# Patient Record
Sex: MALE | Race: WHITE | NOT HISPANIC OR LATINO | Employment: OTHER | URBAN - METROPOLITAN AREA
[De-identification: names, ages, dates, MRNs, and addresses within clinical notes are randomized per-mention and may not be internally consistent; named-entity substitution may affect disease eponyms.]

---

## 2017-03-20 ENCOUNTER — TRANSCRIBE ORDERS (OUTPATIENT)
Dept: ADMINISTRATIVE | Facility: HOSPITAL | Age: 77
End: 2017-03-20

## 2017-03-20 ENCOUNTER — APPOINTMENT (OUTPATIENT)
Dept: LAB | Facility: HOSPITAL | Age: 77
End: 2017-03-20
Attending: INTERNAL MEDICINE
Payer: COMMERCIAL

## 2017-03-20 DIAGNOSIS — D64.9 ANEMIA, UNSPECIFIED: ICD-10-CM

## 2017-03-20 DIAGNOSIS — I10 ESSENTIAL HYPERTENSION, MALIGNANT: ICD-10-CM

## 2017-03-20 DIAGNOSIS — E78.2 MIXED HYPERLIPIDEMIA: ICD-10-CM

## 2017-03-20 DIAGNOSIS — E55.9 UNSPECIFIED VITAMIN D DEFICIENCY: ICD-10-CM

## 2017-03-20 DIAGNOSIS — I10 ESSENTIAL HYPERTENSION, MALIGNANT: Primary | ICD-10-CM

## 2017-03-20 LAB
25(OH)D3 SERPL-MCNC: 28.7 NG/ML (ref 30–100)
ALT SERPL W P-5'-P-CCNC: 22 U/L (ref 12–78)
ANION GAP SERPL CALCULATED.3IONS-SCNC: 5 MMOL/L (ref 4–13)
BUN SERPL-MCNC: 14 MG/DL (ref 5–25)
CALCIUM SERPL-MCNC: 8.9 MG/DL (ref 8.3–10.1)
CHLORIDE SERPL-SCNC: 104 MMOL/L (ref 100–108)
CHOLEST SERPL-MCNC: 121 MG/DL (ref 50–200)
CO2 SERPL-SCNC: 32 MMOL/L (ref 21–32)
CREAT SERPL-MCNC: 0.99 MG/DL (ref 0.6–1.3)
GFR SERPL CREATININE-BSD FRML MDRD: >60 ML/MIN/1.73SQ M
GLUCOSE P FAST SERPL-MCNC: 99 MG/DL (ref 65–99)
HCT VFR BLD AUTO: 43 % (ref 42–52)
HDLC SERPL-MCNC: 56 MG/DL (ref 40–60)
HGB BLD-MCNC: 14.2 G/DL (ref 14–18)
LDLC SERPL CALC-MCNC: 53 MG/DL (ref 0–100)
POTASSIUM SERPL-SCNC: 4.5 MMOL/L (ref 3.5–5.3)
SODIUM SERPL-SCNC: 141 MMOL/L (ref 136–145)
TRIGL SERPL-MCNC: 62 MG/DL

## 2017-03-20 PROCEDURE — 82306 VITAMIN D 25 HYDROXY: CPT

## 2017-03-20 PROCEDURE — 80061 LIPID PANEL: CPT | Performed by: INTERNAL MEDICINE

## 2017-03-20 PROCEDURE — 85014 HEMATOCRIT: CPT

## 2017-03-20 PROCEDURE — 85018 HEMOGLOBIN: CPT

## 2017-03-20 PROCEDURE — 36415 COLL VENOUS BLD VENIPUNCTURE: CPT

## 2017-03-20 PROCEDURE — 80048 BASIC METABOLIC PNL TOTAL CA: CPT

## 2017-03-20 PROCEDURE — 84460 ALANINE AMINO (ALT) (SGPT): CPT

## 2017-06-19 ENCOUNTER — APPOINTMENT (OUTPATIENT)
Dept: LAB | Facility: HOSPITAL | Age: 77
End: 2017-06-19
Attending: INTERNAL MEDICINE
Payer: COMMERCIAL

## 2017-06-19 ENCOUNTER — TRANSCRIBE ORDERS (OUTPATIENT)
Dept: ADMINISTRATIVE | Facility: HOSPITAL | Age: 77
End: 2017-06-19

## 2017-06-19 DIAGNOSIS — E78.00 PURE HYPERCHOLESTEROLEMIA: ICD-10-CM

## 2017-06-19 DIAGNOSIS — I10 ESSENTIAL HYPERTENSION, MALIGNANT: ICD-10-CM

## 2017-06-19 DIAGNOSIS — I10 ESSENTIAL HYPERTENSION, MALIGNANT: Primary | ICD-10-CM

## 2017-06-19 DIAGNOSIS — Z79.899 ENCOUNTER FOR LONG-TERM (CURRENT) USE OF OTHER MEDICATIONS: ICD-10-CM

## 2017-06-19 LAB
ALT SERPL W P-5'-P-CCNC: 22 U/L (ref 12–78)
ANION GAP SERPL CALCULATED.3IONS-SCNC: 6 MMOL/L (ref 4–13)
BUN SERPL-MCNC: 13 MG/DL (ref 5–25)
CALCIUM SERPL-MCNC: 8.9 MG/DL (ref 8.3–10.1)
CHLORIDE SERPL-SCNC: 104 MMOL/L (ref 100–108)
CHOLEST SERPL-MCNC: 114 MG/DL (ref 50–200)
CO2 SERPL-SCNC: 30 MMOL/L (ref 21–32)
CREAT SERPL-MCNC: 0.95 MG/DL (ref 0.6–1.3)
GFR SERPL CREATININE-BSD FRML MDRD: >60 ML/MIN/1.73SQ M
GLUCOSE P FAST SERPL-MCNC: 99 MG/DL (ref 65–99)
HDLC SERPL-MCNC: 51 MG/DL (ref 40–60)
LDLC SERPL CALC-MCNC: 48 MG/DL (ref 0–100)
POTASSIUM SERPL-SCNC: 4.3 MMOL/L (ref 3.5–5.3)
SODIUM SERPL-SCNC: 140 MMOL/L (ref 136–145)
TRIGL SERPL-MCNC: 77 MG/DL

## 2017-06-19 PROCEDURE — 36415 COLL VENOUS BLD VENIPUNCTURE: CPT

## 2017-06-19 PROCEDURE — 80048 BASIC METABOLIC PNL TOTAL CA: CPT

## 2017-06-19 PROCEDURE — 80061 LIPID PANEL: CPT | Performed by: INTERNAL MEDICINE

## 2017-06-19 PROCEDURE — 84460 ALANINE AMINO (ALT) (SGPT): CPT

## 2017-09-18 ENCOUNTER — APPOINTMENT (OUTPATIENT)
Dept: LAB | Facility: HOSPITAL | Age: 77
End: 2017-09-18
Attending: INTERNAL MEDICINE
Payer: COMMERCIAL

## 2017-09-18 ENCOUNTER — TRANSCRIBE ORDERS (OUTPATIENT)
Dept: ADMINISTRATIVE | Facility: HOSPITAL | Age: 77
End: 2017-09-18

## 2017-09-18 DIAGNOSIS — F31.9 BIPOLAR DISORDER, UNSPECIFIED (HCC): ICD-10-CM

## 2017-09-18 DIAGNOSIS — I10 ESSENTIAL HYPERTENSION, MALIGNANT: Primary | ICD-10-CM

## 2017-09-18 DIAGNOSIS — E78.00 PURE HYPERCHOLESTEROLEMIA: ICD-10-CM

## 2017-09-18 DIAGNOSIS — I10 ESSENTIAL HYPERTENSION, MALIGNANT: ICD-10-CM

## 2017-09-18 LAB
ALT SERPL W P-5'-P-CCNC: 19 U/L (ref 12–78)
ANION GAP SERPL CALCULATED.3IONS-SCNC: 5 MMOL/L (ref 4–13)
BUN SERPL-MCNC: 16 MG/DL (ref 5–25)
CALCIUM SERPL-MCNC: 8.9 MG/DL (ref 8.3–10.1)
CHLORIDE SERPL-SCNC: 106 MMOL/L (ref 100–108)
CHOLEST SERPL-MCNC: 126 MG/DL (ref 50–200)
CO2 SERPL-SCNC: 30 MMOL/L (ref 21–32)
CREAT SERPL-MCNC: 0.92 MG/DL (ref 0.6–1.3)
GFR SERPL CREATININE-BSD FRML MDRD: 80 ML/MIN/1.73SQ M
GLUCOSE P FAST SERPL-MCNC: 97 MG/DL (ref 65–99)
HDLC SERPL-MCNC: 50 MG/DL (ref 40–60)
LDLC SERPL CALC-MCNC: 63 MG/DL (ref 0–100)
POTASSIUM SERPL-SCNC: 4.1 MMOL/L (ref 3.5–5.3)
SODIUM SERPL-SCNC: 141 MMOL/L (ref 136–145)
TRIGL SERPL-MCNC: 66 MG/DL

## 2017-09-18 PROCEDURE — 36415 COLL VENOUS BLD VENIPUNCTURE: CPT

## 2017-09-18 PROCEDURE — 80061 LIPID PANEL: CPT

## 2017-09-18 PROCEDURE — 84460 ALANINE AMINO (ALT) (SGPT): CPT

## 2017-09-18 PROCEDURE — 80048 BASIC METABOLIC PNL TOTAL CA: CPT | Performed by: INTERNAL MEDICINE

## 2017-12-21 ENCOUNTER — APPOINTMENT (OUTPATIENT)
Dept: LAB | Facility: HOSPITAL | Age: 77
End: 2017-12-21
Attending: INTERNAL MEDICINE
Payer: COMMERCIAL

## 2017-12-21 ENCOUNTER — TRANSCRIBE ORDERS (OUTPATIENT)
Dept: ADMINISTRATIVE | Facility: HOSPITAL | Age: 77
End: 2017-12-21

## 2017-12-21 DIAGNOSIS — E78.00 PURE HYPERCHOLESTEROLEMIA: ICD-10-CM

## 2017-12-21 DIAGNOSIS — I10 ESSENTIAL HYPERTENSION, MALIGNANT: ICD-10-CM

## 2017-12-21 DIAGNOSIS — I10 ESSENTIAL HYPERTENSION, MALIGNANT: Primary | ICD-10-CM

## 2017-12-21 LAB
ALBUMIN SERPL BCP-MCNC: 3.7 G/DL (ref 3.5–5)
ALP SERPL-CCNC: 75 U/L (ref 46–116)
ALT SERPL W P-5'-P-CCNC: 23 U/L (ref 12–78)
ANION GAP SERPL CALCULATED.3IONS-SCNC: 8 MMOL/L (ref 4–13)
AST SERPL W P-5'-P-CCNC: 23 U/L (ref 5–45)
BILIRUB SERPL-MCNC: 0.8 MG/DL (ref 0.2–1)
BUN SERPL-MCNC: 12 MG/DL (ref 5–25)
CALCIUM SERPL-MCNC: 9.1 MG/DL (ref 8.3–10.1)
CHLORIDE SERPL-SCNC: 103 MMOL/L (ref 100–108)
CHOLEST SERPL-MCNC: 126 MG/DL (ref 50–200)
CO2 SERPL-SCNC: 30 MMOL/L (ref 21–32)
CREAT SERPL-MCNC: 1.05 MG/DL (ref 0.6–1.3)
GFR SERPL CREATININE-BSD FRML MDRD: 68 ML/MIN/1.73SQ M
GLUCOSE P FAST SERPL-MCNC: 101 MG/DL (ref 65–99)
HDLC SERPL-MCNC: 55 MG/DL (ref 40–60)
LDLC SERPL CALC-MCNC: 60 MG/DL (ref 0–100)
POTASSIUM SERPL-SCNC: 4.7 MMOL/L (ref 3.5–5.3)
PROT SERPL-MCNC: 7 G/DL (ref 6.4–8.2)
SODIUM SERPL-SCNC: 141 MMOL/L (ref 136–145)
TRIGL SERPL-MCNC: 54 MG/DL

## 2017-12-21 PROCEDURE — 80061 LIPID PANEL: CPT

## 2017-12-21 PROCEDURE — 80053 COMPREHEN METABOLIC PANEL: CPT | Performed by: INTERNAL MEDICINE

## 2017-12-21 PROCEDURE — 36415 COLL VENOUS BLD VENIPUNCTURE: CPT | Performed by: INTERNAL MEDICINE

## 2018-03-23 ENCOUNTER — APPOINTMENT (OUTPATIENT)
Dept: LAB | Facility: HOSPITAL | Age: 78
End: 2018-03-23
Attending: INTERNAL MEDICINE
Payer: COMMERCIAL

## 2018-03-23 ENCOUNTER — TRANSCRIBE ORDERS (OUTPATIENT)
Dept: ADMINISTRATIVE | Facility: HOSPITAL | Age: 78
End: 2018-03-23

## 2018-03-23 DIAGNOSIS — I10 ESSENTIAL HYPERTENSION, MALIGNANT: ICD-10-CM

## 2018-03-23 DIAGNOSIS — E78.00 PURE HYPERCHOLESTEROLEMIA: ICD-10-CM

## 2018-03-23 DIAGNOSIS — E78.00 PURE HYPERCHOLESTEROLEMIA: Primary | ICD-10-CM

## 2018-03-23 DIAGNOSIS — D64.9 ANEMIA, UNSPECIFIED TYPE: ICD-10-CM

## 2018-03-23 DIAGNOSIS — E55.9 VITAMIN D DEFICIENCY DISEASE: ICD-10-CM

## 2018-03-23 LAB
25(OH)D3 SERPL-MCNC: 26.1 NG/ML (ref 30–100)
ALBUMIN SERPL BCP-MCNC: 3.4 G/DL (ref 3.5–5)
ALP SERPL-CCNC: 82 U/L (ref 46–116)
ALT SERPL W P-5'-P-CCNC: 23 U/L (ref 12–78)
ANION GAP SERPL CALCULATED.3IONS-SCNC: 6 MMOL/L (ref 4–13)
AST SERPL W P-5'-P-CCNC: 20 U/L (ref 5–45)
BILIRUB SERPL-MCNC: 0.9 MG/DL (ref 0.2–1)
BUN SERPL-MCNC: 14 MG/DL (ref 5–25)
CALCIUM SERPL-MCNC: 8.8 MG/DL (ref 8.3–10.1)
CHLORIDE SERPL-SCNC: 103 MMOL/L (ref 100–108)
CHOLEST SERPL-MCNC: 133 MG/DL (ref 50–200)
CO2 SERPL-SCNC: 31 MMOL/L (ref 21–32)
CREAT SERPL-MCNC: 1.01 MG/DL (ref 0.6–1.3)
GFR SERPL CREATININE-BSD FRML MDRD: 71 ML/MIN/1.73SQ M
GLUCOSE P FAST SERPL-MCNC: 99 MG/DL (ref 65–99)
HCT VFR BLD AUTO: 43.1 % (ref 42–52)
HDLC SERPL-MCNC: 52 MG/DL (ref 40–60)
HGB BLD-MCNC: 14.2 G/DL (ref 14–18)
LDLC SERPL CALC-MCNC: 66 MG/DL (ref 0–100)
POTASSIUM SERPL-SCNC: 4.7 MMOL/L (ref 3.5–5.3)
PROT SERPL-MCNC: 6.9 G/DL (ref 6.4–8.2)
SODIUM SERPL-SCNC: 140 MMOL/L (ref 136–145)
TRIGL SERPL-MCNC: 75 MG/DL

## 2018-03-23 PROCEDURE — 80053 COMPREHEN METABOLIC PANEL: CPT | Performed by: INTERNAL MEDICINE

## 2018-03-23 PROCEDURE — 85018 HEMOGLOBIN: CPT

## 2018-03-23 PROCEDURE — 36415 COLL VENOUS BLD VENIPUNCTURE: CPT

## 2018-03-23 PROCEDURE — 80061 LIPID PANEL: CPT | Performed by: INTERNAL MEDICINE

## 2018-03-23 PROCEDURE — 85014 HEMATOCRIT: CPT

## 2018-03-23 PROCEDURE — 82306 VITAMIN D 25 HYDROXY: CPT

## 2018-06-19 ENCOUNTER — APPOINTMENT (OUTPATIENT)
Dept: LAB | Facility: HOSPITAL | Age: 78
End: 2018-06-19
Attending: INTERNAL MEDICINE
Payer: COMMERCIAL

## 2018-06-19 ENCOUNTER — TRANSCRIBE ORDERS (OUTPATIENT)
Dept: ADMINISTRATIVE | Facility: HOSPITAL | Age: 78
End: 2018-06-19

## 2018-06-19 DIAGNOSIS — I10 ESSENTIAL HYPERTENSION, MALIGNANT: ICD-10-CM

## 2018-06-19 DIAGNOSIS — I10 ESSENTIAL HYPERTENSION, MALIGNANT: Primary | ICD-10-CM

## 2018-06-19 DIAGNOSIS — E78.00 PURE HYPERCHOLESTEROLEMIA: ICD-10-CM

## 2018-06-19 LAB
ALT SERPL W P-5'-P-CCNC: 18 U/L (ref 12–78)
ANION GAP SERPL CALCULATED.3IONS-SCNC: 7 MMOL/L (ref 4–13)
BUN SERPL-MCNC: 11 MG/DL (ref 5–25)
CALCIUM SERPL-MCNC: 8.9 MG/DL (ref 8.3–10.1)
CHLORIDE SERPL-SCNC: 107 MMOL/L (ref 100–108)
CHOLEST SERPL-MCNC: 123 MG/DL (ref 50–200)
CO2 SERPL-SCNC: 30 MMOL/L (ref 21–32)
CREAT SERPL-MCNC: 1.04 MG/DL (ref 0.6–1.3)
GFR SERPL CREATININE-BSD FRML MDRD: 68 ML/MIN/1.73SQ M
GLUCOSE P FAST SERPL-MCNC: 100 MG/DL (ref 65–99)
HDLC SERPL-MCNC: 50 MG/DL (ref 40–60)
LDLC SERPL CALC-MCNC: 60 MG/DL (ref 0–100)
NONHDLC SERPL-MCNC: 73 MG/DL
POTASSIUM SERPL-SCNC: 4.6 MMOL/L (ref 3.5–5.3)
SODIUM SERPL-SCNC: 144 MMOL/L (ref 136–145)
TRIGL SERPL-MCNC: 67 MG/DL

## 2018-06-19 PROCEDURE — 36415 COLL VENOUS BLD VENIPUNCTURE: CPT

## 2018-06-19 PROCEDURE — 80048 BASIC METABOLIC PNL TOTAL CA: CPT | Performed by: INTERNAL MEDICINE

## 2018-06-19 PROCEDURE — 80061 LIPID PANEL: CPT

## 2018-06-19 PROCEDURE — 84460 ALANINE AMINO (ALT) (SGPT): CPT

## 2018-12-17 ENCOUNTER — TRANSCRIBE ORDERS (OUTPATIENT)
Dept: ADMINISTRATIVE | Facility: HOSPITAL | Age: 78
End: 2018-12-17

## 2018-12-17 ENCOUNTER — APPOINTMENT (OUTPATIENT)
Dept: LAB | Facility: HOSPITAL | Age: 78
End: 2018-12-17
Attending: INTERNAL MEDICINE
Payer: COMMERCIAL

## 2018-12-17 DIAGNOSIS — I10 ESSENTIAL HYPERTENSION, MALIGNANT: Primary | ICD-10-CM

## 2018-12-17 DIAGNOSIS — I10 ESSENTIAL HYPERTENSION, MALIGNANT: ICD-10-CM

## 2018-12-17 DIAGNOSIS — E78.00 PURE HYPERCHOLESTEROLEMIA: ICD-10-CM

## 2018-12-17 LAB
ALBUMIN SERPL BCP-MCNC: 3.4 G/DL (ref 3.5–5)
ALP SERPL-CCNC: 81 U/L (ref 46–116)
ALT SERPL W P-5'-P-CCNC: 28 U/L (ref 12–78)
ANION GAP SERPL CALCULATED.3IONS-SCNC: 5 MMOL/L (ref 4–13)
AST SERPL W P-5'-P-CCNC: 16 U/L (ref 5–45)
BILIRUB SERPL-MCNC: 0.6 MG/DL (ref 0.2–1)
BUN SERPL-MCNC: 14 MG/DL (ref 5–25)
CALCIUM SERPL-MCNC: 8.6 MG/DL (ref 8.3–10.1)
CHLORIDE SERPL-SCNC: 104 MMOL/L (ref 100–108)
CHOLEST SERPL-MCNC: 120 MG/DL (ref 50–200)
CO2 SERPL-SCNC: 31 MMOL/L (ref 21–32)
CREAT SERPL-MCNC: 1.09 MG/DL (ref 0.6–1.3)
GFR SERPL CREATININE-BSD FRML MDRD: 65 ML/MIN/1.73SQ M
GLUCOSE P FAST SERPL-MCNC: 97 MG/DL (ref 65–99)
HDLC SERPL-MCNC: 49 MG/DL (ref 40–60)
LDLC SERPL CALC-MCNC: 60 MG/DL (ref 0–100)
NONHDLC SERPL-MCNC: 71 MG/DL
POTASSIUM SERPL-SCNC: 4.3 MMOL/L (ref 3.5–5.3)
PROT SERPL-MCNC: 6.8 G/DL (ref 6.4–8.2)
SODIUM SERPL-SCNC: 140 MMOL/L (ref 136–145)
TRIGL SERPL-MCNC: 57 MG/DL

## 2018-12-17 PROCEDURE — 80061 LIPID PANEL: CPT

## 2018-12-17 PROCEDURE — 80053 COMPREHEN METABOLIC PANEL: CPT

## 2018-12-17 PROCEDURE — 36415 COLL VENOUS BLD VENIPUNCTURE: CPT

## 2019-06-24 ENCOUNTER — APPOINTMENT (OUTPATIENT)
Dept: LAB | Facility: HOSPITAL | Age: 79
End: 2019-06-24
Attending: INTERNAL MEDICINE
Payer: COMMERCIAL

## 2019-06-24 ENCOUNTER — TRANSCRIBE ORDERS (OUTPATIENT)
Dept: ADMINISTRATIVE | Facility: HOSPITAL | Age: 79
End: 2019-06-24

## 2019-06-24 DIAGNOSIS — D63.8 CHRONIC DISEASE ANEMIA: ICD-10-CM

## 2019-06-24 DIAGNOSIS — I10 ESSENTIAL HYPERTENSION, MALIGNANT: Primary | ICD-10-CM

## 2019-06-24 DIAGNOSIS — E78.00 PURE HYPERCHOLESTEROLEMIA: ICD-10-CM

## 2019-06-24 DIAGNOSIS — I10 ESSENTIAL HYPERTENSION, MALIGNANT: ICD-10-CM

## 2019-06-24 LAB
ALBUMIN SERPL BCP-MCNC: 3.5 G/DL (ref 3.5–5)
ALP SERPL-CCNC: 88 U/L (ref 46–116)
ALT SERPL W P-5'-P-CCNC: 20 U/L (ref 12–78)
ANION GAP SERPL CALCULATED.3IONS-SCNC: 4 MMOL/L (ref 4–13)
AST SERPL W P-5'-P-CCNC: 17 U/L (ref 5–45)
BILIRUB SERPL-MCNC: 0.8 MG/DL (ref 0.2–1)
BUN SERPL-MCNC: 11 MG/DL (ref 5–25)
CALCIUM SERPL-MCNC: 8.6 MG/DL (ref 8.3–10.1)
CHLORIDE SERPL-SCNC: 105 MMOL/L (ref 100–108)
CHOLEST SERPL-MCNC: 121 MG/DL (ref 50–200)
CO2 SERPL-SCNC: 30 MMOL/L (ref 21–32)
CREAT SERPL-MCNC: 1.07 MG/DL (ref 0.6–1.3)
GFR SERPL CREATININE-BSD FRML MDRD: 66 ML/MIN/1.73SQ M
GLUCOSE P FAST SERPL-MCNC: 94 MG/DL (ref 65–99)
HCT VFR BLD AUTO: 42 % (ref 36.5–49.3)
HDLC SERPL-MCNC: 50 MG/DL (ref 40–60)
HGB BLD-MCNC: 13.6 G/DL (ref 12–17)
LDLC SERPL CALC-MCNC: 59 MG/DL (ref 0–100)
NONHDLC SERPL-MCNC: 71 MG/DL
POTASSIUM SERPL-SCNC: 4.5 MMOL/L (ref 3.5–5.3)
PROT SERPL-MCNC: 6.8 G/DL (ref 6.4–8.2)
SODIUM SERPL-SCNC: 139 MMOL/L (ref 136–145)
TRIGL SERPL-MCNC: 58 MG/DL

## 2019-06-24 PROCEDURE — 80061 LIPID PANEL: CPT

## 2019-06-24 PROCEDURE — 36415 COLL VENOUS BLD VENIPUNCTURE: CPT | Performed by: INTERNAL MEDICINE

## 2019-06-24 PROCEDURE — 80053 COMPREHEN METABOLIC PANEL: CPT | Performed by: INTERNAL MEDICINE

## 2019-06-24 PROCEDURE — 85018 HEMOGLOBIN: CPT

## 2019-06-24 PROCEDURE — 85014 HEMATOCRIT: CPT

## 2019-10-14 PROBLEM — I35.1 NONRHEUMATIC AORTIC VALVE INSUFFICIENCY: Status: ACTIVE | Noted: 2019-10-14

## 2019-10-14 PROBLEM — I10 ESSENTIAL HYPERTENSION: Status: ACTIVE | Noted: 2019-10-14

## 2019-10-14 PROBLEM — J30.9 ALLERGIC RHINITIS: Status: ACTIVE | Noted: 2019-10-14

## 2019-10-14 PROBLEM — H90.5 SENSORINEURAL HEARING LOSS (SNHL): Status: ACTIVE | Noted: 2019-10-14

## 2019-10-14 PROBLEM — H91.90 HEARING DEFICIT: Status: ACTIVE | Noted: 2019-10-14

## 2019-10-14 PROBLEM — E78.00 HYPERCHOLESTEREMIA: Status: ACTIVE | Noted: 2019-10-14

## 2019-10-14 PROBLEM — F42.2 MIXED OBSESSIONAL THOUGHTS AND ACTS: Status: ACTIVE | Noted: 2019-10-14

## 2019-10-14 PROBLEM — I35.8 AORTIC VALVE SCLEROSIS: Status: ACTIVE | Noted: 2019-10-14

## 2019-10-14 PROBLEM — F33.42 RECURRENT MAJOR DEPRESSIVE DISORDER, IN FULL REMISSION (HCC): Status: ACTIVE | Noted: 2019-10-14

## 2019-10-14 PROBLEM — I34.0 MITRAL REGURGITATION: Status: ACTIVE | Noted: 2019-10-14

## 2019-12-03 ENCOUNTER — ANESTHESIA EVENT (INPATIENT)
Dept: PERIOP | Facility: HOSPITAL | Age: 79
DRG: 480 | End: 2019-12-03
Payer: COMMERCIAL

## 2019-12-03 ENCOUNTER — HOSPITAL ENCOUNTER (INPATIENT)
Facility: HOSPITAL | Age: 79
LOS: 10 days | DRG: 480 | End: 2019-12-13
Attending: EMERGENCY MEDICINE | Admitting: INTERNAL MEDICINE
Payer: COMMERCIAL

## 2019-12-03 ENCOUNTER — APPOINTMENT (EMERGENCY)
Dept: RADIOLOGY | Facility: HOSPITAL | Age: 79
DRG: 480 | End: 2019-12-03
Payer: COMMERCIAL

## 2019-12-03 DIAGNOSIS — S72.90XA: Primary | ICD-10-CM

## 2019-12-03 DIAGNOSIS — I10 ESSENTIAL HYPERTENSION: ICD-10-CM

## 2019-12-03 DIAGNOSIS — S72.91XA FEMUR FRACTURE, RIGHT (HCC): ICD-10-CM

## 2019-12-03 DIAGNOSIS — N50.89 SCROTAL SWELLING: ICD-10-CM

## 2019-12-03 DIAGNOSIS — I26.99 PULMONARY EMBOLI (HCC): ICD-10-CM

## 2019-12-03 DIAGNOSIS — N17.9 ACUTE RENAL FAILURE (ARF) (HCC): ICD-10-CM

## 2019-12-03 DIAGNOSIS — I48.92 ATRIAL FLUTTER (HCC): ICD-10-CM

## 2019-12-03 DIAGNOSIS — N50.82 SCROTAL PAIN: ICD-10-CM

## 2019-12-03 DIAGNOSIS — S72.90XA: ICD-10-CM

## 2019-12-03 PROBLEM — D72.829 LEUKOCYTOSIS: Status: ACTIVE | Noted: 2019-12-03
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
## 2019-12-03 PROBLEM — I99.9 VASCULAR DISEASE: Status: ACTIVE | Noted: 2019-12-03

## 2019-12-03 LAB
ABO GROUP BLD: NORMAL
ANION GAP SERPL CALCULATED.3IONS-SCNC: 6 MMOL/L (ref 4–13)
APTT PPP: 25 SECONDS (ref 23–37)
BASOPHILS # BLD MANUAL: 0 THOUSAND/UL (ref 0–0.1)
BASOPHILS NFR MAR MANUAL: 0 % (ref 0–1)
BLD GP AB SCN SERPL QL: NEGATIVE
BUN SERPL-MCNC: 12 MG/DL (ref 5–25)
CALCIUM SERPL-MCNC: 8.7 MG/DL (ref 8.3–10.1)
CHLORIDE SERPL-SCNC: 104 MMOL/L (ref 100–108)
CO2 SERPL-SCNC: 29 MMOL/L (ref 21–32)
CREAT SERPL-MCNC: 1.08 MG/DL (ref 0.6–1.3)
EOSINOPHIL # BLD MANUAL: 0.16 THOUSAND/UL (ref 0–0.4)
EOSINOPHIL NFR BLD MANUAL: 1 % (ref 0–6)
ERYTHROCYTE [DISTWIDTH] IN BLOOD BY AUTOMATED COUNT: 13.2 % (ref 11.6–15.1)
GFR SERPL CREATININE-BSD FRML MDRD: 65 ML/MIN/1.73SQ M
GLUCOSE SERPL-MCNC: 122 MG/DL (ref 65–140)
HCT VFR BLD AUTO: 40.4 % (ref 36.5–49.3)
HGB BLD-MCNC: 13.4 G/DL (ref 12–17)
INR PPP: 1.02 (ref 0.84–1.19)
LYMPHOCYTES # BLD AUTO: 1.44 THOUSAND/UL (ref 0.6–4.47)
LYMPHOCYTES # BLD AUTO: 9 % (ref 14–44)
MCH RBC QN AUTO: 32.4 PG (ref 26.8–34.3)
MCHC RBC AUTO-ENTMCNC: 33.2 G/DL (ref 31.4–37.4)
MCV RBC AUTO: 98 FL (ref 82–98)
MONOCYTES # BLD AUTO: 0.64 THOUSAND/UL (ref 0–1.22)
MONOCYTES NFR BLD: 4 % (ref 4–12)
NEUTROPHILS # BLD MANUAL: 13.79 THOUSAND/UL (ref 1.85–7.62)
NEUTS BAND NFR BLD MANUAL: 19 % (ref 0–8)
NEUTS SEG NFR BLD AUTO: 67 % (ref 43–75)
NRBC BLD AUTO-RTO: 0 /100 WBCS
PLATELET # BLD AUTO: 256 THOUSANDS/UL (ref 149–390)
PLATELET BLD QL SMEAR: ADEQUATE
PMV BLD AUTO: 9.3 FL (ref 8.9–12.7)
POTASSIUM SERPL-SCNC: 4.9 MMOL/L (ref 3.5–5.3)
PROTHROMBIN TIME: 13.5 SECONDS (ref 11.6–14.5)
RBC # BLD AUTO: 4.13 MILLION/UL (ref 3.88–5.62)
RBC MORPH BLD: NORMAL
RH BLD: POSITIVE
SODIUM SERPL-SCNC: 139 MMOL/L (ref 136–145)
SPECIMEN EXPIRATION DATE: NORMAL
TOTAL CELLS COUNTED SPEC: 100
WBC # BLD AUTO: 16.04 THOUSAND/UL (ref 4.31–10.16)

## 2019-12-03 PROCEDURE — 86850 RBC ANTIBODY SCREEN: CPT | Performed by: EMERGENCY MEDICINE

## 2019-12-03 PROCEDURE — 73502 X-RAY EXAM HIP UNI 2-3 VIEWS: CPT

## 2019-12-03 PROCEDURE — 71046 X-RAY EXAM CHEST 2 VIEWS: CPT

## 2019-12-03 PROCEDURE — 99223 1ST HOSP IP/OBS HIGH 75: CPT | Performed by: INTERNAL MEDICINE

## 2019-12-03 PROCEDURE — 86920 COMPATIBILITY TEST SPIN: CPT

## 2019-12-03 PROCEDURE — 80048 BASIC METABOLIC PNL TOTAL CA: CPT | Performed by: EMERGENCY MEDICINE

## 2019-12-03 PROCEDURE — 99285 EMERGENCY DEPT VISIT HI MDM: CPT

## 2019-12-03 PROCEDURE — 36415 COLL VENOUS BLD VENIPUNCTURE: CPT | Performed by: EMERGENCY MEDICINE

## 2019-12-03 PROCEDURE — 85007 BL SMEAR W/DIFF WBC COUNT: CPT | Performed by: EMERGENCY MEDICINE

## 2019-12-03 PROCEDURE — 85730 THROMBOPLASTIN TIME PARTIAL: CPT | Performed by: EMERGENCY MEDICINE

## 2019-12-03 PROCEDURE — 86901 BLOOD TYPING SEROLOGIC RH(D): CPT | Performed by: EMERGENCY MEDICINE

## 2019-12-03 PROCEDURE — 96374 THER/PROPH/DIAG INJ IV PUSH: CPT

## 2019-12-03 PROCEDURE — 93005 ELECTROCARDIOGRAM TRACING: CPT

## 2019-12-03 PROCEDURE — 85027 COMPLETE CBC AUTOMATED: CPT | Performed by: EMERGENCY MEDICINE

## 2019-12-03 PROCEDURE — 85610 PROTHROMBIN TIME: CPT | Performed by: EMERGENCY MEDICINE

## 2019-12-03 PROCEDURE — 73552 X-RAY EXAM OF FEMUR 2/>: CPT

## 2019-12-03 PROCEDURE — 86900 BLOOD TYPING SEROLOGIC ABO: CPT | Performed by: EMERGENCY MEDICINE

## 2019-12-03 PROCEDURE — 99285 EMERGENCY DEPT VISIT HI MDM: CPT | Performed by: EMERGENCY MEDICINE

## 2019-12-03 RX ORDER — ACETAMINOPHEN 325 MG/1
650 TABLET ORAL EVERY 6 HOURS PRN
Status: DISCONTINUED | OUTPATIENT
Start: 2019-12-03 | End: 2019-12-13 | Stop reason: HOSPADM

## 2019-12-03 RX ORDER — SODIUM CHLORIDE 9 MG/ML
50 INJECTION, SOLUTION INTRAVENOUS CONTINUOUS
Status: DISCONTINUED | OUTPATIENT
Start: 2019-12-03 | End: 2019-12-04

## 2019-12-03 RX ORDER — ONDANSETRON 2 MG/ML
4 INJECTION INTRAMUSCULAR; INTRAVENOUS EVERY 6 HOURS PRN
Status: DISCONTINUED | OUTPATIENT
Start: 2019-12-03 | End: 2019-12-04 | Stop reason: SDUPTHER

## 2019-12-03 RX ORDER — NIACIN 100 MG
500 TABLET ORAL
Status: DISCONTINUED | OUTPATIENT
Start: 2019-12-04 | End: 2019-12-13 | Stop reason: HOSPADM

## 2019-12-03 RX ORDER — LISINOPRIL 10 MG/1
10 TABLET ORAL DAILY
Status: DISCONTINUED | OUTPATIENT
Start: 2019-12-03 | End: 2019-12-05

## 2019-12-03 RX ORDER — HEPARIN SODIUM 5000 [USP'U]/ML
5000 INJECTION, SOLUTION INTRAVENOUS; SUBCUTANEOUS EVERY 8 HOURS SCHEDULED
Status: DISCONTINUED | OUTPATIENT
Start: 2019-12-03 | End: 2019-12-03

## 2019-12-03 RX ORDER — ASPIRIN 81 MG/1
81 TABLET ORAL DAILY
Status: DISCONTINUED | OUTPATIENT
Start: 2019-12-04 | End: 2019-12-07

## 2019-12-03 RX ORDER — FENTANYL CITRATE 50 UG/ML
1 INJECTION, SOLUTION INTRAMUSCULAR; INTRAVENOUS ONCE
Status: COMPLETED | OUTPATIENT
Start: 2019-12-03 | End: 2019-12-03

## 2019-12-03 RX ORDER — HYDRALAZINE HYDROCHLORIDE 20 MG/ML
5 INJECTION INTRAMUSCULAR; INTRAVENOUS EVERY 4 HOURS PRN
Status: DISCONTINUED | OUTPATIENT
Start: 2019-12-03 | End: 2019-12-13

## 2019-12-03 RX ORDER — ATORVASTATIN CALCIUM 10 MG/1
5 TABLET, FILM COATED ORAL DAILY
Status: DISCONTINUED | OUTPATIENT
Start: 2019-12-03 | End: 2019-12-13 | Stop reason: HOSPADM

## 2019-12-03 RX ORDER — CITALOPRAM 20 MG/1
40 TABLET ORAL DAILY
Status: DISCONTINUED | OUTPATIENT
Start: 2019-12-04 | End: 2019-12-13 | Stop reason: HOSPADM

## 2019-12-03 RX ORDER — FENTANYL CITRATE 50 UG/ML
50 INJECTION, SOLUTION INTRAMUSCULAR; INTRAVENOUS ONCE
Status: COMPLETED | OUTPATIENT
Start: 2019-12-03 | End: 2019-12-03

## 2019-12-03 RX ORDER — HEPARIN SODIUM 5000 [USP'U]/ML
5000 INJECTION, SOLUTION INTRAVENOUS; SUBCUTANEOUS EVERY 8 HOURS SCHEDULED
Status: DISCONTINUED | OUTPATIENT
Start: 2019-12-04 | End: 2019-12-04

## 2019-12-03 RX ORDER — OXYCODONE HYDROCHLORIDE AND ACETAMINOPHEN 5; 325 MG/1; MG/1
1 TABLET ORAL EVERY 4 HOURS PRN
Status: DISCONTINUED | OUTPATIENT
Start: 2019-12-03 | End: 2019-12-04

## 2019-12-03 RX ADMIN — SODIUM CHLORIDE 50 ML/HR: 0.9 INJECTION, SOLUTION INTRAVENOUS at 15:39

## 2019-12-03 RX ADMIN — FENTANYL CITRATE 50 MCG: 50 INJECTION, SOLUTION INTRAMUSCULAR; INTRAVENOUS at 12:15

## 2019-12-03 RX ADMIN — ATORVASTATIN CALCIUM 5 MG: 10 TABLET, FILM COATED ORAL at 15:38

## 2019-12-03 RX ADMIN — OXYCODONE HYDROCHLORIDE AND ACETAMINOPHEN 1 TABLET: 5; 325 TABLET ORAL at 15:39

## 2019-12-03 NOTE — ED PROVIDER NOTES
History  Chief Complaint   Patient presents with    Hip Injury     Brought in by EMS, fell getting out of car, external rotation and shortening RLE, no head strike, no LOC, no thinners, strong pulses  70-year-old male presenting for evaluation of right hip pain after a fall  Fall current just prior to arrival   Patient states he was getting out of his car when he lost his balance and fell landing on his right side, striking his right hip on the edge of the curb  Denies striking his head  No other areas of injury  Pain is localized to the right hip, does not radiate, worse with movement  Denies any numbness or weakness  Has not been able to bear weight since fall  No prior hip injuries/surgeries  Patient denies striking his head  On 81 mg aspirin, no other blood thinners  Received 50 mcg fentanyl EN route with EMS and has had significant improvement of pain  70-year-old male with right hip injury after a fall, will get x-ray to assess for osseous abnormality, preop lab/EKG/CXR          Prior to Admission Medications   Prescriptions Last Dose Informant Patient Reported? Taking? Multiple Vitamin (MULTIVITAMIN) tablet 12/3/2019 at Unknown time  Yes Yes   Sig: Take 1 tablet by mouth daily   aspirin (ECOTRIN LOW STRENGTH) 81 mg EC tablet 12/3/2019 at Unknown time  Yes Yes   Sig: Take 81 mg by mouth daily   atorvastatin (LIPITOR) 10 mg tablet 12/2/2019 at Unknown time  Yes Yes   Sig: Take 5 mg by mouth daily    citalopram (CeleXA) 40 mg tablet 12/2/2019 at Unknown time  Yes Yes   Sig: Take 40 mg by mouth daily   fosinopril (MONOPRIL) 10 mg tablet 12/3/2019 at Unknown time  No Yes   Sig: Take 1 tablet (10 mg total) by mouth daily   niacin 500 mg tablet 12/3/2019 at Unknown time  Yes Yes   Sig: Take 500 mg by mouth daily with breakfast      Facility-Administered Medications: None       Past Medical History:   Diagnosis Date    Hyperlipidemia        History reviewed   No pertinent surgical history  History reviewed  No pertinent family history  I have reviewed and agree with the history as documented  Social History     Tobacco Use    Smoking status: Never Smoker    Smokeless tobacco: Never Used   Substance Use Topics    Alcohol use: Never     Frequency: Never    Drug use: Never        Review of Systems   Constitutional: Negative for chills, fever and unexpected weight change  HENT: Negative for ear pain, rhinorrhea and sore throat  Eyes: Negative for pain and visual disturbance  Respiratory: Negative for cough and shortness of breath  Cardiovascular: Negative for chest pain and leg swelling  Gastrointestinal: Negative for abdominal pain, constipation, diarrhea, nausea and vomiting  Endocrine: Negative for polydipsia, polyphagia and polyuria  Genitourinary: Negative for dysuria, frequency, hematuria and urgency  Musculoskeletal: Negative for back pain, myalgias and neck pain         + right hip/thigh pain   Skin: Negative for color change and rash  Allergic/Immunologic: Negative for environmental allergies and immunocompromised state  Neurological: Negative for dizziness, weakness, light-headedness, numbness and headaches  Hematological: Negative for adenopathy  Does not bruise/bleed easily  Psychiatric/Behavioral: Negative for agitation and confusion  All other systems reviewed and are negative  Physical Exam  Physical Exam   Constitutional: He is oriented to person, place, and time  He appears well-developed and well-nourished  HENT:   Head: Normocephalic and atraumatic  Nose: Nose normal    Mouth/Throat: Oropharynx is clear and moist    Eyes: Conjunctivae and EOM are normal    Neck: Normal range of motion  Neck supple  Cardiovascular: Normal rate, regular rhythm, normal heart sounds and intact distal pulses  Pulmonary/Chest: Effort normal and breath sounds normal  No stridor  No respiratory distress  He has no wheezes  He has no rales   He exhibits no tenderness  Abdominal: Soft  He exhibits no distension  There is no tenderness  There is no rebound and no guarding  Musculoskeletal: He exhibits tenderness and deformity  He exhibits no edema  No tenderness to palpation over bilateral hips, significant tenderness to palpation over right proximal/mid thigh with deformity, distally neurovascularly intact with palpable DP/PT pulses, normal cap refill, sensation grossly intact   Neurological: He is alert and oriented to person, place, and time  He exhibits normal muscle tone  Coordination normal    Skin: Skin is warm and dry  No rash noted  Psychiatric: He has a normal mood and affect  Judgment and thought content normal    Nursing note and vitals reviewed        Vital Signs  ED Triage Vitals [12/03/19 1128]   Temperature Pulse Respirations Blood Pressure SpO2   97 9 °F (36 6 °C) 69 18 (!) 171/77 96 %      Temp Source Heart Rate Source Patient Position - Orthostatic VS BP Location FiO2 (%)   Oral Monitor Lying Right arm --      Pain Score       4           Vitals:    12/03/19 1128 12/03/19 1215 12/03/19 1337 12/03/19 1421   BP: (!) 171/77 166/74 149/66 126/76   Pulse: 69 61 87 72   Patient Position - Orthostatic VS: Lying Lying Lying Lying         Visual Acuity      ED Medications  Medications   aspirin (ECOTRIN LOW STRENGTH) EC tablet 81 mg (has no administration in time range)   atorvastatin (LIPITOR) tablet 5 mg (has no administration in time range)   citalopram (CeleXA) tablet 40 mg (has no administration in time range)   lisinopril (ZESTRIL) tablet 10 mg (10 mg Oral Not Given 12/3/19 1448)   niacin tablet 500 mg (has no administration in time range)   ondansetron (ZOFRAN) injection 4 mg (has no administration in time range)   sodium chloride 0 9 % infusion (has no administration in time range)   heparin (porcine) subcutaneous injection 5,000 Units (has no administration in time range)   acetaminophen (TYLENOL) tablet 650 mg (has no administration in time range)   oxyCODONE-acetaminophen (PERCOCET) 5-325 mg per tablet 1 tablet (has no administration in time range)   morphine injection 2 mg (has no administration in time range)   hydrALAZINE (APRESOLINE) injection 5 mg (has no administration in time range)   fentanyl citrate (PF) (FOR EMS ONLY) 100 mcg/2 mL injection 100 mcg (0 mcg Does not apply Given to EMS 12/3/19 1124)   fentanyl citrate (PF) 100 MCG/2ML 50 mcg (50 mcg Intravenous Given 12/3/19 1215)       Diagnostic Studies  Results Reviewed     Procedure Component Value Units Date/Time    CBC and differential [[id number]]  (Abnormal) Collected:  12/03/19 1214    Lab Status:  Final result Specimen:  Blood from Arm, Left Updated:  12/03/19 1252     WBC 16 04 Thousand/uL      RBC 4 13 Million/uL      Hemoglobin 13 4 g/dL      Hematocrit 40 4 %      MCV 98 fL      MCH 32 4 pg      MCHC 33 2 g/dL      RDW 13 2 %      MPV 9 3 fL      Platelets 355 Thousands/uL      nRBC 0 /100 WBCs     Narrative: This is an appended report  These results have been appended to a previously verified report      APTT [[id number]]  (Normal) Collected:  12/03/19 1214    Lab Status:  Final result Specimen:  Blood from Arm, Left Updated:  12/03/19 1244     PTT 25 seconds     Protime-INR [[id number]]  (Normal) Collected:  12/03/19 1214    Lab Status:  Final result Specimen:  Blood from Arm, Left Updated:  12/03/19 1244     Protime 13 5 seconds      INR 6 46    Basic metabolic panel [[id number]] Collected:  12/03/19 1214    Lab Status:  Final result Specimen:  Blood from Arm, Left Updated:  12/03/19 1235     Sodium 139 mmol/L      Potassium 4 9 mmol/L      Chloride 104 mmol/L      CO2 29 mmol/L      ANION GAP 6 mmol/L      BUN 12 mg/dL      Creatinine 1 08 mg/dL      Glucose 122 mg/dL      Calcium 8 7 mg/dL      eGFR 65 ml/min/1 73sq m     Narrative:       [facility] guidelines for Chronic Kidney Disease (CKD):     Stage 1 with normal or high GFR (GFR > 90 mL/min/1 73 square meters)    Stage 2 Mild CKD (GFR = 60-89 mL/min/1 73 square meters)    Stage 3A Moderate CKD (GFR = 45-59 mL/min/1 73 square meters)    Stage 3B Moderate CKD (GFR = 30-44 mL/min/1 73 square meters)    Stage 4 Severe CKD (GFR = 15-29 mL/min/1 73 square meters)    Stage 5 End Stage CKD (GFR <15 mL/min/1 73 square meters)  Note: GFR calculation is accurate only with a steady state creatinine                 XR hip/pelv 2-3 vws right   Final Result by Sol Gregory MD (12/03 1442)      Comminuted intertrochanteric femoral fracture extending into the subtrochanteric femur  Workstation performed: [id number]         XR femur 2 views RIGHT   Final Result by Leonie Perez MD (12/03 1440)      Comminuted intertrochanteric femoral fracture extending into the subtrochanteric femur  Workstation performed: [id number]         XR chest 2 views   Final Result by Leonie Perez MD (12/03 1441)      No acute cardiopulmonary disease  Multilevel age indeterminate compression deformities of multiple thoracic vertebral bodies          Workstation performed: [id number]                    Procedures  Procedures         ED Course  ED Course as of Dec 03 1506   Tue Dec 03, 2019   1217 EKG: sinus @ 56 bpm, normal axis, normal intervals, no ST/t wave changes      1253 Last PO 7:30 am      1259 Discussed with ortho- vinicio, recommends SLIM admit/clearance, possible OR today vs  tomorrow                                  MDM  Number of Diagnoses or Management Options  Closed femur fracture [facility]):   Diagnosis management comments: 63-year-old male status post mechanical fall with right femur fracture, case discussed with Orthopedic surgery, admitted to slim       Amount and/or Complexity of Data Reviewed  Clinical lab tests: ordered and reviewed  Tests in the radiology section of CPT®: ordered and reviewed  Tests in the medicine section of CPT®: ordered and reviewed  Review and summarize past medical records: yes  Discuss the patient with other providers: yes (Orthopedic surgery)  Independent visualization of images, tracings, or specimens: yes          Disposition  Final diagnoses:   Closed femur fracture ([address] )     Time reflects when diagnosis was documented in both MDM as applicable and the Disposition within this note     Time User Action Codes Description Comment    12/3/2019  1:20 PM Wild BLANC Add [S72 [id number]] Closed femur fracture [facility])       ED Disposition     ED Disposition Condition Date/Time Comment    Admit Stable Tue Dec 3, 2019  1:01 PM Case was discussed with MINERVA and the patient's admission status was agreed to be Admission Status: inpatient status to the service of Dr Josh Stroud    None         Current Discharge Medication List      CONTINUE these medications which have NOT CHANGED    Details   aspirin (ECOTRIN LOW STRENGTH) 81 mg EC tablet Take 81 mg by mouth daily      atorvastatin (LIPITOR) 10 mg tablet Take 5 mg by mouth daily       citalopram (CeleXA) 40 mg tablet Take 40 mg by mouth daily      fosinopril (MONOPRIL) 10 mg tablet Take 1 tablet (10 mg total) by mouth daily  Qty: 90 tablet, Refills: 1    Associated Diagnoses: Essential hypertension      Multiple Vitamin (MULTIVITAMIN) tablet Take 1 tablet by mouth daily      niacin 500 mg tablet Take 500 mg by mouth daily with breakfast           No discharge procedures on file      ED Provider  Electronically Signed by           Idania Johns DO  12/03/19 5662

## 2019-12-03 NOTE — ASSESSMENT & PLAN NOTE
Status post mechanical fall  Patient tripped over curb while getting out of car  X-ray imaging revealed a right-sided femur fracture-official read pending on admission  ED spoke with orthopedic team who plans to take patient to the OR today  Hemoglobin on admission noted to be 13 4  Pain control- Percocet p r n-moderate pain and morphine p r n-severe pain  Orthopedics plans to take patient to OR today 12/4/19  PT/OT after surgery    Surgical clearance-patient denies history of CVA/TIA, CAD, prior stent, or history of PE  Currently without any active chest pain, shortness of breath or palpitations  No preceding symptoms prior to the fall  EKG without acute findings  Official clearance from attending to follow

## 2019-12-03 NOTE — PLAN OF CARE
Problem: Prexisting or High Potential for Compromised Skin Integrity  Goal: Skin integrity is maintained or improved  Description  INTERVENTIONS:  - Identify patients at risk for skin breakdown  - Assess and monitor skin integrity  - Assess and monitor nutrition and hydration status  - Monitor labs   - Assess for incontinence   - Turn and reposition patient  - Assist with mobility/ambulation  - Relieve pressure over bony prominences  - Avoid friction and shearing  - Provide appropriate hygiene as needed including keeping skin clean and dry  - Evaluate need for skin moisturizer/barrier cream  - Collaborate with interdisciplinary team   - Patient/family teaching  - Consider wound care consult   Outcome: Progressing     Problem: Potential for Falls  Goal: Patient will remain free of falls  Description  INTERVENTIONS:  - Assess patient frequently for physical needs  -  Identify cognitive and physical deficits and behaviors that affect risk of falls    -  [place] fall precautions as indicated by assessment   - Educate patient/family on patient safety including physical limitations  - Instruct patient to call for assistance with activity based on assessment  - Modify environment to reduce risk of injury  - Consider OT/PT consult to assist with strengthening/mobility  Outcome: Progressing

## 2019-12-03 NOTE — ASSESSMENT & PLAN NOTE
Home regimen of Monopril 10 mg daily  Substitute for formulary lisinopril here  Has some elevated blood pressures upon arrival and 171/77, 166/74  Has trended down without intervention  Likely secondary to pain  Will continue to monitor    Add prn hydralazine for SBP>160

## 2019-12-03 NOTE — ASSESSMENT & PLAN NOTE
Status post mechanical fall while getting out of car  No preceding symptoms  Patient did not hit his head or lose consciousness  He landed on his right hip  Unable to ambulate    Plan for OR to fix right femur fracture  Fall precautions  PT/OT

## 2019-12-03 NOTE — H&P
Addendum:  Notified by Dr Rudolph Durant of Orthopedics  She plans to take the patient to the OR tomorrow 12/4/19 for fixation  Patient may eat now  NPO after midnight  Family updated at bedside  Continue pain control  H&P- Gin Guaman 1940, 78 y o  male MRN: [id number]    Unit/Bed#: E2 -01 Encounter: [id number]    Primary Care Provider: Wilson Vogt MD   Date and time admitted to hospital: 12/3/2019 11:20 AM      * Femur fracture, right [facility])  Assessment & Plan  Status post mechanical fall  Patient tripped over curb while getting out of car  X-ray imaging revealed a right-sided femur fracture-official read pending on admission  ED spoke with orthopedic team who plans to take patient to the OR today  Hemoglobin on admission noted to be 13 4  Has been NPO since 07/30 this morning  Continue NPO  Pain control- Percocet p r n-moderate pain and morphine p r n-severe pain  Orthopedics consulted  PT/OT after surgery    Surgical clearance-patient denies history of CVA/TIA, CAD, prior stent, or history of PE  Currently without any active chest pain, shortness of breath or palpitations  No preceding symptoms prior to the fall  EKG without acute findings  Official clearance from attending to follow  Leukocytosis  Assessment & Plan  WBC 16 04  No signs of acute infection  Continue to trend  Fall  Assessment & Plan  Status post mechanical fall while getting out of car  No preceding symptoms  Patient did not hit his head or lose consciousness  He landed on his right hip  Unable to ambulate  Plan for OR to fix right femur fracture  Fall precautions  PT/OT    Vascular disease  Assessment & Plan  According to most recent PCP note from 10/14/19 patient does have valvular disease  Noted to have mild-to-moderate aortic regurgitation, mild mitral regurgitation, and a normal ejection fraction on stress test from 3/19/18 and echocardiogram from 3/20/18      Hyperlipidemia  Assessment & Plan  On atorvastatin 5 mg daily    Recurrent major depressive disorder, in full remission ([facility] [place] [address] )  Assessment & Plan  Continue Celexa    Essential hypertension  Assessment & Plan  Home regimen of Monopril 10 mg daily  Substitute for formulary lisinopril here  Has some elevated blood pressures upon arrival and 171/77, 166/74  Has trended down without intervention and most recent is 149/66  Likely secondary to pain  Will continue to monitor  Sensorineural hearing loss (SNHL)  Assessment & Plan  Hard of hearing  Has hearing aids in place  Will give to wife to take home      VTE Prophylaxis: Enoxaparin (Lovenox)  / sequential compression device   Code Status:  Full code  Anticipated Length of Stay:  Patient will be admitted on an Inpatient basis with an anticipated length of stay of  greater than 2 midnights  Justification for Hospital Stay:  Femur fracture with need for further OR fixation    Chief Complaint:   Status post fall    History of Present Illness:    Maggi Segal is a 78 y o  male with past medical history of essential hypertension, anxiety/depression, hyperlipidemia, sensorineural hearing loss, valvular disease, chronic back pain  Patient presents status post fall  Him and his wife were going to visit their granddaughter  He was getting out of the passenger side car door when he tripped over the near by curb  Patient fell and landed on his right hip  He did not his head or lose consciousness  Denies any preceding symptoms such as shortness of breath, chest pain, lightheadedness, dizziness, headache  Wife was in the  seat when patient fell  She immediately got out to help and called EMS  At baseline patient does not use a cane or walker to ambulate  He is a fairly active 51-year-old  He does see a chiropractor once a week for chronic back problems but does not take any chronic pain medications  No history of smoking, drinking, or alcohol consumption    History was obtained with the help of wife and 2 daughters at bedside- updated at length  Review of Systems:    General:   No Fever or chills; + right leg pain with movement   EENT:   No ear pain, facial swelling; No sneezing, sore throat  Skin:   No rashes, color changes  Respiratory:     No shortness of breath, cough,    Cardiovascular:     No chest pain, palpitations  Gastrointestinal:    No nausea, vomiting, diarrhea; No abdominal pain  Musculoskeletal:     No arthralgias, myalgias, swelling  Neurologic:   No dizziness, numbness, weakness  No speech difficulties  Psych:   No agitation,     Otherwise, All other twelve-point review of systems normal      Past Medical and Surgical History:     Past Medical History:   Diagnosis Date    Hyperlipidemia        History reviewed  No pertinent surgical history      Meds/Allergies:    Current Facility-Administered Medications   Medication Dose Route Frequency Provider Last Rate Last Dose    acetaminophen (TYLENOL) tablet 650 mg  650 mg Oral Q6H PRN David Garcia PA-C        [START ON 12/4/2019] aspirin (ECOTRIN LOW STRENGTH) EC tablet 81 mg  81 mg Oral Daily Laila Bautista PA-C        atorvastatin (LIPITOR) tablet 5 mg  5 mg Oral Daily [facility] [place]        [START ON 12/4/2019] citalopram (CeleXA) tablet 40 mg  40 mg Oral Daily [facility] [place]        heparin (porcine) subcutaneous injection 5,000 Units  5,000 Units Subcutaneous Q8H [address] Laila Bautista PA-C        lisinopril (ZESTRIL) tablet 10 mg  10 mg Oral Daily [facility] [place]        [START ON 12/4/2019] niacin tablet 500 mg  500 mg Oral Daily With Breakfast Laila Bautista PA-C        ondansetron (ZOFRAN) injection 4 mg  4 mg Intravenous Q6H PRN [facility] [place]        sodium chloride 0 9 % infusion  50 mL/hr Intravenous Continuous [facility] [place]           No Known Allergies    Allergies: No Known Allergies    Social History:     Marital Status: /Civil Union     Substance Use History:   Social History Substance and Sexual Activity   Alcohol Use Never    Frequency: Never     Social History     Tobacco Use   Smoking Status Never Smoker   Smokeless Tobacco Never Used     Social History     Substance and Sexual Activity   Drug Use Never       Family History:    non-contributory    Physical Exam:     Vitals:   Blood Pressure: 126/76 (12/03/19 1421)  Pulse: 72 (12/03/19 1421)  Temperature: 98 8 °F (37 1 °C) (12/03/19 1421)  Temp Source: Tympanic (12/03/19 1421)  Respirations: 18 (12/03/19 1421)  Height: 5' 9" (175 3 cm) (12/03/19 1421)  Weight - Scale: 83 9 kg (185 lb) (12/03/19 1421)  SpO2: 97 % (12/03/19 1421)    Physical Exam   Constitutional: He is oriented to person, place, and time  He appears well-developed and well-nourished  HENT:   Head: Normocephalic and atraumatic  Cardiovascular: Normal rate and regular rhythm  Murmur heard  Pulmonary/Chest: Effort normal and breath sounds normal  No stridor  No respiratory distress  He has no wheezes  He has no rales  He exhibits no tenderness  Abdominal: Soft  Bowel sounds are normal  He exhibits no distension and no mass  There is no tenderness  There is no rebound and no guarding  No hernia  Musculoskeletal:   Right leg externally rotated  Sensation intact to lower extremities bilaterally  Neurological: He is alert and oriented to person, place, and time  Skin: Skin is warm and dry  Nursing note and vitals reviewed  Additional Data:     Lab Results: I have personally reviewed pertinent reports         Results from last 7 days   Lab Units 12/03/19  1214   WBC Thousand/uL 16 04*   HEMOGLOBIN g/dL 13 4   HEMATOCRIT % 40 4   PLATELETS Thousands/uL 256   LYMPHO PCT % 9*   MONO PCT % 4   EOS PCT % 1     Results from last 7 days   Lab Units 12/03/19  1214   POTASSIUM mmol/L 4 9   CHLORIDE mmol/L 104   CO2 mmol/L 29   BUN mg/dL 12   CREATININE mg/dL 1 08   CALCIUM mg/dL 8 7     Results from last 7 days   Lab Units 12/03/19  1214   INR  1 02 Imaging: I have personally reviewed pertinent films in PACS    No results found  EKG, Pathology, and Other Studies Reviewed on Admission:   · EK sinus    [employer] Records Reviewed: Yes     Total Time for Visit, including Counseling / Coordination of Care: 45 minutes  Greater than 50% of this total time spent on direct patient counseling and coordination of care  ** Please Note: This note has been constructed using a voice recognition system   **

## 2019-12-03 NOTE — ASSESSMENT & PLAN NOTE
Status post mechanical fall  Patient tripped over curb while getting out of car  X-ray imaging revealed a right-sided femur fracture-official read pending on admission  ED spoke with orthopedic team who plans to take patient to the OR today  Has been NPO since 07/30 this morning  Continue NPO  Pain control- Percocet p r n-moderate pain and morphine p r n-severe pain  Orthopedics consulted  PT/OT after surgery    Surgical clearance-patient denies history of CVA/TIA, CAD, prior stent, or history of PE  Currently without any active chest pain, shortness of breath or palpitations  No preceding symptoms prior to the fall  EKG without acute findings  Official clearance from attending to follow

## 2019-12-03 NOTE — ASSESSMENT & PLAN NOTE
According to most recent PCP note from 10/14/19 patient does have valvular disease  Noted to have mild-to-moderate aortic regurgitation, mild mitral regurgitation, and a normal ejection fraction on stress test from 3/19/18 and echocardiogram from 3/20/18

## 2019-12-03 NOTE — ANESTHESIA PREPROCEDURE EVALUATION
Review of Systems/Medical History  Patient summary reviewed  Chart reviewed  No history of anesthetic complications     Cardiovascular  Hyperlipidemia, Hypertension , Valvular heart disease (AR, MR) ,    Pulmonary  Negative pulmonary ROS        GI/Hepatic  Negative GI/hepatic ROS               Endo/Other     GYN       Hematology  Negative hematology ROS      Musculoskeletal  Back pain ,   Comment: Right femur fracture      Neurology  Negative neurology ROS      Psychology   Psychiatric history (OCD), Depression ,              Physical Exam    Airway      TM Distance: >3 FB  Neck ROM: full     Dental       Cardiovascular  Rhythm: regular, Rate: normal, Murmur, Cardiovascular exam normal    Pulmonary  Pulmonary exam normal     Other Findings        Anesthesia Plan  ASA Score- 2     Anesthesia Type- general with ASA Monitors  Additional Monitors:   Airway Plan:         Plan Factors-    Induction- intravenous  Postoperative Plan-     Informed Consent- Anesthetic plan and risks discussed with patient

## 2019-12-04 ENCOUNTER — APPOINTMENT (INPATIENT)
Dept: RADIOLOGY | Facility: HOSPITAL | Age: 79
DRG: 480 | End: 2019-12-04
Payer: COMMERCIAL

## 2019-12-04 ENCOUNTER — ANESTHESIA (INPATIENT)
Dept: PERIOP | Facility: HOSPITAL | Age: 79
DRG: 480 | End: 2019-12-04
Payer: COMMERCIAL

## 2019-12-04 LAB
ANION GAP SERPL CALCULATED.3IONS-SCNC: 10 MMOL/L (ref 4–13)
BUN SERPL-MCNC: 24 MG/DL (ref 5–25)
CALCIUM SERPL-MCNC: 7.9 MG/DL (ref 8.3–10.1)
CHLORIDE SERPL-SCNC: 104 MMOL/L (ref 100–108)
CO2 SERPL-SCNC: 25 MMOL/L (ref 21–32)
CREAT SERPL-MCNC: 1.81 MG/DL (ref 0.6–1.3)
ERYTHROCYTE [DISTWIDTH] IN BLOOD BY AUTOMATED COUNT: 13.4 % (ref 11.6–15.1)
ERYTHROCYTE [DISTWIDTH] IN BLOOD BY AUTOMATED COUNT: 13.9 % (ref 11.6–15.1)
GFR SERPL CREATININE-BSD FRML MDRD: 35 ML/MIN/1.73SQ M
GLUCOSE SERPL-MCNC: 165 MG/DL (ref 65–140)
HCT VFR BLD AUTO: 27.7 % (ref 36.5–49.3)
HCT VFR BLD AUTO: 35 % (ref 36.5–49.3)
HGB BLD-MCNC: 11.5 G/DL (ref 12–17)
HGB BLD-MCNC: 8.9 G/DL (ref 12–17)
MCH RBC QN AUTO: 32.2 PG (ref 26.8–34.3)
MCH RBC QN AUTO: 33.2 PG (ref 26.8–34.3)
MCHC RBC AUTO-ENTMCNC: 32.1 G/DL (ref 31.4–37.4)
MCHC RBC AUTO-ENTMCNC: 32.9 G/DL (ref 31.4–37.4)
MCV RBC AUTO: 103 FL (ref 82–98)
MCV RBC AUTO: 98 FL (ref 82–98)
PLATELET # BLD AUTO: 205 THOUSANDS/UL (ref 149–390)
PLATELET # BLD AUTO: 247 THOUSANDS/UL (ref 149–390)
PMV BLD AUTO: 9.6 FL (ref 8.9–12.7)
PMV BLD AUTO: 9.8 FL (ref 8.9–12.7)
POTASSIUM SERPL-SCNC: 4.8 MMOL/L (ref 3.5–5.3)
RBC # BLD AUTO: 2.68 MILLION/UL (ref 3.88–5.62)
RBC # BLD AUTO: 3.57 MILLION/UL (ref 3.88–5.62)
SODIUM SERPL-SCNC: 139 MMOL/L (ref 136–145)
WBC # BLD AUTO: 12.27 THOUSAND/UL (ref 4.31–10.16)
WBC # BLD AUTO: 15.36 THOUSAND/UL (ref 4.31–10.16)

## 2019-12-04 PROCEDURE — 30233N1 TRANSFUSION OF NONAUTOLOGOUS RED BLOOD CELLS INTO PERIPHERAL VEIN, PERCUTANEOUS APPROACH: ICD-10-PCS | Performed by: INTERNAL MEDICINE

## 2019-12-04 PROCEDURE — 73552 X-RAY EXAM OF FEMUR 2/>: CPT

## 2019-12-04 PROCEDURE — C1713 ANCHOR/SCREW BN/BN,TIS/BN: HCPCS | Performed by: ORTHOPAEDIC SURGERY

## 2019-12-04 PROCEDURE — 85027 COMPLETE CBC AUTOMATED: CPT | Performed by: PHYSICIAN ASSISTANT

## 2019-12-04 PROCEDURE — C1769 GUIDE WIRE: HCPCS | Performed by: ORTHOPAEDIC SURGERY

## 2019-12-04 PROCEDURE — 80048 BASIC METABOLIC PNL TOTAL CA: CPT | Performed by: PHYSICIAN ASSISTANT

## 2019-12-04 PROCEDURE — 99232 SBSQ HOSP IP/OBS MODERATE 35: CPT | Performed by: PHYSICIAN ASSISTANT

## 2019-12-04 PROCEDURE — 27245 TREAT THIGH FRACTURE: CPT | Performed by: ORTHOPAEDIC SURGERY

## 2019-12-04 PROCEDURE — 99024 POSTOP FOLLOW-UP VISIT: CPT | Performed by: ORTHOPAEDIC SURGERY

## 2019-12-04 PROCEDURE — 99223 1ST HOSP IP/OBS HIGH 75: CPT | Performed by: ORTHOPAEDIC SURGERY

## 2019-12-04 PROCEDURE — 0QS604Z REPOSITION RIGHT UPPER FEMUR WITH INTERNAL FIXATION DEVICE, OPEN APPROACH: ICD-10-PCS | Performed by: ORTHOPAEDIC SURGERY

## 2019-12-04 PROCEDURE — 27245 TREAT THIGH FRACTURE: CPT | Performed by: PHYSICIAN ASSISTANT

## 2019-12-04 DEVICE — 11MM/130 DEG TI CANN TROCH FIXATION NAIL 400MM/RIGHT-STER: Type: IMPLANTABLE DEVICE | Site: LEG | Status: FUNCTIONAL

## 2019-12-04 DEVICE — 11.0MM TI TROCH FIXATION NAIL SCREW/110MM - STERILE: Type: IMPLANTABLE DEVICE | Site: LEG | Status: FUNCTIONAL

## 2019-12-04 DEVICE — 5.0MM TI LOCKING SCREW 50MM- FOR IM NAILS-STERILE: Type: IMPLANTABLE DEVICE | Site: LEG | Status: FUNCTIONAL

## 2019-12-04 RX ORDER — ONDANSETRON 2 MG/ML
4 INJECTION INTRAMUSCULAR; INTRAVENOUS ONCE AS NEEDED
Status: DISCONTINUED | OUTPATIENT
Start: 2019-12-04 | End: 2019-12-04 | Stop reason: HOSPADM

## 2019-12-04 RX ORDER — ONDANSETRON 2 MG/ML
INJECTION INTRAMUSCULAR; INTRAVENOUS AS NEEDED
Status: DISCONTINUED | OUTPATIENT
Start: 2019-12-04 | End: 2019-12-04 | Stop reason: SURG

## 2019-12-04 RX ORDER — FENTANYL CITRATE 50 UG/ML
INJECTION, SOLUTION INTRAMUSCULAR; INTRAVENOUS AS NEEDED
Status: DISCONTINUED | OUTPATIENT
Start: 2019-12-04 | End: 2019-12-04 | Stop reason: SURG

## 2019-12-04 RX ORDER — HYDROMORPHONE HCL/PF 1 MG/ML
0.5 SYRINGE (ML) INJECTION
Status: DISCONTINUED | OUTPATIENT
Start: 2019-12-04 | End: 2019-12-04 | Stop reason: HOSPADM

## 2019-12-04 RX ORDER — NEOSTIGMINE METHYLSULFATE 1 MG/ML
INJECTION INTRAVENOUS AS NEEDED
Status: DISCONTINUED | OUTPATIENT
Start: 2019-12-04 | End: 2019-12-04 | Stop reason: SURG

## 2019-12-04 RX ORDER — PANTOPRAZOLE SODIUM 40 MG/1
40 TABLET, DELAYED RELEASE ORAL
Status: DISCONTINUED | OUTPATIENT
Start: 2019-12-05 | End: 2019-12-13 | Stop reason: HOSPADM

## 2019-12-04 RX ORDER — EPHEDRINE SULFATE 50 MG/ML
INJECTION INTRAVENOUS AS NEEDED
Status: DISCONTINUED | OUTPATIENT
Start: 2019-12-04 | End: 2019-12-04 | Stop reason: SURG

## 2019-12-04 RX ORDER — GLYCOPYRROLATE 0.2 MG/ML
INJECTION INTRAMUSCULAR; INTRAVENOUS AS NEEDED
Status: DISCONTINUED | OUTPATIENT
Start: 2019-12-04 | End: 2019-12-04 | Stop reason: SURG

## 2019-12-04 RX ORDER — CEFAZOLIN SODIUM 2 G/50ML
SOLUTION INTRAVENOUS AS NEEDED
Status: DISCONTINUED | OUTPATIENT
Start: 2019-12-04 | End: 2019-12-04 | Stop reason: SURG

## 2019-12-04 RX ORDER — ROCURONIUM BROMIDE 10 MG/ML
INJECTION, SOLUTION INTRAVENOUS AS NEEDED
Status: DISCONTINUED | OUTPATIENT
Start: 2019-12-04 | End: 2019-12-04 | Stop reason: SURG

## 2019-12-04 RX ORDER — DOCUSATE SODIUM 100 MG/1
100 CAPSULE, LIQUID FILLED ORAL 2 TIMES DAILY
Status: DISCONTINUED | OUTPATIENT
Start: 2019-12-04 | End: 2019-12-13

## 2019-12-04 RX ORDER — CEFAZOLIN SODIUM 1 G/50ML
1000 SOLUTION INTRAVENOUS EVERY 8 HOURS
Status: COMPLETED | OUTPATIENT
Start: 2019-12-04 | End: 2019-12-05

## 2019-12-04 RX ORDER — SENNOSIDES 8.6 MG
1 TABLET ORAL DAILY
Status: DISCONTINUED | OUTPATIENT
Start: 2019-12-05 | End: 2019-12-13

## 2019-12-04 RX ORDER — OXYCODONE HYDROCHLORIDE AND ACETAMINOPHEN 5; 325 MG/1; MG/1
2 TABLET ORAL EVERY 4 HOURS PRN
Status: DISCONTINUED | OUTPATIENT
Start: 2019-12-04 | End: 2019-12-13

## 2019-12-04 RX ORDER — FENTANYL CITRATE/PF 50 MCG/ML
50 SYRINGE (ML) INJECTION
Status: DISCONTINUED | OUTPATIENT
Start: 2019-12-04 | End: 2019-12-04 | Stop reason: HOSPADM

## 2019-12-04 RX ORDER — PROPOFOL 10 MG/ML
INJECTION, EMULSION INTRAVENOUS AS NEEDED
Status: DISCONTINUED | OUTPATIENT
Start: 2019-12-04 | End: 2019-12-04 | Stop reason: SURG

## 2019-12-04 RX ORDER — ONDANSETRON 2 MG/ML
4 INJECTION INTRAMUSCULAR; INTRAVENOUS EVERY 6 HOURS PRN
Status: DISCONTINUED | OUTPATIENT
Start: 2019-12-04 | End: 2019-12-13

## 2019-12-04 RX ORDER — CALCIUM CARBONATE 200(500)MG
1000 TABLET,CHEWABLE ORAL DAILY PRN
Status: DISCONTINUED | OUTPATIENT
Start: 2019-12-04 | End: 2019-12-13 | Stop reason: HOSPADM

## 2019-12-04 RX ORDER — OXYCODONE HYDROCHLORIDE 5 MG/1
2.5 TABLET ORAL EVERY 4 HOURS PRN
Status: DISCONTINUED | OUTPATIENT
Start: 2019-12-04 | End: 2019-12-13 | Stop reason: HOSPADM

## 2019-12-04 RX ORDER — SODIUM CHLORIDE, SODIUM LACTATE, POTASSIUM CHLORIDE, CALCIUM CHLORIDE 600; 310; 30; 20 MG/100ML; MG/100ML; MG/100ML; MG/100ML
75 INJECTION, SOLUTION INTRAVENOUS CONTINUOUS
Status: DISCONTINUED | OUTPATIENT
Start: 2019-12-04 | End: 2019-12-05

## 2019-12-04 RX ORDER — ACETAMINOPHEN 325 MG/1
650 TABLET ORAL EVERY 6 HOURS PRN
Status: DISCONTINUED | OUTPATIENT
Start: 2019-12-04 | End: 2019-12-04 | Stop reason: SDUPTHER

## 2019-12-04 RX ORDER — MEPERIDINE HYDROCHLORIDE 50 MG/ML
12.5 INJECTION INTRAMUSCULAR; INTRAVENOUS; SUBCUTANEOUS ONCE AS NEEDED
Status: DISCONTINUED | OUTPATIENT
Start: 2019-12-04 | End: 2019-12-04 | Stop reason: HOSPADM

## 2019-12-04 RX ADMIN — SODIUM CHLORIDE: 0.9 INJECTION, SOLUTION INTRAVENOUS at 17:19

## 2019-12-04 RX ADMIN — FENTANYL CITRATE 50 MCG: 50 INJECTION, SOLUTION INTRAMUSCULAR; INTRAVENOUS at 15:46

## 2019-12-04 RX ADMIN — ROCURONIUM BROMIDE 50 MG: 50 INJECTION, SOLUTION INTRAVENOUS at 15:04

## 2019-12-04 RX ADMIN — CEFAZOLIN SODIUM 1000 MG: 1 SOLUTION INTRAVENOUS at 23:00

## 2019-12-04 RX ADMIN — PHENYLEPHRINE HYDROCHLORIDE 100 MCG: 10 INJECTION INTRAVENOUS at 17:00

## 2019-12-04 RX ADMIN — PHENYLEPHRINE HYDROCHLORIDE 100 MCG: 10 INJECTION INTRAVENOUS at 15:21

## 2019-12-04 RX ADMIN — PROPOFOL 150 MG: 10 INJECTION, EMULSION INTRAVENOUS at 15:04

## 2019-12-04 RX ADMIN — SODIUM CHLORIDE, SODIUM LACTATE, POTASSIUM CHLORIDE, AND CALCIUM CHLORIDE 75 ML/HR: .6; .31; .03; .02 INJECTION, SOLUTION INTRAVENOUS at 18:58

## 2019-12-04 RX ADMIN — FENTANYL CITRATE 100 MCG: 50 INJECTION, SOLUTION INTRAMUSCULAR; INTRAVENOUS at 15:04

## 2019-12-04 RX ADMIN — FENTANYL CITRATE 50 MCG: 50 INJECTION, SOLUTION INTRAMUSCULAR; INTRAVENOUS at 16:40

## 2019-12-04 RX ADMIN — PHENYLEPHRINE HYDROCHLORIDE 200 MCG: 10 INJECTION INTRAVENOUS at 15:09

## 2019-12-04 RX ADMIN — ATORVASTATIN CALCIUM 5 MG: 10 TABLET, FILM COATED ORAL at 08:34

## 2019-12-04 RX ADMIN — PHENYLEPHRINE HYDROCHLORIDE 100 MCG: 10 INJECTION INTRAVENOUS at 16:55

## 2019-12-04 RX ADMIN — OXYCODONE HYDROCHLORIDE AND ACETAMINOPHEN 1 TABLET: 5; 325 TABLET ORAL at 03:24

## 2019-12-04 RX ADMIN — EPHEDRINE SULFATE 5 MG: 50 INJECTION, SOLUTION INTRAVENOUS at 16:06

## 2019-12-04 RX ADMIN — GLYCOPYRROLATE 0.4 MG: 0.2 INJECTION INTRAMUSCULAR; INTRAVENOUS at 18:11

## 2019-12-04 RX ADMIN — PHENYLEPHRINE HYDROCHLORIDE 40 MCG/MIN: 10 INJECTION INTRAVENOUS at 15:21

## 2019-12-04 RX ADMIN — LIDOCAINE HYDROCHLORIDE 80 MG: 20 INJECTION, SOLUTION INTRAVENOUS at 15:04

## 2019-12-04 RX ADMIN — LISINOPRIL 10 MG: 10 TABLET ORAL at 08:34

## 2019-12-04 RX ADMIN — CITALOPRAM HYDROBROMIDE 40 MG: 20 TABLET ORAL at 08:34

## 2019-12-04 RX ADMIN — NEOSTIGMINE METHYLSULFATE 3 MG: 1 INJECTION, SOLUTION INTRAVENOUS at 18:11

## 2019-12-04 RX ADMIN — ROCURONIUM BROMIDE 10 MG: 50 INJECTION, SOLUTION INTRAVENOUS at 16:55

## 2019-12-04 RX ADMIN — DOCUSATE SODIUM 100 MG: 100 CAPSULE, LIQUID FILLED ORAL at 20:59

## 2019-12-04 RX ADMIN — ROCURONIUM BROMIDE 20 MG: 50 INJECTION, SOLUTION INTRAVENOUS at 15:46

## 2019-12-04 RX ADMIN — ONDANSETRON 4 MG: 2 INJECTION INTRAMUSCULAR; INTRAVENOUS at 16:51

## 2019-12-04 RX ADMIN — PHENYLEPHRINE HYDROCHLORIDE 100 MCG: 10 INJECTION INTRAVENOUS at 18:07

## 2019-12-04 RX ADMIN — CEFAZOLIN SODIUM 2000 MG: 2 SOLUTION INTRAVENOUS at 15:02

## 2019-12-04 RX ADMIN — SODIUM CHLORIDE: 0.9 INJECTION, SOLUTION INTRAVENOUS at 15:44

## 2019-12-04 RX ADMIN — PHENYLEPHRINE HYDROCHLORIDE 200 MCG: 10 INJECTION INTRAVENOUS at 15:13

## 2019-12-04 RX ADMIN — OXYCODONE HYDROCHLORIDE AND ACETAMINOPHEN 1 TABLET: 5; 325 TABLET ORAL at 08:37

## 2019-12-04 NOTE — UTILIZATION REVIEW
Initial Clinical Review    Admission: Date/Time/Statement: Inpatient Admission Orders (From admission, onward)     Ordered        12/03/19 1329  Inpatient Admission  Once                   Orders Placed This Encounter   Procedures    Inpatient Admission     Standing Status:   Standing     Number of Occurrences:   1     Order Specific Question:   Admitting Physician     Answer:   Katty Barriga [[id number]]     Order Specific Question:   Level of Care     Answer:   Med Surg [[id number]]     Order Specific Question:   Estimated length of stay     Answer:   More than 2 Midnights     Order Specific Question:   Certification     Answer:   I certify that inpatient services are medically necessary for this patient for a duration of greater than two midnights  See H&P and MD Progress Notes for additional information about the patient's course of treatment  ED Arrival Information     Expected Arrival Acuity Means of Arrival Escorted By Service Admission Type    - 12/3/2019 11:20 Urgent Ambulance [facility] Urgent    Arrival Complaint    fall        Chief Complaint   Patient presents with    Hip Injury     Brought in by EMS, fell getting out of car, external rotation and shortening RLE, no head strike, no LOC, no thinners, strong pulses  Assessment/Plan:    78 Y O male presents to ED  From home  Via  EMS after  Tripping  While getting out of the car, and landed on  His  R side  No LOC  Denies  Any  Preceding symptoms  Patient  Normally  Ambulates  Independently  X rays  Revealed a right  Sided femur fracture  PMH  Is  Vascular disease,  Recurrent  Major depression, essential  Hypertension and  Sensorineural hearing loss  Admit  Ip with    Fracture  R femur and plan is   ortho  Consult, surgical  Intervention, pain control, PT/OT post op  And fall precautions  Per Ortho consult:     Pt is  Status  Post fall with   rightIntertrochanteric femur fracture extending into the subtrochanteric femur    Plan OR  12/4  ED Triage Vitals [12/03/19 1128]   Temperature Pulse Respirations Blood Pressure SpO2   97 9 °F (36 6 °C) 69 18 (!) 171/77 96 %      Temp Source Heart Rate Source Patient Position - Orthostatic VS BP Location FiO2 (%)   Oral Monitor Lying Right arm --      Pain Score       4        Wt Readings from Last 1 Encounters:   12/03/19 83 9 kg (185 lb)     Additional Vital Signs:   12/04/19 0700  98 9 °F (37 2 °C)  102  18  145/64  94 %  None (Room air)  Lying   12/03/19 2300  98 3 °F (36 8 °C)  98  18  119/58  95 %  None (Room air)  Lying   12/03/19 1421  98 8 °F (37 1 °C)  72  18  126/76  97 %  None (Room air)  Lying   12/03/19 1337    87  17  149/66  96 %  None (Room air)  Lying   12/03/19 1215    61  18  166/74  98 %  None (Room air)  Lying   12/03/19 1131            None (Room air)     12/03/19 1128  97 9 °F (36 6 °C)  69  18  171/77Abnormal   96 %  None (Room air)  Lying         Pertinent Labs/Diagnostic Test Results:   EKG  No acute  changes  X ray  R  Hip/pelvis  ( 12/3)     Comminuted intertrochanteric femoral fracture extending into the subtrochanteric femur  X ray  R  Femur   (  12/3)    Comminuted intertrochanteric femoral fracture extending into the subtrochanteric femur  CXR  ( 12/3)     No acute cardiopulmonary disease      Multilevel age indeterminate compression deformities of multiple thoracic vertebral bodies     Results from last 7 days   Lab Units 12/04/19  0424 12/03/19  1214   WBC Thousand/uL 12 27* 16 04*   HEMOGLOBIN g/dL 11 5* 13 4   HEMATOCRIT % 35 0* 40 4   PLATELETS Thousands/uL 247 256   BANDS PCT %  --  19*         Results from last 7 days   Lab Units 12/04/19  0423 12/03/19  1214   SODIUM mmol/L 139 139   POTASSIUM mmol/L 4 8 4 9   CHLORIDE mmol/L 104 104   CO2 mmol/L 25 29   ANION GAP mmol/L 10 6   BUN mg/dL 24 12   CREATININE mg/dL 1 81* 1 08   EGFR ml/min/1 73sq m 35 65   CALCIUM mg/dL 7 9* 8 7             Results from last 7 days   Lab Units 12/04/19  0427 12/03/19  1214   GLUCOSE RANDOM mg/dL 165* 122         Results from last 7 days   Lab Units 12/03/19  1214   PROTIME seconds 13 5   INR  1 02   PTT seconds 25           Results from last 7 days   Lab Units 12/03/19  1214   TOTAL COUNTED  100       ED Treatment:   Medication Administration from 12/03/2019 1120 to 12/03/2019 1407       Date/Time Order Dose Route Action Comments     12/03/2019 1124 fentanyl citrate (PF) (FOR EMS ONLY) 100 mcg/2 mL injection 100 mcg 0 mcg Does not apply Given to EMS      12/03/2019 1215 fentanyl citrate (PF) 100 MCG/2ML 50 mcg 50 mcg Intravenous Given         Present on Admission:   Essential hypertension   Hyperlipidemia   Recurrent major depressive disorder, in full remission ([facility] [address] )   Sensorineural hearing loss (SNHL)   Vascular disease   Femur fracture, right ([facility] [address] )   Fall   Leukocytosis      Admitting Diagnosis: Hip injury [S79 919A]  Closed femur fracture ([facility] [address] ) [S72 90XA]  Age/Sex: 78 y o  male  Admission Orders:  Scheduled Medications:    Medications:  aspirin 81 mg Oral Daily   atorvastatin 5 mg Oral Daily   citalopram 40 mg Oral Daily   heparin (porcine) 5,000 Units Subcutaneous Q8H [address]   lisinopril 10 mg Oral Daily   niacin 500 mg Oral Daily With Breakfast     Continuous IV Infusions:    sodium chloride 50 mL/hr Intravenous Continuous     PRN Meds:    acetaminophen 650 mg Oral Q6H PRN   hydrALAZINE 5 mg Intravenous Q4H PRN   morphine injection 2 mg Intravenous Q4H PRN   ondansetron 4 mg Intravenous Q6H PRN   oxyCODONE-acetaminophen 1 tablet Oral Q4H PRN       IP CONSULT TO ORTHOPEDIC SURGERY  IP CONSULT TO CASE MANAGEMENT    Network Utilization Review Department  [email or web address]  ATTENTION: Please call with any questions or concerns to [phone] and carefully listen to the prompts so that you are directed to the right person   All voicemails are confidential   Lois Baumann all requests for admission clinical reviews, approved or denied determinations and any other requests to dedicated fax number below belonging to the campus where the patient is receiving treatment   List of dedicated fax numbers for the Facilities:  [address] [place] [address] DENIALS (Administrative/Medical Necessity) [phone]   [address]  (Maternity/NICU/Pediatrics) [phone]   Rajwinder Prather [phone]   Abby Montaño [phone]   [address] [place] [address] [phone]   [address] [phone]   [facility] [phone]   Mylinda Reveal [address] [facility] [phone]

## 2019-12-04 NOTE — CONSULTS
Orthopedics   Tor Challenger 78 y o  male MRN: [id number]  Unit/Bed#: E2 -01      Chief Complaint:   right hip pain    HPI:   78 y o  male community ambulator status post Fall on the curb complaining of right hip and thigh pain and inability to bear weight  Pain is well localized to the hip and is made worse with motion or contact to the area  Denies numbness or tingling  Patient denies any history of fracture or previous injury to either leg at this time  He is denying any distal numbness or tingling at this time  Review Of Systems:   · Skin: Normal  · Neuro: See HPI  · Musculoskeletal: See HPI  · 14 point review of systems negative except as stated above     Past Medical History:   Past Medical History:   Diagnosis Date    Hyperlipidemia        Past Surgical History:   History reviewed  No pertinent surgical history  Family History:  Family history reviewed and non-contributory  History reviewed  No pertinent family history      Social History:  Social History     Socioeconomic History    Marital status: /Civil Union     Spouse name: None    Number of children: None    Years of education: None    Highest education level: None   Occupational History    None   Social Needs    Financial resource strain: None    Food insecurity:     Worry: None     Inability: None    Transportation needs:     Medical: None     Non-medical: None   Tobacco Use    Smoking status: Never Smoker    Smokeless tobacco: Never Used   Substance and Sexual Activity    Alcohol use: Never     Frequency: Never    Drug use: Never    Sexual activity: None   Lifestyle    Physical activity:     Days per week: None     Minutes per session: None    Stress: None   Relationships    Social connections:     Talks on phone: None     Gets together: None     Attends Anabaptist service: None     Active member of club or organization: None     Attends meetings of clubs or organizations: None     Relationship status: None    Intimate partner violence:     Fear of current or ex partner: None     Emotionally abused: None     Physically abused: None     Forced sexual activity: None   Other Topics Concern    None   Social History Narrative    None       Allergies:   No Known Allergies        Labs:  0   Lab Value Date/Time    HCT 35 0 (L) 12/04/2019 0424    HCT 40 4 12/03/2019 1214    HCT 42 0 06/24/2019 0707    HCT 43 3 12/21/2015 0720    HGB 11 5 (L) 12/04/2019 0424    HGB 13 4 12/03/2019 1214    HGB 13 6 06/24/2019 0707    HGB 14 1 12/21/2015 0720    INR 1 02 12/03/2019 1214    WBC 12 27 (H) 12/04/2019 0424    WBC 16 04 (H) 12/03/2019 1214    WBC 6 4 12/21/2015 0720       Meds:    Current Facility-Administered Medications:     acetaminophen (TYLENOL) tablet 650 mg, 650 mg, Oral, Q6H PRN, Tayo Jean PA-C    aspirin (ECOTRIN LOW STRENGTH) EC tablet 81 mg, 81 mg, Oral, Daily, Laila Bautista PA-C    atorvastatin (LIPITOR) tablet 5 mg, 5 mg, Oral, Daily, Laila Bautista PA-C, 5 mg at 12/03/19 1538    citalopram (CeleXA) tablet 40 mg, 40 mg, Oral, Daily, Laila Bautista PA-C    heparin (porcine) subcutaneous injection 5,000 Units, 5,000 Units, Subcutaneous, Q8H [address] **AND** Platelet count, , , Once, Tayo Jean PA-C    hydrALAZINE (APRESOLINE) injection 5 mg, 5 mg, Intravenous, Q4H PRN, Laila Bautista PA-C    lisinopril (ZESTRIL) tablet 10 mg, 10 mg, Oral, Daily, Laila Bautista PA-C    morphine injection 2 mg, 2 mg, Intravenous, Q4H PRN, Tayo Jean PA-C    niacin tablet 500 mg, 500 mg, Oral, Daily With Breakfast, Laila Bautista PA-C    ondansetron (ZOFRAN) injection 4 mg, 4 mg, Intravenous, Q6H PRN, Tayo Jean PA-C    oxyCODONE-acetaminophen (PERCOCET) 5-325 mg per tablet 1 tablet, 1 tablet, Oral, Q4H PRN, Tayo Jean PA-C, 1 tablet at 12/04/19 0324    sodium chloride 0 9 % infusion, 50 mL/hr, Intravenous, Continuous, Laila Bautista PA-C, Last Rate: 50 mL/hr at 12/03/19 1539, 50 mL/hr at 12/03/19 1539    Blood Culture:   No results found for: BLOODCX    Wound Culture:   No results found for: WOUNDCULT    Ins and Outs:  No intake/output data recorded  Physical Exam:   /58 (BP Location: Right arm)   Pulse 98   Temp 98 3 °F (36 8 °C) (Tympanic)   Resp 18   Ht 5' 9" (1 753 m)   Wt 83 9 kg (185 lb)   SpO2 95%   BMI 27 32 kg/m²   Gen: Alert and oriented to person, place, time  HEENT: EOMI, eyes clear, moist mucus membranes, hearing intact  Respiratory: Bilateral chest rise  No audible wheezing found  Cardiovascular: Regular Rate and Rhythm  Abdomen: soft nontender/nondistended  Musculoskeletal: right lower extremity  · Skin intact, limb shortened and externally rotated  · Tender to palpation over hip  · Pain with internal/external rotation of the hip  · Sensation intact L3-S1  · Intact ankle dorsi/plantar flexion, EHL/FHL  · 2+ DP pulse      Radiology:   I personally reviewed the films  X-rays right hip shows Intertrochanteric femur fracture extending into the subtrochanteric femur     _*_*_*_*_*_*_*_*_*_*_*_*_*_*_*_*_*_*_*_*_*_*_*_*_*_*_*_*_*_*_*_*_*_*_*_*_*_*_*_*_*    Assessment:  79 y o male status post fall with rightIntertrochanteric femur fracture extending into the subtrochanteric femur  Plan:   · Non weight bearing right lower extremity  · Analgesics for pain  · NPO  · Baez Catheter insertion  · Medicine for all medical management and preoperative risk stratification  · To OR for IM nail femur fracture of Intertrochanteric femur fracture extending into the subtrochanteric femur    · Dispo: Ortho will follow      Sara Shelley PA-C

## 2019-12-04 NOTE — PHYSICAL THERAPY NOTE
PT Cancellation Note    PT orders received  Chart reviewed; pt scheduled for OR today for IM nail femur fracture of Intertrochanteric femur fracture extending into the subtrochanteric femur  Will continue to follow post operatively as appropriate       Leila Morse, PT

## 2019-12-04 NOTE — PROGRESS NOTES
Progress Note - Orthopedics   Jonny Dinh 78 y o  male MRN: [id number]  Unit/Bed#: OR POOL Encounter: [id number]    Assessment:  78 y o  male s/p open reduction internal fixation for right hip fracture  POD 0    Plan:  Pain control prn  PT/OT-TDWB right LE   Lovenox/SCDs for DVT prophylaxis  Abx x 24h    Subjective: Pt S&E  Resting comfortably in PACU  Awake and alert  Vitals: Blood pressure (!) 169/115, pulse (!) 106, temperature 98 °F (36 7 °C), resp  rate 15, height 5' 9" (1 753 m), weight 83 9 kg (185 lb), SpO2 94 %  ,Body mass index is 27 32 kg/m²        Intake/Output Summary (Last 24 hours) at 12/4/2019 1835  Last data filed at 12/4/2019 1815  Gross per 24 hour   Intake 2700 ml   Output 600 ml   Net 2100 ml       Invasive Devices     Peripheral Intravenous Line            Peripheral IV 12/03/19 Left Antecubital 1 day          Drain            Urethral Catheter Non-latex 16 Fr  less than 1 day                Ortho Exam: rightLE:  Drsg:  C/D/I, sensation grossly intact L4, L5, S1, palpable pedal pulse, EHL/AT/GS intact    Lab, Imaging and other studies:   CBC:   Lab Results   Component Value Date    WBC 12 27 (H) 12/04/2019    HGB 11 5 (L) 12/04/2019    HCT 35 0 (L) 12/04/2019    MCV 98 12/04/2019     12/04/2019    MCH 32 2 12/04/2019    MCHC 32 9 12/04/2019    RDW 13 4 12/04/2019    MPV 9 6 12/04/2019     CMP:   Lab Results   Component Value Date    SODIUM 139 12/04/2019     12/04/2019    CO2 25 12/04/2019    BUN 24 12/04/2019    CREATININE 1 81 (H) 12/04/2019    CALCIUM 7 9 (L) 12/04/2019    EGFR 35 12/04/2019

## 2019-12-04 NOTE — PROGRESS NOTES
Progress Note - Tor Challenger 1940, 78 y o  male MRN: [id number]    Unit/Bed#: E2 -01 Encounter: [id number]    Primary Care Provider: Asim Betts MD   Date and time admitted to hospital: 12/3/2019 11:20 AM        * Femur fracture, right [facility])  Assessment & Plan  Status post mechanical fall  Patient tripped over curb while getting out of car  X-ray imaging revealed a right-sided femur fracture-official read pending on admission  ED spoke with orthopedic team who plans to take patient to the OR today  Hemoglobin on admission noted to be 13 4  Pain control- Percocet p r n-moderate pain and morphine p r n-severe pain  Orthopedics plans to take patient to OR today 12/4/19  PT/OT after surgery    Surgical clearance-patient denies history of CVA/TIA, CAD, prior stent, or history of PE  Currently without any active chest pain, shortness of breath or palpitations  No preceding symptoms prior to the fall  EKG without acute findings  Official clearance from attending to follow  Leukocytosis  Assessment & Plan  WBC 16 04 on admission  No signs of acute infection  Repeat 11 5 today    Fall  Assessment & Plan  Status post mechanical fall while getting out of car  No preceding symptoms  Patient did not hit his head or lose consciousness  He landed on his right hip  Unable to ambulate  Plan for OR to fix right femur fracture  Fall precautions  PT/OT    Vascular disease  Assessment & Plan  According to most recent PCP note from 10/14/19 patient does have valvular disease  Noted to have mild-to-moderate aortic regurgitation, mild mitral regurgitation, and a normal ejection fraction on stress test from 3/19/18 and echocardiogram from 3/20/18  Hyperlipidemia  Assessment & Plan  On atorvastatin 5 mg daily    Recurrent major depressive disorder, in full remission ([facility] [address] )  Assessment & Plan  Continue Celexa    Essential hypertension  Assessment & Plan  Home regimen of Monopril 10 mg daily    Substitute for formulary lisinopril here  Has some elevated blood pressures upon arrival and 171/77, 166/74  Has trended down without intervention  Likely secondary to pain  Will continue to monitor  Add prn hydralazine for SBP>160    Sensorineural hearing loss (SNHL)  Assessment & Plan  Hard of hearing  Has hearing aids in place  Will give to wife to take home      VTE Pharmacologic Prophylaxis:   Pharmacologic: Heparin  Mechanical VTE Prophylaxis in Place: Yes    Discharge Plan: With need for OR fixation of right femur fracture today 19  Then PT/OT eval for possible rehab    Discussions with Specialists or Other Care Team Provider: Dr Alfred Poole    Education and Discussions with Family / Patient: wife at bedside    Time Spent for Care: 15 minutes  More than 50% of total time spent on counseling and coordination of care as described above  Current Length of Stay: 1 day(s)  Current Patient Status: Inpatient   Code Status: Level 1 - Full Code    Subjective:   Comfortably in bed  Gets pain relief with oxycodone  He is scheduled for OR today  Currently NPO  Pain worsened with increased movement  Artem Stake for surgery  Wife to be called with updates  Objective:     Vitals:   Temp (24hrs), Av 7 °F (37 1 °C), Min:98 3 °F (36 8 °C), Max:98 9 °F (37 2 °C)    Temp:  [98 3 °F (36 8 °C)-98 9 °F (37 2 °C)] 98 9 °F (37 2 °C)  HR:  [] 102  Resp:  [17-18] 18  BP: (119-149)/(58-76) 145/64  SpO2:  [94 %-97 %] 94 %  Body mass index is 27 32 kg/m²  Input and Output Summary (last 24 hours):     No intake or output data in the 24 hours ending 19 1324    Physical Exam:     Physical Exam   Constitutional: He is oriented to person, place, and time  He appears well-developed and well-nourished  No distress  HENT:   Head: Normocephalic and atraumatic  Cardiovascular: Normal rate, regular rhythm and normal heart sounds  Pulmonary/Chest: Effort normal and breath sounds normal  No stridor  No respiratory distress  He has no wheezes  He has no rales  He exhibits no tenderness  Abdominal: Soft  Bowel sounds are normal  He exhibits no distension and no mass  There is no tenderness  There is no rebound and no guarding  No hernia  Musculoskeletal:   Right leg externally rotated  Right thigh edematous   Neurological: He is alert and oriented to person, place, and time  Skin: Skin is warm and dry  He is not diaphoretic  Psychiatric: He has a normal mood and affect  His behavior is normal  Judgment and thought content normal    Nursing note and vitals reviewed  Additional Data:     Labs:    Results from last 7 days   Lab Units 12/04/19  0424 12/03/19  1214   WBC Thousand/uL 12 27* 16 04*   HEMOGLOBIN g/dL 11 5* 13 4   HEMATOCRIT % 35 0* 40 4   PLATELETS Thousands/uL 247 256   LYMPHO PCT %  --  9*   MONO PCT %  --  4   EOS PCT %  --  1     Results from last 7 days   Lab Units 12/04/19  0423   POTASSIUM mmol/L 4 8   CHLORIDE mmol/L 104   CO2 mmol/L 25   BUN mg/dL 24   CREATININE mg/dL 1 81*   CALCIUM mg/dL 7 9*     Results from last 7 days   Lab Units 12/03/19  1214   INR  1 02       * I Have Reviewed All Lab Data Listed Above  * Additional Pertinent Lab Tests Reviewed:  Robbie [address] Admission Reviewed    Imaging:    Imaging Reports Reviewed Today Include:   Imaging Personally Reviewed by Myself Includes:      Recent Cultures (last 7 days):           Last 24 Hours Medication List:     Current Facility-Administered Medications:  acetaminophen 650 mg Oral Q6H PRN Alvy Mask, PA-C    aspirin 81 mg Oral Daily Laila Piger, PA-C    atorvastatin 5 mg Oral Daily Laila Piger, PA-C    citalopram 40 mg Oral Daily Laila Piger, PA-C    heparin (porcine) 5,000 Units Subcutaneous Q8H [facility] Laila Piger, PA-C    hydrALAZINE 5 mg Intravenous Q4H PRN Laila Piger, PA-C    lisinopril 10 mg Oral Daily Laila Piger, PA-C    morphine injection 2 mg Intravenous Q4H PRN Laila Piger, PA-C    niacin 500 mg Oral Daily With Breakfast Laila Bautista PA-C    ondansetron 4 mg Intravenous Q6H PRN Riaz George PA-C    oxyCODONE-acetaminophen 1 tablet Oral Q4H PRN Laila Bautista PA-C    sodium chloride 50 mL/hr Intravenous Continuous Laila Bautista PA-C Last Rate: 50 mL/hr (12/03/19 6642)        Today, Patient Was Seen By: Riaz George PA-C    ** Please Note: This note has been constructed using a voice recognition system   **

## 2019-12-04 NOTE — OCCUPATIONAL THERAPY NOTE
Occupational Therapy         Patient Name: Jonny Dinh  [id number] Date: 12/4/2019    MD's orders received  Pt is a hip fx with pending sx  Will continue when appropriate   Olinda Nevarez OT

## 2019-12-04 NOTE — OP NOTE
OPERATIVE REPORT  PATIENT NAME: Katya Negron    :  1940  MRN: [id number]  Pt Location: [facility] OR ROOM 03    SURGERY DATE: 2019    Surgeon(s) and Role:     * Percy Marlow, DO - Primary     * Greta Drake PA-C - Assisting    Preop Diagnosis:  Closed femur fracture ([address] ) [S72 90XA]    Post-Op Diagnosis Codes:     * Closed femur fracture ([address] ) [S72 90XA]    Procedure(s) (LRB):  INSERTION NAIL IM FEMUR ANTEGRADE (TROCHANTERIC) (Right)    Specimen(s):  * No specimens in log *    Estimated Blood Loss:   400 mL    Drains:  Urethral Catheter Non-latex 16 Fr  (Active)   Number of days: 0       Anesthesia Type:   GA    Operative Indications:  Closed femur fracture ([address] ) [S72 90XA]    Operative Findings:  See below    Complications:   None    Implants:  synthes TFN  400 x 11mm nail  110mm troch fix screw  50mm distal locking screw    Procedure and Technique:  INDICATIONS FOR PROCEDURE:  The patients is a 78 y o  male who presented to the ER after a fall c/o right  Hip and thigh pain  The patient and the XRs were evaluated  Given the nature of the fracture and the risks associated with nonoperative management, surgery was recommended  Extensive counseling in regards to the reasons for surgical intervention as well as the risks and benefits of surgery were reviewed  The risks include, but are not limited to infection, wound healing problems, blood loss, blood clots, malunion, nonunion, failure of hardware, persistent pain and stiffness, damage to blood vessels and nerves, need for additional surgery, heart attack, stroke, death  The patient understood and agreed to by oral and written consent  OPERATIVE PROCEDURE:  The patient was identified as Haydee Dopp  Bill Scale by his ID bracelet by the surgical staff in the preoperative area at [address]   The patient was wheeled back to the surgical room  Anesthesia was administered without complications  Preoperative antibiotics were given    The patient was placed on the operative table  The patient remained in the supine position and all bony prominences were carefully protected  A closed reduction was performed  This improved the alignment of the fracture fragments, but it was felt that further reduction via open reduction would be needed  The right leg was then prepped and draped in the usual sterile fashion  A timeout was performed where the patients name and surgical site were once again identified  Using XR the incisions were marked out  A skin incision was made over the lateral aspect of the femur  Dissection was made through subcutaneous tissue to the iliotibial band  The iliotibial band was incised and dissection was made down to bone  The fracture fragments were identified  A reduction forceps was placed which achieved better alignment of the fracture  X-rays confirmed this  A skin incision was made over the lateral aspect of the thigh just proximal to the greater trochanter  The tip of the greater trochanter was palpated and a guide wire was placed  A reamer was used to open the proximal femur  A ball-tipped guide wire was placed down the femur and the femur was measured  XR were taken  The femoral canal was reamed and prepared  A 400 x 11 mm nail was placed  The lateral incision was extended over the lateral aspect of the femur and a guide wire was placed into the femoral head  The wire was measured and the lateral cortex was opened  The femoral head was reamed  The femoral head screw was placed and locked into place  A distal femoral screw was then placed via the perfect Jackson technique  The reduction forceps was removed  Final XRs were taken  The incisions were copiously irrigated with saline solution  The subcutaneous tissues were closed with vicryl suture  The skin was closed with Vicryl suture and staples  Sterile dressings were placed    The patient was transferred to the hospital bed and then extubated  The patient was transferred to the recovery room in stable condition  I attest that I was present and performed this procedure  Precious Winn PA-C  was present for the entire procedure and provided essential assistance with limb position, patient prepping, and retraction    A qualified resident physician was not available    Patient Disposition:  extubated and stable    SIGNATURE: Charles Valdez DO  DATE: December 4, 2019  TIME: 6:17 PM

## 2019-12-05 PROBLEM — D64.9 ANEMIA: Status: ACTIVE | Noted: 2019-12-05

## 2019-12-05 PROBLEM — N17.9 ACUTE KIDNEY INJURY (HCC): Status: ACTIVE | Noted: 2019-12-05

## 2019-12-05 PROBLEM — H54.7 IMPAIRED VISION: Chronic | Status: ACTIVE | Noted: 2019-12-05

## 2019-12-05 PROBLEM — E87.5 HYPERKALEMIA: Status: ACTIVE | Noted: 2019-12-05

## 2019-12-05 PROBLEM — H90.0 CONDUCTIVE HEARING LOSS, BILATERAL: Chronic | Status: ACTIVE | Noted: 2019-12-05

## 2019-12-05 LAB
ANION GAP SERPL CALCULATED.3IONS-SCNC: 7 MMOL/L (ref 4–13)
ATRIAL RATE: 110 BPM
ATRIAL RATE: 96 BPM
BUN SERPL-MCNC: 38 MG/DL (ref 5–25)
CALCIUM SERPL-MCNC: 7.1 MG/DL (ref 8.3–10.1)
CHLORIDE SERPL-SCNC: 108 MMOL/L (ref 100–108)
CO2 SERPL-SCNC: 26 MMOL/L (ref 21–32)
CREAT SERPL-MCNC: 1.94 MG/DL (ref 0.6–1.3)
ERYTHROCYTE [DISTWIDTH] IN BLOOD BY AUTOMATED COUNT: 13.8 % (ref 11.6–15.1)
GFR SERPL CREATININE-BSD FRML MDRD: 32 ML/MIN/1.73SQ M
GLUCOSE SERPL-MCNC: 128 MG/DL (ref 65–140)
GLUCOSE SERPL-MCNC: 167 MG/DL (ref 65–140)
HCT VFR BLD AUTO: 25.8 % (ref 36.5–49.3)
HGB BLD-MCNC: 8.2 G/DL (ref 12–17)
MCH RBC QN AUTO: 32.4 PG (ref 26.8–34.3)
MCHC RBC AUTO-ENTMCNC: 31.8 G/DL (ref 31.4–37.4)
MCV RBC AUTO: 102 FL (ref 82–98)
P AXIS: 18 DEGREES
P AXIS: 27 DEGREES
PLATELET # BLD AUTO: 181 THOUSANDS/UL (ref 149–390)
PMV BLD AUTO: 10.3 FL (ref 8.9–12.7)
POTASSIUM SERPL-SCNC: 4.3 MMOL/L (ref 3.5–5.3)
POTASSIUM SERPL-SCNC: 5.6 MMOL/L (ref 3.5–5.3)
PR INTERVAL: 146 MS
QRS AXIS: -5 DEGREES
QRS AXIS: 55 DEGREES
QRSD INTERVAL: 86 MS
QRSD INTERVAL: 86 MS
QT INTERVAL: 334 MS
QT INTERVAL: 416 MS
QTC INTERVAL: 401 MS
QTC INTERVAL: 421 MS
RBC # BLD AUTO: 2.53 MILLION/UL (ref 3.88–5.62)
SODIUM SERPL-SCNC: 141 MMOL/L (ref 136–145)
T WAVE AXIS: 18 DEGREES
T WAVE AXIS: 70 DEGREES
VENTRICULAR RATE: 56 BPM
VENTRICULAR RATE: 96 BPM
WBC # BLD AUTO: 13.86 THOUSAND/UL (ref 4.31–10.16)

## 2019-12-05 PROCEDURE — 99232 SBSQ HOSP IP/OBS MODERATE 35: CPT | Performed by: INTERNAL MEDICINE

## 2019-12-05 PROCEDURE — 93010 ELECTROCARDIOGRAM REPORT: CPT

## 2019-12-05 PROCEDURE — 99024 POSTOP FOLLOW-UP VISIT: CPT | Performed by: ORTHOPAEDIC SURGERY

## 2019-12-05 PROCEDURE — G8987 SELF CARE CURRENT STATUS: HCPCS

## 2019-12-05 PROCEDURE — 85027 COMPLETE CBC AUTOMATED: CPT | Performed by: PHYSICIAN ASSISTANT

## 2019-12-05 PROCEDURE — 97167 OT EVAL HIGH COMPLEX 60 MIN: CPT

## 2019-12-05 PROCEDURE — 93005 ELECTROCARDIOGRAM TRACING: CPT

## 2019-12-05 PROCEDURE — G8978 MOBILITY CURRENT STATUS: HCPCS

## 2019-12-05 PROCEDURE — 80048 BASIC METABOLIC PNL TOTAL CA: CPT | Performed by: PHYSICIAN ASSISTANT

## 2019-12-05 PROCEDURE — 99223 1ST HOSP IP/OBS HIGH 75: CPT | Performed by: INTERNAL MEDICINE

## 2019-12-05 PROCEDURE — G8988 SELF CARE GOAL STATUS: HCPCS

## 2019-12-05 PROCEDURE — 97163 PT EVAL HIGH COMPLEX 45 MIN: CPT

## 2019-12-05 PROCEDURE — 84132 ASSAY OF SERUM POTASSIUM: CPT | Performed by: NURSE PRACTITIONER

## 2019-12-05 PROCEDURE — 82948 REAGENT STRIP/BLOOD GLUCOSE: CPT

## 2019-12-05 PROCEDURE — G8979 MOBILITY GOAL STATUS: HCPCS

## 2019-12-05 PROCEDURE — 99222 1ST HOSP IP/OBS MODERATE 55: CPT | Performed by: INTERNAL MEDICINE

## 2019-12-05 RX ORDER — ALBUTEROL SULFATE 2.5 MG/3ML
10 SOLUTION RESPIRATORY (INHALATION) ONCE
Status: DISCONTINUED | OUTPATIENT
Start: 2019-12-05 | End: 2019-12-05

## 2019-12-05 RX ORDER — SODIUM POLYSTYRENE SULFONATE 4.1 MEQ/G
15 POWDER, FOR SUSPENSION ORAL; RECTAL ONCE
Status: COMPLETED | OUTPATIENT
Start: 2019-12-05 | End: 2019-12-05

## 2019-12-05 RX ORDER — HEPARIN SODIUM 5000 [USP'U]/ML
5000 INJECTION, SOLUTION INTRAVENOUS; SUBCUTANEOUS EVERY 8 HOURS SCHEDULED
Status: DISCONTINUED | OUTPATIENT
Start: 2019-12-05 | End: 2019-12-07

## 2019-12-05 RX ORDER — ALBUTEROL SULFATE 2.5 MG/3ML
10 SOLUTION RESPIRATORY (INHALATION) ONCE
Status: COMPLETED | OUTPATIENT
Start: 2019-12-05 | End: 2019-12-05

## 2019-12-05 RX ORDER — DEXTROSE MONOHYDRATE 25 G/50ML
25 INJECTION, SOLUTION INTRAVENOUS ONCE
Status: COMPLETED | OUTPATIENT
Start: 2019-12-05 | End: 2019-12-05

## 2019-12-05 RX ORDER — SODIUM CHLORIDE 9 MG/ML
75 INJECTION, SOLUTION INTRAVENOUS CONTINUOUS
Status: DISCONTINUED | OUTPATIENT
Start: 2019-12-05 | End: 2019-12-06

## 2019-12-05 RX ADMIN — CEFAZOLIN SODIUM 1000 MG: 1 SOLUTION INTRAVENOUS at 06:02

## 2019-12-05 RX ADMIN — CEFAZOLIN SODIUM 1000 MG: 1 SOLUTION INTRAVENOUS at 14:55

## 2019-12-05 RX ADMIN — SODIUM CHLORIDE: 9 INJECTION, SOLUTION INTRAMUSCULAR; INTRAVENOUS; SUBCUTANEOUS at 10:06

## 2019-12-05 RX ADMIN — OXYCODONE HYDROCHLORIDE 2.5 MG: 5 TABLET ORAL at 12:04

## 2019-12-05 RX ADMIN — PANTOPRAZOLE SODIUM 40 MG: 40 TABLET, DELAYED RELEASE ORAL at 06:02

## 2019-12-05 RX ADMIN — SODIUM CHLORIDE 75 ML/HR: 0.9 INJECTION, SOLUTION INTRAVENOUS at 11:18

## 2019-12-05 RX ADMIN — OXYCODONE HYDROCHLORIDE 2.5 MG: 5 TABLET ORAL at 06:01

## 2019-12-05 RX ADMIN — Medication 500 MG: at 07:55

## 2019-12-05 RX ADMIN — DOCUSATE SODIUM 100 MG: 100 CAPSULE, LIQUID FILLED ORAL at 09:31

## 2019-12-05 RX ADMIN — CALCIUM GLUCONATE 1 G: 98 INJECTION, SOLUTION INTRAVENOUS at 10:13

## 2019-12-05 RX ADMIN — HEPARIN SODIUM 5000 UNITS: 5000 INJECTION INTRAVENOUS; SUBCUTANEOUS at 14:55

## 2019-12-05 RX ADMIN — ALBUTEROL SULFATE 10 MG: 2.5 SOLUTION RESPIRATORY (INHALATION) at 09:36

## 2019-12-05 RX ADMIN — ATORVASTATIN CALCIUM 5 MG: 10 TABLET, FILM COATED ORAL at 09:31

## 2019-12-05 RX ADMIN — HEPARIN SODIUM 5000 UNITS: 5000 INJECTION INTRAVENOUS; SUBCUTANEOUS at 22:37

## 2019-12-05 RX ADMIN — SODIUM POLYSTYRENE SULFONATE 15 G: 1 POWDER ORAL; RECTAL at 09:31

## 2019-12-05 RX ADMIN — ACETAMINOPHEN 650 MG: 325 TABLET ORAL at 07:55

## 2019-12-05 RX ADMIN — HEPARIN SODIUM 5000 UNITS: 5000 INJECTION INTRAVENOUS; SUBCUTANEOUS at 09:47

## 2019-12-05 RX ADMIN — ASPIRIN 81 MG: 81 TABLET, COATED ORAL at 09:31

## 2019-12-05 RX ADMIN — DEXTROSE MONOHYDRATE 25 ML: 500 INJECTION PARENTERAL at 09:31

## 2019-12-05 RX ADMIN — DOCUSATE SODIUM 100 MG: 100 CAPSULE, LIQUID FILLED ORAL at 17:12

## 2019-12-05 RX ADMIN — SENNOSIDES 8.6 MG: 8.6 TABLET, FILM COATED ORAL at 09:31

## 2019-12-05 RX ADMIN — CITALOPRAM HYDROBROMIDE 40 MG: 20 TABLET ORAL at 09:31

## 2019-12-05 NOTE — PLAN OF CARE
Problem: OCCUPATIONAL THERAPY ADULT  Goal: Performs self-care activities at highest level of function for planned discharge setting  See evaluation for individualized goals  Description  Treatment Interventions: ADL retraining, Functional transfer training, UE strengthening/ROM, Endurance training, Patient/family training, Equipment evaluation/education, Compensatory technique education, Energy conservation, Activityengagement          See flowsheet documentation for full assessment, interventions and recommendations  Note:   Limitation: Decreased ADL status, Decreased UE strength, Decreased endurance, Decreased self-care trans, Decreased high-level ADLs  Prognosis: Good  Assessment: Pt is a 78 y o  male seen for OT evaluation s/p adm to [facility] on 12/3/2019 s/p fall resulting in R intertrochanteric femur fracture  Pt now s/p ORIF for R femur fracture on 12/4/19  Pt w/ orders for TTWB R LE  Comorbidities affecting pts functional performance include a significant PMH of HLD  Pt with active OT orders and activity orders for Activity as tolerated  Pt lives with spouse in a multi-level house with 2 JASON and 1st floor setup  At baseline, pt was I w/ ADLs, IADLs, and functional mobility/transfers w/o use of AD, (+) , and reports 1 fall PTA  Upon evaluation, pt currently requires Supervision for UB ADLs, Max-Mod for LB ADLs, Mod A for toileting, Max-Mod A of 2 for bed mobility, and Mod A of 2 for functional mobility/transfers 2* the following deficits impacting occupational performance: weakness, decreased strength, decreased balance, decreased tolerance and increased pain  These impairments, as well at pts steps to enter environment, difficulty performing ADLS and difficulty performing IADLS  limit pts ability to safely engage in all baseline areas of occupation  Pt scored overall 50/100 on the Barthel Index   Based on the aforementioned OT evaluation, functional performance deficits, and assessments, pt has been identified as a High complexity evaluation  Pt to continue to benefit from continued acute OT services during hospital stay to address defined deficits and to maximize level of functional independence in the following Occupational Performance areas: grooming, bathing/shower, toilet hygiene, dressing, medication management, health maintenance, functional mobility, community mobility, clothing management, cleaning, meal prep and household maintenance  From OT standpoint, recommend STR upon D/C   OT will continue to follow pt 3-5x/wk to address the following goals to  w/in 10-14 days:     OT Discharge Recommendation: Short Term Rehab  OT - OK to Discharge: Yes(when medically cleared to rehab)

## 2019-12-05 NOTE — PHYSICAL THERAPY NOTE
PHYSICAL THERAPY EVALUATION          Patient Name: Carlos Matthews  XAXGR'L Date: 12/5/2019   78 y o   [id number]    Hip injury [S79 983H]  Closed femur fracture ([address] ) [place] D[place]    Past Medical History:   Diagnosis Date    Hyperlipidemia        Past Surgical History:   Procedure Laterality Date    RI OPEN RX FEMUR FX+INTRAMED BÁRBARA Right 12/4/2019    Procedure: INSERTION NAIL IM FEMUR ANTEGRADE (TROCHANTERIC); Surgeon: Raine Santos DO;  Location: [facility];  Service: Orthopedics        12/05/19 7192   Note Type   Note type Eval only   Pain Assessment   Pain Assessment No/denies pain   Pain Score No Pain   Home Living   Type of [address] to live on main level with bedroom/bathroom;Stairs to enter without rails  (2 JASON ? rails)   Bathroom Shower/Tub Tub/shower unit   Bathroom Toilet Standard   Bathroom Equipment Grab bars in shower   [address] Walker;Cane   Prior Function   Level of [place] Independent with ADLs and functional mobility   Lives With Spouse   Receives Help From Family   ADL Assistance Independent   IADLs Independent   Falls in the last 6 months 1 to 4  (+1  leading to admission)   Vocational Retired   Comments pt lives w wife  pta he was indep and amb w/o an AD  enjoys mowing the grass and visiting his grandchildren   Restrictions/Precautions   Weight Bearing Precautions Per Order Yes   RLE Weight Bearing Per Order TTWB   Other Precautions WBS; Multiple lines;O2;Fall Risk;Pain;Hard of hearing   General   Additional Pertinent History admitted 12/3/19 w R femur fx s/p fall; s/p insertion nail IM femur antegrade on R on 12/4/19   Family/Caregiver Present No   Cognition   Overall Cognitive Status WFL   Arousal/Participation Cooperative   Orientation Level Oriented X4   Memory Within functional limits   Following Commands Follows one step commands with increased time or repetition   RUE Assessment   RUE Assessment   (refer to OT)   LUE Assessment   LUE Assessment   (refer to OT)   RLE Assessment   RLE Assessment X   Strength RLE   RLE Overall Strength 3-/5   LLE Assessment   LLE Assessment X   Strength LLE   LLE Overall Strength 3+/5   Coordination   Movements are Fluid and Coordinated 0   Coordination and Movement Description guarded 2* to pain   Sensation WFL   Light Touch   RLE Light Touch Grossly intact   LLE Light Touch Grossly intact   Bed Mobility   Supine to Sit 3  Moderate assistance   Additional items Assist x 2;HOB elevated; Bedrails;Leg ;Verbal cues;LE management  (assist for trunk control)   Sit to Supine 2  Maximal assistance   Additional items Assist x 2;HOB elevated; Increased time required;Verbal cues;LE management  (assist for trunk control)   Transfers   Sit to Stand 3  Moderate assistance   Additional items Assist x 2; Increased time required;Verbal cues   Stand to Sit 3  Moderate assistance   Additional items Assist x 2; Increased time required;Verbal cues   Ambulation/Elevation   Gait pattern R Foot drag;R Knee Jacqui; Improper Weight shift; Antalgic; Forward Flexion;Decreased foot clearance;Decreased R stance; Short stride; Excessively slow   Gait Assistance 3  Moderate assist   Additional items Assist x 2;Verbal cues; Tactile cues   Assistive Device Rolling walker   Distance 1' forward and backward   Stair Management Assistance Not tested   Balance   Dynamic Sitting Fair +   Static Standing Poor +  (w RW)   Ambulatory Poor  (w RW)   Endurance Deficit   Endurance Deficit Yes   Endurance Deficit Description pt limited by pain, weakness   Activity Tolerance   Activity Tolerance Patient limited by fatigue   Medical Staff El Butts RN; cleared for PT   Assessment   Prognosis Good   Problem List Decreased strength;Decreased range of motion;Decreased endurance; Impaired balance;Decreased mobility; Decreased coordination; Impaired hearing;Decreased skin integrity;Orthopedic restrictions;Pain   Assessment Gabriel Gunter is a 78 y o  male admitted to [facility] on 12/3/2019 for Femur fracture, right ([address] ) following a fall, s/p R insertion nail IM femur antegrade on 12/4/19 Pt  has a past medical history of Hyperlipidemia    PT was consulted and pt was seen on 12/5/2019 for a high complexity PT EVALUATION  Pt presents with TTWB RLE WBS, fall risk precautions, NC, PIV, RLE incision, monitoring abn labs and vitals  Pt lives with his wife in a multi home with 2 JASON and first floor bed and bath  Prior to admission pt active, amb w/o an AD, and indep w ADLs and IADLs  Pt is currently functioning at a moderate assistance x2 level for bed mobility, moderate assistance x2 level for transfers, moderate assistance x2 level for ambulation with Rolling Walker  Pt demonstrated increased need for RLE management and trunk control during bed mobility and transfers, R knee buckling w decreased RLE clearance during ambulation however w maintenance of TTWB RLE status  Pt will benefit from continued skilled IP PT to address the above mentioned impairments  in order to maximize recovery and increase functional independence when completing mobility and ADLs  Currently PT recommendations for DME include RW (pt owns)  At this time PT recommendations for d/c are STR when medically cleared  Barriers to Discharge Inaccessible home environment   Barriers to Discharge Comments JASON   Goals   Patient Goals get better   STG Expiration Date 12/19/19   Short Term Goal #1 To be completed in 14 days: 1)  Pt will perform bed mobility with Cassandra demonstrating appropriate technique 100% of the time in order to improve function  2)  Perform all transfers with Cassandra demonstrating safe and appropriate technique 100% of the time in order to improve ability to negotiate safely in home environment  3) Amb with least restrictive AD >200'x1 with mod I in order to demonstrate ability to negotiate in home environment  4)  Improve overall strength and balance 1/2 grade in order to optimize ability to perform functional tasks and reduce fall risk  5) Increase activity tolerance to 45 minutes in order to improve endurance to functional tasks  6)  Negotiate stairs using most appropriate technique and S in order to be able to negotiate safely in home environment  7) PT for ongoing patient and family/caregiver education, DME needs and d/c planning in order to promote highest level of function in least restrictive environment  8) Pt will demonstrate TTWB RLE during functional tasks 3/3 times  PT Treatment Day 0   Plan   Treatment/Interventions Functional transfer training;ADL retraining;LE strengthening/ROM; Elevations; Therapeutic exercise; Endurance training;Patient/family training;Equipment eval/education; Bed mobility;Gait training; Compensatory technique education;Spoke to nursing;OT   PT Frequency Twice a day;7x/wk; Weekend   Recommendation   Recommendation Short-term skilled PT   Equipment Recommended Walker  (pt owns)   PT - OK to Discharge Yes   Additional Comments when medically cleared   Modified Gloria Scale   Modified Greenwood Scale 4   Barthel Index   Feeding 10   Bathing 0   Grooming Score 5   Dressing Score 5   Bladder Score 10   Bowels Score 10   Toilet Use Score 5   Transfers (Bed/Chair) Score 5   Mobility (Level Surface) Score 0   Stairs Score 0   Barthel Index Score 50   History: co - morbidities, age, social background,  fall risk, use of assistive device, multiple lines, WB status  Exam: impairments in systems including musculoskeletal (ROM, strength, posture), neuromuscular (balance,locomotion, gait, transfers, motor function and learning), joint integrity, integumentary (skin integrity, presence of scars or wounds), cardiopulmonary, cognition  Clinical: unstable/unpredictable  Complexity:high Martinez Wilson, PT

## 2019-12-05 NOTE — PROGRESS NOTES
Orthopaedic Surgery - Progress Note  Elicia Jane (1 y o  male)   : 1940   MRN: [id number]  Date: 2019   Encounter: [id number]   Unit/Bed#: E2 -01    Assessment / Plan  Postop day 1 status post ORIF right femur fracture    · Continue with PT/OT as ordered  Patient will be on toe-touch weight-bearing for at least the 1st 2 weeks postoperatively  · Continue with ice and analgesics as needed  · Patient's potassium elevated to 5 6 this morning, continue Medicine and Nephrology is recommendations for management  · Heparin 5000 units Q 8 hours for anticoagulant therapy at this time per Medicine  · Hemoglobin 8 2 this morning decreased secondary to acute blood loss anemia  Will continue to monitor at this time  · Maintain Mepilex dressings at this time  · Patient will most likely need transfer to rehab when cleared from a medical standpoint  Subjective  Postop day 1 status post ORIF right femur fracture  Patient is doing well at this time and his pain is well controlled at this time  Patient is denying any distal numbness or tingling  Vitals  Temp:  [97 7 °F (36 5 °C)-98 4 °F (36 9 °C)] 98 °F (36 7 °C)  HR:  [] 88  Resp:  [18-20] 18  BP: (120-169)/() 132/74  Body mass index is 27 38 kg/m²  I/O last 24 hours: In: 3000 [I V :3000]  Out: 600 [Urine:200; Blood:400]    Right Hip Exam  Alignment / Posture:  Normal resting hip posture  Normal knee alignment  Inspection:  Mild to moderate right thigh swelling  No erythema  No ecchymosis  Mepilex dressings are clean, dry and intact at this time  Palpation:  Mild tenderness at Tena-incisional areas right thigh     ROM:  Not tested  Strength:  5/5 AT, GSC, PT, and peroneals  Stability:  Not tested  Tests:  No pertinent positive or negative tests  Neurovascular:  Sensation intact in DP/SP/Geiger/Sa/T nerve distributions  Sensation intact in all digital nerve distributions  Toes warm and perfused  Gait:  Not tested      Lab Results (I have personally reviewed pertinent lab results )  Results from last 7 days   Lab Units 12/05/19  0442 12/04/19  1840 12/04/19  0424 12/03/19  1214   WBC Thousand/uL 13 86* 15 36* 12 27* 16 04*   HEMOGLOBIN g/dL 8 2* 8 9* 11 5* 13 4   HEMATOCRIT % 25 8* 27 7* 35 0* 40 4   PLATELETS Thousands/uL 181 205 247 256     Results from last 7 days   Lab Units 12/03/19  1214   PTT seconds 25   INR  1 02     Results from last 7 days   Lab Units 12/05/19  0442 12/04/19  0423 12/03/19  1214   POTASSIUM mmol/L 5 6* 4 8 4 9   CHLORIDE mmol/L 108 104 104   CO2 mmol/L 26 25 29   BUN mg/dL 38* 24 12   CREATININE mg/dL 1 94* 1 81* 1 08   EGFR ml/min/1 73sq m 32 35 65   CALCIUM mg/dL 7 1* 7 9* 8 7               Tre Ho PA-C

## 2019-12-05 NOTE — ASSESSMENT & PLAN NOTE
Status post ORIF right femur fracture postop day 1  -orthopedic follow-up appreciated  -continue with PT OT, pain control  -will likely need rehab

## 2019-12-05 NOTE — PROGRESS NOTES
Progress Note - Angie Dunlap 1940, 78 y o  male MRN: [id number]    Unit/Bed#: E2 -01 Encounter: [id number]    Primary Care Provider: Pa Denney MD   Date and time admitted to hospital: 12/3/2019 11:20 AM        * Femur fracture, right [facility])  Assessment & Plan  Status post ORIF right femur fracture postop day 1  -orthopedic follow-up appreciated  -continue with PT OT, pain control  -will likely need rehab    Acute kidney injury [facility])  Assessment & Plan  Patient's baseline creatinine close to 1, creatinine on 12/04/2019 was 1 8, creatinine today is 1 94  -possible etiologies include prerenal secondary to ACE-inhibitor, episodes of hypotension, anemia  -nephrology consultation appreciated, will hold lisinopril,  -patient started on normal saline  -will monitor renal function, avoid nephrotoxin    Hyperkalemia  Assessment & Plan  Potassium 5 6 this morning, likely secondary to ACE-inhibitor  -given cocktail, repeat potassium 4 3    Anemia  Assessment & Plan  Hemoglobin today 8 2 from 8 9  , will monitor H&H, transfuse if less than 7    Leukocytosis  Assessment & Plan  WBC 16 04 on admission  No signs of acute infection  White blood cell count 13 86 today, will monitor, defer antibiotics at this point  Vascular disease  Assessment & Plan  According to most recent PCP note from 10/14/19 patient does have valvular disease  Noted to have mild-to-moderate aortic regurgitation, mild mitral regurgitation, and a normal ejection fraction on stress test from 3/19/18 and echocardiogram from 3/20/18      Hyperlipidemia  Assessment & Plan  On atorvastatin 5 mg daily    Recurrent major depressive disorder, in full remission ([facility] [address] )  Assessment & Plan  Continue Celexa    Essential hypertension  Assessment & Plan  Was initially placed on lisinopril however given JIMY will hold  -hydralazine IV PRN as needed  -monitor BP    Sensorineural hearing loss (SNHL)  Assessment & Plan  Hard of hearing  Has hearing aids in place       VTE Pharmacologic Prophylaxis:   Pharmacologic: heparin    Patient Centered Rounds: I have performed bedside rounds with nursing staff today  Discussions with Specialists or Other Care Team Provider: neprhology    Education and Discussions with Family / Patient: wife, at bedside    Time Spent for Care: 20 minutes  More than 50% of total time spent on counseling and coordination of care as described above  Current Length of Stay: 2 day(s)    Current Patient Status: Inpatient   Certification Statement: The patient will continue to require additional inpatient hospital stay due to right femur fracture    Discharge Plan / Estimated Discharge Date: 2-3 days    Code Status: Level 1 - Full Code      Subjective:   Patient seen and examined at bedside, currently denies any complaints    Objective:     Vitals:   Temp (24hrs), Av 1 °F (36 7 °C), Min:97 7 °F (36 5 °C), Max:98 4 °F (36 9 °C)    Temp:  [97 7 °F (36 5 °C)-98 4 °F (36 9 °C)] 98 °F (36 7 °C)  HR:  [] 115  Resp:  [16-20] 18  BP: (120-169)/() 132/77  SpO2:  [92 %-100 %] 95 %  Body mass index is 27 38 kg/m²  Input and Output Summary (last 24 hours): Intake/Output Summary (Last 24 hours) at 2019 1518  Last data filed at 2019 1858  Gross per 24 hour   Intake 2800 ml   Output 600 ml   Net 2200 ml       Physical Exam:    Constitutional: Patient is oriented to person, place and time, no acute distress  HEENT:  Normocephalic, atraumatic, EOMI, PERRLA, no scleral icterus, no pallor, moist oral mucosa  Neck:  Supple, no masses, no thyromegaly, no bruits Normal range of motion  Lymph nodes:  No lymphadenopathy  Cardiovascular: Normal S1S2, RRR, No murmurs/rubs/gallops appreciated    Pulmonary:  Bilateral air entry, No rhonchi/rales/wheezing appreciated  Abdominal: Soft, Bowel sounds present, Non-tender, Non-distended, No rebound/guarding, no hepatomegaly   Musculoskeletal: No tenderness/abnormality   Extremities:  No cyanosis, clubbing or edema  Peripheral pulses palpable and equal bilaterally  Neurological: Cranial nerves II-XII grossly intact, sensation intact, otherwise no focal neurological symptoms  Skin: Skin is warm and dry, no rashes  Additional Data:     Labs:    Results from last 7 days   Lab Units 12/05/19  0442  12/03/19  1214   WBC Thousand/uL 13 86*   < > 16 04*   HEMOGLOBIN g/dL 8 2*   < > 13 4   HEMATOCRIT % 25 8*   < > 40 4   PLATELETS Thousands/uL 181   < > 256   LYMPHO PCT %  --   --  9*   MONO PCT %  --   --  4   EOS PCT %  --   --  1    < > = values in this interval not displayed  Results from last 7 days   Lab Units 12/05/19  1204 12/05/19  0442   POTASSIUM mmol/L 4 3 5 6*   CHLORIDE mmol/L  --  108   CO2 mmol/L  --  26   BUN mg/dL  --  38*   CREATININE mg/dL  --  1 94*   CALCIUM mg/dL  --  7 1*     Results from last 7 days   Lab Units 12/03/19  1214   INR  1 02        I Have Reviewed All Lab Data Listed Above          Recent Cultures (last 7 days):           Last 24 Hours Medication List:     Current Facility-Administered Medications:  acetaminophen 650 mg Oral Q6H PRN Anni Richardson PA-C    aspirin 81 mg Oral Daily MAIN [place]    atorvastatin 5 mg Oral Daily Anni Richardson, PA-TIKI    calcium carbonate 1,000 mg Oral Daily PRN MAIN [place]    cefazolin 1,000 mg Intravenous Q8H Anni Richardson PA-C Last Rate: 1,000 mg (12/05/19 1455)   citalopram 40 mg Oral Daily Anni Richardson PA-C    docusate sodium 100 mg Oral BID [place]IN [place]    heparin (porcine) 5,000 Units Subcutaneous Q8H [address] Tulio Hoff MD    hydrALAZINE 5 mg Intravenous Q4H PRN Anni Richardson PA-C    morphine injection 2 mg Intravenous Q4H PRN [place]IN [place]    niacin 500 mg Oral Daily With Breakfast Anni Richardson PA-C    ondansetron 4 mg Intravenous Q6H PRN MAIN [place]    oxyCODONE 2 5 mg Oral Q4H PRN Anni Richardson PA-C    oxyCODONE-acetaminophen 2 tablet Oral Q4H PRN Kianna Brunner SHIRA Hughes    pantoprazole 40 mg Oral Early Morning Natan Tejeda PA-C    senna 1 tablet Oral Daily Natan Tejeda PA-C    sodium chloride 75 mL/hr Intravenous Continuous Marcel Avila MD Last Rate: 75 mL/hr (12/05/19 1118)        Today, Patient Was Seen By: Marcel Avila MD

## 2019-12-05 NOTE — ASSESSMENT & PLAN NOTE
Patient's baseline creatinine close to 1, creatinine on 12/04/2019 was 1 8, creatinine today is 1 94  -possible etiologies include prerenal secondary to ACE-inhibitor, episodes of hypotension, anemia  -nephrology consultation appreciated, will hold lisinopril,  -patient started on normal saline  -will monitor renal function, avoid nephrotoxin

## 2019-12-05 NOTE — ASSESSMENT & PLAN NOTE
Potassium 5 6 this morning, likely secondary to ACE-inhibitor  -given cocktail, repeat potassium 4 3

## 2019-12-05 NOTE — ASSESSMENT & PLAN NOTE
Was initially placed on lisinopril however given JIMY will hold  -hydralazine IV PRN as needed  -monitor BP

## 2019-12-05 NOTE — CONSULTS
Consultation - Geriatric Medicine   Fritz Flores 78 y o  male MRN: [id number]  Unit/Bed#: E2 -01 Encounter: [id number]      Assessment/Plan     Comminuted intertrochanteric femur fracture  -confirmed on x-ray hip pelvis 12/03/2019  -s/p insertion IM nail with Dr Michelle Means 12/4/19  -continue acute pain control per geriatric pain protocol    Acute pain due to trauma  -recommend pain control per Geriatric pain protocol:   -recommend avoidance of combination pills such as percocet  -recommend DC percocet and initiating regimen below:  Tylenol 975mg Q8H scheduled  Roxicodone 2 5mg Q4H PRN moderate pain  Roxicodone 5mg Q4H PRN severe pain  Dilaudid 0 2mg Q4H PRN   -recommend lidocaine patch topically  -encourage addition of non-pharmacologic pain treatment including ice and frequent repositioning  -continue bowel regimen including senna to prevent and treat constipation due to increased risk with acute pain and opiate pain medications    Acute blood loss anemia  -evidenced by drop in hemoglobin from 13 4 preop 8 2 today  -no evidence of bleeding through surgical bandages  -medicine team managing     JIMY   -multifactorial including borderline low BP, Lisinopril, and acute blood loss anemia  -consider initiation of JIMY pathway to ensure follow-up after d/c  -Nephrology on board, currently on NSS 75cc/hr, ACE on hold     Osteoporosis  -as evidenced by fragility fracture, femur fracture sustained in fall from standing height  -recommend checking vitamin-D as last level was 26 in March of 2018, if levels remain that low recommend supplementation with 36795 units once weekly for 12 weeks followed by 1000 International Units daily and at least 1200mg calcium daily  -consider secondary workup for evaluation including TSH, PTH, SPEP, UPEP and DEXA as o/p with PCP follow-up to discuss tx options including antiresorptive therapy if indicated    Impaired vision  -patient requires use of glasses, encourage use of appropriate times  -encourage personal items to be kept close to avoid reaching  -encourage appropriate footwear and adequate lighting when out of bed to reduce risk of contributing to fall risk    Impaired hearing  -patient requires use of hearing aids which he does not have with him and are home, recommend use of hearing amplifier to ensure patient can hear providers  -encourage use of teach back method to ensure that patient is hearing directions/instructions clearly     Delirium precautions  -Patient is high risk of delirium due to age, traumatic injury, acute pain due to trauma, and hospitalization  -Initiate delirium precautions  -maintain normal sleep/wake cycle  -minimize overnight interruptions, group overnight vitals/labs/nursing checks as possible  -dim lights, close blinds and turn off tv to minimize stimulation and encourage sleep environment in evenings  -ensure that pain is well controlled, recommend starting Tylenol 975mg Q8H scheduled   -monitor for fecal and urinary retention which may precipitate delirium  -encourage early mobilization and ambulation with assistance  -avoid medications which may precipitate or worsen delirium such as tramadol, benzodiazepine, anticholinergics, and benadryl  -encourage hydration and nutrition     Cognitive screening  -patient is A/Ox4 and answers screening questions with ease  -continue to encourage good control of chronic conditions  -continue to remain active physically, mentally, and socially  -continue treatment of sensory impairments which can lead to social isolation and increased risk of cognitive impairment would not treated including impaired hearing impaired vision  -continue routine regularly scheduled medical treatments and preventative screenings with PCP    History of Present Illness   Physician Requesting Consult: Janet Ignacio MD  Reason for Consult / Principal Problem:  Fall  Hx and PE limited by: N/A  Additional history obtained from:  Patient interview/exam and extensive chart review    HPI: Nay Marie is a 78y o  year old male with peripheral vascular disease, hypertension, hyperlipidemia, OCD, impaired vision, and impaired hearing, he is admitted to medical service following intertrochanteric femur fracture sustained in a fall from standing height which is thought to be mechanical in nature when he accidentally turned the wrong way getting out of the car and landed on his hip on the curb  He underwent surgical correction with IM nail insertion on 12/04/2019 with Dr Roxana Ramachandran  He is being seen in consultation by Geriatrics for ambulatory dysfunction with fall and delirium prevention  He reports that his pain is moderately well controlled, he states that he has been sleeping well, and that appetite is poor due to pain  Reports that this is his 1st surgery as well as 1st fracture and he considers himself mostly healthy at baseline  He resides with his wife in [place] and remains very active  He denies any memory concerns, he is able to manage his own finances, medications, ADLs, and IADLs  He does require the use of glasses and hearing aids but does not use dentures or any assistive device for ambulation  He denies any acute concerns  Inpatient consult to Gerontology  Consult performed by: Dorie Guzman DO  Consult ordered by: Digna Steinberg PA-C        Review of Systems   Constitutional: Positive for appetite change (poor appetite)  Negative for chills and fever  HENT: Positive for hearing loss  Negative for dental problem and trouble swallowing  Ear pain: Requires hearing aids which he does not have with him this admission  Eyes: Positive for visual disturbance ( wears glasses)  Respiratory: Negative for shortness of breath and wheezing  Cardiovascular: Negative for chest pain and palpitations  Gastrointestinal: Negative for diarrhea, nausea and vomiting  Genitourinary: Negative for difficulty urinating     Musculoskeletal: Positive for gait problem ( due to recent femur fracture)  Skin: Positive for color change  Neurological: Negative for dizziness, weakness, light-headedness and headaches  Hematological: Negative for adenopathy  Psychiatric/Behavioral: Negative for confusion, decreased concentration and sleep disturbance  All other systems reviewed and are negative  Historical Information   Past Medical History:   Diagnosis Date    Hyperlipidemia      Past Surgical History:   Procedure Laterality Date    LA OPEN RX FEMUR FX+INTRAMED BÁRBARA Right 12/4/2019    Procedure: INSERTION NAIL IM FEMUR ANTEGRADE (TROCHANTERIC); Surgeon: Yony Montalvo DO;  Location: [facility];  Service: Orthopedics     Social History   Social History     Substance and Sexual Activity   Alcohol Use Never    Frequency: Never     Social History     Substance and Sexual Activity   Drug Use Never     Social History     Tobacco Use   Smoking Status Never Smoker   Smokeless Tobacco Never Used     Family History:   Family History   Problem Relation Age of Onset    Cancer Mother      Meds/Allergies   all current active meds have been reviewed    No Known Allergies    Objective     Intake/Output Summary (Last 24 hours) at 12/5/2019 1848  Last data filed at 12/5/2019 0602  Gross per 24 hour   Intake 300 ml   Output 500 ml   Net -200 ml     Invasive Devices     Peripheral Intravenous Line            Peripheral IV 12/03/19 Left Antecubital 2 days              Physical Exam   Constitutional: He is oriented to person, place, and time  He appears well-developed and well-nourished  Appears stated age, no acute distress, lying in bed resting comfortably   HENT:   Head: Normocephalic and atraumatic  Mouth/Throat: Oropharynx is clear and moist    Currently wearing glasses  O2 via NC  Mucous membranes moist   Eyes: Pupils are equal, round, and reactive to light  Conjunctivae and EOM are normal  No scleral icterus  Neck: Normal range of motion  Neck supple   No thyromegaly present  Cardiovascular: Normal heart sounds and intact distal pulses  No murmur heard  Pulmonary/Chest: Effort normal and breath sounds normal  No respiratory distress  He has no wheezes  Abdominal: Soft  Bowel sounds are normal  He exhibits no distension  There is no tenderness  Musculoskeletal: He exhibits tenderness (right thigh)  He exhibits no edema  Neurological: He is alert and oriented to person, place, and time  He exhibits normal muscle tone  Skin: Skin is warm and dry  There is pallor  Psychiatric: He has a normal mood and affect  Nursing note and vitals reviewed      Lab Results:   I have personally reviewed pertinent lab results including the following:  -sodium 141, potassium 5 6, chloride 108, CO2 26, BUN 30, creatinine 1 4, glucose 128, calcium 7 1, GFR 32  -hemoglobin 8 2, hematocrit 25 8, WBC 13 6, platelets 446    I have personally reviewed the following imaging study reports in PACS:  -X-ray right hip 12/03/2019 revealed comminuted intertrochanteric femur fracture extending into subtrochanteric femur  -x-ray chest 12/03/2019 revealed no acute cardiopulmonary disease, multilevel age indeterminate compression deformities multiple both thoracic vertebral bodies    Therapies:   PT:  Recommend short-term rehab  OT:  Recommending short-term rehab    VTE Prophylaxis: Heparin    Code Status: Level 1 - Full Code  Advance Directive and Living Will:      Power of :    POLST:      Family and Social Support:  Spouse he resides as well as 2 daughters who resides locally    Goals of Care:  Get back to normal activities

## 2019-12-05 NOTE — ANESTHESIA POSTPROCEDURE EVALUATION
Post-Op Assessment Note    CV Status:  Stable    Pain management: adequate     Mental Status:  Alert and awake   Hydration Status:  Euvolemic   PONV Controlled:  Controlled   Airway Patency:  Patent   Post Op Vitals Reviewed: Yes      Staff: Anesthesiologist           /58 (12/04/19 2033)    Temp 98 1 °F (36 7 °C) (12/04/19 2033)    Pulse 100 (12/04/19 2033)   Resp 18 (12/04/19 2033)    SpO2 97 % (12/04/19 2033)

## 2019-12-05 NOTE — CONSULTS
Consultation - Nephrology   Hanna Katz 78 y o  male MRN: [id number]  Unit/Bed#: E2 -01 Encounter: [id number]    ASSESSMENT and PLAN:  Acute kidney injury: likely prerenal in the setting of ACE + fluctuations of blood pressure below baseline     - Lisinopril on hold  Last dose yesterday in am    - Admission creatinine 1 08  Creatinine continues to trend up today to 1 94 from 1 81 yesterday  - Upon review of medical records, baseline creatinine approximately 0 9- 1 1    - Avoid hypotension/fluctuations in blood pressure to prevent decreased renal perfusion   - Avoid IV contrast, NSAIDS  - UA ordered  - PVR ordered  - Monitor volume status closely, intake/output  Daily weight    - Repeat BMP in a m  Hyperkalemia:  Potassium today 5 6   - Calcium gluconate, D50, and insulin ordered per primary team   - Albuterol ordered  - Repeat potassium level today  - Trend and monitor with daily BMP  Right femur fracture:  Status post fall    - POD 1 for open reduction internal fixation for right hip fracture  - Plan per Orthopedics  Acute Anemia:  Hemoglobin 8 2   - Continue to trend and monitor closely  HISTORY OF PRESENT ILLNESS:  Requesting Physician: Emma Vega MD  Reason for Consult:  Acute kidney injury    Hanna Katz is a 78 y o  male with past medical history of essential hypertension, anxiety/depression, hyperlipidemia, hearing loss, valvular disease, and chronic back pain who was admitted to [facility] after presenting post fall on 12/03/2019  Patient and wife were going to visit granddaughter when patient tripped over the near curb exiting the car  He fell and landed on his right hip  He did not hit his head or lose consciousness  At that time, patient did not have any shortness of breath, chest pain, dizziness, headache  Wife immediately called EMS  Patient is a fairly active and does not use assistive devices to ambulate    Upon assessment and evaluation, patient denies hip pain, shortness of breath, chest pain, nausea, vomiting, or diarrhea  Patient denies NSAID use at home  A renal consultation is requested today for assistance in the management of acute kidney injury  PAST MEDICAL HISTORY:  Past Medical History:   Diagnosis Date    Hyperlipidemia        PAST SURGICAL HISTORY:  History reviewed  No pertinent surgical history  ALLERGIES:  No Known Allergies    SOCIAL HISTORY:  Social History     Substance and Sexual Activity   Alcohol Use Never    Frequency: Never     Social History     Substance and Sexual Activity   Drug Use Never     Social History     Tobacco Use   Smoking Status Never Smoker   Smokeless Tobacco Never Used       FAMILY HISTORY:  History reviewed  No pertinent family history      MEDICATIONS:    Current Facility-Administered Medications:     acetaminophen (TYLENOL) tablet 650 mg, 650 mg, Oral, Q6H PRN, Shan Stoner PA-C, 650 mg at 12/05/19 0755    albuterol inhalation solution 10 mg, 10 mg, Nebulization, Once, Lulú Dyer MD    aspirin (ECOTRIN LOW STRENGTH) EC tablet 81 mg, 81 mg, Oral, Daily, Shan Stoner PA-C, Stopped at 12/04/19 5649    atorvastatin (LIPITOR) tablet 5 mg, 5 mg, Oral, Daily, Shan Stoner PA-C, 5 mg at 12/04/19 1085    calcium carbonate (TUMS) chewable tablet 1,000 mg, 1,000 mg, Oral, Daily PRN, Shan Stoner PA-C    calcium gluconate 1 g in sodium chloride 0 9 % 100 mL IVPB, 1 g, Intravenous, Once, Lulú Dyer MD    ceFAZolin (ANCEF) IVPB (premix) 1,000 mg, 1,000 mg, Intravenous, Q8H, Shan Stoner PA-C, Last Rate: 100 mL/hr at 12/05/19 0602, 1,000 mg at 12/05/19 0602    citalopram (CeleXA) tablet 40 mg, 40 mg, Oral, Daily, MAIN Munroe-TIKI, 40 mg at 12/04/19 0834    dextrose 50 % IV solution 25 mL, 25 mL, Intravenous, Once, Lulú Dyer MD    docusate sodium (COLACE) capsule 100 mg, 100 mg, Oral, BID, Shna Stoner PA-C, 100 mg at 12/04/19 6905    heparin (porcine) subcutaneous injection 5,000 Units, 5,000 Units, Subcutaneous, Q8H [address], Mariposa Roman MD    hydrALAZINE (APRESOLINE) injection 5 mg, 5 mg, Intravenous, Q4H PRN, Juan Gtz PA-C    insulin regular (HumuLIN R,NovoLIN R) 10 Units in sodium chloride 0 9 % 2 9 mL IV syringe, , Intravenous, Once, Mariposa Roman MD    lactated ringers infusion, 75 mL/hr, Intravenous, Continuous, Juan Gtz PA-C, Last Rate: 75 mL/hr at 12/04/19 1858, 75 mL/hr at 12/04/19 1858    morphine injection 2 mg, 2 mg, Intravenous, Q4H PRN, Juan Gtz PA-C    niacin tablet 500 mg, 500 mg, Oral, Daily With Breakfast, Juan Gtz PA-C, 500 mg at 12/05/19 0755    ondansetron (ZOFRAN) injection 4 mg, 4 mg, Intravenous, Q6H PRN, Juan Gtz PA-C    oxyCODONE (ROXICODONE) IR tablet 2 5 mg, 2 5 mg, Oral, Q4H PRN, Juan Gtz PA-C, 2 5 mg at 12/05/19 0601    oxyCODONE-acetaminophen (PERCOCET) 5-325 mg per tablet 2 tablet, 2 tablet, Oral, Q4H PRN, Juan Gtz PA-C    pantoprazole (PROTONIX) EC tablet 40 mg, 40 mg, Oral, Early Morning, Juan Gtz PA-C, 40 mg at 12/05/19 0602    senna (SENOKOT) tablet 8 6 mg, 1 tablet, Oral, Daily, Juan Gtz PA-C    sodium polystyrene (KAYEXALATE) powder 15 g, 15 g, Oral, Once, Mariposa Roman MD    REVIEW OF SYSTEMS:  All the systems were reviewed and were negative except as documented on the HPI      PHYSICAL EXAM:  Current Weight: Weight - Scale: 84 1 kg (185 lb 6 5 oz)  First Weight: Weight - Scale: 88 8 kg (195 lb 11 2 oz)  Vitals:    12/04/19 2300 12/05/19 0300 12/05/19 0531 12/05/19 0748   BP: 131/65 128/72  132/74   BP Location:       Pulse: 98 89  88   Resp: 20 20  18   Temp: 97 7 °F (36 5 °C) 98 1 °F (36 7 °C)  98 °F (36 7 °C)   TempSrc: Tympanic Tympanic     SpO2: 97% 97%  97%   Weight:   84 1 kg (185 lb 6 5 oz)    Height:           Intake/Output Summary (Last 24 hours) at 12/5/2019 0856  Last data filed at 12/4/2019 1858  Gross per 24 hour   Intake 3000 ml   Output 600 ml Net 2400 ml     General: conscious, cooperative, not in acute distress  Skin: no rash, warm  Eyes: pale conjunctivae, anicteric sclerae  ENT: moist lips and mucous membranes  Neck: supple, no JVD, trachea midline  Chest: clear breath sounds bilaterally  CVS: S1 & S2, normal rate, regular rhythm  Abdomen: soft, non-tender, non-distended  Extremities: no edema bilaterally   Neuro: awake, alert  Psych: appropriate affect       Invasive Devices:        Lab Results:   Results from last 7 days   Lab Units 12/05/19  0442 12/04/19  1840 12/04/19  0424 12/04/19  0423 12/03/19  1214   WBC Thousand/uL 13 86* 15 36* 12 27*  --  16 04*   HEMOGLOBIN g/dL 8 2* 8 9* 11 5*  --  13 4   HEMATOCRIT % 25 8* 27 7* 35 0*  --  40 4   PLATELETS Thousands/uL 181 205 247  --  256   POTASSIUM mmol/L 5 6*  --   --  4 8 4 9   CHLORIDE mmol/L 108  --   --  104 104   CO2 mmol/L 26  --   --  25 29   BUN mg/dL 38*  --   --  24 12   CREATININE mg/dL 1 94*  --   --  1 81* 1 08   CALCIUM mg/dL 7 1*  --   --  7 9* 8 7

## 2019-12-05 NOTE — PLAN OF CARE
Problem: PHYSICAL THERAPY ADULT  Goal: Performs mobility at highest level of function for planned discharge setting  See evaluation for individualized goals  Description  Treatment/Interventions: Functional transfer training, ADL retraining, LE strengthening/ROM, Elevations, Therapeutic exercise, Endurance training, Patient/family training, Equipment eval/education, Bed mobility, Gait training, Compensatory technique education, Spoke to nursing, OT  Equipment Recommended: Walker(pt owns)       See flowsheet documentation for full assessment, interventions and recommendations  Note:   Prognosis: Good  Problem List: Decreased strength, Decreased range of motion, Decreased endurance, Impaired balance, Decreased mobility, Decreased coordination, Impaired hearing, Decreased skin integrity, Orthopedic restrictions, Pain  Assessment: Nay Marie is a 78 y o  male admitted to [facility] on 12/3/2019 for Femur fracture, right ([address] ) following a fall, s/p R insertion nail IM femur antegrade on 12/4/19 Pt  has a past medical history of Hyperlipidemia    PT was consulted and pt was seen on 12/5/2019 for a high complexity PT EVALUATION  Pt presents with TTWB RLE WBS, fall risk precautions, NC, PIV, RLE incision, monitoring abn labs and vitals  Pt lives with his wife in a multi home with 2 JASON and first floor bed and bath  Prior to admission pt active, amb w/o an AD, and indep w ADLs and IADLs  Pt is currently functioning at a moderate assistance x2 level for bed mobility, moderate assistance x2 level for transfers, moderate assistance x2 level for ambulation with Rolling Walker  Pt demonstrated increased need for RLE management and trunk control during bed mobility and transfers, R knee buckling w decreased RLE clearance during ambulation however w maintenance of TTWB RLE status   Pt will benefit from continued skilled IP PT to address the above mentioned impairments  in order to maximize recovery and increase functional independence when completing mobility and ADLs  Currently PT recommendations for DME include RW (pt owns)  At this time PT recommendations for d/c are STR when medically cleared  Barriers to Discharge: Inaccessible home environment  Barriers to Discharge Comments: JASON  Recommendation: Short-term skilled PT     PT - OK to Discharge: Yes    See flowsheet documentation for full assessment

## 2019-12-05 NOTE — OCCUPATIONAL THERAPY NOTE
[address]  Evaluation     Patient Name: Kaity DE LEÓN Date: 12/5/2019  Problem List  Principal Problem:    Femur fracture, right ([facility] )  Active Problems:    Sensorineural hearing loss (SNHL)    Essential hypertension    Recurrent major depressive disorder, in full remission ([facility] )    Hyperlipidemia    Vascular disease    Leukocytosis    Closed femur fracture ([facility] )    Acute [facility] ([facility] )    Hyperkalemia    Anemia    Past Medical History  Past Medical History:   Diagnosis Date    Hyperlipidemia      Past Surgical History  Past Surgical History:   Procedure Laterality Date    TN OPEN RX FEMUR FX+INTRAMED BÁRBARA Right 12/4/2019    Procedure: INSERTION NAIL IM FEMUR ANTEGRADE (TROCHANTERIC); Surgeon: Alma Fishman DO;  Location: [facility];  Service: Orthopedics           12/05/19 1521   Note Type   Note type Eval only   Restrictions/Precautions   Weight Bearing Precautions Per Order Yes   RLE Weight Bearing Per Order TTWB   Other Precautions Fall Risk;Pain;WBS;Multiple lines;O2;Hard of hearing   Pain Assessment   Pain Assessment No/denies pain   Pain Score No Pain   Home Living   Type of Home House   Home Layout Multi-level;Performs ADLs on one level; Able to live on main level with bedroom/bathroom;Stairs to enter with rails  (2 JASON, stays on 1st floor)   Bathroom Shower/Tub Tub/shower unit   Bathroom Toilet Standard   Bathroom Equipment Grab bars in shower   Bathroom Accessibility Accessible   Home Equipment Walker;Cane   Additional Comments Pt lives with spouse in a multi-level house with 2 JASON and 1st floor setup   Prior Function   Level of [place] Independent with ADLs and functional mobility   Lives With Nicholas Nelson in the last 6 months 1 to 4  (1 leading to admission)   Vocational Retired   Comments At baseline, pt was I w/ ADLs, IADLs, and functional mobility/transfers w/o use of AD, (+) , and reports 1 fall PTA  Lifestyle   Autonomy At baseline, pt was I w/ ADLs, IADLs, and functional mobility/transfers w/o use of AD, (+) , and reports 1 fall PTA  Reciprocal Relationships Spouse   Service to Others Retired   Intrinsic Gratification Mowing the grass   Psychosocial   Psychosocial (WDL) WDL   ADL   Where Assessed Edge of bed   Eating Assistance 5  Supervision/Setup   Grooming Assistance 5  Supervision/Setup   UB Bathing Assistance 5  Supervision/Setup   LB Bathing Assistance 3  Moderate Assistance   UB Dressing Assistance 5  Supervision/Setup   LB Dressing Assistance 2  Maximal [address]  3  Moderate Assistance   Functional Assistance 3  Moderate Assistance   Bed Mobility   Supine to Sit 3  Moderate assistance   Additional items Assist x 2;HOB elevated; Bedrails; Increased time required;Verbal cues;LE management   Sit to Supine 2  Maximal assistance   Additional items Assist x 2; Increased time required;Verbal cues;LE management   Additional Comments Pt lying supine at end of session with call bell and phone within reach  All needs met and pt reports no further questions for OT at this time   Transfers   Sit to Stand 3  Moderate assistance   Additional items Assist x 2; Increased time required;Verbal cues   Stand to Sit 3  Moderate assistance   Additional items Assist x 2; Increased time required;Verbal cues   Additional Comments Cues for safe technique and hand placement   Functional Mobility   Functional Mobility 3  Moderate assistance   Additional Comments Assist x2; Cues for TTWB R LE   Additional items Rolling walker   Balance   Static Sitting Fair +   Dynamic Sitting Fair +   Static Standing Poor +   Dynamic Standing Poor   Ambulatory Poor   Activity Tolerance   Activity Tolerance Patient limited by fatigue;Patient limited by pain   Medical Staff [address], PT   Nurse Made Aware yes;  Guerrero Gilman RN   RUE Assessment   RUE Assessment [facility]   RUE Strength   RUE Overall Strength Within Functional Limits - able to perform ADL tasks with strength  (4/5 throughout)   LUE Assessment   LUE Assessment WFL   LUE Strength   LUE Overall Strength Within Functional Limits - able to perform ADL tasks with strength   Hand Function   Gross Motor Coordination Functional   Fine Motor Coordination Functional   Sensation   Light Touch No apparent deficits   Proprioception   Proprioception No apparent deficits   Vision-Basic Assessment   Current Vision Wears glasses all the time   Vision - Complex Assessment   Ocular Range of Motion [facility]   Acuity Able to read clock/calendar on wall without difficulty   Perception   Inattention/Neglect Appears intact   Cognition   Overall Cognitive Status [facility]   Arousal/Participation Alert; Cooperative   Attention Within functional limits   Orientation Level Oriented X4   Memory Within functional limits   Following Commands Follows one step commands with increased time or repetition   Comments Pt pleasant and cooperative   Assessment   Limitation Decreased ADL status; Decreased UE strength;Decreased endurance;Decreased self-care trans;Decreased high-level ADLs   Prognosis Good   Assessment Pt is a 78 y o  male seen for OT evaluation [place] [facility] [place] s/p fall resulting in R intertrochanteric femur fracture  Pt now s/p ORIF for R femur fracture on 12/4/19  Pt w/ orders for TTWB R LE  Comorbidities affecting pts functional performance include a significant PMH of HLD  Pt with active OT orders and activity orders for Activity as tolerated  Pt lives with spouse in a multi-level house with 2 JASON and 1st floor setup  At baseline, pt was I w/ ADLs, IADLs, and functional mobility/transfers w/o use of AD, (+) , and reports 1 fall PTA   Upon evaluation, pt currently requires Supervision for UB ADLs, Max-Mod for LB ADLs, Mod A for toileting, Max-Mod A of 2 for bed mobility, and Mod A of 2 for functional mobility/transfers 2* the following deficits impacting occupational performance: weakness, decreased strength, decreased balance, decreased tolerance and increased pain  These impairments, as well at pts steps to enter environment, difficulty performing ADLS and difficulty performing IADLS  limit pts ability to safely engage in all baseline areas of occupation  Pt scored overall 50/100 on the Barthel Index  Based on the aforementioned OT evaluation, functional performance deficits, and assessments, pt has been identified as a High complexity evaluation  Pt to continue to benefit from continued acute OT services during hospital stay to address defined deficits and to maximize level of functional independence in the following Occupational Performance areas: grooming, bathing/shower, toilet hygiene, dressing, medication management, health maintenance, functional mobility, community mobility, clothing management, cleaning, meal prep and household maintenance  From OT standpoint, recommend STR upon D/C  OT will continue to follow pt 3-5x/wk to address the following goals to  w/in 10-14 days:   Goals   Patient Goals To get better   LTG Time Frame 10-   Long Term Goal Please refer to LTGs listed below   Plan   Treatment Interventions ADL retraining;Functional transfer training;UE strengthening/ROM; Endurance training;Patient/family training;Equipment evaluation/education; Compensatory technique education; Energy conservation; Activityengagement   Goal Expiration Date 19   OT Treatment Day 0   OT Frequency 3-5x/wk   Recommendation   OT Discharge Recommendation Short Term Rehab   OT - OK to Discharge Yes  (when medically cleared to rehab)   Barthel Index   Feeding 10   Bathing 0   Grooming Score 5   Dressing Score 5   Bladder Score 10   Bowels Score 10   Toilet Use Score 5   Transfers (Bed/Chair) Score 5   Mobility (Level Surface) Score 0   Stairs Score 0   Barthel Index Score 50   Modified Gloria Scale   Modified Gloria Scale 4          GOALS    1) Pt will improve activity tolerance to G for min 30 min txment sessions for increase engagement in functional tasks    2) Pt will complete bed mobility at a Supervision level w/ G balance/safety demonstrated to decrease caregiver assistance required     3) Pt will demonstrate 100% adherence to WBS (TTWB R LE) during all functional activities w/o cues from therapist     4) Pt will complete UB dressing/self care w/ mod I using adaptive device and DME as needed    5) Pt will complete LB dressing/self care w/ Supervision using adaptive device and DME as needed    6) Pt will complete toileting w/ Supervision w/ G hygiene/thoroughness using DME as needed    7) Pt will improve functional transfers to Supervision on/off all surfaces using DME as needed w/ G balance/safety     8) Pt will improve functional mobility during ADL/IADL/leisure tasks to Supervision using DME as needed w/ G balance/safety     9) Pt will be attentive 100% of the time during ongoing cognitive assessment w/ G participation to assist w/ safe d/c planning/recommendations    10) Pt will demonstrate G carryover of pt/caregiver education and training as appropriate w/o cues w/ good tolerance to increase safety during functional tasks    11) Pt will increase BUE strength by 1MM grade via AROM/AAROM/PROM exercises to increase independence in ADLs and transfers    12) Pt will verbalize 3 potential fall hazards and identify appropriate compensatory techniques to decrease fall risk in home environment         NADIYA Yepez/L

## 2019-12-06 ENCOUNTER — APPOINTMENT (INPATIENT)
Dept: RADIOLOGY | Facility: HOSPITAL | Age: 79
DRG: 480 | End: 2019-12-06
Payer: COMMERCIAL

## 2019-12-06 PROBLEM — D62 ACUTE BLOOD LOSS ANEMIA: Status: ACTIVE | Noted: 2019-12-05

## 2019-12-06 LAB
ANION GAP SERPL CALCULATED.3IONS-SCNC: 5 MMOL/L (ref 4–13)
ATRIAL RATE: 114 BPM
ATRIAL RATE: 133 BPM
ATRIAL RATE: 142 BPM
ATRIAL RATE: 72 BPM
ATRIAL RATE: 99 BPM
BACTERIA UR QL AUTO: ABNORMAL /HPF
BASOPHILS # BLD AUTO: 0.01 THOUSANDS/ΜL (ref 0–0.1)
BASOPHILS NFR BLD AUTO: 0 % (ref 0–1)
BILIRUB UR QL STRIP: NEGATIVE
BUN SERPL-MCNC: 38 MG/DL (ref 5–25)
CALCIUM SERPL-MCNC: 7.4 MG/DL (ref 8.3–10.1)
CHLORIDE SERPL-SCNC: 107 MMOL/L (ref 100–108)
CLARITY UR: CLEAR
CO2 SERPL-SCNC: 27 MMOL/L (ref 21–32)
COLOR UR: YELLOW
CREAT SERPL-MCNC: 1.24 MG/DL (ref 0.6–1.3)
EOSINOPHIL # BLD AUTO: 0.04 THOUSAND/ΜL (ref 0–0.61)
EOSINOPHIL NFR BLD AUTO: 0 % (ref 0–6)
ERYTHROCYTE [DISTWIDTH] IN BLOOD BY AUTOMATED COUNT: 13.9 % (ref 11.6–15.1)
GFR SERPL CREATININE-BSD FRML MDRD: 55 ML/MIN/1.73SQ M
GLUCOSE SERPL-MCNC: 123 MG/DL (ref 65–140)
GLUCOSE UR STRIP-MCNC: NEGATIVE MG/DL
HCT VFR BLD AUTO: 19.1 % (ref 36.5–49.3)
HCT VFR BLD AUTO: 19.7 % (ref 36.5–49.3)
HCT VFR BLD AUTO: 23.2 % (ref 36.5–49.3)
HGB BLD-MCNC: 6.3 G/DL (ref 12–17)
HGB BLD-MCNC: 6.3 G/DL (ref 12–17)
HGB BLD-MCNC: 7.5 G/DL (ref 12–17)
HGB UR QL STRIP.AUTO: ABNORMAL
IMM GRANULOCYTES # BLD AUTO: 0.09 THOUSAND/UL (ref 0–0.2)
IMM GRANULOCYTES NFR BLD AUTO: 1 % (ref 0–2)
KETONES UR STRIP-MCNC: NEGATIVE MG/DL
LEUKOCYTE ESTERASE UR QL STRIP: NEGATIVE
LYMPHOCYTES # BLD AUTO: 1.28 THOUSANDS/ΜL (ref 0.6–4.47)
LYMPHOCYTES NFR BLD AUTO: 11 % (ref 14–44)
MCH RBC QN AUTO: 33.2 PG (ref 26.8–34.3)
MCHC RBC AUTO-ENTMCNC: 33 G/DL (ref 31.4–37.4)
MCV RBC AUTO: 101 FL (ref 82–98)
MONOCYTES # BLD AUTO: 1.83 THOUSAND/ΜL (ref 0.17–1.22)
MONOCYTES NFR BLD AUTO: 16 % (ref 4–12)
NEUTROPHILS # BLD AUTO: 7.98 THOUSANDS/ΜL (ref 1.85–7.62)
NEUTS SEG NFR BLD AUTO: 72 % (ref 43–75)
NITRITE UR QL STRIP: NEGATIVE
NON-SQ EPI CELLS URNS QL MICRO: ABNORMAL /HPF
NRBC BLD AUTO-RTO: 0 /100 WBCS
P AXIS: 22 DEGREES
P AXIS: 24 DEGREES
PH UR STRIP.AUTO: 5 [PH]
PLATELET # BLD AUTO: 169 THOUSANDS/UL (ref 149–390)
PMV BLD AUTO: 10 FL (ref 8.9–12.7)
POTASSIUM SERPL-SCNC: 4.6 MMOL/L (ref 3.5–5.3)
PR INTERVAL: 142 MS
PR INTERVAL: 142 MS
PR INTERVAL: 176 MS
PROT UR STRIP-MCNC: NEGATIVE MG/DL
QRS AXIS: -6 DEGREES
QRS AXIS: 1 DEGREES
QRS AXIS: 2 DEGREES
QRS AXIS: 3 DEGREES
QRS AXIS: 5 DEGREES
QRSD INTERVAL: 84 MS
QRSD INTERVAL: 86 MS
QRSD INTERVAL: 86 MS
QRSD INTERVAL: 88 MS
QRSD INTERVAL: 96 MS
QT INTERVAL: 304 MS
QT INTERVAL: 338 MS
QT INTERVAL: 340 MS
QT INTERVAL: 340 MS
QT INTERVAL: 380 MS
QTC INTERVAL: 419 MS
QTC INTERVAL: 436 MS
QTC INTERVAL: 523 MS
QTC INTERVAL: 528 MS
QTC INTERVAL: 565 MS
RBC # BLD AUTO: 1.9 MILLION/UL (ref 3.88–5.62)
RBC #/AREA URNS AUTO: ABNORMAL /HPF
SODIUM SERPL-SCNC: 139 MMOL/L (ref 136–145)
SP GR UR STRIP.AUTO: 1.02 (ref 1–1.03)
T WAVE AXIS: 11 DEGREES
T WAVE AXIS: 12 DEGREES
T WAVE AXIS: 15 DEGREES
T WAVE AXIS: 16 DEGREES
T WAVE AXIS: 8 DEGREES
UROBILINOGEN UR QL STRIP.AUTO: 0.2 E.U./DL
VENTRICULAR RATE: 114 BPM
VENTRICULAR RATE: 133 BPM
VENTRICULAR RATE: 142 BPM
VENTRICULAR RATE: 147 BPM
VENTRICULAR RATE: 99 BPM
WBC # BLD AUTO: 11.23 THOUSAND/UL (ref 4.31–10.16)
WBC #/AREA URNS AUTO: ABNORMAL /HPF

## 2019-12-06 PROCEDURE — 97535 SELF CARE MNGMENT TRAINING: CPT

## 2019-12-06 PROCEDURE — 71045 X-RAY EXAM CHEST 1 VIEW: CPT

## 2019-12-06 PROCEDURE — 93010 ELECTROCARDIOGRAM REPORT: CPT | Performed by: INTERNAL MEDICINE

## 2019-12-06 PROCEDURE — 97110 THERAPEUTIC EXERCISES: CPT

## 2019-12-06 PROCEDURE — 85014 HEMATOCRIT: CPT | Performed by: INTERNAL MEDICINE

## 2019-12-06 PROCEDURE — 80048 BASIC METABOLIC PNL TOTAL CA: CPT | Performed by: PHYSICIAN ASSISTANT

## 2019-12-06 PROCEDURE — 85018 HEMOGLOBIN: CPT | Performed by: INTERNAL MEDICINE

## 2019-12-06 PROCEDURE — 99024 POSTOP FOLLOW-UP VISIT: CPT | Performed by: ORTHOPAEDIC SURGERY

## 2019-12-06 PROCEDURE — 97530 THERAPEUTIC ACTIVITIES: CPT

## 2019-12-06 PROCEDURE — 93005 ELECTROCARDIOGRAM TRACING: CPT

## 2019-12-06 PROCEDURE — 99232 SBSQ HOSP IP/OBS MODERATE 35: CPT | Performed by: INTERNAL MEDICINE

## 2019-12-06 PROCEDURE — 85018 HEMOGLOBIN: CPT | Performed by: PHYSICIAN ASSISTANT

## 2019-12-06 PROCEDURE — 85025 COMPLETE CBC W/AUTO DIFF WBC: CPT | Performed by: INTERNAL MEDICINE

## 2019-12-06 PROCEDURE — 81001 URINALYSIS AUTO W/SCOPE: CPT | Performed by: INTERNAL MEDICINE

## 2019-12-06 PROCEDURE — 85014 HEMATOCRIT: CPT | Performed by: PHYSICIAN ASSISTANT

## 2019-12-06 PROCEDURE — P9016 RBC LEUKOCYTES REDUCED: HCPCS

## 2019-12-06 RX ADMIN — SODIUM CHLORIDE 75 ML/HR: 0.9 INJECTION, SOLUTION INTRAVENOUS at 01:29

## 2019-12-06 RX ADMIN — ASPIRIN 81 MG: 81 TABLET, COATED ORAL at 08:42

## 2019-12-06 RX ADMIN — HEPARIN SODIUM 5000 UNITS: 5000 INJECTION INTRAVENOUS; SUBCUTANEOUS at 23:13

## 2019-12-06 RX ADMIN — DOCUSATE SODIUM 100 MG: 100 CAPSULE, LIQUID FILLED ORAL at 08:43

## 2019-12-06 RX ADMIN — DOCUSATE SODIUM 100 MG: 100 CAPSULE, LIQUID FILLED ORAL at 17:45

## 2019-12-06 RX ADMIN — OXYCODONE HYDROCHLORIDE 2.5 MG: 5 TABLET ORAL at 06:42

## 2019-12-06 RX ADMIN — HEPARIN SODIUM 5000 UNITS: 5000 INJECTION INTRAVENOUS; SUBCUTANEOUS at 14:36

## 2019-12-06 RX ADMIN — CITALOPRAM HYDROBROMIDE 40 MG: 20 TABLET ORAL at 08:42

## 2019-12-06 RX ADMIN — SENNOSIDES 8.6 MG: 8.6 TABLET, FILM COATED ORAL at 08:42

## 2019-12-06 RX ADMIN — ATORVASTATIN CALCIUM 5 MG: 10 TABLET, FILM COATED ORAL at 08:43

## 2019-12-06 RX ADMIN — Medication 500 MG: at 08:41

## 2019-12-06 RX ADMIN — OXYCODONE HYDROCHLORIDE AND ACETAMINOPHEN 2 TABLET: 5; 325 TABLET ORAL at 13:48

## 2019-12-06 RX ADMIN — PANTOPRAZOLE SODIUM 40 MG: 40 TABLET, DELAYED RELEASE ORAL at 06:42

## 2019-12-06 RX ADMIN — HEPARIN SODIUM 5000 UNITS: 5000 INJECTION INTRAVENOUS; SUBCUTANEOUS at 06:42

## 2019-12-06 NOTE — TREATMENT PLAN
Patient noted to be significantly tachycardic, heart rate fluctuating between 90 to 140s in 150s, blood pressure stable  Patient also requiring oxygen 2 L via nasal cannula  Chest x-ray ordered which shows no acute cardiopulmonary disease  Patient currently without any complaints, no chest pain, no pain at surgical site, denies any palpitations that he feels, denies any dyspnea  EKG reveals sinus tachycardia    Sinus tachycardia  -possible differentials at this point include dehydration versus anemia versus pain although not reporting versus PE  -patient baseline hemoglobin is close to 12, hemoglobin today 6 3 patient transfuse 1 unit PRBC and repeat hemoglobin is 7 5  -discussed in depth with Nephrology, Dr Orville Morgan, at this point we have agreed that we will transfuse 1 more unit PRBC and monitor patient's vitals,  -if patient is still tachycardic despite receiving his 2nd PRBC will pursue a CT PE protocol  -discussed with Nephrology and will give 250 cc bolus prior to CT and after will maintained on 75 cc an hour of normal saline for kidney protection  -I also discussed with orthopedic surgery team, Dr Stacy Olson regarding need to anticoagulate if patient does have a PE, she is okay with treating if needed  -I have discussed with patient and patient's wife, Shaneka Postal via telephone, regarding plan of care  Both patient and wife understand plan of care including repeat blood transfusion for tachycardia and need for possible his CT scan if no improvement    Wife and patient do understand that CT PE protocol with contrast may cause worsening kidney function, patient and wife are okay with proceeding with CT scan if needed  -will continue to closely monitor patient on telemetry  -also updated plan of care to nurse, Fidencio Agarwal

## 2019-12-06 NOTE — PHYSICAL THERAPY NOTE
Physical Therapy Treatment Note     12/06/19 [facility]   Pain Assessment   Pain Assessment 0-10   Pain Score 7   Pain Type Acute pain   Pain Location Leg   Pain Orientation Right   Pain Descriptors Discomfort   Clinical Progression Gradually improving   Hospital Pain Intervention(s) Repositioned;Cold applied; Ambulation/increased activity; Emotional support   Restrictions/Precautions   RLE Weight Bearing Per Order TTWB   Other Precautions WBS; Fall Risk;Pain;O2;Hard of hearing   General   Chart Reviewed Yes   Family/Caregiver Present No   Cognition   Overall Cognitive Status WFL   Arousal/Participation Alert; Cooperative   Attention Within functional limits   Orientation Level Oriented X4   Memory Within functional limits   Following Commands Follows one step commands with increased time or repetition   Subjective   Subjective " they gave me somehting for the pain "     Bed Mobility   Rolling R 2  Maximal assistance   Additional items Assist x 2; Increased time required; Bedrails;Verbal cues;LE management   Rolling L 2  Maximal assistance   Additional items Assist x 1;Bedrails; Increased time required;Verbal cues;LE management   Supine to Sit 3  Moderate assistance   Additional items Assist x 1;HOB elevated; Bedrails; Increased time required;Verbal cues;LE management   Sit to Supine 2  Maximal assistance   Additional items Assist x 2; Increased time required;Verbal cues;LE management   Additional Comments Pt performed sitting activites at edge of bed working on sitting toleance, balance and posture x 15 reps  Transfers   Sit to Stand 3  Moderate assistance   Additional items Assist x 2; Increased time required;Verbal cues   Stand to Sit 3  Moderate assistance   Additional items Assist x 2; Increased time required;Verbal cues   Additional Comments pt performed static standing, dynmaic standing x 7 minutes wtih mod assist x1 working on static standing balanec, upright posture, weightbearing compliance, and balance, weightshfting, Ambulation/Elevation   Gait pattern Forward Flexion;Decreased foot clearance; Improper Weight shift; Excessively slow; Wide ALEX; Short stride;Decreased R stance   Gait Assistance 3  Moderate assist   Additional items Assist x 2;Verbal cues   Assistive Device Rolling walker   Distance 3' x1 side stepping toward head of bed  Balance   Static Sitting Fair   Dynamic Sitting Fair -  (retropulsion noted while performing seatd b/l le arom ex  )   Static Standing Poor  (with Rw)   Dynamic Standing   (zero, assist x2)   Ambulatory Zero  (assist x2)   Endurance Deficit   Endurance Deficit Yes   Endurance Deficit Description Pt  limited by pain  Activity Tolerance   Activity Tolerance Patient limited by pain; Patient limited by fatigue   Medical Staff Made Aware Sergo nuñez, RN, rickey   Nurse Made Aware yes   Exercises   Hip Flexion Sitting;10 reps;AAROM; Right   Hip Adduction Sitting;10 reps;AROM; Bilateral  (isometric hip add x 10 reps  )   Knee AROM Long Arc Quad Sitting;10 reps;AROM; Bilateral   Ankle Pumps Sitting;10 reps;AROM; Bilateral   Assessment   Prognosis Good   Problem List Decreased strength;Decreased range of motion;Decreased endurance; Impaired balance;Decreased mobility; Impaired hearing;Decreased skin integrity;Orthopedic restrictions;Pain   Assessment Pt continues to require increased assistance for all aspects of mobility, requiring mod assist x1 for supine to sit and max assist x2 for sit to supine  Pt is requiring max assist x2 for rolling verbal cues for rolling techniques and handplacement for use of bedrails  PT performed sitting at edge of bed x 15 minutes with close supervision- min assist and verbal cues for improved sitting posture and balance  Pt ablet o self correct at times and other times requires min assist x1  Pt perform sit to stand with mod assist x2 with cues for handplacement, positioning of R le and cues for compliance with weightbearing status    Pt  tolerated 7 minutes of standing activities with mod assist x2 and performed side stepping 3' x1 with mod assist with verbal cues for le sequencing, improved posture, weightshifting and weightbearing compliance  Pt performed seated b/l le a-aarom exercises x 10 reps  Pt's reported lightheadedness upon sit at edge of bed  Pt 's vitals as follows:  126/60, , sit edge of bed-137/63 , and post standing and side stepping 123/58   Pt returned to supine , SCD's to b/l le's and ice to R lateral hip  Recommend  D/c to STR when medically cleared  Goals   Patient Goals " to get better "     STG Expiration Date 12/19/19   PT Treatment Day 1   Plan   Treatment/Interventions Functional transfer training;LE strengthening/ROM; Therapeutic exercise; Endurance training;Patient/family training;Equipment eval/education; Bed mobility;Gait training;Spoke to nursing;OT   Progress Slow progress, decreased activity tolerance   PT Frequency Twice a day;7x/wk; Weekend   Recommendation   Recommendation Short-term skilled PT   PT - OK to Discharge Yes      12[place]06/19 [facility]   Pain Assessment   Pain Assessment 0-10   Pain Score 7   Pain Type Acute pain   Pain Location Leg   Pain Orientation Right   Pain Descriptors Discomfort   Clinical Progression Gradually improving   Hospital Pain Intervention(s) Repositioned;Cold applied; Ambulation/increased activity; Emotional support   Restrictions/Precautions   RLE Weight Bearing Per Order TTWB   Other Precautions WBS; Fall Risk;Pain;O2;Hard of hearing   General   Chart Reviewed Yes   Family/Caregiver Present No   Cognition   Overall Cognitive Status WFL   Arousal/Participation Alert; Cooperative   Attention Within functional limits   Orientation Level Oriented X4   Memory Within functional limits   Following Commands Follows one step commands with increased time or repetition   Subjective   Subjective " they gave me somehting for the pain "     Bed Mobility   Rolling R 2  Maximal assistance   Additional items Assist x 2;Increased time required; Bedrails;Verbal cues;LE management   Rolling L 2  Maximal assistance   Additional items Assist x 1;Bedrails; Increased time required;Verbal cues;LE management   Supine to Sit 3  Moderate assistance   Additional items Assist x 1;HOB elevated; Bedrails; Increased time required;Verbal cues;LE management   Sit to Supine 2  Maximal assistance   Additional items Assist x 2; Increased time required;Verbal cues;LE management   Additional Comments Pt performed sitting activites at edge of bed working on sitting toleance, balance and posture x 15 reps  Transfers   Sit to Stand 3  Moderate assistance   Additional items Assist x 2; Increased time required;Verbal cues   Stand to Sit 3  Moderate assistance   Additional items Assist x 2; Increased time required;Verbal cues   Additional Comments pt performed static standing, dynmaic standing x 7 minutes wtih mod assist x1 working on static standing balanec, upright posture, weightbearing compliance, and balance, weightshfting,    Ambulation/Elevation   Gait pattern Forward Flexion;Decreased foot clearance; Improper Weight shift; Excessively slow; Wide ALEX; Short stride;Decreased R stance   Gait Assistance 3  Moderate assist   Additional items Assist x 2;Verbal cues   Assistive Device Rolling walker   Distance 3' x1 side stepping toward head of bed  Balance   Static Sitting Fair   Dynamic Sitting Fair -  (retropulsion noted while performing seatd b/l le arom ex  )   Static Standing Poor  (with Rw)   Dynamic Standing   (zero, assist x2)   Ambulatory Zero  (assist x2)   Endurance Deficit   Endurance Deficit Yes   Endurance Deficit Description Pt  limited by pain  Activity Tolerance   Activity Tolerance Patient limited by pain; Patient limited by fatigue   Medical Staff Made Aware Sergo nuñez, RN, rickey   Nurse Made Aware yes   Exercises   Hip Flexion Sitting;10 reps;AAROM; Right   Hip Adduction Sitting;10 reps;AROM; Bilateral  (isometric hip add x 10 reps  )   Knee AROM Long Arc Quad Sitting;10 reps;AROM; Bilateral   Ankle Pumps Sitting;10 reps;AROM; Bilateral   Assessment   Prognosis Good   Problem List Decreased strength;Decreased range of motion;Decreased endurance; Impaired balance;Decreased mobility; Impaired hearing;Decreased skin integrity;Orthopedic restrictions;Pain   Assessment Pt continues to require increased assistance for all aspects of mobility, requiring mod assist x1 for supine to sit and max assist x2 for sit to supine  Pt is requiring max assist x2 for rolling verbal cues for rolling techniques and handplacement for use of bedrails  PT performed sitting at edge of bed x 15 minutes with close supervision- min assist and verbal cues for improved sitting posture and balance  Pt ablet o self correct at times and other times requires min assist x1  Pt perform sit to stand with mod assist x2 with cues for handplacement, positioning of R le and cues for compliance with weightbearing status  Pt  tolerated 7 minutes of standing activities with mod assist x2 and performed side stepping 3' x1 with mod assist with verbal cues for le sequencing, improved posture, weightshifting and weightbearing compliance  Pt performed seated b/l le a-aarom exercises x 10 reps  Pt's reported lightheadedness upon sit at edge of bed  Pt 's vitals as follows:  126/60, , sit edge of bed-137/63 , and post standing and side stepping 123/58   Pt returned to supine , SCD's to b/l le's and ice to R lateral hip  Recommend  D/c to STR when medically cleared  Goals   Patient Goals " to get better "     STG Expiration Date 12/19/19   PT Treatment Day 1   Plan   Treatment/Interventions Functional transfer training;LE strengthening/ROM; Therapeutic exercise; Endurance training;Patient/family training;Equipment eval/education; Bed mobility;Gait training;Spoke to nursing;OT   Progress Slow progress, decreased activity tolerance   PT Frequency Twice a day;7x/wk; Weekend Recommendation   Recommendation Short-term skilled PT   PT - OK to Discharge Yes         Savita Bright, HOLLY

## 2019-12-06 NOTE — PROGRESS NOTES
Progress Note - Phi Vásquez 1940, 78 y o  male MRN: [id number]    Unit/Bed#: E2 -01 Encounter: [id number]    Primary Care Provider: Keegan Ruiz MD   Date and time admitted to hospital: 12/3/2019 11:20 AM        Acute blood loss anemia  Assessment & Plan  Admitted with hemoglobin of 11 5, hemoglobin today is 6 3  -repeat hemoglobin is still 6 3, will transfuse 1 unit PRBC  -will check stool occult  -monitor H&H and transfuse further as needed  -thoroughly examined patient, no abdominal pain, no back pain, no areas of ecchymosis or hematoma visualized    * Femur fracture, right [facility])  Assessment & Plan  Status post ORIF right femur fracture postop day 2  -patient with significant postoperative anemia requiring transfusion today  -orthopedic follow-up appreciated  -continue with PT OT, pain control  -will likely need rehab    Hyperkalemia  Assessment & Plan  Likely secondary to ACE-inhibitor, improved    Acute kidney injury [facility])  Assessment & Plan  Patient's baseline creatinine close to 1, creatinine on 12/04/2019 was 1 8, -creatinine today improved to 1 24  -possible etiologies include prerenal secondary to ACE-inhibitor, episodes of hypotension, anemia  -nephrology follow-up appreciated, continue to avoid Ace inhibitors at this point,   -will monitor renal function, avoid nephrotoxin    Leukocytosis  Assessment & Plan  WBC 16 04 on admission, 13 86 today, patient afebrile  No signs of acute infection  Vascular disease  Assessment & Plan  According to most recent PCP note from 10/14/19 patient does have valvular disease  Noted to have mild-to-moderate aortic regurgitation, mild mitral regurgitation, and a normal ejection fraction on stress test from 3/19/18 and echocardiogram from 3/20/18      Hyperlipidemia  Assessment & Plan  On atorvastatin 5 mg daily    Recurrent major depressive disorder, in full remission [facility])  Assessment & Plan  Continue Celexa    Essential hypertension  Assessment & Plan  Was initially placed on lisinopril however given JIMY will hold  -hydralazine IV PRN as needed  -monitor BP    Sensorineural hearing loss (SNHL)  Assessment & Plan  Hard of hearing  Has hearing aids in place  VTE Pharmacologic Prophylaxis:   Pharmacologic: heparin    Patient Centered Rounds: I have performed bedside rounds with nursing staff today  Discussions with Specialists or Other Care Team Provider:  Orthopedic surgery    Education and Discussions with Family / Patient:  Wife, at bedside    Time Spent for Care: 20 minutes  More than 50% of total time spent on counseling and coordination of care as described above  Current Length of Stay: 3 day(s)    Current Patient Status: Inpatient   Certification Statement: The patient will continue to require additional inpatient hospital stay due to Right femur fracture, acute kidney injury, anemia    Discharge Plan / Estimated Discharge Date: 2-3 days    Code Status: Level 1 - Full Code      Subjective:   Patient seen and examined at bedside, denies any chest pain, palpitations, dyspnea, nausea, vomiting  Denies any hematemesis, hematochezia, melena    Objective:     Vitals:   Temp (24hrs), Av 3 °F (36 8 °C), Min:97 °F (36 1 °C), Max:99 °F (37 2 °C)    Temp:  [97 °F (36 1 °C)-99 °F (37 2 °C)] 98 8 °F (37 1 °C)  HR:  [100-140] 106  Resp:  [16-19] 18  BP: (124-142)/(46-91) 130/61  SpO2:  [95 %-98 %] 96 %  Body mass index is 27 67 kg/m²  Input and Output Summary (last 24 hours): Intake/Output Summary (Last 24 hours) at 2019 1253  Last data filed at 2019 1233  Gross per 24 hour   Intake 350 ml   Output 1050 ml   Net -700 ml       Physical Exam:    Constitutional: Patient is oriented to person, place and time, no acute distress  HEENT:  Normocephalic, atraumatic, EOMI, PERRLA, no scleral icterus, no pallor, moist oral mucosa  Neck:  Supple, no masses, no thyromegaly, no bruits Normal range of motion     Lymph nodes:  No lymphadenopathy  Cardiovascular: Normal S1S2, RRR, No murmurs/rubs/gallops appreciated  Pulmonary:  Bilateral air entry, No rhonchi/rales/wheezing appreciated  Abdominal: Soft, Bowel sounds present, Non-tender, Non-distended, No rebound/guarding, no hepatomegaly   Musculoskeletal: No tenderness/abnormality   Extremities:  No cyanosis, clubbing or edema  Peripheral pulses palpable and equal bilaterally  Neurological: Cranial nerves II-XII grossly intact, sensation intact, otherwise no focal neurological symptoms  Skin: surgical site on right hip with dressing inplace    Additional Data:     Labs:    Results from last 7 days   Lab Units 12/06/19  0754 12/06/19  0451   WBC Thousand/uL  --  11 23*   HEMOGLOBIN g/dL 6 3* 6 3*   HEMATOCRIT % 19 7* 19 1*   PLATELETS Thousands/uL  --  169   NEUTROS PCT %  --  72   LYMPHS PCT %  --  11*   MONOS PCT %  --  16*   EOS PCT %  --  0     Results from last 7 days   Lab Units 12/06/19  0451   POTASSIUM mmol/L 4 6   CHLORIDE mmol/L 107   CO2 mmol/L 27   BUN mg/dL 38*   CREATININE mg/dL 1 24   CALCIUM mg/dL 7 4*     Results from last 7 days   Lab Units 12/03/19  1214   INR  1 02        I Have Reviewed All Lab Data Listed Above          Recent Cultures (last 7 days):           Last 24 Hours Medication List:     Current Facility-Administered Medications:  acetaminophen 650 mg Oral Q6H PRN [facility], PA-C   aspirin 81 mg Oral Daily [facility], PA-C   atorvastatin 5 mg Oral Daily [facility], PA-C   calcium carbonate 1,000 mg Oral Daily PRN [facility], PA-C   citalopram 40 mg Oral Daily [facility], PA-C   docusate sodium 100 mg Oral BID [facility], PA-C   heparin (porcine) 5,000 Units Subcutaneous Q8H [address] Janet Ignacio MD   hydrALAZINE 5 mg Intravenous Q4H PRN [facility], PA-C   morphine injection 2 mg Intravenous Q4H PRN Roselia Montana, PA-C   niacin 500 mg Oral Daily With Breakfast Raul Hughes, PA-C   ondansetron 4 mg Intravenous Q6H PRN Keturah Trujillo SHIRA Hughes   oxyCODONE 2 5 mg Oral Q4H PRN Digna ErikssSHIRA lopez   oxyCODONE-acetaminophen 2 tablet Oral Q4H PRN Digna ErikSHIRA hanna   pantoprazole 40 mg Oral Early Morning Digna ErikSHIRA hanna   senna 1 tablet Oral Daily Dignaselvin Steinberg PA-C        Today, Patient Was Seen By: Hayden Bradley MD

## 2019-12-06 NOTE — PLAN OF CARE
Problem: OCCUPATIONAL THERAPY ADULT  Goal: Performs self-care activities at highest level of function for planned discharge setting  See evaluation for individualized goals  Description  Treatment Interventions: ADL retraining, Functional transfer training, UE strengthening/ROM, Endurance training, Patient/family training, Equipment evaluation/education, Compensatory technique education, Energy conservation, Activityengagement          See flowsheet documentation for full assessment, interventions and recommendations  Outcome: Progressing  Note:   Limitation: Decreased ADL status, Decreased UE strength, Decreased endurance, Decreased self-care trans, Decreased high-level ADLs  Prognosis: Good  Assessment: Pt was seen for skilled OT with focus on completion of self care tasks, bed mobility, functional mobility, review of fall prevention and review of current plan of care  Pt with B/P at 126/60 with supine positioning  Max A X2 to achieve EOB positioning  Pt with initial c/o of dizziness at EOB  B/P at /63  Pt able to tolerate completion of UE self care while seated EOB with Min A required overall  Pt dependent for LE self care  Max A of 2 required to stand at [facility]  Pt's B/P dipped to 125/58 with activity  No further c/o of pain or dizziness noted  Pt's pain levels with movement 7/10  Reported all findings to nursing staff  See above levels of A required for all functional tasks  Pt may benefit from further rehab with focus on achieving optimal performance levels with all functional tasks   Continue to recommend Inpatient rehab     OT Discharge Recommendation: Short Term Rehab  OT - OK to Discharge: Yes(when medically cleared  )

## 2019-12-06 NOTE — SOCIAL WORK
CM met with pt at bedside to discuss discharge needs  Pt lives in a house with his wife  ADL's are completed independently with no DME use  Pt does own a cane and a RW  PCP identified as Dr Chetan Helton  Pharmacy identified as [facility]  Pt does drive; spouse also able to drive  POA identified as his spouse and then his two dtrs  Pt denies VNA/STR history  A post acute care recommendation was made by your care team for Acute Rehab  Discussed [place] of Choice with patient  List of facilities given to patient via in person  patient aware the list is custom filtered for them by insurance and that [facility] post acute providers are designated  Pt reports living in the [place], [place] area and wishes to go to a rehab near there  Pt's wife mentioned [facility] is close to their home  CM has submitted a referral to that facility  CM will also go over other options with pt and spouse  CM will advise

## 2019-12-06 NOTE — PLAN OF CARE
Problem: PHYSICAL THERAPY ADULT  Goal: Performs mobility at highest level of function for planned discharge setting  See evaluation for individualized goals  Description  Treatment/Interventions: Functional transfer training, ADL retraining, LE strengthening/ROM, Elevations, Therapeutic exercise, Endurance training, Patient/family training, Equipment eval/education, Bed mobility, Gait training, Compensatory technique education, Spoke to nursing, OT  Equipment Recommended: Walker(pt owns)       See flowsheet documentation for full assessment, interventions and recommendations  12/6/2019 1643 by Alicia Marsh PTA  Outcome: Progressing  Note:   Prognosis: Good  Problem List: Decreased strength, Decreased range of motion, Decreased endurance, Impaired balance, Decreased mobility, Decreased skin integrity, Orthopedic restrictions, Pain  Assessment: Pt sleeping upon arrival   Pt arouseable  Pt reporting no pain at rest, increased pain with activity/ mobility  5/10 reported  Pt performed supine b/l le a- aarom to R le and arom to l le  X 10 reps with verbal and tactile cues for proper exercise techniques and performance  Pt  Issued written supine b/lle HEP reviewed with pt  Increased time required for all exercises to R le due to increased pain or discomfort, assistance required for heel slides, saq, hip abd /add exercises to R le  Ice applied to R lateral thigh/ hip post PT session and scd's to b/l le's  Pt  will benefit from continued inpt PT and rehab at d/c to maximize functional outcomes, mobility and independence  Barriers to Discharge: Inaccessible home environment  Barriers to Discharge Comments: JASON  Recommendation: Short-term skilled PT     PT - OK to Discharge: Yes    See flowsheet documentation for full assessment

## 2019-12-06 NOTE — PROGRESS NOTES
Orthopaedic Surgery - Progress Note  Dex Krishnamurthy (89 y o  male)   : 1940   MRN: [id number]  Date: 2019   Encounter: [id number]   Unit/Bed#: E2 -01    Assessment / Plan  Postop day 2 status post ORIF right femur fracture    · Continue with PT/OT as ordered  Patient will be on toe-touch weight-bearing for at least the 1st 2 weeks postoperatively  · Continue with ice and analgesics as needed  · Patient's potassium improved 2 with treatment yesterday, 4 6 this am   · Continue with Heparin 5000 units Q 8 hours for anticoagulant therapy at this time per Medicine  · Hemoglobin 6 3 this morning decreased secondary to acute blood loss anemia  There is no acute change with swelling or edema since yesterday in the thigh and dressings remain clean and dry  Due to no sign of obvious bleeding, will repeat hemoglobin this a m  and if remains low patient will most likely need transfusion due to some tachycardia this a m  · Maintain Mepilex dressings at this time  · Patient will most likely need transfer to rehab when cleared from a medical standpoint  Subjective  Postop day 2 status post ORIF right femur fracture  Patient is doing well at this time and his pain is well controlled at this time  Patient does feel he has more thigh pain than he did yesterday, the pain is localized to his the at this time  Patient is denying any distal numbness or tingling  Vitals  Temp:  [97 °F (36 1 °C)-99 °F (37 2 °C)] 99 °F (37 2 °C)  HR:  [100-117] 117  Resp:  [16-19] 18  BP: (124-134)/(46-77) 134/60  Body mass index is 27 67 kg/m²  I/O last 24 hours: In: -   Out: 850 [Urine:850]    Right Hip Exam  Alignment / Posture:  Normal resting hip posture  Normal knee alignment  Inspection:  Mild to moderate right thigh swelling  No erythema  No ecchymosis  Mepilex dressings are clean, dry and intact at this time  no obvious signs of bleeding at this time    Palpation:  Mild tenderness at Tena-incisional areas right thigh     ROM:  Not tested  Strength:  5/5 AT, GSC, PT, and peroneals  Stability:  Not tested  Tests:  No pertinent positive or negative tests  Neurovascular:  Sensation intact in DP/SP/Geiger/Sa/T nerve distributions  Sensation intact in all digital nerve distributions  Toes warm and perfused  Gait:  Not tested      Lab Results  (I have personally reviewed pertinent lab results )  Results from last 7 days   Lab Units 12/06/19  0451 12/05/19  0442 12/04/19  1840 12/04/19  0424 12/03/19  1214   WBC Thousand/uL 11 23* 13 86* 15 36* 12 27* 16 04*   HEMOGLOBIN g/dL 6 3* 8 2* 8 9* 11 5* 13 4   HEMATOCRIT % 19 1* 25 8* 27 7* 35 0* 40 4   PLATELETS Thousands/uL 169 181 205 247 256     Results from last 7 days   Lab Units 12/03/19  1214   PTT seconds 25   INR  1 02     Results from last 7 days   Lab Units 12/06/19  0451 12/05/19  1204 12/05/19  0442 12/04/19  0423 12/03/19  1214   POTASSIUM mmol/L 4 6 4 3 5 6* 4 8 4 9   CHLORIDE mmol/L 107  --  108 104 104   CO2 mmol/L 27  --  26 25 29   BUN mg/dL 38*  --  38* 24 12   CREATININE mg/dL 1 24  --  1 94* 1 81* 1 08   EGFR ml/min/1 73sq m 55  --  32 35 65   CALCIUM mg/dL 7 4*  --  7 1* 7 9* 8 7               Shan Stoner PA-C

## 2019-12-06 NOTE — OCCUPATIONAL THERAPY NOTE
Occupational Therapy Treatment Note:         12/06/19 1000   Restrictions/Precautions   Weight Bearing Precautions Per Order Yes   RLE Weight Bearing Per Order TTWB   Pain Assessment   Pain Assessment 0-10   Pain Score 7   Pain Type Acute pain;Surgical pain   Pain Location Hip   Pain Orientation Right   ADL   Where Assessed Edge of bed   Grooming Assistance 4  Minimal Assistance   Grooming Deficit Setup   UB Bathing Assistance 4  Minimal Assistance   UB Bathing Deficit Setup   LB Bathing Assistance 2  Maximal Assistance   LB Bathing Deficit Steadying;Setup;Verbal cueing; Increased time to complete;Supervision/safety; Buttocks; Perineal area;Right lower leg including foot; Left lower leg including foot   UB Dressing Assistance 4  Minimal Assistance   UB Dressing Deficit Setup   LB Dressing Assistance 2  Maximal Assistance   LB Dressing Deficit Requires assistive device for steadying;Steadying;Setup;Verbal cueing;Supervision/safety; Increased time to complete; Don/doff R sock; Don/doff L sock   LB Dressing Comments Pt with limited functional reach  Toileting Assistance  4  Minimal Assistance   Toileting Deficit Increased time to complete;Use of bedpan/urinal setup;Verbal cueing   Toileting Comments Pt with noted spillage from use of hand held urinal     Functional Standing Tolerance   Time 7 mins   Activity static stand balance activity  Comments Pt with need for step by step verbal cues with activities  Bed Mobility   Rolling R 2  Maximal assistance   Additional items Assist x 2;Bedrails; Increased time required;Verbal cues;LE management   Rolling L 2  Maximal assistance   Additional items Assist x 2;Bedrails; Increased time required;Verbal cues;LE management   Supine to Sit 2  Maximal assistance   Additional items Assist x 2; Increased time required;Verbal cues;LE management; Bedrails   Sit to Supine 3  Moderate assistance   Additional items Bedrails; Increased time required;Verbal cues;LE management;Assist x 1 Additional Comments cues for positioning self prior to movement   Transfers   Sit to Stand 3  Moderate assistance   Additional items Assist x 2   Stand to Sit 3  Moderate assistance   Additional items Assist x 2;Armrests; Bedrails; Increased time required;Verbal cues   Stand pivot Unable to assess   Additional Comments Pt with increased weakness with activities  Functional Mobility   Functional Mobility 3  Moderate assistance   Additional Comments x2   Additional items Rolling walker   Cognition   Overall Cognitive Status WFL   Arousal/Participation Responsive; Alert; Cooperative   Attention Within functional limits   Orientation Level Oriented X4   Memory Within functional limits   Following Commands Follows one step commands without difficulty   Comments Pt [place] warranting slow, loud communication  Additional Activities   Additional Activities Other (Comment)  (reviewed current plan of care  )   Additional Activities Comments Pt reports haivng good understanding  Activity Tolerance   Activity Tolerance Patient limited by fatigue;Patient limited by pain   Medical Staff Made Aware Reported all findings to nursing staff  MAYLIN Pardo regarding Pt's participation levels  Bed alarm activated upon termination of tx session  Assessment   Assessment Pt was seen for skilled OT with focus on completion of self care tasks, bed mobility, functional mobility, review of fall prevention and review of current plan of care  Pt with B/P at 126/60 with supine positioning  Max A X2 to achieve EOB positioning  Pt with initial c/o of dizziness at EOB  B/P at /63  Pt able to tolerate completion of UE self care while seated EOB with Min A required overall  Pt dependent for LE self care  Max A of 2 required to stand at [address] [place] [address]  Pt's B/P dipped to 125/58 with activity  No further c/o of pain or dizziness noted  Pt's pain levels with movement 7/10  Reported all findings to nursing staff   See above levels of A required for all functional tasks  Pt may benefit from further rehab with focus on achieving optimal performance levels with all functional tasks  Continue to recommend Inpatient rehab   Plan   Treatment Interventions ADL retraining;Functional transfer training; Endurance training   Goal Expiration Date 12/19/19   OT Treatment Day 1   OT Frequency 3-5x/wk   Recommendation   OT Discharge Recommendation Short Term Rehab   OT - OK to Discharge Yes  (when medically cleared  )   Barthel Index   Feeding 10   Bathing 0   Grooming Score 5   Dressing Score 5   Bladder Score 10   Bowels Score 10   Toilet Use Score 5   Transfers (Bed/Chair) Score 5   Mobility (Level Surface) Score 0   Stairs Score 0   Barthel Index Score 50   Modified Gloria Scale   Modified Gloria Scale 4   Danni Ortiz, [address]

## 2019-12-06 NOTE — PHYSICAL THERAPY NOTE
Physical Therapy Treatment Note       12/06/19 7377   Pain Assessment   Pain Assessment 0-10   Pain Score 5   Pain Type Acute pain;Surgical pain   Pain Location Hip   Pain Orientation Right   Pain Descriptors Discomfort   Pain Frequency Constant/continuous   Clinical Progression Gradually improving   Hospital Pain Intervention(s) Cold applied; Ambulation/increased activity; Emotional support; Rest   Response to Interventions tolerated  Restrictions/Precautions   RLE Weight Bearing Per Order TTWB   Other Precautions Bed Alarm;WBS;Multiple lines; Fall Risk;Pain;Hard of hearing   General   Chart Reviewed Yes   Family/Caregiver Present No   Cognition   Overall Cognitive Status WFL   Subjective   Subjective " It's gettng better I have not pain just laying here "     Exercises   Quad Sets Supine;10 reps;AROM; Bilateral   Heelslides Supine;10 reps;AAROM; Right  (araom L le )   Glute Sets Supine;10 reps;AROM; Bilateral   Hip Abduction Supine;10 reps;AROM;AAROM; Right;Left  (aarom r le , arom l le)   Hip Adduction Supine;10 reps;AAROM;AROM; Right;Left  (aarom r le and arom L le)   Knee AROM Short Arc Quad Supine;10 reps;AAROM;AROM; Right;Left  (aarom r le and arom l le )   Ankle Pumps Supine;10 reps;AROM; Bilateral   Equipment Use   Comments pt issued written HEP for b/l le exercises  reviewed and performed with pt  Assessment   Prognosis Good   Problem List Decreased strength;Decreased range of motion;Decreased endurance; Impaired balance;Decreased mobility; Decreased skin integrity;Orthopedic restrictions;Pain   Assessment Pt sleeping upon arrival   Pt arouseable  Pt reporting no pain at rest, increased pain with activity/ mobility  5/10 reported  Pt performed supine b/l le a- aarom to R le and arom to l le  X 10 reps with verbal and tactile cues for proper exercise techniques and performance  Pt  Issued written supine b/lle HEP reviewed with pt    Increased time required for all exercises to R le due to increased pain or discomfort, assistance required for heel slides, saq, hip abd /add exercises to R le  Ice applied to R lateral thigh/ hip post PT session and scd's to b/l le's  Pt  will benefit from continued inpt PT and rehab at d/c to maximize functional outcomes, mobility and independence  Goals   Patient Goals " To get better  "     STG Expiration Date 12/19/19   PT Treatment Day 2   Plan   Treatment/Interventions Functional transfer training;LE strengthening/ROM; Therapeutic exercise; Endurance training;Patient/family training;Equipment eval/education; Bed mobility;Gait training;Spoke to nursing   Progress Slow progress, decreased activity tolerance   PT Frequency Twice a day;7x/wk; Weekend   Recommendation   Recommendation Short-term skilled PT   PT - OK to Discharge Yes        12/06/19 1645   Pain Assessment   Pain Assessment 0-10   Pain Score 5   Pain Type Acute pain;Surgical pain   Pain Location Hip   Pain Orientation Right   Pain Descriptors Discomfort   Pain Frequency Constant/continuous   Clinical Progression Gradually improving   Hospital Pain Intervention(s) Cold applied; Ambulation/increased activity; Emotional support; Rest   Response to Interventions tolerated  Restrictions/Precautions   RLE Weight Bearing Per Order TTWB   Other Precautions Bed Alarm;WBS;Multiple lines; Fall Risk;Pain;Hard of hearing   General   Chart Reviewed Yes   Family/Caregiver Present No   Cognition   Overall Cognitive Status WFL   Subjective   Subjective " It's gettng better I have not pain just laying here "     Exercises   Quad Sets Supine;10 reps;AROM; Bilateral   Heelslides Supine;10 reps;AAROM; Right  (araom L le )   Glute Sets Supine;10 reps;AROM; Bilateral   Hip Abduction Supine;10 reps;AROM;AAROM; Right;Left  (aarom r le , arom l le)   Hip Adduction Supine;10 reps;AAROM;AROM; Right;Left  (aarom r le and arom L le)   Knee AROM Short Arc Quad Supine;10 reps;AAROM;AROM; Right;Left  (aarom r le and arom l le )   Ankle Pumps Supine;10 reps;AROM; Bilateral   Equipment Use   Comments pt issued written HEP for b/l le exercises  reviewed and performed with pt  Assessment   Prognosis Good   Problem List Decreased strength;Decreased range of motion;Decreased endurance; Impaired balance;Decreased mobility; Decreased skin integrity;Orthopedic restrictions;Pain   Assessment Pt sleeping upon arrival   Pt arouseable  Pt reporting no pain at rest, increased pain with activity/ mobility  5/10 reported  Pt performed supine b/l le a- aarom to R le and arom to l le  X 10 reps with verbal and tactile cues for proper exercise techniques and performance  Pt  Issued written supine b/lle HEP reviewed with pt  Increased time required for all exercises to R le due to increased pain or discomfort, assistance required for heel slides, saq, hip abd /add exercises to R le  Ice applied to R lateral thigh/ hip post PT session and scd's to b/l le's  Pt  will benefit from continued inpt PT and rehab at d/c to maximize functional outcomes, mobility and independence  Goals   Patient Goals " To get better  "     [facility] Expiration Date 12/19/19   PT Treatment Day 2   Plan   Treatment/Interventions Functional transfer training;LE strengthening/ROM; Therapeutic exercise; Endurance training;Patient/family training;Equipment eval/education; Bed mobility;Gait training;Spoke to nursing   Progress Slow progress, decreased activity tolerance   PT Frequency Twice a day;7x/wk; Weekend   Recommendation   Recommendation Short-term skilled PT   PT - OK to Discharge Yes       Savita Bright PTA

## 2019-12-06 NOTE — PROGRESS NOTES
NEPHROLOGY PROGRESS NOTE   Chip Estevez 78 y o  male MRN: [id number]  Unit/Bed#: E2 -01 Encounter: [id number]  Reason for Consult: JIMY    ASSESSMENT AND PLAN:  Patient is 49-year-old male with hypertension for many years, no obvious history of diabetes,, hyperlipidemia, anxiety, presented after episode of fall and found to have femoral fracture  We are consulted for JIMY management      JIMY POA, baseline serum creatinine 0 9 to 1 0  -creatinine peak level 1 9 now significantly improved to 1 2 today  -JIMY suspected to be prerenal, use of ACE-inhibitor + occasional lower BP readings contributing to autoregulatory failure + severe anemia  -status post lisinopril on 12/4/19  continue to avoid Ace inhibitor for now   -currently on IV fluid and discontinue after current bag is over   -bladder scan nonsignificant  -urinalysis this admission shows 4 to 10 RBCs, no proteinuria  This was in the setting of recent Baez catheter insertion  Will eventually need repeat urinalysis  -avoid nephrotoxins or NSAIDs     Hyperkalemia, now improved with medical management and improving renal function    -continue to avoid Ace inhibitor  -strict low-potassium diet     Anemia, currently postoperative, transfuse p r n  For hemoglobin less than seven  Hemoglobin has dropped to 6 3 today  Recent blood transfusion today  Management as per primary team      Hypertension  -blood pressure acceptable, goal SBP 140s for time being  Avoid Ace inhibitor for now      Femur fracture, status post ORIF     Discussed above plan with primary team    SUBJECTIVE:  Patient seen and examined at bedside  No chest pain, shortness of breath, nausea, vomiting, abdominal pain or diarrhea  No urinary complaints       OBJECTIVE:  Current Weight: Weight - Scale: 85 kg (187 lb 6 3 oz)  Vitals:    12/06/19 1106   BP: 142/64   Pulse: (!) 114   Resp: 18   Temp: 98 3 °F (36 8 °C)   SpO2:        Intake/Output Summary (Last 24 hours) at 12/6/2019 1111  Last data filed at 12/6/2019 0620  Gross per 24 hour   Intake    Output 850 ml   Net -850 ml     Wt Readings from Last 3 Encounters:   12/06/19 85 kg (187 lb 6 3 oz)   10/14/19 86 2 kg (190 lb)     Temp Readings from Last 3 Encounters:   12/06/19 98 3 °F (36 8 °C) (Tympanic)     BP Readings from Last 3 Encounters:   12/06/19 142/64   10/14/19 131/82     Pulse Readings from Last 3 Encounters:   12/06/19 (!) 114   10/14/19 84        Physical Examination:  General:  Lying in bed, no acute distress   Eyes:  Mild conjunctival pallor present, sclerae anicteric  ENT:  External examination of ears and nose unremarkable  Neck:  No obvious lymphadenopathy appreciated  Respiratory:  Bilateral air entry present, no crackles appreciated  CVS:  S1, S2 present  GI:  Soft, nontender, nondistended  CNS:  Active alert oriented x3  Extremities:  No significant edema in legs  Skin:  No new rash in legs    Medications:    Current Facility-Administered Medications:     acetaminophen (TYLENOL) tablet 650 mg, 650 mg, Oral, Q6H PRN, Ray Blinks, PA-C, 650 mg at 12/05/19 0755    aspirin (ECOTRIN LOW STRENGTH) EC tablet 81 mg, 81 mg, Oral, Daily, Rayfield Blinks, PA-C, 81 mg at 12/06/19 3039    atorvastatin (LIPITOR) tablet 5 mg, 5 mg, Oral, Daily, Rayfield Blinks, PA-C, 5 mg at 12/06/19 3539    calcium carbonate (TUMS) chewable tablet 1,000 mg, 1,000 mg, Oral, Daily PRN, Ray Blinks, PA-C    citalopram (CeleXA) tablet 40 mg, 40 mg, Oral, Daily, Rayfield Blinks, PA-C, 40 mg at 12/06/19 1917    docusate sodium (COLACE) capsule 100 mg, 100 mg, Oral, BID, Rayfield Blinks, PA-C, 100 mg at 12/06/19 0843    heparin (porcine) subcutaneous injection 5,000 Units, 5,000 Units, Subcutaneous, Q8H [address], Cassandra Sargent MD, 5,000 Units at 12/06/19 0208    hydrALAZINE (APRESOLINE) injection 5 mg, 5 mg, Intravenous, Q4H PRN, Tre Ho PA-C    morphine injection 2 mg, 2 mg, Intravenous, Q4H PRN, Tre Ho PA-C    niacin tablet 500 mg, 500 mg, Oral, Daily With Breakfast, Zorita Pilling, PA-C, 500 mg at 12/06/19 0841    ondansetron [facility]) injection 4 mg, 4 mg, Intravenous, Q6H PRN, Zorita Pilling, PA-C    oxyCODONE (ROXICODONE) IR tablet 2 5 mg, 2 5 mg, Oral, Q4H PRN, Zorita Pilling, PA-C, 2 5 mg at 12/06/19 1639    oxyCODONE-acetaminophen (PERCOCET) 5-325 mg per tablet 2 tablet, 2 tablet, Oral, Q4H PRN, Zorita Pilling, PA-C    pantoprazole (PROTONIX) EC tablet 40 mg, 40 mg, Oral, Early Morning, Zorita Pilling, PA-C, 40 mg at 12/06/19 4484    senna (SENOKOT) tablet 8 6 mg, 1 tablet, Oral, Daily, Zorita Pilling, PA-C, 8 6 mg at 12/06/19 0207    sodium chloride 0 9 % infusion, 75 mL/hr, Intravenous, Continuous, Jetta Cogan, MD, Last Rate: 75 mL/hr at 12/06/19 0129, 75 mL/hr at 12/06/19 0129    Laboratory Results:  Results from last 7 days   Lab Units 12/06/19  0754 12/06/19  0451 12/05/19  1204 12/05/19  0442 12/04/19  1840 12/04/19  0424 12/04/19  0423 12/03/19  1214   WBC Thousand/uL  --  11 23*  --  13 86* 15 36* 12 27*  --  16 04*   HEMOGLOBIN g/dL 6 3* 6 3*  --  8 2* 8 9* 11 5*  --  13 4   HEMATOCRIT % 19 7* 19 1*  --  25 8* 27 7* 35 0*  --  40 4   PLATELETS Thousands/uL  --  169  --  181 205 247  --  256   SODIUM mmol/L  --  139  --  141  --   --  139 139   POTASSIUM mmol/L  --  4 6 4 3 5 6*  --   --  4 8 4 9   CHLORIDE mmol/L  --  107  --  108  --   --  104 104   CO2 mmol/L  --  27  --  26  --   --  25 29   BUN mg/dL  --  38*  --  38*  --   --  24 12   CREATININE mg/dL  --  1 24  --  1 94*  --   --  1 81* 1 08   CALCIUM mg/dL  --  7 4*  --  7 1*  --   --  7 9* 8 7       XR femur 2 vw right   Final Result by Darian Molina MD (12/04 2041)      Fluoroscopic guidance provided for surgical procedure  Please refer to the separate procedure notes for additional details            Workstation performed: [id number]         XR hip/pelv 2-3 vws right   Final Result by Yimi Leyva MD (12/03 3632)      Comminuted intertrochanteric femoral fracture extending into the subtrochanteric femur  Workstation performed: [id number]         XR femur 2 views RIGHT   Final Result by Leonie Perez MD (12/03 1440)      Comminuted intertrochanteric femoral fracture extending into the subtrochanteric femur  Workstation performed: [id number]         XR chest 2 views   Final Result by Leonie Perez MD (12/03 1441)      No acute cardiopulmonary disease  Multilevel age indeterminate compression deformities of multiple thoracic vertebral bodies  Workstation performed: [id number]             Portions of the record may have been created with voice recognition software  Occasional wrong word or "sound a like" substitutions may have occurred due to the inherent limitations of voice recognition software  Read the chart carefully and recognize, using context, where substitutions have occurred

## 2019-12-06 NOTE — PLAN OF CARE
Problem: PHYSICAL THERAPY ADULT  Goal: Performs mobility at highest level of function for planned discharge setting  See evaluation for individualized goals  Description  Treatment/Interventions: Functional transfer training, ADL retraining, LE strengthening/ROM, Elevations, Therapeutic exercise, Endurance training, Patient/family training, Equipment eval/education, Bed mobility, Gait training, Compensatory technique education, Spoke to nursing, OT  Equipment Recommended: Walker(pt owns)       See flowsheet documentation for full assessment, interventions and recommendations  Outcome: Progressing  Note:   Prognosis: Good  Problem List: Decreased strength, Decreased range of motion, Decreased endurance, Impaired balance, Decreased mobility, Impaired hearing, Decreased skin integrity, Orthopedic restrictions, Pain  Assessment: Pt continues to require increased assistance for all aspects of mobility, requiring mod assist x1 for supine to sit and max assist x2 for sit to supine  Pt is requiring max assist x2 for rolling verbal cues for rolling techniques and handplacement for use of bedrails  PT performed sitting at edge of bed x 15 minutes with close supervision- min assist and verbal cues for improved sitting posture and balance  Pt ablet o self correct at times and other times requires min assist x1  Pt perform sit to stand with mod assist x2 with cues for handplacement, positioning of R le and cues for compliance with weightbearing status  Pt  tolerated 7 minutes of standing activities with mod assist x2 and performed side stepping 3' x1 with mod assist with verbal cues for le sequencing, improved posture, weightshifting and weightbearing compliance  Pt performed seated b/l le a-aarom exercises x 10 reps  Pt's reported lightheadedness upon sit at edge of bed  Pt 's vitals as follows:  126/60, , sit edge of bed-137/63 , and post standing and side stepping 123/58     Pt returned to supine , SCD's to b/l le's and ice to R lateral hip  Recommend  D/c to STR when medically cleared  Barriers to Discharge: Inaccessible home environment  Barriers to Discharge Comments: JASON  Recommendation: Short-term skilled PT     PT - OK to Discharge: Yes    See flowsheet documentation for full assessment

## 2019-12-06 NOTE — PROGRESS NOTES
Pt hemoglobin resulted at 6 3 this AM  Called [facility] and notified Fe Valle   She is leaving a note for Dr Luciana Barber this AM

## 2019-12-06 NOTE — ASSESSMENT & PLAN NOTE
Status post ORIF right femur fracture postop day 2  -patient with significant postoperative anemia requiring transfusion today  -orthopedic follow-up appreciated  -continue with PT OT, pain control  -will likely need rehab

## 2019-12-07 ENCOUNTER — APPOINTMENT (INPATIENT)
Dept: RADIOLOGY | Facility: HOSPITAL | Age: 79
DRG: 480 | End: 2019-12-07
Attending: INTERNAL MEDICINE
Payer: COMMERCIAL

## 2019-12-07 ENCOUNTER — APPOINTMENT (INPATIENT)
Dept: CT IMAGING | Facility: HOSPITAL | Age: 79
DRG: 480 | End: 2019-12-07
Payer: COMMERCIAL

## 2019-12-07 PROBLEM — Z95.828 S/P IVC FILTER: Status: ACTIVE | Noted: 2019-12-07

## 2019-12-07 PROBLEM — I30.9 ACUTE PERICARDITIS: Status: ACTIVE | Noted: 2019-12-07

## 2019-12-07 PROBLEM — I26.99 ACUTE PULMONARY EMBOLISM (HCC): Status: ACTIVE | Noted: 2019-12-07

## 2019-12-07 PROBLEM — N50.89 SCROTAL SWELLING: Status: ACTIVE | Noted: 2019-12-07

## 2019-12-07 PROBLEM — I30.9 ACUTE PERICARDITIS: Status: RESOLVED | Noted: 2019-12-07 | Resolved: 2019-12-07

## 2019-12-07 PROBLEM — R00.0 SINUS TACHYCARDIA: Status: ACTIVE | Noted: 2019-12-07

## 2019-12-07 LAB
ABO GROUP BLD BPU: NORMAL
ABO GROUP BLD BPU: NORMAL
ANION GAP SERPL CALCULATED.3IONS-SCNC: 6 MMOL/L (ref 4–13)
APTT PPP: 143 SECONDS (ref 23–37)
APTT PPP: 39 SECONDS (ref 23–37)
BASOPHILS # BLD AUTO: 0.03 THOUSANDS/ΜL (ref 0–0.1)
BASOPHILS NFR BLD AUTO: 0 % (ref 0–1)
BPU ID: NORMAL
BPU ID: NORMAL
BUN SERPL-MCNC: 32 MG/DL (ref 5–25)
CALCIUM SERPL-MCNC: 7.7 MG/DL (ref 8.3–10.1)
CHLORIDE SERPL-SCNC: 106 MMOL/L (ref 100–108)
CO2 SERPL-SCNC: 26 MMOL/L (ref 21–32)
CREAT SERPL-MCNC: 0.96 MG/DL (ref 0.6–1.3)
CROSSMATCH: NORMAL
CROSSMATCH: NORMAL
EOSINOPHIL # BLD AUTO: 0.16 THOUSAND/ΜL (ref 0–0.61)
EOSINOPHIL NFR BLD AUTO: 2 % (ref 0–6)
ERYTHROCYTE [DISTWIDTH] IN BLOOD BY AUTOMATED COUNT: 15.2 % (ref 11.6–15.1)
ERYTHROCYTE [DISTWIDTH] IN BLOOD BY AUTOMATED COUNT: 15.4 % (ref 11.6–15.1)
GFR SERPL CREATININE-BSD FRML MDRD: 75 ML/MIN/1.73SQ M
GLUCOSE SERPL-MCNC: 108 MG/DL (ref 65–140)
HCT VFR BLD AUTO: 24 % (ref 36.5–49.3)
HCT VFR BLD AUTO: 24.2 % (ref 36.5–49.3)
HCT VFR BLD AUTO: 26.1 % (ref 36.5–49.3)
HGB BLD-MCNC: 7.8 G/DL (ref 12–17)
HGB BLD-MCNC: 7.8 G/DL (ref 12–17)
HGB BLD-MCNC: 8.3 G/DL (ref 12–17)
IMM GRANULOCYTES # BLD AUTO: 0.12 THOUSAND/UL (ref 0–0.2)
IMM GRANULOCYTES NFR BLD AUTO: 1 % (ref 0–2)
INR PPP: 1.13 (ref 0.84–1.19)
LYMPHOCYTES # BLD AUTO: 1.41 THOUSANDS/ΜL (ref 0.6–4.47)
LYMPHOCYTES NFR BLD AUTO: 13 % (ref 14–44)
MCH RBC QN AUTO: 31.8 PG (ref 26.8–34.3)
MCH RBC QN AUTO: 32 PG (ref 26.8–34.3)
MCHC RBC AUTO-ENTMCNC: 31.8 G/DL (ref 31.4–37.4)
MCHC RBC AUTO-ENTMCNC: 32.5 G/DL (ref 31.4–37.4)
MCV RBC AUTO: 100 FL (ref 82–98)
MCV RBC AUTO: 98 FL (ref 82–98)
MONOCYTES # BLD AUTO: 1.71 THOUSAND/ΜL (ref 0.17–1.22)
MONOCYTES NFR BLD AUTO: 16 % (ref 4–12)
NEUTROPHILS # BLD AUTO: 7.33 THOUSANDS/ΜL (ref 1.85–7.62)
NEUTS SEG NFR BLD AUTO: 68 % (ref 43–75)
NRBC BLD AUTO-RTO: 0 /100 WBCS
PLATELET # BLD AUTO: 174 THOUSANDS/UL (ref 149–390)
PLATELET # BLD AUTO: 174 THOUSANDS/UL (ref 149–390)
PMV BLD AUTO: 10 FL (ref 8.9–12.7)
PMV BLD AUTO: 10.3 FL (ref 8.9–12.7)
POTASSIUM SERPL-SCNC: 4.5 MMOL/L (ref 3.5–5.3)
PROTHROMBIN TIME: 14.7 SECONDS (ref 11.6–14.5)
RBC # BLD AUTO: 2.44 MILLION/UL (ref 3.88–5.62)
RBC # BLD AUTO: 2.61 MILLION/UL (ref 3.88–5.62)
SODIUM SERPL-SCNC: 138 MMOL/L (ref 136–145)
UNIT DISPENSE STATUS: NORMAL
UNIT DISPENSE STATUS: NORMAL
UNIT PRODUCT CODE: NORMAL
UNIT PRODUCT CODE: NORMAL
UNIT RH: NORMAL
UNIT RH: NORMAL
WBC # BLD AUTO: 10.73 THOUSAND/UL (ref 4.31–10.16)
WBC # BLD AUTO: 10.76 THOUSAND/UL (ref 4.31–10.16)

## 2019-12-07 PROCEDURE — 76937 US GUIDE VASCULAR ACCESS: CPT

## 2019-12-07 PROCEDURE — 93005 ELECTROCARDIOGRAM TRACING: CPT

## 2019-12-07 PROCEDURE — 85027 COMPLETE CBC AUTOMATED: CPT | Performed by: PHYSICIAN ASSISTANT

## 2019-12-07 PROCEDURE — 80048 BASIC METABOLIC PNL TOTAL CA: CPT | Performed by: PHYSICIAN ASSISTANT

## 2019-12-07 PROCEDURE — C1894 INTRO/SHEATH, NON-LASER: HCPCS

## 2019-12-07 PROCEDURE — 85730 THROMBOPLASTIN TIME PARTIAL: CPT | Performed by: INTERNAL MEDICINE

## 2019-12-07 PROCEDURE — 99233 SBSQ HOSP IP/OBS HIGH 50: CPT | Performed by: INTERNAL MEDICINE

## 2019-12-07 PROCEDURE — C1769 GUIDE WIRE: HCPCS

## 2019-12-07 PROCEDURE — 85014 HEMATOCRIT: CPT | Performed by: INTERNAL MEDICINE

## 2019-12-07 PROCEDURE — C1880 VENA CAVA FILTER: HCPCS

## 2019-12-07 PROCEDURE — 99222 1ST HOSP IP/OBS MODERATE 55: CPT | Performed by: NURSE PRACTITIONER

## 2019-12-07 PROCEDURE — 85730 THROMBOPLASTIN TIME PARTIAL: CPT | Performed by: PHYSICIAN ASSISTANT

## 2019-12-07 PROCEDURE — 71275 CT ANGIOGRAPHY CHEST: CPT

## 2019-12-07 PROCEDURE — 37191 INS ENDOVAS VENA CAVA FILTR: CPT | Performed by: RADIOLOGY

## 2019-12-07 PROCEDURE — 99222 1ST HOSP IP/OBS MODERATE 55: CPT | Performed by: UROLOGY

## 2019-12-07 PROCEDURE — 37191 INS ENDOVAS VENA CAVA FILTR: CPT

## 2019-12-07 PROCEDURE — 85018 HEMOGLOBIN: CPT | Performed by: INTERNAL MEDICINE

## 2019-12-07 PROCEDURE — 06H03DZ INSERTION OF INTRALUMINAL DEVICE INTO INFERIOR VENA CAVA, PERCUTANEOUS APPROACH: ICD-10-PCS | Performed by: RADIOLOGY

## 2019-12-07 PROCEDURE — 99232 SBSQ HOSP IP/OBS MODERATE 35: CPT | Performed by: INTERNAL MEDICINE

## 2019-12-07 PROCEDURE — 85025 COMPLETE CBC W/AUTO DIFF WBC: CPT | Performed by: INTERNAL MEDICINE

## 2019-12-07 PROCEDURE — 99024 POSTOP FOLLOW-UP VISIT: CPT | Performed by: PHYSICIAN ASSISTANT

## 2019-12-07 PROCEDURE — 85610 PROTHROMBIN TIME: CPT | Performed by: PHYSICIAN ASSISTANT

## 2019-12-07 RX ORDER — SODIUM CHLORIDE 9 MG/ML
40 INJECTION, SOLUTION INTRAVENOUS CONTINUOUS
Status: DISCONTINUED | OUTPATIENT
Start: 2019-12-07 | End: 2019-12-08

## 2019-12-07 RX ORDER — FENTANYL CITRATE 50 UG/ML
INJECTION, SOLUTION INTRAMUSCULAR; INTRAVENOUS CODE/TRAUMA/SEDATION MEDICATION
Status: COMPLETED | OUTPATIENT
Start: 2019-12-07 | End: 2019-12-07

## 2019-12-07 RX ORDER — HEPARIN SODIUM 10000 [USP'U]/100ML
3-30 INJECTION, SOLUTION INTRAVENOUS
Status: DISCONTINUED | OUTPATIENT
Start: 2019-12-07 | End: 2019-12-07

## 2019-12-07 RX ORDER — HEPARIN SODIUM 1000 [USP'U]/ML
3400 INJECTION, SOLUTION INTRAVENOUS; SUBCUTANEOUS AS NEEDED
Status: DISCONTINUED | OUTPATIENT
Start: 2019-12-07 | End: 2019-12-07

## 2019-12-07 RX ORDER — METOPROLOL TARTRATE 5 MG/5ML
2.5 INJECTION INTRAVENOUS EVERY 6 HOURS PRN
Status: DISCONTINUED | OUTPATIENT
Start: 2019-12-07 | End: 2019-12-13

## 2019-12-07 RX ORDER — HEPARIN SODIUM 1000 [USP'U]/ML
6800 INJECTION, SOLUTION INTRAVENOUS; SUBCUTANEOUS AS NEEDED
Status: DISCONTINUED | OUTPATIENT
Start: 2019-12-07 | End: 2019-12-07

## 2019-12-07 RX ADMIN — SODIUM CHLORIDE 75 ML/HR: 0.9 INJECTION, SOLUTION INTRAVENOUS at 03:33

## 2019-12-07 RX ADMIN — FENTANYL CITRATE 50 MCG: 50 INJECTION, SOLUTION INTRAMUSCULAR; INTRAVENOUS at 11:26

## 2019-12-07 RX ADMIN — MORPHINE SULFATE 2 MG: 2 INJECTION, SOLUTION INTRAMUSCULAR; INTRAVENOUS at 16:52

## 2019-12-07 RX ADMIN — OXYCODONE HYDROCHLORIDE AND ACETAMINOPHEN 2 TABLET: 5; 325 TABLET ORAL at 22:16

## 2019-12-07 RX ADMIN — METOPROLOL TARTRATE 2.5 MG: 1 INJECTION, SOLUTION INTRAVENOUS at 17:44

## 2019-12-07 RX ADMIN — HEPARIN SODIUM AND DEXTROSE 18 UNITS/KG/HR: 10000; 5 INJECTION INTRAVENOUS at 03:37

## 2019-12-07 RX ADMIN — IODIXANOL 85 ML: 320 INJECTION, SOLUTION INTRAVASCULAR at 02:39

## 2019-12-07 RX ADMIN — PANTOPRAZOLE SODIUM 40 MG: 40 TABLET, DELAYED RELEASE ORAL at 06:28

## 2019-12-07 RX ADMIN — DOCUSATE SODIUM 100 MG: 100 CAPSULE, LIQUID FILLED ORAL at 17:44

## 2019-12-07 RX ADMIN — IOHEXOL 5 ML: 350 INJECTION, SOLUTION INTRAVENOUS at 11:45

## 2019-12-07 RX ADMIN — OXYCODONE HYDROCHLORIDE AND ACETAMINOPHEN 2 TABLET: 5; 325 TABLET ORAL at 06:28

## 2019-12-07 RX ADMIN — SODIUM CHLORIDE 250 ML: 0.9 INJECTION, SOLUTION INTRAVENOUS at 01:11

## 2019-12-07 NOTE — CONSULTS
ConsultNote - Titus Spears 1940, 78 y o  male MRN: [id number]    Unit/Bed#: E2 -01 Encounter: [id number]    Primary Care Provider: Reynaldo Major MD   Date and time admitted to hospital: 12/3/2019 11:20 AM        S/P IVC filter  Assessment & Plan  77 y/o male with HTN, HLD, AS, s/p fall with right femur fracture on 12/3, s/p ORIF 12/4, noted to be tachycardic with decrease pulse ox, CTA PE study on 12/7 notable for right sided PE, initiated on Heparin gtt  After initiation of Heparin gtt developed significant bleeding from surgical site  Vascular was consulted for IVC filter placement  Upon seeing patient and chart review, patient had IVC filter placed by IR at 11:44am   Patient denies chest pain/SOB  No prior hx of DVT/PE  Recommendations:  -Due to presence of IVC filter, PE and non-ambulatory status s/p ORIF would recommend therapeutic anticoagulation when cleared by ortho/SLIM  -Thank you for allowing us to participate in the care of this patient  If we can be of further assistance during this hospitalization please let us know  ______________________________________________________________________    Consulting Service: [facility]    Chief Complaint: IVC filter placement    HPI: Titus Spears is a 78 y o  male with HTN, HLD, AS, s/p fall with right femur fracture on 12/3, s/p ORIF 12/4, noted to be tachycardic with decrease pulse ox, so had CTA PE study on 12/7 that was notable for right sided PE, initiated on Heparin gtt  After initiation of Heparin gtt developed significant bleeding from surgical site  Vascular was consulted for IVC filter placement  Upon seeing patient and chart review, patient had IVC filter placed by IR at 11:44am   Patient denies chest pain/SOB  No prior hx of DVT/PE  No family history DVT/PE/hypercoagulable state  No lower extremity edema  Reports pain to the RLE with movement/repositioning, otherwise offers no complaints      Review of Systems:  General: negative  Cardiovascular: no chest pain or dyspnea on exertion  Respiratory: no cough, shortness of breath, or wheezing  Gastrointestinal: negative  Genitourinary ROS: negative  Musculoskeletal ROS: Right leg pain with movement  Neurological ROS: negative  Hematological and Lymphatic ROS: negative  Dermatological ROS: negative  Psychological ROS: negative  Ophthalmic ROS: negative  ENT ROS: negative    Past Medical History:  Past Medical History:   Diagnosis Date    Hyperlipidemia        Past Surgical History:  Past Surgical History:   Procedure Laterality Date    IR IVC OPTIONAL FILTER PLACEMENT  12/7/2019    [place] OPEN RX FEMUR FX+INTRAMED BÁRBARA Right 12/4/2019    Procedure: INSERTION NAIL IM FEMUR ANTEGRADE (TROCHANTERIC);   Surgeon: Alma Fishman DO;  Location: [facility];  Service: Orthopedics       Social History:  Social History     Substance and Sexual Activity   Alcohol Use Never    Frequency: Never     Social History     Substance and Sexual Activity   Drug Use Never     Social History     Tobacco Use   Smoking Status Never Smoker   Smokeless Tobacco Never Used       Family History:  Family History   Problem Relation Age of Onset    Cancer Mother        Allergies:  No Known Allergies    Medications:  Current Facility-Administered Medications   Medication Dose Route Frequency    acetaminophen (TYLENOL) tablet 650 mg  650 mg Oral Q6H PRN    atorvastatin (LIPITOR) tablet 5 mg  5 mg Oral Daily    calcium carbonate (TUMS) chewable tablet 1,000 mg  1,000 mg Oral Daily PRN    citalopram (CeleXA) tablet 40 mg  40 mg Oral Daily    docusate sodium (COLACE) capsule 100 mg  100 mg Oral BID    heparin (porcine) 25,000 units in 250 mL infusion (premix)  3-30 Units/kg/hr (Order-Specific) Intravenous Titrated    heparin (porcine) injection 3,400 Units  3,400 Units Intravenous PRN    heparin (porcine) injection 6,800 Units  6,800 Units Intravenous PRN    hydrALAZINE (APRESOLINE) injection 5 mg  5 mg Intravenous Q4H PRN    morphine injection 2 mg  2 mg Intravenous Q4H PRN    niacin tablet 500 mg  500 mg Oral Daily With Breakfast    ondansetron (ZOFRAN) injection 4 mg  4 mg Intravenous Q6H PRN    oxyCODONE (ROXICODONE) IR tablet 2 5 mg  2 5 mg Oral Q4H PRN    oxyCODONE-acetaminophen (PERCOCET) 5-325 mg per tablet 2 tablet  2 tablet Oral Q4H PRN    pantoprazole (PROTONIX) EC tablet 40 mg  40 mg Oral Early Morning    senna (SENOKOT) tablet 8 6 mg  1 tablet Oral Daily    sodium chloride 0 9 % infusion  75 mL/hr Intravenous Continuous       Vitals:  BP      Temp      Pulse     Resp      SpO2        I/Os:  I/O last 3 completed shifts:   In: 691 7 [Blood:691 7]  Out: 2050 [Urine:2050]  I/O this shift:  In: -   Out: 400 [Urine:400]    Lab Results and Cultures:   CBC with diff:   Lab Results   Component Value Date    WBC 10 76 (H) 12/07/2019    HGB 7 8 (L) 12/07/2019    HCT 24 2 (L) 12/07/2019     (H) 12/07/2019     12/07/2019    MCH 31 8 12/07/2019    MCHC 31 8 12/07/2019    RDW 15 4 (H) 12/07/2019    MPV 10 3 12/07/2019    NRBC 0 12/07/2019   ,   BMP/CMP:  Lab Results   Component Value Date     12/21/2015    K 4 5 12/07/2019    K 4 6 12/21/2015     12/07/2019     12/21/2015    CO2 26 12/07/2019    CO2 30 (H) 12/21/2015    ANIONGAP 11 9 12/21/2015    BUN 32 (H) 12/07/2019    BUN 13 12/21/2015    CREATININE 0 96 12/07/2019    CREATININE 0 9 12/21/2015    GLUCOSE 93 12/21/2015    CALCIUM 7 7 (L) 12/07/2019    CALCIUM 8 7 12/21/2015    AST 17 06/24/2019    AST 20 12/21/2015    ALT 20 06/24/2019    ALT 22 12/21/2015    ALKPHOS 88 06/24/2019    ALKPHOS 82 12/21/2015    PROT 6 9 12/21/2015    BILITOT 0 6 12/21/2015    EGFR 75 12/07/2019   ,   Lipid Panel:   Lab Results   Component Value Date    CHOL 113 (L) 12/21/2015   ,   Coags:   Lab Results   Component Value Date     (HH) 12/07/2019    INR 1 13 12/07/2019   ,     Blood Culture: No results found for: BLOODCX,   Urinalysis:   Lab Results Component Value Date    COLORU Yellow 2019    CLARITYU Clear 2019    SPECGRAV 1 025 2019    PHUR 5 0 2019    LEUKOCYTESUR Negative 2019    NITRITE Negative 2019    GLUCOSEU Negative 2019    KETONESU Negative 2019    BILIRUBINUR Negative 2019    BLOODU Moderate (A) 2019   ,   Urine Culture: No results found for: URINECX,   Wound Culure: No results found for: WOUNDCULT    Imagin19 CTA PE study  FINDINGS:     PULMONARY ARTERIAL TREE:  There is a segmental branch right upper lobe pulmonary artery embolus (series 2 image 75)     LUNGS:  The trachea and central bronchial tree are patent  Atelectasis seen within the lung bases      PLEURA:  Small bilateral pleural effusions are visualized      HEART/GREAT VESSELS:  Unremarkable for patient's age      MEDIASTINUM AND JACQUIE:  Unremarkable      CHEST WALL AND LOWER NECK:   Unremarkable      VISUALIZED STRUCTURES IN THE UPPER ABDOMEN:  Large hiatal hernia is seen      OSSEOUS STRUCTURES:  Multiple compression deformities of the thoracic and lumbar spine are seen  Old right-sided rib fractures are seen      IMPRESSION:     Segmental branch right upper lobe pulmonary artery embolus     Measured RV/LV ratio is within normal limits at less than 0 9  Physical Exam:    General appearance: alert and oriented, in no acute distress  Head: Normocephalic, without obvious abnormality, atraumatic  Eyes: EOMI  Neck: no JVD and supple, symmetrical, trachea midline  Back: symmetric, no curvature  ROM normal  No CVA tenderness    Lungs: clear to auscultation bilaterally  Chest wall: no tenderness  Heart: regular rate and rhythm, S1, S2 normal, no murmur, click, rub or gallop  Abdomen: soft, non-tender; bowel sounds normal; no masses,  no organomegaly  Genitalia: deferred  Rectal: deferred  Extremities: extremities normal, warm and well-perfused; no cyanosis, clubbing, or edema  Skin: Skin color, texture, turgor normal  No rashes or lesions  Neurologic: Grossly normal      BERNADETTE Huang  12/7/2019

## 2019-12-07 NOTE — PHYSICAL THERAPY NOTE
Physical Therapy Cancel Note  Orders received, chart reviewed  Spoke with RN pt  Pending IVC filter in IR due to PE, has had heparin  Pt  Not appropriate for PT at this time  Cancel PT today, continue to follow       Ruth Manzo PT

## 2019-12-07 NOTE — ASSESSMENT & PLAN NOTE
Hard of hearing  Hearing aids at home, patient afraid to lose them and did not let wife bring them in

## 2019-12-07 NOTE — ASSESSMENT & PLAN NOTE
79 y/o male with HTN, HLD, AS, s/p fall with right femur fracture on 12/3, s/p ORIF 12/4, noted to be tachycardic with decrease pulse ox, CTA PE study on 12/7 notable for right sided PE, initiated on Heparin gtt  After initiation of Heparin gtt developed significant bleeding from surgical site  Vascular was consulted for IVC filter placement  Upon seeing patient and chart review, patient had IVC filter placed by IR at 11:44am   Patient denies chest pain/SOB  No prior hx of DVT/PE  Recommendations:  -Due to presence of IVC filter, PE and non-ambulatory status s/p ORIF would recommend therapeutic anticoagulation when cleared by ortho/[facility]  -Thank you for allowing us to participate in the care of this patient    If we can be of further assistance during this hospitalization please let us know

## 2019-12-07 NOTE — ASSESSMENT & PLAN NOTE
Patient with scrotal swelling and ecchymosis  Urology consulted, did not believe patient has an expanding hematoma at this point  Will continue to monitor

## 2019-12-07 NOTE — QUICK NOTE
Re-evaluated patient  Heart rate in improved after blood transfusion, currently in low 90's  I did not see any spikes higher than that while I was evaluating patient  Nurse states that he was having spikes into the 120's but that may have been while the blood was still running  Pulse ox 96% on 2L  Patient denies chest pain or shortness of breath  Will continue to monitor closely  Spoke with nurse  If heart rate starts to go back up will order CT PE study

## 2019-12-07 NOTE — ASSESSMENT & PLAN NOTE
Admitted with hemoglobin of 11 5,     -anemia secondary to postoperative blood loss, also expressed to have hematoma prior to surgery,  -hemoglobin today 8 3 status post 2 units PRBCs transfused on 12/06/2019  -monitor H&H and transfuse further as needed  -stool occult pending

## 2019-12-07 NOTE — OCCUPATIONAL THERAPY NOTE
Occupational Therapy Note:    Patient Name: Anival Cardoso  BCQGQ'R Date: 12/7/2019     Attempted to see pt for OT treatment session this AM, however nursing requested to defer at this time as pt pending IVC filter at IR 2* PE  Will attempt at later time as medically appropriate       Jordan Guthrie, OTR/L

## 2019-12-07 NOTE — PROGRESS NOTES
Progress Note - Orthopedics   Molly Hadley 78 y o  male MRN: [id number]  Unit/Bed#: E2 -01      Subjective:    78 y o male postop day 3 status post ORIF right femur fracture  Overnight patient had several episodes of sinus tachycardia that persisted even after blood transfusion  CTA was ordered and showed a PE in a segmental branch of the right upper lobe pulmonary artery  Patient started on heparin drip and then noted to have excessive serosanguineous drainage his surgical wounds  Compressive dressings have been applied and heparin drip has since been held  Currently patient notes some soreness in the right leg but overall his pain is controlled  He currently denies any numbness or tingling  Denies fevers chills, CP, or SOB  He is going for an IVC filter later this morning      Labs:  0   Lab Value Date/Time    HCT 24 2 (L) 12/07/2019 0920    HCT 26 1 (L) 12/07/2019 0425    HCT 24 0 (L) 12/07/2019 0326    HCT 43 3 12/21/2015 0720    HGB 7 8 (L) 12/07/2019 0920    HGB 8 3 (L) 12/07/2019 0425    HGB 7 8 (L) 12/07/2019 0326    HGB 14 1 12/21/2015 0720    INR 1 13 12/07/2019 0326    WBC 10 76 (H) 12/07/2019 0425    WBC 10 73 (H) 12/07/2019 0326    WBC 11 23 (H) 12/06/2019 0451    WBC 6 4 12/21/2015 0720       Meds:    Current Facility-Administered Medications:     acetaminophen (TYLENOL) tablet 650 mg, 650 mg, Oral, Q6H PRN, Padmini Keene, PA-C, 650 mg at 12/05/19 0755    atorvastatin (LIPITOR) tablet 5 mg, 5 mg, Oral, Daily, Padmini Keene, PA-C, 5 mg at 12/06/19 7750    calcium carbonate (TUMS) chewable tablet 1,000 mg, 1,000 mg, Oral, Daily PRN, Padmini Keene, PA-C    citalopram (CeleXA) tablet 40 mg, 40 mg, Oral, Daily, Padmini Keene, PA-C, 40 mg at 12/06/19 1978    docusate sodium (COLACE) capsule 100 mg, 100 mg, Oral, BID, Padmini Keene PA-C, 100 mg at 12/06/19 1745    heparin (porcine) 25,000 units in 250 mL infusion (premix), 3-30 Units/kg/hr (Order-Specific), Intravenous, Titrated, Jennifer Natalya, PA-C, Last Rate: 15 3 mL/hr at 12/07/19 0337, 18 Units/kg/hr at 12/07/19 0337    heparin (porcine) injection 3,400 Units, 3,400 Units, Intravenous, PRN, Jennifer Natalya, PA-C    heparin (porcine) injection 6,800 Units, 6,800 Units, Intravenous, PRN, Jennifer Natalya, PA-C    hydrALAZINE (APRESOLINE) injection 5 mg, 5 mg, Intravenous, Q4H PRN, Alexei Brew, PA-C    morphine injection 2 mg, 2 mg, Intravenous, Q4H PRN, Alexei Brew, PA-C    niacin tablet 500 mg, 500 mg, Oral, Daily With Breakfast, Alexei Lizethw, PA-C, 500 mg at 12/06/19 0841    ondansetron (ZOFRAN) injection 4 mg, 4 mg, Intravenous, Q6H PRN, Alexei Brew, PA-C    oxyCODONE (ROXICODONE) IR tablet 2 5 mg, 2 5 mg, Oral, Q4H PRN, Alexei Brew, PA-C, 2 5 mg at 12/06/19 5171    oxyCODONE-acetaminophen (PERCOCET) 5-325 mg per tablet 2 tablet, 2 tablet, Oral, Q4H PRN, Alexei Brew, PA-C, 2 tablet at 12/07/19 0628    pantoprazole (PROTONIX) EC tablet 40 mg, 40 mg, Oral, Early Morning, Alexei Maykel, PA-C, 40 mg at 12/07/19 1071    senna (SENOKOT) tablet 8 6 mg, 1 tablet, Oral, Daily, Alexeinara Brar, PA-C, 8 6 mg at 12/06/19 6168    sodium chloride 0 9 % infusion, 75 mL/hr, Intravenous, Continuous, Jennifer Natalya, PA-C, Last Rate: 75 mL/hr at 12/07/19 0333, 75 mL/hr at 12/07/19 0333    Blood Culture:   No results found for: BLOODCX    Wound Culture:   No results found for: WOUNDCULT    Ins and Outs:  I/O last 24 hours: In: 691 7 [Blood:691 7]  Out: 1375 [Urine:1375]          Physical:  Vitals:    12/07/19 0732   BP: 153/67   Pulse: 97   Resp: 18   Temp: 99 4 °F (37 4 °C)   SpO2: 95%     Musculoskeletal:  Right Lower Extremity  · Skin warm all perfused, no excessive ecchymosis or erythema    Notable swelling over entire right lower extremity which is not surprising given the amount of bleeding he has had since heparin drip was started  · No anterior thigh pain, no calf pain, negative Homans  · Compressive dressings clean dry and intact currently  · TTP over incision sites  · SILT s/s/sp/dp/t  +fhl/ehl, +ankle dorsi/plantar flexion  2+ DP pulse    Assessment:    79 y o male postop day 3 status post ORIF a right femur fracture, PE in the right upper lobe     Plan:  · Toe touch weight-bearing x2 weeks postoperatively  · Hemoglobin 7 8 as of last reading today    Patient qualifies for diagnosis of acute blood loss anemia, will monitor and administer IVF/prbc as indicated   · PT/OT  · Pain control - continue ice and analgesia as needed per primary team  · DVT ppx - per primary team  · Patient is going for IVC filter today  · Continue compressive dressings as needed per nursing  · Urology on board due to focal swelling  · Dispo: Ortho will follow while patient is here, may begin d/c planning once medically stable     Yaniv Michele PA-C

## 2019-12-07 NOTE — CONSULTS
UROLOGY CONSULTATION NOTE     Patient Identifiers: Germaine Kelly (MRN [id number])  Service Requesting Consultation:  Internal Medicine  Service Providing Consultation:  Urology, Debbi Gutierrez MD    Date of Service: 12/7/2019    Reason for Consultation:  Scrotal ecchymosis      ASSESSMENT:     Patient has some old ecchymosis of the scrotum without tense intrascrotal hematoma    PLAN:     At this point time, I do not think the patient has a tense expanding scrotal hematoma  Would defer management of his pulmonary embolism with either anticoagulation or IVC filter to the medical service  Would be happy to reexamine the patient serially to ensure that this is not a changing exam at this time  Thank you for allowing me to participate in this patients care  Please do not hesitate to call with any additional questions  Debbi Gutierrez MD    History of Present Illness:     Germaine Kelly is a 78 y o  old with a history of fall and fracture of the right femur  Patient underwent surgical fixation  Following the procedure, the patient was diagnosed with pulmonary embolism and started on short course of intravenous heparin drip  He began to have bleeding from his surgical incision  Nursing staff noticed some ecchymosis of the patient's scrotum without tense expansion or pain  Urology was consulted  Heparin drip has been stopped  Past Medical, Past Surgical History:     Past Medical History:   Diagnosis Date    Hyperlipidemia    :    Past Surgical History:   Procedure Laterality Date    KS OPEN RX FEMUR FX+INTRAMED BÁRBARA Right 12/4/2019    Procedure: INSERTION NAIL IM FEMUR ANTEGRADE (TROCHANTERIC);   Surgeon: Clayton Michelle DO;  Location: [facility] OR;  Service: Orthopedics   :    Medications, Allergies:     Current Facility-Administered Medications:     acetaminophen (TYLENOL) tablet 650 mg, 650 mg, Oral, Q6H PRN, Kiel Marsh PA-C, 650 mg at 12/05/19 0755    atorvastatin (LIPITOR) tablet 5 mg, 5 mg, Oral, Daily, MAIN Phelan-C, 5 mg at 12/06/19 0632    calcium carbonate (TUMS) chewable tablet 1,000 mg, 1,000 mg, Oral, Daily PRN, Roderick Guerin PA-C    citalopram (CeleXA) tablet 40 mg, 40 mg, Oral, Daily, MAIN Phelan-C, 40 mg at 12/06/19 0918    docusate sodium (COLACE) capsule 100 mg, 100 mg, Oral, BID, MAIN Phelan-C, 100 mg at 12/06/19 1745    heparin (porcine) 25,000 units in 250 mL infusion (premix), 3-30 Units/kg/hr (Order-Specific), Intravenous, Titrated, Collin Avila PA-C, Last Rate: 15 3 mL/hr at 12/07/19 0337, 18 Units/kg/hr at 12/07/19 0337    heparin (porcine) injection 3,400 Units, 3,400 Units, Intravenous, PRN, Collin Avila PA-C    heparin (porcine) injection 6,800 Units, 6,800 Units, Intravenous, PRN, Collin Avila PA-C    hydrALAZINE (APRESOLINE) injection 5 mg, 5 mg, Intravenous, Q4H PRN, Roderick Guerin PA-C    morphine injection 2 mg, 2 mg, Intravenous, Q4H PRN, MAIN Phelan-C    niacin tablet 500 mg, 500 mg, Oral, Daily With Breakfast, MAIN Phelan-C, 500 mg at 12/06/19 0841    ondansetron (ZOFRAN) injection 4 mg, 4 mg, Intravenous, Q6H PRN, MAIN Phelan-TIKI    oxyCODONE (ROXICODONE) IR tablet 2 5 mg, 2 5 mg, Oral, Q4H PRN, MAIN Phelan-C, 2 5 mg at 12/06/19 1138    oxyCODONE-acetaminophen (PERCOCET) 5-325 mg per tablet 2 tablet, 2 tablet, Oral, Q4H PRN, MAIN Phelan-C, 2 tablet at 12/07/19 7092    pantoprazole (PROTONIX) EC tablet 40 mg, 40 mg, Oral, Early Morning, MAIN Phelan-C, 40 mg at 12/07/19 9500    senna (SENOKOT) tablet 8 6 mg, 1 tablet, Oral, Daily, Roderick Guerin PA-C, 8 6 mg at 12/06/19 7564    sodium chloride 0 9 % infusion, 75 mL/hr, Intravenous, Continuous, Collin Avila PA-C, Last Rate: 75 mL/hr at 12/07/19 0333, 75 mL/hr at 12/07/19 6108    Allergies:  No Known Allergies:    Social and Family History:   Social History:   Social History     Tobacco Use    Smoking status: Never Smoker    Smokeless tobacco: Never Used   Substance Use Topics    Alcohol use: Never     Frequency: Never    Drug use: Never     Social History     Tobacco Use   Smoking Status Never Smoker   Smokeless Tobacco Never Used       Family History:  Family History   Problem Relation Age of Onset   Duran Sutherland Cancer Mother    :     Review of Systems:     General: Fever, chills, or night sweats: negative patient is hard of hearing  Cardiac: Negative for chest pain  Pulmonary: Negative for shortness of breath  Gastrointestinal: Abdominal pain negative  Nausea, vomiting, or diarrhea negative,  Genitourinary: See HPI above  Patient does not have hematuria  All other systems queried were negative  Physical Exam:   General: Patient is pleasant and in NAD  Awake and alert  Hard of hearing, wife at bedside  /67 (BP Location: Left arm)   Pulse 97   Temp 99 4 °F (37 4 °C) (Tympanic)   Resp 18   Ht 5' 9" (1 753 m)   Wt 84 7 kg (186 lb 11 7 oz)   SpO2 95% Comment: 2liter  BMI 27 58 kg/m²   Cardiac: Peripheral edema: positive  Pulmonary: Non-labored breathing  Abdomen: Soft, non-tender, non-distended  No surgical scars  No masses, tenderness, hernias noted  Genitourinary: Negative CVA tenderness, negative suprapubic tenderness  (Male): Penis pseudo buried given some mild scrotal edema,  meatus patent  Testicles descended into scrotum bilaterally without masses nor tenderness  there is some dependent edema of the scrotum which is soft and flexible without tense expansion  There is no tenderness on examination  This is not consistent with an expanding intrascrotal hematoma  No inguinal hernias bilaterally          Labs:     Lab Results   Component Value Date    HGB 7 8 (L) 12/07/2019    HCT 24 2 (L) 12/07/2019    WBC 10 76 (H) 12/07/2019     12/07/2019   ]    Lab Results   Component Value Date     12/21/2015    K 4 5 12/07/2019     12/07/2019    CO2 26 12/07/2019    BUN 32 (H) 12/07/2019    CREATININE 0 96 12/07/2019    CALCIUM 7 7 (L) 12/07/2019    GLUCOSE 93 12/21/2015   ]    Imaging:   I personally reviewed the images and report of the following studies, and reviewed them with the patient:    No recent  imaging

## 2019-12-07 NOTE — ASSESSMENT & PLAN NOTE
Was initially placed on lisinopril however given JIMY will hold  -hydralazine IV PRN as needed  -monitor BP No risk alerts present

## 2019-12-07 NOTE — QUICK NOTE
Recheck on patient  HR consistently back up into 100's  Will order CT PE study  IV fluids ordered pre and post CT per Dr Lorie Stacy recommendations   Will continue to monitor closely

## 2019-12-07 NOTE — ASSESSMENT & PLAN NOTE
Patient with persistent sinus tachycardia likely secondary to significant blood loss anemia/dehydration/pain/PE  -will continue with telemetry monitoring  -2D echo for further evaluation

## 2019-12-07 NOTE — ASSESSMENT & PLAN NOTE
Status post ORIF right femur fracture postop day 3  -patient with significant postoperative anemia requiring transfusion   -patient started on heparin drip early this morning at 3:00 a m   After which noted to have bleeding at surgical sites, discussed with orthopedic surgery team  -recommend to continue with pressure dressing, monitor H&H and transfuse as needed  -continue with PT OT, pain control  -will likely need rehab

## 2019-12-07 NOTE — PROGRESS NOTES
RN received call from charge nurse to notify her that pt tele was alarming tachy in the 150's consistently  RN obtained vitals and all normal except   RN called Dr Carla Diamond and spoke with her and obtained order for EKG  Pt has no complaints at this time  States that his hip only hurts when he moves, so administered PRN dose of pain medication  Had no complaints of SOB or chest pain  Will continue to monitor

## 2019-12-07 NOTE — PROGRESS NOTES
Progress Note - Katya Negron 1940, 78 y o  male MRN: [id number]    Unit/Bed#: E2 -01 Encounter: [id number]    Primary Care Provider: Abe Alston MD   Date and time admitted to hospital: 12/3/2019 11:20 AM        Acute pulmonary embolism [facility])  Assessment & Plan  CT PE protocol positive for segmental branch right upper lobe pulmonary artery embolus, RV/LV ratio normal  -patient started on heparin drip at about 3:00 a m  After which early this morning patient noted to have will oozing of serosanguineous bloody discharge from his surgical sites also noted to have swelling of his scrotum concerning for hematoma  -at this point decision made to stop further heparin drip, vascular surgery consulted, intervention Radiology consulted  -patient underwent IVC filter  -vascular surgery recommending therapeutic anticoagulation when stable    Acute blood loss anemia  Assessment & Plan  Admitted with hemoglobin of 11 5,     -anemia secondary to postoperative blood loss, also expressed to have hematoma prior to surgery,  -hemoglobin today 8 3 status post 2 units PRBCs transfused on 12/06/2019  -monitor H&H and transfuse further as needed  -stool occult pending    Scrotal swelling  Assessment & Plan  Patient with scrotal swelling and ecchymosis  Urology consulted, did not believe patient has an expanding hematoma at this point  Will continue to monitor      * Femur fracture, right ([address] )  Assessment & Plan  Status post ORIF right femur fracture postop day 3  -patient with significant postoperative anemia requiring transfusion   -patient started on heparin drip early this morning at 3:00 a m   After which noted to have bleeding at surgical sites, discussed with orthopedic surgery team  -recommend to continue with pressure dressing, monitor H&H and transfuse as needed  -continue with PT OT, pain control  -will likely need rehab    Sinus tachycardia  Assessment & Plan  Patient with persistent sinus tachycardia likely secondary to significant blood loss anemia/dehydration/pain/PE  -will continue with telemetry monitoring  -2D echo for further evaluation    Acute kidney injury [facility])  Assessment & Plan  Patient's baseline creatinine close to 1, creatinine on 12/04/2019 was 1 8, -creatinine today improved to 0 96  -possible etiologies include prerenal secondary to ACE-inhibitor, episodes of hypotension, anemia  -nephrology follow-up appreciated, continue to avoid Ace inhibitors at this point,   -will monitor renal function, avoid nephrotoxin    Hyperlipidemia  Assessment & Plan  On atorvastatin 5 mg daily    Recurrent major depressive disorder, in full remission ([facility] [address] )  Assessment & Plan  Continue Celexa    Essential hypertension  Assessment & Plan  Was initially placed on lisinopril however given JIMY will hold  -hydralazine IV PRN as needed  -monitor BP    Sensorineural hearing loss (SNHL)  Assessment & Plan  Hard of hearing  Hearing aids at home, patient afraid to lose them and did not let wife bring them in  Extensively updated patient, patient's wife Konstantin Rivas, patient's daughter Arslan Barros at bedside also updated patient's daughter Melissa Garcias of via telephone  VTE Pharmacologic Prophylaxis:   Pharmacologic:  Currently on hold due to anemia and bleeding from surgical site    Patient Centered Rounds: I have performed bedside rounds with nursing staff today  Discussions with Specialists or Other Care Team Provider:  Orthopedic surgery, vascular surgery, intervention Radiology, Urology, Nephrology    Education and Discussions with Family / Patient:  Wife, at bedside, daughter Melissa Garcias a via telephone  Saadia Barros at bedside    Time Spent for Care: 50 minutes  More than 50% of total time spent on counseling and coordination of care as described above      Current Length of Stay: 4 day(s)    Current Patient Status: Inpatient   Certification Statement: The patient will continue to require additional inpatient hospital stay due to Right femur fracture, anemia, PE    Discharge Plan / Estimated Discharge Date:  2-3 days    Code Status: Level 1 - Full Code      Subjective:   Patient seen and examined at bedside, denies any chest pain, palpitations, dyspnea    Objective:     Vitals:   Temp (24hrs), Av 4 °F (36 9 °C), Min:97 3 °F (36 3 °C), Max:99 4 °F (37 4 °C)    Temp:  [97 3 °F (36 3 °C)-99 4 °F (37 4 °C)] 99 1 °F (37 3 °C)  HR:  [] 152  Resp:  [16-20] 18  BP: (120-157)/(58-75) 147/71  SpO2:  [93 %-98 %] 95 %  Body mass index is 27 58 kg/m²  Input and Output Summary (last 24 hours): Intake/Output Summary (Last 24 hours) at 2019 1715  Last data filed at 2019 1500  Gross per 24 hour   Intake 341 67 ml   Output 1600 ml   Net -1258 33 ml       Physical Exam:    Constitutional: Patient is oriented to person, place and time, no acute distress  HEENT:  Normocephalic, atraumatic, EOMI, PERRLA, no scleral icterus, no pallor, moist oral mucosa  Neck:  Supple, no masses, no thyromegaly, no bruits Normal range of motion  Lymph nodes:  No lymphadenopathy  Cardiovascular: Normal S1S2, RRR, No murmurs/rubs/gallops appreciated  Pulmonary:  Bilateral air entry, No rhonchi/rales/wheezing appreciated  Abdominal: Soft, Bowel sounds present, Non-tender, Non-distended, No rebound/guarding, no hepatomegaly   Musculoskeletal: No tenderness/abnormality   Extremities:  No cyanosis, clubbing or edema  Peripheral pulses palpable and equal bilaterally  Neurological: Cranial nerves II-XII grossly intact, sensation intact, otherwise no focal neurological symptoms     Skin:  Right femur surgical site with pressure dressing in place    Additional Data:     Labs:    Results from last 7 days   Lab Units 19  0920 19  0425   WBC Thousand/uL  --  10 76*   HEMOGLOBIN g/dL 7 8* 8 3*   HEMATOCRIT % 24 2* 26 1*   PLATELETS Thousands/uL  --  174   NEUTROS PCT %  --  68   LYMPHS PCT %  --  13*   MONOS PCT %  --  16*   EOS PCT %  --  2     Results from last 7 days   Lab Units 12/07/19  0425   POTASSIUM mmol/L 4 5   CHLORIDE mmol/L 106   CO2 mmol/L 26   BUN mg/dL 32*   CREATININE mg/dL 0 96   CALCIUM mg/dL 7 7*     Results from last 7 days   Lab Units 12/07/19  0326   INR  1 13        I Have Reviewed All Lab Data Listed Above          Recent Cultures (last 7 days):           Last 24 Hours Medication List:     Current Facility-Administered Medications:  acetaminophen 650 mg Oral Q6H PRN Al Estimable, PA-C    atorvastatin 5 mg Oral Daily Al Estimable, PA-C    calcium carbonate 1,000 mg Oral Daily PRN Al Estimable, PA-C    citalopram 40 mg Oral Daily Al Estimable, PA-C    docusate sodium 100 mg Oral BID Al Estimable, PA-C    hydrALAZINE 5 mg Intravenous Q4H PRN Al Estimable, PA-C    morphine injection 2 mg Intravenous Q4H PRN Al Estimable, PA-C    niacin 500 mg Oral Daily With Breakfast Al Estimable, PA-C    ondansetron 4 mg Intravenous Q6H PRN Al Estimable, PA-C    oxyCODONE 2 5 mg Oral Q4H PRN Al Estimable, PA-C    oxyCODONE-acetaminophen 2 tablet Oral Q4H PRN Al Estimable, PA-C    pantoprazole 40 mg Oral Early Morning Al Estimable, PA-C    senna 1 tablet Oral Daily Al Estimable, PA-C    sodium chloride 40 mL/hr Intravenous Continuous Jayson Carbajal DO Last Rate: 75 mL/hr (12/07/19 0832)        Today, Patient Was Seen By: Halie Lyons MD

## 2019-12-07 NOTE — ASSESSMENT & PLAN NOTE
Patient's baseline creatinine close to 1, creatinine on 12/04/2019 was 1 8, -creatinine today improved to 0 96  -possible etiologies include prerenal secondary to ACE-inhibitor, episodes of hypotension, anemia  -nephrology follow-up appreciated, continue to avoid Ace inhibitors at this point,   -will monitor renal function, avoid nephrotoxin

## 2019-12-07 NOTE — BRIEF OP NOTE (RAD/CATH)
IR IVC OPTIONAL FILTER PLACEMENT Procedure Note    PATIENT NAME: Kamala Edwards  : 1940  MRN: [id number]    Pre-op Diagnosis:   1  Closed femur fracture ([facility] [address] )    2  Closed fracture of femur, initial encounter ([facility] )    3  Acute renal failure (ARF) (HCC)    4  Pulmonary emboli (HCC)    5  Scrotal pain    6  Scrotal swelling    7  Femur fracture, right ([facility])      Post-op Diagnosis:   1  Closed femur fracture ([facility] )    2  Closed fracture of femur, initial encounter ([facility] )    3  Acute renal failure (ARF) (HCC)    4  Pulmonary emboli (HCC)    5  Scrotal pain    6  Scrotal swelling    7   Femur fracture, right [facility])        Surgeon:   Yesica Mujica MD  Assistants:     No qualified resident was available, Resident is only observing    Estimated Blood Loss: minimal  Findings: Infrarenal Convertible filter placed    Specimens: none    Complications:  none    Anesthesia: Local    Yesica Mujica MD     Date: 2019  Time: 11:44 AM

## 2019-12-07 NOTE — QUICK NOTE
Informed by nurse, Blayne Kwong that patient now having losing for his surgical dressing sites  Also patient has scrotal swelling and red purple discoloration  At this point heparin drip has been placed on hold  Patient denies any chest pain, palpitations, dyspnea  Denies any pain in his scrotum  Will get stat H and H, orthopedic surgery paged by RN  I have placed a consult for Urology for patient's scrotal swelling, have also place consult for vascular surgery for need to consider IVC filter given bleeding  Call placed to patient's wife, message left on the answering service      Will continue with telemetry monitoring

## 2019-12-07 NOTE — PROGRESS NOTES
RN entered room to help assist PCA reposition pt and complete assessment  RN found mepilex dressings completely saturated x3 and saturated pad under pt and additionally noted that pt scrotum were swollen and purple  RN placed Heparin gtt on hold and paged Dr Alfred Poole and Bharath Speaker the [place] from [facility]  Will continue to monitor

## 2019-12-07 NOTE — ASSESSMENT & PLAN NOTE
CT PE protocol positive for segmental branch right upper lobe pulmonary artery embolus, RV/LV ratio normal  -patient started on heparin drip at about 3:00 a m   After which early this morning patient noted to have will oozing of serosanguineous bloody discharge from his surgical sites also noted to have swelling of his scrotum concerning for hematoma  -at this point decision made to stop further heparin drip, vascular surgery consulted, intervention Radiology consulted  -patient underwent IVC filter  -vascular surgery recommending therapeutic anticoagulation when stable

## 2019-12-08 PROBLEM — I47.1 SVT (SUPRAVENTRICULAR TACHYCARDIA) (HCC): Status: ACTIVE | Noted: 2019-12-07

## 2019-12-08 PROBLEM — I47.10 SVT (SUPRAVENTRICULAR TACHYCARDIA): Status: ACTIVE | Noted: 2019-12-07

## 2019-12-08 LAB
ANION GAP SERPL CALCULATED.3IONS-SCNC: 1 MMOL/L (ref 4–13)
BASOPHILS # BLD AUTO: 0.03 THOUSANDS/ΜL (ref 0–0.1)
BASOPHILS NFR BLD AUTO: 0 % (ref 0–1)
BUN SERPL-MCNC: 23 MG/DL (ref 5–25)
CALCIUM SERPL-MCNC: 7.9 MG/DL (ref 8.3–10.1)
CHLORIDE SERPL-SCNC: 107 MMOL/L (ref 100–108)
CO2 SERPL-SCNC: 31 MMOL/L (ref 21–32)
CREAT SERPL-MCNC: 0.87 MG/DL (ref 0.6–1.3)
EOSINOPHIL # BLD AUTO: 0.36 THOUSAND/ΜL (ref 0–0.61)
EOSINOPHIL NFR BLD AUTO: 4 % (ref 0–6)
ERYTHROCYTE [DISTWIDTH] IN BLOOD BY AUTOMATED COUNT: 15.2 % (ref 11.6–15.1)
GFR SERPL CREATININE-BSD FRML MDRD: 82 ML/MIN/1.73SQ M
GLUCOSE SERPL-MCNC: 104 MG/DL (ref 65–140)
HCT VFR BLD AUTO: 25.3 % (ref 36.5–49.3)
HGB BLD-MCNC: 8.1 G/DL (ref 12–17)
IMM GRANULOCYTES # BLD AUTO: 0.11 THOUSAND/UL (ref 0–0.2)
IMM GRANULOCYTES NFR BLD AUTO: 1 % (ref 0–2)
LYMPHOCYTES # BLD AUTO: 1.63 THOUSANDS/ΜL (ref 0.6–4.47)
LYMPHOCYTES NFR BLD AUTO: 17 % (ref 14–44)
MCH RBC QN AUTO: 32.1 PG (ref 26.8–34.3)
MCHC RBC AUTO-ENTMCNC: 32 G/DL (ref 31.4–37.4)
MCV RBC AUTO: 100 FL (ref 82–98)
MONOCYTES # BLD AUTO: 1.5 THOUSAND/ΜL (ref 0.17–1.22)
MONOCYTES NFR BLD AUTO: 16 % (ref 4–12)
NEUTROPHILS # BLD AUTO: 5.76 THOUSANDS/ΜL (ref 1.85–7.62)
NEUTS SEG NFR BLD AUTO: 62 % (ref 43–75)
NRBC BLD AUTO-RTO: 0 /100 WBCS
PLATELET # BLD AUTO: 210 THOUSANDS/UL (ref 149–390)
PMV BLD AUTO: 9.3 FL (ref 8.9–12.7)
POTASSIUM SERPL-SCNC: 4.5 MMOL/L (ref 3.5–5.3)
RBC # BLD AUTO: 2.52 MILLION/UL (ref 3.88–5.62)
SODIUM SERPL-SCNC: 139 MMOL/L (ref 136–145)
WBC # BLD AUTO: 9.39 THOUSAND/UL (ref 4.31–10.16)

## 2019-12-08 PROCEDURE — 99232 SBSQ HOSP IP/OBS MODERATE 35: CPT | Performed by: UROLOGY

## 2019-12-08 PROCEDURE — 99024 POSTOP FOLLOW-UP VISIT: CPT | Performed by: PHYSICIAN ASSISTANT

## 2019-12-08 PROCEDURE — 97535 SELF CARE MNGMENT TRAINING: CPT

## 2019-12-08 PROCEDURE — 99233 SBSQ HOSP IP/OBS HIGH 50: CPT | Performed by: INTERNAL MEDICINE

## 2019-12-08 PROCEDURE — 99222 1ST HOSP IP/OBS MODERATE 55: CPT | Performed by: INTERNAL MEDICINE

## 2019-12-08 PROCEDURE — 85025 COMPLETE CBC W/AUTO DIFF WBC: CPT | Performed by: INTERNAL MEDICINE

## 2019-12-08 PROCEDURE — 30233N1 TRANSFUSION OF NONAUTOLOGOUS RED BLOOD CELLS INTO PERIPHERAL VEIN, PERCUTANEOUS APPROACH: ICD-10-PCS | Performed by: INTERNAL MEDICINE

## 2019-12-08 PROCEDURE — 97530 THERAPEUTIC ACTIVITIES: CPT

## 2019-12-08 PROCEDURE — 80048 BASIC METABOLIC PNL TOTAL CA: CPT | Performed by: NURSE PRACTITIONER

## 2019-12-08 RX ORDER — HEPARIN SODIUM 10000 [USP'U]/100ML
3-30 INJECTION, SOLUTION INTRAVENOUS
Status: DISCONTINUED | OUTPATIENT
Start: 2019-12-08 | End: 2019-12-08

## 2019-12-08 RX ORDER — HEPARIN SODIUM 5000 [USP'U]/ML
5000 INJECTION, SOLUTION INTRAVENOUS; SUBCUTANEOUS EVERY 8 HOURS SCHEDULED
Status: DISCONTINUED | OUTPATIENT
Start: 2019-12-08 | End: 2019-12-10

## 2019-12-08 RX ADMIN — CITALOPRAM HYDROBROMIDE 40 MG: 20 TABLET ORAL at 08:09

## 2019-12-08 RX ADMIN — METOPROLOL TARTRATE 25 MG: 25 TABLET, FILM COATED ORAL at 20:45

## 2019-12-08 RX ADMIN — DOCUSATE SODIUM 100 MG: 100 CAPSULE, LIQUID FILLED ORAL at 08:09

## 2019-12-08 RX ADMIN — HEPARIN SODIUM 5000 UNITS: 5000 INJECTION INTRAVENOUS; SUBCUTANEOUS at 22:27

## 2019-12-08 RX ADMIN — OXYCODONE HYDROCHLORIDE 2.5 MG: 5 TABLET ORAL at 10:47

## 2019-12-08 RX ADMIN — SENNOSIDES 8.6 MG: 8.6 TABLET, FILM COATED ORAL at 08:09

## 2019-12-08 RX ADMIN — Medication 500 MG: at 08:08

## 2019-12-08 RX ADMIN — OXYCODONE HYDROCHLORIDE AND ACETAMINOPHEN 2 TABLET: 5; 325 TABLET ORAL at 19:18

## 2019-12-08 RX ADMIN — HEPARIN SODIUM 5000 UNITS: 5000 INJECTION INTRAVENOUS; SUBCUTANEOUS at 15:19

## 2019-12-08 RX ADMIN — DOCUSATE SODIUM 100 MG: 100 CAPSULE, LIQUID FILLED ORAL at 17:37

## 2019-12-08 RX ADMIN — ATORVASTATIN CALCIUM 5 MG: 10 TABLET, FILM COATED ORAL at 08:09

## 2019-12-08 RX ADMIN — METOPROLOL TARTRATE 25 MG: 25 TABLET, FILM COATED ORAL at 12:42

## 2019-12-08 RX ADMIN — PANTOPRAZOLE SODIUM 40 MG: 40 TABLET, DELAYED RELEASE ORAL at 05:03

## 2019-12-08 RX ADMIN — OXYCODONE HYDROCHLORIDE AND ACETAMINOPHEN 2 TABLET: 5; 325 TABLET ORAL at 05:03

## 2019-12-08 NOTE — CONSULTS
Consult - Cardiology   Nay Blum 78 y o  male MRN: [id number]  Unit/Bed#: E2 -01 Encounter: [id number]        Reason For Consult:  Tachycardia               ASSESSMENT:  1  Tachycardia- probable SVT up to 150-180          2  Acute blood-loss anemia s/p 2 units PRBC:   -hemoglobin currently trending around 8    3  Acute pulmonary embolism    4  Right femur fracture s/p ORIF 12/04/2019    5  Hematoma at site of right femur surgery    PLAN/ DISCUSSION:     1  As I am sitting with the patient his heart rate goes up to 150 (see above strips) which looks like SVT  Will start Lopressor 25 b i d  1st dose now  2  P r n  IV Lopressor for sustained tachycardia    3  Suspect SVT is driven by several factors- acute anemia, pain from recent surgery, acute pulmonary embolism  Hopefully with treatment of all of these his paroxysms of tachycardia will resolve    4  He is s/p IVC filter on 12/7/19 & will soon be restarted on [facility] [place] by [facility] once OK with ortho    5  Will discuss with EP for potential SVT ablation (IP vs OP)    6  Please keep on telemetry while hospitalized     7  Echocardiogram ordered, will review tomorrow    Discussed with patient and family at bedside    History Of Present Illness:  Is a very kind 68-year-old gentleman without much of a past medical history  He tells me he does see a cardiologist for a Carlos Conrad heart valve"but denies any other cardiac history  His cardiologist does not seem to be affiliated with any of the local hospitals  He is normally an active gentleman for his age does not have much of a noncardiac past medical history  This is in fact the first time he has undergone any surgery in his life in the 1st time he has been hospitalized for quite sometime  This gentleman presented to the hospital last week (Tuesday) after sustaining a mechanical fall onto his right hip  He was found to have a complicated femur fracture and underwent an open reduction internal fixation on December 4, 2019   His postoperative course was complicated by several factors  He had acute blood-loss anemia with hemoglobin dropping from 11 5 down to 6 3  He has required 2 units of packed red blood cells and his hemoglobin is now 8 1  He was found to be tachycardic and hypoxic and underwent a CTA of his chest and was found to have an acute pulmonary embolism  He was started on IV heparin however soon developed a hematoma on his right hip and so anticoagulation was stopped  He had an IVC filter placed on December 7, 2019 by Interventional Radiology  We are awaiting clearance by Orthopedics to start anticoagulation  In the midst of all this he has been having paroxysms of tachycardia with heart rate up to 150-180  We are asked to see him in consultation for this  He is completely asymptomatic from his tachycardia  The time my consultation his heart rate was 150 any felt perfectly fine  Past Medical History:        Past Medical History:   Diagnosis Date    Hyperlipidemia       Past Surgical History:   Procedure Laterality Date    IR IVC OPTIONAL FILTER PLACEMENT  12/7/2019    CO OPEN RX FEMUR FX+INTRAMED BÁRBARA Right 12/4/2019    Procedure: INSERTION NAIL IM FEMUR ANTEGRADE (TROCHANTERIC); Surgeon: Jamaal Byrne DO;  Location: [facility];  Service: Orthopedics        Allergy:        No Known Allergies    Medications:       Prior to Admission medications    Medication Sig Start Date End Date Taking?  Authorizing Provider   aspirin (ECOTRIN LOW STRENGTH) 81 mg EC tablet Take 81 mg by mouth daily   Yes Historical Provider, MD   atorvastatin (LIPITOR) 10 mg tablet Take 5 mg by mouth daily    Yes Historical Provider, MD   citalopram (CeleXA) 40 mg tablet Take 40 mg by mouth daily   Yes Historical Provider, MD   fosinopril (MONOPRIL) 10 mg tablet Take 1 tablet (10 mg total) by mouth daily 10/14/19  Yes Janet Frias MD   Multiple Vitamin (MULTIVITAMIN) tablet Take 1 tablet by mouth daily   Yes Historical Provider, MD niacin 500 mg tablet Take 500 mg by mouth daily with breakfast   Yes Historical Provider, MD       Family History:     Family History   Problem Relation Age of Onset    Cancer Mother         Social History:       Social History     Socioeconomic History    Marital status: /Civil Union     Spouse name: None    Number of children: None    Years of education: None    Highest education level: None   Occupational History    None   Social Needs    Financial resource strain: None    Food insecurity:     Worry: None     Inability: None    Transportation needs:     Medical: None     Non-medical: None   Tobacco Use    Smoking status: Never Smoker    Smokeless tobacco: Never Used   Substance and Sexual Activity    Alcohol use: Never     Frequency: Never    Drug use: Never    Sexual activity: None   Lifestyle    Physical activity:     Days per week: None     Minutes per session: None    Stress: None   Relationships    Social connections:     Talks on phone: None     Gets together: None     Attends [employer] service: None     Active member of club or organization: None     Attends meetings of clubs or organizations: None     Relationship status: None    Intimate partner violence:     Fear of current or ex partner: None     Emotionally abused: None     Physically abused: None     Forced sexual activity: None   Other Topics Concern    None   Social History Narrative    None       ROS:  Symptoms per HPI  Remainder review of systems is negative    Exam:  General:  alert, oriented and in no distress, cooperative  Head: Normocephalic, atraumatic  Eyes:  EOMI  Pupils - equal, round, reactive to accomodation  No icterus  Normal Conjunctiva     Oropharynx: moist and normal-appearing mucosa  Neck: supple, symmetrical, trachea midline and no JVD  Heart:  Tachycardic, regular, no murmurs  Respiratory effort / Chest Inspection: unlabored  Lungs:  normal air entry, lungs clear to auscultation and no rales, rhonchi or wheezing   Abdomen: flat, normal findings: bowel sounds normal and soft, non-tender  Lower Limbs:  no pitting edema  Pulses[de-identified]  RLE - DP: present 2+                 LLE - DP: present 2+  Musculoskeletal: ROM grossly normal        DATA:      ECG:   Available ECGs appear to be sinus tachycardia                    Telemetry: tachycardic  -115  SVT as noted above          Echocardiogram:   Pending               Weights: Wt Readings from Last 3 Encounters:   12/08/19 85 1 kg (187 lb 9 8 oz)   10/14/19 86 2 kg (190 lb)   , Body mass index is 27 71 kg/m²           Lab Studies:             Results from last 7 days   Lab Units 12/08/19  0457 12/07/19  0920 12/07/19  0425 12/07/19  0326   WBC Thousand/uL 9 39  --  10 76* 10 73*   HEMOGLOBIN g/dL 8 1* 7 8* 8 3* 7 8*   HEMATOCRIT % 25 3* 24 2* 26 1* 24 0*   PLATELETS Thousands/uL 210  --  174 174   ,   Results from last 7 days   Lab Units 12/08/19  0457 12/07/19  0425 12/06/19  0451   POTASSIUM mmol/L 4 5 4 5 4 6   CHLORIDE mmol/L 107 106 107   CO2 mmol/L 31 26 27   BUN mg/dL 23 32* 38*   CREATININE mg/dL 0 87 0 96 1 24   CALCIUM mg/dL 7 9* 7 7* 7 4*

## 2019-12-08 NOTE — PROGRESS NOTES
NEPHROLOGY PROGRESS NOTE   Doris Muniz 78 y o  male MRN: [id number]  Unit/Bed#: E2 -01 Encounter: [id number]  Reason for Consult: JIMY    ASSESSMENT/PLAN:  1  Acute kidney injury (POA):   resolved and creatinine stable at 0 87  -Suspect secondary to prerenal, relative hypotension periodically, ACE-inhibitor, and severe anemia requiring transfusion  -baseline creatinine 0 9-1 0  -peak creatinine 1 9, current creatinine 0 87  -status post IV fluids yesterday secondary to IV contrast for PE study-will discontinue today  -I/O-good urinary output negative one 0 5 L in 24 hours  -avoid nephrotoxins  -avoid hypotension  -continue to trend I/O, lab values volume status  -will check BMP in a m  to ensure stability renal function off IV fluids     2  Hyperkalemia:   resolved and stable at 4 5  -Suspect secondary to AK I with previous ACE-inhibitor use  - current potassium 4 5  -avoid Ace  -will continue trend     3  Anemia:  Suspect postop  -status post transfusion x2 Friday  -hemoglobin stable at 8 1  -per primary team     4  Hypertension:  BP acceptable given age  -continue to trend at this time  -avoid ACE-inhibitor  -echocardiogram pending     5  Femur fracture:  Status post ORIF  -orthopedics following  -had bleeding at surgical site yesterday after initiation of heparin drip  -once heparin drip off no further issues  -continues with lower extremity swelling     6  Positive for PE:  Heparin drip initially started however was discontinued secondary to oozing at surgical site and scrotal edema with change in color to purple-Urology now following  -status post IR for IVC filter placement yesterday  -per primary team           SUBJECTIVE:  Patient seen and examined  Denies chest pain shortness of breath       OBJECTIVE:  Current Weight: Weight - Scale: 85 1 kg (187 lb 9 8 oz)  Vitals:    12/07/19 2300 12/07/19 2313 12/08/19 0531 12/08/19 0700   BP: 139/78   141/72   BP Location:    Left arm   Pulse: (!) 115 94  85 Resp: 20   18   Temp: 99 4 °F (37 4 °C)   (!) 97 4 °F (36 3 °C)   TempSrc: Tympanic   Tympanic   SpO2: 97%   95%   Weight:   85 1 kg (187 lb 9 8 oz)    Height:           Intake/Output Summary (Last 24 hours) at 12/8/2019 1132  Last data filed at 12/8/2019 0700  Gross per 24 hour   Intake    Output 1350 ml   Net -1350 ml     General: no acute distress, out of bed, [place]  Skin:  Warm and dry, pale, no rash, scrotum purple in color, and small  Eyes:  Sclera anicteric  ENMT:  Mucous membranes moist  Neck:  Supple without JVD  Respiratory:  Essentially clear auscultation slightly decreased bases without crackles rhonchi or wheezes  Cardiac:  Regular rate and rhythm  Extremities:  Edema noted bilaterally right greater than left  GI:  Soft, nontender, no distention, active bowel sounds  Neuro:  Alert oriented awake  Psych:  Appropriate affect    Medications:    Current Facility-Administered Medications:     acetaminophen (TYLENOL) tablet 650 mg, 650 mg, Oral, Q6H PRN, Alexei Brar PA-C, 650 mg at 12/05/19 0755    atorvastatin (LIPITOR) tablet 5 mg, 5 mg, Oral, Daily, Alexei Lizethw, PA-C, 5 mg at 12/08/19 0809    calcium carbonate (TUMS) chewable tablet 1,000 mg, 1,000 mg, Oral, Daily PRN, Alexei Brar PA-C    citalopram (CeleXA) tablet 40 mg, 40 mg, Oral, Daily, Alexei Brar, PA-C, 40 mg at 12/08/19 0809    docusate sodium (COLACE) capsule 100 mg, 100 mg, Oral, BID, Alexei Maykel, PA-C, 100 mg at 12/08/19 0809    hydrALAZINE (APRESOLINE) injection 5 mg, 5 mg, Intravenous, Q4H PRN, Alexei Brar PA-C    metoprolol (LOPRESSOR) injection 2 5 mg, 2 5 mg, Intravenous, Q6H PRN, Evangelina Lopes MD, 2 5 mg at 12/07/19 1744    morphine injection 2 mg, 2 mg, Intravenous, Q4H PRN, Alexei Brar PA-C, 2 mg at 12/07/19 1652    niacin tablet 500 mg, 500 mg, Oral, Daily With Breakfast, Philly Hughes PA-C, 500 mg at 12/08/19 0808    ondansetron (ZOFRAN) injection 4 mg, 4 mg, Intravenous, Q6H PRN, Philly Barnard SHIRA Hughes    oxyCODONE (ROXICODONE) IR tablet 2 5 mg, 2 5 mg, Oral, Q4H PRN, Natan Tejeda PA-C, 2 5 mg at 12/08/19 1047    oxyCODONE-acetaminophen (PERCOCET) 5-325 mg per tablet 2 tablet, 2 tablet, Oral, Q4H PRN, Natan Tejeda PA-C, 2 tablet at 12/08/19 0503    pantoprazole (PROTONIX) EC tablet 40 mg, 40 mg, Oral, Early Morning, Natan Tejeda PA-C, 40 mg at 12/08/19 0503    senna (SENOKOT) tablet 8 6 mg, 1 tablet, Oral, Daily, Natan Tejeda PA-C, 8 6 mg at 12/08/19 0809    Laboratory Results:  Results from last 7 days   Lab Units 12/08/19  0457 12/07/19  0920 12/07/19  0425 12/07/19  0326 12/06/19  1508 12/06/19  0754 12/06/19  0451 12/05/19  1204 12/05/19  0442 12/04/19  1840 12/04/19  0424 12/04/19  0423 12/03/19  1214   WBC Thousand/uL 9 39  --  10 76* 10 73*  --   --  11 23*  --  13 86* 15 36* 12 27*  --  16 04*   HEMOGLOBIN g/dL 8 1* 7 8* 8 3* 7 8* 7 5* 6 3* 6 3*  --  8 2* 8 9* 11 5*  --  13 4   HEMATOCRIT % 25 3* 24 2* 26 1* 24 0* 23 2* 19 7* 19 1*  --  25 8* 27 7* 35 0*  --  40 4   PLATELETS Thousands/uL 210  --  174 174  --   --  169  --  181 205 247  --  256   POTASSIUM mmol/L 4 5  --  4 5  --   --   --  4 6 4 3 5 6*  --   --  4 8 4 9   CHLORIDE mmol/L 107  --  106  --   --   --  107  --  108  --   --  104 104   CO2 mmol/L 31  --  26  --   --   --  27  --  26  --   --  25 29   BUN mg/dL 23  --  32*  --   --   --  38*  --  38*  --   --  24 12   CREATININE mg/dL 0 87  --  0 96  --   --   --  1 24  --  1 94*  --   --  1 81* 1 08   CALCIUM mg/dL 7 9*  --  7 7*  --   --   --  7 4*  --  7 1*  --   --  7 9* 8 7

## 2019-12-08 NOTE — PROGRESS NOTES
Progress Note - Orthopedics   Titus Spears 78 y o  male MRN: [id number]  Unit/Bed#: E2 -01      Subjective:    78 y o male PO day 4 status post ORIF right femur fracture  Patient got IVC filter yesterday and is doing well overall  He notes soreness in the operative leg with movement but is comfortable lying down  No numbness or tingling  No fevers chills chest pain or shortness of breath      Labs:  0   Lab Value Date/Time    HCT 25 3 (L) 12/08/2019 0457    HCT 24 2 (L) 12/07/2019 0920    HCT 26 1 (L) 12/07/2019 0425    HCT 43 3 12/21/2015 0720    HGB 8 1 (L) 12/08/2019 0457    HGB 7 8 (L) 12/07/2019 0920    HGB 8 3 (L) 12/07/2019 0425    HGB 14 1 12/21/2015 0720    INR 1 13 12/07/2019 0326    WBC 9 39 12/08/2019 0457    WBC 10 76 (H) 12/07/2019 0425    WBC 10 73 (H) 12/07/2019 0326    WBC 6 4 12/21/2015 0720       Meds:    Current Facility-Administered Medications:     acetaminophen (TYLENOL) tablet 650 mg, 650 mg, Oral, Q6H PRN, Alexei Brar PA-C, 650 mg at 12/05/19 0755    atorvastatin (LIPITOR) tablet 5 mg, 5 mg, Oral, Daily, Alexei Brar, PA-C, 5 mg at 12/08/19 0809    calcium carbonate (TUMS) chewable tablet 1,000 mg, 1,000 mg, Oral, Daily PRN, Alexei Brar PA-C    citalopram (CeleXA) tablet 40 mg, 40 mg, Oral, Daily, Alexei Brar, PA-C, 40 mg at 12/08/19 0809    docusate sodium (COLACE) capsule 100 mg, 100 mg, Oral, BID, Alexei Brar, PA-C, 100 mg at 12/08/19 0809    hydrALAZINE (APRESOLINE) injection 5 mg, 5 mg, Intravenous, Q4H PRN, Alexei Brar PA-C    metoprolol (LOPRESSOR) injection 2 5 mg, 2 5 mg, Intravenous, Q6H PRN, Evangelina Lopes MD, 2 5 mg at 12/07/19 1744    morphine injection 2 mg, 2 mg, Intravenous, Q4H PRN, Alexei Brar PA-C, 2 mg at 12/07/19 1652    niacin tablet 500 mg, 500 mg, Oral, Daily With Breakfast, Alexei Brar PA-C, 500 mg at 12/08/19 0808    ondansetron (ZOFRAN) injection 4 mg, 4 mg, Intravenous, Q6H PRN, Alexei Brar PA-C   oxyCODONE (ROXICODONE) IR tablet 2 5 mg, 2 5 mg, Oral, Q4H PRN, Aleatha Sabal, PA-C, 2 5 mg at 12/06/19 8052    oxyCODONE-acetaminophen (PERCOCET) 5-325 mg per tablet 2 tablet, 2 tablet, Oral, Q4H PRN, Aleatha Sabal, PA-C, 2 tablet at 12/08/19 0503    pantoprazole (PROTONIX) EC tablet 40 mg, 40 mg, Oral, Early Morning, Aleatha Sabal, PA-C, 40 mg at 12/08/19 0503    senna (SENOKOT) tablet 8 6 mg, 1 tablet, Oral, Daily, Aleatha Sabal, PA-C, 8 6 mg at 12/08/19 0809    sodium chloride 0 9 % infusion, 40 mL/hr, Intravenous, Continuous, Aba Holding, DO, Last Rate: 40 mL/hr at 12/07/19 1731, 40 mL/hr at 12/07/19 1731    Blood Culture:   No results found for: BLOODCX    Wound Culture:   No results found for: WOUNDCULT    Ins and Outs:  I/O last 24 hours: In: -   Out: 0703 [Urine:1525]          Physical:  Vitals:    12/08/19 0700   BP: 141/72   Pulse: 85   Resp: 18   Temp: (!) 97 4 °F (36 3 °C)   SpO2: 95%     Musculoskeletal: right Lower Extremity  · Skin warm and well perfused, mild edema over the operative extremity, compartments soft and compressible, mild ecchymosis, no erythema  · Compressive dressings nearly saturated with serosanguineous drainage, most notable at the long middle incision site  · Incision sites appear well intact  · TTP over incision sites  · SILT s/s/sp/dp/t  +fhl/ehl, +ankle dorsi/plantar flexion  2+ DP pulse    Assessment:    79 y o male postop day 4 from a right femur ORIF    Plan:  · TTWB right lower extremity x2 weeks postop  · Patient received IVC filter yesterday and is doing well  · Dressings were changed today, continue with compressive dressings as needed  Consider changing to Mepilex once drainage slows  · Hemoglobin 8 1 today  Continue to monitor and administer IVF/prbc as indicated   · PT/OT  · Pain control per primary team  · DVT ppx per primary team  · Dispo:  Ortho will continue to follow while here, begin discharge planning when medically stable    Follow up with Dr Moises Marina 2 weeks postoperatively    Yony Spears PA-C

## 2019-12-08 NOTE — PLAN OF CARE
Problem: OCCUPATIONAL THERAPY ADULT  Goal: Performs self-care activities at highest level of function for planned discharge setting  See evaluation for individualized goals  Description  Treatment Interventions: ADL retraining, Functional transfer training, UE strengthening/ROM, Endurance training, Patient/family training, Equipment evaluation/education, Compensatory technique education, Energy conservation, Activityengagement          See flowsheet documentation for full assessment, interventions and recommendations  Outcome: Progressing  Note:   Limitation: Decreased ADL status, Decreased UE strength, Decreased endurance, Decreased self-care trans, Decreased high-level ADLs  Prognosis: Good  Assessment: Pt seen for OT treatment session this AM focusing on functional activity tolerance, bed mobility, ADLs, and functional mobility/transfers  Pt alert and cooperative throughout session  Pt lying supine at start of session, requiring Mod A of 2 for supine>sit with assist for LE management and trunk control  BP seated EOB: 166/98, HR: 133 bpm  Transfers completed with Max A of 2 with cues for safe technique and hand placement  Increased assist required to initiate forward weight shift from surface for sit>stand and assist for controlled descent to surface for stand>sit  Pt unable to take steps at this time while adhering to WBS, therefore trial with Quick Move performed  Pt able to stand with Mod A of 2 with use of Quick Move and transferred to bedside chair with 2 staff members present for optimal pt safety  BP in chair: 146/87, HR: 137 bpm   ADL routine completed while seated in chair  UB ADLs completed with Min A with assist for thoroughness for UB bathing and assist to bring gown around/down in back for UB dressing  LB ADLs completed with Max A with impaired functional reach and decreased dynamic standing balance, with pt requiring BUE support on RW when standing for bathing tasks   Pt seated OOB in chair at end of session with call bell and phone within reach  All needs met and pt reports no further questions for OT at this time  Continue to recommend STR when medically cleared  OT to follow pt on caseload        OT Discharge Recommendation: Short Term Rehab  OT - OK to Discharge: Yes(when medically cleared to rehab)

## 2019-12-08 NOTE — PROGRESS NOTES
Progress Note - Tor Challenger 1940, 78 y o  male MRN: [id number]    Unit/Bed#: E2 -01 Encounter: [id number]    Primary Care Provider: Asim Betts MD   Date and time admitted to hospital: 12/3/2019 11:20 AM        Acute pulmonary embolism [facility])  Assessment & Plan  CT PE protocol positive for segmental branch right upper lobe pulmonary artery embolus, RV/LV ratio normal  -patient started on heparin drip at about 3:00 a m   After which early this morning patient noted to have will oozing of serosanguineous bloody discharge from his surgical sites also noted to have swelling of his scrotum concerning for hematoma  -at this point decision made to stop further heparin drip, vascular surgery consulted, intervention Radiology consulted  -patient underwent IVC filter  -vascular surgery recommending therapeutic anticoagulation when stable  -discussed with vascular surgery team, at this point will start patient on DVT prophylaxis with heparin and closely monitor  -will defer therapeutic anticoagulation given risk of bleeding at this point will continue prophylactic as per Ortho recommendations, patient may need to be started on a therapeutic anticoagulation once stable  -will also need to be following with vascular surgery to remove IVC filter    Acute blood loss anemia  Assessment & Plan  Admitted with hemoglobin of 11 5,     -anemia secondary to postoperative blood loss, also expressed to have hematoma prior to surgery,  -hemoglobin today 8 3 status post 2 units PRBCs transfused on 12/06/2019  -monitor H&H and transfuse further as needed  -stool occult pending    Scrotal swelling  Assessment & Plan  Patient with scrotal swelling and ecchymosis  Urology consulted, did not believe patient has an expanding hematoma at this point  Will continue to monitor      * Femur fracture, right ([address] )  Assessment & Plan  Status post ORIF right femur fracture postop day 3  -patient with significant postoperative anemia requiring transfusion   -patient started on heparin drip early this morning at 3:00 a m  After which noted to have bleeding at surgical sites, discussed with orthopedic surgery team  -recommend to continue with pressure dressing, monitor H&H and transfuse as needed  -continue with PT OT, pain control  -will likely need rehab    SVT (supraventricular tachycardia) ([facility] [address] )  Assessment & Plan  Patient with persistent sinus tachycardia likely secondary to significant blood loss anemia/dehydration/pain/PE  -also noted to have episodes of SVT, cardiology consultation appreciated  -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, recommend initiating metoprolol 25 mg b i d   -2D echo  -will continue with telemetry monitoring    Acute kidney injury [facility])  Assessment & Plan  Patient's baseline creatinine close to 1, creatinine on 12/04/2019 was 1 8, -creatinine today improved to 0 96  -possible etiologies include prerenal secondary to ACE-inhibitor, episodes of hypotension, anemia  -nephrology follow-up appreciated, continue to avoid Ace inhibitors at this point,   -will monitor renal function, avoid nephrotoxin    Hyperlipidemia  Assessment & Plan  On atorvastatin 5 mg daily    Recurrent major depressive disorder, [facility] ([facility] )  Assessment & Plan  Continue Celexa    Essential hypertension  Assessment & Plan  Was initially placed on lisinopril however given JIMY will hold  -hydralazine IV PRN as needed  -monitor BP    Sensorineural hearing loss (SNHL)  Assessment & Plan  Hard of hearing  Hearing aids at home, patient afraid to lose them and did not let wife bring them in        VTE Pharmacologic Prophylaxis:   Pharmacologic:  Heparin    Patient Centered Rounds: I have performed bedside rounds with nursing staff today      Discussions with Specialists or Other Care Team Provider:  Orthopedic surgery, urology    Education and Discussions with Family / Patient:  Updated daughterLexie via telephone    Time Spent for Care: 36 minutes  More than 50% of total time spent on counseling and coordination of care as described above  Current Length of Stay: 5 day(s)    Current Patient Status: Inpatient   Certification Statement: The patient will continue to require additional inpatient hospital stay due to Femur fracture, PE, anemia    Discharge Plan / Estimated Discharge Date: TBD    Code Status: Level 1 - Full Code      Subjective:   Patient seen and examined at bedside, denies any chest pain, palpitations, dyspnea    Objective:     Vitals:   Temp (24hrs), Av 8 °F (37 1 °C), Min:97 4 °F (36 3 °C), Max:99 4 °F (37 4 °C)    Temp:  [97 4 °F (36 3 °C)-99 4 °F (37 4 °C)] 97 4 °F (36 3 °C)  HR:  [] 85  Resp:  [18-20] 18  BP: (139-156)/(71-78) 141/72  SpO2:  [95 %-97 %] 95 %  Body mass index is 27 71 kg/m²  Input and Output Summary (last 24 hours): Intake/Output Summary (Last 24 hours) at 2019 1452  Last data filed at 2019 1243  Gross per 24 hour   Intake    Output 1375 ml   Net -1375 ml       Physical Exam:    Constitutional: Patient is oriented to person, place and time, no acute distress  HEENT:  Normocephalic, atraumatic, EOMI, PERRLA, no scleral icterus, no pallor, moist oral mucosa  Neck:  Supple, no masses, no thyromegaly, no bruits Normal range of motion  Lymph nodes:  No lymphadenopathy  Cardiovascular: Normal S1S2, RRR, No murmurs/rubs/gallops appreciated  Pulmonary:  Bilateral air entry, No rhonchi/rales/wheezing appreciated  Abdominal: Soft, Bowel sounds present, Non-tender, Non-distended, No rebound/guarding, no hepatomegaly   Musculoskeletal:  Right femur dressing in place without any drainage  Extremities:  No cyanosis, clubbing or edema  Peripheral pulses palpable and equal bilaterally  Neurological: Cranial nerves II-XII grossly intact, sensation intact, otherwise no focal neurological symptoms  Skin: Skin is warm and dry, no rashes    Scrotal swelling and ecchymosis    Additional Data: Labs:    Results from last 7 days   Lab Units 12/08/19  0457   WBC Thousand/uL 9 39   HEMOGLOBIN g/dL 8 1*   HEMATOCRIT % 25 3*   PLATELETS Thousands/uL 210   NEUTROS PCT % 62   LYMPHS PCT % 17   MONOS PCT % 16*   EOS PCT % 4     Results from last 7 days   Lab Units 12/08/19  0457   POTASSIUM mmol/L 4 5   CHLORIDE mmol/L 107   CO2 mmol/L 31   BUN mg/dL 23   CREATININE mg/dL 0 87   CALCIUM mg/dL 7 9*     Results from last 7 days   Lab Units 12/07/19  0326   INR  1 13        I Have Reviewed All Lab Data Listed Above          Recent Cultures (last 7 days):           Last 24 Hours Medication List:     Current Facility-Administered Medications:  acetaminophen 650 mg Oral Q6H PRN Lorayne Sovereign, PA-C   atorvastatin 5 mg Oral Daily Lorayne Sovereign, PA-C   calcium carbonate 1,000 mg Oral Daily PRN Lorayne Sovereign, PA-C   citalopram 40 mg Oral Daily Lorayne Sovereign, PA-C   docusate sodium 100 mg Oral BID Lorayne Sovereign, PA-C   hydrALAZINE 5 mg Intravenous Q4H PRN Lorayne Sovereign, PA-C   metoprolol 2 5 mg Intravenous Q6H PRN Lanre Booth MD   metoprolol tartrate 25 mg Oral Q12H [address] Myron Sloan, PA-C   morphine injection 2 mg Intravenous Q4H PRN Lorayne Sovereign, PA-C   niacin 500 mg Oral Daily With Breakfast Lorayne Sovereign, PA-C   ondansetron 4 mg Intravenous Q6H PRN Lorayne Sovereign, PA-C   oxyCODONE 2 5 mg Oral Q4H PRN Lorayne Sovereign, PA-C   oxyCODONE-acetaminophen 2 tablet Oral Q4H PRN Lorayne Sovereign, PA-C   pantoprazole 40 mg Oral Early Morning Lorayne Sovereign, PA-C   senna 1 tablet Oral Daily Lorayne Soverejocelyn, PA-C        Today, Patient Was Seen By: Lanre Booth MD

## 2019-12-08 NOTE — ASSESSMENT & PLAN NOTE
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

## 2019-12-08 NOTE — ASSESSMENT & PLAN NOTE
CT PE protocol positive for segmental branch right upper lobe pulmonary artery embolus, RV/LV ratio normal  -patient started on heparin drip at about 3:00 a m   After which early this morning patient noted to have will oozing of serosanguineous bloody discharge from his surgical sites also noted to have swelling of his scrotum concerning for hematoma  -at this point decision made to stop further heparin drip, vascular surgery consulted, intervention Radiology consulted  -patient underwent IVC filter  -vascular surgery recommending therapeutic anticoagulation when stable  -discussed with vascular surgery team, at this point will start patient on DVT prophylaxis with heparin and closely monitor  -will defer therapeutic anticoagulation given risk of bleeding at this point will continue prophylactic as per Ortho recommendations, patient may need to be started on a therapeutic anticoagulation once stable  -will also need to be following with vascular surgery to remove IVC filter

## 2019-12-08 NOTE — PROGRESS NOTES
UROLOGY PROGRESS NOTE   Patient Identifiers: Katie Giron (MRN [id number])  Date of Service: 12/8/2019        Assessment:   80-year-old male status post fixation of femur fracture with scrotal ecchymosis after hyper drip started for pulmonary embolism    Plan: This appears to be superficial issue with ecchymosis and no progressive swelling hematoma or about a c/o  No additional imaging or urologic intervention needed  If there appears to be a change in the clinical status, please contact us  No specific follow-up is warranted      Subjective:     24 HR EVENTS:   Patient underwent IR IVC filter      Patient has complaints of right leg patient      Objective:     VITALS:    Vitals:    12/08/19 0700   BP: 141/72   Pulse: 85   Resp: 18   Temp: (!) 97 4 °F (36 3 °C)   SpO2: 95%       INS & OUTS:  1525cc    LABS:  Lab Results   Component Value Date    HGB 8 1 (L) 12/08/2019    HCT 25 3 (L) 12/08/2019    WBC 9 39 12/08/2019     12/08/2019   ]    Lab Results   Component Value Date     12/21/2015    K 4 5 12/08/2019     12/08/2019    CO2 31 12/08/2019    BUN 23 12/08/2019    CREATININE 0 87 12/08/2019    CALCIUM 7 9 (L) 12/08/2019    GLUCOSE 93 12/21/2015   ]    INPATIENT MEDS:    Current Facility-Administered Medications:     acetaminophen (TYLENOL) tablet 650 mg, 650 mg, Oral, Q6H PRN, Gill Salen, PA-C, 650 mg at 12/05/19 0755    atorvastatin (LIPITOR) tablet 5 mg, 5 mg, Oral, Daily, Gill Salen, PA-C, 5 mg at 12/08/19 0809    calcium carbonate (TUMS) chewable tablet 1,000 mg, 1,000 mg, Oral, Daily PRN, Gill Salen, PA-C    citalopram (CeleXA) tablet 40 mg, 40 mg, Oral, Daily, Gill Salen, PA-C, 40 mg at 12/08/19 0809    docusate sodium (COLACE) capsule 100 mg, 100 mg, Oral, BID, Gill Thaddeusn, PA-C, 100 mg at 12/08/19 0809    hydrALAZINE (APRESOLINE) injection 5 mg, 5 mg, Intravenous, Q4H PRN, Jairo Yeung PA-C    metoprolol (LOPRESSOR) injection 2 5 mg, 2 5 mg, Intravenous, Q6H PRN, Fabian Delatorre MD, 2 5 mg at 12/07/19 1744    morphine injection 2 mg, 2 mg, Intravenous, Q4H PRN, Evan Gomez PA-C, 2 mg at 12/07/19 1652    niacin tablet 500 mg, 500 mg, Oral, Daily With Breakfast, Evan Gomez PA-C, 500 mg at 12/08/19 0808    ondansetron (ZOFRAN) injection 4 mg, 4 mg, Intravenous, Q6H PRN, Evan Gomez PA-C    oxyCODONE (ROXICODONE) IR tablet 2 5 mg, 2 5 mg, Oral, Q4H PRN, Evan Gomez PA-C, 2 5 mg at 12/06/19 6777    oxyCODONE-acetaminophen (PERCOCET) 5-325 mg per tablet 2 tablet, 2 tablet, Oral, Q4H PRN, Evan Gomez PA-C, 2 tablet at 12/08/19 0503    pantoprazole (PROTONIX) EC tablet 40 mg, 40 mg, Oral, Early Morning, Evan Gomez PA-C, 40 mg at 12/08/19 0503    senna (SENOKOT) tablet 8 6 mg, 1 tablet, Oral, Daily, Evan Gomez PA-C, 8 6 mg at 12/08/19 0809      Physical Exam:   /72 (BP Location: Left arm)   Pulse 85   Temp (!) 97 4 °F (36 3 °C) (Tympanic)   Resp 18   Ht 5' 9" (1 753 m)   Wt 85 1 kg (187 lb 9 8 oz)   SpO2 95% Comment: 1liter  BMI 27 71 kg/m²   GEN: No acute distress  RESP: breathing comfortably with no accessory muscle use  CV: no significant peripheral edema  ABD: soft, non-tender, non-distended  Scrotum with ecchymosis and minimal edema, no tense hematoma her medicine noted

## 2019-12-09 ENCOUNTER — APPOINTMENT (INPATIENT)
Dept: NON INVASIVE DIAGNOSTICS | Facility: HOSPITAL | Age: 79
DRG: 480 | End: 2019-12-09
Payer: COMMERCIAL

## 2019-12-09 PROBLEM — I38 VALVULAR HEART DISEASE: Status: ACTIVE | Noted: 2019-12-09

## 2019-12-09 PROBLEM — N17.9 ACUTE KIDNEY INJURY (HCC): Status: RESOLVED | Noted: 2019-12-05 | Resolved: 2019-12-09

## 2019-12-09 LAB
ABO GROUP BLD: NORMAL
ANION GAP SERPL CALCULATED.3IONS-SCNC: 3 MMOL/L (ref 4–13)
ATRIAL RATE: 296 BPM
BASOPHILS # BLD AUTO: 0.02 THOUSANDS/ΜL (ref 0–0.1)
BASOPHILS NFR BLD AUTO: 0 % (ref 0–1)
BLD GP AB SCN SERPL QL: NEGATIVE
BUN SERPL-MCNC: 22 MG/DL (ref 5–25)
CALCIUM SERPL-MCNC: 8 MG/DL (ref 8.3–10.1)
CHLORIDE SERPL-SCNC: 105 MMOL/L (ref 100–108)
CO2 SERPL-SCNC: 30 MMOL/L (ref 21–32)
CREAT SERPL-MCNC: 0.85 MG/DL (ref 0.6–1.3)
EOSINOPHIL # BLD AUTO: 0.39 THOUSAND/ΜL (ref 0–0.61)
EOSINOPHIL NFR BLD AUTO: 4 % (ref 0–6)
ERYTHROCYTE [DISTWIDTH] IN BLOOD BY AUTOMATED COUNT: 14.9 % (ref 11.6–15.1)
GFR SERPL CREATININE-BSD FRML MDRD: 83 ML/MIN/1.73SQ M
GLUCOSE SERPL-MCNC: 105 MG/DL (ref 65–140)
HCT VFR BLD AUTO: 22.7 % (ref 36.5–49.3)
HGB BLD-MCNC: 7.4 G/DL (ref 12–17)
IMM GRANULOCYTES # BLD AUTO: 0.16 THOUSAND/UL (ref 0–0.2)
IMM GRANULOCYTES NFR BLD AUTO: 2 % (ref 0–2)
LYMPHOCYTES # BLD AUTO: 1.12 THOUSANDS/ΜL (ref 0.6–4.47)
LYMPHOCYTES NFR BLD AUTO: 12 % (ref 14–44)
MCH RBC QN AUTO: 32.7 PG (ref 26.8–34.3)
MCHC RBC AUTO-ENTMCNC: 32.6 G/DL (ref 31.4–37.4)
MCV RBC AUTO: 100 FL (ref 82–98)
MONOCYTES # BLD AUTO: 1.41 THOUSAND/ΜL (ref 0.17–1.22)
MONOCYTES NFR BLD AUTO: 15 % (ref 4–12)
NEUTROPHILS # BLD AUTO: 6.08 THOUSANDS/ΜL (ref 1.85–7.62)
NEUTS SEG NFR BLD AUTO: 67 % (ref 43–75)
NRBC BLD AUTO-RTO: 0 /100 WBCS
P AXIS: 263 DEGREES
PLATELET # BLD AUTO: 223 THOUSANDS/UL (ref 149–390)
PMV BLD AUTO: 9.3 FL (ref 8.9–12.7)
POTASSIUM SERPL-SCNC: 4 MMOL/L (ref 3.5–5.3)
QRS AXIS: -14 DEGREES
QRSD INTERVAL: 146 MS
QT INTERVAL: 286 MS
QTC INTERVAL: 449 MS
RBC # BLD AUTO: 2.26 MILLION/UL (ref 3.88–5.62)
RH BLD: POSITIVE
SODIUM SERPL-SCNC: 138 MMOL/L (ref 136–145)
SPECIMEN EXPIRATION DATE: NORMAL
T WAVE AXIS: -38 DEGREES
VENTRICULAR RATE: 148 BPM
WBC # BLD AUTO: 9.18 THOUSAND/UL (ref 4.31–10.16)

## 2019-12-09 PROCEDURE — 99233 SBSQ HOSP IP/OBS HIGH 50: CPT | Performed by: INTERNAL MEDICINE

## 2019-12-09 PROCEDURE — 97110 THERAPEUTIC EXERCISES: CPT

## 2019-12-09 PROCEDURE — 86923 COMPATIBILITY TEST ELECTRIC: CPT

## 2019-12-09 PROCEDURE — 86900 BLOOD TYPING SEROLOGIC ABO: CPT | Performed by: INTERNAL MEDICINE

## 2019-12-09 PROCEDURE — 97535 SELF CARE MNGMENT TRAINING: CPT

## 2019-12-09 PROCEDURE — 80048 BASIC METABOLIC PNL TOTAL CA: CPT | Performed by: NURSE PRACTITIONER

## 2019-12-09 PROCEDURE — 86850 RBC ANTIBODY SCREEN: CPT | Performed by: INTERNAL MEDICINE

## 2019-12-09 PROCEDURE — 99232 SBSQ HOSP IP/OBS MODERATE 35: CPT | Performed by: INTERNAL MEDICINE

## 2019-12-09 PROCEDURE — 99024 POSTOP FOLLOW-UP VISIT: CPT | Performed by: PHYSICIAN ASSISTANT

## 2019-12-09 PROCEDURE — 93306 TTE W/DOPPLER COMPLETE: CPT

## 2019-12-09 PROCEDURE — 97530 THERAPEUTIC ACTIVITIES: CPT

## 2019-12-09 PROCEDURE — 85025 COMPLETE CBC W/AUTO DIFF WBC: CPT | Performed by: INTERNAL MEDICINE

## 2019-12-09 PROCEDURE — 93010 ELECTROCARDIOGRAM REPORT: CPT

## 2019-12-09 PROCEDURE — 93306 TTE W/DOPPLER COMPLETE: CPT | Performed by: INTERNAL MEDICINE

## 2019-12-09 PROCEDURE — P9016 RBC LEUKOCYTES REDUCED: HCPCS

## 2019-12-09 PROCEDURE — 86901 BLOOD TYPING SEROLOGIC RH(D): CPT | Performed by: INTERNAL MEDICINE

## 2019-12-09 RX ORDER — POLYETHYLENE GLYCOL 3350 17 G/17G
17 POWDER, FOR SOLUTION ORAL DAILY
Status: DISCONTINUED | OUTPATIENT
Start: 2019-12-09 | End: 2019-12-13 | Stop reason: HOSPADM

## 2019-12-09 RX ORDER — LISINOPRIL 10 MG/1
10 TABLET ORAL DAILY
Status: DISCONTINUED | OUTPATIENT
Start: 2019-12-09 | End: 2019-12-13 | Stop reason: HOSPADM

## 2019-12-09 RX ADMIN — OXYCODONE HYDROCHLORIDE AND ACETAMINOPHEN 2 TABLET: 5; 325 TABLET ORAL at 05:02

## 2019-12-09 RX ADMIN — Medication 500 MG: at 08:33

## 2019-12-09 RX ADMIN — METOPROLOL TARTRATE 25 MG: 25 TABLET, FILM COATED ORAL at 08:31

## 2019-12-09 RX ADMIN — HEPARIN SODIUM 5000 UNITS: 5000 INJECTION INTRAVENOUS; SUBCUTANEOUS at 05:00

## 2019-12-09 RX ADMIN — OXYCODONE HYDROCHLORIDE 2.5 MG: 5 TABLET ORAL at 11:37

## 2019-12-09 RX ADMIN — OXYCODONE HYDROCHLORIDE 2.5 MG: 5 TABLET ORAL at 17:44

## 2019-12-09 RX ADMIN — ATORVASTATIN CALCIUM 5 MG: 10 TABLET, FILM COATED ORAL at 08:31

## 2019-12-09 RX ADMIN — DOCUSATE SODIUM 100 MG: 100 CAPSULE, LIQUID FILLED ORAL at 17:44

## 2019-12-09 RX ADMIN — POLYETHYLENE GLYCOL 3350 17 G: 17 POWDER, FOR SOLUTION ORAL at 11:37

## 2019-12-09 RX ADMIN — METOPROLOL TARTRATE 25 MG: 25 TABLET, FILM COATED ORAL at 22:13

## 2019-12-09 RX ADMIN — CITALOPRAM HYDROBROMIDE 40 MG: 20 TABLET ORAL at 08:32

## 2019-12-09 RX ADMIN — PANTOPRAZOLE SODIUM 40 MG: 40 TABLET, DELAYED RELEASE ORAL at 05:00

## 2019-12-09 RX ADMIN — SENNOSIDES 8.6 MG: 8.6 TABLET, FILM COATED ORAL at 08:31

## 2019-12-09 RX ADMIN — DOCUSATE SODIUM 100 MG: 100 CAPSULE, LIQUID FILLED ORAL at 08:31

## 2019-12-09 RX ADMIN — LISINOPRIL 10 MG: 10 TABLET ORAL at 11:37

## 2019-12-09 NOTE — PHYSICAL THERAPY NOTE
Pt treatment time 4111-3298   Physical Therapy Treatment Note       12/09/19 6128   Pain Assessment   Pain Assessment 0-10   Pain Score 7   Pain Type Surgical pain   Pain Location Hip   Pain Orientation Right   Pain Descriptors Aching;Discomfort   Hospital Pain Intervention(s) Repositioned;Cold applied; Ambulation/increased activity; Emotional support   Response to Interventions tolerated   Restrictions/Precautions   Weight Bearing Precautions Per Order Yes   RLE Weight Bearing Per Order TTWB   Other Precautions Pain; Fall Risk;Hard of hearing;WBS  (tttwb R le  )   General   Family/Caregiver Present No   Subjective   Subjective pt  out of bed in chair upon arrival   Pt reports fatigue, pain 4/10 at rest and asking to get back to bed  Bed Mobility   Rolling L 3  Moderate assistance   Additional items Assist x 1;Bedrails; Increased time required;Verbal cues;LE management   Sit to Supine 2  Maximal assistance   Additional items Assist x 2; Increased time required;Verbal cues;LE management   Transfers   Sit to Stand 3  Moderate assistance   Additional items Assist x 2; Increased time required;Verbal cues  (with use of quick move   )   Stand to Sit 3  Moderate assistance   Additional items Assist x 2; Increased time required;Verbal cues   Other 1  Dependent   Additional items Assist x 2  (with use of quick move   )   Additional Comments PT performs static standing with use of quick move and mod assist x2  x2 mintues x2  Balance   Static Sitting Fair +   Static Standing Zero  (assist x2 with quick move   )   Endurance Deficit   Endurance Deficit Yes  (pain, fatigue)   Activity Tolerance   Activity Tolerance Patient limited by pain; Patient limited by fatigue   Medical Staff Made Aware Booker Pérez   Nurse Made Aware yes   Exercises   THR Supine;15 reps;AAROM;AROM; Right;Left  (a/a hip abd /add , a/a hs, )   Assessment   Prognosis Fair   Problem List Decreased strength;Decreased range of motion;Decreased endurance; Impaired balance;Decreased mobility; Impaired hearing;Obesity; Decreased skin integrity;Orthopedic restrictions;Pain   Assessment Pt performs and completes sit to stand transfers from bedside chair with mod assist x2 with use of quickmove cues for handplacement and reminders and compliance with weightbearing status  Pt  Performs static standing with use of quickmove x 2 minutes with mod assist x2 and verbal cues for reminders and compliance with weightbearing  Pt performs b/l le a-aarom to R le x 15 reps  Pt requires assistance for hip abd /add and heelslides  Pt continues to have increased swelling in R le and copious amounts of drainage  PT's RNPebbles aware  Pt performs rolling to L with mod assist x1 with use of bedrail  Ice applied to R lateral thigh scd's to b/l le's  Recommend continued inpt PT and inpt rehab at d/c to maximize functional outcomes, mobility and independence  Goals   Patient Goals "To get better "     STG Expiration Date 12/19/19   PT Treatment Day 4   Plan   Treatment/Interventions Functional transfer training;LE strengthening/ROM; Therapeutic exercise; Endurance training;Patient/family training;Equipment eval/education; Bed mobility;Gait training;Spoke to nursing   Progress Slow progress, decreased activity tolerance   PT Frequency 7x/wk; Twice a day;Weekend   Recommendation   Recommendation Short-term skilled PT   PT - OK to Discharge Yes  (STR)        12/09/19 1508   Pain Assessment   Pain Assessment 0-10   Pain Score 7   Pain Type Surgical pain   Pain Location Hip   Pain Orientation Right   Pain Descriptors Aching;Discomfort   Hospital Pain Intervention(s) Repositioned;Cold applied; Ambulation/increased activity; Emotional support   Response to Interventions tolerated   Restrictions/Precautions   Weight Bearing Precautions Per Order Yes   RLE Weight Bearing Per Order TTWB   Other Precautions Pain; Fall Risk;Hard of hearing;WBS  (tttwb R le  )   General   Family/Caregiver Present No   Subjective Subjective pt  out of bed in chair upon arrival   Pt reports fatigue, pain 4/10 at rest and asking to get back to bed  Bed Mobility   Rolling L 3  Moderate assistance   Additional items Assist x 1;Bedrails; Increased time required;Verbal cues;LE management   Sit to Supine 2  Maximal assistance   Additional items Assist x 2; Increased time required;Verbal cues;LE management   Transfers   Sit to Stand 3  Moderate assistance   Additional items Assist x 2; Increased time required;Verbal cues  (with use of quick move   )   Stand to Sit 3  Moderate assistance   Additional items Assist x 2; Increased time required;Verbal cues   Other 1  Dependent   Additional items Assist x 2  (with use of quick move   )   Additional Comments PT performs static standing with use of quick move and mod assist x2  x2 mintues x2  Balance   Static Sitting Fair +   Static Standing Zero  (assist x2 with quick move   )   Endurance Deficit   Endurance Deficit Yes  (pain, fatigue)   Activity Tolerance   Activity Tolerance Patient limited by pain; Patient limited by fatigue   Medical Staff Made Aware Camron Galarza   Nurse Made Aware yes   Exercises   THR Supine;15 reps;AAROM;AROM; Right;Left  (a/a hip abd /add , a/a hs, )   Assessment   Prognosis Fair   Problem List Decreased strength;Decreased range of motion;Decreased endurance; Impaired balance;Decreased mobility; Impaired hearing;Obesity; Decreased skin integrity;Orthopedic restrictions;Pain   Assessment Pt performs and completes sit to stand transfers from bedside chair with mod assist x2 with use of quickmove cues for handplacement and reminders and compliance with weightbearing status  Pt  Performs static standing with use of quickmove x 2 minutes with mod assist x2 and verbal cues for reminders and compliance with weightbearing  Pt performs b/l le a-aarom to R le x 15 reps  Pt requires assistance for hip abd /add and heelslides    Pt continues to have increased swelling in R le and copious amounts of drainage  PT's RNRoxi Sees aware  Pt performs rolling to L with mod assist x1 with use of bedrail  Ice applied to R lateral thigh scd's to b/l le's  Recommend continued inpt PT and inpt rehab at d/c to maximize functional outcomes, mobility and independence  Goals   Patient Goals "To get better "     [facility] Expiration Date 12/19/19   PT Treatment Day 4   Plan   Treatment/Interventions Functional transfer training;LE strengthening/ROM; Therapeutic exercise; Endurance training;Patient/family training;Equipment eval/education; Bed mobility;Gait training;Spoke to nursing   Progress Slow progress, decreased activity tolerance   PT Frequency 7x/wk; Twice a day;Weekend   Recommendation   Recommendation Short-term skilled PT   PT - OK to Discharge Yes  (STR)       Furman Gosselin, HOLLY

## 2019-12-09 NOTE — PROGRESS NOTES
Orthopaedic Surgery - Progress Note  Doris Muniz (89 y o  male)   : 1940   MRN: [id number]  Date: 2019   Encounter: [id number]   Unit/Bed#: E2 -01    Assessment / Plan  Postop day 5 status post ORIF right femur fracture    · Continue with PT/OT as ordered  Patient will be on toe-touch weight-bearing for at least the 1st 2 weeks postoperatively  · Continue with ice and analgesics as needed  · Now status post IVC filter placement due to PT, continue anticoagulation per Medicine  · Hemoglobin 7 4 this morning decreased secondary to acute blood loss anemia  Will continue to monitor at this time  Continue with dressing changes as needed for continued serous sanguinous drainage  Incisions are healing well at this time without erythema  Incisions are well approximated  · Patient will most likely need transfer to rehab when cleared from a medical standpoint  Subjective  Postop day 5 status post ORIF right femur fracture  Patient is doing well at this time and his pain is well controlled at this time  Patient is denying any distal numbness or tingling  Vitals  Temp:  [97 8 °F (36 6 °C)-98 °F (36 7 °C)] 97 9 °F (36 6 °C)  HR:  [87-94] 89  Resp:  [18-20] 18  BP: (123-162)/(58-72) 162/72  Body mass index is 27 87 kg/m²  I/O last 24 hours: In: -   Out: 980 [Urine:980]    Right Hip Exam  Alignment / Posture:  Normal resting hip posture  Normal knee alignment  Inspection:  Moderate right thigh and leg swelling  No erythema  Mild to moderate right leg ecchymosis  Incisions are healing well, mild serous drainage noted from all incisions at this time  Palpation:  Mild tenderness at Tena-incisional areas right thigh     ROM:  Not tested  Strength:  5/5 AT, GSC, PT, and peroneals  Stability:  Not tested  Tests:  No pertinent positive or negative tests  Neurovascular:  Sensation intact in DP/SP/Geiger/Sa/T nerve distributions  Sensation intact in all digital nerve distributions   Toes warm and perfused  Gait:  Not tested      Lab Results  (I have personally reviewed pertinent lab results )  Results from last 7 days   Lab Units 12/09/19  0456 12/08/19  0457 12/07/19  0920 12/07/19  0425 12/07/19  0326 12/06/19  1508 12/06/19  0754 12/06/19  0451 12/05/19  0442 12/04/19  1840 12/04/19  0424 12/03/19  1214   WBC Thousand/uL 9 18 9 39  --  10 76* 10 73*  --   --  11 23* 13 86* 15 36* 12 27* 16 04*   HEMOGLOBIN g/dL 7 4* 8 1* 7 8* 8 3* 7 8* 7 5* 6 3* 6 3* 8 2* 8 9* 11 5* 13 4   HEMATOCRIT % 22 7* 25 3* 24 2* 26 1* 24 0* 23 2* 19 7* 19 1* 25 8* 27 7* 35 0* 40 4   PLATELETS Thousands/uL 223 210  --  174 174  --   --  169 181 205 247 256     Results from last 7 days   Lab Units 12/07/19  0920 12/07/19  0326 12/03/19  1214   PTT seconds 143* 39* 25   INR   --  1 13 1 02     Results from last 7 days   Lab Units 12/09/19  0456 12/08/19  0457 12/07/19  0425 12/06/19  0451 12/05/19  1204 12/05/19  0442 12/04/19  0423 12/03/19  1214   POTASSIUM mmol/L 4 0 4 5 4 5 4 6 4 3 5 6* 4 8 4 9   CHLORIDE mmol/L 105 107 106 107  --  108 104 104   CO2 mmol/L 30 31 26 27  --  26 25 29   BUN mg/dL 22 23 32* 38*  --  38* 24 12   CREATININE mg/dL 0 85 0 87 0 96 1 24  --  1 94* 1 81* 1 08   EGFR ml/min/1 73sq m 83 82 75 55  --  32 35 65   CALCIUM mg/dL 8 0* 7 9* 7 7* 7 4*  --  7 1* 7 9* 8 7               Chance De Anda PA-C

## 2019-12-09 NOTE — PROGRESS NOTES
Progress Note - Cardiology   Nay Marie 78 y o  male MRN: [id number]  Unit/Bed#: E2 -01 Encounter: [id number]        Problem List:  Principal Problem:    Femur fracture, right ([facility] [address] )  Active Problems:    Sensorineural hearing loss (SNHL)    Essential hypertension    Recurrent major depressive disorder, in full remission ([facility] [address] )    Hyperlipidemia    Closed femur fracture ([facility] [address] )    Acute kidney injury ([facility] [address] )    Acute blood loss anemia    Impaired vision    Conductive hearing loss, bilateral    S/P IVC filter    Scrotal swelling    Acute pulmonary embolism (HCC)    SVT (supraventricular tachycardia) (HCC)      Asessment:  1  Tachycardia, 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  2  Acute blood-loss anemia s/p 2 units PRBC:              -hemoglobin currently trending around 8  3  Acute pulmonary embolism  4  Right femur fracture s/p ORIF 12/04/2019  5  Hematoma at site of right femur surgery    Plan/ Discussion:  1  Heart rate looks much better on telemetry and no further episodes of AVNRT since yesterday- he is responding to and tolerating beta blocker well     2  Continue Lopressor 25 b i d     3  Will arrange outpatient follow-up with EP for 2 week event monitor as outlined in note yesterday from Dr Rick Hanna    4  Ongoing workup for PE, femur fracture, and anemia    Will see patient PRN  Please call if needed    Subjective:  Feels good today    Doing physical therapy at the time my progress note    Vitals:  Vitals:    12/08/19 0531 12/09/19 0535   Weight: 85 1 kg (187 lb 9 8 oz) 85 6 kg (188 lb 11 4 oz)   ,  Vitals:    12/08/19 2041 12/08/19 2300 12/09/19 0535 12/09/19 0700   BP: 123/58 128/72  162/72   BP Location: Left arm   Left arm   Pulse: 94 89  89   Resp:  20  18   Temp:  97 8 °F (36 6 °C)  97 9 °F (36 6 °C)   TempSrc:  Tympanic  Tympanic   SpO2: 93% 97%  96%   Weight:   85 6 kg (188 lb 11 4 oz)    Height:           Exam:  General: Alert awake and oriented, no acute distress    Heart:  Regular rate and rhythm, no murmurs Respiratory effort:  Breathing comfortably on room air   Lungs:  Clear bilaterally without wheezing, rales, crackles   Lower Limbs:  No edema           Telemetry:       tachycardic, Heart Rate   Sinus tachycardia    Medications:    Current Facility-Administered Medications:     acetaminophen (TYLENOL) tablet 650 mg, 650 mg, Oral, Q6H PRN, Shar Marti PA-C, 650 mg at 12/05/19 0755    atorvastatin (LIPITOR) tablet 5 mg, 5 mg, Oral, Daily, Shar Marti PA-C, 5 mg at 12/09/19 0831    calcium carbonate (TUMS) chewable tablet 1,000 mg, 1,000 mg, Oral, Daily PRN, Shar Matri PA-C    citalopram (CeleXA) tablet 40 mg, 40 mg, Oral, Daily, Shar Marti PA-C, 40 mg at 12/09/19 3888    docusate sodium (COLACE) capsule 100 mg, 100 mg, Oral, BID, Shar Marti PA-C, 100 mg at 12/09/19 0831    heparin (porcine) subcutaneous injection 5,000 Units, 5,000 Units, Subcutaneous, Q8H [facility], Tiffany Rojas MD, 5,000 Units at 12/09/19 0500    hydrALAZINE (APRESOLINE) injection 5 mg, 5 mg, Intravenous, Q4H PRN, Shar Marti PA-C    metoprolol (LOPRESSOR) injection 2 5 mg, 2 5 mg, Intravenous, Q6H PRN, Tiffany Rojas MD, 2 5 mg at 12/07/19 1744    metoprolol tartrate (LOPRESSOR) tablet 25 mg, 25 mg, Oral, Q12H [facility], Myron Solan PA-C, 25 mg at 12/09/19 0831    morphine injection 2 mg, 2 mg, Intravenous, Q4H PRN, Shar Marti PA-C, 2 mg at 12/07/19 1652    niacin tablet 500 mg, 500 mg, Oral, Daily With Breakfast, Shar Marti PA-C, 500 mg at 12/09/19 0833    ondansetron (ZOFRAN) injection 4 mg, 4 mg, Intravenous, Q6H PRN, Shar Marti PA-C    oxyCODONE (ROXICODONE) IR tablet 2 5 mg, 2 5 mg, Oral, Q4H PRN, Shar Marti PA-C, 2 5 mg at 12/08/19 1047    oxyCODONE-acetaminophen (PERCOCET) 5-325 mg per tablet 2 tablet, 2 tablet, Oral, Q4H PRN, Shar Marti PA-C, 2 tablet at 12/09/19 0502    pantoprazole (PROTONIX) EC tablet 40 mg, 40 mg, Oral, Early Morning, Shar Marti PA-C, 40 mg at 12/09/19 0500    senna (SENOKOT) tablet 8 6 mg, 1 tablet, Oral, Daily, Charla Nye PA-C, 8 6 mg at 12/09/19 0831      Labs/Data:        Results from last 7 days   Lab Units 12/09/19  0456 12/08/19  0457 12/07/19  0920 12/07/19  0425   WBC Thousand/uL 9 18 9 39  --  10 76*   HEMOGLOBIN g/dL 7 4* 8 1* 7 8* 8 3*   HEMATOCRIT % 22 7* 25 3* 24 2* 26 1*   PLATELETS Thousands/uL 223 210  --  174     Results from last 7 days   Lab Units 12/09/19  0456 12/08/19  0457 12/07/19  0425   POTASSIUM mmol/L 4 0 4 5 4 5   CHLORIDE mmol/L 105 107 106   CO2 mmol/L 30 31 26   BUN mg/dL 22 23 32*

## 2019-12-09 NOTE — PROGRESS NOTES
NEPHROLOGY PROGRESS NOTE   Jonny Dinh 78 y o  male MRN: [id number]  Unit/Bed#: E2 -01 Encounter: [id number]      ASSESSMENT/PLAN:  Acute kidney injury (POA):  Likely prerenal with episodes of relative hypotension, ACE- i, and severe anemia  Resolved  Creatinine stable at 0 85   - Upon review of medical records, baseline creatinine 0 9-1 0    - Peak creatinine on this admission 1 9   - Renal status stable post receiving IV contrast on 12/7/2019  Gentle IVF discontinued yesterday  - Monitor volume status with intake/output  - Avoid hypotension/fluctuations in blood pressure, nephrotoxins, NSAIDs   - Continue to hold ACE  Hyperkalemia:  Resolved  Potassium stable at 4 0 today  - Continue to avoid ACE  - Trend and monitor with labs  Hypertension:  Blood pressure overall stable with one episode of hypertension this am    - Continue to trend as ACE is on hold  - Echo completed, awaiting interpretation    - Currently on: Metoprolol Tartrate 25 mg BID  Anemia:  Suspect secondary to procedure  - Hemoglobin 7 4 today  - Patient received 2 units PRBC Friday  - Trend H&H  Transfuse for hemoglobin < 7    - Plan per primary team      Femur fracture: post ORIF 5 days    - Management per orthopedics   - Patient with minimal bleeding at surgical site today per RN  Patient with bleeding at surgical site over the weekend after initiation of heparin drip  Positive for PE:  Heparin drip discontinued due to surgical site and scrotal ecchymosis  - Urology following for scrotal ecchymosis  - IVC filter placed in IR on 12/07/2019   - Management per primary team        SUBJECTIVE:  Patient pleasantly resting in bed with RN at bedside  Patient denies chest pain, shortness of breath, nausea, vomiting, diarrhea       OBJECTIVE:  Current Weight: Weight - Scale: 85 6 kg (188 lb 11 4 oz)  Vitals:    12/09/19 0700   BP: 162/72   Pulse: 89   Resp: 18   Temp: 97 9 °F (36 6 °C)   SpO2: 96%       Intake/Output Summary (Last 24 hours) at 12/9/2019 0905  Last data filed at 12/9/2019 0401  Gross per 24 hour   Intake    Output 680 ml   Net -680 ml       General:  No acute distress  Skin:  Warm, no rash, ecchymosis on scrotum  Eyes:  Sclerae are anicteric  ENT:  Moist lips and mucous membranes  Neck:  Supple, no JVD, trachea midline  Chest:  Clear breath sounds bilaterally   CVS:  Normal rate rhythm  Abdomen:  Soft, nontender, nondistended  Extremities:  No bilateral lower extremity edema   Neuro:  Alert and oriented  Psych:  Appropriate affect      Medications:  Scheduled Meds:  Current Facility-Administered Medications:  acetaminophen 650 mg Oral Q6H PRN Chyrl Magyar, PA-C   atorvastatin 5 mg Oral Daily Chyrl Magyar, PA-C   calcium carbonate 1,000 mg Oral Daily PRN Chyrl Magyar, PA-C   citalopram 40 mg Oral Daily Chyrl Magyar, PA-C   docusate sodium 100 mg Oral BID Chyrl Magyar, PA-C   heparin (porcine) 5,000 Units Subcutaneous Q8H [address] Kitty Frey MD   hydrALAZINE 5 mg Intravenous Q4H PRN Chyrl Magyar, PA-TIKI   metoprolol 2 5 mg Intravenous Q6H PRN Kitty Frey MD   metoprolol tartrate 25 mg Oral Q12H [address] Myron Sloan PA-C   morphine injection 2 mg Intravenous Q4H PRN Chyrl Magyar, PA-C   niacin 500 mg Oral Daily With Breakfast Raul Hughes PA-C   ondansetron 4 mg Intravenous Q6H PRN Chyrl Magyar, PA-TIKI   oxyCODONE 2 5 mg Oral Q4H PRN Chyrl Magyar, PA-TIKI   oxyCODONE-acetaminophen 2 tablet Oral Q4H PRN Chyrl Magyar, PA-C   pantoprazole 40 mg Oral Early Morning Chyrl Magyar, PA-C   senna 1 tablet Oral Daily Chyrl Magyar, PA-C       PRN Meds:   acetaminophen    calcium carbonate    hydrALAZINE    metoprolol    morphine injection    ondansetron    oxyCODONE    oxyCODONE-acetaminophen    Continuous Infusions:     Laboratory Results:  Results from last 7 days   Lab Units 12/09/19  0456 12/08/19  0457 12/07/19  0920 12/07/19  0425 12/07/19  0326 12/06/19  1508 12/06/19  2606 12/06/19  0451 12/05/19  1204 12/05/19  0442 12/04/19  1840  12/04/19  0423 12/03/19  1214   WBC Thousand/uL 9 18 9 39  --  10 76* 10 73*  --   --  11 23*  --  13 86* 15 36*   < >  --  16 04*   HEMOGLOBIN g/dL 7 4* 8 1* 7 8* 8 3* 7 8* 7 5* 6 3* 6 3*  --  8 2* 8 9*   < >  --  13 4   HEMATOCRIT % 22 7* 25 3* 24 2* 26 1* 24 0* 23 2* 19 7* 19 1*  --  25 8* 27 7*   < >  --  40 4   PLATELETS Thousands/uL 223 210  --  174 174  --   --  169  --  181 205   < >  --  256   SODIUM mmol/L 138 139  --  138  --   --   --  139  --  141  --   --  139 139   POTASSIUM mmol/L 4 0 4 5  --  4 5  --   --   --  4 6 4 3 5 6*  --   --  4 8 4 9   CHLORIDE mmol/L 105 107  --  106  --   --   --  107  --  108  --   --  104 104   CO2 mmol/L 30 31  --  26  --   --   --  27  --  26  --   --  25 29   BUN mg/dL 22 23  --  32*  --   --   --  38*  --  38*  --   --  24 12   CREATININE mg/dL 0 85 0 87  --  0 96  --   --   --  1 24  --  1 94*  --   --  1 81* 1 08   CALCIUM mg/dL 8 0* 7 9*  --  7 7*  --   --   --  7 4*  --  7 1*  --   --  7 9* 8 7    < > = values in this interval not displayed

## 2019-12-09 NOTE — OCCUPATIONAL THERAPY NOTE
Occupational Therapy Treatment Note:         12/09/19 1055   Restrictions/Precautions   Weight Bearing Precautions Per Order Yes   RLE Weight Bearing Per Order TTWB   Other Precautions Pain; Fall Risk;O2;Telemetry;Cognitive;Hard of hearing;WBS   Pain Assessment   Pain Assessment 0-10   Pain Score Worst Possible Pain   Pain Type Acute pain;Surgical pain   Pain Location Leg   Pain Orientation Right   Pain Descriptors Aching; Sore   ADL   Where Assessed Chair   Grooming Assistance 5  Supervision/Setup   Grooming Deficit Setup   UB Bathing Assistance 4  Minimal Assistance   UB Bathing Deficit Setup;Supervision/safety;Verbal cueing   LB Bathing Assistance 2  Maximal Assistance   LB Bathing Deficit Steadying;Setup;Verbal cueing;Supervision/safety; Increased time to complete;Perineal area; Buttocks   UB Dressing Assistance 4  Minimal Assistance   UB Dressing Deficit Setup   LB Dressing Assistance 2  Maximal Assistance   LB Dressing Deficit Setup;Steadying; Requires assistive device for steadying;Verbal cueing;Supervision/safety; Increased time to complete; Don/doff R sock; Don/doff L sock   Functional Standing Tolerance   Time 2 mins   Activity dynamic stand balance activity  Comments cues for safety required with activity    Bed Mobility   Supine to Sit 3  Moderate assistance   Additional items Assist x 2;HOB elevated; Increased time required;Verbal cues;LE management   Additional Comments cues for safety required with activities  Transfers   Sit to Stand 2  Maximal assistance   Additional items Assist x 2;Armrests; Increased time required;Verbal cues; Bedrails   Stand to Sit 3  Moderate assistance   Additional items Assist x 2;Armrests; Bedrails; Increased time required;Verbal cues   Stand pivot 3  Moderate assistance   Additional items Assist x 2;Armrests; Bedrails; Increased time required;Verbal cues   Additional Comments with use of quick move device      Functional Mobility   Functional Mobility 3  Moderate assistance Additional Comments x2   Additional items Rolling walker   Cognition   Overall Cognitive Status Impaired   Arousal/Participation Alert; Cooperative   Attention Within functional limits   Orientation Level Oriented to person;Disoriented to place; Disoriented to time;Disoriented to situation   Memory Decreased short term memory;Decreased recall of recent events;Decreased recall of precautions   Following Commands Follows one step commands without difficulty   Comments step by step cues required with loud simple commands to complete functional tasks  Additional Activities   Additional Activities Other (Comment)  (reviewed current plan of care  )   Additional Activities Comments Pt and spouse report having good understanding  Activity Tolerance   Activity Tolerance Patient limited by fatigue;Patient limited by pain   Medical Staff Made Aware reported all findings to nursing staff  Pt reports having 5/10 pain overall during tx session able to tolerate OOB positioning with BLE elevated on foot stool and chair alarm activated upon termination of tx session  All items with in reach  Assessment   Assessment Pt was seen for skilled OT with focus on completion of self care tasks, bed mobility, functional transfers, review of LE dressing activity and review of current plan of care  Pt with noted increased drainage from affected RLE  Pt with [place] and poor carry over of reviewed techs  Inconsistent focus to tasks noted  Min A overall for UE bathing following EOB positioning  Improved sitting balance noted while EOB for further completion of self care routine  Mod A overall for LE bathing with limited stand tolerance/functional reach for channing care  See above levels of A required for all functional tasks  Pt able to tolerate use of quick move device with Mod A x2 and step by step verbal cues to shift weight into LLE with activity   Pt may benefit from further rehab with focus on achieving optimal performance levels with all functional tasks  Continue to recommend Inpatient rehab   Plan   Treatment Interventions ADL retraining;UE strengthening/ROM; Functional transfer training   Goal Expiration Date 12/19/19   OT Treatment Day 3   OT Frequency 3-5x/wk   Recommendation   OT Discharge Recommendation Short Term Rehab   OT - OK to Discharge Yes  (when medically cleared  )   Barthel Index   Feeding 10   Bathing 0   Grooming Score 0   Dressing Score 5   Bladder Score 5   Bowels Score 10   Toilet Use Score 5   Transfers (Bed/Chair) Score 5   Mobility (Level Surface) Score 0   Stairs Score 0   Barthel Index Score 40   Modified Gloria Scale   Modified Gloria Scale 4   Jane Schultz, [address]

## 2019-12-09 NOTE — PLAN OF CARE
Problem: PHYSICAL THERAPY ADULT  Goal: Performs mobility at highest level of function for planned discharge setting  See evaluation for individualized goals  Description  Treatment/Interventions: Functional transfer training, ADL retraining, LE strengthening/ROM, Elevations, Therapeutic exercise, Endurance training, Patient/family training, Equipment eval/education, Bed mobility, Gait training, Compensatory technique education, Spoke to nursing, OT  Equipment Recommended: Walker(pt owns)       See flowsheet documentation for full assessment, interventions and recommendations  12/9/2019 1633 by Tere Virk PTA  Outcome: Progressing  Note:   Prognosis: Fair  Problem List: Decreased strength, Decreased range of motion, Decreased endurance, Impaired balance, Decreased mobility, Impaired hearing, Obesity, Decreased skin integrity, Orthopedic restrictions, Pain  Assessment: Pt performs and completes sit to stand transfers from bedside chair with mod assist x2 with use of quickmove cues for handplacement and reminders and compliance with weightbearing status  Pt  Performs static standing with use of quickmove x 2 minutes with mod assist x2 and verbal cues for reminders and compliance with weightbearing  Pt performs b/l le a-aarom to R le x 15 reps  Pt requires assistance for hip abd /add and heelslides  Pt continues to have increased swelling in R le and copious amounts of drainage  PT's RNJennifer  Pt performs rolling to L with mod assist x1 with use of bedrail  Ice applied to R lateral thigh scd's to b/l le's  Recommend continued inpt PT and inpt rehab at d/c to maximize functional outcomes, mobility and independence  Barriers to Discharge: Inaccessible home environment  Barriers to Discharge Comments: JASON  Recommendation: Short-term skilled PT     PT - OK to Discharge: Yes(STR)    See flowsheet documentation for full assessment

## 2019-12-09 NOTE — PHYSICAL THERAPY NOTE
PT treatment time [id number]   Physical Therapy Treatment Note     12/09/19 0943   Pain Assessment   Pain Assessment 0-10   Pain Score Worst Possible Pain   Pain Type Acute pain;Surgical pain   Pain Location Leg   Pain Orientation Right   Pain Descriptors Aching; Sore   Pain Onset Ongoing   Hospital Pain Intervention(s) Repositioned; Ambulation/increased activity; Emotional support;Elevated; Rest   Response to Interventions tolerated  Restrictions/Precautions   RLE Weight Bearing Per Order TTWB   Other Precautions Cognitive; Fall Risk;Pain;Telemetry;O2;Hard of hearing;WBS   General   Family/Caregiver Present Yes  (wife)   Cognition   Overall Cognitive Status Impaired   Arousal/Participation Alert; Cooperative   Attention Within functional limits   Orientation Level Oriented to person  (general to place did not recall name)   Memory Decreased short term memory   Following Commands Follows one step commands with increased time or repetition   Subjective   Subjective " No pain at rest, jsut when I move "     Bed Mobility   Supine to Sit 3  Moderate assistance   Additional items Assist x 2;HOB elevated; Increased time required; Bedrails;Verbal cues;LE management   Additional Comments pt seated at edge of bed x 5 minutes with supervision without support prior to standing  Transfers   Sit to Stand 2  Maximal assistance   Additional items Assist x 2; Increased time required;Verbal cues  (from bed level, mod with quick move)   Stand to Sit 3  Moderate assistance   Additional items Assist x 2; Increased time required;Verbal cues   Other 1  Dependent   Additional items Assist x 2; Increased time required  (with quickmove for transfers from bed-->chair)   Additional Comments pt performed static standing with use of Rw with max to mod assist x2 working on standing tolenace, balance, proper weightbearing to R le, ue weightbearing techniques, weightshifting x 5 minutes  Ambulation/Elevation   Gait pattern Forward Flexion; Improper Weight shift; Poor UE support;L Knee Jacqui;Decreased foot clearance;Decreased R stance; Antalgic; Short stride; Step to;Excessively slow   Gait Assistance 2  Maximal assist   Additional items Assist x 2;Verbal cues   Assistive Device Rolling walker   Distance 1 step forward and 3 stepss backward  Balance   Static Sitting Fair +   Dynamic Sitting Fair   Static Standing Zero  (assist x2 with Rw, retropulsion upon standing)   Dynamic Standing Poor -  (zero assist x2 with Rw)   Ambulatory Zero  (assist x2 with Rw   )   Endurance Deficit   Endurance Deficit Yes   Endurance Deficit Description pain, fatigue, weakness   Activity Tolerance   Activity Tolerance Patient limited by pain; Patient limited by fatigue  (vital /78 , P stand/ amb 159/88 )   Medical Staff Made Aware Scooby Castillo  RN, Petty Scott   Nurse Made Aware yes   Exercises   THR Supine;15 reps;AAROM;AROM; Right;Left  (a/a hs, a/a hip abd /add  )   Assessment   Prognosis Fair   Problem List Decreased strength;Decreased range of motion;Decreased endurance; Impaired balance;Decreased mobility; Impaired hearing;Decreased skin integrity;Obesity;Orthopedic restrictions;Pain   Assessment requiring mod assist A x2 for bed mobility, max A x2 for sit to stand from bed level and mod assist x2 with use of quick move  Pt is unable to maintain TTWB to R Le and requires frequent verbal cues for reminders and compliance, cues required for ue weightbearing  and weightshfting techniques and cues for improved posture  PT  Able to take one step forward with use of rw and 3 step backward with mod to max assist x2  Noted (+) buckling of L knee  PT demetra to tolerate static standing x5 minutes, noted retropulsion requiring verbal cues and assistance to correct  Pt requires use of quickmove for transfers out of bed to chair to to fatigue, increased R le pain and generalized weakness and for safety and compliance with weightbearing during transfers    Pt perform supine a-aarom to R le x 15 reps  Pt remained seated out of bed in chair with Rle elevated and scd's to b/l 'es  PT's Bp remained stable t/o PT session 154/78 seated edge of bed, 159/88 post standing, amb  HR tachy 104 edge of bed and 125 post mobility training  Increased serous and serous sanguinous drainage noted from incisions on R le, pt's RN, Edgardo Li aware  Recommend continued inpt PT and inpt rehab at d/c to maximize functional outcomes, mobility and independence  Goals   Patient Goals " to get better and go home "   [facility] Expiration Date 12/19/19   PT Treatment Day 3   Plan   Treatment/Interventions Functional transfer training;LE strengthening/ROM; Elevations; Therapeutic exercise; Endurance training;Patient/family training;Equipment eval/education; Bed mobility;Gait training;Spoke to nursing;OT;Family   Progress Slow progress, decreased activity tolerance   PT Frequency 7x/wk; Twice a day;Weekend   Recommendation   Recommendation Short-term skilled PT   PT - OK to Discharge Yes  (inpt rehab)         Carmelo Rodrigues, PTA

## 2019-12-09 NOTE — PLAN OF CARE
Problem: PHYSICAL THERAPY ADULT  Goal: Performs mobility at highest level of function for planned discharge setting  See evaluation for individualized goals  Description  Treatment/Interventions: Functional transfer training, ADL retraining, LE strengthening/ROM, Elevations, Therapeutic exercise, Endurance training, Patient/family training, Equipment eval/education, Bed mobility, Gait training, Compensatory technique education, Spoke to nursing, OT  Equipment Recommended: Walker(pt owns)       See flowsheet documentation for full assessment, interventions and recommendations  Outcome: Progressing  Note:   Prognosis: Fair  Problem List: Decreased strength, Decreased range of motion, Decreased endurance, Impaired balance, Decreased mobility, Impaired hearing, Decreased skin integrity, Obesity, Orthopedic restrictions, Pain  Assessment: requiring mod assist A x2 for bed mobility, max A x2 for sit to stand from bed level and mod assist x2 with use of quick move  Pt is unable to maintain TTWB to R Le and requires frequent verbal cues for reminders and compliance, cues required for ue weightbearing  and weightshfting techniques and cues for improved posture  PT  Able to take one step forward with use of rw and 3 step backward with mod to max assist x2  Noted (+) buckling of L knee  PT demetra to tolerate static standing x5 minutes, noted retropulsion requiring verbal cues and assistance to correct  Pt requires use of quickmove for transfers out of bed to chair to to fatigue, increased R le pain and generalized weakness and for safety and compliance with weightbearing during transfers  Pt perform supine a-aarom to R le x 15 reps  Pt remained seated out of bed in chair with Rle elevated and scd's to b/l 'es  PT's Bp remained stable t/o PT session 154/78 seated edge of bed, 159/88 post standing, amb  HR tachy 104 edge of bed and 125 post mobility training   Increased serous and serous sanguinous drainage noted from incisions on R le, pt's RN, Mary Manuel aware  Recommend continued inpt PT and inpt rehab at d/c to maximize functional outcomes, mobility and independence  Barriers to Discharge: Inaccessible home environment  Barriers to Discharge Comments: JASON  Recommendation: Short-term skilled PT     PT - OK to Discharge: Yes(inpt rehab)    See flowsheet documentation for full assessment

## 2019-12-09 NOTE — SOCIAL WORK
YELITZA spoke with liaison from [address] [place] [address]  They need an updated medical note to send to [facility] for [facility]  CM faxed medical note from yesterday    Awaiting notification of authorization from [address] [place] [address]

## 2019-12-09 NOTE — PLAN OF CARE
Problem: OCCUPATIONAL THERAPY ADULT  Goal: Performs self-care activities at highest level of function for planned discharge setting  See evaluation for individualized goals  Description  Treatment Interventions: ADL retraining, Functional transfer training, UE strengthening/ROM, Endurance training, Patient/family training, Equipment evaluation/education, Compensatory technique education, Energy conservation, Activityengagement          See flowsheet documentation for full assessment, interventions and recommendations  Outcome: Progressing  Note:   Limitation: Decreased ADL status, Decreased UE strength, Decreased endurance, Decreased self-care trans, Decreased high-level ADLs  Prognosis: Good  Assessment: Pt was seen for skilled OT with focus on completion of self care tasks, bed mobility, functional transfers, review of LE dressing activity and review of current plan of care  Pt with noted increased drainage from affected RLE  Pt with [place] and poor carry over of reviewed techs  Inconsistent focus to tasks noted  Min A overall for UE bathing following EOB positioning  Improved sitting balance noted while EOB for further completion of self care routine  Mod A overall for LE bathing with limited stand tolerance/functional reach for channing care  See above levels of A required for all functional tasks  Pt able to tolerate use of quick move device with Mod A x2 and step by step verbal cues to shift weight into LLE with activity  Pt may benefit from further rehab with focus on achieving optimal performance levels with all functional tasks   Continue to recommend Inpatient rehab     OT Discharge Recommendation: Short Term Rehab  OT - OK to Discharge: Yes(when medically cleared  )

## 2019-12-09 NOTE — PROGRESS NOTES
Progress Note - Evelia Lloyd 78 y o  male MRN: [id number]    Unit/Bed#: E2 -01 Encounter: [id number]      Assessment/Plan:  1-acute pulmonary embolism:  Patient had a CT scan that was positive for a right upper lobe segmental branch pulmonary embolism  Patient was started on a heparin infusion, however this was discontinued after he had serosanguineous oozing from his surgical sites   -patient was evaluated by vascular surgery who recommended IVC filter placement, performed by Interventional Radiology:  Vascular surgery recommended continuing therapeutic anticoagulation once stable    -patient was started on subcu heparin the DVT prophylaxis dose, however he has significant bleeding from his surgical incision, so this was held today  Once bleeding from the surgical site stabilizes, he will be tried on therapeutic anticoagulation again    2-acute blood loss anemia:  Patient was noted to have postoperative blood loss secondary to acute blood loss anemia due to expected procedural blood loss, and acute blood loss from his femur fracture    -patient's hemoglobin decreased from 11 5 down to 6 3 postoperatively:  He was transfused 2 units of packed red blood cells  -patient was diagnosed with a pulmonary embolism was started on heparin infusion, however was noted to have serosanguineous drainage at his surgical site  Patient is suspected to have acute blood loss anemia secondary to this bleeding as well   -patient's hemoglobin has decreased to 7 4, and he has continued oozing from the right thigh surgical site: Will transfuse 1 additional unit packed red blood cells at this time, especially in light of his recent acute kidney injury, hypotension, hypovolemia    3-scrotal swelling:  Patient was noted to have scrotal swelling and ecchymoses  There were initial concerns for possible hematoma  Patient was followed by the Urology team who noted his changes appear to be superficial, with ecchymoses    There is no evidence of progressive swelling, or hematoma  They did not recommend any additional imaging or urologic intervention  4-status post right femur fracture:  Patient had right femur fracture and underwent ORIF of the right femur    -continue physical therapy/occupational therapy   -continue pain management   -patient will need short-term rehab   -per ortho:  Patient will be on toe-touch weight-bearing for at at least the 2 weeks postoperatively    5-arrhythmia:  Patient was noted to have PSVT, and sinus tachycardia:  Felt to be multifactorial secondary to acute blood loss anemia, dehydration, pain, as well as pulmonary embolism    -patient was evaluated by the cardiology team who noted 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:  He was initiated on a beta-blocker   -continue metoprolol 25 mg b i d    -2D echocardiogram:  With aortic regurgitation and tricuspid regurgitation  No other significant structural abnormalities    6-status post acute kidney injury:  Patient does not have any previous history of chronic kidney disease  Previous creatinine appeared to range 0 9-1 0      -Patient had acute kidney injury with a creatinine that increased to 1 8  He was evaluated by the nephrology team    -this is most likely multifactorial secondary to hypovolemia, acute blood loss anemia, hypotension, and ACE-inhibitor use    -patient was transfused, ACE-inhibitor was discontinued, and his creatinine improved   -creatinine currently within normal limits, however monitor closely as his lisinopril has been restarted by Cardiology  7-hyperlipidemia:  Continue statin    8-recurrent major depressive disorder: In full remission  Continue Celexa    9-essential hypertension:  Patient's lisinopril was held due to acute kidney injury  -currently on metoprolol 25 mg b i d    -central 10 mg daily restarted by Cardiology:    10-sensorineural hearing loss:  Patient has hearing aids at home    Does not wish family to bring the med in the hospital    11-status post hyperkalemia:  Most likely secondary to acute kidney injury and previous ACE-inhibitor use  Normalized    12-valvular heart disease:  Patient's 2D echocardiogram revealed mild-moderate aortic regurgitation, and moderate tricuspid regurgitation  He will continue outpatient follow-up with his primary cardiologist    13-family:  Called pt's wife Emily Comes and LM to call my cell number for update    Discussed with patient's nurse and       VTE Pharmacologic Prophylaxis: Heparin- dose held today due to bloody oozing at R leg incision site and ABLA  VTE Mechanical Prophylaxis: sequential compression device    Certification Statement: The patient will continue to require additional inpatient hospital stay due to need for further acute intervention for ABLA    Status: inpatient     Total time spent today including review of chart, radiology reports, lab work, consultant's notes, as well as patient's interview and exam:  46 minutes    ===================================================================    Subjective:  Patient notes he has pain and his right leg incision site  He notes is worse with weight-bearing and with exercising  It improves when his leg is still  He denies any pain anywhere else  He denies any headache, chest pain, abdominal or back pain  He denies any shortness of breath or cough  He denies any dizziness, lightheadedness, numbness, or weakness  Patient was having bloody oozing from his right lateral thigh incision site  He denies any bleeding anywhere else  He denies any hematuria  Patient denies any nausea or vomiting  He denies any diarrhea  Notes he has not had a bowel movement since admission  Received laxatives today    Patient denies any other complaints  He denies any scrotal tenderness    He notes he is not certain if the swelling has improved as it is difficult for him to see that area    Physical Exam: Temp:  [97 8 °F (36 6 °C)-99 1 °F (37 3 °C)] 99 1 °F (37 3 °C)  HR:  [89-94] 92  Resp:  [18-20] 18  BP: (122-162)/(58-72) 122/58    Gen:  Pleasant, non-tachypnic, non-dyspnic  Conversant  Sitting up in the bedside  Smiling and interactive  Heart: regular rate and rhythm, S1S2 present, no murmur, rub or gallop  Lungs: clear to ausculatation bilaterally  No wheezing, crackles, or rhonchi  No accessory muscle use or respiratory distress  Abd: soft, non-tender, non-distended  NABS, no guarding, rebound or peritoneal signs  Extremities: no clubbing, cyanosis or edema  2+pedal pulses bilaterally  :  Scrotal edema and ecchymoses noted  Neuro: awake, alert  Fluent speech  Interactive  Skin: warm and dry: no petechiae, purpura and rash  LABS:   Results from last 7 days   Lab Units 12/09/19 0456 12/08/19 0457 12/07/19  0920 12/07/19  0425   WBC Thousand/uL 9 18 9 39  --  10 76*   HEMOGLOBIN g/dL 7 4* 8 1* 7 8* 8 3*   HEMATOCRIT % 22 7* 25 3* 24 2* 26 1*   PLATELETS Thousands/uL 223 210  --  174     Results from last 7 days   Lab Units 12/09/19  0456 12/08/19  0457 12/07/19  0425   POTASSIUM mmol/L 4 0 4 5 4 5   CHLORIDE mmol/L 105 107 106   CO2 mmol/L 30 31 26   BUN mg/dL 22 23 32*   CREATININE mg/dL 0 85 0 87 0 96   CALCIUM mg/dL 8 0* 7 9* 7 [address] [place] [address] Data:  12/9:  Echocardiogram:  LEFT VENTRICLE:  Systolic function was vigorous  Ejection fraction was estimated to be 68 %  There were no regional wall motion abnormalities  Wall thickness was mildly increased  Mild-moderate aortic regurgitation  Moderate tricuspid regurgitation    12/7:  CTA chest PE study:  Segmental branch right upper lobe pulmonary artery embolus  Measured RV/LV ratio is within normal limits at less than 0 9       12/6:  Chest x-ray  No acute cardiopulmonary disease  12/4 x-ray right femur:  Fluoroscopic guidance provided for surgical procedure  Please refer to the separate procedure notes for additional details      12/3: Chest x-ray  No acute cardiopulmonary disease  Multilevel age indeterminate compression deformities of multiple thoracic vertebral bodies    12/3 right femur  Comminuted intertrochanteric femoral fracture extending into the subtrochanteric femur    12/3:  X-ray right hip/pelvis  Comminuted intertrochanteric femoral fracture extending into the subtrochanteric femur    Procedure  12/7:  IVC filter placement  12/4:  Insertion nail IM femur antegrade, right      ---------------------------------------------------------------------------------------------------------------  This note has been constructed using a voice recognition system

## 2019-12-10 LAB
ABO GROUP BLD BPU: NORMAL
ANION GAP SERPL CALCULATED.3IONS-SCNC: 6 MMOL/L (ref 4–13)
BPU ID: NORMAL
BUN SERPL-MCNC: 20 MG/DL (ref 5–25)
CALCIUM SERPL-MCNC: 8.2 MG/DL (ref 8.3–10.1)
CHLORIDE SERPL-SCNC: 103 MMOL/L (ref 100–108)
CO2 SERPL-SCNC: 28 MMOL/L (ref 21–32)
CREAT SERPL-MCNC: 0.91 MG/DL (ref 0.6–1.3)
CROSSMATCH: NORMAL
ERYTHROCYTE [DISTWIDTH] IN BLOOD BY AUTOMATED COUNT: 15.5 % (ref 11.6–15.1)
GFR SERPL CREATININE-BSD FRML MDRD: 80 ML/MIN/1.73SQ M
GLUCOSE SERPL-MCNC: 110 MG/DL (ref 65–140)
HCT VFR BLD AUTO: 27.5 % (ref 36.5–49.3)
HGB BLD-MCNC: 8.8 G/DL (ref 12–17)
MCH RBC QN AUTO: 31.2 PG (ref 26.8–34.3)
MCHC RBC AUTO-ENTMCNC: 32 G/DL (ref 31.4–37.4)
MCV RBC AUTO: 98 FL (ref 82–98)
PLATELET # BLD AUTO: 265 THOUSANDS/UL (ref 149–390)
PMV BLD AUTO: 9.3 FL (ref 8.9–12.7)
POTASSIUM SERPL-SCNC: 4 MMOL/L (ref 3.5–5.3)
RBC # BLD AUTO: 2.82 MILLION/UL (ref 3.88–5.62)
SODIUM SERPL-SCNC: 137 MMOL/L (ref 136–145)
UNIT DISPENSE STATUS: NORMAL
UNIT PRODUCT CODE: NORMAL
UNIT RH: NORMAL
WBC # BLD AUTO: 9.46 THOUSAND/UL (ref 4.31–10.16)

## 2019-12-10 PROCEDURE — 97116 GAIT TRAINING THERAPY: CPT

## 2019-12-10 PROCEDURE — 99232 SBSQ HOSP IP/OBS MODERATE 35: CPT | Performed by: INTERNAL MEDICINE

## 2019-12-10 PROCEDURE — 85027 COMPLETE CBC AUTOMATED: CPT | Performed by: INTERNAL MEDICINE

## 2019-12-10 PROCEDURE — 99024 POSTOP FOLLOW-UP VISIT: CPT | Performed by: PHYSICIAN ASSISTANT

## 2019-12-10 PROCEDURE — 97535 SELF CARE MNGMENT TRAINING: CPT

## 2019-12-10 PROCEDURE — 97110 THERAPEUTIC EXERCISES: CPT

## 2019-12-10 PROCEDURE — 80048 BASIC METABOLIC PNL TOTAL CA: CPT | Performed by: INTERNAL MEDICINE

## 2019-12-10 PROCEDURE — 97530 THERAPEUTIC ACTIVITIES: CPT

## 2019-12-10 RX ORDER — BISACODYL 10 MG
10 SUPPOSITORY, RECTAL RECTAL DAILY PRN
Status: DISCONTINUED | OUTPATIENT
Start: 2019-12-10 | End: 2019-12-13

## 2019-12-10 RX ADMIN — SENNOSIDES 8.6 MG: 8.6 TABLET, FILM COATED ORAL at 08:14

## 2019-12-10 RX ADMIN — ATORVASTATIN CALCIUM 5 MG: 10 TABLET, FILM COATED ORAL at 08:13

## 2019-12-10 RX ADMIN — CITALOPRAM HYDROBROMIDE 40 MG: 20 TABLET ORAL at 08:13

## 2019-12-10 RX ADMIN — Medication 500 MG: at 08:12

## 2019-12-10 RX ADMIN — METOPROLOL TARTRATE 25 MG: 25 TABLET, FILM COATED ORAL at 08:13

## 2019-12-10 RX ADMIN — OXYCODONE HYDROCHLORIDE 2.5 MG: 5 TABLET ORAL at 10:58

## 2019-12-10 RX ADMIN — LISINOPRIL 10 MG: 10 TABLET ORAL at 08:16

## 2019-12-10 RX ADMIN — DOCUSATE SODIUM 100 MG: 100 CAPSULE, LIQUID FILLED ORAL at 08:13

## 2019-12-10 RX ADMIN — DOCUSATE SODIUM 100 MG: 100 CAPSULE, LIQUID FILLED ORAL at 17:06

## 2019-12-10 RX ADMIN — PANTOPRAZOLE SODIUM 40 MG: 40 TABLET, DELAYED RELEASE ORAL at 06:34

## 2019-12-10 RX ADMIN — BISACODYL 10 MG: 10 SUPPOSITORY RECTAL at 15:30

## 2019-12-10 RX ADMIN — METOPROLOL TARTRATE 25 MG: 25 TABLET, FILM COATED ORAL at 22:03

## 2019-12-10 RX ADMIN — POLYETHYLENE GLYCOL 3350 17 G: 17 POWDER, FOR SOLUTION ORAL at 08:14

## 2019-12-10 NOTE — PHYSICAL THERAPY NOTE
Physical Therapy Treatment Note       12/10/19 1518   Pain Assessment   Pain Assessment 0-10   Pain Score 9   Pain Type Surgical pain   Pain Location Leg;Hip   Pain Orientation Right   Pain Descriptors Aching;Discomfort   Effect of Pain on Daily Activities increased pain with weightbearing activity  Hospital Pain Intervention(s) Cold applied;Repositioned; Ambulation/increased activity; Emotional support; Rest   Response to Interventions tolerated   Restrictions/Precautions   RLE Weight Bearing Per Order TTWB   Other Precautions Cognitive;WBS;Fall Risk;Pain;Hard of hearing  (ttwb R le  )   General   Family/Caregiver Present No   Subjective   Subjective " I would like to get back to bed "     Bed Mobility   Sit to Supine 3  Moderate assistance   Additional items Assist x 2; Increased time required;Verbal cues   Transfers   Sit to Stand 3  Moderate assistance   Additional items Assist x 2; Increased time required;Verbal cues  (with quickmove)   Stand to Sit 3  Moderate assistance   Additional items Assist x 2; Increased time required;Verbal cues  (with quickmove)   Other 1  Dependent   Additional items Assist x 2  (with use of quick move for transfers from chair to bed)   Additional Comments PT performed standing tolerance working on standing posture, weightbearing compliance to R le  standing exercises of hip flexion  x2 mintues x1 and 1 minutes x1 with mod assist x2 for safety  Ambulation/Elevation   Gait pattern Not appropriate  (unable to due to increased fatigue, pain and weakness)   Balance   Static Sitting Fair  (briefly at edge of bed )   Static Standing Zero  (assist x2 with quickmove)   Dynamic Standing Poor -  (assist x2 with quickmove)   Endurance Deficit   Endurance Deficit Yes   Endurance Deficit Description pain, fatigue, weakness,   Activity Tolerance   Activity Tolerance Patient limited by pain; Patient limited by fatigue   Medical Staff Made Aware Rn, [id number] Gabrielle   Nurse Made Aware yes   Exercises THR Supine;15 reps;AAROM;AROM; Right;Bilateral  (a/a hs, a/a hip abd /add , arom saq)   Assessment   Prognosis Fair   Problem List Decreased strength;Decreased range of motion;Decreased endurance; Impaired balance;Decreased mobility; Decreased cognition;Decreased safety awareness; Impaired hearing;Obesity; Decreased skin integrity;Orthopedic restrictions;Pain   Assessment Pt  Required use of quickmove for transfers from bedside chair back to bed due to increased fatigue, generalized weakness, and increased pain of r le  PT continues to requires mod assist x2 for transfers, static standing activities and sit to supine transfers  With max cues for handplacement, R le foot placement and compliance with weightbearing to R le  Pt was able to perform static standing with use of quickmove and mod assist x2 x 2 minutes x1 and 1 minute x1 with verbal cues for improved posture, balance, and compliance with weightbearing To r le  Pt demonstrates retropulsion during static standing activities requiring verbal cues and assistance to correct  Pt performs supine aa-arom to R le x 15 reps  and continues to require assistance for hip abd /add  and heel slides to r le  Pt reamins at increaed risk for falls due to impairments in balance, strength, activity tolerance, functional endurance Mobility, safety, R le pain, and weightbearing limitations  Recommend STR at d/c  Goals   Patient Goals " to be able to walk "     STG Expiration Date 12/19/19   PT Treatment Day 6   Plan   Treatment/Interventions Functional transfer training;LE strengthening/ROM; Therapeutic exercise; Endurance training;Patient/family training;Equipment eval/education; Bed mobility;Spoke to nursing   Progress Slow progress, decreased activity tolerance   PT Frequency 7x/wk; Twice a day;Weekend   Recommendation   Recommendation Short-term skilled PT   PT - OK to Discharge Yes        12/10/19 1266   Pain Assessment   Pain Assessment 0-10   Pain Score 9   Pain Type Surgical pain   Pain Location Leg;Hip   Pain Orientation Right   Pain Descriptors Aching;Discomfort   Effect of Pain on Daily Activities increased pain with weightbearing activity  Hospital Pain Intervention(s) Cold applied;Repositioned; Ambulation/increased activity; Emotional support; Rest   Response to Interventions tolerated   Restrictions/Precautions   RLE Weight Bearing Per Order TTWB   Other Precautions Cognitive;WBS;Fall Risk;Pain;Hard of hearing  (ttwb R le  )   General   Family/Caregiver Present No   Subjective   Subjective " I would like to get back to bed "     Bed Mobility   Sit to Supine 3  Moderate assistance   Additional items Assist x 2; Increased time required;Verbal cues   Transfers   Sit to Stand 3  Moderate assistance   Additional items Assist x 2; Increased time required;Verbal cues  (with quickmove)   Stand to Sit 3  Moderate assistance   Additional items Assist x 2; Increased time required;Verbal cues  (with quickmove)   Other 1  Dependent   Additional items Assist x 2  (with use of quick move for transfers from chair to bed)   Additional Comments PT performed standing tolerance working on standing posture, weightbearing compliance to R le  standing exercises of hip flexion  x2 mintues x1 and 1 minutes x1 with mod assist x2 for safety  Ambulation/Elevation   Gait pattern Not appropriate  (unable to due to increased fatigue, pain and weakness)   Balance   Static Sitting Fair  (briefly at edge of bed )   Static Standing Zero  (assist x2 with quickmove)   Dynamic Standing Poor -  (assist x2 with quickmove)   Endurance Deficit   Endurance Deficit Yes   Endurance Deficit Description pain, fatigue, weakness,   Activity Tolerance   Activity Tolerance Patient limited by pain; Patient limited by fatigue   Medical Staff Made Aware rickey Ulloa   Nurse Made Aware yes   Exercises   THR Supine;15 reps;AAROM;AROM; Right;Bilateral  (a/a hs, a/a hip abd /add , arom saq)   Assessment   Prognosis Fair Problem List Decreased strength;Decreased range of motion;Decreased endurance; Impaired balance;Decreased mobility; Decreased cognition;Decreased safety awareness; Impaired hearing;Obesity; Decreased skin integrity;Orthopedic restrictions;Pain   Assessment Pt  Required use of quickmove for transfers from bedside chair back to bed due to increased fatigue, generalized weakness, and increased pain of r le  PT continues to requires mod assist x2 for transfers, static standing activities and sit to supine transfers  With max cues for handplacement, R le foot placement and compliance with weightbearing to R le  Pt was able to perform static standing with use of quickmove and mod assist x2 x 2 minutes x1 and 1 minute x1 with verbal cues for improved posture, balance, and compliance with weightbearing To r le  Pt demonstrates retropulsion during static standing activities requiring verbal cues and assistance to correct  Pt performs supine aa-arom to R le x 15 reps  and continues to require assistance for hip abd /add  and heel slides to r le  Pt reamins at increaed risk for falls due to impairments in balance, strength, activity tolerance, functional endurance Mobility, safety, R le pain, and weightbearing limitations  Recommend STR at d/c  Goals   Patient Goals " to be able to walk "     [facility] Expiration Date 12/19/19   PT Treatment Day 6   Plan   Treatment/Interventions Functional transfer training;LE strengthening/ROM; Therapeutic exercise; Endurance training;Patient/family training;Equipment eval/education; Bed mobility;Spoke to nursing   Progress Slow progress, decreased activity tolerance   PT Frequency 7x/wk; Twice a day;Weekend   Recommendation   Recommendation Short-term skilled PT   PT - OK to Discharge Yes       Willian Pizarro PTA

## 2019-12-10 NOTE — PROGRESS NOTES
Progress Note - Maggi Segal 78 y o  male MRN: [id number]    Unit/Bed#: E2 -01 Encounter: [id number]      Assessment/Plan:  1-acute pulmonary embolism:  Patient had a CT scan that was positive for a right upper lobe segmental branch pulmonary embolism  Patient was started on a heparin infusion, however this was discontinued after he had serosanguineous oozing from his surgical sites              -patient was evaluated by vascular surgery who recommended IVC filter placement, performed by Interventional Radiology:  Vascular surgery recommended continuing therapeutic anticoagulation once stable  He will follow up with vascular surgery as an outpatient for IVC filter removal              -patient was started on subcu heparin the DVT prophylaxis dose, however he had significant bleeding from his surgical incision, so this was held yesterday afternoon and again today  Bleeding from the incision site has resolved  -continue to monitor incision site:  Anticipate restarting DVT prophylaxis dose anticoagulation in 2 days if hemoglobin remains stable      2-acute blood loss anemia:  Patient was noted to have acute blood loss anemia secondary to expected procedural blood loss, and acute blood loss from his femur fracture               -patient's hemoglobin decreased from 11 5 down to 6 3 postoperatively:  He was transfused 2 units of packed red blood cells  -patient was diagnosed with a pulmonary embolism was started on heparin infusion, however was noted to have serosanguineous drainage at his surgical site    Patient was suspected to have acute blood loss anemia secondary to this additional bleeding as well              -on 12/9, patient's hemoglobin had decreased to 7 4, and he had continued oozing from the right thigh surgical site: He was transfused 1 additional unit packed red blood cells at 12/9, especially in light of his recent acute kidney injury, hypotension, hypovolemia   -follow up Hb improved to 8 8   -due to pt's ABLA on anticoagulation requiring repeat blood transfusion, and significant sanguinous oozing from incision site on dvt prophylaxis dose heparin, anticoagulation will be temporarily held at this time     3-scrotal swelling:  Patient was noted to have scrotal swelling and ecchymoses  There were initial concerns for possible hematoma  Patient was followed by the Urology team who noted his changes appear to be superficial, with ecchymoses  There is no evidence of progressive swelling, or hematoma  They did not recommend any additional imaging or urologic intervention      4-status post right femur fracture:  Patient had right femur fracture and underwent ORIF of the right femur               -continue physical therapy/occupational therapy              -continue pain management              -patient will need short-term rehab              -per ortho:  Patient will be on toe-touch weight-bearing for at at least the 2 weeks postoperatively     5-arrhythmia:  Patient was noted to have PSVT, and sinus tachycardia:  Felt to be multifactorial secondary to acute blood loss anemia, dehydration, pain, as well as pulmonary embolism               -patient was evaluated by the cardiology team who noted 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:  He was initiated on a beta-blocker              -continue metoprolol 25 mg b i d               -2D echocardiogram:  With aortic regurgitation and tricuspid regurgitation  No other significant structural abnormalities   -for cardiology:  Patient's arrhythmias were most likely secondary to the pulmonary embolism, and not likely to recur as he recovers  They recommend outpatient follow-up with his primary cardiologist      6-status post acute kidney injury:  Patient does not have any previous history of chronic kidney disease    Previous creatinine appeared to range 0 9-1 0                 -Patient had acute kidney injury with a creatinine that increased to 1 8  He was evaluated by the nephrology team               -this is most likely multifactorial secondary to hypovolemia, acute blood loss anemia, hypotension, and ACE-inhibitor use               -patient was transfused, ACE-inhibitor was discontinued, and his creatinine improved              -creatinine currently within normal limits, however monitor closely as his lisinopril has been restarted by Cardiology    -creatinine 0 9 today   7-hyperlipidemia:  Continue statin     8-recurrent major depressive disorder: In full remission  Continue Celexa     9-essential hypertension:  Patient's lisinopril was held due to acute kidney injury  -currently on metoprolol 25 mg b i d               -lisinopril 10 mg daily restarted by Cardiology:   -blood pressure 163/71 today  Continue to monitor     10-sensorineural hearing loss:  Patient has hearing aids at home  Does not wish family to bring the med in the hospital     11-status post hyperkalemia:  Most likely secondary to acute kidney injury and previous ACE-inhibitor use  Normalized     12-valvular heart disease:  Patient's 2D echocardiogram revealed mild-moderate aortic regurgitation, and moderate tricuspid regurgitation    He will continue outpatient follow-up with his primary cardiologist     13-family:  Called pt's wife Graham Gordillo and gave update  Discussed with patient's nurse and         VTE Pharmacologic Prophylaxis: Heparin- dose held today due to bloody oozing at R leg incision site and ABLA  VTE Mechanical Prophylaxis: sequential compression device     Certification Statement: The patient will continue to require additional inpatient hospital stay due to need for further acute intervention for ABLA    Status:  Inpatient    Addendum:  Called [facility] for a peer to peer review regarding the denial for his acute rehab stay    ===================================================================    Subjective:  Patient denies any current pain anywhere  He notes he has pain in his right hip with weight-bearing and ambulating with physical therapy  This improved at rest     He denies any pain anywhere else  He denies any headache, chest pain, back pain, abdominal pain  He denies any shortness of breath or cough  He denies any nausea, vomiting, diarrhea  He notes he has not had a bowel movement in 1 week  He is agreeable to a suppository  He denies any abdominal pain, cramping, or discomfort  He denies any chest congestion, cough, shortness of breath, or dyspnea on exertion  He denies any dizziness or lightheadedness  He denies any bleeding today  Physical Exam:   Temp:  [98 6 °F (37 °C)-100 1 °F (37 8 °C)] 98 6 °F (37 °C)  HR:  [] 93  Resp:  [16-18] 18  BP: (122-163)/(58-71) 163/71    Gen:  Pleasant, non-tachypnic, non-dyspnic  Conversant  Sitting at chair at bedside  Smiling and joking  Interactive  Heart: regular rate and rhythm, S1S2 present, no murmur, rub or gallop  Lungs: clear to ausculatation bilaterally  No wheezing, crackles, or rhonchi  No accessory muscle use or respiratory distress  Abd: soft, non-tender, non-distended  NABS, no guarding, rebound or peritoneal signs  Extremities: no clubbing, cyanosis  2+ right lower extremity, 1+ left lower extremity edema  1+ bilateral pedal edema     2+pedal pulses bilaterally  Neuro: awake, alert and oriented  Fluent speech  Interactive  Follows commands  Skin: warm and dry: no petechiae, purpura and rash  + feeding ecchymoses noted on right lower extremity, proximal and distal to the knee      LABS:   Results from last 7 days   Lab Units 12/10/19  0542 12/09/19  0456 12/08/19  0457   WBC Thousand/uL 9 46 9 18 9 39   HEMOGLOBIN g/dL 8 8* 7 4* 8 1*   HEMATOCRIT % 27 5* 22 7* 25 3*   PLATELETS Thousands/uL 265 223 210     Results from last 7 days Lab Units 12/10/19  0542 12/09/19  0456 12/08/19  0457   POTASSIUM mmol/L 4 0 4 0 4 5   CHLORIDE mmol/L 103 105 107   CO2 mmol/L 28 30 31   BUN mg/dL 20 22 23   CREATININE mg/dL 0 91 0 85 0 87   CALCIUM mg/dL 8 2* 8 0* 7 [address] [place] [address] Data:    12/9:  Echocardiogram:  LEFT VENTRICLE:  Systolic function was vigorous  Ejection fraction was estimated to be 68 %  There were no regional wall motion abnormalities  Wall thickness was mildly increased  Mild-moderate aortic regurgitation  Moderate tricuspid regurgitation     12/7:  CTA chest PE study:  Segmental branch right upper lobe pulmonary artery embolus  Measured RV/LV ratio is within normal limits at less than 0 9        12/6:  Chest x-ray  No acute cardiopulmonary disease      12/4 x-ray right femur:  Fluoroscopic guidance provided for surgical procedure  Please refer to the separate procedure notes for additional details      12/3:  Chest x-ray  No acute cardiopulmonary disease  Multilevel age indeterminate compression deformities of multiple thoracic vertebral bodies     12/3 right femur  Comminuted intertrochanteric femoral fracture extending into the subtrochanteric femur     12/3:  X-ray right hip/pelvis  Comminuted intertrochanteric femoral fracture extending into the subtrochanteric femur     Procedure  12/7:  IVC filter placement  12/4:  Insertion nail IM femur antegrade, right        ---------------------------------------------------------------------------------------------------------------  This note has been constructed using a voice recognition system

## 2019-12-10 NOTE — OCCUPATIONAL THERAPY NOTE
Occupational Therapy Treatment Note:         12/10/19 1201   ADL   Where Assessed Edge of bed   Grooming Assistance 5  Supervision/Setup   Grooming Deficit Setup   UB Bathing Assistance 4  Minimal Assistance   UB Bathing Deficit Setup   LB Bathing Assistance 3  Moderate Assistance   LB Bathing Deficit Setup;Verbal cueing;Supervision/safety; Increased time to complete;Steadying   UB Dressing Assistance 5  Supervision/Setup   UB Dressing Deficit Setup   LB Dressing Assistance 2  Maximal Assistance   LB Dressing Deficit Steadying;Setup; Requires assistive device for steadying;Verbal cueing;Supervision/safety; Increased time to complete; Don/doff L sock; Don/doff R sock   Functional Standing Tolerance   Time 2 mins   Activity dynamic stand balance activities  Comments cues for safe hand placement required with activities Poor carry over noted  Bed Mobility   Rolling L 4  Minimal assistance   Additional items Assist x 1;Bedrails; Increased time required;Verbal cues;LE management   Supine to Sit 3  Moderate assistance   Additional items Assist x 1;HOB elevated; Increased time required;Verbal cues;LE management   Transfers   Sit to Stand 3  Moderate assistance   Additional items Assist x 2;Bedrails;Armrests; Increased time required   Stand to Sit 3  Moderate assistance   Additional items Assist x 2;Armrests; Increased time required;Verbal cues   Stand pivot 3  Moderate assistance   Additional items Assist x 2   Additional Comments Pt performed static standing with support of Rw with max to mod assist x2 x 2 minutes working on anterior weight shifts posture, TTWB compliance, ue weightbearing techniques,    Functional Mobility   Functional Mobility 3  Moderate assistance   Additional Comments x2   Additional items Rolling walker   Cognition   Overall Cognitive Status Impaired   Arousal/Participation Alert; Cooperative   Attention Attends with cues to redirect   Orientation Level Oriented to person;Oriented to place;Oriented to time;Oriented to situation   Memory Decreased recall of precautions;Decreased recall of recent events;Decreased short term memory   Following Commands Follows one step commands without difficulty   Comments Pt with inconsistent focus to tasks noted  Cognition Assessment Tools MOCA  (MOCA BASIC)   Score 18   Additional Activities   Additional Activities Other (Comment)  (reviewed current plan of care  )   Additional Activities Comments Pt will beneffit from further review  Activity Tolerance   Activity Tolerance Patient limited by fatigue;Patient limited by pain   Medical Staff Made Aware reported all findings to nursing staff  Assessment   Assessment Pt was seen for skilled OT with focus on completion of bed mobility, light grooming, functional mobility, review of fall prevention and review of current plan of care  Pt's wife was present during tx session  See above levels of A required for all functional tasks  Pt with need for initial A from nursing staff for wound dressing change due to noted weeping and drainage  Mod A overall to achieve EOB positioning due to noted weakness in BUE and affected RLE  Pt able to use electric razor appropriately for light grooming and UE self care  Max A for LE self care due to noted edema in scrotum as well as affected RLE  Pt was encouraged to complete the [facility] Cognitive Assessment MoCA BASIC due to noted delays with processing commands and limited motor planning  Pt with deficits noted in delayed recall scoring 2/5 overall  Further cognitive screening may be required due to noted deficits  Pt with improvement noted with functional mobility following extensive step by step repetitive cues required to carry over techs  Pt will require repetitive training with further rehab  Pt may benefit from further rehab with focus on achieving optimal performance levels with all functional tasks   Continue to recommend Inpatient rehab   Plan   Treatment Interventions ADL retraining;Functional transfer training; Endurance training;Cognitive reorientation;UE strengthening/ROM   Goal Expiration Date 12/19/19   OT Treatment Day 4   OT Frequency 3-5x/wk   Recommendation   OT Discharge Recommendation Short Term Rehab   OT - OK to Discharge Yes  (when medically cleared  )   Barthel Index   Feeding 10   Bathing 0   Grooming Score 0   Dressing Score 5   Bladder Score 5   Bowels Score 10   Toilet Use Score 5   Transfers (Bed/Chair) Score 5   Mobility (Level Surface) Score 0   Stairs Score 0   Barthel Index Score 40   Modified Gloria Scale   Modified Gloria Scale 4   Radha Hedrick, [address]

## 2019-12-10 NOTE — PLAN OF CARE
Problem: OCCUPATIONAL THERAPY ADULT  Goal: Performs self-care activities at highest level of function for planned discharge setting  See evaluation for individualized goals  Description  Treatment Interventions: ADL retraining, Functional transfer training, UE strengthening/ROM, Endurance training, Patient/family training, Equipment evaluation/education, Compensatory technique education, Energy conservation, Activityengagement          See flowsheet documentation for full assessment, interventions and recommendations  Outcome: Progressing  Note:   Limitation: Decreased ADL status, Decreased UE strength, Decreased endurance, Decreased self-care trans, Decreased high-level ADLs  Prognosis: Good  Assessment: Pt was seen for skilled OT with focus on completion of bed mobility, light grooming, functional mobility, review of fall prevention and review of current plan of care  Pt's wife was present during tx session  See above levels of A required for all functional tasks  Pt with need for initial A from nursing staff for wound dressing change due to noted weeping and drainage  Mod A overall to achieve EOB positioning due to noted weakness in BUE and affected RLE  Pt able to use electric razor appropriately for light grooming and UE self care  Max A for LE self care due to noted edema in scrotum as well as affected RLE  Pt was encouraged to complete the [facility] Cognitive Assessment MoCA BASIC due to noted delays with processing commands and limited motor planning  Pt with deficits noted in delayed recall scoring 2/5 overall  Further cognitive screening may be required due to noted deficits  Pt with improvement noted with functional mobility following extensive step by step repetitive cues required to carry over techs  Pt will require repetitive training with further rehab  Pt may benefit from further rehab with focus on achieving optimal performance levels with all functional tasks   Continue to recommend Inpatient rehab     OT Discharge Recommendation: Short Term Rehab  OT - OK to Discharge: Yes(when medically cleared  )

## 2019-12-10 NOTE — PROGRESS NOTES
Orthopaedic Surgery - Progress Note  Rm Carter (33 y o  male)   : 1940   MRN: [id number]  Date: 12/10/2019   Encounter: [id number]   Unit/Bed#: E2 -01    Assessment / Plan  Postop day 6 status post ORIF right femur fracture    · Continue with PT/OT as ordered  Patient will be on toe-touch weight-bearing for at least the 1st 2 weeks postoperatively  · Continue with ice and analgesics as needed  · Now status post IVC filter placement due to PT, continue anticoagulation per Medicine  · Hemoglobin 8 8 following transfusion last night  Will continue to monitor at this time as drainage seems to have decreased significantly since yesterday though still mild to moderate output  Overall leg edema seems to have improved slightly since yesterday  Continue with dressing changes as needed for continued serous sanguinous drainage  · Patient will most likely need transfer to rehab when cleared from a medical standpoint  Subjective  Postop day 6 status post ORIF right femur fracture  Patient continues to do well at this time and his pain is well controlled at this time  Pain is localized to right thigh expected  Patient is denying any distal numbness or tingling  Vitals  Temp:  [99 1 °F (37 3 °C)-99 8 °F (37 7 °C)] 99 5 °F (37 5 °C)  HR:  [] 84  Resp:  [16-18] 16  BP: (122-150)/(58-66) 142/65  Body mass index is 30 44 kg/m²  I/O last 24 hours: In: 368 8 [Blood:368 8]  Out: 850 [Urine:850]    Right Hip Exam  Alignment / Posture:  Normal resting hip posture  Normal knee alignment  Inspection:  Slightly improved from yesterday on exam, Moderate right thigh and leg swelling  No erythema  Mild to moderate right leg ecchymosis  Incisions are healing well, mild serous drainage noted from all incisions at this time  Palpation:  Mild tenderness at Tena-incisional areas right thigh     ROM:  Not tested  Strength:  5/5 AT, GSC, PT, and peroneals  Stability:  Not tested    Tests:  No pertinent positive or negative tests  Neurovascular:  Sensation intact in DP/SP/Geiger/Sa/T nerve distributions  Sensation intact in all digital nerve distributions  Toes warm and perfused  Gait:  Not tested      Lab Results  (I have personally reviewed pertinent lab results )  Results from last 7 days   Lab Units 12/10/19  0542 12/09/19  0456 12/08/19  0457 12/07/19  0920 12/07/19  0425 12/07/19  0326 12/06/19  1508 12/06/19  0754 12/06/19  0451 12/05/19  0442 12/04/19  1840 12/04/19  0424 12/03/19  1214   WBC Thousand/uL 9 46 9 18 9 39  --  10 76* 10 73*  --   --  11 23* 13 86* 15 36* 12 27* 16 04*   HEMOGLOBIN g/dL 8 8* 7 4* 8 1* 7 8* 8 3* 7 8* 7 5* 6 3* 6 3* 8 2* 8 9* 11 5* 13 4   HEMATOCRIT % 27 5* 22 7* 25 3* 24 2* 26 1* 24 0* 23 2* 19 7* 19 1* 25 8* 27 7* 35 0* 40 4   PLATELETS Thousands/uL 265 223 210  --  174 174  --   --  169 181 205 247 256     Results from last 7 days   Lab Units 12/07/19  0920 12/07/19  0326 12/03/19  1214   PTT seconds 143* 39* 25   INR   --  1 13 1 02     Results from last 7 days   Lab Units 12/10/19  0542 12/09/19  0456 12/08/19  0457 12/07/19  0425 12/06/19  0451 12/05/19  1204 12/05/19  0442 12/04/19  0423 12/03/19  1214   POTASSIUM mmol/L 4 0 4 0 4 5 4 5 4 6 4 3 5 6* 4 8 4 9   CHLORIDE mmol/L 103 105 107 106 107  --  108 104 104   CO2 mmol/L 28 30 31 26 27  --  26 25 29   BUN mg/dL 20 22 23 32* 38*  --  38* 24 12   CREATININE mg/dL 0 91 0 85 0 87 0 96 1 24  --  1 94* 1 81* 1 08   EGFR ml/min/1 73sq m 80 83 82 75 55  --  32 35 65   CALCIUM mg/dL 8 2* 8 0* 7 9* 7 7* 7 4*  --  7 1* 7 9* 8 7               Alicia Mix PA-C

## 2019-12-10 NOTE — PHYSICAL THERAPY NOTE
Physical Therapy Treatment Note       12/10/19 1200   Pain Assessment   Pain Assessment 0-10   Pain Score 7   Pain Type Surgical pain   Pain Location Hip   Pain Orientation Right   Pain Descriptors Aching;Discomfort   Patient's Stated Pain Goal No pain   Hospital Pain Intervention(s) Cold applied;Repositioned; Ambulation/increased activity; Elevated; Rest   Response to Interventions tolerated   Restrictions/Precautions   RLE Weight Bearing Per Order TTWB   Other Precautions WBS;Pain; Fall Risk;Hard of hearing   General   Chart Reviewed Yes   Family/Caregiver Present   (wife)   Cognition   Overall Cognitive Status Impaired   Arousal/Participation Alert; Cooperative   Attention Attends with cues to redirect   Orientation Level Oriented to person;Oriented to place;Oriented to time;Oriented to situation  (not to exact date)   Memory Decreased short term memory;Decreased recall of recent events;Decreased recall of precautions   Following Commands Follows one step commands with increased time or repetition   Subjective   Subjective " Still quite sore when I get up and move "     Bed Mobility   Rolling L 4  Minimal assistance   Additional items Assist x 1   Supine to Sit 3  Moderate assistance   Additional items Assist x 1; Increased time required;Verbal cues;LE management; Bedrails;HOB elevated   Transfers   Sit to Stand 3  Moderate assistance   Additional items Assist x 2  (max cues )   Stand to Sit 3  Moderate assistance  (max cues  )   Additional items Assist x 2   Stand pivot 3  Moderate assistance   Additional items Increased time required;Verbal cues; Assist x 2   Additional Comments Pt performed static standing with support of Rw with max to mod assist x2 x 2 minutes working on anterior weight shifts posture, TTWB compliance, ue weightbearing techniques,    Ambulation/Elevation   Gait pattern Forward Flexion; Retropulsion; Antalgic;Poor UE support;Decreased R stance; Excessively slow; Step to   Gait Assistance 3  Moderate assist  (with max cues for gait and safe and proper techniques,WB'ing)   Additional items Assist x 2;Verbal cues   Assistive Device Rolling walker   Distance 6'x1, 2' x1   Balance   Static Sitting Fair -  (retropulsion)   Dynamic Sitting Poor  (retropulsion)   Static Standing Zero  (assist x2 with Rw)   Dynamic Standing   (zero assist x2 with support of rw)   Ambulatory Zero  (assist x2 with support of Rw   )   Endurance Deficit   Endurance Deficit Yes   Endurance Deficit Description pain fatigue   Activity Tolerance   Activity Tolerance Patient limited by pain; Patient limited by fatigue   Medical Staff Made Aware RnSamir   Nurse Made Aware yes   Exercises   Hip Flexion Sitting;10 reps;AAROM; Right   Hip Adduction Sitting;AROM; Bilateral  (x 12 reps  isometric hip add   )   Knee AROM Long Arc Quad Sitting;AROM;10 reps;Right   Ankle Pumps Sitting;15 reps;AROM; Bilateral   Equipment Use   Comments Pt performed sitting at edge of bed x 15 minutes with close supervision to min assist x1 due to retropulsion with dynamic sitting balance  PT requiring b/l ue support and max verbal cues for correction and or min assist to correct  Assessment   Prognosis Fair   Problem List Decreased strength;Decreased range of motion;Decreased endurance; Impaired balance;Decreased mobility; Decreased cognition;Decreased safety awareness; Obesity; Decreased skin integrity;Orthopedic restrictions;Pain   Assessment Pt seen for PT for bed mobility, transfers, sitting and standing balance/ tolerance activities, le strengthening rom exercises, and gait training  Pt is requiring mod assist x1 for supine to sit at edge of bed with use of bed rails and max cues for bed mobility techniques  Pt  remained seated at edge of bed x 15 minutes with close supervision to min assist for sitting balance correction and safety due to retropulsion with static and dynamic sitting activities    Pt requiring b/l ue support for sitting activities and max verbal cues for improved sitting balance  Pt requires max cues for improved balance, weightbearing compliance, posture, proper handplacement during transfer training, weightshifting techniques with use of Rw  Pt progressed with ambulation to short distances of 6' with mod assist x2 with max verbal cues as previously stated  Pt continues to display retropulsion with standing and ambulation  Pt performed seated r le arom exercises for laq and self assisted hip flexion, and ap's to b/l le's  X 10 - 15 reps to tolerance  PT remained seated out of bed in chair post Pt session with r le elevated on step stool with one pillow and ice to R thigh  Recommend STR at d/c  Goals   Patient Goals " To be able to walk "    [facility] Expiration Date 12/19/19   PT Treatment Day 5   Plan   Treatment/Interventions Functional transfer training;LE strengthening/ROM; Therapeutic exercise; Endurance training;Patient/family training;Equipment eval/education; Bed mobility;Gait training;Spoke to nursing;OT;Family   Progress Slow progress, decreased activity tolerance   PT Frequency 7x/wk; Twice a day;Weekend   Recommendation   Recommendation Short-term skilled PT   PT - OK to Discharge Yes  (when medically cleared)       Princess Garcia PTA

## 2019-12-10 NOTE — PLAN OF CARE
Problem: PHYSICAL THERAPY ADULT  Goal: Performs mobility at highest level of function for planned discharge setting  See evaluation for individualized goals  Description  Treatment/Interventions: Functional transfer training, ADL retraining, LE strengthening/ROM, Elevations, Therapeutic exercise, Endurance training, Patient/family training, Equipment eval/education, Bed mobility, Gait training, Compensatory technique education, Spoke to nursing, OT  Equipment Recommended: Walker(pt owns)       See flowsheet documentation for full assessment, interventions and recommendations  Outcome: Progressing  Note:   Prognosis: Fair  Problem List: Decreased strength, Decreased range of motion, Decreased endurance, Impaired balance, Decreased mobility, Decreased cognition, Decreased safety awareness, Impaired hearing, Obesity, Decreased skin integrity, Orthopedic restrictions, Pain  Assessment: Pt  Required use of quickmove for transfers from bedside chair back to bed due to increased fatigue, generalized weakness, and increased pain of r le  PT continues to requires mod assist x2 for transfers, static standing activities and sit to supine transfers  With max cues for handplacement, R le foot placement and compliance with weightbearing to R le  Pt was able to perform static standing with use of quickmove and mod assist x2 x 2 minutes x1 and 1 minute x1 with verbal cues for improved posture, balance, and compliance with weightbearing To r le  Pt demonstrates retropulsion during static standing activities requiring verbal cues and assistance to correct  Pt performs supine aa-arom to R le x 15 reps  and continues to require assistance for hip abd /add  and heel slides to r le  Pt reamins at increaed risk for falls due to impairments in balance, strength, activity tolerance, functional endurance Mobility, safety, R le pain, and weightbearing limitations    Recommend STR at d/c    Barriers to Discharge: [address] home environment  Barriers to Discharge Comments: JASON  Recommendation: Short-term skilled PT     PT - OK to Discharge: Yes    See flowsheet documentation for full assessment

## 2019-12-11 LAB
HCT VFR BLD AUTO: 28.6 % (ref 36.5–49.3)
HGB BLD-MCNC: 9.3 G/DL (ref 12–17)

## 2019-12-11 PROCEDURE — 99232 SBSQ HOSP IP/OBS MODERATE 35: CPT | Performed by: INTERNAL MEDICINE

## 2019-12-11 PROCEDURE — 97116 GAIT TRAINING THERAPY: CPT

## 2019-12-11 PROCEDURE — 97535 SELF CARE MNGMENT TRAINING: CPT

## 2019-12-11 PROCEDURE — 97110 THERAPEUTIC EXERCISES: CPT

## 2019-12-11 PROCEDURE — 99024 POSTOP FOLLOW-UP VISIT: CPT | Performed by: ORTHOPAEDIC SURGERY

## 2019-12-11 PROCEDURE — 97530 THERAPEUTIC ACTIVITIES: CPT

## 2019-12-11 PROCEDURE — 85014 HEMATOCRIT: CPT | Performed by: INTERNAL MEDICINE

## 2019-12-11 PROCEDURE — 85018 HEMOGLOBIN: CPT | Performed by: INTERNAL MEDICINE

## 2019-12-11 RX ORDER — SODIUM PHOSPHATE, DIBASIC AND SODIUM PHOSPHATE, MONOBASIC 7; 19 G/133ML; G/133ML
1 ENEMA RECTAL DAILY PRN
Status: DISCONTINUED | OUTPATIENT
Start: 2019-12-11 | End: 2019-12-13

## 2019-12-11 RX ADMIN — Medication 500 MG: at 07:59

## 2019-12-11 RX ADMIN — CITALOPRAM HYDROBROMIDE 40 MG: 20 TABLET ORAL at 08:12

## 2019-12-11 RX ADMIN — DOCUSATE SODIUM 100 MG: 100 CAPSULE, LIQUID FILLED ORAL at 08:12

## 2019-12-11 RX ADMIN — SENNOSIDES 8.6 MG: 8.6 TABLET, FILM COATED ORAL at 08:12

## 2019-12-11 RX ADMIN — BISACODYL 10 MG: 10 SUPPOSITORY RECTAL at 13:14

## 2019-12-11 RX ADMIN — OXYCODONE HYDROCHLORIDE 2.5 MG: 5 TABLET ORAL at 10:26

## 2019-12-11 RX ADMIN — LISINOPRIL 10 MG: 10 TABLET ORAL at 08:12

## 2019-12-11 RX ADMIN — OXYCODONE HYDROCHLORIDE 2.5 MG: 5 TABLET ORAL at 06:37

## 2019-12-11 RX ADMIN — PANTOPRAZOLE SODIUM 40 MG: 40 TABLET, DELAYED RELEASE ORAL at 06:33

## 2019-12-11 RX ADMIN — DOCUSATE SODIUM 100 MG: 100 CAPSULE, LIQUID FILLED ORAL at 17:35

## 2019-12-11 RX ADMIN — POLYETHYLENE GLYCOL 3350 17 G: 17 POWDER, FOR SOLUTION ORAL at 08:11

## 2019-12-11 RX ADMIN — METOPROLOL TARTRATE 25 MG: 25 TABLET, FILM COATED ORAL at 08:12

## 2019-12-11 RX ADMIN — ATORVASTATIN CALCIUM 5 MG: 10 TABLET, FILM COATED ORAL at 08:12

## 2019-12-11 RX ADMIN — METOPROLOL TARTRATE 25 MG: 25 TABLET, FILM COATED ORAL at 21:05

## 2019-12-11 NOTE — OCCUPATIONAL THERAPY NOTE
Occupational Therapy Treatment Note:    Treatment time 2743 - 4662 PM     12/11/19 1552   Restrictions/Precautions   Weight Bearing Precautions Per Order Yes   RLE Weight Bearing Per Order TTWB   Other Precautions Fall Risk;Pain;Cognitive; Chair Alarm; Bed Alarm;Hard of hearing   Pain Assessment   Pain Assessment 0-10   Pain Score 5   Pain Type Surgical pain   Pain Location Hip   Pain Orientation Right   ADL   Where Assessed Edge of bed   Grooming Assistance 5  Supervision/Setup   Grooming Deficit Setup   UB Bathing Assistance 4  Minimal Assistance   UB Bathing Deficit Setup   LB Bathing Assistance 3  Moderate Assistance   LB Bathing Deficit Setup   LB Dressing Assistance 2  Maximal Assistance   LB Dressing Deficit Setup   Functional Standing Tolerance   Time 4 mins   Activity dynamic stand balance activity  Comments cues for safety required  Bed Mobility   Sit to Supine 3  Moderate assistance   Additional items Assist x 2;Bedrails; Increased time required;Verbal cues;LE management   Additional Comments cues for safety reqiured with activity  Transfers   Sit to Stand 3  Moderate assistance   Additional items Assist x 2   Stand to Sit 3  Moderate assistance   Additional items Assist x 2   Stand pivot 3  Moderate assistance   Additional items Assist x 2   Cognition   Overall Cognitive Status Impaired   Arousal/Participation Responsive; Cooperative   Attention Difficulty attending to directions   Orientation Level Oriented X4   Memory Decreased short term memory;Decreased recall of recent events;Decreased recall of precautions   Following Commands Follows one step commands without difficulty   Comments Pt [place]   Additional Activities   Additional Activities Other (Comment)  (reviewed current plan of care  )   Additional Activities Comments Pt with improved focus to tasks noted this tx session      Activity Tolerance   Activity Tolerance Patient limited by fatigue;Patient limited by pain   Medical Staff Made Aware reported all findings to nursing staff  Pt back to bed with bed alarm in place this tx session  Assessment   Assessment Pt was seen for skilled OT with focus on completion of self care tasks, functional transfers and review of current plan of care  Pt with improved functional mobility noted this tx session  Pt was able to weight shift with both tactile and verbal cues provided  See above levels of A required for all functional tasks  Pt may benefit from further rehab with focus on achieving optimal performance levels with all functional tasks  Continue to recommend Inpatient rehab   Plan   Treatment Interventions ADL retraining;Functional transfer training; Endurance training;Cognitive reorientation   Goal Expiration Date 12/19/19   OT Treatment Day 5   OT Frequency 3-5x/wk   Recommendation   OT Discharge Recommendation Short Term Rehab   OT - OK to Discharge Yes  (when medically cleared  )   Barthel Index   Feeding 10   Bathing 0   Grooming Score 0   Dressing Score 5   Bladder Score 5   Bowels Score 10   Toilet Use Score 5   Transfers (Bed/Chair) Score 5   Mobility (Level Surface) Score 0   Stairs Score 0   Barthel Index Score 40   Modified Gloria Scale   Modified Gloria Scale 4   Ally Mcgrath, [address]

## 2019-12-11 NOTE — PHYSICAL THERAPY NOTE
Physical Therapy Treatment Note     12/11/19 1610   Pain Assessment   Pain Assessment 0-10   Pain Score 5   Pain Type Surgical pain   Pain Location Hip   Pain Orientation Right   Pain Descriptors Aching; Sore   Clinical Progression Gradually improving   Hospital Pain Intervention(s) Ambulation/increased activity; Emotional support   Restrictions/Precautions   RLE Weight Bearing Per Order TTWB   Other Precautions Fall Risk;Pain;WBS   General   Chart Reviewed Yes   Subjective   Subjective " I'm ok "     Bed Mobility   Rolling L 3  Moderate assistance   Additional items Assist x 1;Bedrails; Increased time required;Verbal cues;LE management   Additional Comments pt performs supine to longsitting with use of b/l rails x2 with min assist x1 and moderate verbal cues  Exercises   Quad Sets Supine;15 reps;AROM; Bilateral   Heelslides Supine;15 reps;AAROM; Right   Hip Abduction Supine;15 reps;AAROM; Right   Hip Adduction Supine;15 reps;AAROM; Right   Knee AROM Short Arc Quad Supine;15 reps;AAROM;AROM; Right   Ankle Pumps Supine;15 reps;AROM; Bilateral   Equipment Use   Comments pt rolls to the left with use of bedrail and mod assist x1 to place and remove bedpan under pt   total assist for channing anal care  Assessment   Prognosis Fair   Problem List Decreased strength;Decreased range of motion;Decreased endurance;Decreased mobility; Impaired balance; Impaired hearing;Decreased skin integrity;Orthopedic restrictions;Pain   Assessment Pt seen for bed level exercises to R le a-aarom performed x 15 reps  A for hs, hip abd /add , and assist to initial SAQ then pt is able to perform actively  Pt requires mod assist to roll toward the l with use of bedrail  Pt performs supine to longsitting x2 with min assist x1 with use of b/l bedrails  Recommend STR at d/c inorder to maximize safe functional mobility, outcomes and independence       Goals   Patient Goals " to be able to walk "     [facility] Expiration Date 12/19/19   PT Treatment Day 8 Plan   Treatment/Interventions LE strengthening/ROM; Therapeutic exercise;Patient/family training;Bed mobility;Spoke to nursing   PT Frequency 7x/wk; Twice a day;Weekend   Recommendation   Recommendation Short-term skilled PT   PT - OK to Discharge Yes         Saintclair Crea, PTA

## 2019-12-11 NOTE — PROGRESS NOTES
Orthopaedic Surgery - Progress Note  Katya Negron (64 y o  male)   : 1940   MRN: [id number]  Date: 2019   Encounter: [id number]   Unit/Bed#: E2 -01    Assessment / Plan  Postop day 7 status post ORIF right femur fracture    · Continue with PT/OT as ordered  Continue with toe-touch weight-bearing at this time with plan for at least the 1st 2 weeks postoperatively  · Continue with ice and analgesics as needed  · Now status post IVC filter placement due to PT, continue anticoagulation per Medicine  · Hemoglobin 8 8 yesterday  Will continue to monitor at this time as drainage and edema continues to decrease  Continue with dressing changes as needed for continued serous sanguinous drainage  · Plan for transfer to rehab when cleared from a medical standpoint  Will need follow up with Dr Aleksandr Gill 2 weeks postoperatively for staple removal and xrays  Subjective  Postop day 7 status post ORIF right femur fracture  Patient continues to improve overall and his pain is well controlled at this time  He has been tolerating what they are doing with him in PT  Pain is localized to right thigh expected  Patient is denying any distal numbness or tingling  Vitals  Temp:  [97 8 °F (36 6 °C)-98 8 °F (37 1 °C)] 97 8 °F (36 6 °C)  HR:  [70-86] 70  Resp:  [18] 18  BP: (148-156)/(64-70) 148/70  Body mass index is 30 28 kg/m²  I/O last 24 hours: In: -   Out: 1320 [Urine:1320]    Right Hip Exam  Alignment / Posture:  Normal resting hip posture  Normal knee alignment  Inspection:  moderate R thigh swelling  No erythema  Mild to moderate right leg ecchymosis  Incisions are healing well, continued mild serous drainage noted from all incisions at this time  Overall swelling and drainage continue to improve daily  Palpation:  Mild tenderness at Tena-incisional areas right thigh     ROM:  Not tested  Strength:  5/5 AT, GSC, PT, and peroneals  Stability:  Not tested    Tests:  No pertinent positive or negative tests  Neurovascular:  Sensation intact in DP/SP/Geiger/Sa/T nerve distributions  Sensation intact in all digital nerve distributions  Toes warm and perfused  Gait:  Not tested      Lab Results  (I have personally reviewed pertinent lab results )  Results from last 7 days   Lab Units 12/10/19  0542 12/09/19  0456 12/08/19  0457 12/07/19  0920 12/07/19  0425 12/07/19  0326 12/06/19  1508 12/06/19  0754 12/06/19  0451 12/05/19  0442 12/04/19  1840   WBC Thousand/uL 9 46 9 18 9 39  --  10 76* 10 73*  --   --  11 23* 13 86* 15 36*   HEMOGLOBIN g/dL 8 8* 7 4* 8 1* 7 8* 8 3* 7 8* 7 5* 6 3* 6 3* 8 2* 8 9*   HEMATOCRIT % 27 5* 22 7* 25 3* 24 2* 26 1* 24 0* 23 2* 19 7* 19 1* 25 8* 27 7*   PLATELETS Thousands/uL 265 223 210  --  174 174  --   --  169 181 205     Results from last 7 days   Lab Units 12/07/19  0920 12/07/19  0326   PTT seconds 143* 39*   INR   --  1 13     Results from last 7 days   Lab Units 12/10/19  0542 12/09/19  0456 12/08/19  0457 12/07/19  0425 12/06/19  0451 12/05/19  1204 12/05/19  0442   POTASSIUM mmol/L 4 0 4 0 4 5 4 5 4 6 4 3 5 6*   CHLORIDE mmol/L 103 105 107 106 107  --  108   CO2 mmol/L 28 30 31 26 27  --  26   BUN mg/dL 20 22 23 32* 38*  --  38*   CREATININE mg/dL 0 91 0 85 0 87 0 96 1 24  --  1 94*   EGFR ml/min/1 73sq m 80 83 82 75 55  --  32   CALCIUM mg/dL 8 2* 8 0* 7 9* 7 7* 7 4*  --  7 1*               Royer Ambriz PA-C

## 2019-12-11 NOTE — PHYSICAL THERAPY NOTE
Physical Therapy Treatment Note       12/11/19 4253   Pain Assessment   Pain Assessment 0-10   Pain Score 5   Pain Type Surgical pain   Pain Location Hip   Pain Orientation Right   Pain Descriptors Aching; [facility] [place]   Hospital Pain Intervention(s) Repositioned;Cold applied; Ambulation/increased activity; Emotional support; Rest   Response to Interventions tolerated   Restrictions/Precautions   RLE Weight Bearing Per Order TTWB   Other Precautions Cognitive;WBS;Fall Risk;Pain;Hard of hearing;Bed Alarm; Chair Alarm   General   Chart Reviewed Yes   Family/Caregiver Present Yes  (wife)   Cognition   Overall Cognitive Status Impaired   Arousal/Participation Responsive; Cooperative   Attention Difficulty attending to directions   Orientation Level Oriented X4   Memory Decreased recall of precautions;Decreased short term memory   Following Commands Follows one step commands without difficulty   Subjective   Subjective "I't's a little better "     Bed Mobility   Sit to Supine 3  Moderate assistance   Additional items Assist x 2; Increased time required;Verbal cues;LE management; Bedrails   Transfers   Sit to Stand 3  Moderate assistance   Additional items Assist x 2;Armrests; Increased time required;Verbal cues  (use of sling to lift pt from chair   )   Stand to Sit 3  Moderate assistance   Additional items Assist x 2; Increased time required;Verbal cues   Ambulation/Elevation   Gait pattern Antalgic;Retropulsion;Poor UE support;Decreased foot clearance;Decreased R stance; Improper Weight shift; Step to;Excessively slow   Gait Assistance 3  Moderate assist   Additional items Assist x 2;Verbal cues   Assistive Device Rolling walker   Distance 12' x1   Balance   Static Sitting Fair +   Static Standing Poor -  (assist x2 with rw)   Dynamic Standing Poor -   Ambulatory Zero  (assist x1 with rw  )   Endurance Deficit   Endurance Deficit Yes   Endurance Deficit Description fatigue, ue fatigue, weakness, pain,     Activity Tolerance   Activity Tolerance Patient limited by pain; Patient limited by fatigue   Medical Staff Made Aware RNberto Janise Clause, [address]   Nurse Made Aware yes   Exercises   Heelslides Sitting;15 reps;AAROM;AROM; Right   Glute Sets Sitting;15 reps;AROM; Bilateral   Hip Flexion Sitting;15 reps;AAROM; Right   Hip Adduction Sitting;15 reps;AROM; Bilateral  (isometric hip add)   Knee AROM Long Arc Quad Sitting;15 reps;AROM; Right   Ankle Pumps Sitting;15 reps;AROM; Bilateral   Assessment   Prognosis Fair   Problem List Decreased strength;Decreased endurance;Decreased range of motion; Impaired balance;Decreased mobility;Obesity; Decreased skin integrity; Decreased cognition   Assessment Pt seen for Pt interventions focusing on functional mobility training, le there x, rom  Pt performs seated r le a-aarom x 15 reps with brief est breaks between exercises due to muscle fatigue, muscle weakness and increased pain to R le  Pt performs sit to stand transfers from bedside chair with mod assist x2 with use of sling to assist with transfer verbal cues required for handplacement and cues to maintain TTWB to R le  Pt continues to demonstrate retropulsion upon standing requiring verbal cues and min- mod assist x2 to correct  L sneaker donned with total assist prior to gait training, pt ambulates with use of rw with mod assist x2 and max cues for le sequencing, step lengths, posture, ue weightbearing techniques and cues for reminder to maintain TTWb to R le  However feel sneaker to L le helps with improved compliance with WBS to R le  Pt progressed with ambulation distances to 12' x1  Increased fatigued noted post ambulation and BOWIE  Continue to recommend STR at d/c   Goals   Patient Goals " to be able to walk "     [facility] Expiration Date 12/19/19   PT Treatment Day 9   Plan   Treatment/Interventions Functional transfer training;LE strengthening/ROM; Therapeutic exercise; Endurance training;Patient/family training;Equipment eval/education; Bed mobility;Gait training;Spoke to nursing;OT;Family   Progress Slow progress, decreased activity tolerance   PT Frequency 7x/wk; Twice a day;Weekend   Recommendation   Recommendation Short-term skilled PT   PT - OK to Discharge Yes        12/11/19 1308   Pain Assessment   Pain Assessment 0-10   Pain Score 5   Pain Type Surgical pain   Pain Location Hip   Pain Orientation Right   Pain Descriptors Aching; [facility]   [facility] Pain Intervention(s) Repositioned;Cold applied; Ambulation/increased activity; Emotional support; Rest   Response to Interventions tolerated   Restrictions/Precautions   RLE Weight Bearing Per Order TTWB   Other Precautions Cognitive;WBS;Fall Risk;Pain;Hard of hearing;Bed Alarm; Chair Alarm   General   Chart Reviewed Yes   Family/Caregiver Present Yes  (wife)   Cognition   Overall Cognitive Status Impaired   Arousal/Participation Responsive; Cooperative   Attention Difficulty attending to directions   Orientation Level Oriented X4   Memory Decreased recall of precautions;Decreased short term memory   Following Commands Follows one step commands without difficulty   Subjective   Subjective "I't's a little better "     Bed Mobility   Sit to Supine 3  Moderate assistance   Additional items Assist x 2; Increased time required;Verbal cues;LE management; Bedrails   Transfers   Sit to Stand 3  Moderate assistance   Additional items Assist x 2;Armrests; Increased time required;Verbal cues  (use of sling to lift pt from chair   )   Stand to Sit 3  Moderate assistance   Additional items Assist x 2; Increased time required;Verbal cues   Ambulation/Elevation   Gait pattern Antalgic;Retropulsion;Poor UE support;Decreased foot clearance;Decreased R stance; Improper Weight shift; Step to;Excessively slow   Gait Assistance 3  Moderate assist   Additional items Assist x 2;Verbal cues   Assistive Device Rolling walker   Distance 12' x1   Balance   Static Sitting Fair +   Static Standing Poor -  (assist x2 with rw)   Dynamic Standing Poor - Ambulatory Zero  (assist x1 with rw  )   Endurance Deficit   Endurance Deficit Yes   Endurance Deficit Description fatigue, ue fatigue, weakness, pain,     Activity Tolerance   Activity Tolerance Patient limited by pain; Patient limited by fatigue   Medical Staff Made Aware berto CARLISLE Etheleen Sly, [address]   Nurse Made Aware yes   Exercises   Heelslides Sitting;15 reps;AAROM;AROM; Right   Glute Sets Sitting;15 reps;AROM; Bilateral   Hip Flexion Sitting;15 reps;AAROM; Right   Hip Adduction Sitting;15 reps;AROM; Bilateral  (isometric hip add)   Knee AROM Long Arc Quad Sitting;15 reps;AROM; Right   Ankle Pumps Sitting;15 reps;AROM; Bilateral   Assessment   Prognosis Fair   Problem List Decreased strength;Decreased endurance;Decreased range of motion; Impaired balance;Decreased mobility;Obesity; Decreased skin integrity; Decreased cognition   Assessment Pt seen for Pt interventions focusing on functional mobility training, le there x, rom  Pt performs seated r le a-aarom x 15 reps with brief est breaks between exercises due to muscle fatigue, muscle weakness and increased pain to R le  Pt performs sit to stand transfers from bedside chair with mod assist x2 with use of sling to assist with transfer verbal cues required for handplacement and cues to maintain TTWB to R le  Pt continues to demonstrate retropulsion upon standing requiring verbal cues and min- mod assist x2 to correct  L sneaker donned with total assist prior to gait training, pt ambulates with use of rw with mod assist x2 and max cues for le sequencing, step lengths, posture, ue weightbearing techniques and cues for reminder to maintain TTWb to R le  However feel sneaker to L le helps with improved compliance with WBS to R le  Pt progressed with ambulation distances to 12' x1  Increased fatigued noted post ambulation and BOWIE      Continue to recommend STR at d/c   Goals   Patient Goals " to be able to walk "     [facility] Expiration Date 12/19/19   PT Treatment Day 9 Plan   Treatment/Interventions Functional transfer training;LE strengthening/ROM; Therapeutic exercise; Endurance training;Patient/family training;Equipment eval/education; Bed mobility;Gait training;Spoke to nursing;OT;Family   Progress Slow progress, decreased activity tolerance   PT Frequency 7x/wk; Twice a day;Weekend   Recommendation   Recommendation Short-term skilled PT   PT - OK to Discharge Yes       Willian Pizarro, PTA

## 2019-12-11 NOTE — PLAN OF CARE
Problem: PHYSICAL THERAPY ADULT  Goal: Performs mobility at highest level of function for planned discharge setting  See evaluation for individualized goals  Description  Treatment/Interventions: Functional transfer training, ADL retraining, LE strengthening/ROM, Elevations, Therapeutic exercise, Endurance training, Patient/family training, Equipment eval/education, Bed mobility, Gait training, Compensatory technique education, Spoke to nursing, OT  Equipment Recommended: Walker(pt owns)       See flowsheet documentation for full assessment, interventions and recommendations  Outcome: Progressing  Note:   Prognosis: Fair  Problem List: Decreased strength, Decreased range of motion, Decreased endurance, Decreased mobility, Impaired balance, Impaired hearing, Decreased skin integrity, Orthopedic restrictions, Pain  Assessment: Pt seen for bed level exercises to R le a-aarom performed x 15 reps  A for hs, hip abd /add , and assist to initial SAQ then pt is able to perform actively  Pt requires mod assist to roll toward the l with use of bedrail  Pt performs supine to longsitting x2 with min assist x1 with use of b/l bedrails  Recommend STR at d/c inorder to maximize safe functional mobility, outcomes and independence  Barriers to Discharge: Inaccessible home environment  Barriers to Discharge Comments: JASON  Recommendation: Short-term skilled PT     PT - OK to Discharge: Yes    See flowsheet documentation for full assessment

## 2019-12-11 NOTE — PLAN OF CARE
Problem: PHYSICAL THERAPY ADULT  Goal: Performs mobility at highest level of function for planned discharge setting  See evaluation for individualized goals  Description  Treatment/Interventions: Functional transfer training, ADL retraining, LE strengthening/ROM, Elevations, Therapeutic exercise, Endurance training, Patient/family training, Equipment eval/education, Bed mobility, Gait training, Compensatory technique education, Spoke to nursing, OT  Equipment Recommended: Walker(pt owns)       See flowsheet documentation for full assessment, interventions and recommendations  12/11/2019 1715 by Nayla Porter PTA  Note:   Prognosis: Fair  Problem List: Decreased strength, Decreased range of motion, Decreased endurance, Decreased mobility, Impaired balance, Impaired hearing, Decreased skin integrity, Orthopedic restrictions, Pain  Assessment: Pt seen for bed level exercises to R le a-aarom performed x 15 reps  A for hs, hip abd /add , and assist to initial SAQ then pt is able to perform actively  Pt requires mod assist to roll toward the l with use of bedrail  Pt performs supine to longsitting x2 with min assist x1 with use of b/l bedrails  Recommend STR at d/c inorder to maximize safe functional mobility, outcomes and independence  Barriers to Discharge: Inaccessible home environment  Barriers to Discharge Comments: JASON  Recommendation: Short-term skilled PT     PT - OK to Discharge: Yes    See flowsheet documentation for full assessment

## 2019-12-11 NOTE — PLAN OF CARE
Problem: OCCUPATIONAL THERAPY ADULT  Goal: Performs self-care activities at highest level of function for planned discharge setting  See evaluation for individualized goals  Description  Treatment Interventions: ADL retraining, Functional transfer training, UE strengthening/ROM, Endurance training, Patient/family training, Equipment evaluation/education, Compensatory technique education, Energy conservation, Activityengagement          See flowsheet documentation for full assessment, interventions and recommendations  Outcome: Progressing  Note:   Limitation: Decreased ADL status, Decreased UE strength, Decreased endurance, Decreased self-care trans, Decreased high-level ADLs  Prognosis: Good  Assessment: Pt was seen for skilled OT with focus on completion of self care tasks, functional transfers and review of current plan of care  Pt with improved functional mobility noted this tx session  Pt was able to weight shift with both tactile and verbal cues provided  See above levels of A required for all functional tasks  Pt may benefit from further rehab with focus on achieving optimal performance levels with all functional tasks  Continue to recommend Inpatient rehab     OT Discharge Recommendation: Short Term Rehab  OT - OK to Discharge: Yes(when medically cleared  )       Problem: OCCUPATIONAL THERAPY ADULT  Goal: Performs self-care activities at highest level of function for planned discharge setting  See evaluation for individualized goals  Description  Treatment Interventions: ADL retraining, Functional transfer training, UE strengthening/ROM, Endurance training, Patient/family training, Equipment evaluation/education, Compensatory technique education, Energy conservation, Activityengagement          See flowsheet documentation for full assessment, interventions and recommendations      Outcome: Progressing  Note:   Limitation: Decreased ADL status, Decreased UE strength, Decreased endurance, Decreased self-care trans, Decreased high-level ADLs  Prognosis: Good  Assessment: Pt was seen for skilled OT with focus on completion of self care tasks, functional transfers and review of current plan of care  Pt with improved functional mobility noted this tx session  Pt was able to weight shift with both tactile and verbal cues provided  See above levels of A required for all functional tasks  Pt may benefit from further rehab with focus on achieving optimal performance levels with all functional tasks   Continue to recommend Inpatient rehab     OT Discharge Recommendation: Short Term Rehab  OT - OK to Discharge: Yes(when medically cleared  )

## 2019-12-11 NOTE — SOCIAL WORK
CM met with pt and spouse at bedside to discuss denial  If denial does not get overturned, pt would like either [facility] or to go home with home therapy  CM will submit a referral to [facility] to see if they would be able to accept  We are hoping for the denial to be overturned  We should have an answer by later today  Will advise

## 2019-12-11 NOTE — PROGRESS NOTES
Progress Note - Gabriel Gunter 78 y o  male MRN: [id number]    Unit/Bed#: E2 -01 Encounter: [id number]      Assessment/Plan:  1-acute pulmonary embolism:  Patient had a CT scan that was positive for a right upper lobe segmental branch pulmonary embolism   Patient was started on a heparin infusion, however this was discontinued after he had serosanguineous oozing from his right hip surgical site              -patient was evaluated by vascular surgery who recommended IVC filter placement, performed by Interventional Radiology:  Vascular surgery recommended continuing therapeutic anticoagulation once stable  He will follow up with vascular surgery as an outpatient for IVC filter removal              -patient was started on subcu heparin, DVT prophylaxis dose, however he had significant bleeding from his surgical incision, so this was held since 12/09 am                 -patient continues to have sanguinous oozing from his surgical site: At this time will not institute any anticoagulation at prophylactic are therapeutic doses, as he had significant acute blood loss anemia requiring transfusions  -appreciate Orthopedic surgery evaluation:  Continue to monitor closely, and monitor his hemoglobin     2-acute blood loss anemia:  Patient was noted to have acute blood loss anemia secondary to expected blood loss from his femur fracture, and expected procedural blood loss from his hip surgery              -patient's hemoglobin decreased from 11 5 down to 6 3 postoperatively:  He was transfused 2 units of packed red blood cells                -ICCOPEU was diagnosed with a pulmonary embolism was started on heparin infusion, however was noted to have serosanguineous drainage at his surgical site   Patient was suspected to have acute blood loss anemia secondary to sanguinous oozing from his surgical incision site due to anticoagulation with treatment dose heparin infusion               -on 12/9, patient's hemoglobin had decreased to 7 4, and he had continued oozing from the right hip surgical site: He was transfused 1 additional unit packed red blood cells on 12/9, especially in light of his recent acute kidney injury, hypotension, hypovolemia              -follow up Hb improved to 8 8 and is 9 3 today              -due to pt's ABLA on anticoagulation requiring repeat blood transfusion, and significant sanguinous oozing from incision site on prophylactic dose heparin, anticoagulation will be temporarily held at this time     3-scrotal swelling:  Patient was noted to have scrotal swelling and ecchymoses   There were initial concerns for possible hematoma   Patient was followed by the Urology team who noted his changes appear to be superficial, with ecchymoses  Arabella Gamma is no evidence of progressive swelling, or hematoma   They did not recommend any additional imaging or urologic intervention      4-status post right femur fracture:  Patient had right femur fracture after he tripped and suffered a mechanical fall    He underwent ORIF of the right femur               -continue physical therapy/occupational therapy              -continue pain management              -patient will need short-term rehab              -per ortho:  Patient will be on toe-touch weight-bearing for at at least the 2 weeks postoperatively   -continue to monitor sanguinous oozing from surgical incision site     5-arrhythmia:  Patient was noted to have PSVT, and sinus tachycardia:  Felt to be multifactorial secondary to acute blood loss anemia, dehydration, pain, as well as pulmonary embolism               -patient was evaluated by the cardiology team who noted 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:  He was initiated on a beta-blocker              -continue metoprolol 25 mg b i d               -7B echocardiogram:  With aortic regurgitation and tricuspid regurgitation   No other significant structural abnormalities              -per cardiology: Patient's arrhythmias were most likely secondary to the pulmonary embolism, and not likely to recur as he recovers  They recommend outpatient follow-up with his primary cardiologist    -patient currently normal sinus rhythm     6-status post acute kidney injury:  Patient does not have any previous history of chronic kidney disease   Previous creatinine appeared to range 0 9-1 0                 -SHAR had acute kidney injury with a creatinine that increased to 1 8   He was evaluated by the nephrology team               -this is most likely multifactorial secondary to hypovolemia, acute blood loss anemia, hypotension, and ACE-inhibitor use               -patient was transfused, ACE-inhibitor was discontinued, and his creatinine improved              -creatinine currently within normal limits, however monitor closely as his lisinopril has been restarted by Cardiology               -creatinine 0 9     7-hyperlipidemia:  Continue statin     8-recurrent major depressive disorder:  In full remission   Continue Celexa     9-essential hypertension:  Patient's lisinopril was held due to acute kidney injury              -currently on metoprolol 25 mg b i d               -lisinopril 10 mg daily restarted by Cardiology:              -systolic blood pressure currently ranging 140-150s  Continue medication and monitor    Titrate as needed     10-sensorineural hearing loss:  Patient has hearing aids at home  Willammarjorie BassBeverly not wish family to bring the med in the hospital     11-status post hyperkalemia:  Patient had hyperkalemia, most likely secondary to acute kidney injury and previous ACE-inhibitor use   Normalized     12-valvular heart disease:  Patient's 2D echocardiogram revealed mild-moderate aortic regurgitation, and moderate tricuspid regurgitation   He will continue outpatient follow-up with his primary cardiologist     13-family:  Updated pt's wife Eloisa Dolan via phone    Discussed with patient's nurse and case manager    Called for peer to peer review with insurance company: Dave So [phone]  Left second message for her to call me  Addendum:  Received call from rehab center that insurance had tried to call me unsuccessfully:  Called charisse at 1:47 and left another message to call me  Addendum:  Spoke with Dave So at 1:51pm- she notes that medical director Dr Skip Keene will call me before 2:49pm     ---------------------------------------------------------   VTE Pharmacologic Prophylaxis: Heparin- dose held today due to bloody oozing at R leg incision site and ABLA  VTE Mechanical Prophylaxis: sequential compression device     Certification Statement: The patient will continue to require additional inpatient hospital stay due to need for short-term rehab placement      Status: inpatient     ===================================================================    Subjective:  Patient relates he feels better  He denies any current complaints  He denies any pain in his hip  He notes he has pain mainly with weight-bearing, and ambulating  He notes his pain is controlled  He denies pain anywhere else besides his hip  He denies any other leg pain  He denies any headache, chest pain, back pain, abdominal pain  He denies any shortness of breath or cough  He denies any chest congestion or phlegm  He denies any nausea, vomiting, diarrhea    He has not yet moved his bowels  He denies any abdominal pain or discomfort  He is tolerating p o  He denies any dizziness or lightheadedness  Per his nurse, he continues to have sanguinous drainage, soaking through the gauze on his right hip dressing      Physical Exam:   Temp:  [97 8 °F (36 6 °C)-98 8 °F (37 1 °C)] 97 9 °F (36 6 °C)  HR:  [70-86] 74  Resp:  [18] 18  BP: (148-157)/(64-74) 157/74    Gen:  Pleasant, non-tachypnic, non-dyspnic  Conversant  Heart: regular rate and rhythm, S1S2 present, no murmur, rub or gallop  Lungs: clear to ausculatation bilaterally  No wheezing, crackles, or rhonchi  No accessory muscle use or respiratory distress  Abd: soft, non-tender, non-distended  NABS, no guarding, rebound or peritoneal signs  Extremities: no clubbing, cyanosis  2+ right lower extremity edema  1+ bilateral pedal edema     2+pedal pulses bilaterally  Neuro: awake, alert  Fluent speech  Interactive  Cooperates with exam   Versa Angry and joking  Skin: warm and dry:  Fading ecchymoses right lower extremity    LABS:   Results from last 7 days   Lab Units 12/11/19  1016 12/10/19  0542 12/09/19  0456 12/08/19 0457   WBC Thousand/uL  --  9 46 9 18 9 39   HEMOGLOBIN g/dL 9 3* 8 8* 7 4* 8 1*   HEMATOCRIT % 28 6* 27 5* 22 7* 25 3*   PLATELETS Thousands/uL  --  265 223 210     Results from last 7 days   Lab Units 12/10/19  0542 12/09/19  0456 12/08/19 0457   POTASSIUM mmol/L 4 0 4 0 4 5   CHLORIDE mmol/L 103 105 107   CO2 mmol/L 28 30 31   BUN mg/dL 20 22 23   CREATININE mg/dL 0 91 0 85 0 87   CALCIUM mg/dL 8 2* 8 0* 7 [address] [place] [address] Data:    12/9:  Echocardiogram:  LEFT VENTRICLE:  Systolic function was vigorous  Ejection fraction was estimated to be 68 %  There were no regional wall motion abnormalities  Wall thickness was mildly increased  Mild-moderate aortic regurgitation  Moderate tricuspid regurgitation     12/7:  CTA chest PE study:  Segmental branch right upper lobe pulmonary artery embolus  Measured RV/LV ratio is within normal limits at less than 0 9        12/6:  Chest x-ray  No acute cardiopulmonary disease      12/4 x-ray right femur:  Fluoroscopic guidance provided for surgical procedure  Please refer to the separate procedure notes for additional details      12/3:  Chest x-ray  No acute cardiopulmonary disease    Multilevel age indeterminate compression deformities of multiple thoracic vertebral bodies     12/3 right femur  Comminuted intertrochanteric femoral fracture extending into the subtrochanteric femur     12/3:  X-ray right hip/pelvis  Comminuted intertrochanteric femoral fracture extending into the subtrochanteric femur     Procedure  12/7: [facility] filter placement  12/4:  Insertion nail IM femur antegrade, right        ---------------------------------------------------------------------------------------------------------------  This note has been constructed using a voice recognition system

## 2019-12-11 NOTE — DISCHARGE INSTRUCTIONS
HIP FRACTURE DISCHARGE INSTRUCTIONS    Surgical Dressing:  Change your dressing daily  Cecilia Morley will be removed in the office 2 weeks postoperatively  Do not put any lotions or creams on your incision  If there are any signs of infection such as drainage persisting beyond a few days, unusual looking drainage (yellow, green), increased redness around the incision, or fever/chills let your doctor know  Medications:  Narcotic pain relievers cannot be refilled over the phone  Please be mindful of the number of pills you have left so you can  a prescription for a refill if needed during office hours  Walking: You will be toe-touch weight-bearing at this time  Use two crutches or a walker for EVERY step  Gradually increase your walking daily  Bathing:  No tub baths  Do not submerge your incision  You may shower and let water run over your incisions 1 week after surgery  You may sit on a shower chair or stand and shower briefly  Use your crutches/walker to get in and out of the shower  Sexual Relations:  Resume according to your comfort  Swimming:  No swimming or hot tubs until approved by your physician  Swimming will be allowed once your incision is well healed  Driving: You may ride as a passenger now  No driving until your follow-up appointment  To get into the car use the front passenger seat with the seat pushed back as far as possible  Scoot yourself back in the seat  Use your hands to assist your legs into the car  Physical therapy:  When you have finished with home visiting PT, you may begin outpatient PT  Call your surgeons office if you have not already received a prescription  A prescription can be faxed to the outpatient center of your choice  Special considerations: To minimize swelling, stiffness, and decrease pain use cold as needed, but not heat  Ice 20 minutes at a time with a cloth between your skin and the ice    Ice when you have pain or after you have completed your exercises  Limit your sitting to 60 minutes at one time  Follow up:  Call [phone] to make an appointment to see your surgeon within 2 weeks of your surgery if you havent done so already  If you have any questions during business hours please call the direct office phone number [phone]  Otherwise please call [phone] for any concerns  Inferior Vena Cava Filter Placement     WHAT YOU NEED TO KNOW:   Inferior vena cava filter placement is surgery to place a filter into your inferior vena cava (IVC)  The IVC is a large blood vessel that brings blood from your lower body back to your heart  The filter is a small mesh strainer made of thin wires  It is placed in the center of the IVC to trap blood clots going to your heart or lungs  Filter types: Permanent Filters are used for patients who can't have anticoagulation medications  The filters are left in place permanently  Optional filters: Are filters that can be removed when a patient's risk for clotting is decreased  DISCHARGE INSTRUCTIONS:     Wound care: Keep your wound clean and dry  Band aid may come off in 24 hours  Self-care:   · Limit activity: Do not lift, pull or push heavy objects for 24 hours  Slowly start to do more each day  Return to your daily activities as directed  · Resume your normal diet  Small sips of flat soda will help with nausea  Contact Interventional Radiology at [phone] [place] PATIENTS: Contact Interventional Radiology at [phone]) Dara Saldaña PATIENTS: Contact Interventional Radiology at [phone]) if:  · You have a fever  · You have chills, a cough, or feel weak and achy  · Persistent nausea or vomiting  · Your wound is red, swollen, or draining pus  · You have questions or concerns about your condition  · Optional filters can and should  be removed when you no longer need it    · Please call Interventional Radiology to make your removal appointment        Start heparin subcu 5000 units every 8 hours in 3 to 4 days if hip postop wound remained stable with no hemorrhage/draining    In follow-up with vascular surgery in 1 week to evaluate for ongoing therapeutic anticoagulation

## 2019-12-12 LAB
APTT PPP: 29 SECONDS (ref 23–37)
ERYTHROCYTE [DISTWIDTH] IN BLOOD BY AUTOMATED COUNT: 15.5 % (ref 11.6–15.1)
HCT VFR BLD AUTO: 29.6 % (ref 36.5–49.3)
HGB BLD-MCNC: 9.4 G/DL (ref 12–17)
INR PPP: 1.1 (ref 0.84–1.19)
MCH RBC QN AUTO: 31.3 PG (ref 26.8–34.3)
MCHC RBC AUTO-ENTMCNC: 31.8 G/DL (ref 31.4–37.4)
MCV RBC AUTO: 99 FL (ref 82–98)
PLATELET # BLD AUTO: 335 THOUSANDS/UL (ref 149–390)
PMV BLD AUTO: 9 FL (ref 8.9–12.7)
PROTHROMBIN TIME: 14.3 SECONDS (ref 11.6–14.5)
RBC # BLD AUTO: 3 MILLION/UL (ref 3.88–5.62)
WBC # BLD AUTO: 9.65 THOUSAND/UL (ref 4.31–10.16)

## 2019-12-12 PROCEDURE — 99024 POSTOP FOLLOW-UP VISIT: CPT | Performed by: PHYSICIAN ASSISTANT

## 2019-12-12 PROCEDURE — 85730 THROMBOPLASTIN TIME PARTIAL: CPT | Performed by: INTERNAL MEDICINE

## 2019-12-12 PROCEDURE — 97110 THERAPEUTIC EXERCISES: CPT

## 2019-12-12 PROCEDURE — 97535 SELF CARE MNGMENT TRAINING: CPT

## 2019-12-12 PROCEDURE — 97530 THERAPEUTIC ACTIVITIES: CPT

## 2019-12-12 PROCEDURE — 99232 SBSQ HOSP IP/OBS MODERATE 35: CPT | Performed by: INTERNAL MEDICINE

## 2019-12-12 PROCEDURE — 85027 COMPLETE CBC AUTOMATED: CPT | Performed by: INTERNAL MEDICINE

## 2019-12-12 PROCEDURE — 97116 GAIT TRAINING THERAPY: CPT

## 2019-12-12 PROCEDURE — 85610 PROTHROMBIN TIME: CPT | Performed by: INTERNAL MEDICINE

## 2019-12-12 RX ADMIN — LISINOPRIL 10 MG: 10 TABLET ORAL at 10:00

## 2019-12-12 RX ADMIN — ACETAMINOPHEN 650 MG: 325 TABLET ORAL at 16:27

## 2019-12-12 RX ADMIN — SENNOSIDES 8.6 MG: 8.6 TABLET, FILM COATED ORAL at 08:01

## 2019-12-12 RX ADMIN — METOPROLOL TARTRATE 25 MG: 25 TABLET, FILM COATED ORAL at 20:00

## 2019-12-12 RX ADMIN — DOCUSATE SODIUM 100 MG: 100 CAPSULE, LIQUID FILLED ORAL at 17:05

## 2019-12-12 RX ADMIN — METOPROLOL TARTRATE 25 MG: 25 TABLET, FILM COATED ORAL at 10:00

## 2019-12-12 RX ADMIN — Medication 500 MG: at 08:01

## 2019-12-12 RX ADMIN — CITALOPRAM HYDROBROMIDE 40 MG: 20 TABLET ORAL at 09:59

## 2019-12-12 RX ADMIN — POLYETHYLENE GLYCOL 3350 17 G: 17 POWDER, FOR SOLUTION ORAL at 09:55

## 2019-12-12 RX ADMIN — ATORVASTATIN CALCIUM 5 MG: 10 TABLET, FILM COATED ORAL at 10:00

## 2019-12-12 RX ADMIN — PANTOPRAZOLE SODIUM 40 MG: 40 TABLET, DELAYED RELEASE ORAL at 05:10

## 2019-12-12 RX ADMIN — OXYCODONE HYDROCHLORIDE 2.5 MG: 5 TABLET ORAL at 10:03

## 2019-12-12 RX ADMIN — DOCUSATE SODIUM 100 MG: 100 CAPSULE, LIQUID FILLED ORAL at 09:59

## 2019-12-12 NOTE — PLAN OF CARE
Problem: OCCUPATIONAL THERAPY ADULT  Goal: Performs self-care activities at highest level of function for planned discharge setting  See evaluation for individualized goals  Description  Treatment Interventions: ADL retraining, Functional transfer training, UE strengthening/ROM, Endurance training, Patient/family training, Equipment evaluation/education, Compensatory technique education, Energy conservation, Activityengagement          See flowsheet documentation for full assessment, interventions and recommendations  Outcome: Progressing  Note:   Limitation: Decreased ADL status, Decreased UE strength, Decreased endurance, Decreased self-care trans, Decreased high-level ADLs  Prognosis: Good  Assessment: Pt was seen for skilled OT with focus on completion of self care routine, review of LE dressing with use of LHAE, review of RW safety and review of current plan of care  Pt with need for Min A x2 to achieve EOB positioning  No LOB noted while seated EOB  Pt able to complete UE bathing/dressing with Min A overall  Pt able to complete front channing care while seated EOB Extended rest break provided prior to review of LE dressing  Min A overall required with returned demonstration of use of dressing stick and sock aid while seated EOB  Pt pleasantly surprised he was able to don socks and L shoe without bending  See above levels of A required for functional transfers  Pt with improvement noted with functional transfers with use of RW  Min A x2 overall with step by step verbal cues provided to advance footing and RW  Improved carry over noted of reviewed techs for fall prevention  Pt may benefit from further rehab with focus on achieving optimal performance levels with all functional tasks   Continue to recommend Inpatient rehab     OT Discharge Recommendation: Short Term Rehab  OT - OK to Discharge: Yes(when medically cleared  )

## 2019-12-12 NOTE — SOCIAL WORK
CM notified by MD that they have overturned the denial for acute rehab  Pt to go to [address] tomorrow at [facility] arranged with [facility]  CM called pt's wife and dtr to inform them of discharge plan

## 2019-12-12 NOTE — PROGRESS NOTES
Progress Note - Jensen Cordova 78 y o  male MRN: [id number]    Unit/Bed#: E2 -01 Encounter: [id number]      Assessment/Plan:  1-acute pulmonary embolism:  Patient had a CT scan that was positive for a right upper lobe segmental branch pulmonary embolism   Patient was started on a heparin infusion, however this was discontinued after he had serosanguineous oozing from his right hip surgical site              -patient was evaluated by vascular surgery who recommended IVC filter placement, performed by Interventional Radiology:  Vascular surgery recommended continuing therapeutic anticoagulation once stable   He will follow up with vascular surgery as an outpatient for IVC filter removal              -patient was started on subcu heparin, DVT prophylaxis dose, however he had significant bleeding from his surgical incision, so this was held since 12/09 am                 -[id number] continues to have sanguinous oozing from his surgical site: At this time will not institute any anticoagulation at prophylactic are therapeutic doses, as he had significant acute blood loss anemia requiring transfusions  -appreciate Orthopedic surgery evaluation:  Continue to monitor closely, and monitor his hemoglobin   -patient currently does not have any respiratory compromise, and has adequate oxygenation on room air    -it is anticipated that once patient's healing at his right hip incision site has improved, he will be started on DVT prophylactic dose anticoagulation, and monitor for any bleeding  If he successfully tolerates this, it is recommended he be started on the full dose anticoagulation for treatment of a PE for the 3-6 months duration    2-acute blood loss anemia:  Patient was noted to have acute blood loss anemia secondary to expected blood loss from his femur fracture, and expected procedural blood loss from his hip surgery                -patient's hemoglobin decreased from 11 5 down to 6 3 postoperatively:  He was transfused 2 units of packed red blood cells              -ELMER was diagnosed with a pulmonary embolism was started on heparin infusion, however was noted to have serosanguineous drainage at his surgical site   Patient was suspected to have acute blood loss anemia secondary to sanguinous oozing from his surgical incision site due to anticoagulation with treatment dose heparin infusion               -on 12/9, patient's hemoglobin had decreased to 7 4, and he had continued oozing from the right hip surgical site: He was transfused 1 additional unit packed red blood cells on 12/9, especially in light of his recent acute kidney injury, hypotension, hypovolemia              -follow up Hb improved to 8 8 and is 9 4 today              -due to pt's ABLA on anticoagulation requiring repeat blood transfusion, and significant sanguinous oozing from incision site on prophylactic dose heparin, anticoagulation will be temporarily held at this time   -is anticipated that has the sanguinous oozing from patient's right hip incision resolves, his anticoagulation will be restarted at the DVT prophylaxis dose, in the next 2-3 days  He will need close monitoring of his hemoglobin at that time      3-scrotal swelling:  Patient was noted to have scrotal swelling and ecchymoses   There were initial concerns for possible hematoma   Patient was followed by the Urology team who noted his changes appear to be superficial, with ecchymoses  Cheng Martin is no evidence of progressive swelling, or hematoma   They did not recommend any additional imaging or urologic intervention      4-status post right femur fracture:  Patient had right femur fracture after he tripped and suffered a mechanical fall    He underwent ORIF of the right femur               -continue physical therapy/occupational therapy              -continue pain management              -patient will need short-term rehab              -per ortho:  Patient will be on toe-touch weight-bearing for at at least the 2 weeks postoperatively              -continue to monitor sanguinous oozing from surgical incision site     5-arrhythmia:  Patient was noted to have PSVT, and sinus tachycardia:  Felt to be multifactorial secondary to acute blood loss anemia, dehydration, pain, as well as pulmonary embolism               -patient was evaluated by the cardiology team who noted 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:  He was initiated on a beta-blocker              -continue metoprolol 25 mg b i d               -2D echocardiogram:  With aortic regurgitation and tricuspid regurgitation   No other significant structural abnormalities              -per cardiology:  Patient's arrhythmias were most likely secondary to the pulmonary embolism, and not likely to recur as he recovers  Thom Curiel recommend outpatient follow-up with his primary cardiologist               -patient currently normal sinus rhythm     6-status post acute kidney injury:  Patient does not have any previous history of chronic kidney disease   Previous creatinine appeared to range 0 9-1 0                 -Patient had acute kidney injury with a creatinine that increased to 1 8   He was evaluated by the nephrology team               -this is most likely multifactorial secondary to hypovolemia, acute blood loss anemia, hypotension, and ACE-inhibitor use               -patient was transfused, ACE-inhibitor was discontinued, and his creatinine improved              -creatinine currently within normal limits, however monitor closely as his lisinopril has been restarted by Cardiology              -creatinine 0 9 most recently     7-hyperlipidemia:  Continue statin     8-recurrent major depressive disorder:  In full remission   Continue Celexa     9-essential hypertension:  Patient's lisinopril was held due to acute kidney injury                -currently on metoprolol 25 mg b i d               -lisinopril 10 mg daily restarted by Cardiology:  Mani Paige blood pressure currently ranging 140-160  Continue medication and monitor  Titrate as needed     10-sensorineural hearing loss:  Patient has hearing aids at home  Marie Valero not wish family to bring the med in the hospital     11-status post hyperkalemia:  Patient had hyperkalemia, most likely secondary to acute kidney injury and previous ACE-inhibitor use   Normalized     12-valvular heart disease:  Patient's 2D echocardiogram revealed mild-moderate aortic regurgitation, and moderate tricuspid regurgitation   He will continue outpatient follow-up with his primary cardiologist    13-family:  Called pt's wife via phone  Had updated her yesterday    D/w pt's nurse and      VTE Pharmacologic Prophylaxis: RX contraindicated due to: ABLA and bleeding from incision  VTE Mechanical Prophylaxis: sequential compression device    Certification Statement: The patient will continue to require additional inpatient hospital stay due to need for further acute intervention for STR arrangements dc planning    Status: inpatient     ===================================================================    Subjective:  Patient relates that he has pain in his right hip only with standing and weight-bearing, and walking  He denies any pain when he is sitting, at rest     He denies any pain anywhere else  He denies any headache, chest pain, back pain, abdominal pain  He denies any chest congestion, chest tightness or cough  He denies any abdominal pain, nausea, vomiting, diarrhea or constipation  Patient notes that he had a suppository yesterday, and had a subsequent large bowel movement  He notes he is tolerating p o  He denies any shortness of breath, chest congestion, cough or phlegm  He denies any dizziness or lightheadedness  He denies any complaints at all  He denies any bleeding        Physical Exam:   Temp:  [98 °F (36 7 °C)-99 4 °F (37 4 °C)] 98 °F (36 7 °C)  HR: [73-84] 84  Resp:  [18] 18  BP: (147-161)/(58-78) 161/70    Gen:  Pleasant, non-tachypnic, non-dyspnic  Conversant  Sitting in a chair at the bedside  Heart: regular rate and rhythm, S1S2 present, no murmur, rub or gallop  Lungs: clear to ausculatation bilaterally  No wheezing, crackles, or rhonchi  No accessory muscle use or respiratory distress  Good air movement  Abd: soft, non-tender, non-distended  NABS, no guarding, rebound or peritoneal signs  Extremities: no clubbing, cyanosis  + 1+ edema right lower extremity  1+ bilateral pedal edema     2+pedal pulses bilaterally  Neuro: awake, alert  Fluent speech  Skin: warm and dry: no petechiae, purpura and rash + fading ecchymoses right leg    LABS:   Results from last 7 days   Lab Units 12/12/19  0504 12/11/19  1016 12/10/19  0542 12/09/19  0456   WBC Thousand/uL 9 65  --  9 46 9 18   HEMOGLOBIN g/dL 9 4* 9 3* 8 8* 7 4*   HEMATOCRIT % 29 6* 28 6* 27 5* 22 7*   PLATELETS Thousands/uL 335  --  265 223     Results from last 7 days   Lab Units 12/10/19  0542 12/09/19  0456 12/08/19  0457   POTASSIUM mmol/L 4 0 4 0 4 5   CHLORIDE mmol/L 103 105 107   CO2 mmol/L 28 30 31   BUN mg/dL 20 22 23   CREATININE mg/dL 0 91 0 85 0 87   CALCIUM mg/dL 8 2* 8 0* 7 [address] [place] [address] Data:    12/9:  Echocardiogram:  LEFT VENTRICLE:  Systolic function was vigorous  Ejection fraction was estimated to be 68 %  There were no regional wall motion abnormalities  Wall thickness was mildly increased  Mild-moderate aortic regurgitation  Moderate tricuspid regurgitation     12/7:  CTA chest PE study:  Segmental branch right upper lobe pulmonary artery embolus  Measured RV/LV ratio is within normal limits at less than 0 9        12/6:  Chest x-ray  No acute cardiopulmonary disease      12/4 x-ray right femur:  Fluoroscopic guidance provided for surgical procedure   Please refer to the separate procedure notes for additional details      12/3:  Chest x-ray  No acute cardiopulmonary disease  Multilevel age indeterminate compression deformities of multiple thoracic vertebral bodies     12/3 right femur  Comminuted intertrochanteric femoral fracture extending into the subtrochanteric femur     12/3:  X-ray right hip/pelvis  Comminuted intertrochanteric femoral fracture extending into the subtrochanteric femur     Procedure  12/7: [facility] filter placement  12/4:  Insertion nail IM femur antegrade, right        ---------------------------------------------------------------------------------------------------------------  This note has been constructed using a voice recognition system

## 2019-12-12 NOTE — PLAN OF CARE
Problem: Prexisting or High Potential for Compromised Skin Integrity  Goal: Skin integrity is maintained or improved  Description  INTERVENTIONS:  - Identify patients at risk for skin breakdown  - Assess and monitor skin integrity  - Assess and monitor nutrition and hydration status  - Monitor labs   - Assess for incontinence   - Turn and reposition patient  - Assist with mobility/ambulation  - Relieve pressure over bony prominences  - Avoid friction and shearing  - Provide appropriate hygiene as needed including keeping skin clean and dry  - Evaluate need for skin moisturizer/barrier cream  - Collaborate with interdisciplinary team   - Patient/family teaching  - Consider wound care consult   Outcome: Progressing     Problem: Potential for Falls  Goal: Patient will remain free of falls  Description  INTERVENTIONS:  - Assess patient frequently for physical needs  -  Identify cognitive and physical deficits and behaviors that affect risk of falls    -  [place] fall precautions as indicated by assessment   - Educate patient/family on patient safety including physical limitations  - Instruct patient to call for assistance with activity based on assessment  - Modify environment to reduce risk of injury  - Consider OT/PT consult to assist with strengthening/mobility  Outcome: Progressing

## 2019-12-12 NOTE — OCCUPATIONAL THERAPY NOTE
Occupational Therapy Treatment Note:         12/12/19 0920   Restrictions/Precautions   Weight Bearing Precautions Per Order Yes   RLE Weight Bearing Per Order TTWB   Other Precautions Fall Risk;Pain;WBS   Pain Assessment   Pain Assessment 0-10   Pain Score 5   Pain Type Surgical pain   Pain Location Hip   Pain Orientation Right   ADL   Where Assessed Edge of bed   Grooming Assistance 5  Supervision/Setup   Grooming Deficit Setup   UB Bathing Assistance 4  Minimal Assistance   UB Bathing Deficit Setup   LB Bathing Assistance 3  Moderate Assistance   LB Bathing Deficit Setup;Steadying;Verbal cueing;Supervision/safety; Increased time to complete;Perineal area; Buttocks;Right lower leg including foot; Left lower leg including foot   LB Bathing Comments Pt able to complete front channing care while seated  UB Dressing Assistance 5  Supervision/Setup   UB Dressing Deficit Setup   LB Dressing Assistance 4  Minimal Assistance   LB Dressing Deficit Steadying;Setup; Requires assistive device for steadying;Supervision/safety;Verbal cueing; Increased time to complete; Don/doff R sock; Don/doff L sock   LB Dressing Comments with use of LHAE  Toileting Assistance  4  Minimal Assistance   Toileting Deficit Setup;Steadying;Verbal cueing;Supervison/safety; Increased time to complete; Bedside commode;Perineal hygiene   Toileting Comments further review of clothing management instance will be required  Functional Standing Tolerance   Time 5 mins   Activity functional mobility  Comments Improved tolerance noted without further signs of increased pain  Bed Mobility   Supine to Sit 4  Minimal assistance   Additional items Assist x 2;Bedrails; Increased time required;Verbal cues;LE management   Additional Comments increased A to advance affected RLE      Transfers   Sit to Stand 4  Minimal assistance   Additional items Assist x 2   Stand to Sit 4  Minimal assistance   Additional items Assist x 2   Stand pivot 4  Minimal assistance Additional items Assist x 2   Toilet transfer 4  Minimal assistance   Additional items Assist x 2   Additional Comments improvement noted with appropriate techs  Functional Mobility   Functional Mobility 4  Minimal assistance   Additional Comments x2   Additional items Rolling walker   Cognition   Overall Cognitive Status Impaired   Arousal/Participation Responsive; Alert; Cooperative   Attention Attends with cues to redirect   Orientation Level Oriented X4   Memory Decreased short term memory;Decreased recall of precautions   Following Commands Follows multistep commands with increased time or repetition   Comments Pt [place] Improved carry over noted this tx session of newly learned techs  Additional Activities   Additional Activities Other (Comment)  (reviewed fall prevention/RW safety  )   Additional Activities Comments Pt reports having F understanding  Activity Tolerance   Activity Tolerance Patient limited by pain   Medical Staff Made Aware reviewed current plan care of care  Assessment   Assessment Pt was seen for skilled OT with focus on completion of self care routine, review of LE dressing with use of LHAE, review of RW safety and review of current plan of care  Pt with need for Min A x2 to achieve EOB positioning  No LOB noted while seated EOB  Pt able to complete UE bathing/dressing with Min A overall  Pt able to complete front channing care while seated EOB Extended rest break provided prior to review of LE dressing  Min A overall required with returned demonstration of use of dressing stick and sock aid while seated EOB  Pt pleasantly surprised he was able to don socks and L shoe without bending  See above levels of A required for functional transfers  Pt with improvement noted with functional transfers with use of RW  Min A x2 overall with step by step verbal cues provided to advance footing and RW  Improved carry over noted of reviewed techs for fall prevention   Pt may benefit from further rehab with focus on achieving optimal performance levels with all functional tasks  Continue to recommend Inpatient rehab   Plan   Treatment Interventions ADL retraining;Functional transfer training; Endurance training;Cognitive reorientation;Patient/family training   Goal Expiration Date 12/19/19   OT Treatment Day 6   OT Frequency 3-5x/wk   Recommendation   OT Discharge Recommendation Short Term Rehab   OT - OK to Discharge Yes  (when medically cleared  )   Barthel Index   Feeding 10   Bathing 0   Grooming Score 0   Dressing Score 5   Bladder Score 5   Bowels Score 10   Toilet Use Score 5   Transfers (Bed/Chair) Score 5   Mobility (Level Surface) Score 0   Stairs Score 0   Barthel Index Score 40   Modified Gloria Scale   Modified [place] Scale 4   Lord Cisneros, [address]

## 2019-12-12 NOTE — PLAN OF CARE
Problem: PHYSICAL THERAPY ADULT  Goal: Performs mobility at highest level of function for planned discharge setting  See evaluation for individualized goals  Description  Treatment/Interventions: Functional transfer training, ADL retraining, LE strengthening/ROM, Elevations, Therapeutic exercise, Endurance training, Patient/family training, Equipment eval/education, Bed mobility, Gait training, Compensatory technique education, Spoke to nursing, OT  Equipment Recommended: Walker(pt owns)       See flowsheet documentation for full assessment, interventions and recommendations  Outcome: Progressing  Note:   Prognosis: Fair  Problem List: Decreased strength, Decreased range of motion, Decreased endurance, Impaired balance, Decreased mobility, Obesity, Decreased skin integrity, Orthopedic restrictions, Pain, Impaired hearing  Assessment: Pt  rec'd supine in bed  Pt  Continues to perform supine R le a-aarom exercises x 15 reps  And seated active laq and aa hip flexion to R le x 5 reps  Pt demonstrating improved ability to perform bed mobility,  Transfers and ambulation requiring less assistance for all aspects of mobility  Pt  Progressed with ambulation distances to 25' x1 with use of rw  PT maintnains ttwb to R le 100% of the time during gait training with shoe on L le  NOted Improved carry over with transfer and gait techniques, le sequencing, step lengths and compliance with weightbearing status therefore requiring less verbal cueing  Less assistance for static standing , pt requiring min assist x1 with support fo rw x 3 minutes  Pt  remained seated out of bed in chair post PT session  Pt 's / 63 upon sitting at edge of bed o2 sat 935 AND hr 100  Continue to recommend d/c to rehab inorder to maximize safe functional mobility and independence      Barriers to Discharge: Inaccessible home environment  Barriers to Discharge Comments: JASON  Recommendation: Long-term skilled PT     PT - OK to Discharge: Yes    See flowsheet documentation for full assessment

## 2019-12-12 NOTE — PHYSICAL THERAPY NOTE
Physical Therapy Treatment Note     12/12/19 0925   Pain Assessment   Pain Assessment 0-10   Pain Score 5   Pain Type Surgical pain   Pain Location Hip   Pain Orientation Right   Pain Descriptors Sore   Clinical Progression Gradually improving   Hospital Pain Intervention(s) Repositioned;Cold applied; Ambulation/increased activity; Emotional support   Response to Interventions tolerated   Restrictions/Precautions   RLE Weight Bearing Per Order TTWB   Other Precautions Pain; Fall Risk;THR;WBS   General   Chart Reviewed Yes   Family/Caregiver Present No   Cognition   Overall Cognitive Status Impaired   Arousal/Participation Responsive; Cooperative   Attention Attends with cues to redirect   Orientation Level Oriented X4   Memory Decreased recall of precautions;Decreased short term memory   Following Commands Follows multistep commands with increased time or repetition   Subjective   Subjective " I'm still having pain when I move it "     Bed Mobility   Supine to Sit 4  Minimal assistance   Additional items Assist x 2;HOB elevated; Bedrails; Increased time required;Verbal cues;LE management   Additional Comments assist to manuver L le to edge of bed, pt seasted at edg eof bed x 10 minutes performed le exercises and sitting balance/ tolerance activities while performing ADL's  Transfers   Sit to Stand 4  Minimal assistance   Additional items Assist x 2; Increased time required;Verbal cues   Stand to Sit 4  Minimal assistance   Additional items Assist x 2;Armrests; Increased time required;Verbal cues   Stand pivot 4  Minimal assistance   Additional items Assist x 2; Increased time required;Verbal cues   Ambulation/Elevation   Gait pattern Antalgic; Step to;Excessively slow; Inconsistent jennifer; Foward flexed   Gait Assistance 4  Minimal assist   Additional items Verbal cues; Assist x 2   Assistive Device Rolling walker   Distance 18' x1, 3' x1   Balance   Static Sitting Good   Dynamic Sitting Fair +   Static Standing Fair -  (with rw)   Dynamic Standing Poor   Ambulatory Poor -  (with Rw)   Endurance Deficit   Endurance Deficit Yes   Endurance Deficit Description fatigue, pain  weakness   Activity Tolerance   Activity Tolerance Patient limited by pain; Patient limited by fatigue   Medical Staff Made Aware savannah COFFEY Rn renelda   Exercises   Hip Flexion Sitting;5 reps;AAROM; Right   Knee AROM Long Arc Quad Sitting;5 reps;AROM; Right   THR Supine;15 reps;AAROM;AROM; Right;Bilateral   Assessment   Prognosis Fair   Problem List Decreased strength;Decreased range of motion;Decreased endurance; Impaired balance;Decreased mobility;Obesity; Decreased skin integrity;Orthopedic restrictions;Pain; Impaired hearing   Assessment Pt  rec'd supine in bed  Pt  Continues to perform supine R le a-aarom exercises x 15 reps  And seated active laq and aa hip flexion to R le x 5 reps  Pt demonstrating improved ability to perform bed mobility,  Transfers and ambulation requiring less assistance for all aspects of mobility  Pt  Progressed with ambulation distances to 25' x1 with use of rw  PT maintnains ttwb to R le 100% of the time during gait training with shoe on L le  NOted Improved carry over with transfer and gait techniques, le sequencing, step lengths and compliance with weightbearing status therefore requiring less verbal cueing  Less assistance for static standing , pt requiring min assist x1 with support fo rw x 3 minutes  Pt  remained seated out of bed in chair post PT session  Pt 's / 63 upon sitting at edge of bed o2 sat 935 AND hr 100  Continue to recommend d/c to rehab inorder to maximize safe functional mobility and independence  Goals   Patient Goals " To be able to walk "     STG Expiration Date 12/19/19   PT Treatment Day 9   Plan   Treatment/Interventions Functional transfer training;LE strengthening/ROM; Therapeutic exercise; Endurance training;Patient/family training;Equipment eval/education; Bed mobility;Gait training;Spoke to nursing;OT   Progress Slow progress, decreased activity tolerance   PT Frequency 7x/wk; Twice a day;Weekend   Recommendation   Recommendation Long-term skilled PT   PT - OK to Discharge Yes        12/12/19 0940   Pain Assessment   Pain Assessment 0-10   Pain Score 5   Pain Type Surgical pain   Pain Location Hip   Pain Orientation Right   Pain Descriptors Sore   Clinical Progression Gradually improving   Hospital Pain Intervention(s) Repositioned;Cold applied; Ambulation/increased activity; Emotional support   Response to Interventions tolerated   Restrictions/Precautions   RLE Weight Bearing Per Order TTWB   Other Precautions Pain; Fall Risk;THR;WBS   General   Chart Reviewed Yes   Family/Caregiver Present No   Cognition   Overall Cognitive Status Impaired   Arousal/Participation Responsive; Cooperative   Attention Attends with cues to redirect   Orientation Level Oriented X4   Memory Decreased recall of precautions;Decreased short term memory   Following Commands Follows multistep commands with increased time or repetition   Subjective   Subjective " I'm still having pain when I move it "     Bed Mobility   Supine to Sit 4  Minimal assistance   Additional items Assist x 2;HOB elevated; Bedrails; Increased time required;Verbal cues;LE management   Additional Comments assist to manuver L le to edge of bed, pt seasted at edg eof bed x 10 minutes performed le exercises and sitting balance/ tolerance activities while performing ADL's  Transfers   Sit to Stand 4  Minimal assistance   Additional items Assist x 2; Increased time required;Verbal cues   Stand to Sit 4  Minimal assistance   Additional items Assist x 2;Armrests; Increased time required;Verbal cues   Stand pivot 4  Minimal assistance   Additional items Assist x 2; Increased time required;Verbal cues   Ambulation/Elevation   Gait pattern Antalgic; Step to;Excessively slow; Inconsistent jennifer; Foward flexed   Gait Assistance 4  Minimal assist Additional items Verbal cues; Assist x 2   Assistive Device Rolling walker   Distance 18' x1, 3' x1   Balance   Static Sitting Good   Dynamic Sitting Fair +   Static Standing Fair -  (with rw)   Dynamic Standing Poor   Ambulatory Poor -  (with Rw)   Endurance Deficit   Endurance Deficit Yes   Endurance Deficit Description fatigue, pain  weakness   Activity Tolerance   Activity Tolerance Patient limited by pain; Patient limited by fatigue   Medical Staff Made Aware savannah COFFEY, Rn renelda   Exercises   Hip Flexion Sitting;5 reps;AAROM; Right   Knee AROM Long Arc Quad Sitting;5 reps;AROM; Right   THR Supine;15 reps;AAROM;AROM; Right;Bilateral   Assessment   Prognosis Fair   Problem List Decreased strength;Decreased range of motion;Decreased endurance; Impaired balance;Decreased mobility;Obesity; Decreased skin integrity;Orthopedic restrictions;Pain; Impaired hearing   Assessment Pt  rec'd supine in bed  Pt  Continues to perform supine R le a-aarom exercises x 15 reps  And seated active laq and aa hip flexion to R le x 5 reps  Pt demonstrating improved ability to perform bed mobility,  Transfers and ambulation requiring less assistance for all aspects of mobility  Pt  Progressed with ambulation distances to 25' x1 with use of rw  PT maintnains ttwb to R le 100% of the time during gait training with shoe on L le  NOted Improved carry over with transfer and gait techniques, le sequencing, step lengths and compliance with weightbearing status therefore requiring less verbal cueing  Less assistance for static standing , pt requiring min assist x1 with support fo rw x 3 minutes  Pt  remained seated out of bed in chair post PT session  Pt 's / 63 upon sitting at edge of bed o2 sat 935 AND hr 100  Continue to recommend d/c to rehab inorder to maximize safe functional mobility and independence       Goals   Patient Goals " To be able to walk "     STG Expiration Date 12/19/19   PT Treatment Day 9   Plan Treatment/Interventions Functional transfer training;LE strengthening/ROM; Therapeutic exercise; Endurance training;Patient/family training;Equipment eval/education; Bed mobility;Gait training;Spoke to nursing;OT   Progress Slow progress, decreased activity tolerance   PT Frequency 7x/wk; Twice a day;Weekend   Recommendation   Recommendation Long-term skilled PT   PT - OK to Discharge Yes         Omar Disla, PTA

## 2019-12-12 NOTE — PROGRESS NOTES
Orthopaedic Surgery - Progress Note  Tara Ricks (5 y o  male)   : 1940   MRN: [id number]  Date: 2019   Encounter: [id number]   Unit/Bed#: E2 -01    Assessment / Plan  Postop day 8 status post ORIF right femur fracture    · Continue with PT/OT as ordered  Continue with toe-touch weight-bearing at this time with plan for at least the 1st 2 weeks postoperatively  · Continue with ice and analgesics as needed  · Now status post IVC filter placement due to PT, continue anticoagulation per Medicine  · Hemoglobin stable 9 4 today  Will continue to monitor at this time as drainage and edema continues to decrease  Continue with dressing changes regularly when soiled for all incisions  Use minimal tape at this time to avoid blistering  · Plan for transfer to rehab when cleared from a medical standpoint  Will need follow up with Dr Garland Collins 2 weeks postoperatively for staple removal and xrays  Subjective  Postop day 8 status post ORIF right femur fracture  Patient feels he has continue to improve in regards to pain and swelling  Pain is localized to right thigh expected  Patient is denying any distal numbness or tingling  Vitals  Temp:  [98 8 °F (37 1 °C)-99 4 °F (37 4 °C)] 98 8 °F (37 1 °C)  HR:  [73-84] 80  Resp:  [18] 18  BP: (144-158)/(58-78) 158/74  Body mass index is 30 28 kg/m²  I/O last 24 hours: In: -   Out: 2450 [Urine:2450]    Right Hip Exam  Alignment / Posture:  Normal resting hip posture  Normal knee alignment  Inspection:  Mild to moderate right thigh swelling  No erythema  Mild to moderate right leg ecchymosis  Incisions continue to heal well with mild serous drainage noted from all incisions at this time  Overall swelling and drainage continue to improve daily  Palpation:  Mild tenderness at Tena-incisional areas right thigh     ROM:  Not tested  Strength:  5/5 AT, GSC, PT, and peroneals  Stability:  Not tested    Tests:  No pertinent positive or negative tests   Neurovascular:  Sensation intact in DP/SP/Geiger/Sa/T nerve distributions  Sensation intact in all digital nerve distributions  Toes warm and perfused  Gait:  Not tested      Lab Results  (I have personally reviewed pertinent lab results )  Results from last 7 days   Lab Units 12/12/19  0504 12/11/19  1016 12/10/19  0542 12/09/19  0456 12/08/19  0457 12/07/19  0920 12/07/19  0425 12/07/19  0326 12/06/19  1508 12/06/19  0754 12/06/19  0451   WBC Thousand/uL 9 65  --  9 46 9 18 9 39  --  10 76* 10 73*  --   --  11 23*   HEMOGLOBIN g/dL 9 4* 9 3* 8 8* 7 4* 8 1* 7 8* 8 3* 7 8* 7 5* 6 3* 6 3*   HEMATOCRIT % 29 6* 28 6* 27 5* 22 7* 25 3* 24 2* 26 1* 24 0* 23 2* 19 7* 19 1*   PLATELETS Thousands/uL 335  --  265 223 210  --  174 174  --   --  169     Results from last 7 days   Lab Units 12/12/19  0504 12/07/19  0920 12/07/19  0326   PTT seconds 29 143* 39*   INR  1 10  --  1 13     Results from last 7 days   Lab Units 12/10/19  0542 12/09/19  0456 12/08/19  0457 12/07/19  0425 12/06/19  0451 12/05/19  1204   POTASSIUM mmol/L 4 0 4 0 4 5 4 5 4 6 4 3   CHLORIDE mmol/L 103 105 107 106 107  --    CO2 mmol/L 28 30 31 26 27  --    BUN mg/dL 20 22 23 32* 38*  --    CREATININE mg/dL 0 91 0 85 0 87 0 96 1 24  --    EGFR ml/min/1 73sq m 80 83 82 75 55  --    CALCIUM mg/dL 8 2* 8 0* 7 9* 7 7* 7 4*  --                Shar Marti PA-C

## 2019-12-13 VITALS
DIASTOLIC BLOOD PRESSURE: 67 MMHG | OXYGEN SATURATION: 97 % | TEMPERATURE: 97.9 F | HEART RATE: 88 BPM | SYSTOLIC BLOOD PRESSURE: 145 MMHG | HEIGHT: 69 IN | BODY MASS INDEX: 30.27 KG/M2 | WEIGHT: 204.37 LBS | RESPIRATION RATE: 18 BRPM

## 2019-12-13 PROCEDURE — 99239 HOSP IP/OBS DSCHRG MGMT >30: CPT | Performed by: INTERNAL MEDICINE

## 2019-12-13 PROCEDURE — 99024 POSTOP FOLLOW-UP VISIT: CPT | Performed by: PHYSICIAN ASSISTANT

## 2019-12-13 RX ORDER — ACETAMINOPHEN 325 MG/1
650 TABLET ORAL EVERY 6 HOURS PRN
Qty: 30 TABLET | Refills: 0 | Status: SHIPPED | OUTPATIENT
Start: 2019-12-13 | End: 2020-01-03

## 2019-12-13 RX ORDER — AMOXICILLIN 250 MG
1 CAPSULE ORAL 2 TIMES DAILY
Refills: 0
Start: 2019-12-13 | End: 2020-03-10

## 2019-12-13 RX ORDER — AMOXICILLIN 250 MG
1 CAPSULE ORAL 2 TIMES DAILY
Status: DISCONTINUED | OUTPATIENT
Start: 2019-12-13 | End: 2019-12-13 | Stop reason: HOSPADM

## 2019-12-13 RX ORDER — LISINOPRIL 10 MG/1
10 TABLET ORAL DAILY
Refills: 0
Start: 2019-12-14 | End: 2020-01-03

## 2019-12-13 RX ORDER — POLYETHYLENE GLYCOL 3350 17 G/17G
17 POWDER, FOR SOLUTION ORAL DAILY
Qty: 14 EACH | Refills: 0 | Status: SHIPPED | OUTPATIENT
Start: 2019-12-14 | End: 2020-01-03

## 2019-12-13 RX ORDER — PANTOPRAZOLE SODIUM 40 MG/1
40 TABLET, DELAYED RELEASE ORAL
Refills: 0
Start: 2019-12-14 | End: 2020-01-10 | Stop reason: ALTCHOICE

## 2019-12-13 RX ORDER — OXYCODONE HYDROCHLORIDE 5 MG/1
2.5 TABLET ORAL EVERY 4 HOURS PRN
Qty: 30 TABLET | Refills: 0
Start: 2019-12-13 | End: 2019-12-23

## 2019-12-13 RX ORDER — CALCIUM CARBONATE 200(500)MG
1000 TABLET,CHEWABLE ORAL DAILY PRN
Refills: 0
Start: 2019-12-13 | End: 2020-01-03

## 2019-12-13 RX ADMIN — CITALOPRAM HYDROBROMIDE 40 MG: 20 TABLET ORAL at 08:01

## 2019-12-13 RX ADMIN — LISINOPRIL 10 MG: 10 TABLET ORAL at 08:04

## 2019-12-13 RX ADMIN — PANTOPRAZOLE SODIUM 40 MG: 40 TABLET, DELAYED RELEASE ORAL at 05:21

## 2019-12-13 RX ADMIN — DOCUSATE SODIUM 100 MG: 100 CAPSULE, LIQUID FILLED ORAL at 08:01

## 2019-12-13 RX ADMIN — ATORVASTATIN CALCIUM 5 MG: 10 TABLET, FILM COATED ORAL at 08:00

## 2019-12-13 RX ADMIN — SENNOSIDES AND DOCUSATE SODIUM 1 TABLET: 8.6; 5 TABLET ORAL at 09:15

## 2019-12-13 RX ADMIN — SENNOSIDES 8.6 MG: 8.6 TABLET, FILM COATED ORAL at 08:01

## 2019-12-13 RX ADMIN — METOPROLOL TARTRATE 25 MG: 25 TABLET, FILM COATED ORAL at 08:04

## 2019-12-13 RX ADMIN — ACETAMINOPHEN 650 MG: 325 TABLET ORAL at 05:23

## 2019-12-13 RX ADMIN — OXYCODONE HYDROCHLORIDE 2.5 MG: 5 TABLET ORAL at 09:35

## 2019-12-13 RX ADMIN — Medication 500 MG: at 07:59

## 2019-12-13 RX ADMIN — POLYETHYLENE GLYCOL 3350 17 G: 17 POWDER, FOR SOLUTION ORAL at 08:01

## 2019-12-13 NOTE — PROGRESS NOTES
Progress Note - Jonny Dinh 78 y o  male MRN: [id number]    Unit/Bed#: E2 -01 Encounter: [id number]    Assessment/Plan:    Acute PE   patient is status post IVC filter, will follow up with vascular surgery 1 stable for removal of IVC filter and initiation of therapeutic anticoagulation    Acute blood loss anemia hemoglobin stable 9 4    Right femoral fracture  status post ORIF right femur continue physical therapy and orthopedic follow-up    A KI    Postop, now resolved creatinine 0 91    Dyslipidemia   continue statin for LDL control    Subjective:   Feels good, denies chest pain shortness of breath nausea vomiting diarrhea no fevers chills minimal hip pain appetite good    Objective:     Vitals: Blood pressure 145/67, pulse 88, temperature 97 9 °F (36 6 °C), temperature source Tympanic, resp  rate 18, height 5' 9" (1 753 m), weight 92 7 kg (204 lb 5 9 oz), SpO2 97 %  ,Body mass index is 30 18 kg/m²        Results from last 7 days   Lab Units 12/12/19  0504   WBC Thousand/uL 9 65   HEMOGLOBIN g/dL 9 4*   HEMATOCRIT % 29 6*   PLATELETS Thousands/uL 335   INR  1 10     Results from last 7 days   Lab Units 12/10/19  0542   POTASSIUM mmol/L 4 0   CHLORIDE mmol/L 103   CO2 mmol/L 28   BUN mg/dL 20   CREATININE mg/dL 0 91   CALCIUM mg/dL 8 2*       Scheduled Meds:    Current Facility-Administered Medications:  acetaminophen 650 mg Oral Q6H PRN Sara Shelley PA-C   atorvastatin 5 mg Oral Daily Sara Shelley PA-C   calcium carbonate 1,000 mg Oral Daily PRN Sara Shelley PA-C   citalopram 40 mg Oral Daily Sara Shelley PA-C   lisinopril 10 mg Oral Daily Veronica Johnston MD   metoprolol tartrate 25 mg Oral Q12H [facility] Myron Sloan PA-C   niacin 500 mg Oral Daily With Breakfast Sara Shelley PA-C   oxyCODONE 2 5 mg Oral Q4H PRN Sara Shelley PA-C   pantoprazole 40 mg Oral Early Morning Sara Shelley PA-C   polyethylene glycol 17 g Oral Daily Emiliano Jordan MD   senna-docusate sodium 1 tablet Oral BID Velvet Chars, DO       Continuous Infusions:     Physical exam:  General appearance:  Alert elderly no distress interaction appropriate   Head/Eyes:  Nonicteric pale PERRL EOMI  Neck:  Supple  Lungs:  Decreased BS bilateral no wheezing rhonchi or rales  Heart: normal S1 S2 regular  Abdomen: Soft nontender with bowel sounds  Extremities: no edema  Skin: no rash    Invasive Devices     Peripheral Intravenous Line            Peripheral IV 12/08/19 Left;Ventral (anterior) Forearm 4 days                Counseling / Coordination of Care  Total floor / unit time spent today 30  minutes  Greater than 50% of total time was spent with the patient and / or family counseling and / or coordination of care    A description of the counseling / coordination of care:

## 2019-12-13 NOTE — PROGRESS NOTES
Orthopaedic Surgery - Progress Note  Fritz Flores (70 y o  male)   : 1940   MRN: [id number]  Date: 2019   Encounter: [id number]   Unit/Bed#: E2 -01    Assessment / Plan  Postop day 9 status post ORIF right femur fracture    · Continue with PT/OT as ordered  Continue with toe-touch weight-bearing at this time with plan for at least the 1st 2 weeks postoperatively  · Continue with ice and analgesics as needed  · Now status post IVC filter placement due to PT, continue anticoagulation per Medicine  · Hemoglobin has been stable for last few days, 9 4 yesterday  Will continue to monitor at this time as drainage and edema continues to decrease  Continue with dressing changes regularly when soiled for all incisions  Use minimal tape at this time to avoid blistering  · Plan for transfer to rehab when cleared from a medical standpoint  Will most likely be today  Will need follow up with Dr Michelle Means 1 week on  or 12-10 for staple removal and xrays  Subjective  Postop day 9 status post ORIF right femur fracture  Patient continues to improve in regards to pain and swelling  Pain is localized to right thigh expected  Patient is denying any distal numbness or tingling  Vitals  Temp:  [97 8 °F (36 6 °C)-98 °F (36 7 °C)] 97 9 °F (36 6 °C)  HR:  [74-88] 88  Resp:  [18-20] 18  BP: (121-161)/(61-70) 145/67  Body mass index is 30 18 kg/m²  I/O last 24 hours: In: -   Out: 1130 [Urine:1130]    Right Hip Exam  Alignment / Posture:  Normal resting hip posture  Normal knee alignment  Inspection:  Mild to moderate right thigh swelling  Patient has slight mild erythema around staples  Mild to moderate right leg ecchymosis  Incisions continue to heal well with mild serous drainage noted from all incisions at this time  Overall swelling and drainage continue to improve daily  Palpation:  Mild tenderness at Tena-incisional areas right thigh     ROM:  Not tested    Strength:  5/5 AT, GSC, PT, and peroneals  Stability:  Not tested  Tests:  No pertinent positive or negative tests  Neurovascular:  Sensation intact in DP/SP/Geiger/Sa/T nerve distributions  Sensation intact in all digital nerve distributions  Toes warm and perfused  Gait:  Not tested      Lab Results  (I have personally reviewed pertinent lab results )  Results from last 7 days   Lab Units 12/12/19  0504 12/11/19  1016 12/10/19  0542 12/09/19  0456 12/08/19  0457 12/07/19  0920 12/07/19  0425 12/07/19  0326 12/06/19  1508   WBC Thousand/uL 9 65  --  9 46 9 18 9 39  --  10 76* 10 73*  --    HEMOGLOBIN g/dL 9 4* 9 3* 8 8* 7 4* 8 1* 7 8* 8 3* 7 8* 7 5*   HEMATOCRIT % 29 6* 28 6* 27 5* 22 7* 25 3* 24 2* 26 1* 24 0* 23 2*   PLATELETS Thousands/uL 335  --  265 223 210  --  174 174  --      Results from last 7 days   Lab Units 12/12/19  0504 12/07/19  0920 12/07/19  0326   PTT seconds 29 143* 39*   INR  1 10  --  1 13     Results from last 7 days   Lab Units 12/10/19  0542 12/09/19  0456 12/08/19  0457 12/07/19  0425   POTASSIUM mmol/L 4 0 4 0 4 5 4 5   CHLORIDE mmol/L 103 105 107 106   CO2 mmol/L 28 30 31 26   BUN mg/dL 20 22 23 32*   CREATININE mg/dL 0 91 0 85 0 87 0 96   EGFR ml/min/1 73sq m 80 83 82 75   CALCIUM mg/dL 8 2* 8 0* 7 9* 7 7*               Solo Oneil PA-C

## 2019-12-13 NOTE — DISCHARGE SUMMARY
Discharge Summary - Medical Fritz Flores 78 y o  male MRN: [id number]    [address] [address] MED SURG Room / Bed: [address] [place] [address] /E2 -* Encounter: [id number]    BRIEF OVERVIEW    Admitting Provider: Adrian Infante MD  Discharge Provider: Vicki Zuñiga DO  Admission Date: 12/3/2019     Discharge Date: No discharge date for patient encounter  Primary Care Physician at Discharge:  Ameya Park [phone]    Primary Discharge Diagnosis  Acute pulmonary emboli  Acute blood loss anemia  Right femur fracture    Other Problems Addressed  Patient Active Problem List    Diagnosis Date Noted    Valvular heart disease 12/09/2019    S/P IVC filter 12/07/2019    Scrotal swelling 12/07/2019    Acute pulmonary embolism ([address] ) 12/07/2019    SVT (supraventricular tachycardia) (HCC) 12/07/2019    Hyperkalemia 12/05/2019    Acute blood loss anemia 12/05/2019    Impaired vision 12/05/2019    Conductive hearing loss, bilateral 12/05/2019    Vascular disease 12/03/2019    Femur fracture, right ([address] ) 12/03/2019    Fall 12/03/2019    Leukocytosis 12/03/2019    Closed femur fracture ([address] ) 12/03/2019    Hyperlipidemia     Mixed obsessional thoughts and acts 10/14/2019    Allergic rhinitis 10/14/2019    Hypercholesteremia 10/14/2019    Sensorineural hearing loss (SNHL) 10/14/2019    Mitral regurgitation 10/14/2019    Aortic valve sclerosis 10/14/2019    Nonrheumatic aortic valve insufficiency 10/14/2019    Essential hypertension 10/14/2019    Recurrent major depressive disorder, in full remission ([address] ) 10/14/2019       Consulting Providers   Vascular surgery  Orthopedic surgery  Urology  Geriatric Medicine  Nephrology  Therapeutic Operative Procedures Performed  IVC filter placement  ORIF right femur    Discharge Disposition:  Rehab  Facility:  [facility]    Allergies  No Known Allergies  Diet restrictions:  Low-salt diet  Activity restrictions:  Per rehab  Discharge Condition:  [address] Dr Tami Filbert in 1 week  Follow up with consulting providers  Orthopedic surgery in 1 week Dr Annie Oliva  Vascular surgery in 1 week Dr Janell Munoz    Presenting Problem/History of Present Illness  Femur fracture, right [facility])  Hospital Course  80-year-old male presents after tripping and landing on his right hip  Right hip fracture  patient was admitted and provided IV narcotic pain control, seen in consultation with Orthopedics and ORIF was completed  Patient continued with physical therapy and postop care by Orthopedic surgery  Discharged for continue rehab at [address] [place] [address] pulmonary emboli CT scan was positive for right upper lobe segment pole branch pulmonary emboli  Initially started on IV heparin but had serosanguineous oozing from his right surgical hip site  Seen in consultation with vascular surgery who recommended IVC filter which was placed by Interventional Radiology  Once wound stabilizes vascular surgery recommends full anticoagulation  Patient will be followed up in 1 week with vascular surgery to possibly remove IVC filter and initiate therapeutic anticoagulation  Acute blood loss anemia  associated with postop and perioperative blood loss  He was transfused 1 unit, hemoglobin stabilized, 9 4 on discharge  A KI    postop associated with hypovolemia acute blood loss hypotension and ACE-inhibitor usage  He was seen in consultation with Nephrology the creatinine peaked at 1 8 and was at baseline 0 9 at discharge  Hypertension   blood pressure stable on discharge with metoprolol and lisinopril    Transferred to acute rehab at [facility]    Discharge  Statement   I spent 35 minutes discharging the patient  This time was spent on the day of discharge  I had direct contact with the patient on the day of discharge  Additional documentation is required if more than 30 minutes were spent on discharge  Discussed discharge follow-up and rehab    Discharge instructions/Information to patient and family:   See after visit summary for information provided to patient and family

## 2019-12-13 NOTE — PLAN OF CARE
Problem: Prexisting or High Potential for Compromised Skin Integrity  Goal: Skin integrity is maintained or improved  Description  INTERVENTIONS:  - Identify patients at risk for skin breakdown  - Assess and monitor skin integrity  - Assess and monitor nutrition and hydration status  - Monitor labs   - Assess for incontinence   - Turn and reposition patient  - Assist with mobility/ambulation  - Relieve pressure over bony prominences  - Avoid friction and shearing  - Provide appropriate hygiene as needed including keeping skin clean and dry  - Evaluate need for skin moisturizer/barrier cream  - Collaborate with interdisciplinary team   - Patient/family teaching  - Consider wound care consult   Outcome: Progressing     Problem: Potential for Falls  Goal: Patient will remain free of falls  Description  INTERVENTIONS:  - Assess patient frequently for physical needs  -  Identify cognitive and physical deficits and behaviors that affect risk of falls    -  Ariel fall precautions as indicated by assessment   - Educate patient/family on patient safety including physical limitations  - Instruct patient to call for assistance with activity based on assessment  - Modify environment to reduce risk of injury  - Consider OT/PT consult to assist with strengthening/mobility  Outcome: Progressing     Problem: PAIN - ADULT  Goal: Verbalizes/displays adequate comfort level or baseline comfort level  Description  Interventions:  - Encourage patient to monitor pain and request assistance  - Assess pain using appropriate pain scale  - Administer analgesics based on type and severity of pain and evaluate response  - Implement non-pharmacological measures as appropriate and evaluate response  - Consider cultural and social influences on pain and pain management  - Notify physician/advanced practitioner if interventions unsuccessful or patient reports new pain  Outcome: Progressing     Problem: INFECTION - ADULT  Goal: Absence or prevention of progression during hospitalization  Description  INTERVENTIONS:  - Assess and monitor for signs and symptoms of infection  - Monitor lab/diagnostic results  - Monitor all insertion sites, i e  indwelling lines, tubes, and drains  - Monitor endotracheal if appropriate and nasal secretions for changes in amount and color  - Rum[place] appropriate cooling/warming therapies per order  - Administer medications as ordered  - Instruct and encourage patient and family to use good hand hygiene technique  - Identify and instruct in appropriate isolation precautions for identified infection/condition  Outcome: Progressing  Goal: Absence of fever/infection during neutropenic period  Description  INTERVENTIONS:  - Monitor WBC    Outcome: Progressing     Problem: SAFETY ADULT  Goal: Maintain or return to baseline ADL function  Description  INTERVENTIONS:  -  Assess patient's ability to carry out ADLs; assess patient's baseline for ADL function and identify physical deficits which impact ability to perform ADLs (bathing, care of mouth/teeth, toileting, grooming, dressing, etc )  - Assess/evaluate cause of self-care deficits   - Assess range of motion  - Assess patient's mobility; develop plan if impaired  - Assess patient's need for assistive devices and provide as appropriate  - Encourage maximum independence but intervene and supervise when necessary  - Involve family in performance of ADLs  - Assess for home care needs following discharge   - Consider OT consult to assist with ADL evaluation and planning for discharge  - Provide patient education as appropriate  Outcome: Progressing  Goal: Maintain or return mobility status to optimal level  Description  INTERVENTIONS:  - Assess patient's baseline mobility status (ambulation, transfers, stairs, etc )    - Identify cognitive and physical deficits and behaviors that affect mobility  - Identify mobility aids required to assist with transfers and/or ambulation (gait belt, sit-to-stand, lift, walker, cane, etc )  - [place] fall precautions as indicated by assessment  - Record patient progress and toleration of activity level on Mobility SBAR; progress patient to next Phase/Stage  - Instruct patient to call for assistance with activity based on assessment  - Consider rehabilitation consult to assist with strengthening/weightbearing, etc   Outcome: Progressing     Problem: DISCHARGE PLANNING  Goal: Discharge to home or other facility with appropriate resources  Description  INTERVENTIONS:  - Identify barriers to discharge w/patient and caregiver  - Arrange for needed discharge resources and transportation as appropriate  - Identify discharge learning needs (meds, wound care, etc )  - Arrange for interpretive services to assist at discharge as needed  - Refer to Case Management Department for coordinating discharge planning if the patient needs post-hospital services based on physician/advanced practitioner order or complex needs related to functional status, cognitive ability, or social support system  Outcome: Progressing     Problem: Knowledge Deficit  Goal: Patient/family/caregiver demonstrates understanding of disease process, treatment plan, medications, and discharge instructions  Description  Complete learning assessment and assess knowledge base    Interventions:  - Provide teaching at level of understanding  - Provide teaching via preferred learning methods  Outcome: Progressing

## 2019-12-16 ENCOUNTER — TELEPHONE (OUTPATIENT)
Dept: OBGYN CLINIC | Facility: MEDICAL CENTER | Age: 79
End: 2019-12-16

## 2019-12-16 NOTE — TELEPHONE ENCOUNTER
Bess Yeung,     I have to see this patient before any staples are removed  I want to watch him closely because he had a lot of drainage after surgery due to high dosage of heparin that was given  Please tell this rehab they will have to transport him to be seen later this week  I would like to see him Wednesday or Thursday  Thank you

## 2019-12-16 NOTE — TELEPHONE ENCOUNTER
I spoke to Nicolasa Richardson at [address] and advised we should bring him in for PO appt this week  Nicolasa Richardson said they will not transport while in rehab as they have Drs that are evaluating patient's there  She would like to know if it is okay that they remove staples due to they think patient is sensitive to the metal in the staples  Wound does not look infection  They do not have tiger text to send a picture  Please advise

## 2019-12-16 NOTE — TELEPHONE ENCOUNTER
Dr Veronica   #: [phone]           Chana Cardenas from [facility] acute re[facility] called in requesting a call back   Patient had sx 12/4 R femur  Chana Cardenas stated patient's incision is a little red and would like to know if they can remove the staples?  Please advise, thank you

## 2019-12-17 NOTE — TELEPHONE ENCOUNTER
Spoke with Burke Chauhan at [address], she states that she has a meeting today and they will discuss at that time to the feasibility of transporting the patient for a post op appointment this week  She states she will call back later this afternoon  I did give her my direct number to call

## 2019-12-19 ENCOUNTER — TELEPHONE (OUTPATIENT)
Dept: OBGYN CLINIC | Facility: HOSPITAL | Age: 79
End: 2019-12-19

## 2019-12-19 ENCOUNTER — OFFICE VISIT (OUTPATIENT)
Dept: OBGYN CLINIC | Facility: MEDICAL CENTER | Age: 79
End: 2019-12-19

## 2019-12-19 ENCOUNTER — APPOINTMENT (OUTPATIENT)
Dept: RADIOLOGY | Facility: MEDICAL CENTER | Age: 79
End: 2019-12-19
Payer: COMMERCIAL

## 2019-12-19 VITALS
DIASTOLIC BLOOD PRESSURE: 75 MMHG | HEIGHT: 69 IN | WEIGHT: 192 LBS | BODY MASS INDEX: 28.44 KG/M2 | SYSTOLIC BLOOD PRESSURE: 137 MMHG

## 2019-12-19 DIAGNOSIS — S72.91XD CLOSED FRACTURE OF RIGHT FEMUR WITH ROUTINE HEALING, UNSPECIFIED FRACTURE MORPHOLOGY, UNSPECIFIED PORTION OF FEMUR, SUBSEQUENT ENCOUNTER: Primary | ICD-10-CM

## 2019-12-19 DIAGNOSIS — S72.91XD CLOSED FRACTURE OF RIGHT FEMUR WITH ROUTINE HEALING, UNSPECIFIED FRACTURE MORPHOLOGY, UNSPECIFIED PORTION OF FEMUR, SUBSEQUENT ENCOUNTER: ICD-10-CM

## 2019-12-19 DIAGNOSIS — Z98.890 S/P ORIF (OPEN REDUCTION INTERNAL FIXATION) FRACTURE: ICD-10-CM

## 2019-12-19 DIAGNOSIS — Z87.81 S/P ORIF (OPEN REDUCTION INTERNAL FIXATION) FRACTURE: ICD-10-CM

## 2019-12-19 DIAGNOSIS — T81.89XA DRAINING POSTOPERATIVE WOUND, INITIAL ENCOUNTER: ICD-10-CM

## 2019-12-19 PROCEDURE — 99024 POSTOP FOLLOW-UP VISIT: CPT | Performed by: ORTHOPAEDIC SURGERY

## 2019-12-19 PROCEDURE — 73552 X-RAY EXAM OF FEMUR 2/>: CPT

## 2019-12-19 RX ORDER — CEPHALEXIN 500 MG/1
500 CAPSULE ORAL EVERY 8 HOURS
Qty: 30 CAPSULE | Refills: 0 | Status: SHIPPED | OUTPATIENT
Start: 2019-12-19 | End: 2019-12-29

## 2019-12-19 NOTE — PROGRESS NOTES
Assessment/Plan:  1  Closed fracture of right femur with routine healing, unspecified fracture morphology, unspecified portion of femur, subsequent encounter    2  Draining postoperative wound, initial encounter    3  S/P ORIF (open reduction internal fixation) fracture      Orders Placed This Encounter   Procedures    XR femur 2 vw right       2 weeks s/p Right Antegrade IM nail insertion  · Continue physical therapy  · Continue toe touch weight bearing   · Tylenol  for pain as needed  · Wean from narcotics  · IVC filter in place  · Monitor incision daily  · Tuck dressing into skin fold over most proximal incision  · Change daily  · Start Keflex 500mg TID 10 days    · Follow up in one week for wound check    Return for next friday  I answered all of the patient's questions during the visit and provided education of the patient's condition during the visit  The patient verbalized understanding of the information given and agrees with the plan  This note was dictated using [employer] software  It may contain errors including improperly dictated words  Please contact physician directly for any questions  Subjective   Chief Complaint:   Chief Complaint   Patient presents with    Right Hip - Fracture, Post-op       Zechariah Lloyd is a 78 y o  male who presents 2 weeks s/p Right Antegrade IM nail insertion, 124/19  He is doing well  Today he complains of right lateral hip pain  He rates his pain at 5/10  He did have a PE following procedure and now has a IVC filter  He does inpatient physical therapy  He has been toe touch weight bearing  He and his family are uncertain if he uses oxycodone  He does use tyelnol as well  He denies fever or chills  Review of Systems  ROS:    See HPI for musculoskeletal review     All other systems reviewed are negative     History:  Past Medical History:   Diagnosis Date    Hyperlipidemia      Past Surgical History:   Procedure Laterality Date    IR IVC OPTIONAL FILTER PLACEMENT  12/7/2019    AK OPEN RX FEMUR FX+INTRAMED BÁRBARA Right 12/4/2019    Procedure: INSERTION NAIL IM FEMUR ANTEGRADE (TROCHANTERIC);   Surgeon: Cyndi Vogt DO;  Location: [facility];  Service: Orthopedics     Social History   Social History     Substance and Sexual Activity   Alcohol Use Never    Frequency: Never     Social History     Substance and Sexual Activity   Drug Use Never     Social History     Tobacco Use   Smoking Status Never Smoker   Smokeless Tobacco Never Used     Family History:   Family History   Problem Relation Age of Onset    Cancer Mother        Meds/Allergies     (Not in a hospital admission)  No Known Allergies       Objective     /75   Ht 5' 9" (1 753 m)   Wt 87 1 kg (192 lb)   BMI 28 35 kg/m²      PE:  AAOx 3  WDWN  Hearing intact, no drainage from eyes  no audible wheezing  no abdominal distension  LE compartments soft, AT/GS intact    Ortho Exam:  right hip:   INC: healing, moderate swelling  Reaction to staples noted      Scribe Attestation    I,:   Chevy Walls am acting as a scribe while in the presence of the attending physician :        I,:   Cyndi Vogt DO personally performed the services described in this documentation    as scribed in my presence :

## 2019-12-19 NOTE — TELEPHONE ENCOUNTER
Darrion Clemente, Matty Parada, contacted to discuss patients appt for tomorrow, 12/19  Matty Parada reports pt and family was educated on wheelchair van option vs personal vehicle transportation  Pt and family selected that patient would be coming to surgeons appt in personal vehicle and the [facility] rehab staff would assist with getting patient into the car for appt  Per [facility] staff patient to be at appt tomorrow morning

## 2019-12-20 ENCOUNTER — TELEPHONE (OUTPATIENT)
Dept: VASCULAR SURGERY | Facility: CLINIC | Age: 79
End: 2019-12-20

## 2019-12-20 NOTE — TELEPHONE ENCOUNTER
Incoming Vascular Consult Telephone Note    Jeaneth Thomas from [facility] called with consult    Return Phone Number: [phone]     [id number]          Provider Requesting:Sue     Diagnosis for Consult: question need for  anticoagulation with IVC filter   Gave to resident Denzel and DR Bonner

## 2019-12-26 ENCOUNTER — TELEPHONE (OUTPATIENT)
Dept: OBGYN CLINIC | Facility: MEDICAL CENTER | Age: 79
End: 2019-12-26

## 2019-12-26 NOTE — TELEPHONE ENCOUNTER
I spoke with the hospitalist taking care of Mr Sim Milan at [facility]  She is calling to inquire regarding DVT prophylaxis  It was recommended by another physician for him to be on DVT prophylaxis at this point  He had a PE after surgery and now has an IVC filter  I am comfortable with him leaving on Lovenox at this stage of his wound healing  He will just be on a prophylactic dosage

## 2019-12-27 ENCOUNTER — APPOINTMENT (OUTPATIENT)
Dept: RADIOLOGY | Facility: MEDICAL CENTER | Age: 79
End: 2019-12-27
Payer: COMMERCIAL

## 2019-12-27 ENCOUNTER — OFFICE VISIT (OUTPATIENT)
Dept: OBGYN CLINIC | Facility: MEDICAL CENTER | Age: 79
End: 2019-12-27

## 2019-12-27 VITALS
WEIGHT: 192 LBS | SYSTOLIC BLOOD PRESSURE: 147 MMHG | HEIGHT: 69 IN | HEART RATE: 72 BPM | BODY MASS INDEX: 28.44 KG/M2 | DIASTOLIC BLOOD PRESSURE: 70 MMHG

## 2019-12-27 DIAGNOSIS — S72.91XD CLOSED FRACTURE OF RIGHT FEMUR WITH ROUTINE HEALING, UNSPECIFIED FRACTURE MORPHOLOGY, UNSPECIFIED PORTION OF FEMUR, SUBSEQUENT ENCOUNTER: ICD-10-CM

## 2019-12-27 DIAGNOSIS — S72.91XD CLOSED FRACTURE OF RIGHT FEMUR WITH ROUTINE HEALING, UNSPECIFIED FRACTURE MORPHOLOGY, UNSPECIFIED PORTION OF FEMUR, SUBSEQUENT ENCOUNTER: Primary | ICD-10-CM

## 2019-12-27 PROCEDURE — 99024 POSTOP FOLLOW-UP VISIT: CPT | Performed by: ORTHOPAEDIC SURGERY

## 2019-12-27 PROCEDURE — 73552 X-RAY EXAM OF FEMUR 2/>: CPT

## 2019-12-27 NOTE — PROGRESS NOTES
Assessment/Plan:  1  Closed fracture of right femur with routine healing, unspecified fracture morphology, unspecified portion of femur, subsequent encounter      Orders Placed This Encounter   Procedures    XR femur 2 vw right     Monitor incision  Monitor skin posterior thigh  Last Steri-Strips of follow up on their own  If they are there in 1 week please remove  Continue home PT- PWB RLE  Pain control prn- tylenol prn pain  Continue with Lovenox for DVT prophylaxis- discussed with PCP if Lovenox needs to be continued beyond 1 more week  May shower and let water run over incision, do not submerge incision    Return in about 2 weeks (around 1/10/2020)  I answered all of the patient's questions during the visit and provided education of the patient's condition during the visit  The patient verbalized understanding of the information given and agrees with the plan  This note was dictated using [employer] software  It may contain errors including improperly dictated words  Please contact physician directly for any questions  Subjective   Chief Complaint:   Chief Complaint   Patient presents with    Right Leg - Post-op, Follow-up       Elicia Jane is a 78 y o  male who presents for 3 week follow up s/p right hip ORIF  Overall he is doing well  He is trying to be TDWB with walker, but admits that he is likely PWB RLE with the walker  Admits some mild pain  He was discharged from rehab yesterday  They started him on prophylactic Lovenox daily  He had a PE after surgery and had an IVC filter placed  He was on antibiotics while in the facility but the family does not think he was sent home on any antibiotics  Review of Systems  ROS:    See HPI for musculoskeletal review     All other systems reviewed are negative     History:  Past Medical History:   Diagnosis Date    Hyperlipidemia      Past Surgical History:   Procedure Laterality Date    IR IVC OPTIONAL FILTER PLACEMENT  12/7/2019    MS OPEN RX FEMUR FX+INTRAMED BÁRBARA Right 12/4/2019    Procedure: INSERTION NAIL IM FEMUR ANTEGRADE (TROCHANTERIC);   Surgeon: Callie Parker DO;  Location: [facility];  Service: Orthopedics     Social History   Social History     Substance and Sexual Activity   Alcohol Use Never    Frequency: Never     Social History     Substance and Sexual Activity   Drug Use Never     Social History     Tobacco Use   Smoking Status Never Smoker   Smokeless Tobacco Never Used     Family History:   Family History   Problem Relation Age of Onset    Cancer Mother        Current Outpatient Medications on File Prior to Visit   Medication Sig Dispense Refill    acetaminophen (TYLENOL) 325 mg tablet Take 2 tablets (650 mg total) by mouth every 6 (six) hours as needed for mild pain, headaches or fever 30 tablet 0    atorvastatin (LIPITOR) 10 mg tablet Take 5 mg by mouth daily       calcium carbonate (TUMS) 500 mg chewable tablet Chew 2 tablets (1,000 mg total) daily as needed for indigestion or heartburn  0    cephalexin (KEFLEX) 500 mg capsule Take 1 capsule (500 mg total) by mouth every 8 (eight) hours for 10 days 30 capsule 0    citalopram (CeleXA) 40 mg tablet Take 40 mg by mouth daily      fosinopril (MONOPRIL) 10 mg tablet Take 1 tablet (10 mg total) by mouth daily 90 tablet 1    lisinopril (ZESTRIL) 10 mg tablet Take 1 tablet (10 mg total) by mouth daily  0    metoprolol tartrate (LOPRESSOR) 25 mg tablet Take 1 tablet (25 mg total) by mouth every 12 (twelve) hours  0    Multiple Vitamin (MULTIVITAMIN) tablet Take 1 tablet by mouth daily      niacin 500 mg tablet Take 500 mg by mouth daily with breakfast      pantoprazole (PROTONIX) 40 mg tablet Take 1 tablet (40 mg total) by mouth daily in the early morning  0    polyethylene glycol (MIRALAX) 17 g packet Take 17 g by mouth daily 14 each 0    senna-docusate sodium (SENOKOT S) 8 6-50 mg per tablet Take 1 tablet by mouth 2 (two) times a day  0     No current facility-administered medications on file prior to visit        No Known Allergies     Objective     /70   Pulse 72   Ht 5' 9" (1 753 m)   Wt 87 1 kg (192 lb)   BMI 28 35 kg/m²      PE:  AAOx 3  WDWN  Hearing intact, no drainage from eyes  no audible wheezing  no abdominal distension  LE compartments soft, AT/GS intact    Ortho Exam:  right hip:   INCs: C/D/I, No erythema, mild swelling  mild pain with ROM  Posterior R thigh:  Mild abrasion with some discoloration, no erythema, no drainage, healing    Imaging Studies: I have personally reviewed pertinent films in PACS  XR right hip:  S/p ORIF hip, hardware intact, fracture alignment maintained, small amount of callus noted

## 2020-01-02 ENCOUNTER — TELEPHONE (OUTPATIENT)
Dept: OBGYN CLINIC | Facility: HOSPITAL | Age: 80
End: 2020-01-02

## 2020-01-02 NOTE — TELEPHONE ENCOUNTER
Caller: Payam Brock- OT Physical Therapist  Call Back Number: [phone]  Provider: Dr Philomena Guadalupe has called to inform Dr Shahram Ortiz that he did go see patient for OT Therapy  Payam Brock stated he will be requesting to see patient for 5 more OT sessions  Payam Brock stated he will be sending paperwork over as well

## 2020-01-10 ENCOUNTER — OFFICE VISIT (OUTPATIENT)
Dept: OBGYN CLINIC | Facility: MEDICAL CENTER | Age: 80
End: 2020-01-10

## 2020-01-10 ENCOUNTER — APPOINTMENT (OUTPATIENT)
Dept: RADIOLOGY | Facility: MEDICAL CENTER | Age: 80
End: 2020-01-10
Payer: COMMERCIAL

## 2020-01-10 VITALS
BODY MASS INDEX: 28.58 KG/M2 | DIASTOLIC BLOOD PRESSURE: 70 MMHG | HEART RATE: 66 BPM | HEIGHT: 69 IN | SYSTOLIC BLOOD PRESSURE: 133 MMHG | WEIGHT: 193 LBS

## 2020-01-10 DIAGNOSIS — S72.91XD CLOSED FRACTURE OF RIGHT FEMUR WITH ROUTINE HEALING, UNSPECIFIED FRACTURE MORPHOLOGY, UNSPECIFIED PORTION OF FEMUR, SUBSEQUENT ENCOUNTER: Primary | ICD-10-CM

## 2020-01-10 DIAGNOSIS — Z87.81 S/P ORIF (OPEN REDUCTION INTERNAL FIXATION) FRACTURE: ICD-10-CM

## 2020-01-10 DIAGNOSIS — S72.91XD CLOSED FRACTURE OF RIGHT FEMUR WITH ROUTINE HEALING, UNSPECIFIED FRACTURE MORPHOLOGY, UNSPECIFIED PORTION OF FEMUR, SUBSEQUENT ENCOUNTER: ICD-10-CM

## 2020-01-10 DIAGNOSIS — Z98.890 S/P ORIF (OPEN REDUCTION INTERNAL FIXATION) FRACTURE: ICD-10-CM

## 2020-01-10 PROCEDURE — 99024 POSTOP FOLLOW-UP VISIT: CPT | Performed by: ORTHOPAEDIC SURGERY

## 2020-01-10 PROCEDURE — 73552 X-RAY EXAM OF FEMUR 2/>: CPT

## 2020-01-10 NOTE — PROGRESS NOTES
Assessment/Plan:  1  Closed fracture of right femur with routine healing, unspecified fracture morphology, unspecified portion of femur, subsequent encounter    2  S/P ORIF (open reduction internal fixation) fracture      Orders Placed This Encounter   Procedures    XR femur 2 vw right     Advance to WBAT RLE  Continue home PT  Pain control prn- tylenol prn pain  Continue lovenox x 2 weeks then per PCP, given hx of PE, IVC filter was placed    Return in about 4 weeks (around 2/7/2020)  I answered all of the patient's questions during the visit and provided education of the patient's condition during the visit  The patient verbalized understanding of the information given and agrees with the plan  This note was dictated using [employer] software  It may contain errors including improperly dictated words  Please contact physician directly for any questions  Subjective   Chief Complaint:   Chief Complaint   Patient presents with    Right Hip - Post-op, Follow-up       Nicolas Stroud is a 78 y o  male who presents for 5 week follow up s/p right   Antegrade IM nail insertion, 12/4/19  patient is present today with his daughter and wife  Patient is present in a wheelchair  Patient is walking with a walker at home  Patient states he is doing well  He has been toe-touch weight-bearing right lower extremity  He is currently doing home physical therapy and tolerating the sessions well  Patient notes some soreness over the right lateral hip  Patient is currently on Lovenox for DVT prophylaxis  Patient is taking Tylenol p r n  For pain  Review of Systems  ROS:    See HPI for musculoskeletal review     All other systems reviewed are negative     History:  Past Medical History:   Diagnosis Date    Hyperlipidemia      Past Surgical History:   Procedure Laterality Date    IR IVC OPTIONAL FILTER PLACEMENT  12/7/2019    MI OPEN RX FEMUR FX+INTRAMED BÁRBARA Right 12/4/2019    Procedure: INSERTION NAIL IM FEMUR ANTEGRADE (TROCHANTERIC); Surgeon: Edwin Post DO;  Location: [facility];  Service: Orthopedics     Social History   Social History     Substance and Sexual Activity   Alcohol Use Never    Frequency: Never     Social History     Substance and Sexual Activity   Drug Use Never     Social History     Tobacco Use   Smoking Status Never Smoker   Smokeless Tobacco Never Used     Family History:   Family History   Problem Relation Age of Onset    Cancer Mother        Current Outpatient Medications on File Prior to Visit   Medication Sig Dispense Refill    atorvastatin (LIPITOR) 10 mg tablet Take 0 5 tablets (5 mg total) by mouth daily 90 tablet 1    citalopram (CeleXA) 40 mg tablet Take 1 tablet (40 mg total) by mouth daily 90 tablet 1    enoxaparin (LOVENOX) 40 mg/0 4 mL Inject 0 4 mL (40 mg total) under the skin daily 21 Syringe 1    fosinopril (MONOPRIL) 10 mg tablet Take 1 tablet (10 mg total) by mouth daily 90 tablet 1    metoprolol tartrate (LOPRESSOR) 25 mg tablet Take 1 tablet (25 mg total) by mouth every 12 (twelve) hours 180 tablet 1    Multiple Vitamin (MULTIVITAMIN) tablet Take 1 tablet by mouth daily      niacin (NIASPAN) 500 mg CR tablet Take 1 tablet (500 mg total) by mouth daily at bedtime 90 tablet 1    niacin 500 mg tablet Take 500 mg by mouth daily with breakfast      senna-docusate sodium (SENOKOT S) 8 6-50 mg per tablet Take 1 tablet by mouth 2 (two) times a day  0    [DISCONTINUED] pantoprazole (PROTONIX) 40 mg tablet Take 1 tablet (40 mg total) by mouth daily in the early morning (Patient not taking: Reported on 1/10/2020)  0     No current facility-administered medications on file prior to visit        No Known Allergies     Objective     /70   Pulse 66   Ht 5' 9" (1 753 m)   Wt 87 5 kg (193 lb)   BMI 28 50 kg/m²      PE:  AAOx 3  WDWN  Hearing intact, no drainage from eyes  no audible wheezing  no abdominal distension  LE compartments soft, AT/GS intact    Ortho Exam:  right hip:   INCs: healed, No erythema, mild swelling  No pain with ROM    I personally reviewed the x-rays  X-ray right femur:  Status post open reduction internal fixation, hardware intact, fracture alignment maintained, healing evident      Scribe Attestation    I,:   Gisela Miller am acting as a scribe while in the presence of the attending physician :        I,:   Ernesto Malcolm,  personally performed the services described in this documentation    as scribed in my presence :

## 2020-01-17 ENCOUNTER — APPOINTMENT (OUTPATIENT)
Dept: LAB | Facility: HOSPITAL | Age: 80
End: 2020-01-17
Attending: INTERNAL MEDICINE
Payer: COMMERCIAL

## 2020-01-17 DIAGNOSIS — I26.99 ACUTE PULMONARY EMBOLISM, UNSPECIFIED PULMONARY EMBOLISM TYPE, UNSPECIFIED WHETHER ACUTE COR PULMONALE PRESENT (HCC): ICD-10-CM

## 2020-01-17 DIAGNOSIS — E78.00 HYPERCHOLESTEREMIA: ICD-10-CM

## 2020-01-17 DIAGNOSIS — D62 ACUTE BLOOD LOSS ANEMIA: ICD-10-CM

## 2020-01-17 DIAGNOSIS — S72.91XD CLOSED FRACTURE OF RIGHT FEMUR WITH ROUTINE HEALING, UNSPECIFIED FRACTURE MORPHOLOGY, UNSPECIFIED PORTION OF FEMUR, SUBSEQUENT ENCOUNTER: ICD-10-CM

## 2020-01-17 DIAGNOSIS — I10 ESSENTIAL HYPERTENSION: ICD-10-CM

## 2020-01-17 LAB
ALBUMIN SERPL BCP-MCNC: 3.2 G/DL (ref 3.5–5)
ALP SERPL-CCNC: 181 U/L (ref 46–116)
ALT SERPL W P-5'-P-CCNC: 16 U/L (ref 12–78)
ANION GAP SERPL CALCULATED.3IONS-SCNC: 5 MMOL/L (ref 4–13)
AST SERPL W P-5'-P-CCNC: 14 U/L (ref 5–45)
BILIRUB SERPL-MCNC: 0.4 MG/DL (ref 0.2–1)
BUN SERPL-MCNC: 12 MG/DL (ref 5–25)
CALCIUM SERPL-MCNC: 9.1 MG/DL (ref 8.3–10.1)
CHLORIDE SERPL-SCNC: 101 MMOL/L (ref 100–108)
CHOLEST SERPL-MCNC: 124 MG/DL (ref 50–200)
CO2 SERPL-SCNC: 30 MMOL/L (ref 21–32)
CREAT SERPL-MCNC: 1.07 MG/DL (ref 0.6–1.3)
ERYTHROCYTE [DISTWIDTH] IN BLOOD BY AUTOMATED COUNT: 13.6 % (ref 11.6–15.1)
GFR SERPL CREATININE-BSD FRML MDRD: 66 ML/MIN/1.73SQ M
GLUCOSE P FAST SERPL-MCNC: 93 MG/DL (ref 65–99)
HCT VFR BLD AUTO: 39.7 % (ref 36.5–49.3)
HDLC SERPL-MCNC: 46 MG/DL
HGB BLD-MCNC: 12.6 G/DL (ref 12–17)
LDLC SERPL CALC-MCNC: 60 MG/DL (ref 0–100)
MCH RBC QN AUTO: 31.3 PG (ref 26.8–34.3)
MCHC RBC AUTO-ENTMCNC: 31.7 G/DL (ref 31.4–37.4)
MCV RBC AUTO: 99 FL (ref 82–98)
NONHDLC SERPL-MCNC: 78 MG/DL
PLATELET # BLD AUTO: 426 THOUSANDS/UL (ref 149–390)
PMV BLD AUTO: 9 FL (ref 8.9–12.7)
POTASSIUM SERPL-SCNC: 4.1 MMOL/L (ref 3.5–5.3)
PROT SERPL-MCNC: 7.1 G/DL (ref 6.4–8.2)
RBC # BLD AUTO: 4.03 MILLION/UL (ref 3.88–5.62)
SODIUM SERPL-SCNC: 136 MMOL/L (ref 136–145)
TRIGL SERPL-MCNC: 89 MG/DL
WBC # BLD AUTO: 7.25 THOUSAND/UL (ref 4.31–10.16)

## 2020-01-17 PROCEDURE — 80061 LIPID PANEL: CPT

## 2020-01-17 PROCEDURE — 36415 COLL VENOUS BLD VENIPUNCTURE: CPT

## 2020-01-17 PROCEDURE — 80053 COMPREHEN METABOLIC PANEL: CPT

## 2020-01-17 PROCEDURE — 85027 COMPLETE CBC AUTOMATED: CPT

## 2020-02-06 ENCOUNTER — APPOINTMENT (OUTPATIENT)
Dept: RADIOLOGY | Facility: MEDICAL CENTER | Age: 80
End: 2020-02-06
Payer: COMMERCIAL

## 2020-02-06 ENCOUNTER — OFFICE VISIT (OUTPATIENT)
Dept: OBGYN CLINIC | Facility: MEDICAL CENTER | Age: 80
End: 2020-02-06

## 2020-02-06 VITALS
DIASTOLIC BLOOD PRESSURE: 69 MMHG | SYSTOLIC BLOOD PRESSURE: 155 MMHG | WEIGHT: 176.8 LBS | HEART RATE: 64 BPM | BODY MASS INDEX: 26.19 KG/M2 | HEIGHT: 69 IN

## 2020-02-06 DIAGNOSIS — S72.91XD CLOSED FRACTURE OF RIGHT FEMUR WITH ROUTINE HEALING, UNSPECIFIED FRACTURE MORPHOLOGY, UNSPECIFIED PORTION OF FEMUR, SUBSEQUENT ENCOUNTER: Primary | ICD-10-CM

## 2020-02-06 DIAGNOSIS — S72.91XD CLOSED FRACTURE OF RIGHT FEMUR WITH ROUTINE HEALING, UNSPECIFIED FRACTURE MORPHOLOGY, UNSPECIFIED PORTION OF FEMUR, SUBSEQUENT ENCOUNTER: ICD-10-CM

## 2020-02-06 PROCEDURE — 73552 X-RAY EXAM OF FEMUR 2/>: CPT

## 2020-02-06 PROCEDURE — 1160F RVW MEDS BY RX/DR IN RCRD: CPT | Performed by: ORTHOPAEDIC SURGERY

## 2020-02-06 PROCEDURE — 99024 POSTOP FOLLOW-UP VISIT: CPT | Performed by: ORTHOPAEDIC SURGERY

## 2020-02-06 PROCEDURE — 3077F SYST BP >= 140 MM HG: CPT | Performed by: ORTHOPAEDIC SURGERY

## 2020-02-06 PROCEDURE — 1111F DSCHRG MED/CURRENT MED MERGE: CPT | Performed by: ORTHOPAEDIC SURGERY

## 2020-02-06 PROCEDURE — 3078F DIAST BP <80 MM HG: CPT | Performed by: ORTHOPAEDIC SURGERY

## 2020-02-06 PROCEDURE — 3008F BODY MASS INDEX DOCD: CPT | Performed by: ORTHOPAEDIC SURGERY

## 2020-02-06 NOTE — PROGRESS NOTES
Assessment/Plan:  1  Closed fracture of right femur with routine healing, unspecified fracture morphology, unspecified portion of femur, subsequent encounter      Orders Placed This Encounter   Procedures    XR femur 2 vw right    Ambulatory referral to Physical Therapy       · Reviewed x-rays with patient showing fracture healing  · Continue PT  Script provided for outpatient PT  Use walker as needed  Patient will consider  He is aware of the importance of home exercises as well  · Pain control prn- tylenol  · Completed lovenox DVT prophylaxis and has been transitioned to Eliquis by PCP  Return if symptoms worsen or fail to improve  I answered all of the patient's questions during the visit and provided education of the patient's condition during the visit  The patient verbalized understanding of the information given and agrees with the plan  This note was dictated using [employer] software  It may contain errors including improperly dictated words  Please contact physician directly for any questions  Subjective   Chief Complaint:   Chief Complaint   Patient presents with    Right Hip - Post-op, Follow-up       Nay Marie is a 78 y o  male who presents with his wife and daughter for 9 week follow up s/p right antegrade IM nail insertion on 12/4/19  Pt and family states he is doing well  Pt reports occasional pain on the lateral aspect of the right thigh  Denies taking anything for pain  He completed home PT 2 weeks ago  He ambulates with a walker but feels that he gets around well  He completed lovenox DVT prophylaxis and was transitioned to Eliquis by his PCP  Denies numbness or tingling  Review of Systems  ROS:    See HPI for musculoskeletal review     All other systems reviewed are negative     History:  Past Medical History:   Diagnosis Date    Hyperlipidemia      Past Surgical History:   Procedure Laterality Date    IR IVC OPTIONAL FILTER PLACEMENT  12/7/2019    [place] OPEN RX FEMUR FX+INTRAMED BÁRBARA Right 12/4/2019    Procedure: INSERTION NAIL IM FEMUR ANTEGRADE (TROCHANTERIC); Surgeon: Rhett Peres DO;  Location: [facility];  Service: Orthopedics     Social History   Social History     Substance and Sexual Activity   Alcohol Use Never    Frequency: Never     Social History     Substance and Sexual Activity   Drug Use Never     Social History     Tobacco Use   Smoking Status Never Smoker   Smokeless Tobacco Never Used     Family History:   Family History   Problem Relation Age of Onset    Cancer Mother        Current Outpatient Medications on File Prior to Visit   Medication Sig Dispense Refill    apixaban (ELIQUIS) 5 mg Take 1 tablet (5 mg total) by mouth 2 (two) times a day 60 tablet 1    atorvastatin (LIPITOR) 10 mg tablet Take 0 5 tablets (5 mg total) by mouth daily 90 tablet 1    citalopram (CeleXA) 40 mg tablet Take 1 tablet (40 mg total) by mouth daily 90 tablet 1    fosinopril (MONOPRIL) 10 mg tablet Take 1 tablet (10 mg total) by mouth daily 90 tablet 1    metoprolol tartrate (LOPRESSOR) 25 mg tablet Take 1 tablet (25 mg total) by mouth every 12 (twelve) hours 180 tablet 1    Multiple Vitamin (MULTIVITAMIN) tablet Take 1 tablet by mouth daily      niacin (NIASPAN) 500 mg CR tablet Take 1 tablet (500 mg total) by mouth daily at bedtime 90 tablet 1    niacin 500 mg tablet Take 500 mg by mouth daily with breakfast      senna-docusate sodium (SENOKOT S) 8 6-50 mg per tablet Take 1 tablet by mouth 2 (two) times a day (Patient taking differently: Take 1 tablet by mouth daily )  0     No current facility-administered medications on file prior to visit        No Known Allergies     Objective     /69   Pulse 64   Ht 5' 9" (1 753 m)   Wt 80 2 kg (176 lb 12 8 oz)   BMI 26 11 kg/m²      PE:  AAOx 3  WDWN  Hearing intact, no drainage from eyes  no audible wheezing  no abdominal distension  LE compartments soft, AT/GS intact    Ortho Exam:  right hip:   INCs: C/D/I, No erythema, no swelling  No pain with ROM    Imaging Studies: I have personally reviewed pertinent films in PACS  XR right hip:  S/p ORIF hip, hardware intact, fracture alignment maintained, healing evident

## 2020-03-02 ENCOUNTER — HOSPITAL ENCOUNTER (OUTPATIENT)
Dept: RADIOLOGY | Facility: HOSPITAL | Age: 80
Discharge: HOME | End: 2020-03-02
Attending: INTERNAL MEDICINE
Payer: COMMERCIAL

## 2020-03-02 ENCOUNTER — APPOINTMENT (OUTPATIENT)
Dept: LAB | Facility: CLINIC | Age: 80
End: 2020-03-02
Payer: COMMERCIAL

## 2020-03-02 DIAGNOSIS — D62 ACUTE BLOOD LOSS ANEMIA: ICD-10-CM

## 2020-03-02 DIAGNOSIS — R33.9 URINARY RETENTION: ICD-10-CM

## 2020-03-02 DIAGNOSIS — R60.0 EDEMA OF BOTH LEGS: ICD-10-CM

## 2020-03-02 DIAGNOSIS — R35.0 URINARY FREQUENCY: ICD-10-CM

## 2020-03-02 DIAGNOSIS — I26.99 ACUTE PULMONARY EMBOLISM, UNSPECIFIED PULMONARY EMBOLISM TYPE, UNSPECIFIED WHETHER ACUTE COR PULMONALE PRESENT (HCC): ICD-10-CM

## 2020-03-02 LAB
ALBUMIN SERPL BCP-MCNC: 3.5 G/DL (ref 3.5–5)
ALP SERPL-CCNC: 183 U/L (ref 46–116)
ALT SERPL W P-5'-P-CCNC: 15 U/L (ref 12–78)
ANION GAP SERPL CALCULATED.3IONS-SCNC: 2 MMOL/L (ref 4–13)
AST SERPL W P-5'-P-CCNC: 12 U/L (ref 5–45)
BILIRUB SERPL-MCNC: 0.36 MG/DL (ref 0.2–1)
BUN SERPL-MCNC: 13 MG/DL (ref 5–25)
CALCIUM SERPL-MCNC: 8.9 MG/DL (ref 8.3–10.1)
CHLORIDE SERPL-SCNC: 104 MMOL/L (ref 100–108)
CO2 SERPL-SCNC: 30 MMOL/L (ref 21–32)
CREAT SERPL-MCNC: 0.97 MG/DL (ref 0.6–1.3)
ERYTHROCYTE [DISTWIDTH] IN BLOOD BY AUTOMATED COUNT: 14.1 % (ref 11.6–15.1)
GFR SERPL CREATININE-BSD FRML MDRD: 74 ML/MIN/1.73SQ M
GLUCOSE SERPL-MCNC: 97 MG/DL (ref 65–140)
HCT VFR BLD AUTO: 40.4 % (ref 36.5–49.3)
HGB BLD-MCNC: 12.9 G/DL (ref 12–17)
MCH RBC QN AUTO: 30.7 PG (ref 26.8–34.3)
MCHC RBC AUTO-ENTMCNC: 31.9 G/DL (ref 31.4–37.4)
MCV RBC AUTO: 96 FL (ref 82–98)
PLATELET # BLD AUTO: 306 THOUSANDS/UL (ref 149–390)
PMV BLD AUTO: 9.9 FL (ref 8.9–12.7)
POTASSIUM SERPL-SCNC: 4.2 MMOL/L (ref 3.5–5.3)
PROT SERPL-MCNC: 7 G/DL (ref 6.4–8.2)
RBC # BLD AUTO: 4.2 MILLION/UL (ref 3.88–5.62)
SODIUM SERPL-SCNC: 136 MMOL/L (ref 136–145)
WBC # BLD AUTO: 6.56 THOUSAND/UL (ref 4.31–10.16)

## 2020-03-02 PROCEDURE — 93970 EXTREMITY STUDY: CPT

## 2020-03-02 PROCEDURE — 80053 COMPREHEN METABOLIC PANEL: CPT

## 2020-03-02 PROCEDURE — 36415 COLL VENOUS BLD VENIPUNCTURE: CPT

## 2020-03-02 PROCEDURE — 85027 COMPLETE CBC AUTOMATED: CPT

## 2020-03-03 PROCEDURE — 93970 EXTREMITY STUDY: CPT | Performed by: SURGERY

## 2020-03-09 ENCOUNTER — HOSPITAL ENCOUNTER (OUTPATIENT)
Dept: RADIOLOGY | Facility: HOSPITAL | Age: 80
Discharge: HOME | End: 2020-03-09
Attending: INTERNAL MEDICINE
Payer: COMMERCIAL

## 2020-03-09 DIAGNOSIS — R35.0 URINARY FREQUENCY: ICD-10-CM

## 2020-03-09 DIAGNOSIS — R33.9 URINARY RETENTION: ICD-10-CM

## 2020-03-09 PROCEDURE — 76770 US EXAM ABDO BACK WALL COMP: CPT

## 2020-03-10 PROBLEM — N30.00 ACUTE CYSTITIS: Status: ACTIVE | Noted: 2020-03-10

## 2020-05-05 PROBLEM — E87.5 HYPERKALEMIA: Status: RESOLVED | Noted: 2019-12-05 | Resolved: 2020-05-05

## 2020-05-05 PROBLEM — D72.829 LEUKOCYTOSIS: Status: RESOLVED | Noted: 2019-12-03 | Resolved: 2020-05-05

## 2020-05-05 PROBLEM — N30.00 ACUTE CYSTITIS: Status: RESOLVED | Noted: 2020-03-10 | Resolved: 2020-05-05

## 2020-06-16 PROBLEM — R60.0 EDEMA OF LEFT LOWER LEG: Status: ACTIVE | Noted: 2020-06-16

## 2020-06-17 ENCOUNTER — TRANSCRIBE ORDERS (OUTPATIENT)
Dept: ADMINISTRATIVE | Facility: HOSPITAL | Age: 80
End: 2020-06-17

## 2020-06-17 ENCOUNTER — HOSPITAL ENCOUNTER (OUTPATIENT)
Dept: RADIOLOGY | Facility: HOSPITAL | Age: 80
Discharge: HOME | End: 2020-06-17
Attending: INTERNAL MEDICINE
Payer: COMMERCIAL

## 2020-06-17 ENCOUNTER — APPOINTMENT (OUTPATIENT)
Dept: LAB | Facility: HOSPITAL | Age: 80
End: 2020-06-17
Attending: INTERNAL MEDICINE
Payer: COMMERCIAL

## 2020-06-17 DIAGNOSIS — I10 ESSENTIAL HYPERTENSION: ICD-10-CM

## 2020-06-17 DIAGNOSIS — R60.0 EDEMA OF BOTH LEGS: ICD-10-CM

## 2020-06-17 DIAGNOSIS — E78.00 HYPERCHOLESTEREMIA: ICD-10-CM

## 2020-06-17 DIAGNOSIS — I26.99 ACUTE PULMONARY EMBOLISM, UNSPECIFIED PULMONARY EMBOLISM TYPE, UNSPECIFIED WHETHER ACUTE COR PULMONALE PRESENT (HCC): ICD-10-CM

## 2020-06-17 DIAGNOSIS — S72.91XD CLOSED FRACTURE OF RIGHT FEMUR WITH ROUTINE HEALING, UNSPECIFIED FRACTURE MORPHOLOGY, UNSPECIFIED PORTION OF FEMUR, SUBSEQUENT ENCOUNTER: ICD-10-CM

## 2020-06-17 DIAGNOSIS — M32.9 LUPUS (HCC): ICD-10-CM

## 2020-06-17 LAB
ALBUMIN SERPL BCP-MCNC: 3.4 G/DL (ref 3.5–5)
ALP SERPL-CCNC: 129 U/L (ref 46–116)
ALT SERPL W P-5'-P-CCNC: 12 U/L (ref 12–78)
ANION GAP SERPL CALCULATED.3IONS-SCNC: 3 MMOL/L (ref 4–13)
AST SERPL W P-5'-P-CCNC: 15 U/L (ref 5–45)
BILIRUB SERPL-MCNC: 0.6 MG/DL (ref 0.2–1)
BUN SERPL-MCNC: 13 MG/DL (ref 5–25)
CALCIUM SERPL-MCNC: 8.8 MG/DL (ref 8.3–10.1)
CHLORIDE SERPL-SCNC: 105 MMOL/L (ref 100–108)
CHOLEST SERPL-MCNC: 122 MG/DL (ref 50–200)
CO2 SERPL-SCNC: 31 MMOL/L (ref 21–32)
CREAT SERPL-MCNC: 0.94 MG/DL (ref 0.6–1.3)
GFR SERPL CREATININE-BSD FRML MDRD: 76 ML/MIN/1.73SQ M
GLUCOSE P FAST SERPL-MCNC: 96 MG/DL (ref 65–99)
HDLC SERPL-MCNC: 51 MG/DL
LDLC SERPL CALC-MCNC: 58 MG/DL (ref 0–100)
NONHDLC SERPL-MCNC: 71 MG/DL
POTASSIUM SERPL-SCNC: 4.5 MMOL/L (ref 3.5–5.3)
PROT SERPL-MCNC: 6.7 G/DL (ref 6.4–8.2)
SODIUM SERPL-SCNC: 139 MMOL/L (ref 136–145)
TRIGL SERPL-MCNC: 65 MG/DL

## 2020-06-17 PROCEDURE — 86038 ANTINUCLEAR ANTIBODIES: CPT

## 2020-06-17 PROCEDURE — 93970 EXTREMITY STUDY: CPT | Performed by: SURGERY

## 2020-06-17 PROCEDURE — 80061 LIPID PANEL: CPT

## 2020-06-17 PROCEDURE — 80053 COMPREHEN METABOLIC PANEL: CPT

## 2020-06-17 PROCEDURE — 36415 COLL VENOUS BLD VENIPUNCTURE: CPT

## 2020-06-17 PROCEDURE — 93970 EXTREMITY STUDY: CPT

## 2020-06-19 LAB — RYE IGE QN: NEGATIVE

## 2020-06-23 LAB — F5 GENE MUT ANL BLD/T: NORMAL

## 2020-06-29 PROBLEM — I48.92 PAROXYSMAL ATRIAL FLUTTER (HCC): Status: ACTIVE | Noted: 2020-06-29
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
## 2020-06-29 PROBLEM — N50.89 SCROTAL SWELLING: Status: RESOLVED | Noted: 2019-12-07 | Resolved: 2020-06-29

## 2020-08-04 DIAGNOSIS — Z95.828 S/P IVC FILTER: Primary | ICD-10-CM

## 2020-08-13 ENCOUNTER — TELEPHONE (OUTPATIENT)
Dept: RADIOLOGY | Facility: HOSPITAL | Age: 80
End: 2020-08-13

## 2020-08-13 NOTE — TELEPHONE ENCOUNTER
Spoke to patient and patient's wife regarding IVC filter conversion on Friday, 8/21  Patient instructed to arrive to hospital at 7:30am, NPO after midnight, and to have a   Patient also instructed he is to get tested for Covid-19 prior to the procedure per [place] regulation  Both patient and spouse state understanding

## 2020-08-15 DIAGNOSIS — U07.1 COVID-19: ICD-10-CM

## 2020-08-15 PROCEDURE — U0003 INFECTIOUS AGENT DETECTION BY NUCLEIC ACID (DNA OR RNA); SEVERE ACUTE RESPIRATORY SYNDROME CORONAVIRUS 2 (SARS-COV-2) (CORONAVIRUS DISEASE [COVID-19]), AMPLIFIED PROBE TECHNIQUE, MAKING USE OF HIGH THROUGHPUT TECHNOLOGIES AS DESCRIBED BY CMS-2020-01-R: HCPCS | Performed by: INTERNAL MEDICINE

## 2020-08-17 LAB — SARS-COV-2 RNA SPEC QL NAA+PROBE: NOT DETECTED

## 2020-08-20 ENCOUNTER — TELEPHONE (OUTPATIENT)
Dept: SURGERY | Facility: HOSPITAL | Age: 80
End: 2020-08-20

## 2020-08-20 RX ORDER — SODIUM CHLORIDE 9 MG/ML
50 INJECTION, SOLUTION INTRAVENOUS CONTINUOUS
Status: CANCELLED | OUTPATIENT
Start: 2020-08-20

## 2020-08-21 ENCOUNTER — HOSPITAL ENCOUNTER (OUTPATIENT)
Dept: NON INVASIVE DIAGNOSTICS | Facility: HOSPITAL | Age: 80
Discharge: HOME | End: 2020-08-21
Admitting: RADIOLOGY
Payer: COMMERCIAL

## 2020-08-21 VITALS
DIASTOLIC BLOOD PRESSURE: 69 MMHG | RESPIRATION RATE: 18 BRPM | TEMPERATURE: 97.5 F | SYSTOLIC BLOOD PRESSURE: 124 MMHG | WEIGHT: 179.5 LBS | OXYGEN SATURATION: 94 % | HEART RATE: 54 BPM | BODY MASS INDEX: 26.51 KG/M2

## 2020-08-21 DIAGNOSIS — Z95.828 S/P IVC FILTER: ICD-10-CM

## 2020-08-21 PROCEDURE — C1894 INTRO/SHEATH, NON-LASER: HCPCS

## 2020-08-21 PROCEDURE — C1769 GUIDE WIRE: HCPCS

## 2020-08-21 PROCEDURE — 37799 UNLISTED PX VASCULAR SURGERY: CPT | Performed by: RADIOLOGY

## 2020-08-21 PROCEDURE — 99152 MOD SED SAME PHYS/QHP 5/>YRS: CPT | Performed by: RADIOLOGY

## 2020-08-21 PROCEDURE — 99152 MOD SED SAME PHYS/QHP 5/>YRS: CPT

## 2020-08-21 PROCEDURE — 37197 REMOVE INTRVAS FOREIGN BODY: CPT

## 2020-08-21 PROCEDURE — 99153 MOD SED SAME PHYS/QHP EA: CPT

## 2020-08-21 PROCEDURE — C1773 RET DEV, INSERTABLE: HCPCS

## 2020-08-21 RX ORDER — FENTANYL CITRATE 50 UG/ML
INJECTION, SOLUTION INTRAMUSCULAR; INTRAVENOUS CODE/TRAUMA/SEDATION MEDICATION
Status: COMPLETED | OUTPATIENT
Start: 2020-08-21 | End: 2020-08-21

## 2020-08-21 RX ORDER — ACETAMINOPHEN 325 MG/1
650 TABLET ORAL EVERY 4 HOURS PRN
Status: DISCONTINUED | OUTPATIENT
Start: 2020-08-21 | End: 2020-08-22 | Stop reason: HOSPADM

## 2020-08-21 RX ORDER — MIDAZOLAM HYDROCHLORIDE 2 MG/2ML
INJECTION, SOLUTION INTRAMUSCULAR; INTRAVENOUS CODE/TRAUMA/SEDATION MEDICATION
Status: COMPLETED | OUTPATIENT
Start: 2020-08-21 | End: 2020-08-21

## 2020-08-21 RX ORDER — LIDOCAINE WITH 8.4% SOD BICARB 0.9%(10ML)
SYRINGE (ML) INJECTION CODE/TRAUMA/SEDATION MEDICATION
Status: COMPLETED | OUTPATIENT
Start: 2020-08-21 | End: 2020-08-21

## 2020-08-21 RX ORDER — OXYCODONE HYDROCHLORIDE 5 MG/1
5 TABLET ORAL EVERY 4 HOURS PRN
Status: DISCONTINUED | OUTPATIENT
Start: 2020-08-21 | End: 2020-08-22 | Stop reason: HOSPADM

## 2020-08-21 RX ORDER — SODIUM CHLORIDE 9 MG/ML
50 INJECTION, SOLUTION INTRAVENOUS CONTINUOUS
Status: DISCONTINUED | OUTPATIENT
Start: 2020-08-21 | End: 2020-08-22 | Stop reason: HOSPADM

## 2020-08-21 RX ADMIN — SODIUM CHLORIDE 50 ML/HR: 0.9 INJECTION, SOLUTION INTRAVENOUS at 07:40

## 2020-08-21 RX ADMIN — FENTANYL CITRATE 50 MCG: 50 INJECTION, SOLUTION INTRAMUSCULAR; INTRAVENOUS at 08:48

## 2020-08-21 RX ADMIN — MIDAZOLAM HYDROCHLORIDE 1 MG: 1 INJECTION, SOLUTION INTRAMUSCULAR; INTRAVENOUS at 08:48

## 2020-08-21 RX ADMIN — IODIXANOL 30 ML: 320 INJECTION, SOLUTION INTRAVASCULAR at 09:45

## 2020-08-21 RX ADMIN — Medication 5 ML: at 08:49

## 2020-08-21 NOTE — BRIEF OP NOTE (RAD/CATH)
IR IVC FILTER REMOVAL Procedure Note    PATIENT NAME: Sadia Madsen  : 1940  MRN: [id number]    Pre-op Diagnosis:   1  S/P IVC filter      Post-op Diagnosis:   1   S/P IVC filter        Surgeon:   Teresita Malloy MD  Assistants:     No qualified resident was available, Resident is only observing    Estimated Blood Loss: minimal     Findings:     IVC filter conversion with removal of cap and venogram with no thrombus within the cava, cava intact on completion venogram      Specimens: none    Complications:  none    Anesthesia: Conscious sedation and Destiny Gonzalez MD     Date: 2020  Time: 9:39 AM

## 2020-08-21 NOTE — SEDATION DOCUMENTATION
Procedure ended  IVC filter converted without incident  Manual pressure held to right neck site  Steri-strips telfa, tegaderm to site  Dressing is clean, dry, and intact   Patient tolerated well and transferred to [facility] in stable condition

## 2020-08-21 NOTE — DISCHARGE INSTRUCTIONS
Inferior Vena Cava Filter Removal     WHAT YOU NEED TO KNOW:   Inferior vena cava (IVC) filter removal is a procedure to remove your IVC filter  DISCHARGE INSTRUCTIONS:     Wound care: Keep your wound clean and dry  Bandaide may come off in 24 hours  Self-care:   · Limit activity for 24 hours  Slowly start to do more each day  Return to your daily activities as directed  · Resume your normal diet  Small sips of flat soda will help with nausea  Contact Interventional Radiology at [phone] [facility] PATIENTS: Contact Interventional Radiology at [phone]) [facility] [place] PATIENTS: Contact Interventional Radiology at [phone]) if:  · You have a fever  · Persistent nausea or vomiting  · You have chills, a cough, or feel weak and achy  · Your wound is red, swollen, or draining pus  · You have questions or concerns about your condition  Procedural Sedation   WHAT YOU NEED TO KNOW:   Procedural sedation is medicine used during procedures to help you feel relaxed and calm  You will remember little to none of the procedure  After sedation you may feel tired, weak, or unsteady on your feet  You may also have trouble concentrating or short-term memory loss  These symptoms should go away in 24 hours or less  DISCHARGE INSTRUCTIONS:     Call 911 or have someone else call for any of the following:   · You have sudden trouble breathing      · You cannot be woken        Contact Interventional Radiology at [phone]   [facility] PATIENTS: Contact Interventional Radiology at [phone]) [facility] [place] PATIENTS: Contact Interventional Radiology at [phone]) if any of the following occur:     · You have a severe headache or dizziness      · Your heart is beating faster than usual     · You have a fever or chills      · Your skin is itchy, swollen, or you have a rash      · You have nausea or are vomiting for more than 8 hours after the procedure       · You have questions or concerns about your condition or care   Self-care:   · Have someone stay with you for 24 hours  This person can drive you to errands and help you do things around the house  This person can also watch for problems       · Rest and do quiet activities for 24 hours  Do not exercise, ride a bike, or play sports  Stand up slowly to prevent dizziness and falls  Take short walks around the house with another person  Slowly return to your usual activities the next day       · Do not drive or use dangerous machines or tools for 24 hours  You may injure yourself or others  Examples include a lawnmower, saw, or drill  Do not return to work for 24 hours if you use dangerous machines or tools for work       · Do not make important decisions for 24 hours  For example, do not sign important papers or invest money       · Drink liquids as directed  Liquids help flush the sedation medicine out of your body  Ask how much liquid to drink each day and which liquids are best for you       · Eat small, frequent meals to prevent nausea and vomiting  Start with clear liquids such as juice or broth  If you do not vomit after clear liquids, you can eat your usual foods       · Do not drink alcohol or take medicines that make you drowsy  This includes medicines that help you sleep and anxiety medicines  Ask your healthcare provider if it is safe for you to take pain medicine  Follow up with your healthcare provider as directed: Write down your questions so you remember to ask them during your visits

## 2020-10-26 ENCOUNTER — LAB (OUTPATIENT)
Dept: LAB | Facility: CLINIC | Age: 80
End: 2020-10-26
Payer: COMMERCIAL

## 2020-10-26 DIAGNOSIS — I10 ESSENTIAL HYPERTENSION: ICD-10-CM

## 2020-10-26 DIAGNOSIS — E78.2 MIXED HYPERLIPIDEMIA: ICD-10-CM

## 2020-10-26 LAB
ALBUMIN SERPL BCP-MCNC: 3.8 G/DL (ref 3.5–5)
ALP SERPL-CCNC: 109 U/L (ref 46–116)
ALT SERPL W P-5'-P-CCNC: 15 U/L (ref 12–78)
ANION GAP SERPL CALCULATED.3IONS-SCNC: -1 MMOL/L (ref 4–13)
AST SERPL W P-5'-P-CCNC: 14 U/L (ref 5–45)
BILIRUB SERPL-MCNC: 0.79 MG/DL (ref 0.2–1)
BUN SERPL-MCNC: 13 MG/DL (ref 5–25)
CALCIUM SERPL-MCNC: 9.2 MG/DL (ref 8.3–10.1)
CHLORIDE SERPL-SCNC: 106 MMOL/L (ref 100–108)
CHOLEST SERPL-MCNC: 130 MG/DL (ref 50–200)
CO2 SERPL-SCNC: 33 MMOL/L (ref 21–32)
CREAT SERPL-MCNC: 0.96 MG/DL (ref 0.6–1.3)
GFR SERPL CREATININE-BSD FRML MDRD: 74 ML/MIN/1.73SQ M
GLUCOSE P FAST SERPL-MCNC: 94 MG/DL (ref 65–99)
HDLC SERPL-MCNC: 65 MG/DL
LDLC SERPL CALC-MCNC: 51 MG/DL (ref 0–100)
NONHDLC SERPL-MCNC: 65 MG/DL
POTASSIUM SERPL-SCNC: 4.3 MMOL/L (ref 3.5–5.3)
PROT SERPL-MCNC: 7.2 G/DL (ref 6.4–8.2)
SODIUM SERPL-SCNC: 138 MMOL/L (ref 136–145)
TRIGL SERPL-MCNC: 71 MG/DL

## 2020-10-26 PROCEDURE — 80061 LIPID PANEL: CPT

## 2020-10-26 PROCEDURE — 36415 COLL VENOUS BLD VENIPUNCTURE: CPT

## 2020-10-26 PROCEDURE — 80053 COMPREHEN METABOLIC PANEL: CPT

## 2020-11-03 PROBLEM — S72.91XA FEMUR FRACTURE, RIGHT (HCC): Status: RESOLVED | Noted: 2019-12-03 | Resolved: 2020-11-03

## 2020-11-03 PROBLEM — R33.9 URINARY RETENTION: Status: RESOLVED | Noted: 2020-03-02 | Resolved: 2020-11-03

## 2020-11-03 PROBLEM — S72.90XA CLOSED FEMUR FRACTURE (HCC): Status: RESOLVED | Noted: 2019-12-03 | Resolved: 2020-11-03

## 2020-11-03 PROBLEM — D62 ACUTE BLOOD LOSS ANEMIA: Status: RESOLVED | Noted: 2019-12-05 | Resolved: 2020-11-03

## 2020-11-05 PROBLEM — Z00.00 MEDICARE ANNUAL WELLNESS VISIT, SUBSEQUENT: Status: ACTIVE | Noted: 2020-11-05

## 2021-02-02 ENCOUNTER — TELEPHONE (OUTPATIENT)
Dept: ADMINISTRATIVE | Facility: OTHER | Age: 81
End: 2021-02-02

## 2021-02-02 ENCOUNTER — TELEMEDICINE (OUTPATIENT)
Dept: INTERNAL MEDICINE CLINIC | Facility: CLINIC | Age: 81
End: 2021-02-02
Payer: COMMERCIAL

## 2021-02-02 VITALS
BODY MASS INDEX: 27.11 KG/M2 | HEIGHT: 69 IN | WEIGHT: 183 LBS | DIASTOLIC BLOOD PRESSURE: 63 MMHG | HEART RATE: 65 BPM | SYSTOLIC BLOOD PRESSURE: 133 MMHG

## 2021-02-02 DIAGNOSIS — I10 ESSENTIAL HYPERTENSION: Primary | ICD-10-CM

## 2021-02-02 DIAGNOSIS — I35.1 NONRHEUMATIC AORTIC VALVE INSUFFICIENCY: ICD-10-CM

## 2021-02-02 DIAGNOSIS — H90.5 SENSORINEURAL HEARING LOSS (SNHL), UNSPECIFIED LATERALITY: ICD-10-CM

## 2021-02-02 DIAGNOSIS — I34.0 NONRHEUMATIC MITRAL VALVE REGURGITATION: ICD-10-CM

## 2021-02-02 DIAGNOSIS — F42.2 MIXED OBSESSIONAL THOUGHTS AND ACTS: ICD-10-CM

## 2021-02-02 DIAGNOSIS — F33.42 RECURRENT MAJOR DEPRESSIVE DISORDER, IN FULL REMISSION (HCC): ICD-10-CM

## 2021-02-02 DIAGNOSIS — E78.2 MIXED HYPERLIPIDEMIA: ICD-10-CM

## 2021-02-02 DIAGNOSIS — E55.9 VITAMIN D DEFICIENCY: ICD-10-CM

## 2021-02-02 DIAGNOSIS — E78.00 HYPERCHOLESTEREMIA: ICD-10-CM

## 2021-02-02 PROCEDURE — 99442 PR PHYS/QHP TELEPHONE EVALUATION 11-20 MIN: CPT | Performed by: INTERNAL MEDICINE

## 2021-02-02 RX ORDER — ATORVASTATIN CALCIUM 10 MG/1
5 TABLET, FILM COATED ORAL DAILY
Qty: 45 TABLET | Refills: 1 | Status: SHIPPED | OUTPATIENT
Start: 2021-02-02 | End: 2021-06-25

## 2021-02-02 RX ORDER — FOSINOPRIL SODIUM 10 MG/1
TABLET ORAL
Qty: 45 TABLET | Refills: 1 | Status: SHIPPED | OUTPATIENT
Start: 2021-02-02 | End: 2021-06-25

## 2021-02-02 NOTE — PATIENT INSTRUCTIONS
Follow with Consultants as per their and our suggestion    Follow up in 12 week(s) or as needed earlier    Follow all instructions as advised and discussed  Take your medications as prescribed  Call the office immediately if you experience any side effects  Ask questions if you do not understand  Keep your scheduled appointment as advised or come sooner if necessary or in doubt  Best time to call for non-urgent matter or questions on weekdays is between 9am and 12 noon  See physician for any new symptoms or worsening of current symptoms  Urgent or emergent situations call 911 and report to nearest emergency room      I spent  25 minutes taking care of this patient including clinical care, conseling, collaboration, chart, lab and consultaion review as appropriate    Patient is to get labs 1 week(s) prior to next visit if advised

## 2021-02-02 NOTE — TELEPHONE ENCOUNTER
----- Message from Filomena Minaya MD sent at 2/2/2021 10:40 AM EST -----  Regarding: colonoscopy  02/02/21 10:40 AM    Hello, our patient Zechariah Lloyd has had CRC: Colonoscopy completed/performed  Please assist in updating the patient chart by making an External outreach to [facility] facility located in [place]  The date of service is 2016 Dr Cassie Aj      Thank you,  Filomena Minaya MD  [facility] INTERNAL MED

## 2021-02-02 NOTE — TELEPHONE ENCOUNTER
Upon review of the In Basket request and the patient's chart, initial outreach has been made via fax, please see Contacts section for details       Thank you  Nasir Orozco

## 2021-02-02 NOTE — ASSESSMENT & PLAN NOTE
Hypertension( High Blood Pressure ):    Continue Home BP check daily and bring log, if you are not checking consider checking daily    Take your Blood Pressure medicine as advised    Do not take your Blood pressure medicine if systolic Blood Pressure (top number) is less than 100 or heart rate less than 60  Notify you physician

## 2021-02-02 NOTE — LETTER
Procedure Request Form: Colonoscopy      Date Requested: 21  Patient: Gin Rochelle  Patient : 1940   Referring Provider: Wilson Verasis, MD        Date of Procedure ______________________________       The above patient has informed us that they have completed their   most recent Colonoscopy at your facility  Please complete   this form and attach all corresponding procedure reports/results  Comments __________________________________________________________  ____________________________________________________________________  ____________________________________________________________________  ____________________________________________________________________    Facility Completing Procedure _________________________________________    Form Completed By (print name) _______________________________________      Signature __________________________________________________________      These reports are needed for  compliance    Please fax this completed form and a copy of the procedure report to our office located at [place] [address] as soon as possible to [phone] attention Sridhar Hickman: Phone [phone]    We thank you for your assistance in treating our mutual patient

## 2021-02-02 NOTE — ASSESSMENT & PLAN NOTE
Pt's depression is well controlled    Pt is tolerating current psych medicine regimen and regimen is effective  Pt does not have any side effects of medicine  Refer to Med List for Psych Medicine  Will continue same regimen

## 2021-02-02 NOTE — PROGRESS NOTES
Virtual Brief Visit    Assessment/Plan:    Problem List Items Addressed This Visit        Cardiovascular and Mediastinum    Essential hypertension - Primary     Hypertension( High Blood Pressure ):    Continue Home BP check daily and bring log, if you are not checking consider checking daily    Take your Blood Pressure medicine as advised    Do not take your Blood pressure medicine if systolic Blood Pressure (top number) is less than 100 or heart rate less than 60  Notify you physician  Other    Mixed obsessional thoughts and acts     Fair at this time         Recurrent major depressive disorder, in full remission ([facility] [address] )     Pt's depression is well controlled    Pt is tolerating current psych medicine regimen and regimen is effective  Pt does not have any side effects of medicine  Refer to Med List for Psych Medicine  Will continue same regimen  Hyperlipidemia     Cholesterol    Eat low cholesterol diet    Continue taking your cholesterol medicine as advised    Call if any side effects    Lipid Profile and LFT prior to next visit or as advised  ( You should Get periodically to monitor liver side effects)    KNOW YOUR DIABETIC GOAL( HBA1C AND SUGAR), BLOOD PRESSURE TARGET NUMBER AND CHOLESTEROL( LDL, HDL AND TRIGLYCERIDE)   Reason for visit is   Chief Complaint   Patient presents with    Virtual Brief Visit        Encounter provider Owen James MD    Provider located at [address] [address]    Recent Visits  No visits were found meeting these conditions     Showing recent visits within past 7 days and meeting all other requirements     Today's Visits  Date Type Provider Dept   02/02/21 Telemedicine Owen James MD [facility] Internal [facility]   Showing today's visits and meeting all other requirements     Future Appointments  No visits were found meeting these conditions  Showing future appointments within next 150 days and meeting all other requirements        After connecting through telephone, the patient was identified by name and date of birth  Zechariah Lloyd was informed that this is a telemedicine visit and that the visit is being conducted through telephone  My office door was closed  No one else was in the room  He acknowledged consent and understanding of privacy and security of the platform  The patient has agreed to participate and understands he can discontinue the visit at any time  Patient is aware this is a billable service  Subjective    Zechariah Lloyd is a [de-identified] y o  male chronic disae management  Mr Renetta Hernandez was seen today for management of his chronic medical issues  Patient states that he has been well, and reports no acute issues  He indicates compliance with medication regime  HTN: Patient reports no symptoms  Intermittent ambulatory BP measurements but states within appropriate ranges  No side-effects reported on ACEi and beta-blocker  Hx of PE: Patient reports that IV filter has been removed  Currently not on blood thinner, sx free    Edema of legs better    Allergic rhinitis:  Symptom-free  Hyperlipidemia remains on atorvastatin 10 mg half tablet daily d  Tolerating medication without side effect  Blood sugar borderline will monitor  Chronic depression and obsessive-compulsive are fairly same a tolerating antidepressant without side effect    PHQ-9 Follow-up    Little interest or pleasure in doing things: 0 - not at all  Feeling down, depressed, or hopeless: 0 - not at all  Trouble falling or staying asleep, or sleeping too much: 0 - not at all  Feeling tired or having little energy: 0 - not at all  Poor appetite or overeatin - not at all  Feeling bad about yourself - or that you are a failure or have let   yourself or your family down: 0 - not at all  Trouble concentrating on things, such as reading the newspaper or watching   television: 0 - not at all  Moving or speaking so slowly that other people could have noticed  Or the   opposite - being so fidgety or restless that you have been moving around a   lot more than usual: 0 - not at all  Thoughts that you would be better off dead, or of hurting yourself in some   way: 0 - not at all  PHQ-2 Score: 0  PHQ-9 Score: 0     MAGALY-7 Flowsheet Screening      Most Recent Value   Over the last two weeks, how often have you been bothered by the following   problems? Feeling nervous, anxious, or on edge  1   Not being able to stop or control worrying  1   Worrying too much about different things  1   Trouble relaxing   0   Being so restless that it's hard to sit still  0   Becoming easily annoyed or irritable   0   Feeling afraid as if something awful might happen  0   How difficult have these problems made it for you to do your work, take   care of things at home, or get along with other people? Not difficult at   all   MAGALY Score   3          Mild-to-moderate aortic regurgitation, mild mitral regurgitation and normal ejection fraction with normal nuclear stress test on 03/19/2018 an echocardiogram on 03/20/2018 fair  In the no cancer screen per his choice  Medications reviewed        Colonoscopy done on 12/26/2016 last time  A  Fib: pulse regular      No visits with results within 2 Week(s) from this visit    Latest known visit with results is:  Lab on 10/26/2020  Cholesterol               Value: 130(mg/dL)         Dt: 10/26/2020  Triglycerides             Value: 71(mg/dL)          Dt: 10/26/2020  HDL, Direct               Value: 65(mg/dL)          Dt: 10/26/2020  LDL Calculated            Value: 51(mg/dL)          Dt: 10/26/2020  Non-HDL-Chol (CHOL-HDL)   Value: 65(mg/dl)          Dt: 10/26/2020  Sodium                    Value: 138(mmol/L)        Dt: 10/26/2020  Potassium                 Value: 4 3(mmol/L)        Dt: 10/26/2020  Chloride                  Value: 106(mmol/L)        Dt: 10/26/2020  CO2                       Value: 33(mmol/L)*        Dt: 10/26/2020  ANION GAP                 Value: -1(mmol/L)*        Dt: 10/26/2020  BUN                       Value: 13(mg/dL)          Dt: 10/26/2020  Creatinine                Value: 0 96(mg/dL)        Dt: 10/26/2020  Glucose, Fasting          Value: 94(mg/dL)          Dt: 10/26/2020  Calcium                   Value: 9 2(mg/dL)         Dt: 10/26/2020  AST                       Value: 14(U/L)            Dt: 10/26/2020  ALT                       Value: 15(U/L)            Dt: 10/26/2020  Alkaline Phosphatase      Value: 109(U/L)           Dt: 10/26/2020  Total Protein             Value: 7 2(g/dL)          Dt: 10/26/2020  Albumin                   Value: 3 8(g/dL)          Dt: 10/26/2020  Total Bilirubin           Value: 0 79(mg/dL)        Dt: 10/26/2020  eGFR                      Value: 74(ml/min/1 73sq m) Dt: 10/26/2020  ------------ - 2 weeks    Cholesterol               Value: 130(mg/dL)         Dt: 10/26/2020  Triglycerides             Value: 71(mg/dL)          Dt: 10/26/2020  HDL, Direct               Value: 65(mg/dL)          Dt: 10/26/2020  LDL Calculated            Value: 51(mg/dL)          Dt: 10/26/2020  Non-HDL-Chol (CHOL-HDL)   Value: 65(mg/dl)          Dt: 10/26/2020  Sodium                    Value: 138(mmol/L)        Dt: 10/26/2020  Potassium                 Value: 4 3(mmol/L)        Dt: 10/26/2020  Chloride                  Value: 106(mmol/L)        Dt: 10/26/2020  CO2                       Value: 33(mmol/L)*        Dt: 10/26/2020  ANION GAP                 Value: -1(mmol/L)*        Dt: 10/26/2020  BUN                       Value: 13(mg/dL)          Dt: 10/26/2020  Creatinine                Value: 0 96(mg/dL)        Dt: 10/26/2020  Glucose, Fasting          Value: 94(mg/dL)          Dt: 10/26/2020  Calcium                   Value: 9 2(mg/dL)         Dt: 10/26/2020  AST                       Value: 14(U/L) Dt: 10/26/2020  ALT                       Value: 15(U/L)            Dt: 10/26/2020  Alkaline Phosphatase      Value: 109(U/L)           Dt: 10/26/2020  Total Protein             Value: 7 2(g/dL)          Dt: 10/26/2020  Albumin                   Value: 3 8(g/dL)          Dt: 10/26/2020  Total Bilirubin           Value: 0 79(mg/dL)        Dt: 10/26/2020  eGFR                      Value: 74(ml/min/1 73sq m) Dt: 10/26/2020      Hypertension  This is a chronic problem  The problem is controlled  Pertinent negatives include no chest pain, headaches, palpitations or shortness of breath  There are no associated agents to hypertension  Risk factors for coronary artery disease include dyslipidemia  The current treatment provides significant improvement  There is no history of angina, CVA, heart failure or PVD  Hyperlipidemia  Pertinent negatives include no chest pain, myalgias or shortness of breath  Past Medical History:   Diagnosis Date    Hyperlipidemia        Past Surgical History:   Procedure Laterality Date    IR IVC FILTER PLACEMENT OPTIONAL/TEMPORARY  12/7/2019    IR IVC FILTER REMOVAL  8/21/2020    [place] OPEN RX FEMUR FX+INTRAMED BÁRBARA Right 12/4/2019    Procedure: INSERTION NAIL IM FEMUR ANTEGRADE (TROCHANTERIC);   Surgeon: Giovanna Kaufman DO;  Location: [facility];  Service: Orthopedics       Current Outpatient Medications   Medication Sig Dispense Refill    atorvastatin (LIPITOR) 10 mg tablet TAKE 1/2 TABLET BY MOUTH DAILY 90 tablet 1    citalopram (CeleXA) 40 mg tablet Take 1 tablet (40 mg total) by mouth daily 90 tablet 1    fosinopril (MONOPRIL) 10 mg tablet TAKE ONE-HALF TABLET DAILY 45 tablet 1    metoprolol tartrate (LOPRESSOR) 25 mg tablet Take 1 tablet (25 mg total) by mouth every 12 (twelve) hours 180 tablet 1    Multiple Vitamin (MULTIVITAMIN) tablet Take 1 tablet by mouth daily      niacin (NIASPAN) 500 mg CR tablet TAKE ONE TABLET BY MOUTH EVERY DAY AT BEDTIME 90 tablet 1     No current facility-administered medications for this visit  No Known Allergies    Review of Systems   Constitutional: Negative for chills, fatigue, fever and unexpected weight change  HENT: Negative for ear pain, postnasal drip, sinus pain and sore throat  Eyes: Negative for pain and redness  Respiratory: Negative for cough and shortness of breath  Cardiovascular: Negative for chest pain and palpitations  Gastrointestinal: Negative for abdominal pain, diarrhea and nausea  Endocrine: Negative for cold intolerance, polydipsia and polyuria  Genitourinary: Negative for dysuria, frequency and urgency  Musculoskeletal: Positive for gait problem  Negative for arthralgias and myalgias  Skin: Negative for rash  Allergic/Immunologic: Negative  Neurological: Negative for dizziness, tremors, weakness and headaches  Hematological: Negative for adenopathy  Psychiatric/Behavioral: Negative for behavioral problems, confusion, decreased concentration, dysphoric mood and self-injury  The patient is nervous/anxious  The patient is not hyperactive  There were no vitals filed for this visit  I spent 15 minutes directly with the patient during this visit    VIRTUAL VISIT [address] acknowledges that he has consented to an online visit or consultation  He understands that the online visit is based solely on information provided by him, and that, in the absence of a face-to-face physical evaluation by the physician, the diagnosis he receives is both limited and provisional in terms of accuracy and completeness  This is not intended to replace a full medical face-to-face evaluation by the physician  Genny Proctor understands and accepts these terms

## 2021-02-02 NOTE — LETTER
[facility] Medical Records Request for Care Gap Closure    Medical Records Fax: [phone]    Date Requested: 21  Patient: Demetrio Bamberger  Patient : 1940   Requesting on behalf of Provider: MD Soraya Mai MD has requested the retrieval and updating of CRC: Colonoscopy  The office staff has provided us with the following information listed below  Prior to sending this request I have reviewed Epic Chart Review, completed an Epic Chart Search, and reviewed Care Everywhere documents  Estimated Date of Service:   Facility: [facility]  Specialty: Paulo Zamudio  Performing Provider: Dr Allie Morse  Additional Comments: [facility] stated not in there old computer system  Your assistance in completing this request is greatly appreciated  Please send document to [phone] rafal Garcia: Phone [phone]       Sincerely,      Christi Hawkins

## 2021-02-04 NOTE — TELEPHONE ENCOUNTER
Upon review of the In Basket request we have found as a result of outreach that patient did not have the requested item(s) completed  As per Medical Records, there was no Colonoscopy done  Any additional questions or concerns should be emailed to the Practice Liaisons via [email or web address]  [email or web address] email, please do not reply via In Basket      Thank you  Sohan Finney

## 2021-02-04 NOTE — TELEPHONE ENCOUNTER
Upon review of the In Basket request and the patient's chart, initial outreach has been made via fax, please see Contacts section for details  As per [facility], patient is not in there old computer system   I am checking with [address] [place] [address]     Thank you  Toni Abad

## 2021-03-03 ENCOUNTER — TELEPHONE (OUTPATIENT)
Dept: INTERNAL MEDICINE CLINIC | Facility: CLINIC | Age: 81
End: 2021-03-03

## 2021-03-03 NOTE — TELEPHONE ENCOUNTER
Spoke with pharmacist today about cancelling renewals for Tamsulosin which pt is no longer on  Pharmacist agreed

## 2021-04-26 ENCOUNTER — APPOINTMENT (OUTPATIENT)
Dept: LAB | Facility: CLINIC | Age: 81
End: 2021-04-26
Payer: COMMERCIAL

## 2021-04-26 DIAGNOSIS — E78.2 MIXED HYPERLIPIDEMIA: ICD-10-CM

## 2021-04-26 DIAGNOSIS — I10 ESSENTIAL HYPERTENSION: ICD-10-CM

## 2021-04-26 DIAGNOSIS — F42.2 MIXED OBSESSIONAL THOUGHTS AND ACTS: ICD-10-CM

## 2021-04-26 DIAGNOSIS — F33.42 RECURRENT MAJOR DEPRESSIVE DISORDER, IN FULL REMISSION (HCC): ICD-10-CM

## 2021-04-26 DIAGNOSIS — E55.9 VITAMIN D DEFICIENCY: ICD-10-CM

## 2021-04-26 LAB
25(OH)D3 SERPL-MCNC: 26.6 NG/ML (ref 30–100)
ALBUMIN SERPL BCP-MCNC: 3.5 G/DL (ref 3.5–5)
ALP SERPL-CCNC: 89 U/L (ref 46–116)
ALT SERPL W P-5'-P-CCNC: 18 U/L (ref 12–78)
ANION GAP SERPL CALCULATED.3IONS-SCNC: 4 MMOL/L (ref 4–13)
AST SERPL W P-5'-P-CCNC: 14 U/L (ref 5–45)
BASOPHILS # BLD AUTO: 0.04 THOUSANDS/ΜL (ref 0–0.1)
BASOPHILS NFR BLD AUTO: 1 % (ref 0–1)
BILIRUB SERPL-MCNC: 0.81 MG/DL (ref 0.2–1)
BUN SERPL-MCNC: 14 MG/DL (ref 5–25)
CALCIUM SERPL-MCNC: 9.1 MG/DL (ref 8.3–10.1)
CHLORIDE SERPL-SCNC: 107 MMOL/L (ref 100–108)
CHOLEST SERPL-MCNC: 127 MG/DL (ref 50–200)
CO2 SERPL-SCNC: 29 MMOL/L (ref 21–32)
CREAT SERPL-MCNC: 1.02 MG/DL (ref 0.6–1.3)
EOSINOPHIL # BLD AUTO: 0.3 THOUSAND/ΜL (ref 0–0.61)
EOSINOPHIL NFR BLD AUTO: 5 % (ref 0–6)
ERYTHROCYTE [DISTWIDTH] IN BLOOD BY AUTOMATED COUNT: 13.3 % (ref 11.6–15.1)
GFR SERPL CREATININE-BSD FRML MDRD: 69 ML/MIN/1.73SQ M
GLUCOSE P FAST SERPL-MCNC: 98 MG/DL (ref 65–99)
HCT VFR BLD AUTO: 42.1 % (ref 36.5–49.3)
HDLC SERPL-MCNC: 55 MG/DL
HGB BLD-MCNC: 13.8 G/DL (ref 12–17)
IMM GRANULOCYTES # BLD AUTO: 0.01 THOUSAND/UL (ref 0–0.2)
IMM GRANULOCYTES NFR BLD AUTO: 0 % (ref 0–2)
LDLC SERPL CALC-MCNC: 58 MG/DL (ref 0–100)
LYMPHOCYTES # BLD AUTO: 1.52 THOUSANDS/ΜL (ref 0.6–4.47)
LYMPHOCYTES NFR BLD AUTO: 26 % (ref 14–44)
MCH RBC QN AUTO: 31.9 PG (ref 26.8–34.3)
MCHC RBC AUTO-ENTMCNC: 32.8 G/DL (ref 31.4–37.4)
MCV RBC AUTO: 98 FL (ref 82–98)
MONOCYTES # BLD AUTO: 0.63 THOUSAND/ΜL (ref 0.17–1.22)
MONOCYTES NFR BLD AUTO: 11 % (ref 4–12)
NEUTROPHILS # BLD AUTO: 3.33 THOUSANDS/ΜL (ref 1.85–7.62)
NEUTS SEG NFR BLD AUTO: 57 % (ref 43–75)
NONHDLC SERPL-MCNC: 72 MG/DL
NRBC BLD AUTO-RTO: 0 /100 WBCS
PLATELET # BLD AUTO: 246 THOUSANDS/UL (ref 149–390)
PMV BLD AUTO: 9.2 FL (ref 8.9–12.7)
POTASSIUM SERPL-SCNC: 4.7 MMOL/L (ref 3.5–5.3)
PROT SERPL-MCNC: 6.9 G/DL (ref 6.4–8.2)
RBC # BLD AUTO: 4.32 MILLION/UL (ref 3.88–5.62)
SODIUM SERPL-SCNC: 140 MMOL/L (ref 136–145)
TRIGL SERPL-MCNC: 72 MG/DL
WBC # BLD AUTO: 5.83 THOUSAND/UL (ref 4.31–10.16)

## 2021-04-26 PROCEDURE — 36415 COLL VENOUS BLD VENIPUNCTURE: CPT

## 2021-04-26 PROCEDURE — 85025 COMPLETE CBC W/AUTO DIFF WBC: CPT

## 2021-04-26 PROCEDURE — 80061 LIPID PANEL: CPT

## 2021-04-26 PROCEDURE — 80053 COMPREHEN METABOLIC PANEL: CPT

## 2021-04-26 PROCEDURE — 82306 VITAMIN D 25 HYDROXY: CPT

## 2021-05-04 ENCOUNTER — OFFICE VISIT (OUTPATIENT)
Dept: INTERNAL MEDICINE CLINIC | Facility: CLINIC | Age: 81
End: 2021-05-04
Payer: COMMERCIAL

## 2021-05-04 VITALS
WEIGHT: 185 LBS | TEMPERATURE: 96.6 F | SYSTOLIC BLOOD PRESSURE: 138 MMHG | OXYGEN SATURATION: 95 % | HEART RATE: 56 BPM | DIASTOLIC BLOOD PRESSURE: 88 MMHG | HEIGHT: 69 IN | BODY MASS INDEX: 27.4 KG/M2

## 2021-05-04 DIAGNOSIS — F33.42 RECURRENT MAJOR DEPRESSIVE DISORDER, IN FULL REMISSION (HCC): ICD-10-CM

## 2021-05-04 DIAGNOSIS — I34.0 NONRHEUMATIC MITRAL VALVE REGURGITATION: ICD-10-CM

## 2021-05-04 DIAGNOSIS — J30.9 ALLERGIC RHINITIS, UNSPECIFIED SEASONALITY, UNSPECIFIED TRIGGER: ICD-10-CM

## 2021-05-04 DIAGNOSIS — I48.92 PAROXYSMAL ATRIAL FLUTTER (HCC): ICD-10-CM

## 2021-05-04 DIAGNOSIS — H90.5 SENSORINEURAL HEARING LOSS (SNHL), UNSPECIFIED LATERALITY: ICD-10-CM

## 2021-05-04 DIAGNOSIS — F42.2 MIXED OBSESSIONAL THOUGHTS AND ACTS: ICD-10-CM

## 2021-05-04 DIAGNOSIS — I10 ESSENTIAL HYPERTENSION: Primary | ICD-10-CM

## 2021-05-04 DIAGNOSIS — I35.8 AORTIC VALVE SCLEROSIS: ICD-10-CM

## 2021-05-04 DIAGNOSIS — E78.2 MIXED HYPERLIPIDEMIA: ICD-10-CM

## 2021-05-04 DIAGNOSIS — R60.0 EDEMA OF LEFT LOWER LEG: ICD-10-CM

## 2021-05-04 DIAGNOSIS — I99.9 VASCULAR DISEASE: ICD-10-CM

## 2021-05-04 PROCEDURE — 3075F SYST BP GE 130 - 139MM HG: CPT | Performed by: INTERNAL MEDICINE

## 2021-05-04 PROCEDURE — 1160F RVW MEDS BY RX/DR IN RCRD: CPT | Performed by: INTERNAL MEDICINE

## 2021-05-04 PROCEDURE — 3725F SCREEN DEPRESSION PERFORMED: CPT | Performed by: INTERNAL MEDICINE

## 2021-05-04 PROCEDURE — 3079F DIAST BP 80-89 MM HG: CPT | Performed by: INTERNAL MEDICINE

## 2021-05-04 PROCEDURE — 99214 OFFICE O/P EST MOD 30 MIN: CPT | Performed by: INTERNAL MEDICINE

## 2021-05-04 PROCEDURE — 1036F TOBACCO NON-USER: CPT | Performed by: INTERNAL MEDICINE

## 2021-05-04 RX ORDER — CITALOPRAM 40 MG/1
40 TABLET ORAL DAILY
Qty: 90 TABLET | Refills: 1 | Status: SHIPPED | OUTPATIENT
Start: 2021-05-04 | End: 2021-10-25

## 2021-05-04 NOTE — PROGRESS NOTES
[facility] Office Visit Note  21     Alba Holiday 80 y o  male MRN: [id number]  : 1940    Assessment:     Allergic rhinitis  Allergy is well controlled at this time recommend saline nasal spray or Claritin as needed    Vascular disease  Seen by cardiologist   Had EKG done through them  Patient is compensated at this time    Paroxysmal atrial flutter (HCC)  Pulse is regular  Paroxysmal atrial flutter was during hospitalization  Continue metoprolol    Nonrheumatic mitral valve regurgitation  Seen by cardiologist   Had EKG done through them  Patient is compensated at this time    Essential hypertension  Hypertension( High Blood Pressure ):    Continue Home BP check daily and bring log, if you are not checking consider checking daily    Take your Blood Pressure medicine as advised    Do not take your Blood pressure medicine if systolic Blood Pressure (top number) is less than 100 or heart rate less than 60  Notify you physician  It at home continue metoprolol tartrate 25 mg and fosinopril 10 mg daily    Sensorineural hearing loss (SNHL)  Unchanged    Hyperlipidemia  Excellent cholesterol and triglyceride control ALT normal continue atorvastatin 10 mg daily and Niaspan 500 mg daily tolerating without side effect    Edema of left lower leg  Mild baseline post phlebitis syndrome recommend stockings for the legs       Diagnoses and all orders for this visit:    Essential hypertension  -     citalopram (CeleXA) 40 mg tablet; Take 1 tablet (40 mg total) by mouth daily  -     metoprolol tartrate (LOPRESSOR) 25 mg tablet; Take 1 tablet (25 mg total) by mouth every 12 (twelve) hours    Recurrent major depressive disorder, in full remission (HCC)  -     citalopram (CeleXA) 40 mg tablet;  Take 1 tablet (40 mg total) by mouth daily    Aortic valve sclerosis    Allergic rhinitis, unspecified seasonality, unspecified trigger    Vascular disease    Paroxysmal atrial flutter ([address] )    Nonrheumatic mitral valve regurgitation    Sensorineural hearing loss (SNHL), unspecified laterality    Mixed hyperlipidemia    Edema of left lower leg    Mixed obsessional thoughts and acts          Discussion Summary and Plan: Today's care plan and medications were reviewed with patient in detail and all their questions answered to their satisfaction  In summary Nishant Méndez is doing fairly well  Hypertension fair control  Hyperlipidemia excellent control  Of no further urinary problem  Mild edema acceptable  Walks with a cane  No further problem with the pulmonary embolism  No major depression  All CAD fair  Tolerating current medication without side effect  Status post IVC filter which was removed  History of pulmonary embolism stable  Hearing deficit unchanged  Generally seems to be doing fairly well  Will follow back in 3 months with no blood test discussed with wife    Chief Complaint   Patient presents with    Follow-up     no complaints    Hypertension    Hyperlipidemia    Abnormal Lab    Allergic Rhinitis    Anxiety      Subjective:  Mr  Deb Wilde was seen today for management of his chronic medical issues  Patient states that he has been well, and reports no acute issues  He indicates compliance with medication regime  HTN: Patient reports no symptoms    No side-effects reported on ACEi and beta-blocker  Hx of PE: Patient reports that IV filter has been removed  Currently not on blood thinner, sx free    Edema of legs better    Allergic rhinitis:  Symptom-free  Hyperlipidemia remains on atorvastatin 10 mg half tablet daily d  Tolerating medication without side effect  Blood sugar fair    Chronic depression and obsessive-compulsive are fairly same a tolerating antidepressant without side effect  MAGALY-7 Flowsheet Screening      Most Recent Value   Over the last two weeks, how often have you been bothered by the following   problems?     Feeling nervous, anxious, or on edge  0   Not being able to stop or control worrying  0   Worrying too much about different things  2   Trouble relaxing   0   Being so restless that it's hard to sit still  0   Becoming easily annoyed or irritable   0   Feeling afraid as if something awful might happen  0   How difficult have these problems made it for you to do your work, take   care of things at home, or get along with other people? Not difficult at   all   MAGALY Score   2      PHQ-9 Follow-up    Little interest or pleasure in doing things: 0 - not at all  Feeling down, depressed, or hopeless: 0 - not at all  Trouble falling or staying asleep, or sleeping too much: 0 - not at all  Feeling tired or having little energy: 0 - not at all  Poor appetite or overeatin - not at all  Feeling bad about yourself - or that you are a failure or have let   yourself or your family down: 0 - not at all  Trouble concentrating on things, such as reading the newspaper or watching   television: 0 - not at all  Moving or speaking so slowly that other people could have noticed  Or the   opposite - being so fidgety or restless that you have been moving around a   lot more than usual: 0 - not at all  Thoughts that you would be better off dead, or of hurting yourself in some   way: 0 - not at all  PHQ-2 Score: 0  PHQ-9 Score: 0     His OCD is fair  Does was sent set time  Tolerating citalopram without side effect  No psychosis  Mild-to-moderate aortic regurgitation, mild mitral regurgitation and normal ejection fraction with normal nuclear stress test on 2018 an echocardiogram on 2018 fair  In the no cancer screen per his choice  Medications reviewed    Walks with cane  Colonoscopy done on 2016 last time  Hip Fracture: Pt ambulates without cane  Denies edema, ecchymosis or other symptoms  Pt not doing any Physical Therapy at this time       A  Fib: pulse regular    Patient was seen by cardiologist     Appointment on 2021  Sodium                    Value: 140(mmol/L)        Dt: 04/26/2021  Potassium                 Value: 4 7(mmol/L)        Dt: 04/26/2021  Chloride                  Value: 107(mmol/L)        Dt: 04/26/2021  CO2                       Value: 29(mmol/L)         Dt: 04/26/2021  ANION GAP                 Value:  4(mmol/L)          Dt: 04/26/2021  BUN                       Value: 14(mg/dL)          Dt: 04/26/2021  Creatinine                Value: 1 02(mg/dL)        Dt: 04/26/2021  Glucose, Fasting          Value: 98(mg/dL)          Dt: 04/26/2021  Calcium                   Value: 9 1(mg/dL)         Dt: 04/26/2021  AST                       Value: 14(U/L)            Dt: 04/26/2021  ALT                       Value: 18(U/L)            Dt: 04/26/2021  Alkaline Phosphatase      Value: 89(U/L)            Dt: 04/26/2021  Total Protein             Value: 6 9(g/dL)          Dt: 04/26/2021  Albumin                   Value: 3 5(g/dL)          Dt: 04/26/2021  Total Bilirubin           Value: 0 81(mg/dL)        Dt: 04/26/2021  eGFR                      Value: 69(ml/min/1 73sq m) Dt: 04/26/2021  WBC                       Value: 5 83(Thousand/uL)  Dt: 04/26/2021  RBC                       Value: 4 32(Million/uL)   Dt: 04/26/2021  Hemoglobin                Value: 13 8(g/dL)         Dt: 04/26/2021  Hematocrit                Value: 42 1(%)            Dt: 04/26/2021  MCV                       Value: 98(fL)             Dt: 04/26/2021  MCH                       Value: 31 9(pg)           Dt: 04/26/2021  MCHC                      Value: 32 8(g/dL)         Dt: 04/26/2021  RDW                       Value: 13 3(%)            Dt: 04/26/2021  MPV                       Value: 9 2(fL)            Dt: 04/26/2021  Platelets                 Value: 246(Thousands/uL)  Dt: 04/26/2021  nRBC                      Value: 0(/100 WBCs)       Dt: 04/26/2021  Neutrophils Relative      Value: 57(%)              Dt: 04/26/2021  Immat GRANS %             Value: 0(%)               Dt: 04/26/2021  Lymphocytes Relative      Value: 26(%)              Dt: 04/26/2021  Monocytes Relative        Value: 11(%)              Dt: 04/26/2021  Eosinophils Relative      Value: 5(%)               Dt: 04/26/2021  Basophils Relative        Value: 1(%)               Dt: 04/26/2021  Neutrophils Absolute      Value: 3 33(Thousands/µL) Dt: 04/26/2021  Immature Grans Absolute   Value: 0 01(Thousand/uL)  Dt: 04/26/2021  Lymphocytes Absolute      Value: 1 52(Thousands/µL) Dt: 04/26/2021  Monocytes Absolute        Value: 0 63(Thousand/µL)  Dt: 04/26/2021  Eosinophils Absolute      Value: 0 30(Thousand/µL)  Dt: 04/26/2021  Basophils Absolute        Value: 0 04(Thousands/µL) Dt: 04/26/2021  Cholesterol               Value: 127(mg/dL)         Dt: 04/26/2021  Triglycerides             Value: 72(mg/dL)          Dt: 04/26/2021  HDL, Direct               Value: 55(mg/dL)          Dt: 04/26/2021  LDL Calculated            Value: 58(mg/dL)          Dt: 04/26/2021  Non-HDL-Chol (CHOL-HDL)   Value: 72(mg/dl)          Dt: 04/26/2021  Vit D, 25-Hydroxy         Value: 26  6(ng/mL)*       Dt: 04/26/2021  ------------ - 2 weeks      Hypertension  The problem is uncontrolled  Pertinent negatives include no chest pain, headaches, palpitations or shortness of breath  The current treatment provides significant improvement  Hyperlipidemia  Pertinent negatives include no chest pain, myalgias or shortness of breath  The following portions of the patient's history were reviewed and updated as appropriate: allergies, current medications, past family history, past medical history, past social history, past surgical history and problem list     Review of Systems   Respiratory: Negative for shortness of breath  Cardiovascular: Negative for chest pain and palpitations  Musculoskeletal: Negative for myalgias  Neurological: Negative for headaches  All other systems reviewed and are negative          Historical Information   Patient Active Problem List   Diagnosis    Mixed obsessional thoughts and acts    Allergic rhinitis    Hypercholesteremia    Sensorineural hearing loss (SNHL)    Nonrheumatic mitral valve regurgitation    Aortic valve sclerosis    Nonrheumatic aortic valve insufficiency    Essential hypertension    Recurrent major depressive disorder, in full remission ([facility] )    Hyperlipidemia    Vascular disease    Impaired vision    Conductive hearing loss, bilateral    S/P IVC filter    Acute pulmonary embolism (HCC)    SV[facility]ra[facility] tachycardia) [facility]    Valvular heart disease    Urinary frequency    Edema of both legs    Edema of left lower leg    Paroxysmal atrial flutter ([facility] )    Medicare annual wellness visit, subsequent     Past Medical History:   Diagnosis Date    Hyperlipidemia      Past Surgical History:   Procedure Laterality Date    IR IVC FILTER PLACEMENT OPTIONAL/TEMPORARY  12/7/2019    IR IVC FILTER REMOVAL  8/21/2020    MN OPEN RX FEMUR FX+INTRAMED BÁRBARA Right 12/4/2019    Procedure: INSERTION NAIL IM FEMUR ANTEGRADE (TROCHANTERIC);   Surgeon: Ara Clemente DO;  Location: [facility];  Service: Orthopedics     Social History     Substance and Sexual Activity   Alcohol Use Never    Frequency: Never     Social History     Substance and Sexual Activity   Drug Use Never     Social History     Tobacco Use   Smoking Status Never Smoker   Smokeless Tobacco Never Used     Family History   Problem Relation Age of Onset    Cancer Mother      Health Maintenance Due   Topic    COVID-19 Vaccine (1)    Falls: Plan of Care     Pneumococcal Vaccine: 65+ Years (1 of 1 - PPSV23)      Meds/Allergies       Current Outpatient Medications:     atorvastatin (LIPITOR) 10 mg tablet, Take 0 5 tablets (5 mg total) by mouth daily, Disp: 45 tablet, Rfl: 1    citalopram (CeleXA) 40 mg tablet, Take 1 tablet (40 mg total) by mouth daily, Disp: 90 tablet, Rfl: 1    fosinopril (MONOPRIL) 10 mg tablet, TAKE ONE-HALF TABLET DAILY, Disp: 45 tablet, Rfl: 1    metoprolol tartrate (LOPRESSOR) 25 mg tablet, Take 1 tablet (25 mg total) by mouth every 12 (twelve) hours, Disp: 180 tablet, Rfl: 1    Multiple Vitamin (MULTIVITAMIN) tablet, Take 1 tablet by mouth daily, Disp: , Rfl:     niacin (NIASPAN) 500 mg CR tablet, TAKE ONE TABLET BY MOUTH EVERY DAY AT BEDTIME, Disp: 90 tablet, Rfl: 1      Objective:    Vitals:   /88   Pulse 56   Temp (!) 96 6 °F (35 9 °C)   Ht 5' 9" (1 753 m)   Wt 83 9 kg (185 lb)   SpO2 95%   BMI 27 32 kg/m²   Body mass index is 27 32 kg/m²  Vitals:    05/04/21 1004   Weight: 83 9 kg (185 lb)       Physical Exam  Constitutional:       Appearance: Normal appearance  He is well-developed  HENT:      Head: Normocephalic and atraumatic  Eyes:      Conjunctiva/sclera: Conjunctivae normal    Neck:      Thyroid: No thyromegaly  Vascular: No JVD  Cardiovascular:      Rate and Rhythm: Regular rhythm  Heart sounds: Normal heart sounds  Pulmonary:      Effort: No respiratory distress  Breath sounds: Normal breath sounds  No wheezing or rales  Abdominal:      General: Bowel sounds are normal  There is no distension  Palpations: There is no mass  Tenderness: There is no rebound  Musculoskeletal:      Right lower leg: Edema present  Left lower leg: Edema present  Comments: mild   Lymphadenopathy:      Cervical: No cervical adenopathy  Skin:     General: Skin is warm  Findings: No rash  Neurological:      Coordination: Coordination normal       Gait: Gait abnormal    Psychiatric:         Attention and Perception: Attention and perception normal  He is attentive  He does not perceive auditory or visual hallucinations  Mood and Affect: Mood normal  Mood is not anxious  Speech: Speech normal          Behavior: Behavior normal  Behavior is not agitated, aggressive, withdrawn, hyperactive or combative  Thought Content:  Thought content normal  Thought content is not paranoid or delusional  Thought content does not include homicidal or suicidal ideation  Thought content does not include suicidal plan  Cognition and Memory: Cognition is not impaired  Memory is not impaired  He exhibits impaired recent memory  Judgment: Judgment normal  Judgment is not impulsive or inappropriate  Lab Review   Appointment on 04/26/2021   Component Date Value Ref Range Status    Sodium 04/26/2021 140  136 - 145 mmol/L Final    Potassium 04/26/2021 4 7  3 5 - 5 3 mmol/L Final    Chloride 04/26/2021 107  100 - 108 mmol/L Final    CO2 04/26/2021 29  21 - 32 mmol/L Final    ANION GAP 04/26/2021 4  4 - 13 mmol/L Final    BUN 04/26/2021 14  5 - 25 mg/dL Final    Creatinine 04/26/2021 1 02  0 60 - 1 30 mg/dL Final    Standardized to IDMS reference method    Glucose, Fasting 04/26/2021 98  65 - 99 mg/dL Final    Specimen collection should occur prior to Sulfasalazine administration due to the potential for falsely depressed results  Specimen collection should occur prior to Sulfapyridine administration due to the potential for falsely elevated results   Calcium 04/26/2021 9 1  8 3 - 10 1 mg/dL Final    AST 04/26/2021 14  5 - 45 U/L Final    Specimen collection should occur prior to Sulfasalazine administration due to the potential for falsely depressed results   ALT 04/26/2021 18  12 - 78 U/L Final    Specimen collection should occur prior to Sulfasalazine and/or Sulfapyridine administration due to the potential for falsely depressed results   Alkaline Phosphatase 04/26/2021 89  46 - 116 U/L Final    Total Protein 04/26/2021 6 9  6 4 - 8 2 g/dL Final    Albumin 04/26/2021 3 5  3 5 - 5 0 g/dL Final    Total Bilirubin 04/26/2021 0 81  0 20 - 1 00 mg/dL Final    Use of this assay is not recommended for patients undergoing treatment with eltrombopag due to the potential for falsely elevated results      eGFR 04/26/2021 69 ml/min/1 73sq m Final    WBC 04/26/2021 5 83  4 31 - 10 16 Thousand/uL Final    RBC 04/26/2021 4 32  3 88 - 5 62 Million/uL Final    Hemoglobin 04/26/2021 13 8  12 0 - 17 0 g/dL Final    Hematocrit 04/26/2021 42 1  36 5 - 49 3 % Final    MCV 04/26/2021 98  82 - 98 fL Final    MCH 04/26/2021 31 9  26 8 - 34 3 pg Final    MCHC 04/26/2021 32 8  31 4 - 37 4 g/dL Final    RDW 04/26/2021 13 3  11 6 - 15 1 % Final    MPV 04/26/2021 9 2  8 9 - 12 7 fL Final    Platelets 66/58/1134 246  149 - 390 Thousands/uL Final    nRBC 04/26/2021 0  /100 WBCs Final    Neutrophils Relative 04/26/2021 57  43 - 75 % Final    Immat GRANS % 04/26/2021 0  0 - 2 % Final    Lymphocytes Relative 04/26/2021 26  14 - 44 % Final    Monocytes Relative 04/26/2021 11  4 - 12 % Final    Eosinophils Relative 04/26/2021 5  0 - 6 % Final    Basophils Relative 04/26/2021 1  0 - 1 % Final    Neutrophils Absolute 04/26/2021 3 33  1 85 - 7 62 Thousands/µL Final    Immature Grans Absolute 04/26/2021 0 01  0 00 - 0 20 Thousand/uL Final    Lymphocytes Absolute 04/26/2021 1 52  0 60 - 4 47 Thousands/µL Final    Monocytes Absolute 04/26/2021 0 63  0 17 - 1 22 Thousand/µL Final    Eosinophils Absolute 04/26/2021 0 30  0 00 - 0 61 Thousand/µL Final    Basophils Absolute 04/26/2021 0 04  0 00 - 0 10 Thousands/µL Final    Cholesterol 04/26/2021 127  50 - 200 mg/dL Final    Cholesterol:       Desirable         <200 mg/dl       Borderline         200-239 mg/dl       High              >239           Triglycerides 04/26/2021 72  <=150 mg/dL Final    Triglyceride:     Normal          <150 mg/dl     Borderline High 150-199 mg/dl     High            200-499 mg/dl        Very High       >499 mg/dl    Specimen collection should occur prior to N-Acetylcysteine or Metamizole administration due to the potential for falsely depressed results      HDL, Direct 04/26/2021 55  >=40 mg/dL Final    HDL Cholesterol:       Low     <41 mg/dL  Specimen collection should occur prior to Metamizole administration due to the potential for falsley depressed results   LDL Calculated 04/26/2021 58  0 - 100 mg/dL Final    LDL Cholesterol:     Optimal           <100 mg/dl     Near Optimal      100-129 mg/dl     Above Optimal       Borderline High 130-159 mg/dl       High            160-189 mg/dl       Very High       >189 mg/dl         This screening LDL is a calculated result  It does not have the accuracy of the Direct Measured LDL in the monitoring of patients with hyperlipidemia and/or statin therapy  Direct Measure LDL (ATM077) must be ordered separately in these patients   Non-HDL-Chol (CHOL-HDL) 04/26/2021 72  mg/dl Final    Vit D, 25-Hydroxy 04/26/2021 26 6* 30 0 - 100 0 ng/mL Final         Patient Instructions   Follow with Consultants as per their and our suggestion    Follow up in 12 week(s) or as needed earlier    Follow all instructions as advised and discussed  Take your medications as prescribed  Call the office immediately if you experience any side effects  Ask questions if you do not understand  Keep your scheduled appointment as advised or come sooner if necessary or in doubt  Best time to call for non-urgent matter or questions on weekdays is between 9am and 12 noon  See physician for any new symptoms or worsening of current symptoms  Urgent or emergent situations call 911 and report to nearest emergency room  I spent  30 -40 minutes taking care of this patient including clinical care, conseling, collaboration, chart, lab and consultaion review as appropriate    Patient is to get labs 1 week(s) prior to next visit if advised               Dr Candace Thrasher MD  [facility]       "This note has been constructed using a voice recognition system  Therefore there may be syntax, spelling, and/or grammatical errors   Please call if you have any questions  "

## 2021-05-04 NOTE — ASSESSMENT & PLAN NOTE
Hypertension( High Blood Pressure ):    Continue Home BP check daily and bring log, if you are not checking consider checking daily    Take your Blood Pressure medicine as advised    Do not take your Blood pressure medicine if systolic Blood Pressure (top number) is less than 100 or heart rate less than 60  Notify you physician        It at home continue metoprolol tartrate 25 mg and fosinopril 10 mg daily

## 2021-05-04 NOTE — ASSESSMENT & PLAN NOTE
Excellent cholesterol and triglyceride control ALT normal continue atorvastatin 10 mg daily and Niaspan 500 mg daily tolerating without side effect

## 2021-08-03 ENCOUNTER — OFFICE VISIT (OUTPATIENT)
Dept: INTERNAL MEDICINE CLINIC | Facility: CLINIC | Age: 81
End: 2021-08-03
Payer: COMMERCIAL

## 2021-08-03 VITALS
BODY MASS INDEX: 27.99 KG/M2 | OXYGEN SATURATION: 96 % | SYSTOLIC BLOOD PRESSURE: 120 MMHG | HEART RATE: 56 BPM | DIASTOLIC BLOOD PRESSURE: 70 MMHG | HEIGHT: 69 IN | WEIGHT: 189 LBS

## 2021-08-03 DIAGNOSIS — I35.8 AORTIC VALVE SCLEROSIS: ICD-10-CM

## 2021-08-03 DIAGNOSIS — I10 ESSENTIAL HYPERTENSION: ICD-10-CM

## 2021-08-03 DIAGNOSIS — I48.92 PAROXYSMAL ATRIAL FLUTTER (HCC): Primary | ICD-10-CM

## 2021-08-03 DIAGNOSIS — I34.0 NONRHEUMATIC MITRAL VALVE REGURGITATION: ICD-10-CM

## 2021-08-03 DIAGNOSIS — I35.1 NONRHEUMATIC AORTIC VALVE INSUFFICIENCY: ICD-10-CM

## 2021-08-03 DIAGNOSIS — R60.0 EDEMA OF LEFT LOWER LEG: ICD-10-CM

## 2021-08-03 DIAGNOSIS — E78.00 HYPERCHOLESTEREMIA: ICD-10-CM

## 2021-08-03 DIAGNOSIS — F33.42 RECURRENT MAJOR DEPRESSIVE DISORDER, IN FULL REMISSION (HCC): ICD-10-CM

## 2021-08-03 DIAGNOSIS — E78.2 MIXED HYPERLIPIDEMIA: ICD-10-CM

## 2021-08-03 DIAGNOSIS — J30.9 ALLERGIC RHINITIS, UNSPECIFIED SEASONALITY, UNSPECIFIED TRIGGER: ICD-10-CM

## 2021-08-03 DIAGNOSIS — Z95.828 S/P IVC FILTER: ICD-10-CM

## 2021-08-03 PROBLEM — I26.99 ACUTE PULMONARY EMBOLISM (HCC): Status: RESOLVED | Noted: 2019-12-07 | Resolved: 2021-08-03

## 2021-08-03 PROCEDURE — 3078F DIAST BP <80 MM HG: CPT | Performed by: INTERNAL MEDICINE

## 2021-08-03 PROCEDURE — 99214 OFFICE O/P EST MOD 30 MIN: CPT | Performed by: INTERNAL MEDICINE

## 2021-08-03 PROCEDURE — 1101F PT FALLS ASSESS-DOCD LE1/YR: CPT | Performed by: INTERNAL MEDICINE

## 2021-08-03 PROCEDURE — 3725F SCREEN DEPRESSION PERFORMED: CPT | Performed by: INTERNAL MEDICINE

## 2021-08-03 PROCEDURE — 3288F FALL RISK ASSESSMENT DOCD: CPT | Performed by: INTERNAL MEDICINE

## 2021-08-03 PROCEDURE — 1036F TOBACCO NON-USER: CPT | Performed by: INTERNAL MEDICINE

## 2021-08-03 PROCEDURE — 1160F RVW MEDS BY RX/DR IN RCRD: CPT | Performed by: INTERNAL MEDICINE

## 2021-08-03 PROCEDURE — 3074F SYST BP LT 130 MM HG: CPT | Performed by: INTERNAL MEDICINE

## 2021-08-03 NOTE — PATIENT INSTRUCTIONS
Follow with Consultants as per their and our suggestion    Follow up in 12 week(s) or as needed earlier    Follow all instructions as advised and discussed  Take your medications as prescribed  Call the office immediately if you experience any side effects  Ask questions if you do not understand  Keep your scheduled appointment as advised or come sooner if necessary or in doubt  Best time to call for non-urgent matter or questions on weekdays is between 9am and 12 noon  See physician for any new symptoms or worsening of current symptoms  Urgent or emergent situations call 911 and report to nearest emergency room      I spent  30 -40 minutes taking care of this patient including clinical care, conseling, collaboration, chart, lab and consultaion review as appropriate    Patient is to get labs 1 week(s) prior to next visit if advised    Continue using cane, do some strengthening exercise, fall precautions reviewed

## 2021-08-03 NOTE — ASSESSMENT & PLAN NOTE
Was CT and depressions are well controlled  Will continue citalopram    Pt's depression is well controlled    Pt is tolerating current psych medicine regimen and regimen is effective  Pt does not have any side effects of medicine  Refer to Med List for Psych Medicine  Will continue same regimen  mg daily  yvmdd

## 2021-08-03 NOTE — ASSESSMENT & PLAN NOTE
12/09/2019:  Echocardiogram:  SUMMARY     LEFT VENTRICLE:  Systolic function was vigorous  Ejection fraction was estimated to be 68 %  There were no regional wall motion abnormalities  Wall thickness was mildly increased      LEFT ATRIUM:  The atrium was mildly dilated      MITRAL VALVE:  There was mild annular calcification  There was mild regurgitation      AORTIC VALVE:  There was mild left ventricular outflow obstruction at rest with peak velocity in LVOT of 1 5 M/S  There was mild to moderate regurgitation      TRICUSPID VALVE:  There was mild to moderate regurgitation      AORTA:  The root exhibited mild dilatation  Clinically compensated

## 2021-08-03 NOTE — ASSESSMENT & PLAN NOTE
Continue atorvastatin 10 mg daily    Due for lipid profile and CMP prior to next visit tolerating without side effect

## 2021-08-03 NOTE — PROGRESS NOTES
[facility]  [facility]     Amina Garcia 80 y o  male MRN: [id number]  : 1940    Assessment:     Nonrheumatic mitral valve regurgitation  2019:  Echocardiogram:  SUMMARY     LEFT VENTRICLE:  Systolic function was vigorous  Ejection fraction was estimated to be 68 %  There were no regional wall motion abnormalities  Wall thickness was mildly increased      LEFT ATRIUM:  The atrium was mildly dilated      MITRAL VALVE:  There was mild annular calcification  There was mild regurgitation      AORTIC VALVE:  There was mild left ventricular outflow obstruction at rest with peak velocity in LVOT of 1 5 M/S  There was mild to moderate regurgitation      TRICUSPID VALVE:  There was mild to moderate regurgitation      AORTA:  The root exhibited mild dilatation  Clinically compensated  Aortic valve sclerosis  2019:  Echocardiogram:  SUMMARY     LEFT VENTRICLE:  Systolic function was vigorous  Ejection fraction was estimated to be 68 %  There were no regional wall motion abnormalities  Wall thickness was mildly increased      LEFT ATRIUM:  The atrium was mildly dilated      MITRAL VALVE:  There was mild annular calcification  There was mild regurgitation      AORTIC VALVE:  There was mild left ventricular outflow obstruction at rest with peak velocity in LVOT of 1 5 M/S  There was mild to moderate regurgitation      TRICUSPID VALVE:  There was mild to moderate regurgitation      AORTA:  The root exhibited mild dilatation  Clinically compensated  Nonrheumatic aortic valve insufficiency  2019:  Echocardiogram:  SUMMARY     LEFT VENTRICLE:  Systolic function was vigorous  Ejection fraction was estimated to be 68 %  There were no regional wall motion abnormalities  Wall thickness was mildly increased      LEFT ATRIUM:  The atrium was mildly dilated      MITRAL VALVE:  There was mild annular calcification    There was mild regurgitation      AORTIC VALVE:  There was mild left ventricular outflow obstruction at rest with peak velocity in LVOT of 1 5 M/S  There was mild to moderate regurgitation      TRICUSPID VALVE:  There was mild to moderate regurgitation      AORTA:  The root exhibited mild dilatation  Clinically compensated  Allergic rhinitis  Allergy symptoms    Paroxysmal atrial flutter (HCC)  Pulses regular  Denies any palpitation dizziness  Remains on metoprolol tartrate 25 mg twice a day  Hypercholesteremia  Continue atorvastatin 10 mg daily  Due for lipid profile and CMP prior to next visit tolerating without side effect    Recurrent major depressive disorder, in full remission ([facility] )  Was CT and depressions are well controlled  Will continue citalopram    Pt's depression is well controlled    Pt is tolerating current psych medicine regimen and regimen is effective  Pt does not have any side effects of medicine  Refer to Med List for Psych Medicine  Will continue same regimen  mg daily  yvmdd    Hyperlipidemia  Continue atorvastatin 10 mg daily  Due for lipid profile and CMP prior to the next visit  Diagnoses and all orders for this visit:    [facility]ox[facility]    Essential hypertension  -     Lipid panel; Future  -     Comprehensive metabolic panel; Future    Nonrheumatic mitral valve regurgitation    Nonrheumatic aortic valve insufficiency    Recurrent major depressive disorder, in full remission ([facility] )    Mixed hyperlipidemia    Edema of left lower leg    S/P IVC filter    Hypercholesteremia  -     Lipid panel; Future  -     Comprehensive metabolic panel; Future    Aortic valve sclerosis    Allergic rhinitis, unspecified seasonality, unspecified trigger          Discussion Summary and Plan: Today's care plan and medications were reviewed with patient in detail and all their questions answered to their satisfaction  Generally seems to be doing fairly well  Hypertension well controlled    Hyperlipidemia continue statin due for CMP lipid profile prior to next visit  Depression and OCD fair  The allergic rhinitis fair  Gait reasonable walks with a cane  No further colonoscopy per his choice  Tolerating current medications without side effect  Mild mitral regurgitation tricuspid regurgitations aortic sclerosis all appears fair  A home compensated  Continue current regimen  History of pulmonary embolism stable status post filter  Discussed with wife  Chief Complaint   Patient presents with    Hypertension    Hyperlipidemia    Follow-up     Valvular heart disease, edema of legs, OCD,      Subjective:  Mr Stacy Mcnair was seen today for management of his chronic medical issues  Patient states that he has been well, and reports no acute issues  He indicates compliance with medication regime  HTN: Patient reports no symptoms    No side-effects reported on ACEi and beta-blocker  Hx of PE: Patient reports that IV filter has been removed  Currently not on blood thinner, sx free    Edema of legs better    Allergic rhinitis:  Symptom-free  Not requiring much medications    Hyperlipidemia remains on atorvastatin 10 mg half tablet daily d  Tolerating medication without side effect  Due for blood test next time    Blood sugar fair    Chronic depression and obsessive-compulsive are fairly same a tolerating antidepressant without side effect  His OCD is fair  Does was sent set time  Tolerating citalopram without side effect  No psychosis    PHQ-9 Follow-up    Little interest or pleasure in doing things: 0 - not at all  Feeling down, depressed, or hopeless: 0 - not at all  Trouble falling or staying asleep, or sleeping too much: 0 - not at all  Feeling tired or having little energy: 0 - not at all  Poor appetite or overeatin - not at all  Feeling bad about yourself - or that you are a failure or have let   yourself or your family down: 0 - not at all  Trouble concentrating on things, such as reading the newspaper or watching television: 0 - not at all  Moving or speaking so slowly that other people could have noticed  Or the   opposite - being so fidgety or restless that you have been moving around a   lot more than usual: 0 - not at all  Thoughts that you would be better off dead, or of hurting yourself in some   way: 0 - not at all  PHQ-2 Score: 0  PHQ-9 Score: 0     generalized anxiety disorder        Mild-to-moderate aortic regurgitation, mild mitral regurgitation and normal ejection fraction with normal nuclear stress test on 03/19/2018 an echocardiogram on 03/20/2018 fair  In the no cancer screen per his choice  Medications reviewed    Walks with cane  Colonoscopy done on 12/26/2016 last time  Hip Fracture: Pt ambulates without cane  Denies edema, ecchymosis or other symptoms  Pt not doing any Physical Therapy at this time  A  Fib: pulse regular    Patient was seen by cardiologist     04/26/2021:  CMP normal except creatinine 1 02, GFR 69, CBC normal, vitamin-D 26 6, LDL 58, HDL 55, triglycerides 72 total cholesterol 127          The following portions of the patient's history were reviewed and updated as appropriate: allergies, current medications, past family history, past medical history, past social history, past surgical history and problem list     Review of Systems   Constitutional: Negative for chills, fatigue, fever and unexpected weight change  HENT: Negative for ear pain, postnasal drip, sinus pain and sore throat  Eyes: Negative for pain and redness  Respiratory: Negative for cough and shortness of breath  Cardiovascular: Negative for chest pain and palpitations  Gastrointestinal: Negative for abdominal pain, diarrhea and nausea  Endocrine: Negative for cold intolerance, polydipsia and polyuria  Genitourinary: Negative for dysuria, frequency and urgency  Musculoskeletal: Positive for gait problem  Negative for arthralgias and myalgias  Skin: Negative for rash  Allergic/Immunologic: Negative  Neurological: Negative for dizziness, tremors, seizures, syncope, speech difficulty, weakness, numbness and headaches  Hematological: Negative for adenopathy  Psychiatric/Behavioral: Negative for behavioral problems, confusion, decreased concentration, dysphoric mood, hallucinations, self-injury and sleep disturbance  The patient is not nervous/anxious and is not hyperactive  Historical Information   Patient Active Problem List   Diagnosis    Mixed obsessional thoughts and acts    Allergic rhinitis    Hypercholesteremia    Sensorineural hearing loss (SNHL)    Nonrheumatic mitral valve regurgitation    Aortic valve sclerosis    Nonrheumatic aortic valve insufficiency    Essential hypertension    Recurrent major depressive disorder, in full remission ([facility] )    Hyperlipidemia    Impaired vision    Conductive hearing loss, bilateral    S/P IVC filter    [facility]    Valvular heart disease    Urinary frequency    Edema of left lower leg    [facility]    Medicare annual wellness visit, subsequent     Past Medical History:   Diagnosis Date    Hyperlipidemia      Past Surgical History:   Procedure Laterality Date    IR IVC FILTER PLACEMENT OPTIONAL/TEMPORARY  12/7/2019    IR IVC FILTER REMOVAL  8/21/2020    SD OPEN RX FEMUR FX+INTRAMED BÁRBARA Right 12/4/2019    Procedure: INSERTION NAIL IM FEMUR ANTEGRADE (TROCHANTERIC);   Surgeon: Jayden Lane DO;  Location: [facility];  Service: Orthopedics     Social History     Substance and Sexual Activity   Alcohol Use Never     Social History     Substance and Sexual Activity   Drug Use Never     Social History     Tobacco Use   Smoking Status Never Smoker   Smokeless Tobacco Never Used     Family History   Problem Relation Age of Onset    Cancer Mother      Health Maintenance Due   Topic    COVID-19 Vaccine (1)    Pneumococcal Vaccine: 65+ Years (1 of 1 - is warm  Coloration: Skin is not jaundiced or pale  Findings: No rash  Neurological:      General: No focal deficit present  Mental Status: He is oriented to person, place, and time  Coordination: Coordination normal       Gait: Gait abnormal    Psychiatric:         Attention and Perception: Attention and perception normal  He is attentive  He does not perceive auditory or visual hallucinations  Mood and Affect: Mood normal  Mood is not anxious  Speech: Speech normal          Behavior: Behavior normal  Behavior is not agitated, aggressive, withdrawn, hyperactive or combative  Thought Content: Thought content normal  Thought content is not paranoid or delusional  Thought content does not include homicidal or suicidal ideation  Thought content does not include suicidal plan  Cognition and Memory: Cognition is not impaired  Memory is not impaired  He exhibits impaired recent memory  Judgment: Judgment normal  Judgment is not impulsive or inappropriate  Falls Plan of Care: Balance, strength, and gait training instructions were provided  Lab Review   No visits with results within 2 Month(s) from this visit  Latest known visit with results is:   Appointment on 04/26/2021   Component Date Value Ref Range Status    Sodium 04/26/2021 140  136 - 145 mmol/L Final    Potassium 04/26/2021 4 7  3 5 - 5 3 mmol/L Final    Chloride 04/26/2021 107  100 - 108 mmol/L Final    CO2 04/26/2021 29  21 - 32 mmol/L Final    ANION GAP 04/26/2021 4  4 - 13 mmol/L Final    BUN 04/26/2021 14  5 - 25 mg/dL Final    Creatinine 04/26/2021 1 02  0 60 - 1 30 mg/dL Final    Standardized to IDMS reference method    Glucose, Fasting 04/26/2021 98  65 - 99 mg/dL Final    Specimen collection should occur prior to Sulfasalazine administration due to the potential for falsely depressed results   Specimen collection should occur prior to Sulfapyridine administration due to the potential for falsely elevated results   Calcium 04/26/2021 9 1  8 3 - 10 1 mg/dL Final    AST 04/26/2021 14  5 - 45 U/L Final    Specimen collection should occur prior to Sulfasalazine administration due to the potential for falsely depressed results   ALT 04/26/2021 18  12 - 78 U/L Final    Specimen collection should occur prior to Sulfasalazine and/or Sulfapyridine administration due to the potential for falsely depressed results   Alkaline Phosphatase 04/26/2021 89  46 - 116 U/L Final    Total Protein 04/26/2021 6 9  6 4 - 8 2 g/dL Final    Albumin 04/26/2021 3 5  3 5 - 5 0 g/dL Final    Total Bilirubin 04/26/2021 0 81  0 20 - 1 00 mg/dL Final    Use of this assay is not recommended for patients undergoing treatment with eltrombopag due to the potential for falsely elevated results      eGFR 04/26/2021 69  ml/min/1 73sq m Final    WBC 04/26/2021 5 83  4 31 - 10 16 Thousand/uL Final    RBC 04/26/2021 4 32  3 88 - 5 62 Million/uL Final    Hemoglobin 04/26/2021 13 8  12 0 - 17 0 g/dL Final    Hematocrit 04/26/2021 42 1  36 5 - 49 3 % Final    MCV 04/26/2021 98  82 - 98 fL Final    MCH 04/26/2021 31 9  26 8 - 34 3 pg Final    MCHC 04/26/2021 32 8  31 4 - 37 4 g/dL Final    RDW 04/26/2021 13 3  11 6 - 15 1 % Final    MPV 04/26/2021 9 2  8 9 - 12 7 fL Final    Platelets 91/57/4555 246  149 - 390 Thousands/uL Final    nRBC 04/26/2021 0  /100 WBCs Final    Neutrophils Relative 04/26/2021 57  43 - 75 % Final    Immat GRANS % 04/26/2021 0  0 - 2 % Final    Lymphocytes Relative 04/26/2021 26  14 - 44 % Final    Monocytes Relative 04/26/2021 11  4 - 12 % Final    Eosinophils Relative 04/26/2021 5  0 - 6 % Final    Basophils Relative 04/26/2021 1  0 - 1 % Final    Neutrophils Absolute 04/26/2021 3 33  1 85 - 7 62 Thousands/µL Final    Immature Grans Absolute 04/26/2021 0 01  0 00 - 0 20 Thousand/uL Final    Lymphocytes Absolute 04/26/2021 1 52  0 60 - 4 47 Thousands/µL Final    Monocytes Absolute 04/26/2021 0 63  0 17 - 1 22 Thousand/µL Final    Eosinophils Absolute 04/26/2021 0 30  0 00 - 0 61 Thousand/µL Final    Basophils Absolute 04/26/2021 0 04  0 00 - 0 10 Thousands/µL Final    Cholesterol 04/26/2021 127  50 - 200 mg/dL Final    Cholesterol:       Desirable         <200 mg/dl       Borderline         200-239 mg/dl       High              >239           Triglycerides 04/26/2021 72  <=150 mg/dL Final    Triglyceride:     Normal          <150 mg/dl     Borderline High 150-199 mg/dl     High            200-499 mg/dl        Very High       >499 mg/dl    Specimen collection should occur prior to N-Acetylcysteine or Metamizole administration due to the potential for falsely depressed results   HDL, Direct 04/26/2021 55  >=40 mg/dL Final    HDL Cholesterol:       Low     <41 mg/dL  Specimen collection should occur prior to Metamizole administration due to the potential for falsley depressed results   LDL Calculated 04/26/2021 58  0 - 100 mg/dL Final    LDL Cholesterol:     Optimal           <100 mg/dl     Near Optimal      100-129 mg/dl     Above Optimal       Borderline High 130-159 mg/dl       High            160-189 mg/dl       Very High       >189 mg/dl         This screening LDL is a calculated result  It does not have the accuracy of the Direct Measured LDL in the monitoring of patients with hyperlipidemia and/or statin therapy  Direct Measure LDL (HSG457) must be ordered separately in these patients   Non-HDL-Chol (CHOL-HDL) 04/26/2021 72  mg/dl Final    Vit D, 25-Hydroxy 04/26/2021 26 6* 30 0 - 100 0 ng/mL Final         Patient Instructions   Follow with Consultants as per their and our suggestion    Follow up in 12 week(s) or as needed earlier    Follow all instructions as advised and discussed  Take your medications as prescribed  Call the office immediately if you experience any side effects  Ask questions if you do not understand      Keep your scheduled appointment as advised or come sooner if necessary or in doubt  Best time to call for non-urgent matter or questions on weekdays is between 9am and 12 noon  See physician for any new symptoms or worsening of current symptoms  Urgent or emergent situations call 911 and report to nearest emergency room  I spent  30 -40 minutes taking care of this patient including clinical care, conseling, collaboration, chart, lab and consultaion review as appropriate    Patient is to get labs 1 week(s) prior to next visit if advised    Continue using cane, do some strengthening exercise, fall precautions reviewed           Dr Adam Lindo MD  [facility]       "This note has been constructed using a voice recognition system  Therefore there may be syntax, spelling, and/or grammatical errors   Please call if you have any questions  "

## 2021-09-23 DIAGNOSIS — I10 ESSENTIAL HYPERTENSION: ICD-10-CM

## 2021-09-23 RX ORDER — FOSINOPRIL SODIUM 10 MG/1
TABLET ORAL
Qty: 45 TABLET | Refills: 1 | Status: SHIPPED | OUTPATIENT
Start: 2021-09-23 | End: 2021-12-22

## 2021-10-12 ENCOUNTER — CLINICAL SUPPORT (OUTPATIENT)
Dept: INTERNAL MEDICINE CLINIC | Facility: CLINIC | Age: 81
End: 2021-10-12
Payer: COMMERCIAL

## 2021-10-12 DIAGNOSIS — Z23 ENCOUNTER FOR IMMUNIZATION: Primary | ICD-10-CM

## 2021-10-12 PROCEDURE — 90662 IIV NO PRSV INCREASED AG IM: CPT | Performed by: INTERNAL MEDICINE

## 2021-10-12 PROCEDURE — G0008 ADMIN INFLUENZA VIRUS VAC: HCPCS | Performed by: INTERNAL MEDICINE

## 2021-10-23 DIAGNOSIS — F33.42 RECURRENT MAJOR DEPRESSIVE DISORDER, IN FULL REMISSION (HCC): ICD-10-CM

## 2021-10-23 DIAGNOSIS — I10 ESSENTIAL HYPERTENSION: ICD-10-CM

## 2021-10-25 RX ORDER — CITALOPRAM 40 MG/1
TABLET ORAL
Qty: 90 TABLET | Refills: 1 | Status: SHIPPED | OUTPATIENT
Start: 2021-10-25 | End: 2021-11-24 | Stop reason: SDUPTHER

## 2021-11-15 ENCOUNTER — APPOINTMENT (OUTPATIENT)
Dept: LAB | Facility: CLINIC | Age: 81
End: 2021-11-15
Payer: COMMERCIAL

## 2021-11-15 DIAGNOSIS — I10 ESSENTIAL HYPERTENSION: ICD-10-CM

## 2021-11-15 DIAGNOSIS — E78.00 HYPERCHOLESTEREMIA: ICD-10-CM

## 2021-11-15 LAB
ALBUMIN SERPL BCP-MCNC: 3.3 G/DL (ref 3.5–5)
ALP SERPL-CCNC: 94 U/L (ref 46–116)
ALT SERPL W P-5'-P-CCNC: 17 U/L (ref 12–78)
ANION GAP SERPL CALCULATED.3IONS-SCNC: 2 MMOL/L (ref 4–13)
AST SERPL W P-5'-P-CCNC: 14 U/L (ref 5–45)
BILIRUB SERPL-MCNC: 0.63 MG/DL (ref 0.2–1)
BUN SERPL-MCNC: 17 MG/DL (ref 5–25)
CALCIUM ALBUM COR SERPL-MCNC: 9.9 MG/DL (ref 8.3–10.1)
CALCIUM SERPL-MCNC: 9.3 MG/DL (ref 8.3–10.1)
CHLORIDE SERPL-SCNC: 108 MMOL/L (ref 100–108)
CHOLEST SERPL-MCNC: 137 MG/DL (ref 50–200)
CO2 SERPL-SCNC: 29 MMOL/L (ref 21–32)
CREAT SERPL-MCNC: 1.04 MG/DL (ref 0.6–1.3)
GFR SERPL CREATININE-BSD FRML MDRD: 67 ML/MIN/1.73SQ M
GLUCOSE P FAST SERPL-MCNC: 98 MG/DL (ref 65–99)
HDLC SERPL-MCNC: 52 MG/DL
LDLC SERPL CALC-MCNC: 72 MG/DL (ref 0–100)
NONHDLC SERPL-MCNC: 85 MG/DL
POTASSIUM SERPL-SCNC: 4.7 MMOL/L (ref 3.5–5.3)
PROT SERPL-MCNC: 6.8 G/DL (ref 6.4–8.2)
SODIUM SERPL-SCNC: 139 MMOL/L (ref 136–145)
TRIGL SERPL-MCNC: 67 MG/DL

## 2021-11-15 PROCEDURE — 80061 LIPID PANEL: CPT

## 2021-11-15 PROCEDURE — 36415 COLL VENOUS BLD VENIPUNCTURE: CPT

## 2021-11-15 PROCEDURE — 80053 COMPREHEN METABOLIC PANEL: CPT

## 2021-11-22 DIAGNOSIS — E78.5 HYPERLIPIDEMIA, UNSPECIFIED HYPERLIPIDEMIA TYPE: ICD-10-CM

## 2021-11-22 RX ORDER — NIACIN 500 MG/1
TABLET, EXTENDED RELEASE ORAL
Qty: 90 TABLET | Refills: 1 | Status: SHIPPED | OUTPATIENT
Start: 2021-11-22 | End: 2021-11-24 | Stop reason: SDUPTHER

## 2021-11-24 ENCOUNTER — OFFICE VISIT (OUTPATIENT)
Dept: INTERNAL MEDICINE CLINIC | Facility: CLINIC | Age: 81
End: 2021-11-24
Payer: COMMERCIAL

## 2021-11-24 VITALS
HEART RATE: 64 BPM | OXYGEN SATURATION: 94 % | DIASTOLIC BLOOD PRESSURE: 80 MMHG | WEIGHT: 189 LBS | BODY MASS INDEX: 27.99 KG/M2 | HEIGHT: 69 IN | SYSTOLIC BLOOD PRESSURE: 128 MMHG

## 2021-11-24 DIAGNOSIS — F42.2 MIXED OBSESSIONAL THOUGHTS AND ACTS: ICD-10-CM

## 2021-11-24 DIAGNOSIS — E78.00 HYPERCHOLESTEREMIA: ICD-10-CM

## 2021-11-24 DIAGNOSIS — I48.92 PAROXYSMAL ATRIAL FLUTTER (HCC): Primary | ICD-10-CM

## 2021-11-24 DIAGNOSIS — R60.0 EDEMA OF LEFT LOWER LEG: ICD-10-CM

## 2021-11-24 DIAGNOSIS — H90.5 SENSORINEURAL HEARING LOSS (SNHL), UNSPECIFIED LATERALITY: ICD-10-CM

## 2021-11-24 DIAGNOSIS — F33.42 RECURRENT MAJOR DEPRESSIVE DISORDER, IN FULL REMISSION (HCC): ICD-10-CM

## 2021-11-24 DIAGNOSIS — J30.9 ALLERGIC RHINITIS, UNSPECIFIED SEASONALITY, UNSPECIFIED TRIGGER: ICD-10-CM

## 2021-11-24 DIAGNOSIS — I10 ESSENTIAL HYPERTENSION: ICD-10-CM

## 2021-11-24 DIAGNOSIS — Z00.00 MEDICARE ANNUAL WELLNESS VISIT, SUBSEQUENT: ICD-10-CM

## 2021-11-24 DIAGNOSIS — I34.0 NONRHEUMATIC MITRAL VALVE REGURGITATION: ICD-10-CM

## 2021-11-24 DIAGNOSIS — E78.5 HYPERLIPIDEMIA, UNSPECIFIED HYPERLIPIDEMIA TYPE: ICD-10-CM

## 2021-11-24 PROCEDURE — 1036F TOBACCO NON-USER: CPT | Performed by: INTERNAL MEDICINE

## 2021-11-24 PROCEDURE — 3079F DIAST BP 80-89 MM HG: CPT | Performed by: INTERNAL MEDICINE

## 2021-11-24 PROCEDURE — 1170F FXNL STATUS ASSESSED: CPT | Performed by: INTERNAL MEDICINE

## 2021-11-24 PROCEDURE — 1125F AMNT PAIN NOTED PAIN PRSNT: CPT | Performed by: INTERNAL MEDICINE

## 2021-11-24 PROCEDURE — 99214 OFFICE O/P EST MOD 30 MIN: CPT | Performed by: INTERNAL MEDICINE

## 2021-11-24 PROCEDURE — G0439 PPPS, SUBSEQ VISIT: HCPCS | Performed by: INTERNAL MEDICINE

## 2021-11-24 PROCEDURE — 3074F SYST BP LT 130 MM HG: CPT | Performed by: INTERNAL MEDICINE

## 2021-11-24 PROCEDURE — 3725F SCREEN DEPRESSION PERFORMED: CPT | Performed by: INTERNAL MEDICINE

## 2021-11-24 PROCEDURE — 3288F FALL RISK ASSESSMENT DOCD: CPT | Performed by: INTERNAL MEDICINE

## 2021-11-24 PROCEDURE — 1160F RVW MEDS BY RX/DR IN RCRD: CPT | Performed by: INTERNAL MEDICINE

## 2021-11-24 RX ORDER — ATORVASTATIN CALCIUM 10 MG/1
TABLET, FILM COATED ORAL
Qty: 45 TABLET | Refills: 1 | Status: SHIPPED | OUTPATIENT
Start: 2021-11-24 | End: 2022-05-27 | Stop reason: SDUPTHER

## 2021-11-24 RX ORDER — CITALOPRAM 40 MG/1
40 TABLET ORAL DAILY
Qty: 90 TABLET | Refills: 1 | Status: SHIPPED | OUTPATIENT
Start: 2021-11-24 | End: 2022-04-21

## 2021-11-24 RX ORDER — NIACIN 500 MG/1
500 TABLET, EXTENDED RELEASE ORAL
Qty: 90 TABLET | Refills: 1 | Status: ON HOLD | OUTPATIENT
Start: 2021-11-24 | End: 2022-06-20

## 2021-12-01 ENCOUNTER — APPOINTMENT (EMERGENCY)
Dept: RADIOLOGY | Facility: HOSPITAL | Age: 81
End: 2021-12-01
Payer: COMMERCIAL

## 2021-12-01 ENCOUNTER — HOSPITAL ENCOUNTER (EMERGENCY)
Facility: HOSPITAL | Age: 81
Discharge: HOME | End: 2021-12-01
Attending: EMERGENCY MEDICINE
Payer: COMMERCIAL

## 2021-12-01 VITALS
RESPIRATION RATE: 20 BRPM | OXYGEN SATURATION: 98 % | BODY MASS INDEX: 27.17 KG/M2 | DIASTOLIC BLOOD PRESSURE: 85 MMHG | SYSTOLIC BLOOD PRESSURE: 214 MMHG | HEART RATE: 78 BPM | TEMPERATURE: 98.2 F | WEIGHT: 184 LBS

## 2021-12-01 DIAGNOSIS — S42.009A CLAVICLE FRACTURE: ICD-10-CM
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
## 2021-12-01 PROCEDURE — 99284 EMERGENCY DEPT VISIT MOD MDM: CPT | Performed by: EMERGENCY MEDICINE

## 2021-12-01 PROCEDURE — 73030 X-RAY EXAM OF SHOULDER: CPT

## 2021-12-01 PROCEDURE — 99284 EMERGENCY DEPT VISIT MOD MDM: CPT

## 2021-12-01 PROCEDURE — 70450 CT HEAD/BRAIN W/O DYE: CPT

## 2021-12-01 PROCEDURE — 72125 CT NECK SPINE W/O DYE: CPT

## 2021-12-01 RX ORDER — ACETAMINOPHEN 325 MG/1
650 TABLET ORAL ONCE
Status: COMPLETED | OUTPATIENT
Start: 2021-12-01 | End: 2021-12-01

## 2021-12-01 RX ADMIN — ACETAMINOPHEN 650 MG: 325 TABLET, FILM COATED ORAL at 10:45

## 2021-12-03 ENCOUNTER — APPOINTMENT (OUTPATIENT)
Dept: RADIOLOGY | Facility: CLINIC | Age: 81
End: 2021-12-03
Payer: COMMERCIAL

## 2021-12-03 ENCOUNTER — OFFICE VISIT (OUTPATIENT)
Dept: OBGYN CLINIC | Facility: CLINIC | Age: 81
End: 2021-12-03
Payer: COMMERCIAL

## 2021-12-03 VITALS — TEMPERATURE: 98.4 F | SYSTOLIC BLOOD PRESSURE: 202 MMHG | HEART RATE: 59 BPM | DIASTOLIC BLOOD PRESSURE: 73 MMHG

## 2021-12-03 DIAGNOSIS — S42.018A CLOSED NONDISPLACED FRACTURE OF STERNAL END OF LEFT CLAVICLE, INITIAL ENCOUNTER: Primary | ICD-10-CM

## 2021-12-03 DIAGNOSIS — M54.2 NECK PAIN ON LEFT SIDE: ICD-10-CM

## 2021-12-03 PROCEDURE — 3077F SYST BP >= 140 MM HG: CPT | Performed by: PHYSICIAN ASSISTANT

## 2021-12-03 PROCEDURE — 73000 X-RAY EXAM OF COLLAR BONE: CPT

## 2021-12-03 PROCEDURE — 1160F RVW MEDS BY RX/DR IN RCRD: CPT | Performed by: PHYSICIAN ASSISTANT

## 2021-12-03 PROCEDURE — 99214 OFFICE O/P EST MOD 30 MIN: CPT | Performed by: PHYSICIAN ASSISTANT

## 2021-12-03 PROCEDURE — 3078F DIAST BP <80 MM HG: CPT | Performed by: PHYSICIAN ASSISTANT

## 2021-12-03 PROCEDURE — 1036F TOBACCO NON-USER: CPT | Performed by: PHYSICIAN ASSISTANT

## 2021-12-22 DIAGNOSIS — I10 ESSENTIAL HYPERTENSION: ICD-10-CM

## 2021-12-22 RX ORDER — FOSINOPRIL SODIUM 10 MG/1
TABLET ORAL
Qty: 45 TABLET | Refills: 1 | Status: SHIPPED | OUTPATIENT
Start: 2021-12-22 | End: 2022-03-22

## 2022-01-05 ENCOUNTER — APPOINTMENT (OUTPATIENT)
Dept: RADIOLOGY | Facility: CLINIC | Age: 82
End: 2022-01-05
Payer: COMMERCIAL

## 2022-01-05 ENCOUNTER — OFFICE VISIT (OUTPATIENT)
Dept: OBGYN CLINIC | Facility: CLINIC | Age: 82
End: 2022-01-05
Payer: COMMERCIAL

## 2022-01-05 VITALS
BODY MASS INDEX: 27.25 KG/M2 | HEART RATE: 59 BPM | WEIGHT: 184 LBS | DIASTOLIC BLOOD PRESSURE: 75 MMHG | HEIGHT: 69 IN | SYSTOLIC BLOOD PRESSURE: 174 MMHG

## 2022-01-05 DIAGNOSIS — S42.018A CLOSED NONDISPLACED FRACTURE OF STERNAL END OF LEFT CLAVICLE, INITIAL ENCOUNTER: Primary | ICD-10-CM

## 2022-01-05 DIAGNOSIS — S42.018A CLOSED NONDISPLACED FRACTURE OF STERNAL END OF LEFT CLAVICLE, INITIAL ENCOUNTER: ICD-10-CM

## 2022-01-05 PROCEDURE — 3078F DIAST BP <80 MM HG: CPT | Performed by: ORTHOPAEDIC SURGERY

## 2022-01-05 PROCEDURE — 1160F RVW MEDS BY RX/DR IN RCRD: CPT | Performed by: ORTHOPAEDIC SURGERY

## 2022-01-05 PROCEDURE — 99213 OFFICE O/P EST LOW 20 MIN: CPT | Performed by: ORTHOPAEDIC SURGERY

## 2022-01-05 PROCEDURE — 1036F TOBACCO NON-USER: CPT | Performed by: ORTHOPAEDIC SURGERY

## 2022-01-05 PROCEDURE — 3077F SYST BP >= 140 MM HG: CPT | Performed by: ORTHOPAEDIC SURGERY

## 2022-01-05 PROCEDURE — 73000 X-RAY EXAM OF COLLAR BONE: CPT

## 2022-01-05 NOTE — PROGRESS NOTES
Assessment/Plan:  1  Closed nondisplaced fracture of sternal end of left clavicle, initial encounter  XR clavicle left       Scribe Attestation    I,:  Sandrajose martin [place] Call am acting as a scribe while in the presence of the attending physician :       I,:  Rodo Sim MD personally performed the services described in this documentation    as scribed in my presence :             Hyacinth Sharma is doing very well  His x-rays demonstrate an acceptably aligned and healing comminuted fracture at the medial clavicle  I am pleased with Taj's active range of motion today  He can now discontinue the use of the sling  He should still avoid lifting anything of significant weight with the left extremity for an additional 4 weeks  I asked that he not sleep on the left side for an additional 4 weeks as well  He can now follow up with me as needed for this  Neither he nor his wife had any questions  Subjective:   Alba Lopez is a 80 y o  male who presents to the office today for follow-up evaluation for the left clavicle fracture suffered 4 weeks ago  Hyacinth Sharma is very happy to report that he is experiencing little to no pain whatsoever  He denies radiating pain or distal paresthesias  He is eager to be able to remove his sling  He is here today with his wife  Review of Systems   Constitutional: Negative for chills, fever and unexpected weight change  HENT: Negative for hearing loss, nosebleeds and sore throat  Eyes: Negative for pain, redness and visual disturbance  Respiratory: Negative for cough, shortness of breath and wheezing  Cardiovascular: Negative for chest pain, palpitations and leg swelling  Gastrointestinal: Negative for abdominal pain, nausea and vomiting  Endocrine: Negative for polyphagia and polyuria  Genitourinary: Negative for dysuria and hematuria  Musculoskeletal:        See HPI   Skin: Negative for rash and wound  Neurological: Negative for dizziness, numbness and headaches  Psychiatric/Behavioral: Negative for decreased concentration and suicidal ideas  The patient is not nervous/anxious  Past Medical History:   Diagnosis Date    Hyperlipidemia        Past Surgical History:   Procedure Laterality Date    IR IVC FILTER PLACEMENT OPTIONAL/TEMPORARY  12/7/2019    IR IVC FILTER REMOVAL  8/21/2020    NM OPEN RX FEMUR FX+INTRAMED BÁRBARA Right 12/4/2019    Procedure: INSERTION NAIL IM FEMUR ANTEGRADE (TROCHANTERIC); Surgeon: Ari Forrest DO;  Location: [facility];  Service: Orthopedics       Family History   Problem Relation Age of Onset    Cancer Mother        Social History     Occupational History    Not on file   Tobacco Use    Smoking status: Never Smoker    Smokeless tobacco: Never Used   Substance and Sexual Activity    Alcohol use: Never    Drug use: Never    Sexual activity: Not on file         Current Outpatient Medications:     atorvastatin (LIPITOR) 10 mg tablet, Half tab daily, Disp: 45 tablet, Rfl: 1    citalopram (CeleXA) 40 mg tablet, Take 1 tablet (40 mg total) by mouth daily, Disp: 90 tablet, Rfl: 1    fosinopril (MONOPRIL) 10 mg tablet, TAKE ONE-HALF TABLET BY MOUTH DAILY, Disp: 45 tablet, Rfl: 1    metoprolol tartrate (LOPRESSOR) 25 mg tablet, TAKE 1 TABLET BY MOUTH EVERY 12 HOURS, Disp: 180 tablet, Rfl: 1    Multiple Vitamin (MULTIVITAMIN) tablet, Take 1 tablet by mouth daily, Disp: , Rfl:     niacin (NIASPAN) 500 mg CR tablet, Take 1 tablet (500 mg total) by mouth daily at bedtime, Disp: 90 tablet, Rfl: 1    No Known Allergies    Objective:  Vitals:    01/05/22 0949   BP: (!) 174/75   Pulse: 59       Left Shoulder Exam     Tenderness   The patient is experiencing no tenderness       Range of Motion   Active abduction: 130   External rotation: 60   Forward flexion: 140   Internal rotation 0 degrees: Lumbar     Other   Erythema: absent  Sensation: normal  Pulse: present (2+ radial)     Comments:  Swelling over the medial clavicle and sternoclavicular jt remains  The patient is able to actively swallow without difficulty  Normal motor function in the distal extremity  Normal sensation to light touch  Physical Exam  Vitals reviewed  Constitutional:       Appearance: He is well-developed  HENT:      Head: Normocephalic and atraumatic  Eyes:      General:         Right eye: No discharge  Left eye: No discharge  Conjunctiva/sclera: Conjunctivae normal    Cardiovascular:      Rate and Rhythm: Regular rhythm  Pulmonary:      Effort: Pulmonary effort is normal  No respiratory distress  Musculoskeletal:      Cervical back: Normal range of motion and neck supple  Skin:     General: Skin is warm and dry  Neurological:      Mental Status: He is alert and oriented to person, place, and time  Psychiatric:         Behavior: Behavior normal          I have personally reviewed pertinent films in PACS and my interpretation is as follows:   X-rays of the left clavicle taken today demonstrate an acceptably aligned comminuted fracture of the medial clavicle

## 2022-02-25 ENCOUNTER — OFFICE VISIT (OUTPATIENT)
Dept: INTERNAL MEDICINE CLINIC | Facility: CLINIC | Age: 82
End: 2022-02-25
Payer: COMMERCIAL

## 2022-02-25 VITALS
HEIGHT: 69 IN | DIASTOLIC BLOOD PRESSURE: 80 MMHG | SYSTOLIC BLOOD PRESSURE: 120 MMHG | OXYGEN SATURATION: 94 % | WEIGHT: 188 LBS | HEART RATE: 71 BPM | BODY MASS INDEX: 27.85 KG/M2

## 2022-02-25 DIAGNOSIS — R60.0 EDEMA OF LEFT LOWER LEG: ICD-10-CM

## 2022-02-25 DIAGNOSIS — I47.1 SVT (SUPRAVENTRICULAR TACHYCARDIA) (HCC): ICD-10-CM

## 2022-02-25 DIAGNOSIS — F33.42 RECURRENT MAJOR DEPRESSIVE DISORDER, IN FULL REMISSION (HCC): ICD-10-CM

## 2022-02-25 DIAGNOSIS — E55.9 VITAMIN D DEFICIENCY: ICD-10-CM

## 2022-02-25 DIAGNOSIS — I48.92 PAROXYSMAL ATRIAL FLUTTER (HCC): Primary | ICD-10-CM

## 2022-02-25 DIAGNOSIS — I10 ESSENTIAL HYPERTENSION: ICD-10-CM

## 2022-02-25 DIAGNOSIS — J30.9 ALLERGIC RHINITIS, UNSPECIFIED SEASONALITY, UNSPECIFIED TRIGGER: ICD-10-CM

## 2022-02-25 DIAGNOSIS — R35.0 URINARY FREQUENCY: ICD-10-CM

## 2022-02-25 DIAGNOSIS — I34.0 NONRHEUMATIC MITRAL VALVE REGURGITATION: ICD-10-CM

## 2022-02-25 DIAGNOSIS — E78.5 HYPERLIPIDEMIA, UNSPECIFIED HYPERLIPIDEMIA TYPE: ICD-10-CM

## 2022-02-25 DIAGNOSIS — I35.1 NONRHEUMATIC AORTIC VALVE INSUFFICIENCY: ICD-10-CM

## 2022-02-25 DIAGNOSIS — H90.0 CONDUCTIVE HEARING LOSS, BILATERAL: Chronic | ICD-10-CM

## 2022-02-25 PROCEDURE — 3074F SYST BP LT 130 MM HG: CPT | Performed by: INTERNAL MEDICINE

## 2022-02-25 PROCEDURE — 3079F DIAST BP 80-89 MM HG: CPT | Performed by: INTERNAL MEDICINE

## 2022-02-25 PROCEDURE — 3725F SCREEN DEPRESSION PERFORMED: CPT | Performed by: INTERNAL MEDICINE

## 2022-02-25 PROCEDURE — 1036F TOBACCO NON-USER: CPT | Performed by: INTERNAL MEDICINE

## 2022-02-25 PROCEDURE — 99214 OFFICE O/P EST MOD 30 MIN: CPT | Performed by: INTERNAL MEDICINE

## 2022-02-25 PROCEDURE — 1160F RVW MEDS BY RX/DR IN RCRD: CPT | Performed by: INTERNAL MEDICINE

## 2022-02-25 NOTE — PROGRESS NOTES
[facility] Office Visit Note  22     Madison Jha 80 y o  male MRN: [id number]  : 1940    Assessment:     1  Paroxysmal atrial flutter (HCC)  Assessment & Plan:  Pulses regular  Will continue metoprolol tartrate 25 mg 1 tablet every 12 hour  As well as continue to control blood pressure    Orders:  -     Comprehensive metabolic panel; Future; Expected date: 2022  -     CBC and differential; Future    2  Essential hypertension  Assessment & Plan:  Hypertension well controlled  Continue fosinopril 10 mg daily as well as metoprolol tartrate 25 mg every 12 hour    Orders:  -     metoprolol tartrate (LOPRESSOR) 25 mg tablet; Take 1 tablet (25 mg total) by mouth every 12 (twelve) hours  -     Comprehensive metabolic panel; Future; Expected date: 2022  -     CBC and differential; Future  -     Lipid panel; Future    3  Recurrent major depressive disorder, in full remission [facility])  Assessment & Plan:  Well controlled  Pt's depression is well controlled    Pt is tolerating current psych medicine regimen and regimen is effective  Pt does not have any side effects of medicine  Refer to Med List for Psych Medicine  Will continue same regimen  Continue citalopram 40 mg daily  OCD is well controlled      4  Allergic rhinitis, unspecified seasonality, unspecified trigger  Assessment & Plan:  No major allergy symptoms at this time      5  SVT (supraventricular tachycardia) (HCC)  Assessment & Plan:  Pulses regular  Continue beta-blocker as well as Monopril  Also being followed by cardiologist   Seema Daniel  Orders:  -     Comprehensive metabolic panel; Future; Expected date: 2022    6  Conductive hearing loss, bilateral  Assessment & Plan:  Unchanged  7  Urinary frequency  Assessment & Plan:  No major frequency issue      8  Edema of left lower leg  Assessment & Plan:  Mild swelling but no LV failure  Tolerating well    More left Than the right    Orders:  - Comprehensive metabolic panel; Future; Expected date: 05/25/2022  -     CBC and differential; Future    9  Nonrheumatic aortic valve insufficiency  Assessment & Plan:  Compensated symptom-free also gets echocardiogram periodically with his own cardiologist periodically      10  Nonrheumatic mitral valve regurgitation  Assessment & Plan:  Mild-to-moderate MR  Clinically compensated  No symptoms LV failure  Last echocardiogram 2019      11  Vitamin D deficiency  -     Vitamin D 25 hydroxy; Future    12  Hyperlipidemia, unspecified hyperlipidemia type  Assessment & Plan:  Lab Results   Component Value Date    CHOLESTEROL 137 11/15/2021    CHOLESTEROL 127 04/26/2021    CHOLESTEROL 130 10/26/2020     Lab Results   Component Value Date    HDL 52 11/15/2021    HDL 55 04/26/2021    HDL 65 10/26/2020     Lab Results   Component Value Date    TRIG 67 11/15/2021    TRIG 72 04/26/2021    TRIG 71 10/26/2020     Lab Results   Component Value Date    [place] 85 11/15/2021    NONHDLC 72 04/26/2021    [place] 65 10/26/2020   Continue low-cholesterol diet as well as atorvastatin 10 mg daily due for lipid profile next visit      Orders:  -     Comprehensive metabolic panel; Future; Expected date: 05/25/2022  -     CBC and differential; Future  -     Lipid panel; Future        Discussion Summary and Plan: Today's care plan and medications were reviewed with patient in detail and all their questions answered to their satisfaction  Chief Complaint   Patient presents with    Hypertension     Valvular heart disease    Hyperlipidemia    Depression     OCD    Follow-up     Gait dysfunction, history of paroxysmal atrial flutter, urinary frequency, history of edema legs,    Arthritis      Subjective:  Patient is here for chronic disease management  Offers no specific complaint  HTN: Patient reports no symptoms    No side-effects reported on ACEi and beta-blocker    Patient is on fosinopril 10 mg daily as well as metoprolol tartrate 25 mg twice a day    Hx of PE: Patient reports that IV filter has been removed  Currently not on blood thinner, sx free, filter was already removed 2020    Edema of legs better somewhat on the left    Allergic rhinitis:  Symptom-free  Not requiring much medications    Hyperlipidemia remains on atorvastatin 10 mg half tablet daily d  Tolerating medication without side effect  Due for lipid profile next visit    Blood sugar fair monitor blood sugar next visit    Chronic depression and obsessive-compulsive are fairly same a tolerating antidepressant without side effect  His OCD is fair  Does was sent set time  Tolerating citalopram without side effect  No psychosis  PHQ-9 0 MAGALY-7 0  generalized anxiety disorder  OCD thoughts are well controlled no side effect of citalopram 40 mg daily      Mild-to-moderate aortic regurgitation, mild mitral regurgitation and normal ejection fraction with normal nuclear stress test on 03/19/2018 an echocardiogram on 03/20/2018 fair  Sees cardiologist periodically  Paroxysmal atrial flutter pulse regular  The no palpitation dizziness  In the no cancer screen per his choice  Medications reviewed    Walks with cane  Colonoscopy done on 12/26/2016 last time  Hip Fracture: Pt ambulates without cane  Denies edema, ecchymosis or other symptoms  Pt not doing any Physical Therapy at this time       A  Fib: pulse regular    Patient was seen by cardiologist     04/26/2021:  CMP normal except creatinine 1 02, GFR 69, CBC normal, vitamin-D 26 6, LDL 58, HDL 55, triglycerides 72 total cholesterol 127  11/15/2021 blood sugar 98 LDL cholesterol 72 HDL 52 GFR 67 albumin 3 3 rest of the CMP normal            The following portions of the patient's history were reviewed and updated as appropriate: allergies, current medications, past family history, past medical history, past social history, past surgical history and problem list     Review of Systems   All other systems reviewed and are negative  Historical Information   Patient Active Problem List   Diagnosis    Mixed obsessional thoughts and acts    Allergic rhinitis    Hypercholesteremia    Sensorineural hearing loss (SNHL)    Nonrheumatic mitral valve regurgitation    Aortic valve sclerosis    Nonrheumatic aortic valve insufficiency    Essential hypertension    Recurrent major depressive disorder, in full remission ([facility] [address] )    Hyperlipidemia    Impaired vision    Conductive hearing loss, bilateral    S/P IVC filter    [facility]    Valvular heart disease    Urinary frequency    Edema of left lower leg    Paroxysmal atrial flutter ([facility] [address] )    Medicare annual wellness visit, subsequent     Past Medical History:   Diagnosis Date    Hyperlipidemia      Past Surgical History:   Procedure Laterality Date    IR IVC FILTER PLACEMENT OPTIONAL/TEMPORARY  12/7/2019    IR IVC FILTER REMOVAL  8/21/2020    UT OPEN RX FEMUR FX+INTRAMED BÁRBARA Right 12/4/2019    Procedure: INSERTION NAIL IM FEMUR ANTEGRADE (TROCHANTERIC);   Surgeon: Nicho Sofia DO;  Location: [facility];  Service: Orthopedics     Social History     Substance and Sexual Activity   Alcohol Use Never     Social History     Substance and Sexual Activity   Drug Use Never     Social History     Tobacco Use   Smoking Status Never Smoker   Smokeless Tobacco Never Used     Family History   Problem Relation Age of Onset    Cancer Mother      Health Maintenance Due   Topic    COVID-19 Vaccine (1)    Pneumococcal Vaccine: 65+ Years (1 of 1 - PPSV23)      Meds/Allergies       Current Outpatient Medications:     atorvastatin (LIPITOR) 10 mg tablet, Half tab daily, Disp: 45 tablet, Rfl: 1    citalopram (CeleXA) 40 mg tablet, Take 1 tablet (40 mg total) by mouth daily, Disp: 90 tablet, Rfl: 1    fosinopril (MONOPRIL) 10 mg tablet, TAKE ONE-HALF TABLET BY MOUTH DAILY, Disp: 45 tablet, Rfl: 1    metoprolol tartrate (LOPRESSOR) 25 mg tablet, Take 1 tablet (25 mg total) by mouth every 12 (twelve) hours, Disp: 180 tablet, Rfl: 1    Multiple Vitamin (MULTIVITAMIN) tablet, Take 1 tablet by mouth daily, Disp: , Rfl:     niacin (NIASPAN) 500 mg CR tablet, Take 1 tablet (500 mg total) by mouth daily at bedtime, Disp: 90 tablet, Rfl: 1      Objective:    Vitals:   /80   Pulse 71   Ht 5' 9" (1 753 m)   Wt 85 3 kg (188 lb)   SpO2 94%   BMI 27 76 kg/m²   Body mass index is 27 76 kg/m²  Vitals:    02/25/22 1036   Weight: 85 3 kg (188 lb)       Physical Exam  Constitutional:       General: He is not in acute distress  Appearance: Normal appearance  He is well-developed  He is not ill-appearing, toxic-appearing or diaphoretic  HENT:      Head: Normocephalic and atraumatic  Right Ear: External ear normal       Left Ear: External ear normal    Eyes:      General:         Right eye: No discharge  Left eye: No discharge  Conjunctiva/sclera: Conjunctivae normal    Neck:      Thyroid: No thyromegaly  Vascular: No carotid bruit or JVD  Cardiovascular:      Rate and Rhythm: Regular rhythm  Heart sounds: Murmur heard  No friction rub  No gallop  Pulmonary:      Effort: No respiratory distress  Breath sounds: Normal breath sounds  No wheezing or rales  Abdominal:      General: Bowel sounds are normal  There is no distension  Palpations: There is no mass  Tenderness: There is no rebound  Musculoskeletal:      Cervical back: No rigidity or tenderness  Right lower leg: No edema  Left lower leg: No edema  Comments: mild   Lymphadenopathy:      Cervical: No cervical adenopathy  Skin:     General: Skin is warm  Coloration: Skin is not jaundiced or pale  Findings: No rash  Neurological:      General: No focal deficit present  Mental Status: He is oriented to person, place, and time        Coordination: Coordination normal       Gait: Gait abnormal  Psychiatric:         Attention and Perception: Attention and perception normal  He is attentive  He does not perceive auditory or visual hallucinations  Mood and Affect: Mood normal  Mood is not anxious  Speech: Speech normal          Behavior: Behavior normal  Behavior is not agitated, aggressive, withdrawn, hyperactive or combative  Thought Content: Thought content normal  Thought content is not paranoid or delusional  Thought content does not include homicidal or suicidal ideation  Thought content does not include suicidal plan  Cognition and Memory: Cognition is not impaired  Memory is not impaired  He exhibits impaired recent memory  Judgment: Judgment normal  Judgment is not impulsive or inappropriate  Lab Review   No visits with results within 2 Month(s) from this visit  Latest known visit with results is:   Appointment on 11/15/2021   Component Date Value Ref Range Status    Cholesterol 11/15/2021 137  50 - 200 mg/dL Final    Cholesterol:       Desirable         <200 mg/dl       Borderline         200-239 mg/dl       High              >239 mg/dl           Triglycerides 11/15/2021 67  <=150 mg/dL Final    Triglyceride:     Normal          <150 mg/dl     Borderline High 150-199 mg/dl     High            200-499 mg/dl        Very High       >499 mg/dl    Specimen collection should occur prior to N-Acetylcysteine or Metamizole administration due to the potential for falsely depressed results   HDL, Direct 11/15/2021 52  >=40 mg/dL Final    Specimen collection should occur prior to Metamizole administration due to the potential for falsley depressed results      LDL Calculated 11/15/2021 72  0 - 100 mg/dL Final    LDL Cholesterol:     Optimal           <100 mg/dl     Near Optimal      100-129 mg/dl     Above Optimal       Borderline High 130-159 mg/dl       High            160-189 mg/dl       Very High       >189 mg/dl         This screening LDL is a calculated result  It does not have the accuracy of the Direct Measured LDL in the monitoring of patients with hyperlipidemia and/or statin therapy  Direct Measure LDL (UWM089) must be ordered separately in these patients   Non-HDL-Chol (CHOL-HDL) 11/15/2021 85  mg/dl Final    Sodium 11/15/2021 139  136 - 145 mmol/L Final    Potassium 11/15/2021 4 7  3 5 - 5 3 mmol/L Final    Chloride 11/15/2021 108  100 - 108 mmol/L Final    CO2 11/15/2021 29  21 - 32 mmol/L Final    ANION GAP 11/15/2021 2* 4 - 13 mmol/L Final    BUN 11/15/2021 17  5 - 25 mg/dL Final    Creatinine 11/15/2021 1 04  0 60 - 1 30 mg/dL Final    Standardized to IDMS reference method    Glucose, Fasting 11/15/2021 98  65 - 99 mg/dL Final    Specimen collection should occur prior to Sulfasalazine administration due to the potential for falsely depressed results  Specimen collection should occur prior to Sulfapyridine administration due to the potential for falsely elevated results   Calcium 11/15/2021 9 3  8 3 - 10 1 mg/dL Final    Corrected Calcium 11/15/2021 9 9  8 3 - 10 1 mg/dL Final    AST 11/15/2021 14  5 - 45 U/L Final    Specimen collection should occur prior to Sulfasalazine administration due to the potential for falsely depressed results   ALT 11/15/2021 17  12 - 78 U/L Final    Specimen collection should occur prior to Sulfasalazine and/or Sulfapyridine administration due to the potential for falsely depressed results   Alkaline Phosphatase 11/15/2021 94  46 - 116 U/L Final    Total Protein 11/15/2021 6 8  6 4 - 8 2 g/dL Final    Albumin 11/15/2021 3 3* 3 5 - 5 0 g/dL Final    Total Bilirubin 11/15/2021 0 63  0 20 - 1 00 mg/dL Final    Use of this assay is not recommended for patients undergoing treatment with eltrombopag due to the potential for falsely elevated results      eGFR 11/15/2021 67  ml/min/1 73sq m Final         Patient Instructions   Follow with Consultants as per their and our suggestion    Follow up in 12 week(s) or as needed earlier    Follow all instructions as advised and discussed  Take your medications as prescribed  Call the office immediately if you experience any side effects  Ask questions if you do not understand  Keep your scheduled appointment as advised or come sooner if necessary or in doubt  Best time to call for non-urgent matter or questions on weekdays is between 9am and 12 noon  See physician for any new symptoms or worsening of current symptoms  Urgent or emergent situations call 911 and report to nearest emergency room  I spent  30 -40 minutes taking care of this patient including clinical care, conseling, collaboration, chart, lab and consultaion review as appropriate    Patient is to get labs 1 week(s) prior to next visit if advised               Dr Sejal Pantoja MD  [facility]       "This note has been constructed using a voice recognition system  Therefore there may be syntax, spelling, and/or grammatical errors   Please call if you have any questions  "

## 2022-02-25 NOTE — ASSESSMENT & PLAN NOTE
Lab Results   Component Value Date    CHOLESTEROL 137 11/15/2021    CHOLESTEROL 127 04/26/2021    CHOLESTEROL 130 10/26/2020     Lab Results   Component Value Date    HDL 52 11/15/2021    HDL 55 04/26/2021    HDL 65 10/26/2020     Lab Results   Component Value Date    TRIG 67 11/15/2021    TRIG 72 04/26/2021    TRIG 71 10/26/2020     Lab Results   Component Value Date    NONHDLC 85 11/15/2021    NONHDLC 72 04/26/2021    [place] 65 10/26/2020   Continue low-cholesterol diet as well as atorvastatin 10 mg daily due for lipid profile next visit

## 2022-02-25 NOTE — ASSESSMENT & PLAN NOTE
Compensated symptom-free also gets echocardiogram periodically with his own cardiologist periodically

## 2022-02-25 NOTE — ASSESSMENT & PLAN NOTE
Well controlled  Pt's depression is well controlled    Pt is tolerating current psych medicine regimen and regimen is effective  Pt does not have any side effects of medicine  Refer to Med List for Psych Medicine  Will continue same regimen  Continue citalopram 40 mg daily    OCD is well controlled

## 2022-02-25 NOTE — ASSESSMENT & PLAN NOTE
Hypertension well controlled    Continue fosinopril 10 mg daily as well as metoprolol tartrate 25 mg every 12 hour

## 2022-02-25 NOTE — ASSESSMENT & PLAN NOTE
Pulses regular  Will continue metoprolol tartrate 25 mg 1 tablet every 12 hour    As well as continue to control blood pressure

## 2022-02-25 NOTE — ASSESSMENT & PLAN NOTE
Pulses regular  Continue beta-blocker as well as Monopril  Also being followed by cardiologist   Lefty Ying

## 2022-03-22 DIAGNOSIS — I10 ESSENTIAL HYPERTENSION: ICD-10-CM

## 2022-03-22 RX ORDER — FOSINOPRIL SODIUM 10 MG/1
TABLET ORAL
Qty: 45 TABLET | Refills: 1 | Status: ON HOLD | OUTPATIENT
Start: 2022-03-22 | End: 2022-06-20

## 2022-04-21 DIAGNOSIS — I10 ESSENTIAL HYPERTENSION: ICD-10-CM

## 2022-04-21 DIAGNOSIS — F33.42 RECURRENT MAJOR DEPRESSIVE DISORDER, IN FULL REMISSION (HCC): ICD-10-CM

## 2022-04-21 RX ORDER — CITALOPRAM 40 MG/1
TABLET ORAL
Qty: 90 TABLET | Refills: 1 | Status: SHIPPED | OUTPATIENT
Start: 2022-04-21 | End: 2022-05-27 | Stop reason: SDUPTHER

## 2022-05-20 ENCOUNTER — LAB (OUTPATIENT)
Dept: LAB | Facility: CLINIC | Age: 82
End: 2022-05-20
Payer: COMMERCIAL

## 2022-05-20 DIAGNOSIS — E78.5 HYPERLIPIDEMIA, UNSPECIFIED HYPERLIPIDEMIA TYPE: ICD-10-CM

## 2022-05-20 DIAGNOSIS — R60.0 EDEMA OF LEFT LOWER LEG: ICD-10-CM

## 2022-05-20 DIAGNOSIS — I47.1 SVT (SUPRAVENTRICULAR TACHYCARDIA) (HCC): ICD-10-CM

## 2022-05-20 DIAGNOSIS — E55.9 VITAMIN D DEFICIENCY: ICD-10-CM

## 2022-05-20 DIAGNOSIS — I10 ESSENTIAL HYPERTENSION: ICD-10-CM

## 2022-05-20 DIAGNOSIS — I48.92 PAROXYSMAL ATRIAL FLUTTER (HCC): ICD-10-CM

## 2022-05-20 LAB
25(OH)D3 SERPL-MCNC: 37.1 NG/ML (ref 30–100)
ALBUMIN SERPL BCP-MCNC: 3.5 G/DL (ref 3.5–5)
ALP SERPL-CCNC: 81 U/L (ref 46–116)
ALT SERPL W P-5'-P-CCNC: 17 U/L (ref 12–78)
ANION GAP SERPL CALCULATED.3IONS-SCNC: 2 MMOL/L (ref 4–13)
AST SERPL W P-5'-P-CCNC: 17 U/L (ref 5–45)
BASOPHILS # BLD AUTO: 0.05 THOUSANDS/ΜL (ref 0–0.1)
BASOPHILS NFR BLD AUTO: 1 % (ref 0–1)
BILIRUB SERPL-MCNC: 0.97 MG/DL (ref 0.2–1)
BUN SERPL-MCNC: 16 MG/DL (ref 5–25)
CALCIUM SERPL-MCNC: 8.9 MG/DL (ref 8.3–10.1)
CHLORIDE SERPL-SCNC: 105 MMOL/L (ref 100–108)
CHOLEST SERPL-MCNC: 120 MG/DL
CO2 SERPL-SCNC: 30 MMOL/L (ref 21–32)
CREAT SERPL-MCNC: 1.05 MG/DL (ref 0.6–1.3)
EOSINOPHIL # BLD AUTO: 0.25 THOUSAND/ΜL (ref 0–0.61)
EOSINOPHIL NFR BLD AUTO: 4 % (ref 0–6)
ERYTHROCYTE [DISTWIDTH] IN BLOOD BY AUTOMATED COUNT: 13.8 % (ref 11.6–15.1)
GFR SERPL CREATININE-BSD FRML MDRD: 65 ML/MIN/1.73SQ M
GLUCOSE P FAST SERPL-MCNC: 102 MG/DL (ref 65–99)
HCT VFR BLD AUTO: 39.1 % (ref 36.5–49.3)
HDLC SERPL-MCNC: 49 MG/DL
HGB BLD-MCNC: 13.1 G/DL (ref 12–17)
IMM GRANULOCYTES # BLD AUTO: 0.02 THOUSAND/UL (ref 0–0.2)
IMM GRANULOCYTES NFR BLD AUTO: 0 % (ref 0–2)
LDLC SERPL CALC-MCNC: 54 MG/DL (ref 0–100)
LYMPHOCYTES # BLD AUTO: 1.59 THOUSANDS/ΜL (ref 0.6–4.47)
LYMPHOCYTES NFR BLD AUTO: 26 % (ref 14–44)
MCH RBC QN AUTO: 31.8 PG (ref 26.8–34.3)
MCHC RBC AUTO-ENTMCNC: 33.5 G/DL (ref 31.4–37.4)
MCV RBC AUTO: 95 FL (ref 82–98)
MONOCYTES # BLD AUTO: 0.66 THOUSAND/ΜL (ref 0.17–1.22)
MONOCYTES NFR BLD AUTO: 11 % (ref 4–12)
NEUTROPHILS # BLD AUTO: 3.55 THOUSANDS/ΜL (ref 1.85–7.62)
NEUTS SEG NFR BLD AUTO: 58 % (ref 43–75)
NONHDLC SERPL-MCNC: 71 MG/DL
NRBC BLD AUTO-RTO: 0 /100 WBCS
PLATELET # BLD AUTO: 225 THOUSANDS/UL (ref 149–390)
PMV BLD AUTO: 9.5 FL (ref 8.9–12.7)
POTASSIUM SERPL-SCNC: 4.8 MMOL/L (ref 3.5–5.3)
PROT SERPL-MCNC: 6.7 G/DL (ref 6.4–8.2)
RBC # BLD AUTO: 4.12 MILLION/UL (ref 3.88–5.62)
SODIUM SERPL-SCNC: 137 MMOL/L (ref 136–145)
TRIGL SERPL-MCNC: 87 MG/DL
WBC # BLD AUTO: 6.12 THOUSAND/UL (ref 4.31–10.16)

## 2022-05-20 PROCEDURE — 80061 LIPID PANEL: CPT

## 2022-05-20 PROCEDURE — 36415 COLL VENOUS BLD VENIPUNCTURE: CPT

## 2022-05-20 PROCEDURE — 80053 COMPREHEN METABOLIC PANEL: CPT

## 2022-05-20 PROCEDURE — 82306 VITAMIN D 25 HYDROXY: CPT

## 2022-05-20 PROCEDURE — 85025 COMPLETE CBC W/AUTO DIFF WBC: CPT

## 2022-05-27 ENCOUNTER — OFFICE VISIT (OUTPATIENT)
Dept: INTERNAL MEDICINE CLINIC | Facility: CLINIC | Age: 82
End: 2022-05-27
Payer: COMMERCIAL

## 2022-05-27 VITALS
SYSTOLIC BLOOD PRESSURE: 122 MMHG | HEART RATE: 56 BPM | DIASTOLIC BLOOD PRESSURE: 79 MMHG | HEIGHT: 69 IN | BODY MASS INDEX: 28.14 KG/M2 | WEIGHT: 190 LBS | OXYGEN SATURATION: 96 %

## 2022-05-27 DIAGNOSIS — I10 ESSENTIAL HYPERTENSION: Primary | ICD-10-CM

## 2022-05-27 DIAGNOSIS — I35.1 NONRHEUMATIC AORTIC VALVE INSUFFICIENCY: ICD-10-CM

## 2022-05-27 DIAGNOSIS — F33.42 RECURRENT MAJOR DEPRESSIVE DISORDER, IN FULL REMISSION (HCC): ICD-10-CM

## 2022-05-27 DIAGNOSIS — R60.0 EDEMA OF LEFT LOWER LEG: ICD-10-CM

## 2022-05-27 DIAGNOSIS — F42.2 MIXED OBSESSIONAL THOUGHTS AND ACTS: ICD-10-CM

## 2022-05-27 DIAGNOSIS — I34.0 NONRHEUMATIC MITRAL VALVE REGURGITATION: ICD-10-CM

## 2022-05-27 DIAGNOSIS — E78.00 HYPERCHOLESTEREMIA: ICD-10-CM

## 2022-05-27 DIAGNOSIS — I48.92 PAROXYSMAL ATRIAL FLUTTER (HCC): ICD-10-CM

## 2022-05-27 PROCEDURE — 99214 OFFICE O/P EST MOD 30 MIN: CPT | Performed by: INTERNAL MEDICINE

## 2022-05-27 PROCEDURE — 1160F RVW MEDS BY RX/DR IN RCRD: CPT | Performed by: INTERNAL MEDICINE

## 2022-05-27 PROCEDURE — 3078F DIAST BP <80 MM HG: CPT | Performed by: INTERNAL MEDICINE

## 2022-05-27 PROCEDURE — 3074F SYST BP LT 130 MM HG: CPT | Performed by: INTERNAL MEDICINE

## 2022-05-27 PROCEDURE — 1036F TOBACCO NON-USER: CPT | Performed by: INTERNAL MEDICINE

## 2022-05-27 RX ORDER — CITALOPRAM 40 MG/1
40 TABLET ORAL DAILY
Qty: 90 TABLET | Refills: 1 | Status: SHIPPED | OUTPATIENT
Start: 2022-05-27

## 2022-05-27 RX ORDER — ATORVASTATIN CALCIUM 10 MG/1
TABLET, FILM COATED ORAL
Qty: 45 TABLET | Refills: 1 | Status: SHIPPED | OUTPATIENT
Start: 2022-05-27

## 2022-05-27 NOTE — PROGRESS NOTES
[facility] Office Visit Note  22     Evelia Lloyd 80 y o  male MRN: [id number]  : 1940    Assessment:     1  Essential hypertension  -     citalopram (CeleXA) 40 mg tablet; Take 1 tablet (40 mg total) by mouth daily    2  Recurrent major depressive disorder, in full remission ([facility] [address] )  -     citalopram (CeleXA) 40 mg tablet; Take 1 tablet (40 mg total) by mouth daily    3  Hypercholesteremia  -     atorvastatin (LIPITOR) 10 mg tablet; Half tab daily    4  Nonrheumatic mitral valve regurgitation    5  Nonrheumatic aortic valve insufficiency    6  Paroxysmal atrial flutter (HCC)    7  Edema of left lower leg    8  Mixed obsessional thoughts and acts          Discussion Summary and Plan: Today's care plan and medications were reviewed with patient in detail and all their questions answered to their satisfaction  Chief Complaint   Patient presents with    Hypertension    Hyperlipidemia    Depression     OCD    Arthritis     Gait dysfunction walks with a cane    Follow-up    Abnormal Lab      Subjective:  Patient is here for chronic disease management  Patient's wife is present  Generally patient seems to be doing fairly well  Hypertension patient remains on fosinopril 10 mg orally daily as well as metoprolol tartrate 25 mg twice a day  Denies any chest pain palpitation PND orthopnea  History of SVT patient denies any palpitation  History of valvular heart disease patient denies any symptoms of CHF patient is being followed by cardiologist     History of pulmonary embolism status post IVC filter which was removed  Of edema is mild on the right but chronic unremarkable  Allergic rhinitis fair denies any stuffy nose runny nose  Gait dysfunction walks with a cane  No recent fall  Chronic depression and obsessive-compulsive symptom-free    At this time the how from the depression standpoint some OCD wise he likes to wash his hands or check lock very frequently or check few things but it is a benign behavior  Of patient remains on citalopram 40 mg daily symptoms are fair control  No side effect of medications  Mild-to-moderate aortic regurgitation, mild mitral regurgitation and normal ejection fraction with normal nuclear stress test on 03/19/2018 an echocardiogram on 03/20/2018 fair  Sees cardiologist periodically  Paroxysmal atrial flutter pulse regular  The no palpitation dizziness  In the no cancer screen per his choice  Medications reviewed    Walks with cane  Colonoscopy done on 12/26/2016 last time  A  Fib: pulse regular    Patient was seen by cardiologist     04/26/2021:  CMP normal except creatinine 1 02, GFR 69, CBC normal, vitamin-D 26 6, LDL 58, HDL 55, triglycerides 72 total cholesterol 127  11/15/2021 blood sugar 98 LDL cholesterol 72 HDL 52 GFR 67 albumin 3 3 rest of the CMP normal  Lab on 05/20/2022  Sodium                    Value: 137(mmol/L)        Dt: 05/20/2022  Potassium                 Value: 4 8(mmol/L)        Dt: 05/20/2022  Chloride                  Value: 105(mmol/L)        Dt: 05/20/2022  CO2                       Value: 30(mmol/L)         Dt: 05/20/2022  ANION GAP                 Value:  2(mmol/L) (A)      Dt: 05/20/2022  BUN                       Value: 16(mg/dL)          Dt: 05/20/2022  Creatinine                Value: 1 05(mg/dL)        Dt: 05/20/2022  Glucose, Fasting          Value: 102(mg/dL) (A)     Dt: 05/20/2022  Calcium                   Value: 8 9(mg/dL)         Dt: 05/20/2022  AST                       Value: 17(U/L)            Dt: 05/20/2022  ALT                       Value: 17(U/L)            Dt: 05/20/2022  Alkaline Phosphatase      Value: 81(U/L)            Dt: 05/20/2022  Total Protein             Value: 6 7(g/dL)          Dt: 05/20/2022  Albumin                   Value: 3 5(g/dL)          Dt: 05/20/2022  Total Bilirubin           Value: 0 97(mg/dL)        Dt: 05/20/2022  eGFR                      Value: 65(ml/min/1 73sq m) Dt: 05/20/2022  WBC                       Value: 6 12(Thousand/uL)  Dt: 05/20/2022  RBC                       Value: 4 12(Million/uL)   Dt: 05/20/2022  Hemoglobin                Value: 13 1(g/dL)         Dt: 05/20/2022  Hematocrit                Value: 39 1(%)            Dt: 05/20/2022  MCV                       Value: 95(fL)             Dt: 05/20/2022  MCH                       Value: 31 8(pg)           Dt: 05/20/2022  MCHC                      Value: 33 5(g/dL)         Dt: 05/20/2022  RDW                       Value: 13 8(%)            Dt: 05/20/2022  MPV                       Value: 9 5(fL)            Dt: 05/20/2022  Platelets                 Value: 225(Thousands/uL)  Dt: 05/20/2022  nRBC                      Value: 0(/100 WBCs)       Dt: 05/20/2022  Neutrophils Relative      Value: 58(%)              Dt: 05/20/2022  Immat GRANS %             Value: 0(%)               Dt: 05/20/2022  Lymphocytes Relative      Value: 26(%)              Dt: 05/20/2022  Monocytes Relative        Value: 11(%)              Dt: 05/20/2022  Eosinophils Relative      Value: 4(%)               Dt: 05/20/2022  Basophils Relative        Value: 1(%)               Dt: 05/20/2022  Neutrophils Absolute      Value: 3 55(Thousands/µL) Dt: 05/20/2022  Immature Grans Absolute   Value: 0 02(Thousand/uL)  Dt: 05/20/2022  Lymphocytes Absolute      Value: 1 59(Thousands/µL) Dt: 05/20/2022  Monocytes Absolute        Value: 0 66(Thousand/µL)  Dt: 05/20/2022  Eosinophils Absolute      Value: 0 25(Thousand/µL)  Dt: 05/20/2022  Basophils Absolute        Value: 0 05(Thousands/µL) Dt: 05/20/2022  Cholesterol               Value: 120(mg/dL)         Dt: 05/20/2022  Triglycerides             Value: 87(mg/dL)          Dt: 05/20/2022  HDL, Direct               Value: 49(mg/dL)          Dt: 05/20/2022  LDL Calculated            Value: 54(mg/dL)          Dt: 05/20/2022  Non-HDL-Chol (CHOL-HDL)   Value: 71(mg/dl)          Dt: 05/20/2022  Vit D, 25-Hydroxy         Value: 37  1(ng/mL)        Dt: 05/20/2022  ------------ - 2 weeks          The following portions of the patient's history were reviewed and updated as appropriate: allergies, current medications, past family history, past medical history, past social history, past surgical history and problem list     Review of Systems   All other systems reviewed and are negative  Historical Information   Patient Active Problem List   Diagnosis    Mixed obsessional thoughts and acts    Allergic rhinitis    Hypercholesteremia    Sensorineural hearing loss (SNHL)    Nonrheumatic mitral valve regurgitation    Aortic valve sclerosis    Nonrheumatic aortic valve insufficiency    Essential hypertension    Recurrent major depressive disorder, in full remission ([facility] [address] )    Hyperlipidemia    Impaired vision    Conductive hearing loss, bilateral    S/P IVC filter    SVT [facility]supraventricular tachycardia) ([facility])    Valvular heart disease    Urinary frequency    Edema of left lower leg    [facility]    Medicare annual wellness visit, subsequent     Past Medical History:   Diagnosis Date    Hyperlipidemia      Past Surgical History:   Procedure Laterality Date    IR IVC FILTER PLACEMENT OPTIONAL/TEMPORARY  12/7/2019    IR IVC FILTER REMOVAL  8/21/2020    WA OPEN RX FEMUR FX+INTRAMED BÁRBARA Right 12/4/2019    Procedure: INSERTION NAIL IM FEMUR ANTEGRADE (TROCHANTERIC);   Surgeon: Jamaal Byrne DO;  Location: [facility];  Service: Orthopedics     Social History     Substance and Sexual Activity   Alcohol Use Never     Social History     Substance and Sexual Activity   Drug Use Never     Social History     Tobacco Use   Smoking Status Never Smoker   Smokeless Tobacco Never Used     Family History   Problem Relation Age of Onset    Cancer Mother      Health Maintenance Due   Topic    COVID-19 Vaccine (1)    Pneumococcal Vaccine: 65+ Years (1 - PCV)      Meds/Allergies       Current Outpatient Medications:     atorvastatin (LIPITOR) 10 mg tablet, Half tab daily, Disp: 45 tablet, Rfl: 1    citalopram (CeleXA) 40 mg tablet, Take 1 tablet (40 mg total) by mouth daily, Disp: 90 tablet, Rfl: 1    fosinopril (MONOPRIL) 10 mg tablet, TAKE ONE-HALF TABLET BY MOUTH DAILY, Disp: 45 tablet, Rfl: 1    metoprolol tartrate (LOPRESSOR) 25 mg tablet, Take 1 tablet (25 mg total) by mouth every 12 (twelve) hours, Disp: 180 tablet, Rfl: 1    Multiple Vitamin (MULTIVITAMIN) tablet, Take 1 tablet by mouth daily, Disp: , Rfl:     niacin (NIASPAN) 500 mg CR tablet, Take 1 tablet (500 mg total) by mouth daily at bedtime, Disp: 90 tablet, Rfl: 1      Objective:    Vitals:   /79   Pulse 56   Ht 5' 9" (1 753 m)   Wt 86 2 kg (190 lb)   SpO2 96%   BMI 28 06 kg/m²   Body mass index is 28 06 kg/m²  Vitals:    05/27/22 1000   Weight: 86 2 kg (190 lb)       Physical Exam  Constitutional:       General: He is not in acute distress  Appearance: Normal appearance  He is well-developed  He is not ill-appearing, toxic-appearing or diaphoretic  HENT:      Head: Normocephalic and atraumatic  Right Ear: External ear normal       Left Ear: External ear normal    Eyes:      General:         Right eye: No discharge  Left eye: No discharge  Conjunctiva/sclera: Conjunctivae normal    Neck:      Thyroid: No thyromegaly  Vascular: No carotid bruit or JVD  Cardiovascular:      Rate and Rhythm: Regular rhythm  Heart sounds: Murmur heard  No friction rub  No gallop  Pulmonary:      Effort: No respiratory distress  Breath sounds: Normal breath sounds  No wheezing or rales  Abdominal:      General: Bowel sounds are normal  There is no distension  Palpations: There is no mass  Tenderness: There is no rebound  Musculoskeletal:      Cervical back: No rigidity or tenderness  Right lower leg: No edema  Left lower leg: No edema        Comments: mild Lymphadenopathy:      Cervical: No cervical adenopathy  Skin:     General: Skin is warm  Coloration: Skin is not jaundiced or pale  Findings: No rash  Neurological:      General: No focal deficit present  Mental Status: He is oriented to person, place, and time  Coordination: Coordination normal       Gait: Gait abnormal    Psychiatric:         Attention and Perception: Attention and perception normal  He is attentive  He does not perceive auditory or visual hallucinations  Mood and Affect: Mood normal  Mood is not anxious or depressed  Speech: Speech normal          Behavior: Behavior normal          Thought Content: Thought content normal          Cognition and Memory: Cognition is not impaired  Memory is not impaired  He exhibits impaired recent memory  Judgment: Judgment normal  Judgment is not impulsive or inappropriate  Lab Review   Lab on 05/20/2022   Component Date Value Ref Range Status    Sodium 05/20/2022 137  136 - 145 mmol/L Final    Potassium 05/20/2022 4 8  3 5 - 5 3 mmol/L Final    Chloride 05/20/2022 105  100 - 108 mmol/L Final    CO2 05/20/2022 30  21 - 32 mmol/L Final    ANION GAP 05/20/2022 2 (A) 4 - 13 mmol/L Final    BUN 05/20/2022 16  5 - 25 mg/dL Final    Creatinine 05/20/2022 1 05  0 60 - 1 30 mg/dL Final    Standardized to IDMS reference method    Glucose, Fasting 05/20/2022 102 (A) 65 - 99 mg/dL Final    Specimen collection should occur prior to Sulfasalazine administration due to the potential for falsely depressed results  Specimen collection should occur prior to Sulfapyridine administration due to the potential for falsely elevated results   Calcium 05/20/2022 8 9  8 3 - 10 1 mg/dL Final    AST 05/20/2022 17  5 - 45 U/L Final    Specimen collection should occur prior to Sulfasalazine administration due to the potential for falsely depressed results       ALT 05/20/2022 17  12 - 78 U/L Final    Specimen collection should occur prior to Sulfasalazine and/or Sulfapyridine administration due to the potential for falsely depressed results   Alkaline Phosphatase 05/20/2022 81  46 - 116 U/L Final    Total Protein 05/20/2022 6 7  6 4 - 8 2 g/dL Final    Albumin 05/20/2022 3 5  3 5 - 5 0 g/dL Final    Total Bilirubin 05/20/2022 0 97  0 20 - 1 00 mg/dL Final    Use of this assay is not recommended for patients undergoing treatment with eltrombopag due to the potential for falsely elevated results      eGFR 05/20/2022 65  ml/min/1 73sq m Final    WBC 05/20/2022 6 12  4 31 - 10 16 Thousand/uL Final    RBC 05/20/2022 4 12  3 88 - 5 62 Million/uL Final    Hemoglobin 05/20/2022 13 1  12 0 - 17 0 g/dL Final    Hematocrit 05/20/2022 39 1  36 5 - 49 3 % Final    MCV 05/20/2022 95  82 - 98 fL Final    MCH 05/20/2022 31 8  26 8 - 34 3 pg Final    MCHC 05/20/2022 33 5  31 4 - 37 4 g/dL Final    RDW 05/20/2022 13 8  11 6 - 15 1 % Final    MPV 05/20/2022 9 5  8 9 - 12 7 fL Final    Platelets 42/01/9141 225  149 - 390 Thousands/uL Final    nRBC 05/20/2022 0  /100 WBCs Final    Neutrophils Relative 05/20/2022 58  43 - 75 % Final    Immat GRANS % 05/20/2022 0  0 - 2 % Final    Lymphocytes Relative 05/20/2022 26  14 - 44 % Final    Monocytes Relative 05/20/2022 11  4 - 12 % Final    Eosinophils Relative 05/20/2022 4  0 - 6 % Final    Basophils Relative 05/20/2022 1  0 - 1 % Final    Neutrophils Absolute 05/20/2022 3 55  1 85 - 7 62 Thousands/µL Final    Immature Grans Absolute 05/20/2022 0 02  0 00 - 0 20 Thousand/uL Final    Lymphocytes Absolute 05/20/2022 1 59  0 60 - 4 47 Thousands/µL Final    Monocytes Absolute 05/20/2022 0 66  0 17 - 1 22 Thousand/µL Final    Eosinophils Absolute 05/20/2022 0 25  0 00 - 0 61 Thousand/µL Final    Basophils Absolute 05/20/2022 0 05  0 00 - 0 10 Thousands/µL Final    Cholesterol 05/20/2022 120  See Comment mg/dL Final    Cholesterol:         Pediatric <18 Years        Desirable <170 mg/dL      Borderline High    170-199 mg/dL      High               >=200 mg/dL        Adult >=18 Years            Desirable         <200 mg/dL      Borderline High   200-239 mg/dL      High              >239 mg/dL      Triglycerides 05/20/2022 87  See Comment mg/dL Final    Triglyceride:     0-9Y            <75mg/dL     10Y-17Y         <90 mg/dL       >=18Y     Normal          <150 mg/dL     Borderline High 150-199 mg/dL     High            200-499 mg/dL        Very High       >499 mg/dL    Specimen collection should occur prior to N-Acetylcysteine or Metamizole administration due to the potential for falsely depressed results   HDL, Direct 05/20/2022 49  >=40 mg/dL Final    Specimen collection should occur prior to Metamizole administration due to the potential for falsley depressed results   LDL Calculated 05/20/2022 54  0 - 100 mg/dL Final    LDL Cholesterol:     Optimal           <100 mg/dl     Near Optimal      100-129 mg/dl     Above Optimal       Borderline High 130-159 mg/dl       High            160-189 mg/dl       Very High       >189 mg/dl         This screening LDL is a calculated result  It does not have the accuracy of the Direct Measured LDL in the monitoring of patients with hyperlipidemia and/or statin therapy  Direct Measure LDL (FHA722) must be ordered separately in these patients   Non-HDL-Chol (CHOL-HDL) 05/20/2022 71  mg/dl Final    Vit D, 25-Hydroxy 05/20/2022 37 1  30 0 - 100 0 ng/mL Final         Patient Instructions   Follow with Consultants as per their and our suggestion    Follow up in 12 week(s) or as needed earlier    Follow all instructions as advised and discussed  Take your medications as prescribed  Call the office immediately if you experience any side effects  Ask questions if you do not understand  Keep your scheduled appointment as advised or come sooner if necessary or in doubt      Best time to call for non-urgent matter or questions on weekdays is between 9am and 12 noon  See physician for any new symptoms or worsening of current symptoms  Urgent or emergent situations call 911 and report to nearest emergency room  I spent  30 -40 minutes taking care of this patient including clinical care, conseling, collaboration, chart, lab and consultaion review as appropriate                 Dr Rodolfo Henderson MD  [facility]       "This note has been constructed using a voice recognition system  Therefore there may be syntax, spelling, and/or grammatical errors   Please call if you have any questions  "

## 2022-05-27 NOTE — PROGRESS NOTES
BMI Counseling: Body mass index is 28 06 kg/m²  The BMI is above normal  Nutrition recommendations include decreasing portion sizes, encouraging healthy choices of fruits and vegetables, decreasing fast food intake, consuming healthier snacks, limiting drinks that contain sugar, moderation in carbohydrate intake, increasing intake of lean protein, reducing intake of saturated and trans fat and reducing intake of cholesterol  Exercise recommendations include exercising 3-5 times per week and strength training exercises  No pharmacotherapy was ordered  Rationale for BMI follow-up plan is due to patient being overweight or obese

## 2022-05-27 NOTE — PATIENT INSTRUCTIONS
Follow with Consultants as per their and our suggestion    Follow up in 12 week(s) or as needed earlier    Follow all instructions as advised and discussed  Take your medications as prescribed  Call the office immediately if you experience any side effects  Ask questions if you do not understand  Keep your scheduled appointment as advised or come sooner if necessary or in doubt  Best time to call for non-urgent matter or questions on weekdays is between 9am and 12 noon  See physician for any new symptoms or worsening of current symptoms  Urgent or emergent situations call 911 and report to nearest emergency room      I spent  30 -40 minutes taking care of this patient including clinical care, conseling, collaboration, chart, lab and consultaion review as appropriate

## 2022-06-16 ENCOUNTER — ANESTHESIA EVENT (INPATIENT)
Dept: PERIOP | Facility: HOSPITAL | Age: 82
DRG: 853 | End: 2022-06-16
Payer: COMMERCIAL

## 2022-06-16 ENCOUNTER — APPOINTMENT (EMERGENCY)
Dept: RADIOLOGY | Facility: HOSPITAL | Age: 82
DRG: 853 | End: 2022-06-16
Payer: COMMERCIAL

## 2022-06-16 ENCOUNTER — HOSPITAL ENCOUNTER (INPATIENT)
Facility: HOSPITAL | Age: 82
LOS: 4 days | Discharge: NURSING, REHAB OR LONG-TERM CARE | DRG: 853 | End: 2022-06-20
Attending: EMERGENCY MEDICINE | Admitting: INTERNAL MEDICINE
Payer: COMMERCIAL

## 2022-06-16 DIAGNOSIS — S72.011K: ICD-10-CM

## 2022-06-16 DIAGNOSIS — I47.1 SVT (SUPRAVENTRICULAR TACHYCARDIA) (HCC): ICD-10-CM

## 2022-06-16 DIAGNOSIS — J18.9 PNEUMONIA OF RIGHT UPPER LOBE DUE TO INFECTIOUS ORGANISM: ICD-10-CM

## 2022-06-16 DIAGNOSIS — S72.142A CLOSED DISPLACED INTERTROCHANTERIC FRACTURE OF LEFT FEMUR, INITIAL ENCOUNTER (HCC): ICD-10-CM

## 2022-06-16 DIAGNOSIS — E83.39 HYPOPHOSPHATEMIA: ICD-10-CM

## 2022-06-16 DIAGNOSIS — Z95.828 S/P IVC FILTER: ICD-10-CM

## 2022-06-16 DIAGNOSIS — R50.9 FEVER: ICD-10-CM

## 2022-06-16 DIAGNOSIS — I48.92 PAROXYSMAL ATRIAL FLUTTER (HCC): ICD-10-CM

## 2022-06-16 DIAGNOSIS — S72.302A CLOSED FRACTURE OF SHAFT OF LEFT FEMUR, UNSPECIFIED FRACTURE MORPHOLOGY, INITIAL ENCOUNTER (HCC): ICD-10-CM

## 2022-06-16 DIAGNOSIS — I10 ESSENTIAL HYPERTENSION: ICD-10-CM

## 2022-06-16 DIAGNOSIS — S72.012S: ICD-10-CM

## 2022-06-16 DIAGNOSIS — Z86.711 HISTORY OF PULMONARY EMBOLISM: ICD-10-CM
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
## 2022-06-16 DIAGNOSIS — J18.9 PNEUMONIA OF RIGHT LUNG DUE TO INFECTIOUS ORGANISM, UNSPECIFIED PART OF LUNG: ICD-10-CM

## 2022-06-16 DIAGNOSIS — N17.9 AKI (ACUTE KIDNEY INJURY) (HCC): ICD-10-CM

## 2022-06-16 DIAGNOSIS — Z01.810 PREOP CARDIOVASCULAR EXAM: ICD-10-CM

## 2022-06-16 DIAGNOSIS — I38 VALVULAR HEART DISEASE: ICD-10-CM

## 2022-06-16 DIAGNOSIS — D72.829 LEUKOCYTOSIS: ICD-10-CM

## 2022-06-16 PROBLEM — A41.9 SEPSIS (HCC): Status: ACTIVE | Noted: 2022-06-16

## 2022-06-16 PROBLEM — M25.559 HIP PAIN: Status: ACTIVE | Noted: 2022-06-16

## 2022-06-16 PROBLEM — E87.1 HYPONATREMIA: Status: ACTIVE | Noted: 2022-06-16

## 2022-06-16 LAB
ALBUMIN SERPL BCP-MCNC: 2.5 G/DL (ref 3.5–5)
ALP SERPL-CCNC: 86 U/L (ref 46–116)
ALT SERPL W P-5'-P-CCNC: 24 U/L (ref 12–78)
ANION GAP SERPL CALCULATED.3IONS-SCNC: 9 MMOL/L (ref 4–13)
APTT PPP: 33 SECONDS (ref 23–37)
AST SERPL W P-5'-P-CCNC: 43 U/L (ref 5–45)
BACTERIA UR QL AUTO: ABNORMAL /HPF
BASOPHILS # BLD MANUAL: 0 THOUSAND/UL (ref 0–0.1)
BASOPHILS NFR MAR MANUAL: 0 % (ref 0–1)
BILIRUB SERPL-MCNC: 0.6 MG/DL (ref 0.2–1)
BILIRUB UR QL STRIP: NEGATIVE
BUN SERPL-MCNC: 28 MG/DL (ref 5–25)
CALCIUM ALBUM COR SERPL-MCNC: 9.8 MG/DL (ref 8.3–10.1)
CALCIUM SERPL-MCNC: 8.6 MG/DL (ref 8.3–10.1)
CHLORIDE SERPL-SCNC: 99 MMOL/L (ref 100–108)
CLARITY UR: ABNORMAL
CO2 SERPL-SCNC: 26 MMOL/L (ref 21–32)
COLOR UR: YELLOW
CREAT SERPL-MCNC: 1.48 MG/DL (ref 0.6–1.3)
EOSINOPHIL # BLD MANUAL: 0.19 THOUSAND/UL (ref 0–0.4)
EOSINOPHIL NFR BLD MANUAL: 1 % (ref 0–6)
ERYTHROCYTE [DISTWIDTH] IN BLOOD BY AUTOMATED COUNT: 13.7 % (ref 11.6–15.1)
FLUAV RNA RESP QL NAA+PROBE: NEGATIVE
FLUBV RNA RESP QL NAA+PROBE: NEGATIVE
GFR SERPL CREATININE-BSD FRML MDRD: 43 ML/MIN/1.73SQ M
GLUCOSE SERPL-MCNC: 134 MG/DL (ref 65–140)
GLUCOSE UR STRIP-MCNC: NEGATIVE MG/DL
HCT VFR BLD AUTO: 37.5 % (ref 36.5–49.3)
HGB BLD-MCNC: 12.6 G/DL (ref 12–17)
HGB UR QL STRIP.AUTO: ABNORMAL
INR PPP: 1.25 (ref 0.84–1.19)
KETONES UR STRIP-MCNC: NEGATIVE MG/DL
LACTATE SERPL-SCNC: 1.7 MMOL/L (ref 0.5–2)
LEUKOCYTE ESTERASE UR QL STRIP: NEGATIVE
LYMPHOCYTES # BLD AUTO: 0.19 THOUSAND/UL (ref 0.6–4.47)
LYMPHOCYTES # BLD AUTO: 1 % (ref 14–44)
MAGNESIUM SERPL-MCNC: 1.6 MG/DL (ref 1.6–2.6)
MCH RBC QN AUTO: 31.8 PG (ref 26.8–34.3)
MCHC RBC AUTO-ENTMCNC: 33.6 G/DL (ref 31.4–37.4)
MCV RBC AUTO: 95 FL (ref 82–98)
MONOCYTES # BLD AUTO: 1.54 THOUSAND/UL (ref 0–1.22)
MONOCYTES NFR BLD: 8 % (ref 4–12)
NEUTROPHILS # BLD MANUAL: 17.35 THOUSAND/UL (ref 1.85–7.62)
NEUTS BAND NFR BLD MANUAL: 15 % (ref 0–8)
NEUTS SEG NFR BLD AUTO: 75 % (ref 43–75)
NITRITE UR QL STRIP: NEGATIVE
NON-SQ EPI CELLS URNS QL MICRO: ABNORMAL /HPF
PH UR STRIP.AUTO: 6 [PH]
PHOSPHATE SERPL-MCNC: 1.8 MG/DL (ref 2.3–4.1)
PLATELET # BLD AUTO: 234 THOUSANDS/UL (ref 149–390)
PLATELET BLD QL SMEAR: ADEQUATE
PMV BLD AUTO: 9.4 FL (ref 8.9–12.7)
POTASSIUM SERPL-SCNC: 4.2 MMOL/L (ref 3.5–5.3)
PROCALCITONIN SERPL-MCNC: 4.26 NG/ML
PROT SERPL-MCNC: 6.7 G/DL (ref 6.4–8.2)
PROT UR STRIP-MCNC: ABNORMAL MG/DL
PROTHROMBIN TIME: 15.4 SECONDS (ref 11.6–14.5)
RBC # BLD AUTO: 3.96 MILLION/UL (ref 3.88–5.62)
RBC #/AREA URNS AUTO: ABNORMAL /HPF
RBC MORPH BLD: NORMAL
RSV RNA RESP QL NAA+PROBE: NEGATIVE
SARS-COV-2 RNA RESP QL NAA+PROBE: NEGATIVE
SODIUM SERPL-SCNC: 134 MMOL/L (ref 136–145)
SP GR UR STRIP.AUTO: >=1.03 (ref 1–1.03)
UROBILINOGEN UR QL STRIP.AUTO: 0.2 E.U./DL
WBC # BLD AUTO: 19.28 THOUSAND/UL (ref 4.31–10.16)
WBC #/AREA URNS AUTO: ABNORMAL /HPF

## 2022-06-16 PROCEDURE — 85027 COMPLETE CBC AUTOMATED: CPT | Performed by: EMERGENCY MEDICINE

## 2022-06-16 PROCEDURE — 36415 COLL VENOUS BLD VENIPUNCTURE: CPT | Performed by: EMERGENCY MEDICINE

## 2022-06-16 PROCEDURE — 84100 ASSAY OF PHOSPHORUS: CPT | Performed by: EMERGENCY MEDICINE

## 2022-06-16 PROCEDURE — 70450 CT HEAD/BRAIN W/O DYE: CPT

## 2022-06-16 PROCEDURE — 93005 ELECTROCARDIOGRAM TRACING: CPT

## 2022-06-16 PROCEDURE — 73560 X-RAY EXAM OF KNEE 1 OR 2: CPT

## 2022-06-16 PROCEDURE — 80053 COMPREHEN METABOLIC PANEL: CPT | Performed by: EMERGENCY MEDICINE

## 2022-06-16 PROCEDURE — 85007 BL SMEAR W/DIFF WBC COUNT: CPT | Performed by: EMERGENCY MEDICINE

## 2022-06-16 PROCEDURE — 74176 CT ABD & PELVIS W/O CONTRAST: CPT

## 2022-06-16 PROCEDURE — 0241U HB NFCT DS VIR RESP RNA 4 TRGT: CPT | Performed by: EMERGENCY MEDICINE

## 2022-06-16 PROCEDURE — 73502 X-RAY EXAM HIP UNI 2-3 VIEWS: CPT

## 2022-06-16 PROCEDURE — 81001 URINALYSIS AUTO W/SCOPE: CPT | Performed by: INTERNAL MEDICINE

## 2022-06-16 PROCEDURE — G1004 CDSM NDSC: HCPCS

## 2022-06-16 PROCEDURE — 87040 BLOOD CULTURE FOR BACTERIA: CPT | Performed by: EMERGENCY MEDICINE

## 2022-06-16 PROCEDURE — 99291 CRITICAL CARE FIRST HOUR: CPT | Performed by: EMERGENCY MEDICINE

## 2022-06-16 PROCEDURE — 71250 CT THORAX DX C-: CPT

## 2022-06-16 PROCEDURE — 87449 NOS EACH ORGANISM AG IA: CPT | Performed by: INTERNAL MEDICINE

## 2022-06-16 PROCEDURE — 85730 THROMBOPLASTIN TIME PARTIAL: CPT | Performed by: EMERGENCY MEDICINE

## 2022-06-16 PROCEDURE — 99285 EMERGENCY DEPT VISIT HI MDM: CPT

## 2022-06-16 PROCEDURE — 83605 ASSAY OF LACTIC ACID: CPT | Performed by: EMERGENCY MEDICINE

## 2022-06-16 PROCEDURE — 85610 PROTHROMBIN TIME: CPT | Performed by: EMERGENCY MEDICINE

## 2022-06-16 PROCEDURE — 83735 ASSAY OF MAGNESIUM: CPT | Performed by: EMERGENCY MEDICINE

## 2022-06-16 PROCEDURE — 84145 PROCALCITONIN (PCT): CPT | Performed by: INTERNAL MEDICINE

## 2022-06-16 PROCEDURE — 99222 1ST HOSP IP/OBS MODERATE 55: CPT | Performed by: INTERNAL MEDICINE

## 2022-06-16 PROCEDURE — 99223 1ST HOSP IP/OBS HIGH 75: CPT | Performed by: ORTHOPAEDIC SURGERY

## 2022-06-16 PROCEDURE — 99223 1ST HOSP IP/OBS HIGH 75: CPT | Performed by: INTERNAL MEDICINE

## 2022-06-16 PROCEDURE — 73552 X-RAY EXAM OF FEMUR 2/>: CPT

## 2022-06-16 RX ORDER — HEPARIN SODIUM 5000 [USP'U]/ML
5000 INJECTION, SOLUTION INTRAVENOUS; SUBCUTANEOUS EVERY 8 HOURS SCHEDULED
Status: COMPLETED | OUTPATIENT
Start: 2022-06-16 | End: 2022-06-16

## 2022-06-16 RX ORDER — SODIUM CHLORIDE 9 MG/ML
75 INJECTION, SOLUTION INTRAVENOUS CONTINUOUS
Status: DISCONTINUED | OUTPATIENT
Start: 2022-06-16 | End: 2022-06-18

## 2022-06-16 RX ORDER — CEFTRIAXONE 1 G/50ML
1000 INJECTION, SOLUTION INTRAVENOUS ONCE
Status: COMPLETED | OUTPATIENT
Start: 2022-06-16 | End: 2022-06-16

## 2022-06-16 RX ORDER — GUAIFENESIN/DEXTROMETHORPHAN 100-10MG/5
10 SYRUP ORAL EVERY 4 HOURS PRN
Status: DISCONTINUED | OUTPATIENT
Start: 2022-06-16 | End: 2022-06-20 | Stop reason: HOSPADM

## 2022-06-16 RX ORDER — ATORVASTATIN CALCIUM 10 MG/1
5 TABLET, FILM COATED ORAL
Status: DISCONTINUED | OUTPATIENT
Start: 2022-06-16 | End: 2022-06-20 | Stop reason: HOSPADM

## 2022-06-16 RX ORDER — ACETAMINOPHEN 325 MG/1
650 TABLET ORAL EVERY 6 HOURS PRN
Status: DISCONTINUED | OUTPATIENT
Start: 2022-06-16 | End: 2022-06-20 | Stop reason: HOSPADM

## 2022-06-16 RX ORDER — CITALOPRAM 20 MG/1
20 TABLET ORAL DAILY
Status: DISCONTINUED | OUTPATIENT
Start: 2022-06-16 | End: 2022-06-20 | Stop reason: HOSPADM

## 2022-06-16 RX ORDER — AZITHROMYCIN 250 MG/1
500 TABLET, FILM COATED ORAL EVERY 24 HOURS
Status: DISCONTINUED | OUTPATIENT
Start: 2022-06-17 | End: 2022-06-18

## 2022-06-16 RX ORDER — ACETAMINOPHEN 325 MG/1
650 TABLET ORAL ONCE
Status: COMPLETED | OUTPATIENT
Start: 2022-06-16 | End: 2022-06-16

## 2022-06-16 RX ORDER — CEFTRIAXONE 1 G/50ML
1000 INJECTION, SOLUTION INTRAVENOUS EVERY 24 HOURS
Status: DISCONTINUED | OUTPATIENT
Start: 2022-06-17 | End: 2022-06-20 | Stop reason: HOSPADM

## 2022-06-16 RX ORDER — ENOXAPARIN SODIUM 100 MG/ML
40 INJECTION SUBCUTANEOUS DAILY
Status: DISCONTINUED | OUTPATIENT
Start: 2022-06-17 | End: 2022-06-16

## 2022-06-16 RX ORDER — OXYCODONE HYDROCHLORIDE 5 MG/1
2.5 TABLET ORAL EVERY 8 HOURS PRN
Status: DISCONTINUED | OUTPATIENT
Start: 2022-06-16 | End: 2022-06-20 | Stop reason: HOSPADM

## 2022-06-16 RX ADMIN — HEPARIN SODIUM 5000 UNITS: 5000 INJECTION INTRAVENOUS; SUBCUTANEOUS at 21:41

## 2022-06-16 RX ADMIN — AZITHROMYCIN MONOHYDRATE 500 MG: 500 INJECTION, POWDER, LYOPHILIZED, FOR SOLUTION INTRAVENOUS at 10:08

## 2022-06-16 RX ADMIN — ACETAMINOPHEN 650 MG: 325 TABLET ORAL at 08:08

## 2022-06-16 RX ADMIN — CEFTRIAXONE 1000 MG: 1 INJECTION, SOLUTION INTRAVENOUS at 09:47

## 2022-06-16 RX ADMIN — B-COMPLEX W/ C & FOLIC ACID TAB 1 TABLET: TAB at 14:04

## 2022-06-16 RX ADMIN — METOPROLOL TARTRATE 25 MG: 25 TABLET, FILM COATED ORAL at 14:04

## 2022-06-16 RX ADMIN — ATORVASTATIN CALCIUM 5 MG: 10 TABLET, FILM COATED ORAL at 16:34

## 2022-06-16 RX ADMIN — ACETAMINOPHEN 650 MG: 325 TABLET ORAL at 20:03

## 2022-06-16 RX ADMIN — HEPARIN SODIUM 5000 UNITS: 5000 INJECTION INTRAVENOUS; SUBCUTANEOUS at 14:55

## 2022-06-16 RX ADMIN — CITALOPRAM HYDROBROMIDE 20 MG: 20 TABLET ORAL at 14:04

## 2022-06-16 RX ADMIN — SODIUM CHLORIDE 75 ML/HR: 0.9 INJECTION, SOLUTION INTRAVENOUS at 14:55

## 2022-06-16 NOTE — PLAN OF CARE
Problem: Potential for Falls  Goal: Patient will remain free of falls  Description: INTERVENTIONS:  - Educate patient/family on patient safety including physical limitations  - Instruct patient to call for assistance with activity   - Consult OT/PT to assist with strengthening/mobility   - Keep Call bell within reach  - Keep bed low and locked with side rails adjusted as appropriate  - Keep care items and personal belongings within reach  - Initiate and maintain comfort rounds  - Make Fall Risk Sign visible to staff  - Offer Toileting every 2 Hours, in advance of need  - Initiate/Maintain bed alarm  - Obtain necessary fall risk management equipment: bed alarm, call bell  - Apply yellow socks and bracelet for high fall risk patients  - Consider moving patient to room near nurses station  Outcome: Progressing

## 2022-06-16 NOTE — ED PROVIDER NOTES
History  Chief Complaint   Patient presents with    Fall    Fever - 75 years or older     Pt was dizzy and fell today now reports of l hip/leg pain  Pt hit head     12-year-old male with past medical history of hyperlipidemia, hypertension and prior right hip trochanteric fracture who presents via EMS after a fall  Patient was coming back from the bathroom when he started to get dizzy and fell backwards hitting his head without losing any consciousness  He describes the dizziness as a lightheaded sensation and after falling was unable to get up due to left hip pain  Patient endorses a yellowish productive cough with a fever that started on Monday  He has not called his primary care doctor or seen any physicians in the meantime  Patient denies taking any aspirin or blood thinner medications  Patient otherwise denies any N/V/D/C, headache, vision changes, chest pain, abdominal pain, numbness, tingling, arm pain or swelling  History provided by:  Patient  History limited by:  Age  Fall  Mechanism of injury: fall    Injury location:  Head/neck  Arrived directly from scene: yes    Fall:     Fall occurred:  Standing    Impact surface:  Hard floor    Point of impact:  Head  Suspicion of alcohol use: no    Suspicion of drug use: no    Associated symptoms: no abdominal pain, no back pain, no chest pain, no loss of consciousness, no seizures and no vomiting    Risk factors: no anticoagulation therapy    Fever - 75 years or older  Associated symptoms: cough    Associated symptoms: no chest pain, no chills, no dysuria, no ear pain, no rash, no sore throat and no vomiting        Prior to Admission Medications   Prescriptions Last Dose Informant Patient Reported? Taking?    Multiple Vitamin (MULTIVITAMIN) tablet   Yes No   Sig: Take 1 tablet by mouth daily   atorvastatin (LIPITOR) 10 mg tablet   No No   Sig: Half tab daily   citalopram (CeleXA) 40 mg tablet   No No   Sig: Take 1 tablet (40 mg total) by mouth daily fosinopril (MONOPRIL) 10 mg tablet   No No   Sig: TAKE ONE-HALF TABLET BY MOUTH DAILY   metoprolol tartrate (LOPRESSOR) 25 mg tablet   No No   Sig: Take 1 tablet (25 mg total) by mouth every 12 (twelve) hours   niacin (NIASPAN) 500 mg CR tablet   No No   Sig: Take 1 tablet (500 mg total) by mouth daily at bedtime      Facility-Administered Medications: None       Past Medical History:   Diagnosis Date    Hyperlipidemia        Past Surgical History:   Procedure Laterality Date    IR IVC FILTER PLACEMENT OPTIONAL/TEMPORARY  12/7/2019    IR IVC FILTER REMOVAL  8/21/2020    NH OPEN RX FEMUR FX+INTRAMED BÁRBARA Right 12/4/2019    Procedure: INSERTION NAIL IM FEMUR ANTEGRADE (TROCHANTERIC); Surgeon: Russell Silver DO;  Location: [facility] OR;  Service: Orthopedics       Family History   Problem Relation Age of Onset    Cancer Mother      I have reviewed and agree with the history as documented  E-Cigarette/Vaping     E-Cigarette/Vaping Substances     Social History     Tobacco Use    Smoking status: Never Smoker    Smokeless tobacco: Never Used   Substance Use Topics    Alcohol use: Never    Drug use: Never       Review of Systems   Constitutional: Negative for chills and fever  HENT: Negative for ear pain and sore throat  Eyes: Negative for pain and visual disturbance  Respiratory: Positive for cough and shortness of breath  Cardiovascular: Negative for chest pain and palpitations  Gastrointestinal: Negative for abdominal pain and vomiting  Genitourinary: Negative for dysuria and hematuria  Musculoskeletal: Negative for arthralgias and back pain  Hip Pain   Skin: Negative for color change and rash  Neurological: Negative for seizures, loss of consciousness and syncope  All other systems reviewed and are negative  Physical Exam  Physical Exam  Vitals and nursing note reviewed  Constitutional:       General: He is not in acute distress  Appearance: He is well-developed  HENT:      Head: Normocephalic and atraumatic  Mouth/Throat:      Mouth: Mucous membranes are moist    Eyes:      Extraocular Movements: Extraocular movements intact  Conjunctiva/sclera: Conjunctivae normal       Pupils: Pupils are equal, round, and reactive to light  Cardiovascular:      Rate and Rhythm: Regular rhythm  Tachycardia present  Heart sounds: No murmur heard  Pulmonary:      Effort: Pulmonary effort is normal  No respiratory distress  Breath sounds: Examination of the right-upper field reveals decreased breath sounds and rhonchi  Examination of the right-middle field reveals decreased breath sounds and rhonchi  Decreased breath sounds and rhonchi present  Abdominal:      Palpations: Abdomen is soft  Tenderness: There is no abdominal tenderness  Musculoskeletal:         General: No swelling  Normal range of motion  Cervical back: Normal range of motion and neck supple  Left lower leg: Deformity and tenderness present  Comments: Shortened and internally rotated   Skin:     General: Skin is warm and dry  Neurological:      General: No focal deficit present  Mental Status: He is alert and oriented to person, place, and time  Cranial Nerves: No cranial nerve deficit           Vital Signs  ED Triage Vitals [06/16/22 0733]   Temperature Pulse Respirations Blood Pressure SpO2   (!) 101 2 °F (38 4 °C) (!) 111 20 (!) 177/88 (!) 89 %      Temp Source Heart Rate Source Patient Position - Orthostatic VS BP Location FiO2 (%)   Tympanic Monitor -- -- --      Pain Score       --           Vitals:    06/16/22 0733   BP: (!) 177/88   Pulse: (!) 111         Visual Acuity      ED Medications  Medications - No data to display    Diagnostic Studies  Results Reviewed     Procedure Component Value Units Date/Time    Blood culture #1 [[id number]]     Lab Status: No result Specimen: Blood     Blood culture #2 [[id number]]     Lab Status: No result Specimen: Blood COVID/FLU/RSV - 2 hour TAT [[id number]]     Lab Status: No result Specimen: Nares from Nose     CBC and differential [[id number]]     Lab Status: No result Specimen: Blood     Protime-INR [[id number]]     Lab Status: No result Specimen: Blood     APTT [[id number]]     Lab Status: No result Specimen: Blood     Comprehensive metabolic panel [[id number]]     Lab Status: No result Specimen: Blood     Magnesium [[id number]]     Lab Status: No result Specimen: Blood     Phosphorus [[id number]]     Lab Status: No result Specimen: Blood     Lactic acid [[id number]]     Lab Status: No result Specimen: Blood                  CT head without contrast    (Results Pending)   CT chest without contrast    (Results Pending)   CT abdomen pelvis wo contrast    (Results Pending)              Procedures  ECG 12 Lead Documentation Only    Date/Time: 6/16/2022 8:43 AM  Performed by: Michael Pastor DO  Authorized by: Michael Pastor DO     Indications / Diagnosis:  Fall  ECG reviewed by me, the ED Provider: yes    Patient location:  ED  Previous ECG:     Previous ECG:  Compared to current    Comparison ECG info:  December 2019  Interpretation:     Interpretation: normal    Rate:     ECG rate:  116    ECG rate assessment: normal    Rhythm:     Rhythm: sinus rhythm    Ectopy:     Ectopy: none    QRS:     QRS axis:  Normal    QRS intervals:  Normal  Conduction:     Conduction: normal    ST segments:     ST segments:  Normal  Q waves:     Q waves:  I and III  CriticalCare Time  Performed by: Michael Pastor DO  Authorized by: Michael Pastor DO     Critical care provider statement:     Critical care time (minutes):  41    Critical care time was exclusive of:  Separately billable procedures and treating other patients    Critical care was necessary to treat or prevent imminent or life-threatening deterioration of the following conditions:  Sepsis    Critical care was time spent personally by me on the following activities:  Blood draw for specimens, obtaining history from patient or surrogate, ordering and performing treatments and interventions, ordering and review of laboratory studies, ordering and review of radiographic studies, re-evaluation of patient's condition, review of old charts, examination of patient, discussions with consultants, development of treatment plan with patient or surrogate and evaluation of patient's response to treatment    I assumed direction of critical care for this patient from another provider in my specialty: no               ED Course  ED Course as of 06/16/22 0952   Thu Jun 16, 2022   0915 Called by Radiologist and discussed CT Findings  Major findings below, see detailed report for more information:    Likely Aspiration pneumonia  Left Femur Fracture - Unclear if Displaced or Not  No Free Fluid in Abdomen  Osteoporosis  No Hydronephrosis   0922 This patient's clinical condition and current status, although hemodynamically stable, warrants admission for further evaluation and closer monitoring  Review of labs, imaging and additional diagnostics has been completed  The current treatment plan of hospital admission has been discussed with the patient and his wife and they are in agreement, with all questions answered  Patient report has been given to the admitting team who will manage the future subsequent care  MDM  Number of Diagnoses or Management Options  Diagnosis management comments: Presentation, symptoms and overall clinical picture suggestive of sepsis secondary to pneumonia and subsequent fall causing a left hip fracture  Will obtain CT head to evaluate for intracranial hemorrhage although unlikely given absence of blood thinners, anticoagulation and loss of consciousness  Will also obtain CT chest as there is high clinical suspicion for pneumonia given patient's tachycardia, fever and productive cough    Will obtain blood cultures in addition to sepsis screening labs with a low threshold to initiate antibiotics  Patient will require admission and plan to discussed his case with orthopedics so that they can be following on the inpatient side of his care  Patient is aware of and in agreement with this current treatment plan, all questions answered  Amount and/or Complexity of Data Reviewed  Clinical lab tests: ordered and reviewed  Tests in the radiology section of CPT®: ordered and reviewed  Tests in the medicine section of CPT®: ordered and reviewed  Review and summarize past medical records: yes    Risk of Complications, Morbidity, and/or Mortality  Presenting problems: high  Diagnostic procedures: high  Management options: high        Disposition  Final diagnoses:   None     ED Disposition     None      Follow-up Information    None         Patient's Medications   Discharge Prescriptions    No medications on file       No discharge procedures on file      PDMP Review     None          ED Provider  Electronically Signed by           Ezell Kocher, DO  06/16/22 8169

## 2022-06-16 NOTE — CONSULTS
Consultation - Orthopedics   Tara Ricks 80 y o  male MRN: [id number]  Unit/Bed#: 2 [address] Encounter: [id number]      Assessment/Plan     Assessment:  Left hip pain - patient sustained a left proximal femur intertrochanteric fracture with significant subtrochanteric extension  This will require surgical fixation in the form of a long cephalomedullary nail  In discussion with the medical team, he does have hypoxia, pneumonia, and significant tachycardia  Therefore, we will plan for surgery tomorrow  He may eat today but should remain NPO after midnight  Consent will be obtained by Dr Sina Rader at the time of surgery if patient is medically cleared  We also recommend cardiac evaluation and clearance  He has a history of pulmonary embolism with temporary IVC filter placed and Eliquis use after a similar injury on the right side a few years ago  He is no longer on anticoagulation  He will need basic labs in the morning and should remain on strict bedrest and nonweightbearing on the left lower extremity  All chemical anticoagulation should be held after midnight tonight  Orthopedics will continue to follow and will plan to operate when the patient is medically cleared    Plan:  - plan for long cephalomedullary nail tomorrow with Dr Sina Rader if medically cleared  - appreciate medical and cardiac clearance for intended procedure  - DVT prophylaxis per primary medical team; all chemical anticoagulation should be held after midnight  - SCDs okay  - analgesia p r n   - nonweightbearing left lower extremity with strict bedrest  - medical management per primary medical team  -  orthopedics will continue to follow and will plan to operate when medically stable    History of Present Illness   Physician Requesting Consult: Chyna Wasserman MD  Reason for Consult / Principal Problem: fall  HPI: Tara Rciks is a 80y o  year old male who presents with left hip pain    He reports that he got out of bed this morning and grab his walker, but did not wait for his wife to assist him  He suffered a fall after becoming dizzy landing on his left hip and was unable to stand and ambulate  He denies any antecedent left hip pain  He had a right hip fracture a few years ago that required surgical fixation, after which he developed a pulmonary embolism and was given a IVC filter  He the IVC filter was removed after 6 months and he was discontinued for Eliquis  He does not take any blood thinners at home  He reports that recently, he has had a fever and productive yellow cough this started 3 days ago  He has not seen any medical care for this  He denies any chest pain or shortness of breath  He denies any numbness or tingling in the left lower extremity  Consults    Review of Systems   Constitutional: Positive for activity change  HENT: Positive for hearing loss  Eyes: Negative  Respiratory: Negative  Cardiovascular: Negative  Gastrointestinal: Negative  Endocrine: Negative  Genitourinary: Negative  Musculoskeletal: Positive for arthralgias, gait problem, joint swelling and myalgias  Skin: Negative  Allergic/Immunologic: Negative  Hematological: Negative  Psychiatric/Behavioral: Negative  Historical Information   Past Medical History:   Diagnosis Date    Hyperlipidemia      Past Surgical History:   Procedure Laterality Date    IR IVC FILTER PLACEMENT OPTIONAL/TEMPORARY  12/7/2019    IR IVC FILTER REMOVAL  8/21/2020    NC OPEN RX FEMUR FX+INTRAMED BÁRBARA Right 12/4/2019    Procedure: INSERTION NAIL IM FEMUR ANTEGRADE (TROCHANTERIC);   Surgeon: Marcel Damon DO;  Location: [facility] OR;  Service: Orthopedics     Social History   Social History     Substance and Sexual Activity   Alcohol Use Never     Social History     Substance and Sexual Activity   Drug Use Never     E-Cigarette/Vaping     E-Cigarette/Vaping Substances     Social History     Tobacco Use   Smoking Status Never Smoker Smokeless Tobacco Never Used     Family History:   Family History   Problem Relation Age of Onset    Cancer Mother        Meds/Allergies   all current active meds have been reviewed, current meds:   Current Facility-Administered Medications   Medication Dose Route Frequency    acetaminophen (TYLENOL) tablet 650 mg  650 mg Oral Q6H PRN    atorvastatin (LIPITOR) tablet 5 mg  5 mg Oral Daily With Dinner    [START ON 6/17/2022] azithromycin (ZITHROMAX) tablet 500 mg  500 mg Oral Q24H    [START ON 6/17/2022] cefTRIAXone (ROCEPHIN) IVPB (premix in dextrose) 1,000 mg 50 mL  1,000 mg Intravenous Q24H    citalopram (CeleXA) tablet 20 mg  20 mg Oral Daily    dextromethorphan-guaiFENesin (ROBITUSSIN DM) oral syrup 10 mL  10 mL Oral Q4H PRN    [START ON 6/17/2022] enoxaparin (LOVENOX) subcutaneous injection 40 mg  40 mg Subcutaneous Daily    metoprolol tartrate (LOPRESSOR) tablet 25 mg  25 mg Oral Q12H    multivitamin stress formula tablet 1 tablet  1 tablet Oral Daily    and PTA meds:   Prior to Admission Medications   Prescriptions Last Dose Informant Patient Reported? Taking? Multiple Vitamin (MULTIVITAMIN) tablet   Yes No   Sig: Take 1 tablet by mouth daily   atorvastatin (LIPITOR) 10 mg tablet   No No   Sig: Half tab daily   citalopram (CeleXA) 40 mg tablet   No No   Sig: Take 1 tablet (40 mg total) by mouth daily   fosinopril (MONOPRIL) 10 mg tablet   No No   Sig: TAKE ONE-HALF TABLET BY MOUTH DAILY   metoprolol tartrate (LOPRESSOR) 25 mg tablet   No No   Sig: Take 1 tablet (25 mg total) by mouth every 12 (twelve) hours   niacin (NIASPAN) 500 mg CR tablet   No No   Sig: Take 1 tablet (500 mg total) by mouth daily at bedtime      Facility-Administered Medications: None     No Known Allergies    Objective   Vitals: Blood pressure 163/85, pulse (!) 112, temperature 98 8 °F (37 1 °C), resp  rate 20, weight 85 kg (187 lb 6 3 oz), SpO2 91 %  ,Body mass index is 27 67 kg/m²      No intake or output data in the 24 hours ending 06/16/22 1408  No intake/output data recorded  Invasive Devices  Report    Peripheral Intravenous Line  Duration           Peripheral IV 06/16/22 Right Antecubital <1 day          Line  Duration           Venous Sheath Other (Comment) Right Other (Comment) 664 days                Physical Exam  Vitals and nursing note reviewed  Constitutional:       General: He is not in acute distress  Appearance: Normal appearance  HENT:      Head: Normocephalic and atraumatic  Right Ear: External ear normal       Left Ear: External ear normal       Ears:      Comments: Hard of hearing     Nose: Nose normal       Comments: Nasal cannula in place     Mouth/Throat:      Mouth: Mucous membranes are dry  Eyes:      Extraocular Movements: Extraocular movements intact  Cardiovascular:      Rate and Rhythm: Tachycardia present  Pulses: Normal pulses  Pulmonary:      Effort: Pulmonary effort is normal    Abdominal:      Palpations: Abdomen is soft  Musculoskeletal:         General: Deformity present  Comments: See ortho exam   Skin:     General: Skin is warm and dry  Neurological:      General: No focal deficit present  Mental Status: He is alert and oriented to person, place, and time  Psychiatric:         Mood and Affect: Mood normal          Behavior: Behavior normal          Thought Content: Thought content normal          Judgment: Judgment normal        Left Hip Exam     Tenderness   The patient is experiencing tenderness in the lateral and anterior      Other   Erythema: absent  Scars: absent  Sensation: normal  Pulse: present    Comments:  Left lower extremity shortened and externally rotated  Range of motion and strength deferred due to acute fracture  No abrasion, laceration, ulceration, erythema, ecchymosis, or other bring skin around left hip  Thigh and calf soft and nontender  Reports normal sensation in the L2 through S1 nerve distribution  Palpable pedal pulse  Positive dorsiflexion, plantar flexion, EHL            Lab Results:   I have personally reviewed pertinent lab results  CBC:   Lab Results   Component Value Date    WBC 19 28 (H) 06/16/2022    HGB 12 6 06/16/2022    HCT 37 5 06/16/2022    MCV 95 06/16/2022     06/16/2022    MCH 31 8 06/16/2022    MCHC 33 6 06/16/2022    RDW 13 7 06/16/2022    MPV 9 4 06/16/2022     CMP:   Lab Results   Component Value Date    SODIUM 134 (L) 06/16/2022    CL 99 (L) 06/16/2022    CO2 26 06/16/2022    BUN 28 (H) 06/16/2022    CREATININE 1 48 (H) 06/16/2022    CALCIUM 8 6 06/16/2022    AST 43 06/16/2022    ALT 24 06/16/2022    ALKPHOS 86 06/16/2022    EGFR 43 06/16/2022     PT/INR:   Lab Results   Component Value Date    INR 1 25 (H) 06/16/2022     ESR: No results found for: ESR  CRP: No results found for: CRP     Imaging Studies: I have personally reviewed pertinent films in PACS   CT chest abdomen pelvis wo contrast    Result Date: 6/16/2022  Narrative: CT CHEST, ABDOMEN AND PELVIS WITHOUT IV CONTRAST INDICATION:   Chest trauma, blunt Fall/Trauma and Concern for Pneumonia  COMPARISON:  None  TECHNIQUE: CT examination of the chest, abdomen and pelvis was performed without intravenous contrast  This examination was performed without intravenous contrast in the context of the critical nationwide Omnipaque shortage  Axial, sagittal, and coronal 2D reformatted images were created from the source data and submitted for interpretation  Radiation dose length product (DLP) for this visit:  550 13 mGy-cm   This examination, like all CT scans performed in the [facility], was performed utilizing techniques to minimize radiation dose exposure, including the use of iterative  reconstruction and automated exposure control  Enteric contrast was administered  FINDINGS: CHEST LUNGS:  Dense airspace consolidation seen right upper lobe post posterior aspect   Mild patchy airspace consolidation seen in the right lower lobe A perifissural nodule seen in the left midlung measuring about 3 mm, image 69 series 400 PLEURA:  Small right effusion seen HEART/GREAT VESSELS: Heart is unremarkable for patient's age  Ascending aorta measures 3 9 cm Mild coronary artery calcification seen MEDIASTINUM AND JACQUIE:  Lower right paratracheal lymph nodes are seen measuring about 1 cm Subcarinal lymph node seen measuring about 8 mm Large hiatal hernia seen CHEST WALL AND LOWER NECK:  No significant axillary lymph node enlargement seen Evaluation of the hips is limited due to motion ABDOMEN LIVER/BILIARY TREE:  No focal liver lesion seen GALLBLADDER:  No calcified gallstones  No pericholecystic inflammatory change  SPLEEN:  Unremarkable  PANCREAS:  Unremarkable  ADRENAL GLANDS:  Unremarkable  Mild nodularity of the both adrenal glands KIDNEYS/URETERS:  No hydronephrosis Lobulated contour of the kidney STOMACH AND BOWEL:  Fluid noted within the rectum There are no findings of bowel obstruction Hiatal hernia seen APPENDIX:  No findings to suggest appendicitis  ABDOMINOPELVIC CAVITY:  No free fluid seen No fluid in the paracolic gutter, perisplenic region or in Morison's VESSELS:  An IVC filter is noted PELVIS REPRODUCTIVE ORGANS:  Prostate appears unremarkable No pelvic sidewall lymph node and oblique  URINARY BLADDER:  Mild thickening of the urinary bladder wall through its posterior and superior aspect seen ABDOMINAL WALL/INGUINAL REGIONS:  Unremarkable   OSSEOUS STRUCTURES:  Osteoporosis There is compression deformity of the multiple lumbar vertebrae with biconcavity of the L5, L4, L3, L2 vertebra There is severe Central depression of the L1 vertebra with 80-90% loss of vertebral height, chronic There is a fracture of the proximal shaft of the left femur with the medial displacement of the distal fragment with overlapping Evaluation of the pelvic ring is limited due to patient motion Intramedullary nail with the femoral neck screw noted with the healing intertrochanteric fracture of the right femur Lucency seen in the bilateral iliac bone, image 104 series 99     Impression: Dense consolidation right upper lobe patchy consolidation right lower lobe suggests pneumonia/aspiration Small right effusion seen There is a displaced fracture of the proximal shaft of the femur with medial displacement of the distal fragment along with overlapping Lucency seen in the both iliac bone, image 92 series 601 with no correlate on the radiograph may be due to motion or due to nondisplaced fracture  Correlate clinically Bony pelvic CT may be considered as clinically needed Intact acetabulum Osteoporosis with biconcavity of the multiple lumbar thoracic and lumbar vertebrae, chronic Mild thickening of the posterior aspect of the urinary bladder wall noted, correlate with the urinary evaluation and urology evaluation on nonemergent basis Incidentally detected the left perifissural nodule  Based on current Fleischner Society 2017 Guidelines on incidental pulmonary nodule, no routine follow-up is needed if the patient is low risk  If the patient is high risk, optional follow-up chest CT at 12 months can be considered  Consider follow-up at 2 months demonstrate resolution  I personally discussed this study with Delmar Guerrero on 6/16/2022 at 9:21 AM  Workstation performed: [id number]     XR hip/pelv 2-3 vws left    Result Date: 6/16/2022  Narrative: LEFT HIP INDICATION:   Fracture  COMPARISON:  None VIEWS:  XR HIP/PELV 2-3 VWS LEFT  W PELVIS IF PERFORMED FINDINGS: There is an acute, displaced fracture of the proximal femoral shaft extending to the neck  No dislocation  Partially included right hip hardware with associated healed fracture deformity  No significant hip degenerative changes  No lytic or blastic osseous lesion  Soft tissues are unremarkable  The visualized lumbar spine is unremarkable       Impression: Acute, displaced left proximal femoral shaft fracture extending to the neck  No dislocation  Workstation performed: [id number]     XR femur 2 views LEFT    Result Date: 6/16/2022  Narrative: LEFT FEMUR INDICATION:   Fracture  COMPARISON:  None VIEWS:  XR FEMUR 2 VW LEFT FINDINGS: There is an acute, displaced fracture of the proximal femoral shaft extending to the neck  No dislocation  No significant degenerative changes  No lytic or blastic osseous lesion  Soft tissues are unremarkable  Impression: Acute, displaced left proximal femoral shaft fracture extending to the neck  No dislocation  Workstation performed: [id number]     XR knee 1 or 2 vw left    Result Date: 6/16/2022  Narrative: LEFT KNEE INDICATION:   Fracture  COMPARISON:  None VIEWS:  XR KNEE 1 OR 2 VW LEFT FINDINGS: There is no acute fracture or dislocation  There is no joint effusion  No significant degenerative changes  No lytic or blastic osseous lesion  Soft tissues are unremarkable  Impression: No acute osseous abnormality  Workstation performed: [id number]     CT head without contrast    Result Date: 6/16/2022  Narrative: CT BRAIN - WITHOUT CONTRAST INDICATION:   Head trauma, minor (Age >= 65y) Fall with head injury  COMPARISON: December 1, 2021 TECHNIQUE:  CT examination of the brain was performed  In addition to axial images, sagittal and coronal 2D reformatted images were created and submitted for interpretation  Radiation dose length product (DLP) for this visit:  896 44 mGy-cm   This examination, like all CT scans performed in the [place], was performed utilizing techniques to minimize radiation dose exposure, including the use of iterative  reconstruction and automated exposure control  IMAGE QUALITY:  Diagnostic  FINDINGS: PARENCHYMA:  No intracranial mass, mass effect or midline shift  No CT signs of acute infarction  No acute parenchymal hemorrhage    Mild periventricular and white matter hypodensity seen related to chronic small vessel ischemic VENTRICLES AND EXTRA-AXIAL SPACES:  Normal for the patient's age  VISUALIZED ORBITS AND PARANASAL SINUSES:  Unremarkable  Mild chronic mucosal thickening right maxillary sinus CALVARIUM AND EXTRACRANIAL SOFT TISSUES:  Normal      Impression: No acute intracranial hemorrhage seen No mass effect or midline shift seen Mild periventricular and white matter hypodensity related to chronic small vessel Workstation performed: [id number]     Dr Arnaldo Jarvis and I reviewed the available pertinent images which demonstrate a displaced left hip intertrochanteric fracture with significant subtrochanteric extension  No lytic or blastic lesions are appreciated    Hardware of the right hip appears stable    EKG, Pathology, and Other Studies: I have personally reviewed pertinent films in PACS     VTE Prophylaxis: Sequential compression device (Venodyne)  and Lovenox    Code Status: Prior  Advance Directive and Living Will:      Power of :    POLST:      Gomez Montes PA-C

## 2022-06-16 NOTE — CONSULTS
Consultation - Cardiology   Kamala Edwards 80 y o  male MRN: [id number]  Unit/Bed#: 2 [address] Encounter: [id number]    Assessment/Plan     Assessment:  1  Pre-surgical screening for left hip surgery  2  Mechanical fall status post left proximal femur fracture  3  Right-sided multi lobular pneumonia  4  Hypertension  5  Dyslipidemia  6  Acute kidney injury on chronic kidney disease secondary to poor p o  Intake  7  Paroxysmal atrial fibrillation in the setting of acute PE and right hip surgery    Plan:  Patient has been admitted to the hospitalist service  1  Will obtain 2D echocardiogram to evaluate cardiac function, structure wall motion in patient with previous history of aortic valve insufficiency    2  Will hold patient's Monopril at this time due to his acute kidney injury, will continue Lopressor 25 mg b i d  With gentle IV hydration and monitoring telemetry  Will titrate beta-blocker as needed  And if necessary will add Norvasc  3  Antibiotics per primary team    4  Surgery per Orthopedics     Patient appears to be at intermediate risk for perioperative complications secondary to his age and ongoing comorbidities  He has not experienced any exacerbations of CHF or chest discomfort  May proceed with surgery and we will continue follow the patient  History of Present Illness   Physician Requesting Consult: Francisco Gonzalez MD  Reason for Consult / Principal Problem:  Pre-surgical screening for repair of left proximal femur fracture, history of hypertension, history of provoked PE after right hip surgery in 2019 and history of paroxysmal atrial fibrillation/flutter also during that admission  HPI: Kamala Edwards is a 80y o  year old male who presented to the emergency room this morning after sustaining a mechanical fall  He states that he has not been feeling well for the last 3 or 4 days with shortness of breath, fatigue generalized weakness and poor appetite    He also endorses that he has been having loose runny bowel movements  He was coming from the bathroom this morning when he states he became very weak and fell backwards striking the back of his head  He denies any loss of consciousness  He was brought to the emergency room for evaluation  X-ray noted a displaced left proximal femoral fracture extending into the shaft  CT scan of the chest noted both right upper lobe and lower lobe consolidation concerning for pneumonia  He notes he has been expectorating yellow secretions  Patient has been admitted for repair of his hip and further treatment  Patient has a history for hypertension, dyslipidemia and there is some question of coronary artery disease with PCI  He has followed with Dr Kiki Garza in the past   He does not believe he said any recent cardiac testing  Patient also has a history for a fall in 2019 with a fractured right hip which was repaired at [address] [place] [address] in [facility]  He had ORIF of the right hip on December 4, 2019 with subsequent development of a provoked PE and was started on anticoagulation  Patient then developed a large hematoma at the operative area and anticoagulation needs to be discontinued  He underwent implantation of a removable IVC filter during that admission and filter was subsequently removed 6 months later and all anticoagulation has been discontinued since that time  Patient also during that admission had a bout of paroxysmal atrial fibrillation/flutter and was started on beta-blocker  Patient denies any chest pain, pressure or palpitations  He denies any change in his ability to perform activities with the exception that he does note increasing difficulty with balance and experiencing more unsteadiness and falls at home  Patient lives with his wife      Inpatient consult to Cardiology  Consult performed by: BERNADETTE Croft  Consult ordered by: Bambi Babinski, MD          Review of Systems   Constitutional: Positive for activity change, appetite change, fatigue and fever  HENT: Negative for congestion, facial swelling, postnasal drip, sneezing and trouble swallowing  Eyes: Negative  Negative for photophobia and visual disturbance  Respiratory: Positive for cough (Yellow sputum) and shortness of breath  Cardiovascular: Negative  Negative for chest pain, palpitations and leg swelling  Gastrointestinal: Positive for diarrhea  Negative for abdominal distention, rectal pain and vomiting  Poor appetite for the last 3-4 days   Endocrine: Negative  Negative for polydipsia, polyphagia and polyuria  Genitourinary:        Decreased urinary output secondary to decreased oral intake   Musculoskeletal: Positive for gait problem  Patient states he has been having balance issues   Skin: Negative  Neurological: Positive for weakness and light-headedness  Negative for dizziness and syncope  Hematological: Negative  Psychiatric/Behavioral: Negative  Historical Information   Past Medical History:   Diagnosis Date    Hyperlipidemia      Past Surgical History:   Procedure Laterality Date    IR IVC FILTER PLACEMENT OPTIONAL/TEMPORARY  12/7/2019    IR IVC FILTER REMOVAL  8/21/2020    [place] OPEN RX FEMUR FX+INTRAMED BÁRBARA Right 12/4/2019    Procedure: INSERTION NAIL IM FEMUR ANTEGRADE (TROCHANTERIC);   Surgeon: Clayton Michelle DO;  Location: [facility] OR;  Service: Orthopedics     Social History     Substance and Sexual Activity   Alcohol Use Never     Social History     Substance and Sexual Activity   Drug Use Never     E-Cigarette/Vaping     E-Cigarette/Vaping Substances     Social History     Tobacco Use   Smoking Status Never Smoker   Smokeless Tobacco Never Used     Family History:   Family History   Problem Relation Age of Onset    Cancer Mother        Meds/Allergies   all current active meds have been reviewed, current meds:   Current Facility-Administered Medications   Medication Dose Route Frequency    acetaminophen (TYLENOL) tablet 650 mg  650 mg Oral Q6H PRN    atorvastatin (LIPITOR) tablet 5 mg  5 mg Oral Daily With Dinner    [START ON 6/17/2022] azithromycin (ZITHROMAX) tablet 500 mg  500 mg Oral Q24H    [START ON 6/17/2022] cefTRIAXone (ROCEPHIN) IVPB (premix in dextrose) 1,000 mg 50 mL  1,000 mg Intravenous Q24H    citalopram (CeleXA) tablet 20 mg  20 mg Oral Daily    dextromethorphan-guaiFENesin (ROBITUSSIN DM) oral syrup 10 mL  10 mL Oral Q4H PRN    heparin (porcine) subcutaneous injection 5,000 Units  5,000 Units Subcutaneous Q8H [address]    metoprolol tartrate (LOPRESSOR) tablet 25 mg  25 mg Oral Q12H    multivitamin stress formula tablet 1 tablet  1 tablet Oral Daily    sodium chloride 0 9 % infusion  75 mL/hr Intravenous Continuous    and PTA meds:   Prior to Admission Medications   Prescriptions Last Dose Informant Patient Reported? Taking? Multiple Vitamin (MULTIVITAMIN) tablet   Yes No   Sig: Take 1 tablet by mouth daily   atorvastatin (LIPITOR) 10 mg tablet   No No   Sig: Half tab daily   citalopram (CeleXA) 40 mg tablet   No No   Sig: Take 1 tablet (40 mg total) by mouth daily   fosinopril (MONOPRIL) 10 mg tablet   No No   Sig: TAKE ONE-HALF TABLET BY MOUTH DAILY   metoprolol tartrate (LOPRESSOR) 25 mg tablet   No No   Sig: Take 1 tablet (25 mg total) by mouth every 12 (twelve) hours   niacin (NIASPAN) 500 mg CR tablet   No No   Sig: Take 1 tablet (500 mg total) by mouth daily at bedtime      Facility-Administered Medications: None     No Known Allergies    Objective   Vitals: Blood pressure 163/85, pulse (!) 112, temperature 98 8 °F (37 1 °C), resp  rate 20, height 5' 9" (1 753 m), weight 83 5 kg (184 lb 1 4 oz), SpO2 91 %    Orthostatic Blood Pressures    Flowsheet Row Most Recent Value   Blood Pressure 163/85 filed at 06/16/2022 1443   Patient Position - Orthostatic VS Lying filed at 06/16/2022 1032          No intake or output data in the 24 hours ending 06/16/22 1503    Invasive Devices  Report Peripheral Intravenous Line  Duration           Peripheral IV 06/16/22 Right Antecubital <1 day          Line  Duration           Venous Sheath Other (Comment) Right Other (Comment) 664 days                Physical Exam  Vitals and nursing note reviewed  Constitutional:       Appearance: Normal appearance  He is overweight  HENT:      Right Ear: External ear normal       Left Ear: External ear normal       Ears:      Comments: Hard of hearing  Eyes:      General: No scleral icterus  Right eye: No discharge  Left eye: No discharge  Cardiovascular:      Rate and Rhythm: Normal rate and regular rhythm  Pulses: Normal pulses  Heart sounds: Murmur heard  Diastolic murmur is present with a grade of 2/4  Pulmonary:      Effort: Pulmonary effort is normal  No accessory muscle usage or respiratory distress  Breath sounds: Examination of the right-lower field reveals decreased breath sounds  Examination of the left-lower field reveals decreased breath sounds  Decreased breath sounds and rhonchi present  No wheezing  Comments: Coarse breath sounds  Abdominal:      General: Bowel sounds are normal  There is no distension  Palpations: Abdomen is soft  Musculoskeletal:         General: Deformity (Left leg externally rotated and shortened) present  Right lower leg: No edema  Left lower leg: No edema  Skin:     General: Skin is warm and dry  Capillary Refill: Capillary refill takes less than 2 seconds  Neurological:      General: No focal deficit present  Mental Status: He is alert and oriented to person, place, and time  Mental status is at baseline  Psychiatric:         Mood and Affect: Mood normal          Behavior: Behavior is cooperative  Lab Results:   I have personally reviewed pertinent lab results      CBC with diff:   Results from last 7 days   Lab Units 06/16/22  0752   WBC Thousand/uL 19 28*   RBC Million/uL 3 96   HEMOGLOBIN g/dL 12 6 HEMATOCRIT % 37 5   MCV fL 95   MCH pg 31 8   MCHC g/dL 33 6   RDW % 13 7   MPV fL 9 4   PLATELETS Thousands/uL 234     CMP:   Results from last 7 days   Lab Units 22  0752   SODIUM mmol/L 134*   CHLORIDE mmol/L 99*   CO2 mmol/L 26   BUN mg/dL 28*   CREATININE mg/dL 1 48*   CALCIUM mg/dL 8 6   AST U/L 43   ALT U/L 24   ALK PHOS U/L 86   EGFR ml/min/1 73sq m 43     HS Troponin: No results found for: HSTNI, HSTNI0, HSTNI2, HSTNI4  BNP:   Results from last 7 days   Lab Units 22  0752   POTASSIUM mmol/L 4 2   CHLORIDE mmol/L 99*   CO2 mmol/L 26   BUN mg/dL 28*   CREATININE mg/dL 1 48*   CALCIUM mg/dL 8 6   EGFR ml/min/1 73sq m 43     Coags:   Results from last 7 days   Lab Units 22  0752   PTT seconds 33   INR  1 25*     TSH:     Magnesium:   Results from last 7 days   Lab Units 22  0752   MAGNESIUM mg/dL 1 6     Lipid Profile:     Imaging: I have personally reviewed pertinent reports         EK lead EKG demonstrates sinus tachycardia with occasional PAC and nonspecific ST and T-wave changes    VTE Prophylaxis: Sequential compression device Era Bane)     Code Status: Prior  Advance Directive and Living Will:      Power of :    POLST:      Velma Hamman, [address]  Cardiology

## 2022-06-16 NOTE — QUICK NOTE
QUICK NOTE - Deterioration Index  Zechariah Lloyd 80 y o  male MRN: [id number]  Unit/Bed#: 2 [address] Encounter: [id number]    Date Paged: 22  Time Paged: [phone]  Room #: 156  Arrival Time: 1450  Deterioration index score at time of page: 70 49  %  Spoke with Ayden Holman RN from primary team  Need to escalate level of care: no     PROBLEMS resulting in high DI score:    Respiratory rate of 80, Age,  Nasal canula use    PLAN:     Respiratory rate of 80 documented erroneously and incorrect confirmed by bedside RN  Patient stable  Please contact critical care via [employer] with any questions or concerns       Vitals:   Vitals:    22 1300 22 1319 22 1320 22 1443   BP:  163/85 163/85 163/85   BP Location:    Left arm   Pulse:  (!) 122 (!) 112 (!) 112   Resp:    (!) 80   Temp:   98 8 °F (37 1 °C) 98 8 °F (37 1 °C)   TempSrc:       SpO2:  91% 91%    Weight: 83 5 kg (184 lb 1 4 oz)   83 5 kg (184 lb 1 4 oz)   Height:    5' 9" (1 753 m)       Respiratory:  SpO2: SpO2: 91 %, SpO2 Activity: SpO2 Activity: At Rest, SpO2 Device: O2 Device: Nasal cannula  Nasal Cannula O2 Flow Rate (L/min): 2 L/min    Temperature: Temp (24hrs), Av 8 °F (37 1 °C), Min:96 6 °F (35 9 °C), Max:101 2 °F (38 4 °C)  Current: Temperature: 98 8 °F (37 1 °C)    Labs:   Results from last 7 days   Lab Units 22  0752   WBC Thousand/uL 19 28*   HEMOGLOBIN g/dL 12 6   HEMATOCRIT % 37 5   PLATELETS Thousands/uL 234   MONO PCT % 8     Results from last 7 days   Lab Units 22  0752   SODIUM mmol/L 134*   POTASSIUM mmol/L 4 2   CHLORIDE mmol/L 99*   CO2 mmol/L 26   BUN mg/dL 28*   CREATININE mg/dL 1 48*   CALCIUM mg/dL 8 6   ALK PHOS U/L 86   ALT U/L 24   AST U/L 43     Results from last 7 days   Lab Units 22  0752   MAGNESIUM mg/dL 1 6     Results from last 7 days   Lab Units 22  0752   LACTIC ACID mmol/L 1 7         Results from last 7 days   Lab Units 22  0752   PROCALCITONIN ng/ml 4 26*       Code Status: Prior

## 2022-06-16 NOTE — ANESTHESIA PREPROCEDURE EVALUATION
Procedure:  INSERTION NAIL IM FEMUR ANTEGRADE (TROCHANTERIC) - long nail (Left Leg Upper)    Relevant Problems   CARDIO  Initial cardiology consult indicates intermediate risk of events for procedure  Echo shows EF 60%, mild AI, mild to mod tricuspid regurg   (+) DVT (deep venous thrombosis) ([facility])   (+) Essential hypertension   (+) Hypercholesteremia   (+) Hyperlipidemia   (+) Nonrheumatic aortic valve insufficiency   (+) Nonrheumatic mitral valve regurgitation   (+) Paroxysmal atrial flutter ([facility])   (+) SVT (supraventricular tachycardia) ([facility])      /RENAL   (+) Acute kidney injury ([facility] [address] )      MUSCULOSKELETAL  s/p right IM nail      NEURO/PSYCH   (+) Recurrent major depressive disorder, in full remission ([facility] [address] )      PULMONARY   (+) Pneumonia of right lung due to infectious organism (O2 sat is 90-91% on 2 LPM via nasal canula, cough continues with production of yellow mucus - Abx Rx continues )      Nervous and Auditory   (+) Conductive hearing loss, bilateral      Other   (+) S/P IVC filter        Physical Exam    Airway    Mallampati score: III  TM Distance: >3 FB  Neck ROM: full     Dental       Cardiovascular  Rhythm: regular, Rate: normal,     Pulmonary  Decreased breath sounds,     Other Findings        Anesthesia Plan  ASA Score- 4 Emergent    Anesthesia Type- general with ASA Monitors  Additional Monitors:   Airway Plan: LMA  Comment: Patient is at elevated risk for pulmonary complications following procedure that may lead to prolonged intubation post-procedure  Patient is full code  Will attempt procedure with LMA and light GA while maintaining spontaneous respiration  Plan Factors-    Chart reviewed  Patient is not a current smoker  Induction- intravenous  Postoperative Plan- Plan for postoperative opioid use  Informed Consent- Anesthetic plan and risks discussed with patient  I personally reviewed this patient with the CRNA   Discussed and agreed on the Anesthesia Plan with the RED Jenkins

## 2022-06-16 NOTE — H&P
History and Physical - [facility] Internal Medicine    Patient Information: Elicia Jane 80 y o  male MRN: [id number]  Unit/Bed#: ED CT1 Encounter: [id number]  Admitting Physician: Arelis Rees MD  PCP: Junior Timmons MD  Date of Admission:  06/16/22        Hospital Problem List:     Principal Problem:    Pneumonia of right lung due to infectious organism  Active Problems:    Closed fracture of shaft of left femur ([place] [address] )    Sepsis ([place] [address] )    Valvular heart disease    Paroxysmal atrial flutter ([place] [address] )    Acute kidney injury ([place] [address] )    Hyperlipidemia    S/P IVC filter    Hyponatremia    Recurrent major depressive disorder, in full remission ([place] [address] )      Assessment/Plan:    Sepsis ([place] [address] )  Assessment & Plan  As evidenced by fever, tachycardia, leukocytosis  Lactic acid 1 7  Secondary to pneumonia  · Follow-up blood culture  · Trend procalcitonin  · Continue antibiotics as above  · Follow-up CBC  · IV fluids  · Monitor hemodynamics      Closed fracture of shaft of left femur (HCC)  Assessment & Plan  Status post mechanical fall, resulting in acute displaced left proximal femoral shaft fracture extended neck  Likely precipitated by acute illness  · Pain control  · Bedrest, Nonweightbearing  · DVT prophylaxis  · Orthopedic evaluation  · Patient is at elevated risk for perioperative complication given acute infection/sepsis, advanced age, comorbidities and prior perioperative complications  Discussed with patient and family/daughter  Patient is expected to improve with sepsis with appropriate antimicrobial treatment, Tentative plan for OR in a m    · Cardiology evaluation    * Pneumonia of right lung due to infectious organism  Assessment & Plan  Presented with symptoms of cough with yellow expectoration, shortness of breath, generalized malaise, poor appetite, nausea, loose bowel movement over last few days  Noted to be febrile with tachycardia leukocytosis  CT revealed evidence of dense right upper lobe and patchy right lower lobe consolidation  SARS-CoV-2, influenza RSV PCR negative  · Sputum culture  · Urine Legionella pneumococcal antigen  · IV ceftriaxone, Azithromycin  · Supplemental oxygen as needed  · Monitor clinically    Acute kidney injury ([facility] [address] )  Assessment & Plan  Creatinine noted to be 1 48  Likely secondary to poor p o  Intake, diarrhea, sepsis  · Check UA  · IV fluid  · Monitor intake output  · Hold ACE inhibitor  · Avoid hypotension, nephrotoxin  · Follow-up BMP    Paroxysmal atrial flutter (HCC)  Assessment & Plan  Patient with history of paroxysmal AFib/PSVT in setting of pulmonary embolism  Currently in sinus rhythm, tachycardic in setting of acute infection and fracture  · Continue metoprolol  · Telemetry  · IV fluids  · DVT prophylaxis  · Follow up Cardiology recommendation      Valvular heart disease  Assessment & Plan  2D echo-3/20-EF 65-70%, mild aortic sclerosis, mild-to-moderate aortic regurg, mild MR mild TR  · Appears stable  · Cardiology evaluation for perioperative evaluation and management    Hyponatremia  Assessment & Plan  Mild  Monitor with IV fluids    S/P IVC filter  Assessment & Plan  Patient with 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,  Subsequently patient had IVC filter conversion of vena Tech convertible filter, 08/2020    Hyperlipidemia  Assessment & Plan  On statin    Recurrent major depressive disorder, in full remission ([facility] [address] )  Assessment & Plan  On Celexa          VTE Prophylaxis: Heparin hold for surgery  / sequential compression device   Code Status: Level 1 - Full Code    Anticipated Length of Stay:  Patient will be admitted on an Inpatient basis with an anticipated length of stay of  > 2 midnights  Justification for Hospital Stay:  Pneumonia, HIP fracture requiring surgery    Total Time for Visit, including Counseling / Coordination of Care: 45 minutes    Greater than 50% of this total time spent on direct patient counseling and coordination of care  Chief Complaint:     Fall and Fever - 75 years or older (Pt was dizzy and fell today now reports of l hip/leg pain  Pt hit head)    History of Present Illness:    Jamia Cabrera is a 80 y o  male with history of hypertension, dyslipidemia, paroxysmal AFib, valvular heart disease, 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, who presents with with a fall  Patient reported that he has not been feeling well since Monday with malaise, generalized weakness and poor appetite  Patient reported that he has been having cough recently which he thought was secondary to allergies but it continued to get worse, he also reported mild shortness of breath  Due to weakness patient had a minor fall on Monday without any injury but today morning he sustained another fall  He may have felt a little lightheaded  Patient denied any loss of consciousness, he reported having left hip pain and was brought to ED  On presentation patient was noted to be febrile with T-max of 101 2° F with tachycardia, saturating 89% on room air  CT head did not reveal any acute abnormality  Left hip x-ray revealed acute displaced left proximal femoral shaft fracture extending to neck  CT of the chest abdomen pelvis revealed dense consolidation of the right upper lobe and patchy consolidation of the right lower lobe  Patient was subsequently admitted  Patient currently is comfortably lying in bed reports mild left hip discomfort  Denies any chest pain or shortness of breath  Review of Systems:    Review of Systems   Constitutional: Positive for activity change, appetite change, fatigue and fever  HENT: Negative for rhinorrhea and sore throat  Respiratory: Positive for cough and shortness of breath  Cardiovascular: Negative for chest pain  Gastrointestinal: Positive for diarrhea (Few episodes of loose bowel movement) and nausea  Negative for abdominal pain and vomiting  Genitourinary: Negative for difficulty urinating  Musculoskeletal: Positive for gait problem (Since prior hip surgery, requires cane)  Neurological: Positive for light-headedness  Negative for dizziness and syncope  Psychiatric/Behavioral: Negative for behavioral problems  Past Medical and Surgical History:     Past Medical History:   Diagnosis Date    Hyperlipidemia        Past Surgical History:   Procedure Laterality Date    IR IVC FILTER PLACEMENT OPTIONAL/TEMPORARY  12/7/2019    IR IVC FILTER REMOVAL  8/21/2020    PA OPEN RX FEMUR FX+INTRAMED BÁRBARA Right 12/4/2019    Procedure: INSERTION NAIL IM FEMUR ANTEGRADE (TROCHANTERIC); Surgeon: Saray Rowley DO;  Location: [facility];  Service: Orthopedics       Meds/Allergies:    PTA meds:   Prior to Admission Medications   Prescriptions Last Dose Informant Patient Reported? Taking?    Multiple Vitamin (MULTIVITAMIN) tablet   Yes No   Sig: Take 1 tablet by mouth daily   atorvastatin (LIPITOR) 10 mg tablet   No No   Sig: Half tab daily   citalopram (CeleXA) 40 mg tablet   No No   Sig: Take 1 tablet (40 mg total) by mouth daily   fosinopril (MONOPRIL) 10 mg tablet   No No   Sig: TAKE ONE-HALF TABLET BY MOUTH DAILY   metoprolol tartrate (LOPRESSOR) 25 mg tablet   No No   Sig: Take 1 tablet (25 mg total) by mouth every 12 (twelve) hours   niacin (NIASPAN) 500 mg CR tablet   No No   Sig: Take 1 tablet (500 mg total) by mouth daily at bedtime      Facility-Administered Medications: None       Allergies: No Known Allergies  History:     Marital Status: /Civil Union     Substance Use History:   Social History     Substance and Sexual Activity   Alcohol Use Never     Social History     Tobacco Use   Smoking Status Never Smoker   Smokeless Tobacco Never Used     Social History     Substance and Sexual Activity   Drug Use Never       Family History:    Family History   Problem Relation Age of Onset    Cancer Mother        Physical Exam:     Vitals: Blood Pressure: (!) 177/88 (06/16/22 0733)  Pulse: (!) 111 (06/16/22 0733)  Temperature: (!) 101 2 °F (38 4 °C) (06/16/22 0733)  Temp Source: Tympanic (06/16/22 0733)  Respirations: 20 (06/16/22 0733)  Weight - Scale: 85 kg (187 lb 6 3 oz) (06/16/22 0733)  SpO2: 94 % (06/16/22 0739)    Physical Exam  Constitutional:       General: He is not in acute distress  Comments: Elderly, frail,   HENT:      Head: Normocephalic and atraumatic  Ears:      Comments: Decreased hearing     Mouth/Throat:      Mouth: Mucous membranes are dry  Cardiovascular:      Rate and Rhythm: Tachycardia present  Heart sounds: Murmur heard  Pulmonary:      Effort: Pulmonary effort is normal  No respiratory distress  Breath sounds: No wheezing, rhonchi or rales  Comments: Diminished bilaterally, right more than left  Chest:      Chest wall: No tenderness  Abdominal:      General: Bowel sounds are normal  There is no distension  Palpations: Abdomen is soft  Tenderness: There is no abdominal tenderness  There is no guarding or rebound  Musculoskeletal:      Right lower leg: No edema  Left lower leg: No edema  Comments: Mild left hip tenderness, left lower extremity shorter and externally rotated   Skin:     General: Skin is warm and dry  Findings: No rash  Neurological:      Mental Status: He is alert  Mental status is at baseline  Cranial Nerves: No cranial nerve deficit  Lab Results: I have personally reviewed pertinent reports        Results from last 7 days   Lab Units 06/16/22  0752   WBC Thousand/uL 19 28*   HEMOGLOBIN g/dL 12 6   HEMATOCRIT % 37 5   PLATELETS Thousands/uL 234   LYMPHO PCT % 1*   MONO PCT % 8   EOS PCT % 1     Results from last 7 days   Lab Units 06/16/22  0752   POTASSIUM mmol/L 4 2   CHLORIDE mmol/L 99*   CO2 mmol/L 26   BUN mg/dL 28*   CREATININE mg/dL 1 48*   CALCIUM mg/dL 8 6   ALK PHOS U/L 86   ALT U/L 24   AST U/L 43     Results from last 7 days   Lab Units 06/16/22  0752   INR  1 25*       Imaging: I have personally reviewed pertinent reports  CT head without contrast    Result Date: 6/16/2022  Narrative: CT BRAIN - WITHOUT CONTRAST INDICATION:   Head trauma, minor (Age >= 65y) Fall with head injury  COMPARISON: December 1, 2021 TECHNIQUE:  CT examination of the brain was performed  In addition to axial images, sagittal and coronal 2D reformatted images were created and submitted for interpretation  Radiation dose length product (DLP) for this visit:  896 44 mGy-cm   This examination, like all CT scans performed in the [facility], was performed utilizing techniques to minimize radiation dose exposure, including the use of iterative  reconstruction and automated exposure control  IMAGE QUALITY:  Diagnostic  FINDINGS: PARENCHYMA:  No intracranial mass, mass effect or midline shift  No CT signs of acute infarction  No acute parenchymal hemorrhage  Mild periventricular and white matter hypodensity seen related to chronic small vessel ischemic VENTRICLES AND EXTRA-AXIAL SPACES:  Normal for the patient's age  VISUALIZED ORBITS AND PARANASAL SINUSES:  Unremarkable   Mild chronic mucosal thickening right maxillary sinus CALVARIUM AND EXTRACRANIAL SOFT TISSUES:  Normal      Impression: No acute intracranial hemorrhage seen No mass effect or midline shift seen Mild periventricular and white matter hypodensity related to chronic small vessel Workstation performed: [id number]       CT head without contrast   Final Result      No acute intracranial hemorrhage seen   No mass effect or midline shift seen   Mild periventricular and white matter hypodensity related to chronic small vessel                  Workstation performed: [id number]         CT chest abdomen pelvis wo contrast    (Results Pending)   XR hip/pelv 2-3 vws left    (Results Pending)   XR femur 2 views LEFT    (Results Pending)   XR knee 1 or 2 vw left    (Results Pending)       EKG, Pathology, and Other Studies Reviewed on Admission:   · EKG-sinus tachycardia at 116 beats per minute    Allscripts/EPIC Records Reviewed: Yes     ** Please Note: "This note has been constructed using a voice recognition system  Therefore there may be syntax, spelling, and/or grammatical errors   Please call if you have any questions  "**

## 2022-06-16 NOTE — LETTER
To: [facility]  From:  [place], [address]    Pending reference # [id number]    Auth request for skilled rehab placement at [facility]    Discharge is planned for today pending authorization approval - clinical attached    Thank You

## 2022-06-17 ENCOUNTER — APPOINTMENT (INPATIENT)
Dept: RADIOLOGY | Facility: HOSPITAL | Age: 82
DRG: 853 | End: 2022-06-17
Payer: COMMERCIAL

## 2022-06-17 ENCOUNTER — ANESTHESIA (INPATIENT)
Dept: PERIOP | Facility: HOSPITAL | Age: 82
DRG: 853 | End: 2022-06-17
Payer: COMMERCIAL

## 2022-06-17 ENCOUNTER — APPOINTMENT (INPATIENT)
Dept: NON INVASIVE DIAGNOSTICS | Facility: HOSPITAL | Age: 82
DRG: 853 | End: 2022-06-17
Payer: COMMERCIAL

## 2022-06-17 PROBLEM — I82.409 DVT (DEEP VENOUS THROMBOSIS) (HCC): Status: ACTIVE | Noted: 2022-06-17

## 2022-06-17 LAB
ANION GAP SERPL CALCULATED.3IONS-SCNC: 8 MMOL/L (ref 4–13)
AORTIC ROOT: 3.6 CM
AV REGURGITATION PRESSURE HALF TIME: 255 MS
BUN SERPL-MCNC: 30 MG/DL (ref 5–25)
CALCIUM SERPL-MCNC: 8.4 MG/DL (ref 8.3–10.1)
CHLORIDE SERPL-SCNC: 101 MMOL/L (ref 100–108)
CO2 SERPL-SCNC: 29 MMOL/L (ref 21–32)
CREAT SERPL-MCNC: 1.35 MG/DL (ref 0.6–1.3)
E WAVE DECELERATION TIME: 181 MS
ERYTHROCYTE [DISTWIDTH] IN BLOOD BY AUTOMATED COUNT: 13.9 % (ref 11.6–15.1)
FRACTIONAL SHORTENING: 34 (ref 28–44)
GFR SERPL CREATININE-BSD FRML MDRD: 48 ML/MIN/1.73SQ M
GLUCOSE SERPL-MCNC: 117 MG/DL (ref 65–140)
HCT VFR BLD AUTO: 36.3 % (ref 36.5–49.3)
HGB BLD-MCNC: 12.1 G/DL (ref 12–17)
INTERVENTRICULAR SEPTUM IN DIASTOLE (PARASTERNAL SHORT AXIS VIEW): 1.2 CM
INTERVENTRICULAR SEPTUM: 1.2 CM (ref 0.6–1.1)
L PNEUMO1 AG UR QL IA.RAPID: NEGATIVE
LAAS-AP4: 24.2 CM2
LEFT ATRIUM SIZE: 4.2 CM
LEFT INTERNAL DIMENSION IN SYSTOLE: 3.3 CM (ref 2.1–4)
LEFT VENTRICULAR INTERNAL DIMENSION IN DIASTOLE: 5 CM (ref 3.5–6)
LEFT VENTRICULAR POSTERIOR WALL IN END DIASTOLE: 1.2 CM
LEFT VENTRICULAR STROKE VOLUME: 71 ML
LVSV (TEICH): 71 ML
MAGNESIUM SERPL-MCNC: 2 MG/DL (ref 1.6–2.6)
MCH RBC QN AUTO: 32 PG (ref 26.8–34.3)
MCHC RBC AUTO-ENTMCNC: 33.3 G/DL (ref 31.4–37.4)
MCV RBC AUTO: 96 FL (ref 82–98)
MV E'TISSUE VEL-LAT: 9 CM/S
MV E'TISSUE VEL-SEP: 8 CM/S
MV PEAK A VEL: 1.06 M/S
MV PEAK E VEL: 75 CM/S
MV STENOSIS PRESSURE HALF TIME: 52 MS
MV VALVE AREA P 1/2 METHOD: 4.23
PHOSPHATE SERPL-MCNC: 2.8 MG/DL (ref 2.3–4.1)
PLATELET # BLD AUTO: 249 THOUSANDS/UL (ref 149–390)
PMV BLD AUTO: 9.8 FL (ref 8.9–12.7)
POTASSIUM SERPL-SCNC: 4.4 MMOL/L (ref 3.5–5.3)
PROCALCITONIN SERPL-MCNC: 3.6 NG/ML
RBC # BLD AUTO: 3.78 MILLION/UL (ref 3.88–5.62)
RIGHT VENTRICLE ID DIMENSION: 3.5 CM
S PNEUM AG UR QL: NEGATIVE
SL CV AV DECELERATION TIME RETROGRADE: 881 MS
SL CV AV PEAK GRADIENT RETROGRADE: 108 MMHG
SL CV PED ECHO LEFT VENTRICLE DIASTOLIC VOLUME (MOD BIPLANE) 2D: 116 ML
SL CV PED ECHO LEFT VENTRICLE SYSTOLIC VOLUME (MOD BIPLANE) 2D: 45 ML
SODIUM SERPL-SCNC: 138 MMOL/L (ref 136–145)
TR MAX PG: 46 MMHG
TR PEAK VELOCITY: 3.4 M/S
TRICUSPID VALVE PEAK REGURGITATION VELOCITY: 3.39 M/S
WBC # BLD AUTO: 18.25 THOUSAND/UL (ref 4.31–10.16)

## 2022-06-17 PROCEDURE — 84100 ASSAY OF PHOSPHORUS: CPT | Performed by: INTERNAL MEDICINE

## 2022-06-17 PROCEDURE — 85027 COMPLETE CBC AUTOMATED: CPT | Performed by: INTERNAL MEDICINE

## 2022-06-17 PROCEDURE — 93306 TTE W/DOPPLER COMPLETE: CPT

## 2022-06-17 PROCEDURE — C1713 ANCHOR/SCREW BN/BN,TIS/BN: HCPCS | Performed by: ORTHOPAEDIC SURGERY

## 2022-06-17 PROCEDURE — 27245 TREAT THIGH FRACTURE: CPT | Performed by: PHYSICIAN ASSISTANT

## 2022-06-17 PROCEDURE — 99024 POSTOP FOLLOW-UP VISIT: CPT | Performed by: ORTHOPAEDIC SURGERY

## 2022-06-17 PROCEDURE — 84145 PROCALCITONIN (PCT): CPT | Performed by: INTERNAL MEDICINE

## 2022-06-17 PROCEDURE — 83735 ASSAY OF MAGNESIUM: CPT | Performed by: INTERNAL MEDICINE

## 2022-06-17 PROCEDURE — 27245 TREAT THIGH FRACTURE: CPT | Performed by: ORTHOPAEDIC SURGERY

## 2022-06-17 PROCEDURE — NC001 PR NO CHARGE: Performed by: ORTHOPAEDIC SURGERY

## 2022-06-17 PROCEDURE — C1769 GUIDE WIRE: HCPCS | Performed by: ORTHOPAEDIC SURGERY

## 2022-06-17 PROCEDURE — 0QS706Z REPOSITION LEFT UPPER FEMUR WITH INTRAMEDULLARY INTERNAL FIXATION DEVICE, OPEN APPROACH: ICD-10-PCS | Performed by: ORTHOPAEDIC SURGERY

## 2022-06-17 PROCEDURE — 99223 1ST HOSP IP/OBS HIGH 75: CPT | Performed by: INTERNAL MEDICINE

## 2022-06-17 PROCEDURE — 80048 BASIC METABOLIC PNL TOTAL CA: CPT | Performed by: INTERNAL MEDICINE

## 2022-06-17 PROCEDURE — 93306 TTE W/DOPPLER COMPLETE: CPT | Performed by: INTERNAL MEDICINE

## 2022-06-17 PROCEDURE — 73552 X-RAY EXAM OF FEMUR 2/>: CPT

## 2022-06-17 PROCEDURE — 99232 SBSQ HOSP IP/OBS MODERATE 35: CPT | Performed by: INTERNAL MEDICINE

## 2022-06-17 DEVICE — 5.0MM TI LOCKING SCREW W/T25 STARDRIVE 48MM F/IM NAIL-STER: Type: IMPLANTABLE DEVICE | Site: FEMUR | Status: FUNCTIONAL

## 2022-06-17 DEVICE — 12MM/130 DEG TI CANN TFNA 400MM/LEFT-STERILE
Type: IMPLANTABLE DEVICE | Site: FEMUR | Status: FUNCTIONAL
Brand: TFN-ADVANCE

## 2022-06-17 DEVICE — TFNA FENESTRATED HELICAL BLADE 100MM - STERILE
Type: IMPLANTABLE DEVICE | Site: FEMUR | Status: FUNCTIONAL
Brand: TFN-ADVANCE

## 2022-06-17 RX ORDER — FENTANYL CITRATE/PF 50 MCG/ML
50 SYRINGE (ML) INJECTION
Status: DISCONTINUED | OUTPATIENT
Start: 2022-06-17 | End: 2022-06-17 | Stop reason: HOSPADM

## 2022-06-17 RX ORDER — PROPOFOL 10 MG/ML
INJECTION, EMULSION INTRAVENOUS AS NEEDED
Status: DISCONTINUED | OUTPATIENT
Start: 2022-06-17 | End: 2022-06-17

## 2022-06-17 RX ORDER — MAGNESIUM HYDROXIDE 1200 MG/15ML
LIQUID ORAL AS NEEDED
Status: DISCONTINUED | OUTPATIENT
Start: 2022-06-17 | End: 2022-06-17 | Stop reason: HOSPADM

## 2022-06-17 RX ORDER — LIDOCAINE HYDROCHLORIDE 10 MG/ML
INJECTION, SOLUTION EPIDURAL; INFILTRATION; INTRACAUDAL; PERINEURAL AS NEEDED
Status: DISCONTINUED | OUTPATIENT
Start: 2022-06-17 | End: 2022-06-17

## 2022-06-17 RX ORDER — FENTANYL CITRATE 50 UG/ML
INJECTION, SOLUTION INTRAMUSCULAR; INTRAVENOUS AS NEEDED
Status: DISCONTINUED | OUTPATIENT
Start: 2022-06-17 | End: 2022-06-17

## 2022-06-17 RX ORDER — ENOXAPARIN SODIUM 100 MG/ML
30 INJECTION SUBCUTANEOUS
Status: DISCONTINUED | OUTPATIENT
Start: 2022-06-18 | End: 2022-06-18

## 2022-06-17 RX ORDER — SODIUM CHLORIDE 9 MG/ML
INJECTION, SOLUTION INTRAVENOUS CONTINUOUS PRN
Status: DISCONTINUED | OUTPATIENT
Start: 2022-06-17 | End: 2022-06-17

## 2022-06-17 RX ORDER — DEXAMETHASONE SODIUM PHOSPHATE 4 MG/ML
INJECTION, SOLUTION INTRA-ARTICULAR; INTRALESIONAL; INTRAMUSCULAR; INTRAVENOUS; SOFT TISSUE AS NEEDED
Status: DISCONTINUED | OUTPATIENT
Start: 2022-06-17 | End: 2022-06-17

## 2022-06-17 RX ORDER — ONDANSETRON 2 MG/ML
4 INJECTION INTRAMUSCULAR; INTRAVENOUS ONCE AS NEEDED
Status: DISCONTINUED | OUTPATIENT
Start: 2022-06-17 | End: 2022-06-17 | Stop reason: HOSPADM

## 2022-06-17 RX ORDER — METOPROLOL TARTRATE 50 MG/1
50 TABLET, FILM COATED ORAL EVERY 12 HOURS
Status: DISCONTINUED | OUTPATIENT
Start: 2022-06-17 | End: 2022-06-20

## 2022-06-17 RX ORDER — BUPIVACAINE HYDROCHLORIDE 5 MG/ML
INJECTION, SOLUTION PERINEURAL AS NEEDED
Status: DISCONTINUED | OUTPATIENT
Start: 2022-06-17 | End: 2022-06-17 | Stop reason: HOSPADM

## 2022-06-17 RX ADMIN — CITALOPRAM HYDROBROMIDE 20 MG: 20 TABLET ORAL at 09:14

## 2022-06-17 RX ADMIN — FENTANYL CITRATE 25 MCG: 50 INJECTION, SOLUTION INTRAMUSCULAR; INTRAVENOUS at 16:50

## 2022-06-17 RX ADMIN — FENTANYL CITRATE 25 MCG: 50 INJECTION, SOLUTION INTRAMUSCULAR; INTRAVENOUS at 17:37

## 2022-06-17 RX ADMIN — METOPROLOL TARTRATE 50 MG: 50 TABLET, FILM COATED ORAL at 11:45

## 2022-06-17 RX ADMIN — LIDOCAINE HYDROCHLORIDE 50 MG: 10 INJECTION, SOLUTION EPIDURAL; INFILTRATION; INTRACAUDAL; PERINEURAL at 16:36

## 2022-06-17 RX ADMIN — CEFTRIAXONE 1000 MG: 1 INJECTION, SOLUTION INTRAVENOUS at 09:15

## 2022-06-17 RX ADMIN — DEXAMETHASONE SODIUM PHOSPHATE 4 MG: 4 INJECTION, SOLUTION INTRA-ARTICULAR; INTRALESIONAL; INTRAMUSCULAR; INTRAVENOUS; SOFT TISSUE at 16:55

## 2022-06-17 RX ADMIN — FENTANYL CITRATE 25 MCG: 50 INJECTION, SOLUTION INTRAMUSCULAR; INTRAVENOUS at 16:37

## 2022-06-17 RX ADMIN — AZITHROMYCIN MONOHYDRATE 500 MG: 250 TABLET ORAL at 09:14

## 2022-06-17 RX ADMIN — FENTANYL CITRATE 25 MCG: 50 INJECTION, SOLUTION INTRAMUSCULAR; INTRAVENOUS at 17:00

## 2022-06-17 RX ADMIN — ACETAMINOPHEN 650 MG: 325 TABLET ORAL at 11:40

## 2022-06-17 RX ADMIN — FENTANYL CITRATE 25 MCG: 50 INJECTION, SOLUTION INTRAMUSCULAR; INTRAVENOUS at 17:03

## 2022-06-17 RX ADMIN — OXYCODONE HYDROCHLORIDE 2.5 MG: 5 TABLET ORAL at 00:55

## 2022-06-17 RX ADMIN — FENTANYL CITRATE 25 MCG: 50 INJECTION, SOLUTION INTRAMUSCULAR; INTRAVENOUS at 17:44

## 2022-06-17 RX ADMIN — FENTANYL CITRATE 25 MCG: 50 INJECTION, SOLUTION INTRAMUSCULAR; INTRAVENOUS at 17:30

## 2022-06-17 RX ADMIN — SODIUM CHLORIDE: 0.9 INJECTION, SOLUTION INTRAVENOUS at 16:34

## 2022-06-17 RX ADMIN — FENTANYL CITRATE 25 MCG: 50 INJECTION, SOLUTION INTRAMUSCULAR; INTRAVENOUS at 17:19

## 2022-06-17 RX ADMIN — PROPOFOL 100 MG: 10 INJECTION, EMULSION INTRAVENOUS at 16:36

## 2022-06-17 RX ADMIN — B-COMPLEX W/ C & FOLIC ACID TAB 1 TABLET: TAB at 09:13

## 2022-06-17 RX ADMIN — SODIUM CHLORIDE 75 ML/HR: 0.9 INJECTION, SOLUTION INTRAVENOUS at 20:23

## 2022-06-17 RX ADMIN — METOPROLOL TARTRATE 25 MG: 25 TABLET, FILM COATED ORAL at 00:56

## 2022-06-17 NOTE — ASSESSMENT & PLAN NOTE
2D echo-3/20-EF 65-70%, mild aortic sclerosis, mild-to-moderate aortic regurg, mild MR mild TR  Repeat echo EF 51%, grade 1 diastolic dysfunction  · Appears stable  · Continue metoprolol, ACE-inhibitor  · Cardiology input appreciated -metoprolol was titrated up  · Follow-up with Cardiology after discharge

## 2022-06-17 NOTE — CONSULTS
Consultation - Pulmonary Medicine   Rm Carter 80 y o  male MRN: [id number]  Unit/Bed#: 2 [address] Encounter: [id number]      Assessment:  Sepsis-Right upper and right lower lobe pneumonia community-acquired  Small right pleural effusion which may be parapneumonic  Large hiatal hernia  Acute displaced left proximal femoral shaft fracture secondary to fall at home  Acute kidney injury  History of segmental branch right upper lobe pulmonary embolism 12/07/2019 phone right femur fracture surgery on 12/04/20189  Had IVC filter placed on 12/07/2019 and this was deactivated 01/21/2020  Still has muscle strength still place  Closed acute fracture of left proximal femoral shaft fracture extending to neck  Fracture secondary to fall at home  Echocardiogram revealed LV systolic function to be normal with ejection fraction of 60%  RSV P was 51 mm Hg      Plan:   Patient clinically better today  I think he is able to tolerate  left hip surgery  Continue IV azithromycin and ceftriaxone for pneumonia  I did speak with hospitalist Dr Corwin Russo and also spoke with patient's wife  If patient has any problems with hypotension or hypoxemia postoperatively he can be transferred to ICU for closer monitoring and treatment      History of Present Illness   Physician Requesting Consult: Candelario Hood MD     Reason for Consult / Principal Problem: pneumonia, preop evaulation    Hx and PE limited by: none    HPI: Rm Carter is a 80y o  year old male who presented to the ER yesterday after falling at home and able to get up on his own  Wife states the patient has not fell well for past few days  He has some increased cough or weakness  She had time stay in bed this morning but she was not room be of 10 to get out of bed and she states patient fell to the floor  He did not his head  He did not lose any consciousness  His initial room air O2 saturation of 89% have fever 101 2° blood pressure 177/88 mm Hg in ER      CT of his chest abdomen pelvis was done on 6/16  On CT chest there was alveolar infiltrate posterior segment right upper lobe and part of right upper lobe  Also very small pleural effusion right side    White blood cell count was 19 3 and today is down to 818 25  Hemoglobin is 12 1 platelet count is 899 initial CBC shows left shift with 15% bands  Nasal swab for influenza RSV and COVID was negative  Initial procalcitonin was 4 26 and is decreased to 3 6 today  Urine for strep pneumonia Legionella antigen were negative  X-ray of his hip revealed acute displaced left proximal femoral fracture extending to the neck  No dislocation  Plan is to take patient to surgery later this afternoon for his left femur fracture  He was started on IV azithromycin and ceftriaxone for pneumonia  He is not have any shortness of breath or chest pain  Does have cough sometimes productive for some discolored yellow mucus  Denies any difficulty swallowing  Does live large hiatal hernia noted on CT scan of chest but denies any significant gastric reflux    Back in December of 2019 he was diagnosed with right upper lobe segmental branch pulmonary emboli  He did have right femur fracture December 4, 2018 and did have surgery to repair this  He did have IVC filter placed 12/07/2009 does at that time he had some blood oozing from wound  On 08/21/2022 this IVC filter was activated by IR by removing capped  There is still stent from the IVC filter in place        Review of Systems   Constitutional: Negative for chills, fever and unexpected weight change  HENT: Negative for congestion, rhinorrhea and sore throat  Eyes: Negative for discharge and redness  Respiratory: Positive for cough  Negative for shortness of breath  Cardiovascular: Negative for chest pain, palpitations and leg swelling  Gastrointestinal: Negative for abdominal distention, abdominal pain and nausea  Endocrine: Negative for polydipsia and polyphagia  Genitourinary: Negative for dysuria  Musculoskeletal: Negative for joint swelling and myalgias  Left hip pain   Skin: Negative for rash  Neurological: Negative for light-headedness  Psychiatric/Behavioral: Negative for confusion  Historical Information   Past Medical History:   Diagnosis Date    Hyperlipidemia      Past Surgical History:   Procedure Laterality Date    IR IVC FILTER PLACEMENT OPTIONAL/TEMPORARY  12/7/2019    IR IVC FILTER REMOVAL  8/21/2020    OR OPEN RX FEMUR FX+INTRAMED BÁRBARA Right 12/4/2019    Procedure: INSERTION NAIL IM FEMUR ANTEGRADE (TROCHANTERIC);   Surgeon: Shelby Sewell DO;  Location: [facility];  Service: Orthopedics     Social History   Social History     Substance and Sexual Activity   Alcohol Use Never     Social History     Substance and Sexual Activity   Drug Use Never     Social History     Tobacco Use   Smoking Status Never Smoker   Smokeless Tobacco Never Used         Family History:   Family History   Problem Relation Age of Onset    Cancer Mother        Meds/Allergies     Current Facility-Administered Medications:     acetaminophen (TYLENOL) tablet 650 mg, 650 mg, Oral, Q6H PRN, Candelario Hood MD, 650 mg at 06/17/22 1140    atorvastatin (LIPITOR) tablet 5 mg, 5 mg, Oral, Daily With Chante Crowe MD, 5 mg at 06/16/22 1634    azithromycin (ZITHROMAX) tablet 500 mg, 500 mg, Oral, Q24H, Candelario Hood MD, 500 mg at 06/17/22 0914    cefTRIAXone (ROCEPHIN) IVPB (premix in dextrose) 1,000 mg 50 mL, 1,000 mg, Intravenous, Q24H, Candelario Hood MD, Last Rate: 100 mL/hr at 06/17/22 0915, 1,000 mg at 06/17/22 0915    citalopram (CeleXA) tablet 20 mg, 20 mg, Oral, Daily, Candelario Hood MD, 20 mg at 06/17/22 0914    dextromethorphan-guaiFENesin (ROBITUSSIN DM) oral syrup 10 mL, 10 mL, Oral, Q4H PRN, Candelario Hood MD    metoprolol tartrate (LOPRESSOR) tablet 50 mg, 50 mg, Oral, Q12H, Candelario Hood MD, 50 mg at 06/17/22 1145   multivitamin stress formula tablet 1 tablet, 1 tablet, Oral, Daily, Bobby Flor MD, 1 tablet at 06/17/22 0913    oxyCODONE (ROXICODONE) IR tablet 2 5 mg, 2 5 mg, Oral, Q8H PRN, Bobby Flor MD, 2 5 mg at 06/17/22 0055    sodium chloride 0 9 % infusion, 75 mL/hr, Intravenous, Continuous, Bobby Flor MD, Last Rate: 75 mL/hr at 06/16/22 1455, 75 mL/hr at 06/16/22 1455    Prior to Admission medications    Medication Sig Start Date End Date Taking? Authorizing Provider   atorvastatin (LIPITOR) 10 mg tablet Half tab daily 5/27/22   Kayleen Bruce MD   citalopram (CeleXA) 40 mg tablet Take 1 tablet (40 mg total) by mouth daily 5/27/22   Kayleen Bruce MD   fosinopril (MONOPRIL) 10 mg tablet TAKE ONE-HALF TABLET BY MOUTH DAILY 3/22/22   Kayleen Bruce MD   metoprolol tartrate (LOPRESSOR) 25 mg tablet Take 1 tablet (25 mg total) by mouth every 12 (twelve) hours 2/25/22   Kayleen Bruce MD   Multiple Vitamin (MULTIVITAMIN) tablet Take 1 tablet by mouth daily    Historical Provider, MD   niacin (NIASPAN) 500 mg CR tablet Take 1 tablet (500 mg total) by mouth daily at bedtime 11/24/21   Kayleen Bruce MD       No Known Allergies    Objective   Vitals: Blood pressure 154/83, pulse 97, temperature 99 2 °F (37 3 °C), resp  rate 18, height 5' 9" (1 753 m), weight 83 5 kg (184 lb), SpO2 94 %  ,Body mass index is 27 17 kg/m²  Intake/Output Summary (Last 24 hours) at 6/17/2022 1557  Last data filed at 6/17/2022 0900  Gross per 24 hour   Intake 20 ml   Output --   Net 20 ml     Invasive Devices  Report    Peripheral Intravenous Line  Duration           Peripheral IV 06/16/22 Right Antecubital 1 day          Line  Duration           Venous Sheath Other (Comment) Right Other (Comment) 665 days                Physical Exam  Vitals reviewed  Constitutional:       General: He is not in acute distress  Appearance: He is well-developed        Comments: On 2 liters/minutes nasal cannula oxygen O2 saturation was 97%   HENT:      Head: Normocephalic  Right Ear: External ear normal       Left Ear: External ear normal       Nose: Nose normal       Mouth/Throat:      Mouth: Mucous membranes are dry  Pharynx: Oropharynx is clear  No oropharyngeal exudate  Eyes:      Conjunctiva/sclera: Conjunctivae normal       Pupils: Pupils are equal, round, and reactive to light  Neck:      Vascular: No JVD  Trachea: No tracheal deviation  Cardiovascular:      Rate and Rhythm: Normal rate and regular rhythm  Heart sounds: Normal heart sounds  Pulmonary:      Effort: Pulmonary effort is normal       Comments: Lung sounds are clear anteriorly  Abdominal:      General: There is no distension  Palpations: Abdomen is soft  Tenderness: There is no abdominal tenderness  There is no guarding  Musculoskeletal:      Cervical back: Neck supple  Comments: No edema   Lymphadenopathy:      Cervical: No cervical adenopathy  Skin:     General: Skin is warm and dry  Findings: No rash  Neurological:      Mental Status: He is alert and oriented to person, place, and time  Psychiatric:         Behavior: Behavior normal          Thought Content:  Thought content normal          Lab Results: ABG: No results found for: PHART, ABP2ZID, PO2ART, NYJ8XPR, N3CPQZQG, BEART, SOURCE, BNP: No results found for: BNP, CBC:   Lab Results   Component Value Date    WBC 18 25 (H) 06/17/2022    HGB 12 1 06/17/2022    HCT 36 3 (L) 06/17/2022    MCV 96 06/17/2022     06/17/2022    MCH 32 0 06/17/2022    MCHC 33 3 06/17/2022    RDW 13 9 06/17/2022    MPV 9 8 06/17/2022    NRBC 0 05/20/2022   , CMP:   Lab Results   Component Value Date     12/21/2015    K 4 4 06/17/2022    K 4 6 12/21/2015     06/17/2022     12/21/2015    CO2 29 06/17/2022    CO2 30 (H) 12/21/2015    ANIONGAP 11 9 12/21/2015    BUN 30 (H) 06/17/2022    BUN 13 12/21/2015    CREATININE 1 35 (H) 06/17/2022 CREATININE 0 9 12/21/2015    GLUCOSE 93 12/21/2015    CALCIUM 8 4 06/17/2022    CALCIUM 8 7 12/21/2015    AST 43 06/16/2022    AST 20 12/21/2015    ALT 24 06/16/2022    ALT 22 12/21/2015    ALKPHOS 86 06/16/2022    ALKPHOS 82 12/21/2015    PROT 6 9 12/21/2015    BILITOT 0 6 12/21/2015    EGFR 48 06/17/2022   , PT/INR:   Lab Results   Component Value Date    INR 1 25 (H) 06/16/2022   , Troponin: No results found for: TROPONIN    Imaging Studies: I have personally reviewed pertinent reports  and I have personally reviewed pertinent films in PACS    EKG, Pathology, and Other Studies: I have personally reviewed pertinent reports  VTE Prophylaxis: Sequential compression device (Venodyne)     Code Status: Level 1 - Full Code    Counseling/Coordination of Care: Total floor / unit time spent today 30 minutes  Greater than 50% of total time was spent with the patient and / or family counseling and / or coordination of care  A description of the counseling / coordination of care: I spoke with patient's wife    I reviewed chest x-ray and I discussed diagnosis and treatment with her and patient

## 2022-06-17 NOTE — PROGRESS NOTES
Progress Note - Orthopedics   Rm Carter 80 y o  male MRN: [id number]  Unit/Bed#: OR POOL Encounter: [id number]    Assessment:  1) POD#0 s/p Long TFN left femur    Plan:  Continue IV and p o  Antibiotics around the clock for pneumonia per Medicine  DVT prophylaxis:  Lovenox 30 mg subQ q day/SCD's/Ambulation  WBAT LLE  PT/OT- WBAT LLE  Analgesia PRN  Follow up AM labs  Maintain Baez until medically stable postop, will defer this decision to the medical service  Medical management per primary team  Dressing- monitor for drainage  Discharge planning - disposition pending progress with PT postoperatively  Weight bearing: WBAT LLE    VTE Pharmacologic Prophylaxis: Enoxaparin (Lovenox)  VTE Mechanical Prophylaxis: sequential compression device    Subjective:  Patient seen and examined in PACU  Pain controlled  Events of surgery discussed with the patient the patient's family via telephone    Vitals: Blood pressure 152/69, pulse 91, temperature 98 4 °F (36 9 °C), resp  rate 22, height 5' 9" (1 753 m), weight 83 5 kg (184 lb), SpO2 95 %  ,Body mass index is 27 17 kg/m²  Intake/Output Summary (Last 24 hours) at 6/17/2022 1916  Last data filed at 6/17/2022 1830  Gross per 24 hour   Intake 860 ml   Output 275 ml   Net 585 ml       Invasive Devices  Report    Peripheral Intravenous Line  Duration           Peripheral IV 06/17/22 Right Hand <1 day          Line  Duration           Venous Sheath Other (Comment) Right Other (Comment) 665 days          Drain  Duration           Urethral Catheter Latex 16 Fr  <1 day                Physical Exam: NAD  Ortho Exam: LLE: Dsg c/d/i, thigh soft, +DF/PF/EHL, +L3-S1 SILT, DP2+, foot warm    Lab, Imaging and other studies:  Intraoperative fluoro images of left femur:  Acceptable reduction of comminuted subtrochanteric femur and appropriate placement of cephalomedullary nail without complication  (technical issue with transfer of fluoroscopic images to PACS    Postoperative x-ray ordered for confirmation )    Stew LOUISE    Division of Adult Reconstruction  Department of Orthopaedic Surgery  [facility]

## 2022-06-17 NOTE — PLAN OF CARE
Problem: Potential for Falls  Goal: Patient will remain free of falls  Description: INTERVENTIONS:  - Educate patient/family on patient safety including physical limitations  - Instruct patient to call for assistance with activity   - Consult OT/PT to assist with strengthening/mobility   - Keep Call bell within reach  - Keep bed low and locked with side rails adjusted as appropriate  - Keep care items and personal belongings within reach  - Initiate and maintain comfort rounds  - Make Fall Risk Sign visible to staff  - Offer Toileting every 2 Hours, in advance of need  - Initiate/Maintain bed alarm  - Obtain necessary fall risk management equipment:   Problem: MOBILITY - ADULT  Goal: Maintain or return to baseline ADL function  Description: INTERVENTIONS:  -  Assess patient's ability to carry out ADLs; assess patient's baseline for ADL function and identify physical deficits which impact ability to perform ADLs (bathing, care of mouth/teeth, toileting, grooming, dressing, etc )  - Assess/evaluate cause of self-care deficits   - Assess range of motion  - Assess patient's mobility; develop plan if impaired  - Assess patient's need for assistive devices and provide as appropriate  - Encourage maximum independence but intervene and supervise when necessary  - Involve family in performance of ADLs  - Assess for home care needs following discharge   - Consider OT consult to assist with ADL evaluation and planning for discharge  - Provide patient education as appropriate  Outcome: Progressing  Goal: Maintains/Returns to pre admission functional level  Description: INTERVENTIONS:  - Perform BMAT or MOVE assessment daily    - Set and communicate daily mobility goal to care team and patient/family/caregiver  - Collaborate with rehabilitation services on mobility goals if consulted  - Perform Range of Motion 3 times a day  - Reposition patient every 2 hours    - Dangle patient 3  times a day  - Stand patient 3 times a day  - Ambulate patient 3 times a day  - Out of bed to chair 3 times a day   - Out of bed for meals 3  Problem: PAIN - ADULT  Goal: Verbalizes/displays adequate comfort level or baseline comfort level  Description: Interventions:  - Encourage patient to monitor pain and request assistance  - Assess pain using appropriate pain scale  - Administer analgesics based on type and severity of pain and evaluate response  - Implement non-pharmacological measures as appropriate and evaluate response  - Consider cultural and social influences on pain and pain management  - Notify physician/advanced practitioner if interventions unsuccessful or patient reports new pain  Outcome: Progressing     Problem: INFECTION - ADULT  Goal: Absence or prevention of progression during hospitalization  Description: INTERVENTIONS:  - Assess and monitor for signs and symptoms of infection  - Monitor lab/diagnostic results  - Monitor all insertion sites, i e  indwelling lines, tubes, and drains  - Monitor endotracheal if appropriate and nasal secretions for changes in amount and color  - [place] appropriate cooling/warming therapies per order  - Administer medications as ordered  - Instruct and encourage patient and family to use good hand hygiene technique  - Identify and instruct in appropriate isolation precautions for identified infection/condition  Outcome: Progressing  Goal: Absence of fever/infection during neutropenic period  Description: INTERVENTIONS:  - Monitor WBC    Outcome: Progressing    times a day  - Out of bed for toileting  - Record patient progress and toleration of activity level   Outcome: Progressing     - Apply yellow socks and bracelet for high fall risk patients  - Consider moving patient to room near nurses station  Outcome: Progressing

## 2022-06-17 NOTE — UTILIZATION REVIEW
Initial Clinical Review    Admission: Date/Time/Statement:   Admission Orders (From admission, onward)     Ordered        06/16/22 0919  INPATIENT ADMISSION  Once                      Orders Placed This Encounter   Procedures    INPATIENT ADMISSION     Standing Status:   Standing     Number of Occurrences:   1     Order Specific Question:   Level of Care     Answer:   Med Surg [16]     Order Specific Question:   Estimated length of stay     Answer:   More than 2 Midnights     Order Specific Question:   Certification     Answer:   I certify that inpatient services are medically necessary for this patient for a duration of greater than two midnights  See H&P and MD Progress Notes for additional information about the patient's course of treatment  ED Arrival Information     Expected   -    Arrival   6/16/2022 07:29    Acuity   Emergent            Means of arrival   Ambulance    Escorted by   [facility] [place]    Admission type   Emergency            Arrival complaint   hip,leg pain due to fall           Chief Complaint   Patient presents with   Karen Mullet Fall    Fever - 75 years or older     Pt was dizzy and fell today now reports of l hip/leg pain  Pt hit head     Initial Presentation:   80 yom to ER from home via EMS s/p fall 2nd dizziness/lightheaded, unable to get up due to L hip pain  Also reports productive cough for yellow sputum  Hx hypertension, dyslipidemia, paroxysmal AFib, valvular heart disease, 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, prior right hip trochanteric fracture  Presents febrile, tachycardic, hypertensive & hypoxic, lungs with decreased breath sounds, LLE shortened & internally rotated   Admission work-up showing acute, displaced left proximal femoral shaft fracture extending to the neck & consolidation right upper lobe patchy consolidation right lower lobe suggests pneumonia/aspiration on imaging, leukocytosis with bandemia, JIMY, hyponatremia, elevated procalcitonin  Admitted to inpatient status for pneumonia  Started on IVABT, ortho consulted for fx  Per ortho: Left hip pain - patient sustained a left proximal femur intertrochanteric fracture with significant subtrochanteric extension  This will require surgical fixation in the form of a long cephalomedullary nail  In discussion with the medical team, he does have hypoxia, pneumonia, and significant tachycardia  Plan for tomorrow if medical/cardiac cleared  Per cardio: Patient has intermediate risk of perioperative cardiac complications with orthopedic surgery  Hold ACE-inhibitor due to JIMY  Metoprolol 25 mg q 12 hours, titrate up to control blood pressure and heart rate as needed     Date: 6/17/22   Day 2:   Cleared for surgery  Plan OR later today for: Insertion nail IM femur antegrade (Trochanteric)- long nail      ED Triage Vitals   Temperature Pulse Respirations Blood Pressure SpO2   06/16/22 0733 06/16/22 0733 06/16/22 0733 06/16/22 0733 06/16/22 0733   (!) 101 2 °F (38 4 °C) (!) 111 20 (!) 177/88 (!) 89 %      Temp Source Heart Rate Source Patient Position - Orthostatic VS BP Location FiO2 (%)   06/16/22 0733 06/16/22 0733 06/16/22 1032 06/16/22 1032 --   Tympanic Monitor Lying Left arm       Pain Score       06/16/22 1032       No Pain          Wt Readings from Last 1 Encounters:   06/16/22 83 5 kg (184 lb 1 4 oz)     Additional Vital Signs:   Date/Time Temp Pulse Resp BP MAP (mmHg) SpO2 Calculated FIO2 (%) - Nasal Cannula Nasal Cannula O2 Flow Rate (L/min) O2 Device Patient Position - Orthostatic VS   06/17/22 07:58:51 99 4 °F (37 4 °C) 105 -- 169/83 112 91 % -- -- -- --   06/17/22 03:03:31 98 4 °F (36 9 °C) 90 19 147/79 102 92 % -- -- -- Lying   06/17/22 0056 98 9 °F (37 2 °C) 110 Abnormal  18 170/80 -- -- -- -- -- Lying   06/16/22 23:14:24 98 6 °F (37 °C) 104 -- 181/150 Abnormal  160 93 % -- -- -- --   06/16/22 21:10:36 100 6 °F (38 1 °C) Abnormal  132 Abnormal  17 169/100 123 93 % -- -- -- Lying   06/16/22 19:57:08 101 5 °F (38 6 °C) Abnormal  120 Abnormal  18 175/100 Abnormal  125 91 % 28 2 L/min Nasal cannula Lying     Pertinent Labs/Diagnostic Test Results:   XR hip/pelv 2-3 vws left   Final Result (06/16 0928)      Acute, displaced left proximal femoral shaft fracture extending to the neck  No dislocation  XR femur 2 views LEFT   Final Result  (06/16 0929)      Acute, displaced left proximal femoral shaft fracture extending to the neck  No dislocation  XR knee 1 or 2 vw left   Final Result (06/16 0926)      No acute osseous abnormality  CT head without contrast   Final Result (06/16 0840)      No acute intracranial hemorrhage seen   No mass effect or midline shift seen   Mild periventricular and white matter hypodensity related to chronic small vessel      CT chest abdomen pelvis wo contrast   Final Result  (06/16 0922)   Dense consolidation right upper lobe patchy consolidation right lower lobe suggests pneumonia/aspiration      Small right effusion seen      There is a displaced fracture of the proximal shaft of the femur with medial displacement of the distal fragment along with overlapping         Lucency seen in the both iliac bone, image 92 series 601 with no correlate on the radiograph may be due to motion or due to nondisplaced fracture  Correlate clinically   Bony pelvic CT may be considered as clinically needed      Intact acetabulum      Osteoporosis with biconcavity of the multiple lumbar thoracic and lumbar vertebrae, chronic      Mild thickening of the posterior aspect of the urinary bladder wall noted, correlate with the urinary evaluation and urology evaluation on nonemergent basis      Incidentally detected the left perifissural nodule  Based on current Fleischner Society 2017 Guidelines on incidental pulmonary nodule, no routine follow-up is needed if the patient is low risk    If the patient is high risk, optional follow-up chest CT    at 12 months can be considered          Consider follow-up at 2 months demonstrate resolution     6/16  Ekg=  Rhythm: sinus rhythm     Ectopy:     Ectopy: none     QRS:     QRS axis:  Normal     QRS intervals:  Normal   Conduction:     Conduction: normal     ST segments:     ST segments:  Normal   Q waves:     Q waves:  I and III    Results from last 7 days   Lab Units 06/16/22  0752   SARS-COV-2  Negative     Results from last 7 days   Lab Units 06/17/22  0524 06/16/22  0752   WBC Thousand/uL 18 25* 19 28*   HEMOGLOBIN g/dL 12 1 12 6   HEMATOCRIT % 36 3* 37 5   PLATELETS Thousands/uL 249 234   BANDS PCT %  --  15*     Results from last 7 days   Lab Units 06/17/22  0524 06/16/22  0752   SODIUM mmol/L 138 134*   POTASSIUM mmol/L 4 4 4 2   CHLORIDE mmol/L 101 99*   CO2 mmol/L 29 26   ANION GAP mmol/L 8 9   BUN mg/dL 30* 28*   CREATININE mg/dL 1 35* 1 48*   EGFR ml/min/1 73sq m 48 43   CALCIUM mg/dL 8 4 8 6   MAGNESIUM mg/dL 2 0 1 6   PHOSPHORUS mg/dL 2 8 1 8*     Results from last 7 days   Lab Units 06/16/22  0752   AST U/L 43   ALT U/L 24   ALK PHOS U/L 86   TOTAL PROTEIN g/dL 6 7   ALBUMIN g/dL 2 5*   TOTAL BILIRUBIN mg/dL 0 60     Results from last 7 days   Lab Units 06/17/22  0524 06/16/22  0752   GLUCOSE RANDOM mg/dL 117 134     Results from last 7 days   Lab Units 06/16/22  0752   PROTIME seconds 15 4*   INR  1 25*   PTT seconds 33     Results from last 7 days   Lab Units 06/17/22  0524 06/16/22  0752   PROCALCITONIN ng/ml 3 60* 4 26*     Results from last 7 days   Lab Units 06/16/22  0752   LACTIC ACID mmol/L 1 7     Results from last 7 days   Lab Units 06/16/22  1621   CLARITY UA  Slightly Cloudy   COLOR UA  Yellow   SPEC GRAV UA  >=1 030   PH UA  6 0   GLUCOSE UA mg/dl Negative   KETONES UA mg/dl Negative   BLOOD UA  Moderate*   PROTEIN UA mg/dl 100 (2+)*   NITRITE UA  Negative   BILIRUBIN UA  Negative   UROBILINOGEN UA E U /dl 0 2   LEUKOCYTES UA  Negative   WBC UA /hpf 0-1*   RBC UA /hpf 2-4   BACTERIA UA /hpf Occasional EPITHELIAL CELLS WET PREP /hpf Occasional     Results from last 7 days   Lab Units 06/16/22  1621 06/16/22  0752   STREP PNEUMONIAE ANTIGEN, URINE  Negative  --    LEGIONELLA URINARY ANTIGEN  Negative  --    INFLUENZA A PCR   --  Negative   INFLUENZA B PCR   --  Negative   RSV PCR   --  Negative     Results from last 7 days   Lab Units 06/16/22  0801 06/16/22  0752   BLOOD CULTURE  Received in Microbiology Lab  Culture in Progress  Received in Microbiology Lab  Culture in Progress       ED Treatment:   Medication Administration from 06/16/2022 0729 to 06/16/2022 1248       Date/Time Order Dose Route Action     06/16/2022 0808 acetaminophen (TYLENOL) tablet 650 mg 650 mg Oral Given     06/16/2022 0947 cefTRIAXone (ROCEPHIN) IVPB (premix in dextrose) 1,000 mg 50 mL 1,000 mg Intravenous New Bag     06/16/2022 1008 azithromycin (ZITHROMAX) 500 mg in sodium chloride 0 9% 250mL IVPB 500 mg 500 mg Intravenous New Bag        Past Medical History:   Diagnosis Date    Hyperlipidemia      Present on Admission:   Valvular heart disease   Paroxysmal atrial flutter (HCC)   Recurrent major depressive disorder, in full remission ([place] [address] )   Hyperlipidemia   Closed fracture of shaft of left femur ([place] [address] )   Pneumonia of right lung due to infectious organism   Sepsis ([place] [address] )   Acute kidney injury ([place] [address] )   Hyponatremia    Admitting Diagnosis: Leg pain [M79 606]  Leukocytosis [D72 829]  Hypophosphatemia [E83 39]  Hip pain [M25 559]  Fever [R50 9]  JIMY (acute kidney injury) ([place] [address] ) [N17 9]  Closed displaced intertrochanteric fracture of left femur, initial encounter ([place] [address] ) [S72 142A]  Pneumonia of right upper lobe due to infectious organism [J18 9]  Age/Sex: 80 y o  male  Admission Orders:  Aspiration precautions  HOB elevated 30 degrees  Scd/foot pumps  O2 to keep sats>94%  Telemetry  Consult cardio  Consult ortho    Scheduled Medications:  atorvastatin, 5 mg, Oral, Daily With Dinner  azithromycin, 500 mg, Oral, Q24H  cefTRIAXone, 1,000 mg, Intravenous, Q24H  citalopram, 20 mg, Oral, Daily  metoprolol tartrate, 25 mg, Oral, Q12H  multivitamin stress formula, 1 tablet, Oral, Daily    Continuous IV Infusions:  sodium chloride, 75 mL/hr, Intravenous, Continuous    PRN Meds:  acetaminophen, 650 mg, Oral, Q6H PRN  dextromethorphan-guaiFENesin, 10 mL, Oral, Q4H PRN  oxyCODONE, 2 5 mg, Oral, Q8H PRN    Network Utilization Review Department  ATTENTION: Please call with any questions or concerns to [phone] and carefully listen to the prompts so that you are directed to the right person  All voicemails are confidential   [place] all requests for admission clinical reviews, approved or denied determinations and any other requests to dedicated fax number below belonging to the campus where the patient is receiving treatment   List of dedicated fax numbers for the Facilities:  [address] DENIALS (Administrative/Medical Necessity) [phone]   [address] (Maternity/NICU/Pediatrics) [phone]   [address]  [address] [facility] [place] [address] [id number] [place] [address] Stephany Avalos [address] [facility] [place] [address] [phone]

## 2022-06-17 NOTE — ASSESSMENT & PLAN NOTE
Creatinine noted to be 1 48  UA moderate blood, 2+ protein, 0-1 WBCs  Likely secondary to poor p o   Intake, diarrhea, sepsis  Improved to baseline,  · Monitor intake output  · Avoid hypotension, nephrotoxin  · Follow-up BMP

## 2022-06-17 NOTE — PERIOPERATIVE NURSING NOTE
Received from Or, VSS  Left hip dsgs dry and intact  Ice applied  Left foot pedal pulse palpable, marked  Baez patent for zander urine

## 2022-06-17 NOTE — ASSESSMENT & PLAN NOTE
Patient with history of paroxysmal AFib/PSVT in setting of pulmonary embolism  Currently in sinus rhythm  · Continue metoprolol , titrate up to 100 mg q 12 hours  · DVT prophylaxis  · Follow up with Cardiology

## 2022-06-17 NOTE — PERIOPERATIVE NURSING NOTE
Left femur xray done  Pt denies pain at present  Changed to Nasal Cannula oxygen at 2litres   Wedding Band placed on pts left ring finger as requested

## 2022-06-17 NOTE — OP NOTE
OPERATIVE REPORT  PATIENT NAME: Carlos Matthews    :  1940  MRN: [id number]  Pt Location: [facility] OR ROOM 03    SURGERY DATE: 2022    Surgeon(s) and Role:     * Jairo James DO - Primary     * Aren Zapata PA-C- assist, no qualified resident was available an assistant was needed for patient positioning, prepping and draping, soft tissue retraction, protection of vital structures throughout the case, maintenance of comminuted subtroch fracture reduction during reaming and nail insertion, assistance during perfect [place] technique, superficial closure, and to complete the case safely  Preop Diagnosis:  Closed comminuted subtrochanteric fracture left femur  Left leg pain    Post-Op Diagnosis Codes:     * closed comminuted subtrochanteric fracture left femur     * left leg pain    Procedure(s) (LRB):  INSERTION NAIL IM FEMUR ANTEGRADE (TROCHANTERIC) - long nail (Left)    Specimen(s):  * No specimens in log *    Estimated Blood Loss:   150 mL    Drains:  None  Urethral Catheter Latex 16 Fr  (Active)   Number of days: 0       Anesthesia Type:   General    Antibiotics:  Zithromax p o , Rocephin IV around the clock  Intravenous fluids:  850 cc crystalloid    Urine output:  Baez:  125 cc    Implant Name Type Inv   Item Serial No   Lot No  LRB No  Used Action   NAIL IM 12MM 130 DEG TI MONICA TFNA 400MM LT STRL - [id number]  NAIL IM 12MM 130 DEG TI MONICA TFNA 400MM LT STRL  Synthes [id number] Left 1 Implanted   BLADE HELICAL 127HG TFNA FENESTRATED STRL - [id number]  BLADE HELICAL 430DA TFNA FENESTRATED STRL  Synthes [id number] Left 1 Implanted   SCREW LCK 5 X 48MM T25 STRDRV RECES STRL - [id number]  SCREW LCK 5 X 48MM T25 STRDRV RECES STRL  Synthes [id number] Left 1 Implanted     Operative Indications:  Closed comminuted subtrochanteric fracture left femur  Left leg pain  Patient is a very pleasant 80-year-old male that presents to the emergency department on 2022 after getting out of bed and sustaining a fall in his home  He became dizzy just prior to the fall but denies loss of consciousness  He was evaluated in the emergency department and found to have a displaced subtrochanteric fracture of the left femur  He was admitted for medical optimization as he was also found to have underlying pneumonia  He was admitted to the medical service and IV antibiotics were started  It was the recommendation of the medical service that the patient would need a day to be optimized from a pulmonary and pneumonia status prior to proceeding to surgical fixation  Orthopedics was consulted and recommendations were made for surgical intervention in the form of closed reduction and cephalomedullary nailing of left hip  Treatment options were discussed with the patient and consents were signed and placed in the chart after the risks and benefits of undergoing this procedure were discussed  Patient was optimized from a medical and cardiovascular standpoint by 6/17/2022 at which point he underwent closed reduction cephalomedullary nailing of his left subtrochanteric femur fracture  Operative Findings:  Intraoperatively fluoroscopic images were utilized to evaluate the fracture characteristics  The fracture appeared to be a comminuted subtrochanteric fracture of the left femur with 3 major fracture fragments  Ultimately closed reduction techniques were successful at producing adequate reduction  During the reaming and the nail insertion process the fracture fragments had to be manipulated through the proximal wound to assist in maintenance of closed reduction and to optimize alignment  The helical blade was inserted in to the femoral head in the appropriate position and statically locked proximally to create a fixed construct  The nail was also locked distally through the distal static hole  The distal locking screw had excellent purchase  Acceptable reduction was maintained throughout the case    Patient tolerated procedure well  There were no complications  Complications:   None    Procedure and Technique:  Patient was identified in the preoperative holding area and the left lower extremity was identified and marked by the orthopedic staff  The patient's questions final questions were addressed prior to surgery  The consents were visualized to ensure correct laterality and intended procedure and were deemed to be correct and replaced in the chart  The patient was taken back to the operating room where general anesthesia was administered by the anesthesia staff  The patient was then positioned on the fracture table for the intended procedure  Preoperative antibiotics were not utilized as the patient was on p o  and IV antibiotics around the clock for his underlying pneumonia  The patient was positioned on the fracture table with the left lower extremity in extension and placed in the traction boot  The nonoperative leg was placed into the well leg noyola in a flexed and abducted position with all bony prominences well padded  After the patient was positioned, reduction using the fracture table was performed  Fluoroscopy revealed that the patient is presenting with a comminuted subtrochanteric fracture of his left femur  Acceptable reduction of the comminuted subtrochanteric fracture in the AP and lateral planes was confirmed using fluoroscopy  The left lower extremity was prepped and draped in the normal sterile fashion  A timeout was performed  The borders of the proximal femur were delineated using a marking pen and the long axis of the femur was drawn with a marking pen extending proximal to the greater troch  Two finger breaths above the greater trochanter a longitudinal skin incision was made using a scalpel in line with the longitudinal axis of the femur  Deep dissection was carried down through the subcutaneous tissue using a Bovie to ensure hemostasis   The deep fascia was identified and incised using a Lars   The proximal fragment including the greater troch had rotation posteriorly and inferiorly and a Escoto was utilized to the proximal incision to restore appropriate alignment of the proximal femoral fragment containing the greater troch  The tip of the greater trochanter was palpated digitally and the starting guidewire was placed upon the tip of the greater trochanter  Using AP and lateral fluoroscopy the appropriate starting position was obtained and the guidewire was inserted down to the level of lesser troch  The opening reamer was then used and carried down through the intratrochanteric region down to the lesser troch while using a tissue protector  With indirect reduction techniques utilized the ball-tipped guidewire was passed down through the comminuted subtrochanteric fracture site down into the distal diaphysis of the left femur  AP and lateral images down at the knee revealed that the ball-tipped guidewire was in appropriate position  The ball-tipped guidewire was carried down to the superior pole of the patella and the length of the wire was measured  It was determined that a 400 mm nail would be appropriate in terms of length  While reduction was maintained via an assistant reaming was started with a 9 mm Reamer and carried up sequentially until a size 13-,1/2 Reamer was utilized which produced excellent chatter  This would accommodate a 12 mm nail  The 400 by 12 mm 130 degree Synthes TFNA nail was inserted down through the comminuted fracture site without significant displacement in the overall alignment of reduction  The nail was impacted down to its final position with the tip of the nail resting at the superior pole of the patella allowing appropriate placement of the helical blade proximally  The 956° helical blade targeting guide was assembled and attached to the insertion arm and the correct area for the distal incision was delineated   Sharp dissection was carried down through the skin utilizing a stab incision down to the lateral border of the femur  The targeting triple sleeve guide was inserted and its inner sleeve was carried down to the lateral border of the femur and a guidewire was inserted  The guidewire was inserted into the apex position of the femoral head  The guidewire was placed just below the subchondral bone in both the AP and lateral views in the apex position of the femoral head  Once in the correct position was the guidepin was measured for the length of the helical blade  The lateral cortex was then opened and the helical blade reamer was then set to 100 mm and inserted up through the targeting sleeve to prepare the femoral neck and head for the helical blade  This was done without penetration of the guidewire through the femoral chondral surface and into the acetabulum  The reamer was removed and the 110 mm x 11 mm helical blade was then inserted through the targeting guide and impacted up into its final position into the femoral neck and head  The helical blade was appropriately positioned on both AP and lateral views using fluoroscopy  The proximal locking screw was then tightened down to secure the position of the helical blade  The proximal set screw was statically locked to provide a fixed construct proximally in the presence of the subtrochanteric fracture  At this point the helical blade insertion device was removed and the 130° trochars were also removed  Preparations for the distal locking screw were made utilizing the perfect [place] technique distally at the knee  Due to the overall alignment and length of the comminuted sub trochanteric fracture reduction it was decided that the static distal locking screw hole would be utilized  Utilizing the perfect [place] technique with fluoroscopy the location of the distal static locking screw was localized, a stab incision was made over the lateral aspect of the distal femur and carried down carefully to bone    The hole for the oblique distal locking screw was drilled measured and the appropriate length screw was then inserted and appeared to be the appropriate length and produced excellent purchase  Final images of the trochanteric nail was obtained in both AP and lateral planes demonstrating an appropriate position  The 3 incisions were copiously irrigated with normal saline solution  The deep fascia of the proximal incision was closed using an 0 Vicryl in a running locked fashion  The proximal deep subcutaneous tissues were reapproximated using 0 Vicryl sutures  The deep subcuticular tissues were infiltrated with 0 5% Marcaine plain  The superficial subcutaneous tissues of all incisions were reapproximated using an undyed 2-0 Vicryl, and the skin edges were reapproximated using staples  The leg was cleaned and dried and sterile dressings were applied to both incisions  The drapes were removed and the patient was taken out of positioning on the fracture table  General anesthesia was reversed and the patient was awakened without complication  The patient was transferred back to the hospital bed and was transferred to PACU in stable condition         I was present for all critical portions of the procedure    Patient Disposition:  PACU       SIGNATURE: David Maxwell DO  DATE: June 17, 2022  TIME: 6:44 PM

## 2022-06-17 NOTE — ASSESSMENT & PLAN NOTE
Presented with symptoms of cough with yellow expectoration, shortness of breath, generalized malaise, poor appetite, nausea, loose bowel movement over last few days  CT revealed evidence of dense right upper lobe and patchy right lower lobe consolidation  SARS-CoV-2, influenza RSV PCR negative, blood culture, urine Legionella pneumococcal antigen negative  Remains afebrile,  Leukocytosis normalized  Procalcitonin downtrending  · Treated with IV ceftriaxone, azithromycin  Azithromycin was discontinued once Legionella antigen was negative  Transition to [facility] to complete 7 days    · Pulmonary evaluation appreciated  · Supplemental oxygen as needed  · Incentive spirometry  · Aspiration precaution, Speech and swallow evaluation-recommended regular diet with thin liquid  · Follow up repeat CT scan outpatient in 2 months for resolution and for pulmonary nodule in 1 year if persist

## 2022-06-17 NOTE — DISCHARGE INSTRUCTIONS
Discharge Instructions - Orthopedics  Carlos Matthews 80 y o  male MRN: [id number]  Unit/Bed#: [facility] OR MAIN    Weight Bearing Status:                                           WBAT LLE    DVT prophylaxis  Lovenox x 30 days    Pain:  Continue analgesics as directed    Dressing Instructions:   Please keep clean, dry and intact until follow up     Appt Instructions: If you do not have your appointment, please call the clinic at [phone] t  Otherwise followup as scheduled     Contact the office sooner if you experience any increased numbness/tingling in the extremities        Miscellaneous:

## 2022-06-17 NOTE — PROGRESS NOTES
[address] Internal Medicine Progress Note  Patient: Maggi Segal 80 y o  male   MRN: [id number]  PCP: Jimmy Flores MD  Unit/Bed#: 2 [facility] Encounter: [id number]  Date Of Visit: 06/17/22    Problem List:    Principal Problem:    Pneumonia of right lung due to infectious organism  Active Problems:    Closed fracture of shaft of left femur ([facility] [address] )    Sepsis ([facility] )    Valvular heart disease    Paroxysmal atrial flutter ([facility] )    Acute kidney injury ([facility] [address] )    Hyperlipidemia    S/P IVC filter    Hyponatremia    Recurrent major depressive disorder, in full remission ([facility] )      Assessment & Plan:    Sepsis ([facility] )  Assessment & Plan  As evidenced by fever, tachycardia, leukocytosis  Lactic acid 1 7  Secondary to pneumonia  Leukocytosis mildly improved  Tachycardia better  Hemodynamically stable  · Follow-up blood culture-negative so far  · Trend procalcitonin-downtrending  · Continue antibiotics as above  · Follow-up CBC  · IV fluids  · Monitor hemodynamics      Closed fracture of shaft of left femur (HCC)  Assessment & Plan  Status post mechanical fall, resulting in acute displaced left proximal femoral shaft fracture extended neck  Likely precipitated by acute illness  · Pain control  · Bedrest, Nonweightbearing  · DVT prophylaxis  · Orthopedic evaluation appreciated, plan for OR later today for long cephalomedullary nail  · Patient is at elevated risk for perioperative complication given acute infection/sepsis, advanced age, comorbidities and prior perioperative complications     · Continue to monitor postoperatively    * Pneumonia of right lung due to infectious organism  Assessment & Plan  Presented with symptoms of cough with yellow expectoration, shortness of breath, generalized malaise, poor appetite, nausea, loose bowel movement over last few days  CT revealed evidence of dense right upper lobe and patchy right lower lobe consolidation  SARS-CoV-2, influenza RSV PCR negative, blood culture, urine Legionella pneumococcal antigen negative  Febrile last night, remains afebrile today  Leukocytosis slightly better  Procalcitonin downtrending  · Sputum culture if able  · Continue IV ceftriaxone, Azithromycin  · Pulmonary evaluation  · Supplemental oxygen as needed  · Monitor clinically  · Aspiration precaution  · Speech and swallow evaluation    Acute kidney injury ([facility] [address] )  Assessment & Plan  Creatinine noted to be 1 48  UA moderate blood, 2+ protein, 0-1 WBCs  Likely secondary to poor p o  Intake, diarrhea, sepsis  Downtrending  · IV fluid  · Monitor intake output  · Hold ACE inhibitor  · Avoid hypotension, nephrotoxin  · Follow-up BMP    Paroxysmal atrial flutter (HCC)  Assessment & Plan  Patient with history of paroxysmal AFib/PSVT in setting of pulmonary embolism  Currently in sinus rhythm  · Continue metoprolol, will increase to 50 mg q 12 hours  · Telemetry  · IV fluids  · DVT prophylaxis  · Follow up Cardiology recommendation      Valvular heart disease  Assessment & Plan  2D echo-3/20-EF 65-70%, mild aortic sclerosis, mild-to-moderate aortic regurg, mild MR mild TR  · Appears stable  · Follow up repeat echo  · Cardiology evaluation for perioperative evaluation and management appreciated    Hyponatremia  Assessment & Plan  Improved with IV fluids    S/P IVC filter  Assessment & Plan  Patient with 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,  Subsequently patient had IVC filter conversion of vena Tech convertible filter, 08/2020    Hyperlipidemia  Assessment & Plan  On statin    Recurrent major depressive disorder, in full remission ([facility] [address] )  Assessment & Plan  On Celexa          VTE Pharmacologic Prophylaxis:   High Risk (Score >/= 5) - Pharmacological DVT Prophylaxis Ordered: enoxaparin (Lovenox)  Sequential Compression Devices Ordered  On hold for OR    Patient Centered Rounds: I performed bedside rounds with nursing staff today    Discussions with Specialists or Other Care Team Provider:  Cardiology, Pulmonary    Education and Discussions with Family / Patient: Will update family  Time Spent for Care: 45 minutes  More than 50% of total time spent on counseling and coordination of care as described above  Current Length of Stay: 1 day(s)  Current Patient Status: Inpatient   Certification Statement: The patient will continue to require additional inpatient hospital stay due to IV antibiotics, orthopedic intervention  Discharge Plan: Anticipate discharge in >72 hrs to rehab facility  Code Status: Level 1 - Full Code    Subjective:   Noted to be comfortably sitting up in bed, reports generalized weakness  Reports left hip pain   denies any chest pain shortness of breath or cough  Febrile last night but afebrile subsequently  Tachycardia and oxygenation is improving  Blood pressure stable      Objective:     Vitals:   Temp (24hrs), Av 3 °F (37 4 °C), Min:98 3 °F (36 8 °C), Max:101 5 °F (38 6 °C)    Temp:  [98 3 °F (36 8 °C)-101 5 °F (38 6 °C)] 99 4 °F (37 4 °C)  HR:  [] 116  Resp:  [17-19] 18  BP: (147-181)/() 168/86  SpO2:  [91 %-94 %] 91 % on 2 L  Body mass index is 27 17 kg/m²  Input and Output Summary (last 24 hours): Intake/Output Summary (Last 24 hours) at 2022 1512  Last data filed at 2022 0900  Gross per 24 hour   Intake 20 ml   Output --   Net 20 ml       Physical Exam:   Physical Exam  Constitutional:       General: He is not in acute distress  HENT:      Head: Normocephalic and atraumatic  Cardiovascular:      Rate and Rhythm: Normal rate  Pulmonary:      Effort: Pulmonary effort is normal  No respiratory distress  Breath sounds: No wheezing, rhonchi or rales  Comments: Diminished, right more than left  Chest:      Chest wall: No tenderness  Abdominal:      General: Bowel sounds are normal  There is no distension  Palpations: Abdomen is soft  Tenderness: There is no abdominal tenderness   There is no guarding or rebound  Musculoskeletal:         General: Deformity (Right lower extremity shorter and externally rotated) present  Skin:     General: Skin is warm and dry  Findings: No rash  Neurological:      Mental Status: He is alert  Mental status is at baseline  Cranial Nerves: No cranial nerve deficit           Additional Data:     Labs:  Results from last 7 days   Lab Units 06/17/22  0524 06/16/22  0752   WBC Thousand/uL 18 25* 19 28*   HEMOGLOBIN g/dL 12 1 12 6   HEMATOCRIT % 36 3* 37 5   PLATELETS Thousands/uL 249 234   BANDS PCT %  --  15*   LYMPHO PCT %  --  1*   MONO PCT %  --  8   EOS PCT %  --  1     Results from last 7 days   Lab Units 06/17/22  0524 06/16/22  0752   SODIUM mmol/L 138 134*   POTASSIUM mmol/L 4 4 4 2   CHLORIDE mmol/L 101 99*   CO2 mmol/L 29 26   BUN mg/dL 30* 28*   CREATININE mg/dL 1 35* 1 48*   ANION GAP mmol/L 8 9   CALCIUM mg/dL 8 4 8 6   ALBUMIN g/dL  --  2 5*   TOTAL BILIRUBIN mg/dL  --  0 60   ALK PHOS U/L  --  86   ALT U/L  --  24   AST U/L  --  43   GLUCOSE RANDOM mg/dL 117 134     Results from last 7 days   Lab Units 06/16/22  0752   INR  1 25*             Results from last 7 days   Lab Units 06/17/22  0524 06/16/22  0752   LACTIC ACID mmol/L  --  1 7   PROCALCITONIN ng/ml 3 60* 4 26*       Lines/Drains:  Invasive Devices  Report    Peripheral Intravenous Line  Duration           Peripheral IV 06/16/22 Right Antecubital 1 day          Line  Duration           Venous Sheath Other (Comment) Right Other (Comment) 665 days                  Telemetry:  Telemetry Orders (From admission, onward)             48 Hour Telemetry Monitoring  Continuous x 48 hours        References:    Telemetry Guidelines   Question:  Reason for 48 Hour Telemetry  Answer:  Arrhythmias Requiring Medical Therapy (eg  SVT, Vtach/fib, Bradycardia, Uncontrolled A-fib)                 Telemetry Reviewed: Normal Sinus Rhythm  Indication for Continued Telemetry Use: Arrthymias requiring medical therapy Imaging: Reviewed radiology reports from this admission including: chest CT scan and abdominal/pelvic CT    Recent Cultures (last 7 days):   Results from last 7 days   Lab Units 06/16/22  1621 06/16/22  0801 06/16/22  0752   BLOOD CULTURE   --  No Growth at 24 hrs  No Growth at 24 hrs  LEGIONELLA URINARY ANTIGEN  Negative  --   --        Last 24 Hours Medication List:   Current Facility-Administered Medications   Medication Dose Route Frequency Provider Last Rate    acetaminophen  650 mg Oral Q6H PRN Moe Escudero MD      atorvastatin  5 mg Oral Daily With Eyal Harp MD      azithromycin  500 mg Oral Q24H Moe Escudero MD      cefTRIAXone  1,000 mg Intravenous Q24H Moe Escudero MD 1,000 mg (06/17/22 0915)    citalopram  20 mg Oral Daily Moe Escudero MD      dextromethorphan-guaiFENesin  10 mL Oral Q4H PRN Moe Escudero MD      metoprolol tartrate  50 mg Oral Q12H Moe Escudero MD      multivitamin stress formula  1 tablet Oral Daily Moe Escudero MD      oxyCODONE  2 5 mg Oral Q8H PRN Moe Escudero MD      sodium chloride  75 mL/hr Intravenous Continuous Moe Escudero MD 75 mL/hr (06/16/22 3697)        Today, Patient Was Seen By: Moe Escudero MD    ** Please Note: "This note has been constructed using a voice recognition system  Therefore there may be syntax, spelling, and/or grammatical errors   Please call if you have any questions  "**

## 2022-06-17 NOTE — PROGRESS NOTES
Progress Note - Orthopedics   Jensen Cordova 80 y o  male MRN: [id number]  Unit/Bed#: OR POOL Encounter: [id number]    Assessment:  1) closed displaced subtrochanteric fracture left femur  2) left leg pain  3) pneumonia    Plan:  Patient seen examined at bedside today with family present  Treatment options were discussed with the patient  These options included non operative versus operative treatment  The risks and benefits of both were discussed  Patient elected to pursue surgical intervention in the form of closed versus open reduction and cephalomedullary nailing of his left femur fracture  The risks and benefits of undergoing this procedure were discussed at length and the patient was agreeable to accept these risks and proceed with surgery  The patient signed consents knees consents were placed into the chart  Patient has been cleared by the medical service and feel that his pneumonia has had ample time on IV antibiotics to proceed with surgery in the setting of his femur fracture and associated risks therein  NPO  Azithromycin and Rocephin around the clock  Bed rest  Analgesia p r n  Labs reviewed  Consents placed in the chart  Images reviewed  Medically and cardiovascularly cleared for the procedure today  Proceed to OR for closed versus open reduction cephalomedullary nailing left femur    Weight bearing:  Bed rest    VTE Pharmacologic Prophylaxis: Reason for no pharmacologic prophylaxis On hold for OR  VTE Mechanical Prophylaxis: sequential compression device    Subjective:  Patient seen examined at bedside  Patient states pain controlled  Family present  Vitals: Blood pressure 154/83, pulse 97, temperature 99 2 °F (37 3 °C), resp  rate 18, height 5' 9" (1 753 m), weight 83 5 kg (184 lb), SpO2 94 %  ,Body mass index is 27 17 kg/m²        Intake/Output Summary (Last 24 hours) at 6/17/2022 1621  Last data filed at 6/17/2022 0900  Gross per 24 hour   Intake 10 ml   Output --   Net 10 ml Invasive Devices  Report    Peripheral Intravenous Line  Duration           Peripheral IV 06/16/22 Right Antecubital 1 day          Line  Duration           Venous Sheath Other (Comment) Right Other (Comment) 665 days                Physical Exam: NAD  Ortho Exam: RLE:  With flexed and rotational deformity of the right leg,  Compartments soft, neurovascular intact, skin intact,    Lab, Imaging and other studies:  X-rays of the left hip and femur demonstrate a closed displaced subtrochanteric fracture of the left femur  No lytic or blastic lesions  Ricardo LOUISE    Division of Adult Reconstruction  Department of Orthopaedic Surgery  [facility]

## 2022-06-17 NOTE — ASSESSMENT & PLAN NOTE
As evidenced by fever, tachycardia, leukocytosis  Lactic acid 1 7  Secondary to pneumonia  Remains afebrile, leukocytosis improved    Hemodynamically stable  · Follow-up blood culture-negative so far  · Trend procalcitonin-downtrending  · Continue antibiotics as above  · Follow-up CBC  · Monitor hemodynamics

## 2022-06-17 NOTE — ANESTHESIA POSTPROCEDURE EVALUATION
Post-Op Assessment Note    CV Status:  Stable  Pain Score: 0    Pain management: adequate     Mental Status:  Awake   Hydration Status:  Stable   PONV Controlled:  None   Airway Patency:  Patent      Post Op Vitals Reviewed: Yes      Staff: Anesthesiologist         No complications documented      BP   167/67   Temp   98 4   Pulse  92   Resp      SpO2   92%

## 2022-06-17 NOTE — ASSESSMENT & PLAN NOTE
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   Deactivated  Continue DVT prophylaxis

## 2022-06-17 NOTE — ASSESSMENT & PLAN NOTE
Status post mechanical fall, resulting in acute displaced left proximal femoral shaft fracture extending to the neck  Fall likely precipitated by acute illness  Status post long TFN left femur, 6/17  Doing well postoperatively  Pain is controlled    Voiding well postoperatively  · Pain control-with Tylenol and oxycodone prior to procedure  · PT/OT-weight-bearing as tolerated  · DVT prophylaxis-with Lovenox 6 weeks postoperative  · Incentive spirometry  · Follow-up labs-CBC and BMP  · Follow-up with orthopedic after discharge in 2 weeks

## 2022-06-17 NOTE — CASE MANAGEMENT
Case Management Assessment & Discharge Planning Note    Patient name Jensen Cordova  Location [facility] [address] ie[address] LUX MRN [id number]  : 1940 Date 2022       Current Admission Date: 2022  Current Admission Diagnosis:Pneumonia of right lung due to infectious organism   Patient Active Problem List    Diagnosis Date Noted    Closed fracture of shaft of left femur ([facility] [address] ) 2022    Pneumonia of right lung due to infectious organism 2022    Sepsis ([address] ) 2022    Acute kidney injury ([address] ) 2022    Hyponatremia 2022    Medicare annual wellness visit, subsequent 2020    Paroxysmal atrial flutter ([facility] [address] ) 2020    Edema of left lower leg 2020    Urinary frequency 2020    Valvular heart disease 2019    S/P IVC filter 2019    SVT (supraventricular tachycardia) ([facility] [address] ) 2019    Impaired vision 2019    Conductive hearing loss, bilateral 2019    Hyperlipidemia     Mixed obsessional thoughts and acts 10/14/2019    Allergic rhinitis 10/14/2019    Hypercholesteremia 10/14/2019    Sensorineural hearing loss (SNHL) 10/14/2019    Nonrheumatic mitral valve regurgitation 10/14/2019    Aortic valve sclerosis 10/14/2019    Nonrheumatic aortic valve insufficiency 10/14/2019    Essential hypertension 10/14/2019    Recurrent major depressive disorder, in full remission ([facility] [address] ) 10/14/2019      LOS (days): 1  Geometric Mean LOS (GMLOS) (days): 9 60  Days to GMLOS:8 4     OBJECTIVE:    Risk of Unplanned Readmission Score: 13 04         Current admission status: Inpatient       Preferred Pharmacy:   [facility] [address] [facility] [address] [place] [address]  Phone: [phone] Fax: [phone]    Primary Care Provider:  Mariangel Bhakta MD    Primary Insurance: [facility]  Secondary Insurance:     ASSESSMENT:    Readmission Root Cause  30 Day Readmission: No    Patient Information  Admitted from[de-identified] Home  Mental Status: Alert  During Assessment patient was accompanied by: Not accompanied during assessment  Assessment information provided by[de-identified] Patient  Primary Caregiver: Self  Support Systems: Self, Spouse/significant other, Daughter  [place] of Residence: [address] [place] [address] [place] do you live in?: [address] [place] [address]  Homeless/housing insecurity resource given?: No  Living Arrangements: Lives w/ Spouse/significant other    Activities of Daily Living Prior to Admission  Functional Status: Independent  Completes ADLs independently?: Yes  Ambulates independently?: Yes  Does patient use assisted devices?: Yes  Assisted Devices (DME) used: Straight Cane  Does patient currently own DME?: Yes  What DME does the patient currently own?: Straight Cane  Does patient have a history of Outpatient Therapy (PT/OT)?: No  Does the patient have a history of Short-Term Rehab?: Yes (Unable to recall)  Does patient have a history of HHC?: No  Does patient currently have [address]?: No    Patient Information Continued  Income Source: Pension/FCI  Does patient have prescription coverage?: Yes  Within the past 12 months, you worried that your food would run out before you got the money to buy more : Never true  Within the past 12 months, the food you bought just didn't last and you didn't have money to get more : Never true  Food insecurity resource given?: No  Does patient receive dialysis treatments?: No  Does patient have a history of substance abuse?: No  Does patient have a history of Mental Health Diagnosis?: Yes (MDD)  Is patient receiving treatment for mental health?: No  Patient declined treatment information    Has patient received inpatient treatment related to mental health in the last 2 years?: No    PHQ 2/9 Screening   Reviewed PHQ 2/9 Depression Screening Score?: No    Means of Transportation  Means of Transport to Appts[de-identified] Drives Self  In the past 12 months, has lack of transportation kept you from medical appointments or from getting medications?: No  In the past 12 months, has lack of transportation kept you from meetings, work, or from getting things needed for daily living?: No  Was application for public transport provided?: No    DISCHARGE DETAILS:    Discharge planning discussed with[de-identified] Patient  Freedom of Choice: Yes     CM contacted family/caregiver?: No- see comments (Declined)  Were Treatment Team discharge recommendations reviewed with patient/caregiver?: Yes  Did patient/caregiver verbalize understanding of patient care needs?: Yes  Were patient/caregiver advised of the risks associated with not following Treatment Team discharge recommendations?: Yes    [facility]         Is the patient interested in [place] [address] at discharge?: No    DME Referral Provided  Referral made for DME?: No    Other Referral/Resources/Interventions Provided:  Interventions: Short Term Rehab  Referral Comments: CM s/w patient regarding DCP  CM informed patient that he will be evaluated by PT and OT for recommendations at D/C and that due to recent falls and gait issues may be recommended for STR  Patient agreeable to placement in [place] only, however requesting follow-up regarding available options  [place] referrals opened via [address], YELITZA to follow-up regarding recommendations and placement options      Would you like to participate in our [address] [place] [address] service program?  : No - Declined    Treatment Team Recommendation[facility] Term Rehab[facility] (PENDING EVALUATIONS)

## 2022-06-18 PROBLEM — E87.1 HYPONATREMIA: Status: RESOLVED | Noted: 2022-06-16 | Resolved: 2022-06-18

## 2022-06-18 LAB
ANION GAP SERPL CALCULATED.3IONS-SCNC: 9 MMOL/L (ref 4–13)
BUN SERPL-MCNC: 35 MG/DL (ref 5–25)
CALCIUM SERPL-MCNC: 8.3 MG/DL (ref 8.3–10.1)
CHLORIDE SERPL-SCNC: 105 MMOL/L (ref 100–108)
CO2 SERPL-SCNC: 25 MMOL/L (ref 21–32)
CREAT SERPL-MCNC: 0.93 MG/DL (ref 0.6–1.3)
ERYTHROCYTE [DISTWIDTH] IN BLOOD BY AUTOMATED COUNT: 14.2 % (ref 11.6–15.1)
GFR SERPL CREATININE-BSD FRML MDRD: 76 ML/MIN/1.73SQ M
GLUCOSE SERPL-MCNC: 134 MG/DL (ref 65–140)
HCT VFR BLD AUTO: 33.3 % (ref 36.5–49.3)
HGB BLD-MCNC: 11.1 G/DL (ref 12–17)
MAGNESIUM SERPL-MCNC: 2.2 MG/DL (ref 1.6–2.6)
MCH RBC QN AUTO: 31.7 PG (ref 26.8–34.3)
MCHC RBC AUTO-ENTMCNC: 33.3 G/DL (ref 31.4–37.4)
MCV RBC AUTO: 95 FL (ref 82–98)
NRBC BLD AUTO-RTO: 0 /100 WBCS
PLATELET # BLD AUTO: 252 THOUSANDS/UL (ref 149–390)
PMV BLD AUTO: 9.9 FL (ref 8.9–12.7)
POTASSIUM SERPL-SCNC: 4.6 MMOL/L (ref 3.5–5.3)
PROCALCITONIN SERPL-MCNC: 2.64 NG/ML
RBC # BLD AUTO: 3.5 MILLION/UL (ref 3.88–5.62)
SODIUM SERPL-SCNC: 139 MMOL/L (ref 136–145)
WBC # BLD AUTO: 11.74 THOUSAND/UL (ref 4.31–10.16)

## 2022-06-18 PROCEDURE — 97163 PT EVAL HIGH COMPLEX 45 MIN: CPT

## 2022-06-18 PROCEDURE — 99232 SBSQ HOSP IP/OBS MODERATE 35: CPT | Performed by: INTERNAL MEDICINE

## 2022-06-18 PROCEDURE — 84145 PROCALCITONIN (PCT): CPT | Performed by: INTERNAL MEDICINE

## 2022-06-18 PROCEDURE — 80048 BASIC METABOLIC PNL TOTAL CA: CPT | Performed by: INTERNAL MEDICINE

## 2022-06-18 PROCEDURE — 83735 ASSAY OF MAGNESIUM: CPT | Performed by: INTERNAL MEDICINE

## 2022-06-18 PROCEDURE — 97167 OT EVAL HIGH COMPLEX 60 MIN: CPT

## 2022-06-18 PROCEDURE — 99024 POSTOP FOLLOW-UP VISIT: CPT | Performed by: ORTHOPAEDIC SURGERY

## 2022-06-18 PROCEDURE — 85027 COMPLETE CBC AUTOMATED: CPT | Performed by: INTERNAL MEDICINE

## 2022-06-18 PROCEDURE — 97535 SELF CARE MNGMENT TRAINING: CPT

## 2022-06-18 PROCEDURE — 97116 GAIT TRAINING THERAPY: CPT

## 2022-06-18 RX ORDER — ENOXAPARIN SODIUM 100 MG/ML
40 INJECTION SUBCUTANEOUS
Status: DISCONTINUED | OUTPATIENT
Start: 2022-06-19 | End: 2022-06-20 | Stop reason: HOSPADM

## 2022-06-18 RX ORDER — LISINOPRIL 5 MG/1
5 TABLET ORAL DAILY
Status: DISCONTINUED | OUTPATIENT
Start: 2022-06-18 | End: 2022-06-20

## 2022-06-18 RX ADMIN — LISINOPRIL 5 MG: 5 TABLET ORAL at 11:50

## 2022-06-18 RX ADMIN — B-COMPLEX W/ C & FOLIC ACID TAB 1 TABLET: TAB at 08:26

## 2022-06-18 RX ADMIN — CITALOPRAM HYDROBROMIDE 20 MG: 20 TABLET ORAL at 08:26

## 2022-06-18 RX ADMIN — OXYCODONE HYDROCHLORIDE 2.5 MG: 5 TABLET ORAL at 10:01

## 2022-06-18 RX ADMIN — METOPROLOL TARTRATE 50 MG: 50 TABLET, FILM COATED ORAL at 11:50

## 2022-06-18 RX ADMIN — OXYCODONE HYDROCHLORIDE 2.5 MG: 5 TABLET ORAL at 20:11

## 2022-06-18 RX ADMIN — ATORVASTATIN CALCIUM 5 MG: 10 TABLET, FILM COATED ORAL at 16:58

## 2022-06-18 RX ADMIN — SODIUM CHLORIDE 75 ML/HR: 0.9 INJECTION, SOLUTION INTRAVENOUS at 08:27

## 2022-06-18 RX ADMIN — METOPROLOL TARTRATE 50 MG: 50 TABLET, FILM COATED ORAL at 00:03

## 2022-06-18 RX ADMIN — METOPROLOL TARTRATE 50 MG: 50 TABLET, FILM COATED ORAL at 23:45

## 2022-06-18 RX ADMIN — AZITHROMYCIN MONOHYDRATE 500 MG: 250 TABLET ORAL at 08:26

## 2022-06-18 RX ADMIN — ENOXAPARIN SODIUM 30 MG: 40 INJECTION SUBCUTANEOUS at 08:26

## 2022-06-18 RX ADMIN — ACETAMINOPHEN 650 MG: 325 TABLET ORAL at 23:35

## 2022-06-18 RX ADMIN — CEFTRIAXONE 1000 MG: 1 INJECTION, SOLUTION INTRAVENOUS at 08:26

## 2022-06-18 NOTE — PROGRESS NOTES
Progress Note - Cardiology   [facility]     Maggi Segal 80 y o  male MRN: [id number]  : 1940  Unit/Bed#: [address] [place] [address] Encounter: [id number]    ASSESSMENT:   Left proximal femur fracture secondary to mechanical fall    S/p Left long TFN  Right multilobar pneumonia   Hypertension       BP today is 157/70 mmHg with heart rate of 97 per minute  Dyslipidemia   History of PE and    PAF following right hip surgery   JIMY       RECOMMENDATIONS:   Resume ACE-inhibitor  Continue metoprolol  Postop care, PT and rehab              Subjective / Objective:     Review of Systems   Awake in no distress  Denies chest pain or dyspnea  Vitals: Blood pressure 157/70, pulse 97, temperature 97 9 °F (36 6 °C), resp  rate 18, height 5' 9" (1 753 m), weight 83 5 kg (184 lb), SpO2 93 %  Vitals:    22 0750 22 0600   Weight: 83 5 kg (184 lb) 83 5 kg (184 lb)     Body mass index is 27 17 kg/m²  BP Readings from Last 3 Encounters:   22 157/70   22 122/79   22 120/80     Orthostatic Blood Pressures    Flowsheet Row Most Recent Value   Blood Pressure 157/70 filed at 2022 0753   Patient Position - Orthostatic VS Lying filed at 2022 0301        I/O        0701   0700  0701   0700  0701   0700    P  O   400     I V  (mL/kg) 10 (0 1) 860 (10 3)     Total Intake(mL/kg) 10 (0 1) 1260 (15 1)     Urine (mL/kg/hr)  675 (0 3)     Blood  150     Total Output  825     Net +10 +435            Unmeasured Urine Occurrence 1 x 201 x         Invasive Devices  Report    Peripheral Intravenous Line  Duration           Peripheral IV 22 Right Hand <1 day          Line  Duration           Venous Sheath Other (Comment) Right Other (Comment) 666 days          Drain  Duration           Urethral Catheter Latex 16 Fr  <1 day                  Intake/Output Summary (Last 24 hours) at 2022 0947  Last data filed at 2022 0624  Gross per 24 hour   Intake 1250 ml   Output 825 ml   Net 425 ml         Physical Exam:     Neurologic:  Alert & oriented x 3,  no focal deficits noted   Constitutional:  Well developed, well nourished,  With no acute distress  Eyes:  PERRL, conjunctiva normal   HENT:  Atraumatic, external ears normal, nose normal,   NECK: Normal range of motion, no tenderness, neck is supple , No JVP  Respiratory:  Bilateral air entry, mostly clear to auscultation  Cardiovascular: Regular S1-S2 with a I/VI ejection systolic murmur  No pericardial rub or gallop audible  GI:  Soft, nondistended, audible bowel sounds, nontender, no hepatosplenomegaly appreciated  Musculoskeletal:  Tender left hip    Extremities:  No appreciable pitting edema   Distal pulses are present  Psychiatric:  Speech and behavior appropriate             Medications/ Allergies:     Current Facility-Administered Medications   Medication Dose Route Frequency Provider Last Rate    acetaminophen  650 mg Oral Q6H PRN Conception Reading, MD      atorvastatin  5 mg Oral Daily With Honor Rain, MD      azithromycin  500 mg Oral Q24H Conception Reading, MD      cefTRIAXone  1,000 mg Intravenous Q24H Conception Reading, MD 1,000 mg (06/18/22 0826)    citalopram  20 mg Oral Daily Conception Reading, MD      dextromethorphan-guaiFENesin  10 mL Oral Q4H PRN Conception Reading, MD      enoxaparin  30 mg Subcutaneous Q24H [address] Grant Meyer PA-C      lactated ringers  1,000 mL Intravenous Once PRN Aren Zapata PA-C      And    lactated ringers  1,000 mL Intravenous Once PRN Aren Zapata PA-C      metoprolol tartrate  50 mg Oral Q12H Conception Reading, MD      multivitamin stress formula  1 tablet Oral Daily Conception Reading, MD      oxyCODONE  2 5 mg Oral Q8H PRN Conception Reading, MD      sodium chloride  1,000 mL Intravenous Once PRN Aren Zapata PA-C      And    sodium chloride  1,000 mL Intravenous Once PRN Aren Zapata PA-C      sodium chloride  75 mL/hr Intravenous Continuous Dhyanesh Misha Rojas MD 75 mL/hr (06/18/22 0827)     acetaminophen, 650 mg, Q6H PRN  dextromethorphan-guaiFENesin, 10 mL, Q4H PRN  lactated ringers, 1,000 mL, Once PRN   And  lactated ringers, 1,000 mL, Once PRN  oxyCODONE, 2 5 mg, Q8H PRN  sodium chloride, 1,000 mL, Once PRN   And  sodium chloride, 1,000 mL, Once PRN      No Known Allergies        Labs:   Troponins:      CBC with diff:  Results from last 7 days   Lab Units 06/18/22  0531 06/17/22  0524 06/16/22  0752   WBC Thousand/uL 11 74* 18 25* 19 28*   HEMOGLOBIN g/dL 11 1* 12 1 12 6   HEMATOCRIT % 33 3* 36 3* 37 5   MCV fL 95 96 95   PLATELETS Thousands/uL 252 249 234   MCH pg 31 7 32 0 31 8   MCHC g/dL 33 3 33 3 33 6   RDW % 14 2 13 9 13 7   MPV fL 9 9 9 8 9 4   NRBC AUTO /100 WBCs 0  --   --        CMP:  Results from last 7 days   Lab Units 06/18/22  0531 06/17/22  0524 06/16/22  0752   SODIUM mmol/L 139 138 134*   POTASSIUM mmol/L 4 6 4 4 4 2   CHLORIDE mmol/L 105 101 99*   CO2 mmol/L 25 29 26   ANION GAP mmol/L 9 8 9   BUN mg/dL 35* 30* 28*   CREATININE mg/dL 0 93 1 35* 1 48*   CALCIUM mg/dL 8 3 8 4 8 6   AST U/L  --   --  43   ALT U/L  --   --  24   ALK PHOS U/L  --   --  86   TOTAL PROTEIN g/dL  --   --  6 7   ALBUMIN g/dL  --   --  2 5*   TOTAL BILIRUBIN mg/dL  --   --  0 60   EGFR ml/min/1 73sq m 76 48 43       Magnesium:  Results from last 7 days   Lab Units 06/18/22  0531 06/17/22  0524 06/16/22  0752   MAGNESIUM mg/dL 2 2 2 0 1 6     Coags:  Results from last 7 days   Lab Units 06/16/22  0752   PTT seconds 33   INR  1 25*     TSH:    No components found for: TSH3  Lipid Profile:    Hgb A1c:    NT-proBNP: No results for input(s): NTBNP in the last 72 hours  Imaging & Testing   I have personally reviewed pertinent reports  CT chest abdomen pelvis wo contrast    Result Date: 6/16/2022  Narrative: CT CHEST, ABDOMEN AND PELVIS WITHOUT IV CONTRAST INDICATION:   Chest trauma, blunt Fall/Trauma and Concern for Pneumonia  COMPARISON:  None   TECHNIQUE: CT examination of the chest, abdomen and pelvis was performed without intravenous contrast  This examination was performed without intravenous contrast in the context of the critical nationwide Omnipaque shortage  Axial, sagittal, and coronal 2D reformatted images were created from the source data and submitted for interpretation  Radiation dose length product (DLP) for this visit:  550 13 mGy-cm   This examination, like all CT scans performed in the [facility], was performed utilizing techniques to minimize radiation dose exposure, including the use of iterative  reconstruction and automated exposure control  Enteric contrast was administered  FINDINGS: CHEST LUNGS:  Dense airspace consolidation seen right upper lobe post posterior aspect  Mild patchy airspace consolidation seen in the right lower lobe A perifissural nodule seen in the left midlung measuring about 3 mm, image 69 series 400 PLEURA:  Small right effusion seen HEART/GREAT VESSELS: Heart is unremarkable for patient's age  Ascending aorta measures 3 9 cm Mild coronary artery calcification seen MEDIASTINUM AND JACQUIE:  Lower right paratracheal lymph nodes are seen measuring about 1 cm Subcarinal lymph node seen measuring about 8 mm Large hiatal hernia seen CHEST WALL AND LOWER NECK:  No significant axillary lymph node enlargement seen Evaluation of the hips is limited due to motion ABDOMEN LIVER/BILIARY TREE:  No focal liver lesion seen GALLBLADDER:  No calcified gallstones  No pericholecystic inflammatory change  SPLEEN:  Unremarkable  PANCREAS:  Unremarkable  ADRENAL GLANDS:  Unremarkable  Mild nodularity of the both adrenal glands KIDNEYS/URETERS:  No hydronephrosis Lobulated contour of the kidney STOMACH AND BOWEL:  Fluid noted within the rectum There are no findings of bowel obstruction Hiatal hernia seen APPENDIX:  No findings to suggest appendicitis   ABDOMINOPELVIC CAVITY:  No free fluid seen No fluid in the paracolic gutter, perisplenic region or in Morison's VESSELS:  An IVC filter is noted PELVIS REPRODUCTIVE ORGANS:  Prostate appears unremarkable No pelvic sidewall lymph node and oblique  URINARY BLADDER:  Mild thickening of the urinary bladder wall through its posterior and superior aspect seen ABDOMINAL WALL/INGUINAL REGIONS:  Unremarkable  OSSEOUS STRUCTURES:  Osteoporosis There is compression deformity of the multiple lumbar vertebrae with biconcavity of the L5, L4, L3, L2 vertebra There is severe Central depression of the L1 vertebra with 80-90% loss of vertebral height, chronic There is a fracture of the proximal shaft of the left femur with the medial displacement of the distal fragment with overlapping Evaluation of the pelvic ring is limited due to patient motion Intramedullary nail with the femoral neck screw noted with the healing intertrochanteric fracture of the right femur Lucency seen in the bilateral iliac bone, image 104 series 99     Impression: Dense consolidation right upper lobe patchy consolidation right lower lobe suggests pneumonia/aspiration Small right effusion seen There is a displaced fracture of the proximal shaft of the femur with medial displacement of the distal fragment along with overlapping Lucency seen in the both iliac bone, image 92 series 601 with no correlate on the radiograph may be due to motion or due to nondisplaced fracture  Correlate clinically Bony pelvic CT may be considered as clinically needed Intact acetabulum Osteoporosis with biconcavity of the multiple lumbar thoracic and lumbar vertebrae, chronic Mild thickening of the posterior aspect of the urinary bladder wall noted, correlate with the urinary evaluation and urology evaluation on nonemergent basis Incidentally detected the left perifissural nodule  Based on current Fleischner Society 2017 Guidelines on incidental pulmonary nodule, no routine follow-up is needed if the patient is low risk    If the patient is high risk, optional follow-up chest CT at 12 months can be considered  Consider follow-up at 2 months demonstrate resolution  I personally discussed this study with Renate Sharpe on 6/16/2022 at 9:21 AM  Workstation performed: [id number]     XR hip/pelv 2-3 vws left    Result Date: 6/16/2022  Narrative: LEFT HIP INDICATION:   Fracture  COMPARISON:  None VIEWS:  XR HIP/PELV 2-3 VWS LEFT  W PELVIS IF PERFORMED FINDINGS: There is an acute, displaced fracture of the proximal femoral shaft extending to the neck  No dislocation  Partially included right hip hardware with associated healed fracture deformity  No significant hip degenerative changes  No lytic or blastic osseous lesion  Soft tissues are unremarkable  The visualized lumbar spine is unremarkable  Impression: Acute, displaced left proximal femoral shaft fracture extending to the neck  No dislocation  Workstation performed: [id number]     XR femur 2 vw left    Result Date: 6/18/2022  Narrative: LEFT FEMUR INDICATION:   Post op eval  COMPARISON:  06/16/2022 VIEWS:  XR FEMUR 2 VW LEFT Images: 4 FINDINGS: ORIF subtrochanteric fracture  Orthopedic hardware in good position  Mild narrowing of the left hip joint  No lytic or blastic osseous lesion  Expected postoperative changes in the soft tissues  Impression: ORIF left subtrochanteric fracture  Workstation performed: [id number]     XR femur 2 views LEFT    Result Date: 6/16/2022  Narrative: LEFT FEMUR INDICATION:   Fracture  COMPARISON:  None VIEWS:  XR FEMUR 2 VW LEFT FINDINGS: There is an acute, displaced fracture of the proximal femoral shaft extending to the neck  No dislocation  No significant degenerative changes  No lytic or blastic osseous lesion  Soft tissues are unremarkable  Impression: Acute, displaced left proximal femoral shaft fracture extending to the neck  No dislocation   Workstation performed: [id number]     XR knee 1 or 2 vw left    Result Date: 6/16/2022  Narrative: LEFT KNEE INDICATION: Fracture  COMPARISON:  None VIEWS:  XR KNEE 1 OR 2 VW LEFT FINDINGS: There is no acute fracture or dislocation  There is no joint effusion  No significant degenerative changes  No lytic or blastic osseous lesion  Soft tissues are unremarkable  Impression: No acute osseous abnormality  Workstation performed: [id number]     CT head without contrast    Result Date: 6/16/2022  Narrative: CT BRAIN - WITHOUT CONTRAST INDICATION:   Head trauma, minor (Age >= 65y) Fall with head injury  COMPARISON: December 1, 2021 TECHNIQUE:  CT examination of the brain was performed  In addition to axial images, sagittal and coronal 2D reformatted images were created and submitted for interpretation  Radiation dose length product (DLP) for this visit:  896 44 mGy-cm   This examination, like all CT scans performed in the [facility], was performed utilizing techniques to minimize radiation dose exposure, including the use of iterative  reconstruction and automated exposure control  IMAGE QUALITY:  Diagnostic  FINDINGS: PARENCHYMA:  No intracranial mass, mass effect or midline shift  No CT signs of acute infarction  No acute parenchymal hemorrhage  Mild periventricular and white matter hypodensity seen related to chronic small vessel ischemic VENTRICLES AND EXTRA-AXIAL SPACES:  Normal for the patient's age  VISUALIZED ORBITS AND PARANASAL SINUSES:  Unremarkable  Mild chronic mucosal thickening right maxillary sinus CALVARIUM AND EXTRACRANIAL SOFT TISSUES:  Normal      Impression: No acute intracranial hemorrhage seen No mass effect or midline shift seen Mild periventricular and white matter hypodensity related to chronic small vessel Workstation performed: [id number]     Echo complete w/ contrast if indicated    Result Date: 6/17/2022  Narrative: Emaline Folds  Left Ventricle: Left ventricular cavity size is normal  Wall thickness is mildly increased  Systolic function is normal   Estimated ejection fraction is 60%   Although no diagnostic regional wall motion abnormality was identified, this possibility cannot be completely excluded on the basis of this study  Diastolic function is mildly abnormal, consistent with grade I (abnormal) relaxation    IVS: There is sigmoid appearance of the septum without any significant LVOT obstruction    Right Ventricle: Systolic function is normal    Aortic Valve: There is mild regurgitation    Tricuspid Valve: There is mild to moderate regurgitation  The right ventricular systolic pressure is mildly to moderately elevated at 51 mmHg    No significant changes noted compared to the prior study  Cardiac testing:   Results for orders placed during the hospital encounter of 19    Echo complete with contrast if indicated    Narrative  [address] [place] [address] 35  [place], [address]  [phone]    Transthoracic Echocardiogram  2D, M-mode, Doppler, and Color Doppler    Study date:  09-Dec-2019    Patient: Aditya Gore  MR number: [id number]  Account number: [de-identified]  : 1940  Age: 78 years  Gender: Male  Status: Inpatient  Location: Bedside  Height: 69 in  Weight: 188 lb  BP: 128/ 72 mmHg    Indications: Pulmonary embolism    Diagnoses: I27 82 - Chronic pulmonary embolism    Sonographer:  GIOVANNA Lance  Primary Physician: Joyce Odell MD  Referring Physician:  Francisco Chisholm MD  Group:  [id number]  Interpreting Physician:  Ana Silver MD    SUMMARY    LEFT VENTRICLE:  Systolic function was vigorous  Ejection fraction was estimated to be 68 %  There were no regional wall motion abnormalities  Wall thickness was mildly increased  LEFT ATRIUM:  The atrium was mildly dilated  MITRAL VALVE:  There was mild annular calcification  There was mild regurgitation      AORTIC VALVE:  There was mild left ventricular outflow obstruction at rest with peak velocity in LVOT of 1 5 M/S  There was mild to moderate regurgitation  TRICUSPID VALVE:  There was mild to moderate regurgitation  AORTA:  The root exhibited mild dilatation  HISTORY: PRIOR HISTORY: depression  Risk factors: hypertension and medication-treated hypercholesterolemia  PRIOR PROCEDURES: IVC filter placed 12/7/19  PROCEDURE: The procedure was performed at the bedside  This was a routine study  The transthoracic approach was used  The study included complete 2D imaging, M-mode, complete spectral Doppler, and color Doppler  The heart rate was 100 bpm,  at the start of the study  Image quality was adequate  LEFT VENTRICLE: Size was normal  Systolic function was vigorous  Ejection fraction was estimated to be 68 %  There were no regional wall motion abnormalities  Wall thickness was mildly increased  DOPPLER: There was an increased relative  contribution of atrial contraction to ventricular filling  The deceleration time of the early transmitral flow velocity was normal     RIGHT VENTRICLE: The size was normal  Systolic function was normal  Wall thickness was normal     LEFT ATRIUM: The atrium was mildly dilated  RIGHT ATRIUM: Size was normal     MITRAL VALVE: There was mild annular calcification  DOPPLER: There was no evidence for stenosis  There was mild regurgitation  AORTIC VALVE: There was mild left ventricular outflow obstruction at rest with peak velocity in LVOT of 1 5 M/S The valve was trileaflet  Leaflets exhibited mildly increased thickness  DOPPLER: There was no evidence for stenosis  There was  mild to moderate regurgitation  TRICUSPID VALVE: The valve structure was normal  There was normal leaflet separation  DOPPLER: The transtricuspid velocity was within the normal range  There was no evidence for stenosis  There was mild to moderate regurgitation  Pulmonary  artery systolic pressure was moderately to markedly increased  Estimated peak PA pressure was 58 mmHg      PULMONIC VALVE: Leaflets exhibited normal thickness, no calcification, and normal cuspal separation  DOPPLER: The transpulmonic velocity was within the normal range  There was no regurgitation  PERICARDIUM: There was no pericardial effusion  The pericardium was normal in appearance  AORTA: The root exhibited mild dilatation  SYSTEMIC VEINS: IVC: The inferior vena cava was normal in size and course  Respirophasic changes were normal     MEASUREMENT TABLES    2D MEASUREMENTS  Aorta   (Reference normals)  AAo AP diam   40 mm   (--)    SYSTEM MEASUREMENT TABLES    2D  %FS: 52 5 %  Ao Diam: 3 9 cm  EDV(Teich): 109 8 ml  EF(Teich): 83 5 %  ESV(Teich): 18 1 ml  IVSd: 1 2 cm  LA Area: 16 6 cm2  LVEDV MOD A4C: 75 7 ml  LVEF MOD A4C: 71 7 %  LVESV MOD A4C: 21 4 ml  LVIDd: 4 8 cm  LVIDs: 2 3 cm  LVLd A4C: 8 cm  LVLs A4C: 7 1 cm  LVOT Diam: 2 3 cm  LVPWd: 1 cm  RA Area: 15 2 cm2  RWT: 0 4  SV MOD A4C: 54 3 ml  SV(Teich): 91 7 ml    CW  AR Dec [place]: 3 2 m/s2  AR Dec Time: 1158 4 ms  AR PHT: 335 9 ms  AR Vmax: 3 6 m/s  AR maxP 8 mmHg  AV Env  Ti: 209 9 ms  AV VTI: 27 4 cm  AV Vmax: 1 8 m/s  AV Vmean: 1 3 m/s  AV maxP 6 mmHg  AV meanP 5 mmHg  TR Vmax: 3 6 m/s  TR maxP mmHg    MM  TAPSE: 2 2 cm    PW  REESE (VTI): 3 4 cm2  REESE Vmax: 3 4 cm2  E' Av 1 m/s  E' Lat: 0 1 m/s  E' Sept: 0 1 m/s  E/E' Av 5  E/E' Lat: 6 8  E/E' Sept: 8 5  LVOT Env  Ti: 240 5 ms  LVOT VTI: 22 7 cm  LVOT Vmax: 1 5 m/s  LVOT Vmean: 0 9 m/s  LVOT maxP 5 mmHg  LVOT meanP 1 mmHg  LVSV Dopp: 93 5 ml  MV A Scotty: 0 8 m/s  MV Dec [place]: 7 3 m/s2  MV DecT: 114 4 ms  MV E Scotty: 0 8 m/s  MV E/A Ratio: 1    [employer] Accredited Echocardiography Laboratory    Prepared and electronically signed by    Zonia Neumann MD  Signed 09-Dec-2019 14:02:59            Dr Christine James MD, [facility]      "This note has been constructed using a voice recognition system  Therefore there may be syntax, spelling, and/or grammatical errors   Please call if you have any questions  "

## 2022-06-18 NOTE — PROGRESS NOTES
Progress Note - Pulmonary   Nay Marie 80 y o  male MRN: [id number]  Unit/Bed#: 2 [place] [address] Encounter: [id number]    Assessment:  1  Right multilobar community aquired pneumonia  As evident per CT chest  Patient is on IV ceftriaxone and vancomycin day 3  WBC trending down 19->11  Procalcitonin trending down 4 26->2 64  Legionella antigen came back negative  - continue ceftriaxone d3/5  - stop azithromycin    2  Small right pleural effusion  No intervention needed at this time  Continue to monitor respiratory status    3  Acute displaced left proximal femoral shaft fracture secondary to fall at home  S/p TNF pod 1  On VTE prophylaxis with Lovenox   Ortho follows    4  History of  PE  S/p IVC filter placed on 12/07/2019    Plan:  - continue ceftriaxone IV  - stop azithromycin  - insentive spirometry      Subjective:   Patient reports he has moderate pain in the left hip  He denies shortness of breath, chest pain, cough, sputum production, palpitations, lightheadedness, N/V  Objective:     Vitals: Blood pressure 157/70, pulse 97, temperature 97 9 °F (36 6 °C), resp  rate 18, height 5' 9" (1 753 m), weight 83 5 kg (184 lb), SpO2 93 %  ,Body mass index is 27 17 kg/m²  Intake/Output Summary (Last 24 hours) at 6/18/2022 1131  Last data filed at 6/18/2022 9650  Gross per 24 hour   Intake 1250 ml   Output 825 ml   Net 425 ml       Invasive Devices  Report    Peripheral Intravenous Line  Duration           Peripheral IV 06/17/22 Right Hand <1 day          Line  Duration           Venous Sheath Other (Comment) Right Other (Comment) 666 days          Drain  Duration           Urethral Catheter Latex 16 Fr  <1 day                Physical Exam:  Physical Exam   Constitutional: He is oriented to person, place, and time  Non-toxic appearance  He appears ill  No distress  HENT:   Head: Normocephalic  Cardiovascular: Normal rate, regular rhythm and normal heart sounds  No murmur heard    Pulmonary/Chest: No respiratory distress  He has no wheezes  He has no rhonchi  He has no rales  Musculoskeletal:      Right lower leg: No edema  Left lower leg: No edema  Neurological: He is oriented to person, place, and time  Skin: No rash noted  Psychiatric: Mood normal        Labs: I have personally reviewed pertinent lab results    Imaging and other studies: I have personally reviewed pertinent films in PACS

## 2022-06-18 NOTE — PLAN OF CARE
Problem: Potential for Falls  Goal: Patient will remain free of falls  Description: INTERVENTIONS:  - Educate patient/family on patient safety including physical limitations  - Instruct patient to call for assistance with activity   - Consult OT/PT to assist with strengthening/mobility   - Keep Call bell within reach  - Keep bed low and locked with side rails adjusted as appropriate  - Keep care items and personal belongings within reach  - Initiate and maintain comfort rounds  - Make Fall Risk Sign visible to staff  - Offer Toileting every 2 Hours, in advance of need  - Initiate/Maintain bed alarm  - Obtain necessary fall risk management equipment: bed alarm, yellow socks  - Apply yellow socks and bracelet for high fall risk patients  - Consider moving patient to room near nurses station  Outcome: Progressing     Problem: MOBILITY - ADULT  Goal: Maintain or return to baseline ADL function  Description: INTERVENTIONS:  -  Assess patient's ability to carry out ADLs; assess patient's baseline for ADL function and identify physical deficits which impact ability to perform ADLs (bathing, care of mouth/teeth, toileting, grooming, dressing, etc )  - Assess/evaluate cause of self-care deficits   - Assess range of motion  - Assess patient's mobility; develop plan if impaired  - Assess patient's need for assistive devices and provide as appropriate  - Encourage maximum independence but intervene and supervise when necessary  - Involve family in performance of ADLs  - Assess for home care needs following discharge   - Consider OT consult to assist with ADL evaluation and planning for discharge  - Provide patient education as appropriate  Outcome: Progressing  Goal: Maintains/Returns to pre admission functional level  Description: INTERVENTIONS:  - Perform BMAT or MOVE assessment daily    - Set and communicate daily mobility goal to care team and patient/family/caregiver     - Collaborate with rehabilitation services on mobility goals if consulted  - Perform Range of Motion 3 times a day  - Reposition patient every 2 hours    - Dangle patient 3 times a day  - Stand patient 2 times a day  - Ambulate patient 3 times a day  - Out of bed to chair 3 times a day   - Out of bed for meals 3 times a day  - Out of bed for toileting  - Record patient progress and toleration of activity level   Outcome: Progressing     Problem: PAIN - ADULT  Goal: Verbalizes/displays adequate comfort level or baseline comfort level  Description: Interventions:  - Encourage patient to monitor pain and request assistance  - Assess pain using appropriate pain scale  - Administer analgesics based on type and severity of pain and evaluate response  - Implement non-pharmacological measures as appropriate and evaluate response  - Consider cultural and social influences on pain and pain management  - Notify physician/advanced practitioner if interventions unsuccessful or patient reports new pain  Outcome: Progressing     Problem: INFECTION - ADULT  Goal: Absence or prevention of progression during hospitalization  Description: INTERVENTIONS:  - Assess and monitor for signs and symptoms of infection  - Monitor lab/diagnostic results  - Monitor all insertion sites, i e  indwelling lines, tubes, and drains  - Monitor endotracheal if appropriate and nasal secretions for changes in amount and color  - [place] appropriate cooling/warming therapies per order  - Administer medications as ordered  - Instruct and encourage patient and family to use good hand hygiene technique  - Identify and instruct in appropriate isolation precautions for identified infection/condition  Outcome: Progressing  Goal: Absence of fever/infection during neutropenic period  Description: INTERVENTIONS:  - Monitor WBC    Outcome: Progressing     Problem: SAFETY ADULT  Goal: Patient will remain free of falls  Description: INTERVENTIONS:  - Educate patient/family on patient safety including physical limitations  - Instruct patient to call for assistance with activity   - Consult OT/PT to assist with strengthening/mobility   - Keep Call bell within reach  - Keep bed low and locked with side rails adjusted as appropriate  - Keep care items and personal belongings within reach  - Initiate and maintain comfort rounds  - Make Fall Risk Sign visible to staff  - Offer Toileting every 2 Hours, in advance of need  - Initiate/Maintain bed alarm  - Obtain necessary fall risk management equipment: bed alarm, yellow socks   - Apply yellow socks and bracelet for high fall risk patients  - Consider moving patient to room near nurses station  Outcome: Progressing  Goal: Maintain or return to baseline ADL function  Description: INTERVENTIONS:  -  Assess patient's ability to carry out ADLs; assess patient's baseline for ADL function and identify physical deficits which impact ability to perform ADLs (bathing, care of mouth/teeth, toileting, grooming, dressing, etc )  - Assess/evaluate cause of self-care deficits   - Assess range of motion  - Assess patient's mobility; develop plan if impaired  - Assess patient's need for assistive devices and provide as appropriate  - Encourage maximum independence but intervene and supervise when necessary  - Involve family in performance of ADLs  - Assess for home care needs following discharge   - Consider OT consult to assist with ADL evaluation and planning for discharge  - Provide patient education as appropriate  Outcome: Progressing  Goal: Maintains/Returns to pre admission functional level  Description: INTERVENTIONS:  - Perform BMAT or MOVE assessment daily    - Set and communicate daily mobility goal to care team and patient/family/caregiver  - Collaborate with rehabilitation services on mobility goals if consulted  - Perform Range of Motion 3 times a day  - Reposition patient every 2 hours    - Dangle patient 3 times a day  - Stand patient 3 times a day  - Ambulate patient 3 times a day  - Out of bed to chair 3 times a day   - Out of bed for meals 3 times a day  - Out of bed for toileting  - Record patient progress and toleration of activity level   Outcome: Progressing     Problem: DISCHARGE PLANNING  Goal: Discharge to home or other facility with appropriate resources  Description: INTERVENTIONS:  - Identify barriers to discharge w/patient and caregiver  - Arrange for needed discharge resources and transportation as appropriate  - Identify discharge learning needs (meds, wound care, etc )  - Arrange for interpretive services to assist at discharge as needed  - Refer to Case Management Department for coordinating discharge planning if the patient needs post-hospital services based on physician/advanced practitioner order or complex needs related to functional status, cognitive ability, or social support system  Outcome: Progressing     Problem: Knowledge Deficit  Goal: Patient/family/caregiver demonstrates understanding of disease process, treatment plan, medications, and discharge instructions  Description: Complete learning assessment and assess knowledge base    Interventions:  - Provide teaching at level of understanding  - Provide teaching via preferred learning methods  Outcome: Progressing     Problem: Prexisting or High Potential for Compromised Skin Integrity  Goal: Skin integrity is maintained or improved  Description: INTERVENTIONS:  - Identify patients at risk for skin breakdown  - Assess and monitor skin integrity  - Assess and monitor nutrition and hydration status  - Monitor labs   - Assess for incontinence   - Turn and reposition patient  - Assist with mobility/ambulation  - Relieve pressure over bony prominences  - Avoid friction and shearing  - Provide appropriate hygiene as needed including keeping skin clean and dry  - Evaluate need for skin moisturizer/barrier cream  - Collaborate with interdisciplinary team   - Patient/family teaching  - Consider wound care consult Outcome: Progressing

## 2022-06-18 NOTE — PROGRESS NOTES
Progress Note - Orthopedics   Titus Spears 80 y o  male MRN: [id number]  Unit/Bed#: 2 [place] [address]      Subjective:    82 y o male POD 1 left femur long TFN  No acute events, no complaints  Pt doing well  Pain controlled   Denies fevers chills, CP, SOB    Labs:  0   Lab Value Date/Time    HCT 33 3 (L) 06/18/2022 0531    HCT 36 3 (L) 06/17/2022 0524    HCT 37 5 06/16/2022 0752    HCT 43 3 12/21/2015 0720    HGB 11 1 (L) 06/18/2022 0531    HGB 12 1 06/17/2022 0524    HGB 12 6 06/16/2022 0752    HGB 14 1 12/21/2015 0720    INR 1 25 (H) 06/16/2022 0752    WBC 11 74 (H) 06/18/2022 0531    WBC 18 25 (H) 06/17/2022 0524    WBC 19 28 (H) 06/16/2022 0752    WBC 6 4 12/21/2015 0720       Meds:    Current Facility-Administered Medications:     acetaminophen (TYLENOL) tablet 650 mg, 650 mg, Oral, Q6H PRN, Bobby Flor MD, 650 mg at 06/17/22 1140    atorvastatin (LIPITOR) tablet 5 mg, 5 mg, Oral, Daily With Yessenia Overton MD, 5 mg at 06/16/22 1634    azithromycin (ZITHROMAX) tablet 500 mg, 500 mg, Oral, Q24H, Bobby Flor MD, 500 mg at 06/18/22 0826    cefTRIAXone (ROCEPHIN) IVPB (premix in dextrose) 1,000 mg 50 mL, 1,000 mg, Intravenous, Q24H, Bobby Flor MD, Last Rate: 100 mL/hr at 06/18/22 0826, 1,000 mg at 06/18/22 0826    citalopram (CeleXA) tablet 20 mg, 20 mg, Oral, Daily, Bobby Flor MD, 20 mg at 06/18/22 0826    dextromethorphan-guaiFENesin (ROBITUSSIN DM) oral syrup 10 mL, 10 mL, Oral, Q4H PRN, Bobby Flor MD    enoxaparin (LOVENOX) subcutaneous injection 30 mg, 30 mg, Subcutaneous, Q24H [address], Grant Meyer PA-C, 30 mg at 06/18/22 0826    lactated ringers bolus 1,000 mL, 1,000 mL, Intravenous, Once PRN **AND** lactated ringers bolus 1,000 mL, 1,000 mL, Intravenous, Once PRN, Hanna Betancur PA-C    metoprolol tartrate (LOPRESSOR) tablet 50 mg, 50 mg, Oral, Q12H, Bobby Flor MD, 50 mg at 06/18/22 0003    multivitamin stress formula tablet 1 tablet, 1 tablet, Oral, Daily, Heide Clarke MD, 1 tablet at 06/18/22 0826    oxyCODONE (ROXICODONE) IR tablet 2 5 mg, 2 5 mg, Oral, Q8H PRN, Heide Clarke MD, 2 5 mg at 06/17/22 0055    sodium chloride 0 9 % bolus 1,000 mL, 1,000 mL, Intravenous, Once PRN **AND** sodium chloride 0 9 % bolus 1,000 mL, 1,000 mL, Intravenous, Once PRN, Aixa Ghosh PA-C    sodium chloride 0 9 % infusion, 75 mL/hr, Intravenous, Continuous, Heide Clarke MD, Last Rate: 75 mL/hr at 06/18/22 0827, 75 mL/hr at 06/18/22 0827    Blood Culture:   Lab Results   Component Value Date    BLOODCX No Growth at 24 hrs  06/16/2022       Wound Culture:   No results found for: WOUNDCULT    Ins and Outs:  I/O last 24 hours: In: 0015 [P O :400; I V :860]  Out: 825 [Urine:675; Blood:150]          Physical:  Vitals:    06/18/22 0753   BP: 157/70   Pulse: 97   Resp:    Temp: 97 9 °F (36 6 °C)   SpO2: 93%     Musculoskeletal: left Lower Extremity    · Skin no ecchymosis or erythema  · Dressing c/d/i  · TTP about the hip/proximal thigh  Supple  No hematoma  · SILT s/s/sp/dp/t  +fhl/ehl, +ankle dorsi/plantar flexion    2+ DP pulse    Assessment:    82 y o male POD 1 Left long TFN for subtroch fracture     Plan:  · WBAT LLE  · PT/OT  · Pain control  · DVT ppx-Lovenox  · Dispo: Ortho will follow    Aixa Ghosh PA-C

## 2022-06-18 NOTE — PHYSICAL THERAPY NOTE
PHYSICAL THERAPY EVALUATION/TREATMENT     06/18/22 1050   Note Type   Note type Evaluation   Pain Assessment   Pain Assessment Tool 0-10   Pain Score 7   Pain Location/Orientation Orientation: Left; Location: Hip;Location: Leg  (RN aware; pt premedicated prior to PT)   Restrictions/Precautions   LLE Weight Bearing Per Order WBAT   Other Precautions Pain; Fall Risk;Bed Alarm; Chair Alarm   Home Living   Type of Home House  (3 [place])   Home Layout Two level; Able to live on main level with bedroom/bathroom;1/2 bath on main level  (Full bath on 1st floor as well)   [address] [facility]  (RW)   Prior Function   Level of [place] Independent with ADLs and functional mobility   Lives With Spouse   Receives Help From Family   ADL Assistance Independent   IADLs Independent   Falls in the last 6 months 1 to 4   Comments Pt ambulates with a cane prior to admission   General   Additional Pertinent History Pt is admitted with a fall sustaining a left femur fracture  Pt underwent an IM nail 06/17/2022   Family/Caregiver Present No   Cognition   Overall Cognitive Status WFL   Arousal/Participation Cooperative   Orientation Level Oriented X4   Following Commands Follows multistep commands with increased time or repetition   Subjective   Subjective It is just sore   RLE Assessment   RLE Assessment WFL  (Grossly 3 to 3+/5)   LLE Assessment   LLE Assessment WFL  (Hip and knee 2+ to 3-/5, ankle 3/5)   Bed Mobility   Supine to Sit 2  Maximal assistance   Additional items Assist x 2;Verbal cues; Increased time required   Transfers   Sit to Stand 2  Maximal assistance   Additional items Assist x 2;Verbal cues; Increased time required  (Bed raised)   Stand to Sit 3  Moderate assistance   Additional items Assist x 2;Verbal cues   Ambulation/Elevation   Gait pattern Antalgic;Decreased L stance; Forward Flexion; Short stride; Step to   Gait Assistance   (Mod/max assist)   Additional items Assist x 2;Verbal cues; Tactile cues   Assistive Device Rolling walker   Distance 5-6 steps bed to chair   Balance   Static Sitting Fair   Static Standing Fair -   Dynamic Standing Poor +   Ambulatory Poor +   Activity Tolerance   Activity Tolerance Patient limited by fatigue;Patient limited by pain;Treatment limited secondary to medical complications (Comment)  (Generalized weakness and deconditioning)   Assessment   Problem List Decreased strength;Decreased range of motion;Decreased endurance; Impaired balance;Decreased mobility; Decreased coordination;Decreased safety awareness;Pain   Assessment Patient seen for Physical Therapy evaluation  Patient admitted with Pneumonia of right lung due to infectious organism  Comorbidities affecting patient's physical performance include:  Recurrent major depressive disorder, hyperlipidemia, status post IVC filter, valvular heart disease, paroxysmal atrial flutter, sepsis, DVT, aortic valve sclerosis, edema of LLE, urinary frequency, impaired vision, hearing loss  Personal factors affecting patient at time of initial evaluation include: lives in two story house, ambulating with assistive device, stairs to enter home, inability to ambulate household distances, inability to navigate community distances, inability to navigate level surfaces without external assistance, inability to perform dynamic tasks in community and positive fall history  Prior to admission, patient was independent with functional mobility with cane, independent with ADLS, independent with IADLS, living with spouse in a two level home with 3 steps to enter, ambulating household distance, ambulating community distances and lives in a multilevel house but has 1st floor setup    Please find objective findings from Physical Therapy assessment regarding body systems outlined above with impairments and limitations including weakness, decreased ROM, impaired balance, decreased endurance, impaired coordination, gait deviations, pain, decreased activity tolerance, decreased functional mobility tolerance, decreased safety awareness, fall risk and SOB upon exertion  The Barthel Index was used as a functional outcome tool presenting with a score of Barthel Index Score: 25 today indicating marked limitations of functional mobility and ADLS  Patient's clinical presentation is currently unstable/unpredictable as seen in patient's presentation of vital sign response, changing level of pain, increased fall risk, new onset of impairment of functional mobility, decreased endurance and new onset of weakness  Pt would benefit from continued Physical Therapy treatment to address deficits as defined above and maximize level of functional mobility  As demonstrated by objective findings, the assigned level of complexity for this evaluation is high  The patient's AM-[facility] Basic Mobility Inpatient Short Form Raw Score is 7  A Raw score of less than or equal to 16 suggests the patient may benefit from discharge to post-acute rehabilitation services  Please also refer to the recommendation of the Physical Therapist for safe discharge planning  Goals   Patient Goals To be able to walk again   STG Expiration Date 06/25/22   Short Term Goal #1 Bed mobility-mod assist of 1; transfers-mod assist of 1   Short Term Goal #2 Pt will ambulate short, functional distances with a RW and mod assist of 1; balance with a RW will increase to at least F-for safe gait and mobility and to decrease fall risk   LTG Expiration Date 07/02/22   Long Term Goal #1 Bed mobility and transfers-Min assist; pt will ambulate with a RW functional household distances-Min assist   Long Term Goal #2 Balance with a RW will be at least F/F+ for safe gait mobility and to decrease fall risk; increased strength left hip and knee to at least 3/5   Plan   Treatment/Interventions ADL retraining;Functional transfer training;LE strengthening/ROM; Elevations; Therapeutic exercise; Endurance training;Patient/family training;Equipment eval/education; Bed mobility;Gait training; Compensatory technique education   PT Frequency Other (Comment)  (Daily)   Recommendation   PT Discharge Recommendation Post acute rehabilitation services   Additional Comments On room air, SaO2 decrease to 87-88% and pt mildly SOB with activity  On 3 L O2 SaO2 93-94% with activity  At rest on room air SaO2 is 91-92% and on 3 L is 94-95%  AM-[facility] Basic Mobility Inpatient   Turning in Bed Without Bedrails 2   Lying on Back to Sitting on Edge of Flat Bed 1   Moving Bed to Chair 1   Standing Up From Chair 1   Walk in Room 1   Climb 3-5 Stairs 1   Basic Mobility Inpatient Raw Score 7   Turning Head Towards Sound 3   Follow Simple Instructions 3   Low Function Basic Mobility Raw Score 13   Low Function Basic Mobility Standardized Score 20 14   Highest Level Of Mobility   -[facility] Goal 2: Bed activities/Dependent transfer   -HL Achieved 4: Move to chair/commode   Barthel Index   Feeding 5   Bathing 0   Grooming Score 0   Dressing Score 5   Bladder Score 0   Bowels Score 5   Toilet Use Score 5   Transfers (Bed/Chair) Score 5   Mobility (Level Surface) Score 0   Stairs Score 0   Barthel Index Score 25   Additional Treatment Session   Start Time 1050   End Time 1100   Treatment Assessment S:  My leg is just sore O:  Pt performed LLE exercises as noted below  A:  Pt with fairly good tolerance to LLE exercise, bed mobility, transfers and short distance ambulation with a RW  Pt is appropriate for post acute rehab services when medically stable for discharge to further increase his strength, active range of motion, gait and functional mobility prior to discharge home  Exercises   Quad Sets 10 reps; Left;Supine   Heelslides AAROM;10 reps; Left;Supine   Hip Abduction AAROM;10 reps; Left;Supine   Ankle Pumps 10 reps   Heel Cord Stretch Supine   End of Consult   Patient Position at End of Consult Bed/Chair alarm activated; Bedside chair; All needs within reach   End of Consult Comments Cold pack to left hip/thigh   Licensure   [facility] [employer] License Number  Claire Zapata PT [id number]     Portions of the documentation may have been created using voice recognition software  Occasional wrong word or sound alike substitutions may have occurred due to the inherent limitation of the voice recognition software  Read the chart carefully and recognize, using context, where substitutions have occurred

## 2022-06-18 NOTE — OCCUPATIONAL THERAPY NOTE
Occupational Therapy Evaluation/Treatment     06/18/22 1125   Note Type   Note type Evaluation   Restrictions/Precautions   LLE Weight Bearing Per Order WBAT   Other Precautions Chair Alarm; Bed Alarm;Multiple lines;O2;Fall Risk;Pain   Pain Assessment   Pain Assessment Tool 0-10   Pain Score 7   Pain Location/Orientation Location: Hip   Home Living   Type of [address] [place] [address] Two level; Able to live on main level with bedroom/bathroom   Bathroom Shower/Tub Tub/shower unit   Bathroom Toilet Standard   Bathroom Equipment Commode; Shower chair;Grab bars around toilet   Home Equipment Walker;Cane;Grab bars   Additional Comments Patient admitted s/p fall and sustained a closed comminuted subtrochanteric fracture Left femur  Pt underwent an IM nail 06/17/2022   Prior Function   Level of [place] Independent with ADLs and functional mobility   Lives With Spouse   Receives Help From Family   ADL Assistance Independent   IADLs Independent   Falls in the last 6 months 1 to 4   Comments Patient reports prior to admission pt was independent in all basic and IADLs and mobility using a cane  Pt is retired, lives with wife and (+) driving  Subjective   Subjective "it feels good to be up"   ADL   Eating Assistance 5  Supervision/Setup   Grooming Assistance 5  Supervision/Setup   Grooming Deficit Teeth care;Wash/dry face   UB Bathing Assistance 3  Moderate Assistance   LB Bathing Assistance 2  Maximal Assistance   UB Dressing Assistance 4  Minimal Assistance   LB Dressing Assistance 2  Maximal [address]  2  Maximal Assistance  (islas catheter)   Functional Assistance 3  Moderate Assistance   Additional Comments ADLs  limited by fatigue and pain   Bed Mobility   Rolling L 3  Moderate assistance   Additional items Assist x 1   Supine to Sit 2  Maximal assistance   Additional items Assist x 2; Increased time required; Bedrails   Transfers   Sit to Stand 2  Maximal assistance   Additional items Assist x 2;Increased time required;Verbal cues   Stand to Sit 3  Moderate assistance   Additional items Assist x 2;Armrests; Verbal cues; Increased time required   Stand pivot 2  Maximal assistance   Additional items Assist x 2; Increased time required;Verbal cues   Functional Mobility   Functional Mobility 2  Maximal assistance   Additional Comments ambulated a few steps for transfer to chair from bed, limited by fatigue and pain to L hip/leg  On 2-3 L O2 with activity  With exertion pt visibly SOB and with limited endurance needing frequent rest periods  Balance   Static Sitting Fair   Dynamic Sitting Fair -   Static Standing Fair -   Dynamic Standing Poor +   Activity Tolerance   Activity Tolerance Patient limited by fatigue;Patient limited by pain;Treatment limited secondary to medical complications (Comment)   RUE Assessment   RUE Assessment WFL   LUE Assessment   LUE Assessment WFL  (left handed)   Cognition   Overall Cognitive Status WFL   Arousal/Participation Alert; Responsive; Cooperative   Attention Within functional limits   Orientation Level Oriented X4   Memory Within functional limits   Following Commands Follows multistep commands with increased time or repetition   Assessment   Limitation Decreased ADL status; Decreased UE ROM; Decreased UE strength;Decreased endurance;Decreased high-level ADLs   Prognosis Good   Assessment Patient evaluated by Occupational Therapy  Patient admitted with Pneumonia of right lung due to infectious organism  The patients occupational profile, medical and therapy history includes a extensive additional review of physical, cognitive, or psychosocial history related to current functional performance  Comorbidities affecting functional mobility and ADLS include: Major depressive disorder, hyperlipidemia, s/p IVC filter, valvular heart disease,atriall flutter,  Sepsis, acute kidney injury, hyponatremia, DVT   Prior to admission, patient was independent with functional mobility with cane, independent with ADLS, independent with IADLS, living in a multi-level home, living with spouse in a two level home with 3 steps to enter, ambulating community distances and retired  The evaluation identifies the following performance deficits: weakness, decreased ROM, impaired balance, decreased endurance, decreased coordination, increased fall risk, decreased ADLS, decreased IADLS, pain, decreased activity tolerance, SOB upon exertion and decreased strength, that result in activity limitations and/or participation restrictions  This evaluation requires clinical decision making of high complexity, because the patient presents with comorbidites that affect occupational performance and required significant modification of tasks or assistance with consideration of multiple treatment options  The Barthel Index was used as a functional outcome tool presenting with a score of Barthel Index Score: 25, indicating marked limitations of functional mobility and ADLS  The patient's raw score on the -PAC Daily Activity inpatient short form is 16, standardized score is 35 96, less than 39 4  Patients at this level are likely to benefit from DC to post-acute rehabilitation services  Please refer to the recommendation of the Occupational Therapist for safe DC planning  Patient will benefit from skilled Occupational Therapy services to address above deficits and facilitate a safe return to prior level of function  Goals   Patient Goals go home   STG Time Frame   (1-7 days)   Short Term Goal  Goals established to promote Patient Goals: go home:  Patient will increase standing tolerance to 5 minutes during ADL task to decrease assistance level and decrease fall risk; Patient will increase bed mobility to mod assist in preparation for ADLS and transfers;  Patient will increase functional mobility to and from bathroom with rolling walker with mod assist to increase performance with ADLS and to use a toilet; Patient will tolerate 5 minutes of UE ROM/strengthening to increase general activity tolerance and performance in ADLS/IADLS; Patient will improve functional activity tolerance to 5 minutes of sustained functional tasks to increase participation in basic self-care and decrease assistance level;  Patient will be able to to verbalize understanding and perform energy conservation/proper body mechanics during ADLS and functional mobility at least 75% of the time with minimal cueing to decrease signs of fatigue and increase stamina to return to prior level of function; Patient will increase dynamic sitting balance to fair+ to improve the ability to sit at edge of bed or on a chair for ADLS;  Patient will increase static standing balance to fair to improve postural stability and decrease fall risk during standing ADLS and transfers  LTG Time Frame   (8-14 days)   Long Term Goal Patient will increase standing tolerance to 10 minutes during ADL task to decrease assistance level and decrease fall risk; Patient will increase bed mobility to min assist in preparation for ADLS and transfers;  Patient will increase functional mobility to and from bathroom with rolling walker with min assist to increase performance with ADLS and to use a toilet; Patient will tolerate 10 minutes of UE ROM/strengthening to increase general activity tolerance and performance in ADLS/IADLS; Patient will improve functional activity tolerance to 10 minutes of sustained functional tasks to increase participation in basic self-care and decrease assistance level;  Patient will be able to to verbalize understanding and perform energy conservation/proper body mechanics during ADLS and functional mobility at least 90% of the time with no cueing to decrease signs of fatigue and increase stamina to return to prior level of function; Patient will increase dynamic sitting balance to good to improve the ability to sit at edge of bed or on a chair for ADLS;  Patient will increase static/dynamic standing balance to fair to improve postural stability and decrease fall risk during standing ADLS and transfers  Pt will score >/= 22/24 on AM-PAC Daily Activity Inpatient scale to promote safe independence with ADLs and functional mobility; Pt will score >/= 55/100 on Barthel Index in order to decrease caregiver assistance needed and increase ability to perform ADLs and functional mobility  Functional Transfer Goals   Pt Will Perform All Functional Transfers   (STG minimal assist, LTG Supervision)   ADL Goals   Pt Will Perform Eating   (LTG independent)   Pt Will Perform Grooming   (LTG independent)   Pt Will Perform Bathing   (STG minimal assist, LTG Supervision)   Pt Will Perform UE Dressing   (STG supervision, LTG independent)   Pt Will Perform LE Dressing   (STG minimal assist, LTG Supervision)   Pt Will Perform Toileting   (STG minimal assist, LTG Supervision)   Plan   Treatment Interventions ADL retraining;Functional transfer training;UE strengthening/ROM; Endurance training;Patient/family training;Neuromuscular reeducation;Continued evaluation; Energy conservation; Activityengagement   Goal Expiration Date 07/02/22   OT Frequency 5x/wk   Additional Treatment Session   Start Time 1115   End Time 1125   Treatment Assessment S: "It feels  good to be up" O: Pt needed max A for supine to sit, and sit to stand transition max A  X 2 with verbal and visual cues for sequencing and safety  Pt needed frequent rest periods d/t fatigue, pain and SOB and for management of both LEs  Pt completed few steps while using RW to transfer from bed -recliner chair  Teeth brushing and washing face while seated on chair  A: Patient with some pain to L  Leg but able to complete all ADLs and functional mobility with moderate difficulty, at end of session patient seated on chair with needs within reach  P: Continue OT services as per POC     Recommendation   OT Discharge Recommendation Post acute rehabilitation services   AM-PAC Daily Activity Inpatient   Lower Body Dressing 2   Bathing 2   Toileting 2   Upper Body Dressing 3   Grooming 3   Eating 4   Daily Activity Raw Score 16   Daily Activity Standardized Score (Calc for Raw Score >=11) 35 96   AM-PAC Applied Cognition Inpatient   Following a Speech/Presentation 4   Understanding Ordinary Conversation 4   Taking Medications 4   Remembering Where Things Are Placed or Put Away 4   Remembering List of 4-5 Errands 4   Taking Care of Complicated Tasks 4   Applied Cognition Raw Score 24   Applied Cognition Standardized Score 62 21   Barthel Index   Feeding 5   Bathing 0   Grooming Score 0   Dressing Score 5   Bladder Score 0   Bowels Score 10   Toilet Use Score 0   Transfers (Bed/Chair) Score 5   Mobility (Level Surface) Score 0   Stairs Score 0   Barthel Index Score 25   Licensure   [place] License Number  Forestine Oppenheim, MS, OTR/L [id number]

## 2022-06-18 NOTE — PROGRESS NOTES
[facility] Internal Medicine Progress Note  Patient: Gabriel Gunter 80 y o  male   MRN: [id number]  PCP: Mekhi Glass MD  Unit/Bed#: [address] [place] [address] Encounter: [id number]  Date Of Visit: 06/18/22    Problem List:    Principal Problem:    Pneumonia of right lung due to infectious organism  Active Problems:    Closed fracture of shaft of left femur ([facility] )    Sepsis ([place] [address] )    Valvular heart disease    Paroxysmal atrial flutter ([facility] )    Acute kidney injury ([facility] )    Hyperlipidemia    S/P IVC filter    Recurrent major depressive disorder, in full remission ([place] [address] )    DVT (deep venous thrombosis) ([place] [address] )      Assessment & Plan:    Sepsis ([place] [address] )  Assessment & Plan  As evidenced by fever, tachycardia, leukocytosis  Lactic acid 1 7  Secondary to pneumonia  Remains afebrile, leukocytosis improving  Hemodynamically stable  · Follow-up blood culture-negative so far  · Trend procalcitonin-downtrending  · Continue antibiotics as above  · Follow-up CBC  · Monitor hemodynamics      Closed fracture of shaft of left femur (HCC)  Assessment & Plan  Status post mechanical fall, resulting in acute displaced left proximal femoral shaft fracture extending to the neck  Fall likely precipitated by acute illness  Status post long TFN left femur, 6/17  Doing well postoperatively  · Pain control  · PT/OT  · DVT prophylaxis  · Incentive spirometry  · Follow-up labs  · Discontinue IV fluids and Baez   · Continue to monitor postoperatively    * Pneumonia of right lung due to infectious organism  Assessment & Plan  Presented with symptoms of cough with yellow expectoration, shortness of breath, generalized malaise, poor appetite, nausea, loose bowel movement over last few days  CT revealed evidence of dense right upper lobe and patchy right lower lobe consolidation  SARS-CoV-2, influenza RSV PCR negative, blood culture, urine Legionella pneumococcal antigen negative  Febrile last night, remains afebrile today  Leukocytosis downtrending  Procalcitonin downtrending  · Sputum culture if able  · Continue IV ceftriaxone, will discontinue Azithromycin  · Pulmonary evaluation appreciated  · Supplemental oxygen as needed  · Incentive spirometry  · Monitor clinically  · Aspiration precaution  · Speech and swallow evaluation    Acute kidney injury ([facility] )  Assessment & Plan  Creatinine noted to be 1 48  UA moderate blood, 2+ protein, 0-1 WBCs  Likely secondary to poor p o  Intake, diarrhea, sepsis  Improved to baseline  · Monitor of IV fluid  · Monitor intake output  · Will resume ACE inhibitor  · Avoid hypotension, nephrotoxin  · Follow-up BMP    Paroxysmal atrial flutter (HCC)  Assessment & Plan  Patient with history of paroxysmal AFib/PSVT in setting of pulmonary embolism  Currently in sinus rhythm  · Continue metoprolol 50 mg q 12 hours  · DVT prophylaxis  · Follow up Cardiology recommendation      Valvular heart disease  Assessment & Plan  2D echo-3/20-EF 65-70%, mild aortic sclerosis, mild-to-moderate aortic regurg, mild MR mild TR  Repeat echo EF 91%, grade 1 diastolic dysfunction  · Appears stable  · Continue metoprolol, resume lisinopril  · Cardiology input appreciated     S/P IVC filter  Assessment & Plan  Patient with 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,  Subsequently patient had IVC filter conversion of vena Tech convertible filter, 08/2020    Hyperlipidemia  Assessment & Plan  On statin    Hyponatremia-resolved as of 6/18/2022  Assessment & Plan  Improved with IV fluids    Recurrent major depressive disorder, in full remission ([facility] )  Assessment & Plan  On Celexa          VTE Pharmacologic Prophylaxis:   High Risk (Score >/= 5) - Pharmacological DVT Prophylaxis Ordered: enoxaparin (Lovenox)  Sequential Compression Devices Ordered  Patient Centered Rounds: I performed bedside rounds with nursing staff today    Discussions with Specialists or Other Care Team Provider:  Yes, Cardiology    Education and Discussions with Family / Patient: Updated  (wife) at bedside  Time Spent for Care: 45 minutes  More than 50% of total time spent on counseling and coordination of care as described above  Current Length of Stay: 2 day(s)  Current Patient Status: Inpatient   Certification Statement: The patient will continue to require additional inpatient hospital stay due to IV antibiotics, orthopedic intervention  Discharge Plan: Anticipate discharge in 48-72 hrs to rehab facility  Code Status: Level 1 - Full Code    Subjective:   Sitting up in bed  Denies any chest pain shortness of breath  Mild postoperative pain    Remains afebrile, vitals are stable  Underwent long TFN left femur, tolerated procedure well    Objective:     Vitals:   Temp (24hrs), Av °F (36 7 °C), Min:97 4 °F (36 3 °C), Max:99 2 °F (37 3 °C)    Temp:  [97 4 °F (36 3 °C)-99 2 °F (37 3 °C)] 97 9 °F (36 6 °C)  HR:  [] 105  Resp:  [16-24] 18  BP: (145-173)/(67-90) 159/90  SpO2:  [90 %-97 %] 93 % on 2 L  Body mass index is 27 17 kg/m²  Input and Output Summary (last 24 hours): Intake/Output Summary (Last 24 hours) at 2022 1249  Last data filed at 2022 1698  Gross per 24 hour   Intake 1250 ml   Output 825 ml   Net 425 ml       Physical Exam:   Physical Exam  Constitutional:       General: He is not in acute distress  HENT:      Head: Normocephalic and atraumatic  Cardiovascular:      Rate and Rhythm: Normal rate  Pulmonary:      Effort: Pulmonary effort is normal  No respiratory distress  Breath sounds: Rhonchi present  No wheezing or rales  Comments: Diminished, right more than left  Chest:      Chest wall: No tenderness  Abdominal:      General: Bowel sounds are normal  There is no distension  Palpations: Abdomen is soft  Tenderness: There is no abdominal tenderness  There is no guarding or rebound     Genitourinary:     Comments: Baez catheter in place  Musculoskeletal:      Right lower leg: No edema  Left lower leg: No edema  Comments: Dressing C/D/I   Skin:     General: Skin is warm and dry  Findings: No rash  Neurological:      Mental Status: He is alert  Mental status is at baseline  Cranial Nerves: No cranial nerve deficit  Additional Data:     Labs:  Results from last 7 days   Lab Units 06/18/22 0531 06/17/22  0524 06/16/22  0752   WBC Thousand/uL 11 74*   < > 19 28*   HEMOGLOBIN g/dL 11 1*   < > 12 6   HEMATOCRIT % 33 3*   < > 37 5   PLATELETS Thousands/uL 252   < > 234   BANDS PCT %  --   --  15*   LYMPHO PCT %  --   --  1*   MONO PCT %  --   --  8   EOS PCT %  --   --  1    < > = values in this interval not displayed  Results from last 7 days   Lab Units 06/18/22 0531 06/17/22 0524 06/16/22  0752   SODIUM mmol/L 139   < > 134*   POTASSIUM mmol/L 4 6   < > 4 2   CHLORIDE mmol/L 105   < > 99*   CO2 mmol/L 25   < > 26   BUN mg/dL 35*   < > 28*   CREATININE mg/dL 0 93   < > 1 48*   ANION GAP mmol/L 9   < > 9   CALCIUM mg/dL 8 3   < > 8 6   ALBUMIN g/dL  --   --  2 5*   TOTAL BILIRUBIN mg/dL  --   --  0 60   ALK PHOS U/L  --   --  86   ALT U/L  --   --  24   AST U/L  --   --  43   GLUCOSE RANDOM mg/dL 134   < > 134    < > = values in this interval not displayed       Results from last 7 days   Lab Units 06/16/22  0752   INR  1 25*             Results from last 7 days   Lab Units 06/18/22 0531 06/17/22 0524 06/16/22  0752   LACTIC ACID mmol/L  --   --  1 7   PROCALCITONIN ng/ml 2 64* 3 60* 4 26*       Lines/Drains:  Invasive Devices  Report    Peripheral Intravenous Line  Duration           Peripheral IV 06/17/22 Right Hand <1 day          Line  Duration           Venous Sheath Other (Comment) Right Other (Comment) 666 days          Drain  Duration           Urethral Catheter Latex 16 Fr  <1 day                  Telemetry:  Telemetry Orders (From admission, onward)             48 Hour Telemetry Monitoring  Continuous x 48 hours        References: Telemetry Guidelines   Question:  Reason for 48 Hour Telemetry  Answer:  Arrhythmias Requiring Medical Therapy (eg  SVT, Vtach/fib, Bradycardia, Uncontrolled A-fib)                 Telemetry Reviewed: Normal Sinus Rhythm  Indication for Continued Telemetry Use: No indication for continued use  Will discontinue  Imaging: Reviewed radiology reports from this admission including: chest CT scan and abdominal/pelvic CT    Recent Cultures (last 7 days):   Results from last 7 days   Lab Units 06/16/22  1621 06/16/22  0801 06/16/22  0752   BLOOD CULTURE   --  No Growth at 48 hrs  No Growth at 48 hrs     LEGIONELLA URINARY ANTIGEN  Negative  --   --        Last 24 Hours Medication List:   Current Facility-Administered Medications   Medication Dose Route Frequency Provider Last Rate    acetaminophen  650 mg Oral Q6H PRN Zoran Dennis MD      atorvastatin  5 mg Oral Daily With Benson Bartholomew MD      azithromycin  500 mg Oral Q24H Zoran Dennis MD      cefTRIAXone  1,000 mg Intravenous Q24H Zoran Dennis MD 1,000 mg (06/18/22 0826)    citalopram  20 mg Oral Daily Zoran Dennis MD      dextromethorphan-guaiFENesin  10 mL Oral Q4H PRN Zoran Dennis MD      enoxaparin  30 mg Subcutaneous Q24H [facility] Grant Meyer PA-C      lactated ringers  1,000 mL Intravenous Once PRN Dinone Meter, PA-C      And    lactated ringers  1,000 mL Intravenous Once PRN Dionne Meter, PA-C      lisinopril  5 mg Oral Daily Sammy Sanchez MD      metoprolol tartrate  50 mg Oral Q12H Zoran Dennis MD      multivitamin stress formula  1 tablet Oral Daily Zoran Dennis MD      oxyCODONE  2 5 mg Oral Q8H PRN Zoran Dennis MD      sodium chloride  1,000 mL Intravenous Once PRN Dionne Meter, PA-C      And    sodium chloride  1,000 mL Intravenous Once PRN Dionne Meter, PA-C      sodium chloride  75 mL/hr Intravenous Continuous Zoran Dennis MD 75 mL/hr (06/18/22 0827)        Today, Patient Was Seen By: Gregorio Frias MD    ** Please Note: "This note has been constructed using a voice recognition system  Therefore there may be syntax, spelling, and/or grammatical errors   Please call if you have any questions  "**

## 2022-06-19 LAB
ANION GAP SERPL CALCULATED.3IONS-SCNC: 6 MMOL/L (ref 4–13)
ATRIAL RATE: 116 BPM
BUN SERPL-MCNC: 38 MG/DL (ref 5–25)
CALCIUM SERPL-MCNC: 8.5 MG/DL (ref 8.3–10.1)
CHLORIDE SERPL-SCNC: 105 MMOL/L (ref 100–108)
CO2 SERPL-SCNC: 28 MMOL/L (ref 21–32)
CREAT SERPL-MCNC: 0.89 MG/DL (ref 0.6–1.3)
ERYTHROCYTE [DISTWIDTH] IN BLOOD BY AUTOMATED COUNT: 14.3 % (ref 11.6–15.1)
GFR SERPL CREATININE-BSD FRML MDRD: 79 ML/MIN/1.73SQ M
GLUCOSE SERPL-MCNC: 109 MG/DL (ref 65–140)
HCT VFR BLD AUTO: 33.8 % (ref 36.5–49.3)
HGB BLD-MCNC: 11 G/DL (ref 12–17)
MCH RBC QN AUTO: 31.5 PG (ref 26.8–34.3)
MCHC RBC AUTO-ENTMCNC: 32.5 G/DL (ref 31.4–37.4)
MCV RBC AUTO: 97 FL (ref 82–98)
P AXIS: 57 DEGREES
PLATELET # BLD AUTO: 311 THOUSANDS/UL (ref 149–390)
PMV BLD AUTO: 9.6 FL (ref 8.9–12.7)
POTASSIUM SERPL-SCNC: 4.5 MMOL/L (ref 3.5–5.3)
PR INTERVAL: 152 MS
PROCALCITONIN SERPL-MCNC: 1.77 NG/ML
QRS AXIS: 9 DEGREES
QRSD INTERVAL: 94 MS
QT INTERVAL: 312 MS
QTC INTERVAL: 433 MS
RBC # BLD AUTO: 3.49 MILLION/UL (ref 3.88–5.62)
SODIUM SERPL-SCNC: 139 MMOL/L (ref 136–145)
T WAVE AXIS: 40 DEGREES
VENTRICULAR RATE: 116 BPM
WBC # BLD AUTO: 10.81 THOUSAND/UL (ref 4.31–10.16)

## 2022-06-19 PROCEDURE — 92610 EVALUATE SWALLOWING FUNCTION: CPT

## 2022-06-19 PROCEDURE — 93010 ELECTROCARDIOGRAM REPORT: CPT | Performed by: INTERNAL MEDICINE

## 2022-06-19 PROCEDURE — 97530 THERAPEUTIC ACTIVITIES: CPT

## 2022-06-19 PROCEDURE — 99232 SBSQ HOSP IP/OBS MODERATE 35: CPT | Performed by: INTERNAL MEDICINE

## 2022-06-19 PROCEDURE — 80048 BASIC METABOLIC PNL TOTAL CA: CPT | Performed by: INTERNAL MEDICINE

## 2022-06-19 PROCEDURE — 84145 PROCALCITONIN (PCT): CPT | Performed by: INTERNAL MEDICINE

## 2022-06-19 PROCEDURE — 97110 THERAPEUTIC EXERCISES: CPT

## 2022-06-19 PROCEDURE — 99024 POSTOP FOLLOW-UP VISIT: CPT | Performed by: PHYSICIAN ASSISTANT

## 2022-06-19 PROCEDURE — 85027 COMPLETE CBC AUTOMATED: CPT | Performed by: INTERNAL MEDICINE

## 2022-06-19 RX ADMIN — ATORVASTATIN CALCIUM 5 MG: 10 TABLET, FILM COATED ORAL at 16:33

## 2022-06-19 RX ADMIN — CEFTRIAXONE 1000 MG: 1 INJECTION, SOLUTION INTRAVENOUS at 08:15

## 2022-06-19 RX ADMIN — OXYCODONE HYDROCHLORIDE 2.5 MG: 5 TABLET ORAL at 10:02

## 2022-06-19 RX ADMIN — B-COMPLEX W/ C & FOLIC ACID TAB 1 TABLET: TAB at 08:12

## 2022-06-19 RX ADMIN — METOPROLOL TARTRATE 50 MG: 50 TABLET, FILM COATED ORAL at 12:15

## 2022-06-19 RX ADMIN — ENOXAPARIN SODIUM 40 MG: 40 INJECTION SUBCUTANEOUS at 08:12

## 2022-06-19 RX ADMIN — ACETAMINOPHEN 650 MG: 325 TABLET ORAL at 16:41

## 2022-06-19 RX ADMIN — OXYCODONE HYDROCHLORIDE 2.5 MG: 5 TABLET ORAL at 18:00

## 2022-06-19 RX ADMIN — CITALOPRAM HYDROBROMIDE 20 MG: 20 TABLET ORAL at 08:12

## 2022-06-19 RX ADMIN — LISINOPRIL 5 MG: 5 TABLET ORAL at 08:12

## 2022-06-19 NOTE — SPEECH THERAPY NOTE
Speech-Language Pathology Bedside Swallow Evaluation        Patient Name: Rm Carter    YESMT'W Date: 6/19/2022     Problem List  Principal Problem:    Pneumonia of right lung due to infectious organism  Active Problems:    Recurrent major depressive disorder, in full remission ([facility] )    Hyperlipidemia    S/P IVC filter    Valvular heart disease    Paroxysmal atrial flutter (HCC)    Closed fracture of shaft of left femur (HCC)    Sepsis ([facility] )    Ac[facility]    DVT (deep venous thrombosis) ([facility] )         Past Medical History  Past Medical History:   Diagnosis Date    Hyperlipidemia        Past Surgical History  Past Surgical History:   Procedure Laterality Date    IR IVC FILTER PLACEMENT OPTIONAL/TEMPORARY  12/7/2019    IR IVC FILTER REMOVAL  8/21/2020    MS OPEN RX FEMUR FX+INTRAMED BÁRBARA Right 12/4/2019    Procedure: INSERTION NAIL IM FEMUR ANTEGRADE (TROCHANTERIC); Surgeon: Shelby Sewell DO;  Location: [facility];  Service: Orthopedics       Summary    Oral and pharyngeal stages of swallowing appeared WNL, no overt s/s aspiration  Recommendations:   Diet: regular diet and thin liquids   Meds: whole with liquid   Frequent Oral care: 2x/day  Other Recommendations/ considerations: No follow up tx needed  Current Medical Status  Pt is a 80 y o  male who presented to [facility]  with pneumonia of R lung  Pt presents with with a fall  Patient reported that he has not been feeling well since Monday with malaise, generalized weakness and poor appetite  Patient reported that he has been having cough recently which he thought was secondary to allergies but it continued to get worse, he also reported mild shortness of breath  Due to weakness patient had a minor fall on Monday without any injury but today morning he sustained another fall  He may have felt a little lightheaded    Patient denied any loss of consciousness, he reported having left hip pain and was brought to ED   On presentation patient was noted to be febrile with T-max of 101 2° F with tachycardia, saturating 89% on room air  CT head did not reveal any acute abnormality  Left hip x-ray revealed acute displaced left proximal femoral shaft fracture extending to neck  CT of the chest abdomen pelvis revealed dense consolidation of the right upper lobe and patchy consolidation of the right lower lobe  Patient was subsequently admitted  Patient currently is comfortably lying in bed reports mild left hip discomfort  Denies any chest pain or shortness of breath        Past medical history:   Please see H&P for details    Special Studies:  CT-chest/abd/pelvis: 6/16/22 LUNGS:  Dense airspace consolidation seen right upper lobe post posterior aspect  Mild patchy airspace consolidation seen in the right lower lobe    Social/Education/Vocational Hx:  Pt lives with family    Swallow Information   Current Risks for Dysphagia & Aspiration: recent surgery  Current Symptoms/Concerns: current pneumonia  Current Diet: regular diet and thin liquids   Baseline Diet: regular diet and thin liquids  Takes pills- whole w/ water     Baseline Assessment   Behavior/Cognition: alert  Speech/Language Status: able to participate in conversation and able to follow commands  Patient Positioning: upright in bed     Swallow Mechanism Exam   Facial: symmetrical  Labial: WFL  Lingual: WFL  Velum: unable to visualize  Mandible: adequate ROM  Dentition: adequate  Vocal quality:clear/adequate   Volitional Cough: strong/productive   Respiratory: NC    Consistencies Assessed and Performance   Consistencies Administered: thin liquids, nectar thick, puree and hard solids    Oral Stage: pt was able to self feed; pt took single and successive sips of NTL and thin liquids by cup and straw  Mastication, manipulation were WNL  Good oral control w/ liquids; timely transfers of all consistencies    No oral residue note d     Pharyngeal Stage: swallow initiation was timely w/ complete laryngeal excursion; no coughing, throat clearing noted, O2 sats were stable at 93%         Esophageal Concerns: none reported      Results Reviewed with: patient   Dysphagia Goals: none at this time      April Adam Rodriguez [facility]-SLP  Speech Pathologist  [place] license # [id number] [facility] [id number]  [place] license # [id number]  Available via [employer]

## 2022-06-19 NOTE — UTILIZATION REVIEW
Continued Stay Review    Date:06-18-22                          Current Patient Class:inpatient  Current Level of Care: medical    HPI:82 y o  male initially admitted on 06-16-22     Assessment/Plan: ) POD #1 s/p left long TFN Dsg c/d/i, thigh soft, +DF/PF/EHL, +L3-S1 SILT, DP2+, foot warm moderate pain noted but under control continue to treat for right multilobe PNA with IV cefTRAIXone day 3/ 5  O2 as needed  Weaned from 3 L to 2 L NC @ 28-32 % patient remains ill appearing    PT/OT eval : Post acute rehabilitation services needed upon d/c   Vital Signs:     18/22 23:30:58 97 4 °F (36 3 °C) Abnormal  79 18 144/74 97 95 % -- -- -- -- -- --   06/18/22 2257 -- 81 -- -- -- 92 % 28 -- 2 L/min Nasal cannula -- --   06/18/22 19:53:29 98 °F (36 7 °C) 90 18 151/82 105 95 % 28 -- 2 L/min Nasal cannula -- --   06/18/22 19:50:53 -- 93 -- 151/82 105 94 % -- -- -- -- -- --   06/18/22 16:03:30 98 °F (36 7 °C) -- -- 125/80 95 -- -- -- -- -- -- --   06/18/22 11:49:21 -- 105 -- 159/90 113 93 % -- -- -- -- -- --   06/18/22 07:53:37 97 9 °F (36 6 °C) 97 -- 157/70 99 93 % 32 -- 3 L/min Nasal cannula -- --   06/18/22 03:01:59 97 4 °F (36 3 °C) Abnormal  82 18 145/73 97 94 % 32 -- 3 L/min Nasal cannula -- Lying   06/18/22 00:00:19 97 8 °F (36 6 °C) 101 16 163/81 108 92 % 32 -- 3 L/min Nasal cannula --                    Pertinent Labs/Diagnostic Results:   Results from last 7 days   Lab Units 06/16/22  0752   SARS-COV-2  Negative     Results from last 7 days   Lab Units 06/19/22  0611 06/18/22  0531 06/17/22  0524 06/16/22  0752   WBC Thousand/uL 10 81* 11 74* 18 25* 19 28*   HEMOGLOBIN g/dL 11 0* 11 1* 12 1 12 6   HEMATOCRIT % 33 8* 33 3* 36 3* 37 5   PLATELETS Thousands/uL 311 252 249 234   BANDS PCT %  --   --   --  15*         Results from last 7 days   Lab Units 06/19/22  0611 06/18/22  0531 06/17/22  0524 06/16/22  0752   SODIUM mmol/L 139 139 138 134*   POTASSIUM mmol/L 4 5 4 6 4 4 4 2   CHLORIDE mmol/L 105 105 101 99*   CO2 mmol/L 28 25 29 26   ANION GAP mmol/L 6 9 8 9   BUN mg/dL 38* 35* 30* 28*   CREATININE mg/dL 0 89 0 93 1 35* 1 48*   EGFR ml/min/1 73sq m 79 76 48 43   CALCIUM mg/dL 8 5 8 3 8 4 8 6   MAGNESIUM mg/dL  --  2 2 2 0 1 6   PHOSPHORUS mg/dL  --   --  2 8 1 8*     Results from last 7 days   Lab Units 06/16/22  0752   AST U/L 43   ALT U/L 24   ALK PHOS U/L 86   TOTAL PROTEIN g/dL 6 7   ALBUMIN g/dL 2 5*   TOTAL BILIRUBIN mg/dL 0 60         Results from last 7 days   Lab Units 06/19/22  0611 06/18/22  0531 06/17/22  0524 06/16/22  0752   GLUCOSE RANDOM mg/dL 109 134 117 134       Results from last 7 days   Lab Units 06/16/22  0752   PROTIME seconds 15 4*   INR  1 25*   PTT seconds 33         Results from last 7 days   Lab Units 06/19/22  0611 06/18/22  0531 06/17/22  0524 06/16/22  0752   PROCALCITONIN ng/ml 1 77* 2 64* 3 60* 4 26*     Results from last 7 days   Lab Units 06/16/22  0752   LACTIC ACID mmol/L 1 7     Results from last 7 days   Lab Units 06/16/22  1621   CLARITY UA  Slightly Cloudy   COLOR UA  Yellow   SPEC GRAV UA  >=1 030   PH UA  6 0   GLUCOSE UA mg/dl Negative   KETONES UA mg/dl Negative   BLOOD UA  Moderate*   PROTEIN UA mg/dl 100 (2+)*   NITRITE UA  Negative   BILIRUBIN UA  Negative   UROBILINOGEN UA E U /dl 0 2   LEUKOCYTES UA  Negative   WBC UA /hpf 0-1*   RBC UA /hpf 2-4   BACTERIA UA /hpf Occasional   EPITHELIAL CELLS WET PREP /hpf Occasional     Results from last 7 days   Lab Units 06/16/22  1621 06/16/22  0752   STREP PNEUMONIAE ANTIGEN, URINE  Negative  --    LEGIONELLA URINARY ANTIGEN  Negative  --    INFLUENZA A PCR   --  Negative   INFLUENZA B PCR   --  Negative   RSV PCR   --  Negative     Results from last 7 days   Lab Units 06/16/22  0801 06/16/22  0752   BLOOD CULTURE  No Growth at 48 hrs  No Growth at 48 hrs                   Medications:   Scheduled Medications:  atorvastatin, 5 mg, Oral, Daily With Dinner  cefTRIAXone, 1,000 mg, Intravenous, Q24H  citalopram, 20 mg, Oral, Daily  enoxaparin, 40 mg, Subcutaneous, Q24H JEN  lisinopril, 5 mg, Oral, Daily  metoprolol tartrate, 50 mg, Oral, Q12H  multivitamin stress formula, 1 tablet, Oral, Daily      Continuous IV Infusions:     PRN Meds:  acetaminophen, 650 mg, Oral, Q6H PRN  dextromethorphan-guaiFENesin, 10 mL, Oral, Q4H PRN  oxyCODONE, 2 5 mg, Oral, Q8H PRN        Discharge Plan: TDB    Network Utilization Review Department  ATTENTION: Please call with any questions or concerns to [phone] and carefully listen to the prompts so that you are directed to the right person  All voicemails are confidential   Francisco Javier Lawton all requests for admission clinical reviews, approved or denied determinations and any other requests to dedicated fax number below belonging to the campus where the patient is receiving treatment   List of dedicated fax numbers for the Facilities:  [address] DENIALS (Administrative/Medical Necessity) [phone]   [address] (Maternity/NICU/Pediatrics) [phone]   [address] [place] [address]  [address] [place] [address] [address] [place] [address] Stephany Avalos [address] [facility] [place] [address] [phone]

## 2022-06-19 NOTE — UTILIZATION REVIEW
Continued Stay Review    Date: 06-19-22                          Current Patient Class: inpatient  Current Level of Care: medical    HPI:82 y o  male initially admitted on 06-16-22     Assessment/Plan: patient s/p fall with left hip pain and productive cough  POD 2 Left long TFN for subtroch fracture PT/OT recommending Post acute rehabilitation services continue IV antibx day 4/5  for secondary right multilobar PNA still requiring O2 @ 2 L  NC lung sounds diminished dressing C/D/I    Vital Signs:     Date/Time Temp Pulse Resp BP MAP (mmHg) SpO2 Calculated FIO2 (%) - Nasal Cannula O2 Flow Rate (L/min) Nasal Cannula O2 Flow Rate (L/min) O2 Device Cardiac (WDL) Patient Position - Orthostatic VS   06/19/22 1300 98 °F (36 7 °C) 93 18 140/76 -- 93 % 28 -- 2 L/min Nasal cannula -- Lying   06/19/22 07:41:16 97 8 °F (36 6 °C) 79 18 148/76 100 93 % 28 -- 2 L/min Nasal cannula -- Lying   06/18/22 23:30:58 97 4 °F (36 3 °C) Abnormal  79 18 144/74 97 95 % -- -- -- -- -- --   06/18/22 2257 -- 81 -- -- -- 92 % 28 -- 2 L/min Nasal cannula -- --   06/18/22 19:53:29 98 °F (36 7 °C) 90 18 151/82 105 95 % 28 -- 2 L/min Nasal cannula -- --   06/18/22 19:50:53 -- 93 -- 151/82 105 94 % -- -- -- -- -- --   06/18/22 16:03:30 98 °F (36 7 °C) -- -- 125/80 95 -- -- -- -- -- -- --   06/18/22 11:49:21 -- 105 -- 159/90 113 93 % -- -- -- -- -- --   06/18/22 07:53:37 97 9 °F (36 6 °C) 97 -- 157/70 99 93 % 32 -- 3 L/min Nasal cannula -- --       Pertinent Labs/Diagnostic Results:   Results from last 7 days   Lab Units 06/16/22  2350   SARS-COV-2  Negative     Results from last 7 days   Lab Units 06/19/22  0611 06/18/22  0531 06/17/22  0524 06/16/22  0752   WBC Thousand/uL 10 81* 11 74* 18 25* 19 28*   HEMOGLOBIN g/dL 11 0* 11 1* 12 1 12 6   HEMATOCRIT % 33 8* 33 3* 36 3* 37 5   PLATELETS Thousands/uL 311 252 249 234   BANDS PCT %  --   --   --  15*         Results from last 7 days   Lab Units 06/19/22  0611 06/18/22  0531 06/17/22  0524 06/16/22  0752   SODIUM mmol/L 139 139 138 134*   POTASSIUM mmol/L 4 5 4 6 4 4 4 2   CHLORIDE mmol/L 105 105 101 99*   CO2 mmol/L 28 25 29 26   ANION GAP mmol/L 6 9 8 9   BUN mg/dL 38* 35* 30* 28*   CREATININE mg/dL 0 89 0 93 1 35* 1 48*   EGFR ml/min/1 73sq m 79 76 48 43   CALCIUM mg/dL 8 5 8 3 8 4 8 6   MAGNESIUM mg/dL  --  2 2 2 0 1 6   PHOSPHORUS mg/dL  --   --  2 8 1 8*     Results from last 7 days   Lab Units 06/16/22  0752   AST U/L 43   ALT U/L 24   ALK PHOS U/L 86   TOTAL PROTEIN g/dL 6 7   ALBUMIN g/dL 2 5*   TOTAL BILIRUBIN mg/dL 0 60         Results from last 7 days   Lab Units 06/19/22  0611 06/18/22  0531 06/17/22  0524 06/16/22  0752   GLUCOSE RANDOM mg/dL 109 134 117 134     Results from last 7 days   Lab Units 06/16/22  0752   PROTIME seconds 15 4*   INR  1 25*   PTT seconds 33         Results from last 7 days   Lab Units 06/19/22  0611 06/18/22  0531 06/17/22  0524 06/16/22  0752   PROCALCITONIN ng/ml 1 77* 2 64* 3 60* 4 26*     Results from last 7 days   Lab Units 06/16/22  0752   LACTIC ACID mmol/L 1 7       Results from last 7 days   Lab Units 06/16/22  1621   CLARITY UA  Slightly Cloudy   COLOR UA  Yellow   SPEC GRAV UA  >=1 030   PH UA  6 0   GLUCOSE UA mg/dl Negative   KETONES UA mg/dl Negative   BLOOD UA  Moderate*   PROTEIN UA mg/dl 100 (2+)*   NITRITE UA  Negative   BILIRUBIN UA  Negative   UROBILINOGEN UA E U /dl 0 2   LEUKOCYTES UA  Negative   WBC UA /hpf 0-1*   RBC UA /hpf 2-4   BACTERIA UA /hpf Occasional   EPITHELIAL CELLS WET PREP /hpf Occasional     Results from last 7 days   Lab Units 06/16/22  1621 06/16/22  0752   STREP PNEUMONIAE ANTIGEN, URINE  Negative  --    LEGIONELLA URINARY ANTIGEN  Negative  --    INFLUENZA A PCR   --  Negative   INFLUENZA B PCR   --  Negative   RSV PCR   --  Negative     Results from last 7 days   Lab Units 06/16/22  0801 06/16/22  0752   BLOOD CULTURE  No Growth at 72 hrs  No Growth at 72 hrs                   Medications:   Scheduled Medications:  atorvastatin, 5 mg, Oral, Daily With Dinner  cefTRIAXone, 1,000 mg, Intravenous, Q24H  citalopram, 20 mg, Oral, Daily  enoxaparin, 40 mg, Subcutaneous, Q24H JEN  lisinopril, 5 mg, Oral, Daily  metoprolol tartrate, 50 mg, Oral, Q12H  multivitamin stress formula, 1 tablet, Oral, Daily      Continuous IV Infusions:     PRN Meds:  acetaminophen, 650 mg, Oral, Q6H PRN  dextromethorphan-guaiFENesin, 10 mL, Oral, Q4H PRN  oxyCODONE, 2 5 mg, Oral, Q8H PRN        Discharge Plan: D    Network Utilization Review Department  ATTENTION: Please call with any questions or concerns to [phone] and carefully listen to the prompts so that you are directed to the right person  All voicemails are confidential   Finis Feeling all requests for admission clinical reviews, approved or denied determinations and any other requests to dedicated fax number below belonging to the campus where the patient is receiving treatment   List of dedicated fax numbers for the Facilities:  [address] DENIALS (Administrative/Medical Necessity) [phone]   [address] (Maternity/NICU/Pediatrics) [phone]   [address]  [address] [facility] [address] Medical [place] [address] [address] [place] [address] Stephany Avalos [address] [facility] [phone]

## 2022-06-19 NOTE — PLAN OF CARE
Problem: Potential for Falls  Goal: Patient will remain free of falls  Description: INTERVENTIONS:  - Educate patient/family on patient safety including physical limitations  - Instruct patient to call for assistance with activity   - Consult OT/PT to assist with strengthening/mobility   - Keep Call bell within reach  - Keep bed low and locked with side rails adjusted as appropriate  - Keep care items and personal belongings within reach  - Initiate and maintain comfort rounds  - Make Fall Risk Sign visible to staff  - Offer Toileting every 2 Hours, in advance of need  - Initiate/Maintain bed/ chair alarm- Apply yellow socks and bracelet for high fall risk patients  - Consider moving patient to room near nurses station  Outcome: Progressing     Problem: MOBILITY - ADULT  Goal: Maintain or return to baseline ADL function  Description: INTERVENTIONS:  - Educate patient/family on patient safety including physical limitations  - Instruct patient to call for assistance with activity   - Consult OT/PT to assist with strengthening/mobility   - Keep Call bell within reach  - Keep bed low and locked with side rails adjusted as appropriate  - Keep care items and personal belongings within reach  - Initiate and maintain comfort rounds  - Make Fall Risk Sign visible to staff  - Offer Toileting every 2  Hours, in advance of need  - Initiate/Maintain  bed/chair alarm    - Apply yellow socks and bracelet for high fall risk patients  - Consider moving patient to room near nurses station  Outcome: Progressing  Goal: Maintains/Returns to pre admission functional level  Description: INTERVENTIONS:  - Perform BMAT or MOVE assessment daily    - Set and communicate daily mobility goal to care team and patient/family/caregiver     - Collaborate with rehabilitation services on mobility goals if consulted  - Out of bed for toileting  - Record patient progress and toleration of activity level   Outcome: Progressing     Problem: PAIN - ADULT  Goal: Verbalizes/displays adequate comfort level or baseline comfort level  Description: Interventions:  - Encourage patient to monitor pain and request assistance  - Assess pain using appropriate pain scale  - Administer analgesics based on type and severity of pain and evaluate response  - Implement non-pharmacological measures as appropriate and evaluate response  - Consider cultural and social influences on pain and pain management  - Notify physician/advanced practitioner if interventions unsuccessful or patient reports new pain  Outcome: Progressing     Problem: INFECTION - ADULT  Goal: Absence or prevention of progression during hospitalization  Description: INTERVENTIONS:  - Assess and monitor for signs and symptoms of infection  - Monitor lab/diagnostic results  - Monitor all insertion sites, i e  indwelling lines, tubes, and drains  - Monitor endotracheal if appropriate and nasal secretions for changes in amount and color    - Administer medications as ordered  - Instruct and encourage patient and family to use good hand hygiene technique    Outcome: Progressing  Goal: Absence of fever/infection during neutropenic period  Description: INTERVENTIONS:  - Monitor WBC    Outcome: Progressing

## 2022-06-19 NOTE — PHYSICAL THERAPY NOTE
PT TREATMENT     06/19/22 1048   PT Last Visit   PT Visit Date 06/19/22   Note Type   Note Type Treatment   Pain Assessment   Pain Assessment Tool 0-10   Pain Score 5   Pain Location/Orientation Orientation: Left; Location: Hip;Location: Leg   Restrictions/Precautions   Weight Bearing Precautions Per Order Yes   LLE Weight Bearing Per Order WBAT   Other Precautions Chair Alarm; Bed Alarm;O2;Fall Risk;Pain;Hard of hearing   General   Chart Reviewed Yes   Family/Caregiver Present No   Cognition   Overall Cognitive Status WFL   Arousal/Participation Cooperative   Attention Within functional limits   Following Commands Follows multistep commands with increased time or repetition   Subjective   Subjective Pt feels that he did better today than yesterday   Bed Mobility   Supine to Sit 2  Maximal assistance   Additional items Assist x 2;Verbal cues;LE management; Increased time required   Additional Comments pt to chair   Transfers   Sit to Stand 3  Moderate assistance   Additional items Assist x 2;Verbal cues   Stand to Sit 3  Moderate assistance   Additional items Assist x 2;Verbal cues   Ambulation/Elevation   Gait pattern Foward flexed;Decreased L stance; Short stride   Gait Assistance 3  Moderate assist   Additional items Assist x 2;Verbal cues   Assistive Device Rolling walker   Distance 6 steps from bed > chair   Activity Tolerance   Activity Tolerance Patient limited by fatigue;Patient limited by pain  (Pt requires frequent rests)   Exercises   Hip Flexion Sitting;10 reps;AAROM; Left   Knee AROM Long Arc Quad Sitting;10 reps; Left   Ankle Pumps Sitting;10 reps;Bilateral   Balance training  with RW to take steps from bed > chair   Assessment   Prognosis Good   Problem List Decreased strength;Decreased endurance; Impaired balance;Decreased mobility; Decreased cognition; Impaired judgement;Decreased safety awareness; Impaired hearing;Decreased skin integrity   Assessment Pt seen after pain meds given (about 1 hour later)    Pt tolerated session well  Pt and therapists feel that pt did better today in that he was Moderate assist of 2 instead of Max assist of 2  A: tolerated well  Expect pt to progress a little more each day  P: Cont  as per plan  The patient's AM-PAC Basic Mobility Inpatient Short Form Raw Score is 11  A Raw score of less than or equal to 16 suggests the patient may benefit from discharge to post-acute rehabilitation services  Please also refer to the recommendation of the Physical Therapist for safe discharge planning  Goals   Patient Goals to get better and walk without pain   Plan   Treatment/Interventions ADL retraining;Functional transfer training;LE strengthening/ROM; Therapeutic exercise; Endurance training;Patient/family training;Equipment eval/education; Bed mobility;Gait training;Spoke to nursing;OT   Progress Progressing toward goals   PT Frequency Other (Comment)  (daily)   Recommendation   PT Discharge Recommendation Post acute rehabilitation services   Additional Comments Although pt requred frequent breaks, pt's O2 sats remained above 90*   AM-PAC Basic Mobility Inpatient   Turning in Bed Without Bedrails 2   Lying on Back to Sitting on Edge of Flat Bed 2   Moving Bed to Chair 2   Standing Up From Chair 2   Walk in Room 2   Climb 3-5 Stairs 1   Basic Mobility Inpatient Raw Score 11   Basic Mobility Standardized Score 30 25   Turning Head Towards Sound 4   Follow Simple Instructions 3   Low Function Basic Mobility Raw Score 18   Low Function Basic Mobility Standardized Score 29 25   Highest Level Of Mobility   -HLM Goal 4: Move to chair/commode   -HLM Achieved 4: Move to chair/commode   End of Consult   Patient Position at End of Consult Bedside chair;Bed/Chair alarm activated; All needs within reach   End of Consult Comments cold pack to left hip, legs elevated , positoned for comfort   Licensure   [place] License Number  Santos Issa [facility] PT  [id number]

## 2022-06-19 NOTE — PLAN OF CARE
Problem: Potential for Falls  Goal: Patient will remain free of falls  Description: INTERVENTIONS:  - Educate patient/family on patient safety including physical limitations  - Instruct patient to call for assistance with activity   - Consult OT/PT to assist with strengthening/mobility   - Keep Call bell within reach  - Keep bed low and locked with side rails adjusted as appropriate  - Keep care items and personal belongings within reach  - Initiate and maintain comfort rounds  - Make Fall Risk Sign visible to staff  - Offer Toileting every 2 Hours, in advance of need  - Initiate/Maintain bed/ chair alarm- Apply yellow socks and bracelet for high fall risk patients  - Consider moving patient to room near nurses station  Outcome: Progressing     Problem: MOBILITY - ADULT  Goal: Maintain or return to baseline ADL function  Description: INTERVENTIONS:  - Educate patient/family on patient safety including physical limitations  - Instruct patient to call for assistance with activity   - Consult OT/PT to assist with strengthening/mobility   - Keep Call bell within reach  - Keep bed low and locked with side rails adjusted as appropriate  - Keep care items and personal belongings within reach  - Initiate and maintain comfort rounds  - Make Fall Risk Sign visible to staff  - Offer Toileting every 2  Hours, in advance of need  - Initiate/Maintain  bed/chair alarm    - Apply yellow socks and bracelet for high fall risk patients  - Consider moving patient to room near nurses station  Outcome: Progressing     Problem: PAIN - ADULT  Goal: Verbalizes/displays adequate comfort level or baseline comfort level  Description: Interventions:  - Encourage patient to monitor pain and request assistance  - Assess pain using appropriate pain scale  - Administer analgesics based on type and severity of pain and evaluate response  - Implement non-pharmacological measures as appropriate and evaluate response  - Consider cultural and social influences on pain and pain management  - Notify physician/advanced practitioner if interventions unsuccessful or patient reports new pain  Outcome: Progressing     Problem: INFECTION - ADULT  Goal: Absence or prevention of progression during hospitalization  Description: INTERVENTIONS:  - Assess and monitor for signs and symptoms of infection  - Monitor lab/diagnostic results  - Monitor all insertion sites, i e  indwelling lines, tubes, and drains  - Monitor endotracheal if appropriate and nasal secretions for changes in amount and color    - Administer medications as ordered  - Instruct and encourage patient and family to use good hand hygiene technique    Outcome: Progressing     Problem: SAFETY ADULT  Goal: Patient will remain free of falls  Description: INTERVENTIONS:  - Educate patient/family on patient safety including physical limitations  - Instruct patient to call for assistance with activity   - Consult OT/PT to assist with strengthening/mobility   - Keep Call bell within reach  - Keep bed low and locked with side rails adjusted as appropriate  - Keep care items and personal belongings within reach  - Initiate and maintain comfort rounds  - Make Fall Risk Sign visible to staff  - Offer Toileting every 2 Hours, in advance of need  - Initiate/Maintain bed/ chair alarm- Apply yellow socks and bracelet for high fall risk patients  - Consider moving patient to room near nurses station  Outcome: Progressing  Goal: Maintain or return to baseline ADL function  Description: INTERVENTIONS:  - Educate patient/family on patient safety including physical limitations  - Instruct patient to call for assistance with activity   - Consult OT/PT to assist with strengthening/mobility   - Keep Call bell within reach  - Keep bed low and locked with side rails adjusted as appropriate  - Keep care items and personal belongings within reach  - Initiate and maintain comfort rounds  - Make Fall Risk Sign visible to staff  - Offer Toileting every 2  Hours, in advance of need  - Initiate/Maintain  bed/chair alarm    - Apply yellow socks and bracelet for high fall risk patients  - Consider moving patient to room near nurses station  Outcome: Progressing

## 2022-06-19 NOTE — PROGRESS NOTES
[facility] Medicine Progress Note  Patient: Demetrio Bamberger 80 y o  male   MRN: [id number]  PCP: Adilson Green MD  Unit/Bed#: [address] [place] [address] Encounter: [id number]  Date Of Visit: 06/19/22    Problem List:    Principal Problem:    Pneumonia of right lung due to infectious organism  Active Problems:    Closed fracture of shaft of left femur ([facility] )    Sepsis ([facility] )    Valvular heart disease    Paroxysmal atrial flutter ([facility] )    Acute kidney injury ([facility] )    Hyperlipidemia    S/P IVC filter    Recurrent major depressive disorder, in full remission ([place] [address] )    DVT (deep venous thrombosis) ([place] [address] )      Assessment & Plan:    Sepsis ([place] [address] )  Assessment & Plan  As evidenced by fever, tachycardia, leukocytosis  Lactic acid 1 7  Secondary to pneumonia  Remains afebrile, leukocytosis improving  Hemodynamically stable  · Follow-up blood culture-negative so far  · Trend procalcitonin-downtrending  · Continue antibiotics as above  · Follow-up CBC  · Monitor hemodynamics      Closed fracture of shaft of left femur (HCC)  Assessment & Plan  Status post mechanical fall, resulting in acute displaced left proximal femoral shaft fracture extending to the neck  Fall likely precipitated by acute illness  Status post long TFN left femur, 6/17  Doing well postoperatively  Pain is controlled    Voiding well postoperatively  · Pain control  · PT/OT  · DVT prophylaxis  · Incentive spirometry  · Follow-up labs  · Continue to monitor postoperatively  · Discharge planning    * Pneumonia of right lung due to infectious organism  Assessment & Plan  Presented with symptoms of cough with yellow expectoration, shortness of breath, generalized malaise, poor appetite, nausea, loose bowel movement over last few days  CT revealed evidence of dense right upper lobe and patchy right lower lobe consolidation  SARS-CoV-2, influenza RSV PCR negative, blood culture, urine Legionella pneumococcal antigen negative  Remains afebrile,  Leukocytosis downtrending  Procalcitonin downtrending  · Sputum culture if able  · Continue IV ceftriaxone, discontinued Azithromycin  · Pulmonary evaluation appreciated  · Supplemental oxygen as needed  · Incentive spirometry  · Monitor clinically  · Aspiration precaution  · Speech and swallow evaluation    Acute kidney injury ([facility] )  Assessment & Plan  Creatinine noted to be 1 48  UA moderate blood, 2+ protein, 0-1 WBCs  Likely secondary to poor p o  Intake, diarrhea, sepsis  Improved to baseline,  · Monitor intake output  · Avoid hypotension, nephrotoxin  · Follow-up BMP    Paroxysmal atrial flutter ([facility] )  Assessment & Plan  Patient with history of paroxysmal AFib/PSVT in setting of pulmonary embolism  Currently in sinus rhythm  · Continue metoprolol 50 mg q 12 hours  · DVT prophylaxis  · Follow up Cardiology recommendation      Valvular heart disease  Assessment & Plan  2D echo-3/20-EF 65-70%, mild aortic sclerosis, mild-to-moderate aortic regurg, mild MR mild TR  Repeat echo EF 47%, grade 1 diastolic dysfunction  · Appears stable  · Continue metoprolol, ACE-inhibitor  · Cardiology input appreciated     S/P IVC filter  Assessment & Plan  Patient with 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,  Subsequently patient had IVC filter conversion of vena Tech convertible filter, 08/2020    Hyperlipidemia  Assessment & Plan  On statin    Hyponatremia-resolved as of 6/18/2022  Assessment & Plan  Improved with IV fluids    Recurrent major depressive disorder, in full remission ([facility] )  Assessment & Plan  On Celexa          VTE Pharmacologic Prophylaxis:   High Risk (Score >/= 5) - Pharmacological DVT Prophylaxis Ordered: enoxaparin (Lovenox)  Sequential Compression Devices Ordered  Patient Centered Rounds: I performed bedside rounds with nursing staff today    Discussions with Specialists or Other Care Team Provider:  Yes    Education and Discussions with Family / Patient: Attempted to update  (daughter) via phone  Unable to contact  Updated daughter yesterday  Time Spent for Care: 45 minutes  More than 50% of total time spent on counseling and coordination of care as described above  Current Length of Stay: 3 day(s)  Current Patient Status: Inpatient   Certification Statement: The patient will continue to require additional inpatient hospital stay due to IV antibiotics, orthopedic intervention  Discharge Plan: Anticipate discharge in 24-48 hrs to rehab facility  Code Status: Level 1 - Full Code    Subjective:   Sitting up in bed  Reports left hip discomfort on movement  Denies chest pain shortness of breath  Cough is better        Objective:     Vitals:   Temp (24hrs), Av 8 °F (36 6 °C), Min:97 4 °F (36 3 °C), Max:98 °F (36 7 °C)    Temp:  [97 4 °F (36 3 °C)-98 °F (36 7 °C)] 97 8 °F (36 6 °C)  HR:  [] 79  Resp:  [18] 18  BP: (125-159)/(74-90) 148/76  SpO2:  [92 %-95 %] 93 % on 2 L  Body mass index is 27 35 kg/m²  Input and Output Summary (last 24 hours): Intake/Output Summary (Last 24 hours) at 2022 1141  Last data filed at 2022 0601  Gross per 24 hour   Intake 365 ml   Output 600 ml   Net -235 ml       Physical Exam:   Physical Exam  Constitutional:       General: He is not in acute distress  HENT:      Head: Normocephalic and atraumatic  Cardiovascular:      Rate and Rhythm: Normal rate  Pulmonary:      Effort: Pulmonary effort is normal  No respiratory distress  Breath sounds: No wheezing, rhonchi or rales  Comments: Diminished clear  Chest:      Chest wall: No tenderness  Abdominal:      General: Bowel sounds are normal  There is no distension  Palpations: Abdomen is soft  Tenderness: There is no abdominal tenderness  There is no guarding or rebound  Musculoskeletal:      Right lower leg: No edema  Left lower leg: No edema  Comments: Dressing C/D/I   Skin:     General: Skin is warm and dry        Findings: No rash    Neurological:      Mental Status: He is alert  Mental status is at baseline  Cranial Nerves: No cranial nerve deficit  Additional Data:     Labs:  Results from last 7 days   Lab Units 06/19/22  0611 06/17/22  0524 06/16/22  0752   WBC Thousand/uL 10 81*   < > 19 28*   HEMOGLOBIN g/dL 11 0*   < > 12 6   HEMATOCRIT % 33 8*   < > 37 5   PLATELETS Thousands/uL 311   < > 234   BANDS PCT %  --   --  15*   LYMPHO PCT %  --   --  1*   MONO PCT %  --   --  8   EOS PCT %  --   --  1    < > = values in this interval not displayed  Results from last 7 days   Lab Units 06/19/22  0611 06/17/22  0524 06/16/22  0752   SODIUM mmol/L 139   < > 134*   POTASSIUM mmol/L 4 5   < > 4 2   CHLORIDE mmol/L 105   < > 99*   CO2 mmol/L 28   < > 26   BUN mg/dL 38*   < > 28*   CREATININE mg/dL 0 89   < > 1 48*   ANION GAP mmol/L 6   < > 9   CALCIUM mg/dL 8 5   < > 8 6   ALBUMIN g/dL  --   --  2 5*   TOTAL BILIRUBIN mg/dL  --   --  0 60   ALK PHOS U/L  --   --  86   ALT U/L  --   --  24   AST U/L  --   --  43   GLUCOSE RANDOM mg/dL 109   < > 134    < > = values in this interval not displayed  Results from last 7 days   Lab Units 06/16/22  0752   INR  1 25*             Results from last 7 days   Lab Units 06/19/22  0611 06/18/22  0531 06/17/22  0524 06/16/22  0752   LACTIC ACID mmol/L  --   --   --  1 7   PROCALCITONIN ng/ml 1 77* 2 64* 3 60* 4 26*       Lines/Drains:  Invasive Devices  Report    Peripheral Intravenous Line  Duration           Peripheral IV 06/18/22 Distal;Dorsal (posterior); Left Forearm <1 day          Line  Duration           Venous Sheath Other (Comment) Right Other (Comment) 667 days                           Imaging: Reviewed radiology reports from this admission including: chest CT scan and abdominal/pelvic CT    Recent Cultures (last 7 days):   Results from last 7 days   Lab Units 06/16/22  1621 06/16/22  0801 06/16/22  0752   BLOOD CULTURE   --  No Growth at 72 hrs  No Growth at 72 hrs  LEGIONELLA URINARY ANTIGEN  Negative  --   --        Last 24 Hours Medication List:   Current Facility-Administered Medications   Medication Dose Route Frequency Provider Last Rate    acetaminophen  650 mg Oral Q6H PRN Ai Roblero MD      atorvastatin  5 mg Oral Daily With Román Sewell MD      cefTRIAXone  1,000 mg Intravenous Q24H Ai Roblero MD 1,000 mg (06/19/22 0815)    citalopram  20 mg Oral Daily Ai Roblero MD      dextromethorphan-guaiFENesin  10 mL Oral Q4H PRN Ai Roblero MD      enoxaparin  40 mg Subcutaneous Q24H Tayler Humphries MD      lisinopril  5 mg Oral Daily Raleigh Maldonado MD      metoprolol tartrate  50 mg Oral Q12H Ai Roblero MD      multivitamin stress formula  1 tablet Oral Daily Ai Roblero MD      oxyCODONE  2 5 mg Oral Q8H PRN Ai Roblero MD          Today, Patient Was Seen By: Ai Roblero MD    ** Please Note: "This note has been constructed using a voice recognition system  Therefore there may be syntax, spelling, and/or grammatical errors   Please call if you have any questions  "**

## 2022-06-19 NOTE — PROGRESS NOTES
Progress Note - Orthopedics   Titus Spears 80 y o  male MRN: [id number]  Unit/Bed#: [address] [place] [address]      Subjective:    82 y o male seen this morning No acute events, no complaints  Pt doing well  Pain controlled   Denies fevers chills, CP, SOB    Labs:  0   Lab Value Date/Time    HCT 33 8 (L) 06/19/2022 0611    HCT 33 3 (L) 06/18/2022 0531    HCT 36 3 (L) 06/17/2022 0524    HCT 43 3 12/21/2015 0720    HGB 11 0 (L) 06/19/2022 0611    HGB 11 1 (L) 06/18/2022 0531    HGB 12 1 06/17/2022 0524    HGB 14 1 12/21/2015 0720    INR 1 25 (H) 06/16/2022 0752    WBC 10 81 (H) 06/19/2022 0611    WBC 11 74 (H) 06/18/2022 0531    WBC 18 25 (H) 06/17/2022 0524    WBC 6 4 12/21/2015 0720       Meds:    Current Facility-Administered Medications:     acetaminophen (TYLENOL) tablet 650 mg, 650 mg, Oral, Q6H PRN, Bobby Flor MD, 650 mg at 06/18/22 2335    atorvastatin (LIPITOR) tablet 5 mg, 5 mg, Oral, Daily With Yessenia Overton MD, 5 mg at 06/18/22 1658    cefTRIAXone (ROCEPHIN) IVPB (premix in dextrose) 1,000 mg 50 mL, 1,000 mg, Intravenous, Q24H, Bobby Flor MD, Last Rate: 100 mL/hr at 06/19/22 0815, 1,000 mg at 06/19/22 0815    citalopram (CeleXA) tablet 20 mg, 20 mg, Oral, Daily, Bobby Flor MD, 20 mg at 06/19/22 0812    dextromethorphan-guaiFENesin (ROBITUSSIN DM) oral syrup 10 mL, 10 mL, Oral, Q4H PRN, Bobby Flor MD    enoxaparin (LOVENOX) subcutaneous injection 40 mg, 40 mg, Subcutaneous, Q24H [address], Bobby Flor MD, 40 mg at 06/19/22 0812    lisinopril (ZESTRIL) tablet 5 mg, 5 mg, Oral, Daily, Chang Griggs MD, 5 mg at 06/19/22 0710    metoprolol tartrate (LOPRESSOR) tablet 50 mg, 50 mg, Oral, Q12H, Bobby Flor MD, 50 mg at 06/18/22 2345    multivitamin stress formula tablet 1 tablet, 1 tablet, Oral, Daily, Bobby Flor MD, 1 tablet at 06/19/22 0812    oxyCODONE (ROXICODONE) IR tablet 2 5 mg, 2 5 mg, Oral, Q8H PRN, Bobby Flor MD, 2 5 mg at 06/18/22 2011    Blood Culture: Lab Results   Component Value Date    BLOODCX No Growth at 48 hrs  06/16/2022       Wound Culture:   No results found for: WOUNDCULT    Ins and Outs:  I/O last 24 hours: In: 365 [P O :360; I V :5]  Out: 600 [Urine:600]          Physical:  Vitals:    06/19/22 0741   BP: 148/76   Pulse: 79   Resp: 18   Temp: 97 8 °F (36 6 °C)   SpO2: 93%     Musculoskeletal: left Lower Extremity    · Skin no erythema or ecchymosis  · Dressing c/d/i Mepilex  · TTP about the thigh, supple  No collection or hematoma  · SILT s/s/sp/dp/t  +fhl/ehl, +ankle dorsi/plantar flexion    2+ DP pulse    Assessment:    82 y o male POD 2 Left long TFN for subtroch fracture     Plan:  · WBAT LLE  · Hg 11 1, continue to monitor for ABLA  · PT/OT  · Pain control  · DVT ppx  · Dispo: Ortho will follow    Dionne Saucedo PA-C

## 2022-06-19 NOTE — PROGRESS NOTES
Progress Note - Pulmonary   Phi Vásquez 80 y o  male MRN: [id number]  Unit/Bed#: [address] [place] [address] Encounter: [id number]    Assessment/Plan:    1  Right multilobar community aquired pneumonia  As evident per CT chest  Patient is on IV ceftriaxone and vancomycin day 4  WBC trending down 19->11->10  Procalcitonin trending down 4 26->2 64->1 77  Legionella antigen came back negative  - continue ceftriaxone d4/5     2  Small right pleural effusion  No intervention needed at this time  Continue to monitor respiratory status     3  Acute displaced left proximal femoral shaft fracture secondary to fall at home  S/p TNF POD 2  On VTE prophylaxis with Lovenox   Ortho follows     4  History of  PE  S/p IVC filter placed on 12/07/2019     Plan:  - continue ceftriaxone IV  - insentive spirometry      Subjective:    Patient complained of some dry cough  Otherwise patient feels okay  Patient denies shortness of breath on 2 L of nasal cannula, no fever, chills, or chest pain  Objective:    Vitals: Blood pressure 148/76, pulse 79, temperature 97 8 °F (36 6 °C), temperature source Oral, resp  rate 18, height 5' 9" (1 753 m), weight 84 kg (185 lb 3 oz), SpO2 93 %  on 2L of NC,Body mass index is 27 35 kg/m²  Intake/Output Summary (Last 24 hours) at 6/19/2022 1100  Last data filed at 6/19/2022 0601  Gross per 24 hour   Intake 365 ml   Output 600 ml   Net -235 ml       Invasive Devices  Report    Peripheral Intravenous Line  Duration           Peripheral IV 06/18/22 Distal;Dorsal (posterior); Left Forearm <1 day          Line  Duration           Venous Sheath Other (Comment) Right Other (Comment) 667 days                Physical Exam:     Physical Exam  Constitutional:       General: He is not in acute distress  Appearance: He is well-developed  He is not diaphoretic  HENT:      Head: Normocephalic  Mouth/Throat:      Pharynx: No oropharyngeal exudate  Eyes:      Pupils: Pupils are equal, round, and reactive to light  Cardiovascular:      Rate and Rhythm: Normal rate and regular rhythm  Heart sounds: No murmur heard  Pulmonary:      Effort: Pulmonary effort is normal  No respiratory distress  Breath sounds: No wheezing or rales  Comments: On 2 L of nasal cannula  Abdominal:      General: Bowel sounds are normal       Palpations: Abdomen is soft  Tenderness: There is no abdominal tenderness  Musculoskeletal:         General: No tenderness  Normal range of motion  Cervical back: Normal range of motion  Skin:     General: Skin is warm  Neurological:      Mental Status: He is alert and oriented to person, place, and time  Labs: I have personally reviewed pertinent lab results  Imaging and other studies: I have personally reviewed pertinent reports

## 2022-06-19 NOTE — OCCUPATIONAL THERAPY NOTE
OT TREATMENT       06/19/22 1113   Note Type   Note Type Treatment   Restrictions/Precautions   Weight Bearing Precautions Per Order Yes   LLE Weight Bearing Per Order WBAT   Bed Mobility   Rolling L 2  Maximal assistance   Additional items Assist x 1; Increased time required;LE management   Supine to Sit 2  Maximal assistance   Additional items Assist x 2   Transfers   Sit to Stand 3  Moderate assistance   Additional items Assist x 1;HOB elevated; Increased time required;Verbal cues   Stand to Sit 3  Moderate assistance   Additional items Assist x 2; Increased time required;Verbal cues   Therapeutic Exercise - ROM   UE-ROM Yes   ROM- Right Upper Extremities   R Shoulder AROM; Flexion; Horizontal ABduction   R Elbow AROM;Elbow flexion;Elbow extension   R Weight/Reps/Sets 10 reps x 2 sets   ROM - Left Upper Extremities    L Shoulder AROM; Flexion; Horizontal ABduction   L Elbow Elbow flexion;Elbow extension;AROM   L Weight/Reps/Sets 10 reps x 2 sets   Cognition   Overall Cognitive Status WFL   Arousal/Participation Alert; Responsive; Cooperative   Attention Within functional limits   Following Commands Follows multistep commands with increased time or repetition   Activity Tolerance   Activity Tolerance Patient limited by fatigue;Patient limited by pain   Medical Staff Made Aware   (Nurse white premedicated pt prior to session)   Assessment   Assessment Pt seen for OT this AM  Per pt, pain level has  Improved today  Pt needed max A for supine to sit, and sit to stand transition mod A  X 2 with verbal and visual cues for sequencing and safety  Pt needed rest periods d/t fatigue and SOB noted with saturation at 87-90 % with exertion but able to maintain SpO2 90-92% when at rest  Pt completed few steps while using RW to transfer from bed to recliner chair With mod A  X 2 using RW  Pt  Tolerated UE there exs while seated at EOB with min A to maintain sitting balance   At end of session patient seated on chair with needs within reach   P: Continue OT services as per POC  The patient's raw score on the AM-PAC Daily Activity inpatient short form is 16, standardized score is 35 96, less than 39 4  Patients at this level are likely to benefit from discharge to post-acute rehabilitation services  Please refer to the recommendation of the Occupational Therapist for safe discharge planning  Plan   Treatment Interventions ADL retraining; Endurance training;Functional transfer training;Continued evaluation; Activityengagement   OT Frequency 5x/wk   Recommendation   OT Discharge Recommendation Post acute rehabilitation services   AM-PAC Daily Activity Inpatient   Lower Body Dressing 2   Bathing 2   Toileting 2   Upper Body Dressing 3   Grooming 3   Eating 4   Daily Activity Raw Score 16   Daily Activity Standardized Score (Calc for Raw Score >=11) 35 96   AM-PAC Applied Cognition Inpatient   Following a Speech/Presentation 4   Understanding Ordinary Conversation 4   Taking Medications 4   Remembering Where Things Are Placed or Put Away 4   Remembering List of 4-5 Errands 4   Taking Care of Complicated Tasks 4   Applied Cognition Raw Score 24   Applied Cognition Standardized Score 49 78   Licensure   [place] License Number  Gabby Overton [address] 87, OTR/L [id number]

## 2022-06-20 VITALS
HEART RATE: 76 BPM | SYSTOLIC BLOOD PRESSURE: 132 MMHG | OXYGEN SATURATION: 93 % | RESPIRATION RATE: 18 BRPM | TEMPERATURE: 97.8 F | WEIGHT: 185.19 LBS | DIASTOLIC BLOOD PRESSURE: 61 MMHG | BODY MASS INDEX: 27.43 KG/M2 | HEIGHT: 69 IN

## 2022-06-20 DIAGNOSIS — I10 ESSENTIAL HYPERTENSION: ICD-10-CM

## 2022-06-20 DIAGNOSIS — E78.5 HYPERLIPIDEMIA, UNSPECIFIED HYPERLIPIDEMIA TYPE: ICD-10-CM

## 2022-06-20 PROBLEM — N17.9 ACUTE KIDNEY INJURY (HCC): Status: RESOLVED | Noted: 2022-06-16 | Resolved: 2022-06-20

## 2022-06-20 PROBLEM — I82.409 DVT (DEEP VENOUS THROMBOSIS) (HCC): Status: RESOLVED | Noted: 2022-06-17 | Resolved: 2022-06-20

## 2022-06-20 PROBLEM — A41.9 SEPSIS (HCC): Status: RESOLVED | Noted: 2022-06-16 | Resolved: 2022-06-20

## 2022-06-20 LAB
ANION GAP SERPL CALCULATED.3IONS-SCNC: 4 MMOL/L (ref 4–13)
BUN SERPL-MCNC: 31 MG/DL (ref 5–25)
CALCIUM SERPL-MCNC: 8.2 MG/DL (ref 8.3–10.1)
CHLORIDE SERPL-SCNC: 105 MMOL/L (ref 100–108)
CO2 SERPL-SCNC: 28 MMOL/L (ref 21–32)
CREAT SERPL-MCNC: 0.84 MG/DL (ref 0.6–1.3)
ERYTHROCYTE [DISTWIDTH] IN BLOOD BY AUTOMATED COUNT: 14.2 % (ref 11.6–15.1)
GFR SERPL CREATININE-BSD FRML MDRD: 81 ML/MIN/1.73SQ M
GLUCOSE SERPL-MCNC: 94 MG/DL (ref 65–140)
HCT VFR BLD AUTO: 30.9 % (ref 36.5–49.3)
HGB BLD-MCNC: 10.1 G/DL (ref 12–17)
MCH RBC QN AUTO: 31.6 PG (ref 26.8–34.3)
MCHC RBC AUTO-ENTMCNC: 32.7 G/DL (ref 31.4–37.4)
MCV RBC AUTO: 97 FL (ref 82–98)
PLATELET # BLD AUTO: 359 THOUSANDS/UL (ref 149–390)
PMV BLD AUTO: 9.5 FL (ref 8.9–12.7)
POTASSIUM SERPL-SCNC: 4.3 MMOL/L (ref 3.5–5.3)
RBC # BLD AUTO: 3.2 MILLION/UL (ref 3.88–5.62)
SODIUM SERPL-SCNC: 137 MMOL/L (ref 136–145)
WBC # BLD AUTO: 8.74 THOUSAND/UL (ref 4.31–10.16)

## 2022-06-20 PROCEDURE — 97530 THERAPEUTIC ACTIVITIES: CPT

## 2022-06-20 PROCEDURE — 97110 THERAPEUTIC EXERCISES: CPT

## 2022-06-20 PROCEDURE — 99232 SBSQ HOSP IP/OBS MODERATE 35: CPT | Performed by: INTERNAL MEDICINE

## 2022-06-20 PROCEDURE — 85027 COMPLETE CBC AUTOMATED: CPT | Performed by: INTERNAL MEDICINE

## 2022-06-20 PROCEDURE — 80048 BASIC METABOLIC PNL TOTAL CA: CPT | Performed by: INTERNAL MEDICINE

## 2022-06-20 PROCEDURE — 99024 POSTOP FOLLOW-UP VISIT: CPT | Performed by: ORTHOPAEDIC SURGERY

## 2022-06-20 PROCEDURE — 99239 HOSP IP/OBS DSCHRG MGMT >30: CPT | Performed by: INTERNAL MEDICINE

## 2022-06-20 RX ORDER — ENOXAPARIN SODIUM 100 MG/ML
40 INJECTION SUBCUTANEOUS
Refills: 0
Start: 2022-06-21 | End: 2022-08-10

## 2022-06-20 RX ORDER — CEFDINIR 300 MG/1
300 CAPSULE ORAL EVERY 12 HOURS SCHEDULED
Refills: 0
Start: 2022-06-20 | End: 2022-06-22

## 2022-06-20 RX ORDER — FOSINOPRIL SODIUM 10 MG/1
TABLET ORAL
Qty: 45 TABLET | Refills: 1 | Status: SHIPPED | OUTPATIENT
Start: 2022-06-20

## 2022-06-20 RX ORDER — GUAIFENESIN 600 MG
1200 TABLET, EXTENDED RELEASE 12 HR ORAL EVERY 12 HOURS SCHEDULED
Refills: 0
Start: 2022-06-20 | End: 2022-06-22

## 2022-06-20 RX ORDER — NIACIN 500 MG/1
TABLET, EXTENDED RELEASE ORAL
Qty: 90 TABLET | Refills: 1 | Status: SHIPPED | OUTPATIENT
Start: 2022-06-20

## 2022-06-20 RX ORDER — GUAIFENESIN/DEXTROMETHORPHAN 100-10MG/5
10 SYRUP ORAL EVERY 4 HOURS PRN
Refills: 0
Start: 2022-06-20 | End: 2022-07-22

## 2022-06-20 RX ORDER — OXYCODONE HYDROCHLORIDE 5 MG/1
2.5 TABLET ORAL EVERY 8 HOURS PRN
Qty: 4 TABLET | Refills: 0 | Status: SHIPPED | OUTPATIENT
Start: 2022-06-20 | End: 2022-06-23

## 2022-06-20 RX ORDER — ACETAMINOPHEN 325 MG/1
650 TABLET ORAL EVERY 6 HOURS PRN
Refills: 0
Start: 2022-06-20

## 2022-06-20 RX ORDER — LISINOPRIL 10 MG/1
10 TABLET ORAL DAILY
Status: DISCONTINUED | OUTPATIENT
Start: 2022-06-21 | End: 2022-06-20 | Stop reason: HOSPADM

## 2022-06-20 RX ORDER — METOPROLOL TARTRATE 100 MG/1
100 TABLET ORAL EVERY 12 HOURS
Refills: 0
Start: 2022-06-21

## 2022-06-20 RX ORDER — METOPROLOL TARTRATE 100 MG/1
100 TABLET ORAL EVERY 12 HOURS
Status: DISCONTINUED | OUTPATIENT
Start: 2022-06-20 | End: 2022-06-20 | Stop reason: HOSPADM

## 2022-06-20 RX ADMIN — CITALOPRAM HYDROBROMIDE 20 MG: 20 TABLET ORAL at 09:22

## 2022-06-20 RX ADMIN — METOPROLOL TARTRATE 100 MG: 100 TABLET, FILM COATED ORAL at 12:43

## 2022-06-20 RX ADMIN — LISINOPRIL 5 MG: 5 TABLET ORAL at 09:22

## 2022-06-20 RX ADMIN — OXYCODONE HYDROCHLORIDE 2.5 MG: 5 TABLET ORAL at 09:29

## 2022-06-20 RX ADMIN — CEFTRIAXONE 1000 MG: 1 INJECTION, SOLUTION INTRAVENOUS at 09:23

## 2022-06-20 RX ADMIN — ENOXAPARIN SODIUM 40 MG: 40 INJECTION SUBCUTANEOUS at 09:22

## 2022-06-20 RX ADMIN — METOPROLOL TARTRATE 50 MG: 50 TABLET, FILM COATED ORAL at 01:09

## 2022-06-20 RX ADMIN — B-COMPLEX W/ C & FOLIC ACID TAB 1 TABLET: TAB at 09:22

## 2022-06-20 RX ADMIN — ATORVASTATIN CALCIUM 5 MG: 10 TABLET, FILM COATED ORAL at 18:28

## 2022-06-20 NOTE — CASE MANAGEMENT
Case Management Progress Note    Patient name Dex Phoenix  Location [address] 220/[address] [facility] MRN [id number]  : 1940 Date 2022       LOS (days): 4  Geometric Mean LOS (GMLOS) (days): 9 60  Days to GMLOS:5 5        OBJECTIVE:    Current admission status: Inpatient  Preferred Pharmacy:   [address] [place] [address]  Phone: [phone] Fax: [phone]    Primary Care Provider: Claudia Santos MD    Primary Insurance: [facility]  Secondary Insurance:     PROGRESS NOTE:    Rehab authorization request for rehab placement at [facility] initiated with [employer]  Clinical faxed to [employer] as requested  Once authorization approval is received pt can be transferred  Notified Dr Brannon Ceron of plan  SW will follow

## 2022-06-20 NOTE — PLAN OF CARE
Problem: Potential for Falls  Goal: Patient will remain free of falls  Description: INTERVENTIONS:  - Educate patient/family on patient safety including physical limitations  - Instruct patient to call for assistance with activity   - Consult OT/PT to assist with strengthening/mobility   - Keep Call bell within reach  - Keep bed low and locked with side rails adjusted as appropriate  - Keep care items and personal belongings within reach  - Initiate and maintain comfort rounds  - Make Fall Risk Sign visible to staff  - Offer Toileting every 2 Hours, in advance of need  - Initiate/Maintain bed/ chair alarm- Apply yellow socks and bracelet for high fall risk patients  - Consider moving patient to room near nurses station  Outcome: Progressing     Problem: MOBILITY - ADULT  Goal: Maintain or return to baseline ADL function  Description: INTERVENTIONS:  - Educate patient/family on patient safety including physical limitations  - Instruct patient to call for assistance with activity   - Consult OT/PT to assist with strengthening/mobility   - Keep Call bell within reach  - Keep bed low and locked with side rails adjusted as appropriate  - Keep care items and personal belongings within reach  - Initiate and maintain comfort rounds  - Make Fall Risk Sign visible to staff  - Offer Toileting every 2  Hours, in advance of need  - Initiate/Maintain  bed/chair alarm    - Apply yellow socks and bracelet for high fall risk patients  - Consider moving patient to room near nurses station  Outcome: Progressing  Goal: Maintains/Returns to pre admission functional level  Description: INTERVENTIONS:  - Perform BMAT or MOVE assessment daily    - Set and communicate daily mobility goal to care team and patient/family/caregiver  - Collaborate with rehabilitation services on mobility goals if consulted  - Perform Range of Motion 2 times a day  - Reposition patient every 2 hours    - Dangle patient 1 times a day  - Stand patient 2 times a day  - Ambulate patient 2 times a day  - Out of bed to chair 1 times a day   - Out of bed for meals 1 times a day  - Out of bed for toileting  - Record patient progress and toleration of activity level   Outcome: Progressing

## 2022-06-20 NOTE — UTILIZATION REVIEW
Continued Stay Review    Date:06-18-22                          Current Patient Class:inpatient  Current Level of Care: medical    HPI:82 y o  male initially admitted on 06-16-22     Assessment/Plan: ) POD #1 s/p left long TFN Dsg c/d/i, thigh soft, +DF/PF/EHL, +L3-S1 SILT, DP2+, foot warm moderate pain noted but under control continue to treat for right multilobe PNA with IV cefTRAIXone day 3/ 5  O2 as needed  Weaned from 3 L to 2 L NC @ 28-32 % patient remains ill appearing    PT/OT eval : Post acute rehabilitation services needed upon d/c   Vital Signs:     18/22 23:30:58 97 4 °F (36 3 °C) Abnormal  79 18 144/74 97 95 % -- -- -- -- -- --   06/18/22 2257 -- 81 -- -- -- 92 % 28 -- 2 L/min Nasal cannula -- --   06/18/22 19:53:29 98 °F (36 7 °C) 90 18 151/82 105 95 % 28 -- 2 L/min Nasal cannula -- --   06/18/22 19:50:53 -- 93 -- 151/82 105 94 % -- -- -- -- -- --   06/18/22 16:03:30 98 °F (36 7 °C) -- -- 125/80 95 -- -- -- -- -- -- --   06/18/22 11:49:21 -- 105 -- 159/90 113 93 % -- -- -- -- -- --   06/18/22 07:53:37 97 9 °F (36 6 °C) 97 -- 157/70 99 93 % 32 -- 3 L/min Nasal cannula -- --   06/18/22 03:01:59 97 4 °F (36 3 °C) Abnormal  82 18 145/73 97 94 % 32 -- 3 L/min Nasal cannula -- Lying   06/18/22 00:00:19 97 8 °F (36 6 °C) 101 16 163/81 108 92 % 32 -- 3 L/min Nasal cannula --                    Pertinent Labs/Diagnostic Results:   Results from last 7 days   Lab Units 06/16/22  0752   SARS-COV-2  Negative     Results from last 7 days   Lab Units 06/20/22  0523 06/19/22  0611 06/18/22  0531 06/17/22  0524 06/16/22  0752   WBC Thousand/uL 8 74 10 81* 11 74* 18 25* 19 28*   HEMOGLOBIN g/dL 10 1* 11 0* 11 1* 12 1 12 6   HEMATOCRIT % 30 9* 33 8* 33 3* 36 3* 37 5   PLATELETS Thousands/uL 359 311 252 249 234   BANDS PCT %  --   --   --   --  15*         Results from last 7 days   Lab Units 06/20/22  0523 06/19/22  0611 06/18/22  0531 06/17/22  0524 06/16/22  0752   SODIUM mmol/L 137 139 139 138 134*   POTASSIUM mmol/L 4 3 4 5 4 6 4 4 4 2   CHLORIDE mmol/L 105 105 105 101 99*   CO2 mmol/L 28 28 25 29 26   ANION GAP mmol/L 4 6 9 8 9   BUN mg/dL 31* 38* 35* 30* 28*   CREATININE mg/dL 0 84 0 89 0 93 1 35* 1 48*   EGFR ml/min/1 73sq m 81 79 76 48 43   CALCIUM mg/dL 8 2* 8 5 8 3 8 4 8 6   MAGNESIUM mg/dL  --   --  2 2 2 0 1 6   PHOSPHORUS mg/dL  --   --   --  2 8 1 8*     Results from last 7 days   Lab Units 06/16/22  0752   AST U/L 43   ALT U/L 24   ALK PHOS U/L 86   TOTAL PROTEIN g/dL 6 7   ALBUMIN g/dL 2 5*   TOTAL BILIRUBIN mg/dL 0 60         Results from last 7 days   Lab Units 06/20/22  0523 06/19/22  0611 06/18/22  0531 06/17/22  0524 06/16/22  0752   GLUCOSE RANDOM mg/dL 94 109 134 117 134       Results from last 7 days   Lab Units 06/16/22  0752   PROTIME seconds 15 4*   INR  1 25*   PTT seconds 33         Results from last 7 days   Lab Units 06/19/22  0611 06/18/22  0531 06/17/22  0524 06/16/22  0752   PROCALCITONIN ng/ml 1 77* 2 64* 3 60* 4 26*     Results from last 7 days   Lab Units 06/16/22  0752   LACTIC ACID mmol/L 1 7     Results from last 7 days   Lab Units 06/16/22  1621   CLARITY UA  Slightly Cloudy   COLOR UA  Yellow   SPEC GRAV UA  >=1 030   PH UA  6 0   GLUCOSE UA mg/dl Negative   KETONES UA mg/dl Negative   BLOOD UA  Moderate*   PROTEIN UA mg/dl 100 (2+)*   NITRITE UA  Negative   BILIRUBIN UA  Negative   UROBILINOGEN UA E U /dl 0 2   LEUKOCYTES UA  Negative   WBC UA /hpf 0-1*   RBC UA /hpf 2-4   BACTERIA UA /hpf Occasional   EPITHELIAL CELLS WET PREP /hpf Occasional     Results from last 7 days   Lab Units 06/16/22  1621 06/16/22  0752   STREP PNEUMONIAE ANTIGEN, URINE  Negative  --    LEGIONELLA URINARY ANTIGEN  Negative  --    INFLUENZA A PCR   --  Negative   INFLUENZA B PCR   --  Negative   RSV PCR   --  Negative     Results from last 7 days   Lab Units 06/16/22  0801 06/16/22  0752   BLOOD CULTURE  No Growth at 72 hrs  No Growth at 72 hrs                   Medications:   Scheduled Medications:  atorvastatin, 5 mg, Oral, Daily With Dinner  cefTRIAXone, 1,000 mg, Intravenous, Q24H  citalopram, 20 mg, Oral, Daily  enoxaparin, 40 mg, Subcutaneous, Q24H JEN  lisinopril, 5 mg, Oral, Daily  metoprolol tartrate, 50 mg, Oral, Q12H  multivitamin stress formula, 1 tablet, Oral, Daily      Continuous IV Infusions:     PRN Meds:  acetaminophen, 650 mg, Oral, Q6H PRN  dextromethorphan-guaiFENesin, 10 mL, Oral, Q4H PRN  oxyCODONE, 2 5 mg, Oral, Q8H PRN        Discharge Plan: TDB    Network Utilization Review Department  ATTENTION: Please call with any questions or concerns to [phone] and carefully listen to the prompts so that you are directed to the right person  All voicemails are confidential   Karishma Hahn all requests for admission clinical reviews, approved or denied determinations and any other requests to dedicated fax number below belonging to the campus where the patient is receiving treatment   List of dedicated fax numbers for the Facilities:  [address] DENIALS (Administrative/Medical Necessity) [phone]   [address] (Maternity/NICU/Pediatrics) [phone]   [address]  [address] [facility] [place] [address] [id number] [place] [address] [address] [facility] [place] [address] [phone]

## 2022-06-20 NOTE — INCIDENTAL FINDINGS
The following findings require follow up:  Radiographic finding   Finding:  CT scan of the chest abdomen and pelvis revealed   1  Right-sided pneumonia  2  chronic, Mild thickening of the posterior aspect of the urinary bladder wall noted, correlate with the urinary evaluation and urology evaluation on nonemergent basis, 3  Incidentally detected the left perifissural nodule  Based on current Fleischner Society 2017 Guidelines on incidental pulmonary nodule, no routine follow-up is needed if the patient is low risk  If the patient is high risk, optional follow-up chest CT , at 12 months can be considered    , Consider follow-up at 2 months demonstrate resolution     Follow up required:  Yes     Please notify the following clinician to assist with the follow up:   Dr Debra Anglin MD

## 2022-06-20 NOTE — PLAN OF CARE
Problem: OCCUPATIONAL THERAPY ADULT  Goal: Performs self-care activities at highest level of function for planned discharge setting  See evaluation for individualized goals  Description: Treatment Interventions: ADL retraining, Functional transfer training, UE strengthening/ROM, Endurance training, Patient/family training, Energy conservation, Activityengagement, Fine motor coordination activities          See flowsheet documentation for full assessment, interventions and recommendations  Outcome: Progressing  Note: Limitation: Decreased ADL status, Decreased UE ROM, Decreased UE strength, Decreased endurance, Decreased high-level ADLs  Prognosis: Good  Assessment: Patient seen by OT this AM  Patient WBAT POD#3 s/p Long TFN L femur  Patient requiring max assist x2 for all functional transfers and ambulating with RW  Patient with increased heart rate and shortness of breath upon excertion  Dr Paul Marquez made aware  During ambulation patients left knee was buckling and patient requiring max verbal cues  Patient would benefit from continued skilled OT services to address decreased endurance, activity tolerance, SOB and overall particpation in ADLS/IADLS       OT Discharge Recommendation: Post acute rehabilitation services

## 2022-06-20 NOTE — [place] UNIVERSAL TRANSFER FORM
[place] UNIVERSAL TRANSFER FORM  (ALL ITEMS MUST BE COMPLETED)    1  TRANSFER FROM: [address] [facility]      TRANSFER TO: [facility]      2  DATE OF TRANSFER: 6/20/2022                        TIME OF TRANSFER: 1800    3  PATIENT NAME: JAYASHREE Malave      YOB: 1940                             GENDER: male    4  LANGUAGE:   English    5  PHYSICIAN NAME:  Suresh Chavez MD                   PHONE: [phone]    6  CODE STATUS: Level 1 - Full Code        Out of Hospital DNR Attached: No    7  :                                      :  Extended Emergency Contact Information  Primary Emergency Contact: Gina Robleroa  Address: [address] [place] [address] Phone: [phone]  Mobile Phone: [phone]  Relation: Spouse  Secondary Emergency Contact: Meir Albarado  Mobile Phone: [phone]  Relation: Daughter           Health Care Representative/Proxy:  Yes           Legal Guardian:  No             NAME OF:           HEALTH CARE REPRESENTATIVE/PROXY:                                         OR           LEGAL GUARDIAN, IF NOT :                                               PHONE:  (Day)           (Night)                        (Cell)    8  REASON FOR TRANSFER: (Must include brief medical history and recent changes in physical function or cognition ) STR            V/S: /61   Pulse 76   Temp 97 8 °F (36 6 °C)   Resp 18   Ht 5' 9" (1 753 m)   Wt 84 kg (185 lb 3 oz)   SpO2 93%   BMI 27 35 kg/m²           PAIN: Yes, Rating 6, Site L hip/leg and Treatment oxycodone    9  PRIMARY DIAGNOSIS: Pneumonia of right lung due to infectious organism      Secondary Diagnosis:         Pacemaker: No      Internal Defib: No          Mental Health Diagnosis (if Applicable):    10  RESTRAINTS: No     11  RESPIRATORY NEEDS: Oxygen Device NC, and Flow rate: 2L    12  ISOLATION/PRECAUTION: None    13  ALLERGY: Patient has no known allergies  14  SENSORY:       Vision Poor, Hearing Poor and Speech Clear    15  SKIN CONDITION: Yes:  Surgical    16  DIET: Regular    17  IV ACCESS: None    18  PERSONAL ITEMS SENT WITH PATIENT: Dentures Upper Full and Lower Full and Otherring     19  ATTACHED DOCUMENTS: MUST ATTACH CURRENT MEDICATION INFORMATION Face Sheet, MAR, Medication Reconciliation, TAR, Code Status, Discharge Summary, PT Note, OT Note, ST Note and HX/PE    20  AT RISK ALERTS:Falls        HARM TO: N/A    21  WEIGHT BEARING STATUS:         Left Leg: Limited        Right Leg: None    22  MENTAL STATUS:Alert, Oriented and Forgetful    23  FUNCTION:        Walk: With Help        Transfer: With Help        Toilet: Self        Feed: Self    24  IMMUNIZATIONS/SCREENING:     Immunization History   Administered Date(s) Administered    Influenza, high dose seasonal 0 7 mL 09/29/2020, 10/12/2021    Influenza, injectable, quadrivalent, preservative free 0 5 mL 10/31/2019    Tdap 10/14/2019       25  BOWEL: Continent    26  BLADDER: Continent    27   [facility] CON[facility]CT[facility]                  [facility]        Unit:   [id number]         Phone: [phone]          [address] (if known):        Title:        Unit:         Phone:         FORM PREFILLED BY (if applicable)       Title:       Unit:        Phone:         FORM COMPLETED BY Lizabeth Martines RN      Title: RN      Phone: [phone]

## 2022-06-20 NOTE — PLAN OF CARE
Problem: Potential for Falls  Goal: Patient will remain free of falls  Description: INTERVENTIONS:  - Educate patient/family on patient safety including physical limitations  - Instruct patient to call for assistance with activity   - Consult OT/PT to assist with strengthening/mobility   - Keep Call bell within reach  - Keep bed low and locked with side rails adjusted as appropriate  - Keep care items and personal belongings within reach  - Initiate and maintain comfort rounds  - Make Fall Risk Sign visible to staff  - Offer Toileting every 2 Hours, in advance of need  - Initiate/Maintain bed/ chair alarm- Apply yellow socks and bracelet for high fall risk patients  - Consider moving patient to room near nurses station  Outcome: Progressing     Problem: MOBILITY - ADULT  Goal: Maintain or return to baseline ADL function  Description: INTERVENTIONS:  - Educate patient/family on patient safety including physical limitations  - Instruct patient to call for assistance with activity   - Consult OT/PT to assist with strengthening/mobility   - Keep Call bell within reach  - Keep bed low and locked with side rails adjusted as appropriate  - Keep care items and personal belongings within reach  - Initiate and maintain comfort rounds  - Make Fall Risk Sign visible to staff  - Offer Toileting every 2  Hours, in advance of need  - Initiate/Maintain  bed/chair alarm    - Apply yellow socks and bracelet for high fall risk patients  - Consider moving patient to room near nurses station  Outcome: Progressing  Goal: Maintains/Returns to pre admission functional level  Description: INTERVENTIONS:  - Perform BMAT or MOVE assessment daily    - Set and communicate daily mobility goal to care team and patient/family/caregiver     - Collaborate with rehabilitation services on mobility goals if consulted  - Out of bed for toileting  - Record patient progress and toleration of activity level   Outcome: Progressing     Problem: PAIN - ADULT  Goal: Verbalizes/displays adequate comfort level or baseline comfort level  Description: Interventions:  - Encourage patient to monitor pain and request assistance  - Assess pain using appropriate pain scale  - Administer analgesics based on type and severity of pain and evaluate response  - Implement non-pharmacological measures as appropriate and evaluate response  - Consider cultural and social influences on pain and pain management  - Notify physician/advanced practitioner if interventions unsuccessful or patient reports new pain  Outcome: Progressing     Problem: INFECTION - ADULT  Goal: Absence or prevention of progression during hospitalization  Description: INTERVENTIONS:  - Assess and monitor for signs and symptoms of infection  - Monitor lab/diagnostic results  - Monitor all insertion sites, i e  indwelling lines, tubes, and drains  - Monitor endotracheal if appropriate and nasal secretions for changes in amount and color    - Administer medications as ordered  - Instruct and encourage patient and family to use good hand hygiene technique    Outcome: Progressing  Goal: Absence of fever/infection during neutropenic period  Description: INTERVENTIONS:  - Monitor WBC    Outcome: Progressing     Problem: SAFETY ADULT  Goal: Patient will remain free of falls  Description: INTERVENTIONS:  - Educate patient/family on patient safety including physical limitations  - Instruct patient to call for assistance with activity   - Consult OT/PT to assist with strengthening/mobility   - Keep Call bell within reach  - Keep bed low and locked with side rails adjusted as appropriate  - Keep care items and personal belongings within reach  - Initiate and maintain comfort rounds  - Make Fall Risk Sign visible to staff  - Offer Toileting every 2 Hours, in advance of need  - Initiate/Maintain bed/ chair alarm- Apply yellow socks and bracelet for high fall risk patients  - Consider moving patient to room near nurses station  Outcome: Progressing  Goal: Maintain or return to baseline ADL function  Description: INTERVENTIONS:  - Educate patient/family on patient safety including physical limitations  - Instruct patient to call for assistance with activity   - Consult OT/PT to assist with strengthening/mobility   - Keep Call bell within reach  - Keep bed low and locked with side rails adjusted as appropriate  - Keep care items and personal belongings within reach  - Initiate and maintain comfort rounds  - Make Fall Risk Sign visible to staff  - Offer Toileting every 2  Hours, in advance of need  - Initiate/Maintain  bed/chair alarm    - Apply yellow socks and bracelet for high fall risk patients  - Consider moving patient to room near nurses station  Outcome: Progressing  Goal: Maintains/Returns to pre admission functional level  Description: INTERVENTIONS:  - Perform BMAT or MOVE assessment daily    - Set and communicate daily mobility goal to care team and patient/family/caregiver     - Collaborate with rehabilitation services on mobility goals if consulted  - Out of bed for toileting  - Record patient progress and toleration of activity level   Outcome: Progressing     Problem: DISCHARGE PLANNING  Goal: Discharge to home or other facility with appropriate resources  Description: INTERVENTIONS:  - Identify barriers to discharge w/patient and caregiver  - Arrange for needed discharge resources and transportation as appropriate  - Identify discharge learning needs (meds, wound care, etc )  - Arrange for interpretive services to assist at discharge as needed  - Refer to Case Management Department for coordinating discharge planning if the patient needs post-hospital services based on physician/advanced practitioner order or complex needs related to functional status, cognitive ability, or social support system  Outcome: Progressing     Problem: Knowledge Deficit  Goal: Patient/family/caregiver demonstrates understanding of disease process, treatment plan, medications, and discharge instructions  Description: Complete learning assessment and assess knowledge base    Interventions:  - Provide teaching at level of understanding  - Provide teaching via preferred learning methods  Outcome: Progressing     Problem: Prexisting or High Potential for Compromised Skin Integrity  Goal: Skin integrity is maintained or improved  Description: INTERVENTIONS:  - Identify patients at risk for skin breakdown  - Assess and monitor skin integrity  - Assess and monitor nutrition and hydration status  - Monitor labs   - Assess for incontinence   - Turn and reposition patient  - Assist with mobility/ambulation  - Relieve pressure over bony prominences  - Avoid friction and shearing  - Provide appropriate hygiene as needed including keeping skin clean and dry  - Evaluate need for skin moisturizer/barrier cream  - Collaborate with interdisciplinary team   - Patient/family teaching  - Consider wound care consult   Outcome: Progressing

## 2022-06-20 NOTE — PROGRESS NOTES
Progress Note - Cardiology   [facility]     Maggi Segal 80 y o  male MRN: [id number]  : 1940  Unit/Bed#: 2 [place] [address] Encounter: [id number]    Assessment and Plan:   1  Mechanical fall status post left proximal femur fracture:  Underwent ORIF with nailing of left hip on 2022    -  care per Ortho and PT/OT    2  Right-sided multi lobular pneumonia:  Antibiotics per primary team    3  Hypertension:  Blood pressure stable  -  continue lisinopril 5 mg daily    -  continue Lopressor 50 mg twice a day    4  Dyslipidemia:  Continue statin therapy    5  Chronic kidney disease:  Renal function stable    6  Paroxysmal atrial fibrillation in the setting of acute PE and right hip surgery: In 2019  Maintaining sinus rhythm    Subjective / Objective:   Patient seen and examined  No complaints offered  He underwent ORIF with nailing of his left hip on 2022  He states that he is feeling well and slowly getting around with physical therapy  No cardiac complaints offered  Vitals: Blood pressure 142/70, pulse 83, temperature 97 5 °F (36 4 °C), temperature source Oral, resp  rate 20, height 5' 9" (1 753 m), weight 84 kg (185 lb 3 oz), SpO2 95 %  Vitals:    22 0600 22 0600   Weight: 83 5 kg (184 lb) 84 kg (185 lb 3 oz)     Body mass index is 27 35 kg/m²  BP Readings from Last 3 Encounters:   22 142/70   22 122/79   22 120/80     Orthostatic Blood Pressures    Flowsheet Row Most Recent Value   Blood Pressure 142/70 filed at 2022 0108   Patient Position - Orthostatic VS Lying filed at 2022 2305        I/O        0701   0700  0701   0700  0701   0700    P  O  360 600     I V  (mL/kg) 5 (0 1)      Total Intake(mL/kg) 365 (4 3) 600 (7 1)     Urine (mL/kg/hr) 600 (0 3) 1350 (0 7)     Blood       Total Output 600 1350     Net -235 -750                Invasive Devices  Report    Peripheral Intravenous Line  Duration Peripheral IV 06/18/22 Distal;Dorsal (posterior); Left Forearm 1 day          Line  Duration           Venous Sheath Other (Comment) Right Other (Comment) 667 days                  Intake/Output Summary (Last 24 hours) at 6/20/2022 0811  Last data filed at 6/20/2022 0640  Gross per 24 hour   Intake 600 ml   Output 1350 ml   Net -750 ml         Physical Exam:   Physical Exam  Vitals and nursing note reviewed  Constitutional:       General: He is not in acute distress  Appearance: Normal appearance  He is normal weight  HENT:      Left Ear: External ear normal       Ears:      Comments: Hard of hearing  Eyes:      General: No scleral icterus  Right eye: No discharge  Left eye: No discharge  Cardiovascular:      Rate and Rhythm: Normal rate and regular rhythm  Pulses: Normal pulses  Heart sounds: Normal heart sounds  No murmur heard  Pulmonary:      Effort: Pulmonary effort is normal  No respiratory distress  Breath sounds: Normal breath sounds  No wheezing, rhonchi or rales  Abdominal:      General: Bowel sounds are normal  There is no distension  Palpations: Abdomen is soft  Musculoskeletal:      Right lower leg: No edema  Left lower leg: No edema  Skin:     General: Skin is warm and dry  Capillary Refill: Capillary refill takes less than 2 seconds  Neurological:      General: No focal deficit present  Mental Status: He is alert and oriented to person, place, and time  Mental status is at baseline     Psychiatric:         Mood and Affect: Mood normal                  Medications/ Allergies:     Current Facility-Administered Medications   Medication Dose Route Frequency Provider Last Rate    acetaminophen  650 mg Oral Q6H PRN Zoran Dennis MD      atorvastatin  5 mg Oral Daily With Benson Bartholomew MD      cefTRIAXone  1,000 mg Intravenous Q24H Zoran Dennis MD 1,000 mg (06/19/22 0815)    citalopram  20 mg Oral Daily Dung Zac Mehta MD      dextromethorphan-guaiFENesin  10 mL Oral Q4H PRN Thad Crump MD      enoxaparin  40 mg Subcutaneous Q24H Linda Nation MD      lisinopril  5 mg Oral Daily Lj Maria MD      metoprolol tartrate  50 mg Oral Q12H Thad Crump MD      multivitamin stress formula  1 tablet Oral Daily Thad Crump MD      oxyCODONE  2 5 mg Oral Q8H PRN Thad Crump MD       acetaminophen, 650 mg, Q6H PRN  dextromethorphan-guaiFENesin, 10 mL, Q4H PRN  oxyCODONE, 2 5 mg, Q8H PRN      No Known Allergies    VTE Pharmacologic Prophylaxis:   Sequential compression device (Venodyne)     Labs:   CBC with diff:  Results from last 7 days   Lab Units 06/20/22  0523 06/19/22  0611 06/18/22  0531 06/17/22  0524 06/16/22  0752   WBC Thousand/uL 8 74 10 81* 11 74* 18 25* 19 28*   HEMOGLOBIN g/dL 10 1* 11 0* 11 1* 12 1 12 6   HEMATOCRIT % 30 9* 33 8* 33 3* 36 3* 37 5   MCV fL 97 97 95 96 95   PLATELETS Thousands/uL 359 311 252 249 234   MCH pg 31 6 31 5 31 7 32 0 31 8   MCHC g/dL 32 7 32 5 33 3 33 3 33 6   RDW % 14 2 14 3 14 2 13 9 13 7   MPV fL 9 5 9 6 9 9 9 8 9 4   NRBC AUTO /100 WBCs  --   --  0  --   --      CMP:  Results from last 7 days   Lab Units 06/20/22  0523 06/19/22  0611 06/18/22  0531 06/17/22  0524 06/16/22  0752   SODIUM mmol/L 137 139 139 138 134*   POTASSIUM mmol/L 4 3 4 5 4 6 4 4 4 2   CHLORIDE mmol/L 105 105 105 101 99*   CO2 mmol/L 28 28 25 29 26   ANION GAP mmol/L 4 6 9 8 9   BUN mg/dL 31* 38* 35* 30* 28*   CREATININE mg/dL 0 84 0 89 0 93 1 35* 1 48*   CALCIUM mg/dL 8 2* 8 5 8 3 8 4 8 6   AST U/L  --   --   --   --  43   ALT U/L  --   --   --   --  24   ALK PHOS U/L  --   --   --   --  86   TOTAL PROTEIN g/dL  --   --   --   --  6 7   ALBUMIN g/dL  --   --   --   --  2 5*   TOTAL BILIRUBIN mg/dL  --   --   --   --  0 60   EGFR ml/min/1 73sq m 81 79 76 48 43     Magnesium:  Results from last 7 days   Lab Units 06/18/22  0531 06/17/22  0524 06/16/22  0752   MAGNESIUM mg/dL 2 2 2 0 1 6 Coags:  Results from last 7 days   Lab Units 06/16/22  0752   PTT seconds 33   INR  1 25*       Imaging & Testing   I have personally reviewed pertinent reports  CT chest abdomen pelvis wo contrast    Result Date: 6/16/2022  Narrative: CT CHEST, ABDOMEN AND PELVIS WITHOUT IV CONTRAST INDICATION:   Chest trauma, blunt Fall/Trauma and Concern for Pneumonia  COMPARISON:  None  TECHNIQUE: CT examination of the chest, abdomen and pelvis was performed without intravenous contrast  This examination was performed without intravenous contrast in the context of the critical nationwide Omnipaque shortage  Axial, sagittal, and coronal 2D reformatted images were created from the source data and submitted for interpretation  Radiation dose length product (DLP) for this visit:  550 13 mGy-cm   This examination, like all CT scans performed in the [facility], was performed utilizing techniques to minimize radiation dose exposure, including the use of iterative  reconstruction and automated exposure control  Enteric contrast was administered  FINDINGS: CHEST LUNGS:  Dense airspace consolidation seen right upper lobe post posterior aspect  Mild patchy airspace consolidation seen in the right lower lobe A perifissural nodule seen in the left midlung measuring about 3 mm, image 69 series 400 PLEURA:  Small right effusion seen HEART/GREAT VESSELS: Heart is unremarkable for patient's age  Ascending aorta measures 3 9 cm Mild coronary artery calcification seen MEDIASTINUM AND JACQUIE:  Lower right paratracheal lymph nodes are seen measuring about 1 cm Subcarinal lymph node seen measuring about 8 mm Large hiatal hernia seen CHEST WALL AND LOWER NECK:  No significant axillary lymph node enlargement seen Evaluation of the hips is limited due to motion ABDOMEN LIVER/BILIARY TREE:  No focal liver lesion seen GALLBLADDER:  No calcified gallstones  No pericholecystic inflammatory change  SPLEEN:  Unremarkable   PANCREAS: Unremarkable  ADRENAL GLANDS:  Unremarkable  Mild nodularity of the both adrenal glands KIDNEYS/URETERS:  No hydronephrosis Lobulated contour of the kidney STOMACH AND BOWEL:  Fluid noted within the rectum There are no findings of bowel obstruction Hiatal hernia seen APPENDIX:  No findings to suggest appendicitis  ABDOMINOPELVIC CAVITY:  No free fluid seen No fluid in the paracolic gutter, perisplenic region or in Morison's VESSELS:  An IVC filter is noted PELVIS REPRODUCTIVE ORGANS:  Prostate appears unremarkable No pelvic sidewall lymph node and oblique  URINARY BLADDER:  Mild thickening of the urinary bladder wall through its posterior and superior aspect seen ABDOMINAL WALL/INGUINAL REGIONS:  Unremarkable  OSSEOUS STRUCTURES:  Osteoporosis There is compression deformity of the multiple lumbar vertebrae with biconcavity of the L5, L4, L3, L2 vertebra There is severe Central depression of the L1 vertebra with 80-90% loss of vertebral height, chronic There is a fracture of the proximal shaft of the left femur with the medial displacement of the distal fragment with overlapping Evaluation of the pelvic ring is limited due to patient motion Intramedullary nail with the femoral neck screw noted with the healing intertrochanteric fracture of the right femur Lucency seen in the bilateral iliac bone, image 104 series 99     Impression: Dense consolidation right upper lobe patchy consolidation right lower lobe suggests pneumonia/aspiration Small right effusion seen There is a displaced fracture of the proximal shaft of the femur with medial displacement of the distal fragment along with overlapping Lucency seen in the both iliac bone, image 92 series 601 with no correlate on the radiograph may be due to motion or due to nondisplaced fracture   Correlate clinically Bony pelvic CT may be considered as clinically needed Intact acetabulum Osteoporosis with biconcavity of the multiple lumbar thoracic and lumbar vertebrae, chronic Mild thickening of the posterior aspect of the urinary bladder wall noted, correlate with the urinary evaluation and urology evaluation on nonemergent basis Incidentally detected the left perifissural nodule  Based on current Fleischner Society 2017 Guidelines on incidental pulmonary nodule, no routine follow-up is needed if the patient is low risk  If the patient is high risk, optional follow-up chest CT at 12 months can be considered  Consider follow-up at 2 months demonstrate resolution  I personally discussed this study with Mica Public on 6/16/2022 at 9:21 AM  Workstation performed: [id number]     XR hip/pelv 2-3 vws left    Result Date: 6/16/2022  Narrative: LEFT HIP INDICATION:   Fracture  COMPARISON:  None VIEWS:  XR HIP/PELV 2-3 VWS LEFT  W PELVIS IF PERFORMED FINDINGS: There is an acute, displaced fracture of the proximal femoral shaft extending to the neck  No dislocation  Partially included right hip hardware with associated healed fracture deformity  No significant hip degenerative changes  No lytic or blastic osseous lesion  Soft tissues are unremarkable  The visualized lumbar spine is unremarkable  Impression: Acute, displaced left proximal femoral shaft fracture extending to the neck  No dislocation  Workstation performed: [id number]     XR femur 2 vw left    Result Date: 6/18/2022  Narrative: LEFT FEMUR INDICATION:   Post op eval  COMPARISON:  06/16/2022 VIEWS:  XR FEMUR 2 VW LEFT Images: 4 FINDINGS: ORIF subtrochanteric fracture  Orthopedic hardware in good position  Mild narrowing of the left hip joint  No lytic or blastic osseous lesion  Expected postoperative changes in the soft tissues  Impression: ORIF left subtrochanteric fracture  Workstation performed: [id number]     XR femur 2 views LEFT    Result Date: 6/16/2022  Narrative: LEFT FEMUR INDICATION:   Fracture   COMPARISON:  None VIEWS:  XR FEMUR 2 VW LEFT FINDINGS: There is an acute, displaced fracture of the proximal femoral shaft extending to the neck  No dislocation  No significant degenerative changes  No lytic or blastic osseous lesion  Soft tissues are unremarkable  Impression: Acute, displaced left proximal femoral shaft fracture extending to the neck  No dislocation  Workstation performed: [id number]     XR knee 1 or 2 vw left    Result Date: 6/16/2022  Narrative: LEFT KNEE INDICATION:   Fracture  COMPARISON:  None VIEWS:  XR KNEE 1 OR 2 VW LEFT FINDINGS: There is no acute fracture or dislocation  There is no joint effusion  No significant degenerative changes  No lytic or blastic osseous lesion  Soft tissues are unremarkable  Impression: No acute osseous abnormality  Workstation performed: [id number]     CT head without contrast    Result Date: 6/16/2022  Narrative: CT BRAIN - WITHOUT CONTRAST INDICATION:   Head trauma, minor (Age >= 65y) Fall with head injury  COMPARISON: December 1, 2021 TECHNIQUE:  CT examination of the brain was performed  In addition to axial images, sagittal and coronal 2D reformatted images were created and submitted for interpretation  Radiation dose length product (DLP) for this visit:  896 44 mGy-cm   This examination, like all CT scans performed in the [facility], was performed utilizing techniques to minimize radiation dose exposure, including the use of iterative  reconstruction and automated exposure control  IMAGE QUALITY:  Diagnostic  FINDINGS: PARENCHYMA:  No intracranial mass, mass effect or midline shift  No CT signs of acute infarction  No acute parenchymal hemorrhage  Mild periventricular and white matter hypodensity seen related to chronic small vessel ischemic VENTRICLES AND EXTRA-AXIAL SPACES:  Normal for the patient's age  VISUALIZED ORBITS AND PARANASAL SINUSES:  Unremarkable   Mild chronic mucosal thickening right maxillary sinus CALVARIUM AND EXTRACRANIAL SOFT TISSUES:  Normal      Impression: No acute intracranial hemorrhage seen No mass effect or midline shift seen Mild periventricular and white matter hypodensity related to chronic small vessel Workstation performed: [id number]     Echo complete w/ contrast if indicated    Result Date: 6/17/2022  Narrative: [facility]  Left Ventricle: Left ventricular cavity size is normal  Wall thickness is mildly increased  Systolic function is normal   Estimated ejection fraction is 60%  Although no diagnostic regional wall motion abnormality was identified, this possibility cannot be completely excluded on the basis of this study  Diastolic function is mildly abnormal, consistent with grade I (abnormal) relaxation    IVS: There is sigmoid appearance of the septum without any significant LVOT obstruction    Right Ventricle: Systolic function is normal    Aortic Valve: There is mild regurgitation    Tricuspid Valve: There is mild to moderate regurgitation  The right ventricular systolic pressure is mildly to moderately elevated at 51 mmHg    No significant changes noted compared to the prior study  Alberto Minaya  Cardiology      "This note has been constructed using a voice recognition system  Therefore there may be syntax, spelling, and/or grammatical errors   Please call if you have any questions  "

## 2022-06-20 NOTE — PROGRESS NOTES
Progress Note - Pulmonary   Nay Blum 80 y o  male MRN: [id number]  Unit/Bed#: 2 [place] [address] Encounter: [id number]    Assessment and Plan:    1  Acute hypoxic respiratory failure :  He is currently on 2 L of nasal cannula oxygen and is saturating well  Continue titrating down the FiO2  Home O2 evaluation prior to discharge  2  Right multilobar community-acquired pneumonia:  His CT scan showed right multilobar pneumonia and was started on ceftriaxone and azithromycin  Her pneumococcus and and Legionella antigens were negative  Azithromycin has been discontinued  The blood cultures have been negative so far  Change to oral antibiotic and complete a total course for 7 days  3  History of pulmonary embolism:  He has history of pulmonary embolism and had IVC filter placed in 2019  Currently he is on prophylactic Lovenox    4  Acute left femoral fracture: Status post surgery  Gina Console Spoke to patient answered all questions  Spoke to the patient's hospitalist   The patient likely is to be discharged to a rehab facility soon  We will sign off  Please call us back if needed  Thank you for allowing me to participate in the care of the patient      Chief Complaint:   Shortness of breath better  Still has left hip pain    Subjective:   Continues to have left hip pain though better  Denies any significant cough or phlegm  Shortness of breath better  No chest pain or palpitations  No fever or chills    Objective:     Vitals: Blood pressure (!) 176/73, pulse 86, temperature 97 8 °F (36 6 °C), resp  rate 18, height 5' 9" (1 753 m), weight 84 kg (185 lb 3 oz), SpO2 91 %  ,Body mass index is 27 35 kg/m²  Intake/Output Summary (Last 24 hours) at 6/20/2022 1236  Last data filed at 6/20/2022 0640  Gross per 24 hour   Intake 360 ml   Output 1150 ml   Net -790 ml       Invasive Devices  Report    Peripheral Intravenous Line  Duration           Peripheral IV 06/18/22 Distal;Dorsal (posterior); Left Forearm 1 day Line  Duration           Venous Sheath Other (Comment) Right Other (Comment) 668 days                Physical Exam:  On clinical examination, he is out of bed to chair  Hemodynamically stable and afebrile  Comfortable on 2 L of nasal cannula oxygen  Saturating 95%  Not in any distress  HEENT:  No conjunctival pallor no cyanosis  Neck no jugular venous distention no significant neck or supraclavicular adenopathy  Heart S1-S2 heard  Chest air entry present bilaterally no significant crackles or rhonchi  Abdomen soft and nontender  Neurologically awake alert oriented  Extremities no significant edema or clubbing  Labs: I have personally reviewed pertinent lab results  Leukocytosis has resolved  Elevated procalcitonin  Hemoglobin 10 1  Creatinine normal   Blood cultures are negative so far  Imaging and other studies: I have personally reviewed pertinent reports  His CT scan showed right upper lobe consolidation and patchy airspace consolidation in the right lower lobe

## 2022-06-20 NOTE — CASE MANAGEMENT
Case Management Discharge Planning Note    Patient name Sarwat Quick  Location [address] MRN [id number]  : 1940 Date 2022       Current Admission Date: 2022  Current Admission Diagnosis:Pneumonia of right lung due to infectious organism   Patient Active Problem List    Diagnosis Date Noted    DVT (deep venous thrombosis) ([facility] ) 2022    Closed fracture of shaft of left femur ([facility] ) 2022    Pneumonia of right lung due to infectious organism 2022    Sepsis ([facility] ) 2022    [facility] 20[facility]    Medicare annual wellness visit, subsequent 2020    Paroxysmal atrial flutter ([facility] ) 2020    Edema of left lower leg 2020    Urinary frequency 2020    Valvular heart disease 2019    S/P IVC filter 2019    SVT (supraventricular tachycardia) ([facility] ) 2019    Impaired vision 2019    Conductive hearing loss, bilateral 2019    Hyperlipidemia     Mixed obsessional thoughts and acts 10/14/2019    Allergic rhinitis 10/14/2019    Hypercholesteremia 10/14/2019    Sensorineural hearing loss (SNHL) 10/14/2019    Nonrheumatic mitral valve regurgitation 10/14/2019    Aortic valve sclerosis 10/14/2019    Nonrheumatic aortic valve insufficiency 10/14/2019    Essential hypertension 10/14/2019    Recurrent major depressive disorder, in full remission ([facility] ) 10/14/2019      LOS (days): 4  Geometric Mean LOS (GMLOS) (days): 9 60  Days to GMLOS:5 3     OBJECTIVE:  Risk of Unplanned Readmission Score: 12 53     Current admission status: Inpatient   Preferred Pharmacy:   [facility] [address] [place] [address]  Phone: [phone] Fax: [phone]    Primary Care Provider:  Devan Nicole MD    Primary Insurance: [facility]  Secondary Insurance:     DISCHARGE DETAILS:    Discharge planning discussed with[de-identified] Patient  Freedom of Choice: Yes  Comments - Freedom of Choice: Discharge planned  SW met with pt to discuss transfer to [facility] today  Bed is available and authorization has been obtained  Attempted to reach wife but phone line busy  Message left for daughter, Coleen Grullon  CM contacted family/caregiver?: Yes    Contacts  Patient Contacts: Coleen Grullon  Relationship to Patient[de-identified] Family (daughter)  Contact Method: Phone (message left)  Phone Number: [phone]    Other Referral/Resources/Interventions Provided:  Interventions: Short Term Rehab  Referral Comments: STR planned at [facility]  Authorization approval has been received from [facility]/[employer]      Treatment Team Recommendation: Short Term Rehab  Discharge Destination Plan[de-identified] Short Term Rehab  Transport at Discharge : [facility]  Dispatcher Contacted: Yes  Number/Name of Dispatcher: [facility]  Transported by [employer] and Unit #):  (pending)  ETA of Transport (Date): 06/20/22  ETA of Transport (Time): 1294 (requested)    Accepting Facility Name, [address] : [address] [facility]  Receiving Facility/Agency Phone Number: [address] [facility] Number: Tana Olvera received from [place] #[id number] - 3 days starting 6/20, NRD 6/22 - follow up with Gilbert García, fax [phone] 86yoF w/ PMHx significant for ESRD on HD on T/Th/Sat, Afib (on Coumadin), PPM Implanted, Mitral stenosis/Tricuspid Regurgitation s/p annuloplasty (2016), chronic hypotension, remote hx of colon Ca s/p resection, pulmonary HTN, COPD, restrictive lung disease, anemia, RML lung mass and metastatic disease to bone s/p IR biopsy of R femur bone 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

## 2022-06-20 NOTE — OCCUPATIONAL THERAPY NOTE
OT TREATMENT       06/20/22 1040   Note Type   Note Type Treatment   Restrictions/Precautions   Weight Bearing Precautions Per Order Yes   LLE Weight Bearing Per Order WBAT  (POD#3 s/p Long TFN L femur)   Other Precautions Hard of hearing;O2;Fall Risk;Pain; Chair Alarm; Bed Alarm   Pain Assessment   Pain Assessment Tool [place]-Baker FACES   [place]-Baker FACES Pain Rating 6   Pain Location/Orientation Orientation: Left; Location: Hip   ADL   Grooming Assistance 5  Supervision/Setup   Grooming Deficit Wash/dry face   Bed Mobility   Supine to Sit 2  Maximal assistance   Additional items Assist x 2; Increased time required;LE management   Transfers   Sit to Stand 2  Maximal assistance   Additional items Assist x 2; Increased time required;Verbal cues  (with RW)   Stand to Sit 2  Maximal assistance   Additional items Assist x 2; Increased time required;Verbal cues   Stand pivot 2  Maximal assistance   Additional items Assist x 2; Increased time required;Verbal cues  (Left knee buckling)   Functional Mobility   Functional Mobility 2  Maximal assistance   Additional Comments Max assist x2 to ambulated a few steps to chair; pts left knee buckling   Therapeutic Exercise - ROM   UE-ROM Yes   ROM- Right Upper Extremities   R Shoulder AROM; Flexion   R Elbow AROM;Elbow flexion;Elbow extension   R Hand AROM; Thumb; Long finger; Index finger;Ring finger;Little finger   R Position Seated  (In bed with head of bed raised)   R Weight/Reps/Sets 10 reps x 1 set   ROM - Left Upper Extremities    L Shoulder AROM; Flexion   L Elbow AROM;Elbow flexion;Elbow extension   L Hand AROM; Thumb; Index finger; Long finger;Ring finger;Little finger   L Position Seated  (In bed with head of bed raised)   L Weight/Reps/Sets 10 reps x 1 set   Cognition   Overall Cognitive Status WFL   Arousal/Participation Alert; Responsive; Cooperative   Attention Within functional limits   Orientation Level Oriented X4   Following Commands Follows multistep commands with increased time or repetition   Activity Tolerance   Activity Tolerance Patient limited by fatigue;Patient limited by pain;Treatment limited secondary to medical complications (Comment)  (Increased heart rate)   Medical Staff Made Aware Yes, Dr Fernandez Villa Patient seen by OT this AM  Patient WBAT POD#3 s/p Long TFN L femur  Patient requiring max assist x2 for all functional transfers and ambulating with RW  Patient with increased heart rate and shortness of breath upon excertion  Dr Simeon Vaughn made aware  During ambulation patients left knee was buckling and patient requiring max verbal cues  Patient would benefit from continued skilled OT services to address decreased endurance, activity tolerance, SOB and overall particpation in ADLS/IADLS  The patient's raw score on the AM-PAC Daily Activity inpatient short form is 16, standardized score is 35 96, less than 39 4  Patients at this level are likely to benefit from DC to post-acute rehabilitation services  Please refer to the recommendation of the Occupational Therapist for safe DC planning  Plan   Treatment Interventions ADL retraining;Functional transfer training;UE strengthening/ROM; Endurance training;Patient/family training;Energy conservation; Activityengagement; Fine motor coordination activities   Goal Expiration Date 07/04/22   OT Frequency 5x/wk   Recommendation   OT Discharge Recommendation Post acute rehabilitation services   AM-[facility] Daily Activity Inpatient   Lower Body Dressing 2   Bathing 2   Toileting 2   Upper Body Dressing 3   Grooming 3   Eating 4   Daily Activity Raw Score 16   Daily Activity Standardized Score (Calc for Raw Score >=11) 35 96   AM-PAC Applied Cognition Inpatient   Following a Speech/Presentation 4   Understanding Ordinary Conversation 4   Taking Medications 4   Remembering Where Things Are Placed or Put Away 4   Remembering List of 4-5 Errands 4   Taking Care of Complicated Tasks 3   Applied Cognition Raw Score 23   Applied Cognition Standardized Score 60 88   Licensure   [place] License Number  [employer], OTS

## 2022-06-20 NOTE — PHYSICAL THERAPY NOTE
PT TREATMENT       06/20/22 1041   PT Last Visit   PT Visit Date 06/20/22   Note Type   Note Type Treatment   Pain Assessment   Pain Assessment Tool 0-10   Pain Score 5   Pain Location/Orientation Orientation: Left; Location: Hip   Pain Onset/Description Onset: Ongoing   Multiple Pain Sites No   Restrictions/Precautions   Weight Bearing Precautions Per Order Yes   LLE Weight Bearing Per Order WBAT   Other Precautions Hard of hearing; Fall Risk;Pain   General   Chart Reviewed Yes   Additional Pertinent History Pt now POD #3 Left hip IM nailing due to fall + L hip fracture   Family/Caregiver Present No   Cognition   Overall Cognitive Status WFL   Arousal/Participation Cooperative; Alert   Attention Within functional limits   Orientation Level Oriented X4   Memory Within functional limits   Following Commands Follows multistep commands with increased time or repetition   Subjective   Subjective "i am feeling okay today  Still having some soreness in my L hip "   Bed Mobility   Rolling R 3  Moderate assistance   Additional items Assist x 2;Verbal cues   Rolling L 3  Moderate assistance   Additional items Assist x 2;Verbal cues   Supine to Sit 3  Moderate assistance   Additional items Assist x 2;Verbal cues   Transfers   Sit to Stand 3  Moderate assistance   Additional items Assist x 2;Verbal cues   Stand to Sit 3  Moderate assistance   Additional items Assist x 2; Increased time required;Verbal cues   Stand pivot 3  Moderate assistance   Additional items Assist x 2;Verbal cues; Increased time required   Ambulation/Elevation   Gait pattern Improper Weight shift; Antalgic; Wide ALEX; Decreased foot clearance; Foward flexed; Step to   Gait Assistance 6  Modified independent   Additional items Assist x 2;Verbal cues   Assistive Device Rolling walker   Distance 6-8 steps from bed to chair   Balance   Static Sitting Fair +   Dynamic Sitting Fair   Static Standing Fair -   Dynamic Standing Poor +   Ambulatory Poor +   Activity Tolerance   Activity Tolerance Patient tolerated treatment well;Patient limited by pain   Exercises   Neuro re-ed Pt able to participate in supine/seated therex including ankle pumps, quad sets, LAQ, seated hip marches, hip abd with assist   Assessment   Prognosis Good   Problem List Decreased strength;Decreased endurance;Decreased safety awareness;Decreased mobility;Pain;Decreased coordination; Impaired balance   Assessment Pt agreeable to participate in PT session this AM  Pt received working with OT team and assisted to perform TF to bedside chair  Pt presents with intermittent L knee buckling with 6-8 steps to bedside chair with minor difficulty weight-shifting to LLE  Pt able to participate in seated therex with assist as mentioned above  Attempted to perform standing/amb trial but patient refusing once sitting in chair, reports fatigue/soreness at L hip, requesting ice pack  Left sitting in bedside chair with chair alarm donned, all needs within reach  The patient's AM-[facility] Basic Mobility Inpatient Short Form Raw Score is 11  A Raw score of less than or equal to 16 suggests the patient may benefit from discharge to post-acute rehabilitation services  Goals   Patient Goals To decrease pain, move better   Plan   Treatment/Interventions ADL retraining; Therapeutic exercise; Bed mobility;Gait training;LE strengthening/ROM; Functional transfer training   Progress Progressing toward goals   PT Frequency   (Daily)   Recommendation   PT Discharge Recommendation Post acute rehabilitation services   AM-[facility] Basic Mobility Inpatient   Turning in Bed Without Bedrails 2   Lying on Back to Sitting on Edge of Flat Bed 2   Moving Bed to Chair 2   Standing Up From Chair 2   Walk in Room 2   Climb 3-5 Stairs 1   Basic Mobility Inpatient Raw Score 11   Basic Mobility Standardized Score 30 25   Turning Head Towards Sound 4   Follow Simple Instructions 3   Low Function Basic Mobility Raw Score 18   Low Function Basic Mobility Standardized Score 29 25   Highest Level Of Mobility   -[facility] Goal 4: Move to chair/commode   -HL Achieved 4: Move to chair/commode   End of Consult   Patient Position at End of Consult Bedside chair; All needs within reach   [facility] Insurance Number  Shonda Lindo PT [id number] impaired

## 2022-06-20 NOTE — DISCHARGE INSTR - AVS FIRST PAGE
DVT prophylaxis for 6 weeks postoperatively  WBAT LLE  Analgesia PRN  Follow up labs -CBC, BMP  Dressings may stay in place x7 days postoperatively  Change on postop day 7 or if greater than 50% saturation occurs    Discharge planning - follow-up with ortho in approximately 2 weeks time for continued postoperative care and staple removal

## 2022-06-20 NOTE — PROGRESS NOTES
Progress Note - Orthopedics   Angie Dunlap 80 y o  male MRN: [id number]  Unit/Bed#: [address] [place] [address] Encounter: [id number]    Assessment:  1) POD# 3 s/p L TFN left femur    Plan:  Postop antibiotics completed  DVT prophylaxis:  Lovenox 40 mg subQ q day/SCD's/Ambulation- will need DVT prophylaxis for 6 weeks postoperatively  WBAT LLE  PT/OT- WBAT LLE  Analgesia PRN  Follow up AM labs-hemoglobin hematocrit stable  Dressings may stay in place x7 days postoperatively  Change on postop day 7 or if greater than 50% saturation occurs  Discharge planning - plan for STR today  I will see the patient back in approximately 2 weeks time for continued postoperative care and staple removal     Weight bearing: WBAT RLE    VTE Pharmacologic Prophylaxis: Enoxaparin (Lovenox)  VTE Mechanical Prophylaxis: sequential compression device    Subjective:  Patient seen examined at bedside  Patient is sitting in chair comfortably  Pain controlled    Vitals: Blood pressure 132/61, pulse 76, temperature 97 8 °F (36 6 °C), resp  rate 18, height 5' 9" (1 753 m), weight 84 kg (185 lb 3 oz), SpO2 93 %  ,Body mass index is 27 35 kg/m²  Intake/Output Summary (Last 24 hours) at 6/20/2022 1529  Last data filed at 6/20/2022 0640  Gross per 24 hour   Intake --   Output 750 ml   Net -750 ml       Invasive Devices  Report    Peripheral Intravenous Line  Duration           Peripheral IV 06/18/22 Distal;Dorsal (posterior); Left Forearm 2 days          Line  Duration           Venous Sheath Other (Comment) Right Other (Comment) 668 days                Physical Exam: NAD  Ortho Exam: LLE: Dsg c/d/i- stable straight through, thigh soft, +DF/PF/EHL, +L3-S1 SILT, DP2+, foot warm    Chrissy LOUISE    Division of Adult Reconstruction  Department of Orthopaedic Surgery  [facility] [place]

## 2022-06-20 NOTE — DISCHARGE SUMMARY
Discharge Summary - [facility] Internal Medicine    Patient Information: Hanna Katz 80 y o  male MRN: [id number]  Unit/Bed#: [address] [place] [address] Encounter: [id number]    Discharging Physician / Practitioner: Heide Clarke MD  PCP: Willian Isaac MD  Admission Date: 6/16/2022  Discharge Date: 06/20/22    Reason for Admission: Fall and Fever - 75 years or older (Pt was dizzy and fell today now reports of l hip/leg pain  Pt hit head)      Discharge Diagnoses:     Principal Problem:    Pneumonia of right lung due to infectious organism  Active Problems:    Closed fracture of shaft of left femur (HCC)    Valvular heart disease    Paroxysmal atrial flutter (HCC)    Hyperlipidemia    S/P IVC filter    Recurrent major depressive disorder, in full remission ([facility] )    DVT (deep venous thrombosis) ([facility] )  Resolved Problems:    Sepsis ([facility] )    Acute kidney injury ([facility] )    Hyponatremia        Closed fracture of shaft of left femur (HCC)  Assessment & Plan  Status post mechanical fall, resulting in acute displaced left proximal femoral shaft fracture extending to the neck  Fall likely precipitated by acute illness  Status post long TFN left femur, 6/17  Doing well postoperatively  Pain is controlled    Voiding well postoperatively  · Pain control-with Tylenol and oxycodone prior to procedure  · PT/OT-weight-bearing as tolerated  · DVT prophylaxis-with Lovenox 6 weeks postoperative  · Incentive spirometry  · Follow-up labs-CBC and BMP  · Follow-up with orthopedic after discharge in 2 weeks    * Pneumonia of right lung due to infectious organism  Assessment & Plan  Presented with symptoms of cough with yellow expectoration, shortness of breath, generalized malaise, poor appetite, nausea, loose bowel movement over last few days  CT revealed evidence of dense right upper lobe and patchy right lower lobe consolidation  SARS-CoV-2, influenza RSV PCR negative, blood culture, urine Legionella pneumococcal antigen negative  Remains afebrile,  Leukocytosis normalized  Procalcitonin downtrending  · Treated with IV ceftriaxone, azithromycin  Azithromycin was discontinued once Legionella antigen was negative  Transition to [facility] to complete 7 days  · Pulmonary evaluation appreciated  · Supplemental oxygen as needed  · Incentive spirometry  · Aspiration precaution, Speech and swallow evaluation-recommended regular diet with thin liquid  · Follow up repeat CT scan outpatient in 2 months for resolution and for pulmonary nodule in 1 year if persist    Sepsis (HCC)-resolved as of 6/20/2022  Assessment & Plan  As evidenced by fever, tachycardia, leukocytosis  Lactic acid 1 7  Secondary to pneumonia  Remains afebrile, leukocytosis improved  Hemodynamically stable  · Follow-up blood culture-negative so far  · Trend procalcitonin-downtrending  · Continue antibiotics as above  · Follow-up CBC  · Monitor hemodynamics      Paroxysmal atrial flutter ([address] )  Assessment & Plan  Patient with history of paroxysmal AFib/PSVT in setting of pulmonary embolism  Currently in sinus rhythm  · Continue metoprolol , titrate up to 100 mg q 12 hours  · DVT prophylaxis  · Follow up with Cardiology      Valvular heart disease  Assessment & Plan  2D echo-3/20-EF 65-70%, mild aortic sclerosis, mild-to-moderate aortic regurg, mild MR mild TR  Repeat echo EF 23%, grade 1 diastolic dysfunction  · Appears stable  · Continue metoprolol, ACE-inhibitor  · Cardiology input appreciated -metoprolol was titrated up  · Follow-up with Cardiology after discharge    Acute kidney injury (HCC)-resolved as of 6/20/2022  Assessment & Plan  Creatinine noted to be 1 48  UA moderate blood, 2+ protein, 0-1 WBCs  Likely secondary to poor p o   Intake, diarrhea, sepsis  Improved to baseline,  · Monitor intake output  · Avoid hypotension, nephrotoxin  · Follow-up BMP    S/P IVC filter  Assessment & Plan  Patient with 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,  Subsequently patient had IVC filter conversion of [employer] convertible filter, 08/2020  ? Deactivated  Continue DVT prophylaxis    Hyperlipidemia  Assessment & Plan  On statin    Hyponatremia-resolved as of 6/18/2022  Assessment & Plan  Improved with IV fluids    Recurrent major depressive disorder, in full remission ([facility] [address] )  Assessment & Plan  On Celexa      Consultations During Hospital Stay:  IP CONSULT TO CARDIOLOGY  IP CONSULT TO PULMONOLOGY    Procedures Performed:     · Procedure(s):  · INSERTION NAIL IM FEMUR ANTEGRADE (Keith Wilson) - long nail    Significant Findings:     · Refer to hospital course and above listed diagnosis related plan for details    Imaging while in hospital:    CT chest abdomen pelvis wo contrast    Result Date: 6/16/2022  Narrative: CT CHEST, ABDOMEN AND PELVIS WITHOUT IV CONTRAST INDICATION:   Chest trauma, blunt Fall/Trauma and Concern for Pneumonia  COMPARISON:  None  TECHNIQUE: CT examination of the chest, abdomen and pelvis was performed without intravenous contrast  This examination was performed without intravenous contrast in the context of the critical nationwide Omnipaque shortage  Axial, sagittal, and coronal 2D reformatted images were created from the source data and submitted for interpretation  Radiation dose length product (DLP) for this visit:  550 13 mGy-cm   This examination, like all CT scans performed in the [facility], was performed utilizing techniques to minimize radiation dose exposure, including the use of iterative  reconstruction and automated exposure control  Enteric contrast was administered  FINDINGS: CHEST LUNGS:  Dense airspace consolidation seen right upper lobe post posterior aspect   Mild patchy airspace consolidation seen in the right lower lobe A perifissural nodule seen in the left midlung measuring about 3 mm, image 69 series 400 PLEURA:  Small right effusion seen HEART/GREAT VESSELS: Heart is unremarkable for patient's age   Ascending aorta measures 3 9 cm Mild coronary artery calcification seen MEDIASTINUM AND JACQUIE:  Lower right paratracheal lymph nodes are seen measuring about 1 cm Subcarinal lymph node seen measuring about 8 mm Large hiatal hernia seen CHEST WALL AND LOWER NECK:  No significant axillary lymph node enlargement seen Evaluation of the hips is limited due to motion ABDOMEN LIVER/BILIARY TREE:  No focal liver lesion seen GALLBLADDER:  No calcified gallstones  No pericholecystic inflammatory change  SPLEEN:  Unremarkable  PANCREAS:  Unremarkable  ADRENAL GLANDS:  Unremarkable  Mild nodularity of the both adrenal glands KIDNEYS/URETERS:  No hydronephrosis Lobulated contour of the kidney STOMACH AND BOWEL:  Fluid noted within the rectum There are no findings of bowel obstruction Hiatal hernia seen APPENDIX:  No findings to suggest appendicitis  ABDOMINOPELVIC CAVITY:  No free fluid seen No fluid in the paracolic gutter, perisplenic region or in Morison's VESSELS:  An IVC filter is noted PELVIS REPRODUCTIVE ORGANS:  Prostate appears unremarkable No pelvic sidewall lymph node and oblique  URINARY BLADDER:  Mild thickening of the urinary bladder wall through its posterior and superior aspect seen ABDOMINAL WALL/INGUINAL REGIONS:  Unremarkable   OSSEOUS STRUCTURES:  Osteoporosis There is compression deformity of the multiple lumbar vertebrae with biconcavity of the L5, L4, L3, L2 vertebra There is severe Central depression of the L1 vertebra with 80-90% loss of vertebral height, chronic There is a fracture of the proximal shaft of the left femur with the medial displacement of the distal fragment with overlapping Evaluation of the pelvic ring is limited due to patient motion Intramedullary nail with the femoral neck screw noted with the healing intertrochanteric fracture of the right femur Lucency seen in the bilateral iliac bone, image 104 series 99     Impression: Dense consolidation right upper lobe patchy consolidation right lower lobe suggests pneumonia/aspiration Small right effusion seen There is a displaced fracture of the proximal shaft of the femur with medial displacement of the distal fragment along with overlapping Lucency seen in the both iliac bone, image 92 series 601 with no correlate on the radiograph may be due to motion or due to nondisplaced fracture  Correlate clinically Bony pelvic CT may be considered as clinically needed Intact acetabulum Osteoporosis with biconcavity of the multiple lumbar thoracic and lumbar vertebrae, chronic Mild thickening of the posterior aspect of the urinary bladder wall noted, correlate with the urinary evaluation and urology evaluation on nonemergent basis Incidentally detected the left perifissural nodule  Based on current Fleischner Society 2017 Guidelines on incidental pulmonary nodule, no routine follow-up is needed if the patient is low risk  If the patient is high risk, optional follow-up chest CT at 12 months can be considered  Consider follow-up at 2 months demonstrate resolution  I personally discussed this study with Josue Chin on 6/16/2022 at 9:21 AM  Workstation performed: [id number]     XR hip/pelv 2-3 vws left    Result Date: 6/16/2022  Narrative: LEFT HIP INDICATION:   Fracture  COMPARISON:  None VIEWS:  XR HIP/PELV 2-3 VWS LEFT  W PELVIS IF PERFORMED FINDINGS: There is an acute, displaced fracture of the proximal femoral shaft extending to the neck  No dislocation  Partially included right hip hardware with associated healed fracture deformity  No significant hip degenerative changes  No lytic or blastic osseous lesion  Soft tissues are unremarkable  The visualized lumbar spine is unremarkable  Impression: Acute, displaced left proximal femoral shaft fracture extending to the neck  No dislocation   Workstation performed: [id number]     XR femur 2 vw left    Result Date: 6/18/2022  Narrative: LEFT FEMUR INDICATION:   Post op eval  COMPARISON: 06/16/2022 VIEWS:  XR FEMUR 2 VW LEFT Images: 4 FINDINGS: ORIF subtrochanteric fracture  Orthopedic hardware in good position  Mild narrowing of the left hip joint  No lytic or blastic osseous lesion  Expected postoperative changes in the soft tissues  Impression: ORIF left subtrochanteric fracture  Workstation performed: [id number]     XR femur 2 views LEFT    Result Date: 6/16/2022  Narrative: LEFT FEMUR INDICATION:   Fracture  COMPARISON:  None VIEWS:  XR FEMUR 2 VW LEFT FINDINGS: There is an acute, displaced fracture of the proximal femoral shaft extending to the neck  No dislocation  No significant degenerative changes  No lytic or blastic osseous lesion  Soft tissues are unremarkable  Impression: Acute, displaced left proximal femoral shaft fracture extending to the neck  No dislocation  Workstation performed: [id number]     XR knee 1 or 2 vw left    Result Date: 6/16/2022  Narrative: LEFT KNEE INDICATION:   Fracture  COMPARISON:  None VIEWS:  XR KNEE 1 OR 2 VW LEFT FINDINGS: There is no acute fracture or dislocation  There is no joint effusion  No significant degenerative changes  No lytic or blastic osseous lesion  Soft tissues are unremarkable  Impression: No acute osseous abnormality  Workstation performed: [id number]     CT head without contrast    Result Date: 6/16/2022  Narrative: CT BRAIN - WITHOUT CONTRAST INDICATION:   Head trauma, minor (Age >= 65y) Fall with head injury  COMPARISON: December 1, 2021 TECHNIQUE:  CT examination of the brain was performed  In addition to axial images, sagittal and coronal 2D reformatted images were created and submitted for interpretation  Radiation dose length product (DLP) for this visit:  896 44 mGy-cm   This examination, like all CT scans performed in the [facility], was performed utilizing techniques to minimize radiation dose exposure, including the use of iterative  reconstruction and automated exposure control    IMAGE QUALITY: Diagnostic  FINDINGS: PARENCHYMA:  No intracranial mass, mass effect or midline shift  No CT signs of acute infarction  No acute parenchymal hemorrhage  Mild periventricular and white matter hypodensity seen related to chronic small vessel ischemic VENTRICLES AND EXTRA-AXIAL SPACES:  Normal for the patient's age  VISUALIZED ORBITS AND PARANASAL SINUSES:  Unremarkable  Mild chronic mucosal thickening right maxillary sinus CALVARIUM AND EXTRACRANIAL SOFT TISSUES:  Normal      Impression: No acute intracranial hemorrhage seen No mass effect or midline shift seen Mild periventricular and white matter hypodensity related to chronic small vessel Workstation performed: [id number]     Echo complete w/ contrast if indicated    Result Date: 6/17/2022  Narrative: Normie Glory  Left Ventricle: Left ventricular cavity size is normal  Wall thickness is mildly increased  Systolic function is normal   Estimated ejection fraction is 60%  Although no diagnostic regional wall motion abnormality was identified, this possibility cannot be completely excluded on the basis of this study  Diastolic function is mildly abnormal, consistent with grade I (abnormal) relaxation    IVS: There is sigmoid appearance of the septum without any significant LVOT obstruction    Right Ventricle: Systolic function is normal    Aortic Valve: There is mild regurgitation    Tricuspid Valve: There is mild to moderate regurgitation  The right ventricular systolic pressure is mildly to moderately elevated at 51 mmHg    No significant changes noted compared to the prior study  Incidental Findings:   · CT scan findings as above-pulmonary nodule and bladder thickening  Test Results Pending at Discharge (will require follow up):   · As per After Visit Summary     Outpatient Tests Requested:  · CT scan in 2 months    Complications:  Refer to hospital course and above listed diagnosis related plan, if any    Hospital Course:    As per HP  Leandro Kirk is a 80 y o  male patient with history of hypertension, dyslipidemia, paroxysmal AFib, valvular disease, history of provoked pulmonary embolism status post IVC filter who originally presented to the hospital on 6/16/2022  with a fall  Patient reported that he was not been feeling well since last Monday with malaise, generalized weakness and poor appetite  Patient reported that he has been having cough recently which he thought was secondary to allergies but it continued to get worse, he also reported mild shortness of breath  Due to weakness patient had a minor fall on Monday without any injury but on the day of admission morning he sustained another fall  He may have felt a little lightheaded  Patient denied any loss of consciousness, he reported having left hip pain and was brought to ED      On presentation patient was noted to be febrile with T-max of 101 2° F with tachycardia, saturating 89% on room air  CT head did not reveal any acute abnormality  Left hip x-ray revealed acute displaced left proximal femoral shaft fracture extending to neck  CT of the chest abdomen pelvis revealed dense consolidation of the right upper lobe and patchy consolidation of the right lower lobe  Patient was subsequently admitted  Patient currently is comfortably lying in bed reports mild left hip discomfort  Denies any chest pain or shortness of breath  Patient was subsequently admitted, treated with IV antibiotics for pneumonia with improvement in respiratory parameters  Patient was seen by Cardiology for preop evaluation  Seen by orthopedic and subsequently scheduled for surgical intervention for fracture as above  Patient was also seen by Pulmonary during hospitalization  Patient improved with pneumonia with IV antibiotics  Postoperative course was unremarkable  Patient was continued on physical therapy and labs are monitor  Cardiac medications were adjusted as per Cardiology recommendation    Patient was cleared by Pulmonary Cardiology and Orthopedic for discharge  Patient currently is afebrile, hemodynamically stable  Denies any cardiorespiratory symptoms  Patient is progressing with physical therapy  Patient will be discharged to rehab will continue antibiotics for 2 more days to complete 7 days  Labs will be monitored  Patient will be continued on DVT prophylaxis postoperatively for 6 weeks  Patient will follow-up with PCP and orthopedic after discharge  Patient was advised to follow-up with Cardiology after discharge  Patient will need repeat imaging for incidental finding  Please see above list of diagnoses and related plan for additional information  Condition at Discharge: stable     Discharge Day Visit / Exam:     Subjective:  Feels well  Sitting up in chair  Denies any chest pain shortness of breath or cough  Mild postoperative pain control with Tylenol and oxycodone      Vitals: Blood Pressure: 132/61 (06/20/22 1523)  Pulse: 76 (06/20/22 1523)  Temperature: 97 8 °F (36 6 °C) (06/20/22 1523)  Temp Source: Oral (06/19/22 2305)  Respirations: 18 (06/20/22 0813)  Height: 5' 9" (175 3 cm) (06/17/22 0750)  Weight - Scale: 84 kg (185 lb 3 oz) (06/19/22 0600)  SpO2: 93 % (06/20/22 1523) on 2 L  Exam:   Physical Exam  Constitutional:       General: He is not in acute distress  HENT:      Head: Normocephalic and atraumatic  Cardiovascular:      Rate and Rhythm: Normal rate  Pulmonary:      Effort: Pulmonary effort is normal  No respiratory distress  Breath sounds: No wheezing, rhonchi or rales  Comments: Diminished bilaterally, clear  Chest:      Chest wall: No tenderness  Abdominal:      General: Bowel sounds are normal  There is no distension  Palpations: Abdomen is soft  Tenderness: There is no abdominal tenderness  There is no guarding or rebound  Musculoskeletal:      Comments: Left hip dressing C/D/I   Skin:     General: Skin is warm and dry  Findings: No rash  Neurological:      Mental Status: He is alert  Mental status is at baseline  Cranial Nerves: No cranial nerve deficit  Discharge instructions/Information to patient and family:(Discharge Medications and Follow up):   See after visit summary for information provided to patient and family  Provisions for Follow-Up Care:  See after visit summary for information related to follow-up care and any pertinent home health orders  Disposition: Short-term rehab at [facility]    Planned Readmission:  No     Discharge Statement:  I spent 45 minutes discharging the patient  This time was spent on the day of discharge  I had direct contact with the patient on the day of discharge  Greater than 50% of the total time was spent examining patient, answering all patient questions, arranging and discussing plan of care with patient as well as directly providing post-discharge instructions  Additional time then spent on discharge activities  Coordinated with Cardiology at the time of discharge  Discussed with patient's daughter regarding discharge and follow-up recommendation  Also discussed incidental findings with daughter  Discharge Medications:  See after visit summary for reconciled discharge medications provided to patient and family  ** Please Note: "This note has been constructed using a voice recognition system  Therefore there may be syntax, spelling, and/or grammatical errors   Please call if you have any questions  "** yes

## 2022-06-20 NOTE — CASE MANAGEMENT
Case Management Discharge Planning Note    Patient name Dex Krishnamurthy  [facility] MRN [id number]  : 1940 Date 2022       Current Admission Date: 2022  Current Admission Diagnosis:Pneumonia of right lung due to infectious organism   Patient Active Problem List    Diagnosis Date Noted    DVT (deep venous thrombosis) ([facility] ) 2022    Closed fracture of shaft of left femur ([facility] ) 2022    Pneumonia of right lung due to infectious organism 2022    Sepsis ([facility] ) 2022    Acute kidney injury ([facility] ) 2022    Medicare annual wellness visit, subsequent 2020    Paroxysmal atrial flutter ([facility] ) 2020    Edema of left lower leg 2020    Urinary frequency 2020    Valvular heart disease 2019    S/P IVC filter 2019    SVT (supraventricular tachycardia) ([facility] ) 2019    Impaired vision 2019    Conductive hearing loss, bilateral 2019    Hyperlipidemia     Mixed obsessional thoughts and acts 10/14/2019    Allergic rhinitis 10/14/2019    Hypercholesteremia 10/14/2019    Sensorineural hearing loss (SNHL) 10/14/2019    Nonrheumatic mitral valve regurgitation 10/14/2019    Aortic valve sclerosis 10/14/2019    Nonrheumatic aortic valve insufficiency 10/14/2019    Essential hypertension 10/14/2019    Recurrent major depressive disorder, in full remission ([facility] [address] ) 10/14/2019      LOS (days): 4  Geometric Mean LOS (GMLOS) (days): 9 60  Days to GMLOS:5 5     OBJECTIVE:  Risk of Unplanned Readmission Score: 12 5     Current admission status: Inpatient   Preferred Pharmacy:   [facility] [address], [phone] [place] [address]  Phone: [phone] Fax: [phone]    Primary Care Provider:  Claudia Santos MD    Primary Insurance: Mobile  W [facility] REP  Secondary Insurance:     DISCHARGE DETAILS:    Discharge planning discussed with[de-identified] Patient and wife  Freedom of Choice: Yes  Comments - Freedom of Choice: SW following to assist with planning  Case discussed with Dr Edgardo Llanes, discharge is being considered  SW met with pt and wife  Bed available at [facility]  Pt and wife are requesting transfer to [facility] upon discharge  CM contacted family/caregiver?: Yes  Were Treatment Team discharge recommendations reviewed with patient/caregiver?: Yes  Did patient/caregiver verbalize understanding of patient care needs?: N/A- going to facility  Were patient/caregiver advised of the risks associated with not following Treatment Team discharge recommendations?: Yes    Contacts  Patient Contacts: Cristal Gave  Relationship to Patient[de-identified] Family (wife)  Contact Method: In Person  Reason/Outcome: Discharge [address]         Is the patient interested in [facility] at discharge?: No    DME Referral Provided  Referral made for DME?: No    Other Referral/Resources/Interventions Provided:  Interventions: Short Term Rehab  Referral Comments: STR planned at [facility]  Task sent to [employer] Discharge Support to have authorization request submitted to [employer]      Treatment Team Recommendation: Short Term Rehab  Discharge Destination Plan[de-identified] Short Term Rehab  Transport at Discharge : BLS Ambulance

## 2022-06-20 NOTE — CASE MANAGEMENT
Case Management Assessment & Discharge Planning Note    Patient name Gin Guaman  Location [facility] 220/2 [facility] 26 MRN [id number]  : 1940 Date 2022       Current Admission Date: 2022  Current Admission Diagnosis:Pneumonia of right lung due to infectious organism   Patient Active Problem List    Diagnosis Date Noted    DVT (deep venous thrombosis) ([facility] ) 2022    Closed fracture of shaft of left femur ([facility] ) 2022    Pneumonia of right lung due to infectious organism 2022    Sepsis ([facility] ) 2022    Acute kidney injury ([facility] ) 2022    Medicare annual wellness visit, subsequent 2020    Paroxysmal atrial flutter ([facility] ) 2020    Edema of left lower leg 2020    Urinary frequency 2020    Valvular heart disease 2019    S/P IVC filter 2019    SVT (supraventricular tachycardia) ([facility] ) 2019    Impaired vision 2019    Conductive hearing loss, bilateral 2019    Hyperlipidemia     Mixed obsessional thoughts and acts 10/14/2019    Allergic rhinitis 10/14/2019    Hypercholesteremia 10/14/2019    Sensorineural hearing loss (SNHL) 10/14/2019    Nonrheumatic mitral valve regurgitation 10/14/2019    Aortic valve sclerosis 10/14/2019    Nonrheumatic aortic valve insufficiency 10/14/2019    Essential hypertension 10/14/2019    Recurrent major depressive disorder, in full remission ([facility] ) 10/14/2019      LOS (days): 4  Geometric Mean LOS (GMLOS) (days): 9 60  Days to GMLOS:5 6     OBJECTIVE:    Risk of Unplanned Readmission Score: 12 47         Current admission status: Inpatient       Preferred Pharmacy:   [facility] [address], [address] [place] [address]  Phone: [phone] Fax: [phone]    Primary Care Provider:  Leonid Duque MD    Primary Insurance: [facility]  Secondary Insurance:       DISCHARGE DETAILS:    Discharge planning discussed with[de-identified] patient  Freedom of Choice: Yes  Comments - Freedom of Choice: pt would like to go to [facility] first choice as his daughter works there     Were Treatment Team discharge recommendations reviewed with patient/caregiver?: Yes  Did patient/caregiver verbalize understanding of patient care needs?: Yes  Were patient/caregiver advised of the risks associated with not following Treatment Team discharge recommendations?: Yes    Contacts  Patient Contacts: Miriam Humphreys (Daughter)   [phone]  Relationship to Patient[de-identified] Family  Contact Method: Phone  Phone Number: Miriam Humphreys (Daughter)   [phone]  Reason/Outcome: Continuity of Care, Emergency Contact, Discharge [address]         Is the patient interested in [address] at discharge?: No    DME Referral Provided  Referral made for DME?: No         Would you like to participate in our [address] service program?  : No - Declined    Treatment Team Recommendation: Short Term Rehab  Discharge Destination Plan[de-identified] Short Term Rehab   IMM Given (Date):: 06/20/22 (reviewed with pt, pt agreeable to plan, imm placed in scan bin)  IMM Given to[de-identified] Patient     Cm spoke with pt bedside he wishes to go to [facility] bc his daughter works there  Cm left voicemail for return call to daughter to update on plan

## 2022-06-21 LAB
BACTERIA BLD CULT: NORMAL
BACTERIA BLD CULT: NORMAL

## 2022-06-21 NOTE — UTILIZATION REVIEW
Notification of Discharge   This is a Notification of Discharge from our facility [address]  Please be advised that this patient has been discharge from our facility  Below you will find the admission and discharge date and time including the patients disposition  UTILIZATION REVIEW CONTACT:  Cherelle Gerardo  Utilization   Network Utilization Review Department  Phone: [phone] x carefully listen to the prompts  All voicemails are confidential   Email: [email or web address]     PHYSICIAN ADVISORY SERVICES:  FOR ANMS-WX-ICPU REVIEW - MEDICAL NECESSITY DENIAL  Phone: [phone]  Fax: [phone]  Email: [email or web address]     PRESENTATION DATE: 6/16/2022  7:30 AM  OBERVATION ADMISSION DATE:   INPATIENT ADMISSION DATE: 6/16/22  9:19 AM   DISCHARGE DATE: 6/20/2022  8:02 PM  DISPOSITION: Non [facility] SN[facility]/SNU Non [facility] SNF/TC[facility]/SNU      IMPORTANT INFORMATION:  Send all requests for admission clinical reviews, approved or denied determinations and any other requests to dedicated fax number below belonging to the campus where the patient is receiving treatment   List of dedicated fax numbers:  [address] DENIALS (Administrative/Medical Necessity) [phone]   [address] (Maternity/NICU/Pediatrics) [phone]   Maritza Atkinson [phone]   [address] [place] [address] [phone]   [address] [place] [address] [phone]   [address] [place] [address],[address] [phone]   [facility]  [phone]   [address] [place] [address], [facility]  [address] [facility] [address] [facility] [phone]

## 2022-06-22 ENCOUNTER — HOSPITAL ENCOUNTER (OUTPATIENT)
Dept: RADIOLOGY | Facility: HOSPITAL | Age: 82
Discharge: HOME | End: 2022-06-22
Payer: COMMERCIAL

## 2022-06-22 DIAGNOSIS — S72.012S: ICD-10-CM

## 2022-06-22 PROCEDURE — 73552 X-RAY EXAM OF FEMUR 2/>: CPT

## 2022-07-13 ENCOUNTER — APPOINTMENT (OUTPATIENT)
Dept: RADIOLOGY | Facility: CLINIC | Age: 82
End: 2022-07-13
Payer: COMMERCIAL

## 2022-07-13 ENCOUNTER — OFFICE VISIT (OUTPATIENT)
Dept: OBGYN CLINIC | Facility: CLINIC | Age: 82
End: 2022-07-13

## 2022-07-13 VITALS
TEMPERATURE: 98.2 F | HEIGHT: 69 IN | DIASTOLIC BLOOD PRESSURE: 75 MMHG | RESPIRATION RATE: 19 BRPM | SYSTOLIC BLOOD PRESSURE: 145 MMHG | HEART RATE: 88 BPM | BODY MASS INDEX: 27.35 KG/M2

## 2022-07-13 DIAGNOSIS — S72.302A CLOSED FRACTURE OF SHAFT OF LEFT FEMUR, UNSPECIFIED FRACTURE MORPHOLOGY, INITIAL ENCOUNTER (HCC): ICD-10-CM

## 2022-07-13 DIAGNOSIS — S72.302A CLOSED FRACTURE OF SHAFT OF LEFT FEMUR, UNSPECIFIED FRACTURE MORPHOLOGY, INITIAL ENCOUNTER (HCC): Primary | ICD-10-CM

## 2022-07-13 PROCEDURE — 99024 POSTOP FOLLOW-UP VISIT: CPT | Performed by: ORTHOPAEDIC SURGERY

## 2022-07-13 PROCEDURE — 73552 X-RAY EXAM OF FEMUR 2/>: CPT

## 2022-07-13 NOTE — PATIENT INSTRUCTIONS
He may WBAT  Continue OT and PT  Continue DVT prophylaxis for 2 more weeks   Continue analgesics PRN   Follow up in 4 weeks

## 2022-07-13 NOTE — PROGRESS NOTES
Assessment/Plan:  1  Closed fracture of shaft of left femur, unspecified fracture morphology, initial encounter ([facility] )  XR femur 2 vw left     Scribe Attestation    I,:  Michael Nicole am acting as a scribe while in the presence of the attending physician :       I,:  Aldo Harrison, DO personally performed the services described in this documentation    as scribed in my presence :         Christi Bunch Is a pleasant 81 yo male who is aprox 4 weeks S/P left Femur IM nail placed 6/17/2022  X-rays were read and states the bone healing were discussed at length with patient and his family  They were advised that it does take time to see signs of bone healing at his age  Staples were removed at today's visit without complications and he was advised that fullness that they feel over the distal incision is due to a small hematoma  He is to continue his DVT prophylaxis for the next 2 weeks and analgesics as needed  He may continue physical therapy and occupational therapy and weight bear as tolerated  Patient and family were advised that we will follow up with him in additional 4 weeks for repeat x-rays and if he has any increase sharp pain that does not resolve to reach out to the office prior  Subjective: S/P INSERTION NAIL IM FEMUR ANTEGRADE (TROCHANTERIC) - long nail left performed 6/17/2022 for closed comminuted subtrochanteric fracture of the left femur    Patient ID: Demarcus Byrne is a 80 y o  male who presents to the office for a follow-up after left femur IM nail insertion performed 06/17/2022  Patient resides at [facility] where he receives therapy daily  He states that he has been having some aching discomfort at rest and increased pain with motion and ambulation  Family states that he has only been walking a little with therapy and requires assistance getting in and out of the wheelchair and such  He is using lovanox daily  He is taking oxycodone prior to therapy Tylenol as needed otherwise for pain     He does also have history of right femur IM nail  Review of Systems   Constitutional: Negative for chills, fever and unexpected weight change  HENT: Negative for hearing loss, nosebleeds and sore throat  Eyes: Negative for pain, redness and visual disturbance  Respiratory: Negative for cough, shortness of breath and wheezing  Cardiovascular: Negative for chest pain, palpitations and leg swelling  Gastrointestinal: Negative for abdominal pain, nausea and vomiting  Endocrine: Negative for polydipsia and polyuria  Genitourinary: Negative for dysuria and hematuria  Musculoskeletal: Positive for arthralgias and gait problem  Skin: Negative for rash and wound  Neurological: Negative for dizziness, light-headedness and headaches  Psychiatric/Behavioral: Negative for decreased concentration, dysphoric mood and suicidal ideas  The patient is not nervous/anxious  Past Medical History:   Diagnosis Date    Hyperlipidemia        Past Surgical History:   Procedure Laterality Date    IR IVC FILTER PLACEMENT OPTIONAL/TEMPORARY  12/7/2019    IR IVC FILTER REMOVAL  8/21/2020    [place] OPEN RX FEMUR FX+INTRAMED BÁRBARA Right 12/4/2019    Procedure: INSERTION NAIL IM FEMUR ANTEGRADE (TROCHANTERIC);   Surgeon: Garret River DO;  [facility]: [facility];  Service: Orthopedics    [place] OPEN RX FEMUR FX+INTRAMED BÁRBARA Left 6/17/2022    Procedure: INSERTION NAIL IM FEMUR ANTEGRADE (TROCHANTERIC) - long nail;  Surgeon: Manju Mascorro DO;  Location: [facility];  Service: Orthopedics       Family History   Problem Relation Age of Onset    Cancer Mother        Social History     Occupational History    Not on file   Tobacco Use    Smoking status: Never Smoker    Smokeless tobacco: Never Used   Substance and Sexual Activity    Alcohol use: Never    Drug use: Never    Sexual activity: Not on file         Current Outpatient Medications:     acetaminophen (TYLENOL) 325 mg tablet, Take 2 tablets (650 mg total) by mouth every 6 (six) hours as needed for mild pain, headaches or fever, Disp: , Rfl: 0    atorvastatin (LIPITOR) 10 mg tablet, Half tab daily, Disp: 45 tablet, Rfl: 1    citalopram (CeleXA) 40 mg tablet, Take 1 tablet (40 mg total) by mouth daily, Disp: 90 tablet, Rfl: 1    dextromethorphan-guaiFENesin (ROBITUSSIN DM)  mg/5 mL syrup, Take 10 mL by mouth every 4 (four) hours as needed for cough, Disp: , Rfl: 0    enoxaparin (LOVENOX) 40 mg/0 4 mL, Inject 0 4 mL (40 mg total) under the skin every 24 hours, Disp: , Rfl: 0    fosinopril (MONOPRIL) 10 mg tablet, TAKE 1/2 TABLET BY MOUTH DAILY, Disp: 45 tablet, Rfl: 1    metoprolol tartrate (LOPRESSOR) 100 mg tablet, Take 1 tablet (100 mg total) by mouth every 12 (twelve) hours, Disp: , Rfl: 0    Multiple Vitamin (MULTIVITAMIN) tablet, Take 1 tablet by mouth daily, Disp: , Rfl:     niacin (NIASPAN) 500 mg CR tablet, TAKE ONE TABLET DAILY AT BEDTIME, Disp: 90 tablet, Rfl: 1    No Known Allergies    Objective:  Vitals:    07/13/22 1348   BP: 145/75   Pulse: 88   Resp: 19   Temp: 98 2 °F (36 8 °C)       Body mass index is 27 35 kg/m²  Left Hip Exam     Other   Erythema: absent  Scars: present  Sensation: normal  Pulse: present    Comments: Both proximal and distal incisions clean dry staples intact   No erythema or ecchymosis   Small hematoma at distal incision site no significant pain with range of motion of hip  Patient is able to stand during suture removal without significant pain demonstrated            Physical Exam  Vitals and nursing note reviewed  Constitutional:       Appearance: He is well-developed  HENT:      Head: Normocephalic  Eyes:      Conjunctiva/sclera: Conjunctivae normal    Cardiovascular:      Rate and Rhythm: Normal rate  Pulmonary:      Effort: Pulmonary effort is normal       Breath sounds: Normal breath sounds  Musculoskeletal:      Cervical back: Normal range of motion  Skin:     General: Skin is warm and dry  Neurological:      Mental Status: He is alert and oriented to person, place, and time  I have personally reviewed pertinent films in PACS        X-rays of the left femur performed today 07/13/2022 show no significant obvious displacement of fracture, hardware in good position

## 2022-07-21 ENCOUNTER — APPOINTMENT (EMERGENCY)
Dept: RADIOLOGY | Facility: HOSPITAL | Age: 82
End: 2022-07-21
Payer: COMMERCIAL

## 2022-07-21 ENCOUNTER — HOSPITAL ENCOUNTER (EMERGENCY)
Facility: HOSPITAL | Age: 82
Discharge: HOME | End: 2022-07-21
Attending: EMERGENCY MEDICINE
Payer: COMMERCIAL

## 2022-07-21 VITALS
TEMPERATURE: 98.3 F | HEART RATE: 67 BPM | RESPIRATION RATE: 18 BRPM | SYSTOLIC BLOOD PRESSURE: 121 MMHG | DIASTOLIC BLOOD PRESSURE: 59 MMHG | OXYGEN SATURATION: 95 %
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
## 2022-07-21 DIAGNOSIS — M25.552 LEFT HIP PAIN: ICD-10-CM

## 2022-07-21 PROCEDURE — 99283 EMERGENCY DEPT VISIT LOW MDM: CPT

## 2022-07-21 PROCEDURE — 99285 EMERGENCY DEPT VISIT HI MDM: CPT | Performed by: EMERGENCY MEDICINE

## 2022-07-21 PROCEDURE — 73502 X-RAY EXAM HIP UNI 2-3 VIEWS: CPT

## 2022-07-21 PROCEDURE — 73552 X-RAY EXAM OF FEMUR 2/>: CPT

## 2022-07-21 NOTE — ED PROVIDER NOTES
History  Chief Complaint   Patient presents with    Fall     Pt reports falling last night, and spending night on the floor  Pt Hx L Hip replacement 1 month ago     Patient presents for evaluation after a fall  Patient states last night he tripped over his depends which had slid down because they were loose causing the fall to the ground  His wife was next to move the could not stop it from falling but was able to slow his fall to the ground  Did not his head or any loss of consciousness  Landed on his left hip  Has some pain that hip but has been able to stand and ambulate on it since the fall  His doctor wanted to get some x-rays to check out the hip since he just had replacement last month  Patient just got out of rehab yesterday  History provided by:  Patient   used: No    Fall  Associated symptoms: no abdominal pain, no back pain, no chest pain, no seizures and no vomiting        Prior to Admission Medications   Prescriptions Last Dose Informant Patient Reported? Taking?    Multiple Vitamin (MULTIVITAMIN) tablet   Yes No   Sig: Take 1 tablet by mouth daily   acetaminophen (TYLENOL) 325 mg tablet   No No   Sig: Take 2 tablets (650 mg total) by mouth every 6 (six) hours as needed for mild pain, headaches or fever   atorvastatin (LIPITOR) 10 mg tablet   No No   Sig: Half tab daily   citalopram (CeleXA) 40 mg tablet   No No   Sig: Take 1 tablet (40 mg total) by mouth daily   dextromethorphan-guaiFENesin (ROBITUSSIN DM)  mg/5 mL syrup   No No   Sig: Take 10 mL by mouth every 4 (four) hours as needed for cough   enoxaparin (LOVENOX) 40 mg/0 4 mL   No No   Sig: Inject 0 4 mL (40 mg total) under the skin every 24 hours   fosinopril (MONOPRIL) 10 mg tablet   No No   Sig: TAKE 1/2 TABLET BY MOUTH DAILY   metoprolol tartrate (LOPRESSOR) 100 mg tablet   No No   Sig: Take 1 tablet (100 mg total) by mouth every 12 (twelve) hours   niacin (NIASPAN) 500 mg CR tablet   No No   Sig: TAKE ONE TABLET DAILY AT BEDTIME      Facility-Administered Medications: None       Past Medical History:   Diagnosis Date    Hyperlipidemia        Past Surgical History:   Procedure Laterality Date    IR IVC FILTER PLACEMENT OPTIONAL/TEMPORARY  12/7/2019    IR IVC FILTER REMOVAL  8/21/2020    [place] OPEN RX FEMUR FX+INTRAMED BÁRBARA Right 12/4/2019    Procedure: INSERTION NAIL IM FEMUR ANTEGRADE (TROCHANTERIC); Surgeon: Nicho Sofia DO;  [facility];  Service: Orthopedics    [place] OPEN RX FEMUR FX+INTRAMED BÁRBARA Left 6/17/2022    Procedure: INSERTION NAIL IM FEMUR ANTEGRADE (TROCHANTERIC) - long nail;  Surgeon: Heidi Norton DO;  [facility]: [facility];  Service: Orthopedics       Family History   Problem Relation Age of Onset    Cancer Mother      I have reviewed and agree with the history as documented  E-Cigarette/Vaping     E-Cigarette/Vaping Substances     Social History     Tobacco Use    Smoking status: Never Smoker    Smokeless tobacco: Never Used   Substance Use Topics    Alcohol use: Never    Drug use: Never       Review of Systems   Constitutional: Negative for chills and fever  HENT: Negative for ear pain and sore throat  Eyes: Negative for pain and visual disturbance  Respiratory: Negative for cough and shortness of breath  Cardiovascular: Negative for chest pain and palpitations  Gastrointestinal: Negative for abdominal pain and vomiting  Genitourinary: Negative for dysuria and hematuria  Musculoskeletal: Negative for arthralgias and back pain  Skin: Negative for color change and rash  Neurological: Negative for seizures and syncope  All other systems reviewed and are negative  Physical Exam  Physical Exam  Vitals and nursing note reviewed  Constitutional:       General: He is not in acute distress  HENT:      Head: Atraumatic        Right Ear: External ear normal       Left Ear: External ear normal       Nose: Nose normal       Mouth/Throat:      Mouth: Mucous membranes are moist       Pharynx: Oropharynx is clear  Eyes:      General: No scleral icterus  Conjunctiva/sclera: Conjunctivae normal    Cardiovascular:      Rate and Rhythm: Normal rate and regular rhythm  Pulses: Normal pulses  Pulmonary:      Effort: Pulmonary effort is normal  No respiratory distress  Breath sounds: Normal breath sounds  Musculoskeletal:         General: Tenderness present  No deformity  Normal range of motion  Legs:    Skin:     Capillary Refill: Capillary refill takes less than 2 seconds  Findings: No rash  Neurological:      General: No focal deficit present  Mental Status: He is alert and oriented to person, place, and time  Vital Signs  ED Triage Vitals [07/21/22 1343]   Temperature Pulse Respirations Blood Pressure SpO2   98 3 °F (36 8 °C) 67 18 121/59 95 %      Temp Source Heart Rate Source Patient Position - Orthostatic VS BP Location FiO2 (%)   Temporal Monitor Sitting -- --      Pain Score       No Pain           Vitals:    07/21/22 1343   BP: 121/59   Pulse: 67   Patient Position - Orthostatic VS: Sitting         Visual Acuity      ED Medications  Medications - No data to display    Diagnostic Studies  Results Reviewed     None                 XR hip/pelv 2-3 vws left   Final Result by Silviano Lau MD (07/21 1511)      Stable alignment of moderately displaced proximal left femoral shaft fracture  Workstation performed: [id number]         XR femur 2 views LEFT   Final Result by Evelin Dolan MD (07/21 3474)      Overall, probably no significant interval change in the orientation of the bone fragments after ORIF              Workstation performed: [id number]                    Procedures  Procedures         ED Course                                             MDM  Number of Diagnoses or Management Options  Fall, initial encounter  Left hip pain  Diagnosis management comments: Pulse ox 95% on room air indicating adequate oxygenation  Xray L hip/Pelvis:  Stable appearance of left hip hardware no new acute fracture as read by me  Xray L Femur: Stable appearance of left hip hardware no new acute fracture as read by me           Amount and/or Complexity of Data Reviewed  Tests in the radiology section of CPT®: ordered and reviewed  Decide to obtain previous medical records or to obtain history from someone other than the patient: yes  Review and summarize past medical records: yes  Independent visualization of images, tracings, or specimens: yes    Patient Progress  Patient progress: stable      Disposition  Final diagnoses:   Fall, initial encounter   Left hip pain     Time reflects when diagnosis was documented in both MDM as applicable and the Disposition within this note     Time User Action Codes Description Comment    7/21/2022  2:38 PM Shawn Lebron [[id number]  [id number]] Fall, initial encounter     7/21/2022  2:38 PM Shawn Lebron [[id number]] Left hip pain       ED Disposition     ED Disposition   Discharge    Condition   Stable    Date/Time   Th Jul 21, 2022  2:38 PM    Comment   Tre Nova discharge to home/self care                 Follow-up Information     Follow up With Specialties Details Why Joe Gilman MD Family Medicine In 1 week  [facility]  [address] [place] [address]  [phone]            Discharge Medication List as of 7/21/2022  2:39 PM      CONTINUE these medications which have NOT CHANGED    Details   acetaminophen (TYLENOL) 325 mg tablet Take 2 tablets (650 mg total) by mouth every 6 (six) hours as needed for mild pain, headaches or fever, Starting Mon 6/20/2022, No Print      atorvastatin (LIPITOR) 10 mg tablet Half tab daily, Normal      citalopram (CeleXA) 40 mg tablet Take 1 tablet (40 mg total) by mouth daily, Starting Fri 5/27/2022, Normal      dextromethorphan-guaiFENesin (ROBITUSSIN DM)  mg/5 mL syrup Take 10 mL by mouth every 4 (four) hours as needed for cough, Starting Mon 6/20/2022, No Print      enoxaparin (LOVENOX) 40 mg/0 4 mL Inject 0 4 mL (40 mg total) under the skin every 24 hours, Starting Tue 6/21/2022, No Print      fosinopril (MONOPRIL) 10 mg tablet TAKE 1/2 TABLET BY MOUTH DAILY, Normal      metoprolol tartrate (LOPRESSOR) 100 mg tablet Take 1 tablet (100 mg total) by mouth every 12 (twelve) hours, Starting Tue 6/21/2022, No Print      Multiple Vitamin (MULTIVITAMIN) tablet Take 1 tablet by mouth daily, Historical Med      niacin (NIASPAN) 500 mg CR tablet TAKE ONE TABLET DAILY AT BEDTIME, Normal             No discharge procedures on file      PDMP Review     None          ED Provider  Electronically Signed by           Adrianna Snyder DO  07/21/22 3250

## 2022-07-22 ENCOUNTER — OFFICE VISIT (OUTPATIENT)
Dept: INTERNAL MEDICINE CLINIC | Facility: CLINIC | Age: 82
End: 2022-07-22
Payer: COMMERCIAL

## 2022-07-22 VITALS
HEIGHT: 69 IN | SYSTOLIC BLOOD PRESSURE: 124 MMHG | DIASTOLIC BLOOD PRESSURE: 58 MMHG | OXYGEN SATURATION: 96 % | HEART RATE: 66 BPM | BODY MASS INDEX: 26.51 KG/M2 | WEIGHT: 179 LBS

## 2022-07-22 DIAGNOSIS — I38 VALVULAR HEART DISEASE: ICD-10-CM

## 2022-07-22 DIAGNOSIS — I27.21 PULMONARY ARTERY HYPERTENSION (HCC): ICD-10-CM

## 2022-07-22 DIAGNOSIS — Z95.828 S/P IVC FILTER: ICD-10-CM

## 2022-07-22 DIAGNOSIS — J18.9 PNEUMONIA OF RIGHT LUNG DUE TO INFECTIOUS ORGANISM, UNSPECIFIED PART OF LUNG: Primary | ICD-10-CM

## 2022-07-22 DIAGNOSIS — I35.8 AORTIC VALVE SCLEROSIS: ICD-10-CM

## 2022-07-22 DIAGNOSIS — S72.302A CLOSED FRACTURE OF SHAFT OF LEFT FEMUR, UNSPECIFIED FRACTURE MORPHOLOGY, INITIAL ENCOUNTER (HCC): ICD-10-CM

## 2022-07-22 DIAGNOSIS — F33.42 RECURRENT MAJOR DEPRESSIVE DISORDER, IN FULL REMISSION (HCC): ICD-10-CM

## 2022-07-22 DIAGNOSIS — N32.89 BLADDER WALL THICKENING: ICD-10-CM

## 2022-07-22 DIAGNOSIS — I10 ESSENTIAL HYPERTENSION: ICD-10-CM

## 2022-07-22 DIAGNOSIS — I35.1 NONRHEUMATIC AORTIC VALVE INSUFFICIENCY: ICD-10-CM

## 2022-07-22 DIAGNOSIS — I47.1 SVT (SUPRAVENTRICULAR TACHYCARDIA) (HCC): ICD-10-CM

## 2022-07-22 DIAGNOSIS — E78.2 MIXED HYPERLIPIDEMIA: ICD-10-CM

## 2022-07-22 DIAGNOSIS — I34.0 NONRHEUMATIC MITRAL VALVE REGURGITATION: ICD-10-CM

## 2022-07-22 DIAGNOSIS — E78.00 HYPERCHOLESTEREMIA: ICD-10-CM

## 2022-07-22 PROBLEM — R91.1 LUNG NODULE < 6CM ON CT: Status: ACTIVE | Noted: 2022-07-22

## 2022-07-22 PROBLEM — IMO0001 LUNG NODULE < 6CM ON CT: Status: ACTIVE | Noted: 2022-07-22

## 2022-07-22 PROCEDURE — 3074F SYST BP LT 130 MM HG: CPT | Performed by: INTERNAL MEDICINE

## 2022-07-22 PROCEDURE — 99215 OFFICE O/P EST HI 40 MIN: CPT | Performed by: INTERNAL MEDICINE

## 2022-07-22 PROCEDURE — 1160F RVW MEDS BY RX/DR IN RCRD: CPT | Performed by: INTERNAL MEDICINE

## 2022-07-22 PROCEDURE — 3078F DIAST BP <80 MM HG: CPT | Performed by: INTERNAL MEDICINE

## 2022-07-22 RX ORDER — PSEUDOEPHEDRINE HCL 30 MG
100 TABLET ORAL DAILY
COMMUNITY
Start: 2022-07-21

## 2022-07-22 NOTE — ASSESSMENT & PLAN NOTE
06/16/2022:  CT scan of chest abdomen and pelvis:  1  Dense consolidation right upper lobe patchy consolidation right lower lobe suggests pneumonia/aspiration Small right effusion seen  2  There is a displaced fracture of the proximal shaft of the femur with medial displacement of the distal fragment along with overlapping Lucency seen in the both iliac bone, image 92 series 601 with no correlate on the radiograph may be due to motion or due to nondisplaced fracture  S/p ORIF    Seen by anibal recently for PT per him    Lovenox for 6 weeks for DVT prophylaxis given previous history of DVT and history of filter which was removed    No clinical DVT

## 2022-07-22 NOTE — ASSESSMENT & PLAN NOTE
Hypertension will continue fosinopril 10 mg daily    Now metoprolol 50 mg twice a day    [facility]    BP is here good    rec continue 50 mg twice a day if sbp 110-140 range  If less than 110 decresase to 25 mg twice a day    Hold if HR less than 50

## 2022-07-22 NOTE — ASSESSMENT & PLAN NOTE
06/16/2022:  CT scan of chest abdomen and pelvis:  1  Dense consolidation right upper lobe patchy consolidation right lower lobe suggests pneumonia/aspiration Small right effusion seen  2  There is a displaced fracture of the proximal shaft of the femur with medial displacement of the distal fragment along with overlapping Lucency seen in the both iliac bone, image 92 series 601 with no correlate on the radiograph may be due to motion or due to nondisplaced fracture  3  Osteoporosis with biconcavity of the multiple lumbar thoracic and lumbar vertebrae,   4 chronic Mild thickening of the posterior aspect of the urinary bladder wall noted, correlate with the urinary evaluation and urology evaluation on nonemergent basis 5  Incidentally detected the left perifissural nodule  Based on current Fleischner Society 2017 Guidelines on incidental pulmonary nodule, no routine follow-up is needed if the patient is low risk  If the patient is high risk, optional follow-up chest CT at 12 months can be considered          Follow up ct scan of chest in 2 months    Clinically resolved

## 2022-07-22 NOTE — ASSESSMENT & PLAN NOTE
06/17/2022:  Echocardiogram:  Normal ejection fraction 60 person mild aortic regurgitation mild-to-moderate tricuspid regurgitation right ventricular systolic pressure elevated moderately at 51    Otherwise unremarkable echocardiogram with grade 1 diastolic dysfunction    Follow with cardiologist    Compensated cardiac  Status, clinically no cardiac symptoms

## 2022-07-22 NOTE — ASSESSMENT & PLAN NOTE
06/16/2022:  CT scan of chest abdomen and pelvis:    4 chronic Mild thickening of the posterior aspect of the urinary bladder wall noted, correlate with the urinary evaluation and urology evaluation on nonemergent basis     Refer to urologist, wife notified

## 2022-07-22 NOTE — ASSESSMENT & PLAN NOTE
06/16/2022:  CT scan of chest abdomen and pelvis:  1  Dense consolidation right upper lobe patchy consolidation right lower lobe suggests pneumonia/aspiration Small right effusion seen  2  There is a displaced fracture of the proximal shaft of the femur with medial displacement of the distal fragment along with overlapping Lucency seen in the both iliac bone, image 92 series 601 with no correlate on the radiograph may be due to motion or due to nondisplaced fracture  3  Osteoporosis with biconcavity of the multiple lumbar thoracic and lumbar vertebrae,   4 chronic Mild thickening of the posterior aspect of the urinary bladder wall noted, correlate with the urinary evaluation and urology evaluation on nonemergent basis 5  Incidentally detected the left perifissural nodule  Based on current Fleischner Society 2017 Guidelines on incidental pulmonary nodule, no routine follow-up is needed if the patient is low risk  If the patient is high risk, optional follow-up chest CT at 12 months can be considered            Follow up ct scan I 2 months and 12 months      Incidental finding in non smoker

## 2022-07-22 NOTE — PROGRESS NOTES
Garett Teo Office Visit Note  22     Carline Kate 80 y o  male MRN: [id number]  : 1940    Assessment:     1  Pneumonia of right lung due to infectious organism, unspecified part of lung  Assessment & Plan:  2022:  CT scan of chest abdomen and pelvis:  1  Dense consolidation right upper lobe patchy consolidation right lower lobe suggests pneumonia/aspiration Small right effusion seen  2  There is a displaced fracture of the proximal shaft of the femur with medial displacement of the distal fragment along with overlapping Lucency seen in the both iliac bone, image 92 series 601 with no correlate on the radiograph may be due to motion or due to nondisplaced fracture  3  Osteoporosis with biconcavity of the multiple lumbar thoracic and lumbar vertebrae,   4 chronic Mild thickening of the posterior aspect of the urinary bladder wall noted, correlate with the urinary evaluation and urology evaluation on nonemergent basis 5  Incidentally detected the left perifissural nodule  Based on current Fleischner Society 2017 Guidelines on incidental pulmonary nodule, no routine follow-up is needed if the patient is low risk  If the patient is high risk, optional follow-up chest CT at 12 months can be considered  Follow up ct scan of chest in 2 months    Clinically resolved    Orders:  -     CT chest wo contrast; Future; Expected date: 2022  -     CBC and differential; Future  -     Comprehensive metabolic panel; Future    2  Bladder wall thickening  Assessment & Plan:  2022:  CT scan of chest abdomen and pelvis:    4 chronic Mild thickening of the posterior aspect of the urinary bladder wall noted, correlate with the urinary evaluation and urology evaluation on nonemergent basis     Refer to urologist, wife notified      3   Closed fracture of shaft of left femur, unspecified fracture morphology, initial encounter [facility])  Assessment & Plan:  2022:  CT scan of chest abdomen and pelvis:  1  Dense consolidation right upper lobe patchy consolidation right lower lobe suggests pneumonia/aspiration Small right effusion seen  2  There is a displaced fracture of the proximal shaft of the femur with medial displacement of the distal fragment along with overlapping Lucency seen in the both iliac bone, image 92 series 601 with no correlate on the radiograph may be due to motion or due to nondisplaced fracture  S/p ORIF    Seen by anibal recently for PT per him    Lovenox for 6 weeks for DVT prophylaxis given previous history of DVT and history of filter which was removed  No clinical DVT      4  Hypercholesteremia  Assessment & Plan:  Discontinue niacin    Continue atorvastatin for right now     Orders:  -     Comprehensive metabolic panel; Future    5  S/P IVC filter  Assessment & Plan:  Continue Lovenox for 6 weeks      6  Recurrent major depressive disorder, in full remission ([facility] [address] )  Assessment & Plan:  Stable at this time      7  Aortic valve sclerosis  Assessment & Plan:  06/17/2022:  Echocardiogram:  Normal ejection fraction 60 person mild aortic regurgitation mild-to-moderate tricuspid regurgitation right ventricular systolic pressure elevated moderately at 51  Otherwise unremarkable echocardiogram with grade 1 diastolic dysfunction    Follow with cardiologist  Compensated cardiac  Status, clinically no cardiac symptoms      8  Nonrheumatic mitral valve regurgitation  Assessment & Plan:  06/17/2022:  Echocardiogram:  Normal ejection fraction 60 person mild aortic regurgitation mild-to-moderate tricuspid regurgitation right ventricular systolic pressure elevated moderately at 51  Otherwise unremarkable echocardiogram with grade 1 diastolic dysfunction    Follow with cardiologist    Compensated cardiac  Status, clinically no cardiac symptoms      9   Nonrheumatic aortic valve insufficiency  Assessment & Plan:  06/17/2022:  Echocardiogram:  Normal ejection fraction 60 person mild aortic regurgitation mild-to-moderate tricuspid regurgitation right ventricular systolic pressure elevated moderately at 51  Otherwise unremarkable echocardiogram with grade 1 diastolic dysfunction    Follow with cardiologist  Compensated cardiac  Status, clinically no cardiac symptoms      10  Valvular heart disease  Assessment & Plan:  06/17/2022:  Echocardiogram:  Normal ejection fraction 60 person mild aortic regurgitation mild-to-moderate tricuspid regurgitation right ventricular systolic pressure elevated moderately at 51  Otherwise unremarkable echocardiogram with grade 1 diastolic dysfunction    Follow with cardiologist      11  SVT (supraventricular tachycardia) [facility])  Assessment & Plan:  06/17/2022:  Echocardiogram:  Normal ejection fraction 60 person mild aortic regurgitation mild-to-moderate tricuspid regurgitation right ventricular systolic pressure elevated moderately at 51  Otherwise unremarkable echocardiogram with grade 1 diastolic dysfunction    Follow with cardiologist      12  Pulmonary artery hypertension [facility])  Assessment & Plan:  06/17/2022:  Echocardiogram:  Normal ejection fraction 60 person mild aortic regurgitation mild-to-moderate tricuspid regurgitation right ventricular systolic pressure elevated moderately at 51  Otherwise unremarkable echocardiogram with grade 1 diastolic dysfunction    Follow with cardiologist      13  Essential hypertension  Assessment & Plan:  Hypertension will continue fosinopril 10 mg daily    Now metoprolol 50 mg twice a day    [facility] 100[facility]    BP is here good    rec continue 50 mg twice a day if sbp 110-140 range  If less than 110 decresase to 25 mg twice a day    Hold if HR less than 50    Orders:  -     CBC and differential; Future    14  Mixed hyperlipidemia        Discussion Summary and Plan:   Today's care plan and medications were reviewed with patient in detail and all their questions answered to their satisfaction  Chief Complaint   Patient presents with    Follow-up     Status post hip fracture, recent pneumonia, deconditioning, previous history of PE, poor appetite, history of OCD, recently diagnosed incidental lung nodule    Hypertension    Hyperlipidemia      Subjective: This is care transitioned  Pneumonia: resolved, no cough, fever or sob had cough, fever and yellow phelgm and SOB and hyopxia, poor appetite , nausea and loose BM in hosptial,   Clinically penumonia and sepsi resolve    Valvular heart disease and elevated Pulm HTN: no cardiac sx, compensated cardiac status to follow up with cardiologsit    Hx of DVT PE, off filter, on lovanox for 6 week    OCD and MDD: mood fair    Family at bed side    Poor appetite, on protein powder d/w daughter and wife    HLD: on atorvastatin and niacin, niacin stopped at [facility] request, tolerated for long time    PAFutter Post op 4 years ago, transient on beta blocker    HTN was high better, will decrease Metoprolol ,c ont amlodipine    JIMY resolved will check renal fn    Post op blood loss anemia watch CB    No edema of legs    Some weakness      Lung nodule tiny incidental, will do ct of chest in one year  l:     CT chest abdomen pelvis wo contrast     Result Date: 6/16/2022  Narrative: CT CHEST, ABDOMEN AND PELVIS WITHOUT IV CONTRAST INDICATION:   Chest trauma, blunt Fall/Trauma and Concern for Pneumonia  COMPARISON:  None  TECHNIQUE: CT examination of the chest, abdomen and pelvis was performed without intravenous contrast  This examination was performed without intravenous contrast in the context of the critical nationwide Omnipaque shortage  Axial, sagittal, and coronal 2D reformatted images were created from the source data and submitted for interpretation  Radiation dose length product (DLP) for this visit:  550 13 mGy-cm     This examination, like all CT scans performed in the [facility], was performed utilizing techniques to minimize radiation dose exposure, including the use of iterative  reconstruction and automated exposure control  Enteric contrast was administered  FINDINGS: CHEST LUNGS:  Dense airspace consolidation seen right upper lobe post posterior aspect  Mild patchy airspace consolidation seen in the right lower lobe A perifissural nodule seen in the left midlung measuring about 3 mm, image 69 series 400 PLEURA:  Small right effusion seen HEART/GREAT VESSELS: Heart is unremarkable for patient's age  Ascending aorta measures 3 9 cm Mild coronary artery calcification seen MEDIASTINUM AND JACQUIE:  Lower right paratracheal lymph nodes are seen measuring about 1 cm Subcarinal lymph node seen measuring about 8 mm Large hiatal hernia seen CHEST WALL AND LOWER NECK:  No significant axillary lymph node enlargement seen Evaluation of the hips is limited due to motion ABDOMEN LIVER/BILIARY TREE:  No focal liver lesion seen GALLBLADDER:  No calcified gallstones  No pericholecystic inflammatory change  SPLEEN:  Unremarkable  PANCREAS:  Unremarkable  ADRENAL GLANDS:  Unremarkable  Mild nodularity of the both adrenal glands KIDNEYS/URETERS:  No hydronephrosis Lobulated contour of the kidney STOMACH AND BOWEL:  Fluid noted within the rectum There are no findings of bowel obstruction Hiatal hernia seen APPENDIX:  No findings to suggest appendicitis  ABDOMINOPELVIC CAVITY:  No free fluid seen No fluid in the paracolic gutter, perisplenic region or in Morison's VESSELS:  An IVC filter is noted PELVIS REPRODUCTIVE ORGANS:  Prostate appears unremarkable No pelvic sidewall lymph node and oblique  URINARY BLADDER:  Mild thickening of the urinary bladder wall through its posterior and superior aspect seen ABDOMINAL WALL/INGUINAL REGIONS:  Unremarkable   OSSEOUS STRUCTURES:  Osteoporosis There is compression deformity of the multiple lumbar vertebrae with biconcavity of the L5, L4, L3, L2 vertebra There is severe Central depression of the L1 vertebra with 80-90% loss of vertebral height, chronic There is a fracture of the proximal shaft of the left femur with the medial displacement of the distal fragment with overlapping Evaluation of the pelvic ring is limited due to patient motion Intramedullary nail with the femoral neck screw noted with the healing intertrochanteric fracture of the right femur Lucency seen in the bilateral iliac bone, image 104 series 99      Impression: Dense consolidation right upper lobe patchy consolidation right lower lobe suggests pneumonia/aspiration Small right effusion seen There is a displaced fracture of the proximal shaft of the femur with medial displacement of the distal fragment along with overlapping Lucency seen in the both iliac bone, image 92 series 601 with no correlate on the radiograph may be due to motion or due to nondisplaced fracture  Correlate clinically Bony pelvic CT may be considered as clinically needed Intact acetabulum Osteoporosis with biconcavity of the multiple lumbar thoracic and lumbar vertebrae, chronic Mild thickening of the posterior aspect of the urinary bladder wall noted, correlate with the urinary evaluation and urology evaluation on nonemergent basis Incidentally detected the left perifissural nodule  Based on current Fleischner Society 2017 Guidelines on incidental pulmonary nodule, no routine follow-up is needed if the patient is low risk  If the patient is high risk, optional follow-up chest CT at 12 months can be considered  Consider follow-up at 2 months demonstrate resolution  I personally discussed this study with Sejal Beaulieu on 6/16/2022 at 9:21 AM  Workstation performed: [id number]      XR hip/pelv 2-3 vws left     06/16/2022:  CT scan of chest abdomen and pelvis:  1  Dense consolidation right upper lobe patchy consolidation right lower lobe suggests pneumonia/aspiration Small right effusion seen  2   There is a displaced fracture of the proximal shaft of the femur with medial displacement of the distal fragment along with overlapping Lucency seen in the both iliac bone, image 92 series 601 with no correlate on the radiograph may be due to motion or due to nondisplaced fracture  3  Osteoporosis with biconcavity of the multiple lumbar thoracic and lumbar vertebrae,   4 chronic Mild thickening of the posterior aspect of the urinary bladder wall noted, correlate with the urinary evaluation and urology evaluation on nonemergent basis 5  Incidentally detected the left perifissural nodule  Based on current Fleischner Society 2017 Guidelines on incidental pulmonary nodule, no routine follow-up is needed if the patient is low risk  If the patient is high risk, optional follow-up chest CT at 12 months can be considered                Patient with past medical history of hypertension hyperlipidemia paroxysmal atrial fibrillation valvular heart disease and history of provoked pulmonary embolism status post IVC filter which was removed eventually after a fall in the past presented to the emergency room on 06/16/2022 after a fall  Patient had has not been feeling well since Monday with malaise generalized weakness and poor appetite  Due to weakness patient had a minor fall on Monday without any injury but on the day of the admission morning he was worse  Became mildly short of breath came to the emergency room did not have any loss of consciousness  Patient had a temperature of 101° tachycardia hypoxia CT scan of the chest did not reveal any acute abnormality left hip revealed acute displaced left proximal femoral  Soft  How patient was treated for pneumonia  Patient was advised to take 2 more days of antibiotic to finish 7 days at the time of discharge  DVT prophylaxis were given P for the full 6 weeks postoperatively given history of DVT  Patient was also advised to follow-up with cardiologist     Damaris Cole is to do follow-up CT scan in 2 months      Patient was recently seen by orthopedic post discharge their notes were reviewed  Abnormal findings 1  Pulmonary nodule on CT scan of the chest 2  Bladder wall thickening    06/17/2022:  Echocardiogram:  Normal ejection fraction 60 person mild aortic regurgitation mild-to-moderate tricuspid regurgitation right ventricular systolic pressure elevated moderately at 51  Otherwise unremarkable echocardiogram with grade 1 diastolic dysfunction  82/35/0904:  CT scan of the brain: No acute intracranial hemorrhage seen No mass effect or midline shift seen Mild periventricular and white matter hypodensity related to chronic small vessel as well as mild mucosal thickening of the maxillary sinus  06/16/2022:  Left knee x-ray no acute osseous abnormality  06/16/2022:  Left femur: Acute, displaced left proximal femoral shaft fracture extending to the neck  No dislocation  06/18/2022:ORIF left subtrochanteric fracture  Mild narrowing of the left hip joint orthopedic hardware in good position  06/16/2022:  Left hip:Acute, displaced left proximal femoral shaft fracture extending to the neck  No dislocation  06/16/2022:  CT scan of chest abdomen and pelvis:  1  Dense consolidation right upper lobe patchy consolidation right lower lobe suggests pneumonia/aspiration Small right effusion seen  2  There is a displaced fracture of the proximal shaft of the femur with medial displacement of the distal fragment along with overlapping Lucency seen in the both iliac bone, image 92 series 601 with no correlate on the radiograph may be due to motion or due to nondisplaced fracture  3  Osteoporosis with biconcavity of the multiple lumbar thoracic and lumbar vertebrae,   4 chronic Mild thickening of the posterior aspect of the urinary bladder wall noted, correlate with the urinary evaluation and urology evaluation on nonemergent basis 5  Incidentally detected the left perifissural nodule   Based on current Fleischner Society 2017 Guidelines on incidental pulmonary nodule, no routine follow-up is needed if the patient is low risk  If the patient is high risk, optional follow-up chest CT at 12 months can be considered  The following portions of the patient's history were reviewed and updated as appropriate: allergies, current medications, past family history, past medical history, past social history, past surgical history and problem list     Review of Systems   All other systems reviewed and are negative  Historical Information   Patient Active Problem List   Diagnosis    Mixed obsessional thoughts and acts    Allergic rhinitis    Hypercholesteremia    Sensorineural hearing loss (SNHL)    Nonrheumatic mitral valve regurgitation    Aortic valve sclerosis    Nonrheumatic aortic valve insufficiency    Essential hypertension    Recurrent major depressive disorder, in full remission ([address] )    Hyperlipidemia    Impaired vision    Conductive hearing loss, bilateral    S/P IVC filter    [facility]    Valvular heart disease    Urinary frequency    Edema of left lower leg    Paroxysmal atrial flutter ([facility] [address] )    Medicare annual wellness visit, subsequent    Closed fracture of shaft of left femur ([facility] [address] )    Pneumonia of right lung due to infectious organism    Lung nodule < 6cm on CT    Bladder wall thickening    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[facility] [address] )     Past Medical History:   Diagnosis Date    Hyperlipidemia      Past Surgical History:   Procedure Laterality Date    IR IVC FILTER PLACEMENT OPTIONAL/TEMPORARY  12/7/2019    IR IVC FILTER REMOVAL  8/21/2020    IN OPEN RX FEMUR FX+INTRAMED BÁRBARA Right 12/4/2019    Procedure: INSERTION NAIL IM FEMUR ANTEGRADE (TROCHANTERIC);   Surgeon: Ara Clemente DO;  Location: [facility] OR;  Service: Orthopedics    IN OPEN RX FEMUR FX+INTRAMED BÁRBARA Left 6/17/2022    Procedure: INSERTION NAIL IM FEMUR ANTEGRADE (TROCHANTERIC) - long nail;  Surgeon: Barbi Norton DO;  Location: [facility] OR;  Service: Orthopedics     Social History     Substance and Sexual Activity   Alcohol Use Never     Social History     Substance and Sexual Activity   Drug Use Never     Social History     Tobacco Use   Smoking Status Never Smoker   Smokeless Tobacco Never Used     Family History   Problem Relation Age of Onset    Cancer Mother      Health Maintenance Due   Topic    COVID-19 Vaccine (1)    Pneumococcal Vaccine: 65+ Years (1 - PCV)    Influenza Vaccine (1)      Meds/Allergies       Current Outpatient Medications:     acetaminophen (TYLENOL) 325 mg tablet, Take 2 tablets (650 mg total) by mouth every 6 (six) hours as needed for mild pain, headaches or fever, Disp: , Rfl: 0    atorvastatin (LIPITOR) 10 mg tablet, Half tab daily, Disp: 45 tablet, Rfl: 1    citalopram (CeleXA) 40 mg tablet, Take 1 tablet (40 mg total) by mouth daily, Disp: 90 tablet, Rfl: 1    Docusate Sodium (DSS) 100 MG CAPS, Take 100 mg by mouth daily, Disp: , Rfl:     enoxaparin (LOVENOX) 40 mg/0 4 mL, Inject 0 4 mL (40 mg total) under the skin every 24 hours, Disp: , Rfl: 0    fosinopril (MONOPRIL) 10 mg tablet, TAKE 1/2 TABLET BY MOUTH DAILY, Disp: 45 tablet, Rfl: 1    magnesium hydroxide (MILK OF MAGNESIA) 400 mg/5 mL oral suspension, Take 30 mL by mouth, Disp: , Rfl:     metoprolol tartrate (LOPRESSOR) 100 mg tablet, Take 1 tablet (100 mg total) by mouth every 12 (twelve) hours, Disp: , Rfl: 0    Multiple Vitamin (MULTIVITAMIN) tablet, Take 1 tablet by mouth daily, Disp: , Rfl:     niacin (NIASPAN) 500 mg CR tablet, TAKE ONE TABLET DAILY AT BEDTIME, Disp: 90 tablet, Rfl: 1      Objective:    Vitals:   /58   Pulse 66   Ht 5' 9" (1 753 m)   Wt 81 2 kg (179 lb)   SpO2 96%   BMI 26 43 kg/m²   Body mass index is 26 43 kg/m²  Vitals:    07/22/22 1542   Weight: 81 2 kg (179 lb)       Physical Exam  Constitutional:       General: He is not in acute distress  Appearance: Normal appearance  He is well-developed   He is not ill-appearing, toxic-appearing or diaphoretic  HENT:      Head: Normocephalic and atraumatic  Right Ear: External ear normal       Left Ear: External ear normal    Eyes:      General:         Right eye: No discharge  Left eye: No discharge  Conjunctiva/sclera: Conjunctivae normal    Neck:      Thyroid: No thyromegaly  Vascular: No carotid bruit or JVD  Cardiovascular:      Rate and Rhythm: Regular rhythm  Heart sounds: Murmur heard  No friction rub  No gallop  Pulmonary:      Effort: No respiratory distress  Breath sounds: Normal breath sounds  No wheezing or rales  Abdominal:      General: Bowel sounds are normal  There is no distension  Palpations: There is no mass  Tenderness: There is no rebound  Musculoskeletal:      Cervical back: No rigidity or tenderness  Right lower leg: No edema  Left lower leg: No edema  Comments: mild   Lymphadenopathy:      Cervical: No cervical adenopathy  Skin:     General: Skin is warm  Coloration: Skin is not jaundiced or pale  Findings: No rash  Neurological:      General: No focal deficit present  Mental Status: He is oriented to person, place, and time  Motor: Weakness present  Coordination: Coordination normal       Gait: Gait abnormal    Psychiatric:         Attention and Perception: Attention and perception normal  He is attentive  He does not perceive auditory or visual hallucinations  Mood and Affect: Mood normal  Mood is not anxious or depressed  Speech: Speech normal          Behavior: Behavior normal          Thought Content: Thought content normal          Cognition and Memory: Cognition is not impaired  Memory is not impaired  He exhibits impaired recent memory  Judgment: Judgment normal  Judgment is not impulsive or inappropriate           Lab Review   Admission on 06/16/2022, Discharged on 06/20/2022   Component Date Value Ref Range Status    WBC 06/16/2022 19 28 (A) 4 31 - 10 16 Thousand/uL Final    RBC 06/16/2022 3 96  3 88 - 5 62 Million/uL Final    Hemoglobin 06/16/2022 12 6  12 0 - 17 0 g/dL Final    Hematocrit 06/16/2022 37 5  36 5 - 49 3 % Final    MCV 06/16/2022 95  82 - 98 fL Final    MCH 06/16/2022 31 8  26 8 - 34 3 pg Final    MCHC 06/16/2022 33 6  31 4 - 37 4 g/dL Final    RDW 06/16/2022 13 7  11 6 - 15 1 % Final    MPV 06/16/2022 9 4  8 9 - 12 7 fL Final    Platelets 38/07/6218 234  149 - 390 Thousands/uL Final    Protime 06/16/2022 15 4 (A) 11 6 - 14 5 seconds Final    INR 06/16/2022 1 25 (A) 0 84 - 1 19 Final    PTT 06/16/2022 33  23 - 37 seconds Final    Therapeutic Heparin Range =  60-90 seconds    Sodium 06/16/2022 134 (A) 136 - 145 mmol/L Final    Potassium 06/16/2022 4 2  3 5 - 5 3 mmol/L Final    Chloride 06/16/2022 99 (A) 100 - 108 mmol/L Final    CO2 06/16/2022 26  21 - 32 mmol/L Final    ANION GAP 06/16/2022 9  4 - 13 mmol/L Final    BUN 06/16/2022 28 (A) 5 - 25 mg/dL Final    Creatinine 06/16/2022 1 48 (A) 0 60 - 1 30 mg/dL Final    Standardized to IDMS reference method    Glucose 06/16/2022 134  65 - 140 mg/dL Final    If the patient is fasting, the ADA then defines impaired fasting glucose as > 100 mg/dL and diabetes as > or equal to 123 mg/dL  Specimen collection should occur prior to Sulfasalazine administration due to the potential for falsely depressed results  Specimen collection should occur prior to Sulfapyridine administration due to the potential for falsely elevated results   Calcium 06/16/2022 8 6  8 3 - 10 1 mg/dL Final    Corrected Calcium 06/16/2022 9 8  8 3 - 10 1 mg/dL Final    AST 06/16/2022 43  5 - 45 U/L Final    Specimen collection should occur prior to Sulfasalazine administration due to the potential for falsely depressed results       ALT 06/16/2022 24  12 - 78 U/L Final    Specimen collection should occur prior to Sulfasalazine administration due to the potential for falsely depressed results   Alkaline Phosphatase 06/16/2022 86  46 - 116 U/L Final    Total Protein 06/16/2022 6 7  6 4 - 8 2 g/dL Final    Albumin 06/16/2022 2 5 (A) 3 5 - 5 0 g/dL Final    Total Bilirubin 06/16/2022 0 60  0 20 - 1 00 mg/dL Final    Use of this assay is not recommended for patients undergoing treatment with eltrombopag due to the potential for falsely elevated results   eGFR 06/16/2022 43  ml/min/1 73sq m Final    Magnesium 06/16/2022 1 6  1 6 - 2 6 mg/dL Final    Phosphorus 06/16/2022 1 8 (A) 2 3 - 4 1 mg/dL Final    LACTIC ACID 06/16/2022 1 7  0 5 - 2 0 mmol/L Final    Blood Culture 06/16/2022 No Growth After 5 Days  Final    Blood Culture 06/16/2022 No Growth After 5 Days     Final    SARS-CoV-2 06/16/2022 Negative  Negative Final    INFLUENZA A PCR 06/16/2022 Negative  Negative Final    INFLUENZA B PCR 06/16/2022 Negative  Negative Final    RSV PCR 06/16/2022 Negative  Negative Final    Segmented % 06/16/2022 75  43 - 75 % Final    Bands % 06/16/2022 15 (A) 0 - 8 % Final    Lymphocytes % 06/16/2022 1 (A) 14 - 44 % Final    Monocytes % 06/16/2022 8  4 - 12 % Final    Eosinophils, % 06/16/2022 1  0 - 6 % Final    Basophils % 06/16/2022 0  0 - 1 % Final    Absolute Neutrophils 06/16/2022 17 35 (A) 1 85 - 7 62 Thousand/uL Final    Lymphocytes Absolute 06/16/2022 0 19 (A) 0 60 - 4 47 Thousand/uL Final    Monocytes Absolute 06/16/2022 1 54 (A) 0 00 - 1 22 Thousand/uL Final    Eosinophils Absolute 06/16/2022 0 19  0 00 - 0 40 Thousand/uL Final    Basophils Absolute 06/16/2022 0 00  0 00 - 0 10 Thousand/uL Final    RBC Morphology 06/16/2022 Normal   Final    Platelet Estimate 66/69/0085 Adequate  Adequate Final    Procalcitonin 06/16/2022 4 26 (A) <=0 25 ng/ml Final    Comment: Suspected Lower Respiratory Tract Infection (LRTI):  - LESS than or EQUAL to 0 25 ng/mL:   low likelihood for bacterial LRTI; antibiotics DISCOURAGED   - GREATER than 0 25 ng/mL:   increased likelihood for bacterial LRTI; antibiotics ENCOURAGED  Suspected Sepsis:  - Strongly consider initiating antibiotics in ALL UNSTABLE patients  - LESS than or EQUAL to 0 5 ng/mL:   low likelihood for bacterial sepsis; antibiotics DISCOURAGED   - GREATER than 0 5 ng/mL:   increased likelihood for bacterial sepsis; antibiotics ENCOURAGED   - GREATER than 2 ng/mL:   high risk for severe sepsis / septic shock; antibiotics strongly ENCOURAGED  Decisions on antibiotic use should not be based solely on Procalcitonin (PCT) levels  If PCT is low but uncertainty exists with stopping antibiotics, repeat PCT in 6-24 hours to confirm the low level  If antibiotics are administered (regardless if initial PCT was high or low), repeat PCT every 1-2 days to consider early antibiotic cessation (when GREATER                            than 80% decrease from the peak OR when PCT drops below designated cutoffs, whichever comes first), so long as the infection is NOT one that typically requires prolonged treatment durations (e g , bone/joint infections, endocarditis, Staph  aureus bacteremia)      Situations of FALSE-POSITIVE Procalcitonin values:  1) Newborns < 67 hours old  2) Massive stress from severe trauma / burns, major surgery, acute pancreatitis, cardiogenic / hemorrhagic shock, sickle cell crisis, or other organ perfusion abnormalities  3) Malaria and some Candidal infections  4) Treatment with agents that stimulate cytokines (e g , OKT3, anti-lymphocyte globulins, alemtuzumab, IL-2, granulocyte transfusion [NOT GCSFs])  5) Chronic renal disease causes elevated baseline levels (consider GREATER than 0 75 ng/mL as an abnormal cut-off); initiating HD/CRRT may cause transient decreases  6) Paraneoplastic syndromes from medullary thyroid or SCLC, some forms of vasculitis, and acute [id number]                            disease    Situations of FALSE-NEGATIVE Procalcitonin values:  1) Too early in clinical course for PCT to have reached its peak (may repeat in 6-24 hours to confirm low level)  2) Localized infection WITHOUT systemic (SIRS / sepsis) response (e g , an abscess, osteomyelitis, cystitis)  3) Mycobacteria (e g , Tuberculosis, MAC)  4) Cystic fibrosis exacerbations      Strep pneumoniae antigen, urine 06/16/2022 Negative  Negative Final    Legionella Urinary Antigen 06/16/2022 Negative  Negative Final    Clarity, UA 06/16/2022 Slightly Cloudy   Final    Color, UA 06/16/2022 Yellow   Final    Specific Gravity, UA 06/16/2022 >=1 030  1 000 - 1 030 Final    pH, UA 06/16/2022 6 0  5 0, 5 5, 6 0, 6 5, 7 0, 7 5, 8 0, 8 5, 9 0 Final    Glucose, UA 06/16/2022 Negative  Negative mg/dl Final    Ketones, UA 06/16/2022 Negative  Negative mg/dl Final    Occult Blood, UA 06/16/2022 Moderate (A) Negative Final    Protein, UA 06/16/2022 100 (2+) (A) Negative mg/dl Final    Nitrite, UA 06/16/2022 Negative  Negative Final    Bilirubin, UA 06/16/2022 Negative  Negative Final    Urobilinogen, UA 06/16/2022 0 2  0 2, 1 0 E U /dl E U /dl Final    Leukocytes, UA 06/16/2022 Negative  Negative Final    WBC, UA 06/16/2022 0-1 (A) None Seen, 2-4, 5-60 /hpf Final    RBC, UA 06/16/2022 2-4  None Seen, 2-4 /hpf Final    Bacteria, UA 06/16/2022 Occasional  None Seen, Occasional /hpf Final    Epithelial Cells 06/16/2022 Occasional  None Seen, Occasional /hpf Final    LVIDd 06/17/2022 5 00  cm Final    LVIDS 06/17/2022 3 30  cm Final    IVSd 06/17/2022 1 20  cm Final    LVPWd 06/17/2022 1 20  cm Final    FS 06/17/2022 34  28 - 44 Final    MV E' Tissue Velocity Septal 06/17/2022 8  cm/s Final    MV E' Tissue Velocity Lateral 06/17/2022 9  cm/s Final    E wave deceleration time 06/17/2022 181  ms Final    MV Peak E Scotty 06/17/2022 75  cm/s Final    MV Peak A Scotty 06/17/2022 1 06  m/s Final    RVID d 06/17/2022 3 5  cm Final    LA size 06/17/2022 4 2  cm Final    AV peak gradient 06/17/2022 108  mmHg Final    AV Deceleration Time 06/17/2022 881  ms Final    AV regurgitation pressure 1/2 time 06/17/2022 255  ms Final    MV stenosis pressure 1/2 time 06/17/2022 52  ms Final    MV valve area p 1/2 method 06/17/2022 4 23   Final    TR Peak Scotty 06/17/2022 3 4  m/s Final    Triscuspid Valve Regurgitation Pea* 06/17/2022 46 0  mmHg Final    Ao root 06/17/2022 3 60  cm Final    Tricuspid valve peak regurgitation* 06/17/2022 3 39  m/s Final    Left ventricular stroke volume (2D) 06/17/2022 71 00  mL Final    IVS 06/17/2022 1 2  cm Final    LEFT VENTRICLE SYSTOLIC VOLUME (MO* 45/87/0433 45  mL Final    LV DIASTOLIC VOLUME (MOD BIPLANE) * 06/17/2022 116  mL Final    Left Atrium Area-systolic Four Juli* 40/18/0029 24 2  cm2 Final    LVSV, 2D 06/17/2022 71  mL Final    Procalcitonin 06/17/2022 3 60 (A) <=0 25 ng/ml Final    Comment: Suspected Lower Respiratory Tract Infection (LRTI):  - LESS than or EQUAL to 0 25 ng/mL:   low likelihood for bacterial LRTI; antibiotics DISCOURAGED   - GREATER than 0 25 ng/mL:   increased likelihood for bacterial LRTI; antibiotics ENCOURAGED  Suspected Sepsis:  - Strongly consider initiating antibiotics in ALL UNSTABLE patients  - LESS than or EQUAL to 0 5 ng/mL:   low likelihood for bacterial sepsis; antibiotics DISCOURAGED   - GREATER than 0 5 ng/mL:   increased likelihood for bacterial sepsis; antibiotics ENCOURAGED   - GREATER than 2 ng/mL:   high risk for severe sepsis / septic shock; antibiotics strongly ENCOURAGED  Decisions on antibiotic use should not be based solely on Procalcitonin (PCT) levels  If PCT is low but uncertainty exists with stopping antibiotics, repeat PCT in 6-24 hours to confirm the low level   If antibiotics are administered (regardless if initial PCT was high or low), repeat PCT every 1-2 days to consider early antibiotic cessation (when GREATER                            than 80% decrease from the peak OR when PCT drops below designated cutoffs, whichever comes first), so long as the infection is NOT one that typically requires prolonged treatment durations (e g , bone/joint infections, endocarditis, Staph  aureus bacteremia)      Situations of FALSE-POSITIVE Procalcitonin values:  1) Newborns < 67 hours old  2) Massive stress from severe trauma / burns, major surgery, acute pancreatitis, cardiogenic / hemorrhagic shock, sickle cell crisis, or other organ perfusion abnormalities  3) Malaria and some Candidal infections  4) Treatment with agents that stimulate cytokines (e g , OKT3, anti-lymphocyte globulins, alemtuzumab, IL-2, granulocyte transfusion [NOT GCSFs])  5) Chronic renal disease causes elevated baseline levels (consider GREATER than 0 75 ng/mL as an abnormal cut-off); initiating HD/CRRT may cause transient decreases  6) Paraneoplastic syndromes from medullary thyroid or SCLC, some forms of vasculitis, and acute [id number]                            disease    Situations of FALSE-NEGATIVE Procalcitonin values:  1) Too early in clinical course for PCT to have reached its peak (may repeat in 6-24 hours to confirm low level)  2) Localized infection WITHOUT systemic (SIRS / sepsis) response (e g , an abscess, osteomyelitis, cystitis)  3) Mycobacteria (e g , Tuberculosis, MAC)  4) Cystic fibrosis exacerbations      WBC 06/17/2022 18 25 (A) 4 31 - 10 16 Thousand/uL Final    RBC 06/17/2022 3 78 (A) 3 88 - 5 62 Million/uL Final    Hemoglobin 06/17/2022 12 1  12 0 - 17 0 g/dL Final    Hematocrit 06/17/2022 36 3 (A) 36 5 - 49 3 % Final    MCV 06/17/2022 96  82 - 98 fL Final    MCH 06/17/2022 32 0  26 8 - 34 3 pg Final    MCHC 06/17/2022 33 3  31 4 - 37 4 g/dL Final    RDW 06/17/2022 13 9  11 6 - 15 1 % Final    MPV 06/17/2022 9 8  8 9 - 12 7 fL Final    Platelets 22/60/1127 249  149 - 390 Thousands/uL Final    Sodium 06/17/2022 138  136 - 145 mmol/L Final    Potassium 06/17/2022 4 4  3 5 - 5 3 mmol/L Final    Chloride 06/17/2022 101  100 - 108 mmol/L Final    CO2 06/17/2022 29  21 - 32 mmol/L Final    ANION GAP 06/17/2022 8  4 - 13 mmol/L Final    BUN 06/17/2022 30 (A) 5 - 25 mg/dL Final    Creatinine 06/17/2022 1 35 (A) 0 60 - 1 30 mg/dL Final    Standardized to IDMS reference method    Glucose 06/17/2022 117  65 - 140 mg/dL Final    If the patient is fasting, the ADA then defines impaired fasting glucose as > 100 mg/dL and diabetes as > or equal to 123 mg/dL  Specimen collection should occur prior to Sulfasalazine administration due to the potential for falsely depressed results  Specimen collection should occur prior to Sulfapyridine administration due to the potential for falsely elevated results   Calcium 06/17/2022 8 4  8 3 - 10 1 mg/dL Final    eGFR 06/17/2022 48  ml/min/1 73sq m Final    Magnesium 06/17/2022 2 0  1 6 - 2 6 mg/dL Final    Phosphorus 06/17/2022 2 8  2 3 - 4 1 mg/dL Final    WBC 06/18/2022 11 74 (A) 4 31 - 10 16 Thousand/uL Final    RBC 06/18/2022 3 50 (A) 3 88 - 5 62 Million/uL Final    Hemoglobin 06/18/2022 11 1 (A) 12 0 - 17 0 g/dL Final    Hematocrit 06/18/2022 33 3 (A) 36 5 - 49 3 % Final    MCV 06/18/2022 95  82 - 98 fL Final    MCH 06/18/2022 31 7  26 8 - 34 3 pg Final    MCHC 06/18/2022 33 3  31 4 - 37 4 g/dL Final    RDW 06/18/2022 14 2  11 6 - 15 1 % Final    MPV 06/18/2022 9 9  8 9 - 12 7 fL Final    Platelets 12/97/9288 252  149 - 390 Thousands/uL Final    nRBC 06/18/2022 0  /100 WBCs Final    This is an appended report  These results have been appended to a previously preliminary verified report      Sodium 06/18/2022 139  136 - 145 mmol/L Final    Potassium 06/18/2022 4 6  3 5 - 5 3 mmol/L Final    Chloride 06/18/2022 105  100 - 108 mmol/L Final    CO2 06/18/2022 25  21 - 32 mmol/L Final    ANION GAP 06/18/2022 9  4 - 13 mmol/L Final    BUN 06/18/2022 35 (A) 5 - 25 mg/dL Final    Creatinine 06/18/2022 0 93  0 60 - 1 30 mg/dL Final    Standardized to IDMS reference method    Glucose 06/18/2022 134  65 - 140 mg/dL Final    If the patient is fasting, the ADA then defines impaired fasting glucose as > 100 mg/dL and diabetes as > or equal to 123 mg/dL  Specimen collection should occur prior to Sulfasalazine administration due to the potential for falsely depressed results  Specimen collection should occur prior to Sulfapyridine administration due to the potential for falsely elevated results   Calcium 06/18/2022 8 3  8 3 - 10 1 mg/dL Final    eGFR 06/18/2022 76  ml/min/1 73sq m Final    Procalcitonin 06/18/2022 2 64 (A) <=0 25 ng/ml Final    Comment: Suspected Lower Respiratory Tract Infection (LRTI):  - LESS than or EQUAL to 0 25 ng/mL:   low likelihood for bacterial LRTI; antibiotics DISCOURAGED   - GREATER than 0 25 ng/mL:   increased likelihood for bacterial LRTI; antibiotics ENCOURAGED  Suspected Sepsis:  - Strongly consider initiating antibiotics in ALL UNSTABLE patients  - LESS than or EQUAL to 0 5 ng/mL:   low likelihood for bacterial sepsis; antibiotics DISCOURAGED   - GREATER than 0 5 ng/mL:   increased likelihood for bacterial sepsis; antibiotics ENCOURAGED   - GREATER than 2 ng/mL:   high risk for severe sepsis / septic shock; antibiotics strongly ENCOURAGED  Decisions on antibiotic use should not be based solely on Procalcitonin (PCT) levels  If PCT is low but uncertainty exists with stopping antibiotics, repeat PCT in 6-24 hours to confirm the low level  If antibiotics are administered (regardless if initial PCT was high or low), repeat PCT every 1-2 days to consider early antibiotic cessation (when GREATER                            than 80% decrease from the peak OR when PCT drops below designated cutoffs, whichever comes first), so long as the infection is NOT one that typically requires prolonged treatment durations (e g , bone/joint infections, endocarditis, Staph  aureus bacteremia)      Situations of FALSE-POSITIVE Procalcitonin values:  1) Newborns < 67 hours old  2) Massive stress from severe trauma / burns, major surgery, acute pancreatitis, cardiogenic / hemorrhagic shock, sickle cell crisis, or other organ perfusion abnormalities  3) Malaria and some Candidal infections  4) Treatment with agents that stimulate cytokines (e g , OKT3, anti-lymphocyte globulins, alemtuzumab, IL-2, granulocyte transfusion [NOT GCSFs])  5) Chronic renal disease causes elevated baseline levels (consider GREATER than 0 75 ng/mL as an abnormal cut-off); initiating HD/CRRT may cause transient decreases  6) Paraneoplastic syndromes from medullary thyroid or SCLC, some forms of vasculitis, and acute [id number]                            disease    Situations of FALSE-NEGATIVE Procalcitonin values:  1) Too early in clinical course for PCT to have reached its peak (may repeat in 6-24 hours to confirm low level)  2) Localized infection WITHOUT systemic (SIRS / sepsis) response (e g , an abscess, osteomyelitis, cystitis)  3) Mycobacteria (e g , Tuberculosis, MAC)  4) Cystic fibrosis exacerbations      Magnesium 06/18/2022 2 2  1 6 - 2 6 mg/dL Final    WBC 06/19/2022 10 81 (A) 4 31 - 10 16 Thousand/uL Final    RBC 06/19/2022 3 49 (A) 3 88 - 5 62 Million/uL Final    Hemoglobin 06/19/2022 11 0 (A) 12 0 - 17 0 g/dL Final    Hematocrit 06/19/2022 33 8 (A) 36 5 - 49 3 % Final    MCV 06/19/2022 97  82 - 98 fL Final    MCH 06/19/2022 31 5  26 8 - 34 3 pg Final    MCHC 06/19/2022 32 5  31 4 - 37 4 g/dL Final    RDW 06/19/2022 14 3  11 6 - 15 1 % Final    Platelets 55/88/7577 311  149 - 390 Thousands/uL Final    MPV 06/19/2022 9 6  8 9 - 12 7 fL Final    Sodium 06/19/2022 139  136 - 145 mmol/L Final    Potassium 06/19/2022 4 5  3 5 - 5 3 mmol/L Final    Chloride 06/19/2022 105  100 - 108 mmol/L Final    CO2 06/19/2022 28  21 - 32 mmol/L Final    ANION GAP 06/19/2022 6  4 - 13 mmol/L Final    BUN 06/19/2022 38 (A) 5 - 25 mg/dL Final    Creatinine 06/19/2022 0 89  0 60 - 1 30 mg/dL Final    Standardized to IDMS reference method    Glucose 06/19/2022 109  65 - 140 mg/dL Final    If the patient is fasting, the ADA then defines impaired fasting glucose as > 100 mg/dL and diabetes as > or equal to 123 mg/dL  Specimen collection should occur prior to Sulfasalazine administration due to the potential for falsely depressed results  Specimen collection should occur prior to Sulfapyridine administration due to the potential for falsely elevated results   Calcium 06/19/2022 8 5  8 3 - 10 1 mg/dL Final    eGFR 06/19/2022 79  ml/min/1 73sq m Final    Procalcitonin 06/19/2022 1 77 (A) <=0 25 ng/ml Final    Comment: Suspected Lower Respiratory Tract Infection (LRTI):  - LESS than or EQUAL to 0 25 ng/mL:   low likelihood for bacterial LRTI; antibiotics DISCOURAGED   - GREATER than 0 25 ng/mL:   increased likelihood for bacterial LRTI; antibiotics ENCOURAGED  Suspected Sepsis:  - Strongly consider initiating antibiotics in ALL UNSTABLE patients  - LESS than or EQUAL to 0 5 ng/mL:   low likelihood for bacterial sepsis; antibiotics DISCOURAGED   - GREATER than 0 5 ng/mL:   increased likelihood for bacterial sepsis; antibiotics ENCOURAGED   - GREATER than 2 ng/mL:   high risk for severe sepsis / septic shock; antibiotics strongly ENCOURAGED  Decisions on antibiotic use should not be based solely on Procalcitonin (PCT) levels  If PCT is low but uncertainty exists with stopping antibiotics, repeat PCT in 6-24 hours to confirm the low level  If antibiotics are administered (regardless if initial PCT was high or low), repeat PCT every 1-2 days to consider early antibiotic cessation (when GREATER                            than 80% decrease from the peak OR when PCT drops below designated cutoffs, whichever comes first), so long as the infection is NOT one that typically requires prolonged treatment durations (e g , bone/joint infections, endocarditis, Staph  aureus bacteremia)      Situations of FALSE-POSITIVE Procalcitonin values:  1) Newborns < 72 hours old  2) Massive stress from severe trauma / burns, major surgery, acute pancreatitis, cardiogenic / hemorrhagic shock, sickle cell crisis, or other organ perfusion abnormalities  3) Malaria and some Candidal infections  4) Treatment with agents that stimulate cytokines (e g , OKT3, anti-lymphocyte globulins, alemtuzumab, IL-2, granulocyte transfusion [NOT GCSFs])  5) Chronic renal disease causes elevated baseline levels (consider GREATER than 0 75 ng/mL as an abnormal cut-off); initiating HD/CRRT may cause transient decreases  6) Paraneoplastic syndromes from medullary thyroid or SCLC, some forms of vasculitis, and acute [id number]                            disease    Situations of FALSE-NEGATIVE Procalcitonin values:  1) Too early in clinical course for PCT to have reached its peak (may repeat in 6-24 hours to confirm low level)  2) Localized infection WITHOUT systemic (SIRS / sepsis) response (e g , an abscess, osteomyelitis, cystitis)  3) Mycobacteria (e g , Tuberculosis, MAC)  4) Cystic fibrosis exacerbations      Ventricular Rate 06/16/2022 116  BPM Final    Atrial Rate 06/16/2022 116  BPM Final    IA Interval 06/16/2022 152  ms Final    QRSD Interval 06/16/2022 94  ms Final    QT Interval 06/16/2022 312  ms Final    QTC Interval 06/16/2022 433  ms Final    P Axis 06/16/2022 57  degrees Final    QRS Axis 06/16/2022 9  degrees Final    T Wave Axis 06/16/2022 40  degrees Final    Sodium 06/20/2022 137  136 - 145 mmol/L Final    Potassium 06/20/2022 4 3  3 5 - 5 3 mmol/L Final    Chloride 06/20/2022 105  100 - 108 mmol/L Final    CO2 06/20/2022 28  21 - 32 mmol/L Final    ANION GAP 06/20/2022 4  4 - 13 mmol/L Final    BUN 06/20/2022 31 (A) 5 - 25 mg/dL Final    Creatinine 06/20/2022 0 84  0 60 - 1 30 mg/dL Final    Standardized to IDMS reference method    Glucose 06/20/2022 94  65 - 140 mg/dL Final    If the patient is fasting, the ADA then defines impaired fasting glucose as > 100 mg/dL and diabetes as > or equal to 123 mg/dL  Specimen collection should occur prior to Sulfasalazine administration due to the potential for falsely depressed results  Specimen collection should occur prior to Sulfapyridine administration due to the potential for falsely elevated results   Calcium 06/20/2022 8 2 (A) 8 3 - 10 1 mg/dL Final    eGFR 06/20/2022 81  ml/min/1 73sq m Final    WBC 06/20/2022 8 74  4 31 - 10 16 Thousand/uL Final    RBC 06/20/2022 3 20 (A) 3 88 - 5 62 Million/uL Final    Hemoglobin 06/20/2022 10 1 (A) 12 0 - 17 0 g/dL Final    Hematocrit 06/20/2022 30 9 (A) 36 5 - 49 3 % Final    MCV 06/20/2022 97  82 - 98 fL Final    MCH 06/20/2022 31 6  26 8 - 34 3 pg Final    MCHC 06/20/2022 32 7  31 4 - 37 4 g/dL Final    RDW 06/20/2022 14 2  11 6 - 15 1 % Final    Platelets 76/27/9582 359  149 - 390 Thousands/uL Final    MPV 06/20/2022 9 5  8 9 - 12 7 fL Final         Patient Instructions   He had remote history of transient paroxysmal atrial fibrillation during his 1st he fracture on the right side which has resolved and remains stable  Continue your metoprolol 50 mg twice a day as long as your blood pressure is between 110 and 140 and heart rate more than 60  If the blood pressure is between 90 and 110 then take a metoprolol 25 mg twice a day  If your heart rate is less than 50 is do not give metoprolol for that particular 12 hours  Keep track of blood pressure and heart rate is you are doing  Paragraphs  Niacin  May continue atorvastatin as well as the Celexa  Continue Lovenox for 6 weeks  You will need a CT scan of the chest in another 4 weeks  Follow with your cardiologist as planned  Continue to follow with the your orthopedic doctor per them  Follow with Consultants as per their and our suggestion    Follow up in 5  week(s) or as needed earlier    Follow all instructions as advised and discussed  Take your medications as prescribed      Call the office immediately if you experience any side effects  Ask questions if you do not understand  Keep your scheduled appointment as advised or come sooner if necessary or in doubt  Best time to call for non-urgent matter or questions on weekdays is between 9am and 12 noon  See physician for any new symptoms or worsening of current symptoms  Urgent or emergent situations call 911 and report to nearest emergency room  I spent  45 minutes taking care of this patient including clinical care, conseling, collaboration, chart, lab and consultaion review  Patient is to get labs 2 week(s) prior to next visit if advised     Of the recommend 3 healthy meal   Drink plenty of fluid  Start taking 1 multivitamin a day  As well as the protein powder 1 scoop every day    You will needs repeat CT scan of the chest in 1 month as well as the 1 year for the lung nodule follow-up  Also in next 2-3 months after you are better consider seeing urologist regarding your blood bladder wall thickening       Dr Niko Hedrick MD  [facility] [place]       "This note has been constructed using a voice recognition system  Therefore there may be syntax, spelling, and/or grammatical errors   Please call if you have any questions  "

## 2022-07-22 NOTE — ASSESSMENT & PLAN NOTE
06/17/2022:  Echocardiogram:  Normal ejection fraction 60 person mild aortic regurgitation mild-to-moderate tricuspid regurgitation right ventricular systolic pressure elevated moderately at 51    Otherwise unremarkable echocardiogram with grade 1 diastolic dysfunction    Follow with cardiologist

## 2022-07-22 NOTE — PATIENT INSTRUCTIONS
He had remote history of transient paroxysmal atrial fibrillation during his 1st he fracture on the right side which has resolved and remains stable  Continue your metoprolol 50 mg twice a day as long as your blood pressure is between 110 and 140 and heart rate more than 60  If the blood pressure is between 90 and 110 then take a metoprolol 25 mg twice a day  If your heart rate is less than 50 is do not give metoprolol for that particular 12 hours  Keep track of blood pressure and heart rate is you are doing  Paragraphs  Niacin  May continue atorvastatin as well as the Celexa  Continue Lovenox for 6 weeks  You will need a CT scan of the chest in another 4 weeks  Follow with your cardiologist as planned  Continue to follow with the your orthopedic doctor per them  Follow with Consultants as per their and our suggestion    Follow up in 5  week(s) or as needed earlier    Follow all instructions as advised and discussed  Take your medications as prescribed  Call the office immediately if you experience any side effects  Ask questions if you do not understand  Keep your scheduled appointment as advised or come sooner if necessary or in doubt  Best time to call for non-urgent matter or questions on weekdays is between 9am and 12 noon  See physician for any new symptoms or worsening of current symptoms  Urgent or emergent situations call 911 and report to nearest emergency room  I spent  45 minutes taking care of this patient including clinical care, conseling, collaboration, chart, lab and consultaion review  Patient is to get labs 2 week(s) prior to next visit if advised     Of the recommend 3 healthy meal   Drink plenty of fluid  Start taking 1 multivitamin a day  As well as the protein powder 1 scoop every day    You will needs repeat CT scan of the chest in 1 month as well as the 1 year for the lung nodule follow-up      Also in next 2-3 months after you are better consider seeing urologist regarding your blood bladder wall thickening

## 2022-07-26 ENCOUNTER — TELEPHONE (OUTPATIENT)
Dept: INTERNAL MEDICINE CLINIC | Facility: CLINIC | Age: 82
End: 2022-07-26

## 2022-07-26 NOTE — TELEPHONE ENCOUNTER
Visiting Nurse called to let Dr Kitty Fletcher know that today's scheduled visit was rescheduled  due to nurse shortage, Will visit on Wednesday or Friday  Patient's family indicated that he is doing well and he is being seen by PT and OT

## 2022-08-02 ENCOUNTER — TELEPHONE (OUTPATIENT)
Dept: INTERNAL MEDICINE CLINIC | Facility: CLINIC | Age: 82
End: 2022-08-02

## 2022-08-02 NOTE — TELEPHONE ENCOUNTER
Ceasar Klinefelter called to report that her social work visit will be delayed by one week   Family requested that she come next week

## 2022-08-10 ENCOUNTER — OFFICE VISIT (OUTPATIENT)
Dept: OBGYN CLINIC | Facility: CLINIC | Age: 82
End: 2022-08-10

## 2022-08-10 ENCOUNTER — APPOINTMENT (OUTPATIENT)
Dept: RADIOLOGY | Facility: CLINIC | Age: 82
End: 2022-08-10
Payer: COMMERCIAL

## 2022-08-10 DIAGNOSIS — S42.018A CLOSED NONDISPLACED FRACTURE OF STERNAL END OF LEFT CLAVICLE, INITIAL ENCOUNTER: ICD-10-CM

## 2022-08-10 DIAGNOSIS — S42.018D CLOSED NONDISPLACED FRACTURE OF STERNAL END OF LEFT CLAVICLE WITH ROUTINE HEALING, SUBSEQUENT ENCOUNTER: Primary | ICD-10-CM

## 2022-08-10 PROCEDURE — 99024 POSTOP FOLLOW-UP VISIT: CPT | Performed by: ORTHOPAEDIC SURGERY

## 2022-08-10 PROCEDURE — 73552 X-RAY EXAM OF FEMUR 2/>: CPT

## 2022-08-10 NOTE — PROGRESS NOTES
Assessment/Plan:  1  Closed nondisplaced fracture of sternal end of left clavicle with routine healing, subsequent encounter  XR femur 2 vw left     Scribe Attestation    I,:  Miranda Berrios PA-C am acting as a scribe while in the presence of the attending physician :       I,:  Cha Medina, DO personally performed the services described in this documentation    as scribed in my presence :         Kassi Lewis is a pleasant 80-year-old gentleman presenting today for follow-up 7 weeks after undergoing a long cephalomedullary nail of a left hip intertrochanteric femur fracture with subtrochanteric extension  There is no significant change in the x-ray today compared to previous films in the fracture and hardware appears stable  Clinically, he also appears to be doing much better  He may continue to weight bear as tolerated on the left lower extremity  He will continue with his home physical therapy and we can provide him with an outpatient referral if he and his family so choose  He has completed DVT prophylaxis  We will plan to see him back in 6 weeks with an x-ray on arrival of his left femur  He and his family expressed understanding all of her questions were addressed    Subjective: Left femur fracture follow up    Patient ID: Carline Kate is a 80 y o  male  Kassi Lewis is a pleasant 80-year-old gentleman presenting today for follow-up 6 weeks after a long cephalomedullary nail for a left hip intertrochanteric fracture with subtrochanteric extension  His family reports that he is doing better  He is now back home and not any rehab  He has been ambulating with a walker, and not complaining of significant groin pain  He did have a fall a few weeks ago, but x-rays did not show any significant changes of his left femur  He denies any paresthesias in the left lower extremity      Review of Systems   Constitutional: Negative  HENT: Negative  Eyes: Negative  Respiratory: Negative      Cardiovascular: Negative  Gastrointestinal: Negative  Endocrine: Negative  Genitourinary: Negative  Musculoskeletal: Positive for arthralgias, joint swelling and myalgias  Skin: Negative  Allergic/Immunologic: Negative  Neurological: Negative  Hematological: Negative  Psychiatric/Behavioral: Negative  Past Medical History:   Diagnosis Date    Hyperlipidemia      Past Surgical History:   Procedure Laterality Date    IR IVC FILTER PLACEMENT OPTIONAL/TEMPORARY  12/7/2019    IR IVC FILTER REMOVAL  8/21/2020    NE OPEN RX FEMUR FX+INTRAMED BÁRBARA Right 12/4/2019    Procedure: INSERTION NAIL IM FEMUR ANTEGRADE (TROCHANTERIC);   Surgeon: Keven Leija DO;  Location: [facility];  Service: Orthopedics    NE OPEN RX FEMUR FX+INTRAMED BÁRBARA Left 6/17/2022    Procedure: INSERTION NAIL IM FEMUR ANTEGRADE (TROCHANTERIC) - long nail;  Surgeon: Shira Morales DO;  Location: [facility];  Service: Orthopedics       Family History   Problem Relation Age of Onset    Cancer Mother        Social History     Occupational History    Not on file   Tobacco Use    Smoking status: Never Smoker    Smokeless tobacco: Never Used   Substance and Sexual Activity    Alcohol use: Never    Drug use: Never    Sexual activity: Not on file         Current Outpatient Medications:     acetaminophen (TYLENOL) 325 mg tablet, Take 2 tablets (650 mg total) by mouth every 6 (six) hours as needed for mild pain, headaches or fever, Disp: , Rfl: 0    atorvastatin (LIPITOR) 10 mg tablet, Half tab daily, Disp: 45 tablet, Rfl: 1    citalopram (CeleXA) 40 mg tablet, Take 1 tablet (40 mg total) by mouth daily, Disp: 90 tablet, Rfl: 1    Docusate Sodium (DSS) 100 MG CAPS, Take 100 mg by mouth daily, Disp: , Rfl:     fosinopril (MONOPRIL) 10 mg tablet, TAKE 1/2 TABLET BY MOUTH DAILY, Disp: 45 tablet, Rfl: 1    magnesium hydroxide (MILK OF MAGNESIA) 400 mg/5 mL oral suspension, Take 30 mL by mouth, Disp: , Rfl:     metoprolol tartrate (LOPRESSOR) 100 mg tablet, Take 1 tablet (100 mg total) by mouth every 12 (twelve) hours, Disp: , Rfl: 0    Multiple Vitamin (MULTIVITAMIN) tablet, Take 1 tablet by mouth daily, Disp: , Rfl:     niacin (NIASPAN) 500 mg CR tablet, TAKE ONE TABLET DAILY AT BEDTIME, Disp: 90 tablet, Rfl: 1    No Known Allergies    Objective: There were no vitals filed for this visit  There is no height or weight on file to calculate BMI  Left Hip Exam     Tenderness   The patient is experiencing no tenderness  Muscle Strength   Abduction: 5/5   Adduction: 5/5   Flexion: 4/5     Tests   ARMOND: negative  Brady: negative    Other   Erythema: absent  Scars: present  Sensation: normal  Pulse: present    Comments: Both proximal and distal incisions well healed  No erythema or ecchymosis   Small hematoma distal incision, nontender  Tolerated AROM/PROM well  Thigh and calf soft and nontender  Grossly NVI            Physical Exam  Vitals and nursing note reviewed  Constitutional:       Appearance: Normal appearance  He is well-developed  Comments: In WC   HENT:      Head: Normocephalic and atraumatic  Right Ear: External ear normal       Left Ear: External ear normal    Eyes:      Extraocular Movements: Extraocular movements intact  Conjunctiva/sclera: Conjunctivae normal    Cardiovascular:      Rate and Rhythm: Normal rate  Pulses: Normal pulses  Pulmonary:      Effort: Pulmonary effort is normal    Abdominal:      Palpations: Abdomen is soft  Musculoskeletal:      Cervical back: Normal range of motion  Comments: See ortho exam   Skin:     General: Skin is warm and dry  Neurological:      General: No focal deficit present  Mental Status: He is alert and oriented to person, place, and time  Mental status is at baseline  Psychiatric:         Mood and Affect: Mood normal          Behavior: Behavior normal          Thought Content:  Thought content normal          Judgment: Judgment normal  I have personally reviewed pertinent films in PACS of the x-rays taken today and compared them to previous films  There has been no change in alignment of fracture or long CMN hardware    No significant callus development appreciated

## 2022-08-11 DIAGNOSIS — S72.302A CLOSED FRACTURE OF SHAFT OF LEFT FEMUR, UNSPECIFIED FRACTURE MORPHOLOGY, INITIAL ENCOUNTER (HCC): Primary | ICD-10-CM

## 2022-08-11 NOTE — PROGRESS NOTES
Patient's daughter called that patient received call from orthopedic doctor that his fracture is not healing  The advised calcium and vitamin-D  The call for the direction for the does  I recommend calcium with vitamin-D either 500/401 tablet twice a day or 600/401 tablet twice a day either Oscal or Caltrate  I also will order DEXA scan how to check for osteoporosis if that is the case he may require additional medications such as Fosamax Prolia Actonel etc   Or Boniva    They are willing to pursue  I will order    I will ask my office to arrange that

## 2022-08-12 ENCOUNTER — TELEPHONE (OUTPATIENT)
Dept: OBGYN CLINIC | Facility: CLINIC | Age: 82
End: 2022-08-12

## 2022-08-12 NOTE — TELEPHONE ENCOUNTER
Patient daughter called states she forgot to ask you a question  Since it has been 2 months since patients fracture and since it is not healing, she was inquiring if you may consider a Bone Stim for Amber Lyn?

## 2022-08-12 NOTE — TELEPHONE ENCOUNTER
It is not indicated at this time  His fracture has demonstrated stable alignment with no evidence of malunion or nonunion  Typically, we cannot even order those until he is 3 months from fracture   At this point, his clinical healing is more important than radiographic healing, so he is doing well

## 2022-08-14 ENCOUNTER — HOSPITAL ENCOUNTER (EMERGENCY)
Facility: HOSPITAL | Age: 82
Discharge: HOME | End: 2022-08-14
Attending: EMERGENCY MEDICINE
Payer: COMMERCIAL

## 2022-08-14 ENCOUNTER — APPOINTMENT (EMERGENCY)
Dept: RADIOLOGY | Facility: HOSPITAL | Age: 82
End: 2022-08-14
Payer: COMMERCIAL

## 2022-08-14 VITALS
HEART RATE: 78 BPM | TEMPERATURE: 99 F | OXYGEN SATURATION: 92 % | SYSTOLIC BLOOD PRESSURE: 150 MMHG | DIASTOLIC BLOOD PRESSURE: 65 MMHG | RESPIRATION RATE: 20 BRPM

## 2022-08-14 DIAGNOSIS — N39.0 URINARY TRACT INFECTION: Primary | ICD-10-CM

## 2022-08-14 LAB
ALBUMIN SERPL BCP-MCNC: 3.4 G/DL (ref 3.5–5)
ALP SERPL-CCNC: 116 U/L (ref 46–116)
ALT SERPL W P-5'-P-CCNC: 11 U/L (ref 12–78)
ANION GAP SERPL CALCULATED.3IONS-SCNC: 6 MMOL/L (ref 4–13)
AST SERPL W P-5'-P-CCNC: 17 U/L (ref 5–45)
BACTERIA UR QL AUTO: ABNORMAL /HPF
BASOPHILS # BLD AUTO: 0.02 THOUSANDS/ΜL (ref 0–0.1)
BASOPHILS NFR BLD AUTO: 0 % (ref 0–1)
BILIRUB SERPL-MCNC: 0.62 MG/DL (ref 0.2–1)
BILIRUB UR QL STRIP: NEGATIVE
BUN SERPL-MCNC: 24 MG/DL (ref 5–25)
CALCIUM ALBUM COR SERPL-MCNC: 9.8 MG/DL (ref 8.3–10.1)
CALCIUM SERPL-MCNC: 9.3 MG/DL (ref 8.3–10.1)
CARDIAC TROPONIN I PNL SERPL HS: 5 NG/L
CHLORIDE SERPL-SCNC: 103 MMOL/L (ref 96–108)
CLARITY UR: ABNORMAL
CO2 SERPL-SCNC: 29 MMOL/L (ref 21–32)
COLOR UR: YELLOW
CREAT SERPL-MCNC: 0.97 MG/DL (ref 0.6–1.3)
EOSINOPHIL # BLD AUTO: 0.04 THOUSAND/ΜL (ref 0–0.61)
EOSINOPHIL NFR BLD AUTO: 0 % (ref 0–6)
ERYTHROCYTE [DISTWIDTH] IN BLOOD BY AUTOMATED COUNT: 14.4 % (ref 11.6–15.1)
GFR SERPL CREATININE-BSD FRML MDRD: 72 ML/MIN/1.73SQ M
GLUCOSE SERPL-MCNC: 122 MG/DL (ref 65–140)
GLUCOSE UR STRIP-MCNC: NEGATIVE MG/DL
HCT VFR BLD AUTO: 40.5 % (ref 36.5–49.3)
HGB BLD-MCNC: 13.3 G/DL (ref 12–17)
HGB UR QL STRIP.AUTO: ABNORMAL
IMM GRANULOCYTES # BLD AUTO: 0.03 THOUSAND/UL (ref 0–0.2)
IMM GRANULOCYTES NFR BLD AUTO: 0 % (ref 0–2)
KETONES UR STRIP-MCNC: NEGATIVE MG/DL
LACTATE SERPL-SCNC: 0.7 MMOL/L (ref 0.5–2)
LEUKOCYTE ESTERASE UR QL STRIP: ABNORMAL
LYMPHOCYTES # BLD AUTO: 0.38 THOUSANDS/ΜL (ref 0.6–4.47)
LYMPHOCYTES NFR BLD AUTO: 4 % (ref 14–44)
MCH RBC QN AUTO: 31.9 PG (ref 26.8–34.3)
MCHC RBC AUTO-ENTMCNC: 32.8 G/DL (ref 31.4–37.4)
MCV RBC AUTO: 97 FL (ref 82–98)
MONOCYTES # BLD AUTO: 0.61 THOUSAND/ΜL (ref 0.17–1.22)
MONOCYTES NFR BLD AUTO: 6 % (ref 4–12)
NEUTROPHILS # BLD AUTO: 9.07 THOUSANDS/ΜL (ref 1.85–7.62)
NEUTS SEG NFR BLD AUTO: 90 % (ref 43–75)
NITRITE UR QL STRIP: NEGATIVE
NON-SQ EPI CELLS URNS QL MICRO: ABNORMAL /HPF
NRBC BLD AUTO-RTO: 0 /100 WBCS
PH UR STRIP.AUTO: 5.5 [PH]
PLATELET # BLD AUTO: 269 THOUSANDS/UL (ref 149–390)
PMV BLD AUTO: 8.7 FL (ref 8.9–12.7)
POTASSIUM SERPL-SCNC: 4.5 MMOL/L (ref 3.5–5.3)
PROT SERPL-MCNC: 6.9 G/DL (ref 6.4–8.4)
PROT UR STRIP-MCNC: NEGATIVE MG/DL
RBC # BLD AUTO: 4.17 MILLION/UL (ref 3.88–5.62)
RBC #/AREA URNS AUTO: ABNORMAL /HPF
SARS-COV-2 RNA RESP QL NAA+PROBE: NEGATIVE
SODIUM SERPL-SCNC: 138 MMOL/L (ref 135–147)
SP GR UR STRIP.AUTO: 1.02 (ref 1–1.03)
TSH SERPL DL<=0.05 MIU/L-ACNC: 0.7 UIU/ML (ref 0.45–4.5)
UROBILINOGEN UR QL STRIP.AUTO: 0.2 E.U./DL
WBC # BLD AUTO: 10.15 THOUSAND/UL (ref 4.31–10.16)
WBC #/AREA URNS AUTO: ABNORMAL /HPF

## 2022-08-14 PROCEDURE — 85025 COMPLETE CBC W/AUTO DIFF WBC: CPT | Performed by: EMERGENCY MEDICINE

## 2022-08-14 PROCEDURE — 36415 COLL VENOUS BLD VENIPUNCTURE: CPT | Performed by: EMERGENCY MEDICINE

## 2022-08-14 PROCEDURE — 81001 URINALYSIS AUTO W/SCOPE: CPT | Performed by: EMERGENCY MEDICINE

## 2022-08-14 PROCEDURE — 99285 EMERGENCY DEPT VISIT HI MDM: CPT

## 2022-08-14 PROCEDURE — 99285 EMERGENCY DEPT VISIT HI MDM: CPT | Performed by: EMERGENCY MEDICINE

## 2022-08-14 PROCEDURE — 93005 ELECTROCARDIOGRAM TRACING: CPT

## 2022-08-14 PROCEDURE — 87040 BLOOD CULTURE FOR BACTERIA: CPT | Performed by: EMERGENCY MEDICINE

## 2022-08-14 PROCEDURE — 71045 X-RAY EXAM CHEST 1 VIEW: CPT

## 2022-08-14 PROCEDURE — 96365 THER/PROPH/DIAG IV INF INIT: CPT

## 2022-08-14 PROCEDURE — 84484 ASSAY OF TROPONIN QUANT: CPT | Performed by: EMERGENCY MEDICINE

## 2022-08-14 PROCEDURE — 83605 ASSAY OF LACTIC ACID: CPT | Performed by: EMERGENCY MEDICINE

## 2022-08-14 PROCEDURE — U0005 INFEC AGEN DETEC AMPLI PROBE: HCPCS | Performed by: EMERGENCY MEDICINE

## 2022-08-14 PROCEDURE — U0003 INFECTIOUS AGENT DETECTION BY NUCLEIC ACID (DNA OR RNA); SEVERE ACUTE RESPIRATORY SYNDROME CORONAVIRUS 2 (SARS-COV-2) (CORONAVIRUS DISEASE [COVID-19]), AMPLIFIED PROBE TECHNIQUE, MAKING USE OF HIGH THROUGHPUT TECHNOLOGIES AS DESCRIBED BY CMS-2020-01-R: HCPCS | Performed by: EMERGENCY MEDICINE

## 2022-08-14 PROCEDURE — 80053 COMPREHEN METABOLIC PANEL: CPT | Performed by: EMERGENCY MEDICINE

## 2022-08-14 PROCEDURE — 84443 ASSAY THYROID STIM HORMONE: CPT | Performed by: EMERGENCY MEDICINE

## 2022-08-14 RX ORDER — CEPHALEXIN 500 MG/1
500 CAPSULE ORAL EVERY 6 HOURS SCHEDULED
Qty: 40 CAPSULE | Refills: 0 | Status: SHIPPED | OUTPATIENT
Start: 2022-08-15 | End: 2022-08-25

## 2022-08-14 RX ORDER — ONDANSETRON 4 MG/1
4 TABLET, ORALLY DISINTEGRATING ORAL EVERY 8 HOURS PRN
Qty: 20 TABLET | Refills: 0 | Status: SHIPPED | OUTPATIENT
Start: 2022-08-14

## 2022-08-14 RX ORDER — CEFTRIAXONE 1 G/50ML
1000 INJECTION, SOLUTION INTRAVENOUS ONCE
Status: COMPLETED | OUTPATIENT
Start: 2022-08-14 | End: 2022-08-14

## 2022-08-14 RX ADMIN — CEFTRIAXONE 1000 MG: 1 INJECTION, SOLUTION INTRAVENOUS at 15:01

## 2022-08-14 RX ADMIN — SODIUM CHLORIDE 1000 ML: 0.9 INJECTION, SOLUTION INTRAVENOUS at 15:00

## 2022-08-14 NOTE — ED PROVIDER NOTES
History  Chief Complaint   Patient presents with    Weakness - Generalized     Daughter who is a nurse reported that pt seen was weaker started yesterday, vomiting x 1 today, temp at home 99  Recently 2 months ago dx with pneumonia  Korey Jon and has L hip surgery  Pt is AXOX3, slightly tachypnic      Patient is an 40-year-old male  He presents emergency room with a 1 day history of feeling weak  When he left [place] today he was very shaky  No measured fever  He does complain of a mild headache  He did have some nausea and vomiting  No neck pain, photophobia or mental status changes  No diarrhea  He denies abdominal pain  He does complain of a cough  He denies any urinary complaints  No chest pain  Patient had a hip replacement done middle of June  At that time he did have pneumonia  Symptoms are generalized  Moderate in severity  No aggravating or relieving factors  Patient has a history of hyperlipidemia  He has an IVC filter  He has valvular heart disease  Prior to Admission Medications   Prescriptions Last Dose Informant Patient Reported? Taking?    Docusate Sodium (DSS) 100 MG CAPS   Yes No   Sig: Take 100 mg by mouth daily   Multiple Vitamin (MULTIVITAMIN) tablet   Yes No   Sig: Take 1 tablet by mouth daily   acetaminophen (TYLENOL) 325 mg tablet   No No   Sig: Take 2 tablets (650 mg total) by mouth every 6 (six) hours as needed for mild pain, headaches or fever   atorvastatin (LIPITOR) 10 mg tablet   No No   Sig: Half tab daily   citalopram (CeleXA) 40 mg tablet   No No   Sig: Take 1 tablet (40 mg total) by mouth daily   fosinopril (MONOPRIL) 10 mg tablet   No No   Sig: TAKE 1/2 TABLET BY MOUTH DAILY   magnesium hydroxide (MILK OF MAGNESIA) 400 mg/5 mL oral suspension   Yes No   Sig: Take 30 mL by mouth   metoprolol tartrate (LOPRESSOR) 100 mg tablet   No No   Sig: Take 1 tablet (100 mg total) by mouth every 12 (twelve) hours   niacin (NIASPAN) 500 mg CR tablet   No No   Sig: TAKE ONE TABLET DAILY AT BEDTIME      Facility-Administered Medications: None       Past Medical History:   Diagnosis Date    Hyperlipidemia        Past Surgical History:   Procedure Laterality Date    IR IVC FILTER PLACEMENT OPTIONAL/TEMPORARY  12/7/2019    IR IVC FILTER REMOVAL  8/21/2020    IL OPEN RX FEMUR FX+INTRAMED BÁRBARA Right 12/4/2019    Procedure: INSERTION NAIL IM FEMUR ANTEGRADE (TROCHANTERIC); Surgeon: Marques Brooks DO;  Location: [facility];  Service: Orthopedics    IL OPEN RX FEMUR FX+INTRAMED BÁRBARA Left 6/17/2022    Procedure: INSERTION NAIL IM FEMUR ANTEGRADE (TROCHANTERIC) - long nail;  Surgeon: Sabrina Tony DO;  Location: [facility];  Service: Orthopedics       Family History   Problem Relation Age of Onset    Cancer Mother      I have reviewed and agree with the history as documented  E-Cigarette/Vaping    E-Cigarette Use Never User      E-Cigarette/Vaping Substances     Social History     Tobacco Use    Smoking status: Never Smoker    Smokeless tobacco: Never Used   Vaping Use    Vaping Use: Never used   Substance Use Topics    Alcohol use: Never    Drug use: Never       Review of Systems   Constitutional: Positive for chills  Negative for fever  HENT: Negative for rhinorrhea and sore throat  Eyes: Negative for pain, redness and visual disturbance  Respiratory: Negative for cough and shortness of breath  Cardiovascular: Negative for chest pain and leg swelling  Gastrointestinal: Positive for nausea and vomiting  Negative for abdominal pain and diarrhea  Endocrine: Negative for polydipsia and polyuria  Genitourinary: Negative for dysuria, frequency and hematuria  Musculoskeletal: Negative for back pain and neck pain  Skin: Negative for rash and wound  Allergic/Immunologic: Negative for immunocompromised state  Neurological: Positive for tremors, weakness and headaches  Negative for numbness  Psychiatric/Behavioral: Negative for hallucinations and suicidal ideas  All other systems reviewed and are negative  Physical Exam  Physical Exam  Vitals reviewed  Constitutional:       General: He is not in acute distress  Appearance: He is not toxic-appearing  HENT:      Head: Normocephalic and atraumatic  Nose: Nose normal       Mouth/Throat:      Mouth: Mucous membranes are dry  Eyes:      General:         Right eye: No discharge  Left eye: No discharge  Conjunctiva/sclera: Conjunctivae normal    Cardiovascular:      Rate and Rhythm: Normal rate and regular rhythm  Pulses: Normal pulses  Heart sounds: Normal heart sounds  No murmur heard  No friction rub  No gallop  Pulmonary:      Effort: Pulmonary effort is normal  No respiratory distress  Breath sounds: Normal breath sounds  No stridor  No wheezing, rhonchi or rales  Abdominal:      General: Bowel sounds are normal  There is no distension  Palpations: Abdomen is soft  Tenderness: There is no abdominal tenderness  There is no right CVA tenderness, left CVA tenderness, guarding or rebound  Musculoskeletal:         General: No tenderness, deformity or signs of injury  Cervical back: Normal range of motion and neck supple  No rigidity  Right lower leg: Edema present  Left lower leg: Edema present  Comments: No calf pain or unilateral leg swelling  There is 1+ edema to both lower extremities  Surgical incisions near the left hip are well healed  No erythema or drainage  Skin:     General: Skin is warm and dry  Coloration: Skin is not jaundiced or pale  Findings: No bruising, erythema or rash  Neurological:      General: No focal deficit present  Mental Status: He is alert and oriented to person, place, and time  Cranial Nerves: No facial asymmetry  Sensory: No sensory deficit  Motor: Motor function is intact     Psychiatric:         Mood and Affect: Mood normal          Behavior: Behavior normal          Vital Signs  ED Triage Vitals [08/14/22 1354]   Temperature Pulse Respirations Blood Pressure SpO2   99 3 °F (37 4 °C) 87 20 162/73 93 %      Temp Source Heart Rate Source Patient Position - Orthostatic VS BP Location FiO2 (%)   Oral Monitor Sitting Left arm --      Pain Score       No Pain           Vitals:    08/14/22 1354 08/14/22 1400   BP: 162/73 162/73   Pulse: 87 84   Patient Position - Orthostatic VS: Sitting          Visual Acuity      ED Medications  Medications   sodium chloride 0 9 % bolus 1,000 mL (1,000 mL Intravenous New Bag 8/14/22 1500)   cefTRIAXone (ROCEPHIN) IVPB (premix in dextrose) 1,000 mg 50 mL (1,000 mg Intravenous New Bag 8/14/22 1501)       Diagnostic Studies  Results Reviewed     Procedure Component Value Units Date/Time    HS Troponin I 4hr [[id number]]     Lab Status: No result Specimen: Blood     Lactic acid [[id number]]  (Normal) Collected: 08/14/22 1459    Lab Status: Final result Specimen: Blood from Arm, Left Updated: 08/14/22 1528     LACTIC ACID 0 7 mmol/L     Narrative:      Result may be elevated if tourniquet was used during collection  TSH [[id number]]  (Normal) Collected: 08/14/22 1407    Lab Status: Final result Specimen: Blood from Arm, Right Updated: 08/14/22 1516     TSH 3RD GENERATON 0 697 uIU/mL     Narrative:      Patients undergoing fluorescein dye angiography may retain small amounts of fluorescein in the body for 48-72 hours post procedure  Samples containing fluorescein can produce falsely depressed TSH values  If the patient had this procedure,a specimen should be resubmitted post fluorescein clearance  Blood culture #2 [[id number]] Collected: 08/14/22 1459    Lab Status: In process Specimen: Blood from Arm, Left Updated: 08/14/22 1506    Blood culture #1 [[id number]] Collected: 08/14/22 1459    Lab Status:  In process Specimen: Blood from Arm, Right Updated: 08/14/22 [address] only [[id number]]  (Normal) Collected: 08/14/22 1407    Lab Status: Final result Specimen: Nares from Nose Updated: 08/14/22 1505     SARS-CoV-2 Negative    Narrative:      FOR PEDIATRIC PATIENTS - copy/paste COVID Guidelines URL to browser: [email or web address]  IFTTTx    SARS-CoV-2 assay is a Nucleic Acid Amplification assay intended for the  qualitative detection of nucleic acid from SARS-CoV-2 in nasopharyngeal  swabs  Results are for the presumptive identification of SARS-CoV-2 RNA  Positive results are indicative of infection with SARS-CoV-2, the virus  causing COVID-19, but do not rule out bacterial infection or co-infection  with other viruses  Laboratories within the [place] and its  territories are required to report all positive results to the appropriate  public health authorities  Negative results do not preclude SARS-CoV-2  infection and should not be used as the sole basis for treatment or other  patient management decisions  Negative results must be combined with  clinical observations, patient history, and epidemiological information  This test has not been FDA cleared or approved  This test has been authorized by FDA under an Emergency Use Authorization  (EUA)  This test is only authorized for the duration of time the  declaration that circumstances exist justifying the authorization of the  emergency use of an in vitro diagnostic tests for detection of SARS-CoV-2  virus and/or diagnosis of COVID-19 infection under section 564(b)(1) of  the Act, 21 U  S C  192UCW-4(N)(6), unless the authorization is terminated  or revoked sooner  The test has been validated but independent review by FDA  and CLIA is pending  Test performed using [employer] Gene[employer]pert: This RT-PCR assay targets N2,  a region unique to SARS-CoV-2  A conserved region in the E-gene was chosen  for pan-Sarbecovirus detection which includes SARS-CoV-2      HS Troponin I 2hr [[id number]] Collected: 08/14/22 1459    Lab Status: No result Specimen: Blood     HS Troponin 0hr (reflex protocol) [[id number]]  (Normal) Collected: 08/14/22 1407    Lab Status: Final result Specimen: Blood from Arm, Right Updated: 08/14/22 1440     hs TnI 0hr 5 ng/L     Comprehensive metabolic panel [[id number]]  (Abnormal) Collected: 08/14/22 1407    Lab Status: Final result Specimen: Blood from Arm, Right Updated: 08/14/22 1434     Sodium 138 mmol/L      Potassium 4 5 mmol/L      Chloride 103 mmol/L      CO2 29 mmol/L      ANION GAP 6 mmol/L      BUN 24 mg/dL      Creatinine 0 97 mg/dL      Glucose 122 mg/dL      Calcium 9 3 mg/dL      Corrected Calcium 9 8 mg/dL      AST 17 U/L      ALT 11 U/L      Alkaline Phosphatase 116 U/L      Total Protein 6 9 g/dL      Albumin 3 4 g/dL      Total Bilirubin 0 62 mg/dL      eGFR 72 ml/min/1 73sq m     Narrative:      National Kidney Disease Foundation guidelines for Chronic Kidney Disease (CKD):     Stage 1 with normal or high GFR (GFR > 90 mL/min/1 73 square meters)    Stage 2 Mild CKD (GFR = 60-89 mL/min/1 73 square meters)    Stage 3A Moderate CKD (GFR = 45-59 mL/min/1 73 square meters)    Stage 3B Moderate CKD (GFR = 30-44 mL/min/1 73 square meters)    Stage 4 Severe CKD (GFR = 15-29 mL/min/1 73 square meters)    Stage 5 End Stage CKD (GFR <15 mL/min/1 73 square meters)  Note: GFR calculation is accurate only with a steady state creatinine    CBC and differential [[id number]]  (Abnormal) Collected: 08/14/22 1407    Lab Status: Final result Specimen: Blood from Arm, Right Updated: 08/14/22 1434     WBC 10 15 Thousand/uL      RBC 4 17 Million/uL      Hemoglobin 13 3 g/dL      Hematocrit 40 5 %      MCV 97 fL      MCH 31 9 pg      MCHC 32 8 g/dL      RDW 14 4 %      MPV 8 7 fL      Platelets 549 Thousands/uL      nRBC 0 /100 WBCs      Neutrophils Relative 90 %      Immat GRANS % 0 %      Lymphocytes Relative 4 %      Monocytes Relative 6 %      Eosinophils Relative 0 %      Basophils Relative 0 %      Neutrophils Absolute 9 07 Thousands/µL Immature Grans Absolute 0 03 Thousand/uL      Lymphocytes Absolute 0 38 Thousands/µL      Monocytes Absolute 0 61 Thousand/µL      Eosinophils Absolute 0 04 Thousand/µL      Basophils Absolute 0 02 Thousands/µL     Narrative: This is an appended report  These results have been appended to a previously verified report  Urine Microscopic [[id number]]  (Abnormal) Collected: 08/14/22 1419    Lab Status: Final result Specimen: Urine, Clean Catch Updated: 08/14/22 1432     RBC, UA 0-1 /hpf      WBC, UA 30-50 /hpf      Epithelial Cells None Seen /hpf      Bacteria, UA Occasional /hpf     UA (URINE) with reflex to Scope [[id number]]  (Abnormal) Collected: 08/14/22 1419    Lab Status: Final result Specimen: Urine, Clean Catch Updated: 08/14/22 1424     Color, UA Yellow     Clarity, UA Slightly Cloudy     Specific Gravity, UA 1 025     pH, UA 5 5     Leukocytes, UA Moderate     Nitrite, UA Negative     Protein, UA Negative mg/dl      Glucose, UA Negative mg/dl      Ketones, UA Negative mg/dl      Urobilinogen, UA 0 2 E U /dl      Bilirubin, UA Negative     Occult Blood, UA Trace-Intact                 XR chest 1 view portable   ED Interpretation by Umu Keene MD (08/14 1611)   No infiltrates  Hiatal hernia  Final Result by Rosio Bray MD (08/14 1520)      Mild benign post infectious scar in the right upper lobe with no acute disease  Large hiatal hernia                    Workstation performed: [id number]                    Procedures  ECG 12 Lead Documentation Only    Date/Time: 8/14/2022 2:46 PM  Performed by: Umu Keene MD  Authorized by: Umu Keene MD     ECG reviewed by me, the ED Provider: yes    Patient location:  ED  Interpretation:     Interpretation: normal    Rate:     ECG rate assessment: normal    Rhythm:     Rhythm: sinus rhythm    Ectopy:     Ectopy: none    QRS:     QRS axis:  Normal  Conduction:     Conduction: normal    ST segments:     ST segments:  Normal  T waves:     T waves: normal               ED Course                               SBIRT 22yo+    Flowsheet Row Most Recent Value   SBIRT (23 yo +)    In order to provide better care to our patients, we are screening all of our patients for alcohol and drug use  Would it be okay to ask you these screening questions? No Filed at: 08/14/2022 1408                    [facility]  Number of Diagnoses or Management Options  Diagnosis management comments: COVID was negative  Urinalysis was consistent with UTI  There was no pneumonia on chest x-ray  Laboratory evaluation was otherwise unremarkable  Patient is not septic at this time  Discussed admission versus outpatient management with patient and his daughter  Patient would like to try outpatient management  Daughters amendable to this  Patient did receive IV Rocephin in the emergency room  Will discharge on cephalexin  Return if increased weakness, confusion, or any problems  Amount and/or Complexity of Data Reviewed  Clinical lab tests: ordered and reviewed  Tests in the radiology section of CPT®: ordered and reviewed  Independent visualization of images, tracings, or specimens: yes        Disposition  Final diagnoses:   Urinary tract infection     Time reflects when diagnosis was documented in both MDM as applicable and the Disposition within this note     Time User Action Codes Description Comment    8/14/2022  4:21 PM Aruna Peña Add [N39 0] Urinary tract infection       ED Disposition     ED Disposition   Discharge    Condition   Stable    Date/Time   Sun Aug 14, 2022  4:21 PM    [address] discharge to home/self care                 Follow-up Information     Follow up With Specialties Details Why Contact Fadi Singleton MD Family Medicine In 2 days  [address]  [address]  [phone]            Patient's Medications   Discharge Prescriptions    CEPHALEXIN (KEFLEX) 500 MG CAPSULE    Take 1 capsule (500 mg total) by mouth every 6 (six) hours for 10 days       Start Date: 8/15/2022 End Date: 8/25/2022       Order Dose: 500 mg       Quantity: 40 capsule    Refills: 0    ONDANSETRON (ZOFRAN-ODT) 4 MG DISINTEGRATING TABLET    Take 1 tablet (4 mg total) by mouth every 8 (eight) hours as needed for nausea or vomiting       Start Date: 8/14/2022 End Date: --       Order Dose: 4 mg       Quantity: 20 tablet    Refills: 0       No discharge procedures on file      PDMP Review     None          ED Provider  Electronically Signed by           Jaden Humphreys MD  08/14/22 2873

## 2022-08-15 NOTE — TELEPHONE ENCOUNTER
I called and left detailed message concerning below  Advised for her to call back with any questions

## 2022-08-16 ENCOUNTER — TELEPHONE (OUTPATIENT)
Dept: INTERNAL MEDICINE CLINIC | Facility: CLINIC | Age: 82
End: 2022-08-16

## 2022-08-16 LAB
ATRIAL RATE: 79 BPM
P AXIS: 28 DEGREES
PR INTERVAL: 160 MS
QRS AXIS: 0 DEGREES
QRSD INTERVAL: 94 MS
QT INTERVAL: 346 MS
QTC INTERVAL: 396 MS
T WAVE AXIS: 33 DEGREES
VENTRICULAR RATE: 79 BPM

## 2022-08-16 PROCEDURE — 93010 ELECTROCARDIOGRAM REPORT: CPT | Performed by: INTERNAL MEDICINE

## 2022-08-16 NOTE — TELEPHONE ENCOUNTER
Patient's daughter called to report that he was in the ER on Sunday and has a UTI  He is on antibiotics and probiotic, but he did have one episode of diarrhea  If the diarrhea continues she will call back for an appointment or take him back to the ER  Patient also had a fall with no injury

## 2022-08-20 LAB
BACTERIA BLD CULT: NORMAL
BACTERIA BLD CULT: NORMAL

## 2022-08-26 DIAGNOSIS — E78.5 HYPERLIPIDEMIA, UNSPECIFIED HYPERLIPIDEMIA TYPE: ICD-10-CM

## 2022-08-26 RX ORDER — NIACIN 500 MG/1
TABLET, EXTENDED RELEASE ORAL
Qty: 90 TABLET | Refills: 1 | Status: SHIPPED | OUTPATIENT
Start: 2022-08-26

## 2022-08-30 ENCOUNTER — APPOINTMENT (OUTPATIENT)
Dept: LAB | Facility: CLINIC | Age: 82
End: 2022-08-30
Payer: COMMERCIAL

## 2022-08-30 DIAGNOSIS — I10 ESSENTIAL HYPERTENSION: ICD-10-CM

## 2022-08-30 DIAGNOSIS — J18.9 PNEUMONIA OF RIGHT LUNG DUE TO INFECTIOUS ORGANISM, UNSPECIFIED PART OF LUNG: ICD-10-CM

## 2022-08-30 DIAGNOSIS — E78.00 HYPERCHOLESTEREMIA: ICD-10-CM

## 2022-08-30 LAB
ALBUMIN SERPL BCP-MCNC: 3.1 G/DL (ref 3.5–5)
ALP SERPL-CCNC: 98 U/L (ref 46–116)
ALT SERPL W P-5'-P-CCNC: 17 U/L (ref 12–78)
ANION GAP SERPL CALCULATED.3IONS-SCNC: 5 MMOL/L (ref 4–13)
AST SERPL W P-5'-P-CCNC: 13 U/L (ref 5–45)
BASOPHILS # BLD AUTO: 0.04 THOUSANDS/ΜL (ref 0–0.1)
BASOPHILS NFR BLD AUTO: 1 % (ref 0–1)
BILIRUB SERPL-MCNC: 0.31 MG/DL (ref 0.2–1)
BUN SERPL-MCNC: 24 MG/DL (ref 5–25)
CALCIUM ALBUM COR SERPL-MCNC: 9.6 MG/DL (ref 8.3–10.1)
CALCIUM SERPL-MCNC: 8.9 MG/DL (ref 8.3–10.1)
CHLORIDE SERPL-SCNC: 102 MMOL/L (ref 96–108)
CO2 SERPL-SCNC: 29 MMOL/L (ref 21–32)
CREAT SERPL-MCNC: 0.99 MG/DL (ref 0.6–1.3)
EOSINOPHIL # BLD AUTO: 0.28 THOUSAND/ΜL (ref 0–0.61)
EOSINOPHIL NFR BLD AUTO: 4 % (ref 0–6)
ERYTHROCYTE [DISTWIDTH] IN BLOOD BY AUTOMATED COUNT: 14.2 % (ref 11.6–15.1)
GFR SERPL CREATININE-BSD FRML MDRD: 70 ML/MIN/1.73SQ M
GLUCOSE SERPL-MCNC: 102 MG/DL (ref 65–140)
HCT VFR BLD AUTO: 38.8 % (ref 36.5–49.3)
HGB BLD-MCNC: 12.5 G/DL (ref 12–17)
IMM GRANULOCYTES # BLD AUTO: 0.02 THOUSAND/UL (ref 0–0.2)
IMM GRANULOCYTES NFR BLD AUTO: 0 % (ref 0–2)
LYMPHOCYTES # BLD AUTO: 1.45 THOUSANDS/ΜL (ref 0.6–4.47)
LYMPHOCYTES NFR BLD AUTO: 21 % (ref 14–44)
MCH RBC QN AUTO: 31.6 PG (ref 26.8–34.3)
MCHC RBC AUTO-ENTMCNC: 32.2 G/DL (ref 31.4–37.4)
MCV RBC AUTO: 98 FL (ref 82–98)
MONOCYTES # BLD AUTO: 0.88 THOUSAND/ΜL (ref 0.17–1.22)
MONOCYTES NFR BLD AUTO: 13 % (ref 4–12)
NEUTROPHILS # BLD AUTO: 4.36 THOUSANDS/ΜL (ref 1.85–7.62)
NEUTS SEG NFR BLD AUTO: 61 % (ref 43–75)
NRBC BLD AUTO-RTO: 0 /100 WBCS
PLATELET # BLD AUTO: 309 THOUSANDS/UL (ref 149–390)
PMV BLD AUTO: 9.6 FL (ref 8.9–12.7)
POTASSIUM SERPL-SCNC: 4.7 MMOL/L (ref 3.5–5.3)
PROT SERPL-MCNC: 6.5 G/DL (ref 6.4–8.4)
RBC # BLD AUTO: 3.95 MILLION/UL (ref 3.88–5.62)
SODIUM SERPL-SCNC: 136 MMOL/L (ref 135–147)
WBC # BLD AUTO: 7.03 THOUSAND/UL (ref 4.31–10.16)

## 2022-08-30 PROCEDURE — 36415 COLL VENOUS BLD VENIPUNCTURE: CPT

## 2022-08-30 PROCEDURE — 85025 COMPLETE CBC W/AUTO DIFF WBC: CPT

## 2022-08-30 PROCEDURE — 80053 COMPREHEN METABOLIC PANEL: CPT

## 2022-09-02 ENCOUNTER — OFFICE VISIT (OUTPATIENT)
Dept: INTERNAL MEDICINE CLINIC | Facility: CLINIC | Age: 82
End: 2022-09-02
Payer: COMMERCIAL

## 2022-09-02 VITALS
BODY MASS INDEX: 26.07 KG/M2 | TEMPERATURE: 97.5 F | SYSTOLIC BLOOD PRESSURE: 126 MMHG | WEIGHT: 176 LBS | HEART RATE: 56 BPM | OXYGEN SATURATION: 96 % | HEIGHT: 69 IN | DIASTOLIC BLOOD PRESSURE: 70 MMHG

## 2022-09-02 DIAGNOSIS — J18.9 PNEUMONIA OF RIGHT LUNG DUE TO INFECTIOUS ORGANISM, UNSPECIFIED PART OF LUNG: ICD-10-CM

## 2022-09-02 DIAGNOSIS — I48.92 PAROXYSMAL ATRIAL FLUTTER (HCC): ICD-10-CM

## 2022-09-02 DIAGNOSIS — R60.0 EDEMA OF LEFT LOWER LEG: ICD-10-CM

## 2022-09-02 DIAGNOSIS — R35.0 URINARY FREQUENCY: ICD-10-CM

## 2022-09-02 DIAGNOSIS — E78.00 HYPERCHOLESTEREMIA: ICD-10-CM

## 2022-09-02 DIAGNOSIS — I27.21 PULMONARY ARTERY HYPERTENSION (HCC): ICD-10-CM

## 2022-09-02 DIAGNOSIS — I35.1 NONRHEUMATIC AORTIC VALVE INSUFFICIENCY: ICD-10-CM

## 2022-09-02 DIAGNOSIS — N32.89 BLADDER WALL THICKENING: ICD-10-CM

## 2022-09-02 DIAGNOSIS — S72.302A CLOSED FRACTURE OF SHAFT OF LEFT FEMUR, UNSPECIFIED FRACTURE MORPHOLOGY, INITIAL ENCOUNTER (HCC): ICD-10-CM

## 2022-09-02 DIAGNOSIS — F33.42 RECURRENT MAJOR DEPRESSIVE DISORDER, IN FULL REMISSION (HCC): ICD-10-CM

## 2022-09-02 DIAGNOSIS — I10 ESSENTIAL HYPERTENSION: ICD-10-CM

## 2022-09-02 DIAGNOSIS — I34.0 NONRHEUMATIC MITRAL VALVE REGURGITATION: Primary | ICD-10-CM

## 2022-09-02 PROCEDURE — 3288F FALL RISK ASSESSMENT DOCD: CPT | Performed by: INTERNAL MEDICINE

## 2022-09-02 PROCEDURE — 3725F SCREEN DEPRESSION PERFORMED: CPT | Performed by: INTERNAL MEDICINE

## 2022-09-02 PROCEDURE — 1100F PTFALLS ASSESS-DOCD GE2>/YR: CPT | Performed by: INTERNAL MEDICINE

## 2022-09-02 PROCEDURE — 99214 OFFICE O/P EST MOD 30 MIN: CPT | Performed by: INTERNAL MEDICINE

## 2022-09-02 RX ORDER — METOPROLOL TARTRATE 50 MG/1
50 TABLET, FILM COATED ORAL EVERY 12 HOURS
Qty: 180 TABLET | Refills: 1 | Status: SHIPPED | OUTPATIENT
Start: 2022-09-02

## 2022-09-02 NOTE — ASSESSMENT & PLAN NOTE
He seems to be doing fairly well with weight-bearing  He walks at home with walker  No significant pain    He will be followed by orthopedic doctor in September 21, 2022

## 2022-09-02 NOTE — ASSESSMENT & PLAN NOTE
Pt's depression is well controlled    Pt is tolerating current psych medicine regimen and regimen is effective  Pt does not have any side effects of medicine  Refer to Med List for Psych Medicine  Will continue same regimen  Doing fairly well    Remains on citalopram 40 mg without side effect

## 2022-09-02 NOTE — ASSESSMENT & PLAN NOTE
08/14/2022:  ER visit:  Chest x-ray:  Mild benign post infectious scar in the right upper lobe with no acute disease  Patient is going for the CT scan of the chest to reassure the clearing of the density  In right upper lobe    He does have postnasal related some often on mild cough but nothing major  06/16/2022:  CT scan of chest abdomen and pelvis:  1  Dense consolidation right upper lobe patchy consolidation right lower lobe suggests pneumonia/aspiration Small right effusion seen  2  There is a displaced fracture of the proximal shaft of the femur with medial displacement of the distal fragment along with overlapping Lucency seen in the both iliac bone, image 92 series 601 with no correlate on the radiograph may be due to motion or due to nondisplaced fracture  3  Osteoporosis with biconcavity of the multiple lumbar thoracic and lumbar vertebrae,   4 chronic Mild thickening of the posterior aspect of the urinary bladder wall noted, correlate with the urinary evaluation and urology evaluation on nonemergent basis 5  Incidentally detected the left perifissural nodule  Based on current Fleischner Society 2017 Guidelines on incidental pulmonary nodule, no routine follow-up is needed if the patient is low risk  If the patient is high risk, optional follow-up chest CT at 12 months can be considered  Clinically symptom-free  Going for CT scan on September 9th and bone scan a October 26

## 2022-09-02 NOTE — ASSESSMENT & PLAN NOTE
Home blood pressure is well controlled  He remains on metoprolol tartrate 50 mg twice a day that is half of 100 mg twice a day    We will switch it over to metoprolol tartrate 50 mg tablet 1 full tablet twice a day    How family will continue to monitor blood pressure periodically at home and bring the report report

## 2022-09-02 NOTE — PATIENT INSTRUCTIONS
Follow with Consultants as per their and our suggestion    Follow up in 2 months or as needed earlier    Follow all instructions as advised and discussed  Take your medications as prescribed  Call the office immediately if you experience any side effects  Ask questions if you do not understand  Keep your scheduled appointment as advised or come sooner if necessary or in doubt  Best time to call for non-urgent matter or questions on weekdays is between 9am and 12 noon  See physician for any new symptoms or worsening of current symptoms  Urgent or emergent situations call 911 and report to nearest emergency room      I spent  30+ minutes taking care of this patient including clinical care, conseling, collaboration, chart, lab and consultaion review as appropriate    Patient is to get labs 1 week(s) prior to next visit if advised

## 2022-09-02 NOTE — ASSESSMENT & PLAN NOTE
He has chronic left lower extremity swelling more than than the right    He is no longer on Lovenox he finished 6 weeks of Lovenox

## 2022-09-02 NOTE — ASSESSMENT & PLAN NOTE
Family is planning to follow with urologist sometime later part of this year  He does not have any urinary symptoms no retention no UTI    He could continues to have nocturia  We will await urologist opinion

## 2022-09-02 NOTE — ASSESSMENT & PLAN NOTE
Lab Results   Component Value Date    CHOLESTEROL 120 05/20/2022    CHOLESTEROL 137 11/15/2021    CHOLESTEROL 127 04/26/2021     Lab Results   Component Value Date    HDL 49 05/20/2022    HDL 52 11/15/2021    HDL 55 04/26/2021     Lab Results   Component Value Date    TRIG 87 05/20/2022    TRIG 67 11/15/2021    TRIG 72 04/26/2021     Lab Results   Component Value Date    NONHDLC 71 05/20/2022    [place] 85 11/15/2021    NONHDLC 72 04/26/2021   He remains on atorvastatin 10 mg tablet half tablet every day  As well as niacin    Lipid profile unremarkable

## 2022-09-02 NOTE — PROGRESS NOTES
Garett Teo Office Visit Note  22     Carline Kate 80 y o  male MRN: [id number]  : 1940    Assessment:     1  Nonrheumatic mitral valve regurgitation  Assessment & Plan:  Compensated    No cardiac sx  Will continue current meds    Will follow with Cardiology  2  Paroxysmal atrial flutter (HCC)  Assessment & Plan:  SVT well controlled  Continue metoprolol tartrate 50 mg twice a day  Pulse regular    Orders:  -     metoprolol tartrate (LOPRESSOR) 50 mg tablet; Take 1 tablet (50 mg total) by mouth every 12 (twelve) hours    3  Nonrheumatic aortic valve insufficiency  Assessment & Plan:  Compensated    No cardiac sx  Will continue current meds    Will follow with Cardiology  4  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HCC)  Assessment & Plan:  Symptom-free  To be followed by cardiologist   Ivan Villafana  5  Bladder wall thickening  Assessment & Plan:  Family is planning to follow with urologist sometime later part of this year  He does not have any urinary symptoms no retention no UTI    He could continues to have nocturia  We will await urologist opinion  6  Recurrent major depressive disorder, in full remission [facility])  Assessment & Plan:  Pt's depression is well controlled    Pt is tolerating current psych medicine regimen and regimen is effective  Pt does not have any side effects of medicine  Refer to [facility]  Will continue same regimen  Doing fairly well  Remains on citalopram 40 mg without side effect      7   Hypercholesteremia  Assessment & Plan:  Lab Results   Component Value Date    CHOLESTEROL 120 2022    CHOLESTEROL 137 11/15/2021    CHOLESTEROL 127 2021     Lab Results   Component Value Date    HDL 49 2022    HDL 52 11/15/2021    HDL 55 2021     Lab Results   Component Value Date    TRIG 87 2022    TRIG 67 11/15/2021    TRIG 72 2021     Lab Results   Component Value Date    NONHDLC 71 2022    Breonna 85 11/15/2021    Breonna 72 04/26/2021   He remains on atorvastatin 10 mg tablet half tablet every day  As well as niacin  Lipid profile unremarkable      Orders:  -     Lipid panel; Future; Expected date: 12/02/2022  -     Comprehensive metabolic panel; Future; Expected date: 12/02/2022    8  Edema of left lower leg  Assessment & Plan:  He has chronic left lower extremity swelling more than than the right  He is no longer on Lovenox he finished 6 weeks of Lovenox      9  Urinary frequency  Assessment & Plan:    To be seen by urologist   Nighttime frequency persist      10  Closed fracture of shaft of left femur, unspecified fracture morphology, initial encounter [facility])  Assessment & Plan:  He seems to be doing fairly well with weight-bearing  He walks at home with walker  No significant pain  He will be followed by orthopedic doctor in September 21, 2022      11  Essential hypertension  Assessment & Plan:  Home blood pressure is well controlled  He remains on metoprolol tartrate 50 mg twice a day that is half of 100 mg twice a day  We will switch it over to metoprolol tartrate 50 mg tablet 1 full tablet twice a day    How family will continue to monitor blood pressure periodically at home and bring the report report    Orders:  -     Lipid panel; Future; Expected date: 12/02/2022  -     Comprehensive metabolic panel; Future; Expected date: 12/02/2022    12  Pneumonia of right lung due to infectious organism, unspecified part of lung  Assessment & Plan:  08/14/2022:  ER visit:  Chest x-ray:  Mild benign post infectious scar in the right upper lobe with no acute disease  Patient is going for the CT scan of the chest to reassure the clearing of the density  In right upper lobe    He does have postnasal related some often on mild cough but nothing major  06/16/2022:  CT scan of chest abdomen and pelvis:  1   Dense consolidation right upper lobe patchy consolidation right lower lobe suggests pneumonia/aspiration Small right effusion seen  2  There is a displaced fracture of the proximal shaft of the femur with medial displacement of the distal fragment along with overlapping Lucency seen in the both iliac bone, image 92 series 601 with no correlate on the radiograph may be due to motion or due to nondisplaced fracture  3  Osteoporosis with biconcavity of the multiple lumbar thoracic and lumbar vertebrae,   4 chronic Mild thickening of the posterior aspect of the urinary bladder wall noted, correlate with the urinary evaluation and urology evaluation on nonemergent basis 5  Incidentally detected the left perifissural nodule  Based on current Fleischner Society 2017 Guidelines on incidental pulmonary nodule, no routine follow-up is needed if the patient is low risk  If the patient is high risk, optional follow-up chest CT at 12 months can be considered  Clinically symptom-free  Going for CT scan on September 9th and bone scan a October 26  Discussion Summary and Plan: Today's care plan and medications were reviewed with patient in detail and all their questions answered to their satisfaction  Chief Complaint   Patient presents with    Follow-up     Multiple medical problem, also status post pneumonia, status post recent fracture, impaired mobility, please refer to the HPI      Subjective:  Patient is here for chronic disease management  For supple status post fracture, left femur fracture, status post ORIF  Patient is healing fairly well  Does have chronic edema of the left lower extremity  How not any worse  Functional capacity is much better  Generally seems to be doing fairly well  Walks with a walker  Pulmonary:  No symptoms of pneumonia resolved chest x-ray improved going for follow-up CT scan      Valvular heart disease and elevated Pulm HTN: no cardiac sx, compensated cardiac    Hx of DVT PE, off filter, finished 6 weeks of Lovenox no longer on anticoagulation    OCD and MDD: mood fair, tolerating citalopram 40 mg without side effect  Family at bed side    Poor appetite, on protein powder d/w daughter and wife    HLD: on atorvastatin and niacin,LDL to the target ALT normal    PAFutter Post op 4 years ago, transient on beta blocker    HTN was high better, continue metoprolol tartrate 50 mg as advised as well as low-salt diet    Constipation fair    Renal function stable, CBC normal      Lung nodule tiny incidental, will do ct of chest in one year  l:     CT chest abdomen pelvis wo contrast     Result Date: 6/16/2022  Narrative: CT CHEST, ABDOMEN AND PELVIS WITHOUT IV CONTRAST FINDINGS: CHEST LUNGS:  Dense airspace consolidation seen right upper lobe post posterior aspect  Mild patchy airspace consolidation seen in the right lower lobe A perifissural nodule seen in the left midlung measuring about 3 mm, image 69 series 400 PLEURA:  Small right effusion seen HEART/GREAT VESSELS: Heart is unremarkable for patient's age  Ascending aorta measures 3 9 cm Mild coronary artery calcification seen MEDIASTINUM AND JACQUIE:  Lower right paratracheal lymph nodes are seen measuring about 1 cm Subcarinal lymph node seen measuring about 8 mm Large hiatal hernia seen CHEST WALL AND LOWER NECK:  No significant axillary lymph node enlargement seen Evaluation of the hips is limited due to motion ABDOMEN LIVER/BILIARY TREE:  No focal liver lesion seen GALLBLADDER:  No calcified gallstones  No pericholecystic inflammatory change  SPLEEN:  Unremarkable  PANCREAS:  Unremarkable  ADRENAL GLANDS:  Unremarkable  Mild nodularity of the both adrenal glands KIDNEYS/URETERS:  No hydronephrosis Lobulated contour of the kidney STOMACH AND BOWEL:  Fluid noted within the rectum There are no findings of bowel obstruction Hiatal hernia seen APPENDIX:  No findings to suggest appendicitis   ABDOMINOPELVIC CAVITY:  No free fluid seen No fluid in the paracolic gutter, perisplenic region or in Morison's VESSELS:  An IVC filter is noted PELVIS REPRODUCTIVE ORGANS:  Prostate appears unremarkable No pelvic sidewall lymph node and oblique  URINARY BLADDER:  Mild thickening of the urinary bladder wall through its posterior and superior aspect seen ABDOMINAL WALL/INGUINAL REGIONS:  Unremarkable  OSSEOUS STRUCTURES:  Osteoporosis There is compression deformity of the multiple lumbar vertebrae with biconcavity of the L5, L4, L3, L2 vertebra There is severe Central depression of the L1 vertebra with 80-90% loss of vertebral height, chronic There is a fracture of the proximal shaft of the left femur with the medial displacement of the distal fragment with overlapping Evaluation of the pelvic ring is limited due to patient motion Intramedullary nail with the femoral neck screw noted with the healing intertrochanteric fracture of the right femur Lucency seen in the bilateral iliac bone, image 104 series 99      Impression: Dense consolidation right upper lobe patchy consolidation right lower lobe suggests pneumonia/aspiration Small right effusion seen There is a displaced fracture of the proximal shaft of the femur with medial displacement of the distal fragment along with overlapping Lucency seen in the both iliac bone, image 92 series 601 with no correlate on the radiograph may be due to motion or due to nondisplaced fracture  Correlate clinically Bony pelvic CT may be considered as clinically needed Intact acetabulum Osteoporosis with biconcavity of the multiple lumbar thoracic and lumbar vertebrae, chronic Mild thickening of the posterior aspect of the urinary bladder wall noted, correlate with the urinary evaluation and urology evaluation on nonemergent basis Incidentally detected the left perifissural nodule  Based on current Fleischner Society 2017 Guidelines on incidental pulmonary nodule, no routine follow-up is needed if the patient is low risk    If the patient is high risk, optional follow-up chest CT at 12 months can be considered  Consider follow-up at 2 months demonstrate resolution  I XR hip/pelv 2-3 vws left     06/16/2022:  CT scan of chest abdomen and pelvis:  1  Dense consolidation right upper lobe patchy consolidation right lower lobe suggests pneumonia/aspiration Small right effusion seen  2  There is a displaced fracture of the proximal shaft of the femur with medial displacement of the distal fragment along with overlapping Lucency seen in the both iliac bone, image 92 series 601 with no correlate on the radiograph may be due to motion or due to nondisplaced fracture  3  Osteoporosis with biconcavity of the multiple lumbar thoracic and lumbar vertebrae,   4 chronic Mild thickening of the posterior aspect of the urinary bladder wall noted, correlate with the urinary evaluation and urology evaluation on nonemergent basis 5  Incidentally detected the left perifissural nodule  Based on current Fleischner Society 2017 Guidelines on incidental pulmonary nodule, no routine follow-up is needed if the patient is low risk  If the patient is high risk, optional follow-up chest CT at 12 months can be considered                Patient with past medical history of hypertension hyperlipidemia paroxysmal atrial fibrillation valvular heart disease and history of provoked pulmonary embolism status post IVC filter which was removed eventually after a fall in the past presented to the emergency room on 06/16/2022 after a fall  Patient had has not been feeling well since Monday with malaise generalized weakness and poor appetite  Due to weakness patient had a minor fall on Monday without any injury but on the day of the admission morning he was worse  Became mildly short of breath came to the emergency room did not have any loss of consciousness      Patient had a temperature of 101° tachycardia hypoxia CT scan of the chest did not reveal any acute abnormality left hip revealed acute displaced left proximal femoral  Soft  How patient was treated for pneumonia  Patient was advised to take 2 more days of antibiotic to finish 7 days at the time of discharge  DVT prophylaxis were given P for the full 6 weeks postoperatively given history of DVT  Patient was also advised to follow-up with cardiologist     Vinay Mcgrath is to do follow-up CT scan in 2 months  Patient was recently seen by orthopedic post discharge their notes were reviewed  Abnormal findings 1  Pulmonary nodule on CT scan of the chest 2  Bladder wall thickenin      08/30/2022:  CBC normal, CMP normal except blood sugar 102  06/17/2022:  Echocardiogram:  Normal ejection fraction 60 person mild aortic regurgitation mild-to-moderate tricuspid regurgitation right ventricular systolic pressure elevated moderately at 51  Otherwise unremarkable echocardiogram with grade 1 diastolic dysfunction  36/81/2422:  CT scan of the brain: No acute intracranial hemorrhage seen No mass effect or midline shift seen Mild periventricular and white matter hypodensity related to chronic small vessel as well as mild mucosal thickening of the maxillary sinus  06/16/2022:  Left knee x-ray no acute osseous abnormality  06/16/2022:  Left femur: Acute, displaced left proximal femoral shaft fracture extending to the neck  No dislocation  06/18/2022:ORIF left subtrochanteric fracture  Mild narrowing of the left hip joint orthopedic hardware in good position  06/16/2022:  Left hip:Acute, displaced left proximal femoral shaft fracture extending to the neck  No dislocation  06/16/2022:  CT scan of chest abdomen and pelvis:  1  Dense consolidation right upper lobe patchy consolidation right lower lobe suggests pneumonia/aspiration Small right effusion seen  2   There is a displaced fracture of the proximal shaft of the femur with medial displacement of the distal fragment along with overlapping Lucency seen in the both iliac bone, image 92 series 601 with no correlate on the radiograph may be due to motion or due to nondisplaced fracture  3  Osteoporosis with biconcavity of the multiple lumbar thoracic and lumbar vertebrae,   4 chronic Mild thickening of the posterior aspect of the urinary bladder wall noted, correlate with the urinary evaluation and urology evaluation on nonemergent basis 5  Incidentally detected the left perifissural nodule  Based on current Fleischner Society 2017 Guidelines on incidental pulmonary nodule, no routine follow-up is needed if the patient is low risk  If the patient is high risk, optional follow-up chest CT at 12 months can be considered  08/14/2022:  Chest x-ray:  Mild benign postinfectious scar in the right upper lobe with no acute disease, arge hiatal hernia  07/21/2022:  Hip x-ray stable alignment of the moderately displaced proximal left femoral shaft fracture  08/13/2022:  Stable near anatomic alignment of the left femur subtrochanteric fracture status post ORIF  07/21/2022:  Left femur overall probably no significant interval change in orientation of the bone fragment of after ORIF  07/16/2022:  Stable alignment of the subtrochanteric femur fracture with stable ORIF hardware             The following portions of the patient's history were reviewed and updated as appropriate: allergies, current medications, past family history, past medical history, past social history, past surgical history and problem list     Review of Systems   All other systems reviewed and are negative          Historical Information   Patient Active Problem List   Diagnosis    Mixed obsessional thoughts and acts    Allergic rhinitis    Hypercholesteremia    Sensorineural hearing loss (SNHL)    Nonrheumatic mitral valve regurgitation    Aortic valve sclerosis    Nonrheumatic aortic valve insufficiency    Essential hypertension    Recurrent major depressive disorder, in full remission ([address] )    Hyperlipidemia    Impaired vision    Conductive hearing loss, bilateral  S/P IVC filter    SVT (supraventricular tachycardia) (HCC)    Valvular heart disease    Urinary frequency    Edema of left lower leg    Paroxysmal atrial flutter ([facility] )    Medicare annual wellness visit, subsequent    Closed fracture of shaft of left femur ([facility] )    Pneumonia of right lung due to infectious organism    Lung nodule < 6cm on CT    Bladder wall thickening    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[facility] )     Past Medical History:   Diagnosis Date    Hyperlipidemia      Past Surgical History:   Procedure Laterality Date    IR IVC FILTER PLACEMENT OPTIONAL/TEMPORARY  12/7/2019    IR IVC FILTER REMOVAL  8/21/2020    IL OPEN RX FEMUR FX+INTRAMED BÁRBARA Right 12/4/2019    Procedure: INSERTION NAIL IM FEMUR ANTEGRADE (TROCHANTERIC);   Surgeon: Carmela Christopher DO;  Location: [facility];  Service: Orthopedics    [place] OPEN RX FEMUR FX+INTRAMED BÁRBARA Left 6/17/2022    Procedure: INSERTION NAIL IM FEMUR ANTEGRADE (TROCHANTERIC) - long nail;  Surgeon: Brian Beard DO;  Location: [facility];  Service: Orthopedics     Social History     Substance and Sexual Activity   Alcohol Use Never     Social History     Substance and Sexual Activity   Drug Use Never     Social History     Tobacco Use   Smoking Status Never Smoker   Smokeless Tobacco Never Used     Family History   Problem Relation Age of Onset    Cancer Mother      Health Maintenance Due   Topic    COVID-19 Vaccine (1)    Pneumococcal Vaccine: 65+ Years (1 - PCV)    Falls: Plan of Care     Influenza Vaccine (1)    Medicare Annual Wellness Visit (AWV)       Meds/Allergies       Current Outpatient Medications:     acetaminophen (TYLENOL) 325 mg tablet, Take 2 tablets (650 mg total) by mouth every 6 (six) hours as needed for mild pain, headaches or fever, Disp: , Rfl: 0    atorvastatin (LIPITOR) 10 mg tablet, Half tab daily, Disp: 45 tablet, Rfl: 1    Calcium Carb-Cholecalciferol (CALTRATE 600+D3 PO), Take 600 mg by mouth 2 (two) times a day, Disp: , Rfl:     citalopram (CeleXA) 40 mg tablet, Take 1 tablet (40 mg total) by mouth daily, Disp: 90 tablet, Rfl: 1    fosinopril (MONOPRIL) 10 mg tablet, TAKE 1/2 TABLET BY MOUTH DAILY, Disp: 45 tablet, Rfl: 1    metoprolol tartrate (LOPRESSOR) 50 mg tablet, Take 1 tablet (50 mg total) by mouth every 12 (twelve) hours, Disp: 180 tablet, Rfl: 1    Multiple Vitamin (MULTIVITAMIN) tablet, Take 1 tablet by mouth daily, Disp: , Rfl:     niacin (NIASPAN) 500 mg CR tablet, TAKE ONE TABLET DAILY AT BEDTIME, Disp: 90 tablet, Rfl: 1    Docusate Sodium (DSS) 100 MG CAPS, Take 100 mg by mouth daily (Patient not taking: Reported on 9/2/2022), Disp: , Rfl:     magnesium hydroxide (MILK OF MAGNESIA) 400 mg/5 mL oral suspension, Take 30 mL by mouth (Patient not taking: Reported on 9/2/2022), Disp: , Rfl:     ondansetron (ZOFRAN-ODT) 4 mg disintegrating tablet, Take 1 tablet (4 mg total) by mouth every 8 (eight) hours as needed for nausea or vomiting (Patient not taking: Reported on 9/2/2022), Disp: 20 tablet, Rfl: 0      Objective:    Vitals:   /70   Pulse 56   Temp 97 5 °F (36 4 °C)   Ht 5' 9" (1 753 m)   Wt 79 8 kg (176 lb)   SpO2 96%   BMI 25 99 kg/m²   Body mass index is 25 99 kg/m²  Vitals:    09/02/22 1545   Weight: 79 8 kg (176 lb)       Physical Exam  Constitutional:       General: He is not in acute distress  Appearance: Normal appearance  He is well-developed  He is not ill-appearing, toxic-appearing or diaphoretic  HENT:      Head: Normocephalic and atraumatic  Right Ear: External ear normal       Left Ear: External ear normal    Eyes:      General:         Right eye: No discharge  Left eye: No discharge  Conjunctiva/sclera: Conjunctivae normal    Neck:      Thyroid: No thyromegaly  Vascular: No carotid bruit or JVD  Cardiovascular:      Rate and Rhythm: Regular rhythm  Heart sounds: Murmur heard  No friction rub  No gallop        Comments: No clinical DVT  Pulmonary:      Effort: No respiratory distress  Breath sounds: Normal breath sounds  No wheezing or rales  Abdominal:      General: Bowel sounds are normal  There is no distension  Palpations: There is no mass  Tenderness: There is no rebound  Musculoskeletal:      Cervical back: No rigidity or tenderness  Right lower leg: No edema  Left lower le+ Edema present  Comments: mild   Lymphadenopathy:      Cervical: No cervical adenopathy  Skin:     General: Skin is warm  Coloration: Skin is not jaundiced or pale  Findings: No rash  Neurological:      General: No focal deficit present  Mental Status: He is oriented to person, place, and time  Motor: Weakness present  Coordination: Coordination normal       Gait: Gait abnormal    Psychiatric:         Attention and Perception: Attention and perception normal  He is attentive  He does not perceive auditory or visual hallucinations  Mood and Affect: Mood normal  Mood is not anxious or depressed  Speech: Speech normal          Behavior: Behavior normal          Thought Content: Thought content normal          Cognition and Memory: Cognition is not impaired  Memory is not impaired  He exhibits impaired recent memory  Judgment: Judgment normal  Judgment is not impulsive or inappropriate           Lab Review   Appointment on 2022   Component Date Value Ref Range Status    WBC 2022 7 03  4 31 - 10 16 Thousand/uL Final    RBC 2022 3 95  3 88 - 5 62 Million/uL Final    Hemoglobin 2022 12 5  12 0 - 17 0 g/dL Final    Hematocrit 2022 38 8  36 5 - 49 3 % Final    MCV 2022 98  82 - 98 fL Final    MCH 2022 31 6  26 8 - 34 3 pg Final    MCHC 2022 32 2  31 4 - 37 4 g/dL Final    RDW 2022 14 2  11 6 - 15 1 % Final    MPV 2022 9 6  8 9 - 12 7 fL Final    Platelets  309  149 - 390 Thousands/uL Final    nRBC 2022 0  /100 WBCs Final    Neutrophils Relative 08/30/2022 61  43 - 75 % Final    Immat GRANS % 08/30/2022 0  0 - 2 % Final    Lymphocytes Relative 08/30/2022 21  14 - 44 % Final    Monocytes Relative 08/30/2022 13 (A) 4 - 12 % Final    Eosinophils Relative 08/30/2022 4  0 - 6 % Final    Basophils Relative 08/30/2022 1  0 - 1 % Final    Neutrophils Absolute 08/30/2022 4 36  1 85 - 7 62 Thousands/µL Final    Immature Grans Absolute 08/30/2022 0 02  0 00 - 0 20 Thousand/uL Final    Lymphocytes Absolute 08/30/2022 1 45  0 60 - 4 47 Thousands/µL Final    Monocytes Absolute 08/30/2022 0 88  0 17 - 1 22 Thousand/µL Final    Eosinophils Absolute 08/30/2022 0 28  0 00 - 0 61 Thousand/µL Final    Basophils Absolute 08/30/2022 0 04  0 00 - 0 10 Thousands/µL Final    Sodium 08/30/2022 136  135 - 147 mmol/L Final    Potassium 08/30/2022 4 7  3 5 - 5 3 mmol/L Final    Chloride 08/30/2022 102  96 - 108 mmol/L Final    CO2 08/30/2022 29  21 - 32 mmol/L Final    ANION GAP 08/30/2022 5  4 - 13 mmol/L Final    BUN 08/30/2022 24  5 - 25 mg/dL Final    Creatinine 08/30/2022 0 99  0 60 - 1 30 mg/dL Final    Standardized to IDMS reference method    Glucose 08/30/2022 102  65 - 140 mg/dL Final    If the patient is fasting, the ADA then defines impaired fasting glucose as > 100 mg/dL and diabetes as > or equal to 123 mg/dL  Specimen collection should occur prior to Sulfasalazine administration due to the potential for falsely depressed results  Specimen collection should occur prior to Sulfapyridine administration due to the potential for falsely elevated results   Calcium 08/30/2022 8 9  8 3 - 10 1 mg/dL Final    Corrected Calcium 08/30/2022 9 6  8 3 - 10 1 mg/dL Final    AST 08/30/2022 13  5 - 45 U/L Final    Specimen collection should occur prior to Sulfasalazine administration due to the potential for falsely depressed results       ALT 08/30/2022 17  12 - 78 U/L Final    Specimen collection should occur prior to Sulfasalazine and/or Sulfapyridine administration due to the potential for falsely depressed results   Alkaline Phosphatase 08/30/2022 98  46 - 116 U/L Final    Total Protein 08/30/2022 6 5  6 4 - 8 4 g/dL Final    Albumin 08/30/2022 3 1 (A) 3 5 - 5 0 g/dL Final    Total Bilirubin 08/30/2022 0 31  0 20 - 1 00 mg/dL Final    Use of this assay is not recommended for patients undergoing treatment with eltrombopag due to the potential for falsely elevated results   eGFR 08/30/2022 70  ml/min/1 73sq m Final   Admission on 08/14/2022, Discharged on 08/14/2022   Component Date Value Ref Range Status    WBC 08/14/2022 10 15  4 31 - 10 16 Thousand/uL Final    RBC 08/14/2022 4 17  3 88 - 5 62 Million/uL Final    Hemoglobin 08/14/2022 13 3  12 0 - 17 0 g/dL Final    Hematocrit 08/14/2022 40 5  36 5 - 49 3 % Final    MCV 08/14/2022 97  82 - 98 fL Final    MCH 08/14/2022 31 9  26 8 - 34 3 pg Final    MCHC 08/14/2022 32 8  31 4 - 37 4 g/dL Final    RDW 08/14/2022 14 4  11 6 - 15 1 % Final    MPV 08/14/2022 8 7 (A) 8 9 - 12 7 fL Final    Platelets 80/01/2185 269  149 - 390 Thousands/uL Final    nRBC 08/14/2022 0  /100 WBCs Final    This is an appended report  These results have been appended to a previously preliminary verified report      Neutrophils Relative 08/14/2022 90 (A) 43 - 75 % Final    Immat GRANS % 08/14/2022 0  0 - 2 % Final    Lymphocytes Relative 08/14/2022 4 (A) 14 - 44 % Final    Monocytes Relative 08/14/2022 6  4 - 12 % Final    Eosinophils Relative 08/14/2022 0  0 - 6 % Final    Basophils Relative 08/14/2022 0  0 - 1 % Final    Neutrophils Absolute 08/14/2022 9 07 (A) 1 85 - 7 62 Thousands/µL Final    Immature Grans Absolute 08/14/2022 0 03  0 00 - 0 20 Thousand/uL Final    Lymphocytes Absolute 08/14/2022 0 38 (A) 0 60 - 4 47 Thousands/µL Final    Monocytes Absolute 08/14/2022 0 61  0 17 - 1 22 Thousand/µL Final    Eosinophils Absolute 08/14/2022 0 04  0 00 - 0 61 Thousand/µL Final    Basophils Absolute 08/14/2022 0 02  0 00 - 0 10 Thousands/µL Final    Sodium 08/14/2022 138  135 - 147 mmol/L Final    Potassium 08/14/2022 4 5  3 5 - 5 3 mmol/L Final    Chloride 08/14/2022 103  96 - 108 mmol/L Final    CO2 08/14/2022 29  21 - 32 mmol/L Final    ANION GAP 08/14/2022 6  4 - 13 mmol/L Final    BUN 08/14/2022 24  5 - 25 mg/dL Final    Creatinine 08/14/2022 0 97  0 60 - 1 30 mg/dL Final    Standardized to IDMS reference method    Glucose 08/14/2022 122  65 - 140 mg/dL Final    If the patient is fasting, the ADA then defines impaired fasting glucose as > 100 mg/dL and diabetes as > or equal to 123 mg/dL  Specimen collection should occur prior to Sulfasalazine administration due to the potential for falsely depressed results  Specimen collection should occur prior to Sulfapyridine administration due to the potential for falsely elevated results   Calcium 08/14/2022 9 3  8 3 - 10 1 mg/dL Final    Corrected Calcium 08/14/2022 9 8  8 3 - 10 1 mg/dL Final    AST 08/14/2022 17  5 - 45 U/L Final    Specimen collection should occur prior to Sulfasalazine administration due to the potential for falsely depressed results   ALT 08/14/2022 11 (A) 12 - 78 U/L Final    Specimen collection should occur prior to Sulfasalazine administration due to the potential for falsely depressed results   Alkaline Phosphatase 08/14/2022 116  46 - 116 U/L Final    Total Protein 08/14/2022 6 9  6 4 - 8 4 g/dL Final    Albumin 08/14/2022 3 4 (A) 3 5 - 5 0 g/dL Final    Total Bilirubin 08/14/2022 0 62  0 20 - 1 00 mg/dL Final    Use of this assay is not recommended for patients undergoing treatment with eltrombopag due to the potential for falsely elevated results      eGFR 08/14/2022 72  ml/min/1 73sq m Final    Color, UA 08/14/2022 Yellow   Final    Clarity, UA 08/14/2022 Slightly Cloudy   Final    Specific Gravity, UA 08/14/2022 1 025  1 000 - 1 030 Final    pH, UA 08/14/2022 5 5  5 0, 5 5, 6  0, 6 5, 7 0, 7 5, 8 0, 8 5, 9 0 Final    Leukocytes, UA 08/14/2022 Moderate (A) Negative Final    Nitrite, UA 08/14/2022 Negative  Negative Final    Protein, UA 08/14/2022 Negative  Negative mg/dl Final    Glucose, UA 08/14/2022 Negative  Negative mg/dl Final    Ketones, UA 08/14/2022 Negative  Negative mg/dl Final    Urobilinogen, UA 08/14/2022 0 2  0 2, 1 0 E U /dl E U /dl Final    Bilirubin, UA 08/14/2022 Negative  Negative Final    Occult Blood, UA 08/14/2022 Trace-Intact (A) Negative Final    hs TnI 0hr 08/14/2022 5  "Refer to ACS Flowchart"- see link ng/L Final    Comment:                                              Initial (time 0) result  If >=50 ng/L, Myocardial injury suggested ;  Type of myocardial injury and treatment strategy  to be determined  If 5-49 ng/L, a delta result at 2 hours and or 4 hours will be needed to further evaluate  If <4 ng/L, and chest pain has been >3 hours since onset, patient may qualify for discharge based on the HEART score in the ED  If <5 ng/L and <3hours since onset of chest pain, a delta result at 2 hours will be needed to further evaluate  HS Troponin 99th Percentile URL of a Health Population=12 ng/L with a 95% Confidence Interval of 8-18 ng/L  Second Troponin (time 2 hours)  If calculated delta >= 20 ng/L,  Myocardial injury suggested ; Type of myocardial injury and treatment strategy to be determined  If 5-49 ng/L and the calculated delta is 5-19 ng/L, consult medical service for evaluation  Continue evaluation for ischemia on ecg and other possible etiology and repeat hs troponin at 4 hours  If delta                            is <5 ng/L at 2 hours, consider discharge based on risk stratification via the HEART score (if in ED), or DAVID risk score in IP/Observation  HS Troponin 99th Percentile URL of a Health Population=12 ng/L with a 95% Confidence Interval of 8-18 ng/L      SARS-CoV-2 08/14/2022 Negative  Negative Final    RBC, UA 08/14/2022 0-1  None Seen, 0-1, 1-2, 2-4, 0-5 /hpf Final    WBC, UA 08/14/2022 30-50 (A) None Seen, 0-1, 1-2, 0-5, 2-4 /hpf Final    Epithelial Cells 08/14/2022 None Seen  None Seen, Occasional /hpf Final    Bacteria, UA 08/14/2022 Occasional  None Seen, Occasional /hpf Final    Blood Culture 08/14/2022 No Growth After 5 Days  Final    Blood Culture 08/14/2022 No Growth After 5 Days  Final    TSH 3RD GENERATON 08/14/2022 0 697  0 450 - 4 500 uIU/mL Final    Adult TSH (3rd generation) reference range follows the recommended guidelines of the [employer] Thy[employer] Association, January, 2020   LACTIC ACID 08/14/2022 0 7  0 5 - 2 0 mmol/L Final    Ventricular Rate 08/14/2022 79  BPM Final    Atrial Rate 08/14/2022 79  BPM Final    MA Interval 08/14/2022 160  ms Final    QRSD Interval 08/14/2022 94  ms Final    QT Interval 08/14/2022 346  ms Final    QTC Interval 08/14/2022 396  ms Final    P Axis 08/14/2022 28  degrees Final    QRS Axis 08/14/2022 0  degrees Final    T Wave Axis 08/14/2022 33  degrees Final         Patient Instructions   Follow with Consultants as per their and our suggestion    Follow up in 2 months or as needed earlier    Follow all instructions as advised and discussed  Take your medications as prescribed  Call the office immediately if you experience any side effects  Ask questions if you do not understand  Keep your scheduled appointment as advised or come sooner if necessary or in doubt  Best time to call for non-urgent matter or questions on weekdays is between 9am and 12 noon  See physician for any new symptoms or worsening of current symptoms  Urgent or emergent situations call 911 and report to nearest emergency room      I spent  30+ minutes taking care of this patient including clinical care, conseling, collaboration, chart, lab and consultaion review as appropriate    Patient is to get labs 1 week(s) prior to next visit if advised          Dr Shelly Dahl MD  FER       "This note has been constructed using a voice recognition system  Therefore there may be syntax, spelling, and/or grammatical errors   Please call if you have any questions  "

## 2022-09-09 ENCOUNTER — HOSPITAL ENCOUNTER (OUTPATIENT)
Dept: RADIOLOGY | Facility: HOSPITAL | Age: 82
Discharge: HOME | End: 2022-09-09
Attending: INTERNAL MEDICINE
Payer: COMMERCIAL

## 2022-09-09 DIAGNOSIS — J18.9 PNEUMONIA OF RIGHT LUNG DUE TO INFECTIOUS ORGANISM, UNSPECIFIED PART OF LUNG: ICD-10-CM

## 2022-09-09 PROCEDURE — G1004 CDSM NDSC: HCPCS

## 2022-09-09 PROCEDURE — 71250 CT THORAX DX C-: CPT

## 2022-09-21 ENCOUNTER — APPOINTMENT (OUTPATIENT)
Dept: RADIOLOGY | Facility: CLINIC | Age: 82
End: 2022-09-21
Payer: COMMERCIAL

## 2022-09-21 ENCOUNTER — OFFICE VISIT (OUTPATIENT)
Dept: OBGYN CLINIC | Facility: CLINIC | Age: 82
End: 2022-09-21
Payer: COMMERCIAL

## 2022-09-21 VITALS
HEIGHT: 69 IN | WEIGHT: 176 LBS | HEART RATE: 64 BPM | SYSTOLIC BLOOD PRESSURE: 140 MMHG | DIASTOLIC BLOOD PRESSURE: 70 MMHG | BODY MASS INDEX: 26.07 KG/M2

## 2022-09-21 DIAGNOSIS — S72.142D CLOSED DISPLACED INTERTROCHANTERIC FRACTURE OF LEFT FEMUR WITH ROUTINE HEALING, SUBSEQUENT ENCOUNTER: Primary | ICD-10-CM

## 2022-09-21 DIAGNOSIS — S42.018D CLOSED NONDISPLACED FRACTURE OF STERNAL END OF LEFT CLAVICLE WITH ROUTINE HEALING, SUBSEQUENT ENCOUNTER: ICD-10-CM

## 2022-09-21 PROCEDURE — 1160F RVW MEDS BY RX/DR IN RCRD: CPT | Performed by: ORTHOPAEDIC SURGERY

## 2022-09-21 PROCEDURE — 73552 X-RAY EXAM OF FEMUR 2/>: CPT

## 2022-09-21 PROCEDURE — 3078F DIAST BP <80 MM HG: CPT | Performed by: ORTHOPAEDIC SURGERY

## 2022-09-21 PROCEDURE — 99214 OFFICE O/P EST MOD 30 MIN: CPT | Performed by: ORTHOPAEDIC SURGERY

## 2022-09-21 PROCEDURE — 3077F SYST BP >= 140 MM HG: CPT | Performed by: ORTHOPAEDIC SURGERY

## 2022-09-21 NOTE — PROGRESS NOTES
Assessment/Plan:  1  Closed displaced intertrochanteric fracture of left femur with routine healing, subsequent encounter  XR femur 2 vw left     Scribe Attestation    I,:  César Finney am acting as a scribe while in the presence of the attending physician :       I,:  Jacques Chisholm DO personally performed the services described in this documentation    as scribed in my presence :         Edmar Reyes is a pleasant 80-year-old male who returns today for follow-up evaluation 3 months status post IM nail for left femur intertrochanteric fracture  I am very pleased with his imaging and his clinical presentation today in the office  I encouraged him to continue increasing his activity level to his tolerance, but I did caution him about avoiding high levels of activity such as working in his yd  I also recommended that he continue using his walker for ambulatory assistance  We will plan to see him back in 6 months for repeat evaluation with x-ray on arrival   All his questions and concerns were addressed today  Subjective: Follow-up evaluation 3 months status post IM nail for left femur intertrochanteric fracture    Patient ID: Talita Hoff is a 80 y o  male who returns today for follow-up evaluation 3 months status post IM nail for left femur intertrochanteric fracture  He has been doing well  He has been ambulating with the assistance of a walker  He does note some discomfort, mostly about his knee, but denies any significant pain in his hip or groin  He denies any new injury or trauma  Review of Systems   Constitutional: Positive for activity change  Negative for chills, fever and unexpected weight change  HENT: Negative for hearing loss, nosebleeds and sore throat  Eyes: Negative for pain, redness and visual disturbance  Respiratory: Negative for cough, shortness of breath and wheezing  Cardiovascular: Negative for chest pain, palpitations and leg swelling     Gastrointestinal: Negative for abdominal pain, nausea and vomiting  Endocrine: Negative for polyphagia and polyuria  Genitourinary: Negative for dysuria and hematuria  Musculoskeletal: Negative for arthralgias, joint swelling and myalgias  See HPI   Skin: Negative for rash and wound  Neurological: Negative for dizziness, numbness and headaches  Psychiatric/Behavioral: Negative for decreased concentration and suicidal ideas  The patient is not nervous/anxious  Past Medical History:   Diagnosis Date    Hyperlipidemia        Past Surgical History:   Procedure Laterality Date    IR IVC FILTER PLACEMENT OPTIONAL/TEMPORARY  12/7/2019    IR IVC FILTER REMOVAL  8/21/2020    DE OPEN RX FEMUR FX+INTRAMED BÁRBARA Right 12/4/2019    Procedure: INSERTION NAIL IM FEMUR ANTEGRADE (TROCHANTERIC);   Surgeon: Garret River DO;  Location: [facility];  Service: Orthopedics    DE OPEN RX FEMUR FX+INTRAMED BÁRBARA Left 6/17/2022    Procedure: INSERTION NAIL IM FEMUR ANTEGRADE (TROCHANTERIC) - long nail;  Surgeon: Manju Mascorro DO;  Location: [facility];  Service: Orthopedics       Family History   Problem Relation Age of Onset    Cancer Mother        Social History     Occupational History    Not on file   Tobacco Use    Smoking status: Never Smoker    Smokeless tobacco: Never Used   Vaping Use    Vaping Use: Never used   Substance and Sexual Activity    Alcohol use: Never    Drug use: Never    Sexual activity: Not on file         Current Outpatient Medications:     acetaminophen (TYLENOL) 325 mg tablet, Take 2 tablets (650 mg total) by mouth every 6 (six) hours as needed for mild pain, headaches or fever, Disp: , Rfl: 0    atorvastatin (LIPITOR) 10 mg tablet, Half tab daily, Disp: 45 tablet, Rfl: 1    Calcium Carb-Cholecalciferol (CALTRATE 600+D3 PO), Take 600 mg by mouth 2 (two) times a day, Disp: , Rfl:     citalopram (CeleXA) 40 mg tablet, Take 1 tablet (40 mg total) by mouth daily, Disp: 90 tablet, Rfl: 1    Docusate Sodium (DSS) 100 MG CAPS, Take 100 mg by mouth daily (Patient not taking: Reported on 9/2/2022), Disp: , Rfl:     fosinopril (MONOPRIL) 10 mg tablet, TAKE 1/2 TABLET BY MOUTH DAILY, Disp: 45 tablet, Rfl: 1    magnesium hydroxide (MILK OF MAGNESIA) 400 mg/5 mL oral suspension, Take 30 mL by mouth (Patient not taking: Reported on 9/2/2022), Disp: , Rfl:     metoprolol tartrate (LOPRESSOR) 50 mg tablet, Take 1 tablet (50 mg total) by mouth every 12 (twelve) hours, Disp: 180 tablet, Rfl: 1    Multiple Vitamin (MULTIVITAMIN) tablet, Take 1 tablet by mouth daily, Disp: , Rfl:     niacin (NIASPAN) 500 mg CR tablet, TAKE ONE TABLET DAILY AT BEDTIME, Disp: 90 tablet, Rfl: 1    ondansetron (ZOFRAN-ODT) 4 mg disintegrating tablet, Take 1 tablet (4 mg total) by mouth every 8 (eight) hours as needed for nausea or vomiting (Patient not taking: Reported on 9/2/2022), Disp: 20 tablet, Rfl: 0    No Known Allergies    Objective:  Vitals:    09/21/22 1302   BP: 140/70   Pulse: 64       Body mass index is 25 99 kg/m²  Left Hip Exam     Tenderness   The patient is experiencing no tenderness  Muscle Strength   Abduction: 5/5   Adduction: 5/5   Flexion: 4/5     Tests   ARMOND: negative  Brady: negative    Other   Erythema: absent  Scars: present  Sensation: normal  Pulse: present    Comments: Both proximal and distal incisions well healed  No erythema or ecchymosis   Tolerated AROM/PROM well  Thigh and calf soft and nontender  Grossly NVI            Physical Exam  Vitals and nursing note reviewed  Constitutional:       Appearance: He is well-developed  HENT:      Head: Normocephalic and atraumatic  Eyes:      General: No scleral icterus  Extraocular Movements: Extraocular movements intact  Conjunctiva/sclera: Conjunctivae normal    Cardiovascular:      Rate and Rhythm: Normal rate  Pulmonary:      Effort: Pulmonary effort is normal  No respiratory distress     Musculoskeletal:      Cervical back: Normal range of motion and neck supple  Comments: As noted in HPI   Skin:     General: Skin is warm and dry  Neurological:      Mental Status: He is alert and oriented to person, place, and time  Psychiatric:         Behavior: Behavior normal          I have personally reviewed pertinent films in PACS  X-ray of the left femur obtained on 09/21/2022 reviewed demonstrating well-positioned and aligned orthopedic hardware consistent with prior IM nailing  There is no new fracture, dislocation, lytic or blastic lesion

## 2022-10-25 ENCOUNTER — OFFICE VISIT (OUTPATIENT)
Dept: INTERNAL MEDICINE CLINIC | Facility: CLINIC | Age: 82
End: 2022-10-25
Payer: COMMERCIAL

## 2022-10-25 VITALS
BODY MASS INDEX: 26.07 KG/M2 | OXYGEN SATURATION: 96 % | HEIGHT: 69 IN | HEART RATE: 64 BPM | WEIGHT: 176 LBS | DIASTOLIC BLOOD PRESSURE: 60 MMHG | SYSTOLIC BLOOD PRESSURE: 140 MMHG

## 2022-10-25 DIAGNOSIS — I35.1 NONRHEUMATIC AORTIC VALVE INSUFFICIENCY: ICD-10-CM

## 2022-10-25 DIAGNOSIS — I48.92 PAROXYSMAL ATRIAL FLUTTER (HCC): Primary | ICD-10-CM

## 2022-10-25 DIAGNOSIS — U07.1 COVID-19: ICD-10-CM

## 2022-10-25 DIAGNOSIS — I10 ESSENTIAL HYPERTENSION: ICD-10-CM

## 2022-10-25 DIAGNOSIS — S72.302A CLOSED FRACTURE OF SHAFT OF LEFT FEMUR, UNSPECIFIED FRACTURE MORPHOLOGY, INITIAL ENCOUNTER (HCC): ICD-10-CM

## 2022-10-25 DIAGNOSIS — R60.0 EDEMA OF LEFT LOWER LEG: ICD-10-CM

## 2022-10-25 DIAGNOSIS — F33.42 RECURRENT MAJOR DEPRESSIVE DISORDER, IN FULL REMISSION (HCC): ICD-10-CM

## 2022-10-25 DIAGNOSIS — E78.00 HYPERCHOLESTEREMIA: ICD-10-CM

## 2022-10-25 DIAGNOSIS — I34.0 NONRHEUMATIC MITRAL VALVE REGURGITATION: ICD-10-CM

## 2022-10-25 DIAGNOSIS — I47.1 SVT (SUPRAVENTRICULAR TACHYCARDIA) (HCC): ICD-10-CM

## 2022-10-25 DIAGNOSIS — J18.9 PNEUMONIA OF RIGHT LUNG DUE TO INFECTIOUS ORGANISM, UNSPECIFIED PART OF LUNG: ICD-10-CM

## 2022-10-25 PROCEDURE — 99214 OFFICE O/P EST MOD 30 MIN: CPT | Performed by: INTERNAL MEDICINE

## 2022-10-25 NOTE — ASSESSMENT & PLAN NOTE
Pulse regular  Seen by cardiologist yesterday  Metoprolol decreased to half tablet twice a day most likely 100 mg a will check it at home

## 2022-10-25 NOTE — ASSESSMENT & PLAN NOTE
09/09/2022:  CT of the chest:     Resolution of previously noted right lung pneumonia with residual postinfection scarring      Small bilateral pleural effusions  Large hiatal hernia  Clinically lungs are clear    No problem from previous pneumonia or recent COVID-19

## 2022-10-25 NOTE — ASSESSMENT & PLAN NOTE
09/09/2022:  CT scan of the chest:   Resolution of previously noted right lung pneumonia with residual postinfection scarring      Small bilateral pleural effusions  Large hiatal hernia      Will do follow-up CT scan of the chest in next some a

## 2022-10-25 NOTE — PATIENT INSTRUCTIONS
Keep your appointment in a more as planned  Go for the blood test as advised  May take Robitussin over-the-counter cough syrup as needed for cough    If you have recurrent pulmonary symptoms call me

## 2022-10-25 NOTE — ASSESSMENT & PLAN NOTE
Pulse regular  Seen by cardiologist yesterday  Metoprolol decreased to half tablet twice a day most likely 100 mg a will check it at home  Valvular heart disease is compensated      Last echo 06/17/2022 reviewed

## 2022-10-25 NOTE — PROGRESS NOTES
Name: Delgado Cortez      : 1940      MRN: [id number]  Encounter Provider: Lynda Carrion MD  Encounter Date: 10/25/2022   Encounter department: [address]     1  Paroxysmal atrial flutter ([facility] [address] )    2  SVT (supraventricular tachycardia) ([facility])  Assessment & Plan:  Pulse regular  Seen by cardiologist yesterday  Metoprolol decreased to half tablet twice a day most likely 100 mg a will check it at home  3  Nonrheumatic aortic valve insufficiency  Assessment & Plan:  Pulse regular  Seen by cardiologist yesterday  Metoprolol decreased to half tablet twice a day most likely 100 mg a will check it at home  Valvular heart disease is compensated  Last echo 2022 reviewed      4  Nonrheumatic mitral valve regurgitation  Assessment & Plan:  Pulse regular  Seen by cardiologist yesterday  Metoprolol decreased to half tablet twice a day most likely 100 mg a will check it at home  Valvular heart disease is compensated  Last echo 2022 reviewed      5  Hypercholesteremia    6  Recurrent major depressive disorder, in full remission ([facility] [address] )    7  COVID-19  Assessment & Plan:  COVID-19 resolved  Residual cough resolved  Recommend over-the-counter Robitussin as needed  Overall much      8  Closed fracture of shaft of left femur, unspecified fracture morphology, initial encounter [facility])  Assessment & Plan:  Improved  Healed  Weightbearing normal       9  Essential hypertension  Assessment & Plan:  Hypertension well controlled  Metoprolol was decreased to yesterday half tablet twice a day most likely of 100 mg each  Also remains on fosinopril 10 mg daily  Continue same regimen      10   Pneumonia of right lung due to infectious organism, unspecified part of lung  Assessment & Plan:  2022:  CT of the chest:     Resolution of previously noted right lung pneumonia with residual postinfection scarring      Small bilateral pleural effusions  Large hiatal hernia  Clinically lungs are clear  No problem from previous pneumonia or recent COVID-19      11  Edema of left lower leg  Assessment & Plan:  Improved  On the right side some residual         Falls Plan of Care: Balance, strength, and gait training instructions were provided  Subjective     Patient is here for chronic disease management  For supple status post fracture, left femur fracture, status post ORIF:  Hip is doing well  Walking with a walker  No pain  Fracture is healed  Weightbearing is normal     Recently had a COVID  Of-finally is better  He does not have any significant cough chest congestion shortness of breath pleuritic pain abdominal pain nausea vomiting diarrhea smell or taste problem  He was diagnosed to have COVID on October 6    Pulmonary:  No symptoms of pneumonia resolved cValvular heart disease and elevated Pulm HTN: no cardiac sx, compensated cardiac    Hx of DVT PE, off filter, finished 6 weeks of Lovenox no longer on anticoagulation    OCD and MDD: mood fair, tolerating citalopram 40 mg without side effect  HLD: on atorvastatin and niacin,LDL to the target ALT normal    PAFutter /fibrillation:2D echo-3/20-EF 65-70%, mild aortic sclerosis, mild-to-moderate aortic regurg, mild MR mild TR  Repeat echo EF 00%, grade 1 diastolic dysfunction  Seen by cardiologist   [place]  Heart monitor in place  Metoprolol decreased  50 mg ( half tablet  Of 100)twice a day- patient is not sure whether he has 100 mg or 50 mg tablet he was just told yesterday by cardiologist   He will call me how many mg he has at home    HTN :  Symptom-free  Patient had had appointment with cardiologist yesterday told to add take half of metoprolol twice a day he is not sure it is 100 mg or 50 mg at home    He will call me    Constipation fair    Renal function stable, CBC normal      Lung nodule tiny incidental, will do ct of chest in one year:  No pulmonary symptoms     06/16/2022:  CT scan of chest abdomen and pelvis:  1  Dense consolidation right upper lobe patchy consolidation right lower lobe suggests pneumonia/aspiration Small right effusion seen  2  There is a displaced fracture of the proximal shaft of the femur with medial displacement of the distal fragment along with overlapping Lucency seen in the both iliac bone, image 92 series 601 with no correlate on the radiograph may be due to motion or due to nondisplaced fracture  3  Osteoporosis with biconcavity of the multiple lumbar thoracic and lumbar vertebrae,   4 chronic Mild thickening of the posterior aspect of the urinary bladder wall noted, correlate with the urinary evaluation and urology evaluation on nonemergent basis 5  Incidentally detected the left perifissural nodule  Based on current Fleischner Society 2017 Guidelines on incidental pulmonary nodule, no routine follow-up is needed if the patient is low risk  If the patient is high risk, optional follow-up chest CT at 12 months can be considered  09/09/2022:  CT of the chest:   Resolution of previously noted right lung pneumonia with residual postinfection scarring      Small bilateral pleural effusions  Large hiatal hernia    Pulmonary nodule on CT scan of the chest 2  Bladder wall thickenin      08/30/2022:  CBC normal, CMP normal except blood sugar 102      06/17/2022:  Echocardiogram:  Normal ejection fraction 60 person mild aortic regurgitation mild-to-moderate tricuspid regurgitation right ventricular systolic pressure elevated moderately at 51    Otherwise unremarkable echocardiogram with grade 1 diastolic dysfunction  79/51/4773:  CT scan of the brain: No acute intracranial hemorrhage seen No mass effect or midline shift seen Mild periventricular and white matter hypodensity related to chronic small vessel as well as mild mucosal thickening of the maxillary sinus  06/16/2022:  Left knee x-ray no acute osseous abnormality  06/16/2022:  Left femur: Acute, displaced left proximal femoral shaft fracture extending to the neck  No dislocation  06/18/2022:ORIF left subtrochanteric fracture  Mild narrowing of the left hip joint orthopedic hardware in good position  06/16/2022:  Left hip:Acute, displaced left proximal femoral shaft fracture extending to the neck  No dislocation  06/16/2022:  CT scan of chest abdomen and pelvis:  1  Dense consolidation right upper lobe patchy consolidation right lower lobe suggests pneumonia/aspiration Small right effusion seen  2  There is a displaced fracture of the proximal shaft of the femur with medial displacement of the distal fragment along with overlapping Lucency seen in the both iliac bone, image 92 series 601 with no correlate on the radiograph may be due to motion or due to nondisplaced fracture  3  Osteoporosis with biconcavity of the multiple lumbar thoracic and lumbar vertebrae,   4 chronic Mild thickening of the posterior aspect of the urinary bladder wall noted, correlate with the urinary evaluation and urology evaluation on nonemergent basis 5  Incidentally detected the left perifissural nodule  Based on current Fleischner Society 2017 Guidelines on incidental pulmonary nodule, no routine follow-up is needed if the patient is low risk  If the patient is high risk, optional follow-up chest CT at 12 months can be considered      08/14/2022:  Chest x-ray:  Mild benign postinfectious scar in the right upper lobe with no acute disease, arge hiatal hernia  07/21/2022:  Hip x-ray stable alignment of the moderately displaced proximal left femoral shaft fracture  08/13/2022:  Stable near anatomic alignment of the left femur subtrochanteric fracture status post ORIF  07/21/2022:  Left femur overall probably no significant interval change in orientation of the bone fragment of after ORIF  07/16/2022:  Stable alignment of the subtrochanteric femur fracture with stable ORIF hardware            Review of Systems   Constitutional: Negative for chills, fatigue, fever and unexpected weight change  HENT: Positive for congestion  Negative for ear pain, postnasal drip, sinus pain and sore throat  Eyes: Negative for pain and redness  Respiratory: Negative for cough (Cough residual much better) and shortness of breath  Cardiovascular: Negative for chest pain, palpitations and leg swelling  Gastrointestinal: Negative for abdominal pain, diarrhea and nausea  Endocrine: Negative for cold intolerance, polydipsia and polyuria  Genitourinary: Negative for dysuria, frequency and urgency  Musculoskeletal: Negative for arthralgias, gait problem and myalgias  Skin: Negative for rash  Allergic/Immunologic: Negative  Neurological: Negative for dizziness, weakness and headaches  Hematological: Negative for adenopathy  Psychiatric/Behavioral: Negative for behavioral problems  Past Medical History:   Diagnosis Date   • Hyperlipidemia      Past Surgical History:   Procedure Laterality Date   • IR IVC FILTER PLACEMENT OPTIONAL/TEMPORARY  12/7/2019   • IR IVC FILTER REMOVAL  8/21/2020   • [place] OPEN RX FEMUR FX+INTRAMED BÁRBARA Right 12/4/2019    Procedure: INSERTION NAIL IM FEMUR ANTEGRADE (TROCHANTERIC);   Surgeon: Mickey Rosen DO;  Location: [facility];  Service: Orthopedics   • [place] OPEN RX FEMUR FX+INTRAMED BÁRBARA Left 6/17/2022    Procedure: INSERTION NAIL IM FEMUR ANTEGRADE (TROCHANTERIC) - long nail;  Surgeon: Franklin Easley DO;  Location: [facility];  Service: Orthopedics     Family History   Problem Relation Age of Onset   • Cancer Mother      Social History     Socioeconomic History   • Marital status: /Civil Union     Spouse name: None   • Number of children: None   • Years of education: None   • Highest education level: None   Occupational History   • None   Tobacco Use   • Smoking status: Never Smoker   • Smokeless tobacco: Never Used   Vaping Use   • Vaping Use: Never used   Substance and Sexual Activity   • Alcohol use: Never   • Drug use: Never   • Sexual activity: None   Other Topics Concern   • None   Social History Narrative   • None     Social Determinants of Health     Financial Resource Strain: Not on file   Food Insecurity: No Food Insecurity   • Worried About Running Out of Food in the Last Year: Never true   • Ran Out of Food in the Last Year: Never true   Transportation Needs: No Transportation Needs   • Lack of Transportation (Medical): No   • Lack of Transportation (Non-Medical):  No   Physical Activity: Not on file   Stress: Not on file   Social Connections: Not on file   Intimate Partner Violence: Not on file   Housing Stability: Not on file     Current Outpatient Medications on File Prior to Visit   Medication Sig   • acetaminophen (TYLENOL) 325 mg tablet Take 2 tablets (650 mg total) by mouth every 6 (six) hours as needed for mild pain, headaches or fever   • atorvastatin (LIPITOR) 10 mg tablet Half tab daily   • Calcium Carb-Cholecalciferol (CALTRATE 600+D3 PO) Take 600 mg by mouth 2 (two) times a day   • citalopram (CeleXA) 40 mg tablet Take 1 tablet (40 mg total) by mouth daily   • Docusate Sodium (DSS) 100 MG CAPS Take 100 mg by mouth daily   • fosinopril (MONOPRIL) 10 mg tablet TAKE 1/2 TABLET BY MOUTH DAILY   • magnesium hydroxide (MILK OF MAGNESIA) 400 mg/5 mL oral suspension Take 30 mL by mouth   • metoprolol tartrate (LOPRESSOR) 50 mg tablet Take 1 tablet (50 mg total) by mouth every 12 (twelve) hours   • Multiple Vitamin (MULTIVITAMIN) tablet Take 1 tablet by mouth daily   • niacin (NIASPAN) 500 mg CR tablet TAKE ONE TABLET DAILY AT BEDTIME   • ondansetron (ZOFRAN-ODT) 4 mg disintegrating tablet Take 1 tablet (4 mg total) by mouth every 8 (eight) hours as needed for nausea or vomiting     No Known Allergies  Immunization History   Administered Date(s) Administered   • Influenza, high dose seasonal 0 7 mL 09/29/2020, 10/12/2021   • Influenza, injectable, quadrivalent, preservative free 0 5 mL 10/31/2019   • Tdap 10/14/2019       Objective     /60   Pulse 64   Ht 5' 9" (1 753 m)   Wt 79 8 kg (176 lb)   SpO2 96%   BMI 25 99 kg/m²     Physical Exam  Constitutional:       General: He is not in acute distress  Appearance: He is well-developed  He is not ill-appearing or diaphoretic  HENT:      Head: Normocephalic and atraumatic  Right Ear: External ear normal       Left Ear: External ear normal       Nose: No congestion or rhinorrhea  Mouth/Throat:      Pharynx: No oropharyngeal exudate or posterior oropharyngeal erythema  Eyes:      General: No scleral icterus  Right eye: No discharge  Left eye: No discharge  Conjunctiva/sclera: Conjunctivae normal    Neck:      Thyroid: No thyromegaly  Vascular: No JVD  Cardiovascular:      Rate and Rhythm: Regular rhythm  Heart sounds: Normal heart sounds  Pulmonary:      Effort: No respiratory distress  Breath sounds: Normal breath sounds  No wheezing or rales  Musculoskeletal:      Right lower leg: No edema  Left lower leg: No edema  Lymphadenopathy:      Cervical: No cervical adenopathy  Skin:     Coloration: Skin is not jaundiced or pale         Mayo Freeman MD

## 2022-10-25 NOTE — ASSESSMENT & PLAN NOTE
Hypertension well controlled  Metoprolol was decreased to yesterday half tablet twice a day most likely of 100 mg each  Also remains on fosinopril 10 mg daily    Continue same regimen

## 2022-10-25 NOTE — ASSESSMENT & PLAN NOTE
COVID-19 resolved  Residual cough resolved  Recommend over-the-counter Robitussin as needed    Overall much

## 2022-10-26 ENCOUNTER — HOSPITAL ENCOUNTER (OUTPATIENT)
Dept: RADIOLOGY | Facility: HOSPITAL | Age: 82
Discharge: HOME | End: 2022-10-26
Attending: INTERNAL MEDICINE
Payer: COMMERCIAL

## 2022-10-26 DIAGNOSIS — S72.302A CLOSED FRACTURE OF SHAFT OF LEFT FEMUR, UNSPECIFIED FRACTURE MORPHOLOGY, INITIAL ENCOUNTER (HCC): ICD-10-CM

## 2022-10-26 PROCEDURE — 77080 DXA BONE DENSITY AXIAL: CPT

## 2022-11-21 ENCOUNTER — LAB (OUTPATIENT)
Dept: LAB | Facility: CLINIC | Age: 82
End: 2022-11-21

## 2022-11-21 DIAGNOSIS — E78.00 HYPERCHOLESTEREMIA: ICD-10-CM

## 2022-11-21 DIAGNOSIS — I10 ESSENTIAL HYPERTENSION: ICD-10-CM

## 2022-11-21 LAB
ALBUMIN SERPL BCP-MCNC: 3.2 G/DL (ref 3.5–5)
ALP SERPL-CCNC: 87 U/L (ref 46–116)
ALT SERPL W P-5'-P-CCNC: 17 U/L (ref 12–78)
ANION GAP SERPL CALCULATED.3IONS-SCNC: 2 MMOL/L (ref 4–13)
AST SERPL W P-5'-P-CCNC: 15 U/L (ref 5–45)
BILIRUB SERPL-MCNC: 0.66 MG/DL (ref 0.2–1)
BUN SERPL-MCNC: 17 MG/DL (ref 5–25)
CALCIUM ALBUM COR SERPL-MCNC: 10.1 MG/DL (ref 8.3–10.1)
CALCIUM SERPL-MCNC: 9.5 MG/DL (ref 8.3–10.1)
CHLORIDE SERPL-SCNC: 106 MMOL/L (ref 96–108)
CHOLEST SERPL-MCNC: 113 MG/DL
CO2 SERPL-SCNC: 29 MMOL/L (ref 21–32)
CREAT SERPL-MCNC: 1.02 MG/DL (ref 0.6–1.3)
GFR SERPL CREATININE-BSD FRML MDRD: 68 ML/MIN/1.73SQ M
GLUCOSE P FAST SERPL-MCNC: 97 MG/DL (ref 65–99)
HDLC SERPL-MCNC: 52 MG/DL
LDLC SERPL CALC-MCNC: 44 MG/DL (ref 0–100)
NONHDLC SERPL-MCNC: 61 MG/DL
POTASSIUM SERPL-SCNC: 4.8 MMOL/L (ref 3.5–5.3)
PROT SERPL-MCNC: 7.1 G/DL (ref 6.4–8.4)
SODIUM SERPL-SCNC: 137 MMOL/L (ref 135–147)
TRIGL SERPL-MCNC: 84 MG/DL

## 2022-11-25 ENCOUNTER — OFFICE VISIT (OUTPATIENT)
Dept: INTERNAL MEDICINE CLINIC | Facility: CLINIC | Age: 82
End: 2022-11-25

## 2022-11-25 VITALS
OXYGEN SATURATION: 94 % | SYSTOLIC BLOOD PRESSURE: 140 MMHG | DIASTOLIC BLOOD PRESSURE: 62 MMHG | HEIGHT: 69 IN | WEIGHT: 182 LBS | HEART RATE: 74 BPM | BODY MASS INDEX: 26.96 KG/M2

## 2022-11-25 DIAGNOSIS — J20.8 ACUTE BRONCHITIS DUE TO OTHER SPECIFIED ORGANISMS: ICD-10-CM

## 2022-11-25 DIAGNOSIS — M81.0 AGE-RELATED OSTEOPOROSIS WITHOUT CURRENT PATHOLOGICAL FRACTURE: ICD-10-CM

## 2022-11-25 DIAGNOSIS — I35.1 NONRHEUMATIC AORTIC VALVE INSUFFICIENCY: ICD-10-CM

## 2022-11-25 DIAGNOSIS — Z00.00 MEDICARE ANNUAL WELLNESS VISIT, SUBSEQUENT: ICD-10-CM

## 2022-11-25 DIAGNOSIS — E78.00 HYPERCHOLESTEREMIA: ICD-10-CM

## 2022-11-25 DIAGNOSIS — I38 VALVULAR HEART DISEASE: ICD-10-CM

## 2022-11-25 DIAGNOSIS — S72.302A CLOSED FRACTURE OF SHAFT OF LEFT FEMUR, UNSPECIFIED FRACTURE MORPHOLOGY, INITIAL ENCOUNTER (HCC): ICD-10-CM

## 2022-11-25 DIAGNOSIS — U07.1 COVID-19: ICD-10-CM

## 2022-11-25 DIAGNOSIS — I34.0 NONRHEUMATIC MITRAL VALVE REGURGITATION: ICD-10-CM

## 2022-11-25 DIAGNOSIS — I10 ESSENTIAL HYPERTENSION: Primary | ICD-10-CM

## 2022-11-25 DIAGNOSIS — R60.0 EDEMA OF LEFT LOWER LEG: ICD-10-CM

## 2022-11-25 DIAGNOSIS — I48.92 PAROXYSMAL ATRIAL FLUTTER (HCC): ICD-10-CM

## 2022-11-25 DIAGNOSIS — J30.9 ALLERGIC RHINITIS, UNSPECIFIED SEASONALITY, UNSPECIFIED TRIGGER: ICD-10-CM

## 2022-11-25 DIAGNOSIS — F42.2 MIXED OBSESSIONAL THOUGHTS AND ACTS: ICD-10-CM

## 2022-11-25 PROBLEM — J18.9 PNEUMONIA OF RIGHT LUNG DUE TO INFECTIOUS ORGANISM: Status: RESOLVED | Noted: 2022-06-16 | Resolved: 2022-11-25

## 2022-11-25 PROBLEM — R93.89 ABNORMAL CT SCAN, CHEST: Status: ACTIVE | Noted: 2022-11-25

## 2022-11-25 LAB
SARS-COV-2 AG UPPER RESP QL IA: NEGATIVE
VALID CONTROL: NORMAL

## 2022-11-25 RX ORDER — CEFUROXIME AXETIL 250 MG/1
250 TABLET ORAL EVERY 12 HOURS SCHEDULED
Qty: 14 TABLET | Refills: 0 | Status: SHIPPED | OUTPATIENT
Start: 2022-11-25 | End: 2022-12-02

## 2022-11-25 NOTE — ASSESSMENT & PLAN NOTE
Ceftin 250 mg 1 tablet twice a day for 1 week         take Robitussin Cough and cold 4 times a day as per the instruction    Or take DayQuil 3 times a day      Let me know if no better in 3-5 days

## 2022-11-25 NOTE — ASSESSMENT & PLAN NOTE
COVID in month of October resolved home the by the time we saw him in the end of the October however now 3 days history of recurrent cough    Please refer to an acute bronchitis

## 2022-11-25 NOTE — ASSESSMENT & PLAN NOTE
Pulse regular  Last echocardiogram 06/17/2022  Normal ejection fraction  Of right-sided systolic pressure was elevated  But stable pulmonary status  Home continue to monitor

## 2022-11-25 NOTE — ASSESSMENT & PLAN NOTE
Pulse regular  Stable cardiac and pulmonary status    She remains on metoprolol tartrate 50 mg every 12 hour

## 2022-11-25 NOTE — ASSESSMENT & PLAN NOTE
Stable cardiac status  Compensated CHF  Will continue metoprolol  Seen by cardiologist recently  No cardiac symptom at this time  Await cardiologist report    Discussed with daughter about this

## 2022-11-25 NOTE — ASSESSMENT & PLAN NOTE
Lab Results   Component Value Date    LDLCALC 44 11/21/2022       Lab Results   Component Value Date    ALT 17 11/21/2022    ALT 22 12/21/2015       Lab Results   Component Value Date    CHOLESTEROL 113 11/21/2022    CHOLESTEROL 120 05/20/2022    CHOLESTEROL 137 11/15/2021     Lab Results   Component Value Date    HDL 52 11/21/2022    HDL 49 05/20/2022    HDL 52 11/15/2021     Lab Results   Component Value Date    TRIG 84 11/21/2022    TRIG 87 05/20/2022    TRIG 67 11/15/2021     Lab Results   Component Value Date    NONHDLC 61 11/21/2022    [place] 71 05/20/2022    [place] 85 11/15/2021   Good I will tell you in [place] more

## 2022-11-25 NOTE — ASSESSMENT & PLAN NOTE
09/09/2022:  CT scan of the chest:    LUNGS:  Resolution of the consolidations in the right upper lobe and superior right lower lobe with interval postinfectious scarring  Mild bibasilar atelectasis      PLEURA:  Small bilateral pleural effusions  No pneumothorax      HEART/GREAT VESSELS: Normal heart size  Moderate coronary artery calcification  No significant pericardial effusion  No thoracic aortic aneurysm      MEDIASTINUM AND JACQUIE:  Large hiatal hernia  Decreased size of top normal mediastinal lymph nodes, compatible with a reactive process      CHEST WALL AND LOWER NECK:  Unremarkable      VISUALIZED STRUCTURES IN THE UPPER ABDOMEN:  Unremarkable      OSSEOUS STRUCTURES:  Osseous demineralization  Stable multiple old compression fracture deformities including at T6, T8, T11 and L1      IMPRESSION:     Resolution of previously noted right lung pneumonia with residual postinfection scarring      Small bilateral pleural effusions  Large hiatal hernia      06/18/2022:  CT scan of the chest:CHEST     LUNGS:  Dense airspace consolidation seen right upper lobe post posterior aspect  Mild patchy airspace consolidation seen in the right lower lobe  A perifissural nodule seen in the left midlung measuring about 3 mm, image 69 series 400  PLEURA:  Small right effusion seen     HEART/GREAT VESSELS: Heart is unremarkable for patient's age    Ascending aorta measures 3 9 cm  Mild coronary artery calcification seen     MEDIASTINUM AND JACQUIE:  Lower right paratracheal lymph nodes are seen measuring about 1 cm  Subcarinal lymph node seen measuring about 8 mm  Large hiatal hernia seen      Will do follow-up CT scan on 06/16/2023

## 2022-11-25 NOTE — PROGRESS NOTES
Assessment and Plan:     Problem List Items Addressed This Visit        Respiratory    Allergic rhinitis    Acute bronchitis due to other specified organisms     Ceftin 250 mg 1 tablet twice a day for 1 week  take Robitussin Cough and cold 4 times a day as per the instruction    Or take DayQuil 3 times a day      Let me know if no better in 3-5 days         Relevant Medications    cefuroxime (CEFTIN) 250 mg tablet       Cardiovascular and Mediastinum    Nonrheumatic mitral valve regurgitation     Stable cardiac status  Compensated CHF  Will continue metoprolol  Seen by cardiologist recently  No cardiac symptom at this time  Await cardiologist report  Discussed with daughter about this         Nonrheumatic aortic valve insufficiency     Stable cardiac status  Compensated CHF  Will continue metoprolol  Seen by cardiologist recently  No cardiac symptom at this time  Await cardiologist report  Discussed with daughter about this         Essential hypertension - Primary     Fair control  Will continue metoprolol tartrate 50 mg every 12 hour         Valvular heart disease    Paroxysmal atrial flutter (HCC)     Pulse regular  Last echocardiogram 06/17/2022  Normal ejection fraction  Of right-sided systolic pressure was elevated  But stable pulmonary status  Home continue to monitor  Musculoskeletal and Integument    Closed fracture of shaft of left femur (HCC)     A fracture heel walks with a walker no pain         Age-related osteoporosis without current pathological fracture     10/26/2022:  DEXA scan:  Osteoporosis, T-score -2 8 on forearm  and -2 3 on the lumbar spine  Treatment options reviewed will include either tablet such as Fosamax  We will start after is bronchitis is resolved              Other    Mixed obsessional thoughts and acts    Hypercholesteremia     Lab Results   Component Value Date    LDLCALC 44 11/21/2022       Lab Results   Component Value Date ALT 17 11/21/2022    ALT 22 12/21/2015       Lab Results   Component Value Date    CHOLESTEROL 113 11/21/2022    CHOLESTEROL 120 05/20/2022    CHOLESTEROL 137 11/15/2021     Lab Results   Component Value Date    HDL 52 11/21/2022    HDL 49 05/20/2022    HDL 52 11/15/2021     Lab Results   Component Value Date    TRIG 84 11/21/2022    TRIG 87 05/20/2022    TRIG 67 11/15/2021     Lab Results   Component Value Date    NONHDLC 61 11/21/2022    [place] 71 05/20/2022    [place] 85 11/15/2021   Good I will tell you in [place] more           Edema of left lower leg     Edema baseline         Medicare annual wellness visit, subsequent    COVID-19     COVID in month of October resolved home the by the time we saw him in the end of the October however now 3 days history of recurrent cough  Please refer to an acute bronchitis         Relevant Orders    POCT Rapid Covid Ag (Completed)        Preventive health issues were discussed with patient, and age appropriate screening tests were ordered as noted in patient's After Visit Summary  Personalized health advice and appropriate referrals for health education or preventive services given if needed, as noted in patient's After Visit Summary  History of Present Illness:     Patient presents for a Medicare Wellness Visit    Patient is here for chronic disease management  New complain: started with coughing Tuesday, had a sore on nose , it is done, bringing up phlegm from the throat ,  Last 2-3 days: no fever, no sob, no abdominal pain, nausea, vomiting or diarrhea, no problem with smell or taste,   Sore throat is better  Does have stuffy nose and runny nose  He had COVID on October 6th test started with coughs does stuffy nose runny nose chest congestion  He was not treated with antiviral   He subsequently call our office  We saw him on 10/25/2022  His cough got better naturally  And he was relatively symptom-free till this Monday    They wear with family that were coughing on the Saturday  They did do COVID yet  Hypertension:  COVID-19:  As reviewed above  History of DVT and PE:  Resolved  Not on any blood thinner  Hyperlipidemia:  He remains on atorvastatin 10 mg daily and niacin daily  OCD and MDD:  OCD and MDD stable and relatively symptom-free  Tolerating citalopram without any side effect  Edema of the right leg:  Mild chronic unchanged on the right leg  PAFutter /fibrillation:2D echo-3/20-EF 65-70%, mild aortic sclerosis, mild-to-moderate aortic regurg, mild MR mild TR  Repeat echo EF 01%, grade 1 diastolic dysfunction   Symptom-free  Heart monitor in place  Patient recently had a 224 or 48 hours heart monitor done  Metoprolol decreased  50 mgtwice a day-  HTN :  Symptom-free  On metoprolol tartrate 50 mg twice a day  Constipation fair    Renal function stable, CBC normal  His right if fracture is resolved walks with walker and has been stable        Lung nodule tiny incidental, will do ct of chest in one year:  No pulmonary symptoms     06/16/2022:  CT scan of chest abdomen and pelvis:  1  Dense consolidation right upper lobe patchy consolidation right lower lobe suggests pneumonia/aspiration Small right effusion seen  2  There is a displaced fracture of the proximal shaft of the femur with medial displacement of the distal fragment along with overlapping Lucency seen in the both iliac bone, image 92 series 601 with no correlate on the radiograph may be due to motion or due to nondisplaced fracture  3  Osteoporosis with biconcavity of the multiple lumbar thoracic and lumbar vertebrae,   4 chronic Mild thickening of the posterior aspect of the urinary bladder wall noted, correlate with the urinary evaluation and urology evaluation on nonemergent basis 5  Incidentally detected the left perifissural nodule  Based on current Fleischner Society 2017 Guidelines on incidental pulmonary nodule, no routine follow-up is needed if the patient is low risk    If the patient is high risk, optional follow-up chest CT at 12 months can be considered  09/09/2022:  CT of the chest:   Resolution of previously noted right lung pneumonia with residual postinfection scarring  Small bilateral pleural effusions  Large hiatal hernia  Pulmonary nodule on CT scan of the chest 2  Bladder wall thickenin      08/30/2022:  CBC normal, CMP normal except blood sugar 102      06/17/2022:  Echocardiogram:  Normal ejection fraction 60 person mild aortic regurgitation mild-to-moderate tricuspid regurgitation right ventricular systolic pressure elevated moderately at 51  Otherwise unremarkable echocardiogram with grade 1 diastolic dysfunction    67/95/8471:  Left femur: Acute, displaced left proximal femoral shaft fracture extending to the neck  No dislocation  06/18/2022:ORIF left subtrochanteric fracture  Mild narrowing of the left hip joint orthopedic hardware in good position  06/16/2022:  Left hip:Acute, displaced left proximal femoral shaft fracture extending to the neck  No dislocation  06/16/2022:  CT scan of chest abdomen and pelvis:  1  Dense consolidation right upper lobe patchy consolidation right lower lobe suggests pneumonia/aspiration Small right effusion seen  2  There is a displaced fracture of the proximal shaft of the femur with medial displacement of the distal fragment along with overlapping Lucency seen in the both iliac bone, image 92 series 601 with no correlate on the radiograph may be due to motion or due to nondisplaced fracture  3  Osteoporosis with biconcavity of the multiple lumbar thoracic and lumbar vertebrae,   4 chronic Mild thickening of the posterior aspect of the urinary bladder wall noted, correlate with the urinary evaluation and urology evaluation on nonemergent basis 5  Incidentally detected the left perifissural nodule   Based on current Fleischner Society 2017 Guidelines on incidental pulmonary nodule, no routine follow-up is needed if the patient is low risk  If the patient is high risk, optional follow-up chest CT at 12 months can be considered  08/14/2022:  Chest x-ray:  Mild benign postinfectious scar in the right upper lobe with no acute disease, arge hiatal hernia  07/21/2022:  Hip x-ray stable alignment of the moderately displaced proximal left femoral shaft fracture  08/13/2022:  Stable near anatomic alignment of the left femur subtrochanteric fracture status post ORIF    11/21/2022:  Cholesterol 113, LDL 44, rest of the CMP normal, GFR 68         Patient Care Team:  Leonid Duque MD as PCP - General (Internal Medicine)  Leonid Duque MD as PCP - [address] [place] [address] (RTE)     Review of Systems:     Review of Systems   Constitutional: Negative for chills, fatigue, fever and unexpected weight change  HENT: Negative for congestion, ear pain, postnasal drip, sinus pain and sore throat  Eyes: Negative for pain and redness  Respiratory: Positive for cough  Negative for shortness of breath  Cardiovascular: Negative for chest pain, palpitations and leg swelling  Gastrointestinal: Negative for abdominal pain, diarrhea and nausea  Endocrine: Negative for cold intolerance, polydipsia and polyuria  Genitourinary: Negative for dysuria, frequency and urgency  Musculoskeletal: Negative for arthralgias, gait problem and myalgias  Skin: Negative for rash  Allergic/Immunologic: Negative  Neurological: Negative for dizziness, weakness and headaches  Hematological: Negative for adenopathy  Psychiatric/Behavioral: Negative for behavioral problems          Problem List:     Patient Active Problem List   Diagnosis   • Mixed obsessional thoughts and acts   • Allergic rhinitis   • Hypercholesteremia   • Sensorineural hearing loss (SNHL)   • Nonrheumatic mitral valve regurgitation   • Aortic valve sclerosis   • Nonrheumatic aortic valve insufficiency   • Essential hypertension   • Recurrent major depressive disorder, in full remission ([facility] [address] )   • Hyperlipidemia   • Impaired vision   • Conductive hearing loss, bilateral   • S/P IVC filter   • SVT (supraventricular tachycardia) ([facility])   • Valvular heart disease   • Urinary frequency   • Edema of left lower leg   • Paroxysmal atrial flutter ([facility] [address] )   • Medicare annual wellness visit, subsequent   • Closed fracture of shaft of left femur (HCC)   • Lung nodule < 6cm on CT   • Bladder wall thickening   • 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HCC)   • COVID-19   • Acute bronchitis due to other specified organisms   • Age-related osteoporosis without current pathological fracture   • Abnormal CT scan, chest      Past Medical and Surgical History:     Past Medical History:   Diagnosis Date   • Hyperlipidemia      Past Surgical History:   Procedure Laterality Date   • IR IVC FILTER PLACEMENT OPTIONAL/TEMPORARY  12/7/2019   • IR IVC FILTER REMOVAL  8/21/2020   • CA OPEN RX FEMUR FX+INTRAMED BÁRBARA Right 12/4/2019    Procedure: INSERTION NAIL IM FEMUR ANTEGRADE (TROCHANTERIC);   Surgeon: Shelby Sewell DO;  Location: [facility];  Service: Orthopedics   • CA OPEN RX FEMUR FX+INTRAMED BÁRBARA Left 6/17/2022    Procedure: INSERTION NAIL IM FEMUR ANTEGRADE (TROCHANTERIC) - long nail;  Surgeon: Angélica Rivas DO;  Location: [facility];  Service: Orthopedics      Family History:     Family History   Problem Relation Age of Onset   • Cancer Mother       Social History:     Social History     Socioeconomic History   • Marital status: /Civil Union     Spouse name: None   • Number of children: None   • Years of education: None   • Highest education level: None   Occupational History   • None   Tobacco Use   • Smoking status: Never   • Smokeless tobacco: Never   Vaping Use   • Vaping Use: Never used   Substance and Sexual Activity   • Alcohol use: Never   • Drug use: Never   • Sexual activity: None   Other Topics Concern   • None   Social History Narrative   • None     Social Determinants of Health Financial Resource Strain: Low Risk    • Difficulty of Paying Living Expenses: Not hard at all   Food Insecurity: No Food Insecurity   • Worried About Running Out of Food in the Last Year: Never true   • Ran Out of Food in the Last Year: Never true   Transportation Needs: No Transportation Needs   • Lack of Transportation (Medical): No   • Lack of Transportation (Non-Medical): No   Physical Activity: Not on file   Stress: Not on file   Social Connections: Not on file   Intimate Partner Violence: Not on file   Housing Stability: Not on file      Medications and Allergies:     Current Outpatient Medications   Medication Sig Dispense Refill   • acetaminophen (TYLENOL) 325 mg tablet Take 2 tablets (650 mg total) by mouth every 6 (six) hours as needed for mild pain, headaches or fever  0   • atorvastatin (LIPITOR) 10 mg tablet Half tab daily 45 tablet 1   • Calcium Carb-Cholecalciferol (CALTRATE 600+D3 PO) Take 600 mg by mouth 2 (two) times a day     • cefuroxime (CEFTIN) 250 mg tablet Take 1 tablet (250 mg total) by mouth every 12 (twelve) hours for 7 days 14 tablet 0   • citalopram (CeleXA) 40 mg tablet Take 1 tablet (40 mg total) by mouth daily 90 tablet 1   • fosinopril (MONOPRIL) 10 mg tablet TAKE 1/2 TABLET BY MOUTH DAILY 45 tablet 1   • metoprolol tartrate (LOPRESSOR) 50 mg tablet Take 1 tablet (50 mg total) by mouth every 12 (twelve) hours 180 tablet 1   • Multiple Vitamin (MULTIVITAMIN) tablet Take 1 tablet by mouth daily     • niacin (NIASPAN) 500 mg CR tablet TAKE ONE TABLET DAILY AT BEDTIME 90 tablet 1     No current facility-administered medications for this visit  No Known Allergies   Immunizations:     Immunization History   Administered Date(s) Administered   • Influenza, high dose seasonal 0 7 mL 09/29/2020, 10/12/2021   • Influenza, injectable, quadrivalent, preservative free 0 5 mL 10/31/2019   • Tdap 10/14/2019      Health Maintenance:      There are no preventive care reminders to display for this patient  Topic Date Due   • Hepatitis B Vaccine (1 of 3 - 3-dose series) Never done   • COVID-19 Vaccine (1) Never done   • Pneumococcal Vaccine: 65+ Years (1 - PCV) Never done   • Influenza Vaccine (1) 09/01/2022      Medicare Screening Tests and Risk Assessments:     Ace Solitario is here for his Subsequent Wellness visit  Last Medicare Wellness visit information reviewed, patient interviewed and updates made to the record today  Health Risk Assessment:   Patient rates overall health as good  Patient feels that their physical health rating is slightly better  Patient is satisfied with their life  Eyesight was rated as same  Hearing was rated as same  Patient feels that their emotional and mental health rating is same  Patients states they are never, rarely angry  Patient states they are sometimes unusually tired/fatigued  Pain experienced in the last 7 days has been none  Patient states that he has experienced no weight loss or gain in last 6 months  Weight stays the same  But it was down a bit  Is getting better    Fall Risk Screening: In the past year, patient has experienced: history of falling in past year    Number of falls: 1  Injured during fall?: Yes    Feels unsteady when standing or walking?: Yes    Worried about falling?: No      Home Safety:  Patient does not have trouble with stairs inside or outside of their home  Patient has working smoke alarms and has working carbon monoxide detector  Home safety hazards include: none  No home hazards  Goes slow on the stairs  Use banisters for support  Jose is a nurse she looks in a couple times a week  Nutrition:   Current diet is Regular  Eats a balanced diet    Medications:   Patient is currently taking over-the-counter supplements  OTC medications include: see medication list  Patient is able to manage medications  No issues  Daughter does his meds      Activities of Daily Living (ADLs)/Instrumental Activities of Daily Living (IADLs):   Walk and transfer into and out of bed and chair?: Yes  Dress and groom yourself?: Yes    Bathe or shower yourself?: Yes    Feed yourself? Yes  Do your laundry/housekeeping?: Yes  Manage your money, pay your bills and track your expenses?: Yes  Make your own meals?: Yes    Do your own shopping?: Yes    ADL comments: Pt is independent  Previous Hospitalizations:   Any hospitalizations or ED visits within the last 12 months?: No      Hospitalization Comments: Chronic conditions have been stable    Advance Care Planning:   Living will: Yes    Durable POA for healthcare: Yes    Advanced directive: Yes    Advanced directive counseling given: Yes    Patient declined ACP directive: No    End of Life Decisions reviewed with patient: Yes    Provider agrees with end of life decisions: Yes      Comments: All docs in place per pt    Cognitive Screening:   Provider or family/friend/caregiver concerned regarding cognition?: No    PREVENTIVE SCREENINGS      Cardiovascular Screening:    General: Screening Not Indicated and History Lipid Disorder      Diabetes Screening:     General: Screening Current      Colorectal Cancer Screening:     General: Screening Not Indicated      Prostate Cancer Screening:    General: Screening Not Indicated      Osteoporosis Screening:    General: Screening Not Indicated      Abdominal Aortic Aneurysm (AAA) Screening:        General: Screening Not Indicated      Lung Cancer Screening:     General: Screening Not Indicated      Hepatitis C Screening:    General: Patient Declines    Hep C Screening Accepted: No       Preventive Screening Comments: No risk for Hep C  UTD with covid and flu  Screening, Brief Intervention, and Referral to Treatment (SBIRT)    Screening  Typical number of drinks in a day: 0  Typical number of drinks in a week: 0  Interpretation: Low risk drinking behavior      AUDIT-C Screenin) How often did you have a drink containing alcohol in the past year? never  2) How many drinks did you have on a typical day when you were drinking in the past year? 0  3) How often did you have 6 or more drinks on one occasion in the past year? never    AUDIT-C Score: 0  Interpretation: Score 0-3 (male): Negative screen for alcohol misuse    Single Item Drug Screening:  How often have you used an illegal drug (including marijuana) or a prescription medication for non-medical reasons in the past year? never    Single Item Drug Screen Score: 0  Interpretation: Negative screen for possible drug use disorder    Brief Intervention  Alcohol & drug use screenings were reviewed  No concerns regarding substance use disorder identified  Other Counseling Topics:   Sunscreen and regular weightbearing exercise  No results found  Physical Exam:     /62   Pulse 74   Ht 5' 9" (1 753 m)   Wt 82 6 kg (182 lb)   SpO2 94%   BMI 26 88 kg/m²     Physical Exam  Constitutional:       General: He is not in acute distress  Appearance: He is well-developed and well-nourished  He is not ill-appearing, toxic-appearing or diaphoretic  HENT:      Head: Normocephalic and atraumatic  Nose: Congestion and rhinorrhea present  Mouth/Throat:      Pharynx: No oropharyngeal exudate or posterior oropharyngeal erythema  Eyes:      General: No scleral icterus  Right eye: No discharge  Left eye: No discharge  Conjunctiva/sclera: Conjunctivae normal    Neck:      Thyroid: No thyromegaly  Vascular: No JVD  Cardiovascular:      Rate and Rhythm: Regular rhythm  Heart sounds: Normal heart sounds  Pulmonary:      Effort: No respiratory distress  Breath sounds: Normal breath sounds  No wheezing or rales  Abdominal:      General: Bowel sounds are normal  There is no distension  Palpations: There is no mass  Tenderness: There is no abdominal tenderness  There is no rebound  Musculoskeletal:      Right lower le+ Edema present  Left lower le+ Edema present  Lymphadenopathy:      Cervical: No cervical adenopathy  Skin:     General: Skin is warm  Findings: No rash     Neurological:      Coordination: Coordination normal    Psychiatric:         Mood and Affect: Mood and affect normal          Behavior: Behavior normal          Judgment: Judgment normal           Genesis Conrad MD

## 2022-11-25 NOTE — PATIENT INSTRUCTIONS
Ceftin 250 mg 1 tablet twice a day for 1 week  take Robitussin Cough and cold 4 times a day as per the instruction    Or take DayQuil 3 times a day      Let me know if no better in 3-5 days      Ceftin 250 mg 1 tablet twice a day for 1 week  take Robitussin Cough and cold 4 times a day as per the instruction    Or take DayQuil 3 times a day      Let me know if no better in 3-5 days      Follow with Consultants as per their and our suggestion    Follow up in 2 months or as needed earlier    Follow all instructions as advised and discussed  Take your medications as prescribed  Call the office immediately if you experience any side effects  Ask questions if you do not understand  Keep your scheduled appointment as advised or come sooner if necessary or in doubt  Best time to call for non-urgent matter or questions on weekdays is between 9am and 12 noon  See physician for any new symptoms or worsening of current symptoms  Urgent or emergent situations call 911 and report to nearest emergency room  I spent  30+ minutes taking care of this patient including clinical care, conseling, collaboration, chart, lab and consultaion review as appropriate    Patient is to get labs 1 week(s) prior to next visit if advised   Medicare Preventive Visit Patient Instructions  Thank you for completing your Welcome to Medicare Visit or Medicare Annual Wellness Visit today  Your next wellness visit will be due in one year (11/26/2023)  The screening/preventive services that you may require over the next 5-10 years are detailed below  Some tests may not apply to you based off risk factors and/or age  Screening tests ordered at today's visit but not completed yet may show as past due  Also, please note that scanned in results may not display below    Preventive Screenings:  Service Recommendations Previous Testing/Comments   Colorectal Cancer Screening  Colonoscopy    Fecal Occult Blood Test (FOBT)/Fecal Immunochemical Test (FIT)  Fecal DNA/Cologuard Test  Flexible Sigmoidoscopy Age: 39-70 years old   Colonoscopy: every 10 years (May be performed more frequently if at higher risk)  OR  FOBT/FIT: every 1 year  OR  Cologuard: every 3 years  OR  Sigmoidoscopy: every 5 years  Screening may be recommended earlier than age 39 if at higher risk for colorectal cancer  Also, an individualized decision between you and your healthcare provider will decide whether screening between the ages of 74-80 would be appropriate  Colonoscopy: Not on file  FOBT/FIT: Not on file  Cologuard: Not on file  Sigmoidoscopy: Not on file          Prostate Cancer Screening Individualized decision between patient and health care provider in men between ages of 53-78   Medicare will cover every 12 months beginning on the day after your 50th birthday PSA: No results in last 5 years           Hepatitis C Screening Once for adults born between [facility]  More frequently in patients at high risk for Hepatitis C Hep C Antibody: Not on file        Diabetes Screening 1-2 times per year if you're at risk for diabetes or have pre-diabetes Fasting glucose: 97 mg/dL (11/21/2022)  A1C: No results in last 5 years (No results in last 5 years)      Cholesterol Screening Once every 5 years if you don't have a lipid disorder  May order more often based on risk factors  Lipid panel: 11/21/2022         Other Preventive Screenings Covered by Medicare:  Abdominal Aortic Aneurysm (AAA) Screening: covered once if your at risk  You're considered to be at risk if you have a family history of AAA or a male between the age of 73-68 who smoking at least 100 cigarettes in your lifetime    Lung Cancer Screening: covers low dose CT scan once per year if you meet all of the following conditions: (1) Age 50-69; (2) No signs or symptoms of lung cancer; (3) Current smoker or have quit smoking within the last 15 years; (4) You have a tobacco smoking history of at least 20 pack years (packs per day x number of years you smoked); (5) You get a written order from a healthcare provider  Glaucoma Screening: covered annually if you're considered high risk: (1) You have diabetes OR (2) Family history of glaucoma OR (3)  aged 48 and older OR (3)  American aged 72 and older  Osteoporosis Screening: covered every 2 years if you meet one of the following conditions: (1) Have a vertebral abnormality; (2) On glucocorticoid therapy for more than 3 months; (3) Have primary hyperparathyroidism; (4) On osteoporosis medications and need to assess response to drug therapy  HIV Screening: covered annually if you're between the age of 12-76  Also covered annually if you are younger than 13 and older than 72 with risk factors for HIV infection  For pregnant patients, it is covered up to 3 times per pregnancy  Immunizations:  Immunization Recommendations   Influenza Vaccine Annual influenza vaccination during flu season is recommended for all persons aged >= 6 months who do not have contraindications   Pneumococcal Vaccine   * Pneumococcal conjugate vaccine = PCV13 (Prevnar 13), PCV15 (Vaxneuvance), PCV20 (Prevnar 20)  * Pneumococcal polysaccharide vaccine = PPSV23 (Pneumovax) Adults 25-60 years old: 1-3 doses may be recommended based on certain risk factors  Adults 72 years old: 1-2 doses may be recommended based off what pneumonia vaccine you previously received   Hepatitis B Vaccine 3 dose series if at intermediate or high risk (ex: diabetes, end stage renal disease, liver disease)   Tetanus (Td) Vaccine - COST NOT COVERED BY MEDICARE PART B Following completion of primary series, a booster dose should be given every 10 years to maintain immunity against tetanus  Td may also be given as tetanus wound prophylaxis  Tdap Vaccine - COST NOT COVERED BY MEDICARE PART B Recommended at least once for all adults  For pregnant patients, recommended with each pregnancy     Shingles Vaccine (Shingrix) - COST NOT COVERED BY MEDICARE PART B  2 shot series recommended in those aged 48 and above     Health Maintenance Due:  There are no preventive care reminders to display for this patient  Immunizations Due:      Topic Date Due    Hepatitis B Vaccine (1 of 3 - 3-dose series) Never done    COVID-19 Vaccine (1) Never done    Pneumococcal Vaccine: 65+ Years (1 - PCV) Never done    Influenza Vaccine (1) 09/01/2022     Advance Directives   What are advance directives? Advance directives are legal documents that state your wishes and plans for medical care  These plans are made ahead of time in case you lose your ability to make decisions for yourself  Advance directives can apply to any medical decision, such as the treatments you want, and if you want to donate organs  What are the types of advance directives? There are many types of advance directives, and each state has rules about how to use them  You may choose a combination of any of the following:  Living will: This is a written record of the treatment you want  You can also choose which treatments you do not want, which to limit, and which to stop at a certain time  This includes surgery, medicine, IV fluid, and tube feedings  Durable power of  for healthcare [facility]): This is a written record that states who you want to make healthcare choices for you when you are unable to make them for yourself  This person, called a proxy, is usually a family member or a friend  You may choose more than 1 proxy  Do not resuscitate (DNR) order:  A DNR order is used in case your heart stops beating or you stop breathing  It is a request not to have certain forms of treatment, such as CPR  A DNR order may be included in other types of advance directives  Medical directive: This covers the care that you want if you are in a coma, near death, or unable to make decisions for yourself  You can list the treatments you want for each condition  Treatment may include pain medicine, surgery, blood transfusions, dialysis, IV or tube feedings, and a ventilator (breathing machine)  Values history: This document has questions about your views, beliefs, and how you feel and think about life  This information can help others choose the care that you would choose  Why are advance directives important? An advance directive helps you control your care  Although spoken wishes may be used, it is better to have your wishes written down  Spoken wishes can be misunderstood, or not followed  Treatments may be given even if you do not want them  An advance directive may make it easier for your family to make difficult choices about your care  Weight Management   Why it is important to manage your weight:  Being overweight increases your risk of health conditions such as heart disease, high blood pressure, type 2 diabetes, and certain types of cancer  It can also increase your risk for osteoarthritis, sleep apnea, and other respiratory problems  Aim for a slow, steady weight loss  Even a small amount of weight loss can lower your risk of health problems  How to lose weight safely:  A safe and healthy way to lose weight is to eat fewer calories and get regular exercise  You can lose up about 1 pound a week by decreasing the number of calories you eat by 500 calories each day  Healthy meal plan for weight management:  A healthy meal plan includes a variety of foods, contains fewer calories, and helps you stay healthy  A healthy meal plan includes the following:  Eat whole-grain foods more often  A healthy meal plan should contain fiber  Fiber is the part of grains, fruits, and vegetables that is not broken down by your body  Whole-grain foods are healthy and provide extra fiber in your diet  Some examples of whole-grain foods are whole-wheat breads and pastas, oatmeal, brown rice, and bulgur  Eat a variety of vegetables every day    Include dark, leafy greens such as spinach, kale, ivette greens, and mustard greens  Eat yellow and orange vegetables such as carrots, sweet potatoes, and winter squash  Eat a variety of fruits every day  Choose fresh or canned fruit (canned in its own juice or light syrup) instead of juice  Fruit juice has very little or no fiber  Eat low-fat dairy foods  Drink fat-free (skim) milk or 1% milk  Eat fat-free yogurt and low-fat cottage cheese  Try low-fat cheeses such as mozzarella and other reduced-fat cheeses  Choose meat and other protein foods that are low in fat  Choose beans or other legumes such as split peas or lentils  Choose fish, skinless poultry (chicken or turkey), or lean cuts of red meat (beef or pork)  Before you cook meat or poultry, cut off any visible fat  Use less fat and oil  Try baking foods instead of frying them  Add less fat, such as margarine, sour cream, regular salad dressing and mayonnaise to foods  Eat fewer high-fat foods  Some examples of high-fat foods include french fries, doughnuts, ice cream, and cakes  Eat fewer sweets  Limit foods and drinks that are high in sugar  This includes candy, cookies, regular soda, and sweetened drinks  Exercise:  Exercise at least 30 minutes per day on most days of the week  Some examples of exercise include walking, biking, dancing, and swimming  You can also fit in more physical activity by taking the stairs instead of the elevator or parking farther away from stores  Ask your healthcare provider about the best exercise plan for you  © Copyright [employer] 2018 Information is for End User's use only and may not be sold, redistributed or otherwise used for commercial purposes   All illustrations and images included in CareNotes® are the copyrighted property of A D A M , Inc  or [address] [place] [address] [employer]

## 2022-11-25 NOTE — ASSESSMENT & PLAN NOTE
10/26/2022:  DEXA scan:  Osteoporosis, T-score -2 8 on forearm  and -2 3 on the lumbar spine  Treatment options reviewed will include either tablet such as Fosamax  We will start after is bronchitis is resolved

## 2022-12-06 ENCOUNTER — OFFICE VISIT (OUTPATIENT)
Dept: INTERNAL MEDICINE CLINIC | Facility: CLINIC | Age: 82
End: 2022-12-06

## 2022-12-06 VITALS
SYSTOLIC BLOOD PRESSURE: 132 MMHG | OXYGEN SATURATION: 95 % | DIASTOLIC BLOOD PRESSURE: 68 MMHG | TEMPERATURE: 97.8 F | WEIGHT: 176 LBS | BODY MASS INDEX: 26.07 KG/M2 | HEART RATE: 62 BPM | HEIGHT: 69 IN

## 2022-12-06 DIAGNOSIS — J20.8 ACUTE BRONCHITIS DUE TO OTHER SPECIFIED ORGANISMS: Primary | ICD-10-CM

## 2022-12-06 RX ORDER — DEXTROMETHORPHAN HYDROBROMIDE AND PROMETHAZINE HYDROCHLORIDE 15; 6.25 MG/5ML; MG/5ML
5 SOLUTION ORAL 4 TIMES DAILY PRN
Qty: 120 ML | Refills: 0 | Status: SHIPPED | OUTPATIENT
Start: 2022-12-06

## 2022-12-06 RX ORDER — AZITHROMYCIN 250 MG/1
TABLET, FILM COATED ORAL
Qty: 6 TABLET | Refills: 0 | Status: SHIPPED | OUTPATIENT
Start: 2022-12-06 | End: 2022-12-11

## 2022-12-06 RX ORDER — PREDNISONE 20 MG/1
40 TABLET ORAL DAILY
Qty: 10 TABLET | Refills: 0 | Status: SHIPPED | OUTPATIENT
Start: 2022-12-06 | End: 2022-12-11

## 2022-12-06 NOTE — ASSESSMENT & PLAN NOTE
Prednisone 20 mg 2 tablet daily for 5 days  Zithromax 250 mg 2 tablet daily and 1 a day for 4 more days  Phenergan DM 1 teaspoon full every 6 hours as needed for cough  If you are not better in couple of days to get the chest x-ray  We will see her back in 1 week

## 2022-12-06 NOTE — PATIENT INSTRUCTIONS
Prednisone 20 mg 2 tablet daily for 5 days  Zithromax 250 mg 2 tablet daily and 1 a day for 4 more days  Phenergan DM 1 teaspoon full every 6 hours as needed for cough  Nasal spray 2-3 times a day on each nostril    If you are not better in couple of days to get the chest x-ray  We will see her back in 1 week  You are better stop by for the flu vaccine in 1 week  If you are no better I will be glad to see you next week  If you are not better we will see you on February 17 as planned  Never smoker

## 2022-12-06 NOTE — PROGRESS NOTES
Name: Gabriella Mosher      : 1940      MRN: [id number]  Encounter Provider: Lisy Tenorio MD  Encounter Date: 2022   Encounter department: [address]     1  Acute bronchitis due to other specified organisms  Assessment & Plan:  Prednisone 20 mg 2 tablet daily for 5 days  Zithromax 250 mg 2 tablet daily and 1 a day for 4 more days  Phenergan DM 1 teaspoon full every 6 hours as needed for cough  If you are not better in couple of days to get the chest x-ray  We will see her back in 1 week  Orders:  -     predniSONE 20 mg tablet; Take 2 tablets (40 mg total) by mouth daily for 5 days  -     azithromycin (Zithromax) 250 mg tablet; Take 2 tablets (500 mg total) by mouth daily for 1 day, THEN 1 tablet (250 mg total) daily for 4 days   -     Promethazine-DM (PHENERGAN-DM) 6 25-15 mg/5 mL oral syrup; Take 5 mL by mouth 4 (four) times a day as needed for cough  -     XR chest pa & lateral; Future; Expected date: 2022           Patient as well as his wife as well as his daughter does not think patient is allergic to Phenergan DM  Subjective     Patient is here for follow-up of his cough  We saw him on 2022  He presented at that time with 2 -3 days history of cough  We treated him with the Ceftin and DayQuil  Patient reports cough is mild but not going away, continues with the phlegm  Phlegm is somewhat greenish  He does not have any fever chills shortness of breath or wheezing  Continues with the nasal congestion  He started with the nasal congestion  He probably has postnasal drip as symptoms are worse somewhat when he lies down    Did not have history of smoking, asthma, or COPD, his COVID test were negative    Review of Systems   Constitutional: Negative for chills, fatigue, fever and unexpected weight change  HENT: Positive for congestion and postnasal drip   Negative for ear pain, sinus pain and sore throat  Eyes: Negative for pain and redness  Respiratory: Positive for cough  Negative for shortness of breath  Cardiovascular: Negative for chest pain, palpitations and leg swelling  Gastrointestinal: Negative for abdominal pain, diarrhea and nausea  Endocrine: Negative for cold intolerance, polydipsia and polyuria  Genitourinary: Negative for dysuria, frequency and urgency  Musculoskeletal: Negative for arthralgias, gait problem and myalgias  Skin: Negative for rash  Allergic/Immunologic: Negative  Neurological: Negative for dizziness and headaches  Hematological: Negative for adenopathy  Psychiatric/Behavioral: Negative for behavioral problems  Past Medical History:   Diagnosis Date   • Hyperlipidemia      Past Surgical History:   Procedure Laterality Date   • IR IVC FILTER PLACEMENT OPTIONAL/TEMPORARY  12/7/2019   • IR IVC FILTER REMOVAL  8/21/2020   • NV OPEN RX FEMUR FX+INTRAMED BÁRBARA Right 12/4/2019    Procedure: INSERTION NAIL IM FEMUR ANTEGRADE (TROCHANTERIC);   Surgeon: Gallo Herzog DO;  Location: [facility];  Service: Orthopedics   • NV OPEN RX FEMUR FX+INTRAMED BÁRBARA Left 6/17/2022    Procedure: INSERTION NAIL IM FEMUR ANTEGRADE (TROCHANTERIC) - long nail;  Surgeon: Elaine Donohue DO;  Location: [facility];  Service: Orthopedics     Family History   Problem Relation Age of Onset   • Cancer Mother      Social History     Socioeconomic History   • Marital status: /Civil Union     Spouse name: None   • Number of children: None   • Years of education: None   • Highest education level: None   Occupational History   • None   Tobacco Use   • Smoking status: Never   • Smokeless tobacco: Never   Vaping Use   • Vaping Use: Never used   Substance and Sexual Activity   • Alcohol use: Never   • Drug use: Never   • Sexual activity: None   Other Topics Concern   • None   Social History Narrative   • None     Social Determinants of Health     Financial Resource Strain: Low Risk    • Difficulty of Paying Living Expenses: Not hard at all   Food Insecurity: No Food Insecurity   • Worried About [employer] in the Last Year: Never true   • Ran Out of Food in the Last Year: Never true   Transportation Needs: No Transportation Needs   • Lack of Transportation (Medical): No   • Lack of Transportation (Non-Medical): No   Physical Activity: Not on file   Stress: Not on file   Social Connections: Not on file   Intimate Partner Violence: Not on file   Housing Stability: Not on file     Current Outpatient Medications on File Prior to Visit   Medication Sig   • acetaminophen (TYLENOL) 325 mg tablet Take 2 tablets (650 mg total) by mouth every 6 (six) hours as needed for mild pain, headaches or fever   • atorvastatin (LIPITOR) 10 mg tablet Half tab daily   • Calcium Carb-Cholecalciferol (CALTRATE 600+D3 PO) Take 600 mg by mouth 2 (two) times a day   • citalopram (CeleXA) 40 mg tablet Take 1 tablet (40 mg total) by mouth daily   • fosinopril (MONOPRIL) 10 mg tablet TAKE 1/2 TABLET BY MOUTH DAILY   • metoprolol tartrate (LOPRESSOR) 50 mg tablet Take 1 tablet (50 mg total) by mouth every 12 (twelve) hours   • Multiple Vitamin (MULTIVITAMIN) tablet Take 1 tablet by mouth daily   • niacin (NIASPAN) 500 mg CR tablet TAKE ONE TABLET DAILY AT BEDTIME     No Known Allergies  Immunization History   Administered Date(s) Administered   • Influenza, high dose seasonal 0 7 mL 09/29/2020, 10/12/2021   • Influenza, injectable, quadrivalent, preservative free 0 5 mL 10/31/2019   • Tdap 10/14/2019       Objective     /68   Pulse 62   Temp 97 8 °F (36 6 °C)   Ht 5' 9" (1 753 m)   Wt 79 8 kg (176 lb)   SpO2 95%   BMI 25 99 kg/m²     Physical Exam  Constitutional:       General: He is not in acute distress  Appearance: He is well-developed  He is not ill-appearing or diaphoretic  HENT:      Head: Normocephalic and atraumatic        Right Ear: External ear normal       Left Ear: External ear normal       Nose: Congestion and rhinorrhea present  Right Turbinates: Not pale  Left Turbinates: Not pale  Right Sinus: No maxillary sinus tenderness or frontal sinus tenderness  Left Sinus: No maxillary sinus tenderness or frontal sinus tenderness  Mouth/Throat:      Mouth: No oral lesions  Tongue: No lesions  Palate: No mass  Pharynx: No pharyngeal swelling or posterior oropharyngeal erythema  Tonsils: No tonsillar exudate  Eyes:      General: No scleral icterus  Right eye: No discharge  Left eye: No discharge  Conjunctiva/sclera: Conjunctivae normal    Neck:      Thyroid: No thyromegaly  Vascular: No JVD  Cardiovascular:      Rate and Rhythm: Regular rhythm  Heart sounds: Normal heart sounds  Pulmonary:      Effort: No respiratory distress  Breath sounds: Normal breath sounds  No wheezing or rales  Lymphadenopathy:      Cervical: No cervical adenopathy  Skin:     Coloration: Skin is not jaundiced or pale         Kira Fonseca MD

## 2022-12-12 ENCOUNTER — HOSPITAL ENCOUNTER (OUTPATIENT)
Dept: RADIOLOGY | Facility: HOSPITAL | Age: 82
Discharge: HOME | End: 2022-12-12

## 2022-12-12 DIAGNOSIS — J20.8 ACUTE BRONCHITIS DUE TO OTHER SPECIFIED ORGANISMS: ICD-10-CM

## 2022-12-14 ENCOUNTER — CLINICAL SUPPORT (OUTPATIENT)
Dept: INTERNAL MEDICINE CLINIC | Facility: CLINIC | Age: 82
End: 2022-12-14

## 2022-12-14 DIAGNOSIS — Z23 ENCOUNTER FOR IMMUNIZATION: Primary | ICD-10-CM

## 2023-01-17 ENCOUNTER — TELEPHONE (OUTPATIENT)
Dept: INTERNAL MEDICINE CLINIC | Facility: CLINIC | Age: 83
End: 2023-01-17

## 2023-01-17 NOTE — TELEPHONE ENCOUNTER
Anmol Shafer called per your request to remind you to start her father on osteo-medication, stating that he had seen you recently but that you didn't want to start him on them at the time because he was sick

## 2023-01-23 DIAGNOSIS — M81.0 AGE RELATED OSTEOPOROSIS, UNSPECIFIED PATHOLOGICAL FRACTURE PRESENCE: Primary | ICD-10-CM

## 2023-01-23 RX ORDER — ALENDRONATE SODIUM 70 MG/1
70 TABLET ORAL
Qty: 12 TABLET | Refills: 1 | Status: SHIPPED | OUTPATIENT
Start: 2023-01-23

## 2023-01-24 PROBLEM — J20.8 ACUTE BRONCHITIS DUE TO OTHER SPECIFIED ORGANISMS: Status: RESOLVED | Noted: 2022-11-25 | Resolved: 2023-01-24

## 2023-02-10 DIAGNOSIS — E78.5 HYPERLIPIDEMIA, UNSPECIFIED HYPERLIPIDEMIA TYPE: ICD-10-CM

## 2023-02-10 RX ORDER — NIACIN 500 MG/1
TABLET, EXTENDED RELEASE ORAL
Qty: 90 TABLET | Refills: 1 | Status: SHIPPED | OUTPATIENT
Start: 2023-02-10

## 2023-02-17 ENCOUNTER — OFFICE VISIT (OUTPATIENT)
Dept: INTERNAL MEDICINE CLINIC | Facility: CLINIC | Age: 83
End: 2023-02-17

## 2023-02-17 VITALS — OXYGEN SATURATION: 95 % | HEART RATE: 66 BPM | HEIGHT: 69 IN | BODY MASS INDEX: 27.85 KG/M2 | WEIGHT: 188 LBS

## 2023-02-17 DIAGNOSIS — I10 ESSENTIAL HYPERTENSION: ICD-10-CM

## 2023-02-17 DIAGNOSIS — I26.99 OTHER PULMONARY EMBOLISM WITHOUT ACUTE COR PULMONALE, UNSPECIFIED CHRONICITY (HCC): ICD-10-CM

## 2023-02-17 DIAGNOSIS — F33.42 RECURRENT MAJOR DEPRESSIVE DISORDER, IN FULL REMISSION (HCC): ICD-10-CM

## 2023-02-17 DIAGNOSIS — R60.0 EDEMA OF LEFT LOWER LEG: ICD-10-CM

## 2023-02-17 DIAGNOSIS — E78.2 MIXED HYPERLIPIDEMIA: ICD-10-CM

## 2023-02-17 DIAGNOSIS — I48.92 PAROXYSMAL ATRIAL FLUTTER (HCC): ICD-10-CM

## 2023-02-17 DIAGNOSIS — I38 VALVULAR HEART DISEASE: ICD-10-CM

## 2023-02-17 DIAGNOSIS — I47.1 SVT (SUPRAVENTRICULAR TACHYCARDIA) (HCC): Primary | ICD-10-CM

## 2023-02-17 DIAGNOSIS — E55.9 VITAMIN D DEFICIENCY: ICD-10-CM

## 2023-02-17 DIAGNOSIS — I27.21 PULMONARY ARTERY HYPERTENSION (HCC): ICD-10-CM

## 2023-02-17 DIAGNOSIS — J96.01 ACUTE RESPIRATORY FAILURE WITH HYPOXIA (HCC): ICD-10-CM

## 2023-02-17 DIAGNOSIS — R91.1 LUNG NODULE: ICD-10-CM

## 2023-02-17 DIAGNOSIS — M81.0 AGE-RELATED OSTEOPOROSIS WITHOUT CURRENT PATHOLOGICAL FRACTURE: ICD-10-CM

## 2023-02-17 DIAGNOSIS — N32.89 BLADDER WALL THICKENING: ICD-10-CM

## 2023-02-17 DIAGNOSIS — F42.2 MIXED OBSESSIONAL THOUGHTS AND ACTS: ICD-10-CM

## 2023-02-17 DIAGNOSIS — I35.1 NONRHEUMATIC AORTIC VALVE INSUFFICIENCY: ICD-10-CM

## 2023-02-17 PROBLEM — S72.302A CLOSED FRACTURE OF SHAFT OF LEFT FEMUR (HCC): Status: RESOLVED | Noted: 2022-06-16 | Resolved: 2023-02-17

## 2023-02-17 PROBLEM — U07.1 COVID-19: Status: RESOLVED | Noted: 2022-10-25 | Resolved: 2023-02-17

## 2023-02-17 PROBLEM — R35.0 URINARY FREQUENCY: Status: RESOLVED | Noted: 2020-03-02 | Resolved: 2023-02-17

## 2023-02-17 RX ORDER — TAMSULOSIN HYDROCHLORIDE 0.4 MG/1
0.4 CAPSULE ORAL
COMMUNITY

## 2023-02-17 NOTE — ASSESSMENT & PLAN NOTE
Hypertension( High Blood Pressure ):    Check your blood pressure at home periodically, keep the diary and bring records at your chronic disease management/Hypertension visit    Continue your Blood Pressure Medicine as per your current Medication List/ as advised- fosinopril 10 mg daily and metoprolol tart 50 mg every 12 hours    Avoid excessive salt    Know your BP target range ( Usually Systolic 776-167 and diastolic less than 85- In some cases it may be different )

## 2023-02-17 NOTE — ASSESSMENT & PLAN NOTE
Echo from 6/7/2022 reviewed  Valvular heart disease compensated  He does have a mild to moderate aortic regurgitation, mild to moderate tricuspid regurgitation, mildly dilated left atrium, normal ejection fraction, increased wall thickness  He does have edema of both legs somewhat more on the left than the right but seems to be doing fairly well there is no clinical LV failure    We will continue same regimen

## 2023-02-17 NOTE — ASSESSMENT & PLAN NOTE
Lab Results   Component Value Date    LDLCALC 44 11/21/2022     Lab Results   Component Value Date    ALT 17 11/21/2022    ALT 22 12/21/2015   Due for lipid profile CMP prior to next visit    We will continue atorvastatin 10 mg daily  Lab Results   Component Value Date    CHOLESTEROL 113 11/21/2022    CHOLESTEROL 120 05/20/2022    CHOLESTEROL 137 11/15/2021     Lab Results   Component Value Date    HDL 52 11/21/2022    HDL 49 05/20/2022    HDL 52 11/15/2021     Lab Results   Component Value Date    TRIG 84 11/21/2022    TRIG 87 05/20/2022    TRIG 67 11/15/2021     Lab Results   Component Value Date    NONHDLC 61 11/21/2022    [place] 71 05/20/2022    [place] 85 11/15/2021       Continue atorvastatin 10 mg daily  Due for cmp and lipid next visit

## 2023-02-17 NOTE — PROGRESS NOTES
Name: Clare Hunter      : 1940      MRN: [id number]  Encounter Provider: Leroy Donaldson MD  Encounter Date: 2023   Encounter department: [facility] INTERNAL MEDICINE    Assessment & Plan     1  SVT (supraventricular tachycardia) (HCC)  -     Comprehensive metabolic panel; Future; Expected date: 2023  -     CBC and differential; Future    2  Essential hypertension  Assessment & Plan:  Hypertension( High Blood Pressure ):    Check your blood pressure at home periodically, keep the diary and bring records at your chronic disease management/Hypertension visit    Continue your Blood Pressure Medicine as per your current Medication List/ as advised- fosinopril 10 mg daily and metoprolol tart 50 mg every 12 hours    Avoid excessive salt    Know your BP target range ( Usually Systolic 273-425 and diastolic less than 85- In some cases it may be different )        Orders:  -     Comprehensive metabolic panel; Future; Expected date: 2023  -     CBC and differential; Future  -     Lipid panel; Future    3  Paroxysmal atrial flutter ([facility] [address] )    4  Mixed hyperlipidemia  Assessment & Plan:  Lab Results   Component Value Date    LDLCALC 44 2022     Lab Results   Component Value Date    ALT 17 2022    ALT 22 2015   Due for lipid profile CMP prior to next visit    We will continue atorvastatin 10 mg daily  Lab Results   Component Value Date    CHOLESTEROL 113 2022    CHOLESTEROL 120 2022    CHOLESTEROL 137 11/15/2021     Lab Results   Component Value Date    HDL 52 2022    HDL 49 2022    HDL 52 11/15/2021     Lab Results   Component Value Date    TRIG 84 2022    TRIG 87 2022    TRIG 67 11/15/2021     Lab Results   Component Value Date    NONHDLC 61 2022    [place] 71 2022    [place] 85 11/15/2021       Continue atorvastatin 10 mg daily  Due for cmp and lipid next visit      Orders:  -     Comprehensive metabolic panel; Future; Expected date: 05/17/2023  -     Lipid panel; Future    5  Lung nodule    6  Mixed obsessional thoughts and acts  Assessment & Plan:  OCD baseline  No overt symptoms of depression  Depression component well controlled  With tolerating citalopram 40 mg daily without side effect  7  Valvular heart disease  Assessment & Plan:  Echo from 6/7/2022 reviewed  Valvular heart disease compensated  He does have a mild to moderate aortic regurgitation, mild to moderate tricuspid regurgitation, mildly dilated left atrium, normal ejection fraction, increased wall thickness  He does have edema of both legs somewhat more on the left than the right but seems to be doing fairly well there is no clinical LV failure  We will continue same regimen    Orders:  -     Comprehensive metabolic panel; Future; Expected date: 05/17/2023  -     CBC and differential; Future    8  Bladder wall thickening  Assessment & Plan:  Was seen by urologist last week they think prostate causing a little bit pressure on the bladder wall causing the thickening  They started him on Flomax  He is voiding fairly well  They are checking PSA to be on safe side  6/16/2022 CT scan reviewed which again confirmed mild thickening of the posterior aspect of the urinary bladder wall  9  Age-related osteoporosis without current pathological fracture  Assessment & Plan:  10-: Dexa - osteoporosis  Now on fosamax 70 mg weekly      Orders:  -     Comprehensive metabolic panel; Future; Expected date: 05/17/2023  -     CBC and differential; Future  -     Vitamin D 25 hydroxy; Future    10  Acute respiratory failure with hypoxia [facility])  Assessment & Plan:  Respiratory failure resolved  Not requiring any oxygen      11  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[address] )    12  Recurrent major depressive disorder, in full remission [facility])  Assessment & Plan:  OCD baseline  No overt symptoms of depression  Depression component well controlled    With tolerating citalopram 40 mg daily without side effect  13  Other pulmonary embolism without acute cor pulmonale, unspecified chronicity ([facility] [place] [address] )  Assessment & Plan:  Echo from 6/7/2022 reviewed  Valvular heart disease compensated  He does have a mild to moderate aortic regurgitation, mild to moderate tricuspid regurgitation, mildly dilated left atrium, normal ejection fraction, increased wall thickness  He does have edema of both legs somewhat more on the left than the right but seems to be doing fairly well there is no clinical LV failure  We will continue same regimen      14  Edema of left lower leg  Assessment & Plan:  Baseline, benign, no need of additional diuretic, no LV failure, DVT,    Orders:  -     Comprehensive metabolic panel; Future; Expected date: 05/17/2023  -     CBC and differential; Future    15  Nonrheumatic aortic valve insufficiency  Assessment & Plan:  Echo from 6/7/2022 reviewed  Valvular heart disease compensated  He does have a mild to moderate aortic regurgitation, mild to moderate tricuspid regurgitation, mildly dilated left atrium, normal ejection fraction, increased wall thickness  He does have edema of both legs somewhat more on the left than the right but seems to be doing fairly well there is no clinical LV failure  We will continue same regimen      16  Vitamin D deficiency  -     Vitamin D 25 hydroxy; Future      BMI Counseling: There is no height or weight on file to calculate BMI  Follow-up plan was not completed due to elderly patient (72 years old) where weight reduction/weight gain would complicate underlying health condition such as: illness or physical disability  Subjective     Patient is here for chronic disease management  Hypertension: Controlled, continue same regimen  History of DVT and PE:  Resolved    Not on any blood thinner 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  Hyperlipidemia:  He remains on atorvastatin 10 mg daily and niacin daily  Due for lipid profile prior to next visit  OCD and MDD:  OCD and MDD stable and relatively symptom-free  Tolerating citalopram without any side effect  Edema of the right leg:  Mild chronic unchanged on the right leg  L, in part secondary to valvular heart disease in part secondary to previous hip fracture in part secondary to deep vein thrombosis    PAFutter /fibrillation:2D echo-3/20-EF 65-70%, mild aortic sclerosis, mild-to-moderate aortic regurg, mild MR mild TR  Repeat echo EF 60%, grade 1 diastolic dysfunction   Symptom-free  Heart monitor in place  Patient recently had a 224 or 48 hours heart monitor done  Metoprolol decreased  50 mgtwice a day-    Constipation fair    Renal function stable, CBC normal  His right if fracture is resolved walks with walker and has been stable  (Last DEXA scan in every 4 of 2022 does have a osteoporosis  With previous fracture tolerating Fosamax without side effect      Lung nodule tiny incidental, will do ct of chest in one year:  No pulmonary symptoms     06/16/2022:  CT scan of chest abdomen and pelvis:  1  Dense consolidation right upper lobe patchy consolidation right lower lobe suggests pneumonia/aspiration Small right effusion seen  2  There is a displaced fracture of the proximal shaft of the femur with medial displacement of the distal fragment along with overlapping Lucency seen in the both iliac bone, image 92 series 601 with no correlate on the radiograph may be due to motion or due to nondisplaced fracture  3  Osteoporosis with biconcavity of the multiple lumbar thoracic and lumbar vertebrae,   4 chronic Mild thickening of the posterior aspect of the urinary bladder wall noted, correlate with the urinary evaluation and urology evaluation on nonemergent basis 5  Incidentally detected the left perifissural nodule   Based on current Fleischner Society 2017 Guidelines on incidental pulmonary nodule, no routine follow-up is needed if the patient is low risk  If the patient is high risk, optional follow-up chest CT at 12 months can be considered  09/09/2022:  CT of the chest:   Resolution of previously noted right lung pneumonia with residual postinfection scarring  Small bilateral pleural effusions  Large hiatal hernia  Pulmonary nodule on CT scan of the chest 2  Bladder wall thickeni      06/17/2022:  Echocardiogram:  Normal ejection fraction 60 person mild aortic regurgitation mild-to-moderate tricuspid regurgitation right ventricular systolic pressure elevated moderately at 51  Otherwise unremarkable echocardiogram with grade 1 diastolic dysfunction      06/16/2022:  CT scan of chest abdomen and pelvis:  1  Dense consolidation right upper lobe patchy consolidation right lower lobe suggests pneumonia/aspiration Small right effusion seen  2  There is a displaced fracture of the proximal shaft of the femur with medial displacement of the distal fragment along with overlapping Lucency seen in the both iliac bone, image 92 series 601 with no correlate on the radiograph may be due to motion or due to nondisplaced fracture  3  Osteoporosis with biconcavity of the multiple lumbar thoracic and lumbar vertebrae,   4 chronic Mild thickening of the posterior aspect of the urinary bladder wall noted, correlate with the urinary evaluation and urology evaluation on nonemergent basis 5  Incidentally detected the left perifissural nodule  Based on current Fleischner Society 2017 Guidelines on incidental pulmonary nodule, no routine follow-up is needed if the patient is low risk  If the patient is high risk, optional follow-up chest CT at 12 months can be considered      08/14/2022:  Chest x-ray:  Mild benign postinfectious scar in the right upper lobe with no acute disease, arge hiatal hernia  07/21/2022:  Hip x-ray stable alignment of the moderately displaced proximal left femoral shaft fracture  08/13/2022:  Stable near anatomic alignment of the left femur subtrochanteric fracture status post ORIF    11/21/2022:  Cholesterol 113, LDL 44, rest of the CMP normal, GFR 68          Review of Systems   Constitutional: Negative for appetite change, fatigue, fever and unexpected weight change  HENT: Negative for congestion, ear pain and sore throat  Eyes: Negative for pain and redness  Respiratory: Negative for cough and shortness of breath  Cardiovascular: Positive for leg swelling  Negative for chest pain and palpitations  Gastrointestinal: Negative for abdominal pain, diarrhea, nausea and vomiting  Endocrine: Negative for cold intolerance, polydipsia and polyuria  Genitourinary: Negative for dysuria, frequency and urgency  Musculoskeletal: Negative for arthralgias, gait problem and myalgias  Skin: Negative for rash  Allergic/Immunologic: Negative  Neurological: Negative for dizziness and headaches  Hematological: Negative for adenopathy  Psychiatric/Behavioral: Negative for behavioral problems, confusion, decreased concentration, dysphoric mood, hallucinations, self-injury, sleep disturbance and suicidal ideas  The patient is not nervous/anxious and is not hyperactive  Past Medical History:   Diagnosis Date   • Hyperlipidemia      Past Surgical History:   Procedure Laterality Date   • IR IVC FILTER PLACEMENT OPTIONAL/TEMPORARY  12/7/2019   • IR IVC FILTER REMOVAL  8/21/2020   • MA OPTX FEM SHFT FX W/INSJ IMED IMPLT W/WO SCREW Right 12/4/2019    Procedure: INSERTION NAIL IM FEMUR ANTEGRADE (TROCHANTERIC);   Surgeon: King Caryl DO;  Location: [facility];  Service: Orthopedics   • MA OPTX FEM SHFT FX W/INSJ IMED IMPLT W/WO SCREW Left 6/17/2022    Procedure: INSERTION NAIL IM FEMUR ANTEGRADE (TROCHANTERIC) - long nail;  Surgeon: Mely Asencio DO;  Location: [facility];  Service: Orthopedics     Family History   Problem Relation Age of Onset   • Cancer Mother      Social History     Socioeconomic History   • Marital status: /Civil Union     Spouse name: None   • Number of children: None   • Years of education: None   • Highest education level: None   Occupational History   • None   Tobacco Use   • Smoking status: Never   • Smokeless tobacco: Never   Vaping Use   • Vaping Use: Never used   Substance and Sexual Activity   • Alcohol use: Never   • Drug use: Never   • Sexual activity: None   Other Topics Concern   • None   Social History Narrative   • None     Social Determinants of Health     Financial Resource Strain: Low Risk    • Difficulty of Paying Living Expenses: Not hard at all   Food Insecurity: No Food Insecurity   • Worried About Running Out of Food in the Last Year: Never true   • Ran Out of Food in the Last Year: Never true   Transportation Needs: No Transportation Needs   • Lack of Transportation (Medical): No   • Lack of Transportation (Non-Medical):  No   Physical Activity: Not on file   Stress: Not on file   Social Connections: Not on file   Intimate Partner Violence: Not on file   Housing Stability: Not on file     Current Outpatient Medications on File Prior to Visit   Medication Sig   • alendronate (Fosamax) 70 mg tablet Take 1 tablet (70 mg total) by mouth every 7 days   • atorvastatin (LIPITOR) 10 mg tablet Half tab daily   • Calcium Carb-Cholecalciferol (CALTRATE 600+D3 PO) Take 600 mg by mouth 2 (two) times a day   • citalopram (CeleXA) 40 mg tablet Take 1 tablet (40 mg total) by mouth daily   • fosinopril (MONOPRIL) 10 mg tablet TAKE 1/2 TABLET BY MOUTH DAILY   • metoprolol tartrate (LOPRESSOR) 50 mg tablet Take 1 tablet (50 mg total) by mouth every 12 (twelve) hours   • Multiple Vitamin (MULTIVITAMIN) tablet Take 1 tablet by mouth daily   • niacin (NIASPAN) 500 mg CR tablet TAKE ONE TABLET DAILY AT BEDTIME   • tamsulosin (Flomax) 0 4 mg Take 0 4 mg by mouth daily with dinner     No Known Allergies  Immunization History   Administered Date(s) Administered   • Influenza, high dose seasonal 0 7 mL 09/29/2020, 10/12/2021, 12/14/2022   • Influenza, injectable, quadrivalent, preservative free 0 5 mL 10/31/2019   • Tdap 10/14/2019       Objective     Pulse 66   Ht 5' 9" (1 753 m)   Wt 85 3 kg (188 lb)   SpO2 95%   BMI 27 76 kg/m²     Physical Exam  Constitutional:       General: He is not in acute distress  Appearance: He is well-developed  He is not ill-appearing, toxic-appearing or diaphoretic  HENT:      Head: Normocephalic and atraumatic  Right Ear: External ear normal       Left Ear: External ear normal    Eyes:      General: Lids are normal          Right eye: No discharge  Left eye: No discharge  Conjunctiva/sclera: Conjunctivae normal    Neck:      Thyroid: No thyroid mass or thyromegaly  Vascular: No carotid bruit or JVD  Trachea: Trachea normal    Cardiovascular:      Rate and Rhythm: Regular rhythm  Heart sounds: Normal heart sounds  Pulmonary:      Effort: No respiratory distress  Breath sounds: No wheezing, rhonchi or rales  Musculoskeletal:      Right lower leg: No edema  Left lower leg: No edema  Lymphadenopathy:      Cervical: No cervical adenopathy  Skin:     General: Skin is warm  Coloration: Skin is not jaundiced or pale  Findings: No rash  Neurological:      General: No focal deficit present  Mental Status: He is alert and oriented to person, place, and time  Psychiatric:         Mood and Affect: Mood normal          Behavior: Behavior normal          Thought Content:  Thought content normal          Judgment: Judgment normal        Larissa Duarte MD

## 2023-02-17 NOTE — ASSESSMENT & PLAN NOTE
Was seen by urologist last week they think prostate causing a little bit pressure on the bladder wall causing the thickening  They started him on Flomax  He is voiding fairly well  They are checking PSA to be on safe side  6/16/2022 CT scan reviewed which again confirmed mild thickening of the posterior aspect of the urinary bladder wall

## 2023-02-17 NOTE — ASSESSMENT & PLAN NOTE
OCD baseline  No overt symptoms of depression  Depression component well controlled  With tolerating citalopram 40 mg daily without side effect

## 2023-03-06 ENCOUNTER — APPOINTMENT (OUTPATIENT)
Dept: LAB | Facility: CLINIC | Age: 83
End: 2023-03-06

## 2023-03-19 ENCOUNTER — HOSPITAL ENCOUNTER (INPATIENT)
Facility: HOSPITAL | Age: 83
LOS: 4 days | Discharge: HOME HEALTH | End: 2023-03-24
Attending: EMERGENCY MEDICINE | Admitting: STUDENT IN AN ORGANIZED HEALTH CARE EDUCATION/TRAINING PROGRAM

## 2023-03-19 ENCOUNTER — APPOINTMENT (EMERGENCY)
Dept: RADIOLOGY | Facility: HOSPITAL | Age: 83
End: 2023-03-19

## 2023-03-19 DIAGNOSIS — R06.89 ACUTE RESPIRATORY INSUFFICIENCY: ICD-10-CM

## 2023-03-19 DIAGNOSIS — R09.02 HYPOXIA: ICD-10-CM

## 2023-03-19 DIAGNOSIS — R60.0 EDEMA OF LEFT LOWER LEG: ICD-10-CM

## 2023-03-19 DIAGNOSIS — Z29.8 ENCOUNTER FOR OTHER SPECIFIED PROPHYLACTIC MEASURES: ICD-10-CM

## 2023-03-19 DIAGNOSIS — R05.1 ACUTE COUGH: ICD-10-CM

## 2023-03-19 DIAGNOSIS — R60.0 BILATERAL LEG EDEMA: ICD-10-CM

## 2023-03-19 DIAGNOSIS — M81.0 AGE RELATED OSTEOPOROSIS, UNSPECIFIED PATHOLOGICAL FRACTURE PRESENCE: ICD-10-CM

## 2023-03-19 DIAGNOSIS — J69.0 ASPIRATION PNEUMONIA (HCC): ICD-10-CM

## 2023-03-19 DIAGNOSIS — J40 BRONCHITIS: Primary | ICD-10-CM

## 2023-03-19 DIAGNOSIS — J30.9 ALLERGIC RHINITIS, UNSPECIFIED SEASONALITY, UNSPECIFIED TRIGGER: ICD-10-CM

## 2023-03-19 DIAGNOSIS — E83.42 HYPOMAGNESEMIA: ICD-10-CM

## 2023-03-19 DIAGNOSIS — J45.909 REACTIVE AIRWAY DISEASE: ICD-10-CM

## 2023-03-19 DIAGNOSIS — K44.9 HIATAL HERNIA: ICD-10-CM

## 2023-03-19 LAB
2HR DELTA HS TROPONIN: -2 NG/L
4HR DELTA HS TROPONIN: 0 NG/L
ALBUMIN SERPL BCP-MCNC: 3.8 G/DL (ref 3.5–5)
ALP SERPL-CCNC: 70 U/L (ref 34–104)
ALT SERPL W P-5'-P-CCNC: 8 U/L (ref 7–52)
ANION GAP SERPL CALCULATED.3IONS-SCNC: 5 MMOL/L (ref 4–13)
ARTERIAL PATENCY WRIST A: YES
AST SERPL W P-5'-P-CCNC: 15 U/L (ref 13–39)
BASE EXCESS BLDA CALC-SCNC: 0.8 MMOL/L
BASOPHILS # BLD AUTO: 0.06 THOUSANDS/ÂΜL (ref 0–0.1)
BASOPHILS NFR BLD AUTO: 1 % (ref 0–1)
BILIRUB SERPL-MCNC: 0.7 MG/DL (ref 0.2–1)
BILIRUB UR QL STRIP: NEGATIVE
BNP SERPL-MCNC: 92 PG/ML (ref 0–100)
BODY TEMPERATURE: 98 DEGREES FEHRENHEIT
BUN SERPL-MCNC: 21 MG/DL (ref 5–25)
CALCIUM SERPL-MCNC: 8.5 MG/DL (ref 8.4–10.2)
CARDIAC TROPONIN I PNL SERPL HS: 4 NG/L
CARDIAC TROPONIN I PNL SERPL HS: 6 NG/L
CARDIAC TROPONIN I PNL SERPL HS: 6 NG/L
CHLORIDE SERPL-SCNC: 100 MMOL/L (ref 96–108)
CLARITY UR: CLEAR
CO2 SERPL-SCNC: 28 MMOL/L (ref 21–32)
COLOR UR: YELLOW
CREAT SERPL-MCNC: 1.05 MG/DL (ref 0.6–1.3)
EOSINOPHIL # BLD AUTO: 0.23 THOUSAND/ÂΜL (ref 0–0.61)
EOSINOPHIL NFR BLD AUTO: 2 % (ref 0–6)
ERYTHROCYTE [DISTWIDTH] IN BLOOD BY AUTOMATED COUNT: 13.4 % (ref 11.6–15.1)
FLUAV RNA RESP QL NAA+PROBE: NEGATIVE
FLUBV RNA RESP QL NAA+PROBE: NEGATIVE
GFR SERPL CREATININE-BSD FRML MDRD: 65 ML/MIN/1.73SQ M
GLUCOSE SERPL-MCNC: 114 MG/DL (ref 65–140)
GLUCOSE UR STRIP-MCNC: NEGATIVE MG/DL
HCO3 BLDA-SCNC: 25.9 MMOL/L (ref 22–28)
HCT VFR BLD AUTO: 40.7 % (ref 36.5–49.3)
HGB BLD-MCNC: 13.7 G/DL (ref 12–17)
HGB UR QL STRIP.AUTO: NEGATIVE
IMM GRANULOCYTES # BLD AUTO: 0.05 THOUSAND/UL (ref 0–0.2)
IMM GRANULOCYTES NFR BLD AUTO: 1 % (ref 0–2)
KETONES UR STRIP-MCNC: NEGATIVE MG/DL
LACTATE SERPL-SCNC: 0.7 MMOL/L (ref 0.5–2)
LEUKOCYTE ESTERASE UR QL STRIP: NEGATIVE
LYMPHOCYTES # BLD AUTO: 0.8 THOUSANDS/ÂΜL (ref 0.6–4.47)
LYMPHOCYTES NFR BLD AUTO: 8 % (ref 14–44)
MAGNESIUM SERPL-MCNC: 1.8 MG/DL (ref 1.9–2.7)
MCH RBC QN AUTO: 32.5 PG (ref 26.8–34.3)
MCHC RBC AUTO-ENTMCNC: 33.7 G/DL (ref 31.4–37.4)
MCV RBC AUTO: 97 FL (ref 82–98)
MONOCYTES # BLD AUTO: 1.31 THOUSAND/ÂΜL (ref 0.17–1.22)
MONOCYTES NFR BLD AUTO: 13 % (ref 4–12)
NASAL CANNULA: 6
NEUTROPHILS # BLD AUTO: 7.79 THOUSANDS/ÂΜL (ref 1.85–7.62)
NEUTS SEG NFR BLD AUTO: 75 % (ref 43–75)
NITRITE UR QL STRIP: NEGATIVE
NRBC BLD AUTO-RTO: 0 /100 WBCS
O2 CT BLDA-SCNC: 19.8 ML/DL (ref 16–23)
OXYHGB MFR BLDA: 98.7 % (ref 94–97)
PCO2 BLDA: 43.1 MM HG (ref 36–44)
PCO2 TEMP ADJ BLDA: 42.5 MM HG (ref 36–44)
PH BLD: 7.4 [PH] (ref 7.35–7.45)
PH BLDA: 7.4 [PH] (ref 7.35–7.45)
PH UR STRIP.AUTO: 5.5 [PH]
PLATELET # BLD AUTO: 208 THOUSANDS/UL (ref 149–390)
PMV BLD AUTO: 9 FL (ref 8.9–12.7)
PO2 BLD: 187.3 MM HG (ref 75–129)
PO2 BLDA: 188.9 MM HG (ref 75–129)
POTASSIUM SERPL-SCNC: 4.8 MMOL/L (ref 3.5–5.3)
PROCALCITONIN SERPL-MCNC: 0.08 NG/ML
PROT SERPL-MCNC: 6.7 G/DL (ref 6.4–8.4)
PROT UR STRIP-MCNC: NEGATIVE MG/DL
RBC # BLD AUTO: 4.21 MILLION/UL (ref 3.88–5.62)
RSV RNA RESP QL NAA+PROBE: NEGATIVE
SARS-COV-2 RNA RESP QL NAA+PROBE: NEGATIVE
SODIUM SERPL-SCNC: 133 MMOL/L (ref 135–147)
SP GR UR STRIP.AUTO: 1.02 (ref 1–1.03)
SPECIMEN SOURCE: ABNORMAL
UROBILINOGEN UR QL STRIP.AUTO: 0.2 E.U./DL
WBC # BLD AUTO: 10.24 THOUSAND/UL (ref 4.31–10.16)

## 2023-03-19 RX ORDER — LANOLIN ALCOHOL/MO/W.PET/CERES
1 CREAM (GRAM) TOPICAL 2 TIMES DAILY WITH MEALS
Status: DISCONTINUED | OUTPATIENT
Start: 2023-03-19 | End: 2023-03-24 | Stop reason: HOSPADM

## 2023-03-19 RX ORDER — ONDANSETRON 2 MG/ML
4 INJECTION INTRAMUSCULAR; INTRAVENOUS EVERY 6 HOURS PRN
Status: DISCONTINUED | OUTPATIENT
Start: 2023-03-19 | End: 2023-03-24 | Stop reason: HOSPADM

## 2023-03-19 RX ORDER — IPRATROPIUM BROMIDE AND ALBUTEROL SULFATE 2.5; .5 MG/3ML; MG/3ML
3 SOLUTION RESPIRATORY (INHALATION)
Status: DISCONTINUED | OUTPATIENT
Start: 2023-03-20 | End: 2023-03-22

## 2023-03-19 RX ORDER — FUROSEMIDE 10 MG/ML
40 INJECTION INTRAMUSCULAR; INTRAVENOUS ONCE
Status: COMPLETED | OUTPATIENT
Start: 2023-03-19 | End: 2023-03-19

## 2023-03-19 RX ORDER — IPRATROPIUM BROMIDE AND ALBUTEROL SULFATE 2.5; .5 MG/3ML; MG/3ML
3 SOLUTION RESPIRATORY (INHALATION) ONCE
Status: COMPLETED | OUTPATIENT
Start: 2023-03-19 | End: 2023-03-19

## 2023-03-19 RX ORDER — CITALOPRAM 20 MG/1
40 TABLET ORAL DAILY
Status: DISCONTINUED | OUTPATIENT
Start: 2023-03-20 | End: 2023-03-24 | Stop reason: HOSPADM

## 2023-03-19 RX ORDER — AZITHROMYCIN 250 MG/1
500 TABLET, FILM COATED ORAL EVERY 24 HOURS
Status: DISCONTINUED | OUTPATIENT
Start: 2023-03-19 | End: 2023-03-24

## 2023-03-19 RX ORDER — METHYLPREDNISOLONE SODIUM SUCCINATE 125 MG/2ML
125 INJECTION, POWDER, LYOPHILIZED, FOR SOLUTION INTRAMUSCULAR; INTRAVENOUS ONCE
Status: COMPLETED | OUTPATIENT
Start: 2023-03-19 | End: 2023-03-19

## 2023-03-19 RX ORDER — LEVALBUTEROL INHALATION SOLUTION 0.63 MG/3ML
0.63 SOLUTION RESPIRATORY (INHALATION) EVERY 6 HOURS PRN
Status: DISCONTINUED | OUTPATIENT
Start: 2023-03-19 | End: 2023-03-24 | Stop reason: HOSPADM

## 2023-03-19 RX ORDER — LISINOPRIL 5 MG/1
5 TABLET ORAL DAILY
Status: DISCONTINUED | OUTPATIENT
Start: 2023-03-20 | End: 2023-03-24 | Stop reason: HOSPADM

## 2023-03-19 RX ORDER — CEFTRIAXONE 1 G/50ML
1000 INJECTION, SOLUTION INTRAVENOUS EVERY 24 HOURS
Status: DISCONTINUED | OUTPATIENT
Start: 2023-03-19 | End: 2023-03-24 | Stop reason: HOSPADM

## 2023-03-19 RX ORDER — METOPROLOL TARTRATE 50 MG/1
50 TABLET, FILM COATED ORAL EVERY 12 HOURS
Status: DISCONTINUED | OUTPATIENT
Start: 2023-03-19 | End: 2023-03-24 | Stop reason: HOSPADM

## 2023-03-19 RX ORDER — ALENDRONATE SODIUM 70 MG/1
70 TABLET ORAL
Status: DISCONTINUED | OUTPATIENT
Start: 2023-03-20 | End: 2023-03-24 | Stop reason: HOSPADM

## 2023-03-19 RX ORDER — FAMOTIDINE 20 MG/1
10 TABLET, FILM COATED ORAL
Status: DISCONTINUED | OUTPATIENT
Start: 2023-03-19 | End: 2023-03-24 | Stop reason: HOSPADM

## 2023-03-19 RX ORDER — GUAIFENESIN 600 MG/1
600 TABLET, EXTENDED RELEASE ORAL 2 TIMES DAILY
Status: DISCONTINUED | OUTPATIENT
Start: 2023-03-19 | End: 2023-03-24 | Stop reason: HOSPADM

## 2023-03-19 RX ORDER — ENOXAPARIN SODIUM 100 MG/ML
40 INJECTION SUBCUTANEOUS DAILY
Status: DISCONTINUED | OUTPATIENT
Start: 2023-03-19 | End: 2023-03-24 | Stop reason: HOSPADM

## 2023-03-19 RX ORDER — TAMSULOSIN HYDROCHLORIDE 0.4 MG/1
0.4 CAPSULE ORAL
Status: DISCONTINUED | OUTPATIENT
Start: 2023-03-19 | End: 2023-03-24 | Stop reason: HOSPADM

## 2023-03-19 RX ORDER — NIACIN 500 MG/1
500 TABLET, EXTENDED RELEASE ORAL
Status: DISCONTINUED | OUTPATIENT
Start: 2023-03-20 | End: 2023-03-24 | Stop reason: HOSPADM

## 2023-03-19 RX ORDER — ACETAMINOPHEN 325 MG/1
650 TABLET ORAL EVERY 6 HOURS PRN
Status: DISCONTINUED | OUTPATIENT
Start: 2023-03-19 | End: 2023-03-24 | Stop reason: HOSPADM

## 2023-03-19 RX ORDER — ATORVASTATIN CALCIUM 10 MG/1
5 TABLET, FILM COATED ORAL
Status: DISCONTINUED | OUTPATIENT
Start: 2023-03-19 | End: 2023-03-24 | Stop reason: HOSPADM

## 2023-03-19 RX ORDER — ENOXAPARIN SODIUM 100 MG/ML
40 INJECTION SUBCUTANEOUS DAILY
Status: DISCONTINUED | OUTPATIENT
Start: 2023-03-20 | End: 2023-03-19

## 2023-03-19 RX ADMIN — FAMOTIDINE 10 MG: 20 TABLET, FILM COATED ORAL at 21:02

## 2023-03-19 RX ADMIN — FUROSEMIDE 40 MG: 10 INJECTION, SOLUTION INTRAMUSCULAR; INTRAVENOUS at 16:03

## 2023-03-19 RX ADMIN — TAMSULOSIN HYDROCHLORIDE 0.4 MG: 0.4 CAPSULE ORAL at 21:03

## 2023-03-19 RX ADMIN — SODIUM CHLORIDE 3 G: 9 INJECTION, SOLUTION INTRAVENOUS at 19:27

## 2023-03-19 RX ADMIN — IOHEXOL 85 ML: 350 INJECTION, SOLUTION INTRAVENOUS at 18:15

## 2023-03-19 RX ADMIN — METHYLPREDNISOLONE SODIUM SUCCINATE 125 MG: 125 INJECTION, POWDER, FOR SOLUTION INTRAMUSCULAR; INTRAVENOUS at 19:25

## 2023-03-19 RX ADMIN — IPRATROPIUM BROMIDE AND ALBUTEROL SULFATE 3 ML: .5; 3 SOLUTION RESPIRATORY (INHALATION) at 15:59

## 2023-03-19 RX ADMIN — AZITHROMYCIN MONOHYDRATE 500 MG: 250 TABLET ORAL at 21:03

## 2023-03-19 RX ADMIN — ATORVASTATIN CALCIUM 5 MG: 10 TABLET, FILM COATED ORAL at 21:03

## 2023-03-19 RX ADMIN — CEFTRIAXONE 1000 MG: 1 INJECTION, SOLUTION INTRAVENOUS at 21:03

## 2023-03-19 RX ADMIN — METOPROLOL TARTRATE 50 MG: 50 TABLET, FILM COATED ORAL at 21:03

## 2023-03-19 RX ADMIN — Medication 1 TABLET: at 21:03

## 2023-03-19 RX ADMIN — ACETAMINOPHEN 650 MG: 325 TABLET, FILM COATED ORAL at 21:02

## 2023-03-19 RX ADMIN — ENOXAPARIN SODIUM 40 MG: 40 INJECTION SUBCUTANEOUS at 21:46

## 2023-03-19 RX ADMIN — GUAIFENESIN 600 MG: 600 TABLET, EXTENDED RELEASE ORAL at 21:02

## 2023-03-19 NOTE — ED PROVIDER NOTES
History  Chief Complaint   Patient presents with   • Cough     Started with cough two days ago  Having some periods of confusion, headache and low grade temp at home  Weakness, audible rhonchi in triage, hx of pneumonia in June 22     Patient is an 55-year-old male that presents emergency department with his family with complaint of 2 days of progressive cough and decreased urine output  Patient's daughter stated she was febrile to 100 2 prior to coming to the ED, however no antipyretics were administered  Patient denies chest pain  Dyspnea noted at the time of my initial evaluation  Patient hypoxic to 91% on room air in triage  History provided by:  Patient, spouse and relative   used: No    Cough  Cough characteristics:  Productive  Sputum characteristics:  Unable to specify  Severity:  Moderate  Onset quality:  Gradual  Timing:  Constant  Progression:  Worsening  Chronicity:  New  Smoker: no    Relieved by:  Nothing  Worsened by:  Nothing  Ineffective treatments:  None tried  Associated symptoms: fever and shortness of breath    Associated symptoms: no chest pain, no chills, no rash and no wheezing        Prior to Admission Medications   Prescriptions Last Dose Informant Patient Reported? Taking?    Calcium Carb-Cholecalciferol (CALTRATE 600+D3 PO) 3/19/2023  Yes Yes   Sig: Take 600 mg by mouth 2 (two) times a day   Multiple Vitamin (MULTIVITAMIN) tablet 3/19/2023  Yes Yes   Sig: Take 1 tablet by mouth daily   alendronate (Fosamax) 70 mg tablet   No No   Sig: Take 1 tablet (70 mg total) by mouth every 7 days   Patient taking differently: Take 70 mg by mouth every 7 days On Monday   atorvastatin (LIPITOR) 10 mg tablet 3/18/2023  No Yes   Sig: Half tab daily   Patient taking differently: Take 5 mg by mouth daily with dinner Half tab daily   citalopram (CeleXA) 40 mg tablet 3/19/2023  No Yes   Sig: Take 1 tablet (40 mg total) by mouth daily   fosinopril (MONOPRIL) 10 mg tablet 3/19/2023 No Yes   Sig: TAKE 1/2 TABLET BY MOUTH DAILY   metoprolol tartrate (LOPRESSOR) 50 mg tablet 3/19/2023  No Yes   Sig: Take 1 tablet (50 mg total) by mouth every 12 (twelve) hours   niacin (NIASPAN) 500 mg CR tablet 3/19/2023  No Yes   Sig: TAKE ONE TABLET DAILY AT BEDTIME   Patient taking differently: 500 mg daily with dinner   tamsulosin (FLOMAX) 0 4 mg 3/18/2023  Yes Yes   Sig: Take 0 4 mg by mouth daily with dinner      Facility-Administered Medications: None       Past Medical History:   Diagnosis Date   • BPH (benign prostatic hyperplasia)    • Depression    • HTN (hypertension)    • Hyperlipidemia    • OCD (obsessive compulsive disorder)    • Pulmonary embolism ([facility] [address] ) 2019       Past Surgical History:   Procedure Laterality Date   • IR IVC FILTER PLACEMENT OPTIONAL/TEMPORARY  12/7/2019   • IR IVC FILTER REMOVAL  8/21/2020   • IN OPTX FEM SHFT FX W/INSJ IMED IMPLT W/WO SCREW Right 12/4/2019    Procedure: INSERTION NAIL IM FEMUR ANTEGRADE (TROCHANTERIC); Surgeon: Prabhu Hdz DO;  Location: [facility];  Service: Orthopedics   • IN OPTX FEM SHFT FX W/INSJ IMED IMPLT W/WO SCREW Left 6/17/2022    Procedure: INSERTION NAIL IM FEMUR ANTEGRADE (TROCHANTERIC) - long nail;  Surgeon: Eugenia Rolon DO;  Location: [facility];  Service: Orthopedics       Family History   Problem Relation Age of Onset   • Cancer Mother      I have reviewed and agree with the history as documented  E-Cigarette/Vaping   • E-Cigarette Use Never User      E-Cigarette/Vaping Substances     Social History     Tobacco Use   • Smoking status: Never   • Smokeless tobacco: Never   Vaping Use   • Vaping Use: Never used   Substance Use Topics   • Alcohol use: Never   • Drug use: Never       Review of Systems   Constitutional: Positive for fever  Negative for chills  Respiratory: Positive for cough and shortness of breath  Negative for wheezing  Cardiovascular: Negative for chest pain and palpitations     Gastrointestinal: Negative for abdominal pain, constipation, diarrhea, nausea and vomiting  Genitourinary: Negative for dysuria, flank pain, hematuria and urgency  Musculoskeletal: Negative for back pain  Skin: Negative for color change and rash  Psychiatric/Behavioral: Negative for decreased concentration  The patient is nervous/anxious  All other systems reviewed and are negative  Physical Exam  Physical Exam  Vitals and nursing note reviewed  Constitutional:       Appearance: Normal appearance  He is well-developed  HENT:      Head: Normocephalic and atraumatic  Eyes:      Extraocular Movements: Extraocular movements intact  Pupils: Pupils are equal, round, and reactive to light  Cardiovascular:      Rate and Rhythm: Normal rate and regular rhythm  Heart sounds: Normal heart sounds  Pulmonary:      Effort: Respiratory distress present  Breath sounds: Wheezing and rhonchi present  Chest:      Chest wall: No tenderness  Abdominal:      General: Bowel sounds are normal  There is no distension  Palpations: Abdomen is soft  There is no mass  Tenderness: There is no abdominal tenderness  There is no guarding or rebound  Musculoskeletal:      Cervical back: Normal range of motion and neck supple  Right lower leg: Edema present  Left lower leg: Edema present  Skin:     General: Skin is warm and dry  Capillary Refill: Capillary refill takes less than 2 seconds  Neurological:      Mental Status: He is alert and oriented to person, place, and time  Psychiatric:         Behavior: Behavior normal          Thought Content:  Thought content normal          Judgment: Judgment normal          Vital Signs  ED Triage Vitals   Temperature Pulse Respirations Blood Pressure SpO2   03/19/23 1451 03/19/23 1451 03/19/23 1451 03/19/23 1451 03/19/23 1451   99 °F (37 2 °C) 71 (!) 24 155/65 91 %      Temp Source Heart Rate Source Patient Position - Orthostatic VS BP Location FiO2 (%)   03/19/23 0650 359 65 13 03/19/23 1451 03/19/23 1451 03/19/23 1451 --   Oral Monitor Sitting Left arm       Pain Score       03/19/23 1750       No Pain           Vitals:    03/19/23 1651 03/19/23 1750 03/19/23 2031 03/19/23 2326   BP: 170/73 135/63 (!) 176/73 117/58   Pulse: 87 79 90 66   Patient Position - Orthostatic VS:             Visual Acuity      ED Medications  Medications   alendronate (FOSAMAX) tablet 70 mg (has no administration in time range)   atorvastatin (LIPITOR) tablet 5 mg (5 mg Oral Given 3/19/23 2103)   calcium carbonate-vitamin D 500 mg-5 mcg tablet 1 tablet (1 tablet Oral Given 3/19/23 2103)   citalopram (CeleXA) tablet 40 mg (has no administration in time range)   lisinopril (ZESTRIL) tablet 5 mg (has no administration in time range)   metoprolol tartrate (LOPRESSOR) tablet 50 mg (50 mg Oral Given 3/19/23 2103)   multivitamin stress formula tablet 1 tablet (has no administration in time range)   niacin (NIASPAN) CR tablet 500 mg (has no administration in time range)   tamsulosin (FLOMAX) capsule 0 4 mg (0 4 mg Oral Given 3/19/23 2103)   guaiFENesin (MUCINEX) 12 hr tablet 600 mg (600 mg Oral Given 3/19/23 2102)   cefTRIAXone (ROCEPHIN) IVPB (premix in dextrose) 1,000 mg 50 mL (1,000 mg Intravenous New Bag 3/19/23 2103)   ipratropium-albuterol (DUO-NEB) 0 5-2 5 mg/3 mL inhalation solution 3 mL (has no administration in time range)   levalbuterol (XOPENEX) inhalation solution 0 63 mg (has no administration in time range)   famotidine (PEPCID) tablet 10 mg (10 mg Oral Given 3/19/23 2102)   acetaminophen (TYLENOL) tablet 650 mg (650 mg Oral Given 3/19/23 2102)   ondansetron (ZOFRAN) injection 4 mg (has no administration in time range)   azithromycin (ZITHROMAX) tablet 500 mg (500 mg Oral Given 3/19/23 2103)   enoxaparin (LOVENOX) subcutaneous injection 40 mg (40 mg Subcutaneous Given 3/19/23 2146)   ipratropium-albuterol (DUO-NEB) 0 5-2 5 mg/3 mL inhalation solution 3 mL (3 mL Nebulization Given 3/19/23 5802) furosemide (LASIX) injection 40 mg (40 mg Intravenous Given 3/19/23 1603)   iohexol (OMNIPAQUE) 350 MG/ML injection (SINGLE-DOSE) 85 mL (85 mL Intravenous Given 3/19/23 1815)   ampicillin-sulbactam (UNASYN) 3 g in sodium chloride 0 9 % 100 mL IVPB (3 g Intravenous New Bag 3/19/23 1927)   methylPREDNISolone sodium succinate (Solu-MEDROL) injection 125 mg (125 mg Intravenous Given 3/19/23 1925)       Diagnostic Studies  Results Reviewed     Procedure Component Value Units Date/Time    Procalcitonin [[id number]]  (Normal) Collected: 03/19/23 1557    Lab Status: Final result Specimen: Blood from Line, Venous Updated: 03/19/23 2218     Procalcitonin 0 08 ng/ml     Magnesium [[id number]]  (Abnormal) Collected: 03/19/23 1557    Lab Status: Final result Specimen: Blood from Line, Venous Updated: 03/19/23 2155     Magnesium 1 8 mg/dL     HS Troponin I 4hr [[id number]]  (Normal) Collected: 03/19/23 2056    Lab Status: Final result Specimen: Blood from Arm, Right Updated: 03/19/23 2132     hs TnI 4hr 6 ng/L      Delta 4hr hsTnI 0 ng/L     Legionella antigen, Urine [[id number]] Collected: 03/19/23 1351    Lab Status: In process Specimen: Urine, Other Updated: 03/19/23 2118    Strep Pneumoniae, Urine [[id number]] Collected: 03/19/23 1651    Lab Status:  In process Specimen: Urine, Other Updated: 03/19/23 2117    Sputum culture and Gram stain [[id number]]     Lab Status: No result Specimen: Expectorated Sputum     HS Troponin I 2hr [[id number]]  (Normal) Collected: 03/19/23 1750    Lab Status: Final result Specimen: Blood from Line, Venous Updated: 03/19/23 1824     hs TnI 2hr 4 ng/L      Delta 2hr hsTnI -2 ng/L     UA w Reflex to Microscopic w Reflex to Culture [[id number]] Collected: 03/19/23 1651    Lab Status: Final result Specimen: Urine, Other Updated: 03/19/23 1712     Color, UA Yellow     Clarity, UA Clear     Specific [place], UA 1 020     pH, UA 5 5     Leukocytes, UA Negative     Nitrite, UA Negative     Protein, UA Negative mg/dl      Glucose, UA Negative mg/dl      Ketones, UA Negative mg/dl      Urobilinogen, UA 0 2 E U /dl      Bilirubin, UA Negative     Occult Blood, UA Negative    FLU/RSV/COVID - if FLU/RSV clinically relevant [[id number]]  (Normal) Collected: 03/19/23 1557    Lab Status: Final result Specimen: Nares from Nose Updated: 03/19/23 1657     SARS-CoV-2 Negative     INFLUENZA A PCR Negative     INFLUENZA B PCR Negative     RSV PCR Negative    Narrative:      FOR PEDIATRIC PATIENTS - copy/paste COVID Guidelines URL to browser: [email or web address]  [employer]    SARS-CoV-2 assay is a Nucleic Acid Amplification assay intended for the  qualitative detection of nucleic acid from SARS-CoV-2 in nasopharyngeal  swabs  Results are for the presumptive identification of SARS-CoV-2 RNA  Positive results are indicative of infection with SARS-CoV-2, the virus  causing COVID-19, but do not rule out bacterial infection or co-infection  with other viruses  Laboratories within the [place] and its  territories are required to report all positive results to the appropriate  public health authorities  Negative results do not preclude SARS-CoV-2  infection and should not be used as the sole basis for treatment or other  patient management decisions  Negative results must be combined with  clinical observations, patient history, and epidemiological information  This test has not been FDA cleared or approved  This test has been authorized by FDA under an Emergency Use Authorization  (EUA)  This test is only authorized for the duration of time the  declaration that circumstances exist justifying the authorization of the  emergency use of an in vitro diagnostic tests for detection of SARS-CoV-2  virus and/or diagnosis of COVID-19 infection under section 564(b)(1) of  the Act, 21 U  S C  302ZEI-0(V)(9), unless the authorization is terminated  or revoked sooner   The test has been validated but independent review by FDA  and CLIA is pending  Test performed using [employer] GeneXpert: This RT-PCR assay targets N2,  a region unique to SARS-CoV-2  A conserved region in the E-gene was chosen  for pan-Sarbecovirus detection which includes SARS-CoV-2  According to CMS-2020-01-R, this platform meets the definition of high-throughput technology  HS Troponin 0hr (reflex protocol) [[id number]]  (Normal) Collected: 03/19/23 1557    Lab Status: Final result Specimen: Blood from Line, Venous Updated: 03/19/23 1637     hs TnI 0hr 6 ng/L     B-Type Natriuretic Peptide(BNP) [[id number]]  (Normal) Collected: 03/19/23 1557    Lab Status: Final result Specimen: Blood from Line, Venous Updated: 03/19/23 1636     BNP 92 pg/mL     Lactic acid [[id number]]  (Normal) Collected: 03/19/23 1557    Lab Status: Final result Specimen: Blood from Line, Venous Updated: 03/19/23 1635     LACTIC ACID 0 7 mmol/L     Narrative:      Result may be elevated if tourniquet was used during collection      Comprehensive metabolic panel [[id number]]  (Abnormal) Collected: 03/19/23 1557    Lab Status: Final result Specimen: Blood from Line, Venous Updated: 03/19/23 1629     Sodium 133 mmol/L      Potassium 4 8 mmol/L      Chloride 100 mmol/L      CO2 28 mmol/L      ANION GAP 5 mmol/L      BUN 21 mg/dL      Creatinine 1 05 mg/dL      Glucose 114 mg/dL      Calcium 8 5 mg/dL      AST 15 U/L      ALT 8 U/L      Alkaline Phosphatase 70 U/L      Total Protein 6 7 g/dL      Albumin 3 8 g/dL      Total Bilirubin 0 70 mg/dL      eGFR 65 ml/min/1 73sq m     Narrative:      [place] guidelines for Chronic Kidney Disease (CKD):   •  Stage 1 with normal or high GFR (GFR > 90 mL/min/1 73 square meters)  •  Stage 2 Mild CKD (GFR = 60-89 mL/min/1 73 square meters)  •  Stage 3A Moderate CKD (GFR = 45-59 mL/min/1 73 square meters)  •  Stage 3B Moderate CKD (GFR = 30-44 mL/min/1 73 square meters)  •  Stage 4 Severe CKD (GFR = 15-29 mL/min/1 73 square meters)  •  Stage 5 End Stage CKD (GFR <15 mL/min/1 73 square meters)  Note: GFR calculation is accurate only with a steady state creatinine    Blood gas, arterial [[id number]]  (Abnormal) Collected: 03/19/23 1614    Lab Status: Final result Specimen: Blood, Arterial from Radial, Right Updated: 03/19/23 1625     pH, Arterial 7 397     PH ART TC 7 401     pCO2, Arterial 43 1 mm Hg      PCO2 (TC) Arterial 42 5 mm Hg      pO2, Arterial 188 9 mm Hg      PO2 (TC) Arterial 187 3 mm Hg      HCO3, Arterial 25 9 mmol/L      Base Excess, Arterial 0 8 mmol/L      O2 Content, Arterial 19 8 mL/dL      O2 HGB,Arterial  98 7 %      SOURCE Radial, Right     JOSE TEST Yes     Temperature 98 0 Degrees Fehrenheit      Nasal Cannula 6    Blood culture #1 [[id number]] Collected: 03/19/23 1603    Lab Status: In process Specimen: Blood from Arm, Right Updated: 03/19/23 1619    Blood culture #2 [[id number]] Collected: 03/19/23 1558    Lab Status:  In process Specimen: Blood from Line, Venous Updated: 03/19/23 1619    CBC and differential [[id number]]  (Abnormal) Collected: 03/19/23 1557    Lab Status: Final result Specimen: Blood from Line, Venous Updated: 03/19/23 1613     WBC 10 24 Thousand/uL      RBC 4 21 Million/uL      Hemoglobin 13 7 g/dL      Hematocrit 40 7 %      MCV 97 fL      MCH 32 5 pg      MCHC 33 7 g/dL      RDW 13 4 %      MPV 9 0 fL      Platelets 607 Thousands/uL      nRBC 0 /100 WBCs      Neutrophils Relative 75 %      Immat GRANS % 1 %      Lymphocytes Relative 8 %      Monocytes Relative 13 %      Eosinophils Relative 2 %      Basophils Relative 1 %      Neutrophils Absolute 7 79 Thousands/µL      Immature Grans Absolute 0 05 Thousand/uL      Lymphocytes Absolute 0 80 Thousands/µL      Monocytes Absolute 1 31 Thousand/µL      Eosinophils Absolute 0 23 Thousand/µL      Basophils Absolute 0 06 Thousands/µL                  CTA ED chest PE study   Final Result by Marah Harrison MD (03/19 1854) No pulmonary embolus  Diffuse bronchial wall thickening, greatest in the lower lobes, with subtle bilateral lower lobe tree-in-bud nodularity compatible with bronchitis/bronchiolitis  However, the large hiatal hernia and lower lobe distribution raises the possibility of    aspiration  The study was marked in [facility] for immediate notification                    Workstation performed: [id number]         XR chest 1 view portable   ED Interpretation by Gagan Justice DO (03/19 1627)   Unchanged from prev      VAS lower limb venous duplex study, complete bilateral    (Results Pending)              Procedures  ECG 12 Lead Documentation Only    Date/Time: 3/19/2023 3:35 PM  Performed by: Gagan Justice DO  Authorized by: Gagan Justice DO     Indications / Diagnosis:  Sob  ECG reviewed by me, the ED Provider: yes    Patient location:  ED  Previous ECG:     Previous ECG:  Compared to current    Similarity:  No change    Comparison to cardiac monitor: Yes    Interpretation:     Interpretation: normal    Rate:     ECG rate:  69bpm    ECG rate assessment: normal    Rhythm:     Rhythm: sinus rhythm    Ectopy:     Ectopy: none    QRS:     QRS axis:  Normal  Conduction:     Conduction: normal    ST segments:     ST segments:  Normal  T waves:     T waves: normal      CriticalCare Time    Date/Time: 3/19/2023 6:30 PM  Performed by: Gagan Justice DO  Authorized by: Gagan Justice DO     Critical care provider statement:     Critical care time (minutes):  35    Critical care time was exclusive of:  Separately billable procedures and treating other patients and teaching time    Critical care was necessary to treat or prevent imminent or life-threatening deterioration of the following conditions:  Respiratory failure    Critical care was time spent personally by me on the following activities:  Blood draw for specimens, obtaining history from patient or surrogate, development of treatment plan with patient or surrogate, discussions with primary provider, evaluation of patient's response to treatment, examination of patient, interpretation of cardiac output measurements, ordering and performing treatments and interventions, ordering and review of laboratory studies, ordering and review of radiographic studies, re-evaluation of patient's condition and review of old charts    I assumed direction of critical care for this patient from another provider in my specialty: no               ED Course                                   Sam' Criteria for PE    Flowsheet Row Most Recent Value   Wells' Criteria for PE    Clinical signs and symptoms of DVT 3 Filed at: 03/19/2023 1709   PE is primary diagnosis or equally likely 3 Filed at: 03/19/2023 1709   HR >100 0 Filed at: 03/19/2023 1709   Immobilization at least 3 days or Surgery in the previous 4 weeks 0 Filed at: 03/19/2023 1709   Previous, objectively diagnosed PE or DVT 0 Filed at: 03/19/2023 1709   Hemoptysis 0 Filed at: 03/19/2023 1709   Malignancy with treatment within 6 months or palliative 0 Filed at: 03/19/2023 [address] Criteria Total 6 Filed at: 03/19/2023 1709                MDM    Disposition  Final diagnoses:   Bronchitis   Aspiration pneumonia ([facility] [address] )   Hypoxia     Time reflects when diagnosis was documented in both MDM as applicable and the Disposition within this note     Time User Action Codes Description Comment    3/19/2023  7:02 PM Sherren Corrigan O Add [J40] Bronchitis     3/19/2023  7:02 PM Janneth Harry Add [J69 0] Aspiration pneumonia ([facility] [address] )     3/19/2023  7:02 PM Sherren Corrigan Add [R09 02] Hypoxia     3/19/2023  8:12 PM Garo Julian Add [K44 9] Hiatal hernia       ED Disposition     ED Disposition   Admit    Condition   Stable    Date/Time   Sun Mar 19, 2023  7:02 PM    Comment   Case was discussed with Nisha TORREZ and the patient's admission status was agreed to be Admission Status: observation status to the service of Dr Fercho Prado   Follow-up Information    None         Current Discharge Medication List      CONTINUE these medications which have NOT CHANGED    Details   atorvastatin (LIPITOR) 10 mg tablet Half tab daily  Qty: 45 tablet, Refills: 1    Associated Diagnoses: Hypercholesteremia      Calcium Carb-Cholecalciferol (CALTRATE 600+D3 PO) Take 600 mg by mouth 2 (two) times a day      citalopram (CeleXA) 40 mg tablet Take 1 tablet (40 mg total) by mouth daily  Qty: 90 tablet, Refills: 1    Associated Diagnoses: Essential hypertension; Recurrent major depressive disorder, in full remission (HCC)      fosinopril (MONOPRIL) 10 mg tablet TAKE 1/2 TABLET BY MOUTH DAILY  Qty: 45 tablet, Refills: 1    Associated Diagnoses: Essential hypertension      metoprolol tartrate (LOPRESSOR) 50 mg tablet Take 1 tablet (50 mg total) by mouth every 12 (twelve) hours  Qty: 180 tablet, Refills: 1    Associated Diagnoses: Paroxysmal atrial flutter (HCC)      Multiple Vitamin (MULTIVITAMIN) tablet Take 1 tablet by mouth daily      niacin (NIASPAN) 500 mg CR tablet TAKE ONE TABLET DAILY AT BEDTIME  Qty: 90 tablet, Refills: 1    Associated Diagnoses: Hyperlipidemia, unspecified hyperlipidemia type      tamsulosin (FLOMAX) 0 4 mg Take 0 4 mg by mouth daily with dinner      alendronate (Fosamax) 70 mg tablet Take 1 tablet (70 mg total) by mouth every 7 days  Qty: 12 tablet, Refills: 1    Associated Diagnoses: Age related osteoporosis, unspecified pathological fracture presence             No discharge procedures on file      PDMP Review     None          ED Provider  Electronically Signed by           Diego Barlow DO  03/20/23 7653

## 2023-03-20 ENCOUNTER — APPOINTMENT (OUTPATIENT)
Dept: RADIOLOGY | Facility: HOSPITAL | Age: 83
End: 2023-03-20

## 2023-03-20 LAB
ANION GAP SERPL CALCULATED.3IONS-SCNC: 6 MMOL/L (ref 4–13)
ATRIAL RATE: 69 BPM
BUN SERPL-MCNC: 21 MG/DL (ref 5–25)
CALCIUM SERPL-MCNC: 8.3 MG/DL (ref 8.4–10.2)
CHLORIDE SERPL-SCNC: 101 MMOL/L (ref 96–108)
CO2 SERPL-SCNC: 28 MMOL/L (ref 21–32)
CREAT SERPL-MCNC: 1 MG/DL (ref 0.6–1.3)
GFR SERPL CREATININE-BSD FRML MDRD: 69 ML/MIN/1.73SQ M
GLUCOSE SERPL-MCNC: 162 MG/DL (ref 65–140)
L PNEUMO1 AG UR QL IA.RAPID: NEGATIVE
MAGNESIUM SERPL-MCNC: 1.7 MG/DL (ref 1.9–2.7)
P AXIS: 30 DEGREES
POTASSIUM SERPL-SCNC: 4.4 MMOL/L (ref 3.5–5.3)
PR INTERVAL: 154 MS
PROCALCITONIN SERPL-MCNC: 0.09 NG/ML
QRS AXIS: 39 DEGREES
QRSD INTERVAL: 86 MS
QT INTERVAL: 388 MS
QTC INTERVAL: 415 MS
S PNEUM AG UR QL: NEGATIVE
SODIUM SERPL-SCNC: 135 MMOL/L (ref 135–147)
T WAVE AXIS: 53 DEGREES
VENTRICULAR RATE: 69 BPM

## 2023-03-20 RX ORDER — BENZONATATE 100 MG/1
100 CAPSULE ORAL 3 TIMES DAILY PRN
Status: DISCONTINUED | OUTPATIENT
Start: 2023-03-20 | End: 2023-03-24 | Stop reason: HOSPADM

## 2023-03-20 RX ORDER — LORATADINE 10 MG/1
10 TABLET ORAL DAILY
Status: DISCONTINUED | OUTPATIENT
Start: 2023-03-21 | End: 2023-03-24 | Stop reason: HOSPADM

## 2023-03-20 RX ORDER — LANOLIN ALCOHOL/MO/W.PET/CERES
400 CREAM (GRAM) TOPICAL 2 TIMES DAILY
Status: DISCONTINUED | OUTPATIENT
Start: 2023-03-20 | End: 2023-03-24 | Stop reason: HOSPADM

## 2023-03-20 RX ORDER — FLUTICASONE PROPIONATE 50 MCG
1 SPRAY, SUSPENSION (ML) NASAL DAILY
Status: DISCONTINUED | OUTPATIENT
Start: 2023-03-21 | End: 2023-03-24 | Stop reason: HOSPADM

## 2023-03-20 RX ADMIN — IPRATROPIUM BROMIDE AND ALBUTEROL SULFATE 3 ML: 2.5; .5 SOLUTION RESPIRATORY (INHALATION) at 13:16

## 2023-03-20 RX ADMIN — Medication 1 TABLET: at 15:41

## 2023-03-20 RX ADMIN — METOPROLOL TARTRATE 50 MG: 50 TABLET, FILM COATED ORAL at 08:56

## 2023-03-20 RX ADMIN — AZITHROMYCIN MONOHYDRATE 500 MG: 250 TABLET ORAL at 19:34

## 2023-03-20 RX ADMIN — IPRATROPIUM BROMIDE AND ALBUTEROL SULFATE 3 ML: 2.5; .5 SOLUTION RESPIRATORY (INHALATION) at 07:21

## 2023-03-20 RX ADMIN — LISINOPRIL 5 MG: 5 TABLET ORAL at 08:56

## 2023-03-20 RX ADMIN — METOPROLOL TARTRATE 50 MG: 50 TABLET, FILM COATED ORAL at 19:34

## 2023-03-20 RX ADMIN — MAGNESIUM OXIDE TAB 400 MG (241.3 MG ELEMENTAL MG) 400 MG: 400 (241.3 MG) TAB at 18:31

## 2023-03-20 RX ADMIN — IPRATROPIUM BROMIDE AND ALBUTEROL SULFATE 3 ML: 2.5; .5 SOLUTION RESPIRATORY (INHALATION) at 20:11

## 2023-03-20 RX ADMIN — GUAIFENESIN 600 MG: 600 TABLET, EXTENDED RELEASE ORAL at 08:56

## 2023-03-20 RX ADMIN — CEFTRIAXONE 1000 MG: 1 INJECTION, SOLUTION INTRAVENOUS at 19:34

## 2023-03-20 RX ADMIN — B-COMPLEX W/ C & FOLIC ACID TAB 1 TABLET: TAB at 08:56

## 2023-03-20 RX ADMIN — NIACIN 500 MG: 500 TABLET, EXTENDED RELEASE ORAL at 15:42

## 2023-03-20 RX ADMIN — FAMOTIDINE 10 MG: 20 TABLET, FILM COATED ORAL at 20:13

## 2023-03-20 RX ADMIN — ENOXAPARIN SODIUM 40 MG: 40 INJECTION SUBCUTANEOUS at 20:13

## 2023-03-20 RX ADMIN — GUAIFENESIN 600 MG: 600 TABLET, EXTENDED RELEASE ORAL at 17:10

## 2023-03-20 RX ADMIN — Medication 1 TABLET: at 08:55

## 2023-03-20 RX ADMIN — ALENDRONATE SODIUM 70 MG: 70 TABLET ORAL at 06:04

## 2023-03-20 RX ADMIN — TAMSULOSIN HYDROCHLORIDE 0.4 MG: 0.4 CAPSULE ORAL at 15:41

## 2023-03-20 RX ADMIN — ATORVASTATIN CALCIUM 5 MG: 10 TABLET, FILM COATED ORAL at 15:41

## 2023-03-20 RX ADMIN — CITALOPRAM HYDROBROMIDE 40 MG: 20 TABLET ORAL at 08:57

## 2023-03-20 NOTE — CONSULTS
Consultation -landon Dejesus [facility]   Rajwinder Selina 80 y o  male MRN: [id number]    Unit/Bed#: [address] [place] [address] Encounter: [id number]      Physician Requesting Consult: Dr Fabienne Irizarry    Reason for Consult / Principal Problem: hiatal hernia     HPI: This is a 80 y o  male with PMH of hypertension, hyperlipidemia, depression, OCD, BPH, leg edema, paroxysmal atrial flutter, PE who presents with productive cough since Tuesday or Wednesday last week, fever 100 2 at home  Patient reports white phlegm  CT showed no pulmonary embolusiffuse bronchial wall thickening, greatest in the lower lobes, with subtle bilateral lower lobe tree-in-bud nodularity compatible with bronchitis/bronchiolitis  However, the large hiatal hernia and lower lobe distribution raises the possibility of   Aspiration  Speech consulted as well  Speech evaluated patient and felt that oropharyngeal swallow within functional limits  Patient otherwise states that he has no difficulty swallowing never has had an EGD  Patient's wife states that she does not see him coughing with eating and he denies any reflux symptoms    He denies any abdominal pain        Allergies: No Known Allergies    Medications:  Current Facility-Administered Medications:   •  acetaminophen (TYLENOL) tablet 650 mg, 650 mg, Oral, Q6H PRN, BERNADETTE Marie, 650 mg at 03/19/23 2102  •  alendronate (FOSAMAX) tablet 70 mg, 70 mg, Oral, Q7 Days, Yovany Lafleur, CRNP, 70 mg at 03/20/23 0604  •  atorvastatin (LIPITOR) tablet 5 mg, 5 mg, Oral, Daily With Dinner, Yovany Lafleur CRNP, 5 mg at 03/19/23 2103  •  azithromycin (ZITHROMAX) tablet 500 mg, 500 mg, Oral, Q24H, Yovany Lafleur CRNP, 500 mg at 03/19/23 2103  •  calcium carbonate-vitamin D 500 mg-5 mcg tablet 1 tablet, 1 tablet, Oral, BID With Meals, BERNADETTE Marie, 1 tablet at 03/20/23 0855  •  cefTRIAXone (ROCEPHIN) IVPB (premix in dextrose) 1,000 mg 50 mL, 1,000 mg, Intravenous, Q24H, BERNADETTE Marie, Last Rate: 100 mL/hr at 03/19/23 2103, 1,000 mg at 03/19/23 2103  •  citalopram (CeleXA) tablet 40 mg, 40 mg, Oral, Daily, BERNADETTE Marie, 40 mg at 03/20/23 9647  •  enoxaparin (LOVENOX) subcutaneous injection 40 mg, 40 mg, Subcutaneous, Daily, BERNADETTE Marie, 40 mg at 03/19/23 2146  •  famotidine (PEPCID) tablet 10 mg, 10 mg, Oral, HS, BERNADETTE Marie, 10 mg at 03/19/23 2102  •  guaiFENesin (MUCINEX) 12 hr tablet 600 mg, 600 mg, Oral, BID, BERNADETTE Marie, 600 mg at 03/20/23 6480  •  ipratropium-albuterol (DUO-NEB) 0 5-2 5 mg/3 mL inhalation solution 3 mL, 3 mL, Nebulization, TID, BERNADETTE Marie, 3 mL at 03/20/23 8014  •  levalbuterol (XOPENEX) inhalation solution 0 63 mg, 0 63 mg, Nebulization, Q6H PRN, BERNADETTE Marie  •  lisinopril (ZESTRIL) tablet 5 mg, 5 mg, Oral, Daily, BERNADETTE Marie, 5 mg at 03/20/23 9724  •  metoprolol tartrate (LOPRESSOR) tablet 50 mg, 50 mg, Oral, Q12H, BERNADETTE Marie, 50 mg at 03/20/23 2029  •  multivitamin stress formula tablet 1 tablet, 1 tablet, Oral, Daily, BERNADETTE Marie, 1 tablet at 03/20/23 8130  •  niacin (NIASPAN) CR tablet 500 mg, 500 mg, Oral, Daily With Dinner, BERNADETTE Marie  •  ondansetron (ZOFRAN) injection 4 mg, 4 mg, Intravenous, Q6H PRN, BERNADETTE Marie  •  tamsulosin (FLOMAX) capsule 0 4 mg, 0 4 mg, Oral, Daily With Dinner, BERNADETTE Marie, 0 4 mg at 03/19/23 2103    Past Medical history:  Past Medical History:   Diagnosis Date   • BPH (benign prostatic hyperplasia)    • Depression    • HTN (hypertension)    • Hyperlipidemia    • OCD (obsessive compulsive disorder)    • Pulmonary embolism ([facility], [place] [address] ) 2019       Past Surgical History:   Past Surgical History:   Procedure Laterality Date   • IR IVC FILTER PLACEMENT OPTIONAL/TEMPORARY  12/7/2019   • IR IVC FILTER REMOVAL  8/21/2020   • DC OPTX FEM SHFT FX W/INSJ IMED IMPLT W/WO SCREW Right 12/4/2019    Procedure: INSERTION NAIL IM FEMUR ANTEGRADE (TROCHANTERIC);   Surgeon: Prabhu Hdz DO; Location: [facility];  Service: Orthopedics   • [place] OPTX FEM SHFT FX W/INSJ IMED IMPLT W/WO SCREW Left 6/17/2022    Procedure: INSERTION NAIL IM FEMUR ANTEGRADE (TROCHANTERIC) - long nail;  Surgeon: Nadine Laura DO;  Location: [facility];  Service: Orthopedics       Family History:   Family History   Problem Relation Age of Onset   • Cancer Mother        Social history:   Social History     Socioeconomic History   • Marital status: /Civil Union     Spouse name: Not on file   • Number of children: Not on file   • Years of education: Not on file   • Highest education level: Not on file   Occupational History   • Not on file   Tobacco Use   • Smoking status: Never   • Smokeless tobacco: Never   Vaping Use   • Vaping Use: Never used   Substance and Sexual Activity   • Alcohol use: Never   • Drug use: Never   • Sexual activity: Not on file   Other Topics Concern   • Not on file   Social History Narrative   • Not on file     Social Determinants of Health     Financial Resource Strain: Low Risk    • Difficulty of Paying Living Expenses: Not hard at all   Food Insecurity: No Food Insecurity   • Worried About Running Out of Food in the Last Year: Never true   • Ran Out of Food in the Last Year: Never true   Transportation Needs: No Transportation Needs   • Lack of Transportation (Medical): No   • Lack of Transportation (Non-Medical):  No   Physical Activity: Not on file   Stress: Not on file   Social Connections: Not on file   Intimate Partner Violence: Not on file   Housing Stability: Not on file       Review of Systems: All other systems were reviewed and were negative, otherwise please refer to HPI    Physical Exam: /66   Pulse 94   Temp 97 5 °F (36 4 °C) (Oral)   Resp 16   Ht 5' 9" (1 753 m)   Wt 86 4 kg (190 lb 7 6 oz)   SpO2 94%   BMI 28 13 kg/m²     General Appearance:    Alert, cooperative, no distress, appears stated age   Head:    Normocephalic, without obvious abnormality, atraumatic   Eyes: No scleral icterus           Mouth:  Mucosa moist   Neck:   Supple, symmetrical, trachea midline, no thyromegaly       Lungs:     Clear to auscultation bilaterally, respirations unlabored       Heart:    Regular rate and rhythm, S1 and S2 normal, no murmur, rub   or gallop     Abdomen:     Soft, non-tender, bowel sounds active all four quadrants,     no r/r/g   Genitalia:   deferred   Rectal:   deferred   Extremities:   Extremities normal,no cyanosis or edema       Skin:   Skin color, texture, turgor normal, no rashes or lesions       Neurologic:   Grossly intact, no focal deficit           Lab Results:   Recent Results (from the past 24 hour(s))   Legionella antigen, Urine    Collection Time: 03/19/23  1:51 PM    Specimen: Urine, Other   Result Value Ref Range    Legionella Urinary Antigen Negative Negative   ECG 12 lead    Collection Time: 03/19/23  3:32 PM   Result Value Ref Range    Ventricular Rate 69 BPM    Atrial Rate 69 BPM    SD Interval 154 ms    QRSD Interval 86 ms    QT Interval 388 ms    QTC Interval 415 ms    P Axis 30 degrees    QRS Axis 39 degrees    T Wave Axis 53 degrees   CBC and differential    Collection Time: 03/19/23  3:57 PM   Result Value Ref Range    WBC 10 24 (H) 4 31 - 10 16 Thousand/uL    RBC 4 21 3 88 - 5 62 Million/uL    Hemoglobin 13 7 12 0 - 17 0 g/dL    Hematocrit 40 7 36 5 - 49 3 %    MCV 97 82 - 98 fL    MCH 32 5 26 8 - 34 3 pg    MCHC 33 7 31 4 - 37 4 g/dL    RDW 13 4 11 6 - 15 1 %    MPV 9 0 8 9 - 12 7 fL    Platelets 724 500 - 175 Thousands/uL    nRBC 0 /100 WBCs    Neutrophils Relative 75 43 - 75 %    Immat GRANS % 1 0 - 2 %    Lymphocytes Relative 8 (L) 14 - 44 %    Monocytes Relative 13 (H) 4 - 12 %    Eosinophils Relative 2 0 - 6 %    Basophils Relative 1 0 - 1 %    Neutrophils Absolute 7 79 (H) 1 85 - 7 62 Thousands/µL    Immature Grans Absolute 0 05 0 00 - 0 20 Thousand/uL    Lymphocytes Absolute 0 80 0 60 - 4 47 Thousands/µL    Monocytes Absolute 1 31 (H) 0 17 - 1 22 Thousand/µL    Eosinophils Absolute 0 23 0 00 - 0 61 Thousand/µL    Basophils Absolute 0 06 0 00 - 0 10 Thousands/µL   Comprehensive metabolic panel    Collection Time: 03/19/23  3:57 PM   Result Value Ref Range    Sodium 133 (L) 135 - 147 mmol/L    Potassium 4 8 3 5 - 5 3 mmol/L    Chloride 100 96 - 108 mmol/L    CO2 28 21 - 32 mmol/L    ANION GAP 5 4 - 13 mmol/L    BUN 21 5 - 25 mg/dL    Creatinine 1 05 0 60 - 1 30 mg/dL    Glucose 114 65 - 140 mg/dL    Calcium 8 5 8 4 - 10 2 mg/dL    AST 15 13 - 39 U/L    ALT 8 7 - 52 U/L    Alkaline Phosphatase 70 34 - 104 U/L    Total Protein 6 7 6 4 - 8 4 g/dL    Albumin 3 8 3 5 - 5 0 g/dL    Total Bilirubin 0 70 0 20 - 1 00 mg/dL    eGFR 65 ml/min/1 73sq m   HS Troponin 0hr (reflex protocol)    Collection Time: 03/19/23  3:57 PM   Result Value Ref Range    hs TnI 0hr 6 "Refer to ACS Flowchart"- see link ng/L   B-Type Natriuretic Peptide(BNP)    Collection Time: 03/19/23  3:57 PM   Result Value Ref Range    BNP 92 0 - 100 pg/mL   FLU/RSV/COVID - if FLU/RSV clinically relevant    Collection Time: 03/19/23  3:57 PM    Specimen: Nose; Nares   Result Value Ref Range    SARS-CoV-2 Negative Negative    INFLUENZA A PCR Negative Negative    INFLUENZA B PCR Negative Negative    RSV PCR Negative Negative   Lactic acid    Collection Time: 03/19/23  3:57 PM   Result Value Ref Range    LACTIC ACID 0 7 0 5 - 2 0 mmol/L   Procalcitonin    Collection Time: 03/19/23  3:57 PM   Result Value Ref Range    Procalcitonin 0 08 <=0 25 ng/ml   Magnesium    Collection Time: 03/19/23  3:57 PM   Result Value Ref Range    Magnesium 1 8 (L) 1 9 - 2 7 mg/dL   Blood culture #2    Collection Time: 03/19/23  3:58 PM    Specimen: Line, Venous; Blood   Result Value Ref Range    Blood Culture Received in Microbiology Lab  Culture in Progress  Blood culture #1    Collection Time: 03/19/23  4:03 PM    Specimen: Arm, Right; Blood   Result Value Ref Range    Blood Culture Received in Microbiology Lab   Culture in Progress      Blood gas, arterial    Collection Time: 03/19/23  4:14 PM   Result Value Ref Range    pH, Arterial 7 397 7 350 - 7 450    PH ART TC 7 401 7 350 - 7 450    pCO2, Arterial 43 1 36 0 - 44 0 mm Hg    PCO2 (TC) Arterial 42 5 36 0 - 44 0 mm Hg    pO2, Arterial 188 9 (H) 75 0 - 129 0 mm Hg    PO2 (TC) Arterial 187 3 (H) 75 0 - 129 0 mm Hg    HCO3, Arterial 25 9 22 0 - 28 0 mmol/L    Base Excess, Arterial 0 8 mmol/L    O2 Content, Arterial 19 8 16 0 - 23 0 mL/dL    O2 HGB,Arterial  98 7 (H) 94 0 - 97 0 %    SOURCE Radial, Right     JOSE TEST Yes     Temperature 98 0 Degrees Fehrenheit    Nasal Cannula 6    UA w Reflex to Microscopic w Reflex to Culture    Collection Time: 03/19/23  4:51 PM    Specimen: Urine, Other   Result Value Ref Range    Color, UA Yellow     Clarity, UA Clear     Specific Gravity, UA 1 020 1 000 - 1 030    pH, UA 5 5 5 0, 5 5, 6 0, 6 5, 7 0, 7 5, 8 0, 8 5, 9 0    Leukocytes, UA Negative Negative    Nitrite, UA Negative Negative    Protein, UA Negative Negative mg/dl    Glucose, UA Negative Negative mg/dl    Ketones, UA Negative Negative mg/dl    Urobilinogen, UA 0 2 0 2, 1 0 E U /dl E U /dl    Bilirubin, UA Negative Negative    Occult Blood, UA Negative Negative   Strep Pneumoniae, Urine    Collection Time: 03/19/23  4:51 PM    Specimen: Urine, Other   Result Value Ref Range    Strep pneumoniae antigen, urine Negative Negative   HS Troponin I 2hr    Collection Time: 03/19/23  5:50 PM   Result Value Ref Range    hs TnI 2hr 4 "Refer to ACS Flowchart"- see link ng/L    Delta 2hr hsTnI -2 <20 ng/L   HS Troponin I 4hr    Collection Time: 03/19/23  8:56 PM   Result Value Ref Range    hs TnI 4hr 6 "Refer to ACS Flowchart"- see link ng/L    Delta 4hr hsTnI 0 <20 ng/L   Procalcitonin, Next Day AM Collection    Collection Time: 03/20/23  5:22 AM   Result Value Ref Range    Procalcitonin 0 09 <=0 25 ng/ml   Basic metabolic panel    Collection Time: 03/20/23  5:22 AM   Result Value Ref Range Sodium 135 135 - 147 mmol/L    Potassium 4 4 3 5 - 5 3 mmol/L    Chloride 101 96 - 108 mmol/L    CO2 28 21 - 32 mmol/L    ANION GAP 6 4 - 13 mmol/L    BUN 21 5 - 25 mg/dL    Creatinine 1 00 0 60 - 1 30 mg/dL    Glucose 162 (H) 65 - 140 mg/dL    Calcium 8 3 (L) 8 4 - 10 2 mg/dL    eGFR 69 ml/min/1 73sq m   Magnesium    Collection Time: 03/20/23  5:22 AM   Result Value Ref Range    Magnesium 1 7 (L) 1 9 - 2 7 mg/dL       Imaging Studies: CTA ED chest PE study    Result Date: 3/19/2023  Narrative: CTA - CHEST WITH IV CONTRAST - PULMONARY ANGIOGRAM INDICATION:   hypoxia/SOB  Per my review of the medical record, the patient presents with 2 days of progressive cough, intermittent confusion, headache, low-grade temperature  Weakness  COMPARISON: CXR 3/19/2022 and chest CT 9/29/2022, 6/16/2022 and 12/7/2019  TECHNIQUE: CT angiogram timed for optimal opacification of the pulmonary arteries  Axial, sagittal, and coronal 2D reformats created from source data  Coronal 3D MIP postprocessing on the acquisition scanner  Radiation dose length product (DLP):  732 65 mGy-cm   Radiation dose exposure minimized using iterative reconstruction and automated exposure control  IV Contrast:  85 mL of iohexol (OMNIPAQUE)  FINDINGS: PULMONARY ARTERIES:  No pulmonary embolus  LUNGS:  Mild dependent lower lobe atelectasis  Mild basilar predominant juxtapleural reticulation  AIRWAYS: Extensive bronchial wall thickening, greatest in the lower lobes with subtle bilateral lower lobe tree-in-bud nodularity  PLEURA:  Small pleural effusions, similar to September 2022  HEART/GREAT VESSELS:  Mild cardiomegaly  Mild coronary artery calcification indicating atherosclerotic heart disease  MEDIASTINUM AND JACQUIE:  Large hiatal hernia  Minimally enlarged subcarinal node, reactive  CHEST WALL AND LOWER NECK: Unremarkable  UPPER ABDOMEN:  Unremarkable  OSSEOUS STRUCTURES:  Mild degenerative disease in the spine  Healed bilateral rib fractures  Multiple stable compression deformities in the thoracic spine  Impression: No pulmonary embolus  Diffuse bronchial wall thickening, greatest in the lower lobes, with subtle bilateral lower lobe tree-in-bud nodularity compatible with bronchitis/bronchiolitis  However, the large hiatal hernia and lower lobe distribution raises the possibility of aspiration  The study was marked in [facility] for immediate notification  Workstation performed: [id number]       Assessment/Plan:   Hiatal hernia  • Large hiatal hernia showed on CT  Pt and wife are not aware of this  • Could have bottom up aspiration, speech did bedside eval and patient had oropharyngeal swallow within functional limits  • Await pulmonary consultation, COVID flu RSV were negative, consider aspiration pneumonia  • Continue antibiotics  • Patient denies any significant reflux symptoms but was started on Pepcid per primary team at night  • Will await pulmonary eval and can tentatively plan upper endoscopy on Wednesday if cleared from pulmonary standpoint, patient currently on 2 L of oxygen with sats in the mid to low 90s  • Continue regular diet as tolerated    Spoke with patient and wife at bedside  Thank you for the consultation  Case was discussed with Dr Dar Hensley

## 2023-03-20 NOTE — PLAN OF CARE
Problem: CARDIOVASCULAR - ADULT  Goal: Maintains optimal cardiac output and hemodynamic stability  Description: INTERVENTIONS:  - Monitor I/O, vital signs and rhythm  - Monitor for S/S and trends of decreased cardiac output  - Administer and titrate ordered vasoactive medications to optimize hemodynamic stability  - Assess quality of pulses, skin color and temperature  - Assess for signs of decreased coronary artery perfusion  - Instruct patient to report change in severity of symptoms  Outcome: Progressing     Problem: RESPIRATORY - ADULT  Goal: Achieves optimal ventilation and oxygenation  Description: INTERVENTIONS:  - Assess for changes in respiratory status  - Assess for changes in mentation and behavior  - Position to facilitate oxygenation and minimize respiratory effort  - Oxygen administered by appropriate delivery if ordered  - Initiate smoking cessation education as indicated  - Encourage broncho-pulmonary hygiene including cough, deep breathe, Incentive Spirometry  - Assess the need for suctioning and aspirate as needed  - Assess and instruct to report SOB or any respiratory difficulty  - Respiratory Therapy support as indicated  Outcome: Progressing     Problem: MOBILITY - ADULT  Goal: Maintain or return to baseline ADL function  Description: INTERVENTIONS:  -  Assess patient's ability to carry out ADLs; assess patient's baseline for ADL function and identify physical deficits which impact ability to perform ADLs (bathing, care of mouth/teeth, toileting, grooming, dressing, etc )  - Assess/evaluate cause of self-care deficits   - Assess range of motion  - Assess patient's mobility; develop plan if impaired  - Assess patient's need for assistive devices and provide as appropriate  - Encourage maximum independence but intervene and supervise when necessary  - Involve family in performance of ADLs  - Assess for home care needs following discharge   - Consider OT consult to assist with ADL evaluation and planning for discharge  - Provide patient education as appropriate  Outcome: Progressing     Problem: SAFETY ADULT  Goal: Patient will remain free of falls  Description: INTERVENTIONS:  - Educate patient/family on patient safety including physical limitations  - Instruct patient to call for assistance with activity   - Consult OT/PT to assist with strengthening/mobility   - Keep Call bell within reach  - Keep bed low and locked with side rails adjusted as appropriate  - Keep care items and personal belongings within reach  - Initiate and maintain comfort rounds  - Make Fall Risk Sign visible to staff  - Offer Toileting every 2 Hours, in advance of need  - Initiate/Maintain bed alarm  - Obtain necessary fall risk management equipment: yellow socks  - Apply yellow socks and bracelet for high fall risk patients  - Consider moving patient to room near nurses station  Outcome: Progressing

## 2023-03-20 NOTE — OCCUPATIONAL THERAPY NOTE
OT EVALUATION       03/20/23 1014   Note Type   Note type Evaluation   Pain Assessment   Pain Assessment Tool 0-10   Pain Score No Pain   Restrictions/Precautions   Other Precautions Chair Alarm; Bed Alarm; Fall Risk;O2   Home Living   [facility]; Able to live on main level with bedroom/bathroom  (2 JASON)   Bathroom Shower/Tub Tub/shower unit   Bathroom Toilet Standard   Bathroom Equipment Shower chair;Grab bars in [facility] 124   Additional Comments pt reports wife assists him with a sponge bath   Prior Function   Level of [place] Independent with functional mobility; Needs assistance with ADLs; Needs assistance with IADLS   Lives With Spouse   Receives Help From Family   ADL   Eating Assistance 7  Independent   Grooming Assistance 5  Supervision/Setup   UB Bathing Assistance 4  Minimal Assistance   LB Bathing Assistance 4  Minimal Assistance   UB Dressing Assistance 4  Minimal Assistance    [address] 4  Minimal Assistance   Toileting Assistance  4  Minimal Assistance   Transfers   Sit to Stand 4  Minimal assistance   Stand to Sit 4  Minimal assistance   Functional Mobility   Functional Mobility 4  Minimal assistance   Additional Comments to and from bathroom with RW   Balance   Static Sitting Fair +   Dynamic Sitting Fair   Static Standing Fair   Dynamic Standing Fair -   Activity Tolerance   Activity Tolerance Patient limited by fatigue   RUE Assessment   RUE Assessment WFL   LUE Assessment   LUE Assessment WFL   Cognition   Overall Cognitive Status WFL   Arousal/Participation Cooperative   Attention Within functional limits   Orientation Level Oriented X4   Following Commands Follows multistep commands with increased time or repetition   Assessment   Limitation Decreased ADL status; Decreased UE strength;Decreased Safe judgement during ADL;Decreased endurance;Decreased high-level ADLs; Decreased self-care trans  (decreased balance and mobility) Prognosis Good   Assessment Patient evaluated by Occupational Therapy  Patient admitted with Acute cough  The patients occupational profile, medical and therapy history includes a extensive additional review of physical, cognitive, or psychosocial history related to current functional performance  Comorbidities affecting functional mobility and ADLS include: depression, hypertension and PE and OCD  Prior to admission, patient was independent with functional mobility with walker, requiring assist for ADLS and requiring assist for IADLS  The evaluation identifies the following performance deficits: weakness, impaired balance, decreased endurance, increased fall risk, new onset of impairment of functional mobility, decreased ADLS, decreased IADLS, decreased activity tolerance, decreased safety awareness and decreased strength, that result in activity limitations and/or participation restrictions  This evaluation requires clinical decision making of high complexity, because the patient presents with comorbidites that affect occupational performance and required significant modification of tasks or assistance with consideration of multiple treatment options  The Barthel Index was used as a functional outcome tool presenting with a score of Barthel Index Score: 55, indicating marked limitations of functional mobility and ADLS  The patient's raw score on the -PAC Daily Activity Inpatient Short Form is 20  A raw score of greater than or equal to 19 suggests the patient may benefit from discharge to home  Please refer to the recommendation of the Occupational Therapist for safe discharge planning  Patient will benefit from skilled Occupational Therapy services to address above deficits and facilitate a safe return to prior level of function     Goals   Patient Goals to go home   STG Time Frame   (1-7 days)   Short Term Goal  Goals established to promote Patient Goals: to go home:  Grooming: supervision standing at sink; Bathing: supervision; Upper Body Dressing supervision; Lower Body Dressing: supervision; Toileting: supervision; Patient will increase ambulatory standard toilet transfer to supervision with rolling walker to increase performance and safety with ADLS and functional mobility; Patient will increase standing tolerance to 3 minutes during ADL task to decrease assistance level and decrease fall risk; Patient will increase bed mobility to independent in preparation for ADLS and transfers; Patient will increase functional mobility to and from bathroom with rolling walker with supervision to increase performance with ADLS and to use a toilet; Patient will tolerate 5 minutes of UE ROM/strengthening to increase general activity tolerance and performance in ADLS/IADLS; Patient will improve functional activity tolerance to 10 minutes of sustained functional tasks to increase participation in basic self-care and decrease assistance level;  Patient will be able to to verbalize understanding and perform energy conservation/proper body mechanics during ADLS and functional mobility at least 75% of the time with minimal cueing to decrease signs of fatigue and increase stamina to return to prior level of function; Patient will increase dynamic sitting balance to fair+ to improve the ability to sit at edge of bed or on a chair for ADLS;  Patient will increase dynamic standing balance to fair to improve postural stability and decrease fall risk during standing ADLS and transfers  LTG Time Frame   (8-14 days)   Long Term Goal Grooming: independent standing at sink; Bathing: independent; Upper Body Dressing independent; Lower Body Dressing: independent; Toileting: independent; Patient will increase ambulatory standard toilet transfer to independent with rolling walker to increase performance and safety with ADLS and functional mobility;  Patient will increase standing tolerance to 6 minutes during ADL task to decrease assistance level and decrease fall risk; Patient will increase functional mobility to and from bathroom with rolling walker independently to increase performance with ADLS and to use a toilet; Patient will tolerate 10 minutes of UE ROM/strengthening to increase general activity tolerance and performance in ADLS/IADLS; Patient will improve functional activity tolerance to 20 minutes of sustained functional tasks to increase participation in basic self-care and decrease assistance level;  Patient will be able to to verbalize understanding and perform energy conservation/proper body mechanics during ADLS and functional mobility at least 90% of the time to decrease signs of fatigue and increase stamina to return to prior level of function; Patient will increase dynamic sitting balance to good to improve the ability to sit at edge of bed or on a chair for ADLS;  Patient will increase dynamic standing balance to fair+ to improve postural stability and decrease fall risk during standing ADLS and transfers  Pt will score >/= 24/24 on AM-PAC Daily Activity Inpatient scale to promote safe independence with ADLs and functional mobility; Pt will score >/= 85/100 on Barthel Index in order to decrease caregiver assistance needed and increase ability to perform ADLs and functional mobility  Plan   Treatment Interventions ADL retraining;Functional transfer training;UE strengthening/ROM; Endurance training;Patient/family training;Equipment evaluation/education; Activityengagement; Compensatory technique education   Goal Expiration Date 04/03/23   OT Frequency 3-5x/wk   Recommendation   OT Discharge Recommendation Home with home health rehabilitation   AM-[facility] Daily Activity Inpatient   Lower Body Dressing 3   Bathing 3   Toileting 3   Upper Body Dressing 3   Grooming 4   Eating 4   Daily Activity Raw Score 20   Daily Activity Standardized Score (Calc for Raw Score >=11) 42 03   AM-[facility] Applied Cognition Inpatient   Following a Speech/Presentation 3   Understanding Ordinary Conversation 4   Taking Medications 3   Remembering Where Things Are Placed or Put Away 3   Remembering List of 4-5 Errands 3   Taking Care of Complicated Tasks 3   Applied Cognition Raw Score 19   Applied Cognition Standardized Score 39 77   Barthel Index   Feeding 10   Bathing 0   Grooming Score 0   Dressing Score 5   Bladder Score 10   Bowels Score 10   Toilet Use Score 5   Transfers (Bed/Chair) Score 10   Mobility (Level Surface) Score 0   Stairs Score 5   Barthel Index Score 54   Licensure   [place] License Number  Shawn Pierre Jerson 87 OTR/L [id number]

## 2023-03-20 NOTE — ASSESSMENT & PLAN NOTE
· On metoprolol, Monopril at home    · Continue metoprolol  · Substitute Monopril with lisinopril with holding parameter  · BP stable

## 2023-03-20 NOTE — ASSESSMENT & PLAN NOTE
· 2D echo in June last year showed normal EF around 05%, grade 1 diastolic dysfunction, mild to moderate TR, PA pressure 51, mild AR    · Continue ACE inhibitor  · Patient follows cardiology as outpatient

## 2023-03-20 NOTE — ASSESSMENT & PLAN NOTE
Patient presents with productive cough since Tuesday or Wednesday last week, fever 100 2 this morning at home  · COVID flu RSV negative  · CTA chest PE study showed- No PE  Diffuse bronchial wall thickening, greatest in the lower lobes, with subtle bilateral lower lobe tree-in-bud nodularity compatible with bronchitis/bronchiolitis  However, the large hiatal hernia and lower lobe distribution raises the possibility of aspiration    · Chest x-ray no acute cardiopulmonary process  · Suspect bronchitis/bronchiolitis, possible aspiration pneumonia  · Given IV Lasix, Unasyn, Solu-Medrol in ED  · Continue Rocephin, Zithromax  · Check urine antigens, sputum Gram stain culture-negative  · procalcitonin-normal  · Mucinex/Robitussin/Tessalon Perles  · DuoNeb 3 times daily  · Speech eval- regular regular thin diet  · pulmonary- S/O, follows outpatient  · GI for large hiatal hernia

## 2023-03-20 NOTE — UTILIZATION REVIEW
Initial Clinical Review    Observation 3/19/23 @ 1921, converted to inpatient admission 3/20/23 @ 1346 for continued care & tx for acute cough  Admission: Date/Time/Statement:   Admission Orders (From admission, onward)     Ordered        03/20/23 1346  Inpatient Admission  Once            03/19/23 1921  Place in Observation  Once                      Orders Placed This Encounter   Procedures   • Inpatient Admission     Standing Status:   Standing     Number of Occurrences:   1     Order Specific Question:   Level of Care     Answer:   Med Surg [16]     Order Specific Question:   Estimated length of stay     Answer:   More than 2 Midnights     Order Specific Question:   Certification     Answer:   I certify that inpatient services are medically necessary for this patient for a duration of greater than two midnights  See H&P and MD Progress Notes for additional information about the patient's course of treatment  ED Arrival Information     Expected   -    Arrival   3/19/2023 13:30    Acuity   Emergent            Means of arrival   Wheelchair    Escorted by   Family Member    Service   Hospitalist    Admission type   Emergency            Arrival complaint   cough/congestion/wheeze           Chief Complaint   Patient presents with   • Cough     Started with cough two days ago  Having some periods of confusion, headache and low grade temp at home  Weakness, audible rhonchi in triage, hx of pneumonia in June 22       Initial Presentation:   80 yom to ER from home c/o 2 days of progressive cough and decreased urine output, temp to 100 2, dyspnea  Hx hypertension, hyperlipidemia, depression, OCD, BPH, leg edema, paroxysmal atrial flutter, PE  Presents febrile, tachypneic, lungs with wheezing & rhonchi, BLE edema  Admission work-up showing bronchitis/bronchiolitis, possible aspiration on imaging, hypomagnesemia  Placed in observation status for acute cough  Started on IVABT, cultures pending       Observation to IP admission 3/20/23:  IVABT continued for bronchitis/bronchiotitis  Lungs with expiratory wheezing, diminished breath sounds  Persistent BOWIE  O2 requirements remain @ 2ltr nc  BLE edema, venous duplex studies ordered  Per pulm:  Acute tracheobronchitis with possible mild pneumonia left lower lobe  Cough secondary to this  Urine for strep pneumonia antigen negative  Large hiatal hernia but patient denies any significant gastric reflux symptoms  Cannot exclude reflux with aspiration but patient may just have routine community-acquired tracheobronchitis with possible left lower lobe pneumonia  History of segmental right upper lobe pulmonary embolism after he had closed fracture of right femur  He did have right femur fracture surgery on 12/4/19  Acute respiratory insufficiency due to tracheobronchitis  Plan: continue IVABT, nebs, O2 as needed  Day 2- 3/21/23:  IVABT continued for acute tracheobronchitis  Persistent BOWIE, lungs with diminished breath sounds with expiratory wheezing  O2 requirements currently @ 2ltr nc  BLE 3+ edema noted  IV Mg repletion given      ED Triage Vitals   Temperature Pulse Respirations Blood Pressure SpO2   03/19/23 1451 03/19/23 1451 03/19/23 1451 03/19/23 1451 03/19/23 1451   99 °F (37 2 °C) 71 (!) 24 155/65 91 %      Temp Source Heart Rate Source Patient Position - Orthostatic VS BP Location FiO2 (%)   03/19/23 1451 03/19/23 1451 03/19/23 1451 03/19/23 1451 --   Oral Monitor Sitting Left arm       Pain Score       03/19/23 1750       No Pain          Wt Readings from Last 1 Encounters:   03/19/23 86 4 kg (190 lb 7 6 oz)     Additional Vital Signs:   03/21/23 0800 -- -- -- -- -- 94 % 28 2 L/min Nasal cannula --   03/21/23 07:31:52 97 9 °F (36 6 °C) 79 17 155/73 100 94 % 28 2 L/min Nasal cannula --     03/20/23 0721 -- -- -- -- -- 94 % 28 2 L/min Nasal cannula --   03/20/23 07:15:45 97 5 °F (36 4 °C) 79 16 102/82 89 92 % -- -- -- --   03/20/23 04:01:02 -- 74 16 101/65 77 95 % -- -- -- --   03/19/23 23:26:41 98 °F (36 7 °C) 66 18 117/58 78 91 % -- -- -- --   03/19/23 20:31:25 98 1 °F (36 7 °C) 90 17 176/73 Abnormal  107 92 % -- -- -- --   03/19/23 1750 -- 79 18 135/63 -- 95 % 28 2 L/min Nasal cannula --   03/19/23 1651 -- 87 20 170/73 -- 97 % -- -- None (Room air) --   03/19/23 1559 -- -- -- -- -- 98 % -- -- -- --   03/19/23 1451 99 °F (37 2 °C) 71 24 Abnormal  155/65 -- 91 % -- -- None (Room air) Sitting     Pertinent Labs/Diagnostic Test Results:   VAS lower limb venous duplex study, complete bilateral   Final Result  (03/20 1454)  Impression:  RIGHT LOWER LIMB:  No evidence of acute or chronic deep vein thrombosis  No evidence of superficial thrombophlebitis noted  No evidence of valvular incompetence noted in the deep veins  Popliteal, posterior tibial and anterior tibial arterial Doppler waveforms are triphasic  LEFT LOWER LIMB:  No evidence of acute or chronic deep vein thrombosis  No evidence of superficial thrombophlebitis noted  No evidence of valvular incompetence noted in the deep veins  Popliteal, posterior tibial and anterior tibial arterial Doppler waveforms are triphasic  CTA ED chest PE study   Final Result  (03/19 1854)      No pulmonary embolus  Diffuse bronchial wall thickening, greatest in the lower lobes, with subtle bilateral lower lobe tree-in-bud nodularity compatible with bronchitis/bronchiolitis  However, the large hiatal hernia and lower lobe distribution raises the possibility of    aspiration  XR chest 1 view portable   ED Interpretation  (03/19 1627)   Unchanged from prev      Final Result (03/20 1123)      No acute cardiopulmonary disease       3/19 Ekg=  Rhythm: sinus rhythm     Ectopy:     Ectopy: none     QRS:     QRS axis:  Normal   Conduction:     Conduction: normal     ST segments:     ST segments:  Normal   T waves:     T waves: normal      Results from last 7 days   Lab Units 03/19/23  1557   SARS-COV-2  Negative     Results from last 7 days   Lab Units 03/19/23  1557   WBC Thousand/uL 10 24*   HEMOGLOBIN g/dL 13 7   HEMATOCRIT % 40 7   PLATELETS Thousands/uL 208   NEUTROS ABS Thousands/µL 7 79*     Results from last 7 days   Lab Units 03/20/23  0522 03/19/23  1557   SODIUM mmol/L 135 133*   POTASSIUM mmol/L 4 4 4 8   CHLORIDE mmol/L 101 100   CO2 mmol/L 28 28   ANION GAP mmol/L 6 5   BUN mg/dL 21 21   CREATININE mg/dL 1 00 1 05   EGFR ml/min/1 73sq m 69 65   CALCIUM mg/dL 8 3* 8 5   MAGNESIUM mg/dL 1 7* 1 8*     Results from last 7 days   Lab Units 03/19/23  1557   AST U/L 15   ALT U/L 8   ALK PHOS U/L 70   TOTAL PROTEIN g/dL 6 7   ALBUMIN g/dL 3 8   TOTAL BILIRUBIN mg/dL 0 70     Results from last 7 days   Lab Units 03/20/23  0522 03/19/23  1557   GLUCOSE RANDOM mg/dL 162* 114     Results from last 7 days   Lab Units 03/19/23  1614   PH ART  7 397   PCO2 ART mm Hg 43 1   PO2 ART mm Hg 188 9*   HCO3 ART mmol/L 25 9   BASE EXC ART mmol/L 0 8   O2 CONTENT ART mL/dL 19 8   O2 HGB, ARTERIAL % 98 7*   ABG SOURCE  Radial, Right       Results from last 7 days   Lab Units 03/19/23 2056 03/19/23  1750 03/19/23  1557   HS TNI 0HR ng/L  --   --  6   HS TNI 2HR ng/L  --  4  --    HSTNI D2 ng/L  --  -2  --    HS TNI 4HR ng/L 6  --   --    HSTNI D4 ng/L 0  --   --        Results from last 7 days   Lab Units 03/20/23  0522 03/19/23  1557   PROCALCITONIN ng/ml 0 09 0 08     Results from last 7 days   Lab Units 03/19/23  1557   LACTIC ACID mmol/L 0 7     Results from last 7 days   Lab Units 03/19/23  1557   BNP pg/mL 92     Results from last 7 days   Lab Units 03/19/23  1651   CLARITY UA  Clear   COLOR UA  Yellow   SPEC GRAV UA  1 020   PH UA  5 5   GLUCOSE UA mg/dl Negative   KETONES UA mg/dl Negative   BLOOD UA  Negative   PROTEIN UA mg/dl Negative   NITRITE UA  Negative   BILIRUBIN UA  Negative   UROBILINOGEN UA E U /dl 0 2   LEUKOCYTES UA  Negative     Results from last 7 days   Lab Units 03/19/23  1651 03/19/23  1557 03/19/23  1351   STREP PNEUMONIAE ANTIGEN, URINE  Negative  --   --    LEGIONELLA URINARY ANTIGEN   --   --  Negative   INFLUENZA A PCR   --  Negative  --    INFLUENZA B PCR   --  Negative  --    RSV PCR   --  Negative  --      Results from last 7 days   Lab Units 03/20/23  0851 03/19/23  1603 03/19/23  1558   BLOOD CULTURE   --  No Growth at 24 hrs  No Growth at 24 hrs     SPUTUM CULTURE  1+ Growth of  --   --    GRAM STAIN RESULT  1+ Epithelial cells per low power field*  No polys seen*  1+ Gram positive cocci in pairs*  --   --      ED Treatment:   Medication Administration from 03/19/2023 1329 to 03/19/2023 2015       Date/Time Order Dose Route Action     03/19/2023 1559 EDT ipratropium-albuterol (DUO-NEB) 0 5-2 5 mg/3 mL inhalation solution 3 mL 3 mL Nebulization Given     03/19/2023 1603 EDT furosemide (LASIX) injection 40 mg 40 mg Intravenous Given     03/19/2023 1815 EDT iohexol (OMNIPAQUE) 350 MG/ML injection (SINGLE-DOSE) 85 mL 85 mL Intravenous Given     03/19/2023 1927 EDT ampicillin-sulbactam (UNASYN) 3 g in sodium chloride 0 9 % 100 mL IVPB 3 g Intravenous New Bag     03/19/2023 1925 EDT methylPREDNISolone sodium succinate (Solu-MEDROL) injection 125 mg 125 mg Intravenous Given        Past Medical History:   Diagnosis Date   • BPH (benign prostatic hyperplasia)    • Depression    • HTN (hypertension)    • Hyperlipidemia    • OCD (obsessive compulsive disorder)    • Pulmonary embolism ([facility] ) 2019     Present on Admission:  • Bladder wall thickening  • Essential hypertension  • Hyperlipidemia  • History of pulmonary embolism  • Paroxysmal atrial flutter (HCC)  • Recurrent major depressive disorder, in full remission (HCC)  • Bilateral leg edema  • Acute cough  • Acute respiratory insufficiency  • Hiatal hernia  • Valvular heart disease    Admitting Diagnosis: Cough [R05 9]  Hiatal hernia [K44 9]  Aspiration pneumonia ([facility] [address] ) [J69 0]  Bronchitis [J40]  Hypoxia [R09 02]  Age/Sex: 80 y o  male  Admission Orders:  Telemetry  Cont pulse ox  Pt/ot/st eval & tx  Consult pulmonary  Aspiration precautions  Consult GI  O2 to keep sats>90%    Scheduled Medications:  alendronate, 70 mg, Oral, Q7 Days  atorvastatin, 5 mg, Oral, Daily With Dinner  azithromycin, 500 mg, Oral, Q24H  calcium carbonate-vitamin D, 1 tablet, Oral, BID With Meals  cefTRIAXone, 1,000 mg, Intravenous, Q24H  citalopram, 40 mg, Oral, Daily  enoxaparin, 40 mg, Subcutaneous, Daily  famotidine, 10 mg, Oral, HS  fluticasone (FLONASE) 50 mcg/act nasal spray 1 spray, Daily, start 3/21  guaiFENesin, 600 mg, Oral, BID  ipratropium-albuterol, 3 mL, Nebulization, TID  lisinopril, 5 mg, Oral, Daily  loratadine (CLARITIN) tablet 10 mg, Daily, start 3/20  magnesium Oxide (MAG-OX) tablet 400 mg, BID, start 3/20  magnesium sulfate 2 g/50 mL IVPB 2 g, Once 3/21  metoprolol tartrate, 50 mg, Oral, Q12H  multivitamin stress formula, 1 tablet, Oral, Daily  niacin, 500 mg, Oral, Daily With Dinner  tamsulosin, 0 4 mg, Oral, Daily With Dinner    PRN Meds:  acetaminophen, 650 mg, Oral, Q6H PRN  levalbuterol, 0 63 mg, Nebulization, Q6H PRN  ondansetron, 4 mg, Intravenous, Q6H PRN    Network Utilization Review Department  ATTENTION: Please call with any questions or concerns to [phone] and carefully listen to the prompts so that you are directed to the right person  All voicemails are confidential   Tory Prieto all requests for admission clinical reviews, approved or denied determinations and any other requests to dedicated fax number below belonging to the campus where the patient is receiving treatment   List of dedicated fax numbers for the Facilities:  [address] DENIALS (Administrative/Medical Necessity) [phone]   [address]  (Maternity/NICU/Pediatrics) [place] [address] [phone]   [facility] [phone]   [place] [phone]   [address] [place] [address] [address] [address] [address] [facility] [address] [facility] [address] [phone]

## 2023-03-20 NOTE — ASSESSMENT & PLAN NOTE
· History of PE precipitated by femur fracture in 2019  · Status post IVC filter insertion and removal   Was on [facility] for short period of time    · Pharmacologic  DVT prophylaxis

## 2023-03-20 NOTE — PHYSICAL THERAPY NOTE
Medical Social Work    Referral: Pediatric Endocrinology/Amanda Hamilton      Intervention: Spoke with mother via a  over the phone.   Mom reports No issues with transportation. They receive SS income for patient, the amount depends on how much the household makes that month.  Mother states that the Big issue is: in [place] there was a program that allowed her to take care of patient and get paid for it. She is interested in something like that here.  Mother states she is also interested in a program to help her in obtaining housing, as her credit is not very good. They are currently looking to move to a different apartment than the one they have now. She has tried to go through [employer] but they have offered her nothing.     Plan:  will call mother back with programs and contact information for each issue.   PHYSICAL THERAPY EVALUATION/TREATMENT       03/20/23 1034   PT Last Visit   PT Visit Date 03/20/23   Note Type   Note type Evaluation   Pain Assessment   Pain Assessment Tool 0-10   Pain Score No Pain   Patient's Stated Pain Goal No pain   Hospital Pain Intervention(s) Repositioned   Multiple Pain Sites No   Restrictions/Precautions   Weight Bearing Precautions Per Order No   Other Precautions Bed Alarm; Chair Alarm; Fall Risk;Pain   Home Living   Type of [facility] Two level; Able to live on main level with bedroom/bathroom;Stairs to enter with rails  (2 JASON)   [address] [facility] [address];Grab bars in [facility] [address]   Additional Comments Pt reports he is able to dress himself, wife assists with sponge bathes   Prior Function   Level of [place] Independent with functional mobility; Needs assistance with ADLs; Needs assistance with IADLS   Lives With Spouse   Receives Help From Family   IADLs   (Pt reports he can drive but wife usually drives ; wife does all cooking/cleaning/laundry at home)   Falls in the last 6 months 0   Vocational Retired   General   Additional Pertinent History Pt is an 80year-old male who was admitted to the hospital on 3/19/23 due to progressive cough at home   Family/Caregiver Present No   Cognition   Overall Cognitive Status WFL   Attention Within functional limits   Orientation Level Oriented X4   Following Commands Follows multistep commands with increased time or repetition   Comments Pt [place]   Subjective   Subjective "I feel better today"   RLE Assessment   RLE Assessment WFL   LLE Assessment   LLE Assessment WFL   Bed Mobility   Additional Comments Received sitting in bedside chair   Transfers   Sit to Stand 5  Supervision   Additional items Verbal cues   Stand to Sit 5  Supervision   Additional items Verbal cues   Stand pivot 5  Supervision   Additional items Verbal cues   Ambulation/Elevation   Gait pattern Step through pattern; Short stride;Narrow ALEX   Gait Assistance 4  Minimal assist  (progressing to supervision)   Additional items Verbal cues   Assistive Device Rolling walker   Distance 25 feet   Stair Management Assistance Not tested   Balance   Static Sitting Fair +   Dynamic Sitting Fair   Static Standing Fair   Dynamic Standing Fair -   Ambulatory Fair -   Activity Tolerance   Activity Tolerance Patient tolerated treatment well;Patient limited by fatigue   Nurse Made Aware yes   Assessment   Prognosis Good   Problem List Decreased endurance; Impaired balance;Decreased mobility; Decreased strength;Pain   Assessment Patient seen for Physical Therapy evaluation  Patient admitted with Acute cough  Comorbidities affecting patient's physical performance include: depression, hypertension and PE  Personal factors affecting patient at time of initial evaluation include: lives in 2 story house, ambulating with assistive device, stairs to enter home, inability to navigate community distances, inability to perform IADLS  and inability to live alone  Prior to admission, patient was independent with functional mobility with walker, requiring assist for ADLS, requiring assist for IADLS, living with spouse in a 2 level home with 2 steps to enter and ambulating household distance  Please find objective findings from Physical Therapy assessment regarding body systems outlined above with impairments and limitations including weakness, impaired balance, decreased endurance, gait deviations, pain, decreased activity tolerance, decreased functional mobility tolerance, impaired judgement and fall risk  The Barthel Index was used as a functional outcome tool presenting with a score of Barthel Index Score: 55 today indicating marked limitations of functional mobility and ADLS    Patient's clinical presentation is currently unstable/unpredictable as seen in patient's presentation of changing level of pain, increased fall risk, new onset of impairment of functional mobility and decreased endurance  Pt would benefit from continued Physical Therapy treatment to address deficits as defined above and maximize level of functional mobility  As demonstrated by objective findings, the assigned level of complexity for this evaluation is high  The patient's AM-[facility] Basic Mobility Inpatient Short Form Raw Score is 18  A Raw score of greater than 16 suggests the patient may benefit from discharge to home  Please also refer to the recommendation of the Physical Therapist for safe discharge planning  Goals   Patient Goals to go home   STG Expiration Date 03/27/23   Short Term Goal #1 Pt will perform all bed mobility IND ; Pt will perform bed <> chair transfer with supervision +RW ; Pt will improve standing balance to Fair+ to decrease risk of falls   Short Term Goal #2 Pt will ambulate x 150 feet with supervision + RW ; Pt will negotiate x 2 steps with supervision to safely enter/exit home   LTG Expiration Date 04/03/23   Long Term Goal #1 Pt will perform all bed mobility/transfers IND ; pt will improve standing balance to good to decrease risk of falls   Long Term Goal #2 Pt will ambulate x 250 feet with supervision + RW ; Pt will negotiate x 2 steps Mod I with rail   Plan   Treatment/Interventions ADL retraining;Functional transfer training;LE strengthening/ROM; Therapeutic exercise; Endurance training;Bed mobility;Gait training;Spoke to nursing   PT Frequency Other (Comment)  (5x/week)   Recommendation   PT Discharge Recommendation Home with home health rehabilitation   AM-PAC Basic Mobility Inpatient   Turning in Flat Bed Without Bedrails 4   Lying on Back to Sitting on Edge of Flat Bed Without Bedrails 3   Moving Bed to Chair 3   Standing Up From Chair Using Arms 3   Walk in Room 3   Climb 3-5 Stairs With Railing 2   Basic Mobility Inpatient Raw Score 18   Basic Mobility Standardized Score 41 05   Highest Level Of Mobility   [facility] Goal 6: Walk 10 steps or more KYLEIGH-MIRTHA Achieved 7: Walk 25 feet or more   Barthel Index   Feeding 10   Bathing 0   Grooming Score 0   Dressing Score 5   Bladder Score 10   Bowels Score 10   Toilet Use Score 5   Transfers (Bed/Chair) Score 10   Mobility (Level Surface) Score 0   Stairs Score 5   Barthel Index Score 55   Additional Treatment Session   Start Time 1024   End Time 1034   Treatment Assessment S: "Yeah I can walk again" O/A: Pt agreeable to PT session this afternoon  After 1-2 minute seated rest break pt able to ambulate additioanl 25 feet within room with supervision using RW + O2 donned - vital signs stable throughout session with no SOB noted  Pt able to safely reposition himself in bedside chair with intermittent verbal cues for proper hand placement for improved safety awareness  P: Pt will benefit from skilled PT to address deficits to maximize IND for safe d/c   Equipment Use walker   End of Consult   Patient Position at End of Consult Bedside chair;Bed/Chair alarm activated; All needs within reach   [facility] Number  Shonda Belgica [id number]

## 2023-03-20 NOTE — PROGRESS NOTES
Emilee U  66   Progress Note - Gabriella [place] 1940, 80 y o  male MRN: [id number]  Unit/Bed#: [address] [place] [address] Encounter: [id number]  Primary Care Provider: Lisy Tenorio MD   Date and time admitted to hospital: 3/19/2023  3:10 PM    * Acute cough  Assessment & Plan  Patient presents with productive cough since Tuesday or Wednesday last week, fever 100 2 this morning at home  · COVID flu RSV negative  · CTA chest PE study showed- No PE  Diffuse bronchial wall thickening, greatest in the lower lobes, with subtle bilateral lower lobe tree-in-bud nodularity compatible with bronchitis/bronchiolitis  However, the large hiatal hernia and lower lobe distribution raises the possibility of aspiration  · Chest x-ray no acute cardiopulmonary process  · Suspect bronchitis/bronchiolitis, possible aspiration pneumonia  · Given IV Lasix, Unasyn, Solu-Medrol in ED  · Continue Rocephin, Zithromax  · Check urine antigens, sputum Gram stain culture-negative  · procalcitonin-normal  · Mucinex/Robitussin/Tessalon Perles  · DuoNeb 3 times daily  · Speech eval- regular regular thin diet  · pulmonary- S/O, follows outpatient  · GI for large hiatal hernia    Acute respiratory insufficiency  Assessment & Plan  Improving, 95% on 2 L  ·   · SPO2 91% on room air on ED arrival   At baseline 94-95% on room air at home per daughter  · Placed on oxygen 2 L in ED, satting high 90s  · Continue supplemental O2 sats of 92%  · Pulm follows outpatient, chronic pulmonary changes  · Consider home O2 eval    Hiatal hernia  Assessment & Plan  · Large hiatal hernia showed on CT  Daughter is not aware of this  · Start Pepcid at bedtime  · GI recs  · HOB elevation    Bilateral leg edema  Assessment & Plan  · Chronic issue per daughter    Intermittent diuretic use  · BNP normal  · venous Doppler rule out DVT- BLLE negative for DVT, thrombophlebitis or incompetent valves  · Elevation    Bladder wall thickening  Assessment & Plan  · Suspect mild BPH  · Continue Flomax  · follows urology as outpatient    Paroxysmal atrial flutter ([facility] [address] )  Assessment & Plan  · On metoprolol at home  Not on AC  · EKG NSR  · Continue metoprolol  · Optimize electrolytes    Valvular heart disease  Assessment & Plan  · 2D echo in June last year showed normal EF around 43%, grade 1 diastolic dysfunction, mild to moderate TR, PA pressure 51, mild AR  · Continue ACE inhibitor  · follows cardiology as outpatient    History of pulmonary embolism  Assessment & Plan  · History of PE precipitated by femur fracture in 2019  · Status post IVC filter insertion and removal   Was on [facility] for short period of time  · Pharmacologic  DVT prophylaxis Lovenox    Hyperlipidemia  Assessment & Plan  · Continue statin 10 mg nightly  · Reevaluate niacin    Recurrent major depressive disorder, in full remission ([facility] )  Assessment & Plan  · Continue Celexa  · Has history of OCD as well per daughter  Essential hypertension  Assessment & Plan  · On metoprolol, Monopril at home  · Continue metoprolol  · Substitute Monopril with lisinopril with holding parameter  · BP stable        VTE Pharmacologic Prophylaxis: VTE Score: 1 Moderate Risk (Score 3-4) - Pharmacological DVT Prophylaxis Ordered: enoxaparin (Lovenox)  Patient Centered Rounds: I performed bedside rounds with nursing staff today  Discussions with Specialists or Other Care Team Provider: GI, pulmonary    Education and Discussions with Family / Patient: Updated  (daughter) via phone  Total Time Spent on Date of Encounter in care of patient: 55 minutes This time was spent on one or more of the following: performing physical exam; counseling and coordination of care; obtaining or reviewing history; documenting in the medical record; reviewing/ordering tests, medications or procedures; communicating with other healthcare professionals and discussing with patient's family/caregivers      Current Length of Stay: 0 day(s)  Current Patient Status: Inpatient   Certification Statement: The patient will continue to require additional inpatient hospital stay due to clinical course  Discharge Plan: Anticipate discharge in 24-48 hrs to discharge location to be determined pending rehab evaluations  Code Status: Level 1 - Full Code    Subjective:   Patient seen and examined at bedside, denied any home O2 use, currently 2 L 95%, reported feeling better  Patient reported cough prior to arrival with yellow sputum  Patient reported pedal edema chronic  Daughter stated he follows with Dr Tulio Small, does have some PND  Discussed consider cessation of lisinopril chronic cough remains unresolved with PCP  Informed daughter pending we will initiation of Flonase and loratadine, currently on H2 blocker  Objective:     Vitals:   Temp (24hrs), Av 8 °F (36 6 °C), Min:97 4 °F (36 3 °C), Max:98 1 °F (36 7 °C)    Temp:  [97 4 °F (36 3 °C)-98 1 °F (36 7 °C)] 97 4 °F (36 3 °C)  HR:  [66-94] 89  Resp:  [16-18] 18  BP: (101-176)/(58-82) 124/72  SpO2:  [91 %-95 %] 92 %  Body mass index is 28 13 kg/m²  Input and Output Summary (last 24 hours): Intake/Output Summary (Last 24 hours) at 3/20/2023 1742  Last data filed at 3/20/2023 1710  Gross per 24 hour   Intake 390 ml   Output 425 ml   Net -35 ml       Physical Exam:   Physical Exam  Vitals and nursing note reviewed  Constitutional:       General: He is not in acute distress  Appearance: He is well-developed  HENT:      Head: Normocephalic and atraumatic  Eyes:      Conjunctiva/sclera: Conjunctivae normal    Cardiovascular:      Rate and Rhythm: Normal rate and regular rhythm  Heart sounds: No murmur heard  Pulmonary:      Effort: Pulmonary effort is normal  No respiratory distress  Breath sounds: Normal breath sounds  No wheezing or rales  Comments: Nasal cannula  Abdominal:      Palpations: Abdomen is soft  Tenderness: There is no abdominal tenderness  There is no guarding  Hernia: A hernia is present  Musculoskeletal:         General: No swelling  Cervical back: Neck supple  Right lower leg: No edema  Left lower leg: No edema  Comments: Minimal pedal edema   Skin:     General: Skin is warm and dry  Capillary Refill: Capillary refill takes less than 2 seconds  Neurological:      Mental Status: He is alert and oriented to person, place, and time     Psychiatric:         Mood and Affect: Mood normal          Behavior: Behavior normal           Additional Data:     Labs:  Results from last 7 days   Lab Units 03/19/23  1557   WBC Thousand/uL 10 24*   HEMOGLOBIN g/dL 13 7   HEMATOCRIT % 40 7   PLATELETS Thousands/uL 208   NEUTROS PCT % 75   LYMPHS PCT % 8*   MONOS PCT % 13*   EOS PCT % 2     Results from last 7 days   Lab Units 03/20/23  0522 03/19/23  1557   SODIUM mmol/L 135 133*   POTASSIUM mmol/L 4 4 4 8   CHLORIDE mmol/L 101 100   CO2 mmol/L 28 28   BUN mg/dL 21 21   CREATININE mg/dL 1 00 1 05   ANION GAP mmol/L 6 5   CALCIUM mg/dL 8 3* 8 5   ALBUMIN g/dL  --  3 8   TOTAL BILIRUBIN mg/dL  --  0 70   ALK PHOS U/L  --  70   ALT U/L  --  8   AST U/L  --  15   GLUCOSE RANDOM mg/dL 162* 114                 Results from last 7 days   Lab Units 03/20/23  0522 03/19/23  1557   LACTIC ACID mmol/L  --  0 7   PROCALCITONIN ng/ml 0 09 0 08       Lines/Drains:  Invasive Devices     Peripheral Intravenous Line  Duration           Peripheral IV 03/19/23 Left Arm 1 day          Line  Duration           Venous Sheath Other (Comment) Right Other (Comment) 941 days                  Telemetry:  Telemetry Orders (From admission, onward)             48 Hour Telemetry Monitoring  Continuous x 48 hours        References:    Telemetry Guidelines   Question:  Reason for 48 Hour Telemetry  Answer:  Acute MI, chest pain - R/O MI, or unstable angina                            Imaging: Reviewed radiology reports from this admission including: cta     Recent Cultures (last 7 days):   Results from last 7 days   Lab Units 03/20/23  0851 03/19/23  1603 03/19/23  1558 03/19/23  1351   BLOOD CULTURE   --  Received in Microbiology Lab  Culture in Progress  Received in Microbiology Lab  Culture in Progress  --    GRAM STAIN RESULT  1+ Epithelial cells per low power field*  No polys seen*  1+ Gram positive cocci in pairs*  --   --   --    LEGIONELLA URINARY ANTIGEN   --   --   --  Negative       Last 24 Hours Medication List:   Current Facility-Administered Medications   Medication Dose Route Frequency Provider Last Rate   • acetaminophen  650 mg Oral Q6H PRN BERNADETTE Marie     • alendronate  70 mg Oral Q7 Days BERNADETTE Marie     • atorvastatin  5 mg Oral Daily With BERNADETTE Cazares     • azithromycin  500 mg Oral Q24H BERNADETTE Marie     • benzonatate  100 mg Oral TID PRN Shruthi Crawford MD     • calcium carbonate-vitamin D  1 tablet Oral BID With Meals BERNADETTE Marie     • cefTRIAXone  1,000 mg Intravenous Q24H BERNADETTE Marie 1,000 mg (03/19/23 2103)   • citalopram  40 mg Oral Daily BERNADETTE Marie     • enoxaparin  40 mg Subcutaneous Daily BERNADETTE Marie     • famotidine  10 mg Oral HS BERNADETTE Marie     • guaiFENesin  600 mg Oral BID BERNADETTE Marie     • ipratropium-albuterol  3 mL Nebulization TID BERNADETTE Marie     • levalbuterol  0 63 mg Nebulization Q6H PRN BERNADETTE Marie     • lisinopril  5 mg Oral Daily BERNADETTE Marie     • metoprolol tartrate  50 mg Oral Q12H BERNADETTE Marie     • multivitamin stress formula  1 tablet Oral Daily BERNADETTE Marie     • niacin  500 mg Oral Daily With BERNADETTE Cazares     • ondansetron  4 mg Intravenous Q6H PRN BERNADETTE Marie     • tamsulosin  0 4 mg Oral Daily With Raghavendra Gomez, CRNP          Today, Patient Was Seen By: Shruthi Crawford MD    **Please Note: This note may have been constructed using a voice recognition system  **

## 2023-03-20 NOTE — ASSESSMENT & PLAN NOTE
· Large hiatal hernia showed on CT  Daughter is not aware of this    · Suspect bottom up aspiration  · Start Pepcid at bedtime  · Consult GI

## 2023-03-20 NOTE — ASSESSMENT & PLAN NOTE
· 2D echo in June last year showed normal EF around 21%, grade 1 diastolic dysfunction, mild to moderate TR, PA pressure 51, mild AR    · Continue ACE inhibitor  · follows cardiology as outpatient

## 2023-03-20 NOTE — ASSESSMENT & PLAN NOTE
· Chronic issue per daughter  Takes diuretic at home at times    · BNP normal  · Check venous Doppler rule out DVT  · Elevation

## 2023-03-20 NOTE — PLAN OF CARE
Problem: MOBILITY - ADULT  Goal: Maintain or return to baseline ADL function  Description: INTERVENTIONS:  -  Assess patient's ability to carry out ADLs; assess patient's baseline for ADL function and identify physical deficits which impact ability to perform ADLs (bathing, care of mouth/teeth, toileting, grooming, dressing, etc )  - Assess/evaluate cause of self-care deficits   - Assess range of motion  - Assess patient's mobility; develop plan if impaired  - Assess patient's need for assistive devices and provide as appropriate  - Encourage maximum independence but intervene and supervise when necessary  - Involve family in performance of ADLs  - Assess for home care needs following discharge   - Consider OT consult to assist with ADL evaluation and planning for discharge  - Provide patient education as appropriate  Outcome: Progressing  Goal: Maintains/Returns to pre admission functional level  Description: INTERVENTIONS:  - Perform BMAT or MOVE assessment daily    - Set and communicate daily mobility goal to care team and patient/family/caregiver  - Collaborate with rehabilitation services on mobility goals if consulted  - Perform Range of Motion 2 times a day  - Reposition patient every 2 hours    - Dangle patient 2 times a day  - Stand patient 2 times a day  - Ambulate patient 2 times a day  - Out of bed to chair 2 times a day   - Out of bed for meals 2 times a day  - Out of bed for toileting  - Record patient progress and toleration of activity level   Outcome: Progressing     Problem: Prexisting or High Potential for Compromised Skin Integrity  Goal: Skin integrity is maintained or improved  Description: INTERVENTIONS:  - Identify patients at risk for skin breakdown  - Assess and monitor skin integrity  - Assess and monitor nutrition and hydration status  - Monitor labs   - Assess for incontinence   - Turn and reposition patient  - Assist with mobility/ambulation  - Relieve pressure over bony prominences  - Avoid friction and shearing  - Provide appropriate hygiene as needed including keeping skin clean and dry  - Evaluate need for skin moisturizer/barrier cream  - Collaborate with interdisciplinary team   - Patient/family teaching  - Consider wound care consult   Outcome: Progressing     Problem: SAFETY ADULT  Goal: Patient will remain free of falls  Description: INTERVENTIONS:  - Educate patient/family on patient safety including physical limitations  - Instruct patient to call for assistance with activity   - Consult OT/PT to assist with strengthening/mobility   - Keep Call bell within reach  - Keep bed low and locked with side rails adjusted as appropriate  - Keep care items and personal belongings within reach  - Initiate and maintain comfort rounds  - Make Fall Risk Sign visible to staff  - Offer Toileting every 2 Hours, in advance of need  - Initiate/Maintain bed alarm  - Obtain necessary fall risk management equipment: yellow socks  - Apply yellow socks and bracelet for high fall risk patients  - Consider moving patient to room near nurses station  Outcome: Progressing     Problem: DISCHARGE PLANNING  Goal: Discharge to home or other facility with appropriate resources  Description: INTERVENTIONS:  - Identify barriers to discharge w/patient and caregiver  - Arrange for needed discharge resources and transportation as appropriate  - Identify discharge learning needs (meds, wound care, etc )  - Arrange for interpretive services to assist at discharge as needed  - Refer to Case Management Department for coordinating discharge planning if the patient needs post-hospital services based on physician/advanced practitioner order or complex needs related to functional status, cognitive ability, or social support system  Outcome: Progressing     Problem: Knowledge Deficit  Goal: Patient/family/caregiver demonstrates understanding of disease process, treatment plan, medications, and discharge instructions  Description: Complete learning assessment and assess knowledge base    Interventions:  - Provide teaching at level of understanding  - Provide teaching via preferred learning methods  Outcome: Progressing     Problem: CARDIOVASCULAR - ADULT  Goal: Maintains optimal cardiac output and hemodynamic stability  Description: INTERVENTIONS:  - Monitor I/O, vital signs and rhythm  - Monitor for S/S and trends of decreased cardiac output  - Administer and titrate ordered vasoactive medications to optimize hemodynamic stability  - Assess quality of pulses, skin color and temperature  - Assess for signs of decreased coronary artery perfusion  - Instruct patient to report change in severity of symptoms  Outcome: Progressing  Goal: Absence of cardiac dysrhythmias or at baseline rhythm  Description: INTERVENTIONS:  - Continuous cardiac monitoring, vital signs, obtain 12 lead EKG if ordered  - Administer antiarrhythmic and heart rate control medications as ordered  - Monitor electrolytes and administer replacement therapy as ordered  Outcome: Progressing

## 2023-03-20 NOTE — ASSESSMENT & PLAN NOTE
· History of PE precipitated by femur fracture in 2019  · Status post IVC filter insertion and removal   Was on [facility]    · Pharmacologic  DVT prophylaxis Lovenox

## 2023-03-20 NOTE — ASSESSMENT & PLAN NOTE
Improving, 95% on 2 L  ·   · SPO2 91% on room air on ED arrival   At baseline 94-95% on room air at home per daughter  · Placed on oxygen 2 L in ED, satting high 90s  · Continue supplemental O2 sats of 92%    · Pulm follows outpatient, chronic pulmonary changes  · Consider home O2 eval

## 2023-03-20 NOTE — CONSULTS
Consultation - Pulmonary Medicine   Thom Mae 80 y o  male MRN: [id number]  Unit/Bed#: [address] [place] [address] Encounter: [id number]      Assessment:  Acute tracheobronchitis with possible mild pneumonia left lower lobe  Cough secondary to this  Urine for strep pneumonia antigen negative  Large hiatal hernia but patient denies any significant gastric reflux symptoms  Cannot exclude reflux with aspiration but patient may just have routine community-acquired tracheobronchitis with possible left lower lobe pneumonia  History of segmental right upper lobe pulmonary embolism after he had closed fracture of right femur  He did have right femur fracture surgery on 12/4/19  Acute respiratory insufficiency due to tracheobronchitis    Plan:   Concur with IV azithromycin and ceftriaxone  Neb treatments with DuoNebs have been ordered 3 times daily  Smell with his cough  Spoke with gastroenterology  She remained stable over next day could proceed with EGD Wednesday 3/23  Oxygen saturations are being monitored and is currently on oxygen will be adjusted to keep O2 saturation greater than 90%      History of Present Illness   Physician Requesting Consult: Jayne Dee MD  Reason for Consult / Principal Problem: pneumonia  Hx and PE limited by: none     HPI: Thom Mae is a 80y o  year old male who presented to the ER yesterday afternoon for cough for least 2 days  He had some low-grade fever at home and also family had reported he had some intermittent confusion  Showed room air O2 saturation ER was 91% and temperature 99 degrees  Chest x-ray showed no distinct infiltrates  A CTA PE study of chest was done as part of ER evaluation  No evidence of pulmonary embolism  There are some faint alveolar infiltrate left lower lobe and minimal infiltrate right base  Also there were tiny pleural effusions mild bronchial wall thickening in the lower lobes  Minimal faint infiltrate right base      CBC showed white blood cell count be minimally elevated at 7 2  Hemoglobin was 13 7 plate count 435  No significant shift  Procalcitonin level is negative x2    He has been started on IV ceftriaxone and azithromycin for pneumonia  Has been afebrile  On 2 L of oxygen O2 saturation has ranged from 91 to 98%  Does have history of hypertension, depression, hyperlipidemia, OCD and he has had pulmonary embolism back in December 2019  That time he had left femur fracture  He had a convertible  IVC filter placed on 12/7/2019  He had pulm embolism in segmental branch of the right upper lobe pulmonary artery  detected on 12/7/2019  He had a fall at home and had a closed acute fracture left proximal femoral shaft  Noted to have large hiatal hernia at that time  In August 2020 IVC filter was deactivated by IR by removing cap  Also had pneumonia in right lung and June 2022    He never smoked  Denies any difficulty swallowing  States expectorate small amount of clear mucus  Not having any rest of breath at present  Echocardiogram done in June 1357 showed LV systolic function to be normal with ejection fraction 60% grade was grade 1 diastolic dysfunction  Right ventricular systolic pressure was mildly elevated at 51 mmHg  Is a swab for influenza, RSV and COVID was negative  Review of Systems   Constitutional: Negative for chills, fever and unexpected weight change  HENT: Negative for congestion, rhinorrhea and sore throat  Eyes: Negative for discharge and redness  Respiratory: Positive for cough  Cardiovascular: Negative for chest pain, palpitations and leg swelling  Gastrointestinal: Negative for abdominal distention, abdominal pain and nausea  Endocrine: Negative for polydipsia and polyphagia  Genitourinary: Negative for dysuria  Musculoskeletal: Negative for joint swelling and myalgias  Skin: Negative for rash  Neurological: Negative for light-headedness     Psychiatric/Behavioral: Negative for decreased concentration  Historical Information   Past Medical History:   Diagnosis Date   • BPH (benign prostatic hyperplasia)    • Depression    • HTN (hypertension)    • Hyperlipidemia    • OCD (obsessive compulsive disorder)    • Pulmonary embolism ([facility] [address] ) 2019     Past Surgical History:   Procedure Laterality Date   • IR IVC FILTER PLACEMENT OPTIONAL/TEMPORARY  12/7/2019   • IR IVC FILTER REMOVAL  8/21/2020   • UT OPTX FEM SHFT FX W/INSJ IMED IMPLT W/WO SCREW Right 12/4/2019    Procedure: INSERTION NAIL IM FEMUR ANTEGRADE (TROCHANTERIC);   Surgeon: Prabhu Hdz DO;  Location: [facility] OR;  Service: Orthopedics   • UT OPTX FEM SHFT FX W/INSJ IMED IMPLT W/WO SCREW Left 6/17/2022    Procedure: INSERTION NAIL IM FEMUR ANTEGRADE (TROCHANTERIC) - long nail;  Surgeon: Eugenia Rolon DO;  Location: [facility];  Service: Orthopedics     Social History   Social History     Substance and Sexual Activity   Alcohol Use Never     Social History     Substance and Sexual Activity   Drug Use Never     Social History     Tobacco Use   Smoking Status Never   Smokeless Tobacco Never         Family History:   Family History   Problem Relation Age of Onset   • Cancer Mother        Meds/Allergies     Current Facility-Administered Medications:   •  acetaminophen (TYLENOL) tablet 650 mg, 650 mg, Oral, Q6H PRN, Yovany Perryrik, CRNP, 650 mg at 03/19/23 2102  •  alendronate (FOSAMAX) tablet 70 mg, 70 mg, Oral, Q7 Days, Yeseniaiphilippen Vickyrik, CRNP, 70 mg at 03/20/23 0604  •  atorvastatin (LIPITOR) tablet 5 mg, 5 mg, Oral, Daily With Dinner, Yovany Perryrik, CRNP, 5 mg at 03/20/23 1541  •  azithromycin (ZITHROMAX) tablet 500 mg, 500 mg, Oral, Q24H, Yeseniaimarcos Perryrik, CRNP, 500 mg at 03/19/23 2103  •  calcium carbonate-vitamin D 500 mg-5 mcg tablet 1 tablet, 1 tablet, Oral, BID With Meals, Yovany Perryrik, CRNP, 1 tablet at 03/20/23 1541  •  cefTRIAXone (ROCEPHIN) IVPB (premix in dextrose) 1,000 mg 50 mL, 1,000 mg, Intravenous, Q24H, BERNADETTE Marie, Last Rate: 100 mL/hr at 03/19/23 2103, 1,000 mg at 03/19/23 2103  •  citalopram (CeleXA) tablet 40 mg, 40 mg, Oral, Daily, BERNADETTE Marie, 40 mg at 03/20/23 0857  •  enoxaparin (LOVENOX) subcutaneous injection 40 mg, 40 mg, Subcutaneous, Daily, BERNADETTE Marie, 40 mg at 03/19/23 2146  •  famotidine (PEPCID) tablet 10 mg, 10 mg, Oral, HS, Yovany Lafleur, BERNADETTE, 10 mg at 03/19/23 2102  •  guaiFENesin (MUCINEX) 12 hr tablet 600 mg, 600 mg, Oral, BID, BERNADETTE Marie, 600 mg at 03/20/23 0856  •  ipratropium-albuterol (DUO-NEB) 0 5-2 5 mg/3 mL inhalation solution 3 mL, 3 mL, Nebulization, TID, BERNADETTE Marie, 3 mL at 03/20/23 1316  •  levalbuterol (XOPENEX) inhalation solution 0 63 mg, 0 63 mg, Nebulization, Q6H PRN, BERNADETTE Marie  •  lisinopril (ZESTRIL) tablet 5 mg, 5 mg, Oral, Daily, BERNADETTE Marie, 5 mg at 03/20/23 0856  •  metoprolol tartrate (LOPRESSOR) tablet 50 mg, 50 mg, Oral, Q12H, BERNADETTE Marie, 50 mg at 03/20/23 3236  •  multivitamin stress formula tablet 1 tablet, 1 tablet, Oral, Daily, BERNADETTE Marie, 1 tablet at 03/20/23 2308  •  niacin (NIASPAN) CR tablet 500 mg, 500 mg, Oral, Daily With Dinner, BERNADETTE Marie, 500 mg at 03/20/23 1542  •  ondansetron (ZOFRAN) injection 4 mg, 4 mg, Intravenous, Q6H PRN, BERNADETTE Marie  •  tamsulosin (FLOMAX) capsule 0 4 mg, 0 4 mg, Oral, Daily With Dinner, BERNADETTE Marie, 0 4 mg at 03/20/23 1541    Prior to Admission medications    Medication Sig Start Date End Date Taking?  Authorizing Provider   atorvastatin (LIPITOR) 10 mg tablet Half tab daily  Patient taking differently: Take 5 mg by mouth daily with dinner Half tab daily 5/27/22  Yes Jenna Riggs MD   Calcium Carb-Cholecalciferol (CALTRATE 600+D3 PO) Take 600 mg by mouth 2 (two) times a day   Yes Historical Provider, MD   citalopram (CeleXA) 40 mg tablet Take 1 tablet (40 mg total) by mouth daily 5/27/22  Yes Jenna Riggs MD   fosinopril (MONOPRIL) 10 mg tablet TAKE 1/2 TABLET BY MOUTH DAILY 6/20/22  Yes Judy Fritz MD   metoprolol tartrate (LOPRESSOR) 50 mg tablet Take 1 tablet (50 mg total) by mouth every 12 (twelve) hours 9/2/22  Yes Judy Fritz MD   Multiple Vitamin (MULTIVITAMIN) tablet Take 1 tablet by mouth daily   Yes Historical Provider, MD   niacin (NIASPAN) 500 mg CR tablet TAKE ONE TABLET DAILY AT BEDTIME  Patient taking differently: 500 mg daily with dinner 2/10/23  Yes Judy Fritz MD   tamsulosin (FLOMAX) 0 4 mg Take 0 4 mg by mouth daily with dinner   Yes Historical Provider, MD   alendronate (Fosamax) 70 mg tablet Take 1 tablet (70 mg total) by mouth every 7 days  Patient taking differently: Take 70 mg by mouth every 7 days On Monday 1/23/23   Judy Fritz MD       No Known Allergies    Objective   Vitals: Blood pressure 124/72, pulse 89, temperature (!) 97 4 °F (36 3 °C), resp  rate 18, height 5' 9" (1 753 m), weight 86 4 kg (190 lb 7 6 oz), SpO2 92 %  ,Body mass index is 28 13 kg/m²  Intake/Output Summary (Last 24 hours) at 3/20/2023 1614  Last data filed at 3/20/2023 1300  Gross per 24 hour   Intake 270 ml   Output 425 ml   Net -155 ml     Invasive Devices     Peripheral Intravenous Line  Duration           Peripheral IV 03/19/23 Left Arm 1 day          Line  Duration           Venous Sheath Other (Comment) Right Other (Comment) 941 days                Physical Exam  Vitals reviewed  Constitutional:       General: He is not in acute distress  Appearance: He is well-developed  Comments: On 2 l/m nc O2 -91 to 92%   HENT:      Head: Normocephalic  Right Ear: External ear normal       Left Ear: External ear normal       Nose: Nose normal       Mouth/Throat:      Pharynx: No oropharyngeal exudate  Eyes:      Conjunctiva/sclera: Conjunctivae normal       Pupils: Pupils are equal, round, and reactive to light  Neck:      Vascular: No JVD  Trachea: No tracheal deviation     Cardiovascular:      Rate and Rhythm: Normal rate and regular rhythm  Heart sounds: Normal heart sounds  Pulmonary:      Effort: Pulmonary effort is normal       Comments: Lung sounds are clear  No wheezing, crackles, or rhonchi  Abdominal:      General: There is no distension  Palpations: Abdomen is soft  Tenderness: There is no abdominal tenderness  There is no guarding  Musculoskeletal:      Cervical back: Neck supple  Comments: Trace edema, no cyanosis or clubbing   Lymphadenopathy:      Cervical: No cervical adenopathy  Skin:     General: Skin is warm and dry  Findings: No rash  Neurological:      Mental Status: He is alert and oriented to person, place, and time  Psychiatric:         Behavior: Behavior normal          Thought Content:  Thought content normal          Lab Results: ABG:   Lab Results   Component Value Date    PHART 7 397 03/19/2023    LUI5WRM 43 1 03/19/2023    PO2ART 188 9 (H) 03/19/2023    LNK7DHT 25 9 03/19/2023    BEART 0 8 03/19/2023    SOURCE Radial, Right 03/19/2023   , BNP:   Lab Results   Component Value Date    BNP 92 03/19/2023   , CBC:   Lab Results   Component Value Date    WBC 10 24 (H) 03/19/2023    HGB 13 7 03/19/2023    HCT 40 7 03/19/2023    MCV 97 03/19/2023     03/19/2023    MCH 32 5 03/19/2023    MCHC 33 7 03/19/2023    RDW 13 4 03/19/2023    MPV 9 0 03/19/2023    NRBC 0 03/19/2023   , CMP:   Lab Results   Component Value Date     12/21/2015    K 4 4 03/20/2023    K 4 6 12/21/2015     03/20/2023     12/21/2015    CO2 28 03/20/2023    CO2 30 (H) 12/21/2015    ANIONGAP 11 9 12/21/2015    BUN 21 03/20/2023    BUN 13 12/21/2015    CREATININE 1 00 03/20/2023    CREATININE 0 9 12/21/2015    GLUCOSE 93 12/21/2015    CALCIUM 8 3 (L) 03/20/2023    CALCIUM 8 7 12/21/2015    AST 15 03/19/2023    AST 20 12/21/2015    ALT 8 03/19/2023    ALT 22 12/21/2015    ALKPHOS 70 03/19/2023    ALKPHOS 82 12/21/2015    PROT 6 9 12/21/2015    BILITOT 0 6 12/21/2015 EGFR 69 03/20/2023   , PT/INR:   Lab Results   Component Value Date    INR 1 25 (H) 06/16/2022   , Troponin: No results found for: TROPONIN    Imaging Studies: I have personally reviewed pertinent reports  and I have personally reviewed pertinent films in PACS    EKG, Pathology, and Other Studies: I have personally reviewed pertinent reports  VTE Prophylaxis: Enoxaparin (Lovenox)    Code Status: Level 1 - Full Code    Counseling/Coordination of Care: Total floor / unit time spent today 25 minutes  Greater than 50% of total time was spent with the patient and / or family counseling and / or coordination of care   A description of the counseling / coordination of care: Review chart and CT of chest   I discussed diagnosis with patient

## 2023-03-20 NOTE — SPEECH THERAPY NOTE
Speech-Language Pathology Bedside Swallow Evaluation      Patient Name: Arcelia James    SNEBU'D Date: 3/20/2023     Problem List  Principal Problem:    Acute cough  Active Problems:    Essential hypertension    Recurrent major depressive disorder, in full remission ([facility] [address] )    Hyperlipidemia    History of pulmonary embolism    Valvular heart disease    Paroxysmal atrial flutter (HCC)    Bladder wall thickening    Bilateral leg edema    Acute respiratory insufficiency    Hiatal hernia      Past Medical History  Past Medical History:   Diagnosis Date   • BPH (benign prostatic hyperplasia)    • Depression    • HTN (hypertension)    • Hyperlipidemia    • OCD (obsessive compulsive disorder)    • [facility]       Summary   Pt presented with functional appearing oral and pharyngeal stage swallowing skills with materials administered today  Recommended Diet: regular diet and thin liquids   Recommended Form of Meds: whole with liquid   Other Recommendations: Continue frequent oral care        Current Medical Status  Fabienne Son is an 79 yo M c PMH of hypertension, hyperlipidemia, depression, OCD, BPH, leg edema, paroxysmal atrial flutter, PE who presents with productive cough since Tuesday or Wednesday last week, fever 100 2 at home   CT showed no pulmonary embolus but diffuse bronchial wall thickening, greatest in the lower lobes, with subtle bilateral lower lobe tree-in-bud nodularity compatible with bronchitis/bronchiolitis   However, the large hiatal hernia and lower lobe distribution raises the possibility of aspiiration  GI & SLP consulted at this time      Current Precautions:  Aspiration     Allergies:  No known food allergies    Past medical history:  Please see H&P for details    Special Studies:  3/19 CTA chest PE study (see above)    Social/Education/Vocational Hx:  Pt lives with family    Swallow Information   Current Risks for Dysphagia & Aspiration: large hiatal hernia  Current Diet: NPO Baseline Diet: regular diet and thin liquids      Baseline Assessment   Behavior/Cognition: alert  Speech/Language Status: able to participate in conversation  Patient Positioning: upright in chair  Pain Status/Interventions/Response to Interventions: No report of or nonverbal indications of pain  Swallow Mechanism Exam  Facial: symmetrical  Labial: WFL  Lingual: WFL  Velum: symmetrical  Mandible: adequate ROM  Dentition: adequate  Vocal quality:clear/adequate   Respiratory Status: on 2L O2      Consistencies Assessed and Performance   Consistencies Administered: thin liquids, puree, soft solids and hard solids  Materials administered included water, applesauce, fig vasquez, sandwich    Oral Stage:   Mastication was adequate with the materials administered today  Bolus formation and transfer were functional with no significant oral residue noted  No overt s/s reduced oral control  Pharyngeal Stage:   Swallow Mechanics:  Swallowing initiation appeared prompt  Laryngeal rise was palpated and judged to be within functional limits  No coughing, throat clearing, change in vocal quality or respiratory status noted today  Esophageal Concerns: known large hiatal hernia and admits to occasions of regurgitation    Summary and Recommendations (see above)    Results Reviewed with: patient, RN and MD     Treatment Recommended: None at this time     Thank you for this referral   Please do not hesitate to contact me with any questions or concerns      Lydia Rivera [facility]   Speech Pathologist  [place] License [id number]  [place] License [id number]  Available via [facility] Text

## 2023-03-20 NOTE — ASSESSMENT & PLAN NOTE
· Large hiatal hernia showed on CT  Daughter is not aware of this    · Start Pepcid at bedtime  · GI recs  · HOB elevation

## 2023-03-20 NOTE — ASSESSMENT & PLAN NOTE
Patient presents with productive cough since Tuesday or Wednesday last week, fever 100 2 this morning at home  · COVID flu RSV negative  · CTA chest PE study showed- No PE  Diffuse bronchial wall thickening, greatest in the lower lobes, with subtle bilateral lower lobe tree-in-bud nodularity compatible with bronchitis/bronchiolitis  However, the large hiatal hernia and lower lobe distribution raises the possibility of aspiration    · Chest x-ray pending read  · BNP normal  · Suspect bronchitis/bronchiolitis, possible aspiration pneumonia  · Given IV Lasix, Unasyn, Solu-Medrol in ED  · We will start Rocephin, Zithromax  · Hold steroids as no wheezing  on auscultation  · Check urine antigens, sputum Gram stain culture  · Check procalcitonin  · Start Mucinex  · Start Pepcid at bedtime  · DuoNeb 3 times daily  · Speech eval  · Consult pulmonary   · Consult GI for large hiatal hernia

## 2023-03-20 NOTE — ASSESSMENT & PLAN NOTE
· SPO2 91% on room air on ED arrival   At baseline 94-95% on room air at home per daughter  · Placed on oxygen 2 L in ED, satting high 90s  · Continue supplemental oxygen to maintain sats of 92%

## 2023-03-20 NOTE — H&P
Kiana 45  H&P- Rajwinder Marks 1940, 80 y o  male MRN: [id number]  Unit/Bed#: [address] [place] [address] Encounter: [id number]  Primary Care Provider: Trish Ho MD   Date and time admitted to hospital: 3/19/2023  3:10 PM    * Acute cough  Assessment & Plan  Patient presents with productive cough since Tuesday or Wednesday last week, fever 100 2 this morning at home  · COVID flu RSV negative  · CTA chest PE study showed- No PE  Diffuse bronchial wall thickening, greatest in the lower lobes, with subtle bilateral lower lobe tree-in-bud nodularity compatible with bronchitis/bronchiolitis  However, the large hiatal hernia and lower lobe distribution raises the possibility of aspiration  · Chest x-ray pending read  · BNP normal  · Suspect bronchitis/bronchiolitis, possible aspiration pneumonia  · Given IV Lasix, Unasyn, Solu-Medrol in ED  · We will start Rocephin, Zithromax  · Hold steroids as no wheezing  on auscultation  · Check urine antigens, sputum Gram stain culture  · Check procalcitonin  · Start Mucinex  · Start Pepcid at bedtime  · DuoNeb 3 times daily  · Speech eval  · Consult pulmonary   · Consult GI for large hiatal hernia    Acute respiratory insufficiency  Assessment & Plan  · SPO2 91% on room air on ED arrival   At baseline 94-95% on room air at home per daughter  · Placed on oxygen 2 L in ED, satting high 90s  · Continue supplemental oxygen to maintain sats of 92%  Hiatal hernia  Assessment & Plan  · Large hiatal hernia showed on CT  Daughter is not aware of this  · Suspect bottom up aspiration  · Start Pepcid at bedtime  · Consult GI    Bilateral leg edema  Assessment & Plan  · Chronic issue per daughter  Takes diuretic at home at times    · BNP normal  · Check venous Doppler rule out DVT  · Elevation    Bladder wall thickening  Assessment & Plan  · Suspect mild BPH  · Continue Flomax  · Patient follows urology as outpatient    Paroxysmal atrial flutter [facility])  Assessment & Plan  · On metoprolol at home  Not on AC  · EKG NSR  · Continue metoprolol  · Optimize electrolytes    Valvular heart disease  Assessment & Plan  · 2D echo in June last year showed normal EF around 96%, grade 1 diastolic dysfunction, mild to moderate TR, PA pressure 51, mild AR  · Continue ACE inhibitor  · Patient follows cardiology as outpatient    History of pulmonary embolism  Assessment & Plan  · History of PE precipitated by femur fracture in 2019  · Status post IVC filter insertion and removal   Was on [facility] for short period of time  · Pharmacologic  DVT prophylaxis    Hyperlipidemia  Assessment & Plan  · Continue statin    Recurrent major depressive disorder, in full remission ([facility] [address] )  Assessment & Plan  · Continue Celexa  · Has history of OCD as well per daughter  Essential hypertension  Assessment & Plan  · On metoprolol, Monopril at home  · Continue metoprolol  · Substitute Monopril with lisinopril with holding parameter  · BP stable        VTE Prophylaxis: Enoxaparin (Lovenox)  / reason for no mechanical VTE prophylaxis Leg edema   Code Status: Full code  POLST: POLST form is not discussed and not completed at this time  Anticipated Length of Stay:  Patient will be admitted on an observation basis with an anticipated length of stay of  < 2 midnights  Justification for Hospital Stay: Acute cough, respiratory insufficiency    Total Time for Visit, including Counseling / Coordination of Care: 45 minutes  Greater than 50% of this total time spent on direct patient counseling and coordination of care  Chief Complaint:   Cough with phlegm since Tuesday or Wednesday last week    History of Present Illness:    Tre Nova is a 80 y o  male with PMH of hypertension, hyperlipidemia, depression, OCD, BPH, leg edema, paroxysmal atrial flutter, PE who presents with productive cough since Tuesday or Wednesday last week, fever 100 2 this morning at home  Patient reports white phlegm  Denies sore throat  Reports chronic runny nose/postnasal drip  Patient denies chest pain, SOB, nausea vomiting diarrhea constipation  Reports headache this morning at home  Patient reports taking Robitussin at home for his symptoms  Patient offers no other complaints  Bg Goode Spoke to daughter over the phone  Verified home medications  Daughter is not aware patient has hiatal hernia  Patient lives with wife, Rajwinder Smith with tatiana  Review of Systems:    Review of Systems   Constitutional: Positive for fever  HENT: Positive for postnasal drip, rhinorrhea and voice change  Respiratory: Positive for cough  All other systems reviewed and are negative  Past Medical and Surgical History:     Past Medical History:   Diagnosis Date   • BPH (benign prostatic hyperplasia)    • Depression    • HTN (hypertension)    • Hyperlipidemia    • OCD (obsessive compulsive disorder)    • Pulmonary embolism ([facility] [address] ) 2019       Past Surgical History:   Procedure Laterality Date   • IR IVC FILTER PLACEMENT OPTIONAL/TEMPORARY  12/7/2019   • IR IVC FILTER REMOVAL  8/21/2020   • MI OPTX FEM SHFT FX W/INSJ IMED IMPLT W/WO SCREW Right 12/4/2019    Procedure: INSERTION NAIL IM FEMUR ANTEGRADE (TROCHANTERIC); Surgeon: Keven Leija DO;  Location: [facility];  Service: Orthopedics   • MI OPTX FEM SHFT FX W/INSJ IMED IMPLT W/WO SCREW Left 6/17/2022    Procedure: INSERTION NAIL IM FEMUR ANTEGRADE (TROCHANTERIC) - long nail;  Surgeon: Shira Morales DO;  Location: [facility];  Service: Orthopedics       Meds/Allergies:    Prior to Admission medications    Medication Sig Start Date End Date Taking?  Authorizing Provider   atorvastatin (LIPITOR) 10 mg tablet Half tab daily  Patient taking differently: Take 5 mg by mouth daily with dinner Half tab daily 5/27/22  Yes Kira Fonseca MD   Calcium Carb-Cholecalciferol (CALTRATE 600+D3 PO) Take 600 mg by mouth 2 (two) times a day   Yes Historical Provider, MD   citalopram (CeleXA) 40 mg tablet Take 1 tablet (40 mg total) by mouth daily 5/27/22  Yes Dre Santiago MD   fosinopril (MONOPRIL) 10 mg tablet TAKE 1/2 TABLET BY MOUTH DAILY 6/20/22  Yes Dre Santiago MD   metoprolol tartrate (LOPRESSOR) 50 mg tablet Take 1 tablet (50 mg total) by mouth every 12 (twelve) hours 9/2/22  Yes Dre Santiago MD   Multiple Vitamin (MULTIVITAMIN) tablet Take 1 tablet by mouth daily   Yes Historical Provider, MD   niacin (NIASPAN) 500 mg CR tablet TAKE ONE TABLET DAILY AT BEDTIME  Patient taking differently: 500 mg daily with dinner 2/10/23  Yes Dre Santiago MD   tamsulosin (FLOMAX) 0 4 mg Take 0 4 mg by mouth daily with dinner   Yes Historical Provider, MD   alendronate (Fosamax) 70 mg tablet Take 1 tablet (70 mg total) by mouth every 7 days  Patient taking differently: Take 70 mg by mouth every 7 days On Monday 1/23/23   Dre Santiago MD     I have reviewed home medications with patient family member  Allergies: No Known Allergies    Social History:     Marital Status: /Civil Union   Occupation: Retired  Patient Pre-hospital Living Situation: Wife  Patient Pre-hospital Level of Mobility: Walker  Patient Pre-hospital Diet Restrictions: Cardiac diet  Substance Use History:   Social History     Substance and Sexual Activity   Alcohol Use Never     Social History     Tobacco Use   Smoking Status Never   Smokeless Tobacco Never     Social History     Substance and Sexual Activity   Drug Use Never       Family History:    non-contributory    Physical Exam:     Vitals:   Blood Pressure: (!) 176/73 (03/19/23 2031)  Pulse: 90 (03/19/23 2031)  Temperature: 98 1 °F (36 7 °C) (03/19/23 2031)  Temp Source: Oral (03/19/23 1451)  Respirations: 17 (03/19/23 2031)  Weight - Scale: 86 4 kg (190 lb 7 6 oz) (03/19/23 2031)  SpO2: 92 % (03/19/23 2031)    Physical Exam  Vitals and nursing note reviewed  Constitutional:       Appearance: He is well-developed        Comments: Appears overweight   HENT:      Head: Normocephalic and atraumatic  Mouth/Throat:      Comments: Hoarse voice  Neck:      Thyroid: No thyromegaly  Vascular: No JVD  Trachea: No tracheal deviation  Cardiovascular:      Rate and Rhythm: Normal rate and regular rhythm  Heart sounds: Normal heart sounds  Pulmonary:      Effort: Pulmonary effort is normal  No respiratory distress  Breath sounds: No wheezing or rales  Comments: Diminished breath sound bilateral, on oxygen 1 L, satting 94%  Abdominal:      General: Bowel sounds are normal  There is no distension  Palpations: Abdomen is soft  Tenderness: There is no abdominal tenderness  There is no guarding  Musculoskeletal:         General: Swelling present  Cervical back: Neck supple  Right lower leg: Edema present  Left lower leg: Edema present  Comments: 1-2 + leg edema bilaterally   Skin:     General: Skin is warm and dry  Neurological:      General: No focal deficit present  Mental Status: He is alert and oriented to person, place, and time  Psychiatric:         Mood and Affect: Mood normal          Judgment: Judgment normal          Additional Data:     Lab Results: I have personally reviewed pertinent reports  Results from last 7 days   Lab Units 03/19/23  1557   WBC Thousand/uL 10 24*   HEMOGLOBIN g/dL 13 7   HEMATOCRIT % 40 7   PLATELETS Thousands/uL 208   NEUTROS PCT % 75   LYMPHS PCT % 8*   MONOS PCT % 13*   EOS PCT % 2     Results from last 7 days   Lab Units 03/19/23  1557   POTASSIUM mmol/L 4 8   CHLORIDE mmol/L 100   CO2 mmol/L 28   BUN mg/dL 21   CREATININE mg/dL 1 05   CALCIUM mg/dL 8 5   ALK PHOS U/L 70   ALT U/L 8   AST U/L 15           Imaging: I have personally reviewed pertinent reports  CTA ED chest PE study    Result Date: 3/19/2023  Narrative: CTA - CHEST WITH IV CONTRAST - PULMONARY ANGIOGRAM INDICATION:   hypoxia/SOB    Per my review of the medical record, the patient presents with 2 days of progressive cough, intermittent confusion, headache, low-grade temperature  Weakness  COMPARISON: CXR 3/19/2022 and chest CT 9/29/2022, 6/16/2022 and 12/7/2019  TECHNIQUE: CT angiogram timed for optimal opacification of the pulmonary arteries  Axial, sagittal, and coronal 2D reformats created from source data  Coronal 3D MIP postprocessing on the acquisition scanner  Radiation dose length product (DLP):  732 65 mGy-cm   Radiation dose exposure minimized using iterative reconstruction and automated exposure control  IV Contrast:  85 mL of iohexol (OMNIPAQUE)  FINDINGS: PULMONARY ARTERIES:  No pulmonary embolus  LUNGS:  Mild dependent lower lobe atelectasis  Mild basilar predominant juxtapleural reticulation  AIRWAYS: Extensive bronchial wall thickening, greatest in the lower lobes with subtle bilateral lower lobe tree-in-bud nodularity  PLEURA:  Small pleural effusions, similar to September 2022  HEART/GREAT VESSELS:  Mild cardiomegaly  Mild coronary artery calcification indicating atherosclerotic heart disease  MEDIASTINUM AND JACQUIE:  Large hiatal hernia  Minimally enlarged subcarinal node, reactive  CHEST WALL AND LOWER NECK: Unremarkable  UPPER ABDOMEN:  Unremarkable  OSSEOUS STRUCTURES:  Mild degenerative disease in the spine  Healed bilateral rib fractures  Multiple stable compression deformities in the thoracic spine  Impression: No pulmonary embolus  Diffuse bronchial wall thickening, greatest in the lower lobes, with subtle bilateral lower lobe tree-in-bud nodularity compatible with bronchitis/bronchiolitis  However, the large hiatal hernia and lower lobe distribution raises the possibility of aspiration  The study was marked in [facility] for immediate notification   Workstation performed: [id number]       EKG, Pathology, and Other Studies Reviewed on Admission:   · EKG: sr    Allscripts Records Reviewed: Yes     ** Please Note: Dragon 360 Dictation voice to text software may have been used in the creation of this document   **

## 2023-03-20 NOTE — ASSESSMENT & PLAN NOTE
· Chronic issue per daughter    Intermittent diuretic use  · BNP normal  · venous Doppler rule out DVT- BLLE negative for DVT, thrombophlebitis or incompetent valves  · Elevation

## 2023-03-21 RX ORDER — MAGNESIUM SULFATE HEPTAHYDRATE 40 MG/ML
2 INJECTION, SOLUTION INTRAVENOUS ONCE
Status: COMPLETED | OUTPATIENT
Start: 2023-03-21 | End: 2023-03-21

## 2023-03-21 RX ADMIN — METOPROLOL TARTRATE 50 MG: 50 TABLET, FILM COATED ORAL at 19:33

## 2023-03-21 RX ADMIN — TAMSULOSIN HYDROCHLORIDE 0.4 MG: 0.4 CAPSULE ORAL at 16:54

## 2023-03-21 RX ADMIN — METOPROLOL TARTRATE 50 MG: 50 TABLET, FILM COATED ORAL at 08:15

## 2023-03-21 RX ADMIN — LISINOPRIL 5 MG: 5 TABLET ORAL at 08:16

## 2023-03-21 RX ADMIN — Medication 1 TABLET: at 16:54

## 2023-03-21 RX ADMIN — GUAIFENESIN 600 MG: 600 TABLET, EXTENDED RELEASE ORAL at 08:15

## 2023-03-21 RX ADMIN — MAGNESIUM SULFATE HEPTAHYDRATE 2 G: 40 INJECTION, SOLUTION INTRAVENOUS at 09:56

## 2023-03-21 RX ADMIN — IPRATROPIUM BROMIDE AND ALBUTEROL SULFATE 3 ML: 2.5; .5 SOLUTION RESPIRATORY (INHALATION) at 20:07

## 2023-03-21 RX ADMIN — ENOXAPARIN SODIUM 40 MG: 40 INJECTION SUBCUTANEOUS at 20:13

## 2023-03-21 RX ADMIN — B-COMPLEX W/ C & FOLIC ACID TAB 1 TABLET: TAB at 08:15

## 2023-03-21 RX ADMIN — MAGNESIUM OXIDE TAB 400 MG (241.3 MG ELEMENTAL MG) 400 MG: 400 (241.3 MG) TAB at 17:01

## 2023-03-21 RX ADMIN — GUAIFENESIN 600 MG: 600 TABLET, EXTENDED RELEASE ORAL at 17:01

## 2023-03-21 RX ADMIN — IPRATROPIUM BROMIDE AND ALBUTEROL SULFATE 3 ML: 2.5; .5 SOLUTION RESPIRATORY (INHALATION) at 13:40

## 2023-03-21 RX ADMIN — IPRATROPIUM BROMIDE AND ALBUTEROL SULFATE 3 ML: 2.5; .5 SOLUTION RESPIRATORY (INHALATION) at 08:00

## 2023-03-21 RX ADMIN — CEFTRIAXONE 1000 MG: 1 INJECTION, SOLUTION INTRAVENOUS at 19:33

## 2023-03-21 RX ADMIN — Medication 1 TABLET: at 08:15

## 2023-03-21 RX ADMIN — LORATADINE 10 MG: 10 TABLET ORAL at 08:15

## 2023-03-21 RX ADMIN — CITALOPRAM HYDROBROMIDE 40 MG: 20 TABLET ORAL at 08:16

## 2023-03-21 RX ADMIN — NIACIN 500 MG: 500 TABLET, EXTENDED RELEASE ORAL at 16:54

## 2023-03-21 RX ADMIN — AZITHROMYCIN MONOHYDRATE 500 MG: 250 TABLET ORAL at 19:33

## 2023-03-21 RX ADMIN — ATORVASTATIN CALCIUM 5 MG: 10 TABLET, FILM COATED ORAL at 16:54

## 2023-03-21 RX ADMIN — FLUTICASONE PROPIONATE 1 SPRAY: 50 SPRAY, METERED NASAL at 08:52

## 2023-03-21 RX ADMIN — FAMOTIDINE 10 MG: 20 TABLET, FILM COATED ORAL at 20:13

## 2023-03-21 RX ADMIN — MAGNESIUM OXIDE TAB 400 MG (241.3 MG ELEMENTAL MG) 400 MG: 400 (241.3 MG) TAB at 08:15

## 2023-03-21 NOTE — ASSESSMENT & PLAN NOTE
· History of PE precipitated by femur fracture in 2019  · Status post IVC filter insertion and removal   Was on [facility] for short period of time    · Pharmacologic  DVT prophylaxis Lovenox

## 2023-03-21 NOTE — ASSESSMENT & PLAN NOTE
Large hiatal hernia showed on CT  Daughter is not aware of this    Suspect risk factor for aspiration  · Continue Pepcid at bedtime  · GI following, input appreciated, plan for EGD today  · Aspiration precautions

## 2023-03-21 NOTE — PROGRESS NOTES
Progress Note - Pulmonary   Enoc Oakley 80 y o  male MRN: [id number]  Unit/Bed#: [address] [facility] 413-01 Encounter: [id number]    Assessment/Plan:    1  Acute respiratory insufficiency likely multifactorial as listed below  - may be secondary to bottom up aspiration   -Continue to wean oxygen as tolerated  Titrate to maintain oxygen saturations greater than 88%  He is currently on RA, he does not wear home O2   -Pulmonary toilet: cough and deep breathe, incentive spirometer, OOB to chair as tolerated  - Will add flutter valve for retained secretions     2  Acute tracheobronchitis with possible mild pneumonia of left lower lobe  - Continue azithromycin PO every 24 hours  - Continue ceftriaxone IV every 24 hours, today is #3/7 days   - continue Mucinex every 12 hours  - Continue duo-neb three times daily     3  Large hiatal hernia   - GI consulted   - Plan for upper endoscopy on 3/22  - speech consulted  - Continue aspiration precautions     4  History of right upper lobe segmental pulmonary embolism   - s/p femur fracture in 2019  - s/p IVC filter with removal  - not on chronic anticoagulation     Chief Complaint:    "I'm feeling good "    Subjective:    Patient seen and examined laying in bed  He has no complaints at this time  No overnight events reported  He is currently on RA  Objective:    Vitals: Blood pressure 140/65, pulse 84, temperature 97 8 °F (36 6 °C), resp  rate 18, height 5' 9" (1 753 m), weight 86 4 kg (190 lb 7 6 oz), SpO2 92 %  RA,Body mass index is 28 13 kg/m²  Intake/Output Summary (Last 24 hours) at 3/21/2023 1623  Last data filed at 3/21/2023 1318  Gross per 24 hour   Intake 600 ml   Output --   Net 600 ml       Invasive Devices     Peripheral Intravenous Line  Duration           Peripheral IV 03/19/23 Left Arm 2 days          Line  Duration           Venous Sheath Other (Comment) Right Other (Comment) 942 days                  Physical Exam  Vitals reviewed     Constitutional:       General: He is not in acute distress  Appearance: He is not ill-appearing or toxic-appearing  HENT:      Head: Normocephalic and atraumatic  Right Ear: Decreased hearing noted  Left Ear: Decreased hearing noted  Nose: Nose normal       Mouth/Throat:      Pharynx: Oropharynx is clear  Eyes:      Conjunctiva/sclera: Conjunctivae normal    Cardiovascular:      Rate and Rhythm: Normal rate and regular rhythm  Heart sounds: Normal heart sounds  Pulmonary:      Effort: No tachypnea, accessory muscle usage, respiratory distress or retractions  Breath sounds: Normal air entry  No stridor or decreased air movement  Wheezing (scant, faint, expiratory) and rhonchi (scattered throughout) present  No decreased breath sounds or rales  Chest:      Chest wall: No tenderness  Abdominal:      Palpations: Abdomen is soft  Musculoskeletal:      Cervical back: Normal range of motion  Right lower leg: Edema present  Left lower leg: Edema present  Skin:     General: Skin is warm and dry  Neurological:      General: No focal deficit present  Mental Status: He is alert and oriented to person, place, and time  Psychiatric:         Mood and Affect: Mood normal          Behavior: Behavior normal          Labs: I have personally reviewed pertinent lab results  Imaging and other studies: I have personally reviewed pertinent reports

## 2023-03-21 NOTE — ASSESSMENT & PLAN NOTE
Patient presents with productive cough since Tuesday or Wednesday last week, fever 100 2 this morning at home  COVID flu RSV negative  CTA chest PE study showed- No PE  Diffuse bronchial wall thickening, greatest in the lower lobes, with subtle bilateral lower lobe tree-in-bud nodularity compatible with bronchitis/bronchiolitis  However, the large hiatal hernia and lower lobe distribution raises the possibility of aspiration    Suspect bronchitis/bronchiolitis, possible aspiration pneumonia  Remains afebrile, continues to improve   · Given IV Lasix, Unasyn, Solu-Medrol in ED  · Continue Rocephin, Zithromax  · Status post IV Solu-Medrol 40 mg x 1, transition to prednisone  · Urine Legionella pneumococcal antigen negative  · , sputum Gram stain culture- mixed respiratory spencer  · procalcitonin-normal  · Mucinex/Robitussin/Tessalon Perles  · DuoNeb   · Speech eval- recommended regular thin diet  · Pulmonary following, input appreciated

## 2023-03-21 NOTE — ASSESSMENT & PLAN NOTE
Improving, 95% on 2 L  SPO2 91% on room air on ED arrival   At baseline 94-95% on room air at home per daughter  · Placed on oxygen 2 L in ED, satting high 90s  · Continue supplemental O2 sats of 92%    · Pulm follows outpatient, chronic pulmonary changes  · Incentive spirometry  · Taper oxygen as tolerated

## 2023-03-21 NOTE — PROGRESS NOTES
Progress Note - Arcelia James 80 y o  male MRN: [id number]    Unit/Bed#: [address] [place] [address] Encounter: [id number]        Assessment/Plan:  Hiatal hernia  • Large hiatal hernia showed on CT   Pt and wife are not aware of this  • Could have bottom up aspiration, speech did bedside eval and patient had oropharyngeal swallow within functional limits  • COVID flu RSV were negative, consider aspiration pneumonia  • Continue antibiotics  • Patient denies any significant reflux symptoms but was started on Pepcid per primary team at night  • Spoke with pulmonary yesterday and plan was tentatively for upper endoscopy tomorrow, they feel he has acute tracheobronchitis with possible mild pneumonia of the left lower lobe, cannot exclude reflux with aspiration or could just be community-acquired, continue antibiotics/neb treatments, will, sats remain in the mid to low 90s on 2 L of oxygen  • Can keep n p o  after midnight and reassess pulmonary status in a m  for possible EGD  • Continue regular diet as tolerated       Subjective:   Patient is lying in bed  Still with a cough and does have some expectoration of mucus  Wife is at bedside  Patient denies any significant GI complaints and is tolerating a diet well      Objective:     Vitals: /73   Pulse 79   Temp 97 9 °F (36 6 °C) (Oral)   Resp 17   Ht 5' 9" (1 753 m)   Wt 86 4 kg (190 lb 7 6 oz)   SpO2 94%   BMI 28 13 kg/m²       Physical Exam:  Gen-alert no acute distress  Abd-soft positive bowel sounds nontender nondistended no rebound rigidity guarding       Lab, Imaging and other studies:   Recent Results (from the past 72 hour(s))   Legionella antigen, Urine    Collection Time: 03/19/23  1:51 PM    Specimen: Urine, Other   Result Value Ref Range    Legionella Urinary Antigen Negative Negative   ECG 12 lead    Collection Time: 03/19/23  3:32 PM   Result Value Ref Range    Ventricular Rate 69 BPM    Atrial Rate 69 BPM    NM Interval 154 ms    QRSD Interval 86 ms    QT Interval 388 ms    QTC Interval 415 ms    P Axis 30 degrees    QRS Axis 39 degrees    T Wave Axis 53 degrees   CBC and differential    Collection Time: 03/19/23  3:57 PM   Result Value Ref Range    WBC 10 24 (H) 4 31 - 10 16 Thousand/uL    RBC 4 21 3 88 - 5 62 Million/uL    Hemoglobin 13 7 12 0 - 17 0 g/dL    Hematocrit 40 7 36 5 - 49 3 %    MCV 97 82 - 98 fL    MCH 32 5 26 8 - 34 3 pg    MCHC 33 7 31 4 - 37 4 g/dL    RDW 13 4 11 6 - 15 1 %    MPV 9 0 8 9 - 12 7 fL    Platelets 052 564 - 434 Thousands/uL    nRBC 0 /100 WBCs    Neutrophils Relative 75 43 - 75 %    Immat GRANS % 1 0 - 2 %    Lymphocytes Relative 8 (L) 14 - 44 %    Monocytes Relative 13 (H) 4 - 12 %    Eosinophils Relative 2 0 - 6 %    Basophils Relative 1 0 - 1 %    Neutrophils Absolute 7 79 (H) 1 85 - 7 62 Thousands/µL    Immature Grans Absolute 0 05 0 00 - 0 20 Thousand/uL    Lymphocytes Absolute 0 80 0 60 - 4 47 Thousands/µL    Monocytes Absolute 1 31 (H) 0 17 - 1 22 Thousand/µL    Eosinophils Absolute 0 23 0 00 - 0 61 Thousand/µL    Basophils Absolute 0 06 0 00 - 0 10 Thousands/µL   Comprehensive metabolic panel    Collection Time: 03/19/23  3:57 PM   Result Value Ref Range    Sodium 133 (L) 135 - 147 mmol/L    Potassium 4 8 3 5 - 5 3 mmol/L    Chloride 100 96 - 108 mmol/L    CO2 28 21 - 32 mmol/L    ANION GAP 5 4 - 13 mmol/L    BUN 21 5 - 25 mg/dL    Creatinine 1 05 0 60 - 1 30 mg/dL    Glucose 114 65 - 140 mg/dL    Calcium 8 5 8 4 - 10 2 mg/dL    AST 15 13 - 39 U/L    ALT 8 7 - 52 U/L    Alkaline Phosphatase 70 34 - 104 U/L    Total Protein 6 7 6 4 - 8 4 g/dL    Albumin 3 8 3 5 - 5 0 g/dL    Total Bilirubin 0 70 0 20 - 1 00 mg/dL    eGFR 65 ml/min/1 73sq m   HS Troponin 0hr (reflex protocol)    Collection Time: 03/19/23  3:57 PM   Result Value Ref Range    hs TnI 0hr 6 "Refer to ACS Flowchart"- see link ng/L   B-Type Natriuretic Peptide(BNP)    Collection Time: 03/19/23  3:57 PM   Result Value Ref Range    BNP 92 0 - 100 pg/mL   FLU/RSV/COVID - if FLU/RSV clinically relevant    Collection Time: 03/19/23  3:57 PM    Specimen: Nose; Nares   Result Value Ref Range    SARS-CoV-2 Negative Negative    INFLUENZA A PCR Negative Negative    INFLUENZA B PCR Negative Negative    RSV PCR Negative Negative   Lactic acid    Collection Time: 03/19/23  3:57 PM   Result Value Ref Range    LACTIC ACID 0 7 0 5 - 2 0 mmol/L   Procalcitonin    Collection Time: 03/19/23  3:57 PM   Result Value Ref Range    Procalcitonin 0 08 <=0 25 ng/ml   Magnesium    Collection Time: 03/19/23  3:57 PM   Result Value Ref Range    Magnesium 1 8 (L) 1 9 - 2 7 mg/dL   Blood culture #2    Collection Time: 03/19/23  3:58 PM    Specimen: Line, Venous; Blood   Result Value Ref Range    Blood Culture No Growth at 24 hrs  Blood culture #1    Collection Time: 03/19/23  4:03 PM    Specimen: Arm, Right; Blood   Result Value Ref Range    Blood Culture No Growth at 24 hrs      Blood gas, arterial    Collection Time: 03/19/23  4:14 PM   Result Value Ref Range    pH, Arterial 7 397 7 350 - 7 450    PH ART TC 7 401 7 350 - 7 450    pCO2, Arterial 43 1 36 0 - 44 0 mm Hg    PCO2 (TC) Arterial 42 5 36 0 - 44 0 mm Hg    pO2, Arterial 188 9 (H) 75 0 - 129 0 mm Hg    PO2 (TC) Arterial 187 3 (H) 75 0 - 129 0 mm Hg    HCO3, Arterial 25 9 22 0 - 28 0 mmol/L    Base Excess, Arterial 0 8 mmol/L    O2 Content, Arterial 19 8 16 0 - 23 0 mL/dL    O2 HGB,Arterial  98 7 (H) 94 0 - 97 0 %    SOURCE Radial, Right     JOSE TEST Yes     Temperature 98 0 Degrees Fehrenheit    Nasal Cannula 6    UA w Reflex to Microscopic w Reflex to Culture    Collection Time: 03/19/23  4:51 PM    Specimen: Urine, Other   Result Value Ref Range    Color, UA Yellow     Clarity, UA Clear     Specific Gravity, UA 1 020 1 000 - 1 030    pH, UA 5 5 5 0, 5 5, 6 0, 6 5, 7 0, 7 5, 8 0, 8 5, 9 0    Leukocytes, UA Negative Negative    Nitrite, UA Negative Negative    Protein, UA Negative Negative mg/dl    Glucose, UA Negative Negative mg/dl    Ketones, UA Negative Negative mg/dl    Urobilinogen, UA 0 2 0 2, 1 0 E U /dl E U /dl    Bilirubin, UA Negative Negative    Occult Blood, UA Negative Negative   Strep Pneumoniae, Urine    Collection Time: 03/19/23  4:51 PM    Specimen: Urine, Other   Result Value Ref Range    Strep pneumoniae antigen, urine Negative Negative   HS Troponin I 2hr    Collection Time: 03/19/23  5:50 PM   Result Value Ref Range    hs TnI 2hr 4 "Refer to ACS Flowchart"- see link ng/L    Delta 2hr hsTnI -2 <20 ng/L   HS Troponin I 4hr    Collection Time: 03/19/23  8:56 PM   Result Value Ref Range    hs TnI 4hr 6 "Refer to ACS Flowchart"- see link ng/L    Delta 4hr hsTnI 0 <20 ng/L   Procalcitonin, Next Day AM Collection    Collection Time: 03/20/23  5:22 AM   Result Value Ref Range    Procalcitonin 0 09 <=0 25 ng/ml   Basic metabolic panel    Collection Time: 03/20/23  5:22 AM   Result Value Ref Range    Sodium 135 135 - 147 mmol/L    Potassium 4 4 3 5 - 5 3 mmol/L    Chloride 101 96 - 108 mmol/L    CO2 28 21 - 32 mmol/L    ANION GAP 6 4 - 13 mmol/L    BUN 21 5 - 25 mg/dL    Creatinine 1 00 0 60 - 1 30 mg/dL    Glucose 162 (H) 65 - 140 mg/dL    Calcium 8 3 (L) 8 4 - 10 2 mg/dL    eGFR 69 ml/min/1 73sq m   Magnesium    Collection Time: 03/20/23  5:22 AM   Result Value Ref Range    Magnesium 1 7 (L) 1 9 - 2 7 mg/dL   Sputum culture and Gram stain    Collection Time: 03/20/23  8:51 AM    Specimen: Expectorated Sputum   Result Value Ref Range    Gram Stain Result 1+ Epithelial cells per low power field (A)     Gram Stain Result No polys seen (A)     Gram Stain Result 1+ Gram positive cocci in pairs (A)

## 2023-03-21 NOTE — PROGRESS NOTES
[facility] Progress Note  Patient: Sydni Kaufman 80 y o  male   MRN: [id number]  PCP: Galilea Ghosh MD  Unit/Bed#: [address] [place] [address] Encounter: [id number]  Date Of Visit: 03/21/23    Problem List:    Principal Problem:    Acute bronchitis  Active Problems:    Acute respiratory insufficiency    Hiatal hernia    Essential hypertension    History of pulmonary embolism    Valvular heart disease    Paroxysmal atrial flutter (HCC)    Bladder wall thickening    Recurrent major depressive disorder, in full remission ([facility] [address] )    Hyperlipidemia    Bilateral leg edema      Assessment & Plan:    Hiatal hernia  Assessment & Plan  Large hiatal hernia showed on CT  Daughter is not aware of this  Suspect risk factor for aspiration  · Continue Pepcid at bedtime  · GI following, input appreciated, plan for EGD with improvement in respiratory status, tentatively on 3/22  · Aspiration precautions    Acute respiratory insufficiency  Assessment & Plan  Improving, 95% on 2 L  SPO2 91% on room air on ED arrival   At baseline 94-95% on room air at home per daughter  · Placed on oxygen 2 L in ED, satting high 90s  · Continue supplemental O2 sats of 92%  · Pulm follows outpatient, chronic pulmonary changes  · Taper oxygen as tolerated    * Acute bronchitis  Assessment & Plan  Patient presents with productive cough since Tuesday or Wednesday last week, fever 100 2 this morning at home  COVID flu RSV negative  CTA chest PE study showed- No PE  Diffuse bronchial wall thickening, greatest in the lower lobes, with subtle bilateral lower lobe tree-in-bud nodularity compatible with bronchitis/bronchiolitis  However, the large hiatal hernia and lower lobe distribution raises the possibility of aspiration    Suspect bronchitis/bronchiolitis, possible aspiration pneumonia  Remains afebrile, respiratory symptoms and oxygenation is improving  · Given IV Lasix, Unasyn, Solu-Medrol in ED  · Continue Rocephin, Zithromax  · Urine Legionella pneumococcal antigen negative  · , sputum Gram stain culture- mixed respiratory spencer  · procalcitonin-normal  · Mucinex/Robitussin/Tessalon Perles  · DuoNeb   · Speech eval- recommended regular thin diet  · Pulmonary following, appreciate    Bladder wall thickening  Assessment & Plan  · Suspect mild BPH  · Continue Flomax  · follows urology as outpatient    Paroxysmal atrial flutter (HCC)  Assessment & Plan  · On metoprolol at home  Not on AC  · EKG NSR  · Continue metoprolol  · Optimize electrolytes    Valvular heart disease  Assessment & Plan  · 2D echo in June last year showed normal EF around 46%, grade 1 diastolic dysfunction, mild to moderate TR, PA pressure 51, mild AR  · Continue ACE inhibitor  · follows cardiology as outpatient    History of pulmonary embolism  Assessment & Plan  · History of PE precipitated by femur fracture in 2019  · Status post IVC filter insertion and removal   Was on [facility] for short period of time  · Pharmacologic  DVT prophylaxis Lovenox    Essential hypertension  Assessment & Plan  · On metoprolol, Monopril at home  · Continue metoprolol  · Substitute Monopril with lisinopril with holding parameter  · BP stable    Bilateral leg edema  Assessment & Plan  · Chronic issue per daughter  Intermittent diuretic use  · BNP normal  · venous Doppler rule out DVT- BLLE negative for DVT, thrombophlebitis or incompetent valves  · Elevation    Hyperlipidemia  Assessment & Plan  · Continue statin, niacin    Recurrent major depressive disorder, in full remission ([facility] [address] )  Assessment & Plan  · Continue Celexa  · Has history of OCD as well per daughter  Hypomagnesemia-replete and monitor    VTE Pharmacologic Prophylaxis: VTE Score: 1 Moderate Risk (Score 3-4) - Pharmacological DVT Prophylaxis Ordered: enoxaparin (Lovenox)  Patient Centered Rounds: I performed bedside rounds with nursing staff today    Discussions with Specialists or Other Care Team Provider: yes,     Education and Discussions with Family / Patient: Updated  (daughter) via phone  Time Spent for Care: 45 minutes  More than 50% of total time spent on counseling and coordination of care as described above  Current Length of Stay: 1 day(s)  Current Patient Status: Inpatient   Certification Statement: The patient will continue to require additional inpatient hospital stay due to Pneumonia, aspiration  Discharge Plan: Anticipate discharge in 48-72 hrs to discharge location to be determined pending rehab evaluations  Code Status: Level 1 - Full Code    Subjective:   Ports that his breathing and cough is improving  Denies any pain  Denies any nausea vomiting or abdominal pain    Objective:     Vitals:   Temp (24hrs), Av 8 °F (36 6 °C), Min:97 4 °F (36 3 °C), Max:98 3 °F (36 8 °C)    Temp:  [97 4 °F (36 3 °C)-98 3 °F (36 8 °C)] 98 3 °F (36 8 °C)  HR:  [] 87  Resp:  [16-18] 17  BP: (101-157)/(64-82) 146/71  SpO2:  [90 %-95 %] 90 %  Body mass index is 28 13 kg/m²  Input and Output Summary (last 24 hours): Intake/Output Summary (Last 24 hours) at 3/21/2023 0117  Last data filed at 3/20/2023 1710  Gross per 24 hour   Intake 390 ml   Output 200 ml   Net 190 ml       Physical Exam:   Physical Exam  Constitutional:       General: He is not in acute distress  Comments: Elderly, frail, pleasant   HENT:      Head: Normocephalic and atraumatic  Cardiovascular:      Rate and Rhythm: Normal rate  Pulmonary:      Effort: Pulmonary effort is normal  No respiratory distress  Breath sounds: Wheezing and rhonchi present  No rales  Comments: Patient bilaterally, coarse breath sounds  Chest:      Chest wall: No tenderness  Abdominal:      General: Bowel sounds are normal  There is no distension  Palpations: Abdomen is soft  Tenderness: There is no abdominal tenderness  There is no guarding or rebound     Musculoskeletal:      Comments: Trace lower extremity edema   Skin:     General: Skin is warm and dry  Findings: No rash  Neurological:      Mental Status: He is alert  Mental status is at baseline  Cranial Nerves: No cranial nerve deficit  Additional Data:     Labs:  Results from last 7 days   Lab Units 03/19/23  1557   WBC Thousand/uL 10 24*   HEMOGLOBIN g/dL 13 7   HEMATOCRIT % 40 7   PLATELETS Thousands/uL 208   NEUTROS PCT % 75   LYMPHS PCT % 8*   MONOS PCT % 13*   EOS PCT % 2     Results from last 7 days   Lab Units 03/20/23  0522 03/19/23  1557   SODIUM mmol/L 135 133*   POTASSIUM mmol/L 4 4 4 8   CHLORIDE mmol/L 101 100   CO2 mmol/L 28 28   BUN mg/dL 21 21   CREATININE mg/dL 1 00 1 05   ANION GAP mmol/L 6 5   CALCIUM mg/dL 8 3* 8 5   ALBUMIN g/dL  --  3 8   TOTAL BILIRUBIN mg/dL  --  0 70   ALK PHOS U/L  --  70   ALT U/L  --  8   AST U/L  --  15   GLUCOSE RANDOM mg/dL 162* 114                 Results from last 7 days   Lab Units 03/20/23  0522 03/19/23  1557   LACTIC ACID mmol/L  --  0 7   PROCALCITONIN ng/ml 0 09 0 08       Lines/Drains:  Invasive Devices     Peripheral Intravenous Line  Duration           Peripheral IV 03/19/23 Left Arm 1 day          Line  Duration           Venous Sheath Other (Comment) Right Other (Comment) 941 days                      Imaging: Reviewed radiology reports from this admission including: chest CT scan    Recent Cultures (last 7 days):   Results from last 7 days   Lab Units 03/20/23  0851 03/19/23  1603 03/19/23  1558 03/19/23  1351   BLOOD CULTURE   --  No Growth at 24 hrs   No Growth at 24 hrs   --    GRAM STAIN RESULT  1+ Epithelial cells per low power field*  No polys seen*  1+ Gram positive cocci in pairs*  --   --   --    LEGIONELLA URINARY ANTIGEN   --   --   --  Negative       Last 24 Hours Medication List:   Current Facility-Administered Medications   Medication Dose Route Frequency Provider Last Rate   • acetaminophen  650 mg Oral Q6H PRN BERNADETTE Marie     • alendronate  70 mg Oral Q7 Days BERNADETTE Marie     • atorvastatin  5 mg Oral Daily With BERNADETTE Cazares     • azithromycin  500 mg Oral Q24H BERNADETTE Marie     • benzonatate  100 mg Oral TID PRN Patsy Pope MD     • calcium carbonate-vitamin D  1 tablet Oral BID With Meals BERNADETTE Marie     • cefTRIAXone  1,000 mg Intravenous Q24H BERNADETTE Marie 1,000 mg (03/20/23 1934)   • citalopram  40 mg Oral Daily BERNADETTE Marie     • enoxaparin  40 mg Subcutaneous Daily BERNADETTE Marie     • famotidine  10 mg Oral HS BERNADETTE Marie     • fluticasone  1 spray Each Nare Daily Patsy Pope MD     • guaiFENesin  600 mg Oral BID BERNADETTE Marie     • ipratropium-albuterol  3 mL Nebulization TID BERNADETTE Marie     • levalbuterol  0 63 mg Nebulization Q6H PRN BERNADETTE Marie     • lisinopril  5 mg Oral Daily BERNADETTE Marie     • loratadine  10 mg Oral Daily Patsy Pope MD     • magnesium Oxide  400 mg Oral BID Patsy Pope MD     • metoprolol tartrate  50 mg Oral Q12H BERNADETTE Marie     • multivitamin stress formula  1 tablet Oral Daily BERNADETTE Marie     • niacin  500 mg Oral Daily With Dinner BERNADETTE Marie     • ondansetron  4 mg Intravenous Q6H PRN BERNADETTE Marie     • tamsulosin  0 4 mg Oral Daily With BERNADETTE Cazares          Today, Patient Was Seen By: Filiberto Santos MD    ** Please Note: "This note has been constructed using a voice recognition system  Therefore there may be syntax, spelling, and/or grammatical errors   Please call if you have any questions  "**

## 2023-03-21 NOTE — PLAN OF CARE
Problem: MOBILITY - ADULT  Goal: Maintain or return to baseline ADL function  Description: INTERVENTIONS:  -  Assess patient's ability to carry out ADLs; assess patient's baseline for ADL function and identify physical deficits which impact ability to perform ADLs (bathing, care of mouth/teeth, toileting, grooming, dressing, etc )  - Assess/evaluate cause of self-care deficits   - Assess range of motion  - Assess patient's mobility; develop plan if impaired  - Assess patient's need for assistive devices and provide as appropriate  - Encourage maximum independence but intervene and supervise when necessary  - Involve family in performance of ADLs  - Assess for home care needs following discharge   - Consider OT consult to assist with ADL evaluation and planning for discharge  - Provide patient education as appropriate  Outcome: Progressing  Goal: Maintains/Returns to pre admission functional level  Description: INTERVENTIONS:  - Perform BMAT or MOVE assessment daily    - Set and communicate daily mobility goal to care team and patient/family/caregiver  - Collaborate with rehabilitation services on mobility goals if consulted  - Perform Range of Motion 2 times a day  - Reposition patient every 2 hours    - Dangle patient 2 times a day  - Stand patient 2 times a day  - Ambulate patient 2 times a day  - Out of bed to chair 2 times a day   - Out of bed for meals 2 times a day  - Out of bed for toileting  - Record patient progress and toleration of activity level   Outcome: Progressing     Problem: Prexisting or High Potential for Compromised Skin Integrity  Goal: Skin integrity is maintained or improved  Description: INTERVENTIONS:  - Identify patients at risk for skin breakdown  - Assess and monitor skin integrity  - Assess and monitor nutrition and hydration status  - Monitor labs   - Assess for incontinence   - Turn and reposition patient  - Assist with mobility/ambulation  - Relieve pressure over bony prominences  - Avoid friction and shearing  - Provide appropriate hygiene as needed including keeping skin clean and dry  - Evaluate need for skin moisturizer/barrier cream  - Collaborate with interdisciplinary team   - Patient/family teaching  - Consider wound care consult   Outcome: Progressing     Problem: SAFETY ADULT  Goal: Patient will remain free of falls  Description: INTERVENTIONS:  - Educate patient/family on patient safety including physical limitations  - Instruct patient to call for assistance with activity   - Consult OT/PT to assist with strengthening/mobility   - Keep Call bell within reach  - Keep bed low and locked with side rails adjusted as appropriate  - Keep care items and personal belongings within reach  - Initiate and maintain comfort rounds  - Make Fall Risk Sign visible to staff  - Offer Toileting every 2 Hours, in advance of need  - Initiate/Maintain bed alarm  - Obtain necessary fall risk management equipment: yellow socks  - Apply yellow socks and bracelet for high fall risk patients  - Consider moving patient to room near nurses station  Outcome: Progressing     Problem: DISCHARGE PLANNING  Goal: Discharge to home or other facility with appropriate resources  Description: INTERVENTIONS:  - Identify barriers to discharge w/patient and caregiver  - Arrange for needed discharge resources and transportation as appropriate  - Identify discharge learning needs (meds, wound care, etc )  - Arrange for interpretive services to assist at discharge as needed  - Refer to Case Management Department for coordinating discharge planning if the patient needs post-hospital services based on physician/advanced practitioner order or complex needs related to functional status, cognitive ability, or social support system  Outcome: Progressing

## 2023-03-21 NOTE — ASSESSMENT & PLAN NOTE
· 2D echo in June last year showed normal EF around 37%, grade 1 diastolic dysfunction, mild to moderate TR, PA pressure 51, mild AR    · Continue ACE inhibitor  · follows cardiology as outpatient

## 2023-03-22 LAB
ANION GAP SERPL CALCULATED.3IONS-SCNC: 4 MMOL/L (ref 4–13)
BACTERIA SPT RESP CULT: ABNORMAL
BUN SERPL-MCNC: 24 MG/DL (ref 5–25)
CALCIUM SERPL-MCNC: 7.9 MG/DL (ref 8.4–10.2)
CHLORIDE SERPL-SCNC: 103 MMOL/L (ref 96–108)
CO2 SERPL-SCNC: 30 MMOL/L (ref 21–32)
CREAT SERPL-MCNC: 0.86 MG/DL (ref 0.6–1.3)
ERYTHROCYTE [DISTWIDTH] IN BLOOD BY AUTOMATED COUNT: 13.6 % (ref 11.6–15.1)
GFR SERPL CREATININE-BSD FRML MDRD: 80 ML/MIN/1.73SQ M
GLUCOSE SERPL-MCNC: 87 MG/DL (ref 65–140)
GRAM STN SPEC: ABNORMAL
HCT VFR BLD AUTO: 36.4 % (ref 36.5–49.3)
HGB BLD-MCNC: 12.5 G/DL (ref 12–17)
MAGNESIUM SERPL-MCNC: 1.9 MG/DL (ref 1.9–2.7)
MCH RBC QN AUTO: 33.1 PG (ref 26.8–34.3)
MCHC RBC AUTO-ENTMCNC: 34.3 G/DL (ref 31.4–37.4)
MCV RBC AUTO: 96 FL (ref 82–98)
PLATELET # BLD AUTO: 201 THOUSANDS/UL (ref 149–390)
PMV BLD AUTO: 8.9 FL (ref 8.9–12.7)
POTASSIUM SERPL-SCNC: 4.1 MMOL/L (ref 3.5–5.3)
PROCALCITONIN SERPL-MCNC: 0.09 NG/ML
RBC # BLD AUTO: 3.78 MILLION/UL (ref 3.88–5.62)
SODIUM SERPL-SCNC: 137 MMOL/L (ref 135–147)
WBC # BLD AUTO: 9.06 THOUSAND/UL (ref 4.31–10.16)

## 2023-03-22 RX ORDER — SACCHAROMYCES BOULARDII 250 MG
250 CAPSULE ORAL 2 TIMES DAILY
Status: DISCONTINUED | OUTPATIENT
Start: 2023-03-22 | End: 2023-03-24 | Stop reason: HOSPADM

## 2023-03-22 RX ORDER — METHYLPREDNISOLONE SODIUM SUCCINATE 40 MG/ML
40 INJECTION, POWDER, LYOPHILIZED, FOR SOLUTION INTRAMUSCULAR; INTRAVENOUS ONCE
Status: COMPLETED | OUTPATIENT
Start: 2023-03-22 | End: 2023-03-22

## 2023-03-22 RX ORDER — IPRATROPIUM BROMIDE AND ALBUTEROL SULFATE 2.5; .5 MG/3ML; MG/3ML
3 SOLUTION RESPIRATORY (INHALATION)
Status: DISCONTINUED | OUTPATIENT
Start: 2023-03-22 | End: 2023-03-24 | Stop reason: HOSPADM

## 2023-03-22 RX ADMIN — ATORVASTATIN CALCIUM 5 MG: 10 TABLET, FILM COATED ORAL at 15:31

## 2023-03-22 RX ADMIN — B-COMPLEX W/ C & FOLIC ACID TAB 1 TABLET: TAB at 08:54

## 2023-03-22 RX ADMIN — IPRATROPIUM BROMIDE AND ALBUTEROL SULFATE 3 ML: 2.5; .5 SOLUTION RESPIRATORY (INHALATION) at 20:38

## 2023-03-22 RX ADMIN — CITALOPRAM HYDROBROMIDE 40 MG: 20 TABLET ORAL at 08:58

## 2023-03-22 RX ADMIN — FLUTICASONE PROPIONATE 1 SPRAY: 50 SPRAY, METERED NASAL at 08:57

## 2023-03-22 RX ADMIN — Medication 250 MG: at 22:29

## 2023-03-22 RX ADMIN — Medication 1 TABLET: at 15:31

## 2023-03-22 RX ADMIN — IPRATROPIUM BROMIDE AND ALBUTEROL SULFATE 3 ML: 2.5; .5 SOLUTION RESPIRATORY (INHALATION) at 13:13

## 2023-03-22 RX ADMIN — AZITHROMYCIN MONOHYDRATE 500 MG: 250 TABLET ORAL at 20:04

## 2023-03-22 RX ADMIN — MAGNESIUM OXIDE TAB 400 MG (241.3 MG ELEMENTAL MG) 400 MG: 400 (241.3 MG) TAB at 17:17

## 2023-03-22 RX ADMIN — Medication 1 TABLET: at 09:02

## 2023-03-22 RX ADMIN — IPRATROPIUM BROMIDE AND ALBUTEROL SULFATE 3 ML: 2.5; .5 SOLUTION RESPIRATORY (INHALATION) at 07:07

## 2023-03-22 RX ADMIN — METHYLPREDNISOLONE SODIUM SUCCINATE 40 MG: 40 INJECTION, POWDER, FOR SOLUTION INTRAMUSCULAR; INTRAVENOUS at 17:20

## 2023-03-22 RX ADMIN — MAGNESIUM OXIDE TAB 400 MG (241.3 MG ELEMENTAL MG) 400 MG: 400 (241.3 MG) TAB at 08:56

## 2023-03-22 RX ADMIN — LORATADINE 10 MG: 10 TABLET ORAL at 08:54

## 2023-03-22 RX ADMIN — CEFTRIAXONE 1000 MG: 1 INJECTION, SOLUTION INTRAVENOUS at 20:04

## 2023-03-22 RX ADMIN — ENOXAPARIN SODIUM 40 MG: 40 INJECTION SUBCUTANEOUS at 20:04

## 2023-03-22 RX ADMIN — TAMSULOSIN HYDROCHLORIDE 0.4 MG: 0.4 CAPSULE ORAL at 15:31

## 2023-03-22 RX ADMIN — METOPROLOL TARTRATE 50 MG: 50 TABLET, FILM COATED ORAL at 08:56

## 2023-03-22 RX ADMIN — GUAIFENESIN 600 MG: 600 TABLET, EXTENDED RELEASE ORAL at 08:56

## 2023-03-22 RX ADMIN — GUAIFENESIN 600 MG: 600 TABLET, EXTENDED RELEASE ORAL at 17:20

## 2023-03-22 RX ADMIN — NIACIN 500 MG: 500 TABLET, EXTENDED RELEASE ORAL at 15:30

## 2023-03-22 RX ADMIN — METOPROLOL TARTRATE 50 MG: 50 TABLET, FILM COATED ORAL at 20:04

## 2023-03-22 RX ADMIN — FAMOTIDINE 10 MG: 20 TABLET, FILM COATED ORAL at 20:04

## 2023-03-22 NOTE — CASE MANAGEMENT
Case Management Assessment & Discharge Planning Note    Patient name Rochell Severe  Location [address] 03 [phone]-* MRN [id number]  : 1940 Date 3/22/2023       Current Admission Date: 3/19/2023  Current Admission Diagnosis:Acute bronchitis   Patient Active Problem List    Diagnosis Date Noted   • Bilateral leg edema 2023   • Acute bronchitis 2023   • Acute respiratory insufficiency 2023   • Hiatal hernia 2023   • Age-related osteoporosis without current pathological fracture 2022   • Abnormal CT scan, chest 2022   • Lung nodule 2022   • Bladder wall thickening 2022   • 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[address] ) 2022   • Medicare annual wellness visit, subsequent 2020   • Paroxysmal atrial flutter ([address] ) 2020   • Edema of left lower leg 2020   • Valvular heart disease 2019   • S/P IVC filter 2019   • SVT (supraventricular tachycardia) ([address] ) 2019   • History of pulmonary embolism    • Impaired vision 2019   • Conductive hearing loss, bilateral 2019   • Hyperlipidemia    • Mixed obsessional thoughts and acts 10/14/2019   • Allergic rhinitis 10/14/2019   • Nonrheumatic mitral valve regurgitation 10/14/2019   • Aortic valve sclerosis 10/14/2019   • Nonrheumatic aortic valve insufficiency 10/14/2019   • Essential hypertension 10/14/2019   • Recurrent major depressive disorder, in full remission ([address] ) 10/14/2019      LOS (days): 2  Geometric Mean LOS (GMLOS) (days): 2 80  Days to GMLOS:1     OBJECTIVE:    Risk of Unplanned Readmission Score: 12 98       Current admission status: Inpatient  Referral Reason: Other (Discharge planning)    Preferred Pharmacy:   [facility] [address] [place] [address]  Phone: [phone] Fax: [phone]    Primary Care Provider:  Franky Holley MD    Primary Insurance: [facility]  Secondary Insurance: ASSESSMENT:  Active Health Care Proxies    There are no active Health Care Proxies on file  Readmission Root Cause  30 Day Readmission: No    Patient Information  Admitted from[de-identified] Home  Mental Status: Alert  During Assessment patient was accompanied by: Not accompanied during assessment  Assessment information provided by[de-identified] Patient, Spouse  Primary Caregiver: Spouse  Caregiver's Name[de-identified] Uzma Eric (spouse)  Caregiver's Relationship to Patient[de-identified] Family Member  Caregiver's Telephone Number[de-identified] [phone]  Support Systems: Spouse/significant other, Daughter, Family members, [address] of Residence: [address] [place] [address] [place] you [place] [address] entry access options   Select all that apply : Stairs  Number of steps to enter home : 2  Type of Current Residence: 2 story home  Upon entering residence, is there a bedroom on the main floor (no further steps)?: Yes  Upon entering residence, is there a bathroom on the main floor (no further steps)?: Yes  In the last 12 months, was there a time when you were not able to pay the mortgage or rent on time?: No  In the last 12 months, how many places have you lived?: 1  In the last 12 months, was there a time when you did not have a steady place to sleep or slept in a shelter (including now)?: No  Homeless/housing insecurity resource given?: N/A  Living Arrangements: Lives w/ Spouse/significant other    Activities of Daily Living Prior to Admission  Functional Status: Assistance  Completes ADLs independently?: No  Level of ADL dependence: Assistance  Ambulates independently?: Yes  Does patient use assisted devices?: Yes  Assisted Devices (DME) used: [place] [place], Shower Chair  Does patient currently own DME?: Yes  What DME does the patient currently own?: Shower Chair, [employer] [place]  Does patient have a history of Outpatient Therapy (PT/OT)?: No  Does the patient have a history of Short-Term Rehab?: Yes ([address], [facility] (daughter is a nurse at [facility] per wife))  Does patient have a history of [address]?: Yes (Wife unable to recall agency, appears to be [facility] per chart review)  Does patient currently have [address]?: No    Patient Information Continued  Income Source: Pension/detention  Does patient have prescription coverage?: Yes  Within the past 12 months, you worried that your food would run out before you got the money to buy more : Never true  Within the past 12 months, the food you bought just didn't last and you didn't have money to get more : Never true  Food insecurity resource given?: N/A  Does patient receive dialysis treatments?: No  Does patient have a history of substance abuse?: No  Does patient have a history of Mental Health Diagnosis?: Yes (MDD)  Is patient receiving treatment for mental health?: Yes (Medication)  Has patient received inpatient treatment related to mental health in the last 2 years?: No    Means of Transportation  Means of Transport to Appts[de-identified] Family transport  In the past 12 months, has lack of transportation kept you from medical appointments or from getting medications?: No  In the past 12 months, has lack of transportation kept you from meetings, work, or from getting things needed for daily living?: No  Was application for public transport provided?: N/A      DISCHARGE DETAILS:    Discharge planning discussed with[de-identified] Patient and wife Noni Zapata of Choice: Yes     Comments - Freedom of Choice: SW spoke with patient at bedside and with wife Adi Delgado by phone to introduce role of CM and review [address]  Patient lives with his wife in a 2-story home in [place] and has a BR and BA on the first floor  There are 2 JASON  Patient's wife drives  Patient ambulates with a walker and he has a shower chair at home  Patient has a history of STR at [address] and [facility]  His daughter Emma Benoit is a nurse at [facility] per Adi Delgado        Patient had [address] after STR and per chart review the agency appears to have been [facility]  SW reviewed recommendation for [address] with patient and his wife  Patient did not feel he needed home PT/OT but his wife requested referrals, stating that patient benefited from services and did exercises on his own  SW placed referrals in [address] and will follow for acceptance  Patient's plan is to return home at discharge and his wife or his daughter will transport him home       CM contacted family/caregiver?: Yes  Were Treatment Team discharge recommendations reviewed with patient/caregiver?: Yes  Did patient/caregiver verbalize understanding of patient care needs?: Yes  Were patient/caregiver advised of the risks associated with not following Treatment Team discharge recommendations?: Yes    Contacts  Patient Contacts: Laureen Spatz (wife)  Relationship to Patient[de-identified] Family  Contact Method: Phone  Phone Number: [phone]  Reason/Outcome: Emergency Contact, Discharge [address]         Is the patient interested in [address] at discharge?: Yes  [address] requested[de-identified] Occupational Therapy, Physical [address] Name[de-identified] Other  [address] [place] [address] Provider[de-identified] PCP  Home Health Services Needed[de-identified] Evaluate Functional Status and Safety, Gait/ADL Training, Strengthening/Theraputic Exercises to Improve Function  Homebound Criteria Met[de-identified] Requires the Assistance of Another Person for Safe Ambulation or to Leave the Home, Uses an Assist Device (i e  cane, walker, etc)  Supporting Clincal Findings[de-identified] Fatigues Easliy in [employer], Limited Endurance    DME Referral Provided  Referral made for DME?: No    Other Referral/Resources/Interventions Provided:  Interventions: [facility]    [facility]  Discharge Destination Plan[de-identified] Home with Brittany at Discharge : Family

## 2023-03-22 NOTE — PLAN OF CARE
Pt in bed  No distress noted  Meds given  Education  provided  Problem: MOBILITY - ADULT  Goal: Maintain or return to baseline ADL function  Description: INTERVENTIONS:  -  Assess patient's ability to carry out ADLs; assess patient's baseline for ADL function and identify physical deficits which impact ability to perform ADLs (bathing, care of mouth/teeth, toileting, grooming, dressing, etc )  - Assess/evaluate cause of self-care deficits   - Assess range of motion  - Assess patient's mobility; develop plan if impaired  - Assess patient's need for assistive devices and provide as appropriate  - Encourage maximum independence but intervene and supervise when necessary  - Involve family in performance of ADLs  - Assess for home care needs following discharge   - Consider OT consult to assist with ADL evaluation and planning for discharge  - Provide patient education as appropriate  Outcome: Progressing  Goal: Maintains/Returns to pre admission functional level  Description: INTERVENTIONS:  - Perform BMAT or MOVE assessment daily    - Set and communicate daily mobility goal to care team and patient/family/caregiver  - Collaborate with rehabilitation services on mobility goals if consulted  - Perform Range of Motion 2 times a day  - Reposition patient every 2 hours    - Dangle patient 2 times a day  - Stand patient 2 times a day  - Ambulate patient 2 times a day  - Out of bed to chair 2 times a day   - Out of bed for meals 2 times a day  - Out of bed for toileting  - Record patient progress and toleration of activity level   Outcome: Progressing     Problem: Prexisting or High Potential for Compromised Skin Integrity  Goal: Skin integrity is maintained or improved  Description: INTERVENTIONS:  - Identify patients at risk for skin breakdown  - Assess and monitor skin integrity  - Assess and monitor nutrition and hydration status  - Monitor labs   - Assess for incontinence   - Turn and reposition patient  - Assist with mobility/ambulation  - Relieve pressure over bony prominences  - Avoid friction and shearing  - Provide appropriate hygiene as needed including keeping skin clean and dry  - Evaluate need for skin moisturizer/barrier cream  - Collaborate with interdisciplinary team   - Patient/family teaching  - Consider wound care consult   Outcome: Progressing     Problem: SAFETY ADULT  Goal: Patient will remain free of falls  Description: INTERVENTIONS:  - Educate patient/family on patient safety including physical limitations  - Instruct patient to call for assistance with activity   - Consult OT/PT to assist with strengthening/mobility   - Keep Call bell within reach  - Keep bed low and locked with side rails adjusted as appropriate  - Keep care items and personal belongings within reach  - Initiate and maintain comfort rounds  - Make Fall Risk Sign visible to staff  - Offer Toileting every 2 Hours, in advance of need  - Initiate/Maintain bed alarm  - Obtain necessary fall risk management equipment: yellow socks  - Apply yellow socks and bracelet for high fall risk patients  - Consider moving patient to room near nurses station  Outcome: Progressing     Problem: DISCHARGE PLANNING  Goal: Discharge to home or other facility with appropriate resources  Description: INTERVENTIONS:  - Identify barriers to discharge w/patient and caregiver  - Arrange for needed discharge resources and transportation as appropriate  - Identify discharge learning needs (meds, wound care, etc )  - Arrange for interpretive services to assist at discharge as needed  - Refer to Case Management Department for coordinating discharge planning if the patient needs post-hospital services based on physician/advanced practitioner order or complex needs related to functional status, cognitive ability, or social support system  Outcome: Progressing     Problem: Knowledge Deficit  Goal: Patient/family/caregiver demonstrates understanding of disease process, treatment plan, medications, and discharge instructions  Description: Complete learning assessment and assess knowledge base    Interventions:  - Provide teaching at level of understanding  - Provide teaching via preferred learning methods  Outcome: Progressing     Problem: CARDIOVASCULAR - ADULT  Goal: Maintains optimal cardiac output and hemodynamic stability  Description: INTERVENTIONS:  - Monitor I/O, vital signs and rhythm  - Monitor for S/S and trends of decreased cardiac output  - Administer and titrate ordered vasoactive medications to optimize hemodynamic stability  - Assess quality of pulses, skin color and temperature  - Assess for signs of decreased coronary artery perfusion  - Instruct patient to report change in severity of symptoms  Outcome: Progressing  Goal: Absence of cardiac dysrhythmias or at baseline rhythm  Description: INTERVENTIONS:  - Continuous cardiac monitoring, vital signs, obtain 12 lead EKG if ordered  - Administer antiarrhythmic and heart rate control medications as ordered  - Monitor electrolytes and administer replacement therapy as ordered  Outcome: Progressing     Problem: RESPIRATORY - ADULT  Goal: Achieves optimal ventilation and oxygenation  Description: INTERVENTIONS:  - Assess for changes in respiratory status  - Assess for changes in mentation and behavior  - Position to facilitate oxygenation and minimize respiratory effort  - Oxygen administered by appropriate delivery if ordered  - Initiate smoking cessation education as indicated  - Encourage broncho-pulmonary hygiene including cough, deep breathe, Incentive Spirometry  - Assess the need for suctioning and aspirate as needed  - Assess and instruct to report SOB or any respiratory difficulty  - Respiratory Therapy support as indicated  Outcome: Progressing     Problem: Nutrition/Hydration-ADULT  Goal: Nutrient/Hydration intake appropriate for improving, restoring or maintaining nutritional needs  Description: Monitor and assess patient's nutrition/hydration status for malnutrition  Collaborate with interdisciplinary team and initiate plan and interventions as ordered  Monitor patient's weight and dietary intake as ordered or per policy  Utilize nutrition screening tool and intervene as necessary  Determine patient's food preferences and provide high-protein, high-caloric foods as appropriate       INTERVENTIONS:  - Monitor oral intake, urinary output, labs, and treatment plans  - Assess nutrition and hydration status and recommend course of action  - Evaluate amount of meals eaten  - Assist patient with eating if necessary   - Allow adequate time for meals  - Recommend/ encourage appropriate diets, oral nutritional supplements, and vitamin/mineral supplements  - Order, calculate, and assess calorie counts as needed  - Recommend, monitor, and adjust tube feedings and TPN/PPN based on assessed needs  - Assess need for intravenous fluids  - Provide specific nutrition/hydration education as appropriate  - Include patient/family/caregiver in decisions related to nutrition  Outcome: Progressing

## 2023-03-22 NOTE — UTILIZATION REVIEW
Continued Stay Review    Date:    3/22/23                          Current Patient Class:    Inpatient  Current Level of Care:    Med surg    HPI:83 y o  male initially admitted on   Observation 3/19/23 @ [facility], converted to inpatient admission 3/20/23 @ [id number] for continued care & tx for acute cough        Assessment/Plan:   3/22     Continue  PT/OT  Remains on  O2  2L  NC, sats  95  %, need sats  >  92 %  O2  Tapered   3/21,  Did require  O2  On again during the night  Continue aspiration precautions  EGD tentative  For  3/22  Continue  RICH  Cough improved  Trace edema  B/L LE  Continue  Current meds/treatment plan      Vital Signs:   98 °F (36 7 °C) 88 -- 122/69 87 92 % -- -- --    03/22/23 07:45:12 98 °F (36 7 °C) 87 -- 122/69 87 94 % -- -- --   03/22/23 0709 -- 85 -- -- -- 95 % --           Pertinent Labs/Diagnostic Results:   Results from last 7 days   Lab Units 03/19/23  1557   SARS-COV-2  Negative     Results from last 7 days   Lab Units 03/22/23  0507 03/19/23  1557   WBC Thousand/uL 9 06 10 24*   HEMOGLOBIN g/dL 12 5 13 7   HEMATOCRIT % 36 4* 40 7   PLATELETS Thousands/uL 201 208   NEUTROS ABS Thousands/µL  --  7 79*         Results from last 7 days   Lab Units 03/22/23  0507 03/20/23  0522 03/19/23  1557   SODIUM mmol/L 137 135 133*   POTASSIUM mmol/L 4 1 4 4 4 8   CHLORIDE mmol/L 103 101 100   CO2 mmol/L 30 28 28   ANION GAP mmol/L 4 6 5   BUN mg/dL 24 21 21   CREATININE mg/dL 0 86 1 00 1 05   EGFR ml/min/1 73sq m 80 69 65   CALCIUM mg/dL 7 9* 8 3* 8 5   MAGNESIUM mg/dL 1 9 1 7* 1 8*           Results from last 7 days   Lab Units 03/22/23  0507 03/20/23  0522 03/19/23  1557   GLUCOSE RANDOM mg/dL 87 162* 114                   Results from last 7 days   Lab Units 03/22/23  0507 03/20/23  0522 03/19/23  1557   PROCALCITONIN ng/ml 0 09 0 09 0 08               Medications:   Scheduled Medications:  alendronate, 70 mg, Oral, Q7 Days  atorvastatin, 5 mg, Oral, Daily With Dinner  azithromycin, 500 mg, Oral, Q24H  calcium carbonate-vitamin D, 1 tablet, Oral, BID With Meals  cefTRIAXone, 1,000 mg, Intravenous, Q24H  citalopram, 40 mg, Oral, Daily  enoxaparin, 40 mg, Subcutaneous, Daily  famotidine, 10 mg, Oral, HS  fluticasone, 1 spray, Each Nare, Daily  guaiFENesin, 600 mg, Oral, BID  ipratropium-albuterol, 3 mL, Nebulization, TID  lisinopril, 5 mg, Oral, Daily  loratadine, 10 mg, Oral, Daily  magnesium Oxide, 400 mg, Oral, BID  metoprolol tartrate, 50 mg, Oral, Q12H  multivitamin stress formula, 1 tablet, Oral, Daily  niacin, 500 mg, Oral, Daily With Dinner  tamsulosin, 0 4 mg, Oral, Daily With Dinner      Continuous IV Infusions:     PRN Meds:  acetaminophen, 650 mg, Oral, Q6H PRN  benzonatate, 100 mg, Oral, TID PRN  levalbuterol, 0 63 mg, Nebulization, Q6H PRN  ondansetron, 4 mg, Intravenous, Q6H PRN        Discharge Plan:    D    Network Utilization Review Department  ATTENTION: Please call with any questions or concerns to [phone] and carefully listen to the prompts so that you are directed to the right person  All voicemails are confidential   Esha Chan all requests for admission clinical reviews, approved or denied determinations and any other requests to dedicated fax number below belonging to the campus where the patient is receiving treatment   List of dedicated fax numbers for the Facilities:  [address] DENIALS (Administrative/Medical Necessity) [phone]   [address] (Maternity/NICU/Pediatrics) [phone]   [address] [phone]   [place] [address] [phone]   [address] [place] [address] [address] [phone]

## 2023-03-22 NOTE — PROGRESS NOTES
[facility] Progress Note  Patient: Dea Opitz 80 y o  male   MRN: [id number]  PCP: Monroe Curling, MD  Unit/Bed#: [address] [place] [address] Encounter: [id number]  Date Of Visit: 03/22/23    Problem List:    Principal Problem:    Acute bronchitis  Active Problems:    Acute respiratory insufficiency    Hiatal hernia    Essential hypertension    History of pulmonary embolism    Valvular heart disease    Paroxysmal atrial flutter (HCC)    Bladder wall thickening    Recurrent major depressive disorder, in full remission ([facility] [address] )    Hyperlipidemia    Bilateral leg edema      Assessment & Plan:    Hiatal hernia  Assessment & Plan  Large hiatal hernia showed on CT  Daughter is not aware of this  Suspect risk factor for aspiration  · Continue Pepcid at bedtime  · GI following, input appreciated, plan for EGD with improvement in respiratory status, tentatively on 3/23  · Aspiration precautions    Acute respiratory insufficiency  Assessment & Plan  Improving, 95% on 2 L  SPO2 91% on room air on ED arrival   At baseline 94-95% on room air at home per daughter  · Placed on oxygen 2 L in ED, satting high 90s  · Continue supplemental O2 sats of 92%  · Pulm follows outpatient, chronic pulmonary changes  · Incentive spirometry  · Taper oxygen as tolerated    * Acute bronchitis  Assessment & Plan  Patient presents with productive cough since Tuesday or Wednesday last week, fever 100 2 this morning at home  COVID flu RSV negative  CTA chest PE study showed- No PE  Diffuse bronchial wall thickening, greatest in the lower lobes, with subtle bilateral lower lobe tree-in-bud nodularity compatible with bronchitis/bronchiolitis  However, the large hiatal hernia and lower lobe distribution raises the possibility of aspiration  Suspect bronchitis/bronchiolitis, possible aspiration pneumonia  Remains afebrile, ports improvement in shortness of breath  Still with cough  Wheezing noted    · Given IV Lasix, Unasyn, Solu-Medrol in ED  · Continue Rocephin, Zithromax for 7 days  · IV Solu-Medrol 40 mg x 1 today  · Urine Legionella pneumococcal antigen negative  · , sputum Gram stain culture- mixed respiratory spencer  · procalcitonin-normal  · Mucinex/Robitussin/Tessalon Perles  · DuoNeb   · Speech eval- recommended regular thin diet  · Pulmonary following, input appreciated    Bladder wall thickening  Assessment & Plan  · Suspect mild BPH  · Continue Flomax  · follows urology as outpatient    Paroxysmal atrial flutter (HCC)  Assessment & Plan  · On metoprolol at home  Not on AC  · EKG NSR  · Continue metoprolol  · Optimize electrolytes    Valvular heart disease  Assessment & Plan  · 2D echo in June last year showed normal EF around 69%, grade 1 diastolic dysfunction, mild to moderate TR, PA pressure 51, mild AR  · Continue ACE inhibitor  · follows cardiology as outpatient    History of pulmonary embolism  Assessment & Plan  · History of PE precipitated by femur fracture in 2019  · Status post IVC filter insertion and removal   Was on [facility] for short period of time  · Pharmacologic  DVT prophylaxis Lovenox    Essential hypertension  Assessment & Plan  · On metoprolol, Monopril at home  · Continue metoprolol  · Substitute Monopril with lisinopril with holding parameter  · BP stable    Bilateral leg edema  Assessment & Plan  · Chronic issue per daughter  Intermittent diuretic use  · BNP normal  · venous Doppler rule out DVT- BLLE negative for DVT, thrombophlebitis or incompetent valves  · Elevation    Hyperlipidemia  Assessment & Plan  · Continue statin, niacin    Recurrent major depressive disorder, in full remission ([facility] [address] )  Assessment & Plan  · Continue Celexa  · Has history of OCD as well per daughter  Hypomagnesemia-repleted and monitor    VTE Pharmacologic Prophylaxis: VTE Score: 1 Moderate Risk (Score 3-4) - Pharmacological DVT Prophylaxis Ordered: enoxaparin (Lovenox)      Patient Centered Rounds: I performed bedside rounds with nursing staff today  Discussions with Specialists or Other Care Team Provider: yes, pulmonary, GI    Education and Discussions with Family / Patient: Updated  (daughter) via phone  Time Spent for Care: 45 minutes  More than 50% of total time spent on counseling and coordination of care as described above  Current Length of Stay: 2 day(s)  Current Patient Status: Inpatient   Certification Statement: The patient will continue to require additional inpatient hospital stay due to Pneumonia, aspiration  Discharge Plan: Anticipate discharge in 48-72 hrs to discharge location to be determined pending rehab evaluations  Code Status: Level 1 - Full Code    Subjective:   Reports that his breathing continues to improve  Less cough  Oxygen was tapered off last evening but required to be on oxygen overnight  Denies any chest pain or abdominal pain    EGD is scheduled for tomorrow    Objective:     Vitals:   Temp (24hrs), Av °F (36 7 °C), Min:97 8 °F (36 6 °C), Max:98 3 °F (36 8 °C)    Temp:  [97 8 °F (36 6 °C)-98 3 °F (36 8 °C)] 98 °F (36 7 °C)  HR:  [76-92] 88  Resp:  [17-18] 17  BP: (122-155)/(61-71) 122/69  SpO2:  [92 %-96 %] 92 %  Body mass index is 28 13 kg/m²  Input and Output Summary (last 24 hours): Intake/Output Summary (Last 24 hours) at 3/22/2023 1997  Last data filed at 3/21/2023 1752  Gross per 24 hour   Intake 600 ml   Output --   Net 600 ml       Physical Exam:   Physical Exam  Constitutional:       General: He is not in acute distress  Comments: Elderly, pleasant   HENT:      Head: Normocephalic and atraumatic  Cardiovascular:      Rate and Rhythm: Normal rate  Pulmonary:      Effort: Pulmonary effort is normal  No respiratory distress  Breath sounds: Wheezing (Faint scattered) present  No rhonchi or rales  Comments: Diminished bilaterally  Chest:      Chest wall: No tenderness     Abdominal:      General: Bowel sounds are normal  There is no distension  Palpations: Abdomen is soft  Tenderness: There is no abdominal tenderness  There is no guarding or rebound  Musculoskeletal:      Comments: Trace bilateral lower extremity edema   Skin:     General: Skin is warm and dry  Findings: No rash  Neurological:      Mental Status: He is alert  Mental status is at baseline  Cranial Nerves: No cranial nerve deficit  Additional Data:     Labs:  Results from last 7 days   Lab Units 03/22/23  0507 03/19/23  1557   WBC Thousand/uL 9 06 10 24*   HEMOGLOBIN g/dL 12 5 13 7   HEMATOCRIT % 36 4* 40 7   PLATELETS Thousands/uL 201 208   NEUTROS PCT %  --  75   LYMPHS PCT %  --  8*   MONOS PCT %  --  13*   EOS PCT %  --  2     Results from last 7 days   Lab Units 03/22/23  0507 03/20/23  0522 03/19/23  1557   SODIUM mmol/L 137   < > 133*   POTASSIUM mmol/L 4 1   < > 4 8   CHLORIDE mmol/L 103   < > 100   CO2 mmol/L 30   < > 28   BUN mg/dL 24   < > 21   CREATININE mg/dL 0 86   < > 1 05   ANION GAP mmol/L 4   < > 5   CALCIUM mg/dL 7 9*   < > 8 5   ALBUMIN g/dL  --   --  3 8   TOTAL BILIRUBIN mg/dL  --   --  0 70   ALK PHOS U/L  --   --  70   ALT U/L  --   --  8   AST U/L  --   --  15   GLUCOSE RANDOM mg/dL 87   < > 114    < > = values in this interval not displayed  Results from last 7 days   Lab Units 03/22/23  0507 03/20/23  0522 03/19/23  1557   LACTIC ACID mmol/L  --   --  0 7   PROCALCITONIN ng/ml 0 09 0 09 0 08       Lines/Drains:  Invasive Devices     Peripheral Intravenous Line  Duration           Peripheral IV 03/19/23 Left Arm 2 days          Line  Duration           Venous Sheath Other (Comment) Right Other (Comment) 943 days                      Imaging: Reviewed radiology reports from this admission including: chest CT scan    Recent Cultures (last 7 days):   Results from last 7 days   Lab Units 03/20/23  0851 03/19/23  1603 03/19/23  1558 03/19/23  1351   BLOOD CULTURE   --  No Growth at 48 hrs  No Growth at 48 hrs  --    SPUTUM CULTURE  1+ Growth of  --   --   --    GRAM STAIN RESULT  1+ Epithelial cells per low power field*  No polys seen*  1+ Gram positive cocci in pairs*  --   --   --    LEGIONELLA URINARY ANTIGEN   --   --   --  Negative       Last 24 Hours Medication List:   Current Facility-Administered Medications   Medication Dose Route Frequency Provider Last Rate   • acetaminophen  650 mg Oral Q6H PRN BERNADETTE Marie     • alendronate  70 mg Oral Q7 Days BERNADETTE Marie     • atorvastatin  5 mg Oral Daily With BERNADETTE Cazaers     • azithromycin  500 mg Oral Q24H BERNADETTE Marie     • benzonatate  100 mg Oral TID PRN Nima Rahman MD     • calcium carbonate-vitamin D  1 tablet Oral BID With Meals BERNADETTE Marie     • cefTRIAXone  1,000 mg Intravenous Q24H BERNADETTE Marie 1,000 mg (03/21/23 1933)   • citalopram  40 mg Oral Daily BERNADETTE Marie     • enoxaparin  40 mg Subcutaneous Daily BERNADETTE Marie     • famotidine  10 mg Oral HS BERNADETTE Marie     • fluticasone  1 spray Each Nare Daily Nima Rahman MD     • guaiFENesin  600 mg Oral BID BERNADETTE Marie     • ipratropium-albuterol  3 mL Nebulization TID BERNADETTE Marie     • levalbuterol  0 63 mg Nebulization Q6H PRN BERNADETTE Marie     • lisinopril  5 mg Oral Daily BERNADETTE Marie     • loratadine  10 mg Oral Daily Nima Rahman MD     • magnesium Oxide  400 mg Oral BID Nima Rahman MD     • metoprolol tartrate  50 mg Oral Q12H BERNADETTE Marie     • multivitamin stress formula  1 tablet Oral Daily BERNADETTE Marie     • niacin  500 mg Oral Daily With Dinner BERNADETTE Marie     • ondansetron  4 mg Intravenous Q6H PRN BERNADETTE Marie     • tamsulosin  0 4 mg Oral Daily With Dinner Ptaricia CRNP          Today, Patient Was Seen By: Anna Keene MD    ** Please Note: "This note has been constructed using a voice recognition system  Therefore there may be syntax, spelling, and/or grammatical errors   Please call if you have any questions  "**

## 2023-03-22 NOTE — PROGRESS NOTES
Progress Note - Pulmonary   Sydni Cassette 80 y o  male MRN: [id number]  Unit/Bed#: [address] Encounter: [id number]    Assessment/Plan:    1  Acute respiratory insufficiency likely multifactorial as listed below  -May be secondary to bottom of aspiration  -Continue to wean oxygen as tolerated  Titrate to maintain oxygen saturations greater than 88%  Patient was on 2 L upon my exam with an oxygen saturation of 97%  I removed his oxygen and he maintained his saturations above 92%  He does not wear home O2   -Pulmonary toilet: cough and deep breathe, incentive spirometer, flutter valve, OOB to chair as tolerated  -Patient may require home oxygen evaluation prior to discharge    2  Acute tracheobronchitis with possible mild pneumonia of left lower lobe  -Continue azithromycin p o  for total of 5 days, today is day #4/5  -Continue ceftriaxone for total of 7 days, today's day #4/7  -Continue Mucinex every 12 hours  -Change DuoNebs to every 6 hours for wheezing  -Patient has diffuse wheezing throughout, will give one-time dose of IV Solu-Medrol 40 mg, will re-assess tomorrow    3  Large hiatal hernia  -GI following  -Upper endoscopy moved to 3/23  -Speech therapy consulted, continue regular diet with thin liquids  -Continue aspiration precautions    4  History of right upper lobe segmental pulmonary embolism  -Status post femur fracture in 2019  -Status post IVC filter with removal  -Not on chronic anticoagulation    Pulmonary will continue to follow  Chief Complaint:    "My EGD got moved to tomorrow "    Subjective:    Patient seen and examined sitting up in bed  He admits to occasional cough without sputum production  No overnight events reported  He does not have any difficulty breathing at this time  He states that he was on room air however somebody placed him back on oxygen, he is does not know why  His EGD got moved until tomorrow      Objective:    Vitals: Blood pressure 144/60, pulse 82, temperature 98 5 °F (36 9 °C), resp  rate 17, height 5' 9" (1 753 m), weight 86 4 kg (190 lb 7 6 oz), SpO2 93 %  2L NC,Body mass index is 28 13 kg/m²  Intake/Output Summary (Last 24 hours) at 3/22/2023 1648  Last data filed at 3/21/2023 1752  Gross per 24 hour   Intake 120 ml   Output --   Net 120 ml       Invasive Devices     Peripheral Intravenous Line  Duration           Peripheral IV 03/19/23 Left Arm 3 days          Line  Duration           Venous Sheath Other (Comment) Right Other (Comment) 943 days                  Physical Exam  Vitals reviewed  Constitutional:       General: He is not in acute distress  Appearance: He is obese  He is not ill-appearing or toxic-appearing  Interventions: Nasal cannula in place  HENT:      Head: Normocephalic and atraumatic  Right Ear: Hearing normal       Left Ear: Hearing normal       Nose: Nose normal       Mouth/Throat:      Pharynx: Oropharynx is clear  Eyes:      Conjunctiva/sclera: Conjunctivae normal    Cardiovascular:      Rate and Rhythm: Normal rate and regular rhythm  Heart sounds: Normal heart sounds  Pulmonary:      Effort: Pulmonary effort is normal  No tachypnea, accessory muscle usage, respiratory distress or retractions  Breath sounds: Normal air entry  No stridor or decreased air movement  Wheezing (expiratory) and rhonchi (scattered) present  No decreased breath sounds or rales  Chest:      Chest wall: No tenderness  Abdominal:      Palpations: Abdomen is soft  Musculoskeletal:         General: Normal range of motion  Cervical back: Normal range of motion  Skin:     General: Skin is warm and dry  Neurological:      General: No focal deficit present  Mental Status: He is alert  Psychiatric:         Mood and Affect: Mood normal          Behavior: Behavior normal          Labs: I have personally reviewed pertinent lab results        Imaging and other studies: I have personally reviewed pertinent reports

## 2023-03-22 NOTE — PROGRESS NOTES
Progress Note - Mya Graff 80 y o  male MRN: [id number]    Unit/Bed#: [address] [place] [address] Encounter: [id number]        Assessment/Plan:  Hiatal hernia  • Large hiatal hernia showed on CT   Pt and wife are not aware of this  • Could have bottom up aspiration, speech did bedside eval and patient had oropharyngeal swallow within functional limits  • COVID flu RSV were negative, consider aspiration pneumonia  • Continue antibiotics  • Patient denies any significant reflux symptoms but was started on Pepcid per primary team at night  • Spoke with pulmonary -they feel he has acute tracheobronchitis with possible mild pneumonia of the left lower lobe, cannot exclude reflux with aspiration or could just be community-acquired, continue antibiotics/neb treatments  • Plan for EGD tomorrow  • Can continue regular diet in interim  • Spoke with daughter at bedside    Subjective:   Patient is lying in bed  He is doing much better and is off oxygen  Daughter is at bedside  She reports that his nose consistently runs with eating, but patient denies any difficulty swallowing or reflux  Does admit to some left-sided chest upper abdomen soreness due to coughing      Objective:     Vitals: /69   Pulse 88   Temp 98 °F (36 7 °C)   Resp 17   Ht 5' 9" (1 753 m)   Wt 86 4 kg (190 lb 7 6 oz)   SpO2 92%   BMI 28 13 kg/m²       Physical Exam:  Gen-alert no acute distress   abd-soft positive bowel sounds nontender nondistended       Lab, Imaging and other studies:   Recent Results (from the past 72 hour(s))   Legionella antigen, Urine    Collection Time: 03/19/23  1:51 PM    Specimen: Urine, Other   Result Value Ref Range    Legionella Urinary Antigen Negative Negative   ECG 12 lead    Collection Time: 03/19/23  3:32 PM   Result Value Ref Range    Ventricular Rate 69 BPM    Atrial Rate 69 BPM    IA Interval 154 ms    QRSD Interval 86 ms    QT Interval 388 ms    QTC Interval 415 ms    P Axis 30 degrees    QRS Axis 39 degrees    T Wave Axis 53 degrees   CBC and differential    Collection Time: 03/19/23  3:57 PM   Result Value Ref Range    WBC 10 24 (H) 4 31 - 10 16 Thousand/uL    RBC 4 21 3 88 - 5 62 Million/uL    Hemoglobin 13 7 12 0 - 17 0 g/dL    Hematocrit 40 7 36 5 - 49 3 %    MCV 97 82 - 98 fL    MCH 32 5 26 8 - 34 3 pg    MCHC 33 7 31 4 - 37 4 g/dL    RDW 13 4 11 6 - 15 1 %    MPV 9 0 8 9 - 12 7 fL    Platelets 660 023 - 304 Thousands/uL    nRBC 0 /100 WBCs    Neutrophils Relative 75 43 - 75 %    Immat GRANS % 1 0 - 2 %    Lymphocytes Relative 8 (L) 14 - 44 %    Monocytes Relative 13 (H) 4 - 12 %    Eosinophils Relative 2 0 - 6 %    Basophils Relative 1 0 - 1 %    Neutrophils Absolute 7 79 (H) 1 85 - 7 62 Thousands/µL    Immature Grans Absolute 0 05 0 00 - 0 20 Thousand/uL    Lymphocytes Absolute 0 80 0 60 - 4 47 Thousands/µL    Monocytes Absolute 1 31 (H) 0 17 - 1 22 Thousand/µL    Eosinophils Absolute 0 23 0 00 - 0 61 Thousand/µL    Basophils Absolute 0 06 0 00 - 0 10 Thousands/µL   Comprehensive metabolic panel    Collection Time: 03/19/23  3:57 PM   Result Value Ref Range    Sodium 133 (L) 135 - 147 mmol/L    Potassium 4 8 3 5 - 5 3 mmol/L    Chloride 100 96 - 108 mmol/L    CO2 28 21 - 32 mmol/L    ANION GAP 5 4 - 13 mmol/L    BUN 21 5 - 25 mg/dL    Creatinine 1 05 0 60 - 1 30 mg/dL    Glucose 114 65 - 140 mg/dL    Calcium 8 5 8 4 - 10 2 mg/dL    AST 15 13 - 39 U/L    ALT 8 7 - 52 U/L    Alkaline Phosphatase 70 34 - 104 U/L    Total Protein 6 7 6 4 - 8 4 g/dL    Albumin 3 8 3 5 - 5 0 g/dL    Total Bilirubin 0 70 0 20 - 1 00 mg/dL    eGFR 65 ml/min/1 73sq m   HS Troponin 0hr (reflex protocol)    Collection Time: 03/19/23  3:57 PM   Result Value Ref Range    hs TnI 0hr 6 "Refer to ACS Flowchart"- see link ng/L   B-Type Natriuretic Peptide(BNP)    Collection Time: 03/19/23  3:57 PM   Result Value Ref Range    BNP 92 0 - 100 pg/mL   FLU/RSV/COVID - if FLU/RSV clinically relevant    Collection Time: 03/19/23  3:57 PM    Specimen: Nose; Nares   Result Value Ref Range    SARS-CoV-2 Negative Negative    INFLUENZA A PCR Negative Negative    INFLUENZA B PCR Negative Negative    RSV PCR Negative Negative   Lactic acid    Collection Time: 03/19/23  3:57 PM   Result Value Ref Range    LACTIC ACID 0 7 0 5 - 2 0 mmol/L   Procalcitonin    Collection Time: 03/19/23  3:57 PM   Result Value Ref Range    Procalcitonin 0 08 <=0 25 ng/ml   Magnesium    Collection Time: 03/19/23  3:57 PM   Result Value Ref Range    Magnesium 1 8 (L) 1 9 - 2 7 mg/dL   Blood culture #2    Collection Time: 03/19/23  3:58 PM    Specimen: Line, Venous; Blood   Result Value Ref Range    Blood Culture No Growth at 48 hrs  Blood culture #1    Collection Time: 03/19/23  4:03 PM    Specimen: Arm, Right; Blood   Result Value Ref Range    Blood Culture No Growth at 48 hrs      Blood gas, arterial    Collection Time: 03/19/23  4:14 PM   Result Value Ref Range    pH, Arterial 7 397 7 350 - 7 450    PH ART TC 7 401 7 350 - 7 450    pCO2, Arterial 43 1 36 0 - 44 0 mm Hg    PCO2 (TC) Arterial 42 5 36 0 - 44 0 mm Hg    pO2, Arterial 188 9 (H) 75 0 - 129 0 mm Hg    PO2 (TC) Arterial 187 3 (H) 75 0 - 129 0 mm Hg    HCO3, Arterial 25 9 22 0 - 28 0 mmol/L    Base Excess, Arterial 0 8 mmol/L    O2 Content, Arterial 19 8 16 0 - 23 0 mL/dL    O2 HGB,Arterial  98 7 (H) 94 0 - 97 0 %    SOURCE Radial, Right     JOSE TEST Yes     Temperature 98 0 Degrees Fehrenheit    Nasal Cannula 6    UA w Reflex to Microscopic w Reflex to Culture    Collection Time: 03/19/23  4:51 PM    Specimen: Urine, Other   Result Value Ref Range    Color, UA Yellow     Clarity, UA Clear     Specific Gravity, UA 1 020 1 000 - 1 030    pH, UA 5 5 5 0, 5 5, 6 0, 6 5, 7 0, 7 5, 8 0, 8 5, 9 0    Leukocytes, UA Negative Negative    Nitrite, UA Negative Negative    Protein, UA Negative Negative mg/dl    Glucose, UA Negative Negative mg/dl    Ketones, UA Negative Negative mg/dl    Urobilinogen, UA 0 2 0 2, 1 0 E U /dl E U /dl    Bilirubin, UA Negative Negative    Occult Blood, UA Negative Negative   Strep Pneumoniae, Urine    Collection Time: 03/19/23  4:51 PM    Specimen: Urine, Other   Result Value Ref Range    Strep pneumoniae antigen, urine Negative Negative   HS Troponin I 2hr    Collection Time: 03/19/23  5:50 PM   Result Value Ref Range    hs TnI 2hr 4 "Refer to ACS Flowchart"- see link ng/L    Delta 2hr hsTnI -2 <20 ng/L   HS Troponin I 4hr    Collection Time: 03/19/23  8:56 PM   Result Value Ref Range    hs TnI 4hr 6 "Refer to ACS Flowchart"- see link ng/L    Delta 4hr hsTnI 0 <20 ng/L   Procalcitonin, Next Day AM Collection    Collection Time: 03/20/23  5:22 AM   Result Value Ref Range    Procalcitonin 0 09 <=0 25 ng/ml   Basic metabolic panel    Collection Time: 03/20/23  5:22 AM   Result Value Ref Range    Sodium 135 135 - 147 mmol/L    Potassium 4 4 3 5 - 5 3 mmol/L    Chloride 101 96 - 108 mmol/L    CO2 28 21 - 32 mmol/L    ANION GAP 6 4 - 13 mmol/L    BUN 21 5 - 25 mg/dL    Creatinine 1 00 0 60 - 1 30 mg/dL    Glucose 162 (H) 65 - 140 mg/dL    Calcium 8 3 (L) 8 4 - 10 2 mg/dL    eGFR 69 ml/min/1 73sq m   Magnesium    Collection Time: 03/20/23  5:22 AM   Result Value Ref Range    Magnesium 1 7 (L) 1 9 - 2 7 mg/dL   Sputum culture and Gram stain    Collection Time: 03/20/23  8:51 AM    Specimen: Expectorated Sputum   Result Value Ref Range    Sputum Culture 1+ Growth of     Gram Stain Result 1+ Epithelial cells per low power field (A)     Gram Stain Result No polys seen (A)     Gram Stain Result 1+ Gram positive cocci in pairs (A)    Basic metabolic panel    Collection Time: 03/22/23  5:07 AM   Result Value Ref Range    Sodium 137 135 - 147 mmol/L    Potassium 4 1 3 5 - 5 3 mmol/L    Chloride 103 96 - 108 mmol/L    CO2 30 21 - 32 mmol/L    ANION GAP 4 4 - 13 mmol/L    BUN 24 5 - 25 mg/dL    Creatinine 0 86 0 60 - 1 30 mg/dL    Glucose 87 65 - 140 mg/dL    Calcium 7 9 (L) 8 4 - 10 2 mg/dL    eGFR 80 ml/min/1 73sq m   CBC    Collection Time: 03/22/23  5:07 AM   Result Value Ref Range    WBC 9 06 4 31 - 10 16 Thousand/uL    RBC 3 78 (L) 3 88 - 5 62 Million/uL    Hemoglobin 12 5 12 0 - 17 0 g/dL    Hematocrit 36 4 (L) 36 5 - 49 3 %    MCV 96 82 - 98 fL    MCH 33 1 26 8 - 34 3 pg    MCHC 34 3 31 4 - 37 4 g/dL    RDW 13 6 11 6 - 15 1 %    Platelets 202 706 - 012 Thousands/uL    MPV 8 9 8 9 - 12 7 fL   Magnesium    Collection Time: 03/22/23  5:07 AM   Result Value Ref Range    Magnesium 1 9 1 9 - 2 7 mg/dL   Procalcitonin    Collection Time: 03/22/23  5:07 AM   Result Value Ref Range    Procalcitonin 0 09 <=0 25 ng/ml

## 2023-03-22 NOTE — PLAN OF CARE
Problem: MOBILITY - ADULT  Goal: Maintain or return to baseline ADL function  Description: INTERVENTIONS:  -  Assess patient's ability to carry out ADLs; assess patient's baseline for ADL function and identify physical deficits which impact ability to perform ADLs (bathing, care of mouth/teeth, toileting, grooming, dressing, etc )  - Assess/evaluate cause of self-care deficits   - Assess range of motion  - Assess patient's mobility; develop plan if impaired  - Assess patient's need for assistive devices and provide as appropriate  - Encourage maximum independence but intervene and supervise when necessary  - Involve family in performance of ADLs  - Assess for home care needs following discharge   - Consider OT consult to assist with ADL evaluation and planning for discharge  - Provide patient education as appropriate  Outcome: Progressing  Goal: Maintains/Returns to pre admission functional level  Description: INTERVENTIONS:  - Perform BMAT or MOVE assessment daily    - Set and communicate daily mobility goal to care team and patient/family/caregiver  - Collaborate with rehabilitation services on mobility goals if consulted  - Perform Range of Motion 2 times a day  - Reposition patient every 2 hours    - Dangle patient 2 times a day  - Stand patient 2 times a day  - Ambulate patient 2 times a day  - Out of bed to chair 2 times a day   - Out of bed for meals 2 times a day  - Out of bed for toileting  - Record patient progress and toleration of activity level   Outcome: Progressing     Problem: Prexisting or High Potential for Compromised Skin Integrity  Goal: Skin integrity is maintained or improved  Description: INTERVENTIONS:  - Identify patients at risk for skin breakdown  - Assess and monitor skin integrity  - Assess and monitor nutrition and hydration status  - Monitor labs   - Assess for incontinence   - Turn and reposition patient  - Assist with mobility/ambulation  - Relieve pressure over bony prominences  - Avoid friction and shearing  - Provide appropriate hygiene as needed including keeping skin clean and dry  - Evaluate need for skin moisturizer/barrier cream  - Collaborate with interdisciplinary team   - Patient/family teaching  - Consider wound care consult   Outcome: Progressing     Problem: SAFETY ADULT  Goal: Patient will remain free of falls  Description: INTERVENTIONS:  - Educate patient/family on patient safety including physical limitations  - Instruct patient to call for assistance with activity   - Consult OT/PT to assist with strengthening/mobility   - Keep Call bell within reach  - Keep bed low and locked with side rails adjusted as appropriate  - Keep care items and personal belongings within reach  - Initiate and maintain comfort rounds  - Make Fall Risk Sign visible to staff  - Offer Toileting every 2 Hours, in advance of need  - Initiate/Maintain bed alarm  - Obtain necessary fall risk management equipment: yellow socks  - Apply yellow socks and bracelet for high fall risk patients  - Consider moving patient to room near nurses station  Outcome: Progressing     Problem: DISCHARGE PLANNING  Goal: Discharge to home or other facility with appropriate resources  Description: INTERVENTIONS:  - Identify barriers to discharge w/patient and caregiver  - Arrange for needed discharge resources and transportation as appropriate  - Identify discharge learning needs (meds, wound care, etc )  - Arrange for interpretive services to assist at discharge as needed  - Refer to Case Management Department for coordinating discharge planning if the patient needs post-hospital services based on physician/advanced practitioner order or complex needs related to functional status, cognitive ability, or social support system  Outcome: Progressing     Problem: Knowledge Deficit  Goal: Patient/family/caregiver demonstrates understanding of disease process, treatment plan, medications, and discharge instructions  Description: Complete learning assessment and assess knowledge base    Interventions:  - Provide teaching at level of understanding  - Provide teaching via preferred learning methods  Outcome: Progressing

## 2023-03-23 ENCOUNTER — ANESTHESIA EVENT (INPATIENT)
Dept: PERIOP | Facility: HOSPITAL | Age: 83
End: 2023-03-23

## 2023-03-23 ENCOUNTER — ANESTHESIA (INPATIENT)
Dept: PERIOP | Facility: HOSPITAL | Age: 83
End: 2023-03-23

## 2023-03-23 ENCOUNTER — APPOINTMENT (OUTPATIENT)
Dept: PERIOP | Facility: HOSPITAL | Age: 83
End: 2023-03-23

## 2023-03-23 LAB
ANION GAP SERPL CALCULATED.3IONS-SCNC: 8 MMOL/L (ref 4–13)
BUN SERPL-MCNC: 18 MG/DL (ref 5–25)
CALCIUM SERPL-MCNC: 8.1 MG/DL (ref 8.4–10.2)
CHLORIDE SERPL-SCNC: 104 MMOL/L (ref 96–108)
CO2 SERPL-SCNC: 25 MMOL/L (ref 21–32)
CREAT SERPL-MCNC: 0.84 MG/DL (ref 0.6–1.3)
ERYTHROCYTE [DISTWIDTH] IN BLOOD BY AUTOMATED COUNT: 13.2 % (ref 11.6–15.1)
GFR SERPL CREATININE-BSD FRML MDRD: 80 ML/MIN/1.73SQ M
GLUCOSE SERPL-MCNC: 150 MG/DL (ref 65–140)
HCT VFR BLD AUTO: 37.1 % (ref 36.5–49.3)
HGB BLD-MCNC: 12.5 G/DL (ref 12–17)
MAGNESIUM SERPL-MCNC: 1.8 MG/DL (ref 1.9–2.7)
MCH RBC QN AUTO: 32.1 PG (ref 26.8–34.3)
MCHC RBC AUTO-ENTMCNC: 33.7 G/DL (ref 31.4–37.4)
MCV RBC AUTO: 95 FL (ref 82–98)
PLATELET # BLD AUTO: 218 THOUSANDS/UL (ref 149–390)
PMV BLD AUTO: 9 FL (ref 8.9–12.7)
POTASSIUM SERPL-SCNC: 4.4 MMOL/L (ref 3.5–5.3)
RBC # BLD AUTO: 3.89 MILLION/UL (ref 3.88–5.62)
SODIUM SERPL-SCNC: 137 MMOL/L (ref 135–147)
WBC # BLD AUTO: 6.9 THOUSAND/UL (ref 4.31–10.16)

## 2023-03-23 PROCEDURE — 0DB38ZX EXCISION OF LOWER ESOPHAGUS, VIA NATURAL OR ARTIFICIAL OPENING ENDOSCOPIC, DIAGNOSTIC: ICD-10-PCS | Performed by: INTERNAL MEDICINE

## 2023-03-23 PROCEDURE — 0DB68ZX EXCISION OF STOMACH, VIA NATURAL OR ARTIFICIAL OPENING ENDOSCOPIC, DIAGNOSTIC: ICD-10-PCS | Performed by: INTERNAL MEDICINE

## 2023-03-23 RX ORDER — ONDANSETRON 2 MG/ML
4 INJECTION INTRAMUSCULAR; INTRAVENOUS ONCE AS NEEDED
Status: CANCELLED | OUTPATIENT
Start: 2023-03-23

## 2023-03-23 RX ORDER — PROPOFOL 10 MG/ML
INJECTION, EMULSION INTRAVENOUS AS NEEDED
Status: DISCONTINUED | OUTPATIENT
Start: 2023-03-23 | End: 2023-03-23

## 2023-03-23 RX ORDER — SODIUM CHLORIDE, SODIUM LACTATE, POTASSIUM CHLORIDE, CALCIUM CHLORIDE 600; 310; 30; 20 MG/100ML; MG/100ML; MG/100ML; MG/100ML
INJECTION, SOLUTION INTRAVENOUS CONTINUOUS PRN
Status: DISCONTINUED | OUTPATIENT
Start: 2023-03-23 | End: 2023-03-23

## 2023-03-23 RX ORDER — PREDNISONE 20 MG/1
40 TABLET ORAL DAILY
Status: DISCONTINUED | OUTPATIENT
Start: 2023-03-24 | End: 2023-03-24 | Stop reason: HOSPADM

## 2023-03-23 RX ORDER — LIDOCAINE HYDROCHLORIDE 10 MG/ML
INJECTION, SOLUTION EPIDURAL; INFILTRATION; INTRACAUDAL; PERINEURAL AS NEEDED
Status: DISCONTINUED | OUTPATIENT
Start: 2023-03-23 | End: 2023-03-23

## 2023-03-23 RX ORDER — MAGNESIUM SULFATE HEPTAHYDRATE 40 MG/ML
2 INJECTION, SOLUTION INTRAVENOUS ONCE
Status: COMPLETED | OUTPATIENT
Start: 2023-03-23 | End: 2023-03-23

## 2023-03-23 RX ADMIN — LIDOCAINE HYDROCHLORIDE 50 MG: 10 INJECTION, SOLUTION EPIDURAL; INFILTRATION; INTRACAUDAL; PERINEURAL at 15:19

## 2023-03-23 RX ADMIN — GUAIFENESIN 600 MG: 600 TABLET, EXTENDED RELEASE ORAL at 08:40

## 2023-03-23 RX ADMIN — SODIUM CHLORIDE, SODIUM LACTATE, POTASSIUM CHLORIDE, AND CALCIUM CHLORIDE: .6; .31; .03; .02 INJECTION, SOLUTION INTRAVENOUS at 14:58

## 2023-03-23 RX ADMIN — Medication 1 TABLET: at 17:11

## 2023-03-23 RX ADMIN — FAMOTIDINE 10 MG: 20 TABLET, FILM COATED ORAL at 20:31

## 2023-03-23 RX ADMIN — Medication 1 TABLET: at 08:40

## 2023-03-23 RX ADMIN — NIACIN 500 MG: 500 TABLET, EXTENDED RELEASE ORAL at 17:11

## 2023-03-23 RX ADMIN — CEFTRIAXONE 1000 MG: 1 INJECTION, SOLUTION INTRAVENOUS at 20:32

## 2023-03-23 RX ADMIN — PROPOFOL 100 MG: 10 INJECTION, EMULSION INTRAVENOUS at 15:19

## 2023-03-23 RX ADMIN — IPRATROPIUM BROMIDE AND ALBUTEROL SULFATE 3 ML: 2.5; .5 SOLUTION RESPIRATORY (INHALATION) at 08:14

## 2023-03-23 RX ADMIN — LISINOPRIL 5 MG: 5 TABLET ORAL at 08:40

## 2023-03-23 RX ADMIN — IPRATROPIUM BROMIDE AND ALBUTEROL SULFATE 3 ML: 2.5; .5 SOLUTION RESPIRATORY (INHALATION) at 19:56

## 2023-03-23 RX ADMIN — B-COMPLEX W/ C & FOLIC ACID TAB 1 TABLET: TAB at 08:40

## 2023-03-23 RX ADMIN — PROPOFOL 50 MG: 10 INJECTION, EMULSION INTRAVENOUS at 15:24

## 2023-03-23 RX ADMIN — IPRATROPIUM BROMIDE AND ALBUTEROL SULFATE 3 ML: 2.5; .5 SOLUTION RESPIRATORY (INHALATION) at 14:11

## 2023-03-23 RX ADMIN — METOPROLOL TARTRATE 50 MG: 50 TABLET, FILM COATED ORAL at 20:27

## 2023-03-23 RX ADMIN — ATORVASTATIN CALCIUM 5 MG: 10 TABLET, FILM COATED ORAL at 17:11

## 2023-03-23 RX ADMIN — METOPROLOL TARTRATE 50 MG: 50 TABLET, FILM COATED ORAL at 08:40

## 2023-03-23 RX ADMIN — FLUTICASONE PROPIONATE 1 SPRAY: 50 SPRAY, METERED NASAL at 08:41

## 2023-03-23 RX ADMIN — IPRATROPIUM BROMIDE AND ALBUTEROL SULFATE 3 ML: 2.5; .5 SOLUTION RESPIRATORY (INHALATION) at 02:46

## 2023-03-23 RX ADMIN — GUAIFENESIN 600 MG: 600 TABLET, EXTENDED RELEASE ORAL at 17:11

## 2023-03-23 RX ADMIN — CITALOPRAM HYDROBROMIDE 40 MG: 20 TABLET ORAL at 08:40

## 2023-03-23 RX ADMIN — LORATADINE 10 MG: 10 TABLET ORAL at 08:40

## 2023-03-23 RX ADMIN — AZITHROMYCIN MONOHYDRATE 500 MG: 250 TABLET ORAL at 20:31

## 2023-03-23 RX ADMIN — MAGNESIUM OXIDE TAB 400 MG (241.3 MG ELEMENTAL MG) 400 MG: 400 (241.3 MG) TAB at 17:11

## 2023-03-23 RX ADMIN — Medication 250 MG: at 08:40

## 2023-03-23 RX ADMIN — ENOXAPARIN SODIUM 40 MG: 40 INJECTION SUBCUTANEOUS at 20:32

## 2023-03-23 RX ADMIN — TAMSULOSIN HYDROCHLORIDE 0.4 MG: 0.4 CAPSULE ORAL at 17:11

## 2023-03-23 RX ADMIN — PROPOFOL 30 MG: 10 INJECTION, EMULSION INTRAVENOUS at 15:22

## 2023-03-23 RX ADMIN — Medication 250 MG: at 17:10

## 2023-03-23 RX ADMIN — MAGNESIUM OXIDE TAB 400 MG (241.3 MG ELEMENTAL MG) 400 MG: 400 (241.3 MG) TAB at 08:40

## 2023-03-23 RX ADMIN — MAGNESIUM SULFATE HEPTAHYDRATE 2 G: 40 INJECTION, SOLUTION INTRAVENOUS at 09:46

## 2023-03-23 NOTE — ANESTHESIA POSTPROCEDURE EVALUATION
Post-Op Assessment Note    CV Status:  Stable    Pain management: adequate     Mental Status:  Lethargic   Hydration Status:  Euvolemic   PONV Controlled:  Controlled   Airway Patency:  Patent      Post Op Vitals Reviewed: Yes      Staff: CRNA         No notable events documented      BP   139/70   Temp  98 4   Pulse  78   Resp   18   SpO2   99

## 2023-03-23 NOTE — PROGRESS NOTES
-- Patient: Colette Burnham  -- MRN: [id number]  -- Aidin Request ID: [id number]  -- Level of care reserved: [address] [place] [address]  -- Partner Reserved: [address] [place] [address] [phone]  -- Clinical needs requested:  -- Geography searched: [id number]  -- Start of Service:  -- Request sent: 9:19am EDT on 3/22/2023 by Mino Leon  -- Partner reserved: 10:12am EDT on 3/23/2023 by Mino Leon  -- Choice list shared: 10:11am EDT on 3/23/2023 by Mino Leon

## 2023-03-23 NOTE — PHYSICAL THERAPY NOTE
PT TREATMENT       03/23/23 1405   PT Last Visit   PT Visit Date 03/23/23   Note Type   Note Type Treatment   Pain Assessment   Pain Assessment Tool 0-10   Pain Score No Pain   Patient's Stated Pain Goal No pain   Hospital Pain Intervention(s) Repositioned; Ambulation/increased activity   Multiple Pain Sites No   Restrictions/Precautions   Weight Bearing Precautions Per Order No   Other Precautions Bed Alarm; Chair Alarm; Fall Risk;Pain   General   Chart Reviewed Yes   Family/Caregiver Present No   Cognition   Overall Cognitive Status WFL   Arousal/Participation Cooperative   Orientation Level Oriented X4   Following Commands Follows multistep commands with increased time or repetition   Comments Pt mildly [place]   Subjective   Subjective "Yeah it feels good to walk"   Bed Mobility   Additional Comments Received sitting in bedside chair   Transfers   Sit to Stand 5  Supervision   Additional items Verbal cues   Stand to Sit 5  Supervision   Additional items Verbal cues   Stand pivot 5  Supervision   Additional items Verbal cues   Ambulation/Elevation   Gait pattern Short stride; Step through pattern   Gait Assistance 5  Supervision  (close supervision, intermittent contact guard)   Additional items Verbal cues   Assistive Device Rolling walker   Distance 80 feet + 50 feet   Stair Management Assistance Not tested   Balance   Static Sitting Fair +   Dynamic Sitting Fair   Static Standing Fair   Dynamic Standing Fair -   Ambulatory Fair -   Activity Tolerance   Activity Tolerance Patient tolerated treatment well;Patient limited by fatigue   Nurse Made Aware yes   Exercises   Neuro re-ed Pt able to perform seated exercises including heel/toe raises, LAQ, seated hip marches, hip abd squeeze x 10 reps bilat   Assessment   Prognosis Good   Problem List Decreased strength;Decreased endurance; Impaired balance;Decreased mobility;Pain;Decreased safety awareness   Assessment Pt agreeable to PT session this afternoon - O2 doffed per RN at 93-95% on RA at rest  Able to perform exercise as mentioned above with good response with intermittent demo to improve form - O2 drops to approx 87% on RA after performing exercise with improvement >90% within 15-20 seconds  Able to progress ambulation x increased distance with supervision using RW - requires seated rest breaks between ambulation trials with O2 drop to 84% on 1st trial with minimal SOB reported + recovered to >90% within 1 min  On second trial pt recovered >90% after approx 30 seconds  Repositioned in bedside chair with all needs within reach  The patient's AM-PAC Basic Mobility Inpatient Short Form Raw Score is 18  A Raw score of greater than 16 suggests the patient may benefit from discharge to home  Please also refer to the recommendation of the Physical Therapist for safe discharge planning  Goals   Patient Goals to go home   Plan   Treatment/Interventions Functional transfer training;LE strengthening/ROM; Therapeutic exercise; Endurance training;Bed mobility;Gait training;Spoke to nursing   Progress Progressing toward goals   PT Frequency Other (Comment)  (5x/week)   Recommendation   PT Discharge Recommendation Home with home health rehabilitation   AM-PAC Basic Mobility Inpatient   Turning in Flat Bed Without Bedrails 4   Lying on Back to Sitting on Edge of Flat Bed Without Bedrails 3   Moving Bed to Chair 3   Standing Up From Chair Using Arms 3   Walk in Room 3   Climb 3-5 Stairs With Railing 2   Basic Mobility Inpatient Raw Score 18   Basic Mobility Standardized Score 41 05   Highest Level Of Mobility   JH-HLM Goal 6: Walk 10 steps or more   JH-HLM Achieved 7: Walk 25 feet or more   Education   Education Provided Mobility training;Assistive device   Patient Explanation/teachback used; Reinforcement needed   End of Consult   Patient Position at End of Consult Bedside chair;Bed/Chair alarm activated; All needs within reach   [facility] Insurance Number  Shonda Lindo [id number]

## 2023-03-23 NOTE — PLAN OF CARE
Problem: MOBILITY - ADULT  Goal: Maintain or return to baseline ADL function  Description: INTERVENTIONS:  -  Assess patient's ability to carry out ADLs; assess patient's baseline for ADL function and identify physical deficits which impact ability to perform ADLs (bathing, care of mouth/teeth, toileting, grooming, dressing, etc )  - Assess/evaluate cause of self-care deficits   - Assess range of motion  - Assess patient's mobility; develop plan if impaired  - Assess patient's need for assistive devices and provide as appropriate  - Encourage maximum independence but intervene and supervise when necessary  - Involve family in performance of ADLs  - Assess for home care needs following discharge   - Consider OT consult to assist with ADL evaluation and planning for discharge  - Provide patient education as appropriate  Outcome: Progressing  Goal: Maintains/Returns to pre admission functional level  Description: INTERVENTIONS:  - Perform BMAT or MOVE assessment daily    - Set and communicate daily mobility goal to care team and patient/family/caregiver  - Collaborate with rehabilitation services on mobility goals if consulted  - Perform Range of Motion 2 times a day  - Reposition patient every 2 hours    - Dangle patient 2 times a day  - Stand patient 2 times a day  - Ambulate patient 2 times a day  - Out of bed to chair 2 times a day   - Out of bed for meals 2 times a day  - Out of bed for toileting  - Record patient progress and toleration of activity level   Outcome: Progressing     Problem: Prexisting or High Potential for Compromised Skin Integrity  Goal: Skin integrity is maintained or improved  Description: INTERVENTIONS:  - Identify patients at risk for skin breakdown  - Assess and monitor skin integrity  - Assess and monitor nutrition and hydration status  - Monitor labs   - Assess for incontinence   - Turn and reposition patient  - Assist with mobility/ambulation  - Relieve pressure over bony prominences  - Avoid friction and shearing  - Provide appropriate hygiene as needed including keeping skin clean and dry  - Evaluate need for skin moisturizer/barrier cream  - Collaborate with interdisciplinary team   - Patient/family teaching  - Consider wound care consult   Outcome: Progressing     Problem: DISCHARGE PLANNING  Goal: Discharge to home or other facility with appropriate resources  Description: INTERVENTIONS:  - Identify barriers to discharge w/patient and caregiver  - Arrange for needed discharge resources and transportation as appropriate  - Identify discharge learning needs (meds, wound care, etc )  - Arrange for interpretive services to assist at discharge as needed  - Refer to Case Management Department for coordinating discharge planning if the patient needs post-hospital services based on physician/advanced practitioner order or complex needs related to functional status, cognitive ability, or social support system  Outcome: Progressing     Problem: Knowledge Deficit  Goal: Patient/family/caregiver demonstrates understanding of disease process, treatment plan, medications, and discharge instructions  Description: Complete learning assessment and assess knowledge base    Interventions:  - Provide teaching at level of understanding  - Provide teaching via preferred learning methods  Outcome: Progressing

## 2023-03-23 NOTE — PROGRESS NOTES
Progress Note - Pulmonary   Sydni Kaufman 80 y o  male MRN: [id number]  Unit/Bed#: [address] [address]- Encounter: [id number]    Assessment/Plan:    1  Acute respiratory insufficiency likely multifactorial as listed below  - May be secondary to bottom up aspiration   -Continue to wean oxygen as tolerated  Titrate to maintain oxygen saturations greater than 88%  Patient is currently on RA with an oxygen saturation of 94%  He does not wear home O2  -Pulmonary toilet: cough and deep breathe, incentive spirometer, flutter valve, OOB to chair as tolerated  -He may require home oxygen evaluation prior to discharge    2  Acute tracheobronchitis with possible mild pneumonia of left lower lobe   -Continue azithromycin p o  for total 5 days, today is day number 5 out of 5, continue ceftriaxone for total of 7 days, today is day number 5 out of 7  -Continue Mucinex every 12 hours   -continue DuoNebs every 6 hours  -Patient is without wheezes on exam today, he responded well to the one-time dose of IV Solu-Medrol   - recommend 5 days of 40 mg prednisone p o  on discharge  -Recommend ipratropium bromide nasal spray at discharge     3  Large hiatal hernia   -GI following  -Upper endoscopy completed today   -Speech therapy following, continue regular diet with thin liquids  -Continue aspiration precautions    Discussed with wife and daughter at bedside  From a pulmonary standpoint, patient has improved, pulmonary will sign off  Call with questions  Chief Complaint:    "I feel fine "    Subjective:    Patient seen and examined sitting up in bed s/p endoscopy  He has not complaints at this time  No overnight events reported  Wife and daughter at bedside  Objective:    Vitals: Blood pressure 110/66, pulse 76, temperature (!) 97 4 °F (36 3 °C), temperature source Oral, resp  rate 18, height 5' 9" (1 753 m), weight 86 4 kg (190 lb 7 6 oz), SpO2 92 %  RA,Body mass index is 28 13 kg/m²        Intake/Output Summary (Last 24 hours) at 3/23/2023 1619  Last data filed at 3/23/2023 1535  Gross per 24 hour   Intake 280 ml   Output --   Net 280 ml       Invasive Devices     Peripheral Intravenous Line  Duration           Peripheral IV 03/23/23 Dorsal (posterior); Left Wrist <1 day          Line  Duration           Venous Sheath Other (Comment) Right Other (Comment) 944 days                  Physical Exam  Vitals reviewed  Constitutional:       General: He is not in acute distress  Appearance: Normal appearance  He is not ill-appearing or toxic-appearing  HENT:      Head: Normocephalic and atraumatic  Nose: Congestion present  Mouth/Throat:      Pharynx: Oropharynx is clear  Eyes:      Conjunctiva/sclera: Conjunctivae normal    Cardiovascular:      Rate and Rhythm: Normal rate and regular rhythm  Heart sounds: Normal heart sounds  Pulmonary:      Effort: Pulmonary effort is normal  No respiratory distress  Breath sounds: Normal breath sounds  No stridor  No wheezing, rhonchi or rales  Chest:      Chest wall: No tenderness  Abdominal:      Palpations: Abdomen is soft  Musculoskeletal:         General: Normal range of motion  Cervical back: Normal range of motion  Skin:     General: Skin is warm and dry  Findings: Erythema (of cheeks) present  Neurological:      General: No focal deficit present  Mental Status: He is alert  Psychiatric:         Mood and Affect: Mood normal          Behavior: Behavior normal          Labs: I have personally reviewed pertinent lab results  Imaging and other studies: I have personally reviewed pertinent reports     and I have personally reviewed pertinent films in PACS

## 2023-03-23 NOTE — PLAN OF CARE
Problem: PHYSICAL THERAPY ADULT  Goal: Performs mobility at highest level of function for planned discharge setting  See evaluation for individualized goals  Description: Treatment/Interventions: Functional transfer training, LE strengthening/ROM, Therapeutic exercise, Endurance training, Bed mobility, Gait training, Spoke to nursing          See flowsheet documentation for full assessment, interventions and recommendations  Outcome: Progressing  Note: Prognosis: Good  Problem List: Decreased strength, Decreased endurance, Impaired balance, Decreased mobility, Pain, Decreased safety awareness  Assessment: Pt agreeable to PT session this afternoon - O2 doffed per RN at 93-95% on RA at rest  Able to perform exercise as mentioned above with good response with intermittent demo to improve form - O2 drops to approx 87% on RA after performing exercise with improvement >90% within 15-20 seconds  Able to progress ambulation x increased distance with supervision using RW - requires seated rest breaks between ambulation trials with O2 drop to 84% on 1st trial with minimal SOB reported + recovered to >90% within 1 min  On second trial pt recovered >90% after approx 30 seconds  Repositioned in bedside chair with all needs within reach  PT Discharge Recommendation: Home with home health rehabilitation    See flowsheet documentation for full assessment

## 2023-03-23 NOTE — PROGRESS NOTES
[facility] Progress Note  Patient: Mya Graff 80 y o  male   MRN: [id number]  PCP: Ivania Restrepo MD  Unit/Bed#: [address] [place] [address] Encounter: [id number]  Date Of Visit: 03/23/23    Problem List:    Principal Problem:    Acute bronchitis  Active Problems:    Acute respiratory insufficiency    Hiatal hernia    Essential hypertension    History of pulmonary embolism    Valvular heart disease    Paroxysmal atrial flutter (HCC)    Bladder wall thickening    Recurrent major depressive disorder, in full remission ([facility] [address] )    Hyperlipidemia    Bilateral leg edema      Assessment & Plan:    Hiatal hernia  Assessment & Plan  Large hiatal hernia showed on CT  Daughter is not aware of this  Suspect risk factor for aspiration  · Continue Pepcid at bedtime  · GI following, input appreciated, plan for EGD today  · Aspiration precautions    Acute respiratory insufficiency  Assessment & Plan  Improving, 95% on 2 L  SPO2 91% on room air on ED arrival   At baseline 94-95% on room air at home per daughter  · Placed on oxygen 2 L in ED, satting high 90s  · Continue supplemental O2 sats of 92%  · Pulm follows outpatient, chronic pulmonary changes  · Incentive spirometry  · Taper oxygen as tolerated    * Acute bronchitis  Assessment & Plan  Patient presents with productive cough since Tuesday or Wednesday last week, fever 100 2 this morning at home  COVID flu RSV negative  CTA chest PE study showed- No PE  Diffuse bronchial wall thickening, greatest in the lower lobes, with subtle bilateral lower lobe tree-in-bud nodularity compatible with bronchitis/bronchiolitis  However, the large hiatal hernia and lower lobe distribution raises the possibility of aspiration    Suspect bronchitis/bronchiolitis, possible aspiration pneumonia  Remains afebrile, continues to improve   · Given IV Lasix, Unasyn, Solu-Medrol in ED  · Continue Rocephin, Zithromax  · Status post IV Solu-Medrol 40 mg x 1, transition to prednisone  · Urine Legionella pneumococcal antigen negative  · , sputum Gram stain culture- mixed respiratory spencer  · procalcitonin-normal  · Mucinex/Robitussin/Tessalon Perles  · DuoNeb   · Speech eval- recommended regular thin diet  · Pulmonary following, input appreciated    Bladder wall thickening  Assessment & Plan  · Suspect mild BPH  · Continue Flomax  · follows urology as outpatient    Paroxysmal atrial flutter (HCC)  Assessment & Plan  · On metoprolol at home  Not on AC  · EKG NSR  · Continue metoprolol  · Optimize electrolytes    Valvular heart disease  Assessment & Plan  · 2D echo in June last year showed normal EF around 18%, grade 1 diastolic dysfunction, mild to moderate TR, PA pressure 51, mild AR  · Continue ACE inhibitor  · follows cardiology as outpatient    History of pulmonary embolism  Assessment & Plan  · History of PE precipitated by femur fracture in 2019  · Status post IVC filter insertion and removal   Was on [facility] for short period of time  · Pharmacologic  DVT prophylaxis Lovenox    Essential hypertension  Assessment & Plan  · On metoprolol, Monopril at home  · Continue metoprolol  · Substitute Monopril with lisinopril with holding parameter  · BP stable    Bilateral leg edema  Assessment & Plan  · Chronic issue per daughter  Intermittent diuretic use  · BNP normal  · venous Doppler rule out DVT- BLLE negative for DVT, thrombophlebitis or incompetent valves  · Elevation    Hyperlipidemia  Assessment & Plan  · Continue statin, niacin    Recurrent major depressive disorder, in full remission ([facility] [address] )  Assessment & Plan  · Continue Celexa  · Has history of OCD as well per daughter  Hypomagnesemia-repleted and monitor    VTE Pharmacologic Prophylaxis: VTE Score: 1 Moderate Risk (Score 3-4) - Pharmacological DVT Prophylaxis Ordered: enoxaparin (Lovenox)  Patient Centered Rounds: I performed bedside rounds with nursing staff today    Discussions with Specialists or Other Care Team Provider: yes, pulmonary, GI    Education and Discussions with Family / Patient: Updated  (daughter) via phone  Time Spent for Care: 45 minutes  More than 50% of total time spent on counseling and coordination of care as described above  Current Length of Stay: 3 day(s)  Current Patient Status: Inpatient   Certification Statement: The patient will continue to require additional inpatient hospital stay due to Pneumonia, aspiration  Discharge Plan: Anticipate discharge in 48-72 hrs to discharge location to be determined pending rehab evaluations  Code Status: Level 1 - Full Code    Subjective:   Ports that his breathing continues to improve  Denies any chest pain, cough is improving  Denies any nausea vomiting or abdominal pain    Objective:     Vitals:   Temp (24hrs), Av 2 °F (36 8 °C), Min:97 4 °F (36 3 °C), Max:99 3 °F (37 4 °C)    Temp:  [97 4 °F (36 3 °C)-99 3 °F (37 4 °C)] 97 4 °F (36 3 °C)  HR:  [] 83  Resp:  [18-20] 18  BP: (144-172)/(60-89) 164/73  SpO2:  [90 %-97 %] 97 %  Body mass index is 28 13 kg/m²  Input and Output Summary (last 24 hours): Intake/Output Summary (Last 24 hours) at 3/23/2023 6861  Last data filed at 3/23/2023 1534  Gross per 24 hour   Intake 30 ml   Output --   Net 30 ml       Physical Exam:   Physical Exam  Constitutional:       General: He is not in acute distress  Comments: Elderly, pleasant   HENT:      Head: Normocephalic and atraumatic  Cardiovascular:      Rate and Rhythm: Normal rate  Pulmonary:      Effort: Pulmonary effort is normal  No respiratory distress  Breath sounds: Wheezing (Improved) present  No rhonchi or rales  Comments: Diminished bilaterally  Chest:      Chest wall: No tenderness  Abdominal:      General: Bowel sounds are normal  There is no distension  Palpations: Abdomen is soft  Tenderness: There is no abdominal tenderness  There is no guarding or rebound     Musculoskeletal:      Right lower leg: No edema  Left lower leg: No edema  Comments: Trace lower extremity edema   Skin:     General: Skin is warm and dry  Findings: No rash  Neurological:      Mental Status: He is alert  Mental status is at baseline  Cranial Nerves: No cranial nerve deficit  Additional Data:     Labs:  Results from last 7 days   Lab Units 03/23/23  0519 03/22/23  0507 03/19/23  1557   WBC Thousand/uL 6 90   < > 10 24*   HEMOGLOBIN g/dL 12 5   < > 13 7   HEMATOCRIT % 37 1   < > 40 7   PLATELETS Thousands/uL 218   < > 208   NEUTROS PCT %  --   --  75   LYMPHS PCT %  --   --  8*   MONOS PCT %  --   --  13*   EOS PCT %  --   --  2    < > = values in this interval not displayed  Results from last 7 days   Lab Units 03/23/23  0519 03/20/23  0522 03/19/23  1557   SODIUM mmol/L 137   < > 133*   POTASSIUM mmol/L 4 4   < > 4 8   CHLORIDE mmol/L 104   < > 100   CO2 mmol/L 25   < > 28   BUN mg/dL 18   < > 21   CREATININE mg/dL 0 84   < > 1 05   ANION GAP mmol/L 8   < > 5   CALCIUM mg/dL 8 1*   < > 8 5   ALBUMIN g/dL  --   --  3 8   TOTAL BILIRUBIN mg/dL  --   --  0 70   ALK PHOS U/L  --   --  70   ALT U/L  --   --  8   AST U/L  --   --  15   GLUCOSE RANDOM mg/dL 150*   < > 114    < > = values in this interval not displayed  Results from last 7 days   Lab Units 03/22/23  0507 03/20/23  0522 03/19/23  1557   LACTIC ACID mmol/L  --   --  0 7   PROCALCITONIN ng/ml 0 09 0 09 0 08       Lines/Drains:  Invasive Devices     Peripheral Intravenous Line  Duration           Peripheral IV 03/19/23 Left Arm 3 days          Line  Duration           Venous Sheath Other (Comment) Right Other (Comment) 944 days                      Imaging: Reviewed radiology reports from this admission including: chest CT scan    Recent Cultures (last 7 days):   Results from last 7 days   Lab Units 03/20/23  0851 03/19/23  1603 03/19/23  1558 03/19/23  1351   BLOOD CULTURE   --  No Growth at 72 hrs  No Growth at 72 hrs    -- SPUTUM CULTURE  1+ Growth of  --   --   --    GRAM STAIN RESULT  1+ Epithelial cells per low power field*  No polys seen*  1+ Gram positive cocci in pairs*  --   --   --    LEGIONELLA URINARY ANTIGEN   --   --   --  Negative       Last 24 Hours Medication List:   Current Facility-Administered Medications   Medication Dose Route Frequency Provider Last Rate   • acetaminophen  650 mg Oral Q6H PRN BERNADETTE Marie     • alendronate  70 mg Oral Q7 Days BERNADETTE Marie     • atorvastatin  5 mg Oral Daily With BERNADETTE Cazares     • azithromycin  500 mg Oral Q24H BERNADETTE Marie     • benzonatate  100 mg Oral TID PRN Deja Maldonado MD     • calcium carbonate-vitamin D  1 tablet Oral BID With Meals BERNADETTE Marie     • cefTRIAXone  1,000 mg Intravenous Q24H BERNADETTE Marie 1,000 mg (03/22/23 2004)   • citalopram  40 mg Oral Daily BERNADETTE Marie     • enoxaparin  40 mg Subcutaneous Daily BERNADETTE Marie     • famotidine  10 mg Oral HS BERNADETTE Marie     • fluticasone  1 spray Each Nare Daily Deja Maldonado MD     • guaiFENesin  600 mg Oral BID BERNADETTE Marie     • ipratropium-albuterol  3 mL Nebulization Q6H BERNADETTE Bauer     • levalbuterol  0 63 mg Nebulization Q6H PRN BERNADETTE Marie     • lisinopril  5 mg Oral Daily BERNADETTE Marie     • loratadine  10 mg Oral Daily Deja Maldonado MD     • magnesium Oxide  400 mg Oral BID Deja Maldonado MD     • magnesium sulfate  2 g Intravenous Once Coby Hart MD     • metoprolol tartrate  50 mg Oral Q12H BERNADETTE Marie     • multivitamin stress formula  1 tablet Oral Daily BERNADETTE Marie     • niacin  500 mg Oral Daily With Dinner BERNADETTE Marie     • ondansetron  4 mg Intravenous Q6H PRN BERNADETTE Marie     • saccharomyces boulardii  250 mg Oral BID Coby Hart MD     • tamsulosin  0 4 mg Oral Daily With BERNADETTE Cazares          Today, Patient Was Seen By: Coby Hart MD    ** Please Note: "This note has been constructed using a voice recognition system  Therefore there may be syntax, spelling, and/or grammatical errors   Please call if you have any questions  "**

## 2023-03-23 NOTE — ANESTHESIA PREPROCEDURE EVALUATION
Procedure:  EGD    Relevant Problems   ANESTHESIA (within normal limits)      CARDIO   (+) Essential hypertension   (+) Hyperlipidemia   (+) Nonrheumatic aortic valve insufficiency   (+) Nonrheumatic mitral valve regurgitation   (+) Paroxysmal atrial flutter (HCC)   (+) SVT (supraventricular tachycardia) (HCC)      GI/HEPATIC   (+) Hiatal hernia      NEURO/PSYCH   (+) History of pulmonary embolism   (+) Recurrent major depressive disorder, in full remission ([address] )      PULMONARY (within normal limits)        Physical Exam    Airway    Mallampati score: II  TM Distance: >3 FB  Neck ROM: full     Dental   No notable dental hx     Cardiovascular  Cardiovascular exam normal    Pulmonary  Pulmonary exam normal     Other Findings        Anesthesia Plan  ASA Score- 3     Anesthesia Type- IV sedation with anesthesia with ASA Monitors  Additional Monitors:   Airway Plan:           Plan Factors-Exercise tolerance (METS): >4 METS  Chart reviewed  EKG reviewed  Existing labs reviewed  Patient summary reviewed  Patient is not a current smoker  Induction- intravenous  Postoperative Plan-     Informed Consent- Anesthetic plan and risks discussed with patient  I personally reviewed this patient with the CRNA  Discussed and agreed on the Anesthesia Plan with the RED Santana

## 2023-03-23 NOTE — PLAN OF CARE
Problem: RESPIRATORY - ADULT  Goal: Achieves optimal ventilation and oxygenation  Description: INTERVENTIONS:  - Assess for changes in respiratory status  - Assess for changes in mentation and behavior  - Position to facilitate oxygenation and minimize respiratory effort  - Oxygen administered by appropriate delivery if ordered  - Initiate smoking cessation education as indicated  - Encourage broncho-pulmonary hygiene including cough, deep breathe, Incentive Spirometry  - Assess the need for suctioning and aspirate as needed  - Assess and instruct to report SOB or any respiratory difficulty  - Respiratory Therapy support as indicated  Outcome: Progressing     Problem: Nutrition/Hydration-ADULT  Goal: Nutrient/Hydration intake appropriate for improving, restoring or maintaining nutritional needs  Description: Monitor and assess patient's nutrition/hydration status for malnutrition  Collaborate with interdisciplinary team and initiate plan and interventions as ordered  Monitor patient's weight and dietary intake as ordered or per policy  Utilize nutrition screening tool and intervene as necessary  Determine patient's food preferences and provide high-protein, high-caloric foods as appropriate       INTERVENTIONS:  - Monitor oral intake, urinary output, labs, and treatment plans  - Assess nutrition and hydration status and recommend course of action  - Evaluate amount of meals eaten  - Assist patient with eating if necessary   - Allow adequate time for meals  - Recommend/ encourage appropriate diets, oral nutritional supplements, and vitamin/mineral supplements  - Order, calculate, and assess calorie counts as needed  - Recommend, monitor, and adjust tube feedings and TPN/PPN based on assessed needs  - Assess need for intravenous fluids  - Provide specific nutrition/hydration education as appropriate  - Include patient/family/caregiver in decisions related to nutrition  Outcome: Progressing     Problem: Prexisting or High Potential for Compromised Skin Integrity  Goal: Skin integrity is maintained or improved  Description: INTERVENTIONS:  - Identify patients at risk for skin breakdown  - Assess and monitor skin integrity  - Assess and monitor nutrition and hydration status  - Monitor labs   - Assess for incontinence   - Turn and reposition patient  - Assist with mobility/ambulation  - Relieve pressure over bony prominences  - Avoid friction and shearing  - Provide appropriate hygiene as needed including keeping skin clean and dry  - Evaluate need for skin moisturizer/barrier cream  - Collaborate with interdisciplinary team   - Patient/family teaching  - Consider wound care consult   Outcome: Progressing     Problem: MOBILITY - ADULT  Goal: Maintain or return to baseline ADL function  Description: INTERVENTIONS:  -  Assess patient's ability to carry out ADLs; assess patient's baseline for ADL function and identify physical deficits which impact ability to perform ADLs (bathing, care of mouth/teeth, toileting, grooming, dressing, etc )  - Assess/evaluate cause of self-care deficits   - Assess range of motion  - Assess patient's mobility; develop plan if impaired  - Assess patient's need for assistive devices and provide as appropriate  - Encourage maximum independence but intervene and supervise when necessary  - Involve family in performance of ADLs  - Assess for home care needs following discharge   - Consider OT consult to assist with ADL evaluation and planning for discharge  - Provide patient education as appropriate  Outcome: Progressing

## 2023-03-24 VITALS
TEMPERATURE: 97.9 F | DIASTOLIC BLOOD PRESSURE: 73 MMHG | SYSTOLIC BLOOD PRESSURE: 160 MMHG | OXYGEN SATURATION: 93 % | BODY MASS INDEX: 28.05 KG/M2 | WEIGHT: 189.38 LBS | HEIGHT: 69 IN | RESPIRATION RATE: 18 BRPM | HEART RATE: 90 BPM

## 2023-03-24 LAB
ANION GAP SERPL CALCULATED.3IONS-SCNC: 4 MMOL/L (ref 4–13)
BUN SERPL-MCNC: 20 MG/DL (ref 5–25)
CALCIUM SERPL-MCNC: 8.2 MG/DL (ref 8.4–10.2)
CHLORIDE SERPL-SCNC: 103 MMOL/L (ref 96–108)
CO2 SERPL-SCNC: 30 MMOL/L (ref 21–32)
CREAT SERPL-MCNC: 0.9 MG/DL (ref 0.6–1.3)
ERYTHROCYTE [DISTWIDTH] IN BLOOD BY AUTOMATED COUNT: 13.4 % (ref 11.6–15.1)
GFR SERPL CREATININE-BSD FRML MDRD: 78 ML/MIN/1.73SQ M
GLUCOSE SERPL-MCNC: 90 MG/DL (ref 65–140)
HCT VFR BLD AUTO: 37.2 % (ref 36.5–49.3)
HGB BLD-MCNC: 12.6 G/DL (ref 12–17)
MAGNESIUM SERPL-MCNC: 2 MG/DL (ref 1.9–2.7)
MCH RBC QN AUTO: 32.5 PG (ref 26.8–34.3)
MCHC RBC AUTO-ENTMCNC: 33.9 G/DL (ref 31.4–37.4)
MCV RBC AUTO: 96 FL (ref 82–98)
PLATELET # BLD AUTO: 239 THOUSANDS/UL (ref 149–390)
PMV BLD AUTO: 9.1 FL (ref 8.9–12.7)
POTASSIUM SERPL-SCNC: 3.8 MMOL/L (ref 3.5–5.3)
RBC # BLD AUTO: 3.88 MILLION/UL (ref 3.88–5.62)
SODIUM SERPL-SCNC: 137 MMOL/L (ref 135–147)
WBC # BLD AUTO: 8.21 THOUSAND/UL (ref 4.31–10.16)

## 2023-03-24 RX ORDER — BENZONATATE 100 MG/1
100 CAPSULE ORAL 3 TIMES DAILY PRN
Qty: 20 CAPSULE | Refills: 0 | Status: SHIPPED | OUTPATIENT
Start: 2023-03-24

## 2023-03-24 RX ORDER — LANOLIN ALCOHOL/MO/W.PET/CERES
400 CREAM (GRAM) TOPICAL DAILY
Qty: 30 TABLET | Refills: 0 | Status: SHIPPED | OUTPATIENT
Start: 2023-03-24

## 2023-03-24 RX ORDER — PREDNISONE 20 MG/1
40 TABLET ORAL DAILY
Qty: 8 TABLET | Refills: 0 | Status: SHIPPED | OUTPATIENT
Start: 2023-03-25 | End: 2023-03-29

## 2023-03-24 RX ORDER — GUAIFENESIN 600 MG/1
600 TABLET, EXTENDED RELEASE ORAL 2 TIMES DAILY
Qty: 14 TABLET | Refills: 0 | Status: SHIPPED | OUTPATIENT
Start: 2023-03-24 | End: 2023-03-31

## 2023-03-24 RX ORDER — SACCHAROMYCES BOULARDII 250 MG
250 CAPSULE ORAL 2 TIMES DAILY
Qty: 20 CAPSULE | Refills: 0 | Status: SHIPPED | OUTPATIENT
Start: 2023-03-24 | End: 2023-03-24

## 2023-03-24 RX ORDER — ALENDRONATE SODIUM 70 MG/1
70 TABLET ORAL
Start: 2023-03-24 | End: 2023-03-28

## 2023-03-24 RX ORDER — FUROSEMIDE 10 MG/ML
20 INJECTION INTRAMUSCULAR; INTRAVENOUS ONCE
Status: COMPLETED | OUTPATIENT
Start: 2023-03-24 | End: 2023-03-24

## 2023-03-24 RX ORDER — PANTOPRAZOLE SODIUM 40 MG/1
40 TABLET, DELAYED RELEASE ORAL DAILY
Qty: 30 TABLET | Refills: 0 | Status: SHIPPED | OUTPATIENT
Start: 2023-03-24

## 2023-03-24 RX ORDER — CEFUROXIME AXETIL 500 MG/1
500 TABLET ORAL EVERY 12 HOURS SCHEDULED
Qty: 4 TABLET | Refills: 0 | Status: SHIPPED | OUTPATIENT
Start: 2023-03-24 | End: 2023-03-28

## 2023-03-24 RX ORDER — ALBUTEROL SULFATE 90 UG/1
2 AEROSOL, METERED RESPIRATORY (INHALATION) EVERY 6 HOURS PRN
Qty: 18 G | Refills: 0 | Status: SHIPPED | OUTPATIENT
Start: 2023-03-24

## 2023-03-24 RX ORDER — IPRATROPIUM BROMIDE 21 UG/1
2 SPRAY, METERED NASAL 2 TIMES DAILY PRN
Qty: 30 ML | Refills: 0 | Status: SHIPPED | OUTPATIENT
Start: 2023-03-24

## 2023-03-24 RX ADMIN — IPRATROPIUM BROMIDE AND ALBUTEROL SULFATE 3 ML: 2.5; .5 SOLUTION RESPIRATORY (INHALATION) at 13:20

## 2023-03-24 RX ADMIN — GUAIFENESIN 600 MG: 600 TABLET, EXTENDED RELEASE ORAL at 17:09

## 2023-03-24 RX ADMIN — METOPROLOL TARTRATE 50 MG: 50 TABLET, FILM COATED ORAL at 08:56

## 2023-03-24 RX ADMIN — PREDNISONE 40 MG: 20 TABLET ORAL at 08:55

## 2023-03-24 RX ADMIN — NIACIN 500 MG: 500 TABLET, EXTENDED RELEASE ORAL at 17:11

## 2023-03-24 RX ADMIN — MAGNESIUM OXIDE TAB 400 MG (241.3 MG ELEMENTAL MG) 400 MG: 400 (241.3 MG) TAB at 17:09

## 2023-03-24 RX ADMIN — Medication 1 TABLET: at 17:09

## 2023-03-24 RX ADMIN — MAGNESIUM OXIDE TAB 400 MG (241.3 MG ELEMENTAL MG) 400 MG: 400 (241.3 MG) TAB at 08:55

## 2023-03-24 RX ADMIN — LORATADINE 10 MG: 10 TABLET ORAL at 08:55

## 2023-03-24 RX ADMIN — Medication 250 MG: at 17:09

## 2023-03-24 RX ADMIN — TAMSULOSIN HYDROCHLORIDE 0.4 MG: 0.4 CAPSULE ORAL at 17:09

## 2023-03-24 RX ADMIN — FLUTICASONE PROPIONATE 1 SPRAY: 50 SPRAY, METERED NASAL at 08:56

## 2023-03-24 RX ADMIN — FUROSEMIDE 20 MG: 10 INJECTION, SOLUTION INTRAMUSCULAR; INTRAVENOUS at 12:09

## 2023-03-24 RX ADMIN — Medication 1 TABLET: at 08:53

## 2023-03-24 RX ADMIN — ATORVASTATIN CALCIUM 5 MG: 10 TABLET, FILM COATED ORAL at 17:09

## 2023-03-24 RX ADMIN — Medication 250 MG: at 08:54

## 2023-03-24 RX ADMIN — IPRATROPIUM BROMIDE AND ALBUTEROL SULFATE 3 ML: 2.5; .5 SOLUTION RESPIRATORY (INHALATION) at 02:46

## 2023-03-24 RX ADMIN — LISINOPRIL 5 MG: 5 TABLET ORAL at 08:56

## 2023-03-24 RX ADMIN — B-COMPLEX W/ C & FOLIC ACID TAB 1 TABLET: TAB at 08:55

## 2023-03-24 RX ADMIN — GUAIFENESIN 600 MG: 600 TABLET, EXTENDED RELEASE ORAL at 08:54

## 2023-03-24 RX ADMIN — CITALOPRAM HYDROBROMIDE 40 MG: 20 TABLET ORAL at 08:55

## 2023-03-24 RX ADMIN — IPRATROPIUM BROMIDE AND ALBUTEROL SULFATE 3 ML: 2.5; .5 SOLUTION RESPIRATORY (INHALATION) at 08:07

## 2023-03-24 NOTE — PHYSICAL THERAPY NOTE
PT TREATMENT     03/24/23 1115   PT Last Visit   PT Visit Date 03/24/23   Note Type   Note Type Treatment   Pain Assessment   Pain Assessment Tool 0-10   Pain Score No Pain   Restrictions/Precautions   Weight Bearing Precautions Per Order No   Other Precautions Chair Alarm; Bed Alarm;O2;Fall Risk   General   Chart Reviewed Yes   Family/Caregiver Present No   Subjective   Subjective Pt agreeable to work with PT   Bed Mobility   Additional Comments Pt presents sitting in chair   Transfers   Sit to Stand 5  Supervision   Additional items Verbal cues   Stand to Sit 5  Supervision   Additional items Verbal cues   Ambulation/Elevation   Gait pattern Foward flexed  (walker too far in front)   Gait Assistance 5  Supervision  (contact guard at times)   Additional items Verbal cues   Assistive Device Rolling walker  (O2 on 2l)   Distance 120 feet with changes in direction   Stair Management Assistance Not tested   Activity Tolerance   Activity Tolerance Patient limited by fatigue;Patient tolerated treatment well   Nurse Made Aware Yes: June   Exercises   Hip Flexion Sitting;10 reps;Bilateral   Knee AROM Long Arc Quad Sitting;20 reps;Bilateral   Ankle Pumps Sitting;20 reps;Bilateral   Balance training  with RW and cues to keep walker closer to him when walking for better posture  Assessment   Prognosis Good   Problem List Decreased strength;Decreased endurance; Impaired balance;Decreased mobility   Assessment Pt agreeable to walk with PT  Pt on 2L of O2, will continue with use of O2 for walking; on RA sat began to decrease into high 80s , so walked with 2L of O2  Pt has good endurance for walking  Expect pt to progress well and return home with family support and home PT  Cont  to progress toward goals  The patient's AM-PAC Basic Mobility Inpatient Short Form Raw Score is 20  A Raw score of greater than 16 suggests the patient may benefit from discharge to home   Please also refer to the recommendation of the Physical Therapist for safe discharge planning  Goals   Patient Goals to go home   Plan   Treatment/Interventions Functional transfer training;LE strengthening/ROM; Therapeutic exercise; Endurance training;Bed mobility;Gait training;Spoke to MD;Spoke to nursing;Spoke to case management   Progress Progressing toward goals   Recommendation   PT Discharge Recommendation Home with home health rehabilitation   AM-PAC Basic Mobility Inpatient   Turning in Flat Bed Without Bedrails 4   Lying on Back to Sitting on Edge of Flat Bed Without Bedrails 4   Moving Bed to Chair 3   Standing Up From Chair Using Arms 4   Walk in Room 3   Climb 3-5 Stairs With Railing 2   Basic Mobility Inpatient Raw Score 20   Basic Mobility Standardized Score 43 99   Highest Level Of Mobility   JH-HLM Goal 6: Walk 10 steps or more   JH-HLM Achieved 7: Walk 25 feet or more   End of Consult   Patient Position at End of Consult Bedside chair;Bed/Chair alarm activated; All needs within reach   Licensure   [id number] [facility] Backer  Leigh Simmnos PT  [id number]

## 2023-03-24 NOTE — DISCHARGE SUMMARY
Discharge Summary - [facility]    Patient Information: Rajwinder Marks 80 y o  male MRN: [id number]  Unit/Bed#: [address] [place] [address] Encounter: [id number]    Discharging Physician / Practitioner: Berta Rodriguez MD  PCP: Trish Ho MD  Admission Date: 3/19/2023  Discharge Date: 03/24/23    Reason for Admission: Cough (Started with cough two days ago  Having some periods of confusion, headache and low grade temp at home  Weakness, audible rhonchi in triage, hx of pneumonia in June 22)      Discharge Diagnoses:     Principal Problem:    Acute bronchitis  Active Problems:    Acute respiratory insufficiency    Hiatal hernia    Essential hypertension    History of pulmonary embolism    Valvular heart disease    Paroxysmal atrial flutter (HCC)    Bladder wall thickening    Recurrent major depressive disorder, in full [facility] ([facility] )    Hyperlipidemia    Bilateral leg edema  Resolved Problems:    * No resolved hospital problems  *        Hiatal hernia  Assessment & Plan  Large hiatal hernia showed on CT  Daughter is not aware of this  Suspect risk factor for aspiration  · Continue Pepcid at bedtime  · GI following, input appreciated, plan for EGD today  · Aspiration precautions    Acute respiratory insufficiency  Assessment & Plan  Improving, 95% on 2 L  SPO2 91% on room air on ED arrival   At baseline 94-95% on room air at home per daughter  · Placed on oxygen 2 L in ED, satting high 90s  · Continue supplemental O2 sats of 92%  · Pulm follows outpatient, chronic pulmonary changes  · Incentive spirometry  · Taper oxygen as tolerated    * Acute bronchitis  Assessment & Plan  Patient presents with productive cough since Tuesday or Wednesday last week, fever 100 2 this morning at home  COVID flu RSV negative  CTA chest PE study showed- No PE  Diffuse bronchial wall thickening, greatest in the lower lobes, with subtle bilateral lower lobe tree-in-bud nodularity compatible with bronchitis/bronchiolitis  However, the large hiatal hernia and lower lobe distribution raises the possibility of aspiration  Suspect bronchitis/bronchiolitis, possible aspiration pneumonia  Remains afebrile, continues to improve   · Given IV Lasix, Unasyn, Solu-Medrol in ED  · Continue Rocephin, Zithromax  · Status post IV Solu-Medrol 40 mg x 1, transition to prednisone  · Urine Legionella pneumococcal antigen negative  · , sputum Gram stain culture- mixed respiratory spencer  · procalcitonin-normal  · Mucinex/Robitussin/Tessalon Perles  · DuoNeb   · Speech eval- recommended regular thin diet  · Pulmonary following, input appreciated    Bladder wall thickening  Assessment & Plan  · Suspect mild BPH  · Continue Flomax  · follows urology as outpatient    Paroxysmal atrial flutter (HCC)  Assessment & Plan  · On metoprolol at home  Not on AC  · EKG NSR  · Continue metoprolol  · Optimize electrolytes    Valvular heart disease  Assessment & Plan  · 2D echo in June last year showed normal EF around 54%, grade 1 diastolic dysfunction, mild to moderate TR, PA pressure 51, mild AR  · Continue ACE inhibitor  · follows cardiology as outpatient    History of pulmonary embolism  Assessment & Plan  · History of PE precipitated by femur fracture in 2019  · Status post IVC filter insertion and removal   [facility]  · Pharmacologic  DVT prophylaxis Lovenox    Essential hypertension  Assessment & Plan  · On metoprolol, Monopril at home  · Continue metoprolol  · Substitute Monopril with lisinopril with holding parameter  · BP stable    Bilateral leg edema  Assessment & Plan  · Chronic issue per daughter    Intermittent diuretic use  · BNP normal  · venous Doppler rule out DVT- BLLE negative for DVT, thrombophlebitis or incompetent valves  · Elevation    Hyperlipidemia  Assessment & Plan  · Continue statin, niacin    Recurrent major depressive disorder, in full remission ([facility] )  Assessment & Plan  · Continue Deirdreexdomi  · Has history of OCD as well per daughter  Consultations During Hospital Stay:  IP CONSULT TO PULMONOLOGY  IP CONSULT TO GASTROENTEROLOGY    Procedures Performed:     · Home oxygen evaluation  · EGD    Significant Findings:     · Refer to hospital course and above listed diagnosis related plan for details    Imaging while in hospital:    EGD    Addendum Date: 3/23/2023 Addendum:   [facility] Operating Room [address] [facility] [address] [place] [address] [address]: 3/23/23 PHYSICIAN(S): Attending: Tayo Hope MD Fellow: No Staff Documented Procedure : EGD with Biopsies INDICATION: Hiatal hernia POST-OP DIAGNOSIS: See the impression below  PREPROCEDURE: Informed consent was obtained for the procedure, including sedation  Risks of perforation, hemorrhage, adverse drug reaction and aspiration were discussed  The patient was placed in the left lateral decubitus position  Patient was explained about the risks and benefits of the procedure  Risks including but not limited to bleeding, infection, and perforation were explained in detail  Also explained about less than 100% sensitivity with the exam and other alternatives  PROCEDURE: EGD DETAILS OF PROCEDURE: Patient was taken to the procedure room where a time out was performed to confirm correct patient and correct procedure  The patient underwent monitored anesthesia care, which was administered by an anesthesia professional  The patient's blood pressure, heart rate, level of consciousness, respirations and oxygen were monitored throughout the procedure  The scope was introduced through the mouth and advanced to the second part of the duodenum  Retroflexion was performed in the fundus  Prior to the procedure, the patient's H  Pylori status was unknown  The patient experienced no blood loss  The procedure was not difficult  The patient tolerated the procedure well  There were no apparent complications   ANESTHESIA INFORMATION: ASA: III Anesthesia Type: IV Sedation with Anesthesia MEDICATIONS: No administrations occurring from 1511 to 1530 on 03/23/23 FINDINGS: Large herniation of gastric fundus into thoracic cavity (type II hiatal hernia) without [place] lesions present Mild, localized erythematous mucosa in the antrum, consistent with gastritis; performed cold forceps biopsy to rule out H  pylori Foreshortening of esophagus because of large hiatal hernia, esophagus appeared tortuous but no stricture, multiple lower esophageal biopsies were done to rule out Vega esophagus The duodenal bulb, 1st part of the duodenum and 2nd part of the duodenum appeared normal  SPECIMENS: ID Type Source Tests Collected by Time Destination 1 : antral bx Tissue Stomach TISSUE EXAM Carol Choi MD 3/23/2023  3:23 PM  2 : bx lower esophagus Tissue Esophagus TISSUE EXAM Carol Chio MD 3/23/2023  3:24 PM  IMPRESSION: Large 6 cm size hiatal hernia Foreshortening of esophagus because of large hiatal hernia Mild gastritis Normal duodenum     RECOMMENDATION:  Await pathology results  Follow up with me in clinic    Pantoprazole 40 mg p o  daily    Resume regular diet   Fabian Melchor MD         XR chest 1 view portable    Result Date: 3/20/2023  Narrative: CHEST INDICATION:   sob  COMPARISON:  X-ray dated 12/12/2022  EXAM PERFORMED/VIEWS:  XR CHEST PORTABLE FINDINGS: Heart size is top normal  Stable hiatal hernia  The lungs are clear  No pneumothorax or pleural effusion  Osseous structures appear within normal limits for patient age  Impression: No acute cardiopulmonary disease  Workstation performed: [id number]     CTA ED chest PE study    Result Date: 3/19/2023  Narrative: CTA - CHEST WITH IV CONTRAST - PULMONARY ANGIOGRAM INDICATION:   hypoxia/SOB  Per my review of the medical record, the patient presents with 2 days of progressive cough, intermittent confusion, headache, low-grade temperature  Weakness   COMPARISON: CXR 3/19/2022 and chest CT 9/29/2022, 6/16/2022 and 12/7/2019  TECHNIQUE: CT angiogram timed for optimal opacification of the pulmonary arteries  Axial, sagittal, and coronal 2D reformats created from source data  Coronal 3D MIP postprocessing on the acquisition scanner  Radiation dose length product (DLP):  732 65 mGy-cm   Radiation dose exposure minimized using iterative reconstruction and automated exposure control  IV Contrast:  85 mL of iohexol (OMNIPAQUE)  FINDINGS: PULMONARY ARTERIES:  No pulmonary embolus  LUNGS:  Mild dependent lower lobe atelectasis  Mild basilar predominant juxtapleural reticulation  AIRWAYS: Extensive bronchial wall thickening, greatest in the lower lobes with subtle bilateral lower lobe tree-in-bud nodularity  PLEURA:  Small pleural effusions, similar to September 2022  HEART/GREAT VESSELS:  Mild cardiomegaly  Mild coronary artery calcification indicating atherosclerotic heart disease  MEDIASTINUM AND JACQUIE:  Large hiatal hernia  Minimally enlarged subcarinal node, reactive  CHEST WALL AND LOWER NECK: Unremarkable  UPPER ABDOMEN:  Unremarkable  OSSEOUS STRUCTURES:  Mild degenerative disease in the spine  Healed bilateral rib fractures  Multiple stable compression deformities in the thoracic spine  Impression: No pulmonary embolus  Diffuse bronchial wall thickening, greatest in the lower lobes, with subtle bilateral lower lobe tree-in-bud nodularity compatible with bronchitis/bronchiolitis  However, the large hiatal hernia and lower lobe distribution raises the possibility of aspiration  The study was marked in [facility] for immediate notification  Workstation performed: [id number]     VAS lower limb venous duplex study, complete bilateral    Result Date: 3/20/2023  Narrative:  [facility] REPORT CLINICAL: Indications: Patient presents with bilateral lower extremity edema for several years, as per patient   Operative History: 2020-08-21 IVC Filter Removal 2019-12-07 IVC Filter Placement; temporary Risk Factors: The patient has history of HLD, HTN and Pulmonary Emboli  CONCLUSION: Impression: RIGHT LOWER LIMB: No evidence of acute or chronic deep vein thrombosis  No evidence of superficial thrombophlebitis noted  No evidence of valvular incompetence noted in the deep veins  Popliteal, posterior tibial and anterior tibial arterial Doppler waveforms are triphasic  LEFT LOWER LIMB: No evidence of acute or chronic deep vein thrombosis  No evidence of superficial thrombophlebitis noted  No evidence of valvular incompetence noted in the deep veins  Popliteal, posterior tibial and anterior tibial arterial Doppler waveforms are triphasic  SIGNATURE: Electronically Signed by: Jesse Santiago MD on 2023-03-20 02:54:37 PM      Incidental Findings:   · ***     Test Results Pending at Discharge (will require follow up):   · As per After Visit Summary     Outpatient Tests Requested:  · ***    Complications:  Refer to hospital course and above listed diagnosis related plan, if any    Hospital Course: As per HPI  Thom Mae is a 80 y o  male patient who originally presented to the hospital on 3/19/2023 due to ***        Please see above list of diagnoses and related plan for additional information  Condition at Discharge: stable     Discharge Day Visit / Exam:     Subjective:    Feels well  Reports her his breathing and cough has significantly improved  Slept well overnight without any shortness of breath  Denies any nausea vomiting or abdominal pain  Tolerating diet well      Vitals: Blood Pressure: 160/73 (03/24/23 0841)  Pulse: 90 (03/24/23 0841)  Temperature: 97 9 °F (36 6 °C) (03/24/23 0841)  Temp Source: Oral (03/23/23 0717)  Respirations: 18 (03/24/23 0248)  Height: 5' 9" (175 3 cm) (03/19/23 2031)  Weight - Scale: 85 9 kg (189 lb 6 oz) (03/24/23 1148)  SpO2: 93 % (03/24/23 1321)  Exam:   Physical Exam  Constitutional:       General: He is not in acute distress    HENT:      Head: Normocephalic and atraumatic  Cardiovascular:      Rate and Rhythm: Normal rate  Pulmonary:      Effort: Pulmonary effort is normal  No respiratory distress  Breath sounds: No wheezing, rhonchi or rales  Comments: Diminished bilaterally, clear  Chest:      Chest wall: No tenderness  Abdominal:      General: Bowel sounds are normal  There is no distension  Palpations: Abdomen is soft  Tenderness: There is no abdominal tenderness  There is no guarding or rebound  Musculoskeletal:      Comments: Trace to 1+ bilateral lower extremity edema   Skin:     General: Skin is warm and dry  Findings: No rash  Neurological:      General: No focal deficit present  Mental Status: He is alert  Mental status is at baseline  Cranial Nerves: No cranial nerve deficit  Discharge instructions/Information to patient and family:(Discharge Medications and Follow up):   See after visit summary for information provided to patient and family  Provisions for Follow-Up Care:  See after visit summary for information related to follow-up care and any pertinent home health orders  Disposition: Home    Planned Readmission:  No     Discharge Statement:  I spent 50 minutes discharging the patient  This time was spent on the day of discharge  I had direct contact with the patient on the day of discharge  Greater than 50% of the total time was spent examining patient, answering all patient questions, arranging and discussing plan of care with patient as well as directly providing post-discharge instructions  Additional time then spent on discharge activities  Coordinated with GI and pulmonary  Discussed with family at bedside regarding treatment and follow-up plan  Discharge Medications:  See after visit summary for reconciled discharge medications provided to patient and family  ** Please Note: "This note has been constructed using a voice recognition system  Therefore there may be syntax, spelling, and/or grammatical errors   Please call if you have any questions  "**

## 2023-03-24 NOTE — TREATMENT TEAM
Home Oxygen Qualifying Test     Patient name: Shin De Anda        : 1940   Date of Test:  2023  Diagnosis:    Home Oxygen Test:    **Medicare Guidelines require item(s) 1-5 on all ambulatory patients or 1 and 2 on non-ambulatory patients  1  Baseline SPO2 on Room Air at rest 93 %   a  If <= 88% on Room Air add O2 via [place] to obtain SpO2 >=88%  If LPM needed, document LPM  needed to reach =>88%    2  SPO2 during exertion on Room Air  90%  a  During exertion monitor SPO2  If SPO2 increases >=89%, do not add supplemental oxygen    3  SPO2 on Oxygen at Rest NA % at NA LPM    4  SPO2 during exertion on Oxygen NA % at NA LPM    5  Test performed during exertion activity  []  Supplemental Home Oxygen is indicated  [x]  Client does not qualify for home oxygen      Respiratory Additional Notes- Ambulated in hallway with walker  Abby French, RTRRT/ACCS

## 2023-03-24 NOTE — CASE MANAGEMENT
Case Management Discharge Planning Note    Patient name Kathleen Enriquez  Location [address] 03 [phone]-* MRN [id number]  : 1940 Date 3/24/2023       Current Admission Date: 3/19/2023  Current Admission Diagnosis:Acute bronchitis   Patient Active Problem List    Diagnosis Date Noted   • Bilateral leg edema 2023   • Acute bronchitis 2023   • Acute respiratory insufficiency 2023   • Hiatal hernia 2023   • Age-related osteoporosis without current pathological fracture 2022   • Abnormal CT scan, chest 2022   • Lung nodule 2022   • Bladder wall thickening 2022   • 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[address] ) 2022   • Medicare annual wellness visit, subsequent 2020   • Paroxysmal atrial flutter ([address] ) 2020   • Edema of left lower leg 2020   • Valvular heart disease 2019   • S/P IVC filter 2019   • SVT (supraventricular tachycardia) ([address] ) 2019   • History of pulmonary embolism    • Impaired vision 2019   • Conductive hearing loss, bilateral 2019   • Hyperlipidemia    • Mixed obsessional thoughts and acts 10/14/2019   • Allergic rhinitis 10/14/2019   • Nonrheumatic mitral valve regurgitation 10/14/2019   • Aortic valve sclerosis 10/14/2019   • Nonrheumatic aortic valve insufficiency 10/14/2019   • Essential hypertension 10/14/2019   • Recurrent major depressive disorder, in full remission ([address] ) 10/14/2019      LOS (days): 4  Geometric Mean LOS (GMLOS) (days): 3 80  Days to GMLOS:-0 2     OBJECTIVE:  Risk of Unplanned Readmission Score: 14 67       Current admission status: Inpatient   Preferred Pharmacy:   [address] [facility], [address] [place] [address] [address] [place] [address]  Phone: [phone] Fax: [phone]    Primary Care Provider:  Arpita Rosario MD    Primary Insurance: [facility]  Secondary Insurance:     DISCHARGE DETAILS:    Discharge planning discussed with[de-identified] Patient and wife Mahesh Aguirre contacted family/caregiver?: Yes (SW spoke with wife Oscar Wong at bedside)  Were Treatment Team discharge recommendations reviewed with patient/caregiver?: Yes  Did patient/caregiver verbalize understanding of patient care needs?: Yes  Were patient/caregiver advised of the risks associated with not following Treatment Team discharge recommendations?: Yes    Contacts  Patient Contacts: Dimas Powell (wife)  Relationship to Patient[de-identified] Family  Contact Method: In Person  Reason/Outcome: Emergency Contact, Discharge Planning, Continuity of [facility]         Is the patient interested in [facility] at discharge?: Yes  [address] requested[de-identified] Occupational Therapy, Physical [address] Name[de-identified] [address] Provider[de-identified] PCP  Home Health Services Needed[de-identified] Evaluate Functional Status and Safety, Gait/ADL Training, Strengthening/Theraputic Exercises to Improve Function  Homebound Criteria Met[de-identified] Requires the Assistance of Another Person for Safe Ambulation or to Leave the Home, Uses an Assist Device (i e  cane, walker, etc)  Supporting Clincal Findings[de-identified] Fatigues Easliy in [employer], Limited Endurance    Other Referral/Resources/Interventions Provided:  Interventions: [facility]    Treatment Team Recommendation[facility]  Discharge Destination Plan[de-identified] Home with Brittany at Discharge : Family         IMM Given (Date):: 03/24/23  IMM Given to[de-identified] Family (IMM reviewed with and signed by wife Oscar Wong    Copy given to wife and copy placed in scan bin for chart )

## 2023-03-24 NOTE — OCCUPATIONAL THERAPY NOTE
Occupational Therapy Treatment Note       03/24/23 1055   Note Type   Note Type Treatment   Pain Assessment   Pain Assessment Tool 0-10   Pain Score No Pain   Restrictions/Precautions   Other Precautions Chair Alarm; Bed Alarm;O2;Fall Risk   ADL   Eating Assistance 7  Independent   Eating Deficit Beverage management   Grooming Assistance 7  Independent   Grooming Deficit Wash/dry hands; Wash/dry face  (Standing unsupported to sink)   LB Dressing Assistance 5  Supervision/Setup   LB Dressing Comments Don scrub pants, including standing to pull up over hips   Toileting Assistance  5  Supervision/Setup   Bed Mobility   Supine to Sit 7  Independent   Transfers   Sit to Stand 5  Supervision   Stand to Sit 5  Supervision   Additional Comments STS x multiple trials throughout session; support of RW   Functional Mobility   Functional Mobility 5  Supervision   Additional Comments Patient ambulated short household distance in room to/from bathroom with RW   Toilet Transfers   Toilet Transfer Type To and from   Toilet Transfer to Standard toilet   Toilet Transfer Technique Ambulating   Toilet Transfers Supervision   Toilet Transfers Comments With RW   Therapeutic Exercise - ROM   UE-ROM Yes   ROM- Right Upper Extremities   R Shoulder AROM; Flexion;ABduction; External rotation; Internal rotation   R Elbow AROM;Elbow flexion;Elbow extension   R Wrist AROM; Wrist extension;Wrist flexion   R Position Seated  (in recliner chair)   R Weight/Reps/Sets 10 reps x 2   ROM - Left Upper Extremities    L Shoulder AROM; Flexion;ABduction; External rotation; Internal rotation   L Elbow AROM;Elbow flexion;Elbow extension   L Wrist AROM; Wrist flexion;Wrist extension   L Position Seated  (In recliner chair)   L Weight/Reps/Sets 10 reps x 2   Cognition   Overall Cognitive Status WFL   Arousal/Participation Alert; Cooperative   Attention Within functional limits   Orientation Level Oriented X4   Following Commands Follows all commands and directions without difficulty   Activity Tolerance   Activity Tolerance Patient tolerated treatment well   Assessment   Assessment Patient seen for OT treatment session this AM  Patient is eager and motivated to participate in therapy sessions  Currently able to perform all ADLs and functional mobility with RW at a supervision level  The patient's raw score on the AM-PAC Daily Activity Inpatient Short Form is 20  A raw score of greater than or equal to 19 suggests the patient may benefit from discharge to home  Please refer to the recommendation of the Occupational Therapist for safe discharge planning  At end of session patient seated in chair with all needs met, chair alarm set   Continue OT per POC   Plan   OT Frequency 3-5x/wk   Recommendation   OT Discharge Recommendation Home with home health rehabilitation   [facility]   Lower Body Dressing 3   Bathing 3   Toileting 3   Upper Body Dressing 3   Grooming 4   Eating 4   Daily Activity Raw Score 20   Daily Activity Standardized Score (Calc for Raw Score >=11) 42 03   AM-[facility] Applied Cognition Inpatient   Following a Speech/Presentation 3   Understanding Ordinary Conversation 4   Taking Medications 4   Remembering Where Things Are Placed or Put Away 4   Remembering List of 4-5 Errands 3   Taking Care of Complicated Tasks 3   Applied Cognition Raw Score 21   Applied Cognition Standardized Score 24 4   Licensure   [place] License Number  Justin Richard, OTR/L [id number]

## 2023-03-24 NOTE — INCIDENTAL FINDINGS
The following findings require follow up:  Radiographic finding   Finding: CT scan done at the time of admission revealed no evidence of pulmonary embolism (blood clot in the lung) noted to have findings suggestive of bronchitis/bronchiolitis    Also noted to have large hiatal hernia with possibility of aspiration     Follow up required: yes   Follow up should be done within 1 month(s)    Please notify the following clinician to assist with the follow up:   Dr Efrain Polk MD & pulmonology

## 2023-03-24 NOTE — PLAN OF CARE
Problem: MOBILITY - ADULT  Goal: Maintain or return to baseline ADL function  Description: INTERVENTIONS:  -  Assess patient's ability to carry out ADLs; assess patient's baseline for ADL function and identify physical deficits which impact ability to perform ADLs (bathing, care of mouth/teeth, toileting, grooming, dressing, etc )  - Assess/evaluate cause of self-care deficits   - Assess range of motion  - Assess patient's mobility; develop plan if impaired  - Assess patient's need for assistive devices and provide as appropriate  - Encourage maximum independence but intervene and supervise when necessary  - Involve family in performance of ADLs  - Assess for home care needs following discharge   - Consider OT consult to assist with ADL evaluation and planning for discharge  - Provide patient education as appropriate  Outcome: Progressing  Goal: Maintains/Returns to pre admission functional level  Description: INTERVENTIONS:  - Perform BMAT or MOVE assessment daily    - Set and communicate daily mobility goal to care team and patient/family/caregiver  - Collaborate with rehabilitation services on mobility goals if consulted  - Perform Range of Motion 2 times a day  - Reposition patient every 2 hours    - Dangle patient 2 times a day  - Stand patient 2 times a day  - Ambulate patient 2 times a day  - Out of bed to chair 2 times a day   - Out of bed for meals 2 times a day  - Out of bed for toileting  - Record patient progress and toleration of activity level   Outcome: Progressing     Problem: Prexisting or High Potential for Compromised Skin Integrity  Goal: Skin integrity is maintained or improved  Description: INTERVENTIONS:  - Identify patients at risk for skin breakdown  - Assess and monitor skin integrity  - Assess and monitor nutrition and hydration status  - Monitor labs   - Assess for incontinence   - Turn and reposition patient  - Assist with mobility/ambulation  - Relieve pressure over bony prominences  - Avoid friction and shearing  - Provide appropriate hygiene as needed including keeping skin clean and dry  - Evaluate need for skin moisturizer/barrier cream  - Collaborate with interdisciplinary team   - Patient/family teaching  - Consider wound care consult   Outcome: Progressing     Problem: SAFETY ADULT  Goal: Patient will remain free of falls  Description: INTERVENTIONS:  - Educate patient/family on patient safety including physical limitations  - Instruct patient to call for assistance with activity   - Consult OT/PT to assist with strengthening/mobility   - Keep Call bell within reach  - Keep bed low and locked with side rails adjusted as appropriate  - Keep care items and personal belongings within reach  - Initiate and maintain comfort rounds  - Make Fall Risk Sign visible to staff  - Offer Toileting every 2 Hours, in advance of need  - Initiate/Maintain bed alarm  - Obtain necessary fall risk management equipment: yellow socks  - Apply yellow socks and bracelet for high fall risk patients  - Consider moving patient to room near nurses station  Outcome: Progressing     Problem: DISCHARGE PLANNING  Goal: Discharge to home or other facility with appropriate resources  Description: INTERVENTIONS:  - Identify barriers to discharge w/patient and caregiver  - Arrange for needed discharge resources and transportation as appropriate  - Identify discharge learning needs (meds, wound care, etc )  - Arrange for interpretive services to assist at discharge as needed  - Refer to Case Management Department for coordinating discharge planning if the patient needs post-hospital services based on physician/advanced practitioner order or complex needs related to functional status, cognitive ability, or social support system  Outcome: Progressing     Problem: Knowledge Deficit  Goal: Patient/family/caregiver demonstrates understanding of disease process, treatment plan, medications, and discharge instructions  Description: Complete learning assessment and assess knowledge base    Interventions:  - Provide teaching at level of understanding  - Provide teaching via preferred learning methods  Outcome: Progressing     Problem: CARDIOVASCULAR - ADULT  Goal: Maintains optimal cardiac output and hemodynamic stability  Description: INTERVENTIONS:  - Monitor I/O, vital signs and rhythm  - Monitor for S/S and trends of decreased cardiac output  - Administer and titrate ordered vasoactive medications to optimize hemodynamic stability  - Assess quality of pulses, skin color and temperature  - Assess for signs of decreased coronary artery perfusion  - Instruct patient to report change in severity of symptoms  Outcome: Progressing  Goal: Absence of cardiac dysrhythmias or at baseline rhythm  Description: INTERVENTIONS:  - Continuous cardiac monitoring, vital signs, obtain 12 lead EKG if ordered  - Administer antiarrhythmic and heart rate control medications as ordered  - Monitor electrolytes and administer replacement therapy as ordered  Outcome: Progressing     Problem: RESPIRATORY - ADULT  Goal: Achieves optimal ventilation and oxygenation  Description: INTERVENTIONS:  - Assess for changes in respiratory status  - Assess for changes in mentation and behavior  - Position to facilitate oxygenation and minimize respiratory effort  - Oxygen administered by appropriate delivery if ordered  - Initiate smoking cessation education as indicated  - Encourage broncho-pulmonary hygiene including cough, deep breathe, Incentive Spirometry  - Assess the need for suctioning and aspirate as needed  - Assess and instruct to report SOB or any respiratory difficulty  - Respiratory Therapy support as indicated  Outcome: Progressing     Problem: Nutrition/Hydration-ADULT  Goal: Nutrient/Hydration intake appropriate for improving, restoring or maintaining nutritional needs  Description: Monitor and assess patient's nutrition/hydration status for malnutrition  Collaborate with interdisciplinary team and initiate plan and interventions as ordered  Monitor patient's weight and dietary intake as ordered or per policy  Utilize nutrition screening tool and intervene as necessary  Determine patient's food preferences and provide high-protein, high-caloric foods as appropriate       INTERVENTIONS:  - Monitor oral intake, urinary output, labs, and treatment plans  - Assess nutrition and hydration status and recommend course of action  - Evaluate amount of meals eaten  - Assist patient with eating if necessary   - Allow adequate time for meals  - Recommend/ encourage appropriate diets, oral nutritional supplements, and vitamin/mineral supplements  - Order, calculate, and assess calorie counts as needed  - Recommend, monitor, and adjust tube feedings and TPN/PPN based on assessed needs  - Assess need for intravenous fluids  - Provide specific nutrition/hydration education as appropriate  - Include patient/family/caregiver in decisions related to nutrition  Outcome: Progressing

## 2023-03-24 NOTE — DISCHARGE INSTR - AVS FIRST PAGE
Continue antibiotics to complete 7 days  Continue albuterol inhaler  Prednisone 40 mg daily for 4 days  Ipratropium nasal spray    Continue Protonix      Aspiration precaution  Continue incentive spirometry and flutter valve

## 2023-03-25 LAB
BACTERIA BLD CULT: NORMAL
BACTERIA BLD CULT: NORMAL

## 2023-03-27 ENCOUNTER — TRANSITIONAL CARE MANAGEMENT (OUTPATIENT)
Dept: INTERNAL MEDICINE CLINIC | Facility: CLINIC | Age: 83
End: 2023-03-27

## 2023-03-28 ENCOUNTER — OFFICE VISIT (OUTPATIENT)
Dept: INTERNAL MEDICINE CLINIC | Facility: CLINIC | Age: 83
End: 2023-03-28

## 2023-03-28 VITALS
HEIGHT: 69 IN | WEIGHT: 189 LBS | HEART RATE: 61 BPM | OXYGEN SATURATION: 96 % | DIASTOLIC BLOOD PRESSURE: 70 MMHG | BODY MASS INDEX: 27.99 KG/M2 | SYSTOLIC BLOOD PRESSURE: 130 MMHG

## 2023-03-28 DIAGNOSIS — I38 VALVULAR HEART DISEASE: ICD-10-CM

## 2023-03-28 DIAGNOSIS — K21.9 GASTROESOPHAGEAL REFLUX DISEASE WITHOUT ESOPHAGITIS: ICD-10-CM

## 2023-03-28 DIAGNOSIS — R60.0 EDEMA OF LEFT LOWER LEG: ICD-10-CM

## 2023-03-28 DIAGNOSIS — F33.42 RECURRENT MAJOR DEPRESSIVE DISORDER, IN FULL REMISSION (HCC): ICD-10-CM

## 2023-03-28 DIAGNOSIS — J69.0 ASPIRATION PNEUMONIA, UNSPECIFIED ASPIRATION PNEUMONIA TYPE, UNSPECIFIED LATERALITY, UNSPECIFIED PART OF LUNG (HCC): Primary | ICD-10-CM

## 2023-03-28 DIAGNOSIS — K44.9 HIATAL HERNIA: ICD-10-CM

## 2023-03-28 DIAGNOSIS — J30.9 ALLERGIC RHINITIS, UNSPECIFIED SEASONALITY, UNSPECIFIED TRIGGER: ICD-10-CM

## 2023-03-28 DIAGNOSIS — I47.1 SVT (SUPRAVENTRICULAR TACHYCARDIA) (HCC): ICD-10-CM

## 2023-03-28 DIAGNOSIS — Z95.828 S/P IVC FILTER: ICD-10-CM

## 2023-03-28 DIAGNOSIS — R05.1 ACUTE COUGH: ICD-10-CM

## 2023-03-28 DIAGNOSIS — I26.99 OTHER PULMONARY EMBOLISM WITHOUT ACUTE COR PULMONALE, UNSPECIFIED CHRONICITY (HCC): ICD-10-CM

## 2023-03-28 DIAGNOSIS — I35.8 AORTIC VALVE SCLEROSIS: ICD-10-CM

## 2023-03-28 DIAGNOSIS — R60.0 BILATERAL LEG EDEMA: ICD-10-CM

## 2023-03-28 DIAGNOSIS — R06.89 ACUTE RESPIRATORY INSUFFICIENCY: ICD-10-CM

## 2023-03-28 DIAGNOSIS — I35.1 NONRHEUMATIC AORTIC VALVE INSUFFICIENCY: ICD-10-CM

## 2023-03-28 DIAGNOSIS — I10 ESSENTIAL HYPERTENSION: ICD-10-CM

## 2023-03-28 DIAGNOSIS — R91.1 LUNG NODULE: ICD-10-CM

## 2023-03-28 DIAGNOSIS — I34.0 NONRHEUMATIC MITRAL VALVE REGURGITATION: ICD-10-CM

## 2023-03-28 DIAGNOSIS — F42.2 MIXED OBSESSIONAL THOUGHTS AND ACTS: ICD-10-CM

## 2023-03-28 NOTE — ASSESSMENT & PLAN NOTE
Acute bronchitis and acute pneumonia possibly aspiration pneumonia resolved  He does not have significant cough chest congestion shortness of breath or wheezing  He is not on oxygen at this time  Simba Isabel He is not using oxygen at nighttime  Family checks oxygen level at home  He just finished antibiotic course recently  He is finishing prednisone 1 more day  Remains on Tessalon and Mucinex as needed          He is using Ventolin inhaler 2-3 times a day

## 2023-03-28 NOTE — ASSESSMENT & PLAN NOTE
----- Message from Patient Portal,  sent at 7/29/2022  6:18 AM CDT -----  Regarding: Notification of Unviewed Test Results  Contact: [phone]  THEA MELENDREZ has not viewed the following results:  - LIPID PANEL WITH REFLEX  - GLYCOHEMOGLOBIN   He has been using Atrovent nasal spray  Certain food gives him runny nose interestingly

## 2023-03-28 NOTE — UTILIZATION REVIEW
NOTIFICATION OF ADMISSION DISCHARGE   This is a Notification of Discharge from [facility]  Please be advised that this patient has been discharge from our facility  Below you will find the admission and discharge date and time including the patient’s disposition  UTILIZATION REVIEW CONTACT:  Maira Dias  Utilization   Network Utilization Review Department  Phone: [phone] x carefully listen to the prompts  All voicemails are confidential   Email: [email or web address]     ADMISSION INFORMATION  PRESENTATION DATE: 3/19/2023  3:10 PM  OBERVATION ADMISSION DATE:   INPATIENT ADMISSION DATE: 3/20/23  1:46 PM   DISCHARGE DATE: 3/24/2023  6:05 PM   DISPOSITION:Home with [address] INFORMATION:  Send all requests for admission clinical reviews, approved or denied determinations and any other requests to dedicated fax number below belonging to the campus where the patient is receiving treatment   List of dedicated fax numbers:  [address] DENIALS (Administrative/Medical Necessity) [phone]   [address] (Maternity/NICU/Pediatrics) [phone]   [facility] [phone]   [address] [phone]   [address] [phone]   [address] [facility] [phone] 90 [facility] [phone]   [address] [place], [place] [address] [phone]   [facility]  [phone] 405[address] [facility] [phone]   [address] [facility] [address] [facility] [phone]

## 2023-03-28 NOTE — ASSESSMENT & PLAN NOTE
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

## 2023-03-28 NOTE — ASSESSMENT & PLAN NOTE
Acute bronchitis and acute pneumonia possibly aspiration pneumonia resolved  He does not have significant cough chest congestion shortness of breath or wheezing  He is not on oxygen at this time  Mercedes Taylor He is not using oxygen at nighttime  Family checks oxygen level at home    He did

## 2023-03-28 NOTE — ASSESSMENT & PLAN NOTE
Repeat CT scan on the September 2022 did not reveal any lung nodule  Repeat both 6/16/2020 1/9/2022 Reviewed  LUNGS:  Resolution of the consolidations in the right upper lobe and superior right lower lobe with interval postinfectious scarring  Mild bibasilar atelectasis      PLEURA:  Small bilateral pleural effusions  No pneumothorax      HEART/GREAT VESSELS: Normal heart size  Moderate coronary artery calcification  No significant pericardial effusion  No thoracic aortic aneurysm      MEDIASTINUM AND JACQUIE:  Large hiatal hernia  Decreased size of top normal mediastinal lymph nodes, compatible with a reactive process        Will monitor    Consider doing another CAT scan back in September 2023

## 2023-03-28 NOTE — ASSESSMENT & PLAN NOTE
Stable from cardiac standpoint denies any chest pain palpitation PND orthopnea pulses regular  Atrial fibrillation is stable  Valvular heart disease is reasonably stable and compensated    He remains on atorvastatin 10 mg daily, fosinopril 10 mg daily, metoprolol tartrate 50 mg twice a day, magnesium new tablet once a day,

## 2023-03-28 NOTE — PROGRESS NOTES
Assessment & Plan     1  Aspiration pneumonia, unspecified aspiration pneumonia type, unspecified laterality, unspecified part of lung ([facility] [address] )  Assessment & Plan:  Acute bronchitis and acute pneumonia possibly aspiration pneumonia resolved  He does not have significant cough chest congestion shortness of breath or wheezing  He is not on oxygen at this time  Mercedes Taylor He is not using oxygen at nighttime  Family checks oxygen level at home  He just finished antibiotic course recently  He is finishing prednisone 1 more day  Remains on Tessalon and Mucinex as needed  He is using Ventolin inhaler 2-3 times a day      2  Other pulmonary embolism without acute cor pulmonale, unspecified chronicity ([facility] [address] )    3  Gastroesophageal reflux disease without esophagitis  Assessment & Plan:  Patient was recently hospitalized with pneumonia  Thought to have aspiration  And underwent EGD  Which revealed significantly large 6 cm hiatal hernia  Esophagus is short  Because of the large hiatal hernia  Now on pantoprazole 40 mg daily  3/22/2023: EGD:Large 6 cm size hiatal hernia  Foreshortening of esophagus because of large hiatal hernia  Mild gastritis  Normal duodenum     RECOMMENDATION:    Await pathology results     Follow up with me in clinic         Pantoprazole 40 mg p o  daily     Resume regular diet    Denies any difficulty swallowing nausea vomiting  Recommend not to lie down at least 2 hours after eating meal     Eat slow and soft diet  We talked about possible surgical correction we will hold off for right now we will do the antireflux measure  He sleeps in the chair that should be helpful to      4  Essential hypertension    5  Acute respiratory insufficiency  Assessment & Plan:  Resolved  6  Hiatal hernia  Assessment & Plan:  Hiatal hernia please refer above      7  Allergic rhinitis, unspecified seasonality, unspecified trigger  Assessment & Plan:  He has been using Atrovent nasal spray    Certain food gives him runny nose interestingly  8  Nonrheumatic mitral valve regurgitation  Assessment & Plan:  Stable from cardiac standpoint denies any chest pain palpitation PND orthopnea pulses regular  Atrial fibrillation is stable  Valvular heart disease is reasonably stable and compensated  He remains on atorvastatin 10 mg daily, fosinopril 10 mg daily, metoprolol tartrate 50 mg twice a day, magnesium new tablet once a day,      9  Aortic valve sclerosis  Assessment & Plan:  Stable from cardiac standpoint denies any chest pain palpitation PND orthopnea pulses regular  Atrial fibrillation is stable  Valvular heart disease is reasonably stable and compensated  He remains on atorvastatin 10 mg daily, fosinopril 10 mg daily, metoprolol tartrate 50 mg twice a day, magnesium new tablet once a day,      10  Nonrheumatic aortic valve insufficiency  Assessment & Plan:  Stable from cardiac standpoint denies any chest pain palpitation PND orthopnea pulses regular  Atrial fibrillation is stable  Valvular heart disease is reasonably stable and compensated  He remains on atorvastatin 10 mg daily, fosinopril 10 mg daily, metoprolol tartrate 50 mg twice a day, magnesium new tablet once a day,      11  SVT (supraventricular tachycardia) ([facility])  Assessment & Plan:  Stable from cardiac standpoint denies any chest pain palpitation PND orthopnea pulses regular  Atrial fibrillation is stable  Valvular heart disease is reasonably stable and compensated  He remains on atorvastatin 10 mg daily, fosinopril 10 mg daily, metoprolol tartrate 50 mg twice a day, magnesium new tablet once a day,      12  Valvular heart disease  Assessment & Plan:  Stable from cardiac standpoint denies any chest pain palpitation PND orthopnea pulses regular  Atrial fibrillation is stable  Valvular heart disease is reasonably stable and compensated    He remains on atorvastatin 10 mg daily, fosinopril 10 mg daily, metoprolol tartrate 50 mg twice a day, magnesium new tablet once a day,      13  S/P IVC filter    14  Acute bronchitis  Assessment & Plan:  Acute bronchitis and acute pneumonia possibly aspiration pneumonia resolved  He does not have significant cough chest congestion shortness of breath or wheezing  He is not on oxygen at this time  Becki Bullock He is not using oxygen at nighttime  Family checks oxygen level at home  He did      15  Bilateral leg edema  Assessment & Plan:  Left leg mild to moderate chronic edema unchanged  No clinical DVT      16  Lung nodule  Assessment & Plan:  Repeat CT scan on the September 2022 did not reveal any lung nodule  Repeat both 6/16/2020 1/9/2022 Reviewed  LUNGS:  Resolution of the consolidations in the right upper lobe and superior right lower lobe with interval postinfectious scarring  Mild bibasilar atelectasis      PLEURA:  Small bilateral pleural effusions  No pneumothorax      HEART/GREAT VESSELS: Normal heart size  Moderate coronary artery calcification  No significant pericardial effusion  No thoracic aortic aneurysm      MEDIASTINUM AND JACQUIE:  Large hiatal hernia  Decreased size of top normal mediastinal lymph nodes, compatible with a reactive process        Will monitor  Consider doing another CAT scan back in September 2023      17  Edema of left lower leg    18  Recurrent major depressive disorder, in full remission [facility])  Assessment & Plan:  Status bili stable  MDD is fairly stable  Continue Celexa      19  Mixed obsessional thoughts and acts  Assessment & Plan:  Status bili stable  MDD is fairly stable  Continue Celexa        BMI Counseling: Body mass index is 27 91 kg/m²   The BMI is above normal  Nutrition recommendations include decreasing portion sizes, encouraging healthy choices of fruits and vegetables, decreasing fast food intake, consuming healthier snacks, moderation in carbohydrate intake, increasing intake of lean protein, reducing intake of saturated and trans fat and reducing intake of cholesterol  Exercise recommendations include exercising 3-5 times per week and strength training exercises  No pharmacotherapy was ordered  Rationale for BMI follow-up plan is due to patient being overweight or obese  Subjective     Transitional Care Management Review:   Bianka Márquez is a 80 y o  male here for TCM follow up  During the TCM phone call patient stated:  TCM Call     Date and time call was made  3/27/2023  8:46 AM    Hospital care reviewed  Records reviewed    Patient was hospitialized at  [address] [place] [address]    Date of Admission  03/19/23    Date of discharge  03/24/23    Diagnosis  Cough, hiatal hernia, Confusion    Disposition  Home; Home health services    Were the patients medications reviewed and updated  Yes    Current Symptoms  Cough    Cough Severity  Moderate      TCM Call     Post hospital issues  Reduced activity    Should patient be enrolled in anticoag monitoring? No    Scheduled for follow up? Yes    Patients specialists  Pulmonlolgist    Pulmonologist name  Dr Lorin Jimenez contact #  [phone]    Endocrinologist name  [phone]    Did you obtain your prescribed medications  Yes    Do you need help managing your prescriptions or medications  Yes    Why type of assitance do you need  His daughter is a nurse  She manages his meds    Is transportation to your appointment needed  Yes    Specify why  Pt does not drive  Wife to drive him  I have advised the patient to call PCP with any new or worsening symptoms  Ellen Guadarrama, Practice Administrator II    Living Arrangements  Family members; Spouse or Significiant other    Support System  Spouse;  Family  Daughter who is nurse looks in everyday    The type of support provided  Emotional; Physical    Do you have social support  Yes, as much as I need    Are you recieving any outpatient services  No    Are you recieving home care services  Yes    Types of home care services  Home PT; Nurse visit Are you using any community resources  No    Current waiver services  No    Have you fallen in the last 12 months  Yes    How many times  1-2  Is getting Home PT/OT    Counseling  Patient    Counseling topics  Activities of daily living; Importance of RX compliance    Comments  Daughter to call back  TCM Call     Date and time call was made  3/27/2023  8:46 AM    Hospital care reviewed  Records reviewed    Patient was hospitialized at  [address] [place] [address]    Date of Admission  03/19/23    Date of discharge  03/24/23    Diagnosis  Cough, hiatal hernia, Confusion    Disposition  Home; Home health services    Were the patients medications reviewed and updated  Yes    Current Symptoms  Cough    Cough Severity  Moderate      TCM Call     Post hospital issues  Reduced activity    Should patient be enrolled in anticoag monitoring? No    Scheduled for follow up? Yes    Patients specialists  Pulmonlolgist    Pulmonologist name  Dr Beverley Mcgarry contact #  [phone]    Endocrinologist name  [phone]    Did you obtain your prescribed medications  Yes    Do you need help managing your prescriptions or medications  Yes    Why type of assitance do you need  His daughter is a nurse  She manages   his meds    Is transportation to your appointment needed  Yes    Specify why  Pt does not drive  Wife to drive him  I have advised the patient to call PCP with any new or worsening symptoms    Chitra De La Rosa, Practice Administrator II    Living Arrangements  Family members; Spouse or Significiant other    Support System  Spouse;  Family  Daughter who is nurse looks in everyday    The type of support provided  Emotional; Physical    Do you have social support  Yes, as much as I need    Are you recieving any outpatient services  No    Are you recieving home care services  Yes    Types of home care services  Home PT; Nurse visit    Are you using any community resources  No    Current waiver services  No Have you fallen in the last 12 months  Yes    How many times  1-2  Is getting Home PT/OT    Counseling  Patient    Counseling topics  Activities of daily living; Importance of RX   compliance    Comments  Daughter to call back  Hospital course were reviewed  Patient was admitted with acute respiratory insufficiency, acute tracheobronchitis, patient was found to have a large hiatal hernia  Now on Protonix underwent upper GI endoscopy  Valvular heart disease remained fairly stable  MDD and OCD fair  Edema remains unchanged  Patient is now on pantoprazole  Aspiration precaution is being given I reviewed the diet  Respiratory insufficiency resolved no longer on oxygen  Bronchitis is resolved  Finish antibiotic had a temperature  CTA of the chest revealed no pulmonary embolism diffuse bronchial wall thickening greatest in the lower lobe with subtle bilateral lower lobe tree-in-bud nodularity compatible with bronchitis/bronchiolitis  Treated with IV Lasix Unasyn and Solu-Medrol Solu-Medrol with transition to prednisone Legionella were negative  Sputum culture revealed mixed spencer  Procalcitonin were negative  Speech evaluation was done  Pulmonary followed the patient  Medical problems    Paroxysmal atrial flutter stable EKG normal sinus rhythm remains on metoprolol, valvular heart disease 2D echocardiogram noted with normal ejection fraction mild to moderate TR PA pressure 51 and mild AR stable  No pulmonary embolism  Hypertension well controlled patient remains on metoprolol and Monopril  Bilateral edema chronic mild fair more on the left than the right t  Uses diuretic as needed  Venous Doppler was negative for DVT      Hyperlipidemia remains on statin and niacin, MDD stable OCD stable       EGD     Addendum Date: 3/23/2023 Addendum:   sM  IMPRESSION: Large 6 cm size hiatal hernia Foreshortening of esophagus because of large hiatal hernia Mild gastritis Normal duodenum    RECOMMENDATION:  Await pathology results  Follow up with me in clinic    Pantoprazole 40 mg p o  daily    Resume regular diet   Shahram Lindquist MD            XR chest 1 view portable     Result Date: 3/20/2023  Narrative: CHEST INDICATION:   sob  COMPARISON:  X-ray dated 12/12/2022  EXAM PERFORMED/VIEWS:  XR CHEST PORTABLE FINDINGS: Heart size is top normal  Stable hiatal hernia  The lungs are clear  No pneumothorax or pleural effusion  Osseous structures appear within normal limits for patient age       Impression: No acute cardiopulmonary disease  Workstation performed: [id number]      CTA ED chest PE study           P09/09/2022:  CT of the chest:   Resolution of previously noted right lung pneumonia with residual postinfection scarring  Small bilateral pleural effusions  Large hiatal hernia  Pulmonary nodule on CT scan of the chest 2  Bladder wall thickeni      06/17/2022:  Echocardiogram:  Normal ejection fraction 60 person mild aortic regurgitation mild-to-moderate tricuspid regurgitation right ventricular systolic pressure elevated moderately at 51  Otherwise unremarkable echocardiogram with grade 1 diastolic dysfunction      06/16/2022:  CT scan of chest abdomen and pelvis:  1  Dense consolidation right upper lobe patchy consolidation right lower lobe suggests pneumonia/aspiration Small right effusion seen  2  There is a displaced fracture of the proximal shaft of the femur with medial displacement of the distal fragment along with overlapping Lucency seen in the both iliac bone, image 92 series 601 with no correlate on the radiograph may be due to motion or due to nondisplaced fracture  3  Osteoporosis with biconcavity of the multiple lumbar thoracic and lumbar vertebrae,   4 chronic Mild thickening of the posterior aspect of the urinary bladder wall noted, correlate with the urinary evaluation and urology evaluation on nonemergent basis 5  Incidentally detected the left perifissural nodule   Based "on current Fleischner Society 2017 Guidelines on incidental pulmonary nodule, no routine follow-up is needed if the patient is low risk  If the patient is high risk, optional follow-up chest CT at 12 months can be considered  08/14/2022:  Chest x-ray:  Mild benign postinfectious scar in the right upper lobe with no acute disease, arge hiatal hernia  07/21/2022:  Hip x-ray stable alignment of the moderately displaced proximal left femoral shaft fracture  08/13/2022:  Stable near anatomic alignment of the left femur subtrochanteric fracture status post ORIF    11/21/2022:  Cholesterol 113, LDL 44, rest of the CMP normal, GFR 68      Offers no complaint    Review of Systems   Constitutional: Negative for chills and fever  HENT: Positive for congestion  Negative for ear pain, rhinorrhea, sinus pain and sore throat  Eyes: Negative for pain and visual disturbance  Respiratory: Positive for cough (clear)  Negative for shortness of breath  Cardiovascular: Negative for chest pain and palpitations  Gastrointestinal: Negative for abdominal pain, constipation, diarrhea and vomiting  Endocrine: Negative for polyuria  Genitourinary: Negative for dysuria, hematuria and urgency  Musculoskeletal: Negative for arthralgias and back pain  Skin: Negative for color change and rash  Allergic/Immunologic: Negative for food allergies  Neurological: Negative for seizures, syncope and weakness  Psychiatric/Behavioral: Negative for agitation, confusion, hallucinations and sleep disturbance  All other systems reviewed and are negative  Objective     /70   Pulse 61   Ht 5' 9\" (1 753 m)   Wt 85 7 kg (189 lb)   SpO2 96%   BMI 27 91 kg/m²      Physical Exam  Constitutional:       General: He is not in acute distress  Appearance: He is well-developed  He is not ill-appearing, toxic-appearing or diaphoretic  HENT:      Head: Normocephalic and atraumatic        Right Ear: External ear normal       " Left Ear: External ear normal    Eyes:      General:         Right eye: No discharge  Left eye: No discharge  Conjunctiva/sclera: Conjunctivae normal    Neck:      Thyroid: No thyromegaly  Vascular: No JVD  Cardiovascular:      Rate and Rhythm: Regular rhythm  Heart sounds: Normal heart sounds  Pulmonary:      Effort: No respiratory distress  Breath sounds: Normal breath sounds  No wheezing or rales  Abdominal:      General: Bowel sounds are normal  There is no distension  Palpations: There is no mass  Tenderness: There is no abdominal tenderness  There is no rebound  Lymphadenopathy:      Cervical: No cervical adenopathy  Skin:     General: Skin is warm  Findings: No rash  Neurological:      Coordination: Coordination normal    Psychiatric:         Mood and Affect: Mood normal          Behavior: Behavior normal          Thought Content:  Thought content normal          Judgment: Judgment normal        Medications have been reviewed by provider in current encounter    Ricardo Manley MD

## 2023-03-28 NOTE — PATIENT INSTRUCTIONS
Recommend continue albuterol to 3 times a day  Use cough medicine as needed  Finish the prednisone  Hopefully physical therapist and [facility] will come if you need and if they do not come let us know  I will see you back in May as scheduled  So far as eating is concern soft diet sit for 2 hours after eating  Chew slowly  Monitor for any aspiration  If there is sign of any infection let us know  Continue pantoprazole  Also discontinue Fosamax

## 2023-04-05 DIAGNOSIS — I48.92 PAROXYSMAL ATRIAL FLUTTER (HCC): ICD-10-CM

## 2023-04-05 DIAGNOSIS — I10 ESSENTIAL HYPERTENSION: ICD-10-CM

## 2023-04-05 DIAGNOSIS — F33.42 RECURRENT MAJOR DEPRESSIVE DISORDER, IN FULL REMISSION (HCC): ICD-10-CM

## 2023-04-05 RX ORDER — METOPROLOL TARTRATE 50 MG/1
TABLET, FILM COATED ORAL
Qty: 180 TABLET | Refills: 1 | Status: SHIPPED | OUTPATIENT
Start: 2023-04-05

## 2023-04-05 RX ORDER — CITALOPRAM 40 MG/1
TABLET ORAL
Qty: 90 TABLET | Refills: 1 | Status: SHIPPED | OUTPATIENT
Start: 2023-04-05

## 2023-04-12 PROBLEM — R09.81 NASAL CONGESTION: Chronic | Status: ACTIVE | Noted: 2023-04-12

## 2023-04-12 PROBLEM — R09.81 NASAL CONGESTION: Status: ACTIVE | Noted: 2023-04-12

## 2023-04-12 PROBLEM — J31.0 CHRONIC RHINITIS: Status: ACTIVE | Noted: 2023-04-12

## 2023-04-12 PROBLEM — J69.0 ASPIRATION PNEUMONIA, UNSPECIFIED ASPIRATION PNEUMONIA TYPE, UNSPECIFIED LATERALITY, UNSPECIFIED PART OF LUNG (HCC): Chronic | Status: ACTIVE | Noted: 2023-03-28

## 2023-04-12 PROBLEM — J31.0 CHRONIC RHINITIS: Chronic | Status: ACTIVE | Noted: 2023-04-12

## 2023-04-22 DIAGNOSIS — E78.00 HYPERCHOLESTEREMIA: ICD-10-CM

## 2023-04-24 DIAGNOSIS — E83.42 HYPOMAGNESEMIA: ICD-10-CM

## 2023-04-24 DIAGNOSIS — K44.9 HIATAL HERNIA: ICD-10-CM

## 2023-04-24 RX ORDER — ATORVASTATIN CALCIUM 10 MG/1
TABLET, FILM COATED ORAL
Qty: 45 TABLET | Refills: 1 | Status: SHIPPED | OUTPATIENT
Start: 2023-04-24

## 2023-04-24 RX ORDER — LANOLIN ALCOHOL/MO/W.PET/CERES
400 CREAM (GRAM) TOPICAL DAILY
Qty: 30 TABLET | Refills: 2 | Status: SHIPPED | OUTPATIENT
Start: 2023-04-24

## 2023-04-24 RX ORDER — PANTOPRAZOLE SODIUM 40 MG/1
40 TABLET, DELAYED RELEASE ORAL DAILY
Qty: 30 TABLET | Refills: 2 | Status: SHIPPED | OUTPATIENT
Start: 2023-04-24

## 2023-04-28 ENCOUNTER — CONSULT (OUTPATIENT)
Dept: GASTROENTEROLOGY | Facility: CLINIC | Age: 83
End: 2023-04-28

## 2023-04-28 VITALS
HEIGHT: 69 IN | DIASTOLIC BLOOD PRESSURE: 59 MMHG | BODY MASS INDEX: 28.82 KG/M2 | SYSTOLIC BLOOD PRESSURE: 142 MMHG | WEIGHT: 194.6 LBS | HEART RATE: 56 BPM

## 2023-04-28 DIAGNOSIS — K21.9 GASTROESOPHAGEAL REFLUX DISEASE, UNSPECIFIED WHETHER ESOPHAGITIS PRESENT: ICD-10-CM

## 2023-04-28 DIAGNOSIS — K44.9 HIATAL HERNIA: Primary | ICD-10-CM

## 2023-04-28 DIAGNOSIS — K22.70 BARRETT'S ESOPHAGUS WITHOUT DYSPLASIA: ICD-10-CM

## 2023-04-28 RX ORDER — MAGNESIUM OXIDE 400 MG/1
1 TABLET ORAL DAILY
COMMUNITY
Start: 2023-04-25

## 2023-04-28 NOTE — PROGRESS NOTES
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  Patient recently admitted at the end of March due to productive cough associated with fever and CTA showed findings consistent with bronchitis/bronchiolitis however large hiatal hernia and lower lobe distribution raises possibility of aspiration  Speech evaluation was performed which showed no evidence of oral/pharyngeal dysphagia  EGD 3/23/2023 showed 6 cm hiatal hernia with foreshortening of the esophagus due to large hiatal hernia, mild gastritis, normal duodenum  Esophageal biopsies came back consistent with 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  Antral biopsy negative for H  Pylori  Patient denies any history of reflux, dysphagia and continues to haveno GI symptoms     -Continue pantoprazole  -Avoid large meals, avoid laying down within 2 to 3 hours after eating, elevate head of bed at night   -Recall placed for EGD in March 2024 for Vega's surveillance and can reevaluate large hiatal hernia at that time   -Follow-up as needed  ______________________________________________________________________    SUBJECTIVE:  Fiona Kirkland is a 80 y o  male with history of BPH, depression, HTN, HLD, OCD, PE, I BC filter presenting with wife and daughter who is an RN at [employer] for hospital follow-up  He was admitted at the end of March due to a productive cough with associated low-grade fever and CTA showed findings consistent with bronchitis/bronchiolitis  However the large hiatal hernia and lower lobe distribution raises the possibility of aspiration so he was evaluated by speech/GI    Per patient's daughter, patient has no history of dysphagia and speech eval during hospitalization showed oropharyngeal swallowing was intact with no signs and symptoms of aspiration and was recommended regular diet with thin liquids  He had EGD on 3/23/2023 by Dr Brianda Dimas showing a large 6 cm size hiatal hernia with foreshortening of the esophagus due to large hiatal hernia, mild gastritis, normal duodenum  Biopsies came back consistent with Vega's esophagus  He is currently on pantoprazole 40 mg daily  Denies any heartburn, dysphagia, nausea/vomiting, abdominal pain, constipation, diarrhea, black/bloody stool, weight loss  Cough is resolved  REVIEW OF SYSTEMS IS OTHERWISE NEGATIVE  Historical Information   Past Medical History:   Diagnosis Date   • BPH (benign prostatic hyperplasia)    • Depression    • HTN (hypertension)    • Hyperlipidemia    • OCD (obsessive compulsive disorder)    • Pulmonary embolism ([facility] [address] ) 2019     Past Surgical History:   Procedure Laterality Date   • IR IVC FILTER PLACEMENT OPTIONAL/TEMPORARY  12/7/2019   • IR IVC FILTER REMOVAL  8/21/2020   • DC OPTX FEM SHFT FX W/INSJ IMED IMPLT W/WO SCREW Right 12/4/2019    Procedure: INSERTION NAIL IM FEMUR ANTEGRADE (TROCHANTERIC);   Surgeon: Elena Duff DO;  Location: [facility];  Service: Orthopedics   • DC OPTX FEM SHFT FX W/INSJ IMED IMPLT W/WO SCREW Left 6/17/2022    Procedure: INSERTION NAIL IM FEMUR ANTEGRADE (TROCHANTERIC) - long nail;  Surgeon: Sonu Vee DO;  Location: [facility];  Service: Orthopedics     Social History   Social History     Substance and Sexual Activity   Alcohol Use Never     Social History     Substance and Sexual Activity   Drug Use Never     Social History     Tobacco Use   Smoking Status Never   Smokeless Tobacco Never     Family History   Problem Relation Age of Onset   • Cancer Mother        Meds/Allergies       Current Outpatient Medications:   •  albuterol (Ventolin HFA) 90 mcg/act inhaler  •  atorvastatin (LIPITOR) 10 mg tablet  •  Calcium Carb-Cholecalciferol (CALTRATE 600+D3 PO)  •  citalopram (CeleXA) 40 "mg tablet  •  fluticasone (FLONASE) 50 mcg/act nasal spray  •  fosinopril (MONOPRIL) 10 mg tablet  •  ipratropium (ATROVENT) 0 03 % nasal spray  •  magnesium oxide (MAG-OX) 400 mg tablet  •  magnesium Oxide (MAG-OX) 400 mg TABS  •  metoprolol tartrate (LOPRESSOR) 50 mg tablet  •  Multiple Vitamin (MULTIVITAMIN) tablet  •  niacin (NIASPAN) 500 mg CR tablet  •  pantoprazole (PROTONIX) 40 mg tablet  •  tamsulosin (FLOMAX) 0 4 mg  •  benzonatate (TESSALON PERLES) 100 mg capsule    No Known Allergies        Objective     Blood pressure 142/59, pulse 56, height 5' 9\" (1 753 m), weight 88 3 kg (194 lb 9 6 oz)  Body mass index is 28 74 kg/m²  PHYSICAL EXAM:      General Appearance:   Alert, cooperative, no distress; in wheelchair   HEENT:   Normocephalic, atraumatic, anicteric  Neck:  Supple, symmetrical, trachea midline   Lungs:   Clear to auscultation bilaterally; no rales, rhonchi or wheezing; respirations unlabored    Heart[de-identified]   Regular rate and rhythm; no murmur  Abdomen:   Soft, non-tender, non-distended; normal bowel sounds; no masses, no organomegaly    Genitalia:   Deferred    Rectal:   Deferred    Extremities:  No cyanosis, clubbing; +1 pitting edema bilateral lower extremity L>R   Skin:  No jaundice, rashes, or lesions    Lymph nodes:  No palpable cervical lymphadenopathy        Lab Results:   No visits with results within 1 Day(s) from this visit     Latest known visit with results is:   Admission on 03/19/2023, Discharged on 03/24/2023   Component Date Value   • WBC 03/19/2023 10 24 (H)    • RBC 03/19/2023 4 21    • Hemoglobin 03/19/2023 13 7    • Hematocrit 03/19/2023 40 7    • MCV 03/19/2023 97    • MCH 03/19/2023 32 5    • MCHC 03/19/2023 33 7    • RDW 03/19/2023 13 4    • MPV 03/19/2023 9 0    • Platelets 75/62/8361 208    • nRBC 03/19/2023 0    • Neutrophils Relative 03/19/2023 75    • Immat GRANS % 03/19/2023 1    • Lymphocytes Relative 03/19/2023 8 (L)    • Monocytes Relative 03/19/2023 13 (H)  " • Eosinophils Relative 03/19/2023 2    • Basophils Relative 03/19/2023 1    • Neutrophils Absolute 03/19/2023 7 79 (H)    • Immature Grans Absolute 03/19/2023 0 05    • Lymphocytes Absolute 03/19/2023 0 80    • Monocytes Absolute 03/19/2023 1 31 (H)    • Eosinophils Absolute 03/19/2023 0 23    • Basophils Absolute 03/19/2023 0 06    • Sodium 03/19/2023 133 (L)    • Potassium 03/19/2023 4 8    • Chloride 03/19/2023 100    • CO2 03/19/2023 28    • ANION GAP 03/19/2023 5    • BUN 03/19/2023 21    • Creatinine 03/19/2023 1 05    • Glucose 03/19/2023 114    • Calcium 03/19/2023 8 5    • AST 03/19/2023 15    • ALT 03/19/2023 8    • Alkaline Phosphatase 03/19/2023 70    • Total Protein 03/19/2023 6 7    • Albumin 03/19/2023 3 8    • Total Bilirubin 03/19/2023 0 70    • eGFR 03/19/2023 65    • hs TnI 0hr 03/19/2023 6    • BNP 03/19/2023 92    • SARS-CoV-2 03/19/2023 Negative    • INFLUENZA A PCR 03/19/2023 Negative    • INFLUENZA B PCR 03/19/2023 Negative    • RSV PCR 03/19/2023 Negative    • Blood Culture 03/19/2023 No Growth After 5 Days  • Blood Culture 03/19/2023 No Growth After 5 Days  • LACTIC ACID 03/19/2023 0 7    • Color, UA 03/19/2023 Yellow    • Clarity, UA 03/19/2023 Clear    • Specific Gravity, UA 03/19/2023 1 020    • pH, UA 03/19/2023 5 5    • Leukocytes, UA 03/19/2023 Negative    • Nitrite, UA 03/19/2023 Negative    • Protein, UA 03/19/2023 Negative    • Glucose, UA 03/19/2023 Negative    • Ketones, UA 03/19/2023 Negative    • Urobilinogen, UA 03/19/2023 0 2    • Bilirubin, UA 03/19/2023 Negative    • Occult Blood, UA 03/19/2023 Negative    • pH, Arterial 03/19/2023 7  397    • PH ART TC 03/19/2023 7  401    • pCO2, Arterial 03/19/2023 43 1    • PCO2 (TC) Arterial 03/19/2023 42 5    • pO2, Arterial 03/19/2023 188 9 (H)    • PO2 (TC) Arterial 03/19/2023 187 3 (H)    • HCO3, Arterial 03/19/2023 25 9    • Base Excess, Arterial 03/19/2023 0 8    • O2 Content, Arterial 03/19/2023 19 8    • O2 HGB,Arterial 03/19/2023 98 7 (H)    • SOURCE 03/19/2023 Radial, Right    • JOSE TEST 03/19/2023 Yes    • Temperature 03/19/2023 98 0    • Nasal Cannula 03/19/2023 6    • hs TnI 2hr 03/19/2023 4    • Delta 2hr hsTnI 03/19/2023 -2    • hs TnI 4hr 03/19/2023 6    • Delta 4hr hsTnI 03/19/2023 0    • Procalcitonin 03/19/2023 0 08    • Sputum Culture 03/20/2023 1+ Growth of    • Gram Stain Result 03/20/2023 1+ Epithelial cells per low power field (A)    • Gram Stain Result 03/20/2023 No polys seen (A)    • Gram Stain Result 03/20/2023 1+ Gram positive cocci in pairs (A)    • Strep pneumoniae antigen* 03/19/2023 Negative    • Legionella Urinary Antig* 03/19/2023 Negative    • Magnesium 03/19/2023 1 8 (L)    • Procalcitonin 03/20/2023 0 09    • Sodium 03/20/2023 135    • Potassium 03/20/2023 4 4    • Chloride 03/20/2023 101    • CO2 03/20/2023 28    • ANION GAP 03/20/2023 6    • BUN 03/20/2023 21    • Creatinine 03/20/2023 1 00    • Glucose 03/20/2023 162 (H)    • Calcium 03/20/2023 8 3 (L)    • eGFR 03/20/2023 69    • Magnesium 03/20/2023 1 7 (L)    • Ventricular Rate 03/19/2023 69    • Atrial Rate 03/19/2023 69    • AL Interval 03/19/2023 154    • QRSD Interval 03/19/2023 86    • QT Interval 03/19/2023 388    • QTC Interval 03/19/2023 415    • P Axis 03/19/2023 30    • QRS Axis 03/19/2023 39    • T Wave Axis 03/19/2023 53    • Sodium 03/22/2023 137    • Potassium 03/22/2023 4 1    • Chloride 03/22/2023 103    • CO2 03/22/2023 30    • ANION GAP 03/22/2023 4    • BUN 03/22/2023 24    • Creatinine 03/22/2023 0 86    • Glucose 03/22/2023 87    • Calcium 03/22/2023 7 9 (L)    • eGFR 03/22/2023 80    • WBC 03/22/2023 9 06    • RBC 03/22/2023 3 78 (L)    • Hemoglobin 03/22/2023 12 5    • Hematocrit 03/22/2023 36 4 (L)    • MCV 03/22/2023 96    • MCH 03/22/2023 33 1    • MCHC 03/22/2023 34 3    • RDW 03/22/2023 13 6    • Platelets 62/22/0271 201    • MPV 03/22/2023 8 9    • Magnesium 03/22/2023 1 9    • Procalcitonin 03/22/2023 0 09    • Sodium 03/23/2023 137     Potassium 03/23/2023 4 4     Chloride 03/23/2023 104     CO2 03/23/2023 25     ANION GAP 03/23/2023 8     BUN 03/23/2023 18     Creatinine 03/23/2023 0 84     Glucose 03/23/2023 150 (H)     Calcium 03/23/2023 8 1 (L)     eGFR 03/23/2023 80     WBC 03/23/2023 6 90     RBC 03/23/2023 3 89     Hemoglobin 03/23/2023 12 5     Hematocrit 03/23/2023 37 1     MCV 03/23/2023 95     MCH 03/23/2023 32 1     MCHC 03/23/2023 33 7     RDW 03/23/2023 13 2     Platelets 42/07/7902 218     MPV 03/23/2023 9 0     Magnesium 03/23/2023 1 8 (L)     Case Report 03/23/2023                      Value:Surgical Pathology Report                         Case: [id number]                                   Authorizing Provider:  Selina Bradley MD Collected:           03/23/2023 1523              Ordering Location:     [address] [place] [address] Received:            03/23/2023 [facility]                                                                      Pathologist:           Tom Perez MD                                                       Specimens:   A) - Stomach, antral bx                                                                             B) - Esophagus, bx lower esophagus                                                         Addendum 03/23/2023                      Value: This result contains rich text formatting which cannot be displayed here   Final Diagnosis 03/23/2023                      Value: This result contains rich text formatting which cannot be displayed here   Note 03/23/2023                      Value: This result contains rich text formatting which cannot be displayed here   Additional Information 03/23/2023                      Value: This result contains rich text formatting which cannot be displayed here  Leotis Danny Description 03/23/2023                      Value: This result contains rich text formatting which cannot be displayed here  • Sodium 03/24/2023 137    • Potassium 03/24/2023 3 8    • Chloride 03/24/2023 103    • CO2 03/24/2023 30    • ANION GAP 03/24/2023 4    • BUN 03/24/2023 20    • Creatinine 03/24/2023 0 90    • Glucose 03/24/2023 90    • Calcium 03/24/2023 8 2 (L)    • eGFR 03/24/2023 78    • WBC 03/24/2023 8 21    • RBC 03/24/2023 3 88    • Hemoglobin 03/24/2023 12 6    • Hematocrit 03/24/2023 37 2    • MCV 03/24/2023 96    • MCH 03/24/2023 32 5    • MCHC 03/24/2023 33 9    • RDW 03/24/2023 13 4    • Platelets 50/27/2745 239    • MPV 03/24/2023 9 1    • Magnesium 03/24/2023 2 0          Radiology Results:   No results found

## 2023-05-05 ENCOUNTER — LAB (OUTPATIENT)
Dept: LAB | Facility: CLINIC | Age: 83
End: 2023-05-05

## 2023-05-05 DIAGNOSIS — R60.0 EDEMA OF LEFT LOWER LEG: ICD-10-CM

## 2023-05-05 DIAGNOSIS — I47.1 SVT (SUPRAVENTRICULAR TACHYCARDIA) (HCC): ICD-10-CM

## 2023-05-05 DIAGNOSIS — E55.9 VITAMIN D DEFICIENCY: ICD-10-CM

## 2023-05-05 DIAGNOSIS — I10 ESSENTIAL HYPERTENSION: ICD-10-CM

## 2023-05-05 DIAGNOSIS — E78.2 MIXED HYPERLIPIDEMIA: ICD-10-CM

## 2023-05-05 DIAGNOSIS — I38 VALVULAR HEART DISEASE: ICD-10-CM

## 2023-05-05 DIAGNOSIS — M81.0 AGE-RELATED OSTEOPOROSIS WITHOUT CURRENT PATHOLOGICAL FRACTURE: ICD-10-CM

## 2023-05-05 LAB
25(OH)D3 SERPL-MCNC: 49.5 NG/ML (ref 30–100)
BASOPHILS # BLD AUTO: 0.05 THOUSANDS/ÂΜL (ref 0–0.1)
BASOPHILS NFR BLD AUTO: 1 % (ref 0–1)
EOSINOPHIL # BLD AUTO: 0.32 THOUSAND/ÂΜL (ref 0–0.61)
EOSINOPHIL NFR BLD AUTO: 6 % (ref 0–6)
ERYTHROCYTE [DISTWIDTH] IN BLOOD BY AUTOMATED COUNT: 14 % (ref 11.6–15.1)
HCT VFR BLD AUTO: 40.4 % (ref 36.5–49.3)
HGB BLD-MCNC: 13 G/DL (ref 12–17)
IMM GRANULOCYTES # BLD AUTO: 0.01 THOUSAND/UL (ref 0–0.2)
IMM GRANULOCYTES NFR BLD AUTO: 0 % (ref 0–2)
LYMPHOCYTES # BLD AUTO: 1.49 THOUSANDS/ÂΜL (ref 0.6–4.47)
LYMPHOCYTES NFR BLD AUTO: 27 % (ref 14–44)
MCH RBC QN AUTO: 31.6 PG (ref 26.8–34.3)
MCHC RBC AUTO-ENTMCNC: 32.2 G/DL (ref 31.4–37.4)
MCV RBC AUTO: 98 FL (ref 82–98)
MONOCYTES # BLD AUTO: 0.7 THOUSAND/ÂΜL (ref 0.17–1.22)
MONOCYTES NFR BLD AUTO: 13 % (ref 4–12)
NEUTROPHILS # BLD AUTO: 2.9 THOUSANDS/ÂΜL (ref 1.85–7.62)
NEUTS SEG NFR BLD AUTO: 53 % (ref 43–75)
NRBC BLD AUTO-RTO: 0 /100 WBCS
PLATELET # BLD AUTO: 219 THOUSANDS/UL (ref 149–390)
PMV BLD AUTO: 9.8 FL (ref 8.9–12.7)
RBC # BLD AUTO: 4.12 MILLION/UL (ref 3.88–5.62)
WBC # BLD AUTO: 5.47 THOUSAND/UL (ref 4.31–10.16)

## 2023-05-06 LAB
ALBUMIN SERPL BCP-MCNC: 3.4 G/DL (ref 3.5–5)
ALP SERPL-CCNC: 78 U/L (ref 46–116)
ALT SERPL W P-5'-P-CCNC: 14 U/L (ref 12–78)
ANION GAP SERPL CALCULATED.3IONS-SCNC: 0 MMOL/L (ref 4–13)
AST SERPL W P-5'-P-CCNC: 15 U/L (ref 5–45)
BILIRUB SERPL-MCNC: 0.63 MG/DL (ref 0.2–1)
BUN SERPL-MCNC: 14 MG/DL (ref 5–25)
CALCIUM ALBUM COR SERPL-MCNC: 9.6 MG/DL (ref 8.3–10.1)
CALCIUM SERPL-MCNC: 9.1 MG/DL (ref 8.3–10.1)
CHLORIDE SERPL-SCNC: 107 MMOL/L (ref 96–108)
CHOLEST SERPL-MCNC: 112 MG/DL
CO2 SERPL-SCNC: 32 MMOL/L (ref 21–32)
CREAT SERPL-MCNC: 1.11 MG/DL (ref 0.6–1.3)
GFR SERPL CREATININE-BSD FRML MDRD: 61 ML/MIN/1.73SQ M
GLUCOSE P FAST SERPL-MCNC: 94 MG/DL (ref 65–99)
HDLC SERPL-MCNC: 54 MG/DL
LDLC SERPL CALC-MCNC: 47 MG/DL (ref 0–100)
NONHDLC SERPL-MCNC: 58 MG/DL
POTASSIUM SERPL-SCNC: 4.6 MMOL/L (ref 3.5–5.3)
PROT SERPL-MCNC: 6.3 G/DL (ref 6.4–8.4)
SODIUM SERPL-SCNC: 139 MMOL/L (ref 135–147)
TRIGL SERPL-MCNC: 55 MG/DL

## 2023-05-12 ENCOUNTER — OFFICE VISIT (OUTPATIENT)
Dept: INTERNAL MEDICINE CLINIC | Facility: CLINIC | Age: 83
End: 2023-05-12

## 2023-05-12 VITALS
HEART RATE: 59 BPM | SYSTOLIC BLOOD PRESSURE: 120 MMHG | WEIGHT: 196 LBS | OXYGEN SATURATION: 95 % | BODY MASS INDEX: 29.03 KG/M2 | HEIGHT: 69 IN | DIASTOLIC BLOOD PRESSURE: 60 MMHG

## 2023-05-12 DIAGNOSIS — K21.9 GASTROESOPHAGEAL REFLUX DISEASE WITHOUT ESOPHAGITIS: ICD-10-CM

## 2023-05-12 DIAGNOSIS — F33.42 RECURRENT MAJOR DEPRESSIVE DISORDER, IN FULL REMISSION (HCC): ICD-10-CM

## 2023-05-12 DIAGNOSIS — I35.1 NONRHEUMATIC AORTIC VALVE INSUFFICIENCY: ICD-10-CM

## 2023-05-12 DIAGNOSIS — R60.0 BILATERAL LEG EDEMA: ICD-10-CM

## 2023-05-12 DIAGNOSIS — K22.70 BARRETT'S ESOPHAGUS WITHOUT DYSPLASIA: ICD-10-CM

## 2023-05-12 DIAGNOSIS — I48.92 PAROXYSMAL ATRIAL FLUTTER (HCC): ICD-10-CM

## 2023-05-12 DIAGNOSIS — R91.1 LUNG NODULE: ICD-10-CM

## 2023-05-12 DIAGNOSIS — I47.1 SVT (SUPRAVENTRICULAR TACHYCARDIA) (HCC): ICD-10-CM

## 2023-05-12 DIAGNOSIS — I27.21 PULMONARY ARTERY HYPERTENSION (HCC): ICD-10-CM

## 2023-05-12 DIAGNOSIS — I10 ESSENTIAL HYPERTENSION: ICD-10-CM

## 2023-05-12 DIAGNOSIS — N32.89 BLADDER WALL THICKENING: ICD-10-CM

## 2023-05-12 DIAGNOSIS — J30.9 ALLERGIC RHINITIS, UNSPECIFIED SEASONALITY, UNSPECIFIED TRIGGER: ICD-10-CM

## 2023-05-12 DIAGNOSIS — I34.0 NONRHEUMATIC MITRAL VALVE REGURGITATION: Primary | ICD-10-CM

## 2023-05-12 DIAGNOSIS — E78.2 MIXED HYPERLIPIDEMIA: ICD-10-CM

## 2023-05-12 PROBLEM — R06.89 ACUTE RESPIRATORY INSUFFICIENCY: Status: RESOLVED | Noted: 2023-03-19 | Resolved: 2023-05-12

## 2023-05-12 PROBLEM — R09.81 NASAL CONGESTION: Chronic | Status: RESOLVED | Noted: 2023-04-12 | Resolved: 2023-05-12

## 2023-05-12 PROBLEM — R05.1 ACUTE COUGH: Status: RESOLVED | Noted: 2023-03-19 | Resolved: 2023-05-12

## 2023-05-12 PROBLEM — J69.0 ASPIRATION PNEUMONIA, UNSPECIFIED ASPIRATION PNEUMONIA TYPE, UNSPECIFIED LATERALITY, UNSPECIFIED PART OF LUNG (HCC): Chronic | Status: RESOLVED | Noted: 2023-03-28 | Resolved: 2023-05-12

## 2023-05-12 RX ORDER — FOSINOPRIL SODIUM 10 MG/1
TABLET ORAL
Qty: 45 TABLET | Refills: 1 | Status: SHIPPED | OUTPATIENT
Start: 2023-05-12

## 2023-05-12 NOTE — PROGRESS NOTES
Name: Kenny Sullivan      : 1940      MRN: [id number]  Encounter Provider: Agatha Sykes MD  Encounter Date: 2023   Encounter department: [address]     1  Nonrheumatic mitral valve regurgitation  Assessment & Plan:  Last echo noted 2022  Compensated    No sx of LVF      2  Essential hypertension  Assessment & Plan:  Hypertension( High Blood Pressure ):    Check your blood pressure at home periodically, keep the diary and bring records at your chronic disease management/Hypertension visit    Continue your Blood Pressure Medicine as per your current Medication List/ as advised- continue metoprolol tart 50 mg twice a day and fosinopril 10 mg daily  Avoid excessive salt    Know your BP target range ( Usually Systolic 831-304 and diastolic less than 85- In some cases it may be different )        Orders:  -     fosinopril (MONOPRIL) 10 mg tablet; TAKE 1/2 TABLET BY MOUTH DAILY    3  SVT (supraventricular tachycardia) (HCC)  Assessment & Plan:  Pulse regular  No arrythmia  continue metoprolol tart 50 mg twice a day and fosinopril 10 mg daily        4  Paroxysmal atrial flutter (HCC)  Assessment & Plan:  Pulse regular  No arrythmiacontinue metoprolol tart 50 mg twice a day and fosinopril 10 mg daily      5  Pulmonary artery hypertension [facility])  Assessment & Plan:  2022 echo reviewed          6  Bladder wall thickening  Assessment & Plan:  Voiding well    Continue flomax 0 4 mg daily    Pt will continue to follow with urologist      7  Nonrheumatic aortic valve insufficiency  Assessment & Plan:  No sx of angina, chf, syncopel  Last echo reviewed- 2023    Pt will continue to follow with cardiologist      8  Lung nodule  Assessment & Plan:  3-9-2023: CTA chest :  LUNGS:  Mild dependent lower lobe atelectasis    Mild basilar predominant juxtapleural reticulation      AIRWAYS: Extensive bronchial wall thickening, greatest in the lower lobes with subtle bilateral lower lobe tree-in-bud nodularity      PLEURA:  Small pleural effusions, similar to September 2022      HEART/GREAT VESSELS:  Mild cardiomegaly  Mild coronary artery calcification indicating atherosclerotic heart disease      MEDIASTINUM AND JACQUIE:  Large hiatal hernia  Minimally enlarged subcarinal node, reactive      CHEST WALL AND LOWER NECK: Unremarkable      UPPER ABDOMEN:  Unremarkable      OSSEOUS STRUCTURES:  Mild degenerative disease in the spine  Healed bilateral rib fractures  Multiple stable compression deformities in the thoracic spine      IMPRESSION:     No pulmonary embolus      Diffuse bronchial wall thickening, greatest in the lower lobes, with subtle bilateral lower lobe tree-in-bud nodularity compatible with bronchitis/bronchiolitis  However, the large hiatal hernia and lower lobe distribution raises the possibility of   aspiration  9  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  Assessment & Plan:      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  Patient recently admitted at the end of March due to productive cough associated with fever and CTA showed findings consistent with bronchitis/bronchiolitis however large hiatal hernia and lower lobe distribution raises possibility of aspiration  Speech evaluation was performed which showed no evidence of oral/pharyngeal dysphagia  EGD 3/23/2023 showed 6 cm hiatal hernia with foreshortening of the esophagus due to large hiatal hernia, mild gastritis, normal duodenum  Esophageal biopsies came back consistent with 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  Antral biopsy negative for H  Pylori  Will need to take PPI/pantoprazole all the time  He will need follow-up endoscopy in a year  Patient and family are aware of that      8   Gastroesophageal reflux disease without esophagitis  Assessment & Plan:  Sx free    Tolerating diet    No dysphagia, no heartburn, no aspiration    Will continue pantoprazole 40 mg daily    egd 3/2023 reviewed      11  Allergic rhinitis, unspecified seasonality, unspecified trigger  Assessment & Plan:  I personally saw this patient with resident  We reviewed care, documentation, notes assessment and plan  I provided guidance and supervision  I agree with the note and finding  Care plan is done under my direct guidance  Continue flonase      12  Recurrent major depressive disorder, in full remission [facility])  Assessment & Plan:  Pt's depression is well controlled    Pt is tolerating current psych medicine regimen and regimen is effective  Pt does not have any side effects of medicine  Refer to Med List for Psych Medicine  Will continue same regimen  OCD fair    Continue citalopram 40 mg daily      13  Mixed hyperlipidemia    14  Bilateral leg edema  Assessment & Plan:  fair             Subjective     Generally dong      09/09/2022:  CT of the chest:   Resolution of previously noted right lung pneumonia with residual postinfection scarring  Small bilateral pleural effusions  Large hiatal hernia  Pulmonary nodule on CT scan of the chest 2  Bladder wall thickeni    3/19/2023: Chest CT scan:No pulmonary embolus      Diffuse bronchial wall thickening, greatest in the lower lobes, with subtle bilateral lower lobe tree-in-bud nodularity compatible with bronchitis/bronchiolitis  However, the large hiatal hernia and lower lobe distribution raises the possibility of   aspiration  06/17/2022:  Echocardiogram:  Normal ejection fraction 60 person mild aortic regurgitation mild-to-moderate tricuspid regurgitation right ventricular systolic pressure elevated moderately at 51  Otherwise unremarkable echocardiogram with grade 1 diastolic dysfunction      06/16/2022:  CT scan of chest abdomen and pelvis:  1   Dense consolidation right upper lobe patchy consolidation right lower lobe suggests pneumonia/aspiration Small right effusion seen  2  There is a displaced fracture of the proximal shaft of the femur with medial displacement of the distal fragment along with overlapping Lucency seen in the both iliac bone, image 92 series 601 with no correlate on the radiograph may be due to motion or due to nondisplaced fracture  3  Osteoporosis with biconcavity of the multiple lumbar thoracic and lumbar vertebrae,   4 chronic Mild thickening of the posterior aspect of the urinary bladder wall noted, correlate with the urinary evaluation and urology evaluation on nonemergent basis 5  Incidentally detected the left perifissural nodule  Based on current Fleischner Society 2017 Guidelines on incidental pulmonary nodule, no routine follow-up is needed if the patient is low risk  If the patient is high risk, optional follow-up chest CT at 12 months can be considered  08/14/2022:  Chest x-ray:  Mild benign postinfectious scar in the right upper lobe with no acute disease, arge hiatal hernia  07/21/2022:  Hip x-ray stable alignment of the moderately displaced proximal left femoral shaft fracture  08/13/2022:  Stable near anatomic alignment of the left femur subtrochanteric fracture status post ORIF    11/21/2022:  Cholesterol 113, LDL 44, rest of the CMP normal, GFR 68      Offers no complaint    New problem Vega's esophagus: Symptom-free  Tolerating PPI  Going for upper GI scope in 1 year  Follow and see GI notes as followsBarrett's esophagus without dysplasia  Patient recently admitted at the end of March due to productive cough associated with fever and CTA showed findings consistent with bronchitis/bronchiolitis however large hiatal hernia and lower lobe distribution raises possibility of aspiration  Speech evaluation was performed which showed no evidence of oral/pharyngeal dysphagia    EGD 3/23/2023 showed 6 cm hiatal hernia with foreshortening of the esophagus due to large hiatal hernia, mild gastritis, normal duodenum  Esophageal biopsies came back consistent with 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  Antral biopsy negative for H  Pylori  Review of Systems   Constitutional: Negative for chills, fatigue and fever  HENT: Negative for ear pain, sore throat and trouble swallowing  Eyes: Negative for pain and visual disturbance  Respiratory: Negative for cough and shortness of breath  Cardiovascular: Positive for leg swelling  Negative for chest pain and palpitations  Gastrointestinal: Negative for abdominal pain, constipation, diarrhea and vomiting  Endocrine: Negative for polydipsia  Genitourinary: Negative for dysuria, hematuria and testicular pain  Musculoskeletal: Positive for gait problem  Negative for arthralgias, back pain and myalgias  Skin: Negative for color change and rash  Neurological: Negative for dizziness, seizures, syncope and headaches  Psychiatric/Behavioral: Negative for agitation, confusion, hallucinations and sleep disturbance  All other systems reviewed and are negative  Past Medical History:   Diagnosis Date   • BPH (benign prostatic hyperplasia)    • Depression    • HTN (hypertension)    • Hyperlipidemia    • OCD (obsessive compulsive disorder)    • Pulmonary embolism ([address] ) 2019     Past Surgical History:   Procedure Laterality Date   • IR IVC FILTER PLACEMENT OPTIONAL/TEMPORARY  12/7/2019   • IR IVC FILTER REMOVAL  8/21/2020   • NV OPTX FEM SHFT FX W/INSJ IMED IMPLT W/WO SCREW Right 12/4/2019    Procedure: INSERTION NAIL IM FEMUR ANTEGRADE (TROCHANTERIC);   Surgeon: Jama Holter, DO;  Location: [facility];  Service: Orthopedics   • NV OPTX FEM SHFT FX W/INSJ IMED IMPLT W/WO SCREW Left 6/17/2022    Procedure: INSERTION NAIL IM FEMUR ANTEGRADE (TROCHANTERIC) - long nail;  Surgeon: Dolores Hernandez DO;  Location: [facility];  Service: Orthopedics     Family History   Problem Relation Age of Onset   • Cancer Mother      Social History Socioeconomic History   • Marital status: /Civil Union     Spouse name: None   • Number of children: None   • Years of education: None   • Highest education level: None   Occupational History   • None   Tobacco Use   • Smoking status: Never   • Smokeless tobacco: Never   Vaping Use   • Vaping Use: Never used   Substance and Sexual Activity   • Alcohol use: Never   • Drug use: Never   • Sexual activity: None   Other Topics Concern   • None   Social History Narrative   • None     Social Determinants of Health     Financial Resource Strain: Low Risk    • Difficulty of Paying Living Expenses: Not hard at all   Food Insecurity: No Food Insecurity   • Worried About Running Out of Food in the Last Year: Never true   • Ran Out of Food in the Last Year: Never true   Transportation Needs: No Transportation Needs   • Lack of Transportation (Medical): No   • Lack of Transportation (Non-Medical):  No   Physical Activity: Not on file   Stress: Not on file   Social Connections: Not on file   Intimate Partner Violence: Not on file   Housing Stability: Low Risk    • Unable to Pay for Housing in the Last Year: No   • Number of Places Lived in the Last Year: 1   • Unstable Housing in the Last Year: No     Current Outpatient Medications on File Prior to Visit   Medication Sig   • albuterol (Ventolin HFA) 90 mcg/act inhaler Inhale 2 puffs every 6 (six) hours as needed for wheezing   • atorvastatin (LIPITOR) 10 mg tablet TAKE ONE-HALF TABLET DAILY   • Calcium Carb-Cholecalciferol (CALTRATE 600+D3 PO) Take 600 mg by mouth 2 (two) times a day   • citalopram (CeleXA) 40 mg tablet TAKE ONE TABLET ONCE DAILY BY MOUTH   • fluticasone (FLONASE) 50 mcg/act nasal spray 1 spray into each nostril daily   • ipratropium (ATROVENT) 0 03 % nasal spray 2 sprays into each nostril 2 (two) times a day as needed for rhinitis   • magnesium oxide (MAG-OX) 400 mg tablet Take 1 tablet by mouth daily   • metoprolol tartrate (LOPRESSOR) 50 mg tablet "TAKE ONE TABLET EVERY 12 HOURS   • Multiple Vitamin (MULTIVITAMIN) tablet Take 1 tablet by mouth daily   • niacin (NIASPAN) 500 mg CR tablet TAKE ONE TABLET DAILY AT BEDTIME (Patient taking differently: 500 mg daily with dinner)   • pantoprazole (PROTONIX) 40 mg tablet Take 1 tablet (40 mg total) by mouth daily   • tamsulosin (FLOMAX) 0 4 mg Take 0 4 mg by mouth daily with dinner     No Known Allergies  Immunization History   Administered Date(s) Administered   • INFLUENZA 12/14/2022   • Influenza, high dose seasonal 0 7 mL 09/29/2020, 10/12/2021, 12/14/2022   • Influenza, injectable, quadrivalent, preservative free 0 5 mL 10/31/2019   • Tdap 10/14/2019       Objective     /60   Pulse 59   Ht 5' 9\" (1 753 m)   Wt 88 9 kg (196 lb)   SpO2 95%   BMI 28 94 kg/m²     Physical Exam  Vitals and nursing note reviewed  Constitutional:       Appearance: Normal appearance  He is well-developed  He is not ill-appearing  HENT:      Head: Normocephalic and atraumatic  Right Ear: External ear normal       Left Ear: External ear normal       Nose: Nose normal  No congestion or rhinorrhea  Mouth/Throat:      Pharynx: Oropharynx is clear  No oropharyngeal exudate  Eyes:      General: Lids are normal          Right eye: No discharge  Left eye: No discharge  Conjunctiva/sclera: Conjunctivae normal    Neck:      Thyroid: No thyroid mass or thyromegaly  Vascular: No JVD  Trachea: Trachea normal    Cardiovascular:      Rate and Rhythm: Normal rate and regular rhythm  Pulses: Normal pulses  Heart sounds: Normal heart sounds  No murmur heard  Pulmonary:      Effort: Pulmonary effort is normal       Breath sounds: Normal breath sounds  No wheezing or rales  Abdominal:      General: Bowel sounds are normal  There is no distension  Tenderness: There is no abdominal tenderness  Musculoskeletal:         General: Normal range of motion        Cervical back: Normal range of " motion  Right lower leg: Edema present  Left lower leg: Edema present  Skin:     General: Skin is warm  Findings: No bruising, erythema or lesion  Neurological:      General: No focal deficit present  Mental Status: He is alert and oriented to person, place, and time  Coordination: Coordination normal       Gait: Gait abnormal (pt uses a walker)  Psychiatric:         Mood and Affect: Mood normal          Thought Content:  Thought content normal        Isaac Knox MD

## 2023-05-12 NOTE — PATIENT INSTRUCTIONS
Follow with Consultants as per their and our suggestion    Follow up in 12 week(s) or as needed earlier    Follow all instructions as advised and discussed  Take your medications as prescribed  Call the office immediately if you experience any side effects  Ask questions if you do not understand  Keep your scheduled appointment as advised or come sooner if necessary or in doubt  Best time to call for non-urgent matter or questions on weekdays is between 9am and 12 noon  See physician for any new symptoms or worsening of current symptoms  Urgent or emergent situations call 911 and report to nearest emergency room  I spent  time taking care of this patient including clinical care, conseling, collaboration, chart, lab and consultant's follow up note,images report, documentation, pre visit  review as appropriate

## 2023-05-12 NOTE — ASSESSMENT & PLAN NOTE
3-9-2023: CTA chest :  LUNGS:  Mild dependent lower lobe atelectasis  Mild basilar predominant juxtapleural reticulation      AIRWAYS: Extensive bronchial wall thickening, greatest in the lower lobes with subtle bilateral lower lobe tree-in-bud nodularity      PLEURA:  Small pleural effusions, similar to September 2022      HEART/GREAT VESSELS:  Mild cardiomegaly  Mild coronary artery calcification indicating atherosclerotic heart disease      MEDIASTINUM AND JACQUIE:  Large hiatal hernia  Minimally enlarged subcarinal node, reactive      CHEST WALL AND LOWER NECK: Unremarkable      UPPER ABDOMEN:  Unremarkable      OSSEOUS STRUCTURES:  Mild degenerative disease in the spine  Healed bilateral rib fractures  Multiple stable compression deformities in the thoracic spine      IMPRESSION:     No pulmonary embolus      Diffuse bronchial wall thickening, greatest in the lower lobes, with subtle bilateral lower lobe tree-in-bud nodularity compatible with bronchitis/bronchiolitis  However, the large hiatal hernia and lower lobe distribution raises the possibility of   aspiration

## 2023-05-12 NOTE — ASSESSMENT & PLAN NOTE
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  Patient recently admitted at the end of March due to productive cough associated with fever and CTA showed findings consistent with bronchitis/bronchiolitis however large hiatal hernia and lower lobe distribution raises possibility of aspiration  Speech evaluation was performed which showed no evidence of oral/pharyngeal dysphagia  EGD 3/23/2023 showed 6 cm hiatal hernia with foreshortening of the esophagus due to large hiatal hernia, mild gastritis, normal duodenum  Esophageal biopsies came back consistent with 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

## 2023-05-19 NOTE — ASSESSMENT & PLAN NOTE
Sx free    Tolerating diet    No dysphagia, no heartburn, no aspiration    Will continue pantoprazole 40 mg daily    egd 3/2023 reviewed

## 2023-05-19 NOTE — ASSESSMENT & PLAN NOTE
I personally saw this patient with resident  We reviewed care, documentation, notes assessment and plan  I provided guidance and supervision  I agree with the note and finding    Care plan is done under my direct guidance  Continue flonase

## 2023-05-19 NOTE — ASSESSMENT & PLAN NOTE
Lab Results   Component Value Date    LDLCALC 47 05/05/2023     Lab Results   Component Value Date    ALT 14 05/05/2023    ALT 22 12/21/2015     Lab Results   Component Value Date    CHOLESTEROL 112 05/05/2023    CHOLESTEROL 113 11/21/2022    CHOLESTEROL 120 05/20/2022     Lab Results   Component Value Date    HDL 54 05/05/2023    HDL 52 11/21/2022    HDL 49 05/20/2022     Lab Results   Component Value Date    TRIG 55 05/05/2023    TRIG 84 11/21/2022    TRIG 87 05/20/2022     Lab Results   Component Value Date    NONHDLC 58 05/05/2023    [place] 61 11/21/2022    [place] 71 05/20/2022   continue atorvastatin 10 mg daily

## 2023-05-19 NOTE — ASSESSMENT & PLAN NOTE
Hypertension( High Blood Pressure ):    Check your blood pressure at home periodically, keep the diary and bring records at your chronic disease management/Hypertension visit    Continue your Blood Pressure Medicine as per your current Medication List/ as advised- continue metoprolol tart 50 mg twice a day and fosinopril 10 mg daily  Avoid excessive salt    Know your BP target range ( Usually Systolic 551-374 and diastolic less than 85- In some cases it may be different )

## 2023-05-19 NOTE — ASSESSMENT & PLAN NOTE
No sx of angina, chf, syncopel  Last echo reviewed- 6-    Pt will continue to follow with cardiologist

## 2023-05-19 NOTE — ASSESSMENT & PLAN NOTE
Pt's depression is well controlled    Pt is tolerating current psych medicine regimen and regimen is effective  Pt does not have any side effects of medicine  Refer to Med List for Psych Medicine  Will continue same regimen      OCD fair    Continue citalopram 40 mg daily

## 2023-07-19 DIAGNOSIS — K44.9 HIATAL HERNIA: ICD-10-CM

## 2023-07-19 RX ORDER — PANTOPRAZOLE SODIUM 40 MG/1
TABLET, DELAYED RELEASE ORAL
Qty: 30 TABLET | Refills: 2 | Status: SHIPPED | OUTPATIENT
Start: 2023-07-19

## 2023-08-04 ENCOUNTER — OFFICE VISIT (OUTPATIENT)
Dept: INTERNAL MEDICINE CLINIC | Facility: CLINIC | Age: 83
End: 2023-08-04
Payer: COMMERCIAL

## 2023-08-04 VITALS
OXYGEN SATURATION: 96 % | DIASTOLIC BLOOD PRESSURE: 74 MMHG | HEIGHT: 69 IN | SYSTOLIC BLOOD PRESSURE: 142 MMHG | BODY MASS INDEX: 29.03 KG/M2 | WEIGHT: 196 LBS | HEART RATE: 57 BPM

## 2023-08-04 DIAGNOSIS — R60.0 BILATERAL LEG EDEMA: Primary | ICD-10-CM

## 2023-08-04 DIAGNOSIS — I34.0 NONRHEUMATIC MITRAL VALVE REGURGITATION: ICD-10-CM

## 2023-08-04 DIAGNOSIS — I27.21 PULMONARY ARTERY HYPERTENSION (HCC): ICD-10-CM

## 2023-08-04 DIAGNOSIS — J31.0 CHRONIC RHINITIS: Chronic | ICD-10-CM

## 2023-08-04 DIAGNOSIS — I10 ESSENTIAL HYPERTENSION: ICD-10-CM

## 2023-08-04 DIAGNOSIS — I26.99 OTHER PULMONARY EMBOLISM WITHOUT ACUTE COR PULMONALE, UNSPECIFIED CHRONICITY (HCC): ICD-10-CM

## 2023-08-04 DIAGNOSIS — I35.1 NONRHEUMATIC AORTIC VALVE INSUFFICIENCY: ICD-10-CM

## 2023-08-04 DIAGNOSIS — E78.2 MIXED HYPERLIPIDEMIA: ICD-10-CM

## 2023-08-04 DIAGNOSIS — J30.9 ALLERGIC RHINITIS, UNSPECIFIED SEASONALITY, UNSPECIFIED TRIGGER: ICD-10-CM

## 2023-08-04 DIAGNOSIS — K22.70 BARRETT'S ESOPHAGUS WITHOUT DYSPLASIA: ICD-10-CM

## 2023-08-04 DIAGNOSIS — I35.8 AORTIC VALVE SCLEROSIS: ICD-10-CM

## 2023-08-04 DIAGNOSIS — I47.1 SVT (SUPRAVENTRICULAR TACHYCARDIA) (HCC): ICD-10-CM

## 2023-08-04 DIAGNOSIS — N32.89 BLADDER WALL THICKENING: ICD-10-CM

## 2023-08-04 DIAGNOSIS — K21.9 GASTROESOPHAGEAL REFLUX DISEASE WITHOUT ESOPHAGITIS: ICD-10-CM

## 2023-08-04 DIAGNOSIS — I48.92 PAROXYSMAL ATRIAL FLUTTER (HCC): ICD-10-CM

## 2023-08-04 PROCEDURE — 99214 OFFICE O/P EST MOD 30 MIN: CPT | Performed by: INTERNAL MEDICINE

## 2023-08-04 RX ORDER — POTASSIUM CHLORIDE 750 MG/1
10 TABLET, FILM COATED, EXTENDED RELEASE ORAL DAILY
Qty: 30 TABLET | Refills: 1 | Status: SHIPPED | OUTPATIENT
Start: 2023-08-04

## 2023-08-04 RX ORDER — TORSEMIDE 5 MG/1
5 TABLET ORAL DAILY
Qty: 30 TABLET | Refills: 1 | Status: SHIPPED | OUTPATIENT
Start: 2023-08-04

## 2023-08-04 RX ORDER — TORSEMIDE 5 MG/1
5 TABLET ORAL DAILY
COMMUNITY
Start: 2023-07-24 | End: 2023-08-04 | Stop reason: SDUPTHER

## 2023-08-04 NOTE — ASSESSMENT & PLAN NOTE
End of the June patient was noticed to have a swelling of both lower extremity by daughter while at urologist office. Patient is a known case of hypertension, aortic valve sclerosis, aortic insufficiency, history of SVT, PAF, pulmonary artery hypertension, remote history of pulmonary embolism. Patient was seen by cardiology started on torsemide 5 mg daily weight was up 297 pounds went down to 190. Torsemide was decreased to 5 mg every other day echocardiogram was done report of which is not available. When torsemide decreased to 5 mg every other day weight is back up to 193. Patient denies any chest pain palpitation PND orthopnea. Edema is present. Differential diagnosis discussed. No blood test and x-rays were done. Will do CBC CMP BNP chest x-ray and TSH    Plan will be also to do venous Doppler of both lower extremity. Increase torsemide 5 mg daily. Add KCl 10 mEq daily. Recheck back in 2 weeks. Avoid salt. Recommend knee-high stockings.   Review echocardiogram.

## 2023-08-04 NOTE — ASSESSMENT & PLAN NOTE
Lab Results   Component Value Date    LDLCALC 47 05/05/2023     Lab Results   Component Value Date    ALT 14 05/05/2023    ALT 22 12/21/2015     Lab Results   Component Value Date    CHOLESTEROL 112 05/05/2023    CHOLESTEROL 113 11/21/2022    CHOLESTEROL 120 05/20/2022     Lab Results   Component Value Date    HDL 54 05/05/2023    HDL 52 11/21/2022    HDL 49 05/20/2022     Lab Results   Component Value Date    TRIG 55 05/05/2023    TRIG 84 11/21/2022    TRIG 87 05/20/2022     Lab Results   Component Value Date    NONHDLC 58 05/05/2023    [address] 61 11/21/2022    NONHDLC 71 05/20/2022   Continue atorvastatin 10 mg daily

## 2023-08-04 NOTE — ASSESSMENT & PLAN NOTE
Patient's father, Homero, requesting refill of medication.   Medication has been loaded for review.   Please Fax to local pharmacy. Homero will check with pharmacy in 24 to 48 hours   Comments: none       Respiratory pulm pressure was 51. Recent echocardiogram reviewed. Worsening of edema.   Increased

## 2023-08-04 NOTE — PATIENT INSTRUCTIONS
Increased your torsemide 5 mg daily. Control salt. Add potassium chloride 10 mill equivalent daily. Get the blood test either today or next week. Go for the venous Doppler. We will help you schedule. Chest x-ray noted appointment needed you can do it either today or next week. I like to see you back in 2 weeks. Follow with Consultants as per their and our suggestion    Follow up in approximately two week or as needed earlier    Follow all instructions as advised and discussed. Take your medications as prescribed. Call the office immediately if you experience any side effects. Ask questions if you do not understand. Keep your scheduled appointment as advised or come sooner if necessary or in doubt. Best time to call for non-urgent matter or questions on weekdays is between 9am and 12 noon. See physician for any new symptoms or worsening of current symptoms. Urgent or emergent situations call 911 and report to nearest emergency room. I spent  time taking care of this patient including clinical care, conseling, collaboration, chart, lab and consultant's follow up note,images report, documentation, pre visit  review as appropriate.     Patient is to get labs 1 week(s) prior to next visit if advised

## 2023-08-04 NOTE — ASSESSMENT & PLAN NOTE
Pt's depression is well controlled    Pt is tolerating current psych medicine regimen and regimen is effective. Pt does not have any side effects of medicine. Refer to Med List for Psych Medicine. Will continue same regimen.

## 2023-08-04 NOTE — ASSESSMENT & PLAN NOTE
No heartburn or GI symptoms. Remains on pantoprazole 40 mg daily.     Tolerating without side effect we will continue same

## 2023-08-04 NOTE — ASSESSMENT & PLAN NOTE
No lung nodules identified on the last CT scan.   Previously 616 there was a question of a small tiny lung nodule

## 2023-08-04 NOTE — PROGRESS NOTES
Name: Natalia Jameson      : 1940      MRN: [id number]  Encounter Provider: Christoph Smith MD  Encounter Date: 2023   Encounter department: [place] [address]. Bilateral leg edema  Assessment & Plan:  End of the  patient was noticed to have a swelling of both lower extremity by daughter while at urologist office. Patient is a known case of hypertension, aortic valve sclerosis, aortic insufficiency, history of SVT, PAF, pulmonary artery hypertension, remote history of pulmonary embolism. Patient was seen by cardiology started on torsemide 5 mg daily weight was up 297 pounds went down to 190. Torsemide was decreased to 5 mg every other day echocardiogram was done report of which is not available. When torsemide decreased to 5 mg every other day weight is back up to 193. Patient denies any chest pain palpitation PND orthopnea. Edema is present. Differential diagnosis discussed. No blood test and x-rays were done. Will do CBC CMP BNP chest x-ray and TSH    Plan will be also to do venous Doppler of both lower extremity. Increase torsemide 5 mg daily. Add KCl 10 mEq daily. Recheck back in 2 weeks. Avoid salt. Recommend knee-high stockings. Review echocardiogram.      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     Remains on pantoprazole 40 mg daily. Tolerating without side effect we will continue same    Orders:  -     CBC; Future; Expected date: 08/04/2023    3. Gastroesophageal reflux disease without esophagitis  Assessment & Plan:  No heartburn or GI symptoms. Remains on pantoprazole 40 mg daily. Tolerating without side effect we will continue same    Orders:  -     CBC; Future; Expected date: 08/04/2023    4. Chronic rhinitis  Assessment & Plan:  Patient has been using nasal spray. Allergy symptoms are well controlled. We will continue same      5. Allergic rhinitis, unspecified seasonality, unspecified trigger    6. Essential hypertension  Assessment & Plan:  Hypertension well controlled, valvular heart disease including mitral regurgitation aortic regurgitation compensated with normal LV failure, new edema of lower extremity worsening, remote history of PE, mild to moderate pulmonary artery hypertension all fair. History of SVT and paroxysmal atrial fibrillation pulse regular. Relevant medications includes    Atorvastatin 10 mg daily, fosinopril 10 mg daily, magnesium oxide daily, metoprolol tartrate 50 mg twice a day, torsemide 5 mg every other day,    Given his new significant edema both lower extremity will increase torsemide 5 mg daily add KCl 10 mEq daily appropriate lab work, chest x-ray and venous Doppler is ordered, follow back in 1 week. Orders:  -     Comprehensive metabolic panel; Future; Expected date: 08/04/2023  -     CBC; Future; Expected date: 08/04/2023  -     B-Type Natriuretic Peptide(BNP); Future    7. SVT (supraventricular tachycardia) (HCC)  Assessment & Plan:  Pulse regular. Denies any chest pain palpitation PND orthopnea    Orders:  -     Comprehensive metabolic panel; Future; Expected date: 08/04/2023  -     CBC; Future; Expected date: 08/04/2023    8. Nonrheumatic aortic valve insufficiency    9. Aortic valve sclerosis  Assessment & Plan:  Review echocardiogram done recently.       10. Nonrheumatic mitral valve regurgitation  Assessment & Plan:  Hypertension well controlled, valvular heart disease including mitral regurgitation aortic regurgitation compensated with normal LV failure, new edema of lower extremity worsening, remote history of PE, mild to moderate pulmonary artery hypertension all fair. History of SVT and paroxysmal atrial fibrillation pulse regular. Relevant medications includes    Atorvastatin 10 mg daily, fosinopril 10 mg daily, magnesium oxide daily, metoprolol tartrate 50 mg twice a day, torsemide 5 mg every other day,    Given his new significant edema both lower extremity will increase torsemide 5 mg daily add KCl 10 mEq daily appropriate lab work, chest x-ray and venous Doppler is ordered, follow back in 1 week. Orders:  -     Comprehensive metabolic panel; Future; Expected date: 08/04/2023  -     CBC; Future; Expected date: 08/04/2023    11. Paroxysmal atrial flutter (HCC)  Assessment & Plan:  Pulse regular. 12. 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HCC)  Assessment & Plan:  Respiratory pulm pressure was 51. Recent echocardiogram reviewed. Worsening of edema. Increased      13. Other pulmonary embolism without acute cor pulmonale, unspecified chronicity ([address])    14. Bladder wall thickening  Assessment & Plan:  Bladder wall thickening fair. BPH reasonable continue Flomax voiding reasonably      15.  Mixed hyperlipidemia  Assessment & Plan:  Lab Results   Component Value Date    LDLCALC 47 05/05/2023     Lab Results   Component Value Date    ALT 14 05/05/2023    ALT 22 12/21/2015     Lab Results   Component Value Date    CHOLESTEROL 112 05/05/2023    CHOLESTEROL 113 11/21/2022    CHOLESTEROL 120 05/20/2022     Lab Results   Component Value Date    HDL 54 05/05/2023    HDL 52 11/21/2022    HDL 49 05/20/2022     Lab Results   Component Value Date    TRIG 55 05/05/2023    TRIG 84 11/21/2022    TRIG 87 05/20/2022     Lab Results   Component Value Date    [address] 58 05/05/2023 [address] [facility] 61 11/21/2022    [facility] 05/20/[facility]   Continue atorvastatin 10 mg daily      Orders:  -     Comprehensive metabolic panel; Future; Expected date: 08/04/2023         Subjective     New problem bilateral edema both lower extremity:    End of the June patient was noticed to have a swelling of both lower extremity by daughter while at urologist office. Patient is a known case of hypertension, aortic valve sclerosis, aortic insufficiency, history of SVT, PAF, pulmonary artery hypertension, remote history of pulmonary embolism. Patient was seen by cardiology started on torsemide 5 mg daily weight was up 297 pounds went down to 190. Torsemide was decreased to 5 mg every other day echocardiogram was done report of which is not available. When torsemide decreased to 5 mg every other day weight is back up to 193. Patient denies any chest pain palpitation PND orthopnea. Edema is present. Differential diagnosis discussed. No blood test and x-rays were done. Will do CBC CMP BNP chest x-ray and TSH    Plan will be also to do venous Doppler of both lower extremity. Increase torsemide 5 mg daily. Add KCl 10 mEq daily. Recheck back in 2 weeks. Avoid salt. Recommend knee-high stockings. Review echocardiogram.      Cardiac: No chest pain palpitation PND orthopnea. History of mitral valve regurgitation, aortic valve sclerosis, aortic valve insufficiency, history of hypertension, SVT, paroxysmal atrial fibrillation and pulmonary hypertension all fair. Pulmonary artery hypertension could also be contributing to the edema. We will also do the venous Doppler of lower extremity. OCD as well as MAGALY: Depression and anxiety are fairly stable. Tolerating current medications without side effect. Tolerating psych meds without side effect. Status post filter was taking out. Remote history of pulmonary embolism.         09/09/2022:  CT of the chest:   Resolution of previously noted right lung pneumonia with residual postinfection scarring. Small bilateral pleural effusions. Large hiatal hernia. 3/19/2023: Chest CT scan:No pulmonary embolus.     Diffuse bronchial wall thickening, greatest in the lower lobes, with subtle bilateral lower lobe tree-in-bud nodularity compatible with bronchitis/bronchiolitis. However, the large hiatal hernia and lower lobe distribution raises the possibility of   aspiration. 06/17/2022:  Echocardiogram:  Normal ejection fraction 60 person mild aortic regurgitation mild-to-moderate tricuspid regurgitation right ventricular systolic pressure elevated moderately at 51. Otherwise unremarkable echocardiogram with grade 1 diastolic dysfunction      06/16/2022:  CT scan of chest abdomen and pelvis:  1. Dense consolidation right upper lobe patchy consolidation right lower lobe suggests pneumonia/aspiration Small right effusion seen  2. There is a displaced fracture of the proximal shaft of the femur with medial displacement of the distal fragment along with overlapping Lucency seen in the both iliac bone, image 92 series 601 with no correlate on the radiograph may be due to motion or due to nondisplaced fracture. 3. Osteoporosis with biconcavity of the multiple lumbar thoracic and lumbar vertebrae,   4.chronic Mild thickening of the posterior aspect of the urinary bladder wall noted, correlate with the urinary evaluation and urology evaluation on nonemergent basis 5. Incidentally detected the left perifissural nodule. Based on current Fleischner Society 2017 Guidelines on incidental pulmonary nodule, no routine follow-up is needed if the patient is low risk. If the patient is high risk, optional follow-up chest CT at 12 months can be considered. Discussed with patient's wife and daughter at bedside. Review of Systems   Constitutional: Negative for chills, fatigue and fever. HENT: Negative for ear pain, sore throat and trouble swallowing.     Eyes: Negative for pain and visual disturbance. Respiratory: Negative for cough, shortness of breath and wheezing. Cardiovascular: Positive for leg swelling. Negative for chest pain and palpitations. Gastrointestinal: Negative for abdominal pain, constipation, diarrhea and vomiting. Genitourinary: Negative for difficulty urinating, dysuria, hematuria and testicular pain. Musculoskeletal: Positive for gait problem. Negative for arthralgias, back pain and myalgias. Skin: Negative for color change and rash. Neurological: Negative for dizziness, seizures, syncope and headaches. Psychiatric/Behavioral: Negative for confusion and sleep disturbance. The patient is not nervous/anxious. All other systems reviewed and are negative. walks with walker    Past Medical History:   Diagnosis Date   • BPH (benign prostatic hyperplasia)    • Depression    • HTN (hypertension)    • Hyperlipidemia    • OCD (obsessive compulsive disorder)    • Pulmonary embolism ([address]) 2019     Past Surgical History:   Procedure Laterality Date   • IR IVC FILTER PLACEMENT OPTIONAL/TEMPORARY  12/7/2019   • IR IVC FILTER REMOVAL  8/21/2020   • IL OPTX FEM SHFT FX W/INSJ IMED IMPLT W/WO SCREW Right 12/4/2019    Procedure: INSERTION NAIL IM FEMUR ANTEGRADE (TROCHANTERIC);   Surgeon: Waldo Francis DO;  Location: [facility];  Service: Orthopedics   • IL OPTX FEM SHFT FX W/INSJ IMED IMPLT W/WO SCREW Left 6/17/2022    Procedure: INSERTION NAIL IM FEMUR ANTEGRADE (TROCHANTERIC) - long nail;  Surgeon: Barbara Florentino DO;  Location: [facility];  Service: Orthopedics     Family History   Problem Relation Age of Onset   • Cancer Mother      Social History     Socioeconomic History   • Marital status: /Civil Union     Spouse name: None   • Number of children: None   • Years of education: None   • Highest education level: None   Occupational History   • None   Tobacco Use   • Smoking status: Never   • Smokeless tobacco: Never   Vaping Use   • Vaping Use: Never used Substance and Sexual Activity   • Alcohol use: Never   • Drug use: Never   • Sexual activity: None   Other Topics Concern   • None   Social History Narrative   • None     Social Determinants of Health     Financial Resource Strain: Low Risk  (11/25/2022)    Overall Financial Resource Strain (CARDIA)    • Difficulty of Paying Living Expenses: Not hard at all   Food Insecurity: No Food Insecurity (3/22/2023)    Hunger Vital Sign    • Worried About Running Out of Food in the Last Year: Never true    • Ran Out of Food in the Last Year: Never true   Transportation Needs: No Transportation Needs (3/22/2023)    PRAPARE - Transportation    • Lack of Transportation (Medical): No    • Lack of Transportation (Non-Medical):  No   Physical Activity: Not on file   Stress: Not on file   Social Connections: Not on file   Intimate Partner Violence: Not on file   Housing Stability: Low Risk  (3/22/2023)    Housing Stability Vital Sign    • Unable to Pay for Housing in the Last Year: No    • Number of Places Lived in the Last Year: 1    • Unstable Housing in the Last Year: No     Current Outpatient Medications on File Prior to Visit   Medication Sig   • albuterol (Ventolin HFA) 90 mcg/act inhaler Inhale 2 puffs every 6 (six) hours as needed for wheezing   • atorvastatin (LIPITOR) 10 mg tablet TAKE ONE-HALF TABLET DAILY   • Calcium Carb-Cholecalciferol (CALTRATE 600+D3 PO) Take 600 mg by mouth 2 (two) times a day   • citalopram (CeleXA) 40 mg tablet TAKE ONE TABLET ONCE DAILY BY MOUTH   • fluticasone (FLONASE) 50 mcg/act nasal spray 1 spray into each nostril daily   • fosinopril (MONOPRIL) 10 mg tablet TAKE 1/2 TABLET BY MOUTH DAILY   • ipratropium (ATROVENT) 0.03 % nasal spray 2 sprays into each nostril 2 (two) times a day as needed for rhinitis   • magnesium oxide (MAG-OX) 400 mg tablet Take 1 tablet by mouth daily   • metoprolol tartrate (LOPRESSOR) 50 mg tablet TAKE ONE TABLET EVERY 12 HOURS   • Multiple Vitamin (MULTIVITAMIN) tablet Take 1 tablet by mouth daily   • niacin (NIASPAN) 500 mg CR tablet TAKE ONE TABLET DAILY AT BEDTIME (Patient taking differently: 500 mg daily with dinner)   • pantoprazole (PROTONIX) 40 mg tablet TAKE ONE TABLET BY MOUTH EVERY DAY   • tamsulosin (FLOMAX) 0.4 mg Take 0.4 mg by mouth daily with dinner   • [DISCONTINUED] torsemide (DEMADEX) 5 MG tablet Take 5 mg by mouth daily     No Known Allergies  Immunization History   Administered Date(s) Administered   • INFLUENZA 12/14/2022   • Influenza, high dose seasonal 0.7 mL 09/29/2020, 10/12/2021, 12/14/2022   • Influenza, injectable, quadrivalent, preservative free 0.5 mL 10/31/2019   • Tdap 10/14/2019       Objective     /74   Pulse 57   Ht 5' 9" (1.753 m)   Wt 88.9 kg (196 lb)   SpO2 96%   BMI 28.94 kg/m²     Physical Exam  Vitals and nursing note reviewed. Constitutional:       Appearance: Normal appearance. He is well-developed. He is not ill-appearing. HENT:      Head: Normocephalic and atraumatic. Right Ear: External ear normal.      Left Ear: External ear normal.      Nose: Nose normal.      Mouth/Throat:      Pharynx: Oropharynx is clear. No posterior oropharyngeal erythema. Eyes:      General: Lids are normal. No scleral icterus. Conjunctiva/sclera: Conjunctivae normal.   Neck:      Thyroid: No thyroid mass or thyromegaly. Vascular: No JVD. Trachea: Trachea normal.   Cardiovascular:      Rate and Rhythm: Normal rate and regular rhythm. Pulses: Normal pulses. Heart sounds: Murmur heard. No friction rub. No gallop. Pulmonary:      Effort: Pulmonary effort is normal. No respiratory distress. Breath sounds: Normal breath sounds. Abdominal:      General: Bowel sounds are normal. There is no distension. Palpations: There is no mass. Tenderness: There is no abdominal tenderness. Musculoskeletal:         General: Normal range of motion. Cervical back: Normal range of motion.       Right lower leg: 3+ Edema present. Left lower leg: 3+ Edema present. Skin:     General: Skin is warm. Coloration: Skin is not jaundiced or pale. Findings: No rash. Neurological:      General: No focal deficit present. Mental Status: He is alert and oriented to person, place, and time. Sensory: No sensory deficit. Motor: No weakness. Gait: Gait abnormal (pt uses a walker). Psychiatric:         Attention and Perception: Perception normal.         Mood and Affect: Mood normal. Mood is not anxious. Speech: Speech normal.         Behavior: Behavior normal. Behavior is not agitated, aggressive, withdrawn, hyperactive or combative. Thought Content: Thought content is not paranoid or delusional. Thought content does not include homicidal or suicidal ideation. Thought content does not include suicidal plan. Cognition and Memory: Memory is not impaired. Judgment: Judgment normal. Judgment is not impulsive.        Ana Zavala MD

## 2023-08-04 NOTE — ASSESSMENT & PLAN NOTE
Hypertension well controlled, valvular heart disease including mitral regurgitation aortic regurgitation compensated with normal LV failure, new edema of lower extremity worsening, remote history of PE, mild to moderate pulmonary artery hypertension all fair. History of SVT and paroxysmal atrial fibrillation pulse regular. Relevant medications includes    Atorvastatin 10 mg daily, fosinopril 10 mg daily, magnesium oxide daily, metoprolol tartrate 50 mg twice a day, torsemide 5 mg every other day,    Given his new significant edema both lower extremity will increase torsemide 5 mg daily add KCl 10 mEq daily appropriate lab work, chest x-ray and venous Doppler is ordered, follow back in 1 week.

## 2023-08-07 ENCOUNTER — HOSPITAL ENCOUNTER (OUTPATIENT)
Dept: RADIOLOGY | Facility: HOSPITAL | Age: 83
Discharge: HOME | End: 2023-08-07
Payer: COMMERCIAL

## 2023-08-07 ENCOUNTER — LAB (OUTPATIENT)
Dept: LAB | Facility: CLINIC | Age: 83
End: 2023-08-07
Payer: COMMERCIAL

## 2023-08-07 DIAGNOSIS — I34.0 NONRHEUMATIC MITRAL VALVE REGURGITATION: ICD-10-CM

## 2023-08-07 DIAGNOSIS — I47.1 SVT (SUPRAVENTRICULAR TACHYCARDIA) (HCC): ICD-10-CM

## 2023-08-07 DIAGNOSIS — R60.0 BILATERAL LEG EDEMA: ICD-10-CM

## 2023-08-07 DIAGNOSIS — K21.9 GASTROESOPHAGEAL REFLUX DISEASE WITHOUT ESOPHAGITIS: ICD-10-CM

## 2023-08-07 DIAGNOSIS — I10 ESSENTIAL HYPERTENSION: ICD-10-CM

## 2023-08-07 DIAGNOSIS — K22.70 BARRETT'S ESOPHAGUS WITHOUT DYSPLASIA: ICD-10-CM

## 2023-08-07 DIAGNOSIS — E78.2 MIXED HYPERLIPIDEMIA: ICD-10-CM

## 2023-08-07 LAB
ALBUMIN SERPL BCP-MCNC: 3.4 G/DL (ref 3.5–5)
ALP SERPL-CCNC: 95 U/L (ref 46–116)
ALT SERPL W P-5'-P-CCNC: 14 U/L (ref 12–78)
ANION GAP SERPL CALCULATED.3IONS-SCNC: 3 MMOL/L
AST SERPL W P-5'-P-CCNC: 15 U/L (ref 5–45)
BILIRUB SERPL-MCNC: 0.58 MG/DL (ref 0.2–1)
BNP SERPL-MCNC: 79 PG/ML (ref 0–100)
BUN SERPL-MCNC: 18 MG/DL (ref 5–25)
CALCIUM ALBUM COR SERPL-MCNC: 9.5 MG/DL (ref 8.3–10.1)
CALCIUM SERPL-MCNC: 9 MG/DL (ref 8.3–10.1)
CHLORIDE SERPL-SCNC: 106 MMOL/L (ref 96–108)
CO2 SERPL-SCNC: 31 MMOL/L (ref 21–32)
CREAT SERPL-MCNC: 1.36 MG/DL (ref 0.6–1.3)
ERYTHROCYTE [DISTWIDTH] IN BLOOD BY AUTOMATED COUNT: 13.5 % (ref 11.6–15.1)
GFR SERPL CREATININE-BSD FRML MDRD: 47 ML/MIN/1.73SQ M
GLUCOSE P FAST SERPL-MCNC: 130 MG/DL (ref 65–99)
HCT VFR BLD AUTO: 41.1 % (ref 36.5–49.3)
HGB BLD-MCNC: 13.5 G/DL (ref 12–17)
MCH RBC QN AUTO: 31.6 PG (ref 26.8–34.3)
MCHC RBC AUTO-ENTMCNC: 32.8 G/DL (ref 31.4–37.4)
MCV RBC AUTO: 96 FL (ref 82–98)
PLATELET # BLD AUTO: 256 THOUSANDS/UL (ref 149–390)
PMV BLD AUTO: 9.8 FL (ref 8.9–12.7)
POTASSIUM SERPL-SCNC: 4.4 MMOL/L (ref 3.5–5.3)
PROT SERPL-MCNC: 6.9 G/DL (ref 6.4–8.4)
RBC # BLD AUTO: 4.27 MILLION/UL (ref 3.88–5.62)
SODIUM SERPL-SCNC: 140 MMOL/L (ref 135–147)
WBC # BLD AUTO: 6.36 THOUSAND/UL (ref 4.31–10.16)

## 2023-08-07 PROCEDURE — 84443 ASSAY THYROID STIM HORMONE: CPT

## 2023-08-07 PROCEDURE — 83880 ASSAY OF NATRIURETIC PEPTIDE: CPT

## 2023-08-07 PROCEDURE — 85027 COMPLETE CBC AUTOMATED: CPT

## 2023-08-07 PROCEDURE — 36415 COLL VENOUS BLD VENIPUNCTURE: CPT

## 2023-08-07 PROCEDURE — 80053 COMPREHEN METABOLIC PANEL: CPT

## 2023-08-07 PROCEDURE — 71046 X-RAY EXAM CHEST 2 VIEWS: CPT

## 2023-08-08 LAB — TSH SERPL DL<=0.05 MIU/L-ACNC: 1.44 UIU/ML (ref 0.45–4.5)

## 2023-08-10 ENCOUNTER — HOSPITAL ENCOUNTER (OUTPATIENT)
Dept: RADIOLOGY | Facility: HOSPITAL | Age: 83
Discharge: HOME | End: 2023-08-10
Attending: INTERNAL MEDICINE
Payer: COMMERCIAL

## 2023-08-10 DIAGNOSIS — R60.0 BILATERAL LEG EDEMA: ICD-10-CM

## 2023-08-10 PROCEDURE — 93970 EXTREMITY STUDY: CPT | Performed by: SURGERY

## 2023-08-10 PROCEDURE — 93970 EXTREMITY STUDY: CPT

## 2023-08-18 ENCOUNTER — OFFICE VISIT (OUTPATIENT)
Dept: INTERNAL MEDICINE CLINIC | Facility: CLINIC | Age: 83
End: 2023-08-18
Payer: COMMERCIAL

## 2023-08-18 VITALS
DIASTOLIC BLOOD PRESSURE: 68 MMHG | OXYGEN SATURATION: 90 % | SYSTOLIC BLOOD PRESSURE: 126 MMHG | BODY MASS INDEX: 29.03 KG/M2 | HEIGHT: 69 IN | HEART RATE: 88 BPM | WEIGHT: 196 LBS

## 2023-08-18 DIAGNOSIS — I10 ESSENTIAL HYPERTENSION: ICD-10-CM

## 2023-08-18 DIAGNOSIS — K22.70 BARRETT'S ESOPHAGUS WITHOUT DYSPLASIA: ICD-10-CM

## 2023-08-18 DIAGNOSIS — R60.0 BILATERAL LEG EDEMA: Primary | ICD-10-CM

## 2023-08-18 DIAGNOSIS — N18.30 STAGE 3 CHRONIC KIDNEY DISEASE, UNSPECIFIED WHETHER STAGE 3A OR 3B CKD (HCC): ICD-10-CM

## 2023-08-18 DIAGNOSIS — K21.9 GASTROESOPHAGEAL REFLUX DISEASE WITHOUT ESOPHAGITIS: ICD-10-CM

## 2023-08-18 PROCEDURE — 99214 OFFICE O/P EST MOD 30 MIN: CPT | Performed by: INTERNAL MEDICINE

## 2023-08-18 NOTE — PATIENT INSTRUCTIONS
Decrease torsemide 5 mg every other day. Decrease potassium pill 1 tablet every other day. We will go for the blood test in 2 weeks. Continue to use stockings. Follow with Consultants as per their and our suggestion    Follow up in one month or as needed earlier    Follow all instructions as advised and discussed. Take your medications as prescribed. Call the office immediately if you experience any side effects. Ask questions if you do not understand. Keep your scheduled appointment as advised or come sooner if necessary or in doubt. Best time to call for non-urgent matter or questions on weekdays is between 9am and 12 noon. See physician for any new symptoms or worsening of current symptoms. Urgent or emergent situations call 911 and report to nearest emergency room. I spent  time taking care of this patient including clinical care, conseling, collaboration, chart, lab and consultant's follow up note,images report, documentation, pre visit  review as appropriate.     Patient is to get labs 1 week(s) prior to next visit if advised

## 2023-08-18 NOTE — ASSESSMENT & PLAN NOTE
Edema of the leg got better. Decrease torsemide 5 mg every other day. Decrease potassium pill 1 tablet every other day. We will go for the blood test in 2 weeks. Continue to use stockings.

## 2023-08-18 NOTE — ASSESSMENT & PLAN NOTE
Hypertension, SVT, valvular heart disease, pulmonary artery hypertension aortic insufficiency mitral regurgitation all stable. Will decrease torsemide 5 mg every other day KCl every other day due to mild bump in renal functions though we may have to accept mild bump. His edema is better. He is wearing stockings. Other blood pressure medications and cardiac regimen relevant medications reviewed remains on atorvastatin, fosinopril, metoprolol,    .   Potassium and torsemide which will be decreased to every other day, follow-up lab in 2 weeks after making changes

## 2023-08-18 NOTE — ASSESSMENT & PLAN NOTE
Vega's and GERD stable. Chest x-ray revealed moderate hiatal hernia. Diet reviewed advised not to lie down at least 1 hour after each meal.  And do not eat 2 hours prior to going to the bed. Avoid antiacid diet    No Heartburn; GERD related symptoms are well controlled with antiacid medicine. Diet:  Avoid Orange Juice/citrus Juice/Grapefruit juice ,Caffeine  including cola, coffee, tea     Avoid Tomato  sauce, ketch up , pizza sauce, marinara saucet as appropriate  Avoid mint, herbs, garlic, onions, spicy food,   No Alcohol of any type, No Smoking  Minimize/avoid stress    Continue antiacid medicine. Currently on Protonix 40 mg daily we will continue monitor renal functions if renal functions remains I will cut down dose to 20 mg.   For surveillance upper GI endoscopy per gastroenterologist

## 2023-08-18 NOTE — PROGRESS NOTES
Name: Aviva Jimenes      : 1940      MRN: [id number]  Encounter Provider: Brenton Solo MD  Encounter Date: 2023   Encounter department: [address]     1. Bilateral leg edema  Assessment & Plan:  Edema of the leg got better. Decrease torsemide 5 mg every other day. Decrease potassium pill 1 tablet every other day. We will go for the blood test in 2 weeks. Continue to use stockings. Orders:  -     Basic metabolic panel; Future; Expected date: 2023    2. Stage 3 chronic kidney disease, unspecified whether stage 3a or 3b CKD [facility])  Assessment & Plan:  Lab Results   Component Value Date    EGFR 47 2023    EGFR 61 2023    EGFR 78 2023    CREATININE 1.36 (H) 2023    CREATININE 1.11 2023    CREATININE 0.90 2023   Decrease torsemide 5 mg every other day. Decrease potassium pill 1 tablet every other day. We will go for the blood test in 2 weeks. Continue to use stockings. 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 Chest x-ray revealed moderate hiatal hernia. Diet reviewed advised not to lie down at least 1 hour after each meal.  And do not eat 2 hours prior to going to the bed. Avoid antiacid diet    No Heartburn; GERD related symptoms are well controlled with antiacid medicine. Diet:  Avoid Orange Juice/citrus Juice/Grapefruit juice ,Caffeine  including cola, coffee, tea     Avoid Tomato  sauce, ketch up , pizza sauce, marinara saucet as appropriate  Avoid mint, herbs, garlic, onions, spicy food,   No Alcohol of any type, No Smoking  Minimize/avoid stress    Continue antiacid medicine. Currently on Protonix 40 mg daily we will continue monitor renal functions if renal functions remains I will cut down dose to 20 mg. For surveillance upper GI endoscopy per gastroenterologist      4.  Gastroesophageal reflux disease without esophagitis  Assessment & Plan:  See below in hiatal hernia      5. Essential hypertension  Assessment & Plan:  Hypertension, SVT, valvular heart disease, pulmonary artery hypertension aortic insufficiency mitral regurgitation all stable. Will decrease torsemide 5 mg every other day KCl every other day due to mild bump in renal functions though we may have to accept mild bump. His edema is better. He is wearing stockings. Other blood pressure medications and cardiac regimen relevant medications reviewed remains on atorvastatin, fosinopril, metoprolol,    . Potassium and torsemide which will be decreased to every other day, follow-up lab in 2 weeks after making changes               Subjective     Ricky Freeman is a very pleasant 80-year-old gentleman here with the family. Patient is a known case of hypertension, remote history of SVT, valvular heart disease, paroxysmal atrial flutter as well as remote history of pulmonary embolism CKD level 3 presented with the edema of the lower extremities. His edema is better. He lost 3 pound weight at home weight. He is on torsemide 5 mg daily. Remains on fosinopril, metoprolol, potassium and torsemide. His breathing is better offers no other specific complaint walks with a walker. Lab on 08/07/2023  Sodium                    Value: 140(mmol/L)        Dt: 08/07/2023  Potassium                 Value: 4.4(mmol/L)        Dt: 08/07/2023  Chloride                  Value: 106(mmol/L)        Dt: 08/07/2023  CO2                       Value: 31(mmol/L)         Dt: 08/07/2023  ANION GAP                 Value:  3(mmol/L)          Dt: 08/07/2023  BUN                       Value: 18(mg/dL)          Dt: 08/07/2023  Creatinine                Value: 1.36(mg/dL) (H)    Dt: 08/07/2023  Glucose, Fasting          Value: 130(mg/dL) (H)     Dt: 08/07/2023  Calcium                   Value: 9.0(mg/dL)         Dt: 08/07/2023  Corrected Calcium         Value: 9.5(mg/dL)         Dt: 08/07/2023  AST                       Value: 15(U/L)            Dt: 08/07/2023  ALT                       Value: 14(U/L)            Dt: 08/07/2023  Alkaline Phosphatase      Value: 95(U/L)            Dt: 08/07/2023  Total Protein             Value: 6.9(g/dL)          Dt: 08/07/2023  Albumin                   Value: 3.4(g/dL) (L)      Dt: 08/07/2023  Total Bilirubin           Value: 0.58(mg/dL)        Dt: 08/07/2023  eGFR                      Value: 47(ml/min/1.73sq m) Dt: 08/07/2023  WBC                       Value: 6.36(Thousand/uL)  Dt: 08/07/2023  RBC                       Value: 4.27(Million/uL)   Dt: 08/07/2023  Hemoglobin                Value: 13.5(g/dL)         Dt: 08/07/2023  Hematocrit                Value: 41.1(%)            Dt: 08/07/2023  MCV                       Value: 96(fL)             Dt: 08/07/2023  MCH                       Value: 31.6(pg)           Dt: 08/07/2023  MCHC                      Value: 32.8(g/dL)         Dt: 08/07/2023  RDW                       Value: 13.5(%)            Dt: 08/07/2023  Platelets                 Value: 256(Thousands/uL)  Dt: 08/07/2023  MPV                       Value: 9.8(fL)            Dt: 08/07/2023  BNP                       Value: 79(pg/mL)          Dt: 08/07/2023  TSH 3RD GENERATON         Value: 1.435(uIU/mL)      Dt: 08/07/2023  ------------ - 2 weeks    Procedure: VAS lower limb venous duplex study, complete bilateral    Result Date: 8/10/2023  Narrative:  [facility] REPORT [facility]INICAL:     CONCLUSION: Impression: RIGHT LOWER LIMB: No evidence of acute or chronic deep vein thrombosis. No evidence of superficial thrombophlebitis noted. Doppler evaluation shows a normal response to augmentation maneuvers. . Popliteal, posterior tibial and anterior tibial arterial Doppler waveform's are triphasic. LEFT LOWER LIMB: No evidence of acute or chronic deep vein thrombosis. No evidence of superficial thrombophlebitis noted.  Doppler evaluation shows a normal response to augmentation maneuvers. Popliteal, posterior tibial and anterior tibial arterial Doppler waveform's are triphasic. SIGNATURE: ElectronicallyProcedure: XR chest pa & lateral    Result Date: 8/9/2023  Narrative: CHEST  FINDINGS: Cardiomediastinal silhouette normal. Moderate hiatal hernia. Lungs clear. No effusion or pneumothorax. Upper abdomen normal. Stable compression deformities in the spine. Impression: No acute cardiopulmonary disease. Moderate hiatal hernia. Review of Systems   Constitutional: Negative for chills, fatigue and fever. HENT: Negative for ear pain, sore throat and trouble swallowing. Eyes: Negative for pain and visual disturbance. Respiratory: Negative for cough and shortness of breath. Cardiovascular: Positive for leg swelling. Negative for chest pain and palpitations. Gastrointestinal: Negative for abdominal pain, constipation, diarrhea and vomiting. Genitourinary: Negative for difficulty urinating, dysuria, hematuria and testicular pain. Musculoskeletal: Positive for gait problem. Negative for arthralgias, back pain and myalgias. Skin: Negative for color change and rash. Neurological: Negative for dizziness, seizures, syncope and headaches. Psychiatric/Behavioral: Negative for confusion and sleep disturbance. The patient is not nervous/anxious. All other systems reviewed and are negative. Past Medical History:   Diagnosis Date   • BPH (benign prostatic hyperplasia)    • Depression    • HTN (hypertension)    • Hyperlipidemia    • OCD (obsessive compulsive disorder)    • Pulmonary embolism ([address]) 2019     Past Surgical History:   Procedure Laterality Date   • IR IVC FILTER PLACEMENT OPTIONAL/TEMPORARY  12/7/2019   • IR IVC FILTER REMOVAL  8/21/2020   • OK OPTX FEM SHFT FX W/INSJ IMED IMPLT W/WO SCREW Right 12/4/2019    Procedure: INSERTION NAIL IM FEMUR ANTEGRADE (TROCHANTERIC);   Surgeon: Jenaro Horan DO;  Location: [facility];  Service: Orthopedics   • [place] OPTX FEM SHFT FX W/INSJ IMED IMPLT W/WO SCREW Left 6/17/2022    Procedure: INSERTION NAIL IM FEMUR ANTEGRADE (TROCHANTERIC) - long nail;  Surgeon: Allie Bustillo DO;  Location: [address] [place] [address] MAIN OR;  Service: Orthopedics     Family History   Problem Relation Age of Onset   • Cancer Mother      Social History     Socioeconomic History   • Marital status: /Civil Union     Spouse name: None   • Number of children: None   • Years of education: None   • Highest education level: None   Occupational History   • None   Tobacco Use   • Smoking status: Never   • Smokeless tobacco: Never   Vaping Use   • Vaping Use: Never used   Substance and Sexual Activity   • Alcohol use: Never   • Drug use: Never   • Sexual activity: None   Other Topics Concern   • None   Social History Narrative   • None     Social Determinants of Health     Financial Resource Strain: Low Risk  (11/25/2022)    Overall Financial Resource Strain (CARDIA)    • Difficulty of Paying Living Expenses: Not hard at all   Food Insecurity: No Food Insecurity (3/22/2023)    Hunger Vital Sign    • Worried About Running Out of Food in the Last Year: Never true    • Ran Out of Food in the Last Year: Never true   Transportation Needs: No Transportation Needs (3/22/2023)    PRAPARE - Transportation    • Lack of Transportation (Medical): No    • Lack of Transportation (Non-Medical):  No   Physical Activity: Not on file   Stress: Not on file   Social Connections: Not on file   Intimate Partner Violence: Not on file   Housing Stability: Low Risk  (3/22/2023)    Housing Stability Vital Sign    • Unable to Pay for Housing in the Last Year: No    • Number of Places Lived in the Last Year: 1    • Unstable Housing in the Last Year: No     Current Outpatient Medications on File Prior to Visit   Medication Sig   • albuterol (Ventolin HFA) 90 mcg/act inhaler Inhale 2 puffs every 6 (six) hours as needed for wheezing   • atorvastatin (LIPITOR) 10 mg tablet TAKE ONE-HALF TABLET DAILY   • Calcium Carb-Cholecalciferol (CALTRATE 600+D3 PO) Take 600 mg by mouth 2 (two) times a day   • citalopram (CeleXA) 40 mg tablet TAKE ONE TABLET ONCE DAILY BY MOUTH   • fluticasone (FLONASE) 50 mcg/act nasal spray 1 spray into each nostril daily   • fosinopril (MONOPRIL) 10 mg tablet TAKE 1/2 TABLET BY MOUTH DAILY   • ipratropium (ATROVENT) 0.03 % nasal spray 2 sprays into each nostril 2 (two) times a day as needed for rhinitis   • magnesium oxide (MAG-OX) 400 mg tablet Take 1 tablet by mouth daily   • metoprolol tartrate (LOPRESSOR) 50 mg tablet TAKE ONE TABLET EVERY 12 HOURS   • Multiple Vitamin (MULTIVITAMIN) tablet Take 1 tablet by mouth daily   • niacin (NIASPAN) 500 mg CR tablet TAKE ONE TABLET DAILY AT BEDTIME (Patient taking differently: 500 mg daily with dinner)   • pantoprazole (PROTONIX) 40 mg tablet TAKE ONE TABLET BY MOUTH EVERY DAY   • potassium chloride (Klor-Con) 10 mEq tablet Take 1 tablet (10 mEq total) by mouth in the morning   • tamsulosin (FLOMAX) 0.4 mg Take 0.4 mg by mouth daily with dinner   • torsemide (DEMADEX) 5 MG tablet Take 1 tablet (5 mg total) by mouth daily   • torsemide (DEMADEX) 5 MG tablet Take 1 tablet (5 mg total) by mouth daily     No Known Allergies  Immunization History   Administered Date(s) Administered   • INFLUENZA 12/14/2022   • Influenza, high dose seasonal 0.7 mL 09/29/2020, 10/12/2021, 12/14/2022   • Influenza, injectable, quadrivalent, preservative free 0.5 mL 10/31/2019   • Tdap 10/14/2019       Objective     /68   Pulse 88   Ht 5' 9" (1.753 m)   Wt 88.9 kg (196 lb)   SpO2 90%   BMI 28.94 kg/m²     Physical Exam  Vitals and nursing note reviewed. Constitutional:       Appearance: Normal appearance. He is well-developed. He is not ill-appearing. HENT:      Head: Normocephalic and atraumatic. Right Ear: External ear normal.      Left Ear: External ear normal.      Nose: Nose normal. No congestion or rhinorrhea.       Mouth/Throat: Pharynx: Oropharynx is clear. No oropharyngeal exudate. Eyes:      General: Lids are normal.      Conjunctiva/sclera: Conjunctivae normal.   Neck:      Thyroid: No thyroid mass or thyromegaly. Vascular: No JVD. Trachea: Trachea normal.   Cardiovascular:      Rate and Rhythm: Normal rate and regular rhythm. Pulses: Normal pulses. Heart sounds: Murmur heard. Comments: Stockings in place. Edema better. Pulmonary:      Effort: Pulmonary effort is normal. No respiratory distress. Breath sounds: Normal breath sounds. Abdominal:      General: Bowel sounds are normal.      Palpations: There is no mass. Hernia: No hernia is present. Musculoskeletal:         General: Normal range of motion. Cervical back: Normal range of motion. Right lower le+ Edema present. Left lower le+ Edema present. Skin:     General: Skin is warm. Coloration: Skin is not jaundiced or pale. Findings: No rash. Neurological:      General: No focal deficit present. Mental Status: He is alert and oriented to person, place, and time. Sensory: No sensory deficit. Motor: No weakness. Gait: Gait abnormal (pt uses a walker). Psychiatric:         Attention and Perception: Perception normal.         Mood and Affect: Mood normal. Mood is not anxious. Speech: Speech normal.         Behavior: Behavior normal. Behavior is not agitated, aggressive, withdrawn, hyperactive or combative. Thought Content: Thought content is not paranoid or delusional. Thought content does not include homicidal or suicidal ideation. Thought content does not include suicidal plan. Cognition and Memory: Memory is not impaired. Judgment: Judgment normal. Judgment is not impulsive.        Avelina Jordan MD

## 2023-08-18 NOTE — ASSESSMENT & PLAN NOTE
Lab Results   Component Value Date    EGFR 47 08/07/2023    EGFR 61 05/05/2023    EGFR 78 03/24/2023    CREATININE 1.36 (H) 08/07/2023    CREATININE 1.11 05/05/2023    CREATININE 0.90 03/24/2023   Decrease torsemide 5 mg every other day. Decrease potassium pill 1 tablet every other day. We will go for the blood test in 2 weeks. Continue to use stockings.

## 2023-08-27 DIAGNOSIS — I10 ESSENTIAL HYPERTENSION: ICD-10-CM

## 2023-08-27 DIAGNOSIS — I48.92 PAROXYSMAL ATRIAL FLUTTER (HCC): ICD-10-CM

## 2023-08-27 DIAGNOSIS — K44.9 HIATAL HERNIA: ICD-10-CM

## 2023-08-27 DIAGNOSIS — E78.5 HYPERLIPIDEMIA, UNSPECIFIED HYPERLIPIDEMIA TYPE: ICD-10-CM

## 2023-08-28 RX ORDER — PANTOPRAZOLE SODIUM 40 MG/1
40 TABLET, DELAYED RELEASE ORAL DAILY
Qty: 30 TABLET | Refills: 2 | Status: ON HOLD | OUTPATIENT
Start: 2023-08-28

## 2023-08-28 RX ORDER — METOPROLOL TARTRATE 50 MG/1
TABLET, FILM COATED ORAL
Qty: 180 TABLET | Refills: 1 | Status: ON HOLD | OUTPATIENT
Start: 2023-08-28

## 2023-08-28 RX ORDER — NIACIN 500 MG/1
TABLET, EXTENDED RELEASE ORAL
Qty: 90 TABLET | Refills: 1 | Status: ON HOLD | OUTPATIENT
Start: 2023-08-28

## 2023-08-28 RX ORDER — FOSINOPRIL SODIUM 10 MG/1
TABLET ORAL
Qty: 45 TABLET | Refills: 1 | Status: ON HOLD | OUTPATIENT
Start: 2023-08-28

## 2023-09-06 ENCOUNTER — OFFICE VISIT (OUTPATIENT)
Dept: OBGYN CLINIC | Facility: CLINIC | Age: 83
End: 2023-09-06
Payer: COMMERCIAL

## 2023-09-06 ENCOUNTER — APPOINTMENT (OUTPATIENT)
Dept: RADIOLOGY | Facility: CLINIC | Age: 83
DRG: 857 | End: 2023-09-06
Payer: COMMERCIAL

## 2023-09-06 VITALS
HEIGHT: 69 IN | DIASTOLIC BLOOD PRESSURE: 72 MMHG | WEIGHT: 196 LBS | SYSTOLIC BLOOD PRESSURE: 148 MMHG | BODY MASS INDEX: 29.03 KG/M2 | HEART RATE: 84 BPM

## 2023-09-06 DIAGNOSIS — Z96.7: ICD-10-CM

## 2023-09-06 DIAGNOSIS — Z01.89 ENCOUNTER FOR OTHER SPECIFIED SPECIAL EXAMINATIONS: ICD-10-CM

## 2023-09-06 DIAGNOSIS — L02.416 ABSCESS OF LEFT LOWER LEG: Primary | ICD-10-CM

## 2023-09-06 PROCEDURE — 87186 SC STD MICRODIL/AGAR DIL: CPT | Performed by: ORTHOPAEDIC SURGERY

## 2023-09-06 PROCEDURE — 87102 FUNGUS ISOLATION CULTURE: CPT | Performed by: ORTHOPAEDIC SURGERY

## 2023-09-06 PROCEDURE — 87070 CULTURE OTHR SPECIMN AEROBIC: CPT | Performed by: ORTHOPAEDIC SURGERY

## 2023-09-06 PROCEDURE — 73562 X-RAY EXAM OF KNEE 3: CPT

## 2023-09-06 PROCEDURE — 10060 I&D ABSCESS SIMPLE/SINGLE: CPT | Performed by: ORTHOPAEDIC SURGERY

## 2023-09-06 PROCEDURE — 87205 SMEAR GRAM STAIN: CPT | Performed by: ORTHOPAEDIC SURGERY

## 2023-09-06 PROCEDURE — 73560 X-RAY EXAM OF KNEE 1 OR 2: CPT

## 2023-09-06 PROCEDURE — 73552 X-RAY EXAM OF FEMUR 2/>: CPT

## 2023-09-06 PROCEDURE — 99214 OFFICE O/P EST MOD 30 MIN: CPT | Performed by: ORTHOPAEDIC SURGERY

## 2023-09-06 PROCEDURE — 87147 CULTURE TYPE IMMUNOLOGIC: CPT | Performed by: ORTHOPAEDIC SURGERY

## 2023-09-06 RX ORDER — SULFAMETHOXAZOLE AND TRIMETHOPRIM 800; 160 MG/1; MG/1
1 TABLET ORAL EVERY 12 HOURS SCHEDULED
Qty: 14 TABLET | Refills: 0 | Status: SHIPPED | OUTPATIENT
Start: 2023-09-06 | End: 2023-09-12

## 2023-09-06 NOTE — PROGRESS NOTES
Assessment/Plan:  1. Abscess of left lower leg  sulfamethoxazole-trimethoprim (BACTRIM DS) 800-160 mg per tablet    Incision and drain    Fungal culture    Wound culture and Gram stain    Fungal culture    Wound culture and Gram stain      2. Presence of orthopedic implant of femur  XR femur 2 vw left    XR knee 3 vw left non injury    Fungal culture    Wound culture and Gram stain    Fungal culture    Wound culture and Gram stain    CANCELED: XR knee 3 vw left non injury        Scribe Attestation    I,:  Elisabeth Macario am acting as a scribe while in the presence of the attending physician.:       I,:  Marry Bosch DO personally performed the services described in this documentation    as scribed in my presence.:         Is a pleasant 80-year-old male who is here today for follow-up evaluation 15 months status post insertion of nail the left femur for intertrochanteric fracture. Patient is here as directed previously to follow-up on his rehabilitation progress after his delayed union of his complex intertrochanteric fracture. On examination today his x-rays demonstrate that his complex fracture pattern is healed and there is no change in his overall alignment and position of his orthopedic hardware. Coincidentally approximately 2 days ago he started to get some pain and swelling on the distal aspect of his left knee that he attributes to a sudden change in position that led to a pop and pain. On examination he has a localized subcuticular abscess at about the level of his distal screw insertion site. This was the area of a hematoma previously that was previously without issue. On x-ray it did appear to be extrafascial and clinically it appeared to be well localized. I did offer him a bedside I&D here in the office today with iodoform packing and oral antibiotics. The risk and benefits of undergoing his procedure were discussed at length and patient was agreeable to it.   Procedure was performed without complication at bedside please see the description below. Copious amounts of purulent fluid was expressed from the abscess. I did see him back in approximately 2-3 days for a wound eval and possible dressing and packing change. Antibiotics for him to take orally twice a day were sent to his pharmacy. All questions addressed. Incision and drain    Date/Time: 9/6/2023 1:30 PM    Performed by: Dax Abebe DO  Authorized by: Dax Abebe DO  Universal Protocol:  Consent: Verbal consent obtained. Risks and benefits: risks, benefits and alternatives were discussed  Consent given by: patient  Time out: Immediately prior to procedure a "time out" was called to verify the correct patient, procedure, equipment, support staff and site/side marked as required. Patient understanding: patient states understanding of the procedure being performed  Site marked: the operative site was marked  Patient identity confirmed: verbally with patient      Patient location:  Clinic  Location:     Type:  Abscess    Location:  Lower extremity    Lower extremity location:  L leg  Pre-procedure details:     Skin preparation:  Betadine  Anesthesia (see MAR for exact dosages): Anesthesia method:  Local infiltration    Local anesthetic:  Lidocaine 1% w/o epi  Procedure details:     Complexity:  Simple    Needle aspiration: no      Incision types:  Stab incision    Scalpel blade:  11    Approach:  Open    Incision depth:  Superficial and subcutaneous    Wound management:  Probed and deloculated    Drainage:  Bloody and purulent    Drainage amount: Moderate    Wound treatment:  Packing placed and wound left open    Packing materials:  1/4 in iodoform gauze  Post-procedure details:     Patient tolerance of procedure: Tolerated well, no immediate complications  Comments:      Copious amounts of purulent fluid was expressed from the subcuticular abscess. Loculations were disrupted utilizing hemostat.   Wound was packed with iodoform dressing with tail exposed from wound. Wound culture was sent for aerobic and fungal culture. Subjective: Follow-up evaluation 15 months status post nail insertion for left femur intertrochanteric fracture    Patient ID: Mamta Franco is a 80 y.o. male who is here today for follow-up evaluation 15 months status post nail insertion for left femur intertrochanteric fracture. The left knee has been bothering him since 9/2/2023. He was laying in bed and tried to roll over onto his right side when he felt a "pop" in the left knee. He experienced pain immediately following the "pop." He describes the pain as achy. He rates his pain 9/10 on 9/2/2023 and 7/10 today. There is a small lump on the lateral aspect of the left knee that is red and inflamed. He has an abrasion on the left knee from working on his  yesterday. He has been taking Tylenol as needed. He denies any paresthesias of the legs. Review of Systems   Constitutional: Positive for activity change. Negative for chills and fever. HENT: Negative for ear pain and sore throat. Eyes: Negative for pain and visual disturbance. Respiratory: Negative for cough and shortness of breath. Cardiovascular: Negative for chest pain and palpitations. Gastrointestinal: Negative for abdominal pain and vomiting. Genitourinary: Negative for dysuria and hematuria. Musculoskeletal: Positive for arthralgias and joint swelling. Negative for back pain. Skin: Negative for color change and rash. Neurological: Negative for seizures and syncope. All other systems reviewed and are negative.       Past Medical History:   Diagnosis Date   • BPH (benign prostatic hyperplasia)    • Depression    • HTN (hypertension)    • Hyperlipidemia    • OCD (obsessive compulsive disorder)    • Pulmonary embolism ([address]) 2019       Past Surgical History:   Procedure Laterality Date   • IR IVC FILTER PLACEMENT OPTIONAL/TEMPORARY  12/7/2019   • IR IVC FILTER REMOVAL  8/21/2020 • DE OPTX FEM SHFT FX W/INSJ IMED IMPLT W/WO SCREW Right 12/4/2019    Procedure: INSERTION NAIL IM FEMUR ANTEGRADE (TROCHANTERIC);   Surgeon: Nevin Gonzalez DO;  Location: [facility];  Service: Orthopedics   • DE OPTX FEM SHFT FX W/INSJ IMED IMPLT W/WO SCREW Left 6/17/2022    Procedure: INSERTION NAIL IM FEMUR ANTEGRADE (TROCHANTERIC) - long nail;  Surgeon: Gilda Castañeda DO;  Location: [facility];  Service: Orthopedics       Family History   Problem Relation Age of Onset   • Cancer Mother        Social History     Occupational History   • Not on file   Tobacco Use   • Smoking status: Never   • Smokeless tobacco: Never   Vaping Use   • Vaping Use: Never used   Substance and Sexual Activity   • Alcohol use: Never   • Drug use: Never   • Sexual activity: Not on file         Current Outpatient Medications:   •  albuterol (Ventolin HFA) 90 mcg/act inhaler, Inhale 2 puffs every 6 (six) hours as needed for wheezing, Disp: 18 g, Rfl: 0  •  atorvastatin (LIPITOR) 10 mg tablet, TAKE ONE-HALF TABLET DAILY, Disp: 45 tablet, Rfl: 1  •  Calcium Carb-Cholecalciferol (CALTRATE 600+D3 PO), Take 600 mg by mouth 2 (two) times a day, Disp: , Rfl:   •  citalopram (CeleXA) 40 mg tablet, TAKE ONE TABLET ONCE DAILY BY MOUTH, Disp: 90 tablet, Rfl: 1  •  fluticasone (FLONASE) 50 mcg/act nasal spray, 1 spray into each nostril daily, Disp: 15 mL, Rfl: 0  •  fosinopril (MONOPRIL) 10 mg tablet, TAKE ONE-HALF TABLET BY MOUTH DAILY, Disp: 45 tablet, Rfl: 1  •  magnesium oxide (MAG-OX) 400 mg tablet, Take 1 tablet by mouth daily, Disp: , Rfl:   •  metoprolol tartrate (LOPRESSOR) 50 mg tablet, TAKE ONE TABLET EVERY 12 HOURS, Disp: 180 tablet, Rfl: 1  •  Multiple Vitamin (MULTIVITAMIN) tablet, Take 1 tablet by mouth daily, Disp: , Rfl:   •  niacin (NIASPAN) 500 mg CR tablet, TAKE ONE TABLET DAILY AT BEDTIME, Disp: 90 tablet, Rfl: 1  •  pantoprazole (PROTONIX) 40 mg tablet, TAKE ONE TABLET DAILY, Disp: 30 tablet, Rfl: 2  •  potassium chloride (Klor-Con) 10 mEq tablet, Take 1 tablet (10 mEq total) by mouth in the morning, Disp: 30 tablet, Rfl: 1  •  sulfamethoxazole-trimethoprim (BACTRIM DS) 800-160 mg per tablet, Take 1 tablet by mouth every 12 (twelve) hours for 7 days, Disp: 14 tablet, Rfl: 0  •  tamsulosin (FLOMAX) 0.4 mg, Take 0.4 mg by mouth daily with dinner, Disp: , Rfl:   •  torsemide (DEMADEX) 5 MG tablet, Take 1 tablet (5 mg total) by mouth daily, Disp: 30 tablet, Rfl: 1  •  ipratropium (ATROVENT) 0.03 % nasal spray, 2 sprays into each nostril 2 (two) times a day as needed for rhinitis, Disp: 30 mL, Rfl: 2    No Known Allergies    Objective:  Vitals:    09/06/23 1328   BP: 148/72   Pulse: 84       Body mass index is 28.94 kg/m². Left Knee Exam     Tenderness   Left knee tenderness location: Lateral distal femur, superior to the patella. Range of Motion   Extension: 0   Flexion: 120     Other   Erythema: present  Scars: present  Sensation: normal  Swelling: mild    Comments:  Mild swelling  Abrasions present on anterior knee  Abscess located on the lateral knee above the joint line above the level of the patella at the distal lateral femur. There was a small superficial collection appreciated. Fluctuance appreciated. Bedside I&D: Copious amounts of purulent appearing fluid was expressed from the subcuticular abscess and sent for culture. Wound was packed with iodoform dressing and a compressive dressing was applied. Left Hip Exam     Tenderness   The patient is experiencing no tenderness. Muscle Strength   Abduction: 5/5   Adduction: 5/5   Flexion: 4/5     Tests   ARMOND: negative  Brady: negative    Other   Erythema: absent  Scars: present  Sensation: normal  Pulse: present    Comments: Both proximal and distal incisions well healed  Thigh and calf soft and nontender  Grossly NVI            Physical Exam  Vitals and nursing note reviewed. Constitutional:       Appearance: Normal appearance.    HENT:      Head: Normocephalic and atraumatic. Right Ear: External ear normal.      Left Ear: External ear normal.      Mouth/Throat:      Mouth: Mucous membranes are dry. Pharynx: Oropharynx is clear. Eyes:      General: No scleral icterus. Extraocular Movements: Extraocular movements intact. Conjunctiva/sclera: Conjunctivae normal.   Cardiovascular:      Rate and Rhythm: Normal rate. Pulmonary:      Effort: Pulmonary effort is normal. No respiratory distress. Musculoskeletal:      Cervical back: Normal range of motion and neck supple. Right lower leg: Edema present. Left lower leg: Edema present. Comments: See ortho exam   Skin:     General: Skin is warm and dry. Neurological:      General: No focal deficit present. Mental Status: He is alert and oriented to person, place, and time. Psychiatric:         Mood and Affect: Mood normal.         Behavior: Behavior normal.       I have personally reviewed pertinent films in PACS. X-rays of the left knee and femur obtained on 9/6/2023. There is a well-healed intertrochanteric fracture of the left femur. There is no change in hardware. No sign of hardware failure. This document was created using speech voice recognition software. Grammatical errors, random word insertions, pronoun errors, and incomplete sentences are an occasional consequence of this system due to software limitations, ambient noise, and hardware issues. Any formal questions or concerns about content, text, or information contained within the body of this dictation should be directly addressed to the provider for clarification.

## 2023-09-08 ENCOUNTER — TELEPHONE (OUTPATIENT)
Age: 83
End: 2023-09-08

## 2023-09-08 ENCOUNTER — ANESTHESIA EVENT (INPATIENT)
Dept: PERIOP | Facility: HOSPITAL | Age: 83
DRG: 603 | End: 2023-09-08
Payer: COMMERCIAL

## 2023-09-08 ENCOUNTER — OFFICE VISIT (OUTPATIENT)
Dept: OBGYN CLINIC | Facility: CLINIC | Age: 83
End: 2023-09-08
Payer: COMMERCIAL

## 2023-09-08 ENCOUNTER — HOSPITAL ENCOUNTER (INPATIENT)
Facility: HOSPITAL | Age: 83
LOS: 4 days | Discharge: HOME HEALTH | DRG: 857 | End: 2023-09-12
Attending: EMERGENCY MEDICINE | Admitting: FAMILY MEDICINE
Payer: COMMERCIAL

## 2023-09-08 VITALS
BODY MASS INDEX: 29.03 KG/M2 | SYSTOLIC BLOOD PRESSURE: 148 MMHG | HEIGHT: 69 IN | WEIGHT: 196 LBS | HEART RATE: 70 BPM | DIASTOLIC BLOOD PRESSURE: 68 MMHG

## 2023-09-08 DIAGNOSIS — L02.416 ABSCESS OF LEFT KNEE: ICD-10-CM

## 2023-09-08 DIAGNOSIS — Z96.7: ICD-10-CM

## 2023-09-08 DIAGNOSIS — L02.416 ABSCESS OF LEFT LEG: Primary | ICD-10-CM

## 2023-09-08 DIAGNOSIS — L03.116 CELLULITIS OF LEFT KNEE: ICD-10-CM

## 2023-09-08 DIAGNOSIS — L02.416 ABSCESS OF LEFT LOWER LEG: Primary | ICD-10-CM

## 2023-09-08 PROBLEM — N40.0 BPH (BENIGN PROSTATIC HYPERPLASIA): Status: ACTIVE | Noted: 2023-09-08

## 2023-09-08 LAB
ABO GROUP BLD: NORMAL
ALBUMIN SERPL BCP-MCNC: 3.5 G/DL (ref 3.5–5)
ALP SERPL-CCNC: 70 U/L (ref 34–104)
ALT SERPL W P-5'-P-CCNC: 13 U/L (ref 7–52)
ANION GAP SERPL CALCULATED.3IONS-SCNC: 6 MMOL/L
AST SERPL W P-5'-P-CCNC: 18 U/L (ref 13–39)
BACTERIA WND AEROBE CULT: ABNORMAL
BASOPHILS # BLD AUTO: 0.07 THOUSANDS/ÂΜL (ref 0–0.1)
BASOPHILS NFR BLD AUTO: 1 % (ref 0–1)
BILIRUB SERPL-MCNC: 0.39 MG/DL (ref 0.2–1)
BILIRUB UR QL STRIP: NEGATIVE
BLD GP AB SCN SERPL QL: NEGATIVE
BUN SERPL-MCNC: 25 MG/DL (ref 5–25)
CALCIUM SERPL-MCNC: 9.3 MG/DL (ref 8.4–10.2)
CHLORIDE SERPL-SCNC: 100 MMOL/L (ref 96–108)
CLARITY UR: CLEAR
CO2 SERPL-SCNC: 27 MMOL/L (ref 21–32)
COLOR UR: NORMAL
CREAT SERPL-MCNC: 1.34 MG/DL (ref 0.6–1.3)
EOSINOPHIL # BLD AUTO: 0.39 THOUSAND/ÂΜL (ref 0–0.61)
EOSINOPHIL NFR BLD AUTO: 3 % (ref 0–6)
ERYTHROCYTE [DISTWIDTH] IN BLOOD BY AUTOMATED COUNT: 13.8 % (ref 11.6–15.1)
GFR SERPL CREATININE-BSD FRML MDRD: 48 ML/MIN/1.73SQ M
GLUCOSE SERPL-MCNC: 131 MG/DL (ref 65–140)
GLUCOSE UR STRIP-MCNC: NEGATIVE MG/DL
GRAM STN SPEC: ABNORMAL
GRAM STN SPEC: ABNORMAL
HCT VFR BLD AUTO: 40.3 % (ref 36.5–49.3)
HGB BLD-MCNC: 13.5 G/DL (ref 12–17)
HGB UR QL STRIP.AUTO: NEGATIVE
IMM GRANULOCYTES # BLD AUTO: 0.13 THOUSAND/UL (ref 0–0.2)
IMM GRANULOCYTES NFR BLD AUTO: 1 % (ref 0–2)
KETONES UR STRIP-MCNC: NEGATIVE MG/DL
LEUKOCYTE ESTERASE UR QL STRIP: NEGATIVE
LYMPHOCYTES # BLD AUTO: 1.52 THOUSANDS/ÂΜL (ref 0.6–4.47)
LYMPHOCYTES NFR BLD AUTO: 13 % (ref 14–44)
MAGNESIUM SERPL-MCNC: 1.6 MG/DL (ref 1.9–2.7)
MCH RBC QN AUTO: 32 PG (ref 26.8–34.3)
MCHC RBC AUTO-ENTMCNC: 33.5 G/DL (ref 31.4–37.4)
MCV RBC AUTO: 96 FL (ref 82–98)
MONOCYTES # BLD AUTO: 1.34 THOUSAND/ÂΜL (ref 0.17–1.22)
MONOCYTES NFR BLD AUTO: 11 % (ref 4–12)
NEUTROPHILS # BLD AUTO: 8.58 THOUSANDS/ÂΜL (ref 1.85–7.62)
NEUTS SEG NFR BLD AUTO: 71 % (ref 43–75)
NITRITE UR QL STRIP: NEGATIVE
NRBC BLD AUTO-RTO: 0 /100 WBCS
PH UR STRIP.AUTO: 7 [PH]
PLATELET # BLD AUTO: 238 THOUSANDS/UL (ref 149–390)
PMV BLD AUTO: 9.2 FL (ref 8.9–12.7)
POTASSIUM SERPL-SCNC: 4.5 MMOL/L (ref 3.5–5.3)
PROT SERPL-MCNC: 6.5 G/DL (ref 6.4–8.4)
PROT UR STRIP-MCNC: NEGATIVE MG/DL
RBC # BLD AUTO: 4.22 MILLION/UL (ref 3.88–5.62)
RH BLD: POSITIVE
SODIUM SERPL-SCNC: 133 MMOL/L (ref 135–147)
SP GR UR STRIP.AUTO: <=1.005 (ref 1–1.03)
SPECIMEN EXPIRATION DATE: NORMAL
UROBILINOGEN UR QL STRIP.AUTO: 0.2 E.U./DL
WBC # BLD AUTO: 12.03 THOUSAND/UL (ref 4.31–10.16)

## 2023-09-08 PROCEDURE — 36415 COLL VENOUS BLD VENIPUNCTURE: CPT | Performed by: EMERGENCY MEDICINE

## 2023-09-08 PROCEDURE — 99223 1ST HOSP IP/OBS HIGH 75: CPT | Performed by: INTERNAL MEDICINE

## 2023-09-08 PROCEDURE — 87205 SMEAR GRAM STAIN: CPT | Performed by: EMERGENCY MEDICINE

## 2023-09-08 PROCEDURE — 80053 COMPREHEN METABOLIC PANEL: CPT | Performed by: EMERGENCY MEDICINE

## 2023-09-08 PROCEDURE — 99283 EMERGENCY DEPT VISIT LOW MDM: CPT

## 2023-09-08 PROCEDURE — 86901 BLOOD TYPING SEROLOGIC RH(D): CPT | Performed by: ORTHOPAEDIC SURGERY

## 2023-09-08 PROCEDURE — 99214 OFFICE O/P EST MOD 30 MIN: CPT | Performed by: ORTHOPAEDIC SURGERY

## 2023-09-08 PROCEDURE — 99024 POSTOP FOLLOW-UP VISIT: CPT | Performed by: ORTHOPAEDIC SURGERY

## 2023-09-08 PROCEDURE — 86850 RBC ANTIBODY SCREEN: CPT | Performed by: ORTHOPAEDIC SURGERY

## 2023-09-08 PROCEDURE — 87040 BLOOD CULTURE FOR BACTERIA: CPT | Performed by: EMERGENCY MEDICINE

## 2023-09-08 PROCEDURE — 86900 BLOOD TYPING SEROLOGIC ABO: CPT | Performed by: ORTHOPAEDIC SURGERY

## 2023-09-08 PROCEDURE — 87147 CULTURE TYPE IMMUNOLOGIC: CPT | Performed by: EMERGENCY MEDICINE

## 2023-09-08 PROCEDURE — 87070 CULTURE OTHR SPECIMN AEROBIC: CPT | Performed by: EMERGENCY MEDICINE

## 2023-09-08 PROCEDURE — 87186 SC STD MICRODIL/AGAR DIL: CPT | Performed by: EMERGENCY MEDICINE

## 2023-09-08 PROCEDURE — 83735 ASSAY OF MAGNESIUM: CPT | Performed by: EMERGENCY MEDICINE

## 2023-09-08 PROCEDURE — 81003 URINALYSIS AUTO W/O SCOPE: CPT | Performed by: EMERGENCY MEDICINE

## 2023-09-08 PROCEDURE — 85025 COMPLETE CBC W/AUTO DIFF WBC: CPT | Performed by: EMERGENCY MEDICINE

## 2023-09-08 PROCEDURE — 99285 EMERGENCY DEPT VISIT HI MDM: CPT | Performed by: EMERGENCY MEDICINE

## 2023-09-08 RX ORDER — ACETAMINOPHEN 325 MG/1
650 TABLET ORAL EVERY 6 HOURS PRN
Status: DISCONTINUED | OUTPATIENT
Start: 2023-09-08 | End: 2023-09-12 | Stop reason: HOSPADM

## 2023-09-08 RX ORDER — LANOLIN ALCOHOL/MO/W.PET/CERES
400 CREAM (GRAM) TOPICAL DAILY
Status: DISCONTINUED | OUTPATIENT
Start: 2023-09-09 | End: 2023-09-12

## 2023-09-08 RX ORDER — NIACIN 500 MG/1
500 TABLET, EXTENDED RELEASE ORAL
Status: DISCONTINUED | OUTPATIENT
Start: 2023-09-08 | End: 2023-09-12 | Stop reason: HOSPADM

## 2023-09-08 RX ORDER — PANTOPRAZOLE SODIUM 40 MG/1
40 TABLET, DELAYED RELEASE ORAL DAILY
Status: DISCONTINUED | OUTPATIENT
Start: 2023-09-09 | End: 2023-09-12 | Stop reason: HOSPADM

## 2023-09-08 RX ORDER — TAMSULOSIN HYDROCHLORIDE 0.4 MG/1
0.4 CAPSULE ORAL
Status: DISCONTINUED | OUTPATIENT
Start: 2023-09-08 | End: 2023-09-12 | Stop reason: HOSPADM

## 2023-09-08 RX ORDER — CITALOPRAM 20 MG/1
40 TABLET ORAL EVERY MORNING
Status: DISCONTINUED | OUTPATIENT
Start: 2023-09-09 | End: 2023-09-12 | Stop reason: HOSPADM

## 2023-09-08 RX ORDER — ATORVASTATIN CALCIUM 10 MG/1
5 TABLET, FILM COATED ORAL
Status: DISCONTINUED | OUTPATIENT
Start: 2023-09-08 | End: 2023-09-12 | Stop reason: HOSPADM

## 2023-09-08 RX ORDER — FLUTICASONE PROPIONATE 50 MCG
1 SPRAY, SUSPENSION (ML) NASAL DAILY
Status: DISCONTINUED | OUTPATIENT
Start: 2023-09-09 | End: 2023-09-12 | Stop reason: HOSPADM

## 2023-09-08 RX ORDER — METOPROLOL TARTRATE 50 MG/1
50 TABLET, FILM COATED ORAL EVERY 12 HOURS
Status: DISCONTINUED | OUTPATIENT
Start: 2023-09-08 | End: 2023-09-12 | Stop reason: HOSPADM

## 2023-09-08 RX ORDER — SODIUM CHLORIDE 9 MG/ML
50 INJECTION, SOLUTION INTRAVENOUS CONTINUOUS
Status: DISCONTINUED | OUTPATIENT
Start: 2023-09-08 | End: 2023-09-09

## 2023-09-08 RX ADMIN — METOPROLOL TARTRATE 50 MG: 50 TABLET, FILM COATED ORAL at 20:20

## 2023-09-08 RX ADMIN — SODIUM CHLORIDE 150 ML/HR: 0.9 INJECTION, SOLUTION INTRAVENOUS at 15:31

## 2023-09-08 RX ADMIN — NIACIN 500 MG: 500 TABLET, EXTENDED RELEASE ORAL at 22:17

## 2023-09-08 RX ADMIN — VANCOMYCIN HYDROCHLORIDE 2000 MG: 1 INJECTION, POWDER, LYOPHILIZED, FOR SOLUTION INTRAVENOUS at 15:51

## 2023-09-08 RX ADMIN — ATORVASTATIN CALCIUM 5 MG: 10 TABLET, FILM COATED ORAL at 20:20

## 2023-09-08 RX ADMIN — TAMSULOSIN HYDROCHLORIDE 0.4 MG: 0.4 CAPSULE ORAL at 20:20

## 2023-09-08 NOTE — CONSULTS
Consultation - Alexandre Vincent 80 y.o. male MRN: [id number]  Unit/Bed#: ED 08 Encounter: [id number]        Physician Requesting Consult: Ahmet Grayson DO    Reason for Consult / Principal Problem: Abscess left leg    HPI:   Stephen Campbell is a 80y.o. year old male status post ORIF left femur intertrochanteric fx with IM nail performed 15 months ago. He started with that lateral knee started to bother him and he noticed a lump in this region. He did see Dr. Suha Pringle in the office 2 days ago and had a bedside I&D of a left lateral knee abscess. He was started on Bactrim. Cultures did grow staph aureus. The patient saw Dr. Suha Pringle again again today and was sent to ED for admission, IV antibiotics, and I&D tomorrow. The patient notes his pain is well controlled at this time. He denies any fever/chills. He notes good sensation of the left lower extremity. Xrays from 9/6/23 show prior fracture well healed with no evidence of hardware loosening. Consults    Review of Systems   Constitutional: Negative. Negative for chills and fever. HENT: Negative. Negative for ear pain and sore throat. Eyes: Negative. Negative for pain and redness. Respiratory: Negative. Negative for shortness of breath and wheezing. Cardiovascular: Negative for chest pain and palpitations. Gastrointestinal: Negative. Negative for abdominal pain and blood in stool. Endocrine: Negative. Negative for polydipsia and polyuria. Genitourinary: Negative. Negative for difficulty urinating and dysuria. Musculoskeletal:        As noted in HPI   Skin: Negative. Negative for pallor and rash. Neurological: Negative. Negative for dizziness and numbness. Hematological: Negative. Negative for adenopathy. Does not bruise/bleed easily. Psychiatric/Behavioral: Negative. Negative for confusion and suicidal ideas.        Historical Information   Past Medical History:   Diagnosis Date   • BPH (benign prostatic hyperplasia)    • Depression    • HTN (hypertension)    • Hyperlipidemia    • OCD (obsessive compulsive disorder)    • Pulmonary embolism ([address]) 2019     Past Surgical History:   Procedure Laterality Date   • IR IVC FILTER PLACEMENT OPTIONAL/TEMPORARY  12/7/2019   • IR IVC FILTER REMOVAL  8/21/2020   • [place] OPTX FEM SHFT FX W/INSJ IMED IMPLT W/WO SCREW Right 12/4/2019    Procedure: INSERTION NAIL IM FEMUR ANTEGRADE (TROCHANTERIC); Surgeon: Tonya Moise DO;  Location: [facility];  Service: Orthopedics   • MI OPTX FEM SHFT FX W/INSJ IMED IMPLT W/WO SCREW Left 6/17/2022    Procedure: INSERTION NAIL IM FEMUR ANTEGRADE (TROCHANTERIC) - long nail;  Surgeon: Susan John DO;  Location: [facility];  Service: Orthopedics     Social History   Social History     Substance and Sexual Activity   Alcohol Use Never     Social History     Substance and Sexual Activity   Drug Use Never     Social History     Tobacco Use   Smoking Status Never   Smokeless Tobacco Never     Family History:   Family History   Problem Relation Age of Onset   • Cancer Mother        Meds/Allergies   all current active meds have been reviewed and current meds:   Current Facility-Administered Medications   Medication Dose Route Frequency   • sodium chloride 0.9 % infusion  150 mL/hr Intravenous Continuous     No Known Allergies    Objective   Vitals: Blood pressure 161/74, pulse 88, temperature 98.4 °F (36.9 °C), temperature source Oral, resp. rate 20, SpO2 94 %. ,There is no height or weight on file to calculate BMI. Invasive Devices     Peripheral Intravenous Line  Duration           Peripheral IV 03/23/23 Dorsal (posterior); Left Wrist 169 days          Line  Duration           Venous Sheath Other (Comment) Right Other (Comment) 1113 days                Physical Exam  Constitutional:       General: He is not in acute distress. Appearance: He is well-developed. HENT:      Head: Normocephalic and atraumatic. Eyes:      General: No scleral icterus. Conjunctiva/sclera: Conjunctivae normal.   Neck:      Vascular: No JVD. Cardiovascular:      Rate and Rhythm: Normal rate. Pulmonary:      Effort: Pulmonary effort is normal. No respiratory distress. Skin:     General: Skin is warm. Neurological:      Mental Status: He is alert and oriented to person, place, and time. Coordination: Coordination normal.          Ortho Exam   Left knee: Wound lateral knee above joint line, draining purulent fluid. No significant erythema. Abrasions anterior knee. No significant tenderness. No pain with gentle ROM of the knee. Sensation intact left lower extremity. Moves foot and ankle freely. Lab Results: I have personally reviewed pertinent lab results. CBC:   Lab Results   Component Value Date    WBC 12.03 (H) 09/08/2023    HGB 13.5 09/08/2023    HCT 40.3 09/08/2023    MCV 96 09/08/2023     09/08/2023    RBC 4.22 09/08/2023    MCH 32.0 09/08/2023    MCHC 33.5 09/08/2023    RDW 13.8 09/08/2023    MPV 9.2 09/08/2023    NRBC 0 09/08/2023     CMP:   Lab Results   Component Value Date     12/21/2015     08/07/2023     12/21/2015    CO2 31 08/07/2023    CO2 30 (H) 12/21/2015    ANIONGAP 11.9 12/21/2015    BUN 18 08/07/2023    BUN 13 12/21/2015    CREATININE 1.36 (H) 08/07/2023    CREATININE 0.9 12/21/2015    GLUCOSE 93 12/21/2015    CALCIUM 9.0 08/07/2023    CALCIUM 8.7 12/21/2015    AST 15 08/07/2023    AST 20 12/21/2015    ALT 14 08/07/2023    ALT 22 12/21/2015    ALKPHOS 95 08/07/2023    ALKPHOS 82 12/21/2015    PROT 6.9 12/21/2015    BILITOT 0.6 12/21/2015    EGFR 47 08/07/2023     PT/INR:   Lab Results   Component Value Date    INR 1.25 (H) 06/16/2022           Assessment:  1. Status post ORIF intertrochanteric femur fracture. 2. Abscess left knee.     Plan:  Given the patient's continued purulent drainage from his wound in addition to cultures growing Staph aureus, Dr. Kaur Dolan does feel the patient should be admitted for IV antibiotics and I&D of the abscess tomorrow. He will consider hardware removal if this infection appears to be deep. The patient will be n.p.o. after midnight.   Medical management by primary team.      Code Status: Prior  Advance Directive and Living Will:      Power of :    POLST:
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
## 2023-09-08 NOTE — H&P
[id number] [facility]  H&P  Name: Kendy Godoy 80 y.o. male I MRN: [id number]  Unit/Bed#: [address] [place] [address] Date of Admission: 9/8/2023   Date of Service: 9/8/2023  Hospital Day: 0      Assessment/Plan   * Abscess of knee, left  Assessment & Plan  · Worsening L knee pain, swelling and erythema since this past Sunday  · Previously underwent insertion of nail to the Left femur for intertrochanteric fracture about 15 months ago   · Saw Ortho outpatient two days ago and concern for abscess in the L knee; Underwent local I&D and wound was packed; Wound culture grew Staph Aureus; Patient was additionally given Oral abx to take bid  · Reports that Knee continued to appear erythematous and swollen and saw Ortho outpatient and sent to the ER. In the ER, patient was seen by Orthopedics team in consultation and plan for IV abx, npo after midnight and possible need for OR in the AM to I&D and removal of hardware L distal thigh   · WBC 12K afebrile; Knee appears swollen and erythematous on exam   · Admit to medicine. NPO after midnight. IV vancomycin. F/u blood cultures drawn in ER. Ortho following with likely OR procedure in AM. Consider ID consultation     Stage 3 chronic kidney disease, unspecified whether stage 3a or 3b CKD [facility])  Assessment & Plan  Lab Results   Component Value Date    EGFR 48 09/08/2023    EGFR 47 08/07/2023    EGFR 61 05/05/2023    CREATININE 1.34 (H) 09/08/2023    CREATININE 1.36 (H) 08/07/2023    CREATININE 1.11 05/05/2023     · Reviewed Renal function data over the past 3-4 years. Values between 0.9 to as high as 1.9. Patient's creatinine today 1.34 and it was 1.36 one month ago  · Monitor with daily BMP and consider nephrology consultation in renal function worsening during hospitalization    Pulmonary artery hypertension [facility])  Assessment & Plan  · LVEF June 2022 with LVEF 60%. Mild-Moderate TR.  Grade 1 Diastolic Dysfunction  · Denies any sob currently  · On pta torsemide; Holding torsemide overnight with possible OR procedure in AM     Paroxysmal atrial flutter (HCC)  Assessment & Plan  · Hx Paroxysmal atrial flutter on pta Metoprolol. Not on pta anticoagulation   · Continue pta metoprolol. VTE Prophylaxis:  sequential compression device   Code Status: Level 1 - Full Code       Anticipated Length of Stay:  Patient will be admitted on an Inpatient basis with an anticipated length of stay of  > 2 midnights. Justification for Hospital Stay: Please see detailed plans noted above. Chief Complaint:     Left Knee Abscess  History of Present Illness:  Merline Ground is a 80 y.o. male who has past medical history significant for atrial flutter not on PTA anticoagulation, previous provoked DVT/PE and had IVC filter but this was removed, CKD, pulmonary arterial hypertension, previous left lower extremity intertrochanteric femur fracture who presented to [address] [place] [address] ER on the afternoon of 9/8 with left knee abscess. Patient reports that last Sunday he began to notice swelling and erythema of the left knee. He reports that the symptoms persisted and on Wednesday he went and saw orthopedics outpatient where he had a local I&D performed at bedside and was put on oral Bactrim twice daily. Wound cultures grew Staph aureus. Patient reports that his symptoms continue to worsen and was seen again by orthopedics outpatient earlier in the day today where orthopedics recommended sending patient to the ER for IV antibiotics as well as likely need for OR procedure. Patient seen by orthopedics team in the ER and started on IV antibiotics and plan for n.p.o. after midnight with likely need for I&D of the abscess as well as removal of hardware from patient's left distal thigh. In patient's room, he currently reports his pain is controlled. He denies any current fevers. Denies any chest pain shortness of breath, abdominal pain or hematochezia.       Review of Systems:    Constitutional: Denies fever or chills   Eyes:  Denies change in visual acuity   HENT:  Denies nasal congestion or sore throat   Respiratory:  Denies cough or shortness of breath   Cardiovascular:  Denies chest pain or edema   GI:  Denies abdominal pain or bloody stools  :  Denies dysuria   Musculoskeletal:  L knee infection  Integument:  Denies rash   Neurologic:  Denies headache or sensory changes   Endocrine:  Denies polyuria or polydipsia   Lymphatic:  Denies swollen glands   Psychiatric:  Denies depression or anxiety     Past Medical and Surgical History:   Past Medical History:   Diagnosis Date   • BPH (benign prostatic hyperplasia)    • Depression    • HTN (hypertension)    • Hyperlipidemia    • OCD (obsessive compulsive disorder)    • Pulmonary embolism ([address]) 2019     Past Surgical History:   Procedure Laterality Date   • IR IVC FILTER PLACEMENT OPTIONAL/TEMPORARY  12/7/2019   • IR IVC FILTER REMOVAL  8/21/2020   • UT OPTX FEM SHFT FX W/INSJ IMED IMPLT W/WO SCREW Right 12/4/2019    Procedure: INSERTION NAIL IM FEMUR ANTEGRADE (TROCHANTERIC);   Surgeon: Heriberto Porras DO;  Location: [facility];  Service: Orthopedics   • UT OPTX FEM SHFT FX W/INSJ IMED IMPLT W/WO SCREW Left 6/17/2022    Procedure: INSERTION NAIL IM FEMUR ANTEGRADE (TROCHANTERIC) - long nail;  Surgeon: Tay Hinojosa DO;  [facility];  Service: Orthopedics       Meds/Allergies:  Medications Prior to Admission   Medication Sig Dispense Refill Last Dose   • albuterol (Ventolin HFA) 90 mcg/act inhaler Inhale 2 puffs every 6 (six) hours as needed for wheezing 18 g 0 Past Month   • atorvastatin (LIPITOR) 10 mg tablet TAKE ONE-HALF TABLET DAILY 45 tablet 1    • Calcium Carb-Cholecalciferol (CALTRATE 600+D3 PO) Take 600 mg by mouth 2 (two) times a day      • citalopram (CeleXA) 40 mg tablet TAKE ONE TABLET ONCE DAILY BY MOUTH 90 tablet 1    • fluticasone (FLONASE) 50 mcg/act nasal spray 1 spray into each nostril daily 15 mL 0    • fosinopril (MONOPRIL) 10 mg tablet TAKE ONE-HALF TABLET BY MOUTH DAILY 45 tablet 1    • ipratropium (ATROVENT) 0.03 % nasal spray 2 sprays into each nostril 2 (two) times a day as needed for rhinitis 30 mL 2    • magnesium oxide (MAG-OX) 400 mg tablet Take 1 tablet by mouth daily      • metoprolol tartrate (LOPRESSOR) 50 mg tablet TAKE ONE TABLET EVERY 12 HOURS 180 tablet 1    • Multiple Vitamin (MULTIVITAMIN) tablet Take 1 tablet by mouth daily      • niacin (NIASPAN) 500 mg CR tablet TAKE ONE TABLET DAILY AT BEDTIME 90 tablet 1    • pantoprazole (PROTONIX) 40 mg tablet TAKE ONE TABLET DAILY 30 tablet 2    • potassium chloride (Klor-Con) 10 mEq tablet Take 1 tablet (10 mEq total) by mouth in the morning 30 tablet 1    • sulfamethoxazole-trimethoprim (BACTRIM DS) 800-160 mg per tablet Take 1 tablet by mouth every 12 (twelve) hours for 7 days 14 tablet 0    • tamsulosin (FLOMAX) 0.4 mg Take 0.4 mg by mouth daily with dinner      • torsemide (DEMADEX) 5 MG tablet Take 1 tablet (5 mg total) by mouth daily 30 tablet 1        Allergies: No Known Allergies    History:  Marital Status: /Civil Union     Substance Use History:   Social History     Substance and Sexual Activity   Alcohol Use Never     Social History     Tobacco Use   Smoking Status Never   Smokeless Tobacco Never     Social History     Substance and Sexual Activity   Drug Use Never       Family History:  Family History   Problem Relation Age of Onset   • Cancer Mother        Physical Exam:     Vitals:   Blood Pressure: (!) 177/74 (09/08/23 1658)  Pulse: 66 (09/08/23 1658)  Temperature: 97.8 °F (36.6 °C) (09/08/23 1658)  Temp Source: Oral (09/08/23 1501)  Respirations: 18 (09/08/23 1621)  Height: 5' 9" (175.3 cm) (09/08/23 1730)  Weight - Scale: 87.3 kg (192 lb 7.4 oz) (09/08/23 1730)  SpO2: 95 % (09/08/23 1658)    Constitutional:  Alert, Oriented x 4  Eyes:  EOMI, No scleral icterus   HENT:   oropharynx moist, external ears normal, external nose normal   Respiratory:  No respiratory distress, no wheezing   Cardiovascular:  Normal rate, no murmurs   GI:  Soft, nondistended, no guarding   :  No costovertebral angle tenderness   Musculoskeletal:  Left knee swollen and erythematous with ace bandage wrapped around knee, distal pulses of bilateral LE intact  Integument:  no jaundice  Neurologic:  Alert &awake, communicative, CN 2-12 normal,  no focal deficits noted         Lab Results: I have personally reviewed pertinent reports. Results from last 7 days   Lab Units 09/08/23  1524   WBC Thousand/uL 12.03*   HEMOGLOBIN g/dL 13.5   HEMATOCRIT % 40.3   PLATELETS Thousands/uL 238   NEUTROS PCT % 71   LYMPHS PCT % 13*   MONOS PCT % 11   EOS PCT % 3     Results from last 7 days   Lab Units 09/08/23  1524   POTASSIUM mmol/L 4.5   CHLORIDE mmol/L 100   CO2 mmol/L 27   BUN mg/dL 25   CREATININE mg/dL 1.34*   CALCIUM mg/dL 9.3   ALK PHOS U/L 70   ALT U/L 13   AST U/L 18             Imaging: I have personally reviewed pertinent reports. XR femur 2 vw left    Result Date: 9/8/2023  Narrative: RIGHT KNEE; LEFT KNEE; LEFT FEMUR INDICATION:   Z01.89: Encounter for other specified special examinations. COMPARISON: 9/21/2022 left femur VIEWS:  XR KNEE 1 OR 2 VW RIGHT, XR KNEE 3 VW LEFT NON INJURY, XR FEMUR 2 VW LEFT FINDINGS: There is no acute fracture or dislocation. Intramedullary rods extend to the metaphyses of both femurs. There is no joint effusion on the left. There is increased callus formation involving the intertrochanteric/subtrochanteric fracture of the left femur suggesting interval healing. There has been no change in alignment. The orthopedic hardware appears intact. Locking screw in the distal femur slightly bent but unchanged. Mild degenerative joint space narrowing in the medial compartments of both knees are present. Mild patellofemoral joint space narrowing is seen. No lytic or blastic osseous lesion. Soft tissue swelling is noted laterally adjacent to the left knee.  I&D was performed at the office according to the clinical notes. Impression: No acute osseous abnormality. Postsurgical changes of both knees. Healing left femoral fracture. Lateral soft tissue swelling of the left knee. Mild degenerative changes Workstation performed: [id number]     XR knee 3 vw left non injury    Result Date: 9/8/2023  Narrative: RIGHT KNEE; LEFT KNEE; LEFT FEMUR INDICATION:   Z01.89: Encounter for other specified special examinations. COMPARISON: 9/21/2022 left femur VIEWS:  XR KNEE 1 OR 2 VW RIGHT, XR KNEE 3 VW LEFT NON INJURY, XR FEMUR 2 VW LEFT FINDINGS: There is no acute fracture or dislocation. Intramedullary rods extend to the metaphyses of both femurs. There is no joint effusion on the left. There is increased callus formation involving the intertrochanteric/subtrochanteric fracture of the left femur suggesting interval healing. There has been no change in alignment. The orthopedic hardware appears intact. Locking screw in the distal femur slightly bent but unchanged. Mild degenerative joint space narrowing in the medial compartments of both knees are present. Mild patellofemoral joint space narrowing is seen. No lytic or blastic osseous lesion. Soft tissue swelling is noted laterally adjacent to the left knee. I&D was performed at the office according to the clinical notes. Impression: No acute osseous abnormality. Postsurgical changes of both knees. Healing left femoral fracture. Lateral soft tissue swelling of the left knee. Mild degenerative changes Workstation performed: [id number]     XR knee 1 or 2 vw right    Result Date: 9/8/2023  Narrative: RIGHT KNEE; LEFT KNEE; LEFT FEMUR INDICATION:   Z01.89: Encounter for other specified special examinations. COMPARISON: 9/21/2022 left femur VIEWS:  XR KNEE 1 OR 2 VW RIGHT, XR KNEE 3 VW LEFT NON INJURY, XR FEMUR 2 VW LEFT FINDINGS: There is no acute fracture or dislocation. Intramedullary rods extend to the metaphyses of both femurs. There is no joint effusion on the left. There is increased callus formation involving the intertrochanteric/subtrochanteric fracture of the left femur suggesting interval healing. There has been no change in alignment. The orthopedic hardware appears intact. Locking screw in the distal femur slightly bent but unchanged. Mild degenerative joint space narrowing in the medial compartments of both knees are present. Mild patellofemoral joint space narrowing is seen. No lytic or blastic osseous lesion. Soft tissue swelling is noted laterally adjacent to the left knee. I&D was performed at the office according to the clinical notes. Impression: No acute osseous abnormality. Postsurgical changes of both knees. Healing left femoral fracture. Lateral soft tissue swelling of the left knee. Mild degenerative changes Workstation performed: [id number]     VAS lower limb venous duplex study, complete bilateral    Result Date: 8/10/2023  Narrative:  [facility] REPORT CLINICAL: Indications: Patient presents with bilateral lower extremity edema x several days. Operative History: 2020-08-21 IVC Filter Removal 2019-12-07 IVC Filter Placement; temporary Patient denies any cardiac surgeries Risk Factors The patient has history of HTN and Hyperlipidemia. CONCLUSION: Impression: RIGHT LOWER LIMB: No evidence of acute or chronic deep vein thrombosis. No evidence of superficial thrombophlebitis noted. Doppler evaluation shows a normal response to augmentation maneuvers. . Popliteal, posterior tibial and anterior tibial arterial Doppler waveform's are triphasic. LEFT LOWER LIMB: No evidence of acute or chronic deep vein thrombosis. No evidence of superficial thrombophlebitis noted. Doppler evaluation shows a normal response to augmentation maneuvers. Popliteal, posterior tibial and anterior tibial arterial Doppler waveform's are triphasic.   SIGNATURE: Electronically Signed by: Lucía Meraz DO on 2023-08-10 08:08:10 PM      Total time for visit, including counseling/coordination of care: 45 minutes. Greater than 50% of this total time spent on direct patient counseling and coorination of care. Epic Records Reviewed as well as Records in Care Everywhere    ** Please Note: Dragon 360 Dictation voice to text software was used in the creation of this document.  **

## 2023-09-08 NOTE — ASSESSMENT & PLAN NOTE
Lab Results   Component Value Date    EGFR 48 09/08/2023    EGFR 47 08/07/2023    EGFR 61 05/05/2023    CREATININE 1.34 (H) 09/08/2023    CREATININE 1.36 (H) 08/07/2023    CREATININE 1.11 05/05/2023     · Reviewed Renal function data over the past 3-4 years. Values between 0.9 to as high as 1.9.  Patient's creatinine today 1.34 and it was 1.36 one month ago  · Monitor with daily BMP and consider nephrology consultation in renal function worsening during hospitalization

## 2023-09-08 NOTE — ASSESSMENT & PLAN NOTE
· Worsening L knee pain, swelling and erythema since this past Sunday  · Previously underwent insertion of nail to the Left femur for intertrochanteric fracture about 15 months ago   · Saw Ortho outpatient two days ago and concern for abscess in the L knee; Underwent local I&D and wound was packed; Wound culture grew Staph Aureus; Patient was additionally given Oral abx to take bid  · Reports that Knee continued to appear erythematous and swollen and saw Ortho outpatient and sent to the ER. In the ER, patient was seen by Orthopedics team in consultation and plan for IV abx, npo after midnight and possible need for OR in the AM to I&D and removal of hardware L distal thigh   · WBC 12K afebrile; Knee appears swollen and erythematous on exam   · Admit to medicine. NPO after midnight. IV vancomycin. F/u blood cultures drawn in ER.  Ortho following with likely OR procedure in AM. Consider ID consultation

## 2023-09-08 NOTE — PROGRESS NOTES
Assessment/Plan:  1. Abscess of left lower leg        2. Presence of orthopedic implant of femur          Scribe Attestation    I,:  Abilio Dior am acting as a scribe while in the presence of the attending physician.:       I,:  Tay Hinojosa, DO personally performed the services described in this documentation    as scribed in my presence.:         Mariposa Graham is a very pleasant 70-year-old male who is here today for a wound check after an incision and drainage of an abscess on his left leg. Upon evaluation of the abscess, I believe he should have made more progress status post bedside I&D and oral antibiotic regiment over the last 2 days. His purulence is more pronounced despite oral antibiotic use and its decompression Wednesday therefore I have referred the patient to proceed to the emergency department for admission to the hospital to the medical service for IV antibiotics and formal I&D in the OR tomorrow morning. The patient and the patient's family were aware of this. If the localized abscess site appears to track down to the distal locking screw of his long femoral nail we will remove the screw during the procedure. At this point the infection does not appear to have an intra-articular component he will be admitted to the hospital to the medical service for optimization in preparation for tomorrow's procedure. Subjective: Wound check of abscess on left leg    Patient ID: Slade Oseguera is a 80 y.o. male who is here for a wound check after an incision and drainage of an abscess on his left leg on 9/6/2023. He he presents to today's visit in a wheelchair. His wife and daughter are here in the office with him today. A wound culture was sent out to the lab and came back positive for Staphylococcus Aureus. He was prescribed Bactrim at his last visit. Today, he reports the left leg feels much better than it did on Wednesday.  He denies experiencing any fever, chills, sweats, or gastrointestinal upset; however, he reports he has not had a bowel movement since Tuesday. He has been taking his antibiotic as prescribed. Review of Systems   Constitutional: Positive for activity change. Negative for chills and fever. HENT: Negative for ear pain and sore throat. Eyes: Negative for pain and visual disturbance. Respiratory: Negative for cough and shortness of breath. Cardiovascular: Negative for chest pain and palpitations. Gastrointestinal: Negative for abdominal pain and vomiting. Genitourinary: Negative for dysuria and hematuria. Musculoskeletal: Positive for gait problem. Negative for arthralgias and back pain. Skin: Positive for wound (left leg). Negative for color change and rash. Neurological: Negative for seizures and syncope. All other systems reviewed and are negative. Past Medical History:   Diagnosis Date   • BPH (benign prostatic hyperplasia)    • Depression    • HTN (hypertension)    • Hyperlipidemia    • OCD (obsessive compulsive disorder)    • Pulmonary embolism ([address]) 2019       Past Surgical History:   Procedure Laterality Date   • IR IVC FILTER PLACEMENT OPTIONAL/TEMPORARY  12/7/2019   • IR IVC FILTER REMOVAL  8/21/2020   • [place] OPTX FEM SHFT FX W/INSJ IMED IMPLT W/WO SCREW Right 12/4/2019    Procedure: INSERTION NAIL IM FEMUR ANTEGRADE (TROCHANTERIC);   Surgeon: Milagro Ha DO;  Location: [facility];  Service: Orthopedics   • [place] OPTX FEM SHFT FX W/INSJ IMED IMPLT W/WO SCREW Left 6/17/2022    Procedure: INSERTION NAIL IM FEMUR ANTEGRADE (TROCHANTERIC) - long nail;  Surgeon: Duran Mccormack DO;  Location: [facility];  Service: Orthopedics       Family History   Problem Relation Age of Onset   • Cancer Mother        Social History     Occupational History   • Not on file   Tobacco Use   • Smoking status: Never   • Smokeless tobacco: Never   Vaping Use   • Vaping Use: Never used   Substance and Sexual Activity   • Alcohol use: Never   • Drug use: Never   • Sexual activity: Not on file Current Outpatient Medications:   •  albuterol (Ventolin HFA) 90 mcg/act inhaler, Inhale 2 puffs every 6 (six) hours as needed for wheezing, Disp: 18 g, Rfl: 0  •  atorvastatin (LIPITOR) 10 mg tablet, TAKE ONE-HALF TABLET DAILY, Disp: 45 tablet, Rfl: 1  •  Calcium Carb-Cholecalciferol (CALTRATE 600+D3 PO), Take 600 mg by mouth 2 (two) times a day, Disp: , Rfl:   •  citalopram (CeleXA) 40 mg tablet, TAKE ONE TABLET ONCE DAILY BY MOUTH, Disp: 90 tablet, Rfl: 1  •  fluticasone (FLONASE) 50 mcg/act nasal spray, 1 spray into each nostril daily, Disp: 15 mL, Rfl: 0  •  fosinopril (MONOPRIL) 10 mg tablet, TAKE ONE-HALF TABLET BY MOUTH DAILY, Disp: 45 tablet, Rfl: 1  •  ipratropium (ATROVENT) 0.03 % nasal spray, 2 sprays into each nostril 2 (two) times a day as needed for rhinitis, Disp: 30 mL, Rfl: 2  •  magnesium oxide (MAG-OX) 400 mg tablet, Take 1 tablet by mouth daily, Disp: , Rfl:   •  metoprolol tartrate (LOPRESSOR) 50 mg tablet, TAKE ONE TABLET EVERY 12 HOURS, Disp: 180 tablet, Rfl: 1  •  Multiple Vitamin (MULTIVITAMIN) tablet, Take 1 tablet by mouth daily, Disp: , Rfl:   •  niacin (NIASPAN) 500 mg CR tablet, TAKE ONE TABLET DAILY AT BEDTIME, Disp: 90 tablet, Rfl: 1  •  pantoprazole (PROTONIX) 40 mg tablet, TAKE ONE TABLET DAILY, Disp: 30 tablet, Rfl: 2  •  potassium chloride (Klor-Con) 10 mEq tablet, Take 1 tablet (10 mEq total) by mouth in the morning, Disp: 30 tablet, Rfl: 1  •  sulfamethoxazole-trimethoprim (BACTRIM DS) 800-160 mg per tablet, Take 1 tablet by mouth every 12 (twelve) hours for 7 days, Disp: 14 tablet, Rfl: 0  •  tamsulosin (FLOMAX) 0.4 mg, Take 0.4 mg by mouth daily with dinner, Disp: , Rfl:   •  torsemide (DEMADEX) 5 MG tablet, Take 1 tablet (5 mg total) by mouth daily, Disp: 30 tablet, Rfl: 1    No Known Allergies    Objective:  Vitals:    09/08/23 1352   BP: 148/68   Pulse: 70       Body mass index is 28.94 kg/m².     Left Knee Exam     Tenderness   Left knee tenderness location: Lateral distal femur. Other   Erythema: present  Scars: present  Sensation: normal  Pulse: present  Swelling: mild  Effusion: no effusion present    Comments:  Site of bedside I&D with iodoform packing present. There is copious amounts of purulent drainage from the site. There is a foul odor. Area of erythema around I&D site with minimal improvement since Wednesday. No intra-articular effusion appreciated. Anterior skin abrasions without signs of infection          Observations   Left Knee   Negative for effusion. Physical Exam  Vitals and nursing note reviewed. Constitutional:       Appearance: Normal appearance. HENT:      Head: Normocephalic and atraumatic. Right Ear: External ear normal.      Left Ear: External ear normal.      Nose: Nose normal.   Eyes:      General: No scleral icterus. Extraocular Movements: Extraocular movements intact. Conjunctiva/sclera: Conjunctivae normal.   Cardiovascular:      Rate and Rhythm: Normal rate. Pulmonary:      Effort: Pulmonary effort is normal. No respiratory distress. Musculoskeletal:         General: Tenderness (left knee) present. Cervical back: Normal range of motion and neck supple. Left knee: No effusion. Right lower leg: Edema present. Left lower leg: Edema present. Comments: See ortho exam   Skin:     General: Skin is warm and dry. Findings: Lesion (left leg abscess) present. Neurological:      Mental Status: He is alert and oriented to person, place, and time. Psychiatric:         Mood and Affect: Mood normal.         Behavior: Behavior normal.       This document was created using speech voice recognition software. Grammatical errors, random word insertions, pronoun errors, and incomplete sentences are an occasional consequence of this system due to software limitations, ambient noise, and hardware issues.    Any formal questions or concerns about content, text, or information contained within the body of this dictation should be directly addressed to the provider for clarification.

## 2023-09-08 NOTE — ASSESSMENT & PLAN NOTE
· Hx Paroxysmal atrial flutter on pta Metoprolol. Not on pta anticoagulation   · Continue pta metoprolol.

## 2023-09-08 NOTE — ASSESSMENT & PLAN NOTE
· LVEF June 2022 with LVEF 60%. Mild-Moderate TR.  Grade 1 Diastolic Dysfunction  · Denies any sob currently  · On pta torsemide; Holding torsemide overnight with possible OR procedure in AM

## 2023-09-08 NOTE — ANESTHESIA PREPROCEDURE EVALUATION
Procedure:  INCISION AND DRAINAGE (I&D) EXTREMITY (Left: Leg)  REMOVAL HARDWARE (Left: Leg)    Relevant Problems   CARDIO   (+) Essential hypertension   (+) Hyperlipidemia   (+) Nonrheumatic aortic valve insufficiency   (+) Nonrheumatic mitral valve regurgitation   (+) Paroxysmal atrial flutter (HCC)   (+) SVT (supraventricular tachycardia) (HCC)      GI/HEPATIC   (+) Gastroesophageal reflux disease without esophagitis   (+) Hiatal hernia      /RENAL   (+) BPH (benign prostatic hyperplasia)   (+) Stage 3 chronic kidney disease, unspecified whether stage 3a or 3b CKD (HCC)      MUSCULOSKELETAL   (+) Hiatal hernia      NEURO/PSYCH   (+) OCD (obsessive compulsive disorder)   (+) Recurrent major depressive disorder, in full remission ([address])        Physical Exam    Airway    Mallampati score: II  TM Distance: >3 FB  Neck ROM: full     Dental   lower dentures,     Cardiovascular  Rhythm: regular, Rate: normal,     Pulmonary  Breath sounds clear to auscultation,     Other Findings        Anesthesia Plan  ASA Score- 3     Anesthesia Type- general with ASA Monitors. Additional Monitors:   Airway Plan: ETT. Plan Factors-    Chart reviewed. Patient is not a current smoker. Induction- intravenous. Postoperative Plan- Plan for postoperative opioid use. Informed Consent- Anesthetic plan and risks discussed with patient. I personally reviewed this patient with the CRNA. Discussed and agreed on the Anesthesia Plan with the CRNA. Coty Morgan

## 2023-09-08 NOTE — PLAN OF CARE
Problem: SAFETY ADULT  Goal: Maintain or return to baseline ADL function  Description: INTERVENTIONS:  -  Assess patient's ability to carry out ADLs; assess patient's baseline for ADL function and identify physical deficits which impact ability to perform ADLs (bathing, care of mouth/teeth, toileting, grooming, dressing, etc.)  - Assess/evaluate cause of self-care deficits   - Assess range of motion  - Assess patient's mobility; develop plan if impaired  - Assess patient's need for assistive devices and provide as appropriate  - Encourage maximum independence but intervene and supervise when necessary  - Involve family in performance of ADLs  - Assess for home care needs following discharge   - Consider OT consult to assist with ADL evaluation and planning for discharge  - Provide patient education as appropriate  9/8/2023 1739 by Armond Ayala RN  Outcome: Progressing  9/8/2023 1739 by Armond Ayala RN  Outcome: Progressing     Problem: SAFETY ADULT  Goal: Patient will remain free of falls  Description: INTERVENTIONS:  - Educate patient/family on patient safety including physical limitations  - Instruct patient to call for assistance with activity   - Consult OT/PT to assist with strengthening/mobility   - Keep Call bell within reach  - Keep bed low and locked with side rails adjusted as appropriate  - Keep care items and personal belongings within reach  - Initiate and maintain comfort rounds  - Make Fall Risk Sign visible to staff  - Offer Toileting every 2 Hours, in advance of need  - Initiate/Maintain bed/chair alarm  - Obtain necessary fall risk management equipment: fall risk sign  - Apply yellow socks and bracelet for high fall risk patients  - Consider moving patient to room near nurses station  9/8/2023 1739 by Armond Ayala RN  Outcome: Progressing  9/8/2023 1739 by Armond Ayala RN  Outcome: Progressing     Problem: PAIN - ADULT  Goal: Verbalizes/displays adequate comfort level or baseline comfort level  Description: Interventions:  - Encourage patient to monitor pain and request assistance  - Assess pain using appropriate pain scale  - Administer analgesics based on type and severity of pain and evaluate response  - Implement non-pharmacological measures as appropriate and evaluate response  - Consider cultural and social influences on pain and pain management  - Notify physician/advanced practitioner if interventions unsuccessful or patient reports new pain  9/8/2023 1739 by Octavio Garber RN  Outcome: Progressing  9/8/2023 1739 by Octavio Garber RN  Outcome: Progressing

## 2023-09-08 NOTE — ED PROVIDER NOTES
History  Chief Complaint   Patient presents with   • Wound Check     Has infected wound that is to be washed out tomorrow by ortho     45-year-old male with past history of hyperlipidemia, depression, OCD, hypertension, BPH, PE, history of hardware noted to the left distal femur, presents to the ED for evaluation of left knee cellulitis and abscess. Patient was seen by his orthopedic surgeon, Dr. Stephanie Feldman few days ago for left knee swelling and abscess. Abscess was drained and wound culture was sent. Patient is currently on p.o. antibiotic. Dr. Stephanie Feldman received culture report today that showed wound is growing Staph aureus. At this time Dr. Tanner Beard sent patient over for IV antibiotic as well as possible OR washout with removal of hardware in the morning. Patient has no other complaints. Patient denies any fevers or chills. History provided by:  Patient   used: No    Wound Check         Prior to Admission Medications   Prescriptions Last Dose Informant Patient Reported? Taking?    Calcium Carb-Cholecalciferol (CALTRATE 600+D3 PO)  Self Yes No   Sig: Take 600 mg by mouth 2 (two) times a day   Multiple Vitamin (MULTIVITAMIN) tablet  Self Yes No   Sig: Take 1 tablet by mouth daily   albuterol (Ventolin HFA) 90 mcg/act inhaler  Self No No   Sig: Inhale 2 puffs every 6 (six) hours as needed for wheezing   atorvastatin (LIPITOR) 10 mg tablet  Self No No   Sig: TAKE ONE-HALF TABLET DAILY   citalopram (CeleXA) 40 mg tablet  Self No No   Sig: TAKE ONE TABLET ONCE DAILY BY MOUTH   fluticasone (FLONASE) 50 mcg/act nasal spray  Self No No   Si spray into each nostril daily   fosinopril (MONOPRIL) 10 mg tablet   No No   Sig: TAKE ONE-HALF TABLET BY MOUTH DAILY   ipratropium (ATROVENT) 0.03 % nasal spray  Self No No   Si sprays into each nostril 2 (two) times a day as needed for rhinitis   magnesium oxide (MAG-OX) 400 mg tablet  Self Yes No   Sig: Take 1 tablet by mouth daily   metoprolol tartrate (LOPRESSOR) 50 mg tablet   No No   Sig: TAKE ONE TABLET EVERY 12 HOURS   niacin (NIASPAN) 500 mg CR tablet   No No   Sig: TAKE ONE TABLET DAILY AT BEDTIME   pantoprazole (PROTONIX) 40 mg tablet   No No   Sig: TAKE ONE TABLET DAILY   potassium chloride (Klor-Con) 10 mEq tablet   No No   Sig: Take 1 tablet (10 mEq total) by mouth in the morning   sulfamethoxazole-trimethoprim (BACTRIM DS) 800-160 mg per tablet   No No   Sig: Take 1 tablet by mouth every 12 (twelve) hours for 7 days   tamsulosin (FLOMAX) 0.4 mg  Self Yes No   Sig: Take 0.4 mg by mouth daily with dinner   torsemide (DEMADEX) 5 MG tablet   No No   Sig: Take 1 tablet (5 mg total) by mouth daily      Facility-Administered Medications: None       Past Medical History:   Diagnosis Date   • BPH (benign prostatic hyperplasia)    • Depression    • HTN (hypertension)    • Hyperlipidemia    • OCD (obsessive compulsive disorder)    • Pulmonary embolism ([address]) 2019       Past Surgical History:   Procedure Laterality Date   • IR IVC FILTER PLACEMENT OPTIONAL/TEMPORARY  12/7/2019   • IR IVC FILTER REMOVAL  8/21/2020   • PA OPTX FEM SHFT FX W/INSJ IMED IMPLT W/WO SCREW Right 12/4/2019    Procedure: INSERTION NAIL IM FEMUR ANTEGRADE (TROCHANTERIC); Surgeon: Prashanth Lawrence DO;  Location: [facility];  Service: Orthopedics   • PA OPTX FEM SHFT FX W/INSJ IMED IMPLT W/WO SCREW Left 6/17/2022    Procedure: INSERTION NAIL IM FEMUR ANTEGRADE (TROCHANTERIC) - long nail;  Surgeon: Altagracia James DO;  Location: [facility];  Service: Orthopedics       Family History   Problem Relation Age of Onset   • Cancer Mother      I have reviewed and agree with the history as documented.     E-Cigarette/Vaping   • E-Cigarette Use Never User      E-Cigarette/Vaping Substances     Social History     Tobacco Use   • Smoking status: Never   • Smokeless tobacco: Never   Vaping Use   • Vaping Use: Never used   Substance Use Topics   • Alcohol use: Never   • Drug use: Never       Review of Systems   Constitutional: Negative for chills and fever. HENT: Negative for ear pain and sore throat. Eyes: Negative for pain and visual disturbance. Respiratory: Negative for cough and shortness of breath. Cardiovascular: Negative for chest pain and palpitations. Gastrointestinal: Negative for abdominal pain and vomiting. Genitourinary: Negative for dysuria and hematuria. Musculoskeletal: Negative for arthralgias and back pain. Skin: Positive for wound. Negative for color change and rash. Neurological: Negative for seizures and syncope. All other systems reviewed and are negative. Physical Exam  Physical Exam  Vitals and nursing note reviewed. Constitutional:       General: He is not in acute distress. Appearance: He is well-developed. HENT:      Head: Normocephalic and atraumatic. Eyes:      Conjunctiva/sclera: Conjunctivae normal.   Cardiovascular:      Rate and Rhythm: Normal rate and regular rhythm. Heart sounds: No murmur heard. Pulmonary:      Effort: Pulmonary effort is normal. No respiratory distress. Breath sounds: Normal breath sounds. Abdominal:      Palpations: Abdomen is soft. Tenderness: There is no abdominal tenderness. Musculoskeletal:         General: No swelling. Cervical back: Neck supple. Comments: Erythema and swelling noted to left knee. Some superficial abrasion noted to the anterior aspect of left knee. Abscess site noted to the lateral aspect of left knee. Please see picture for clarification. Skin:     General: Skin is warm and dry. Capillary Refill: Capillary refill takes less than 2 seconds. Neurological:      Mental Status: He is alert.    Psychiatric:         Mood and Affect: Mood normal.                   Vital Signs  ED Triage Vitals [09/08/23 1501]   Temperature Pulse Respirations Blood Pressure SpO2   98.4 °F (36.9 °C) 88 20 161/74 94 %      Temp Source Heart Rate Source Patient Position - Orthostatic VS BP Location FiO2 (%)   Oral Monitor Sitting Left arm --      Pain Score       4           Vitals:    09/08/23 1501   BP: 161/74   Pulse: 88   Patient Position - Orthostatic VS: Sitting         Visual Acuity      ED Medications  Medications   sodium chloride 0.9 % infusion (150 mL/hr Intravenous New Bag 9/8/23 1531)   vancomycin (VANCOCIN) 2,000 mg in sodium chloride 0.9 % 500 mL IVPB (2,000 mg Intravenous New Bag 9/8/23 1551)       Diagnostic Studies  Results Reviewed     Procedure Component Value Units Date/Time    Comprehensive metabolic panel [[id number]]  (Abnormal) Collected: 09/08/23 1524    Lab Status: Final result Specimen: Blood from Arm, Left Updated: 09/08/23 1549     Sodium 133 mmol/L      Potassium 4.5 mmol/L      Chloride 100 mmol/L      CO2 27 mmol/L      ANION GAP 6 mmol/L      BUN 25 mg/dL      Creatinine 1.34 mg/dL      Glucose 131 mg/dL      Calcium 9.3 mg/dL      AST 18 U/L      ALT 13 U/L      Alkaline Phosphatase 70 U/L      Total Protein 6.5 g/dL      Albumin 3.5 g/dL      Total Bilirubin 0.39 mg/dL      eGFR 48 ml/min/1.73sq m     Narrative:      [place] guidelines for Chronic Kidney Disease (CKD):   •  Stage 1 with normal or high GFR (GFR > 90 mL/min/1.73 square meters)  •  Stage 2 Mild CKD (GFR = 60-89 mL/min/1.73 square meters)  •  Stage 3A Moderate CKD (GFR = 45-59 mL/min/1.73 square meters)  •  Stage 3B Moderate CKD (GFR = 30-44 mL/min/1.73 square meters)  •  Stage 4 Severe CKD (GFR = 15-29 mL/min/1.73 square meters)  •  Stage 5 End Stage CKD (GFR <15 mL/min/1.73 square meters)  Note: GFR calculation is accurate only with a steady state creatinine    Magnesium [[id number]]  (Abnormal) Collected: 09/08/23 1524    Lab Status: Final result Specimen: Blood from Arm, Left Updated: 09/08/23 1549     Magnesium 1.6 mg/dL     Blood culture #2 [[id number]] Collected: 09/08/23 1544    Lab Status:  In process Specimen: Blood from Arm, Right Updated: 09/08/23 1548    Blood culture #1 [[id number]] Collected: 09/08/23 1544    Lab Status: In process Specimen: Blood from Arm, Left Updated: 09/08/23 1547    Wound culture and Gram stain [[id number]]     Lab Status: No result Specimen: Wound     CBC and differential [[id number]]  (Abnormal) Collected: 09/08/23 1524    Lab Status: Final result Specimen: Blood from Arm, Left Updated: 09/08/23 1529     WBC 12.03 Thousand/uL      RBC 4.22 Million/uL      Hemoglobin 13.5 g/dL      Hematocrit 40.3 %      MCV 96 fL      MCH 32.0 pg      MCHC 33.5 g/dL      RDW 13.8 %      MPV 9.2 fL      Platelets 870 Thousands/uL      nRBC 0 /100 WBCs      Neutrophils Relative 71 %      Immat GRANS % 1 %      Lymphocytes Relative 13 %      Monocytes Relative 11 %      Eosinophils Relative 3 %      Basophils Relative 1 %      Neutrophils Absolute 8.58 Thousands/µL      Immature Grans Absolute 0.13 Thousand/uL      Lymphocytes Absolute 1.52 Thousands/µL      Monocytes Absolute 1.34 Thousand/µL      Eosinophils Absolute 0.39 Thousand/µL      Basophils Absolute 0.07 Thousands/µL     UA w Reflex to Microscopic w Reflex to Culture [[id number]]     Lab Status: No result Specimen: Urine                  No orders to display              Procedures  Procedures         ED Course  ED Course as of 09/08/23 1604   Fri Sep 08, 2023   1445 Spoke with patient's orthopedic surgeon, Dr. Aysha Cramer prior to patient's arrival to the ED. Dr. Aysha Cramer saw patient and drained abscess 2 days ago that grew staph. Area is not getting better with p.o. antibiotic. Patient has history of hardware to the distal femur of left lower extremity. At this time Dr. Marcelina Mary would like patient admitted for IV antibiotic and n.p.o. after midnight for possible OR washout and removal of hardware in the morning. 1520 Ortho AP at bedside evaluating patient.                                              Medical Decision Making  Obtain blood work, wound culture, blood culture  Start IV antibiotic and plan for admission    Patient presented with worsening left knee cellulitis and abscess. Patient was seen by Ortho team in the emergency department. At this time IV antibiotic started. Patient is admitted for OR washout and hardware removal in the morning. Patient and family agrees with admission plans. Amount and/or Complexity of Data Reviewed  Labs: ordered. Decision-making details documented in ED Course. Risk  Prescription drug management. Decision regarding hospitalization. Disposition  Final diagnoses:   Abscess of left knee   Cellulitis of left knee     Time reflects when diagnosis was documented in both MDM as applicable and the Disposition within this note     Time User Action Codes Description Comment    9/8/2023  3:28 PM Germaine Banegas Add [L02.416] Abscess of left leg     9/8/2023  3:40 PM Yadira Flower Add [L02.416] Abscess of left knee     9/8/2023  3:40 PM Yadira Flower Add [P17.887] Cellulitis of left knee       ED Disposition     ED Disposition   Admit    Condition   Stable    Date/Time   Fri Sep 8, 2023  3:41 PM    Comment   Case was discussed with Dr. Jorge Jaimes and the patient's admission status was agreed to be Admission Status: inpatient status to the service of Dr. Jorge Jaimes. Follow-up Information    None         Patient's Medications   Discharge Prescriptions    No medications on file       No discharge procedures on file.     PDMP Review     None          ED Provider  Electronically Signed by           Yadira Flower DO  09/08/23 5992

## 2023-09-09 ENCOUNTER — APPOINTMENT (INPATIENT)
Dept: RADIOLOGY | Facility: HOSPITAL | Age: 83
DRG: 857 | End: 2023-09-09
Payer: COMMERCIAL

## 2023-09-09 ENCOUNTER — ANESTHESIA (INPATIENT)
Dept: PERIOP | Facility: HOSPITAL | Age: 83
DRG: 603 | End: 2023-09-09
Payer: COMMERCIAL

## 2023-09-09 LAB
ALBUMIN SERPL BCP-MCNC: 3.2 G/DL (ref 3.5–5)
ALP SERPL-CCNC: 57 U/L (ref 34–104)
ALT SERPL W P-5'-P-CCNC: 11 U/L (ref 7–52)
ANION GAP SERPL CALCULATED.3IONS-SCNC: 5 MMOL/L
APTT PPP: 32 SECONDS (ref 23–37)
AST SERPL W P-5'-P-CCNC: 15 U/L (ref 13–39)
BASOPHILS # BLD AUTO: 0.07 THOUSANDS/ÂΜL (ref 0–0.1)
BASOPHILS NFR BLD AUTO: 1 % (ref 0–1)
BILIRUB SERPL-MCNC: 0.58 MG/DL (ref 0.2–1)
BUN SERPL-MCNC: 18 MG/DL (ref 5–25)
CALCIUM ALBUM COR SERPL-MCNC: 9.5 MG/DL (ref 8.3–10.1)
CALCIUM SERPL-MCNC: 8.9 MG/DL (ref 8.4–10.2)
CHLORIDE SERPL-SCNC: 104 MMOL/L (ref 96–108)
CO2 SERPL-SCNC: 25 MMOL/L (ref 21–32)
CREAT SERPL-MCNC: 1.08 MG/DL (ref 0.6–1.3)
EOSINOPHIL # BLD AUTO: 0.62 THOUSAND/ÂΜL (ref 0–0.61)
EOSINOPHIL NFR BLD AUTO: 5 % (ref 0–6)
ERYTHROCYTE [DISTWIDTH] IN BLOOD BY AUTOMATED COUNT: 13.7 % (ref 11.6–15.1)
GFR SERPL CREATININE-BSD FRML MDRD: 63 ML/MIN/1.73SQ M
GLUCOSE SERPL-MCNC: 104 MG/DL (ref 65–140)
HCT VFR BLD AUTO: 36 % (ref 36.5–49.3)
HGB BLD-MCNC: 12 G/DL (ref 12–17)
IMM GRANULOCYTES # BLD AUTO: 0.15 THOUSAND/UL (ref 0–0.2)
IMM GRANULOCYTES NFR BLD AUTO: 1 % (ref 0–2)
INR PPP: 1.09 (ref 0.84–1.19)
LYMPHOCYTES # BLD AUTO: 1.57 THOUSANDS/ÂΜL (ref 0.6–4.47)
LYMPHOCYTES NFR BLD AUTO: 13 % (ref 14–44)
MAGNESIUM SERPL-MCNC: 1.6 MG/DL (ref 1.9–2.7)
MCH RBC QN AUTO: 31.6 PG (ref 26.8–34.3)
MCHC RBC AUTO-ENTMCNC: 33.3 G/DL (ref 31.4–37.4)
MCV RBC AUTO: 95 FL (ref 82–98)
MONOCYTES # BLD AUTO: 1.14 THOUSAND/ÂΜL (ref 0.17–1.22)
MONOCYTES NFR BLD AUTO: 9 % (ref 4–12)
NEUTROPHILS # BLD AUTO: 8.72 THOUSANDS/ÂΜL (ref 1.85–7.62)
NEUTS SEG NFR BLD AUTO: 71 % (ref 43–75)
NRBC BLD AUTO-RTO: 0 /100 WBCS
PLATELET # BLD AUTO: 236 THOUSANDS/UL (ref 149–390)
PMV BLD AUTO: 9.4 FL (ref 8.9–12.7)
POTASSIUM SERPL-SCNC: 4.5 MMOL/L (ref 3.5–5.3)
PROT SERPL-MCNC: 5.7 G/DL (ref 6.4–8.4)
PROTHROMBIN TIME: 14.3 SECONDS (ref 11.6–14.5)
RBC # BLD AUTO: 3.8 MILLION/UL (ref 3.88–5.62)
SODIUM SERPL-SCNC: 134 MMOL/L (ref 135–147)
WBC # BLD AUTO: 12.27 THOUSAND/UL (ref 4.31–10.16)

## 2023-09-09 PROCEDURE — 11043 DBRDMT MUSC&/FSCA 1ST 20/<: CPT | Performed by: ORTHOPAEDIC SURGERY

## 2023-09-09 PROCEDURE — 99024 POSTOP FOLLOW-UP VISIT: CPT | Performed by: ORTHOPAEDIC SURGERY

## 2023-09-09 PROCEDURE — 87147 CULTURE TYPE IMMUNOLOGIC: CPT | Performed by: ORTHOPAEDIC SURGERY

## 2023-09-09 PROCEDURE — 87077 CULTURE AEROBIC IDENTIFY: CPT | Performed by: ORTHOPAEDIC SURGERY

## 2023-09-09 PROCEDURE — 87186 SC STD MICRODIL/AGAR DIL: CPT | Performed by: ORTHOPAEDIC SURGERY

## 2023-09-09 PROCEDURE — 85025 COMPLETE CBC W/AUTO DIFF WBC: CPT | Performed by: INTERNAL MEDICINE

## 2023-09-09 PROCEDURE — 87070 CULTURE OTHR SPECIMN AEROBIC: CPT | Performed by: ORTHOPAEDIC SURGERY

## 2023-09-09 PROCEDURE — 87205 SMEAR GRAM STAIN: CPT | Performed by: ORTHOPAEDIC SURGERY

## 2023-09-09 PROCEDURE — 85610 PROTHROMBIN TIME: CPT | Performed by: INTERNAL MEDICINE

## 2023-09-09 PROCEDURE — 83735 ASSAY OF MAGNESIUM: CPT | Performed by: INTERNAL MEDICINE

## 2023-09-09 PROCEDURE — 85730 THROMBOPLASTIN TIME PARTIAL: CPT | Performed by: INTERNAL MEDICINE

## 2023-09-09 PROCEDURE — 0SPB0JZ REMOVAL OF SYNTHETIC SUBSTITUTE FROM LEFT HIP JOINT, OPEN APPROACH: ICD-10-PCS | Performed by: ORTHOPAEDIC SURGERY

## 2023-09-09 PROCEDURE — 87075 CULTR BACTERIA EXCEPT BLOOD: CPT | Performed by: ORTHOPAEDIC SURGERY

## 2023-09-09 PROCEDURE — 20680 REMOVAL OF IMPLANT DEEP: CPT | Performed by: ORTHOPAEDIC SURGERY

## 2023-09-09 PROCEDURE — 11043 DBRDMT MUSC&/FSCA 1ST 20/<: CPT | Performed by: PHYSICIAN ASSISTANT

## 2023-09-09 PROCEDURE — 99233 SBSQ HOSP IP/OBS HIGH 50: CPT | Performed by: FAMILY MEDICINE

## 2023-09-09 PROCEDURE — 0J9P3ZZ DRAINAGE OF LEFT LOWER LEG SUBCUTANEOUS TISSUE AND FASCIA, PERCUTANEOUS APPROACH: ICD-10-PCS | Performed by: ORTHOPAEDIC SURGERY

## 2023-09-09 PROCEDURE — 20680 REMOVAL OF IMPLANT DEEP: CPT | Performed by: PHYSICIAN ASSISTANT

## 2023-09-09 PROCEDURE — 80053 COMPREHEN METABOLIC PANEL: CPT | Performed by: INTERNAL MEDICINE

## 2023-09-09 PROCEDURE — 73552 X-RAY EXAM OF FEMUR 2/>: CPT

## 2023-09-09 RX ORDER — POLYETHYLENE GLYCOL 3350 17 G/17G
17 POWDER, FOR SOLUTION ORAL DAILY
Status: DISCONTINUED | OUTPATIENT
Start: 2023-09-09 | End: 2023-09-10

## 2023-09-09 RX ORDER — LIDOCAINE HYDROCHLORIDE 10 MG/ML
INJECTION, SOLUTION EPIDURAL; INFILTRATION; INTRACAUDAL; PERINEURAL AS NEEDED
Status: DISCONTINUED | OUTPATIENT
Start: 2023-09-09 | End: 2023-09-09

## 2023-09-09 RX ORDER — SODIUM CHLORIDE, SODIUM LACTATE, POTASSIUM CHLORIDE, CALCIUM CHLORIDE 600; 310; 30; 20 MG/100ML; MG/100ML; MG/100ML; MG/100ML
INJECTION, SOLUTION INTRAVENOUS CONTINUOUS PRN
Status: DISCONTINUED | OUTPATIENT
Start: 2023-09-09 | End: 2023-09-09

## 2023-09-09 RX ORDER — FENTANYL CITRATE/PF 50 MCG/ML
50 SYRINGE (ML) INJECTION
Status: DISCONTINUED | OUTPATIENT
Start: 2023-09-09 | End: 2023-09-09 | Stop reason: HOSPADM

## 2023-09-09 RX ORDER — ROCURONIUM BROMIDE 10 MG/ML
INJECTION, SOLUTION INTRAVENOUS AS NEEDED
Status: DISCONTINUED | OUTPATIENT
Start: 2023-09-09 | End: 2023-09-09

## 2023-09-09 RX ORDER — CEFAZOLIN SODIUM 2 G/50ML
2000 SOLUTION INTRAVENOUS EVERY 8 HOURS
Status: DISCONTINUED | OUTPATIENT
Start: 2023-09-09 | End: 2023-09-12 | Stop reason: HOSPADM

## 2023-09-09 RX ORDER — BUPIVACAINE HYDROCHLORIDE 5 MG/ML
INJECTION, SOLUTION EPIDURAL; INTRACAUDAL AS NEEDED
Status: DISCONTINUED | OUTPATIENT
Start: 2023-09-09 | End: 2023-09-09 | Stop reason: HOSPADM

## 2023-09-09 RX ORDER — FENTANYL CITRATE 50 UG/ML
INJECTION, SOLUTION INTRAMUSCULAR; INTRAVENOUS AS NEEDED
Status: DISCONTINUED | OUTPATIENT
Start: 2023-09-09 | End: 2023-09-09

## 2023-09-09 RX ORDER — ONDANSETRON 2 MG/ML
INJECTION INTRAMUSCULAR; INTRAVENOUS AS NEEDED
Status: DISCONTINUED | OUTPATIENT
Start: 2023-09-09 | End: 2023-09-09

## 2023-09-09 RX ORDER — ENOXAPARIN SODIUM 100 MG/ML
40 INJECTION SUBCUTANEOUS
Status: DISCONTINUED | OUTPATIENT
Start: 2023-09-09 | End: 2023-09-12 | Stop reason: HOSPADM

## 2023-09-09 RX ORDER — PROPOFOL 10 MG/ML
INJECTION, EMULSION INTRAVENOUS AS NEEDED
Status: DISCONTINUED | OUTPATIENT
Start: 2023-09-09 | End: 2023-09-09

## 2023-09-09 RX ORDER — SODIUM CHLORIDE 9 MG/ML
INJECTION, SOLUTION INTRAVENOUS AS NEEDED
Status: DISCONTINUED | OUTPATIENT
Start: 2023-09-09 | End: 2023-09-09 | Stop reason: HOSPADM

## 2023-09-09 RX ORDER — HYDROMORPHONE HCL/PF 1 MG/ML
0.2 SYRINGE (ML) INJECTION
Status: DISCONTINUED | OUTPATIENT
Start: 2023-09-09 | End: 2023-09-09 | Stop reason: HOSPADM

## 2023-09-09 RX ORDER — ONDANSETRON 2 MG/ML
4 INJECTION INTRAMUSCULAR; INTRAVENOUS ONCE AS NEEDED
Status: DISCONTINUED | OUTPATIENT
Start: 2023-09-09 | End: 2023-09-09 | Stop reason: HOSPADM

## 2023-09-09 RX ORDER — DEXAMETHASONE SODIUM PHOSPHATE 4 MG/ML
INJECTION, SOLUTION INTRA-ARTICULAR; INTRALESIONAL; INTRAMUSCULAR; INTRAVENOUS; SOFT TISSUE AS NEEDED
Status: DISCONTINUED | OUTPATIENT
Start: 2023-09-09 | End: 2023-09-09

## 2023-09-09 RX ORDER — MAGNESIUM SULFATE HEPTAHYDRATE 40 MG/ML
2 INJECTION, SOLUTION INTRAVENOUS ONCE
Status: COMPLETED | OUTPATIENT
Start: 2023-09-09 | End: 2023-09-09

## 2023-09-09 RX ADMIN — FLUTICASONE PROPIONATE 1 SPRAY: 50 SPRAY, METERED NASAL at 10:53

## 2023-09-09 RX ADMIN — LIDOCAINE HYDROCHLORIDE 50 MG: 10 INJECTION, SOLUTION EPIDURAL; INFILTRATION; INTRACAUDAL; PERINEURAL at 08:07

## 2023-09-09 RX ADMIN — CEFAZOLIN SODIUM 2000 MG: 2 SOLUTION INTRAVENOUS at 20:22

## 2023-09-09 RX ADMIN — METOPROLOL TARTRATE 50 MG: 50 TABLET, FILM COATED ORAL at 20:22

## 2023-09-09 RX ADMIN — POLYETHYLENE GLYCOL 3350 17 G: 17 POWDER, FOR SOLUTION ORAL at 20:22

## 2023-09-09 RX ADMIN — SODIUM CHLORIDE 50 ML/HR: 0.9 INJECTION, SOLUTION INTRAVENOUS at 10:27

## 2023-09-09 RX ADMIN — SODIUM CHLORIDE, SODIUM LACTATE, POTASSIUM CHLORIDE, AND CALCIUM CHLORIDE: .6; .31; .03; .02 INJECTION, SOLUTION INTRAVENOUS at 07:58

## 2023-09-09 RX ADMIN — FENTANYL CITRATE 25 MCG: 50 INJECTION, SOLUTION INTRAMUSCULAR; INTRAVENOUS at 08:29

## 2023-09-09 RX ADMIN — SUGAMMADEX 180 MG: 100 INJECTION, SOLUTION INTRAVENOUS at 09:08

## 2023-09-09 RX ADMIN — MAGNESIUM SULFATE HEPTAHYDRATE 2 G: 40 INJECTION, SOLUTION INTRAVENOUS at 10:27

## 2023-09-09 RX ADMIN — NIACIN 500 MG: 500 TABLET, EXTENDED RELEASE ORAL at 22:27

## 2023-09-09 RX ADMIN — FENTANYL CITRATE 25 MCG: 50 INJECTION, SOLUTION INTRAMUSCULAR; INTRAVENOUS at 08:06

## 2023-09-09 RX ADMIN — ATORVASTATIN CALCIUM 5 MG: 10 TABLET, FILM COATED ORAL at 15:40

## 2023-09-09 RX ADMIN — PANTOPRAZOLE SODIUM 40 MG: 40 TABLET, DELAYED RELEASE ORAL at 10:50

## 2023-09-09 RX ADMIN — ACETAMINOPHEN 650 MG: 325 TABLET ORAL at 13:35

## 2023-09-09 RX ADMIN — METOPROLOL TARTRATE 50 MG: 50 TABLET, FILM COATED ORAL at 10:47

## 2023-09-09 RX ADMIN — TAMSULOSIN HYDROCHLORIDE 0.4 MG: 0.4 CAPSULE ORAL at 15:40

## 2023-09-09 RX ADMIN — CITALOPRAM HYDROBROMIDE 40 MG: 20 TABLET ORAL at 10:46

## 2023-09-09 RX ADMIN — ONDANSETRON 4 MG: 2 INJECTION INTRAMUSCULAR; INTRAVENOUS at 08:20

## 2023-09-09 RX ADMIN — ROCURONIUM BROMIDE 50 MG: 10 INJECTION, SOLUTION INTRAVENOUS at 08:07

## 2023-09-09 RX ADMIN — Medication 400 MG: at 10:49

## 2023-09-09 RX ADMIN — ENOXAPARIN SODIUM 40 MG: 40 INJECTION SUBCUTANEOUS at 20:22

## 2023-09-09 RX ADMIN — DEXAMETHASONE SODIUM PHOSPHATE 10 MG: 4 INJECTION, SOLUTION INTRA-ARTICULAR; INTRALESIONAL; INTRAMUSCULAR; INTRAVENOUS; SOFT TISSUE at 08:20

## 2023-09-09 RX ADMIN — FENTANYL CITRATE 50 MCG: 50 INJECTION, SOLUTION INTRAMUSCULAR; INTRAVENOUS at 08:33

## 2023-09-09 RX ADMIN — CEFAZOLIN SODIUM 2000 MG: 2 SOLUTION INTRAVENOUS at 13:30

## 2023-09-09 RX ADMIN — PROPOFOL 150 MG: 10 INJECTION, EMULSION INTRAVENOUS at 08:07

## 2023-09-09 NOTE — ASSESSMENT & PLAN NOTE
Worsening L knee pain, swelling and erythema since this past Sunday  · Previously underwent insertion of nail to the Left femur for intertrochanteric fracture about 15 months ago   · Saw Ortho outpatient 9/6 and concern for abscess in the L knee; Underwent local I&D and wound was packed; Wound culture grew Staph Aureus; Patient was additionally given Oral abx to take bid  · Reports that Knee continued to appear erythematous and swollen and saw Ortho outpatient and sent to the ER. · Wound culture with MSSA. Discontinue vancomycin and place patient on high-dose IV cefazolin  · Patient underwent I&D of left lower extremity and removal of hardware of left distal femur today. Left distal femur abscess site with purulent fluid and infected appearing tissue was noted which was debrided. Screw was also sent for culture  · Still with mild leukocytosis on lab work  · Repeat labs in a.m.

## 2023-09-09 NOTE — PROGRESS NOTES
[id number] [facility]  Progress Note  Name: Kimmy Hansen  MRN: [id number]  Unit/Bed#: OR POOL I Date of Admission: 9/8/2023   Date of Service: 9/9/2023 I Hospital Day: 1    Assessment/Plan   * Abscess of left leg  Assessment & Plan  Worsening L knee pain, swelling and erythema since this past Sunday  · Previously underwent insertion of nail to the Left femur for intertrochanteric fracture about 15 months ago   · Saw Ortho outpatient 9/6 and concern for abscess in the L knee; Underwent local I&D and wound was packed; Wound culture grew Staph Aureus; Patient was additionally given Oral abx to take bid  · Reports that Knee continued to appear erythematous and swollen and saw Ortho outpatient and sent to the ER. · Wound culture with MSSA. Discontinue vancomycin and place patient on high-dose IV cefazolin  · Patient underwent I&D of left lower extremity and removal of hardware of left distal femur today. Left distal femur abscess site with purulent fluid and infected appearing tissue was noted which was debrided. Screw was also sent for culture  · Still with mild leukocytosis on lab work  · Repeat labs in a.m. Stage 3 chronic kidney disease, unspecified whether stage 3a or 3b CKD [facility])  Assessment & Plan  Lab Results   Component Value Date    EGFR 63 09/09/2023    EGFR 48 09/08/2023    EGFR 47 08/07/2023    CREATININE 1.08 09/09/2023    CREATININE 1.34 (H) 09/08/2023    CREATININE 1.36 (H) 08/07/2023     · Creatinine over the past 3-4 years - 0.9 to as high as 1.9. Patient's creatinine on admission 1.34 and it was 1.36 one month ago  · Creatinine at baseline  · Repeat lab work in a.m. Pulmonary artery hypertension [facility])  Assessment & Plan  LVEF June 2022 with LVEF 60%. Mild-Moderate TR.  Grade 1 Diastolic Dysfunction  · Denies any sob currently  · Holding Demadex but can restart in a.m. if labs within normal limits    Paroxysmal atrial flutter (HCC)  Assessment & Plan  · Hx Paroxysmal atrial flutter, Not on pta anticoagulation   · Continue home Lopressor         VTE Pharmacologic Prophylaxis: VTE Score: 5 High Risk (Score >/= 5) - Pharmacological DVT Prophylaxis Ordered: enoxaparin (Lovenox). Sequential Compression Devices Ordered. Patient Centered Rounds: I performed bedside rounds with nursing staff today. Discussions with Specialists or Other Care Team Provider: yes     Education and Discussions with Family / Patient: Updated  (wife) at bedside. Total Time Spent on Date of Encounter in care of patient: 45 minutes This time was spent on one or more of the following: performing physical exam; counseling and coordination of care; obtaining or reviewing history; documenting in the medical record; reviewing/ordering tests, medications or procedures; communicating with other healthcare professionals and discussing with patient's family/caregivers. Current Length of Stay: 1 day(s)  Current Patient Status: Inpatient   Certification Statement: The patient will continue to require additional inpatient hospital stay due to Left leg abscess requiring IV antibiotics and awaiting cultures  Discharge Plan: Anticipate discharge in 48-72 hrs to home. Code Status: Level 1 - Full Code    Subjective:     Patient denies any significant pain. Denies any shortness of breath    Objective:     Vitals:   Temp (24hrs), Av.1 °F (36.7 °C), Min:97.8 °F (36.6 °C), Max:98.4 °F (36.9 °C)    Temp:  [97.8 °F (36.6 °C)-98.4 °F (36.9 °C)] 97.9 °F (36.6 °C)  HR:  [66-96] 94  Resp:  [18-24] 24  BP: (148-190)/(64-83) 180/79  SpO2:  [89 %-97 %] 95 %  Body mass index is 28.42 kg/m². Input and Output Summary (last 24 hours): Intake/Output Summary (Last 24 hours) at 2023 0954  Last data filed at 2023 0909  Gross per 24 hour   Intake 550 ml   Output 700 ml   Net -150 ml       Physical Exam:   Physical Exam  Vitals reviewed. Constitutional:       General: He is not in acute distress. Appearance: He is not toxic-appearing. HENT:      Head: Normocephalic and atraumatic. Cardiovascular:      Rate and Rhythm: Normal rate and regular rhythm. Pulmonary:      Effort: Pulmonary effort is normal. No respiratory distress. Breath sounds: Normal breath sounds. No wheezing or rales. Abdominal:      General: Bowel sounds are normal. There is no distension. Palpations: Abdomen is soft. Tenderness: There is no abdominal tenderness. Musculoskeletal:      Comments: Left knee area with Ace wrap in place   Neurological:      Mental Status: He is alert and oriented to person, place, and time. Additional Data:     Labs:  Results from last 7 days   Lab Units 09/09/23  0505   WBC Thousand/uL 12.27*   HEMOGLOBIN g/dL 12.0   HEMATOCRIT % 36.0*   PLATELETS Thousands/uL 236   NEUTROS PCT % 71   LYMPHS PCT % 13*   MONOS PCT % 9   EOS PCT % 5     Results from last 7 days   Lab Units 09/09/23  0505   SODIUM mmol/L 134*   POTASSIUM mmol/L 4.5   CHLORIDE mmol/L 104   CO2 mmol/L 25   BUN mg/dL 18   CREATININE mg/dL 1.08   ANION GAP mmol/L 5   CALCIUM mg/dL 8.9   ALBUMIN g/dL 3.2*   TOTAL BILIRUBIN mg/dL 0.58   ALK PHOS U/L 57   ALT U/L 11   AST U/L 15   GLUCOSE RANDOM mg/dL 104     Results from last 7 days   Lab Units 09/09/23  0505   INR  1.09                   Lines/Drains:  Invasive Devices     Peripheral Intravenous Line  Duration           Peripheral IV 03/23/23 Dorsal (posterior); Left Wrist 169 days    Peripheral IV 09/08/23 Left Antecubital <1 day    Peripheral IV 09/08/23 Right Antecubital <1 day          Line  Duration           Venous Sheath Other (Comment) Right Other (Comment) 1114 days                      Imaging: Personally reviewed the following imaging: xray(s)    Recent Cultures (last 7 days):   Results from last 7 days   Lab Units 09/08/23  1621 09/08/23  1544 09/06/23  1454   BLOOD CULTURE   --  Received in Microbiology Lab. Culture in Progress.   Received in Microbiology Lab. Culture in Progress. --    GRAM STAIN RESULT  No Polys or Bacteria seen  --  2+ Polys*  4+ Gram positive cocci in pairs, chains and clusters*   WOUND CULTURE   --   --  4+ Growth of Staphylococcus aureus*       Last 24 Hours Medication List:   Current Facility-Administered Medications   Medication Dose Route Frequency Provider Last Rate   • [MAR Hold] acetaminophen  650 mg Oral Q6H PRN Nilam Miranda, DO     • [MAR Hold] atorvastatin  5 mg Oral Daily With Dinner Nilam Miranda, DO     • [MAR Hold] citalopram  40 mg Oral QAM Nilam Miranda, DO     • fentaNYL  50 mcg Intravenous Q3 min PRN Alok Berrios MD     • [facility] Hold] fluticasone  1 spray Nasal Daily Nilam Miranda, DO     • HYDROmorphone  0.2 mg Intravenous Q5 Min PRN Alok Berrios MD     • [facility] Hold] magnesium Oxide  400 mg Oral Daily Nilam Miranda, DO     • magnesium sulfate  2 g Intravenous Once Geneva Crowell, DO     • [MAR Hold] metoprolol tartrate  50 mg Oral Q12H Nilam Miranda, DO     • [facility] Hold] niacin  500 mg Oral HS Nilam Miranda, DO     • ondansetron  4 mg Intravenous Once PRN Alok Berrios MD     • [facility] Hold] pantoprazole  40 mg Oral Daily Nilam Miranda, DO     • sodium chloride  50 mL/hr Intravenous Continuous Geneva Crowell, DO 50 mL/hr (09/08/23 1728)   • [MAR Hold] tamsulosin  0.4 mg Oral Daily With [address] DO     • [facility] Hold] vancomycin  15 mg/kg Intravenous Daily Nilam Miranda DO          Today, Patient Was Seen By: Luc Giang DO    **Please Note: This note may have been constructed using a voice recognition system. **

## 2023-09-09 NOTE — ANESTHESIA POSTPROCEDURE EVALUATION
Post-Op Assessment Note    CV Status:  Stable  Pain Score: 2    Pain management: adequate     Mental Status:  Sleepy   Hydration Status:  Stable   PONV Controlled:  None   Airway Patency:  Patent      Post Op Vitals Reviewed: Yes      Staff: Anesthesiologist         No notable events documented.     BP  196/84   Temp  97.9    Pulse  100   Resp      SpO2  95%

## 2023-09-09 NOTE — PLAN OF CARE
Problem: PAIN - ADULT  Goal: Verbalizes/displays adequate comfort level or baseline comfort level  Description: Interventions:  - Encourage patient to monitor pain and request assistance  - Assess pain using appropriate pain scale  - Administer analgesics based on type and severity of pain and evaluate response  - Implement non-pharmacological measures as appropriate and evaluate response  - Consider cultural and social influences on pain and pain management  - Notify physician/advanced practitioner if interventions unsuccessful or patient reports new pain  Outcome: Progressing     Problem: SAFETY ADULT  Goal: Patient will remain free of falls  Description: INTERVENTIONS:  - Educate patient/family on patient safety including physical limitations  - Instruct patient to call for assistance with activity   - Consult OT/PT to assist with strengthening/mobility   - Keep Call bell within reach  - Keep bed low and locked with side rails adjusted as appropriate  - Keep care items and personal belongings within reach  - Initiate and maintain comfort rounds  - Make Fall Risk Sign visible to staff  - Offer Toileting every  Hours, in advance of need  - Initiate/Maintain alarm  - Obtain necessary fall risk management equipment:   - Apply yellow socks and bracelet for high fall risk patients  - Consider moving patient to room near nurses station  Outcome: Progressing  Goal: Maintain or return to baseline ADL function  Description: INTERVENTIONS:  -  Assess patient's ability to carry out ADLs; assess patient's baseline for ADL function and identify physical deficits which impact ability to perform ADLs (bathing, care of mouth/teeth, toileting, grooming, dressing, etc.)  - Assess/evaluate cause of self-care deficits   - Assess range of motion  - Assess patient's mobility; develop plan if impaired  - Assess patient's need for assistive devices and provide as appropriate  - Encourage maximum independence but intervene and supervise when necessary  - Involve family in performance of ADLs  - Assess for home care needs following discharge   - Consider OT consult to assist with ADL evaluation and planning for discharge  - Provide patient education as appropriate  Outcome: Progressing     Problem: SKIN/TISSUE INTEGRITY - ADULT  Goal: Skin Integrity remains intact(Skin Breakdown Prevention)  Description: Assess:  -Perform Benson assessment every   -Clean and moisturize skin every   -Inspect skin when repositioning, toileting, and assisting with ADLS  -Assess under medical devices such as  every   -Assess extremities for adequate circulation and sensation     Bed Management:  -Have minimal linens on bed & keep smooth, unwrinkled  -Change linens as needed when moist or perspiring  -Avoid sitting or lying in one position for more than  hours while in bed  -Keep HOB at degrees     Toileting:  -Offer bedside commode  -Assess for incontinence every   -Use incontinent care products after each incontinent episode such as     Activity:  -Mobilize patient  times a day  -Encourage activity and walks on unit  -Encourage or provide ROM exercises   -Turn and reposition patient every  Hours  -Use appropriate equipment to lift or move patient in bed  -Instruct/ Assist with weight shifting every  when out of bed in chair  -Consider limitation of chair time  hour intervals    Skin Care:  -Avoid use of baby powder, tape, friction and shearing, hot water or constrictive clothing  -Relieve pressure over bony prominences using   -Do not massage red bony areas    Next Steps:  -Teach patient strategies to minimize risks such as    -Consider consults to  interdisciplinary teams such as   Outcome: Progressing     Problem: Prexisting or High Potential for Compromised Skin Integrity  Goal: Skin integrity is maintained or improved  Description: INTERVENTIONS:  - Identify patients at risk for skin breakdown  - Assess and monitor skin integrity  - Assess and monitor nutrition and hydration status  - Monitor labs   - Assess for incontinence   - Turn and reposition patient  - Assist with mobility/ambulation  - Relieve pressure over bony prominences  - Avoid friction and shearing  - Provide appropriate hygiene as needed including keeping skin clean and dry  - Evaluate need for skin moisturizer/barrier cream  - Collaborate with interdisciplinary team   - Patient/family teaching  - Consider wound care consult   Outcome: Progressing     Problem: MOBILITY - ADULT  Goal: Maintain or return to baseline ADL function  Description: INTERVENTIONS:  -  Assess patient's ability to carry out ADLs; assess patient's baseline for ADL function and identify physical deficits which impact ability to perform ADLs (bathing, care of mouth/teeth, toileting, grooming, dressing, etc.)  - Assess/evaluate cause of self-care deficits   - Assess range of motion  - Assess patient's mobility; develop plan if impaired  - Assess patient's need for assistive devices and provide as appropriate  - Encourage maximum independence but intervene and supervise when necessary  - Involve family in performance of ADLs  - Assess for home care needs following discharge   - Consider OT consult to assist with ADL evaluation and planning for discharge  - Provide patient education as appropriate  Outcome: Progressing  Goal: Maintains/Returns to pre admission functional level  Description: INTERVENTIONS:  - Perform BMAT or MOVE assessment daily.   - Set and communicate daily mobility goal to care team and patient/family/caregiver. - Collaborate with rehabilitation services on mobility goals if consulted  - Perform Range of Motion  times a day. - Reposition patient every  hours.   - Dangle patient  times a day  - Stand patient  times a day  - Ambulate patient  times a day  - Out of bed to chair  times a day   - Out of bed for meal times a day  - Out of bed for toileting  - Record patient progress and toleration of activity level   Outcome: Progressing

## 2023-09-09 NOTE — PROGRESS NOTES
Velia Apley is a 80 y.o. male who is currently ordered Vancomycin IV with management by the Pharmacy Consult service. Relevant clinical data and objective / subjective history reviewed. Vancomycin Assessment:  Indication and Goal AUC/Trough: Soft tissue (goal -600, trough >10); Bone/joint infection (goal -600, trough >10)  Clinical Status: new  Micro:     Renal Function:  SCr: 1.34 mg/dL  CrCl: 45.7 mL/min  Renal replacement: N/A  Days of Therapy: 1  Current Dose: 2,000 mg x1  Vancomycin Plan:  New Dosin,250 mg Q24H  Estimated AUC: 536 mcg*hr/mL  Estimated Trough: 16.9 mcg/mL  Next Level: 09/10/2023  Renal Function Monitoring: Daily BMP and UOP  Pharmacy will continue to follow closely for s/sx of nephrotoxicity, infusion reactions and appropriateness of therapy. BMP and CBC will be ordered per protocol. We will continue to follow the patient’s culture results and clinical progress daily.     Aldo Baumann, Pharmacist

## 2023-09-09 NOTE — ASSESSMENT & PLAN NOTE
LVEF June 2022 with LVEF 60%. Mild-Moderate TR.  Grade 1 Diastolic Dysfunction  · Denies any sob currently  · Holding Demadex but can restart in a.m. if labs within normal limits

## 2023-09-09 NOTE — UTILIZATION REVIEW
Initial Clinical Review    Admission: Date/Time/Statement:   Admission Orders (From admission, onward)     Ordered        09/08/23 1541  INPATIENT ADMISSION  Once                      Orders Placed This Encounter   Procedures   • INPATIENT ADMISSION     Standing Status:   Standing     Number of Occurrences:   1     Order Specific Question:   Level of Care     Answer:   Med Surg [16]     Order Specific Question:   Estimated length of stay     Answer:   More than 2 Midnights     Order Specific Question:   Certification     Answer:   I certify that inpatient services are medically necessary for this patient for a duration of greater than two midnights. See H&P and MD Progress Notes for additional information about the patient's course of treatment. ED Arrival Information     Expected   -    Arrival   9/8/2023 14:52    Acuity   Urgent            Means of arrival   Wheelchair    Escorted by   Family Member    Service   Hospitalist    Admission type   Emergency            Arrival complaint   Knee Pain            Chief Complaint   Patient presents with   • Wound Check     Has infected wound that is to be washed out tomorrow by ortho       Initial Presentation: 80 y.o. male presented to ED from home as inpatient admission for left knee abscess. Past medical history significant for atrial flutter not on PTA anticoagulation, previous provoked DVT/PE and had IVC filter but this was removed, CKD, pulmonary arterial hypertension. Patient reports that last Sunday he began to notice swelling and erythema of the left knee. He reports that the symptoms persisted and on Wednesday he went and saw orthopedics outpatient where he had a local I&D performed at bedside and was put on oral Bactrim twice daily. Wound cultures grew Staph aureus.   Patient reports that his symptoms continue to worsen and was seen again by orthopedics outpatient earlier in the day today where orthopedics recommended sending patient to the ER for IV antibiotics as well as likely need for OR procedure. On exam  Left knee swollen and erythematous with ace bandage wrapped around knee, distal pulses of bilateral LE intact. Plan IVF, IV ABX,NPO and supportive care     Date: 09-09-23   OR  Preop Diagnosis:  Abscess of left leg [L02.416]  Post-Op Diagnosis Codes:     * Abscess of left leg [L02.416]   Procedure(s):  1)Left - INCISION AND DRAINAGE (I&D) EXTREMITY  2) Left - REMOVAL HARDWARE LEFT DISTAL FEMUR  General   Operative Findings:  Intraoperatively the left distal femur abscess and site of bedside I&D demonstrated purulent fluid and infected appearing tissue that was debrided sharply with a curette, rongeur, electrocautery while maintaining hemostasis. Any remaining loculations were disrupted manually. The I&D site was ellipsed back to healthy bleeding wound edges. Superficial abscess site was irrigated with 3 L of normal saline solution under high flow low pressure. The deep fascia was examined and there was an area of scar tissue in the deep fascia but it was unclear if this was an attempt at the abscess to penetrate deeper to the retained hardware. The deep fascia was incised and the distal locking screw was removed from the TFN construct without complication. Fluid cultures were taken from the superficial abscess collection in the form of aerobic and anaerobic cultures. Tissue cultures were taken from the scar tissue and channing-implant tissue around the distal locking screw of the femur. The screw was also sent for culture. On gross inspection the tissue deep to the fascia did not have evidence of infection as was present superficially      Date: 09-10-23    Day 3: Has surpassed a 2nd midnight with active treatments and services, which include IV ABX consulted ID Left lower extremity: Dressing CDI. No pain with gentle ROM of knee and hip. Compartments soft. No calf tenderness. Moves toes/ankle freely. 2+ DP pulse.  Sensation intact lateral femoral cutaneous, saphenous, sural, deep/superficial peroneal nerve distributions. 2+ pitting edema left lower extremity. 1+ pitting edema right lower extremity.        ED Triage Vitals [09/08/23 1501]   Temperature Pulse Respirations Blood Pressure SpO2   98.4 °F (36.9 °C) 88 20 161/74 94 %      Temp Source Heart Rate Source Patient Position - Orthostatic VS BP Location FiO2 (%)   Oral Monitor Sitting Left arm --      Pain Score       4          Wt Readings from Last 1 Encounters:   09/08/23 87.3 kg (192 lb 7.4 oz)     Additional Vital Signs:    Date/Time Temp Pulse Resp BP MAP (mmHg) SpO2 Calculated FIO2 (%) - Nasal Cannula O2 Flow Rate (L/min) Nasal Cannula O2 Flow Rate (L/min) O2 Device Cardiac (WDL) Patient Position - Orthostatic VS   09/09/23 10:20:54 98 °F (36.7 °C) 95 24 Abnormal  177/80 Abnormal  112 94 % 28 -- 2 L/min Nasal cannula -- Lying   09/09/23 09:58:25 98 °F (36.7 °C) 97 24 Abnormal  178/81 Abnormal  113 93 % 28 -- 2 L/min Nasal cannula -- Lying   09/09/23 0950 -- 94 24 Abnormal  180/79 Abnormal  -- 95 % 28 -- 2 L/min Nasal cannula -- --   09/09/23 0941 -- 94 22 190/83 Abnormal  -- 95 % 28 -- 2 L/min Nasal cannula -- --   09/09/23 0924 97.9 °F (36.6 °C) 96 20 157/68 -- 97 % -- 4 L/min -- Simple mask WDL --   09/09/23 07:02:09 97.9 °F (36.6 °C) 75 -- 179/64 Abnormal  102 89 % Abnormal  -- -- -- -- -- --   09/09/23 02:43:10 98.1 °F (36.7 °C) 78 20 149/72 98 90 % -- -- -- -- -- --   09/08/23 23:33:51 98.4 °F (36.9 °C) 71 18 154/75 101 92 % -- -- -- -- -- --   09/08/23 1949 98.1 °F (36.7 °C) 83 18 176/80 Abnormal  112 93 % -- -- -- None (Room air) -- Lying   09/08/23 16:58:18 97.8 °F (36.6 °C) 66 -- 177/74 Abnormal  108 95 % -- -- -- -- -- --   09/08/23 1621 -- 74 18 168/72 103 94 % -- -- -- None (Room air) -- Lying     Pertinent Labs/Diagnostic Test Results:   XR femur 2 vw left    (Results Pending)         Results from last 7 days   Lab Units 09/09/23  0505 09/08/23  1524   WBC Thousand/uL 12.27* 12.03* HEMOGLOBIN g/dL 12.0 13.5   HEMATOCRIT % 36.0* 40.3   PLATELETS Thousands/uL 236 238   NEUTROS ABS Thousands/µL 8.72* 8.58*         Results from last 7 days   Lab Units 09/09/23  0505 09/08/23  1524   SODIUM mmol/L 134* 133*   POTASSIUM mmol/L 4.5 4.5   CHLORIDE mmol/L 104 100   CO2 mmol/L 25 27   ANION GAP mmol/L 5 6   BUN mg/dL 18 25   CREATININE mg/dL 1.08 1.34*   EGFR ml/min/1.73sq m 63 48   CALCIUM mg/dL 8.9 9.3   MAGNESIUM mg/dL 1.6* 1.6*     Results from last 7 days   Lab Units 09/09/23  0505 09/08/23  1524   AST U/L 15 18   ALT U/L 11 13   ALK PHOS U/L 57 70   TOTAL PROTEIN g/dL 5.7* 6.5   ALBUMIN g/dL 3.2* 3.5   TOTAL BILIRUBIN mg/dL 0.58 0.39         Results from last 7 days   Lab Units 09/09/23  0505 09/08/23  1524   GLUCOSE RANDOM mg/dL 104 131     Results from last 7 days   Lab Units 09/09/23  0505   PROTIME seconds 14.3   INR  1.09   PTT seconds 32       Results from last 7 days   Lab Units 09/08/23  1728   CLARITY UA  Clear   COLOR UA  Light Yellow   SPEC GRAV UA  <=1.005   PH UA  7.0   GLUCOSE UA mg/dl Negative   KETONES UA mg/dl Negative   BLOOD UA  Negative   PROTEIN UA mg/dl Negative   NITRITE UA  Negative   BILIRUBIN UA  Negative   UROBILINOGEN UA E.U./dl 0.2   LEUKOCYTES UA  Negative     Results from last 7 days   Lab Units 09/08/23  1621 09/08/23  1544 09/06/23  1454   BLOOD CULTURE   --  Received in Microbiology Lab. Culture in Progress. Received in Microbiology Lab. Culture in Progress.   --    GRAM STAIN RESULT  No Polys or Bacteria seen  --  2+ Polys*  4+ Gram positive cocci in pairs, chains and clusters*   WOUND CULTURE   --   --  4+ Growth of Staphylococcus aureus*                   ED Treatment:   Medication Administration from 09/08/2023 1452 to 09/08/2023 1656       Date/Time Order Dose Route Action     09/08/2023 1531 EDT sodium chloride 0.9 % infusion 150 mL/hr Intravenous New Bag     09/08/2023 2016 EDT vancomycin (VANCOCIN) 2,000 mg in sodium chloride 0.9 % 500 mL IVPB 2,000 mg Intravenous New Bag        Past Medical History:   Diagnosis Date   • BPH (benign prostatic hyperplasia)    • Depression    • HTN (hypertension)    • Hyperlipidemia    • OCD (obsessive compulsive disorder)    • Pulmonary embolism ([address]) 2019     Present on Admission:  • Paroxysmal atrial flutter (HCC)  • Stage 3 chronic kidney disease, unspecified whether stage 3a or 3b CKD (HCC)  • 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HCC)      Admitting Diagnosis: Abscess of left knee [L02.416]  Abscess of left leg [L02.416]  Cellulitis of left knee [L03.116]  Visit for wound check [Z51.89]  Age/Sex: 80 y.o. male  Admission Orders:  Scheduled Medications:  atorvastatin, 5 mg, Oral, Daily With Dinner  cefazolin, 2,000 mg, Intravenous, Q8H  citalopram, 40 mg, Oral, QAM  enoxaparin, 40 mg, Subcutaneous, Q24H [facility]  fluticasone, 1 spray, Nasal, Daily  magnesium Oxide, 400 mg, Oral, Daily  magnesium sulfate, 2 g, Intravenous, Once  metoprolol tartrate, 50 mg, Oral, Q12H  niacin, 500 mg, Oral, HS  pantoprazole, 40 mg, Oral, Daily  tamsulosin, 0.4 mg, Oral, Daily With Dinner      Continuous IV Infusions:  sodium chloride, 50 mL/hr, Intravenous, Continuous      PRN Meds:  acetaminophen, 650 mg, Oral, Q6H PRN        None    Network Utilization Review Department  ATTENTION: Please call with any questions or concerns to [phone] and carefully listen to the prompts so that you are directed to the right person. All voicemails are confidential.  [facility] Stoner all requests for admission clinical reviews, approved or denied determinations and any other requests to dedicated fax number below belonging to the campus where the patient is receiving treatment.  List of dedicated fax numbers for the Facilities:  [place] DENIALS (Administrative/Medical Necessity) [phone] [facility] [facility] [address] [place] [address] [address] [address] [facility] [address] [phone]

## 2023-09-09 NOTE — PROGRESS NOTES
Vancomycin IV Pharmacy-to-Dose Consultation     Vancomycin has been discontinued. Pharmacy will sign off. Please contact or re-consult with questions.     Mayra Hoyt, Pharmacist

## 2023-09-09 NOTE — OP NOTE
OPERATIVE REPORT  PATIENT NAME: Jessica Merino    :  1940  MRN: [id number]  Pt Location: [facility] OR ROOM 03    SURGERY DATE: 2023    Surgeon(s) and Role:     * Mimi Smith DO - Primary     * Billie Jovel PA-C - Assisting, no qualified resident was available an assistant was needed for patient positioning, prepping and draping, soft tissue retraction, protection of vital structures throughout the case, superficial closure, and to complete the case safely. Preop Diagnosis:  Abscess of left leg [L02.416]      Post-Op Diagnosis Codes:     * Abscess of left leg [L02.416]    Procedure(s):  1)Left - INCISION AND DRAINAGE (I&D) EXTREMITY  2) Left - REMOVAL HARDWARE LEFT DISTAL FEMUR    Specimen(s): Aerobic and anaerobic fluid cultures-superficial abscess, deep tissue channing-implant region-culture, screw -culture  ID Type Source Tests Collected by Time Destination   A : left leg  Wound Wound ANAEROBIC CULTURE AND GRAM STAIN, STAT GRAM STAIN, WOUND CULTURE Mimi Smith DO 2023 0835    B : DEEP TISSUE SCREW LEFT LEG Tissue Soft Tissue, Other ANAEROBIC CULTURE AND GRAM STAIN, CULTURE, TISSUE AND GRAM STAIN Mimi Smith,  2023 0843    C : SCREW LEFT LEG - FOR CULTURE Tissue Surgical Hardware/Implant ANAEROBIC CULTURE AND GRAM STAIN, CULTURE, TISSUE AND GRAM STAIN Mimi Smith,  2023 0845        Estimated Blood Loss:   Minimal    Drains: None    Anesthesia Type:   General    Antibiotics: Vancomycin 1.25 g IV around-the-clock    Urine output: No Baez    Tourniquet time: 47 minutes at 250 millimeters mercury    Implants: 5.0 mm Synthes distal locking screw removed from distal femur    Operative Indications:  Abscess of left leg [L02.416]  Patient is a 80-year-old male known to my practice after having undergone long cephalomedullary nailing of his left femur after a complex intertrochanteric fracture in 2022.   Ultimately the patient reached union without issues postoperatively until this week when he returned with complaint of sudden onset of pain and swelling over the distal aspect of his left distal femur. The area was in the area of the distal screw scar that was previously without issue. The patient had fallen in his yard and had large superficial abrasions over the anterior aspect of the ipsilateral knee. There was fluctuance appreciated during the outpatient evaluation for his complaint on 9/6/2023 and at which point a bedside I&D and wound packing was performed in the clinic. He was also given oral antibiotics to cover the most likely pathogens. At the bedside I&D there was purulence expressed representing a superficial abscess collection. Patient was reevaluated 2 days later in a clinic at which point erythema has not significantly resolved and there was copious amounts of foul-smelling purulence expressed from the prior I&D site. There was no evidence of intra-articular involvement. Without significant improvement after bedside I&D of the left distal femur and oral antibiotic regiment I recommended he proceed to the emergency department for IV antibiotics and admission to the medical service for potential I&D of his left leg abscess the following morning. He is optimized by the medical staff for the intended procedure and the patient underwent I&D with hardware removal on 9/9/2020. Operative Findings:  Intraoperatively the left distal femur abscess and site of bedside I&D demonstrated purulent fluid and infected appearing tissue that was debrided sharply with a curette, rongeur, electrocautery while maintaining hemostasis. Any remaining loculations were disrupted manually. The I&D site was ellipsed back to healthy bleeding wound edges. Superficial abscess site was irrigated with 3 L of normal saline solution under high flow low pressure.   The deep fascia was examined and there was an area of scar tissue in the deep fascia but it was unclear if this was an attempt at the abscess to penetrate deeper to the retained hardware. The deep fascia was incised and the distal locking screw was removed from the TFN construct without complication. Fluid cultures were taken from the superficial abscess collection in the form of aerobic and anaerobic cultures. Tissue cultures were taken from the scar tissue and channing-implant tissue around the distal locking screw of the femur. The screw was also sent for culture. On gross inspection the tissue deep to the fascia did not have evidence of infection as was present superficially. Complications:   None    Procedure and Technique:  Patient was seen identified in the preoperative holding area and the left lower extremity was identified marked. Patient was taken back to the operating room where they were positioned supine position on operating room table and all bony prominences were well-padded. General anesthesia was administered by the anesthesia staff. A Baez catheter was not placed. A tourniquet was placed upon the left thigh and standard sterile draping occurred. At conclusion of draping all members of the operating room team paused for a surgical timeout where the patient was correct identified, and the extremity was correctly identified, and the procedure was correct identified. Leg was elevated to exsanguinate the limb and the tourniquet was inflated to 250 mmHg. The site of prior bedside I&D was delineated with a marking pen with plans to ellipse the site of prior I&D and explant the incision proximally distally. After this area was marked the I&D site was ellipsed with a 15 blade scalpel. The wound edges were taken back to bleeding healthy appearing wound edges. Aerobic and anaerobic fluid cultures were taken from the purulence being expressed from the superficial abscess.   The deep space was inspected and there was evidence of fibrinous ill appearing infected tissue which was debrided sharply utilizing a rongeur, curette, electrocautery. Remaining loculations superiorly and proximally were disrupted manually and this tissue was sharply debrided utilizing the same technique. After all planes of the exposure and the abscess site were deemed clear and debrided from purulence and none viable tissue the wound was copiously irrigated with 3 L of normal saline solution utilizing cystoscopy tubing. After irrigation was complete attention was turned to the deep fascia. The deep fascia did not appear to be grossly disrupted however there was an area midline in the fascia above the level of the patella on the lateral aspect of the distal femur that could have represented scar tissue from the prior procedure but it was unclear if the superficial abscess was attempting to gain access to the subfascial planes. With retained hardware deep to this area of concern was decided that the distal locking screw of the long TFN would be removed. Electrocautery was used to incise the fascia in line with the suspected disruption. Dissection was carried proximally and away from the joint line. There was abundance of scar tissue deep to the fascia which progressed down to the retained distal locking screw. There is no evidence of infection deep and no sign of purulent tissue. During exposure there was no evidence of intra-articular extension or synovial fluid expressed from the deep dissection therefore it was decided that the intra-articular space did not need to be explored. The scar tissue was debrided sharply around the retained distal locking screw and this tissue sample was sent for culture. The screw was removed from the distal femur utilizing the star . This was done without complication. The screw was sent for culture. The deep space where the screw was removed was debrided with a rongeur of any remaining scar tissue and the deep space was irrigated with 1 L normal saline solution utilizing cystoscopy tubing.   After conclusion of irrigation the wound was vigorously inspected and all appeared to be clear and hemostasis was maintained. At this point closure began. The deep fascia was closed with 0 PDS suture in an interrupted fashion. The deep subcuticular layers were closed with 0 PDS suture to decrease dead space. The subcuticular tissues were infiltrated with half percent Marcaine plain for local analgesia. The skin was reapproximated loosely with interrupted horizontal mattress sutures and simple sutures utilizing 3-0 nylon suture. The leg was cleaned and dried and Xeroform dressings were placed over the large anterior knee abrasions as well as over the surgical incision site. The knee was then dressed with gauze, ABD, Webril, and Ace wrap. Tourniquet was deflated after 47 minutes at 250 mmHg. The remaining drapes were removed and general anesthesia was reversed. The patient was transferred back to his hospital bed after which she was transported to PACU to begin his postoperative course. The patient tolerated the procedure well. There were no complications. I was present for all critical portions of the procedure.     Patient Disposition:  PACU       SIGNATURE: Genet Vaughn DO  DATE: September 9, 2023  TIME: 9:41 AM

## 2023-09-09 NOTE — PROGRESS NOTES
Progress Note - Orthopedics   Rosa Lane 80 y.o. male MRN: [id number]  Unit/Bed#: OR POOL Encounter: [id number]        Subjective: No acute overnight events. Patient still notes minimal pain about the knee/thigh/hip. Patient has remained afebrile. Denies any CP, SOB, dizziness, nausea/vomitting. No acute overnight events. Vitals: Blood pressure (!) 179/64, pulse 75, temperature 97.9 °F (36.6 °C), resp. rate 20, height 5' 9" (1.753 m), weight 87.3 kg (192 lb 7.4 oz), SpO2 (!) 89 %. ,Body mass index is 28.42 kg/m². Intake/Output Summary (Last 24 hours) at 9/9/2023 0746  Last data filed at 9/9/2023 0646  Gross per 24 hour   Intake --   Output 700 ml   Net -700 ml       Invasive Devices     Peripheral Intravenous Line  Duration           Peripheral IV 03/23/23 Dorsal (posterior); Left Wrist 169 days    Peripheral IV 09/08/23 Left Antecubital <1 day    Peripheral IV 09/08/23 Right Antecubital <1 day          Line  Duration           Venous Sheath Other (Comment) Right Other (Comment) 1113 days                  Ortho Exam: Alert and oriented X 3 in NAD,     Left lower extremity: Dressing in place. No significant swelling. Minimal pain with ROM of knee and hip. Compartments soft. No calf tenderness. Moves toes/ankle freely. 2+ DP pulse. Sensation intact lateral femoral cutaneous, saphenous, sural, deep/superficial peroneal nerve distributions. Lab, Imaging and other studies: I have personally reviewed pertinent lab results.   CBC:   Lab Results   Component Value Date    WBC 12.27 (H) 09/09/2023    HGB 12.0 09/09/2023    HCT 36.0 (L) 09/09/2023    MCV 95 09/09/2023     09/09/2023    RBC 3.80 (L) 09/09/2023    MCH 31.6 09/09/2023    MCHC 33.3 09/09/2023    RDW 13.7 09/09/2023    MPV 9.4 09/09/2023    NRBC 0 09/09/2023     CMP:   Lab Results   Component Value Date     12/21/2015     09/09/2023     12/21/2015    CO2 25 09/09/2023    CO2 30 (H) 12/21/2015    ANIONGAP 11.9 12/21/2015    BUN 18 09/09/2023    BUN 13 12/21/2015    CREATININE 1.08 09/09/2023    CREATININE 0.9 12/21/2015    GLUCOSE 93 12/21/2015    CALCIUM 8.9 09/09/2023    CALCIUM 8.7 12/21/2015    AST 15 09/09/2023    AST 20 12/21/2015    ALT 11 09/09/2023    ALT 22 12/21/2015    ALKPHOS 57 09/09/2023    ALKPHOS 82 12/21/2015    PROT 6.9 12/21/2015    BILITOT 0.6 12/21/2015    EGFR 63 09/09/2023     PT/INR:   Lab Results   Component Value Date    INR 1.09 09/09/2023       Assessment:  Left knee abscess     Plan:  OR this morning for left leg I&D and possible hardware removal. Surgical consents signed. Will obtain intra-operative cultures. Antibiotics per primary team. Anand Fisher will continue to follow.

## 2023-09-09 NOTE — ASSESSMENT & PLAN NOTE
Lab Results   Component Value Date    EGFR 63 09/09/2023    EGFR 48 09/08/2023    EGFR 47 08/07/2023    CREATININE 1.08 09/09/2023    CREATININE 1.34 (H) 09/08/2023    CREATININE 1.36 (H) 08/07/2023     · Creatinine over the past 3-4 years - 0.9 to as high as 1.9. Patient's creatinine on admission 1.34 and it was 1.36 one month ago  · Creatinine at baseline  · Repeat lab work in a.m.

## 2023-09-09 NOTE — PERIOPERATIVE NURSING NOTE
Received from OR, vss. Sleepy but responsive. Left knee ace dsg dry, intact. Left pedal pulse audible with doppler. +3 edema noted.

## 2023-09-10 LAB
ANION GAP SERPL CALCULATED.3IONS-SCNC: 3 MMOL/L
BUN SERPL-MCNC: 22 MG/DL (ref 5–25)
CALCIUM SERPL-MCNC: 8.6 MG/DL (ref 8.4–10.2)
CHLORIDE SERPL-SCNC: 104 MMOL/L (ref 96–108)
CO2 SERPL-SCNC: 27 MMOL/L (ref 21–32)
CREAT SERPL-MCNC: 1.13 MG/DL (ref 0.6–1.3)
ERYTHROCYTE [DISTWIDTH] IN BLOOD BY AUTOMATED COUNT: 13.7 % (ref 11.6–15.1)
GFR SERPL CREATININE-BSD FRML MDRD: 59 ML/MIN/1.73SQ M
GLUCOSE SERPL-MCNC: 120 MG/DL (ref 65–140)
HCT VFR BLD AUTO: 35.1 % (ref 36.5–49.3)
HGB BLD-MCNC: 11.6 G/DL (ref 12–17)
MCH RBC QN AUTO: 31.7 PG (ref 26.8–34.3)
MCHC RBC AUTO-ENTMCNC: 33 G/DL (ref 31.4–37.4)
MCV RBC AUTO: 96 FL (ref 82–98)
PLATELET # BLD AUTO: 240 THOUSANDS/UL (ref 149–390)
PMV BLD AUTO: 9.4 FL (ref 8.9–12.7)
POTASSIUM SERPL-SCNC: 5.2 MMOL/L (ref 3.5–5.3)
RBC # BLD AUTO: 3.66 MILLION/UL (ref 3.88–5.62)
SODIUM SERPL-SCNC: 134 MMOL/L (ref 135–147)
WBC # BLD AUTO: 14.05 THOUSAND/UL (ref 4.31–10.16)

## 2023-09-10 PROCEDURE — 99024 POSTOP FOLLOW-UP VISIT: CPT | Performed by: PHYSICIAN ASSISTANT

## 2023-09-10 PROCEDURE — 85027 COMPLETE CBC AUTOMATED: CPT | Performed by: FAMILY MEDICINE

## 2023-09-10 PROCEDURE — 99232 SBSQ HOSP IP/OBS MODERATE 35: CPT | Performed by: FAMILY MEDICINE

## 2023-09-10 PROCEDURE — 80048 BASIC METABOLIC PNL TOTAL CA: CPT | Performed by: FAMILY MEDICINE

## 2023-09-10 RX ORDER — TORSEMIDE 10 MG/1
5 TABLET ORAL DAILY
Status: DISCONTINUED | OUTPATIENT
Start: 2023-09-10 | End: 2023-09-10

## 2023-09-10 RX ORDER — TORSEMIDE 10 MG/1
5 TABLET ORAL EVERY OTHER DAY
Status: DISCONTINUED | OUTPATIENT
Start: 2023-09-10 | End: 2023-09-12 | Stop reason: HOSPADM

## 2023-09-10 RX ORDER — BISACODYL 5 MG/1
10 TABLET, DELAYED RELEASE ORAL
Status: DISCONTINUED | OUTPATIENT
Start: 2023-09-10 | End: 2023-09-12 | Stop reason: HOSPADM

## 2023-09-10 RX ORDER — POLYETHYLENE GLYCOL 3350 17 G/17G
17 POWDER, FOR SOLUTION ORAL 2 TIMES DAILY
Status: DISCONTINUED | OUTPATIENT
Start: 2023-09-10 | End: 2023-09-12 | Stop reason: HOSPADM

## 2023-09-10 RX ADMIN — PANTOPRAZOLE SODIUM 40 MG: 40 TABLET, DELAYED RELEASE ORAL at 08:58

## 2023-09-10 RX ADMIN — TORSEMIDE 5 MG: 10 TABLET ORAL at 17:30

## 2023-09-10 RX ADMIN — POLYETHYLENE GLYCOL 3350 17 G: 17 POWDER, FOR SOLUTION ORAL at 17:32

## 2023-09-10 RX ADMIN — CITALOPRAM HYDROBROMIDE 40 MG: 20 TABLET ORAL at 08:58

## 2023-09-10 RX ADMIN — CEFAZOLIN SODIUM 2000 MG: 2 SOLUTION INTRAVENOUS at 12:16

## 2023-09-10 RX ADMIN — NIACIN 500 MG: 500 TABLET, EXTENDED RELEASE ORAL at 21:15

## 2023-09-10 RX ADMIN — POLYETHYLENE GLYCOL 3350 17 G: 17 POWDER, FOR SOLUTION ORAL at 08:58

## 2023-09-10 RX ADMIN — FLUTICASONE PROPIONATE 1 SPRAY: 50 SPRAY, METERED NASAL at 08:58

## 2023-09-10 RX ADMIN — ENOXAPARIN SODIUM 40 MG: 40 INJECTION SUBCUTANEOUS at 08:58

## 2023-09-10 RX ADMIN — BISACODYL 10 MG: 5 TABLET, COATED ORAL at 21:15

## 2023-09-10 RX ADMIN — CEFAZOLIN SODIUM 2000 MG: 2 SOLUTION INTRAVENOUS at 05:00

## 2023-09-10 RX ADMIN — METOPROLOL TARTRATE 50 MG: 50 TABLET, FILM COATED ORAL at 08:58

## 2023-09-10 RX ADMIN — ATORVASTATIN CALCIUM 5 MG: 10 TABLET, FILM COATED ORAL at 16:16

## 2023-09-10 RX ADMIN — Medication 400 MG: at 08:58

## 2023-09-10 RX ADMIN — TAMSULOSIN HYDROCHLORIDE 0.4 MG: 0.4 CAPSULE ORAL at 16:16

## 2023-09-10 RX ADMIN — CEFAZOLIN SODIUM 2000 MG: 2 SOLUTION INTRAVENOUS at 21:15

## 2023-09-10 RX ADMIN — METOPROLOL TARTRATE 50 MG: 50 TABLET, FILM COATED ORAL at 19:45

## 2023-09-10 NOTE — ASSESSMENT & PLAN NOTE
Lab Results   Component Value Date    EGFR 59 09/10/2023    EGFR 63 09/09/2023    EGFR 48 09/08/2023    CREATININE 1.13 09/10/2023    CREATININE 1.08 09/09/2023    CREATININE 1.34 (H) 09/08/2023     Creatinine over the past 3-4 years - 0.9 to as high as 1.9. Patient's creatinine on admission 1.34 and it was 1.36 one month ago  · Creatinine remains at baseline  · Repeat lab work in a.m.

## 2023-09-10 NOTE — PROGRESS NOTES
[address] [facility] ([place], [place])  Progress Note  Name: Virginie Davis  MRN: [id number]  Unit/Bed#: [facility] I Date of Admission: 9/8/2023   Date of Service: 9/10/2023 I Hospital Day: 2    Assessment/Plan   * Abscess of left leg  Assessment & Plan  Worsening L knee pain, swelling and erythema since this past Sunday  · Previously underwent insertion of nail to the Left femur for intertrochanteric fracture about 15 months ago   · Saw Ortho outpatient 9/6 and concern for abscess in the L knee; Underwent local I&D and wound was packed; Wound culture grew Staph Aureus; was additionally given Oral abx to take bid  · Reports that Knee continued to appear erythematous and swollen and saw Ortho outpatient and sent to the ER. · Wound culture with MSSA. Discontinued vancomycin and continue patient on high-dose IV cefazolin  · Patient underwent I&D of left lower extremity and removal of hardware of left distal femur 9/9. Left distal femur abscess site with purulent fluid and infected appearing tissue was noted which was debrided. Screw was also sent for culture  · Continues to have leukocytosis on lab work  · Repeat labs in a.m. Stage 3 chronic kidney disease, unspecified whether stage 3a or 3b CKD [facility])  Assessment & Plan  Lab Results   Component Value Date    EGFR 59 09/10/2023    EGFR 63 09/09/2023    EGFR 48 09/08/2023    CREATININE 1.13 09/10/2023    CREATININE 1.08 09/09/2023    CREATININE 1.34 (H) 09/08/2023     Creatinine over the past 3-4 years - 0.9 to as high as 1.9. Patient's creatinine on admission 1.34 and it was 1.36 one month ago  · Creatinine remains at baseline  · Repeat lab work in a.m. Pulmonary artery hypertension [facility])  Assessment & Plan  LVEF June 2022 with LVEF 60%. Mild-Moderate TR. Grade 1 Diastolic Dysfunction  · Denies any sob currently. · Per review of PCPs note, patient on Demadex every other day.   We will restart Demadex     Paroxysmal atrial flutter (HCC)  Assessment & Plan  Hx Paroxysmal atrial flutter, Not on pta anticoagulation   · Continue Lopressor    Hyperlipidemia  Assessment & Plan  Continue niacin         VTE Pharmacologic Prophylaxis: VTE Score: 5 High Risk (Score >/= 5) - Pharmacological DVT Prophylaxis Ordered: enoxaparin (Lovenox). Sequential Compression Devices Ordered. Patient Centered Rounds: I performed bedside rounds with nursing staff today. Discussions with Specialists or Other Care Team Provider: yes      Education and Discussions with Family / Patient: Updated  (wife and grandson) at bedside. Total Time Spent on Date of Encounter in care of patient: 45 minutes This time was spent on one or more of the following: performing physical exam; counseling and coordination of care; obtaining or reviewing history; documenting in the medical record; reviewing/ordering tests, medications or procedures; communicating with other healthcare professionals and discussing with patient's family/caregivers. Current Length of Stay: 2 day(s)  Current Patient Status: Inpatient   Certification Statement: The patient will continue to require additional inpatient hospital stay due to Left leg abscess status post I&D with hardware removal requiring IV antibiotics and awaiting cultures and ID consult  Discharge Plan: Anticipate discharge in 48-72 hrs to home. Code Status: Level 1 - Full Code    Subjective:     Patient denies any significant pain around his knee. Denies any shortness of breath. Reports constipation not relieved with MiraLAX    Objective:     Vitals:   Temp (24hrs), Av °F (36.7 °C), Min:97.6 °F (36.4 °C), Max:98.7 °F (37.1 °C)    Temp:  [97.6 °F (36.4 °C)-98.7 °F (37.1 °C)] 97.6 °F (36.4 °C)  HR:  [59-97] 77  Resp:  [18-24] 19  BP: (104-190)/(55-85) 157/85  SpO2:  [87 %-97 %] 93 %  Body mass index is 28.42 kg/m². Input and Output Summary (last 24 hours):      Intake/Output Summary (Last 24 hours) at 9/10/2023 0855  Last data filed at 9/10/2023 9270  Gross per 24 hour   Intake 550 ml   Output 450 ml   Net 100 ml       Physical Exam:   Physical Exam  Vitals reviewed. Constitutional:       General: He is not in acute distress. Appearance: He is not ill-appearing. HENT:      Head: Normocephalic and atraumatic. Eyes:      General:         Right eye: No discharge. Left eye: No discharge. Cardiovascular:      Rate and Rhythm: Normal rate and regular rhythm. Pulmonary:      Effort: Pulmonary effort is normal. No respiratory distress. Breath sounds: Normal breath sounds. No wheezing or rales. Abdominal:      General: Bowel sounds are normal. There is no distension. Palpations: Abdomen is soft. Tenderness: There is no abdominal tenderness. Musculoskeletal:      Comments: Left lower leg with Ace wrap in place. Bilateral lower extremity edema noted   Neurological:      Mental Status: He is alert.           Additional Data:     Labs:  Results from last 7 days   Lab Units 09/10/23  0512 09/09/23  0505   WBC Thousand/uL 14.05* 12.27*   HEMOGLOBIN g/dL 11.6* 12.0   HEMATOCRIT % 35.1* 36.0*   PLATELETS Thousands/uL 240 236   NEUTROS PCT %  --  71   LYMPHS PCT %  --  13*   MONOS PCT %  --  9   EOS PCT %  --  5     Results from last 7 days   Lab Units 09/10/23  0512 09/09/23  0505   SODIUM mmol/L 134* 134*   POTASSIUM mmol/L 5.2 4.5   CHLORIDE mmol/L 104 104   CO2 mmol/L 27 25   BUN mg/dL 22 18   CREATININE mg/dL 1.13 1.08   ANION GAP mmol/L 3 5   CALCIUM mg/dL 8.6 8.9   ALBUMIN g/dL  --  3.2*   TOTAL BILIRUBIN mg/dL  --  0.58   ALK PHOS U/L  --  57   ALT U/L  --  11   AST U/L  --  15   GLUCOSE RANDOM mg/dL 120 104     Results from last 7 days   Lab Units 09/09/23  0505   INR  1.09                   Lines/Drains:  Invasive Devices     Peripheral Intravenous Line  Duration           Peripheral IV 09/08/23 Left Antecubital 1 day          Line  Duration           Venous Sheath Other (Comment) Right Other (Comment) 1114 days Imaging: Reviewed radiology reports from this admission including: procedure reports    Recent Cultures (last 7 days):   Results from last 7 days   Lab Units 09/09/23  0845 09/09/23  0835 09/08/23  1621 09/08/23  1544 09/06/23  1454   BLOOD CULTURE   --   --   --  No Growth at 24 hrs. No Growth at 24 hrs.  --    GRAM STAIN RESULT  No Polys or Bacteria seen 2+ Polys*  1+ Gram positive cocci in pairs* No Polys or Bacteria seen  --  2+ Polys*  4+ Gram positive cocci in pairs, chains and clusters*   WOUND CULTURE   --   --  No growth  --  4+ Growth of Staphylococcus aureus*       Last 24 Hours Medication List:   Current Facility-Administered Medications   Medication Dose Route Frequency Provider Last Rate   • acetaminophen  650 mg Oral Q6H PRN Alethea Jones PA-C     • atorvastatin  5 mg Oral Daily With Dinner Sinda SHIRA Jones     • cefazolin  2,000 mg Intravenous Q8H Geneva Crowell, DO 2,000 mg (09/10/23 0500)   • citalopram  40 mg Oral QAM Alethea Jones PA-C     • enoxaparin  40 mg Subcutaneous Q24H [address] Alethea Jones PA-C     • fluticasone  1 spray Nasal Daily Sincandy Jones PA-C     • magnesium Oxide  400 mg Oral Daily Sinda SHIRA Jones     • metoprolol tartrate  50 mg Oral Q12H Sinda SHIRA Jones     • niacin  500 mg Oral HS Sinda SHIRA Jones     • pantoprazole  40 mg Oral Daily Sinda SHIRA Jones     • polyethylene glycol  17 g Oral Daily Geneva Crowell DO     • tamsulosin  0.4 mg Oral Daily With Dinner Sincandy Jones PA-C          Today, Patient Was Seen By: Taras Diaz DO    **Please Note: This note may have been constructed using a voice recognition system. **

## 2023-09-10 NOTE — PLAN OF CARE
Problem: PAIN - ADULT  Goal: Verbalizes/displays adequate comfort level or baseline comfort level  Description: Interventions:  - Encourage patient to monitor pain and request assistance  - Assess pain using appropriate pain scale  - Administer analgesics based on type and severity of pain and evaluate response  - Implement non-pharmacological measures as appropriate and evaluate response  - Consider cultural and social influences on pain and pain management  - Notify physician/advanced practitioner if interventions unsuccessful or patient reports new pain  Outcome: Progressing     Problem: SAFETY ADULT  Goal: Patient will remain free of falls  Description: INTERVENTIONS:  - Educate patient/family on patient safety including physical limitations  - Instruct patient to call for assistance with activity   - Consult OT/PT to assist with strengthening/mobility   - Keep Call bell within reach  - Keep bed low and locked with side rails adjusted as appropriate  - Keep care items and personal belongings within reach  - Initiate and maintain comfort rounds  - Make Fall Risk Sign visible to staff  - Offer Toileting every  Hours, in advance of need  - Initiate/Maintain alarm  - Obtain necessary fall risk management equipment:   - Apply yellow socks and bracelet for high fall risk patients  - Consider moving patient to room near nurses station  Outcome: Progressing  Goal: Maintain or return to baseline ADL function  Description: INTERVENTIONS:  -  Assess patient's ability to carry out ADLs; assess patient's baseline for ADL function and identify physical deficits which impact ability to perform ADLs (bathing, care of mouth/teeth, toileting, grooming, dressing, etc.)  - Assess/evaluate cause of self-care deficits   - Assess range of motion  - Assess patient's mobility; develop plan if impaired  - Assess patient's need for assistive devices and provide as appropriate  - Encourage maximum independence but intervene and supervise when necessary  - Involve family in performance of ADLs  - Assess for home care needs following discharge   - Consider OT consult to assist with ADL evaluation and planning for discharge  - Provide patient education as appropriate  Outcome: Progressing     Problem: SKIN/TISSUE INTEGRITY - ADULT  Goal: Skin Integrity remains intact(Skin Breakdown Prevention)  Description: Assess:  -Perform Benson assessment every   -Clean and moisturize skin every   -Inspect skin when repositioning, toileting, and assisting with ADLS  -Assess under medical devices such as  every   -Assess extremities for adequate circulation and sensation     Bed Management:  -Have minimal linens on bed & keep smooth, unwrinkled  -Change linens as needed when moist or perspiring  -Avoid sitting or lying in one position for more than  hours while in bed  -Keep HOB at degrees     Toileting:  -Offer bedside commode  -Assess for incontinence every   -Use incontinent care products after each incontinent episode such as     Activity:  -Mobilize patient  times a day  -Encourage activity and walks on unit  -Encourage or provide ROM exercises   -Turn and reposition patient every  Hours  -Use appropriate equipment to lift or move patient in bed  -Instruct/ Assist with weight shifting every  when out of bed in chair  -Consider limitation of chair time  hour intervals    Skin Care:  -Avoid use of baby powder, tape, friction and shearing, hot water or constrictive clothing  -Relieve pressure over bony prominences using   -Do not massage red bony areas    Next Steps:  -Teach patient strategies to minimize risks such as    -Consider consults to  interdisciplinary teams such as   Outcome: Progressing     Problem: Prexisting or High Potential for Compromised Skin Integrity  Goal: Skin integrity is maintained or improved  Description: INTERVENTIONS:  - Identify patients at risk for skin breakdown  - Assess and monitor skin integrity  - Assess and monitor nutrition and hydration status  - Monitor labs   - Assess for incontinence   - Turn and reposition patient  - Assist with mobility/ambulation  - Relieve pressure over bony prominences  - Avoid friction and shearing  - Provide appropriate hygiene as needed including keeping skin clean and dry  - Evaluate need for skin moisturizer/barrier cream  - Collaborate with interdisciplinary team   - Patient/family teaching  - Consider wound care consult   Outcome: Progressing     Problem: MOBILITY - ADULT  Goal: Maintain or return to baseline ADL function  Description: INTERVENTIONS:  -  Assess patient's ability to carry out ADLs; assess patient's baseline for ADL function and identify physical deficits which impact ability to perform ADLs (bathing, care of mouth/teeth, toileting, grooming, dressing, etc.)  - Assess/evaluate cause of self-care deficits   - Assess range of motion  - Assess patient's mobility; develop plan if impaired  - Assess patient's need for assistive devices and provide as appropriate  - Encourage maximum independence but intervene and supervise when necessary  - Involve family in performance of ADLs  - Assess for home care needs following discharge   - Consider OT consult to assist with ADL evaluation and planning for discharge  - Provide patient education as appropriate  Outcome: Progressing  Goal: Maintains/Returns to pre admission functional level  Description: INTERVENTIONS:  - Perform BMAT or MOVE assessment daily.   - Set and communicate daily mobility goal to care team and patient/family/caregiver. - Collaborate with rehabilitation services on mobility goals if consulted  - Perform Range of Motion  times a day. - Reposition patient every  hours.   - Dangle patient  times a day  - Stand patient  times a day  - Ambulate patient  times a day  - Out of bed to chair  times a day   - Out of bed for meals times a day  - Out of bed for toileting  - Record patient progress and toleration of activity level   Outcome: Progressing

## 2023-09-10 NOTE — PROGRESS NOTES
Progress Note - Orthopedics   Coty El 80 y.o. male MRN: [id number]  Unit/Bed#: [address] [place] [address] Encounter: [id number]        Subjective: POD #1 status post I&D left leg abscess with hardware removal. Patient notes minimal pain about the leg. He notes good sensation of the left lower extremity. Patient has remained afebrile. He denies any CP, SOB, dizziness, nausea/vomiting. No acute overnight events. Patient has been on Vancomycin. Infectious disease has been consulted. Vitals: Blood pressure 157/85, pulse 77, temperature 97.6 °F (36.4 °C), resp. rate 19, height 5' 9" (1.753 m), weight 87.3 kg (192 lb 7.4 oz), SpO2 93 %. ,Body mass index is 28.42 kg/m². Intake/Output Summary (Last 24 hours) at 9/10/2023 0741  Last data filed at 9/10/2023 0523  Gross per 24 hour   Intake 550 ml   Output 450 ml   Net 100 ml       Invasive Devices     Peripheral Intravenous Line  Duration           Peripheral IV 03/23/23 Dorsal (posterior); Left Wrist 170 days    Peripheral IV 09/08/23 Left Antecubital 1 day          Line  Duration           Venous Sheath Other (Comment) Right Other (Comment) 1114 days                  Ortho Exam: Patient alert and oriented X3 in NAD lying comfortably in bed. Left lower extremity: Dressing CDI. No pain with gentle ROM of knee and hip. Compartments soft. No calf tenderness. Moves toes/ankle freely. 2+ DP pulse. Sensation intact lateral femoral cutaneous, saphenous, sural, deep/superficial peroneal nerve distributions. 2+ pitting edema left lower extremity. 1+ pitting edema right lower extremity. Lab, Imaging and other studies: I have personally reviewed pertinent lab results.   CBC:   Lab Results   Component Value Date    WBC 14.05 (H) 09/10/2023    HGB 11.6 (L) 09/10/2023    HCT 35.1 (L) 09/10/2023    MCV 96 09/10/2023     09/10/2023    RBC 3.66 (L) 09/10/2023    MCH 31.7 09/10/2023    MCHC 33.0 09/10/2023    RDW 13.7 09/10/2023    MPV 9.4 09/10/2023    NRBC 0 09/09/2023     CMP: Lab Results   Component Value Date     12/21/2015     09/10/2023     12/21/2015    CO2 27 09/10/2023    CO2 30 (H) 12/21/2015    ANIONGAP 11.9 12/21/2015    BUN 22 09/10/2023    BUN 13 12/21/2015    CREATININE 1.13 09/10/2023    CREATININE 0.9 12/21/2015    GLUCOSE 93 12/21/2015    CALCIUM 8.6 09/10/2023    CALCIUM 8.7 12/21/2015    AST 15 09/09/2023    AST 20 12/21/2015    ALT 11 09/09/2023    ALT 22 12/21/2015    ALKPHOS 57 09/09/2023    ALKPHOS 82 12/21/2015    PROT 6.9 12/21/2015    BILITOT 0.6 12/21/2015    EGFR 59 09/10/2023     PT/INR:   Lab Results   Component Value Date    INR 1.09 09/09/2023       Assessment:   POD #1 status post I&D left leg with hardware removal    Plan:  -Continue abx per primary team and infectious disease. -WBAT LLE  -Analgesia prn  -Dressing to remain in place until tomorrow  -Lovenox for DVT prophylaxis  -Will reassess patient's wound tomorrow to determine if repeat I&D needed. -LLE edema chronic as per patient. No calf tenderness that would suggest DVT. Weight bearing: WBAT with assistance.     VTE Pharmacologic Prophylaxis: Enoxaparin (Lovenox)  VTE Mechanical Prophylaxis: sequential compression device

## 2023-09-10 NOTE — ASSESSMENT & PLAN NOTE
Worsening L knee pain, swelling and erythema since this past Sunday  · Previously underwent insertion of nail to the Left femur for intertrochanteric fracture about 15 months ago   · Saw Ortho outpatient 9/6 and concern for abscess in the L knee; Underwent local I&D and wound was packed; Wound culture grew Staph Aureus; was additionally given Oral abx to take bid  · Reports that Knee continued to appear erythematous and swollen and saw Ortho outpatient and sent to the ER. · Wound culture with MSSA. Discontinued vancomycin and continue patient on high-dose IV cefazolin  · Patient underwent I&D of left lower extremity and removal of hardware of left distal femur 9/9. Left distal femur abscess site with purulent fluid and infected appearing tissue was noted which was debrided. Screw was also sent for culture  · Continues to have leukocytosis on lab work  · Repeat labs in a.m.

## 2023-09-10 NOTE — ASSESSMENT & PLAN NOTE
LVEF June 2022 with LVEF 60%. Mild-Moderate TR. Grade 1 Diastolic Dysfunction  · Denies any sob currently. · Per review of PCPs note, patient on Demadex every other day.   We will restart Demadex

## 2023-09-11 LAB
ANION GAP SERPL CALCULATED.3IONS-SCNC: 6 MMOL/L
BACTERIA SPEC ANAEROBE CULT: NORMAL
BACTERIA SPEC ANAEROBE CULT: NORMAL
BACTERIA WND AEROBE CULT: ABNORMAL
BUN SERPL-MCNC: 20 MG/DL (ref 5–25)
CALCIUM SERPL-MCNC: 8.9 MG/DL (ref 8.4–10.2)
CHLORIDE SERPL-SCNC: 101 MMOL/L (ref 96–108)
CO2 SERPL-SCNC: 28 MMOL/L (ref 21–32)
CREAT SERPL-MCNC: 1.13 MG/DL (ref 0.6–1.3)
ERYTHROCYTE [DISTWIDTH] IN BLOOD BY AUTOMATED COUNT: 13.9 % (ref 11.6–15.1)
FUNGUS SPEC CULT: NORMAL
GFR SERPL CREATININE-BSD FRML MDRD: 59 ML/MIN/1.73SQ M
GLUCOSE SERPL-MCNC: 93 MG/DL (ref 65–140)
GRAM STN SPEC: ABNORMAL
HCT VFR BLD AUTO: 39.2 % (ref 36.5–49.3)
HGB BLD-MCNC: 13.1 G/DL (ref 12–17)
MAGNESIUM SERPL-MCNC: 1.6 MG/DL (ref 1.9–2.7)
MCH RBC QN AUTO: 32.1 PG (ref 26.8–34.3)
MCHC RBC AUTO-ENTMCNC: 33.4 G/DL (ref 31.4–37.4)
MCV RBC AUTO: 96 FL (ref 82–98)
PLATELET # BLD AUTO: 261 THOUSANDS/UL (ref 149–390)
PMV BLD AUTO: 9.1 FL (ref 8.9–12.7)
POTASSIUM SERPL-SCNC: 4.5 MMOL/L (ref 3.5–5.3)
RBC # BLD AUTO: 4.08 MILLION/UL (ref 3.88–5.62)
SODIUM SERPL-SCNC: 135 MMOL/L (ref 135–147)
WBC # BLD AUTO: 10.21 THOUSAND/UL (ref 4.31–10.16)

## 2023-09-11 PROCEDURE — 97530 THERAPEUTIC ACTIVITIES: CPT

## 2023-09-11 PROCEDURE — 83735 ASSAY OF MAGNESIUM: CPT | Performed by: FAMILY MEDICINE

## 2023-09-11 PROCEDURE — 97163 PT EVAL HIGH COMPLEX 45 MIN: CPT

## 2023-09-11 PROCEDURE — 97167 OT EVAL HIGH COMPLEX 60 MIN: CPT

## 2023-09-11 PROCEDURE — 99024 POSTOP FOLLOW-UP VISIT: CPT | Performed by: ORTHOPAEDIC SURGERY

## 2023-09-11 PROCEDURE — 99223 1ST HOSP IP/OBS HIGH 75: CPT | Performed by: INTERNAL MEDICINE

## 2023-09-11 PROCEDURE — 80048 BASIC METABOLIC PNL TOTAL CA: CPT | Performed by: FAMILY MEDICINE

## 2023-09-11 PROCEDURE — 99232 SBSQ HOSP IP/OBS MODERATE 35: CPT | Performed by: FAMILY MEDICINE

## 2023-09-11 PROCEDURE — 85027 COMPLETE CBC AUTOMATED: CPT | Performed by: FAMILY MEDICINE

## 2023-09-11 RX ORDER — LISINOPRIL 5 MG/1
5 TABLET ORAL DAILY
Status: DISCONTINUED | OUTPATIENT
Start: 2023-09-11 | End: 2023-09-12 | Stop reason: HOSPADM

## 2023-09-11 RX ORDER — MAGNESIUM SULFATE HEPTAHYDRATE 40 MG/ML
2 INJECTION, SOLUTION INTRAVENOUS ONCE
Status: COMPLETED | OUTPATIENT
Start: 2023-09-11 | End: 2023-09-11

## 2023-09-11 RX ADMIN — PANTOPRAZOLE SODIUM 40 MG: 40 TABLET, DELAYED RELEASE ORAL at 08:11

## 2023-09-11 RX ADMIN — FLUTICASONE PROPIONATE 1 SPRAY: 50 SPRAY, METERED NASAL at 08:15

## 2023-09-11 RX ADMIN — LISINOPRIL 5 MG: 5 TABLET ORAL at 10:27

## 2023-09-11 RX ADMIN — CEFAZOLIN SODIUM 2000 MG: 2 SOLUTION INTRAVENOUS at 03:41

## 2023-09-11 RX ADMIN — NIACIN 500 MG: 500 TABLET, EXTENDED RELEASE ORAL at 21:18

## 2023-09-11 RX ADMIN — ACETAMINOPHEN 650 MG: 325 TABLET ORAL at 14:23

## 2023-09-11 RX ADMIN — CITALOPRAM HYDROBROMIDE 40 MG: 20 TABLET ORAL at 08:11

## 2023-09-11 RX ADMIN — POLYETHYLENE GLYCOL 3350 17 G: 17 POWDER, FOR SOLUTION ORAL at 08:16

## 2023-09-11 RX ADMIN — ENOXAPARIN SODIUM 40 MG: 40 INJECTION SUBCUTANEOUS at 08:12

## 2023-09-11 RX ADMIN — MAGNESIUM SULFATE HEPTAHYDRATE 2 G: 40 INJECTION, SOLUTION INTRAVENOUS at 10:26

## 2023-09-11 RX ADMIN — BISACODYL 10 MG: 5 TABLET, COATED ORAL at 21:18

## 2023-09-11 RX ADMIN — METOPROLOL TARTRATE 50 MG: 50 TABLET, FILM COATED ORAL at 19:34

## 2023-09-11 RX ADMIN — CEFAZOLIN SODIUM 2000 MG: 2 SOLUTION INTRAVENOUS at 12:51

## 2023-09-11 RX ADMIN — ATORVASTATIN CALCIUM 5 MG: 10 TABLET, FILM COATED ORAL at 15:34

## 2023-09-11 RX ADMIN — METOPROLOL TARTRATE 50 MG: 50 TABLET, FILM COATED ORAL at 08:09

## 2023-09-11 RX ADMIN — TAMSULOSIN HYDROCHLORIDE 0.4 MG: 0.4 CAPSULE ORAL at 15:34

## 2023-09-11 RX ADMIN — Medication 400 MG: at 08:05

## 2023-09-11 RX ADMIN — CEFAZOLIN SODIUM 2000 MG: 2 SOLUTION INTRAVENOUS at 21:18

## 2023-09-11 RX ADMIN — POLYETHYLENE GLYCOL 3350 17 G: 17 POWDER, FOR SOLUTION ORAL at 17:23

## 2023-09-11 NOTE — ASSESSMENT & PLAN NOTE
LVEF June 2022 with LVEF 60%. Mild-Moderate TR.  Grade 1 Diastolic Dysfunction  · Denies any sob currently  · Per review of PCPs note, patient on Demadex every other day which has been restarted

## 2023-09-11 NOTE — PLAN OF CARE
Problem: PAIN - ADULT  Goal: Verbalizes/displays adequate comfort level or baseline comfort level  Description: Interventions:  - Encourage patient to monitor pain and request assistance  - Assess pain using appropriate pain scale  - Administer analgesics based on type and severity of pain and evaluate response  - Implement non-pharmacological measures as appropriate and evaluate response  - Consider cultural and social influences on pain and pain management  - Notify physician/advanced practitioner if interventions unsuccessful or patient reports new pain  Outcome: Progressing        Problem: Prexisting or High Potential for Compromised Skin Integrity  Goal: Skin integrity is maintained or improved  Description: INTERVENTIONS:  - Identify patients at risk for skin breakdown  - Assess and monitor skin integrity  - Assess and monitor nutrition and hydration status  - Monitor labs   - Assess for incontinence   - Turn and reposition patient  - Assist with mobility/ambulation  - Relieve pressure over bony prominences  - Avoid friction and shearing  - Provide appropriate hygiene as needed including keeping skin clean and dry  - Evaluate need for skin moisturizer/barrier cream  - Collaborate with interdisciplinary team   - Patient/family teaching  - Consider wound care consult   Outcome: Progressing     Problem: MOBILITY - ADULT  Goal: Maintain or return to baseline ADL function  Description: INTERVENTIONS:  -  Assess patient's ability to carry out ADLs; assess patient's baseline for ADL function and identify physical deficits which impact ability to perform ADLs (bathing, care of mouth/teeth, toileting, grooming, dressing, etc.)  - Assess/evaluate cause of self-care deficits   - Assess range of motion  - Assess patient's mobility; develop plan if impaired  - Assess patient's need for assistive devices and provide as appropriate  - Encourage maximum independence but intervene and supervise when necessary  - Involve family in performance of ADLs  - Assess for home care needs following discharge   - Consider OT consult to assist with ADL evaluation and planning for discharge  - Provide patient education as appropriate  Outcome: Progressing

## 2023-09-11 NOTE — PLAN OF CARE
Problem: OCCUPATIONAL THERAPY ADULT  Goal: Performs self-care activities at highest level of function for planned discharge setting. See evaluation for individualized goals. Description: Treatment Interventions: ADL retraining, Functional transfer training, Endurance training, Patient/family training, Equipment evaluation/education, Compensatory technique education, Continued evaluation, Energy conservation, Activityengagement  Equipment Recommended: Shower/Tub chair with back ($)       See flowsheet documentation for full assessment, interventions and recommendations. Note: Limitation: Decreased ADL status, Decreased endurance, Decreased self-care trans, Decreased high-level ADLs (impaired pain, L LE ROM / strength, activity tolerance, standing tolerance, standing balance)     Assessment: Pt is an 79yo male admitted to [facility] on 9/8/23 from home w/ L knee abscess. Diagnosed w/ abscess of L leg and is s/p I&D and removal of hardware L distal femur on 9/9/23 w/ Dr. Lino Milan. Per orthopedics pt is WBAT L LE. Pt reports living w/ wife in [address] [place] [address] w/ 3-4 JASON and use of RW for functional mobility. Pt reports I w/ grooming/ UB/LBD at baseline and wife assists w/ bathing and IADLs. Upon eval, pt alert and generally oriented. Able to participate in conversation and follow directions w/ + time due to hard of hearing. Generally oriented and cooperative. Pt required S to complete bed mobility w/ + time, S sit <> stand, use of RW w/ S, min A LBD, mod I UBD, mod I grooming seated. Pt completing ADLs below baseline level of I and would benefit from OT in acute care to max functional independence. Recommend DC home w/ PT services using RW when medically stable and wife / family support / assist. Will continue to follow 2-3X / week in acute care.      OT Discharge Recommendation: Home with home health rehabilitation (return home w/ wife support / assist and PT services)

## 2023-09-11 NOTE — OCCUPATIONAL THERAPY NOTE
Occupational Therapy Evaluation     Patient Name: Darryle Mole BTORY'P Date: 9/11/2023  Problem List  Principal Problem:    Abscess of left leg  Active Problems:    Hyperlipidemia    Paroxysmal atrial flutter (HCC)    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HCC)    Stage 3 chronic kidney disease, unspecified whether stage 3a or 3b CKD ([address])    Past Medical History  Past Medical History:   Diagnosis Date    BPH (benign prostatic hyperplasia)     Depression     HTN (hypertension)     Hyperlipidemia     OCD (obsessive compulsive disorder)     Pulmonary embolism ([address]) 2019     Past Surgical History  Past Surgical History:   Procedure Laterality Date    HARDWARE REMOVAL Left 9/9/2023    Procedure: REMOVAL HARDWARE;  Surgeon: Deb Angel DO;  Location: [facility];  Service: Orthopedics    INCISION AND DRAINAGE OF WOUND Left 9/9/2023    Procedure: INCISION AND DRAINAGE (I&D) EXTREMITY;  Surgeon: Deb Angel DO;  Location: [facility];  Service: Orthopedics    IR IVC FILTER PLACEMENT OPTIONAL/TEMPORARY  12/7/2019    IR IVC FILTER REMOVAL  8/21/2020    OK OPTX FEM SHFT FX W/INSJ IMED IMPLT W/WO SCREW Right 12/4/2019    Procedure: INSERTION NAIL IM FEMUR ANTEGRADE (TROCHANTERIC); Surgeon: Katie Porter DO;  Location: [facility];  Service: Orthopedics    OK OPTX FEM SHFT FX W/INSJ IMED IMPLT W/WO SCREW Left 6/17/2022    Procedure: INSERTION NAIL IM FEMUR ANTEGRADE (TROCHANTERIC) - long nail;  Surgeon: Deb Angel DO;  Location: [facility];  Service: Orthopedics        09/11/23 1240   OT Last Visit   OT Visit Date 09/11/23   Note Type   Note type Evaluation   Pain Assessment   Pain Assessment Tool 0-10   Pain Score No Pain   Pain Location/Orientation Orientation: Left; Location: Leg;Location: Knee  ("aches")   Effect of Pain on Daily Activities limits pace and I w/LBD   Patient's Stated Pain Goal No pain   Restrictions/Precautions   Weight Bearing Precautions Per Order Yes   LLE Weight Bearing Per Order (S)  WBAT  (s/p I&D, L distal femur hardware removal w/ Dr. Suha Pringle on 9/9/23)   Braces or Orthoses   (ace bandage L LE)   Other Precautions Chair Alarm; Bed Alarm;WBS;Multiple lines; Fall Risk  (IV pole, Homero Aguirrester on room air)   Home Living   Type of Home House  (3-4 JASON)   Home Layout One level;Stairs to enter with rails; Performs ADLs on one level; Able to live on main level with bedroom/bathroom   Bathroom Shower/Tub Tub/shower unit  (prefers to sponge bathe seated on "flowers" w/ assist from wife at baseline)   Bathroom Toilet Standard   Bathroom Equipment Toilet raiser; Shower chair   Bathroom Accessibility Accessible   Home Equipment Walker;Cane   Additional Comments Pt reports living w/ wifeDiana in [facility]   Prior Function   Level of [place] Needs assistance with IADLS; Independent with functional mobility  (using RW; wife assists w/ bathing)   Lives With Spouse  Diana Ruiz)   [address] Help From Family   IADLs Family/Friend/Other provides transportation; Family/Friend/Other provides meals; Family/Friend/Other provides medication management   Falls in the last 6 months 0   Vocational Retired   Comments Pt reports I w/ ADLs using RW but wife assist w/ bathing and IADLs   Lifestyle   Autonomy Pt reports I w/ basic ADLs using RW; needs assistance w/ IADLs and bathing   Reciprocal Relationships Pt reports living w/ wifeDiana   Service 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 Suha Pringle on 9/9/23 and is WBAT L LE   Family/Caregiver Present Yes   Additional General Comments Significant PMH Impacting his occupational performance includes HTN, hx PE, IVC filter placement (2019) and removal (2020), R femur IM nail (2019), L femur IM nail (2022).  Personal and environmental factors impacting performance includes difficulty completing IADLs, increased pain, hard of hearing, advanced age, difficulty completing IADLs; support / strengths include I at baseline, supportive wife, first floor set- up. Subjective   Subjective "I would like to put my clothes on if I am going home today"   ADL   Where Assessed Edge of bed  (vs OOB in chair post eval)   Eating Assistance 6  Modified independent   Eating Deficit Setup   Grooming Assistance 6  Modified Independent   Grooming Deficit Setup; Increased time to complete;Standing with assistive device   UB Bathing Assistance Unable to assess  (anticipate mod I seated based on fxal obs skills, clinical judgement)   LB Bathing Assistance Unable to assess  (anticipate min A based on fxal obs skills, clinical judgement)   UB Dressing Assistance 6  Modified independent   UB Dressing Deficit Setup;Verbal cueing; Increased time to complete  (due to IV / Phoebe Seals)   LB Dressing Assistance 4  Minimal Assistance   LB Dressing Deficit Thread RLE into pants; Thread RLE into underwear; Thread LLE into pants; Thread LLE into underwear;Requires assistive device for steadying;Setup;Supervision/safety;Verbal cueing; Increased time to complete  (required min A to thread LE into underwear / pants)   Toileting Assistance  Unable to assess  (denied need to void)   Bed Mobility   Supine to Sit 5  Supervision   Additional items Assist x 1;Bedrails;HOB elevated; Increased time required;Verbal cues  (to pt's R)   Sit to Supine Unable to assess   Additional Comments Pt seated OOB in chair post eval w/ needs met, call bell in reach and chair alarm activated   Transfers   Sit to Stand 5  Supervision   Additional items Assist x 1; Increased time required;Verbal cues   Stand to Sit 5  Supervision   Additional items Assist x 1; Increased time required;Verbal cues   Additional Comments Pt performed sit <> stand w/ S 2X.  Initially required min A from EOB and progressed to S   Functional Mobility   Functional Mobility 5  Supervision   Additional Comments engaged in short distance functional mobility using RW w/ in room   Additional items Rolling walker   Balance   Static Sitting Good   Dynamic Sitting [address] -  (using RW)   Activity Tolerance   Activity Tolerance Patient limited by fatigue  (hard of hearing; hearing aides not present)   Medical Staff Made Aware spoke w/ YELITZA, Yesy Mejía and PT, Yoko Smart   Nurse Made Aware spoke w/ RNIsaac   RUKARISSA Assessment   RUE Assessment [facility]   RU Strength   RUE Overall Strength Within Functional Limits - able to perform ADL tasks with strength   LUE Assessment   LUE Assessment WFL   LUE Strength   LUE Overall Strength Within Functional Limits - able to perform ADL tasks with strength   Hand Function   Gross Motor Coordination Functional   Fine Motor Coordination Functional   Sensation   Light Touch No apparent deficits  (B UE to light touch)   Cognition   Overall Cognitive Status   (appears WFL; general age related cognitive decline. hard of hearing and hearing aides not present)   Arousal/Participation Alert; Cooperative   Attention Attends with cues to redirect   Orientation Level Oriented X4   Memory   (appears WFL, slow to respond and wife frequently responded for pt)   Following Commands Follows multistep commands with increased time or repetition   Comments Identified pt by full name and birthdate. Alert, genearlly oriented and able to follow directions during ADLs. Pt is hard of hearing and able to follow directions w/ + time. Hearing aides not present   Assessment   Limitation Decreased ADL status; Decreased endurance;Decreased self-care trans;Decreased high-level ADLs  (impaired pain, L LE ROM / strength, activity tolerance, standing tolerance, standing balance)   Assessment Pt is an 81yo male [facility] from home w/ L knee abscess. Diagnosed w/ abscess of L leg and is s/p I&D and removal of hardware L distal femur on 9/9/23 w/ Dr. Charles Thomason. Per orthopedics pt is WBAT L LE.  Pt reports living w/ wife in 1 [facility] FACILITY w/ 3-4 JASON and use of RW for functional mobility. Pt reports I w/ grooming/ UB/LBD at baseline and wife assists w/ bathing and IADLs. Upon eval, pt alert and generally oriented. Able to participate in conversation and follow directions w/ + time due to hard of hearing. Generally oriented and cooperative. Pt required S to complete bed mobility w/ + time, S sit <> stand, use of RW w/ S, min A LBD, mod I UBD, mod I grooming seated. Pt completing ADLs below baseline level of I and would benefit from OT in acute care to max functional independence. Recommend DC home w/ PT services using RW when medically stable and wife / family support / assist. Will continue to follow 2-3X / week in acute care. Goals   Patient Goals Pt stated that he would like to return home today   Plan   Treatment Interventions ADL retraining;Functional transfer training; Endurance training;Patient/family training;Equipment evaluation/education; Compensatory technique education;Continued evaluation; Energy conservation; Activityengagement   Goal Expiration Date 09/18/23   OT Frequency 3-5x/wk   Recommendation   OT Discharge Recommendation Home with home health rehabilitation  (return home w/ wife support / assist and PT services)   Equipment Recommended Shower/Tub chair with back ($)   AM-PAC Daily Activity Inpatient   Lower Body Dressing 3   Bathing 3   Toileting 3   Upper Body Dressing 4   Grooming 4   Eating 4   Daily Activity Raw Score 21   Daily Activity Standardized Score (Calc for Raw Score >=11) 44.27   AM-PAC Applied Cognition Inpatient   Following a Speech/Presentation 3   Understanding Ordinary Conversation 4   Taking Medications 3   Remembering Where Things Are Placed or Put Away 4   Remembering List of 4-5 Errands 3   Taking Care of Complicated Tasks 3   Applied Cognition Raw Score 20   Applied Cognition Standardized Score 41.76   Barthel Index   Feeding 10   Bathing 0   Grooming Score 5   Dressing Score 5 Bladder Score 5   Bowels Score 10   Toilet Use Score 5   Transfers (Bed/Chair) Score 10   Mobility (Level Surface) Score 0   Stairs Score 0   Barthel Index Score 50   End of Consult   Patient Position at End of Consult Bedside chair;Bed/Chair alarm activated; All needs within reach   Nurse Communication Nurse aware of consult   Licensure   [address] License Number  Abraham Crouch, OTR/L [id number]      Pt goals to be met by 9/18/23 to max I w/ ADLs and improve engagement to return home to play solitaire includes:    -Pt will complete functional transfers to bed, chair and toilet using LRAD, DME as needed w/ mod I    -Pt will consistently engage in functional mobility using LRAD household distances w/ mod I to max I and minimize burden of care    -Pt will complete LBD w/ mod I using LRAD and Sameul Callander as needed to minimize burden of care    -Pt will complete bed mobility supine <> sit w/ mod I    -Pt will demonstrate improved activity and functional standing tolerance for at least 10 minutes w/ at least fair balance to participate in grooming and UB bathing w/ S standing at sink     -Pt will complete LB bathing w/ S after set- up while seated in commode. The patient's raw score on the -PAC Daily Activity Inpatient Short Form is 21. A raw score of greater than or equal to 19 suggests the patient may benefit from discharge to home. Please refer to the recommendation of the Occupational Therapist for safe discharge planning.     Abraham Crouch OTR/L  [id number]  [id number]

## 2023-09-11 NOTE — DISCHARGE INSTR - AVS FIRST PAGE
Orthopedics:   After discharge the dressing to the left leg may be changed daily with clean sterile dry dressings. Recommended use of a nonstick dressing to the anterior abrasions over knee.   Follow-up with orthopedics in approximately 10 to 14 days from surgery for wound evaluation and suture removal.

## 2023-09-11 NOTE — PHYSICAL THERAPY NOTE
PHYSICAL THERAPY EVALUATION/TREATMENT       09/11/23 1437   PT Last Visit   PT Visit Date 09/11/23   Note Type   Note type Evaluation   Pain Assessment   Pain Assessment Tool 0-10   Pain Score 7   Pain Location/Orientation Orientation: Left; Location: Knee   Pain Onset/Description Descriptor: Discomfort   Effect of Pain on Daily Activities limits rest/mobility   Patient's Stated Pain Goal No pain   Hospital Pain Intervention(s) Repositioned; Ambulation/increased activity   Multiple Pain Sites No   Restrictions/Precautions   Weight Bearing Precautions Per Order Yes   LLE Weight Bearing Per Order WBAT   Other Precautions Chair Alarm; Bed Alarm;WBS;Fall Risk;Pain   Home Living   Type of [address] [place] [address] Dr One level;Performs ADLs on one level; Able to live on main level with bedroom/bathroom;Stairs to enter with rails  (4 JASON)   Bathroom Toilet Standard   Bathroom Equipment Toilet raiser; Shower chair   Home Equipment Walker;Cane   Prior Function   Level of [place] Independent with functional mobility; Needs assistance with ADLs; Needs assistance with IADLS;Other (Comment)  (Wife sponge bathes patient ; wife performs all IADLs)   Lives With Spouse   Receives Help From Family   IADLs Family/Friend/Other provides transportation; Family/Friend/Other provides meals; Family/Friend/Other provides medication management   Falls in the last 6 months 0   Vocational Retired   General   Additional Pertinent History Pt is an 80year-old male who was admitted to the hospital on 9/8/23 due to L knee pain - found to have abcess now POD #2 drainge + hardware removal. WBAT LLE   Family/Caregiver Present Yes  (wife at bedside during session)   Cognition   Attention Attends with cues to redirect   Orientation Level Oriented X4   Following Commands Follows multistep commands with increased time or repetition   Subjective   Subjective "I can walk"   RLE Assessment   RLE Assessment WFL   LLE Assessment   LLE Assessment WFL  (3 to 3+/5 mild pain reported with knee extension)   Bed Mobility   Sit to Supine 4  Minimal assistance   Additional items Assist x 1;Verbal cues;LE management   Additional Comments received sitting OOB in chair   Transfers   Sit to Stand 5  Supervision   Additional items Assist x 1;Verbal cues;Armrests; Increased time required   Stand to Sit 5  Supervision   Additional items Assist x 1;Verbal cues; Increased time required;Armrests   Ambulation/Elevation   Gait pattern Foward flexed; Short stride; Step through pattern  (decreased gait speed)   Gait Assistance 4  Minimal assist  (progressing to supervision)   Additional items Assist x 1;Verbal cues   Assistive Device Rolling walker   Distance 50 feet   Stair Management Assistance 4  Minimal assist   Additional items Assist x 1;Verbal cues   Stair Management Technique Two rails; Step to pattern   Number of Stairs 4   Balance   Static Sitting Good   Dynamic Sitting Fair +   Static Standing Fair   Dynamic Standing Fair -   Ambulatory Fair -   Activity Tolerance   Activity Tolerance Patient tolerated treatment well;Patient limited by fatigue   Nurse Made Aware yes RN Faith   Assessment   Prognosis Good   Problem List Decreased strength;Decreased range of motion;Decreased endurance; Impaired balance;Decreased mobility;Pain;Decreased skin integrity   Assessment Patient seen for Physical Therapy evaluation. Patient admitted with Abscess of left leg. Comorbidities affecting patient's physical performance include: Afib, cardiac disease, CKD, depression, hypertension and PE. Personal factors affecting patient at time of initial evaluation include: lives in 1 story house, ambulating with assistive device, stairs to enter home, inability to navigate level surfaces without external assistance, inability to perform IADLS  and inability to live alone.  Prior to admission, patient was independent with functional mobility with walker, requiring assist for ADLS, requiring assist for IADLS, living with spouse in a 1 level home with 4 steps to enter and ambulating household distance. Please find objective findings from Physical Therapy assessment regarding body systems outlined above with impairments and limitations including weakness, decreased ROM, impaired balance, decreased endurance, gait deviations, pain, decreased activity tolerance, decreased functional mobility tolerance, decreased safety awareness, fall risk and decreased skin integrity. The Barthel Index was used as a functional outcome tool presenting with a score of Barthel Index Score: 65 today indicating moderate limitations of functional mobility and ADLS. Patient's clinical presentation is currently unstable/unpredictable as seen in patient's presentation of changing level of pain, increased fall risk, new onset of impairment of functional mobility and decreased endurance. Pt would benefit from continued Physical Therapy treatment to address deficits as defined above and maximize level of functional mobility. As demonstrated by objective findings, the assigned level of complexity for this evaluation is high. The patient's [facility] Basic Mobility Inpatient Short Form Raw Score is 18. A Raw score of greater than 16 suggests the patient may benefit from discharge to home. Please also refer to the recommendation of the Physical Therapist for safe discharge planning. Goals   Patient Goals wants to go home   STG Expiration Date 09/18/23   Short Term Goal #1 Pt will perform bed mobility with supervision ; LLE strength will improve by 1/2 grade to improve tolerance to functional mobility ;  Pt will perform bed <> chair transfer with supervision + RW ; Pt will ambulate x150 feet with supervision + RW ; Pt negotiate x 4 steps with supervision + rail ; AMPAC score will improve >20/24 to demonstrate improved functional independence   LTG Expiration Date 09/25/23   Long Term Goal #1 Pt will perform bed mobility IND ; LLE strength will improve by 1 grade to improve tolerance to functional mobility ; Pt will perform bed <> chair transfer with Mod I + RW ; Pt will ambulate x 250 feet with supervision + RW ; Pt negotiate x 4 steps with Mod I + rail ; AMPAC score will improve >22/24 to demonstrate improved functional independence   Plan   Treatment/Interventions ADL retraining;Functional transfer training;LE strengthening/ROM; Therapeutic exercise; Endurance training;Bed mobility;Gait training;Spoke to nursing;OT   PT Frequency Other (Comment)  (5x/week)   Recommendation   PT Discharge Recommendation Home with home health rehabilitation   AM-PAC Basic Mobility Inpatient   Turning in Flat Bed Without Bedrails 3   Lying on Back to Sitting on Edge of Flat Bed Without Bedrails 3   Moving Bed to Chair 3   Standing Up From Chair Using Arms 3   Walk in Room 3   Climb 3-5 Stairs With Railing 3   Basic Mobility Inpatient Raw Score 18   Basic Mobility Standardized Score 41.05   Highest Level Of Mobility   JH-HLM Goal 6: Walk 10 steps or more   JH-HLM Achieved 7: Walk 25 feet or more   Barthel Index   Feeding 10   Bathing 0   Grooming Score 5   Dressing Score 5   Bladder Score 5   Bowels Score 10   Toilet Use Score 5   Transfers (Bed/Chair) Score 10   Mobility (Level Surface) Score 10   Stairs Score 5   Barthel Index Score 65   Additional Treatment Session   Start Time 1427   End Time 1437   Treatment Assessment S: "I feel pretty good" O/A: Pt agreeable to PT session this afternoon. Able to ambulate additional 50 feet with supervision + RW with gait deviations as mentioned above. Pt reports generally fatigued, requesting to return to bed. Requires Min A for LLE management. Able to perform gentle ankle pumps, heel slides LLE with good response. P: pt will benefit from skilled PT to address deficits to maximize IND for safe d/c   Equipment Use walker   End of Consult   Patient Position at End of Consult Supine; All needs within reach   [id number] Number  Shonad Lindo [id number]

## 2023-09-11 NOTE — PROGRESS NOTES
[address] [facility]  Progress Note  Name: Henrik Watson  MRN: [id number]  Unit/Bed#: 9250 [facility] 201 I Date of Admission: 9/8/2023   Date of Service: 9/11/2023 I Hospital Day: 3    Assessment/Plan   * Abscess of left leg  Assessment & Plan  Worsening L knee pain, swelling and erythema since this past Sunday  · Previously underwent insertion of nail to the Left femur for intertrochanteric fracture about 15 months ago   · Saw Ortho outpatient 9/6 and concern for abscess in the L knee; Underwent local I&D and wound was packed; Wound culture grew Staph Aureus; was additionally given Oral abx to take bid  · Reports that Knee continued to appear erythematous and swollen and saw Ortho outpatient and sent to the ER. · Wound culture with MSSA. D/C'ed vancomycin and continue patient on high-dose IV cefazolin  · Possible transition to Keflex tomorrow  · Patient underwent I&D of left lower extremity and removal of hardware of left distal femur 9/9. Left distal femur abscess site with purulent fluid and infected appearing tissue was noted which was debrided. · Screw was also sent for culture which is neg  · Leukocytosis resolving  · Repeat labs in a.m. Stage 3 chronic kidney disease, unspecified whether stage 3a or 3b CKD [facility])  Assessment & Plan  Lab Results   Component Value Date    EGFR 59 09/11/2023    EGFR 59 09/10/2023    EGFR 63 09/09/2023    CREATININE 1.13 09/11/2023    CREATININE 1.13 09/10/2023    CREATININE 1.08 09/09/2023     Creatinine over the past 3-4 years - 0.9 to as high as 1.9. Patient's creatinine on admission 1.34 and it was 1.36 one month ago  · Creatinine remains at baseline. Magnesium repleted today  · Repeat lab work in a.m. Pulmonary artery hypertension [facility])  Assessment & Plan  LVEF June 2022 with LVEF 60%. Mild-Moderate TR.  Grade 1 Diastolic Dysfunction  · Denies any sob currently  · Per review of PCPs note, patient on Demadex every other day which has been restarted    Paroxysmal atrial flutter (HCC)  Assessment & Plan  Hx Paroxysmal atrial flutter, Not on pta anticoagulation   · Continue Lopressor. Hyperlipidemia  Assessment & Plan  Continue niacin. VTE Pharmacologic Prophylaxis: VTE Score: 5 High Risk (Score >/= 5) - Pharmacological DVT Prophylaxis Ordered: enoxaparin (Lovenox). Sequential Compression Devices Ordered. Patient Centered Rounds: I performed bedside rounds with nursing staff today. Discussions with Specialists or Other Care Team Provider: yes - ID    Education and Discussions with Family / Patient: Updated  (daughter) via phone. Total Time Spent on Date of Encounter in care of patient: 45 minutes This time was spent on one or more of the following: performing physical exam; counseling and coordination of care; obtaining or reviewing history; documenting in the medical record; reviewing/ordering tests, medications or procedures; communicating with other healthcare professionals and discussing with patient's family/caregivers. Current Length of Stay: 3 day(s)  Current Patient Status: Inpatient   Certification Statement: The patient will continue to require additional inpatient hospital stay due to MSSA left leg abscess status post I&D, awaiting OR cultures  Discharge Plan: Anticipate discharge in 24-48 hrs to discharge location to be determined pending rehab evaluations. Code Status: Level 1 - Full Code    Subjective:     Patient reports some soreness of his left lower leg but no significant pain    Objective:     Vitals:   Temp (24hrs), Av.9 °F (36.6 °C), Min:97.5 °F (36.4 °C), Max:98.2 °F (36.8 °C)    Temp:  [97.5 °F (36.4 °C)-98.2 °F (36.8 °C)] 97.5 °F (36.4 °C)  HR:  [60-68] 61  Resp:  [16-18] 16  BP: (135-165)/(59-72) 135/59  SpO2:  [89 %-93 %] 92 %  Body mass index is 28.42 kg/m². Input and Output Summary (last 24 hours):      Intake/Output Summary (Last 24 hours) at 2023 152  Last data filed at 9/11/2023 1026  Gross per 24 hour   Intake --   Output 2250 ml   Net -2250 ml       Physical Exam:   Physical Exam  Vitals reviewed. Constitutional:       General: He is not in acute distress. Appearance: He is not toxic-appearing. HENT:      Head: Normocephalic and atraumatic. Eyes:      General:         Right eye: No discharge. Left eye: No discharge. Cardiovascular:      Rate and Rhythm: Normal rate and regular rhythm. Pulmonary:      Effort: Pulmonary effort is normal. No respiratory distress. Breath sounds: Normal breath sounds. No wheezing or rales. Abdominal:      General: Bowel sounds are normal. There is no distension. Palpations: Abdomen is soft. Tenderness: There is no abdominal tenderness. Musculoskeletal:      Right lower leg: Edema present. Left lower leg: Edema present. Comments: Left lower leg with Ace wrap in place   Neurological:      Mental Status: He is alert. Additional Data:     Labs:  Results from last 7 days   Lab Units 09/11/23  0610 09/10/23  0512 09/09/23  0505   WBC Thousand/uL 10.21*   < > 12.27*   HEMOGLOBIN g/dL 13.1   < > 12.0   HEMATOCRIT % 39.2   < > 36.0*   PLATELETS Thousands/uL 261   < > 236   NEUTROS PCT %  --   --  71   LYMPHS PCT %  --   --  13*   MONOS PCT %  --   --  9   EOS PCT %  --   --  5    < > = values in this interval not displayed. Results from last 7 days   Lab Units 09/11/23  0610 09/10/23  0512 09/09/23  0505   SODIUM mmol/L 135   < > 134*   POTASSIUM mmol/L 4.5   < > 4.5   CHLORIDE mmol/L 101   < > 104   CO2 mmol/L 28   < > 25   BUN mg/dL 20   < > 18   CREATININE mg/dL 1.13   < > 1.08   ANION GAP mmol/L 6   < > 5   CALCIUM mg/dL 8.9   < > 8.9   ALBUMIN g/dL  --   --  3.2*   TOTAL BILIRUBIN mg/dL  --   --  0.58   ALK PHOS U/L  --   --  57   ALT U/L  --   --  11   AST U/L  --   --  15   GLUCOSE RANDOM mg/dL 93   < > 104    < > = values in this interval not displayed.      Results from last 7 days   Lab Units 09/09/23  0505   INR  1.09                   Lines/Drains:  Invasive Devices     Peripheral Intravenous Line  Duration           Peripheral IV 09/10/23 Dorsal (posterior); Right Forearm <1 day          Line  Duration           Venous Sheath Other (Comment) Right Other (Comment) 1116 days                      Imaging: No pertinent imaging reviewed. Recent Cultures (last 7 days):   Results from last 7 days   Lab Units 09/09/23  0845 09/09/23  0843 09/09/23  0835 09/08/23  1621 09/08/23  1544 09/06/23  1454   BLOOD CULTURE   --   --   --   --  No Growth at 48 hrs.   No Growth at 48 hrs.  --    GRAM STAIN RESULT  No Polys or Bacteria seen No Polys or Bacteria seen 2+ Polys*  1+ Gram positive cocci in pairs* No Polys or Bacteria seen  --  2+ Polys*  4+ Gram positive cocci in pairs, chains and clusters*   WOUND CULTURE   --   --  3+ Growth of Staphylococcus aureus* 2 colonies Staphylococcus aureus*  1 colony Staphylococcus coagulase negative*  --  4+ Growth of Staphylococcus aureus*       Last 24 Hours Medication List:   Current Facility-Administered Medications   Medication Dose Route Frequency Provider Last Rate   • acetaminophen  650 mg Oral Q6H PRN Kristine Brink PA-C     • atorvastatin  5 mg Oral Daily With D[place] martin Brink PA-C     • bisacodyl  10 mg Oral HS Geneva Revankar, DO     • cefazolin  2,000 mg Intravenous Q8H Geneva Revankar, DO 2,000 mg (09/11/23 1251)   • citalopram  40 mg Oral QAM Kristinejose martin Brink PA-C     • enoxaparin  40 mg Subcutaneous Q24H [facility] [place] martin Brink PA-C     • fluticasone  1 spray Nasal Daily Kristinejose martin Brink PA-C     • lisinopril  5 mg Oral Daily Geneva Revankar, DO     • magnesium Oxide  400 mg Oral Daily [place] SHIRA Brink     • metoprolol tartrate  50 mg Oral Q12H Kristine ANA LILIA BrinkC     • niacin  500 mg Oral HS Kristine ANA LILIA BrinkC     • pantoprazole  40 mg Oral Daily Kristine ANA LILIA BrinkC     • polyethylene glycol  17 g Oral BID Geneva Revankar, DO     • tamsulosin 0.4 mg Oral Daily With Josselyn Harvey PA-C     • torsemide  5 mg Oral Every Other Day Geneva Crowell DO          Today, Patient Was Seen By: Tiffanie Bradley DO    **Please Note: This note may have been constructed using a voice recognition system. **

## 2023-09-11 NOTE — CONSULTS
Consultation - Infectious Disease   Delfino Joel 80 y.o. male MRN: [id number]  Unit/Bed#: 2 [place] [address] Encounter: [id number]      IMPRESSION & RECOMMENDATIONS:   1. Left leg abscess. postop day #2 status post OR I&D of superficial left distal femur abscess that did not appear to extend deep and hardware removal distal thigh. Tissue and wound cultures growing MSSA and surgical hardware/tissue culture no growth to date.  -continues Cefazolin at at present pending final surgical hardware tissue culture  -if hardware culture remains negative, anticipate transition to keflex 500 mg PO q 6 hours to complete a 7-10 day total antibiotic post op course upon discharge  -monitor temperature and hemodynamics  -serial exam  -monitor serial labs  -ongoing orthopedic outpatient follow up     2. Leukocytosis, POA. In setting of #. Now down trending post op  -antibiotics as above  -monitor CBCD      3. CKD. eGFR 59%  -renal dose adjust antibiotic as needed  -volume management   -monitor creatinine     4. Left intertrochanteric femur fracture s/p ORIF 2022. Now s/p screw removal at above  -ongoing orthopedic follow up post op  -PT/OT    Antibiotics:  Cefazolin - POD#2    I have discussed the above management plan in detail with patient and wife at bedside, RN, Trey Whitfield, orthopedic PA, and Dr. Sung Velasquez of the primary service. I have spent a total time of 80 minutes on 09/11/23 in caring for this patient including Diagnostic results, Prognosis, Risks and benefits of tx options, Instructions for management, Patient and family education, Importance of tx compliance, Risk factor reductions, Impressions, Counseling / Coordination of care, Documenting in the medical record, Reviewing / ordering tests, medicine, procedures  , Obtaining or reviewing history   and Communicating with other healthcare professionals .     HISTORY OF PRESENT ILLNESS:  Reason for Consult: Abscess of left leg    HPI: Delfino Joel is a 80y.o. year old male with atrial flutter not on PTA anticoagulation, previous provoked DVT/PE and had IVC filter now removed, CKD, pulmonary arterial hypertension, previous left lower extremity intertrochanteric femur fracture who presented to [address] [place] [address] ER on the afternoon of 9/8/23 with left knee abscess. Patient reports that last Sunday he began to notice swelling and erythema of the left knee. On Wednesday he went to orthopedics outpatient where he had a local I&D performed at bedside and was put on oral Bactrim twice daily. Wound cultures grew Staph aureus. Patient reports that his symptoms continue to worsen and was seen again by orthopedics outpatient earlier 9/8 where orthopedics recommended coming to the ER for IV antibiotics as well as likely need for OR procedure. Patient seen by orthopedics team in the ER and started on IV Vancomycin. Patient is now postop day #2 status post OR I&D of superficial left distal femur abscess that did not appear to extend deep and hardware removal distal thigh. Tissue and wound cultures growing MSSA and surgical hardware/tissue culture no growth to date. Patient on IV Cefazolin, and Infectious Disease now being consulted regarding evaluation and management of abscess of the left leg. Patient denies any fever, chills, sweats, nausea, vomiting, diarrhea, dysuria, chest pain, shortness of breath, cough at home or here. He just reports discomfort in his left lateral leg when getting out of bed last Sunday morning and no fall at home, prior to this he was ambulatory with walker. REVIEW OF SYSTEMS:  A complete review of systems is negative other than that noted in the HPI.     PAST MEDICAL HISTORY:  Past Medical History:   Diagnosis Date   • BPH (benign prostatic hyperplasia)    • Depression    • HTN (hypertension)    • Hyperlipidemia    • OCD (obsessive compulsive disorder)    • Pulmonary embolism ([address]) 2019     Past Surgical History:   Procedure Laterality Date   • HARDWARE REMOVAL Left 2023    Procedure: REMOVAL HARDWARE;  Surgeon: Genet Vaughn DO;  Location: [facility];  Service: Orthopedics   • INCISION AND DRAINAGE OF WOUND Left 2023    Procedure: INCISION AND DRAINAGE (I&D) EXTREMITY;  Surgeon: Genet Vaughn DO;  Location: [facility];  Service: Orthopedics   • IR IVC FILTER PLACEMENT OPTIONAL/TEMPORARY  2019   • IR IVC FILTER REMOVAL  2020   • NV OPTX FEM SHFT FX W/INSJ IMED IMPLT W/WO SCREW Right 2019    Procedure: INSERTION NAIL IM FEMUR ANTEGRADE (TROCHANTERIC); Surgeon: Nile Cueto DO;  Location: [facility];  Service: Orthopedics   • NV OPTX FEM SHFT FX W/INSJ IMED IMPLT W/WO SCREW Left 2022    Procedure: INSERTION NAIL IM FEMUR ANTEGRADE (TROCHANTERIC) - long nail;  Surgeon: Genet Vaughn DO;  Location: [facility];  Service: Orthopedics       FAMILY HISTORY:  Non-contributory    SOCIAL HISTORY:  Social History   Social History     Substance and Sexual Activity   Alcohol Use Never     Social History     Substance and Sexual Activity   Drug Use Never     Social History     Tobacco Use   Smoking Status Never   Smokeless Tobacco Never       ALLERGIES:  No Known Allergies    MEDICATIONS:  All current active medications have been reviewed.     PHYSICAL EXAM:  Temp:  [97.8 °F (36.6 °C)-98.2 °F (36.8 °C)] 97.9 °F (36.6 °C)  HR:  [60-68] 67  Resp:  [16-18] 16  BP: (154-165)/(65-72) 163/71  SpO2:  [89 %-93 %] 91 %  Temp (24hrs), Av °F (36.7 °C), Min:97.8 °F (36.6 °C), Max:98.2 °F (36.8 °C)  Current: Temperature: 97.9 °F (36.6 °C)    Intake/Output Summary (Last 24 hours) at 2023 1030  Last data filed at 2023 0901  Gross per 24 hour   Intake 360 ml   Output 2500 ml   Net -2140 ml       General Appearance:  80year old pleasant male, appearing comfortably resting in bed, nontoxic, and in no distress   Head:  Normocephalic, without obvious abnormality, atraumatic   Eyes:  Conjunctiva pink and sclera anicteric, both eyes   Nose: Nares normal, mucosa normal, no drainage   Throat: Oropharynx moist without lesions   Neck: Supple, symmetrical, no adenopathy, no tenderness/mass/nodules   Back:   Symmetric, no curvature, ROM normal, no CVA tenderness   Lungs:   Clear to auscultation bilaterally, respirations unlabored   Chest Wall:  No tenderness or deformity   Heart:  RRR; no murmur, rub or gallop   Abdomen:   Soft, non-tender, non-distended, positive bowel sounds    Extremities: No cyanosis, clubbing. Left knee/thigh Ace wrap intact, 2+ distal dressing edema   Skin: No rashes or lesions. No draining wounds noted. Lymph nodes: Cervical, supraclavicular nodes normal   Neurologic: Alert and oriented times 3, extremity strength 5/5 and symmetric       LABS, IMAGING, & OTHER STUDIES:  Lab Results:  I have personally reviewed pertinent labs. Results from last 7 days   Lab Units 09/11/23  0610 09/10/23  0512 09/09/23  0505   WBC Thousand/uL 10.21* 14.05* 12.27*   HEMOGLOBIN g/dL 13.1 11.6* 12.0   PLATELETS Thousands/uL 261 240 236     Results from last 7 days   Lab Units 09/11/23  0610 09/10/23  0512 09/09/23  0505 09/08/23  1524   SODIUM mmol/L 135 134* 134* 133*   POTASSIUM mmol/L 4.5 5.2 4.5 4.5   CHLORIDE mmol/L 101 104 104 100   CO2 mmol/L 28 27 25 27   BUN mg/dL 20 22 18 25   CREATININE mg/dL 1.13 1.13 1.08 1.34*   EGFR ml/min/1.73sq m 59 59 63 48   CALCIUM mg/dL 8.9 8.6 8.9 9.3   AST U/L  --   --  15 18   ALT U/L  --   --  11 13   ALK PHOS U/L  --   --  57 70     Results from last 7 days   Lab Units 09/09/23  0845 09/09/23  0843 09/09/23  0835 09/08/23  1621 09/08/23  1544 09/06/23  1454   BLOOD CULTURE   --   --   --   --  No Growth at 48 hrs.   No Growth at 48 hrs.  --    GRAM STAIN RESULT  No Polys or Bacteria seen No Polys or Bacteria seen 2+ Polys*  1+ Gram positive cocci in pairs* No Polys or Bacteria seen  --  2+ Polys*  4+ Gram positive cocci in pairs, chains and clusters*   WOUND CULTURE   --   --  3+ Growth of Staphylococcus aureus* 2 colonies Staphylococcus aureus*  1 colony Staphylococcus coagulase negative*  --  4+ Growth of Staphylococcus aureus*                       Imaging Studies:   Imaging study reports and images in PACS reviewed personally. 9/11/23 Left femur XR: stable alignment of left femoral fracture status post ORIF.  9/8/23 left knee x-ray: Normal soft tissue swelling of left knee. Other Studies:   I have personally reviewed pertinent reports.

## 2023-09-11 NOTE — ASSESSMENT & PLAN NOTE
Worsening L knee pain, swelling and erythema since this past Sunday  · Previously underwent insertion of nail to the Left femur for intertrochanteric fracture about 15 months ago   · Saw Ortho outpatient 9/6 and concern for abscess in the L knee; Underwent local I&D and wound was packed; Wound culture grew Staph Aureus; was additionally given Oral abx to take bid  · Reports that Knee continued to appear erythematous and swollen and saw Ortho outpatient and sent to the ER. · Wound culture with MSSA. D/C'ed vancomycin and continue patient on high-dose IV cefazolin  · Possible transition to Keflex tomorrow  · Patient underwent I&D of left lower extremity and removal of hardware of left distal femur 9/9. Left distal femur abscess site with purulent fluid and infected appearing tissue was noted which was debrided. · Screw was also sent for culture which is neg  · Leukocytosis resolving  · Repeat labs in a.m.

## 2023-09-11 NOTE — PLAN OF CARE
Problem: PHYSICAL THERAPY ADULT  Goal: Performs mobility at highest level of function for planned discharge setting. See evaluation for individualized goals. Description: Treatment/Interventions: ADL retraining, Functional transfer training, LE strengthening/ROM, Therapeutic exercise, Endurance training, Bed mobility, Gait training, Spoke to nursing, OT          See flowsheet documentation for full assessment, interventions and recommendations. Note: Prognosis: Good  Problem List: Decreased strength, Decreased range of motion, Decreased endurance, Impaired balance, Decreased mobility, Pain, Decreased skin integrity  Assessment: Patient seen for Physical Therapy evaluation. Patient admitted with Abscess of left leg. Comorbidities affecting patient's physical performance include: Afib, cardiac disease, CKD, depression, hypertension and PE. Personal factors affecting patient at time of initial evaluation include: lives in 1 story house, ambulating with assistive device, stairs to enter home, inability to navigate level surfaces without external assistance, inability to perform IADLS  and inability to live alone. Prior to admission, patient was independent with functional mobility with walker, requiring assist for ADLS, requiring assist for IADLS, living with spouse in a 1 level home with 4 steps to enter and ambulating household distance. Please find objective findings from Physical Therapy assessment regarding body systems outlined above with impairments and limitations including weakness, decreased ROM, impaired balance, decreased endurance, gait deviations, pain, decreased activity tolerance, decreased functional mobility tolerance, decreased safety awareness, fall risk and decreased skin integrity. The Barthel Index was used as a functional outcome tool presenting with a score of Barthel Index Score: 65 today indicating moderate limitations of functional mobility and ADLS.   Patient's clinical presentation is currently unstable/unpredictable as seen in patient's presentation of changing level of pain, increased fall risk, new onset of impairment of functional mobility and decreased endurance. Pt would benefit from continued Physical Therapy treatment to address deficits as defined above and maximize level of functional mobility. As demonstrated by objective findings, the assigned level of complexity for this evaluation is high. The patient's AM-PAC Basic Mobility Inpatient Short Form Raw Score is 18. A Raw score of greater than 16 suggests the patient may benefit from discharge to home. Please also refer to the recommendation of the Physical Therapist for safe discharge planning. PT Discharge Recommendation: Home with home health rehabilitation    See flowsheet documentation for full assessment.

## 2023-09-11 NOTE — CASE MANAGEMENT
Case Management Assessment & Discharge Planning Note    Patient name Safia Taveras  Location [address] [place] [address] Drive [facility] [id number] MRN [id number]  : 1940 Date 2023       Current Admission Date: 2023  Current Admission Diagnosis:Abscess of left leg   Patient Active Problem List    Diagnosis Date Noted   • Abscess of left leg 2023   • BPH (benign prostatic hyperplasia) 2023   • OCD (obsessive compulsive disorder)    • Stage 3 chronic kidney disease, unspecified whether stage 3a or 3b CKD ([address]) 2023   • Vega esophagus 2023   • Chronic rhinitis 2023   • Other pulmonary embolism without acute cor pulmonale, unspecified chronicity ([address]) 2023   • Gastroesophageal reflux disease without esophagitis 2023   • Bilateral leg edema 2023   • Hiatal hernia 2023   • Age-related osteoporosis without current pathological fracture 2022   • Abnormal CT scan, chest 2022   • Lung nodule 2022   • Bladder wall thickening 2022   • 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[address]) 2022   • Medicare annual wellness visit, subsequent 2020   • Paroxysmal atrial flutter ([address]) 2020   • Valvular heart disease 2019   • S/P IVC filter 2019   • SVT (supraventricular tachycardia) ([address]) 2019   • History of pulmonary embolus (PE)     • Impaired vision 2019   • Hyperlipidemia    • Mixed obsessional thoughts and acts 10/14/2019   • Allergic rhinitis 10/14/2019   • Nonrheumatic mitral valve regurgitation 10/14/2019   • Aortic valve sclerosis 10/14/2019   • Nonrheumatic aortic valve insufficiency 10/14/2019   • Essential hypertension 10/14/2019   • Recurrent major depressive disorder, in full remission ([address]) 10/14/2019      LOS (days): 3  Geometric Mean LOS (GMLOS) (days): 4.20  Days to GMLOS:1.2     OBJECTIVE:    Risk of Unplanned Readmission Score: 12.09     Current admission status: Inpatient    Preferred Pharmacy:   [address] [facility] [place], [address] [place] [address]  Phone: [phone] Fax: [phone]    Primary Care Provider: Luz Khanna MD    Primary Insurance: [address]  Secondary Insurance:     ASSESSMENT:  Andres Proxies    There are no active Health Care Proxies on file.        Readmission Root Cause  30 Day Readmission: No    Patient Information  Admitted from[de-identified] Home  Mental Status: Alert  During Assessment patient was accompanied by: Not accompanied during assessment  Assessment information provided by[de-identified] Daughter  Primary Caregiver: Family  Caregiver's Name[de-identified] Rachael Summers Relationship to Patient[de-identified]  (wife)  Caregiver's Telephone Number[de-identified] [phone]  Support Systems: Spouse/significant other, Daughter, Family members  [place] of Residence: [address] [place] [address] do you live in?: [place]  Type of Current Residence: Other (Comment) (one story)  In the last 12 months, was there a time when you were not able to pay the mortgage or rent on time?: No  In the last 12 months, how many places have you lived?: 1  In the last 12 months, was there a time when you did not have a steady place to sleep or slept in a shelter (including now)?: No  Homeless/housing insecurity resource given?: N/A  Living Arrangements: Lives w/ Spouse/significant other    Activities of Daily Living Prior to Admission  Functional Status: Assistance  Completes ADLs independently?: No (wife assists with ADLs/daughter assists with meds, chores, appts)  Level of ADL dependence: Assistance  Ambulates independently?: Yes  Does patient use assisted devices?: Yes  Assisted Devices (DME) used: Amita Meza (Comment) (commode over toilet)  Does patient currently own DME?: Yes  What DME does the patient currently own?: Amita Meza (Comment), Straight Cane, Wheelchair (commode over toilet)  Does patient have a history of Outpatient Therapy (PT/OT)?: No  Does the patient have a history of Short-Term Rehab?: Yes  Does patient have a history of HHC?: Yes  Does patient currently have [address]?: No    Patient Information Continued  Income Source: Pension/long term  Does patient have prescription coverage?: Yes  Within the past 12 months, you worried that your food would run out before you got the money to buy more.: Never true  Within the past 12 months, the food you bought just didn't last and you didn't have money to get more.: Never true  Food insecurity resource given?: N/A  Does patient receive dialysis treatments?: No    Means of Transportation  Means of Transport to Appts[de-identified] Family transport  In the past 12 months, has lack of transportation kept you from medical appointments or from getting medications?: No  In the past 12 months, has lack of transportation kept you from meetings, work, or from getting things needed for daily living?: No  Was application for public transport provided?: N/A      DISCHARGE DETAILS:    Discharge planning discussed with[de-identified] DaughterBecky of Choice: Yes  Comments - Freedom of Choice: SW spoke with pt's daughter to assess needs and discuss plans. Home with home care services is being recommended by PT. Per daughter pt lives with wife and she lives nearby to assist when needed. Daughter is open to home care services and believes pt had services through [facility] in the past.  SW offered to make referrals to determine agency availability and provide list to daughter to review when available. SW will follow to monitor progress and assist as needed.   CM contacted family/caregiver?: Yes  Were Treatment Team discharge recommendations reviewed with patient/caregiver?: Yes  Did patient/caregiver verbalize understanding of patient care needs?: Yes  Were patient/caregiver advised of the risks associated with not following Treatment Team discharge recommendations?: Yes    Contacts  Patient Contacts: Josselyn Smiley  Relationship to Patient[de-identified] Family (daughter)  Contact Method: Phone  Phone Number: [phone]  Reason/Outcome: Discharge [address] [facility] [address]         Is the patient interested in [address] at discharge?: Yes  [facility] requested[de-identified] Physical Therapy, [facility] Name[de-identified] Other  [facility] Provider[de-identified] PCP  Home Health Services Needed[de-identified] Evaluate Functional Status and Safety, Gait/ADL Training, Strengthening/Theraputic Exercises to Improve Function  Homebound Criteria Met[de-identified] Requires the Assistance of Another Person for Safe Ambulation or to Leave the Home, Uses an Assist Device (i.e. cane, walker, etc)  Supporting Clincal Findings[de-identified] Fatigues Easliy in [employer], Limited Endurance    DME Referral Provided  Referral made for DME?: No    Other Referral/Resources/Interventions Provided:  Interventions: [facility]  Referral Comments: [place] home care referrals have been made    Treatment Team Recommendation: Home with [facility]  Discharge Destination Plan[de-identified] Home with [address] [facility] at Discharge : Family    IMM Given (Date):: 09/11/23  IMM Given to[de-identified] Family (IMM reviewed over phone with pt's daughter. Daughter verbalized understanding. Copy will be placed in pt's room for family as discussed with daughter.  Copy also placed in scan bin for chart.)

## 2023-09-11 NOTE — ASSESSMENT & PLAN NOTE
Lab Results   Component Value Date    EGFR 59 09/11/2023    EGFR 59 09/10/2023    EGFR 63 09/09/2023    CREATININE 1.13 09/11/2023    CREATININE 1.13 09/10/2023    CREATININE 1.08 09/09/2023     Creatinine over the past 3-4 years - 0.9 to as high as 1.9. Patient's creatinine on admission 1.34 and it was 1.36 one month ago  · Creatinine remains at baseline. Magnesium repleted today  · Repeat lab work in a.m.

## 2023-09-11 NOTE — PROGRESS NOTES
Progress Note - Orthopedics   Yen Gamma 80 y.o. male MRN: [id number]  Unit/Bed#: [address] [place] [address] Encounter: [id number]        Subjective: POD #2 status post I&D left leg abscess with hardware removal. Patient notes minimal pain about the leg. He notes good sensation of the left lower extremity. Patient has remained afebrile. He denies any CP, SOB, dizziness, nausea/vomiting. No acute overnight events. Patient has been on Vancomycin. Infectious disease has been consulted. No grown on hardware cultures, but staph aureus on tissue cultures. Vitals: Blood pressure 165/72, pulse 66, temperature 97.9 °F (36.6 °C), resp. rate 16, height 5' 9" (1.753 m), weight 87.3 kg (192 lb 7.4 oz), SpO2 (!) 89 %. ,Body mass index is 28.42 kg/m². Intake/Output Summary (Last 24 hours) at 9/11/2023 0956  Last data filed at 9/11/2023 0901  Gross per 24 hour   Intake 360 ml   Output 2500 ml   Net -2140 ml       Invasive Devices     Peripheral Intravenous Line  Duration           Peripheral IV 09/10/23 Dorsal (posterior); Right Forearm <1 day          Line  Duration           Venous Sheath Other (Comment) Right Other (Comment) 1116 days                  Ortho Exam: Patient alert and oriented X3 in NAD lying comfortably in bed. Left lower extremity: Dressing CDI. This was removed today. Mild blood and anterior and lateral knee dressings, but no purulence noted. Abrasions anterior knee without erythema. Lateral knee incision CDI, without erythema. No appreciable fluid collection. No significant swelling. No pain with gentle ROM of knee and hip. Compartments soft. No calf tenderness. Moves toes/ankle freely. 2+ DP pulse. Sensation intact lateral femoral cutaneous, saphenous, sural, deep/superficial peroneal nerve distributions. 2+ pitting edema left lower extremity. 1+ pitting edema right lower extremity. New dressing placed today.        Lab, Imaging and other studies: I have personally reviewed pertinent lab results.   CBC:   Lab Results   Component Value Date    WBC 10.21 (H) 09/11/2023    HGB 13.1 09/11/2023    HCT 39.2 09/11/2023    MCV 96 09/11/2023     09/11/2023    RBC 4.08 09/11/2023    MCH 32.1 09/11/2023    MCHC 33.4 09/11/2023    RDW 13.9 09/11/2023    MPV 9.1 09/11/2023    NRBC 0 09/09/2023     CMP:   Lab Results   Component Value Date     12/21/2015     09/11/2023     12/21/2015    CO2 28 09/11/2023    CO2 30 (H) 12/21/2015    ANIONGAP 11.9 12/21/2015    BUN 20 09/11/2023    BUN 13 12/21/2015    CREATININE 1.13 09/11/2023    CREATININE 0.9 12/21/2015    GLUCOSE 93 12/21/2015    CALCIUM 8.9 09/11/2023    CALCIUM 8.7 12/21/2015    AST 15 09/09/2023    AST 20 12/21/2015    ALT 11 09/09/2023    ALT 22 12/21/2015    ALKPHOS 57 09/09/2023    ALKPHOS 82 12/21/2015    PROT 6.9 12/21/2015    BILITOT 0.6 12/21/2015    EGFR 59 09/11/2023     PT/INR:   Lab Results   Component Value Date    INR 1.09 09/09/2023       Assessment:  POD #2 status post I&D left leg with hardware removal    Plan:  -Continue abx per primary team and infectious disease. Awaiting outpatient abx recommendations. -WBAT LLE  -Analgesia prn  -Lovenox for DVT prophylaxis  -LLE edema chronic as per patient. No calf tenderness that would suggest DVT. -FU outpatient with Dr. Pranay Real upon discharge. Weight bearing: WBAT with assistance.     VTE Pharmacologic Prophylaxis: Enoxaparin (Lovenox)  VTE Mechanical Prophylaxis: sequential compression device

## 2023-09-12 ENCOUNTER — TELEPHONE (OUTPATIENT)
Age: 83
End: 2023-09-12

## 2023-09-12 ENCOUNTER — TELEPHONE (OUTPATIENT)
Dept: INTERNAL MEDICINE CLINIC | Facility: CLINIC | Age: 83
End: 2023-09-12

## 2023-09-12 VITALS
HEART RATE: 66 BPM | SYSTOLIC BLOOD PRESSURE: 154 MMHG | BODY MASS INDEX: 28.51 KG/M2 | DIASTOLIC BLOOD PRESSURE: 68 MMHG | HEIGHT: 69 IN | TEMPERATURE: 98 F | OXYGEN SATURATION: 96 % | RESPIRATION RATE: 16 BRPM | WEIGHT: 192.46 LBS

## 2023-09-12 PROBLEM — E83.42 HYPOMAGNESEMIA: Status: ACTIVE | Noted: 2023-09-12

## 2023-09-12 LAB
ANION GAP SERPL CALCULATED.3IONS-SCNC: 3 MMOL/L
BACTERIA TISS AEROBE CULT: ABNORMAL
BACTERIA TISS AEROBE CULT: NO GROWTH
BUN SERPL-MCNC: 18 MG/DL (ref 5–25)
CALCIUM SERPL-MCNC: 8.9 MG/DL (ref 8.4–10.2)
CHLORIDE SERPL-SCNC: 101 MMOL/L (ref 96–108)
CO2 SERPL-SCNC: 31 MMOL/L (ref 21–32)
CREAT SERPL-MCNC: 1.12 MG/DL (ref 0.6–1.3)
ERYTHROCYTE [DISTWIDTH] IN BLOOD BY AUTOMATED COUNT: 13.9 % (ref 11.6–15.1)
GFR SERPL CREATININE-BSD FRML MDRD: 60 ML/MIN/1.73SQ M
GLUCOSE SERPL-MCNC: 91 MG/DL (ref 65–140)
GRAM STN SPEC: ABNORMAL
GRAM STN SPEC: NORMAL
HCT VFR BLD AUTO: 38.1 % (ref 36.5–49.3)
HGB BLD-MCNC: 12.3 G/DL (ref 12–17)
MAGNESIUM SERPL-MCNC: 1.8 MG/DL (ref 1.9–2.7)
MCH RBC QN AUTO: 31.5 PG (ref 26.8–34.3)
MCHC RBC AUTO-ENTMCNC: 32.3 G/DL (ref 31.4–37.4)
MCV RBC AUTO: 97 FL (ref 82–98)
PLATELET # BLD AUTO: 297 THOUSANDS/UL (ref 149–390)
PMV BLD AUTO: 9.2 FL (ref 8.9–12.7)
POTASSIUM SERPL-SCNC: 4.8 MMOL/L (ref 3.5–5.3)
RBC # BLD AUTO: 3.91 MILLION/UL (ref 3.88–5.62)
SODIUM SERPL-SCNC: 135 MMOL/L (ref 135–147)
WBC # BLD AUTO: 9.64 THOUSAND/UL (ref 4.31–10.16)

## 2023-09-12 PROCEDURE — 99232 SBSQ HOSP IP/OBS MODERATE 35: CPT | Performed by: PHYSICIAN ASSISTANT

## 2023-09-12 PROCEDURE — 80048 BASIC METABOLIC PNL TOTAL CA: CPT | Performed by: FAMILY MEDICINE

## 2023-09-12 PROCEDURE — 99239 HOSP IP/OBS DSCHRG MGMT >30: CPT | Performed by: INTERNAL MEDICINE

## 2023-09-12 PROCEDURE — 85027 COMPLETE CBC AUTOMATED: CPT | Performed by: FAMILY MEDICINE

## 2023-09-12 PROCEDURE — 83735 ASSAY OF MAGNESIUM: CPT | Performed by: FAMILY MEDICINE

## 2023-09-12 RX ORDER — POLYETHYLENE GLYCOL 3350 17 G/17G
17 POWDER, FOR SOLUTION ORAL 2 TIMES DAILY
Qty: 30 EACH | Refills: 0 | Status: SHIPPED | OUTPATIENT
Start: 2023-09-12

## 2023-09-12 RX ORDER — CEPHALEXIN 500 MG/1
500 CAPSULE ORAL 4 TIMES DAILY
Qty: 24 CAPSULE | Refills: 0 | Status: SHIPPED | OUTPATIENT
Start: 2023-09-12 | End: 2023-09-18

## 2023-09-12 RX ORDER — MAGNESIUM SULFATE HEPTAHYDRATE 40 MG/ML
2 INJECTION, SOLUTION INTRAVENOUS ONCE
Status: COMPLETED | OUTPATIENT
Start: 2023-09-12 | End: 2023-09-12

## 2023-09-12 RX ORDER — BISACODYL 5 MG/1
10 TABLET, DELAYED RELEASE ORAL
Qty: 30 TABLET | Refills: 0 | Status: SHIPPED | OUTPATIENT
Start: 2023-09-12

## 2023-09-12 RX ORDER — CEPHALEXIN 500 MG/1
500 CAPSULE ORAL 4 TIMES DAILY
Qty: 24 CAPSULE | Refills: 0 | Status: SHIPPED | OUTPATIENT
Start: 2023-09-12 | End: 2023-09-12 | Stop reason: SDUPTHER

## 2023-09-12 RX ORDER — ASPIRIN 325 MG
325 TABLET ORAL 2 TIMES DAILY
Qty: 80 TABLET | Refills: 0 | Status: SHIPPED | OUTPATIENT
Start: 2023-09-12 | End: 2023-10-22

## 2023-09-12 RX ORDER — LANOLIN ALCOHOL/MO/W.PET/CERES
400 CREAM (GRAM) TOPICAL 2 TIMES DAILY
Status: DISCONTINUED | OUTPATIENT
Start: 2023-09-12 | End: 2023-09-12 | Stop reason: HOSPADM

## 2023-09-12 RX ADMIN — MAGNESIUM SULFATE HEPTAHYDRATE 2 G: 40 INJECTION, SOLUTION INTRAVENOUS at 09:37

## 2023-09-12 RX ADMIN — FLUTICASONE PROPIONATE 1 SPRAY: 50 SPRAY, METERED NASAL at 08:13

## 2023-09-12 RX ADMIN — Medication 400 MG: at 08:14

## 2023-09-12 RX ADMIN — METOPROLOL TARTRATE 50 MG: 50 TABLET, FILM COATED ORAL at 08:15

## 2023-09-12 RX ADMIN — LISINOPRIL 5 MG: 5 TABLET ORAL at 08:15

## 2023-09-12 RX ADMIN — TORSEMIDE 5 MG: 10 TABLET ORAL at 08:14

## 2023-09-12 RX ADMIN — CITALOPRAM HYDROBROMIDE 40 MG: 20 TABLET ORAL at 08:14

## 2023-09-12 RX ADMIN — POLYETHYLENE GLYCOL 3350 17 G: 17 POWDER, FOR SOLUTION ORAL at 08:14

## 2023-09-12 RX ADMIN — CEFAZOLIN SODIUM 2000 MG: 2 SOLUTION INTRAVENOUS at 12:16

## 2023-09-12 RX ADMIN — ENOXAPARIN SODIUM 40 MG: 40 INJECTION SUBCUTANEOUS at 08:14

## 2023-09-12 RX ADMIN — PANTOPRAZOLE SODIUM 40 MG: 40 TABLET, DELAYED RELEASE ORAL at 08:14

## 2023-09-12 RX ADMIN — CEFAZOLIN SODIUM 2000 MG: 2 SOLUTION INTRAVENOUS at 05:06

## 2023-09-12 NOTE — CASE MANAGEMENT
Case Management Discharge Planning Note    Patient name Regina Gross  Location [address] [place] [address] [address] MRN [id number]  : 1940 Date 2023       Current Admission Date: 2023  Current Admission Diagnosis:Abscess of left leg   Patient Active Problem List    Diagnosis Date Noted   • Abscess of left leg 2023   • BPH (benign prostatic hyperplasia) 2023   • OCD (obsessive compulsive disorder)    • Stage 3 chronic kidney disease, unspecified whether stage 3a or 3b CKD ([address]) 2023   • Vega esophagus 2023   • Chronic rhinitis 2023   • Other pulmonary embolism without acute cor pulmonale, unspecified chronicity ([address]) 2023   • Gastroesophageal reflux disease without esophagitis 2023   • Bilateral leg edema 2023   • Hiatal hernia 2023   • Age-related osteoporosis without current pathological fracture 2022   • Abnormal CT scan, chest 2022   • Lung nodule 2022   • Bladder wall thickening 2022   • 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[address]) 2022   • Medicare annual wellness visit, subsequent 2020   • Paroxysmal atrial flutter ([address]) 2020   • Valvular heart disease 2019   • S/P IVC filter 2019   • SVT (supraventricular tachycardia) ([address]) 2019   • History of pulmonary embolus (PE)     • Impaired vision 2019   • Hyperlipidemia    • Mixed obsessional thoughts and acts 10/14/2019   • Allergic rhinitis 10/14/2019   • Nonrheumatic mitral valve regurgitation 10/14/2019   • Aortic valve sclerosis 10/14/2019   • Nonrheumatic aortic valve insufficiency 10/14/2019   • Essential hypertension 10/14/2019   • Recurrent major depressive disorder, in full remission ([address]) 10/14/2019      LOS (days): 4  Geometric Mean LOS (GMLOS) (days): 4.20  Days to GMLOS:0.3     OBJECTIVE:  Risk of Unplanned Readmission Score: 12.38     Current admission status: Inpatient   Preferred Pharmacy:   [facility]  #[id number] - [place], [address] [facility] [address] [id number]  Phone: [phone] Fax: [phone]    Primary Care Provider: Abel Dennison MD    Primary Insurance: [facility]  Secondary Insurance:     DISCHARGE DETAILS:    Discharge planning discussed with[de-identified] DaughterMadison Breeding of Choice: Yes  Comments - Freedom of Choice: SW following to assist with DCP. Discharge is planned for today. Home with home therapy services is planned. SW spoke with pt's daughter to review accepting home care agency list from [facility]. Daughter requested services from [facility] since pt has received their services in the past.  Agency will be reserved in [address]. No other discharge needs expressed by daughter. CM contacted family/caregiver?: Yes    Contacts  Patient Contacts: Solomon Fu  Relationship to Patient[de-identified] Family  Contact Method: Phone, In Person  Phone Number: [phone]  Reason/Outcome: Discharge [facility]         Is the patient interested in [address] at discharge?: Yes  [facility] requested[de-identified] Physical Therapy, Occupational [facility] Name[de-identified] [address] Provider[de-identified] PCP  Home Health Services Needed[de-identified] Evaluate Functional Status and Safety, Gait/ADL Training, Strengthening/Theraputic Exercises to Improve Function  Homebound Criteria Met[de-identified] Requires the Assistance of Another Person for Safe Ambulation or to Leave the Home, Uses an Assist Device (i.e. cane, walker, etc)  Supporting Clincal Findings[de-identified] Fatigues Easliy in [employer], Limited Endurance    Other Referral/Resources/Interventions Provided:  Interventions: [facility]  Referral Comments: [facility] Nurses notified of daughter's request for services. AVS will be sent to agency when available.     Treatment Team Recommendation: Home with [address]  Discharge Destination Plan[de-identified] Home with [address] at Discharge : Family

## 2023-09-12 NOTE — ASSESSMENT & PLAN NOTE
Lab Results   Component Value Date    EGFR 60 09/12/2023    EGFR 59 09/11/2023    EGFR 59 09/10/2023    CREATININE 1.12 09/12/2023    CREATININE 1.13 09/11/2023    CREATININE 1.13 09/10/2023     Creatinine over the past 3-4 years - 0.9 to as high as 1.9. Patient's creatinine on admission 1.34 and it was 1.36 one month ago  · Creatinine remains at baseline.

## 2023-09-12 NOTE — TELEPHONE ENCOUNTER
Caller: Nikolay TRAN    Doctor: Martinez Parikh    Reason for call: calling to verify Dr Martinez Parikh was patients dr. Paul Carbajal yes.      Call back#: n/a

## 2023-09-12 NOTE — TELEPHONE ENCOUNTER
Cha Thao from [facility] is calling to ask if Dr. David Abbott would be willing to sign home care orders. Call Cha Thao back. Thank you.

## 2023-09-12 NOTE — DISCHARGE SUMMARY
[address] [facility]  Discharge- Chary Rodriguez 1940, 80 y.o. male MRN: [id number]  Unit/Bed#: [address] [place] [address] Encounter: [id number]  Primary Care Provider: Sandy Harp MD   Date and time admitted to hospital: 9/8/2023  3:05 PM    * Abscess of left leg  Assessment & Plan  Worsening L knee pain, swelling and erythema since this past Sunday  · Previously underwent insertion of nail to the Left femur for intertrochanteric fracture about 15 months ago   · Saw Ortho outpatient 9/6 and concern for abscess in the L knee; Underwent local I&D and wound was packed; Wound culture grew Staph Aureus; was additionally given Oral abx to take bid  · Reports that Knee continued to appear erythematous and swollen and saw Ortho outpatient and sent to the ER. · Wound culture with MSSA. D/C'ed vancomycin and continue patient on high-dose IV cefazolin  · Patient underwent I&D of left lower extremity and removal of hardware of left distal femur 9/9. Left distal femur abscess site with purulent fluid and infected appearing tissue was noted which was debrided. · Hardware cultures were negative  · Patient to be transition to Keflex 100 mg p.o. every 6 hours to complete a 10-day course through 9/18/2023  · Patient also started on aspirin 325 mg p.o. twice daily for prophylaxis as per Ortho recommendations. Patient will need outpatient follow-up with orthopedics in 2 weeks    Stage 3 chronic kidney disease, unspecified whether stage 3a or 3b CKD [facility])  Assessment & Plan  Lab Results   Component Value Date    EGFR 60 09/12/2023    EGFR 59 09/11/2023    EGFR 59 09/10/2023    CREATININE 1.12 09/12/2023    CREATININE 1.13 09/11/2023    CREATININE 1.13 09/10/2023     Creatinine over the past 3-4 years - 0.9 to as high as 1.9. Patient's creatinine on admission 1.34 and it was 1.36 one month ago  · Creatinine remains at baseline. Pulmonary artery hypertension [facility])  Assessment & Plan  LVEF June 2022 with LVEF 60%. Mild-Moderate TR. Grade 1 Diastolic Dysfunction  · Denies any sob currently  · Per review of PCPs note, patient on Demadex every other day which has been restarted    Paroxysmal atrial flutter (HCC)  Assessment & Plan  Hx Paroxysmal atrial flutter, Not on anticoagulation. · Continue Lopressor. Hyperlipidemia  Assessment & Plan  Continue niacin. Medical Problems     Resolved Problems  Date Reviewed: 9/12/2023   None       Discharging Physician / Practitioner: Jessica Pardo MD  PCP: Ronnell Price MD  Admission Date:   Admission Orders (From admission, onward)     Ordered        09/08/23 [address]  Once                      Discharge Date: 09/12/23    Consultations During Hospital Stay:  · Orthopedics and ID    Procedures Performed:   · Incision and drainage of the left knee with removal of the hardware from the left distal femur    Significant Findings / Test Results:   · Wound cultures with MSSA     Outpatient Tests Requested:  · Follow-up with orthopedics in 2 weeks    Complications: None    Reason for Admission: Left knee abscess    Hospital Course:   Mague Rodgers is a 80 y.o. male patient with past medical history of atrial flutter, DVT/PE, CKD, pulmonary hypertension, femur fracture who originally presented to the hospital on 9/8/2023 due to left knee abscess. Patient noticed some swelling and erythema of the left knee and was seen by orthopedics and had outpatient local I&D performed at bedside and was started on Bactrim. Wound cultures grew MSSA. Patient was seen by orthopedics as follow-up and was sent into the ED for further evaluation IV antibiotics. Patient initially was started on IV vancomycin. Later patient was seen by orthopedics and underwent incision and drainage of the abscess with removal of hardware. ID was also consulted and antibiotics were de-escalated to Ancef based on cultures.   Hardware cultures and wound cultures remain negative and patient will be discharged on Keflex with outpatient follow-up with orthopedics      Please see above list of diagnoses and related plan for additional information. Condition at Discharge: stable    Discharge Day Visit / Exam:   Subjective: Patient has minimal discomfort in the left knee. Denies any chest pain, shortness of breath, abdominal pain. Did have a bowel movement yesterday  Vitals: Blood Pressure: 154/68 (09/12/23 0706)  Pulse: 66 (09/12/23 0706)  Temperature: 98 °F (36.7 °C) (09/12/23 0706)  Temp Source: Oral (09/10/23 1545)  Respirations: 16 (09/12/23 0706)  Height: 5' 9" (175.3 cm) (09/08/23 1730)  Weight - Scale: 87.3 kg (192 lb 7.4 oz) (09/08/23 1730)  SpO2: 96 % (09/12/23 0706)  Exam:   Physical Exam  Constitutional:       Appearance: Normal appearance. HENT:      Head: Normocephalic and atraumatic. Eyes:      Extraocular Movements: Extraocular movements intact. Pupils: Pupils are equal, round, and reactive to light. Cardiovascular:      Rate and Rhythm: Normal rate and regular rhythm. Heart sounds: No murmur heard. No gallop. Pulmonary:      Effort: Pulmonary effort is normal.      Breath sounds: Normal breath sounds. Abdominal:      General: Bowel sounds are normal.      Palpations: Abdomen is soft. Tenderness: There is no abdominal tenderness. Musculoskeletal:         General: No swelling or deformity. Normal range of motion. Cervical back: Normal range of motion and neck supple. Left lower leg: Edema present. Comments: Left knee dressing is clean dry and intact. Left leg in Ace wrap   Skin:     General: Skin is warm and dry. Neurological:      General: No focal deficit present. Mental Status: He is alert. Discharge instructions/Information to patient and family:   See after visit summary for information provided to patient and family.       Provisions for Follow-Up Care:  See after visit summary for information related to follow-up care and any pertinent home health orders. Disposition:   Home with [facility]NA Services (Reminder: Complete face to face encounter)    Planned Readmission: No     Discharge Statement:  I spent 45 minutes discharging the patient. This time was spent on the day of discharge. I had direct contact with the patient on the day of discharge. Greater than 50% of the total time was spent examining patient, answering all patient questions, arranging and discussing plan of care with patient as well as directly providing post-discharge instructions. Additional time then spent on discharge activities. Discharge Medications:  See after visit summary for reconciled discharge medications provided to patient and/or family.       **Please Note: This note may have been constructed using a voice recognition system**

## 2023-09-12 NOTE — PROGRESS NOTES
Progress Note - Infectious Disease   Regina Gross 80 y.o. male MRN: [id number]  Unit/Bed#: [address] [place] [address] Encounter: [id number]      Impression/Plan:  1. Left leg abscess. postop day #2 status post OR I&D of superficial left distal femur abscess that did not appear to extend deep and hardware removal distal thigh. Tissue and wound cultures growing MSSA and surgical hardware/tissue culture no growth. -continues Cefazolin at at present inhouse  -As hardware culture remains negative, can transition to keflex 500 mg PO q 6 hours to complete a 10 day total antibiotic post op course upon discharge through 23  -orthopedic outpatient follow up     2. Leukocytosis, POA. In setting of #. Now down trending post op  -antibiotics as above  -monitor CBCD      3. CKD. eGFR 59%  -renal dose adjust antibiotic as needed  -volume management   -monitor creatinine     4. Left intertrochanteric femur fracture s/p ORIF . Now s/p screw removal at above  -ongoing orthopedic follow up post op  -PT/OT     Antibiotics:  Cefazolin - abx#4 from I&D     Above impression and plan discussed in detail with patient and wife at bedside, RN, and Dr. Mali Guan of the primary care team.    Subjective:  Patient has no fever, chills, sweats; no nausea, vomiting, diarrhea; no cough, shortness of breath; no pain complaints, just aching at surgical site. No new symptoms. Objective:  Vitals:  Temp:  [97.5 °F (36.4 °C)-98.4 °F (36.9 °C)] 98 °F (36.7 °C)  HR:  [61-66] 66  Resp:  [16-18] 16  BP: (135-154)/(59-70) 154/68  SpO2:  [90 %-96 %] 96 %  Temp (24hrs), Av °F (36.7 °C), Min:97.5 °F (36.4 °C), Max:98.4 °F (36.9 °C)  Current: Temperature: 98 °F (36.7 °C)    Physical Exam:   General Appearance:  80year old pleasant male, resting in bed, nontoxic, no acute distress.    HEENT: Atraumatic normocephalic   Throat: Oropharynx moist.    Pulmonary:   Normal respiratory excursion without accessory muscle use   Cardiac:  RRR   Abdomen:   Soft, non-tender, non-distended   Extremities: No edema, left thigh ace wrap intact   : No islas, no SPT   Psychiatric: Awake, cooperative   Skin: No new rashes. IV site nontender. Labs, Imaging, & Other studies:   All pertinent labs and imaging studies were personally reviewed  Results from last 7 days   Lab Units 09/12/23  0435 09/11/23  0610 09/10/23  0512   WBC Thousand/uL 9.64 10.21* 14.05*   HEMOGLOBIN g/dL 12.3 13.1 11.6*   PLATELETS Thousands/uL 297 261 240     Results from last 7 days   Lab Units 09/12/23  0435 09/11/23  0610 09/10/23  0512 09/09/23  0505 09/08/23  1524   SODIUM mmol/L 135 135 134* 134* 133*   POTASSIUM mmol/L 4.8 4.5 5.2 4.5 4.5   CHLORIDE mmol/L 101 101 104 104 100   CO2 mmol/L 31 28 27 25 27   BUN mg/dL 18 20 22 18 25   CREATININE mg/dL 1.12 1.13 1.13 1.08 1.34*   EGFR ml/min/1.73sq m 60 59 59 63 48   CALCIUM mg/dL 8.9 8.9 8.6 8.9 9.3   AST U/L  --   --   --  15 18   ALT U/L  --   --   --  11 13   ALK PHOS U/L  --   --   --  57 70     Results from last 7 days   Lab Units 09/09/23  0845 09/09/23  0843 09/09/23  0835 09/08/23  1621 09/08/23  1544 09/06/23  1454   BLOOD CULTURE   --   --   --   --  No Growth at 72 hrs.   No Growth at 72 hrs.  --    GRAM STAIN RESULT  No Polys or Bacteria seen No Polys or Bacteria seen 2+ Polys*  1+ Gram positive cocci in pairs* No Polys or Bacteria seen  --  2+ Polys*  4+ Gram positive cocci in pairs, chains and clusters*   WOUND CULTURE   --   --  3+ Growth of Staphylococcus aureus* 2 colonies Staphylococcus aureus*  1 colony Staphylococcus coagulase negative*  --  4+ Growth of Staphylococcus aureus*

## 2023-09-12 NOTE — PROGRESS NOTES
-- Patient: Regina Gross  -- MRN: [id number]  -- Aidin Request ID: [id number]  -- Level of care reserved: [address]  -- Partner Reserved: [facility], [address] [place] [address] [phone]  -- Clinical needs requested:  -- Geography searched: [phone]  -- Start of Service:  -- Request sent: 4:07pm EDT on 9/11/2023 by Susan Berg  -- Partner reserved: 12:50pm EDT on 9/12/2023 by Susan Berg  -- Choice list shared: 12:45pm EDT on 9/12/2023 by Susan Berg

## 2023-09-12 NOTE — ASSESSMENT & PLAN NOTE
Worsening L knee pain, swelling and erythema since this past Sunday  · Previously underwent insertion of nail to the Left femur for intertrochanteric fracture about 15 months ago   · Saw Ortho outpatient 9/6 and concern for abscess in the L knee; Underwent local I&D and wound was packed; Wound culture grew Staph Aureus; was additionally given Oral abx to take bid  · Reports that Knee continued to appear erythematous and swollen and saw Ortho outpatient and sent to the ER. · Wound culture with MSSA. D/C'ed vancomycin and continue patient on high-dose IV cefazolin  · Patient underwent I&D of left lower extremity and removal of hardware of left distal femur 9/9. Left distal femur abscess site with purulent fluid and infected appearing tissue was noted which was debrided. · Hardware cultures were negative  · Patient to be transition to Keflex 100 mg p.o. every 6 hours to complete a 10-day course through 9/18/2023  · Patient also started on aspirin 325 mg p.o. twice daily for prophylaxis as per Ortho recommendations.   Patient will need outpatient follow-up with orthopedics in 2 weeks

## 2023-09-13 LAB
BACTERIA BLD CULT: NORMAL
BACTERIA BLD CULT: NORMAL

## 2023-09-14 ENCOUNTER — TRANSITIONAL CARE MANAGEMENT (OUTPATIENT)
Dept: INTERNAL MEDICINE CLINIC | Facility: CLINIC | Age: 83
End: 2023-09-14

## 2023-09-14 LAB
BACTERIA SPEC ANAEROBE CULT: ABNORMAL
BACTERIA SPEC ANAEROBE CULT: ABNORMAL

## 2023-09-15 ENCOUNTER — OFFICE VISIT (OUTPATIENT)
Dept: INTERNAL MEDICINE CLINIC | Facility: CLINIC | Age: 83
End: 2023-09-15
Payer: COMMERCIAL

## 2023-09-15 VITALS
SYSTOLIC BLOOD PRESSURE: 120 MMHG | HEART RATE: 61 BPM | DIASTOLIC BLOOD PRESSURE: 62 MMHG | HEIGHT: 69 IN | OXYGEN SATURATION: 95 % | BODY MASS INDEX: 28.42 KG/M2

## 2023-09-15 DIAGNOSIS — I35.8 AORTIC VALVE SCLEROSIS: ICD-10-CM

## 2023-09-15 DIAGNOSIS — I10 ESSENTIAL HYPERTENSION: ICD-10-CM

## 2023-09-15 DIAGNOSIS — I48.92 PAROXYSMAL ATRIAL FLUTTER (HCC): ICD-10-CM

## 2023-09-15 DIAGNOSIS — R60.0 BILATERAL LEG EDEMA: ICD-10-CM

## 2023-09-15 DIAGNOSIS — I47.1 SVT (SUPRAVENTRICULAR TACHYCARDIA): ICD-10-CM

## 2023-09-15 DIAGNOSIS — E83.42 HYPOMAGNESEMIA: ICD-10-CM

## 2023-09-15 DIAGNOSIS — I34.0 NONRHEUMATIC MITRAL VALVE REGURGITATION: ICD-10-CM

## 2023-09-15 DIAGNOSIS — I35.1 NONRHEUMATIC AORTIC VALVE INSUFFICIENCY: ICD-10-CM

## 2023-09-15 DIAGNOSIS — I27.21 PULMONARY ARTERY HYPERTENSION (HCC): ICD-10-CM

## 2023-09-15 DIAGNOSIS — L02.416 ABSCESS OF LEFT LEG: Primary | ICD-10-CM

## 2023-09-15 PROCEDURE — 99496 TRANSJ CARE MGMT HIGH F2F 7D: CPT | Performed by: INTERNAL MEDICINE

## 2023-09-15 NOTE — ASSESSMENT & PLAN NOTE
9/12/2023: CBC normal, magnesium 1.8, BMP normal with GFR 60,  9/9/2023: Aerobic culture Staph aureus and broth culture only sensitive to cefazolin. 1 culture 3+ Staph aureus    Chronic disease hyperlipidemia stable remains on niacin, paroxysmal atrial fibrillation stable history of paroxysmal atrial flutter not on anticoagulation pulse regular remains on Lopressor, pulmonary artery hypertension stable ejection fraction 60% mild to moderate tricuspid regurgitation grade 1 diastolic dysfunction. Seen by cardiologist.  On Demadex every other day as well as potassium every other day. .  CKD level 3 based baseline. GFR 60. Which is baseline creatinine 1.12 patient started with the discomfort in the left knee. He had a previous knee surgery done by orthopedic. He went to see orthopedic found to have a infection. There was a concern about abscess he underwent I&D started on antibiotics and subsequently hospitalized started on IV cefazolin switched over to Keflex for 6 more days finishing it up by 9/18/2023. Some ache. No other major issues     Procedures Performed:  Incision and drainage of the left knee with removal of the hardware from the left distal femur       Hospital Course:   Darryle Mole is a 80 y.o. male patient with past medical history of atrial flutter, DVT/PE, CKD, pulmonary hypertension, femur fracture who originally presented to the hospital on 9/8/2023 due to left knee abscess. Patient noticed some swelling and erythema of the left knee and was seen by orthopedics and had outpatient local I&D performed at bedside and was started on Bactrim. Wound cultures grew MSSA. Patient was seen by orthopedics as follow-up and was sent into the ED for further evaluation IV antibiotics. Patient initially was started on IV vancomycin. Later patient was seen by orthopedics and underwent incision and drainage of the abscess with removal of hardware.   ID was also consulted and antibiotics were de-escalated to Ancef based on cultures. Hardware cultures and wound cultures remain negative and patient will be discharged on Keflex with outpatient follow-up with orthopedics     Patient will continue to follow with Dr. Kate Song    Will do CBC sed rate and CMP in 2 to 3 weeks.   We will continue to monitor may have to increase the water pill frequency or dose we will decide accordingly daughter is very well and involved and will keep me posted

## 2023-09-15 NOTE — ASSESSMENT & PLAN NOTE
3/9/2023 CTA of the chest were reviewed.   He will is should get another lung CAT scan in March 2024 daughter is aware

## 2023-09-15 NOTE — PATIENT INSTRUCTIONS
Follow-up with orthopedic doctor. Follow-up with cardiologist.  Get the blood test in 2 weeks. I will see you back in 4 weeks. Okay     recommend to give Demadex 5 mg every day and KCl 10 mEq every day for next 1 to 2 weeks and then can then go back in every other day. Follow with Consultants as per their and our suggestion    Follow up in one month or as needed earlier    Follow all instructions as advised and discussed. Take your medications as prescribed. Call the office immediately if you experience any side effects. Ask questions if you do not understand. Keep your scheduled appointment as advised or come sooner if necessary or in doubt. Best time to call for non-urgent matter or questions on weekdays is between 9am and 12 noon. See physician for any new symptoms or worsening of current symptoms. Urgent or emergent situations call 911 and report to nearest emergency room. I spent  time taking care of this patient including clinical care, conseling, collaboration, chart, lab and consultant's follow up note,images report, documentation, pre visit  review as appropriate.     Patient is to get labs 1 week(s) prior to next visit if advised

## 2023-09-15 NOTE — PROGRESS NOTES
Name: Gerri Cox      : 1940      MRN: [id number]  Encounter Provider: Sade Shen MD  Encounter Date: 9/15/2023   Encounter department: [address]     1. Abscess of left leg  Assessment & Plan:  2023: CBC normal, magnesium 1.8, BMP normal with GFR 60,  2023: Aerobic culture Staph aureus and broth culture only sensitive to cefazolin. 1 culture 3+ Staph aureus    Chronic disease hyperlipidemia stable remains on niacin, paroxysmal atrial fibrillation stable history of paroxysmal atrial flutter not on anticoagulation pulse regular remains on Lopressor, pulmonary artery hypertension stable ejection fraction 60% mild to moderate tricuspid regurgitation grade 1 diastolic dysfunction. Seen by cardiologist.  On Demadex every other day as well as potassium every other day. .  CKD level 3 based baseline. GFR 60. Which is baseline creatinine 1.12 patient started with the discomfort in the left knee. He had a previous knee surgery done by orthopedic. He went to see orthopedic found to have a infection. There was a concern about abscess he underwent I&D started on antibiotics and subsequently hospitalized started on IV cefazolin switched over to Keflex for 6 more days finishing it up by 2023. Some ache. No other major issues     Procedures Performed:  Incision and drainage of the left knee with removal of the hardware from the left distal femur       Hospital Course:   Gerri Cox is a 80 y.o. male patient with past medical history of atrial flutter, DVT/PE, CKD, pulmonary hypertension, femur fracture who originally presented to the hospital on 2023 due to left knee abscess. Patient noticed some swelling and erythema of the left knee and was seen by orthopedics and had outpatient local I&D performed at bedside and was started on Bactrim. Wound cultures grew MSSA.   Patient was seen by orthopedics as follow-up and was sent into the ED for further evaluation IV antibiotics. Patient initially was started on IV vancomycin. Later patient was seen by orthopedics and underwent incision and drainage of the abscess with removal of hardware. ID was also consulted and antibiotics were de-escalated to Ancef based on cultures. Hardware cultures and wound cultures remain negative and patient will be discharged on Keflex with outpatient follow-up with orthopedics     Patient will continue to follow with Dr. Sow Skill    Will do CBC sed rate and CMP in 2 to 3 weeks. We will continue to monitor may have to increase the water pill frequency or dose we will decide accordingly daughter is very well and involved and will keep me posted    Orders:  -     CBC; Future; Expected date: 09/22/2023  -     Sedimentation rate, automated; Future; Expected date: 09/22/2023  -     Comprehensive metabolic panel; Future; Expected date: 09/22/2023    2. Hypomagnesemia  Assessment & Plan:  Recommend magnesium 400 mg 1 daily. Orders:  -     CBC; Future; Expected date: 09/22/2023  -     Sedimentation rate, automated; Future; Expected date: 09/22/2023  -     Comprehensive metabolic panel; Future; Expected date: 09/22/2023    3. Bilateral leg edema  Assessment & Plan:  Does not like to wear knee-high stockings. On Demadex 5 mg every other day as well as potassium chloride 10 mEq every other day. Will do CBC sed rate and CMP in 2 to 3 weeks. We will continue to monitor may have to increase the water pill frequency or dose we will decide accordingly daughter is very well and involved and will keep me posted    Moderate edema    Rec:   recommend to give Demadex 5 mg every day and KCl 10 mEq every day for next 1 to 2 weeks and then can then go back in every other day. Orders:  -     CBC; Future; Expected date: 09/22/2023  -     Sedimentation rate, automated; Future; Expected date: 09/22/2023  -     Comprehensive metabolic panel; Future; Expected date: 09/22/2023    4. Nonrheumatic mitral valve regurgitation  Assessment & Plan:  Paroxysmal atrial fibrillation stable pulse regular, valvular heart disease stable, hypertension well controlled, edema 6 mild to moderate present. No symptoms of LV failure. Remains on Demadex. Remains on potassium every other day along with the Demadex every other day. Other relevant medications includes    Aspirin, atorvastatin, fosinopril, metoprolol, torsemide every other day and potassium every other day. Tolerating without side effect. Due for CMP prior to next visit      5. Aortic valve sclerosis  -     CBC; Future; Expected date: 09/22/2023  -     Sedimentation rate, automated; Future; Expected date: 09/22/2023  -     Comprehensive metabolic panel; Future; Expected date: 09/22/2023    6. Nonrheumatic aortic valve insufficiency  Assessment & Plan:  Paroxysmal atrial fibrillation stable pulse regular, valvular heart disease stable, hypertension well controlled, edema 6 mild to moderate present. No symptoms of LV failure. Remains on Demadex. Remains on potassium every other day along with the Demadex every other day. Other relevant medications includes    Aspirin, atorvastatin, fosinopril, metoprolol, torsemide every other day and potassium every other day. Tolerating without side effect. Due for CMP prior to next visit      7. Essential hypertension  Assessment & Plan:  Paroxysmal atrial fibrillation stable pulse regular, valvular heart disease stable, hypertension well controlled, edema 6 mild to moderate present. No symptoms of LV failure. Remains on Demadex. Remains on potassium every other day along with the Demadex every other day. Other relevant medications includes    Aspirin, atorvastatin, fosinopril, metoprolol, torsemide every other day and potassium every other day. Tolerating without side effect. Due for CMP prior to next visit    Orders:  -     CBC;  Future; Expected date: 09/22/2023  -     Sedimentation rate, automated; Future; Expected date: 09/22/2023  -     Comprehensive metabolic panel; Future; Expected date: 09/22/2023    8. SVT (supraventricular tachycardia) (HCC)    9. Paroxysmal atrial flutter (HCC)  Assessment & Plan:  Paroxysmal atrial fibrillation stable pulse regular, valvular heart disease stable, hypertension well controlled, edema 6 mild to moderate present. No symptoms of LV failure. Remains on Demadex. Remains on potassium every other day along with the Demadex every other day. Other relevant medications includes    Aspirin, atorvastatin, fosinopril, metoprolol, torsemide every other day and potassium every other day. Tolerating without side effect. Due for CMP prior to next visit      10. 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[address])           Subjective     TCM Call     Date and time call was made  9/14/2023  4:58 PM    Hospital care reviewed  Records reviewed    Patient was hospitialized at  [facility]    Date of Admission  09/08/23    Date of discharge  09/12/23    Diagnosis  Abscess of left leg    Disposition  Home    Were the patients medications reviewed and updated  Yes    Current Symptoms  Swelling    Cough Severity  Moderate      TCM Call     Clinical progress swelling  Improving    Post hospital issues  None    Should patient be enrolled in anticoag monitoring? No    Scheduled for follow up? Yes    Patients specialists  Other (comment)    Pulmonologist name  Dr Caitlyn Doran contact #  [phone]    Endocrinologist name  [phone]    Other specialists names  Cami Ricci,  (Orthopedic Surgery    Did you obtain your prescribed medications  Yes    Do you need help managing your prescriptions or medications  No    Why type of assitance do you need  His daughter is a nurse. She manages   his meds    Is transportation to your appointment needed  No    Specify why  Pt does not drive. Wife to drive him.     I have advised the patient to call PCP with any new or worsening symptoms    JORDON YATES MA    Living Arrangements  Spouse or Significiant other    Support System  Children; Spouse    The type of support provided  Emotional; Financial; Physical    Do you have social support  Yes, as much as I need    Are you recieving any outpatient services  No    Are you recieving home care services  Yes    Types of home care services  Nurse visit    Are you using any community resources  No    Current waiver services  No    Have you fallen in the last 12 months  Yes    How many times  1-2. Is getting Home PT/OT    Interperter language line needed  No    Counseling  Patient; Family    Counseling topics  patient and family education; Importance of RX   compliance; instructions for management    Comments  Daughter to call back. 9/12/2023: CBC normal, magnesium 1.8, BMP normal with GFR 60,  9/9/2023: Aerobic culture Staph aureus and broth culture only sensitive to cefazolin. 1 culture 3+ Staph aureus    Chronic disease hyperlipidemia stable remains on niacin, paroxysmal atrial fibrillation stable history of paroxysmal atrial flutter not on anticoagulation pulse regular remains on Lopressor, pulmonary artery hypertension stable ejection fraction 60% mild to moderate tricuspid regurgitation grade 1 diastolic dysfunction. Seen by cardiologist.  On Demadex every other day as well as potassium every other day. .  CKD level 3 based baseline. GFR 60. Which is baseline creatinine 1.12 patient started with the discomfort in the left knee. He had a previous knee surgery done by orthopedic. He went to see orthopedic found to have a infection. There was a concern about abscess he underwent I&D started on antibiotics and subsequently hospitalized started on IV cefazolin switched over to Keflex for 6 more days finishing it up by 9/18/2023. Some ache.   No other major issues     Procedures Performed:  Incision and drainage of the left knee with removal of the hardware from the left distal femur       Hospital Course:   Oscar Rowe is a 80 y.o. male patient with past medical history of atrial flutter, DVT/PE, CKD, pulmonary hypertension, femur fracture who originally presented to the hospital on 9/8/2023 due to left knee abscess. Patient noticed some swelling and erythema of the left knee and was seen by orthopedics and had outpatient local I&D performed at bedside and was started on Bactrim. Wound cultures grew MSSA. Patient was seen by orthopedics as follow-up and was sent into the ED for further evaluation IV antibiotics. Patient initially was started on IV vancomycin. Later patient was seen by orthopedics and underwent incision and drainage of the abscess with removal of hardware. ID was also consulted and antibiotics were de-escalated to Ancef based on cultures. Hardware cultures and wound cultures remain negative and patient will be discharged on Keflex with outpatient follow-up with orthopedics           Review of Systems   Constitutional: Negative for chills, fatigue and fever. HENT: Negative for ear pain, sore throat and trouble swallowing. Eyes: Negative for pain and visual disturbance. Respiratory: Negative for cough and shortness of breath. Cardiovascular: Positive for leg swelling. Negative for chest pain and palpitations. Gastrointestinal: Negative for abdominal pain, constipation, diarrhea and vomiting. Genitourinary: Negative for difficulty urinating, dysuria, hematuria and testicular pain. Musculoskeletal: Negative for arthralgias, back pain and myalgias. Skin: Negative for color change and rash. Neurological: Negative for dizziness, seizures, syncope and headaches. Psychiatric/Behavioral: Negative for confusion, hallucinations and sleep disturbance. All other systems reviewed and are negative.       Past Medical History:   Diagnosis Date   • BPH (benign prostatic hyperplasia)    • Depression    • HTN (hypertension)    • Hyperlipidemia    • OCD (obsessive compulsive disorder)    • Pulmonary embolism ([address]) 2019     Past Surgical History:   Procedure Laterality Date   • HARDWARE REMOVAL Left 9/9/2023    Procedure: REMOVAL HARDWARE;  Surgeon: Duran Mccormack DO;  Location: [facility];  Service: Orthopedics   • INCISION AND DRAINAGE OF WOUND Left 9/9/2023    Procedure: INCISION AND DRAINAGE (I&D) EXTREMITY;  Surgeon: Duran Mccormack DO;  Location: [facility];  Service: Orthopedics   • IR IVC FILTER PLACEMENT OPTIONAL/TEMPORARY  12/7/2019   • IR IVC FILTER REMOVAL  8/21/2020   • DC OPTX FEM SHFT FX W/INSJ IMED IMPLT W/WO SCREW Right 12/4/2019    Procedure: INSERTION NAIL IM FEMUR ANTEGRADE (TROCHANTERIC);   Surgeon: Milagro Ha DO;  Location: [facility];  Service: Orthopedics   • DC OPTX FEM SHFT FX W/INSJ IMED IMPLT W/WO SCREW Left 6/17/2022    Procedure: INSERTION NAIL IM FEMUR ANTEGRADE (TROCHANTERIC) - long nail;  Surgeon: Duran Mccormack DO;  Location: [facility];  Service: Orthopedics     Family History   Problem Relation Age of Onset   • Cancer Mother      Social History     Socioeconomic History   • Marital status: /Civil Union     Spouse name: None   • Number of children: None   • Years of education: None   • Highest education level: None   Occupational History   • None   Tobacco Use   • Smoking status: Never   • Smokeless tobacco: Never   Vaping Use   • Vaping Use: Never used   Substance and Sexual Activity   • Alcohol use: Never   • Drug use: Never   • Sexual activity: None   Other Topics Concern   • None   Social History Narrative   • None     Social Determinants of Health     Financial Resource Strain: Low Risk  (11/25/2022)    Overall Financial Resource Strain (CARDIA)    • Difficulty of Paying Living Expenses: Not hard at all   Food Insecurity: No Food Insecurity (9/11/2023)    Hunger Vital Sign    • Worried About Running Out of Food in the Last Year: Never true    • Ran Out of Food in the Last Year: Never true   Transportation Needs: No Transportation Needs (9/11/2023)    PRAPARE - Transportation    • Lack of Transportation (Medical): No    • Lack of Transportation (Non-Medical):  No   Physical Activity: Not on file   Stress: Not on file   Social Connections: Not on file   Intimate Partner Violence: Not on file   Housing Stability: Low Risk  (9/11/2023)    Housing Stability Vital Sign    • Unable to Pay for Housing in the Last Year: No    • Number of Places Lived in the Last Year: 1    • Unstable Housing in the Last Year: No     Current Outpatient Medications on File Prior to Visit   Medication Sig   • albuterol (Ventolin HFA) 90 mcg/act inhaler Inhale 2 puffs every 6 (six) hours as needed for wheezing   • aspirin 325 mg tablet Take 1 tablet (325 mg total) by mouth 2 (two) times a day   • atorvastatin (LIPITOR) 10 mg tablet TAKE ONE-HALF TABLET DAILY   • bisacodyl (DULCOLAX) 5 mg EC tablet Take 2 tablets (10 mg total) by mouth daily at bedtime   • Calcium Carb-Cholecalciferol (CALTRATE 600+D3 PO) Take 600 mg by mouth 2 (two) times a day   • cephalexin (KEFLEX) 500 mg capsule Take 1 capsule (500 mg total) by mouth 4 (four) times a day for 6 days   • citalopram (CeleXA) 40 mg tablet TAKE ONE TABLET ONCE DAILY BY MOUTH   • fluticasone (FLONASE) 50 mcg/act nasal spray 1 spray into each nostril daily   • fosinopril (MONOPRIL) 10 mg tablet TAKE ONE-HALF TABLET BY MOUTH DAILY   • ipratropium (ATROVENT) 0.03 % nasal spray 2 sprays into each nostril 2 (two) times a day as needed for rhinitis   • magnesium oxide (MAG-OX) 400 mg tablet Take 1 tablet by mouth daily   • metoprolol tartrate (LOPRESSOR) 50 mg tablet TAKE ONE TABLET EVERY 12 HOURS   • Multiple Vitamin (MULTIVITAMIN) tablet Take 1 tablet by mouth daily   • niacin (NIASPAN) 500 mg CR tablet TAKE ONE TABLET DAILY AT BEDTIME   • pantoprazole (PROTONIX) 40 mg tablet TAKE ONE TABLET DAILY   • polyethylene glycol (MIRALAX) 17 g packet Take 17 g by mouth 2 (two) times a day   • potassium chloride (Klor-Con) 10 mEq tablet Take 1 tablet (10 mEq total) by mouth in the morning   • tamsulosin (FLOMAX) 0.4 mg Take 0.4 mg by mouth daily with dinner   • torsemide (DEMADEX) 5 MG tablet Take 1 tablet (5 mg total) by mouth daily     No Known Allergies  Immunization History   Administered Date(s) Administered   • INFLUENZA 12/14/2022   • Influenza, high dose seasonal 0.7 mL 09/29/2020, 10/12/2021, 12/14/2022   • Influenza, injectable, quadrivalent, preservative free 0.5 mL 10/31/2019   • Tdap 10/14/2019       Objective     /62   Pulse 61   Ht 5' 9" (1.753 m)   SpO2 95%   BMI 28.42 kg/m²     Physical Exam  Vitals and nursing note reviewed. Constitutional:       Appearance: Normal appearance. He is well-developed. He is not ill-appearing. HENT:      Head: Normocephalic and atraumatic. Right Ear: External ear normal.      Left Ear: External ear normal.      Nose: Nose normal.   Eyes:      General: Lids are normal.   Neck:      Thyroid: No thyroid mass or thyromegaly. Vascular: No JVD. Trachea: Trachea normal.   Cardiovascular:      Rate and Rhythm: Normal rate and regular rhythm. Pulses: Normal pulses. Heart sounds: Murmur heard. Comments: Stockings in place. Pulmonary:      Effort: Pulmonary effort is normal. No respiratory distress. Breath sounds: Normal breath sounds. Abdominal:      General: Bowel sounds are normal. There is no distension. Palpations: There is no mass. Tenderness: There is no abdominal tenderness. Hernia: No hernia is present. Musculoskeletal:         General: Normal range of motion. Cervical back: Normal range of motion. Right lower leg: 3+ Edema present. Left lower leg: 3+ Edema present. Comments: Left knee decreased range of motion Limited examination was done since patient is wearing patent   Skin:     General: Skin is warm. Coloration: Skin is not jaundiced or pale.    Neurological:      General: No focal deficit present. Mental Status: He is alert and oriented to person, place, and time. Sensory: No sensory deficit. Motor: No weakness. Gait: Gait abnormal (pt is in wheelchair). Psychiatric:         Attention and Perception: Perception normal.         Mood and Affect: Mood normal. Mood is not anxious. Speech: Speech normal.         Behavior: Behavior normal. Behavior is not agitated, aggressive, withdrawn, hyperactive or combative. Thought Content: Thought content normal. Thought content is not paranoid or delusional. Thought content does not include homicidal or suicidal ideation. Thought content does not include suicidal plan. Cognition and Memory: Memory is not impaired. Judgment: Judgment normal. Judgment is not impulsive.        Abel Dennison MD

## 2023-09-15 NOTE — ASSESSMENT & PLAN NOTE
Does not like to wear knee-high stockings. On Demadex 5 mg every other day as well as potassium chloride 10 mEq every other day. Will do CBC sed rate and CMP in 2 to 3 weeks. We will continue to monitor may have to increase the water pill frequency or dose we will decide accordingly daughter is very well and involved and will keep me posted    Moderate edema    Rec:   recommend to give Demadex 5 mg every day and KCl 10 mEq every day for next 1 to 2 weeks and then can then go back in every other day.

## 2023-09-15 NOTE — ASSESSMENT & PLAN NOTE
Paroxysmal atrial fibrillation stable pulse regular, valvular heart disease stable, hypertension well controlled, edema 6 mild to moderate present. No symptoms of LV failure. Remains on Demadex. Remains on potassium every other day along with the Demadex every other day. Other relevant medications includes    Aspirin, atorvastatin, fosinopril, metoprolol, torsemide every other day and potassium every other day. Tolerating without side effect.   Due for CMP prior to next visit

## 2023-09-18 LAB — FUNGUS SPEC CULT: NORMAL

## 2023-09-22 ENCOUNTER — OFFICE VISIT (OUTPATIENT)
Dept: OBGYN CLINIC | Facility: CLINIC | Age: 83
End: 2023-09-22

## 2023-09-22 VITALS — SYSTOLIC BLOOD PRESSURE: 130 MMHG | DIASTOLIC BLOOD PRESSURE: 62 MMHG | HEART RATE: 60 BPM

## 2023-09-22 DIAGNOSIS — Z98.890 STATUS POST HARDWARE REMOVAL: ICD-10-CM

## 2023-09-22 DIAGNOSIS — Z98.890 STATUS POST INCISION AND DRAINAGE: Primary | ICD-10-CM

## 2023-09-22 DIAGNOSIS — L02.416 ABSCESS OF LEFT LOWER LEG: ICD-10-CM

## 2023-09-22 PROCEDURE — 99024 POSTOP FOLLOW-UP VISIT: CPT | Performed by: ORTHOPAEDIC SURGERY

## 2023-09-22 NOTE — PROGRESS NOTES
Assessment/Plan:  1. Status post incision and drainage        2. Status post hardware removal        3. Abscess of left lower leg          Scribe Attestation    I,:  Gracie Delcid am acting as a scribe while in the presence of the attending physician.:       I,:  Kacy Martines, DO personally performed the services described in this documentation    as scribed in my presence.:         Kapil Guillaume is a pleasant 80-year-old gentleman who presents today for follow-up evaluation 2 weeks status post surgical I&D and removal of hardware left leg. I am very pleased with his presentation today in the office. His sutures were removed aseptically today and Steri-Strips were placed over his incision. He should continue with aspirin for DVT prophylaxis for an additional 2 weeks. He may begin showering in 3 to 4 days. He may continue returning to activity to his tolerance. He no longer requires regular follow-up. We will see him as needed. Subjective: Follow-up evaluation 2 weeks status post surgical I&D and removal of hardware left leg    Patient ID: Margoth Renee is a 80 y.o. male who presents today for follow-up evaluation 2 weeks status post surgical I&D and removal of hardware left leg. He reports he has been doing well. He has been improving and has been able to walk without issue or pain. He has been using 325 mg aspirin twice daily. His Keflex is now concluded. He has been using an Ace wrap on his left knee. He denies any new injury or trauma. Review of Systems   Constitutional: Positive for activity change. Negative for chills, fever and unexpected weight change. HENT: Negative for hearing loss, nosebleeds and sore throat. Eyes: Negative for pain, redness and visual disturbance. Respiratory: Negative for cough, shortness of breath and wheezing. Cardiovascular: Negative for chest pain, palpitations and leg swelling. Gastrointestinal: Negative for abdominal pain, nausea and vomiting.    Endocrine: Negative for polyphagia and polyuria. Genitourinary: Negative for dysuria and hematuria. Musculoskeletal: Positive for myalgias. Negative for arthralgias and joint swelling. See HPI   Skin: Negative for rash and wound. Neurological: Negative for dizziness, numbness and headaches. Psychiatric/Behavioral: Negative for decreased concentration and suicidal ideas. The patient is not nervous/anxious. Past Medical History:   Diagnosis Date   • BPH (benign prostatic hyperplasia)    • Depression    • HTN (hypertension)    • Hyperlipidemia    • OCD (obsessive compulsive disorder)    • Pulmonary embolism ([address]) 2019       Past Surgical History:   Procedure Laterality Date   • HARDWARE REMOVAL Left 9/9/2023    Procedure: REMOVAL HARDWARE;  Surgeon: Genet Vaughn DO;  Location: [facility];  Service: Orthopedics   • INCISION AND DRAINAGE OF WOUND Left 9/9/2023    Procedure: INCISION AND DRAINAGE (I&D) EXTREMITY;  Surgeon: Genet Vaughn DO;  Location: [facility];  Service: Orthopedics   • IR IVC FILTER PLACEMENT OPTIONAL/TEMPORARY  12/7/2019   • IR IVC FILTER REMOVAL  8/21/2020   • OH OPTX FEM SHFT FX W/INSJ IMED IMPLT W/WO SCREW Right 12/4/2019    Procedure: INSERTION NAIL IM FEMUR ANTEGRADE (TROCHANTERIC);   Surgeon: Nile Cueto DO;  Location: [facility];  Service: Orthopedics   • [place] OPTX FEM SHFT FX W/INSJ IMED IMPLT W/WO SCREW Left 6/17/2022    Procedure: INSERTION NAIL IM FEMUR ANTEGRADE (TROCHANTERIC) - long nail;  Surgeon: Genet Vaughn DO;  Location: [facility];  Service: Orthopedics       Family History   Problem Relation Age of Onset   • Cancer Mother        Social History     Occupational History   • Not on file   Tobacco Use   • Smoking status: Never   • Smokeless tobacco: Never   Vaping Use   • Vaping Use: Never used   Substance and Sexual Activity   • Alcohol use: Never   • Drug use: Never   • Sexual activity: Not on file         Current Outpatient Medications:   •  albuterol (Ventolin HFA) 90 mcg/act inhaler, Inhale 2 puffs every 6 (six) hours as needed for wheezing, Disp: 18 g, Rfl: 0  •  aspirin 325 mg tablet, Take 1 tablet (325 mg total) by mouth 2 (two) times a day, Disp: 80 tablet, Rfl: 0  •  atorvastatin (LIPITOR) 10 mg tablet, TAKE ONE-HALF TABLET DAILY, Disp: 45 tablet, Rfl: 1  •  bisacodyl (DULCOLAX) 5 mg EC tablet, Take 2 tablets (10 mg total) by mouth daily at bedtime, Disp: 30 tablet, Rfl: 0  •  Calcium Carb-Cholecalciferol (CALTRATE 600+D3 PO), Take 600 mg by mouth 2 (two) times a day, Disp: , Rfl:   •  citalopram (CeleXA) 40 mg tablet, TAKE ONE TABLET ONCE DAILY BY MOUTH, Disp: 90 tablet, Rfl: 1  •  fluticasone (FLONASE) 50 mcg/act nasal spray, 1 spray into each nostril daily, Disp: 15 mL, Rfl: 0  •  fosinopril (MONOPRIL) 10 mg tablet, TAKE ONE-HALF TABLET BY MOUTH DAILY, Disp: 45 tablet, Rfl: 1  •  ipratropium (ATROVENT) 0.03 % nasal spray, 2 sprays into each nostril 2 (two) times a day as needed for rhinitis, Disp: 30 mL, Rfl: 2  •  magnesium oxide (MAG-OX) 400 mg tablet, Take 1 tablet by mouth daily, Disp: , Rfl:   •  metoprolol tartrate (LOPRESSOR) 50 mg tablet, TAKE ONE TABLET EVERY 12 HOURS, Disp: 180 tablet, Rfl: 1  •  Multiple Vitamin (MULTIVITAMIN) tablet, Take 1 tablet by mouth daily, Disp: , Rfl:   •  niacin (NIASPAN) 500 mg CR tablet, TAKE ONE TABLET DAILY AT BEDTIME, Disp: 90 tablet, Rfl: 1  •  pantoprazole (PROTONIX) 40 mg tablet, TAKE ONE TABLET DAILY, Disp: 30 tablet, Rfl: 2  •  polyethylene glycol (MIRALAX) 17 g packet, Take 17 g by mouth 2 (two) times a day, Disp: 30 each, Rfl: 0  •  potassium chloride (Klor-Con) 10 mEq tablet, Take 1 tablet (10 mEq total) by mouth in the morning, Disp: 30 tablet, Rfl: 1  •  tamsulosin (FLOMAX) 0.4 mg, Take 0.4 mg by mouth daily with dinner, Disp: , Rfl:   •  torsemide (DEMADEX) 5 MG tablet, Take 1 tablet (5 mg total) by mouth daily, Disp: 30 tablet, Rfl: 1    No Known Allergies    Objective:  Vitals:    09/22/23 1106   BP: 130/62 Pulse: 60       There is no height or weight on file to calculate BMI. Left Knee Exam     Tenderness   The patient is experiencing no tenderness. Other   Erythema: absent  Scars: present  Sensation: normal  Pulse: present  Swelling: mild (lower extremity edema)  Effusion: no effusion present    Comments: Well healed incisions without drainage, erythema or warmth  Sutures removed aseptically today  Nearly healed abrasion anterior knee  No sign of recurrence of abscess            Observations   Left Knee   Negative for effusion. Physical Exam  Vitals and nursing note reviewed. Constitutional:       Appearance: Normal appearance. He is well-developed. HENT:      Head: Normocephalic and atraumatic. Right Ear: External ear normal.      Left Ear: External ear normal.      Nose: Nose normal.   Eyes:      General: No scleral icterus. Extraocular Movements: Extraocular movements intact. Conjunctiva/sclera: Conjunctivae normal.   Cardiovascular:      Rate and Rhythm: Normal rate. Pulmonary:      Effort: Pulmonary effort is normal. No respiratory distress. Musculoskeletal:      Cervical back: Normal range of motion and neck supple. Left knee: No effusion. Comments: See Ortho exam   Skin:     General: Skin is warm and dry. Neurological:      General: No focal deficit present. Mental Status: He is alert and oriented to person, place, and time. Psychiatric:         Behavior: Behavior normal.         This document was created using speech voice recognition software. Grammatical errors, random word insertions, pronoun errors, and incomplete sentences are an occasional consequence of this system due to software limitations, ambient noise, and hardware issues. Any formal questions or concerns about content, text, or information contained within the body of this dictation should be directly addressed to the provider for clarification.

## 2023-09-25 ENCOUNTER — TELEPHONE (OUTPATIENT)
Age: 83
End: 2023-09-25

## 2023-09-25 LAB — FUNGUS SPEC CULT: NORMAL

## 2023-09-25 NOTE — TELEPHONE ENCOUNTER
Caller: Dino Peraza from [id number]    Doctor: Suha Pringle    Reason for call:     Dino Peraza is questioning the patients Abscess of left lower leg, what was the cause of this ? Was it caused from the prior surgery or from the tramatic fall in his yard? ?  She is asking this question for billing purposes.      Call back#: [phone], Ask for Dino Peraza

## 2023-10-02 ENCOUNTER — LAB (OUTPATIENT)
Dept: LAB | Facility: CLINIC | Age: 83
End: 2023-10-02
Payer: COMMERCIAL

## 2023-10-02 DIAGNOSIS — I10 ESSENTIAL HYPERTENSION: ICD-10-CM

## 2023-10-02 DIAGNOSIS — E83.42 HYPOMAGNESEMIA: ICD-10-CM

## 2023-10-02 DIAGNOSIS — L02.416 ABSCESS OF LEFT LEG: ICD-10-CM

## 2023-10-02 DIAGNOSIS — R60.0 BILATERAL LEG EDEMA: ICD-10-CM

## 2023-10-02 DIAGNOSIS — I35.8 AORTIC VALVE SCLEROSIS: ICD-10-CM

## 2023-10-02 LAB
ALBUMIN SERPL BCP-MCNC: 3.6 G/DL (ref 3.5–5)
ALP SERPL-CCNC: 84 U/L (ref 34–104)
ALT SERPL W P-5'-P-CCNC: 8 U/L (ref 7–52)
ANION GAP SERPL CALCULATED.3IONS-SCNC: 6 MMOL/L
AST SERPL W P-5'-P-CCNC: 17 U/L (ref 13–39)
BILIRUB SERPL-MCNC: 0.71 MG/DL (ref 0.2–1)
BUN SERPL-MCNC: 17 MG/DL (ref 5–25)
CALCIUM SERPL-MCNC: 9.1 MG/DL (ref 8.4–10.2)
CHLORIDE SERPL-SCNC: 100 MMOL/L (ref 96–108)
CO2 SERPL-SCNC: 32 MMOL/L (ref 21–32)
CREAT SERPL-MCNC: 1.24 MG/DL (ref 0.6–1.3)
ERYTHROCYTE [DISTWIDTH] IN BLOOD BY AUTOMATED COUNT: 13.5 % (ref 11.6–15.1)
ERYTHROCYTE [SEDIMENTATION RATE] IN BLOOD: 22 MM/HOUR (ref 0–19)
FUNGUS SPEC CULT: NORMAL
GFR SERPL CREATININE-BSD FRML MDRD: 53 ML/MIN/1.73SQ M
GLUCOSE P FAST SERPL-MCNC: 94 MG/DL (ref 65–99)
HCT VFR BLD AUTO: 38.9 % (ref 36.5–49.3)
HGB BLD-MCNC: 12.6 G/DL (ref 12–17)
MCH RBC QN AUTO: 31.7 PG (ref 26.8–34.3)
MCHC RBC AUTO-ENTMCNC: 32.4 G/DL (ref 31.4–37.4)
MCV RBC AUTO: 98 FL (ref 82–98)
PLATELET # BLD AUTO: 232 THOUSANDS/UL (ref 149–390)
PMV BLD AUTO: 9.7 FL (ref 8.9–12.7)
POTASSIUM SERPL-SCNC: 4.5 MMOL/L (ref 3.5–5.3)
PROT SERPL-MCNC: 6.4 G/DL (ref 6.4–8.4)
RBC # BLD AUTO: 3.98 MILLION/UL (ref 3.88–5.62)
SODIUM SERPL-SCNC: 138 MMOL/L (ref 135–147)
WBC # BLD AUTO: 5.79 THOUSAND/UL (ref 4.31–10.16)

## 2023-10-02 PROCEDURE — 85652 RBC SED RATE AUTOMATED: CPT

## 2023-10-02 PROCEDURE — 80053 COMPREHEN METABOLIC PANEL: CPT

## 2023-10-02 PROCEDURE — 36415 COLL VENOUS BLD VENIPUNCTURE: CPT

## 2023-10-02 PROCEDURE — 85027 COMPLETE CBC AUTOMATED: CPT

## 2023-10-03 NOTE — Clinical Note
Saul Quintanilla was seen and treated in our emergency department on 3/19/2023  Diagnosis:     Marky Lim    He may return on this date: If you have any questions or concerns, please don't hesitate to call        Lilibeth Glynn RN    ______________________________           _______________          _______________  [facility] Representative                              Date                                Time REVIEW OF SYSTEMS:  CONSTITUTIONAL: endorses weakness and cold sweats  EYES/ENT: No visual changes;  No vertigo or throat pain   NECK: No pain or stiffness  RESPIRATORY: No cough, wheezing, hemoptysis; No shortness of breath  CARDIOVASCULAR: endorses chest pain associated w/ abdominal fullness  GASTROINTESTINAL: abdominal pain w/out  nausea, vomiting, or hematemesis; No diarrhea or constipation. No melena or hematochezia.  GENITOURINARY: No dysuria, frequency or hematuria  NEUROLOGICAL: No numbness or weakness  SKIN: r posterior neck and interpectoral patch of hypertrophic skin 2/2 itching

## 2023-10-06 DIAGNOSIS — R60.0 BILATERAL LEG EDEMA: ICD-10-CM

## 2023-10-09 LAB — FUNGUS SPEC CULT: NORMAL

## 2023-10-09 RX ORDER — POTASSIUM CHLORIDE 750 MG/1
10 TABLET, FILM COATED, EXTENDED RELEASE ORAL EVERY MORNING
Qty: 30 TABLET | Refills: 1 | Status: SHIPPED | OUTPATIENT
Start: 2023-10-09 | End: 2023-10-10 | Stop reason: SDUPTHER

## 2023-10-10 DIAGNOSIS — R60.0 BILATERAL LEG EDEMA: ICD-10-CM

## 2023-10-10 RX ORDER — POTASSIUM CHLORIDE 750 MG/1
10 TABLET, FILM COATED, EXTENDED RELEASE ORAL EVERY MORNING
Qty: 90 TABLET | Refills: 0 | Status: SHIPPED | OUTPATIENT
Start: 2023-10-10 | End: 2023-10-13 | Stop reason: SDUPTHER

## 2023-10-10 NOTE — TELEPHONE ENCOUNTER
Reason for call:     [facility] calling need script for potassium change to 90 day in order for ins to cover med      [x] Refill   [] Prior Auth  [] Other:     Office:   [x] PCP/Provider - Dr Balbir Bedolla  [] Speciality/Provider -     Medication:   potassium chloride 10 meq, 1 qd, 90    Pharmacy: [facility]

## 2023-10-13 ENCOUNTER — OFFICE VISIT (OUTPATIENT)
Dept: INTERNAL MEDICINE CLINIC | Facility: CLINIC | Age: 83
End: 2023-10-13
Payer: COMMERCIAL

## 2023-10-13 VITALS
SYSTOLIC BLOOD PRESSURE: 128 MMHG | WEIGHT: 188 LBS | HEIGHT: 69 IN | OXYGEN SATURATION: 96 % | BODY MASS INDEX: 27.85 KG/M2 | DIASTOLIC BLOOD PRESSURE: 64 MMHG | HEART RATE: 58 BPM

## 2023-10-13 DIAGNOSIS — Z23 NEED FOR VACCINATION: ICD-10-CM

## 2023-10-13 DIAGNOSIS — R91.1 LUNG NODULE: ICD-10-CM

## 2023-10-13 DIAGNOSIS — L02.416 ABSCESS OF LEFT LEG: ICD-10-CM

## 2023-10-13 DIAGNOSIS — F33.42 RECURRENT MAJOR DEPRESSIVE DISORDER, IN FULL REMISSION (HCC): ICD-10-CM

## 2023-10-13 DIAGNOSIS — K21.9 GASTROESOPHAGEAL REFLUX DISEASE WITHOUT ESOPHAGITIS: ICD-10-CM

## 2023-10-13 DIAGNOSIS — E78.2 MIXED HYPERLIPIDEMIA: ICD-10-CM

## 2023-10-13 DIAGNOSIS — N18.30 STAGE 3 CHRONIC KIDNEY DISEASE, UNSPECIFIED WHETHER STAGE 3A OR 3B CKD (HCC): ICD-10-CM

## 2023-10-13 DIAGNOSIS — R09.81 NASAL CONGESTION: Chronic | ICD-10-CM

## 2023-10-13 DIAGNOSIS — I10 ESSENTIAL HYPERTENSION: ICD-10-CM

## 2023-10-13 DIAGNOSIS — I35.8 AORTIC VALVE SCLEROSIS: ICD-10-CM

## 2023-10-13 DIAGNOSIS — I35.1 NONRHEUMATIC AORTIC VALVE INSUFFICIENCY: ICD-10-CM

## 2023-10-13 DIAGNOSIS — K22.70 BARRETT'S ESOPHAGUS WITHOUT DYSPLASIA: ICD-10-CM

## 2023-10-13 DIAGNOSIS — I48.92 PAROXYSMAL ATRIAL FLUTTER (HCC): ICD-10-CM

## 2023-10-13 DIAGNOSIS — E44.1 MILD PROTEIN-CALORIE MALNUTRITION (HCC): ICD-10-CM

## 2023-10-13 DIAGNOSIS — K44.9 HIATAL HERNIA: ICD-10-CM

## 2023-10-13 DIAGNOSIS — R60.0 BILATERAL LEG EDEMA: Primary | ICD-10-CM

## 2023-10-13 DIAGNOSIS — I27.21 PULMONARY ARTERY HYPERTENSION (HCC): ICD-10-CM

## 2023-10-13 DIAGNOSIS — I34.0 NONRHEUMATIC MITRAL VALVE REGURGITATION: ICD-10-CM

## 2023-10-13 PROCEDURE — 90662 IIV NO PRSV INCREASED AG IM: CPT | Performed by: INTERNAL MEDICINE

## 2023-10-13 PROCEDURE — 99214 OFFICE O/P EST MOD 30 MIN: CPT | Performed by: INTERNAL MEDICINE

## 2023-10-13 PROCEDURE — G0008 ADMIN INFLUENZA VIRUS VAC: HCPCS | Performed by: INTERNAL MEDICINE

## 2023-10-13 RX ORDER — TORSEMIDE 5 MG/1
TABLET ORAL
Qty: 45 TABLET | Refills: 1 | Status: SHIPPED | OUTPATIENT
Start: 2023-10-13

## 2023-10-13 RX ORDER — POTASSIUM CHLORIDE 750 MG/1
TABLET, FILM COATED, EXTENDED RELEASE ORAL
Qty: 45 TABLET | Refills: 1 | Status: SHIPPED | OUTPATIENT
Start: 2023-10-13

## 2023-10-13 NOTE — ASSESSMENT & PLAN NOTE
No heartburn. Remains on pantoprazole 40 mg daily. Last endoscopy done on 3/22/2023.   We will continue same regimen and will need upper GI endoscopy probably in 2024

## 2023-10-13 NOTE — PROGRESS NOTES
Name: Mague Rodgers      : 1940      MRN: [id number]  Encounter Provider: Ronnell Price MD  Encounter Date: 10/13/2023   Encounter department: [place] [id number]. Bilateral leg edema  Assessment & Plan:  10/2/2022: CMP normal except GFR 53, blood sugar 94, BUN 17 creatinine 1.24 potassium 4.5, sed rate 22 near normal CBC normal    2023 BUN/creatinine 18 and 1.12 rest of the electrolytes normal GFR 60    Denies any chest pain palpitation PND orthopnea. Edema somewhat better. Lost 2 pound weight. Currently remains on torsemide 5 mg mg every other day as well as potassium 10 mEq every other day. Other relevant medications includes fosinopril 10 mg daily, metoprolol tartrate 50 mg every 12 hour  10/2/2022: CMP normal except GFR 53, blood sugar 94, BUN 17 creatinine 1.24 potassium 4.5, sed rate 22 near normal CBC normal    2023 BUN/creatinine 18 and 1.12 rest of the electrolytes normal GFR 60    odema both lower extremity all are fair. Continue torsemide 5 5 mg every other day along with KCl 10 mEq every other day. Also remains on fosinopril  We will continue same regimen. Follow-up lab in 1 months. Orders:  -     potassium chloride (Klor-Con) 10 mEq tablet; 1 tablet every other day  -     torsemide (DEMADEX) 5 MG tablet; 1 tablet every other day  -     Lipid panel; Future; Expected date: 10/27/2023  -     Comprehensive metabolic panel; Future; Expected date: 2023    2.  Need for vaccination  -     influenza vaccine, high-dose, PF 0.7 mL (FLUZONE HIGH-DOSE)    3. Mild protein-calorie malnutrition (HCC)    4. Stage 3 chronic kidney disease, unspecified whether stage 3a or 3b CKD [facility])  Assessment & Plan:  Lab Results   Component Value Date    EGFR 53 10/02/2023    EGFR 60 2023    EGFR 59 2023    CREATININE 1.24 10/02/2023    CREATININE 1.12 2023    CREATININE 1.13 2023   10/2/2022: CMP normal except GFR 53, blood sugar 94, BUN 17 creatinine 1.24 potassium 4.5, sed rate 22 near normal CBC normal    9/12/2023 BUN/creatinine 18 and 1.12 rest of the electrolytes normal GFR 60    Renal function are acceptable given use of diuretic and multiple other medications. Will do laboratory test prior to next visit    Orders:  -     Lipid panel; Future; Expected date: 10/27/2023  -     Comprehensive metabolic panel; Future; Expected date: 11/27/2023    5. Abscess of left leg  Assessment & Plan:  Resolved        6. Hiatal hernia  Assessment & Plan:  Does have a large hiatal hernia on CT scan of the chest.  No reflux symptoms. Continue diet as well as pantoprazole 40 mg daily      7. Gastroesophageal reflux disease without esophagitis  Assessment & Plan:  No heartburn. Remains on pantoprazole 40 mg daily. Last endoscopy done on 3/22/2023. 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 Remains on pantoprazole 40 mg daily. We will continue same      9. Lung nodule  Assessment & Plan:  No cough chest congestion shortness of breath. Using nasal spray for runny nose. Breathing is fairly good not requiring any inhaler. Last CAT scan from 3/9/2023 reviewed      10. Nonrheumatic mitral valve regurgitation  Assessment & Plan:  Denies any chest pain palpitation PND orthopnea. Edema somewhat better. Lost 2 pound weight. Currently remains on torsemide 5 mg mg every other day as well as potassium 10 mEq every other day.   Other relevant medications includes fosinopril 10 mg daily, metoprolol tartrate 50 mg every 12 hour  10/2/2022: CMP normal except GFR 53, blood sugar 94, BUN 17 creatinine 1.24 potassium 4.5, sed rate 22 near normal CBC normal    9/12/2023 BUN/creatinine 18 and 1.12 rest of the electrolytes normal GFR 60    Mitral regurgitation, aortic valve sclerosis, aortic regurgitation, hypertension, history of supraventricular tachycardia, history of paroxysmal atrial fibrillation as well as edema both lower extremity all are fair. We will continue same regimen. Follow-up lab in 1 months. 11. Aortic valve sclerosis  Assessment & Plan:  Denies any chest pain palpitation PND orthopnea. Edema somewhat better. Lost 2 pound weight. Currently remains on torsemide 5 mg mg every other day as well as potassium 10 mEq every other day. Other relevant medications includes fosinopril 10 mg daily, metoprolol tartrate 50 mg every 12 hour  10/2/2022: CMP normal except GFR 53, blood sugar 94, BUN 17 creatinine 1.24 potassium 4.5, sed rate 22 near normal CBC normal    9/12/2023 BUN/creatinine 18 and 1.12 rest of the electrolytes normal GFR 60    Mitral regurgitation, aortic valve sclerosis, aortic regurgitation, hypertension, history of supraventricular tachycardia, history of paroxysmal atrial fibrillation as well as edema both lower extremity all are fair. We will continue same regimen. Follow-up lab in 1 months. 12. Nonrheumatic aortic valve insufficiency  Assessment & Plan:  Denies any chest pain palpitation PND orthopnea. Edema somewhat better. Lost 2 pound weight. Currently remains on torsemide 5 mg mg every other day as well as potassium 10 mEq every other day. Other relevant medications includes fosinopril 10 mg daily, metoprolol tartrate 50 mg every 12 hour  10/2/2022: CMP normal except GFR 53, blood sugar 94, BUN 17 creatinine 1.24 potassium 4.5, sed rate 22 near normal CBC normal    9/12/2023 BUN/creatinine 18 and 1.12 rest of the electrolytes normal GFR 60    Mitral regurgitation, aortic valve sclerosis, aortic regurgitation, hypertension, history of supraventricular tachycardia, history of paroxysmal atrial fibrillation as well as edema both lower extremity all are fair. We will continue same regimen. Follow-up lab in 1 months.       13. Essential hypertension  Assessment & Plan:  Denies any chest pain palpitation PND orthopnea. Edema somewhat better. Lost 2 pound weight. Currently remains on torsemide 5 mg mg every other day as well as potassium 10 mEq every other day. Other relevant medications includes fosinopril 10 mg daily, metoprolol tartrate 50 mg every 12 hour  10/2/2022: CMP normal except GFR 53, blood sugar 94, BUN 17 creatinine 1.24 potassium 4.5, sed rate 22 near normal CBC normal    9/12/2023 BUN/creatinine 18 and 1.12 rest of the electrolytes normal GFR 60    Mitral regurgitation, aortic valve sclerosis, aortic regurgitation, hypertension, history of supraventricular tachycardia, history of paroxysmal atrial fibrillation as well as edema both lower extremity all are fair. We will continue same regimen. Follow-up lab in 1 months. Orders:  -     Lipid panel; Future; Expected date: 10/27/2023  -     Comprehensive metabolic panel; Future; Expected date: 11/27/2023    14. Paroxysmal atrial flutter (HCC)  Assessment & Plan:  Denies any chest pain palpitation PND orthopnea. Edema somewhat better. Lost 2 pound weight. Currently remains on torsemide 5 mg mg every other day as well as potassium 10 mEq every other day. Other relevant medications includes fosinopril 10 mg daily, metoprolol tartrate 50 mg every 12 hour  10/2/2022: CMP normal except GFR 53, blood sugar 94, BUN 17 creatinine 1.24 potassium 4.5, sed rate 22 near normal CBC normal    9/12/2023 BUN/creatinine 18 and 1.12 rest of the electrolytes normal GFR 60    Mitral regurgitation, aortic valve sclerosis, aortic regurgitation, hypertension, history of supraventricular tachycardia, history of paroxysmal atrial fibrillation as well as edema both lower extremity all are fair. We will continue same regimen. Follow-up lab in 1 months. 15. 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[address])    16.  Recurrent major depressive disorder, in full remission [facility])  Assessment & Plan:  Major depressive disorder as well as obsessive-compulsive disorder are to the baseline fair. Tolerating current medications without side effect. We will continue citalopram 40 mg daily. Tolerating without side effect. 17. Mixed hyperlipidemia  Assessment & Plan:  Lab Results   Component Value Date    ALT 8 10/02/2023    ALT 22 12/21/2015     Lab Results   Component Value Date    CHOLESTEROL 112 05/05/2023    CHOLESTEROL 113 11/21/2022    CHOLESTEROL 120 05/20/2022     Lab Results   Component Value Date    HDL 54 05/05/2023    HDL 52 11/21/2022    HDL 49 05/20/2022     Lab Results   Component Value Date    TRIG 55 05/05/2023    TRIG 84 11/21/2022    TRIG 87 05/20/2022     Lab Results   Component Value Date    NONHDLC 58 05/05/2023    [address] 61 11/21/2022    [address] 71 05/20/2022     Lab Results   Component Value Date    LDLCALC 47 05/05/2023     Continue atorvastatin 10 mg daily    Follow up cmp and lipid profile  prior to next visit    Orders:  -     Lipid panel; Future; Expected date: 10/27/2023  -     Comprehensive metabolic panel; Future; Expected date: 11/27/2023           Subjective     Here for chronic disease management as well as edema of both lower extremity. Edema is somewhat better 2 pound weight loss tolerating torsemide 5 mg every other day as well as KCl 10 mEq every other day. Renal functions minimal bump but to be expected and acceptable. Will do follow-up lab data prior to next visit. Hiatal hernia, GERD, Vega's stable continue pantoprazole. Chronic allergic rhinitis fair. Remains on nasal spray Atrovent Atrovent. Mitral regurgitation, aortic valve sclerosis, hypertension, history of SVT, history of PAF, post pulmonary artery hypertension all fair. Edema edema as mentioned above. Stable cardiac status continues regiment without side effect. CKD 3 months is noted minimal bump in creatinine noted we will keep an eye on the.    OCD and MDD fairly stable. CKD level 3 will monitor.     Gait dysfunction walks with a walker safely. Recent skin lesion taken out by dermatologist on the right ear healing well    D/w with wife and daughter     Lab on 10/02/2023  WBC                       Value: 5.79(Thousand/uL)  Dt: 10/02/2023  RBC                       Value: 3.98(Million/uL)   Dt: 10/02/2023  Hemoglobin                Value: 12.6(g/dL)         Dt: 10/02/2023  Hematocrit                Value: 38.9(%)            Dt: 10/02/2023  MCV                       Value: 98(fL)             Dt: 10/02/2023  MCH                       Value: 31.7(pg)           Dt: 10/02/2023  MCHC                      Value: 32.4(g/dL)         Dt: 10/02/2023  RDW                       Value: 13.5(%)            Dt: 10/02/2023  Platelets                 Value: 232(Thousands/uL)  Dt: 10/02/2023  MPV                       Value: 9.7(fL)            Dt: 10/02/2023  Sed Rate                  Value: 22(mm/hour) (H)    Dt: 10/02/2023  Sodium                    Value: 138(mmol/L)        Dt: 10/02/2023  Potassium                 Value: 4.5(mmol/L)        Dt: 10/02/2023  Chloride                  Value: 100(mmol/L)        Dt: 10/02/2023  CO2                       Value: 32(mmol/L)         Dt: 10/02/2023  ANION GAP                 Value:  6(mmol/L)          Dt: 10/02/2023  BUN                       Value: 17(mg/dL)          Dt: 10/02/2023  Creatinine                Value: 1.24(mg/dL)        Dt: 10/02/2023  Glucose, Fasting          Value: 94(mg/dL)          Dt: 10/02/2023  Calcium                   Value: 9.1(mg/dL)         Dt: 10/02/2023  AST                       Value: 17(U/L)            Dt: 10/02/2023  ALT                       Value: 8(U/L)             Dt: 10/02/2023  Alkaline Phosphatase      Value: 84(U/L)            Dt: 10/02/2023  Total Protein             Value: 6.4(g/dL)          Dt: 10/02/2023  Albumin                   Value: 3.6(g/dL)          Dt: 10/02/2023  Total Bilirubin           Value: 0.71(mg/dL)        Dt: 10/02/2023  eGFR Value: 53(ml/min/1.73sq m) Dt: 10/02/2023  ------------ - 2 weeks          Review of Systems   Constitutional:  Negative for chills and fever. HENT:  Negative for ear pain, sore throat and trouble swallowing. Eyes:  Negative for pain and visual disturbance. Respiratory:  Negative for cough and shortness of breath. Cardiovascular:  Positive for leg swelling. Negative for chest pain and palpitations. Gastrointestinal:  Negative for abdominal pain, constipation, diarrhea and vomiting. Genitourinary:  Negative for difficulty urinating, dysuria, flank pain, frequency, hematuria and testicular pain. Musculoskeletal:  Positive for gait problem. Negative for arthralgias, back pain and myalgias. Skin:  Negative for color change and rash. Neurological:  Negative for seizures, syncope and light-headedness. Psychiatric/Behavioral:  Negative for agitation, behavioral problems and hallucinations. The patient is not nervous/anxious. All other systems reviewed and are negative. Past Medical History:   Diagnosis Date   • BPH (benign prostatic hyperplasia)    • Depression    • HTN (hypertension)    • Hyperlipidemia    • OCD (obsessive compulsive disorder)    • Pulmonary embolism ([address]) 2019     Past Surgical History:   Procedure Laterality Date   • HARDWARE REMOVAL Left 9/9/2023    Procedure: REMOVAL HARDWARE;  Surgeon: Annel Hubbard DO;  Location: [facility];  Service: Orthopedics   • INCISION AND DRAINAGE OF WOUND Left 9/9/2023    Procedure: INCISION AND DRAINAGE (I&D) EXTREMITY;  Surgeon: Annel Hubbard DO;  Location: [facility];  Service: Orthopedics   • IR IVC FILTER PLACEMENT OPTIONAL/TEMPORARY  12/7/2019   • IR IVC FILTER REMOVAL  8/21/2020   • WI OPTX FEM SHFT FX W/INSJ IMED IMPLT W/WO SCREW Right 12/4/2019    Procedure: INSERTION NAIL IM FEMUR ANTEGRADE (TROCHANTERIC);   Surgeon: Mary Oneil DO;  Location: [facility] OR;  Service: Orthopedics   • WI OPTX FEM SHFT FX W/INSJ IMED IMPLT W/WO SCREW Left 6/17/2022    Procedure: INSERTION NAIL IM FEMUR ANTEGRADE (TROCHANTERIC) - long nail;  Surgeon: Treva Puri DO;  Location: [facility];  Service: Orthopedics     Family History   Problem Relation Age of Onset   • Cancer Mother      Social History     Socioeconomic History   • Marital status: /Civil Union     Spouse name: None   • Number of children: None   • Years of education: None   • Highest education level: None   Occupational History   • None   Tobacco Use   • Smoking status: Never   • Smokeless tobacco: Never   Vaping Use   • Vaping Use: Never used   Substance and Sexual Activity   • Alcohol use: Never   • Drug use: Never   • Sexual activity: None   Other Topics Concern   • None   Social History Narrative   • None     Social Determinants of Health     Financial Resource Strain: Low Risk  (11/25/2022)    Overall Financial Resource Strain (CARDIA)    • Difficulty of Paying Living Expenses: Not hard at all   Food Insecurity: No Food Insecurity (9/11/2023)    Hunger Vital Sign    • Worried About Running Out of Food in the Last Year: Never true    • Ran Out of Food in the Last Year: Never true   Transportation Needs: No Transportation Needs (9/11/2023)    PRAPARE - Transportation    • Lack of Transportation (Medical): No    • Lack of Transportation (Non-Medical):  No   Physical Activity: Not on file   Stress: Not on file   Social Connections: Not on file   Intimate Partner Violence: Not on file   Housing Stability: Low Risk  (9/11/2023)    Housing Stability Vital Sign    • Unable to Pay for Housing in the Last Year: No    • Number of Places Lived in the Last Year: 1    • Unstable Housing in the Last Year: No     Current Outpatient Medications on File Prior to Visit   Medication Sig   • albuterol (Ventolin HFA) 90 mcg/act inhaler Inhale 2 puffs every 6 (six) hours as needed for wheezing   • aspirin 325 mg tablet Take 1 tablet (325 mg total) by mouth 2 (two) times a day   • atorvastatin (LIPITOR) 10 mg tablet TAKE ONE-HALF TABLET DAILY   • bisacodyl (DULCOLAX) 5 mg EC tablet Take 2 tablets (10 mg total) by mouth daily at bedtime   • Calcium Carb-Cholecalciferol (CALTRATE 600+D3 PO) Take 600 mg by mouth 2 (two) times a day   • citalopram (CeleXA) 40 mg tablet TAKE ONE TABLET ONCE DAILY BY MOUTH   • fluticasone (FLONASE) 50 mcg/act nasal spray 1 spray into each nostril daily   • fosinopril (MONOPRIL) 10 mg tablet TAKE ONE-HALF TABLET BY MOUTH DAILY   • ipratropium (ATROVENT) 0.03 % nasal spray 2 sprays into each nostril 2 (two) times a day as needed for rhinitis   • magnesium oxide (MAG-OX) 400 mg tablet Take 1 tablet by mouth daily   • metoprolol tartrate (LOPRESSOR) 50 mg tablet TAKE ONE TABLET EVERY 12 HOURS   • Multiple Vitamin (MULTIVITAMIN) tablet Take 1 tablet by mouth daily   • niacin (NIASPAN) 500 mg CR tablet TAKE ONE TABLET DAILY AT BEDTIME   • pantoprazole (PROTONIX) 40 mg tablet TAKE ONE TABLET DAILY   • polyethylene glycol (MIRALAX) 17 g packet Take 17 g by mouth 2 (two) times a day   • tamsulosin (FLOMAX) 0.4 mg Take 0.4 mg by mouth daily with dinner   • [DISCONTINUED] potassium chloride (Klor-Con) 10 mEq tablet Take 1 tablet (10 mEq total) by mouth every morning   • [DISCONTINUED] torsemide (DEMADEX) 5 MG tablet Take 1 tablet (5 mg total) by mouth daily     No Known Allergies  Immunization History   Administered Date(s) Administered   • INFLUENZA 12/14/2022   • Influenza, high dose seasonal 0.7 mL 09/29/2020, 10/12/2021, 12/14/2022, 10/13/2023   • Influenza, injectable, quadrivalent, preservative free 0.5 mL 10/31/2019   • Tdap 10/14/2019       Objective     /64   Pulse 58   Ht 5' 9" (1.753 m)   Wt 85.3 kg (188 lb)   SpO2 96%   BMI 27.76 kg/m²     Physical Exam  Vitals and nursing note reviewed. Constitutional:       Appearance: Normal appearance. He is well-developed. He is not ill-appearing. HENT:      Head: Normocephalic and atraumatic.       Right Ear: External ear normal. Left Ear: External ear normal.      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   Eyes:      General: Lids are normal.         Right eye: No discharge. Left eye: No discharge. Neck:      Thyroid: No thyroid mass or thyromegaly. Vascular: No carotid bruit or JVD. Trachea: Trachea normal.   Cardiovascular:      Rate and Rhythm: Normal rate and regular rhythm. Pulses: Normal pulses. Heart sounds: Murmur heard. Comments: Stockings in place. Pulmonary:      Effort: Pulmonary effort is normal. No respiratory distress. Breath sounds: Normal breath sounds. Musculoskeletal:         General: Normal range of motion. Cervical back: No rigidity or tenderness. Right lower leg: 3+ Edema present. Left lower leg: 3+ Edema present. Comments: Left knee decreased range of motion Limited examination was done since patient is wearing patent   Lymphadenopathy:      Cervical: No cervical adenopathy. Skin:     General: Skin is warm. Coloration: Skin is not jaundiced or pale. Neurological:      General: No focal deficit present. Mental Status: He is alert and oriented to person, place, and time. Motor: No weakness. Gait: Gait abnormal (pt is in wheelchair). Psychiatric:         Attention and Perception: Perception normal.         Mood and Affect: Mood normal. Mood is not anxious. Speech: Speech normal.         Behavior: Behavior normal. Behavior is not agitated, aggressive, withdrawn, hyperactive or combative. Thought Content: Thought content normal. Thought content is not paranoid or delusional. Thought content does not include homicidal or suicidal ideation. Thought content does not include suicidal plan. Cognition and Memory: Memory is not impaired. Judgment: Judgment normal. Judgment is not impulsive.        Michael Johns MD

## 2023-10-13 NOTE — ASSESSMENT & PLAN NOTE
Lab Results   Component Value Date    ALT 8 10/02/2023    ALT 22 12/21/2015     Lab Results   Component Value Date    CHOLESTEROL 112 05/05/2023    CHOLESTEROL 113 11/21/2022    CHOLESTEROL 120 05/20/2022     Lab Results   Component Value Date    HDL 54 05/05/2023    HDL 52 11/21/2022    HDL 49 05/20/2022     Lab Results   Component Value Date    TRIG 55 05/05/2023    TRIG 84 11/21/2022    TRIG 87 05/20/2022     Lab Results   Component Value Date    NONHDLC 58 05/05/2023    [address] 61 11/21/2022    [address] 71 05/20/2022     Lab Results   Component Value Date    LDLCALC 47 05/05/2023     Continue atorvastatin 10 mg daily    Follow up cmp and lipid profile  prior to next visit

## 2023-10-13 NOTE — ASSESSMENT & PLAN NOTE
Major depressive disorder as well as obsessive-compulsive disorder are to the baseline fair. Tolerating current medications without side effect. We will continue citalopram 40 mg daily. Tolerating without side effect.

## 2023-10-13 NOTE — ASSESSMENT & PLAN NOTE
10/2/2022: CMP normal except GFR 53, blood sugar 94, BUN 17 creatinine 1.24 potassium 4.5, sed rate 22 near normal CBC normal    9/12/2023 BUN/creatinine 18 and 1.12 rest of the electrolytes normal GFR 60    Denies any chest pain palpitation PND orthopnea. Edema somewhat better. Lost 2 pound weight. Currently remains on torsemide 5 mg mg every other day as well as potassium 10 mEq every other day. Other relevant medications includes fosinopril 10 mg daily, metoprolol tartrate 50 mg every 12 hour  10/2/2022: CMP normal except GFR 53, blood sugar 94, BUN 17 creatinine 1.24 potassium 4.5, sed rate 22 near normal CBC normal    9/12/2023 BUN/creatinine 18 and 1.12 rest of the electrolytes normal GFR 60    odema both lower extremity all are fair. Continue torsemide 5 5 mg every other day along with KCl 10 mEq every other day. Also remains on fosinopril  We will continue same regimen. Follow-up lab in 1 months.

## 2023-10-13 NOTE — ASSESSMENT & PLAN NOTE
Denies any chest pain palpitation PND orthopnea. Edema somewhat better. Lost 2 pound weight. Currently remains on torsemide 5 mg mg every other day as well as potassium 10 mEq every other day. Other relevant medications includes fosinopril 10 mg daily, metoprolol tartrate 50 mg every 12 hour  10/2/2022: CMP normal except GFR 53, blood sugar 94, BUN 17 creatinine 1.24 potassium 4.5, sed rate 22 near normal CBC normal    9/12/2023 BUN/creatinine 18 and 1.12 rest of the electrolytes normal GFR 60    Mitral regurgitation, aortic valve sclerosis, aortic regurgitation, hypertension, history of supraventricular tachycardia, history of paroxysmal atrial fibrillation as well as edema both lower extremity all are fair. We will continue same regimen. Follow-up lab in 1 months.

## 2023-10-13 NOTE — ASSESSMENT & PLAN NOTE
No cough chest congestion shortness of breath. Using nasal spray for runny nose. Breathing is fairly good not requiring any inhaler.   Last CAT scan from 3/9/2023 reviewed

## 2023-10-13 NOTE — ASSESSMENT & PLAN NOTE
Lab Results   Component Value Date    EGFR 53 10/02/2023    EGFR 60 09/12/2023    EGFR 59 09/11/2023    CREATININE 1.24 10/02/2023    CREATININE 1.12 09/12/2023    CREATININE 1.13 09/11/2023   10/2/2022: CMP normal except GFR 53, blood sugar 94, BUN 17 creatinine 1.24 potassium 4.5, sed rate 22 near normal CBC normal    9/12/2023 BUN/creatinine 18 and 1.12 rest of the electrolytes normal GFR 60    Renal function are acceptable given use of diuretic and multiple other medications.   Will do laboratory test prior to next visit

## 2023-10-13 NOTE — ASSESSMENT & PLAN NOTE
Does have a large hiatal hernia on CT scan of the chest.  No reflux symptoms.   Continue diet as well as pantoprazole 40 mg daily

## 2023-10-18 DIAGNOSIS — R09.81 NASAL CONGESTION: Chronic | ICD-10-CM

## 2023-10-18 RX ORDER — FLUTICASONE PROPIONATE 50 MCG
1 SPRAY, SUSPENSION (ML) NASAL DAILY
Qty: 15 ML | Refills: 5 | Status: SHIPPED | OUTPATIENT
Start: 2023-10-18

## 2023-10-18 NOTE — TELEPHONE ENCOUNTER
Previously prescribed by doctor from hospital     Reason for call:   [x] Refill   [] Prior Auth  [] Other:     Office:   [x] PCP/Provider -  Leanne Ruiz MD   [] Specialty/Provider -     Medication:     fluticasone (FLONASE)        Dose/Frequency: 50 mcg/act nasal spray     1 spray, Nasal, Daily       Quantity: 15ml    Pharmacy: [id number]     Does the patient have enough for 3 days?    [] Yes   [x] No - Send as HP to POD

## 2023-10-25 RX ORDER — FLUTICASONE PROPIONATE 50 MCG
1 SPRAY, SUSPENSION (ML) NASAL DAILY
Qty: 16 G | Refills: 0 | Status: SHIPPED | OUTPATIENT
Start: 2023-10-25

## 2023-10-30 DIAGNOSIS — E78.00 HYPERCHOLESTEREMIA: ICD-10-CM

## 2023-10-30 RX ORDER — ATORVASTATIN CALCIUM 10 MG/1
TABLET, FILM COATED ORAL
Qty: 45 TABLET | Refills: 5 | Status: SHIPPED | OUTPATIENT
Start: 2023-10-30

## 2023-11-25 DIAGNOSIS — K44.9 HIATAL HERNIA: ICD-10-CM

## 2023-11-27 RX ORDER — PANTOPRAZOLE SODIUM 40 MG/1
40 TABLET, DELAYED RELEASE ORAL DAILY
Qty: 30 TABLET | Refills: 2 | Status: ON HOLD | OUTPATIENT
Start: 2023-11-27

## 2023-11-29 ENCOUNTER — APPOINTMENT (EMERGENCY)
Dept: RADIOLOGY | Facility: HOSPITAL | Age: 83
DRG: 177 | End: 2023-11-29
Payer: COMMERCIAL

## 2023-11-29 ENCOUNTER — TELEPHONE (OUTPATIENT)
Dept: INTERNAL MEDICINE CLINIC | Facility: CLINIC | Age: 83
End: 2023-11-29

## 2023-11-29 ENCOUNTER — HOSPITAL ENCOUNTER (INPATIENT)
Facility: HOSPITAL | Age: 83
LOS: 3 days | Discharge: HOME | DRG: 177 | End: 2023-12-02
Attending: STUDENT IN AN ORGANIZED HEALTH CARE EDUCATION/TRAINING PROGRAM | Admitting: FAMILY MEDICINE
Payer: COMMERCIAL

## 2023-11-29 DIAGNOSIS — J96.01 ACUTE RESPIRATORY FAILURE WITH HYPOXIA (HCC): ICD-10-CM

## 2023-11-29 DIAGNOSIS — B33.8 RSV INFECTION: ICD-10-CM

## 2023-11-29 DIAGNOSIS — U07.1 COVID: Primary | ICD-10-CM

## 2023-11-29 LAB
ALBUMIN SERPL BCP-MCNC: 3.9 G/DL (ref 3.5–5)
ALP SERPL-CCNC: 76 U/L (ref 34–104)
ALT SERPL W P-5'-P-CCNC: 9 U/L (ref 7–52)
ANION GAP SERPL CALCULATED.3IONS-SCNC: 6 MMOL/L
AST SERPL W P-5'-P-CCNC: 16 U/L (ref 13–39)
BASOPHILS # BLD AUTO: 0.03 THOUSANDS/ÂΜL (ref 0–0.1)
BASOPHILS NFR BLD AUTO: 0 % (ref 0–1)
BILIRUB SERPL-MCNC: 0.88 MG/DL (ref 0.2–1)
BUN SERPL-MCNC: 16 MG/DL (ref 5–25)
CALCIUM SERPL-MCNC: 9.2 MG/DL (ref 8.4–10.2)
CHLORIDE SERPL-SCNC: 99 MMOL/L (ref 96–108)
CO2 SERPL-SCNC: 29 MMOL/L (ref 21–32)
CREAT SERPL-MCNC: 1.26 MG/DL (ref 0.6–1.3)
CRP SERPL QL: 34.7 MG/L
D DIMER PPP FEU-MCNC: 0.9 UG/ML FEU
EOSINOPHIL # BLD AUTO: 0.11 THOUSAND/ÂΜL (ref 0–0.61)
EOSINOPHIL NFR BLD AUTO: 1 % (ref 0–6)
ERYTHROCYTE [DISTWIDTH] IN BLOOD BY AUTOMATED COUNT: 13.3 % (ref 11.6–15.1)
FLUAV RNA RESP QL NAA+PROBE: NEGATIVE
FLUBV RNA RESP QL NAA+PROBE: NEGATIVE
GFR SERPL CREATININE-BSD FRML MDRD: 52 ML/MIN/1.73SQ M
GLUCOSE SERPL-MCNC: 97 MG/DL (ref 65–140)
HCT VFR BLD AUTO: 38.4 % (ref 36.5–49.3)
HGB BLD-MCNC: 13.5 G/DL (ref 12–17)
IMM GRANULOCYTES # BLD AUTO: 0.04 THOUSAND/UL (ref 0–0.2)
IMM GRANULOCYTES NFR BLD AUTO: 0 % (ref 0–2)
LYMPHOCYTES # BLD AUTO: 0.91 THOUSANDS/ÂΜL (ref 0.6–4.47)
LYMPHOCYTES NFR BLD AUTO: 8 % (ref 14–44)
MCH RBC QN AUTO: 33.2 PG (ref 26.8–34.3)
MCHC RBC AUTO-ENTMCNC: 35.2 G/DL (ref 31.4–37.4)
MCV RBC AUTO: 94 FL (ref 82–98)
MONOCYTES # BLD AUTO: 1.25 THOUSAND/ÂΜL (ref 0.17–1.22)
MONOCYTES NFR BLD AUTO: 11 % (ref 4–12)
NEUTROPHILS # BLD AUTO: 8.88 THOUSANDS/ÂΜL (ref 1.85–7.62)
NEUTS SEG NFR BLD AUTO: 80 % (ref 43–75)
NRBC BLD AUTO-RTO: 0 /100 WBCS
PLATELET # BLD AUTO: 199 THOUSANDS/UL (ref 149–390)
PMV BLD AUTO: 9 FL (ref 8.9–12.7)
POTASSIUM SERPL-SCNC: 4.6 MMOL/L (ref 3.5–5.3)
PROCALCITONIN SERPL-MCNC: 0.07 NG/ML
PROT SERPL-MCNC: 6.8 G/DL (ref 6.4–8.4)
RBC # BLD AUTO: 4.07 MILLION/UL (ref 3.88–5.62)
RSV RNA RESP QL NAA+PROBE: POSITIVE
SARS-COV-2 RNA RESP QL NAA+PROBE: POSITIVE
SODIUM SERPL-SCNC: 134 MMOL/L (ref 135–147)
WBC # BLD AUTO: 11.22 THOUSAND/UL (ref 4.31–10.16)

## 2023-11-29 PROCEDURE — 84145 PROCALCITONIN (PCT): CPT | Performed by: STUDENT IN AN ORGANIZED HEALTH CARE EDUCATION/TRAINING PROGRAM

## 2023-11-29 PROCEDURE — 8E0ZXY6 ISOLATION: ICD-10-PCS | Performed by: INTERNAL MEDICINE

## 2023-11-29 PROCEDURE — 99291 CRITICAL CARE FIRST HOUR: CPT | Performed by: STUDENT IN AN ORGANIZED HEALTH CARE EDUCATION/TRAINING PROGRAM

## 2023-11-29 PROCEDURE — 99285 EMERGENCY DEPT VISIT HI MDM: CPT

## 2023-11-29 PROCEDURE — 36415 COLL VENOUS BLD VENIPUNCTURE: CPT | Performed by: STUDENT IN AN ORGANIZED HEALTH CARE EDUCATION/TRAINING PROGRAM

## 2023-11-29 PROCEDURE — 99223 1ST HOSP IP/OBS HIGH 75: CPT | Performed by: FAMILY MEDICINE

## 2023-11-29 PROCEDURE — 94760 N-INVAS EAR/PLS OXIMETRY 1: CPT

## 2023-11-29 PROCEDURE — 94664 DEMO&/EVAL PT USE INHALER: CPT

## 2023-11-29 PROCEDURE — 80053 COMPREHEN METABOLIC PANEL: CPT | Performed by: STUDENT IN AN ORGANIZED HEALTH CARE EDUCATION/TRAINING PROGRAM

## 2023-11-29 PROCEDURE — 0241U HB NFCT DS VIR RESP RNA 4 TRGT: CPT | Performed by: STUDENT IN AN ORGANIZED HEALTH CARE EDUCATION/TRAINING PROGRAM

## 2023-11-29 PROCEDURE — 71045 X-RAY EXAM CHEST 1 VIEW: CPT

## 2023-11-29 PROCEDURE — XW033E5 INTRODUCTION OF REMDESIVIR ANTI-INFECTIVE INTO PERIPHERAL VEIN, PERCUTANEOUS APPROACH, NEW TECHNOLOGY GROUP 5: ICD-10-PCS | Performed by: INTERNAL MEDICINE

## 2023-11-29 PROCEDURE — 85379 FIBRIN DEGRADATION QUANT: CPT

## 2023-11-29 PROCEDURE — 85025 COMPLETE CBC W/AUTO DIFF WBC: CPT | Performed by: STUDENT IN AN ORGANIZED HEALTH CARE EDUCATION/TRAINING PROGRAM

## 2023-11-29 PROCEDURE — 99223 1ST HOSP IP/OBS HIGH 75: CPT

## 2023-11-29 PROCEDURE — 86140 C-REACTIVE PROTEIN: CPT

## 2023-11-29 PROCEDURE — 94640 AIRWAY INHALATION TREATMENT: CPT

## 2023-11-29 RX ORDER — PANTOPRAZOLE SODIUM 40 MG/1
40 TABLET, DELAYED RELEASE ORAL DAILY
Status: DISCONTINUED | OUTPATIENT
Start: 2023-11-29 | End: 2023-12-02 | Stop reason: HOSPADM

## 2023-11-29 RX ORDER — ACETAMINOPHEN 325 MG/1
650 TABLET ORAL EVERY 6 HOURS PRN
Status: DISCONTINUED | OUTPATIENT
Start: 2023-11-29 | End: 2023-12-02 | Stop reason: HOSPADM

## 2023-11-29 RX ORDER — TAMSULOSIN HYDROCHLORIDE 0.4 MG/1
0.4 CAPSULE ORAL
Status: DISCONTINUED | OUTPATIENT
Start: 2023-11-29 | End: 2023-12-02 | Stop reason: HOSPADM

## 2023-11-29 RX ORDER — LANOLIN ALCOHOL/MO/W.PET/CERES
400 CREAM (GRAM) TOPICAL DAILY
Status: DISCONTINUED | OUTPATIENT
Start: 2023-11-29 | End: 2023-12-02 | Stop reason: HOSPADM

## 2023-11-29 RX ORDER — CITALOPRAM 20 MG/1
40 TABLET ORAL EVERY MORNING
Status: DISCONTINUED | OUTPATIENT
Start: 2023-11-30 | End: 2023-12-02 | Stop reason: HOSPADM

## 2023-11-29 RX ORDER — ASPIRIN 325 MG
325 TABLET ORAL 2 TIMES DAILY
Status: DISCONTINUED | OUTPATIENT
Start: 2023-11-29 | End: 2023-12-02 | Stop reason: HOSPADM

## 2023-11-29 RX ORDER — HEPARIN SODIUM 5000 [USP'U]/ML
5000 INJECTION, SOLUTION INTRAVENOUS; SUBCUTANEOUS EVERY 8 HOURS SCHEDULED
Status: DISCONTINUED | OUTPATIENT
Start: 2023-11-29 | End: 2023-12-02 | Stop reason: HOSPADM

## 2023-11-29 RX ORDER — POLYETHYLENE GLYCOL 3350 17 G/17G
17 POWDER, FOR SOLUTION ORAL DAILY
Status: DISCONTINUED | OUTPATIENT
Start: 2023-11-30 | End: 2023-12-02 | Stop reason: HOSPADM

## 2023-11-29 RX ORDER — METOPROLOL TARTRATE 50 MG/1
50 TABLET, FILM COATED ORAL EVERY 12 HOURS
Status: DISCONTINUED | OUTPATIENT
Start: 2023-11-29 | End: 2023-12-02 | Stop reason: HOSPADM

## 2023-11-29 RX ORDER — LISINOPRIL 10 MG/1
10 TABLET ORAL DAILY
Status: DISCONTINUED | OUTPATIENT
Start: 2023-11-29 | End: 2023-12-02 | Stop reason: HOSPADM

## 2023-11-29 RX ORDER — BISACODYL 5 MG/1
10 TABLET, DELAYED RELEASE ORAL
Status: DISCONTINUED | OUTPATIENT
Start: 2023-11-29 | End: 2023-12-02 | Stop reason: HOSPADM

## 2023-11-29 RX ORDER — TORSEMIDE 10 MG/1
5 TABLET ORAL EVERY OTHER DAY
Status: DISCONTINUED | OUTPATIENT
Start: 2023-11-30 | End: 2023-12-02 | Stop reason: HOSPADM

## 2023-11-29 RX ORDER — DEXAMETHASONE SODIUM PHOSPHATE 4 MG/ML
6 INJECTION, SOLUTION INTRA-ARTICULAR; INTRALESIONAL; INTRAMUSCULAR; INTRAVENOUS; SOFT TISSUE EVERY 24 HOURS
Status: DISCONTINUED | OUTPATIENT
Start: 2023-11-29 | End: 2023-12-02 | Stop reason: HOSPADM

## 2023-11-29 RX ORDER — ONDANSETRON 2 MG/ML
4 INJECTION INTRAMUSCULAR; INTRAVENOUS EVERY 6 HOURS PRN
Status: DISCONTINUED | OUTPATIENT
Start: 2023-11-29 | End: 2023-12-02 | Stop reason: HOSPADM

## 2023-11-29 RX ORDER — ALBUTEROL SULFATE 90 UG/1
2 AEROSOL, METERED RESPIRATORY (INHALATION) EVERY 6 HOURS PRN
Status: DISCONTINUED | OUTPATIENT
Start: 2023-11-29 | End: 2023-12-02 | Stop reason: HOSPADM

## 2023-11-29 RX ORDER — ATORVASTATIN CALCIUM 10 MG/1
5 TABLET, FILM COATED ORAL
Status: DISCONTINUED | OUTPATIENT
Start: 2023-11-29 | End: 2023-12-02 | Stop reason: HOSPADM

## 2023-11-29 RX ORDER — NIACIN 500 MG/1
500 TABLET, EXTENDED RELEASE ORAL
Status: DISCONTINUED | OUTPATIENT
Start: 2023-11-29 | End: 2023-12-02 | Stop reason: HOSPADM

## 2023-11-29 RX ORDER — POTASSIUM CHLORIDE 750 MG/1
10 TABLET, EXTENDED RELEASE ORAL EVERY OTHER DAY
Status: DISCONTINUED | OUTPATIENT
Start: 2023-11-29 | End: 2023-12-02 | Stop reason: HOSPADM

## 2023-11-29 RX ORDER — FLUTICASONE PROPIONATE 50 MCG
1 SPRAY, SUSPENSION (ML) NASAL DAILY
Status: DISCONTINUED | OUTPATIENT
Start: 2023-11-29 | End: 2023-12-02 | Stop reason: HOSPADM

## 2023-11-29 RX ORDER — IPRATROPIUM BROMIDE AND ALBUTEROL SULFATE 2.5; .5 MG/3ML; MG/3ML
3 SOLUTION RESPIRATORY (INHALATION)
Status: DISCONTINUED | OUTPATIENT
Start: 2023-11-29 | End: 2023-12-02 | Stop reason: HOSPADM

## 2023-11-29 RX ADMIN — IPRATROPIUM BROMIDE AND ALBUTEROL SULFATE 3 ML: 2.5; .5 SOLUTION RESPIRATORY (INHALATION) at 15:16

## 2023-11-29 RX ADMIN — FLUTICASONE PROPIONATE 1 SPRAY: 50 SPRAY, METERED NASAL at 16:11

## 2023-11-29 RX ADMIN — IPRATROPIUM BROMIDE AND ALBUTEROL SULFATE 3 ML: 2.5; .5 SOLUTION RESPIRATORY (INHALATION) at 20:03

## 2023-11-29 RX ADMIN — NIACIN 500 MG: 500 TABLET, EXTENDED RELEASE ORAL at 21:58

## 2023-11-29 RX ADMIN — DEXAMETHASONE SODIUM PHOSPHATE 6 MG: 4 INJECTION INTRA-ARTICULAR; INTRALESIONAL; INTRAMUSCULAR; INTRAVENOUS; SOFT TISSUE at 17:38

## 2023-11-29 RX ADMIN — Medication 400 MG: at 15:55

## 2023-11-29 RX ADMIN — TAMSULOSIN HYDROCHLORIDE 0.4 MG: 0.4 CAPSULE ORAL at 15:56

## 2023-11-29 RX ADMIN — HEPARIN SODIUM 5000 UNITS: 5000 INJECTION INTRAVENOUS; SUBCUTANEOUS at 15:50

## 2023-11-29 RX ADMIN — METOPROLOL TARTRATE 50 MG: 50 TABLET, FILM COATED ORAL at 15:56

## 2023-11-29 RX ADMIN — ATORVASTATIN CALCIUM 5 MG: 10 TABLET, FILM COATED ORAL at 15:55

## 2023-11-29 RX ADMIN — REMDESIVIR 200 MG: 100 INJECTION, POWDER, LYOPHILIZED, FOR SOLUTION INTRAVENOUS at 17:37

## 2023-11-29 RX ADMIN — HEPARIN SODIUM 5000 UNITS: 5000 INJECTION INTRAVENOUS; SUBCUTANEOUS at 21:58

## 2023-11-29 RX ADMIN — PANTOPRAZOLE SODIUM 40 MG: 40 TABLET, DELAYED RELEASE ORAL at 15:56

## 2023-11-29 RX ADMIN — POTASSIUM CHLORIDE 10 MEQ: 750 TABLET, EXTENDED RELEASE ORAL at 15:56

## 2023-11-29 RX ADMIN — LISINOPRIL 10 MG: 10 TABLET ORAL at 15:56

## 2023-11-29 RX ADMIN — BISACODYL 10 MG: 5 TABLET, COATED ORAL at 21:58

## 2023-11-29 NOTE — ED PROVIDER NOTES
History  Chief Complaint   Patient presents with    Cough     Reported of cough, increases weakness and low grade fever at home. Family reported that grandchild is sick with RSV. O2 sat on arrival 89% RA, better after he is resting in bed to 92-93%. LS diminished on auscultation. Family reported that he has co of dizziness. Patient is an 49-year-old male, past medical history including depression, hypertension, hyperlipidemia, and OCD, who presents the emergency department for 1 day of cough, generalized weakness, dizziness. No modifying factors. No other associated symptoms. No reported fevers or chills. No chest pain. No significant shortness of breath. Per family, who is at bedside, patient was recently exposed to a grandchild with RSV. Patient does not use oxygen at baseline. No other complaints or concerns. Of note, on arrival to the ED patient noted to be hypoxic at 89%. This improved with rest.        Prior to Admission Medications   Prescriptions Last Dose Informant Patient Reported? Taking?    Calcium Carb-Cholecalciferol (CALTRATE 600+D3 PO)  Self Yes No   Sig: Take 600 mg by mouth 2 (two) times a day   Multiple Vitamin (MULTIVITAMIN) tablet  Self Yes No   Sig: Take 1 tablet by mouth daily   albuterol (Ventolin HFA) 90 mcg/act inhaler  Self No No   Sig: Inhale 2 puffs every 6 (six) hours as needed for wheezing   aspirin 325 mg tablet   No No   Sig: Take 1 tablet (325 mg total) by mouth 2 (two) times a day   atorvastatin (LIPITOR) 10 mg tablet   No No   Sig: TAKE ONE-HALF TABLET DAILY   bisacodyl (DULCOLAX) 5 mg EC tablet   No No   Sig: Take 2 tablets (10 mg total) by mouth daily at bedtime   citalopram (CeleXA) 40 mg tablet   No No   Sig: TAKE ONE TABLET ONCE DAILY BY MOUTH   fluticasone (FLONASE) 50 mcg/act nasal spray   No No   Sig: USE 1 SPRAY IN EACH NOSTRIL DAILY   fluticasone (FLONASE) 50 mcg/act nasal spray   No No   Si spray into each nostril daily   fosinopril (MONOPRIL) 10 mg tablet   No No   Sig: TAKE ONE-HALF TABLET BY MOUTH DAILY   ipratropium (ATROVENT) 0.03 % nasal spray  Self No No   Si sprays into each nostril 2 (two) times a day as needed for rhinitis   magnesium oxide (MAG-OX) 400 mg tablet  Self Yes No   Sig: Take 1 tablet by mouth daily   metoprolol tartrate (LOPRESSOR) 50 mg tablet   No No   Sig: TAKE ONE TABLET EVERY 12 HOURS   niacin (NIASPAN) 500 mg CR tablet   No No   Sig: TAKE ONE TABLET DAILY AT BEDTIME   pantoprazole (PROTONIX) 40 mg tablet   No No   Sig: TAKE ONE TABLET DAILY   polyethylene glycol (MIRALAX) 17 g packet   No No   Sig: Take 17 g by mouth 2 (two) times a day   potassium chloride (Klor-Con) 10 mEq tablet   No No   Si tablet every other day   tamsulosin (FLOMAX) 0.4 mg  Self Yes No   Sig: Take 0.4 mg by mouth daily with dinner   torsemide (DEMADEX) 5 MG tablet   No No   Si tablet every other day      Facility-Administered Medications: None       Past Medical History:   Diagnosis Date    BPH (benign prostatic hyperplasia)     Depression     HTN (hypertension)     Hyperlipidemia     OCD (obsessive compulsive disorder)     Pulmonary embolism ([address])        Past Surgical History:   Procedure Laterality Date    HARDWARE REMOVAL Left 2023    Procedure: REMOVAL HARDWARE;  Surgeon: Deb Angel DO;  Location: [facility];  Service: Orthopedics    INCISION AND DRAINAGE OF WOUND Left 2023    Procedure: INCISION AND DRAINAGE (I&D) EXTREMITY;  Surgeon: Deb Angel DO;  Location: [facility];  Service: Orthopedics    IR IVC FILTER PLACEMENT OPTIONAL/TEMPORARY  2019    IR IVC FILTER REMOVAL  2020    NE OPTX FEM SHFT FX W/INSJ IMED IMPLT W/WO SCREW Right 2019    Procedure: INSERTION NAIL IM FEMUR ANTEGRADE (TROCHANTERIC);   Surgeon: Katie Porter DO;  Location: [facility];  Service: Orthopedics    NE OPTX FEM SHFT FX W/INSJ IMED IMPLT W/WO SCREW Left 2022    Procedure: INSERTION NAIL IM FEMUR ANTEGRADE (TROCHANTERIC) - long nail;  Surgeon: Annel Hubbard DO;  Location: [facility];  Service: Orthopedics       Family History   Problem Relation Age of Onset    Cancer Mother      I have reviewed and agree with the history as documented. E-Cigarette/Vaping    E-Cigarette Use Never User      E-Cigarette/Vaping Substances     Social History     Tobacco Use    Smoking status: Never    Smokeless tobacco: Never   Vaping Use    Vaping Use: Never used   Substance Use Topics    Alcohol use: Never    Drug use: Never       Review of Systems   Constitutional:  Negative for chills and fever. Respiratory:  Positive for cough. Neurological:  Positive for dizziness and weakness. All other systems reviewed and are negative. Physical Exam  Physical Exam  Vitals and nursing note reviewed. Constitutional:       General: He is not in acute distress. Appearance: He is well-developed. He is not ill-appearing or diaphoretic. HENT:      Head: Normocephalic and atraumatic. Right Ear: External ear normal.      Left Ear: External ear normal.      Nose: Nose normal.   Eyes:      General: Lids are normal. No scleral icterus. Cardiovascular:      Rate and Rhythm: Normal rate and regular rhythm. Heart sounds: Normal heart sounds. No murmur heard. No friction rub. No gallop. Pulmonary:      Effort: Pulmonary effort is normal. No respiratory distress. Breath sounds: Normal breath sounds. No wheezing or rales. Abdominal:      Palpations: Abdomen is soft. Tenderness: There is no abdominal tenderness. There is no guarding or rebound. Musculoskeletal:         General: No deformity. Normal range of motion. Cervical back: Normal range of motion and neck supple. Skin:     General: Skin is warm and dry. Neurological:      General: No focal deficit present. Mental Status: He is alert.    Psychiatric:         Mood and Affect: Mood normal.         Behavior: Behavior normal.         Vital Signs  ED Triage Vitals Temperature Pulse Respirations Blood Pressure SpO2   11/29/23 1244 11/29/23 1246 11/29/23 1246 11/29/23 1246 11/29/23 1244   98.7 °F (37.1 °C) 74 (!) 24 (!) 185/78 (!) 89 %      Temp Source Heart Rate Source Patient Position - Orthostatic VS BP Location FiO2 (%)   11/29/23 1244 11/29/23 1246 11/29/23 1345 11/29/23 1345 --   Oral Monitor Sitting Right arm       Pain Score       11/29/23 1250       No Pain           Vitals:    11/29/23 1345 11/29/23 1402 11/29/23 1415 11/29/23 1556   BP: 163/70  159/100 157/68   Pulse: 64 78 66 74   Patient Position - Orthostatic VS: Sitting            Visual Acuity      ED Medications  Medications   albuterol (PROVENTIL HFA,VENTOLIN HFA) inhaler 2 puff (has no administration in time range)   aspirin tablet 325 mg (has no administration in time range)   atorvastatin (LIPITOR) tablet 5 mg (5 mg Oral Given 11/29/23 1555)   bisacodyl (DULCOLAX) EC tablet 10 mg (has no administration in time range)   citalopram (CeleXA) tablet 40 mg (has no administration in time range)   fluticasone (FLONASE) 50 mcg/act nasal spray 1 spray (1 spray Each Nare Given 11/29/23 1611)   lisinopril (ZESTRIL) tablet 10 mg (10 mg Oral Given 11/29/23 1556)   metoprolol tartrate (LOPRESSOR) tablet 50 mg (50 mg Oral Given 11/29/23 1556)   magnesium Oxide (MAG-OX) tablet 400 mg (400 mg Oral Given 11/29/23 1555)   niacin (NIASPAN) CR tablet 500 mg (has no administration in time range)   pantoprazole (PROTONIX) EC tablet 40 mg (40 mg Oral Given 11/29/23 1556)   polyethylene glycol (MIRALAX) packet 17 g (has no administration in time range)   potassium chloride (K-DUR,KLOR-CON) CR tablet 10 mEq (10 mEq Oral Given 11/29/23 1556)   tamsulosin (FLOMAX) capsule 0.4 mg (0.4 mg Oral Given 11/29/23 4846)   torsemide (DEMADEX) tablet 5 mg (has no administration in time range)   ipratropium-albuterol (DUO-NEB) 0.5-2.5 mg/3 mL inhalation solution 3 mL (3 mL Nebulization Given 11/29/23 1516)   acetaminophen (TYLENOL) tablet 650 mg (has no administration in time range)   ondansetron (ZOFRAN) injection 4 mg (has no administration in time range)   heparin (porcine) subcutaneous injection 5,000 Units (5,000 Units Subcutaneous Given 11/29/23 1550)   dexamethasone (DECADRON) injection 6 mg (has no administration in time range)   remdesivir (Veklury) 200 mg in sodium chloride 0.9 % 290 mL IVPB (has no administration in time range)     Followed by   remdesivir Marie Burkitt) 100 mg in sodium chloride 0.9 % 270 mL IVPB (has no administration in time range)       Diagnostic Studies  Results Reviewed       Procedure Component Value Units Date/Time    D-dimer, quantitative [[id number]]  (Abnormal) Collected: 11/29/23 1426    Lab Status: Final result Specimen: Blood from Arm, Left Updated: 11/29/23 1452     D-Dimer, Quant 0.90 ug/ml FEU     Narrative: In the evaluation for possible pulmonary embolism, in the appropriate (Well's Score of 4 or less) patient, the age adjusted d-dimer cutoff for this patient can be calculated as:    Age x 0.01 (in ug/mL) for Age-adjusted D-dimer exclusion threshold for a patient over 50 years. C-reactive protein [[id number]]  (Abnormal) Collected: 11/29/23 1257    Lab Status: Final result Specimen: Blood from Arm, Left Updated: 11/29/23 1431     CRP 34.7 mg/L     FLU/RSV/COVID - if FLU/RSV clinically relevant [[id number]]  (Abnormal) Collected: 11/29/23 1257    Lab Status: Final result Specimen: Nares from Nose Updated: 11/29/23 1346     SARS-CoV-2 Positive     INFLUENZA A PCR Negative     INFLUENZA B PCR Negative     RSV PCR Positive    Narrative:      FOR PEDIATRIC PATIENTS - copy/paste COVID Guidelines URL to browser: [email or web address]    SARS-CoV-2 assay is a Nucleic Acid Amplification assay intended for the  qualitative detection of nucleic acid from SARS-CoV-2 in nasopharyngeal  swabs.  Results are for the presumptive identification of SARS-CoV-2 RNA.    Positive results are indicative of infection with SARS-CoV-2, the virus  causing COVID-19, but do not rule out bacterial infection or co-infection  with other viruses. Laboratories within the [facility] and its  territories are required to report all positive results to the appropriate  public health authorities. Negative results do not preclude SARS-CoV-2  infection and should not be used as the sole basis for treatment or other  patient management decisions. Negative results must be combined with  clinical observations, patient history, and epidemiological information. This test has not been FDA cleared or approved. This test has been authorized by FDA under an Emergency Use Authorization  (EUA). This test is only authorized for the duration of time the  declaration that circumstances exist justifying the authorization of the  emergency use of an in vitro diagnostic tests for detection of SARS-CoV-2  virus and/or diagnosis of COVID-19 infection under section 564(b)(1) of  the Act, 21 U. S.C. 421SLV-6(H)(1), unless the authorization is terminated  or revoked sooner. The test has been validated but independent review by FDA  and CLIA is pending. Test performed using [employer] GeneXpert: This RT-PCR assay targets N2,  a region unique to SARS-CoV-2. A conserved region in the E-gene was chosen  for pan-Sarbecovirus detection which includes SARS-CoV-2. According to CMS-2020-01-R, this platform meets the definition of high-throughput technology.     Procalcitonin [[id number]]  (Normal) Collected: 11/29/23 1257    Lab Status: Final result Specimen: Blood from Arm, Left Updated: 11/29/23 1329     Procalcitonin 0.07 ng/ml     CBC and differential [[id number]]  (Abnormal) Collected: 11/29/23 1257    Lab Status: Final result Specimen: Blood from Arm, Left Updated: 11/29/23 1319     WBC 11.22 Thousand/uL      RBC 4.07 Million/uL      Hemoglobin 13.5 g/dL      Hematocrit 38.4 %      MCV 94 fL      MCH 33.2 pg MCHC 35.2 g/dL      RDW 13.3 %      MPV 9.0 fL      Platelets 342 Thousands/uL      nRBC 0 /100 WBCs      Neutrophils Relative 80 %      Immat GRANS % 0 %      Lymphocytes Relative 8 %      Monocytes Relative 11 %      Eosinophils Relative 1 %      Basophils Relative 0 %      Neutrophils Absolute 8.88 Thousands/µL      Immature Grans Absolute 0.04 Thousand/uL      Lymphocytes Absolute 0.91 Thousands/µL      Monocytes Absolute 1.25 Thousand/µL      Eosinophils Absolute 0.11 Thousand/µL      Basophils Absolute 0.03 Thousands/µL     Narrative: This is an appended report. These results have been appended to a previously verified report.     Comprehensive metabolic panel [[id number]]  (Abnormal) Collected: 11/29/23 1257    Lab Status: Final result Specimen: Blood from Arm, Left Updated: 11/29/23 1319     Sodium 134 mmol/L      Potassium 4.6 mmol/L      Chloride 99 mmol/L      CO2 29 mmol/L      ANION GAP 6 mmol/L      BUN 16 mg/dL      Creatinine 1.26 mg/dL      Glucose 97 mg/dL      Calcium 9.2 mg/dL      AST 16 U/L      ALT 9 U/L      Alkaline Phosphatase 76 U/L      Total Protein 6.8 g/dL      Albumin 3.9 g/dL      Total Bilirubin 0.88 mg/dL      eGFR 52 ml/min/1.73sq m     Narrative:      [place] guidelines for Chronic Kidney Disease (CKD):     Stage 1 with normal or high GFR (GFR > 90 mL/min/1.73 square meters)    Stage 2 Mild CKD (GFR = 60-89 mL/min/1.73 square meters)    Stage 3A Moderate CKD (GFR = 45-59 mL/min/1.73 square meters)    Stage 3B Moderate CKD (GFR = 30-44 mL/min/1.73 square meters)    Stage 4 Severe CKD (GFR = 15-29 mL/min/1.73 square meters)    Stage 5 End Stage CKD (GFR <15 mL/min/1.73 square meters)  Note: GFR calculation is accurate only with a steady state creatinine                   XR chest 1 view portable   ED Interpretation by Daniel Sainz DO (11/29 1401)   No acute cardiopulmonary disease      Final Result by Kendall Hanna MD (11/29 1407) No acute cardiopulmonary disease. Stable appearance of hiatal hernia                  Workstation performed: [id number]                    Procedures  CriticalCare Time    Date/Time: 11/29/2023 4:43 PM    Performed by: Ron Fontana DO  Authorized by: Ron Fontana DO    Critical care provider statement:     Critical care time (minutes):  33    Critical care start time:  11/29/2023 12:41 PM    Critical care end time:  11/29/2023 2:03 PM    Critical care time was exclusive of:  Separately billable procedures and treating other patients and teaching time    Critical care was necessary to treat or prevent imminent or life-threatening deterioration of the following conditions:  Respiratory failure    Critical care was time spent personally by me on the following activities:  Blood draw for specimens, obtaining history from patient or surrogate, development of treatment plan with patient or surrogate, evaluation of patient's response to treatment, examination of patient, ordering and performing treatments and interventions, ordering and review of laboratory studies, ordering and review of radiographic studies, re-evaluation of patient's condition and review of old charts    I assumed direction of critical care for this patient from another provider in my specialty: no             ED Course  ED Course as of 11/29/23 1643   Wed Nov 29, 2023   1345 FLU/RSV/COVID - if FLU/RSV clinically relevant(!)  Attempted to ambulate patient. Oxygen level dropped to 88%. Will place on 2 L. Plan to admit. 1403 Discussed with SLIM. Accepts admission                               SBIRT 22yo+      Flowsheet Row Most Recent Value   Initial Alcohol Screen: US AUDIT-C     1. How often do you have a drink containing alcohol? 0 Filed at: 11/29/2023 1305   2. How many drinks containing alcohol do you have on a typical day you are drinking? 0 Filed at: 11/29/2023 1305   3b. FEMALE Any Age, or MALE 65+:  How often do you have 4 or more drinks on one occassion? 0 Filed at: 11/29/2023 1305   Audit-C Score 0 Filed at: 11/29/2023 1305   GELACIO: How many times in the past year have you. .. Used an illegal drug or used a prescription medication for non-medical reasons? Never Filed at: 11/29/2023 1305                      Medical Decision Making  Patient is a 80 y.o. male who presents to the ED for generalized weakness and fatigue, dizziness, and cough. Patient is nontoxic and well-appearing. He is borderline hypoxic but otherwise vitals are stable. Physical exam is unremarkable. .    Differential includes but is not limited to: Pneumonia, viral illness. Presentation not consistent with anemia (no signs of acute blood loss). Acute bronchitis? Plan: Viral testing, labs, chest x-ray                 Portions of the record may have been created with voice recognition software. Occasional wrong word or "sound a like" substitutions may have occurred due to the inherent limitations of voice recognition software. Read the chart carefully and recognize, using context, where substitutions have occurred. Problems Addressed:  Acute respiratory failure with hypoxia ([address]): acute illness or injury  COVID: acute illness or injury  RSV infection: acute illness or injury    Amount and/or Complexity of Data Reviewed  Independent Historian:      Details: Some hx provided by daughter and wife at bedside  Labs: ordered. Radiology: ordered and independent interpretation performed. Details: No acute cardiopulmonary disease    Risk  Decision regarding hospitalization.              Disposition  Final diagnoses:   COVID   RSV infection   Acute respiratory failure with hypoxia ([address])     Time reflects when diagnosis was documented in both MDM as applicable and the Disposition within this note       Time User Action Codes Description Comment    11/29/2023  2:03 PM Vanessa Turner Add [U07.1] COVID     11/29/2023  2:03 PM Vanessa Turner Add [B33.8] RSV infection 11/29/2023  4:39 PM Gavin Heck Add [J96.01] Acute respiratory failure with hypoxia [facility])           ED Disposition       ED Disposition   Admit    Condition   Stable    Date/Time   Wed Nov 29, 2023 1403    Comment   Case was discussed with MINERVA and the patient's admission status was agreed to be Admission Status: inpatient status to the service of Dr. Talat Ferris . Follow-up Information    None         Current Discharge Medication List        CONTINUE these medications which have NOT CHANGED    Details   albuterol (Ventolin HFA) 90 mcg/act inhaler Inhale 2 puffs every 6 (six) hours as needed for wheezing  Qty: 18 g, Refills: 0    Comments: Substitution to a formulary equivalent within the same pharmaceutical class is authorized. Associated Diagnoses: Reactive airway disease      aspirin 325 mg tablet Take 1 tablet (325 mg total) by mouth 2 (two) times a day  Qty: 80 tablet, Refills: 0    Associated Diagnoses: Abscess of left leg      atorvastatin (LIPITOR) 10 mg tablet TAKE ONE-HALF TABLET DAILY  Qty: 45 tablet, Refills: 5    Associated Diagnoses: Hypercholesteremia      bisacodyl (DULCOLAX) 5 mg EC tablet Take 2 tablets (10 mg total) by mouth daily at bedtime  Qty: 30 tablet, Refills: 0    Associated Diagnoses: Abscess of left leg      Calcium Carb-Cholecalciferol (CALTRATE 600+D3 PO) Take 600 mg by mouth 2 (two) times a day      citalopram (CeleXA) 40 mg tablet TAKE ONE TABLET ONCE DAILY BY MOUTH  Qty: 90 tablet, Refills: 1    Associated Diagnoses: Essential hypertension; Recurrent major depressive disorder, in full remission ([address])      ! ! fluticasone (FLONASE) 50 mcg/act nasal spray USE 1 SPRAY IN EACH NOSTRIL DAILY  Qty: 16 g, Refills: 0    Associated Diagnoses: Nasal congestion      !! fluticasone (FLONASE) 50 mcg/act nasal spray 1 spray into each nostril daily  Qty: 15 mL, Refills: 5    Associated Diagnoses: Nasal congestion      fosinopril (MONOPRIL) 10 mg tablet TAKE ONE-HALF TABLET BY MOUTH DAILY  Qty: 45 tablet, Refills: 1    Associated Diagnoses: Essential hypertension      ipratropium (ATROVENT) 0.03 % nasal spray 2 sprays into each nostril 2 (two) times a day as needed for rhinitis  Qty: 30 mL, Refills: 2    Associated Diagnoses: Chronic rhinitis      magnesium oxide (MAG-OX) 400 mg tablet Take 1 tablet by mouth daily      metoprolol tartrate (LOPRESSOR) 50 mg tablet TAKE ONE TABLET EVERY 12 HOURS  Qty: 180 tablet, Refills: 1    Associated Diagnoses: Paroxysmal atrial flutter (HCC)      Multiple Vitamin (MULTIVITAMIN) tablet Take 1 tablet by mouth daily      niacin (NIASPAN) 500 mg CR tablet TAKE ONE TABLET DAILY AT BEDTIME  Qty: 90 tablet, Refills: 1    Associated Diagnoses: Hyperlipidemia, unspecified hyperlipidemia type      pantoprazole (PROTONIX) 40 mg tablet TAKE ONE TABLET DAILY  Qty: 30 tablet, Refills: 2    Associated Diagnoses: Hiatal hernia      polyethylene glycol (MIRALAX) 17 g packet Take 17 g by mouth 2 (two) times a day  Qty: 30 each, Refills: 0    Associated Diagnoses: Abscess of left leg      potassium chloride (Klor-Con) 10 mEq tablet 1 tablet every other day  Qty: 45 tablet, Refills: 1    Associated Diagnoses: Bilateral leg edema      tamsulosin (FLOMAX) 0.4 mg Take 0.4 mg by mouth daily with dinner      torsemide (DEMADEX) 5 MG tablet 1 tablet every other day  Qty: 45 tablet, Refills: 1    Associated Diagnoses: Bilateral leg edema       !! - Potential duplicate medications found. Please discuss with provider. No discharge procedures on file.     PDMP Review       None            ED Provider  Electronically Signed by             Demarco Villarreal DO  11/29/23 9050

## 2023-11-29 NOTE — PLAN OF CARE
Problem: INFECTION - ADULT  Goal: Absence or prevention of progression during hospitalization  Description: INTERVENTIONS:  - Assess and monitor for signs and symptoms of infection  - Monitor lab/diagnostic results  - Monitor all insertion sites, i.e. indwelling lines, tubes, and drains  - Monitor endotracheal if appropriate and nasal secretions for changes in amount and color  - [place] appropriate cooling/warming therapies per order  - Administer medications as ordered  - Instruct and encourage patient and family to use good hand hygiene technique  - Identify and instruct in appropriate isolation precautions for identified infection/condition  Outcome: Progressing  Goal: Absence of fever/infection during neutropenic period  Description: INTERVENTIONS:  - Monitor WBC    Outcome: Progressing

## 2023-11-29 NOTE — ASSESSMENT & PLAN NOTE
Patient presented to ED with shortness of breath, cough and generalized weakness for past day. Oxygen saturation dropped to 88% on room air with ambulation. CXR: No acute cardiopulmonary disease.    Tested positive for COVID and RSV  Wean oxygen as tolerated  Treatment as below

## 2023-11-29 NOTE — H&P
[id number] [facility]  H&P  Name: Gilma Bishop 80 y.o. male I MRN: [id number]  Unit/Bed#: [address] [place] [address] Date of Admission: 11/29/2023   Date of Service: 11/29/2023 I Hospital Day: 0      Assessment/Plan   * Acute respiratory insufficiency  Assessment & Plan  Patient presented to ED with shortness of breath, cough and generalized weakness for past day. Oxygen saturation dropped to 88% on room air with ambulation. CXR: No acute cardiopulmonary disease. Tested positive for COVID and RSV  Wean oxygen as tolerated  Treatment as below    COVID-19  Assessment & Plan  Presented with cough, shortness of breath, weakness  Tested positive for COVID and RSV  Currently requiring 2 L, wean as tolerated  Start remdesivir  Start decadron  Duonebs TID  Continue albuterol as needed  Encourage incentive spirometry  Check d dimer and CRP    Bilateral leg edema  Assessment & Plan  Chronic bilateral leg edema  Continue torsemide 5 mg every other day    Paroxysmal atrial flutter (HCC)  Assessment & Plan  Continue home metoprolol  Not on PTA anticoagulation    Hyperlipidemia  Assessment & Plan  Continue statin and niacin    Recurrent major depressive disorder, in full remission ([address])  Assessment & Plan  Continue Celexa    Essential hypertension  Assessment & Plan  Continue metoprolol  Substitute home Monopril with lisinopril  Continue torsemide every other day           VTE Pharmacologic Prophylaxis: VTE Score: 7 High Risk (Score >/= 5) - Pharmacological DVT Prophylaxis Ordered: heparin. Sequential Compression Devices Ordered. Code Status: Level 1 - Full Code   Discussion with family: Updated  (daughter) at bedside. Anticipated Length of Stay: Patient will be admitted on an inpatient basis with an anticipated length of stay of greater than 2 midnights secondary to COVID, RSV. Total Time Spent on Date of Encounter in care of patient: 65 mins.  This time was spent on one or more of the following: performing physical exam; counseling and coordination of care; obtaining or reviewing history; documenting in the medical record; reviewing/ordering tests, medications or procedures; communicating with other healthcare professionals and discussing with patient's family/caregivers. Chief Complaint: cough, shortness of breath, weakness    History of Present Illness:  Emmanuel Walker is a 80 y.o. male with a PMH of HTN, HLD, PAF, depression, who presents with shortness of breath, cough, and weakness starting yesterday. Daughter at bedside reports family member at home tested positive for RSV. Patient also had fever at home and dizziness. Shortness of breath and dry cough worse with ambulation. Denies chest pain, palpitations, nausea, vomiting, abdominal pain, diarrhea, or urinary symptoms. Review of Systems:  Review of Systems   Constitutional:  Positive for chills and fever. HENT:  Negative for ear pain and sore throat. Eyes:  Negative for pain and visual disturbance. Respiratory:  Positive for cough and shortness of breath. Cardiovascular:  Negative for chest pain and palpitations. Gastrointestinal:  Negative for abdominal pain and vomiting. Genitourinary:  Negative for dysuria and hematuria. Musculoskeletal:  Negative for arthralgias and back pain. Skin:  Negative for color change and rash. Neurological:  Positive for dizziness. Negative for seizures and syncope. All other systems reviewed and are negative.       Past Medical and Surgical History:   Past Medical History:   Diagnosis Date    BPH (benign prostatic hyperplasia)     Depression     HTN (hypertension)     Hyperlipidemia     OCD (obsessive compulsive disorder)     Pulmonary embolism ([address]) 2019       Past Surgical History:   Procedure Laterality Date    HARDWARE REMOVAL Left 9/9/2023    Procedure: REMOVAL HARDWARE;  Surgeon: Tre Salguero DO;  Location: [facility];  Service: Orthopedics    INCISION AND DRAINAGE OF WOUND Left 9/9/2023    Procedure: INCISION AND DRAINAGE (I&D) EXTREMITY;  Surgeon: Gilda Castañeda DO;  Location: [facility];  Service: Orthopedics    IR IVC FILTER PLACEMENT OPTIONAL/TEMPORARY  12/7/2019    IR IVC FILTER REMOVAL  8/21/2020    NE OPTX FEM SHFT FX W/INSJ IMED IMPLT W/WO SCREW Right 12/4/2019    Procedure: INSERTION NAIL IM FEMUR ANTEGRADE (TROCHANTERIC); Surgeon: Nevin Gonzalez DO;  Location: [facility];  Service: Orthopedics    NE OPTX FEM SHFT FX W/INSJ IMED IMPLT W/WO SCREW Left 6/17/2022    Procedure: INSERTION NAIL IM FEMUR ANTEGRADE (TROCHANTERIC) - long nail;  Surgeon: Gilda Castañeda DO;  Location: [facility];  Service: Orthopedics       Meds/Allergies:  Prior to Admission medications    Medication Sig Start Date End Date Taking?  Authorizing Provider   albuterol (Ventolin HFA) 90 mcg/act inhaler Inhale 2 puffs every 6 (six) hours as needed for wheezing 3/24/23   Abdifatah Pierre MD   aspirin 325 mg tablet Take 1 tablet (325 mg total) by mouth 2 (two) times a day 9/12/23 10/22/23  Ashley Reynoso MD   atorvastatin (LIPITOR) 10 mg tablet TAKE ONE-HALF TABLET DAILY 10/30/23   Sandy Harp MD   bisacodyl (DULCOLAX) 5 mg EC tablet Take 2 tablets (10 mg total) by mouth daily at bedtime 9/12/23   Ashley Reynoso MD   Calcium Carb-Cholecalciferol (CALTRATE 600+D3 PO) Take 600 mg by mouth 2 (two) times a day    Historical Provider, MD   citalopram (CeleXA) 40 mg tablet TAKE ONE TABLET ONCE DAILY BY MOUTH 10/3/23   Sandy Harp MD   fluticasone (FLONASE) 50 mcg/act nasal spray USE 1 SPRAY IN [facility] NOSTRIL DAILY 10/25/23   BERNADETTE Murcia   fluticasone [facility]) 50 mcg/act nasal spray 1 spray into each nostril daily 10/18/23   Sandy Harp MD   fosinopril (MONOPRIL) 10 mg tablet TAKE ONE-HALF TABLET BY MOUTH DAILY 8/28/23   Sandy Harp MD   ipratropium (ATROVENT) 0.03 % nasal spray 2 sprays into each nostril 2 (two) times a day as needed for rhinitis 4/12/23 10/13/23  BERNADETTE Lomeli   magnesium oxide (MAG-OX) 400 mg tablet Take 1 tablet by mouth daily 4/25/23   Historical Provider, MD   metoprolol tartrate (LOPRESSOR) 50 mg tablet TAKE ONE TABLET EVERY 12 HOURS 8/28/23   Elizabeth Howell MD   Multiple Vitamin (MULTIVITAMIN) tablet Take 1 tablet by mouth daily    Historical Provider, MD   niacin (NIASPAN) 500 mg CR tablet TAKE ONE TABLET DAILY AT BEDTIME 8/28/23   Elizabeth Howell MD   pantoprazole (PROTONIX) 40 mg tablet TAKE ONE TABLET DAILY 11/27/23   Elizabeth Howell MD   polyethylene glycol (MIRALAX) 17 g packet Take 17 g by mouth 2 (two) times a day 9/12/23   Tam Balderrama MD   potassium chloride (Klor-Con) 10 mEq tablet 1 tablet every other day 10/13/23   Elizabeth Howell MD   tamsulosin [facility]) 0.4 mg Take 0.4 mg by mouth daily with dinner    Historical Provider, MD   torsemide (DEMADEX) 5 MG tablet 1 tablet every other day 10/13/23   Elizabeth Howell MD     I have reviewed home medications with patient family member.     Allergies: No Known Allergies    Social History:  Marital Status: /Civil Union   Patient Pre-hospital Living Situation: Home  Patient Pre-hospital Level of Mobility: unable to be assessed at time of evaluation  Patient Pre-hospital Diet Restrictions: none  Substance Use History:   Social History     Substance and Sexual Activity   Alcohol Use Never     Social History     Tobacco Use   Smoking Status Never   Smokeless Tobacco Never     Social History     Substance and Sexual Activity   Drug Use Never       Family History:  Family History   Problem Relation Age of Onset    Cancer Mother        Physical Exam:     Vitals:   Blood Pressure: 159/100 (11/29/23 1415)  Pulse: 66 (11/29/23 1415)  Temperature: 99.5 °F (37.5 °C) (11/29/23 1357)  Temp Source: Tympanic (11/29/23 1357)  Respirations: 20 (11/29/23 1415)  Weight - Scale: 77.5 kg (170 lb 13.7 oz) (11/29/23 1250)  SpO2: 99 % (11/29/23 1415)    Physical Exam  Vitals and nursing note reviewed. Constitutional:       General: He is not in acute distress. Appearance: He is well-developed. Cardiovascular:      Rate and Rhythm: Normal rate and regular rhythm. Heart sounds: Murmur heard. Pulmonary:      Effort: Pulmonary effort is normal. No respiratory distress. Breath sounds: Decreased breath sounds present. Abdominal:      Palpations: Abdomen is soft. Tenderness: There is no abdominal tenderness. Musculoskeletal:         General: No swelling. Skin:     General: Skin is warm and dry. Capillary Refill: Capillary refill takes less than 2 seconds. Neurological:      Mental Status: He is alert.    Psychiatric:         Mood and Affect: Mood normal.          Additional Data:     Lab Results:  Results from last 7 days   Lab Units 11/29/23  1257   WBC Thousand/uL 11.22*   HEMOGLOBIN g/dL 13.5   HEMATOCRIT % 38.4   PLATELETS Thousands/uL 199   NEUTROS PCT % 80*   LYMPHS PCT % 8*   MONOS PCT % 11   EOS PCT % 1     Results from last 7 days   Lab Units 11/29/23  1257   SODIUM mmol/L 134*   POTASSIUM mmol/L 4.6   CHLORIDE mmol/L 99   CO2 mmol/L 29   BUN mg/dL 16   CREATININE mg/dL 1.26   ANION GAP mmol/L 6   CALCIUM mg/dL 9.2   ALBUMIN g/dL 3.9   TOTAL BILIRUBIN mg/dL 0.88   ALK PHOS U/L 76   ALT U/L 9   AST U/L 16   GLUCOSE RANDOM mg/dL 97                 Results from last 7 days   Lab Units 11/29/23  1257   PROCALCITONIN ng/ml 0.07       Lines/Drains:  Invasive Devices       Peripheral Intravenous Line  Duration             Peripheral IV 11/29/23 Left Antecubital <1 day              Line  Duration             Venous Sheath Other (Comment) Right Other (Comment) 1195 days                        Imaging: Reviewed radiology reports from this admission including: chest xray  XR chest 1 view portable   ED Interpretation by Loyda Tuttle DO (11/29 1401)   No acute cardiopulmonary disease      Final Result by Kellee Antunez MD (11/29 1407)      No acute cardiopulmonary disease. Stable appearance of hiatal hernia                  Workstation performed: [id number]             EKG and Other Studies Reviewed on Admission:   EKG: No EKG obtained. ** Please Note: This note has been constructed using a voice recognition system.  **

## 2023-11-29 NOTE — TELEPHONE ENCOUNTER
Patient's daughter called regarding upper respiratory symptoms and a fever of 100 also he was exposed to family member recently hospitalized wit RSV. Given the patient's complex health concerns I referred patient to the ER.  Daughter agreed

## 2023-11-29 NOTE — ASSESSMENT & PLAN NOTE
Presented with cough, shortness of breath, weakness  Tested positive for COVID and RSV  Currently requiring 2 L, wean as tolerated  Start remdesivir  Start decadron  Duonebs TID  Continue albuterol as needed  Encourage incentive spirometry  Check d dimer and CRP

## 2023-11-30 LAB
ANION GAP SERPL CALCULATED.3IONS-SCNC: 6 MMOL/L
BUN SERPL-MCNC: 21 MG/DL (ref 5–25)
CALCIUM SERPL-MCNC: 8.7 MG/DL (ref 8.4–10.2)
CHLORIDE SERPL-SCNC: 101 MMOL/L (ref 96–108)
CO2 SERPL-SCNC: 28 MMOL/L (ref 21–32)
CREAT SERPL-MCNC: 1.21 MG/DL (ref 0.6–1.3)
CRP SERPL QL: 84 MG/L
ERYTHROCYTE [DISTWIDTH] IN BLOOD BY AUTOMATED COUNT: 13.4 % (ref 11.6–15.1)
GFR SERPL CREATININE-BSD FRML MDRD: 55 ML/MIN/1.73SQ M
GLUCOSE SERPL-MCNC: 134 MG/DL (ref 65–140)
HCT VFR BLD AUTO: 36.5 % (ref 36.5–49.3)
HGB BLD-MCNC: 12.5 G/DL (ref 12–17)
MAGNESIUM SERPL-MCNC: 1.8 MG/DL (ref 1.9–2.7)
MCH RBC QN AUTO: 32.9 PG (ref 26.8–34.3)
MCHC RBC AUTO-ENTMCNC: 34.2 G/DL (ref 31.4–37.4)
MCV RBC AUTO: 96 FL (ref 82–98)
PLATELET # BLD AUTO: 194 THOUSANDS/UL (ref 149–390)
PMV BLD AUTO: 9.9 FL (ref 8.9–12.7)
POTASSIUM SERPL-SCNC: 4.5 MMOL/L (ref 3.5–5.3)
RBC # BLD AUTO: 3.8 MILLION/UL (ref 3.88–5.62)
SODIUM SERPL-SCNC: 135 MMOL/L (ref 135–147)
WBC # BLD AUTO: 8.72 THOUSAND/UL (ref 4.31–10.16)

## 2023-11-30 PROCEDURE — 97535 SELF CARE MNGMENT TRAINING: CPT

## 2023-11-30 PROCEDURE — 97163 PT EVAL HIGH COMPLEX 45 MIN: CPT

## 2023-11-30 PROCEDURE — 99232 SBSQ HOSP IP/OBS MODERATE 35: CPT

## 2023-11-30 PROCEDURE — 86140 C-REACTIVE PROTEIN: CPT

## 2023-11-30 PROCEDURE — 94760 N-INVAS EAR/PLS OXIMETRY 1: CPT

## 2023-11-30 PROCEDURE — 94640 AIRWAY INHALATION TREATMENT: CPT

## 2023-11-30 PROCEDURE — 97167 OT EVAL HIGH COMPLEX 60 MIN: CPT

## 2023-11-30 PROCEDURE — 80048 BASIC METABOLIC PNL TOTAL CA: CPT

## 2023-11-30 PROCEDURE — 83735 ASSAY OF MAGNESIUM: CPT

## 2023-11-30 PROCEDURE — 85027 COMPLETE CBC AUTOMATED: CPT

## 2023-11-30 PROCEDURE — 97116 GAIT TRAINING THERAPY: CPT

## 2023-11-30 RX ADMIN — TAMSULOSIN HYDROCHLORIDE 0.4 MG: 0.4 CAPSULE ORAL at 16:07

## 2023-11-30 RX ADMIN — ATORVASTATIN CALCIUM 5 MG: 10 TABLET, FILM COATED ORAL at 16:07

## 2023-11-30 RX ADMIN — HEPARIN SODIUM 5000 UNITS: 5000 INJECTION INTRAVENOUS; SUBCUTANEOUS at 21:04

## 2023-11-30 RX ADMIN — NIACIN 500 MG: 500 TABLET, EXTENDED RELEASE ORAL at 21:04

## 2023-11-30 RX ADMIN — CITALOPRAM HYDROBROMIDE 40 MG: 20 TABLET ORAL at 10:29

## 2023-11-30 RX ADMIN — REMDESIVIR 100 MG: 100 INJECTION, POWDER, LYOPHILIZED, FOR SOLUTION INTRAVENOUS at 16:05

## 2023-11-30 RX ADMIN — METOPROLOL TARTRATE 50 MG: 50 TABLET, FILM COATED ORAL at 15:17

## 2023-11-30 RX ADMIN — TORSEMIDE 5 MG: 10 TABLET ORAL at 10:28

## 2023-11-30 RX ADMIN — BISACODYL 10 MG: 5 TABLET, COATED ORAL at 21:04

## 2023-11-30 RX ADMIN — METOPROLOL TARTRATE 50 MG: 50 TABLET, FILM COATED ORAL at 02:49

## 2023-11-30 RX ADMIN — FLUTICASONE PROPIONATE 1 SPRAY: 50 SPRAY, METERED NASAL at 10:32

## 2023-11-30 RX ADMIN — IPRATROPIUM BROMIDE AND ALBUTEROL SULFATE 3 ML: 2.5; .5 SOLUTION RESPIRATORY (INHALATION) at 08:15

## 2023-11-30 RX ADMIN — PANTOPRAZOLE SODIUM 40 MG: 40 TABLET, DELAYED RELEASE ORAL at 10:36

## 2023-11-30 RX ADMIN — DEXAMETHASONE SODIUM PHOSPHATE 6 MG: 4 INJECTION INTRA-ARTICULAR; INTRALESIONAL; INTRAMUSCULAR; INTRAVENOUS; SOFT TISSUE at 16:05

## 2023-11-30 RX ADMIN — HEPARIN SODIUM 5000 UNITS: 5000 INJECTION INTRAVENOUS; SUBCUTANEOUS at 15:15

## 2023-11-30 RX ADMIN — HEPARIN SODIUM 5000 UNITS: 5000 INJECTION INTRAVENOUS; SUBCUTANEOUS at 05:13

## 2023-11-30 RX ADMIN — IPRATROPIUM BROMIDE AND ALBUTEROL SULFATE 3 ML: 2.5; .5 SOLUTION RESPIRATORY (INHALATION) at 19:50

## 2023-11-30 RX ADMIN — Medication 400 MG: at 10:29

## 2023-11-30 RX ADMIN — IPRATROPIUM BROMIDE AND ALBUTEROL SULFATE 3 ML: 2.5; .5 SOLUTION RESPIRATORY (INHALATION) at 13:38

## 2023-11-30 RX ADMIN — LISINOPRIL 10 MG: 10 TABLET ORAL at 10:29

## 2023-11-30 NOTE — PLAN OF CARE
Problem: PHYSICAL THERAPY ADULT  Goal: Performs mobility at highest level of function for planned discharge setting. See evaluation for individualized goals. Description: Treatment/Interventions: ADL retraining, Functional transfer training, LE strengthening/ROM, Elevations, Therapeutic exercise, Endurance training, Patient/family training, Equipment eval/education, Bed mobility, Gait training, Compensatory technique education, OT, Spoke to case management, Spoke to nursing  Equipment Recommended:  (Patient has a roller walker)       See flowsheet documentation for full assessment, interventions and recommendations. Note:    Problem List: Decreased strength, Decreased endurance, Impaired balance, Decreased mobility, Decreased safety awareness  Assessment: Patient seen for Physical Therapy evaluation. Patient admitted with Acute respiratory insufficiency. Comorbidities affecting patient's physical performance include: Hypertension, hyperlipidemia, PA flutter, bilateral lower extremity edema, status post IVC filter, history of PE, SVT, valvular heart disease, pulmonary artery hypertension, osteoporosis, CKD 3, BPH. Personal factors affecting patient at time of initial evaluation include: lives in two story house, ambulating with assistive device, stairs to enter home, inability to navigate community distances, inability to navigate level surfaces without external assistance, and decreased cognition. Prior to admission, patient was requiring assist for functional mobility with RW, requiring assist for ADLS, requiring assist for IADLS, living with spouse in a two level home with 2 steps to enter, ambulating household distance, ambulating community distances, and lives in a multilevel house but has 1st floor setup.   Please find objective findings from Physical Therapy assessment regarding body systems outlined above with impairments and limitations including weakness, impaired balance, decreased endurance, gait deviations, decreased activity tolerance, decreased functional mobility tolerance, decreased safety awareness, and fall risk. The Barthel Index was used as a functional outcome tool presenting with a score of Barthel Index Score: 55 today indicating marked limitations of functional mobility and ADLS. Patient's clinical presentation is currently unstable/unpredictable as seen in patient's presentation of vital sign response, increased fall risk, new onset of impairment of functional mobility, decreased endurance, and new onset of weakness. Pt would benefit from continued Physical Therapy treatment to address deficits as defined above and maximize level of functional mobility. As demonstrated by objective findings, the assigned level of complexity for this evaluation is high. The patient's [facility] Basic Mobility Inpatient Short Form Raw Score is 18. A Raw score of greater than 16 suggests the patient may benefit from discharge to home. Please also refer to the recommendation of the Physical Therapist for safe discharge planning. Rehab Resource Intensity Level, PT: No post-acute rehabilitation needs    See flowsheet documentation for full assessment.

## 2023-11-30 NOTE — UTILIZATION REVIEW
Initial Clinical Review    Admission: Date/Time/Statement:   Admission Orders (From admission, onward)       Ordered        11/29/23 1403  INPATIENT ADMISSION  Once                          Orders Placed This Encounter   Procedures    INPATIENT ADMISSION     Standing Status:   Standing     Number of Occurrences:   1     Order Specific Question:   Level of Care     Answer:   Med Surg [16]     Order Specific Question:   Estimated length of stay     Answer:   More than 2 Midnights     Order Specific Question:   Certification     Answer:   I certify that inpatient services are medically necessary for this patient for a duration of greater than two midnights. See H&P and MD Progress Notes for additional information about the patient's course of treatment. ED Arrival Information       Expected   -    Arrival   11/29/2023 12:20    Acuity   Urgent              Means of arrival   Walk-In    Escorted by   Family Member    Service   Hospitalist    Admission type   Emergency              Arrival complaint   cough, fever             Chief Complaint   Patient presents with    Cough     Reported of cough, increases weakness and low grade fever at home. Family reported that grandchild is sick with RSV. O2 sat on arrival 89% RA, better after he is resting in bed to 92-93%. LS diminished on auscultation. Family reported that he has co of dizziness. Initial Presentation:   80 yom to ER from home c/o cough, generalized weakness, dizziness. Recently exposed to grandchild with RSV. Hx depression, hypertension, hyperlipidemia, and OCD. Presents tachypneic, hypertensive, hypoxic, lungs with decreased breath sounds. Admission work-up showing COVID & RSV+, elevated CRP. Admitted to inpatient status for acute respiratory insufficiency 2nd COVID & RSV. Started on IV steroids & IV Remdesivir. Date: 11/30/23   Day 2:   IV steroids & IV Remdesivir in progress for COVID & RSV+. Lungs with diminished breath sounds.  O2 requirements currently @ 2ltr nc.        ED Triage Vitals   Temperature Pulse Respirations Blood Pressure SpO2   11/29/23 1244 11/29/23 1246 11/29/23 1246 11/29/23 1246 11/29/23 1244   98.7 °F (37.1 °C) 74 (!) 24 (!) 185/78 (!) 89 %      Temp Source Heart Rate Source Patient Position - Orthostatic VS BP Location FiO2 (%)   11/29/23 1244 11/29/23 1246 11/29/23 1345 11/29/23 1345 --   Oral Monitor Sitting Right arm       Pain Score       11/29/23 1250       No Pain          Wt Readings from Last 1 Encounters:   11/29/23 77.1 kg (170 lb)     Additional Vital Signs:   Date/Time Temp Pulse Resp BP MAP (mmHg) SpO2 Calculated FIO2 (%) - Nasal Cannula Nasal Cannula O2 Flow Rate (L/min) O2 Device Patient Position - Orthostatic VS   11/30/23 0815 -- 60 17 -- -- 97 % 28 2 L/min Nasal cannula --   11/30/23 0241 97.5 °F (36.4 °C) 77 18 159/71 102 95 % 28 2 L/min Nasal cannula Lying   11/29/23 2023 98.3 °F (36.8 °C) 76 20 141/67 96 99 % -- -- Nasal cannula Lying   11/29/23 2004 -- -- -- -- -- 92 % 28 2 L/min Nasal cannula --   11/29/23 1556 98.9 °F (37.2 °C) 74 18 157/68 -- 97 % 28 2 L/min Nasal cannula Lying   11/29/23 1521 -- -- -- -- -- 98 % 28 2 L/min Nasal cannula --   11/29/23 1415 -- 66 20 159/100 124 99 % -- -- -- --   11/29/23 1402 -- 78 -- -- -- 92 % 28 2 L/min Nasal cannula --   11/29/23 1357 99.5 °F (37.5 °C) -- -- -- -- 88 % Abnormal   -- -- None (Room air) --   SpO2: AMBULATORY at 11/29/23 1357   11/29/23 1345 -- 64 -- 163/70 101 93 % -- -- -- Sitting   11/29/23 1300 -- 68 22 -- -- 94 % -- -- None (Room air) --   11/29/23 1251 -- -- -- -- -- --  -- -- -- --   SpO2: DR Eulogio Palumbo aware at 11/29/23 1251   11/29/23 1246 -- 74 24 Abnormal  185/78 Abnormal  -- -- -- -- -- --   11/29/23 1244 98.7 °F (37.1 °C) -- -- -- -- 89 % Abnormal  -- -- None (Room air) --     Pertinent Labs/Diagnostic Test Results:   XR chest 1 view portable   ED Interpretation (11/29 1401)   No acute cardiopulmonary disease      Final Result  (11/29 1407)      No acute cardiopulmonary disease.  Stable appearance of hiatal hernia        Results from last 7 days   Lab Units 11/29/23  1257   SARS-COV-2  Positive*     Results from last 7 days   Lab Units 11/30/23  0514 11/29/23  1257   WBC Thousand/uL 8.72 11.22*   HEMOGLOBIN g/dL 12.5 13.5   HEMATOCRIT % 36.5 38.4   PLATELETS Thousands/uL 194 199   NEUTROS ABS Thousands/µL  --  8.88*       Results from last 7 days   Lab Units 11/30/23  0514 11/29/23  1257   SODIUM mmol/L 135 134*   POTASSIUM mmol/L 4.5 4.6   CHLORIDE mmol/L 101 99   CO2 mmol/L 28 29   ANION GAP mmol/L 6 6   BUN mg/dL 21 16   CREATININE mg/dL 1.21 1.26   EGFR ml/min/1.73sq m 55 52   CALCIUM mg/dL 8.7 9.2   MAGNESIUM mg/dL 1.8*  --      Results from last 7 days   Lab Units 11/29/23  1257   AST U/L 16   ALT U/L 9   ALK PHOS U/L 76   TOTAL PROTEIN g/dL 6.8   ALBUMIN g/dL 3.9   TOTAL BILIRUBIN mg/dL 0.88       Results from last 7 days   Lab Units 11/30/23  0514 11/29/23  1257   GLUCOSE RANDOM mg/dL 134 97       Results from last 7 days   Lab Units 11/29/23  1426   D-DIMER QUANTITATIVE ug/ml FEU 0.90*     Results from last 7 days   Lab Units 11/29/23  1257   PROCALCITONIN ng/ml 0.07     Results from last 7 days   Lab Units 11/30/23  0514 11/29/23  1257   CRP mg/L 84.0* 34.7*     Results from last 7 days   Lab Units 11/29/23  1257   INFLUENZA A PCR  Negative   INFLUENZA B PCR  Negative   RSV PCR  Positive*       Past Medical History:   Diagnosis Date    BPH (benign prostatic hyperplasia)     Depression     HTN (hypertension)     Hyperlipidemia     OCD (obsessive compulsive disorder)     Pulmonary embolism ([address]) 2019     Present on Admission:   Acute respiratory insufficiency   COVID-19   Paroxysmal atrial flutter (HCC)   Essential hypertension   Bilateral leg edema   Hyperlipidemia   Recurrent major depressive disorder, in full remission (HCC)      Admitting Diagnosis: Cough [R05.9]  RSV infection [B33.8]  COVID [U07.1]  Age/Sex: 80 y.o. male  Admission Orders:  Scd  Pt/ot eval & tx  Contact, airborne & droplet isolation    Scheduled Medications:  aspirin, 325 mg, Oral, BID  atorvastatin, 5 mg, Oral, Daily With Dinner  bisacodyl, 10 mg, Oral, HS  citalopram, 40 mg, Oral, QAM  dexamethasone, 6 mg, Intravenous, Q24H  fluticasone, 1 spray, Each Nare, Daily  heparin (porcine), 5,000 Units, Subcutaneous, Q8H [facility]  ipratropium-albuterol, 3 mL, Nebulization, TID  lisinopril, 10 mg, Oral, Daily  magnesium Oxide, 400 mg, Oral, Daily  metoprolol tartrate, 50 mg, Oral, Q12H  niacin, 500 mg, Oral, HS  pantoprazole, 40 mg, Oral, Daily  polyethylene glycol, 17 g, Oral, Daily  potassium chloride, 10 mEq, Oral, Every Other Day  remdesivir, 100 mg, Intravenous, Q24H  tamsulosin, 0.4 mg, Oral, Daily With Dinner  torsemide, 5 mg, Oral, Every Other Day    PRN Meds:  acetaminophen, 650 mg, Oral, Q6H PRN  albuterol, 2 puff, Inhalation, Q6H PRN  ondansetron, 4 mg, Intravenous, Q6H PRN    Network Utilization Review Department  ATTENTION: Please call with any questions or concerns to [phone] and carefully listen to the prompts so that you are directed to the right person. All voicemails are confidential.   For Discharge needs, contact Care Management DC Support Team at [phone] opt. 2  Send all requests for admission clinical reviews, approved or denied determinations and any other requests to dedicated fax number below belonging to the campus where the patient is receiving treatment.  List of dedicated fax numbers for the Facilities:  [place] DENIALS (Administrative/Medical Necessity) [phone]   DISCHARGE SUPPORT TEAM (NETWORK) [id number] [address]ian (Maternity/NICU/Pediatrics) [phone]   [facility] [address] [phone] [address] [facility] [address] [address] [facility] [address] [phone]

## 2023-11-30 NOTE — PHYSICAL THERAPY NOTE
PHYSICAL THERAPY EVALUATION/TREATMENT     11/30/23 1538   PT Last Visit   PT Visit Date 11/30/23   Note Type   Note type Evaluation   Pain Assessment   Pain Assessment Tool 0-10   Pain Score No Pain   Restrictions/Precautions   Other Precautions Contact/isolation; Airborne/isolation; Bed Alarm; Chair Alarm; Fall Risk;O2;Hard of hearing  (IV; 2 L O2 via NC; bilateral hearing aids)   Home Living   Type of [address] [place] [address] Two level; Able to live on main level with bedroom/bathroom; Performs ADLs on one level  (FFSU;2 JASON with rail)   Bathroom Shower/Tub Tub/shower unit   [employer] Raised   Bathroom Equipment Grab bars in shower; Toilet raiser; Shower chair  (Patient sponge bathes at baseline with wife assist)   [address]   (RW)   Prior Function   Level of [place] Needs assistance with ADLs; Needs assistance with functional mobility; Needs assistance with IADLS   Lives With Spouse   Receives Help From Family   IADLs Independent with driving;Family/Friend/Other provides meals; Family/Friend/Other provides medication management  (Patient states he can drive but his wife does most of the driving)   Falls in the last 6 months 0  (Denies)   Comments Patient ambulates with a roller walker at all times prior to admission   General   Additional Pertinent History Patient is admitted with shortness of breath, cough, generalized weakness. SaO2 on room air was 88% and patient was placed on 2 L O2 with improvement. Patient (+) for COVID-19.    Family/Caregiver Present No   Cognition   Overall Cognitive Status WFL   Arousal/Participation Cooperative   Orientation Level Oriented X4   Memory Within functional limits   Following Commands Follows all commands and directions without difficulty   Comments At least 2 patient identifiers including name and date of birth   Subjective   Subjective "I feel pretty good"   RLE Assessment   RLE Assessment WFL  (Grossly 3+ to 4 -/5)   LLE Assessment   LLE Assessment WFL  (Grossly 3+ to 4 -/5)   Vision-Basic Assessment   Current Vision Wears glasses all the time   Bed Mobility   Additional Comments Patient received out of bed in chair   Transfers   Sit to Stand 5  Supervision   Additional items Armrests;Assist x 1;Verbal cues; Increased time required   Stand to Sit 5  Supervision   Additional items Armrests;Assist x 1;Verbal cues; Increased time required   Ambulation/Elevation   Gait pattern Decreased foot clearance; Foward flexed; Step through pattern   Gait Assistance 5  Supervision   Additional items Assist x 1;Verbal cues; Tactile cues   Assistive Device Rolling walker   Distance 50 feet in patient's room with change in direction; SaO2 on room air 91 to 92% at rest and with ambulation   Balance   Static Sitting Good   Static Standing   (Fair to fair plus with roller walker)   Ambulatory   (Fair to occasional F- with roller walker)   Activity Tolerance   Activity Tolerance Patient tolerated treatment well   Medical Staff [id number] coordination with Ricki Mcguire OT   Nurse Made Aware Yes, Merlin Casco, RN   Assessment   Problem List Decreased strength;Decreased endurance; Impaired balance;Decreased mobility; Decreased safety awareness   Assessment Patient seen for Physical Therapy evaluation. Patient admitted with Acute respiratory insufficiency. Comorbidities affecting patient's physical performance include: Hypertension, hyperlipidemia, PA flutter, bilateral lower extremity edema, status post IVC filter, history of PE, SVT, valvular heart disease, pulmonary artery hypertension, osteoporosis, CKD 3, BPH. Personal factors affecting patient at time of initial evaluation include: lives in two story house, ambulating with assistive device, stairs to enter home, inability to navigate community distances, inability to navigate level surfaces without external assistance, and decreased cognition.  Prior to admission, patient was requiring assist for functional mobility with RW, requiring assist for ADLS, requiring assist for IADLS, living with spouse in a two level home with 2 steps to enter, ambulating household distance, ambulating community distances, and lives in a multilevel house but has 1st floor setup. Please find objective findings from Physical Therapy assessment regarding body systems outlined above with impairments and limitations including weakness, impaired balance, decreased endurance, gait deviations, decreased activity tolerance, decreased functional mobility tolerance, decreased safety awareness, and fall risk. The Barthel Index was used as a functional outcome tool presenting with a score of Barthel Index Score: 55 today indicating marked limitations of functional mobility and ADLS. Patient's clinical presentation is currently unstable/unpredictable as seen in patient's presentation of vital sign response, increased fall risk, new onset of impairment of functional mobility, decreased endurance, and new onset of weakness. Pt would benefit from continued Physical Therapy treatment to address deficits as defined above and maximize level of functional mobility. As demonstrated by objective findings, the assigned level of complexity for this evaluation is high. The patient's -[facility] Basic Mobility Inpatient Short Form Raw Score is 18. A Raw score of greater than 16 suggests the patient may benefit from discharge to home. Please also refer to the recommendation of the Physical Therapist for safe discharge planning. Goals   Patient Goals To be able to go home soon   STG Expiration Date 12/10/23   Short Term Goal #1 Patient will:  Increase bilateral LE strength 1/2 grade to facilitate independent mobility, Perform all bed mobility tasks independently to improve pt's independence w/ repositioning for decrease risk of skin breakdown, Perform all transfers independently consistently from various height surfaces in order to improve I w/ engagement w/ real-world environments/situations, Ambulate at least 250+ ft. with roller walker modified independent w/o LOB to facilitate return and engagement w/ previous living environment, Navigate 2 stairs modified independent with unilateral handrail to either improve independence w/ entering home and/or so patient can fully access living areas in home, Increase ambulatory and static and dynamic standing balance 1 grade to decrease risk for falls, Tolerate at least 15 consecutive minutes of activity to demonstrate improved activity tolerance and endurance, and Tolerate 3 hr OOB to faciliate upright tolerance   Plan   Treatment/Interventions ADL retraining;Functional transfer training;LE strengthening/ROM; Elevations; Therapeutic exercise; Endurance training;Patient/family training;Equipment eval/education; Bed mobility;Gait training; Compensatory technique education;OT;Spoke to case management;Spoke to nursing   PT Frequency Other (Comment)  (5x/wk)   Discharge Recommendation   Rehab Resource Intensity Level, PT No post-acute rehabilitation needs   Equipment Recommended   (Patient has a roller walker)   Additional Comments Patient denies need for home PT services   AM-PAC Basic Mobility Inpatient   Turning in Flat Bed Without Bedrails 3   Lying on Back to Sitting on Edge of Flat Bed Without Bedrails 3   Moving Bed to Chair 3   Standing Up From Chair Using Arms 3   Walk in Room 3   Climb 3-5 Stairs With Railing 3   Basic Mobility Inpatient Raw Score 18   Basic Mobility Standardized Score 41.05   Highest Level Of Mobility   JH-HLM Goal 6: Walk 10 steps or more   JH-HLM Achieved 7: Walk 25 feet or more   Barthel Index   Feeding 10   Bathing 0   Grooming Score 0   Dressing Score 5   Bladder Score 10   Bowels Score 10   Toilet Use Score 5   Transfers (Bed/Chair) Score 10   Mobility (Level Surface) Score 0   Stairs Score 5   Barthel Index Score 55   Additional Treatment Session   Start Time 1538   End Time 1551   Treatment Assessment Patient agreeable to additional trial of ambulation and patient requesting to ambulate longer distances in the hallway. Patient transfers sit to stand with supervision/mod I and ambulates 125 feet in the hallway with supervision and use of the roller walker. Patient transfers stand to sit with supervision/mod I.  SaO2 on room air at end of ambulation 89% briefly and then increases to 92%. Patient denies shortness of breath. A: patient with good tolerance, motivation and participation with skilled PT services. Patient is very near to baseline level of functional mobility and gait with a rolling walker, with SaO2 essentially remaining above 90% on room air with activity and patient subjectively denying shortness of breath. While admitted, patient will continue to benefit from skilled physical therapy services to prevent loss of strength, endurance, and to improve patient's balance and ensure safe functional mobility and gait with a roller walker. When medically stable for discharge, patient has no postacute rehabilitation needs. End of Consult   Patient Position at End of Consult Bed/Chair alarm activated; Bedside chair; All needs within reach   [facility] Insurance Number  [address], [place] [id number]   Portions of the documentation may have been created using voice recognition software. Occasional wrong word or sound alike substitutions may have occurred due to the inherent limitation of the voice recognition software. Read the chart carefully and recognize, using context, where substitutions have occurred.

## 2023-11-30 NOTE — PLAN OF CARE
Problem: Potential for Falls  Goal: Patient will remain free of falls  Description: INTERVENTIONS:  - Educate patient/family on patient safety including physical limitations  - Instruct patient to call for assistance with activity   - Consult OT/PT to assist with strengthening/mobility   - Keep Call bell within reach  - Keep bed low and locked with side rails adjusted as appropriate  - Keep care items and personal belongings within reach  - Initiate and maintain comfort rounds  - Make Fall Risk Sign visible to staff  Problem: RESPIRATORY - ADULT  Goal: Achieves optimal ventilation and oxygenation  Description: INTERVENTIONS:  - Assess for changes in respiratory status  - Assess for changes in mentation and behavior  - Position to facilitate oxygenation and minimize respiratory effort  - Oxygen administered by appropriate delivery if ordered  - Initiate smoking cessation education as indicated  - Encourage broncho-pulmonary hygiene including cough, deep breathe, Incentive Spirometry  - Assess the need for suctioning and aspirate as needed  - Assess and instruct to report SOB or any respiratory difficulty  - Respiratory Therapy support as indicated  Outcome: Progressing     Problem: PAIN - ADULT  Goal: Verbalizes/displays adequate comfort level or baseline comfort level  Description: Interventions:  - Encourage patient to monitor pain and request assistance  - Assess pain using appropriate pain scale  - Administer analgesics based on type and severity of pain and evaluate response  - Implement non-pharmacological measures as appropriate and evaluate response  - Consider cultural and social influences on pain and pain management  - Notify physician/advanced practitioner if interventions unsuccessful or patient reports new pain  Outcome: Progressing     Problem: INFECTION - ADULT  Goal: Absence or prevention of progression during hospitalization  Description: INTERVENTIONS:  - Assess and monitor for signs and symptoms of infection  - Monitor lab/diagnostic results  - Monitor all insertion sites, i.e. indwelling lines, tubes, and drains  - Monitor endotracheal if appropriate and nasal secretions for changes in amount and color  - [place] appropriate cooling/warming therapies per order  - Administer medications as ordered  - Instruct and encourage patient and family to use good hand hygiene technique  - Identify and instruct in appropriate isolation precautions for identified infection/condition  Outcome: Progressing  Goal: Absence of fever/infection during neutropenic period  Description: INTERVENTIONS:  - Monitor WBC    Outcome: Progressing     Problem: SAFETY ADULT  Goal: Patient will remain free of falls  Description: INTERVENTIONS:  - Educate patient/family on patient safety including physical limitations  - Instruct patient to call for assistance with activity   - Consult OT/PT to assist with strengthening/mobility   - Keep Call bell within reach  - Keep bed low and locked with side rails adjusted as appropriate  - Keep care items and personal belongings within reach  - Initiate and maintain comfort rounds  - Make Fall Risk Sign visible to staff  - Apply yellow socks and bracelet for high fall risk patients  - Consider moving patient to room near nurses station  Outcome: Progressing  Goal: Maintain or return to baseline ADL function  Description: INTERVENTIONS:  -  Assess patient's ability to carry out ADLs; assess patient's baseline for ADL function and identify physical deficits which impact ability to perform ADLs (bathing, care of mouth/teeth, toileting, grooming, dressing, etc.)  - Assess/evaluate cause of self-care deficits   - Assess range of motion  - Assess patient's mobility; develop plan if impaired  - Assess patient's need for assistive devices and provide as appropriate  - Encourage maximum independence but intervene and supervise when necessary  - Involve family in performance of ADLs  - Assess for home care needs following discharge   - Consider OT consult to assist with ADL evaluation and planning for discharge  - Provide patient education as appropriate  Outcome: Progressing  Goal: Maintains/Returns to pre admission functional level  Description: INTERVENTIONS:  - Perform AM-PAC 6 Click Basic Mobility/ Daily Activity assessment daily.  - Set and communicate daily mobility goal to care team and patient/family/caregiver.    - Collaborate with rehabilitation services on mobility goals if consulted  - Out of bed for toileting  - Record patient progress and toleration of activity level   Outcome: Progressing     Problem: DISCHARGE PLANNING  Goal: Discharge to home or other facility with appropriate resources  Description: INTERVENTIONS:  - Identify barriers to discharge w/patient and caregiver  - Arrange for needed discharge resources and transportation as appropriate  - Identify discharge learning needs (meds, wound care, etc.)  - Arrange for interpretive services to assist at discharge as needed  - Refer to Case Management Department for coordinating discharge planning if the patient needs post-hospital services based on physician/advanced practitioner order or complex needs related to functional status, cognitive ability, or social support system  Outcome: Progressing     - Initiate/Maintain bed alarm  - Apply yellow socks and bracelet for high fall risk patients  - Consider moving patient to room near nurses station  Outcome: Progressing

## 2023-11-30 NOTE — OCCUPATIONAL THERAPY NOTE
Occupational Therapy Evaluation/Treatment       11/30/23 1515   OT Last Visit   OT Visit Date 11/30/23   Note Type   Note type Evaluation   Pain Assessment   Pain Assessment Tool 0-10   Pain Score No Pain   Restrictions/Precautions   Other Precautions Chair Alarm; Bed Alarm; Fall Risk;O2;Hard of hearing;Contact/isolation; Airborne/isolation  (2L O2)   Home Living   Type of [address] Two level; Able to live on main level with bedroom/bathroom  (2 JASON, uses 1St floor setup)   Bathroom Shower/Tub Tub/shower unit   Bathroom Toilet Standard   Bathroom Equipment Shower chair   Home Equipment Walker   Prior Function   Level of [place] Independent with functional mobility; Needs assistance with IADLS;Needs assistance with ADLs   Lives With Spouse   Receives Help From Family   IADLs Independent with driving;Family/Friend/Other provides meals   Lifestyle   Intrinsic Gratification going to [place] and [place]"   Subjective   Subjective "Hey, I'm walking pretty good"   ADL   Eating Assistance 5  Supervision/Setup   Grooming Assistance 5  Supervision/Setup   UB Bathing Assistance 4  Minimal Assistance   LB Bathing Assistance 4  Minimal [address] [facility] 4  [address]  4  Minimal Assistance   Bed Mobility   Supine to Sit 5  Supervision   Transfers   Sit to Stand 4  Minimal assistance   Additional items Assist x 1;Verbal cues   Stand to Sit 4  Minimal assistance   Additional items Assist x 1;Verbal cues   Functional Mobility   Functional Mobility 4  Minimal assistance   Additional Comments short household distance in room with RW   Balance   Static Sitting Fair +   Dynamic Sitting Fair   Static Standing Fair   Dynamic Standing Fair -   Activity Tolerance   Activity Tolerance Patient limited by fatigue   Nurse Made Aware yes, Beatris HENDERSON Assessment   RUE Assessment WFL   LUE Assessment   LUE Assessment [facility] Cognition   Overall Cognitive Status WFL   Arousal/Participation Cooperative   Attention Within functional limits   Orientation Level Oriented X4   Following Commands Follows one step commands with increased time or repetition   Comments pt is [place]   Assessment   Limitation Decreased ADL status; Decreased UE strength;Decreased Safe judgement during ADL;Decreased endurance;Decreased high-level ADLs; Decreased self-care trans  (decreased balance and mobility)   Prognosis Good   Assessment Patient evaluated by Occupational Therapy. Patient admitted with Acute respiratory insufficiency. The patients occupational profile, medical and therapy history includes a extensive additional review of physical, cognitive, or psychosocial history related to current functional performance. Comorbidities affecting functional mobility and ADLS include: depression and hypertension. Prior to admission, patient was independent with functional mobility with walker, requiring assist for ADLS, and requiring assist for IADLS. The evaluation identifies the following performance deficits: weakness, impaired balance, decreased endurance, increased fall risk, new onset of impairment of functional mobility, decreased ADLS, decreased IADLS, decreased activity tolerance, decreased safety awareness, impaired judgement, and decreased strength, that result in activity limitations and/or participation restrictions. This evaluation requires clinical decision making of high complexity, because the patient presents with comorbidites that affect occupational performance and required significant modification of tasks or assistance with consideration of multiple treatment options. The Barthel Index was used as a functional outcome tool presenting with a score of Barthel Index Score: 55, indicating marked limitations of functional mobility and ADLS. The patient's raw score on the -PAC Daily Activity Inpatient Short Form is 19.  A raw score of greater than or equal to 19 suggests the patient may benefit from discharge to home. Please refer to the recommendation of the Occupational Therapist for safe discharge planning. Patient will benefit from skilled Occupational Therapy services to address above deficits and facilitate a safe return to prior level of function. Goals   Patient Goals to go home   STG Time Frame   (1-7 days)   Short Term Goal  Goals established to promote Patient Goals: To be able to go home soon:  Grooming: supervision standing at sink; Bathing: supervision; Upper Body Dressing supervision; Lower Body Dressing: supervision; Toileting: supervision; Patient will increase ambulatory standard toilet transfer to supervision with rolling walker to increase performance and safety with ADLS and functional mobility; Patient will increase standing tolerance to 5 minutes during ADL task to decrease assistance level and decrease fall risk; Patient will increase bed mobility to independent in preparation for ADLS and transfers; Patient will increase functional mobility to and from bathroom with rolling walker with supervision to increase performance with ADLS and to use a toilet; Patient will tolerate 5 minutes of UE ROM/strengthening to increase general activity tolerance and performance in ADLS/IADLS; Patient will improve functional activity tolerance to 10 minutes of sustained functional tasks to increase participation in basic self-care and decrease assistance level;  Patient will be able to to verbalize understanding and perform energy conservation/proper body mechanics during ADLS and functional mobility at least 75% of the time with minimal cueing to decrease signs of fatigue and increase stamina to return to prior level of function; Patient will increase dynamic standing balance to fair to improve postural stability and decrease fall risk during standing ADLS and transfers.    LTG Time Frame   (8-14 days)   Long Term Goal Grooming: independent standing at sink; Bathing: independent; Upper Body Dressing independent; Lower Body Dressing: independent; Toileting: independent; Patient will increase ambulatory standard toilet transfer to independent with rolling walker to increase performance and safety with ADLS and functional mobility; Patient will increase standing tolerance to 10 minutes during ADL task to decrease assistance level and decrease fall risk;  Patient will increase functional mobility to and from bathroom with rolling walker independently to increase performance with ADLS and to use a toilet; Patient will tolerate 10 minutes of UE ROM/strengthening to increase general activity tolerance and performance in ADLS/IADLS; Patient will improve functional activity tolerance to 20 minutes of sustained functional tasks to increase participation in basic self-care and decrease assistance level;  Patient will be able to to verbalize understanding and perform energy conservation/proper body mechanics during ADLS and functional mobility at least 90% of the time to decrease signs of fatigue and increase stamina to return to prior level of function; Patient will increase dynamic standing balance to fair+ to improve postural stability and decrease fall risk during standing ADLS and transfers. Pt will score >/= 23/24 on AM-PAC Daily Activity Inpatient scale to promote safe independence with ADLs and functional mobility; Pt will score >/= 75/100 on Barthel Index in order to decrease caregiver assistance needed and increase ability to perform ADLs and functional mobility. Plan   Treatment Interventions ADL retraining;Functional transfer training; Endurance training;Patient/family training;Equipment evaluation/education; Activityengagement; Compensatory technique education  (decreased balance and mobility)   Goal Expiration Date 12/14/23   OT Frequency 3-5x/wk   Discharge Recommendation   Rehab Resource Intensity Level, OT III (Minimum Resource Intensity) AM-PAC Daily Activity Inpatient   Lower Body Dressing 3   Bathing 3   Toileting 3   Upper Body Dressing 3   Grooming 3   Eating 4   Daily Activity Raw Score 19   Daily Activity Standardized Score (Calc for Raw Score >=11) 40.22   AM-PAC Applied Cognition Inpatient   Following a Speech/Presentation 3   Understanding Ordinary Conversation 4   Taking Medications 4   Remembering Where Things Are Placed or Put Away 3   Remembering List of 4-5 Errands 3   Taking Care of Complicated Tasks 3   Applied Cognition Raw Score 20   Applied Cognition Standardized Score 41.76   Barthel Index   Feeding 10   Bathing 0   Grooming Score 0   Dressing Score 5   Bladder Score 5   Bowels Score 5   Toilet Use Score 5   Transfers (Bed/Chair) Score 10   Mobility (Level Surface) Score 0   Stairs Score 5   Barthel Index Score 45   Additional Treatment Session   Start Time 1505   End Time 1515   Treatment Assessment S: denies pain O: Completed functional mobility to bathroom with RW with min assist.  Toilet transfer min assist.  Patient doffed/donned R sock seated with supervision. Patient completed BUE AROM 10 times each seated in chair for shoulder flexion, elbow flexion, horizontal abduction/adduction and . A: Tolerated well. Cooperative and pleasant. Patient will benefit from continued OT services to maximize functional performance with ADLS.    Licensure   [place] License Number  Mata Ray [id number] [facility] OTR/L [id number]

## 2023-11-30 NOTE — ASSESSMENT & PLAN NOTE
Presented with cough, shortness of breath, weakness  Tested positive for COVID and RSV  Currently requiring 2 L, wean as tolerated  Continue remdesivir  continue decadron  Duonebs  Continue albuterol as needed  Encourage incentive spirometry  CRP 34 > 84

## 2023-11-30 NOTE — PROGRESS NOTES
[address]   Progress Note  Name: Melissa Galarza  MRN: [id number]  Unit/Bed#: 7 [address] I Date of Admission: 11/29/2023   Date of Service: 11/30/2023 I Hospital Day: 1    Assessment/Plan   * Acute respiratory insufficiency  Assessment & Plan  Patient presented to ED with shortness of breath, cough and generalized weakness for past day. Oxygen saturation dropped to 88% on room air with ambulation. CXR: No acute cardiopulmonary disease. Tested positive for COVID and RSV  Wean oxygen as tolerated  Treatment as below    COVID-19  Assessment & Plan  Presented with cough, shortness of breath, weakness  Tested positive for COVID and RSV  Currently requiring 2 L, wean as tolerated  Continue remdesivir  continue decadron  Duonebs  Continue albuterol as needed  Encourage incentive spirometry  CRP 34 > 84    Bilateral leg edema  Assessment & Plan  Chronic bilateral leg edema  Continue torsemide 5 mg every other day    Paroxysmal atrial flutter (HCC)  Assessment & Plan  Continue home metoprolol  Not on PTA anticoagulation    Hyperlipidemia  Assessment & Plan  Continue statin and niacin    Recurrent major depressive disorder, in full remission ([address])  Assessment & Plan  Continue Celexa    Essential hypertension  Assessment & Plan  Continue metoprolol  Substitute home Monopril with lisinopril  Continue torsemide every other day           VTE Pharmacologic Prophylaxis: VTE Score: 7 High Risk (Score >/= 5) - Pharmacological DVT Prophylaxis Ordered: heparin. Sequential Compression Devices Ordered. Mobility:   Basic Mobility Inpatient Raw Score: 15  [facility] Goal: 4: Move to chair/commode  [facility] Achieved: 3: Sit at edge of bed  [facility] Goal NOT achieved. Continue with multidisciplinary rounding and encourage appropriate mobility to improve upon [facility] goals. Patient Centered Rounds: I performed bedside rounds with nursing staff today.    Discussions with Specialists or Other Care Team Provider: rn, cm    Education and Discussions with Family / Patient: Attempted to update  (daughter) via phone. Left voicemail. Total Time Spent on Date of Encounter in care of patient: 45 mins. This time was spent on one or more of the following: performing physical exam; counseling and coordination of care; obtaining or reviewing history; documenting in the medical record; reviewing/ordering tests, medications or procedures; communicating with other healthcare professionals and discussing with patient's family/caregivers. Current Length of Stay: 1 day(s)  Current Patient Status: Inpatient   Certification Statement: The patient [facility]  Discharge Plan: Anticipate discharge in 24-48 hrs to discharge location to be determined pending rehab evaluations. Code Status: Level 1 - Full Code    Subjective:   Patient seen sitting up in bed this morning watching TV. Patient is pleasant and in good spirits. Denies current shortness of breath or chest pain. Eating well. Denies any current complaints. Objective:     Vitals:   Temp (24hrs), Av.6 °F (37 °C), Min:97.5 °F (36.4 °C), Max:99.5 °F (37.5 °C)    Temp:  [97.5 °F (36.4 °C)-99.5 °F (37.5 °C)] 97.5 °F (36.4 °C)  HR:  [60-78] 60  Resp:  [17-22] 17  BP: (141-163)/() 144/65  SpO2:  [88 %-99 %] 97 %  Body mass index is 25.85 kg/m². Input and Output Summary (last 24 hours):   No intake or output data in the 24 hours ending 23 1250    Physical Exam:   Physical Exam  Vitals and nursing note reviewed. Constitutional:       General: He is not in acute distress. Appearance: He is well-developed. Cardiovascular:      Rate and Rhythm: Normal rate and regular rhythm. Heart sounds: Murmur heard. Pulmonary:      Effort: Pulmonary effort is normal. No respiratory distress. Breath sounds: Normal breath sounds.       Comments: Oxygen 93% and above on 2 L  Abdominal: Palpations: Abdomen is soft. Tenderness: There is no abdominal tenderness. Musculoskeletal:         General: No swelling. Skin:     General: Skin is warm and dry. Capillary Refill: Capillary refill takes less than 2 seconds. Neurological:      Mental Status: He is alert. Psychiatric:         Mood and Affect: Mood normal.          Additional Data:     Labs:  Results from last 7 days   Lab Units 11/30/23  0514 11/29/23  1257   WBC Thousand/uL 8.72 11.22*   HEMOGLOBIN g/dL 12.5 13.5   HEMATOCRIT % 36.5 38.4   PLATELETS Thousands/uL 194 199   NEUTROS PCT %  --  80*   LYMPHS PCT %  --  8*   MONOS PCT %  --  11   EOS PCT %  --  1     Results from last 7 days   Lab Units 11/30/23  0514 11/29/23  1257   SODIUM mmol/L 135 134*   POTASSIUM mmol/L 4.5 4.6   CHLORIDE mmol/L 101 99   CO2 mmol/L 28 29   BUN mg/dL 21 16   CREATININE mg/dL 1.21 1.26   ANION GAP mmol/L 6 6   CALCIUM mg/dL 8.7 9.2   ALBUMIN g/dL  --  3.9   TOTAL BILIRUBIN mg/dL  --  0.88   ALK PHOS U/L  --  76   ALT U/L  --  9   AST U/L  --  16   GLUCOSE RANDOM mg/dL 134 97                 Results from last 7 days   Lab Units 11/29/23  1257   PROCALCITONIN ng/ml 0.07       Lines/Drains:  Invasive Devices       Peripheral Intravenous Line  Duration             Peripheral IV 11/29/23 Left Antecubital <1 day              Line  Duration             Venous Sheath Other (Comment) Right Other (Comment) 1196 days                          Imaging: No pertinent imaging reviewed.     Recent Cultures (last 7 days):         Last 24 Hours Medication List:   Current Facility-Administered Medications   Medication Dose Route Frequency Provider Last Rate    acetaminophen  650 mg Oral Q6H PRN Mandi Rosario, PA-C      albuterol  2 puff Inhalation Q6H PRN Mandi Rosario, PA-C      aspirin  325 mg Oral BID Mandi Rosario, PA-C      atorvastatin  5 mg Oral Daily With Dinner Mandi Rosario, PA-C      bisacodyl  10 mg Oral HS Mandi Rosario, PA-C      citalopram  40 mg Oral QAM Lizz Shelley, SHIRA      dexamethasone  6 mg Intravenous Q24H Lizz Karsten, SHIRA      fluticasone  1 spray Each Nare Daily Lizz Shelley PA-C      heparin (porcine)  5,000 Units Subcutaneous [facility] Lizz Shelley, SHIRA      ipratropium-albuterol  3 mL Nebulization TID Lizz Shelley, SHIRA      lisinopril  10 mg Oral Daily Lizz Karsten, SHIRA      magnesium Oxide  400 mg Oral Daily Lizz Karsten, SHIRA      metoprolol tartrate  50 mg Oral Q12H Lizz Karsten, SHIRA      niacin  500 mg Oral HS Lizz Karsten, SHIRA      ondansetron  4 mg Intravenous Q6H PRN Lizz Shelley, SHIRA      pantoprazole  40 mg Oral Daily Lizz Karsten, SHIRA      polyethylene glycol  17 g Oral Daily Lizz Karsten, SHIRA      potassium chloride  10 mEq Oral Every Other Day Lizz Shelley PA-C      remdesivir  100 mg Intravenous Q24H Lizz Shelley, SHIRA      tamsulosin  0.4 mg Oral Daily With Dinner Lizz Shelley PA-C      torsemide  5 mg Oral Every Other Day Lizz Shelley, SHIRA          Today, Patient Was Seen By: Lizz Shelley PA-C    **Please Note: This note may have been constructed using a voice recognition system. **

## 2023-12-01 LAB
CRP SERPL QL: 59.7 MG/L
ERYTHROCYTE [DISTWIDTH] IN BLOOD BY AUTOMATED COUNT: 13.3 % (ref 11.6–15.1)
HCT VFR BLD AUTO: 34.9 % (ref 36.5–49.3)
HGB BLD-MCNC: 12 G/DL (ref 12–17)
MCH RBC QN AUTO: 32.5 PG (ref 26.8–34.3)
MCHC RBC AUTO-ENTMCNC: 34.4 G/DL (ref 31.4–37.4)
MCV RBC AUTO: 95 FL (ref 82–98)
PLATELET # BLD AUTO: 200 THOUSANDS/UL (ref 149–390)
PMV BLD AUTO: 9.8 FL (ref 8.9–12.7)
RBC # BLD AUTO: 3.69 MILLION/UL (ref 3.88–5.62)
WBC # BLD AUTO: 9.86 THOUSAND/UL (ref 4.31–10.16)

## 2023-12-01 PROCEDURE — 94760 N-INVAS EAR/PLS OXIMETRY 1: CPT

## 2023-12-01 PROCEDURE — 94640 AIRWAY INHALATION TREATMENT: CPT

## 2023-12-01 PROCEDURE — 99232 SBSQ HOSP IP/OBS MODERATE 35: CPT | Performed by: NURSE PRACTITIONER

## 2023-12-01 PROCEDURE — 86140 C-REACTIVE PROTEIN: CPT

## 2023-12-01 PROCEDURE — 85027 COMPLETE CBC AUTOMATED: CPT

## 2023-12-01 RX ADMIN — ATORVASTATIN CALCIUM 5 MG: 10 TABLET, FILM COATED ORAL at 17:13

## 2023-12-01 RX ADMIN — IPRATROPIUM BROMIDE AND ALBUTEROL SULFATE 3 ML: 2.5; .5 SOLUTION RESPIRATORY (INHALATION) at 13:27

## 2023-12-01 RX ADMIN — PANTOPRAZOLE SODIUM 40 MG: 40 TABLET, DELAYED RELEASE ORAL at 09:59

## 2023-12-01 RX ADMIN — NIACIN 500 MG: 500 TABLET, EXTENDED RELEASE ORAL at 21:14

## 2023-12-01 RX ADMIN — POTASSIUM CHLORIDE 10 MEQ: 750 TABLET, EXTENDED RELEASE ORAL at 09:59

## 2023-12-01 RX ADMIN — Medication 400 MG: at 09:59

## 2023-12-01 RX ADMIN — FLUTICASONE PROPIONATE 1 SPRAY: 50 SPRAY, METERED NASAL at 10:00

## 2023-12-01 RX ADMIN — LISINOPRIL 10 MG: 10 TABLET ORAL at 09:59

## 2023-12-01 RX ADMIN — IPRATROPIUM BROMIDE AND ALBUTEROL SULFATE 3 ML: 2.5; .5 SOLUTION RESPIRATORY (INHALATION) at 20:30

## 2023-12-01 RX ADMIN — CITALOPRAM HYDROBROMIDE 40 MG: 20 TABLET ORAL at 09:59

## 2023-12-01 RX ADMIN — HEPARIN SODIUM 5000 UNITS: 5000 INJECTION INTRAVENOUS; SUBCUTANEOUS at 05:09

## 2023-12-01 RX ADMIN — ASPIRIN 325 MG: 325 TABLET ORAL at 09:59

## 2023-12-01 RX ADMIN — HEPARIN SODIUM 5000 UNITS: 5000 INJECTION INTRAVENOUS; SUBCUTANEOUS at 17:13

## 2023-12-01 RX ADMIN — METOPROLOL TARTRATE 50 MG: 50 TABLET, FILM COATED ORAL at 17:13

## 2023-12-01 RX ADMIN — TAMSULOSIN HYDROCHLORIDE 0.4 MG: 0.4 CAPSULE ORAL at 17:13

## 2023-12-01 RX ADMIN — METOPROLOL TARTRATE 50 MG: 50 TABLET, FILM COATED ORAL at 03:07

## 2023-12-01 RX ADMIN — IPRATROPIUM BROMIDE AND ALBUTEROL SULFATE 3 ML: 2.5; .5 SOLUTION RESPIRATORY (INHALATION) at 07:51

## 2023-12-01 RX ADMIN — REMDESIVIR 100 MG: 100 INJECTION, POWDER, LYOPHILIZED, FOR SOLUTION INTRAVENOUS at 17:13

## 2023-12-01 RX ADMIN — DEXAMETHASONE SODIUM PHOSPHATE 6 MG: 4 INJECTION INTRA-ARTICULAR; INTRALESIONAL; INTRAMUSCULAR; INTRAVENOUS; SOFT TISSUE at 17:13

## 2023-12-01 RX ADMIN — ASPIRIN 325 MG: 325 TABLET ORAL at 17:13

## 2023-12-01 RX ADMIN — BISACODYL 10 MG: 5 TABLET, COATED ORAL at 21:14

## 2023-12-01 NOTE — PLAN OF CARE
Problem: Potential for Falls  Goal: Patient will remain free of falls  Description: INTERVENTIONS:  - Educate patient/family on patient safety including physical limitations  - Instruct patient to call for assistance with activity   - Consult OT/PT to assist with strengthening/mobility   - Keep Call bell within reach  - Keep bed low and locked with side rails adjusted as appropriate  - Keep care items and personal belongings within reach  - Initiate and maintain comfort rounds  - Make Fall Risk Sign visible to staff  Problem: INFECTION - ADULT  Goal: Absence or prevention of progression during hospitalization  Description: INTERVENTIONS:  - Assess and monitor for signs and symptoms of infection  - Monitor lab/diagnostic results  - Monitor all insertion sites, i.e. indwelling lines, tubes, and drains  - Monitor endotracheal if appropriate and nasal secretions for changes in amount and color  - [place] appropriate cooling/warming therapies per order  - Administer medications as ordered  - Instruct and encourage patient and family to use good hand hygiene technique  - Identify and instruct in appropriate isolation precautions for identified infection/condition  Outcome: Progressing  Goal: Absence of fever/infection during neutropenic period  Description: INTERVENTIONS:  - Monitor WBC    Outcome: Progressing     Problem: SAFETY ADULT  Goal: Patient will remain free of falls  Description: INTERVENTIONS:  - Educate patient/family on patient safety including physical limitations  - Instruct patient to call for assistance with activity   - Consult OT/PT to assist with strengthening/mobility   - Keep Call bell within reach  - Keep bed low and locked with side rails adjusted as appropriate  - Keep care items and personal belongings within reach  - Initiate and maintain comfort rounds  - Make Fall Risk Sign visible to staff  - Apply yellow socks and bracelet for high fall risk patients  - Consider moving patient to room near nurses station  Outcome: Progressing  Goal: Maintain or return to baseline ADL function  Description: INTERVENTIONS:  -  Assess patient's ability to carry out ADLs; assess patient's baseline for ADL function and identify physical deficits which impact ability to perform ADLs (bathing, care of mouth/teeth, toileting, grooming, dressing, etc.)  - Assess/evaluate cause of self-care deficits   - Assess range of motion  - Assess patient's mobility; develop plan if impaired  - Assess patient's need for assistive devices and provide as appropriate  - Encourage maximum independence but intervene and supervise when necessary  - Involve family in performance of ADLs  - Assess for home care needs following discharge   - Consider OT consult to assist with ADL evaluation and planning for discharge  - Provide patient education as appropriate  Outcome: Progressing  Goal: Maintains/Returns to pre admission functional level  Description: INTERVENTIONS:  - Perform AM-PAC 6 Click Basic Mobility/ Daily Activity assessment daily.  - Set and communicate daily mobility goal to care team and patient/family/caregiver. - Collaborate with rehabilitation services on mobility goals if consulted  Problem: Knowledge Deficit  Goal: Patient/family/caregiver demonstrates understanding of disease process, treatment plan, medications, and discharge instructions  Description: Complete learning assessment and assess knowledge base.   Interventions:  - Provide teaching at level of understanding  - Provide teaching via preferred learning methods  Outcome: Progressing     Problem: Prexisting or High Potential for Compromised Skin Integrity  Goal: Skin integrity is maintained or improved  Description: INTERVENTIONS:  - Identify patients at risk for skin breakdown  - Assess and monitor skin integrity  - Assess and monitor nutrition and hydration status  - Monitor labs   - Assess for incontinence   - Turn and reposition patient  - Assist with mobility/ambulation  - Relieve pressure over bony prominences  - Avoid friction and shearing  - Provide appropriate hygiene as needed including keeping skin clean and dry  - Evaluate need for skin moisturizer/barrier cream  - Collaborate with interdisciplinary team   - Patient/family teaching  - Consider wound care consult   Outcome: Progressing     - Out of bed for toileting  - Record patient progress and toleration of activity level   Outcome: Progressing     - Initiate/Maintain bed alarm  - Obtain necessary fall risk management equipment: yellow socks   - Apply yellow socks and bracelet for high fall risk patients  - Consider moving patient to room near nurses station  Outcome: Progressing     Problem: RESPIRATORY - ADULT  Goal: Achieves optimal ventilation and oxygenation  Description: INTERVENTIONS:  - Assess for changes in respiratory status  - Assess for changes in mentation and behavior  - Position to facilitate oxygenation and minimize respiratory effort  - Oxygen administered by appropriate delivery if ordered  - Initiate smoking cessation education as indicated  - Encourage broncho-pulmonary hygiene including cough, deep breathe, Incentive Spirometry  - Assess the need for suctioning and aspirate as needed  - Assess and instruct to report SOB or any respiratory difficulty  - Respiratory Therapy support as indicated  Outcome: Progressing

## 2023-12-01 NOTE — ASSESSMENT & PLAN NOTE
Patient presented to ED with shortness of breath, cough and generalized weakness for past day. Oxygen saturation dropped to 88% on room air with ambulation. CXR: No acute cardiopulmonary disease.    Tested positive for COVID and RSV  Wean oxygen as tolerated, currently on 1 liter  Treatment as below

## 2023-12-01 NOTE — ASSESSMENT & PLAN NOTE
Presented with cough, shortness of breath, weakness  Tested positive for COVID and RSV  Currently requiring 2 L, wean as tolerated  Continue remdesivir, day 3/4  continue decadron, day 3  Duonebs  Continue albuterol as needed  Encourage incentive spirometry  CRP 34 > 84>59.7

## 2023-12-01 NOTE — PROGRESS NOTES
[address]  Progress Note  Name: Efrain Sutherland  MRN: [id number]  Unit/Bed#: 3 [address] [facility] [place] 308-01 I Date of Admission: 11/29/2023   Date of Service: 12/1/2023 I Hospital Day: 2    Assessment/Plan   * Acute respiratory insufficiency  Assessment & Plan  Patient presented to ED with shortness of breath, cough and generalized weakness for past day. Oxygen saturation dropped to 88% on room air with ambulation. CXR: No acute cardiopulmonary disease. Tested positive for COVID and RSV  Wean oxygen as tolerated, currently on 1 liter  Treatment as below    COVID-19  Assessment & Plan  Presented with cough, shortness of breath, weakness  Tested positive for COVID and RSV  Currently requiring 2 L, wean as tolerated  Continue remdesivir, day 3/4  continue decadron, day 3  Duonebs  Continue albuterol as needed  Encourage incentive spirometry  CRP 34 > 84>59.7    Paroxysmal atrial flutter (HCC)  Assessment & Plan  Continue home metoprolol  Not on PTA anticoagulation    Bilateral leg edema  Assessment & Plan  Chronic bilateral leg edema  Continue torsemide 5 mg every other day    Essential hypertension  Assessment & Plan  Continue metoprolol  Substitute home Monopril with lisinopril  Continue torsemide every other day    Hyperlipidemia  Assessment & Plan  Continue statin and niacin    Recurrent major depressive disorder, in full remission ([address])  Assessment & Plan  Continue Celexa  Supportive care                VTE Pharmacologic Prophylaxis: VTE Score: 7 High Risk (Score >/= 5) - Pharmacological DVT Prophylaxis Ordered: heparin. Sequential Compression Devices Ordered. Mobility:   Basic Mobility Inpatient Raw Score: 18  JH-HLM Goal: 6: Walk 10 steps or more  JH-HLM Achieved: 5: Stand (1 or more minutes)  HLM Goal NOT achieved. Continue with multidisciplinary rounding and encourage appropriate mobility to improve upon [facility] goals. Patient Centered Rounds: I performed bedside rounds with nursing staff today. Discussions with Specialists or Other Care Team Provider: multidisciplinary team     Education and Discussions with Family / Patient: Updated  (wife) via phone. Total Time Spent on Date of Encounter in care of patient: greater than 45 mins. This time was spent on one or more of the following: performing physical exam; counseling and coordination of care; obtaining or reviewing history; documenting in the medical record; reviewing/ordering tests, medications or procedures; communicating with other healthcare professionals and discussing with patient's family/caregivers. Current Length of Stay: 2 day(s)  Current Patient Status: Inpatient   Certification Statement: The patient will continue to require additional inpatient hospital stay due to IV remdesivir, IV decadron  Discharge Plan: Anticipate discharge in 24-48 hrs to discharge location to be determined pending rehab evaluations. Code Status: Level 1 - Full Code    Subjective:   Patient seen sitting up in bed, resting comfortably watching TV. Reports he is feeling better, feels his breathing getting more towards his baseline. No headaches or dizziness. Reports he was able to ambulate with PT in [place] yesterday. Objective:     Vitals:   Temp (24hrs), Av.8 °F (36.6 °C), Min:97.5 °F (36.4 °C), Max:98 °F (36.7 °C)    Temp:  [97.5 °F (36.4 °C)-98 °F (36.7 °C)] 98 °F (36.7 °C)  HR:  [74-96] 83  Resp:  [18] 18  BP: (139-170)/(63-73) 170/73  SpO2:  [92 %-98 %] 94 %  Body mass index is 25.85 kg/m². Input and Output Summary (last 24 hours): Intake/Output Summary (Last 24 hours) at 2023 1144  Last data filed at 2023 0730  Gross per 24 hour   Intake --   Output 400 ml   Net -400 ml       Physical Exam:   Physical Exam  Vitals and nursing note reviewed. HENT:      Head: Normocephalic.       Nose: Nose normal.      Mouth/Throat:      Mouth: Mucous membranes are moist.   Eyes:      Extraocular Movements: Extraocular movements intact. Conjunctiva/sclera: Conjunctivae normal.   Cardiovascular:      Rate and Rhythm: Normal rate. Pulses: Normal pulses. Pulmonary:      Effort: Pulmonary effort is normal.      Breath sounds: Normal breath sounds. Abdominal:      General: Bowel sounds are normal. There is no distension. Palpations: Abdomen is soft. Tenderness: There is no abdominal tenderness. Genitourinary:     Comments: Voiding spontaneously   Musculoskeletal:         General: Normal range of motion. Cervical back: Normal range of motion. Comments: +1 edema bilat LEs    Skin:     General: Skin is warm and dry. Capillary Refill: Capillary refill takes less than 2 seconds. Neurological:      General: No focal deficit present. Mental Status: He is alert and oriented to person, place, and time. Psychiatric:         Mood and Affect: Mood normal.         Behavior: Behavior normal.         Thought Content: Thought content normal.         Judgment: Judgment normal.          Additional Data:     Labs:  Results from last 7 days   Lab Units 12/01/23  0515 11/30/23  0514 11/29/23  1257   WBC Thousand/uL 9.86   < > 11.22*   HEMOGLOBIN g/dL 12.0   < > 13.5   HEMATOCRIT % 34.9*   < > 38.4   PLATELETS Thousands/uL 200   < > 199   NEUTROS PCT %  --   --  80*   LYMPHS PCT %  --   --  8*   MONOS PCT %  --   --  11   EOS PCT %  --   --  1    < > = values in this interval not displayed.      Results from last 7 days   Lab Units 11/30/23  0514 11/29/23  1257   SODIUM mmol/L 135 134*   POTASSIUM mmol/L 4.5 4.6   CHLORIDE mmol/L 101 99   CO2 mmol/L 28 29   BUN mg/dL 21 16   CREATININE mg/dL 1.21 1.26   ANION GAP mmol/L 6 6   CALCIUM mg/dL 8.7 9.2   ALBUMIN g/dL  --  3.9   TOTAL BILIRUBIN mg/dL  --  0.88   ALK PHOS U/L  --  76   ALT U/L  --  9   AST U/L  --  16   GLUCOSE RANDOM mg/dL 134 97                 Results from last 7 days   Lab Units 11/29/23  1257   PROCALCITONIN ng/ml 0.07       Lines/Drains:  Invasive Devices       Peripheral Intravenous Line  Duration             Peripheral IV 11/29/23 Left Antecubital 1 day              Line  Duration             Venous Sheath Other (Comment) Right Other (Comment) 1197 days                          Imaging: Reviewed radiology reports from this admission including: chest xray    Recent Cultures (last 7 days):         Last 24 Hours Medication List:   Current Facility-Administered Medications   Medication Dose Route Frequency Provider Last Rate    acetaminophen  650 mg Oral Q6H PRN Lulla Crank, PA-C      albuterol  2 puff Inhalation Q6H PRN Lulla Crank, PA-C      aspirin  325 mg Oral BID Lulla Crank, PA-C      atorvastatin  5 mg Oral Daily With Dinner Lulla Crank, PA-C      bisacodyl  10 mg Oral HS Lulla Crank, PA-C      citalopram  40 mg Oral QAM Lulla Crank, PA-C      dexamethasone  6 mg Intravenous Q24H Lulla Crank, PA-C      fluticasone  1 spray Each Nare Daily Lulla Crank, PA-C      heparin (porcine)  5,000 Units Subcutaneous Q8H [address] Lulla Crank, PA-C      ipratropium-albuterol  3 mL Nebulization TID Lulla Crank, PA-C      lisinopril  10 mg Oral Daily Lulla Crank, PA-C      magnesium Oxide  400 mg Oral Daily Lulla Crank, PA-C      metoprolol tartrate  50 mg Oral Q12H Lulla Crank, PA-C      niacin  500 mg Oral HS Lulla Crank, PA-C      ondansetron  4 mg Intravenous Q6H PRN Lulla Crank, PA-C      pantoprazole  40 mg Oral Daily Lulla Crank, PA-C      polyethylene glycol  17 g Oral Daily Lulla Crank, PA-C      potassium chloride  10 mEq Oral Every Other Day Lulla Crank, PA-C      remdesivir  100 mg Intravenous Q24H Lulla Crank, PA-C      tamsulosin  0.4 mg Oral Daily With Dinner Lulla Crank, PA-C      torsemide  5 mg Oral Every Other Day Lulla Crank, PA-C          Today, Patient Was Seen By: BERNADETTE Goff    **Please Note: This note may have been constructed using a voice recognition system. **

## 2023-12-01 NOTE — UTILIZATION REVIEW
Date: 12/1    Day 3: Has surpassed a 2nd midnight with active treatments and services, which include continued IV steroids, IV Remdesivir and O2 for tx of Covid .

## 2023-12-02 VITALS
TEMPERATURE: 97.7 F | SYSTOLIC BLOOD PRESSURE: 138 MMHG | BODY MASS INDEX: 25.76 KG/M2 | DIASTOLIC BLOOD PRESSURE: 64 MMHG | OXYGEN SATURATION: 97 % | HEIGHT: 68 IN | HEART RATE: 59 BPM | RESPIRATION RATE: 18 BRPM | WEIGHT: 170 LBS

## 2023-12-02 LAB
ANION GAP SERPL CALCULATED.3IONS-SCNC: 5 MMOL/L
BUN SERPL-MCNC: 31 MG/DL (ref 5–25)
CALCIUM SERPL-MCNC: 8.4 MG/DL (ref 8.4–10.2)
CHLORIDE SERPL-SCNC: 106 MMOL/L (ref 96–108)
CO2 SERPL-SCNC: 26 MMOL/L (ref 21–32)
CREAT SERPL-MCNC: 1.13 MG/DL (ref 0.6–1.3)
CRP SERPL QL: 25.8 MG/L
ERYTHROCYTE [DISTWIDTH] IN BLOOD BY AUTOMATED COUNT: 13.4 % (ref 11.6–15.1)
GFR SERPL CREATININE-BSD FRML MDRD: 59 ML/MIN/1.73SQ M
GLUCOSE SERPL-MCNC: 132 MG/DL (ref 65–140)
HCT VFR BLD AUTO: 34.9 % (ref 36.5–49.3)
HGB BLD-MCNC: 11.7 G/DL (ref 12–17)
MCH RBC QN AUTO: 32 PG (ref 26.8–34.3)
MCHC RBC AUTO-ENTMCNC: 33.5 G/DL (ref 31.4–37.4)
MCV RBC AUTO: 95 FL (ref 82–98)
PLATELET # BLD AUTO: 207 THOUSANDS/UL (ref 149–390)
PMV BLD AUTO: 9.8 FL (ref 8.9–12.7)
POTASSIUM SERPL-SCNC: 4.9 MMOL/L (ref 3.5–5.3)
RBC # BLD AUTO: 3.66 MILLION/UL (ref 3.88–5.62)
SODIUM SERPL-SCNC: 137 MMOL/L (ref 135–147)
WBC # BLD AUTO: 7.21 THOUSAND/UL (ref 4.31–10.16)

## 2023-12-02 PROCEDURE — 86140 C-REACTIVE PROTEIN: CPT | Performed by: NURSE PRACTITIONER

## 2023-12-02 PROCEDURE — 99232 SBSQ HOSP IP/OBS MODERATE 35: CPT | Performed by: NURSE PRACTITIONER

## 2023-12-02 PROCEDURE — 94760 N-INVAS EAR/PLS OXIMETRY 1: CPT

## 2023-12-02 PROCEDURE — 85027 COMPLETE CBC AUTOMATED: CPT | Performed by: NURSE PRACTITIONER

## 2023-12-02 PROCEDURE — 94640 AIRWAY INHALATION TREATMENT: CPT

## 2023-12-02 PROCEDURE — 80048 BASIC METABOLIC PNL TOTAL CA: CPT | Performed by: NURSE PRACTITIONER

## 2023-12-02 RX ORDER — PREDNISONE 10 MG/1
40 TABLET ORAL DAILY
Qty: 16 TABLET | Refills: 0 | Status: SHIPPED | OUTPATIENT
Start: 2023-12-02 | End: 2023-12-06

## 2023-12-02 RX ADMIN — Medication 400 MG: at 08:57

## 2023-12-02 RX ADMIN — HEPARIN SODIUM 5000 UNITS: 5000 INJECTION INTRAVENOUS; SUBCUTANEOUS at 06:25

## 2023-12-02 RX ADMIN — ASPIRIN 325 MG: 325 TABLET ORAL at 08:57

## 2023-12-02 RX ADMIN — PANTOPRAZOLE SODIUM 40 MG: 40 TABLET, DELAYED RELEASE ORAL at 08:57

## 2023-12-02 RX ADMIN — METOPROLOL TARTRATE 50 MG: 50 TABLET, FILM COATED ORAL at 02:54

## 2023-12-02 RX ADMIN — POLYETHYLENE GLYCOL 3350 17 G: 17 POWDER, FOR SOLUTION ORAL at 08:58

## 2023-12-02 RX ADMIN — TORSEMIDE 5 MG: 10 TABLET ORAL at 08:57

## 2023-12-02 RX ADMIN — CITALOPRAM HYDROBROMIDE 40 MG: 20 TABLET ORAL at 08:57

## 2023-12-02 RX ADMIN — FLUTICASONE PROPIONATE 1 SPRAY: 50 SPRAY, METERED NASAL at 08:58

## 2023-12-02 RX ADMIN — IPRATROPIUM BROMIDE AND ALBUTEROL SULFATE 3 ML: 2.5; .5 SOLUTION RESPIRATORY (INHALATION) at 08:29

## 2023-12-02 RX ADMIN — LISINOPRIL 10 MG: 10 TABLET ORAL at 08:57

## 2023-12-02 NOTE — RESPIRATORY THERAPY NOTE
Home Oxygen Qualifying Test     Patient name: Cody Kwong        : 1940   Date of Test:  2023  Diagnosis: Acute Respiratory failure   Home Oxygen Test:    **Medicare Guidelines require item(s) 1-5 on all ambulatory patients or 1 and 2 on non-ambulatory patients. 1. Baseline SPO2 on Room Air at rest 94%   If <= 88% on Room Air add O2 via NC to obtain SpO2 >=88%. If LPM needed, document LPM needed to reach =>88%    SPO2 during exertion on Room Air 90  %  During exertion monitor SPO2. If SPO2 increases >=89%, do not add supplemental oxygen    SPO2 on Oxygen at Rest  NA % at NA LPM    SPO2 during exertion on Oxygen  NA % at NA LPM    Test performed during exertion activity. []  Supplemental Home Oxygen is indicated. [x]  Client does not qualify for home oxygen. Respiratory Additional Notes-    Patient on room air at rest SPO2 94%. Patient walked with walker approximately 100 feet on room air and SPO2 did not go below 90%. Patient stated he feels great, is not experiencing  any shortness of breath and displayed no difficulties with balance during walking.      Candace Luna, RT

## 2023-12-02 NOTE — DISCHARGE SUMMARY
[address] [facility]  Discharge- Stephen Campbell 1940, 80 y.o. male MRN: [id number]  Unit/Bed#: [address] [place] [address] Encounter: [id number]  Primary Care Provider: Zulema Rendon MD   Date and time admitted to hospital: 11/29/2023 12:21 PM    * Acute respiratory insufficiency  Assessment & Plan  Patient presented to ED with shortness of breath, cough and generalized weakness for past day. Oxygen saturation dropped to 88% on room air with ambulation. CXR: No acute cardiopulmonary disease.    Tested positive for COVID and RSV  Wean oxygen as tolerated, currently on RA  Treated with Remdesivir and decadron; improved; will discharge with prednisone burst 40mg po daily x 4  Follow-up outpatient PCP 1 to 2 weeks postdischarge    COVID-19  Assessment & Plan  Presented with cough, shortness of breath, weakness  Tested positive for COVID and RSV  Weaned off of O2 during hospitalization, O2 sats 92-94% on room air  Pleated remdesivir, day 4/4  Transition from IV Decadron to oral prednisone burst 40 mg p.o. daily x 4  Encourage incentive spirometry at home  CRP 34 > 84>59.7>25.8  Follow-up outpatient PCP 1 to 2 weeks postdischarge    Paroxysmal atrial flutter (HCC)  Assessment & Plan  Continue home metoprolol  Not on PTA anticoagulation  Continue to follow-up with outpatient PCP    Bilateral leg edema  Assessment & Plan  Chronic bilateral leg edema  Continue torsemide 5 mg every other day  Continue to follow-up with outpatient PCP    Essential hypertension  Assessment & Plan  Continue metoprolol  Substitute home Monopril with lisinopril  Continue torsemide every other day    Hyperlipidemia  Assessment & Plan  Continue statin and niacin  Heart healthy diet    Recurrent major depressive disorder, in full remission [facility])  Assessment & Plan  Continue Celexa  Supportive care         Medical Problems       Resolved Problems  Date Reviewed: 12/2/2023   None       Discharging Physician / Practitioner: Carl Scott Mary Vásquez  PCP: Ayleen Velez MD  Admission Date:   Admission Orders (From admission, onward)       Ordered        11/29/23 [address]  Once                          Discharge Date: 12/02/23    Consultations During Hospital Stay:  PT and OT     Procedures Performed:   None     Significant Findings / Test Results:   Chest x-ray performed 11/29 showed no acute cardiopulmonary disease, stable appearance of hiatal hernia as per radiology report. Incidental Findings:   None      Test Results Pending at Discharge (will require follow up): None      Outpatient Tests Requested:  None     Complications:  None     Reason for Admission: Shortness of breath, cough and weakness    Hospital Course:   Yen Mcgarry is a 80 y.o. male patient past medical history of hypertension, hyperlipidemia, paroxysmal atrial fibrillation, depression who originally presented to the hospital on 11/29/2023 due to SOB of breath, cough and weakness starting the day prior to admission. Family member at home had tested positive for RSV, patient also had fever at home with dizziness. Patient tested positive for COVID, required O2 and was placed on the moderate pathway with remdesivir and IV Decadron. DuoNebs. Patient improved, was weaned off of his oxygen and did not require oxygen on discharge. PT/OT evaluation was performed and patient was determined to have minimal resource needs and will be discharged home. He is to follow-up with his primary care physician 1 to 2 weeks postdischarge. He will be sent home on a prednisone burst 40 mg p.o. daily for 4 days. This was discussed with the patient at the bedside, he verbalized understanding and is agreeable to the plan. The plan had been discussed day prior with the patient's daughter who is a nurse, and she verbalized understanding also. Please see above list of diagnoses and related plan for additional information.      Condition at Discharge: good    Discharge Day Visit / Exam:   Subjective: Patient seen sitting up in bed resting comfortably. Reports that he had been ambulating in the room earlier, on room air. Went to the bathroom and had a good bowel movement. Good appetite for breakfast no nausea or vomiting. He is hoping that he will be going home today as he feels much improved    Vitals: Blood Pressure: 138/64 (12/02/23 0700)  Pulse: 59 (12/02/23 0700)  Temperature: 97.7 °F (36.5 °C) (12/02/23 0700)  Temp Source: Oral (12/02/23 0700)  Respirations: 18 (12/02/23 0700)  Height: 5' 8" (172.7 cm) (11/29/23 1556)  Weight - Scale: 77.1 kg (170 lb) (11/29/23 1556)  SpO2: 97 % (12/02/23 0829)    Exam:   Physical Exam  Vitals and nursing note reviewed. Constitutional:       General: He is not in acute distress. HENT:      Head: Normocephalic. Ears:      Comments: Hard of hearing      Nose: Nose normal.      Mouth/Throat:      Mouth: Mucous membranes are moist.   Eyes:      Extraocular Movements: Extraocular movements intact. Conjunctiva/sclera: Conjunctivae normal.   Cardiovascular:      Rate and Rhythm: Normal rate. Pulses: Normal pulses. Pulmonary:      Effort: Pulmonary effort is normal.      Breath sounds: Normal breath sounds. Abdominal:      General: Bowel sounds are normal. There is no distension. Palpations: Abdomen is soft. Tenderness: There is no abdominal tenderness. Genitourinary:     Comments: Voiding spontaneously   Musculoskeletal:         General: Normal range of motion. Cervical back: Normal range of motion. Comments: Trace edema bilat LE   Skin:     General: Skin is warm and dry. Capillary Refill: Capillary refill takes less than 2 seconds. Neurological:      General: No focal deficit present. Mental Status: He is alert and oriented to person, place, and time. Psychiatric:         Mood and Affect: Mood normal.         Behavior: Behavior normal.         Thought Content:  Thought content normal.         Judgment: Judgment normal.          Discussion with Family:  Patient called his family with update, and pending discharge to home . Discharge instructions/Information to patient and family:   See after visit summary for information provided to patient and family. Provisions for Follow-Up Care:  See after visit summary for information related to follow-up care and any pertinent home health orders. Mobility at time of Discharge:   Basic Mobility Inpatient Raw Score: 18  JH-HLM Goal: 6: Walk 10 steps or more  JH-HLM Achieved: 5: Stand (1 or more minutes)  Patient had been ambulating in room, reported ambulated in [place] day prior with staff. Disposition:   Home    Planned Readmission: No      Discharge Statement:  I spent greater than 45 minutes discharging the patient. This time was spent on the day of discharge. I had direct contact with the patient on the day of discharge. Greater than 50% of the total time was spent examining patient, answering all patient questions, arranging and discussing plan of care with patient as well as directly providing post-discharge instructions. Additional time then spent on discharge activities. Discharge Medications:  See after visit summary for reconciled discharge medications provided to patient and/or family.       **Please Note: This note may have been constructed using a voice recognition system**

## 2023-12-02 NOTE — ASSESSMENT & PLAN NOTE
Patient presented to ED with shortness of breath, cough and generalized weakness for past day. Oxygen saturation dropped to 88% on room air with ambulation. CXR: No acute cardiopulmonary disease.    Tested positive for COVID and RSV  Wean oxygen as tolerated, currently on RA  Treated with Remdesivir and decadron; improved; will discharge with prednisone burst 40mg po daily x 4  Follow-up outpatient PCP 1 to 2 weeks postdischarge

## 2023-12-02 NOTE — ASSESSMENT & PLAN NOTE
Chronic bilateral leg edema  Continue torsemide 5 mg every other day  Continue to follow-up with outpatient PCP

## 2023-12-02 NOTE — PLAN OF CARE
Problem: Potential for Falls  Goal: Patient will remain free of falls  Description: INTERVENTIONS:  - Educate patient/family on patient safety including physical limitations  - Instruct patient to call for assistance with activity   - Consult OT/PT to assist with strengthening/mobility   - Keep Call bell within reach  - Keep bed low and locked with side rails adjusted as appropriate  - Keep care items and personal belongings within reach  - Initiate and maintain comfort rounds  - Make Fall Risk Sign visible to staff  - Offer Toileting every 2 Hours, in advance of need  - Initiate/Maintain bed alarm  - Obtain necessary fall risk management equipment: walker  - Apply yellow socks and bracelet for high fall risk patients  - Consider moving patient to room near nurses station  Outcome: Progressing     Problem: RESPIRATORY - ADULT  Goal: Achieves optimal ventilation and oxygenation  Description: INTERVENTIONS:  - Assess for changes in respiratory status  - Assess for changes in mentation and behavior  - Position to facilitate oxygenation and minimize respiratory effort  - Oxygen administered by appropriate delivery if ordered  - Initiate smoking cessation education as indicated  - Encourage broncho-pulmonary hygiene including cough, deep breathe, Incentive Spirometry  - Assess the need for suctioning and aspirate as needed  - Assess and instruct to report SOB or any respiratory difficulty  - Respiratory Therapy support as indicated  Outcome: Progressing     Problem: PAIN - ADULT  Goal: Verbalizes/displays adequate comfort level or baseline comfort level  Description: Interventions:  - Encourage patient to monitor pain and request assistance  - Assess pain using appropriate pain scale  - Administer analgesics based on type and severity of pain and evaluate response  - Implement non-pharmacological measures as appropriate and evaluate response  - Consider cultural and social influences on pain and pain management  - Notify physician/advanced practitioner if interventions unsuccessful or patient reports new pain  Outcome: Progressing     Problem: INFECTION - ADULT  Goal: Absence or prevention of progression during hospitalization  Description: INTERVENTIONS:  - Assess and monitor for signs and symptoms of infection  - Monitor lab/diagnostic results  - Monitor all insertion sites, i.e. indwelling lines, tubes, and drains  - Monitor endotracheal if appropriate and nasal secretions for changes in amount and color  - [place] appropriate cooling/warming therapies per order  - Administer medications as ordered  - Instruct and encourage patient and family to use good hand hygiene technique  - Identify and instruct in appropriate isolation precautions for identified infection/condition  Outcome: Progressing  Goal: Absence of fever/infection during neutropenic period  Description: INTERVENTIONS:  - Monitor WBC    Outcome: Progressing     Problem: SAFETY ADULT  Goal: Patient will remain free of falls  Description: INTERVENTIONS:  - Educate patient/family on patient safety including physical limitations  - Instruct patient to call for assistance with activity   - Consult OT/PT to assist with strengthening/mobility   - Keep Call bell within reach  - Keep bed low and locked with side rails adjusted as appropriate  - Keep care items and personal belongings within reach  - Initiate and maintain comfort rounds  - Make Fall Risk Sign visible to staff  - Offer Toileting every 2 Hours, in advance of need  - Initiate/Maintain bed alarm  - Obtain necessary fall risk management equipment: walker  - Apply yellow socks and bracelet for high fall risk patients  - Consider moving patient to room near nurses station  Outcome: Progressing  Goal: Maintain or return to baseline ADL function  Description: INTERVENTIONS:  -  Assess patient's ability to carry out ADLs; assess patient's baseline for ADL function and identify physical deficits which impact ability to perform ADLs (bathing, care of mouth/teeth, toileting, grooming, dressing, etc.)  - Assess/evaluate cause of self-care deficits   - Assess range of motion  - Assess patient's mobility; develop plan if impaired  - Assess patient's need for assistive devices and provide as appropriate  - Encourage maximum independence but intervene and supervise when necessary  - Involve family in performance of ADLs  - Assess for home care needs following discharge   - Consider OT consult to assist with ADL evaluation and planning for discharge  - Provide patient education as appropriate  Outcome: Progressing  Goal: Maintains/Returns to pre admission functional level  Description: INTERVENTIONS:  - Perform AM-PAC 6 Click Basic Mobility/ Daily Activity assessment daily.  - Set and communicate daily mobility goal to care team and patient/family/caregiver. - Collaborate with rehabilitation services on mobility goals if consulted  - Perform Range of Motion 2 times a day. - Reposition patient every 2 hours.   - Dangle patient 2 times a day  - Stand patient 2 times a day  - Ambulate patient 2 times a day  - Out of bed to chair 2 times a day   - Out of bed for meals 2 times a day  - Out of bed for toileting  - Record patient progress and toleration of activity level   Outcome: Progressing     Problem: DISCHARGE PLANNING  Goal: Discharge to home or other facility with appropriate resources  Description: INTERVENTIONS:  - Identify barriers to discharge w/patient and caregiver  - Arrange for needed discharge resources and transportation as appropriate  - Identify discharge learning needs (meds, wound care, etc.)  - Arrange for interpretive services to assist at discharge as needed  - Refer to Case Management Department for coordinating discharge planning if the patient needs post-hospital services based on physician/advanced practitioner order or complex needs related to functional status, cognitive ability, or social support system  Outcome: Progressing     Problem: Knowledge Deficit  Goal: Patient/family/caregiver demonstrates understanding of disease process, treatment plan, medications, and discharge instructions  Description: Complete learning assessment and assess knowledge base.   Interventions:  - Provide teaching at level of understanding  - Provide teaching via preferred learning methods  Outcome: Progressing     Problem: Prexisting or High Potential for Compromised Skin Integrity  Goal: Skin integrity is maintained or improved  Description: INTERVENTIONS:  - Identify patients at risk for skin breakdown  - Assess and monitor skin integrity  - Assess and monitor nutrition and hydration status  - Monitor labs   - Assess for incontinence   - Turn and reposition patient  - Assist with mobility/ambulation  - Relieve pressure over bony prominences  - Avoid friction and shearing  - Provide appropriate hygiene as needed including keeping skin clean and dry  - Evaluate need for skin moisturizer/barrier cream  - Collaborate with interdisciplinary team   - Patient/family teaching  - Consider wound care consult   Outcome: Progressing

## 2023-12-02 NOTE — PLAN OF CARE
Problem: Potential for Falls  Goal: Patient will remain free of falls  Description: INTERVENTIONS:  - Educate patient/family on patient safety including physical limitations  - Instruct patient to call for assistance with activity   - Consult OT/PT to assist with strengthening/mobility   - Keep Call bell within reach  - Keep bed low and locked with side rails adjusted as appropriate  - Keep care items and personal belongings within reach  - Initiate and maintain comfort rounds  - Make Fall Risk Sign visible to staff  - Offer Toileting every 2 Hours, in advance of need  - Initiate/Maintain alarm  - Obtain necessary fall risk management equipment  - Apply yellow socks and bracelet for high fall risk patients  - Consider moving patient to room near nurses station  Outcome: Progressing     Problem: RESPIRATORY - ADULT  Goal: Achieves optimal ventilation and oxygenation  Description: INTERVENTIONS:  - Assess for changes in respiratory status  - Assess for changes in mentation and behavior  - Position to facilitate oxygenation and minimize respiratory effort  - Oxygen administered by appropriate delivery if ordered  - Initiate smoking cessation education as indicated  - Encourage broncho-pulmonary hygiene including cough, deep breathe, Incentive Spirometry  - Assess the need for suctioning and aspirate as needed  - Assess and instruct to report SOB or any respiratory difficulty  - Respiratory Therapy support as indicated  Outcome: Progressing     Problem: PAIN - ADULT  Goal: Verbalizes/displays adequate comfort level or baseline comfort level  Description: Interventions:  - Encourage patient to monitor pain and request assistance  - Assess pain using appropriate pain scale  - Administer analgesics based on type and severity of pain and evaluate response  - Implement non-pharmacological measures as appropriate and evaluate response  - Consider cultural and social influences on pain and pain management  - Notify physician/advanced practitioner if interventions unsuccessful or patient reports new pain  Outcome: Progressing     Problem: INFECTION - ADULT  Goal: Absence or prevention of progression during hospitalization  Description: INTERVENTIONS:  - Assess and monitor for signs and symptoms of infection  - Monitor lab/diagnostic results  - Monitor all insertion sites, i.e. indwelling lines, tubes, and drains  - Monitor endotracheal if appropriate and nasal secretions for changes in amount and color  - [place] appropriate cooling/warming therapies per order  - Administer medications as ordered  - Instruct and encourage patient and family to use good hand hygiene technique  - Identify and instruct in appropriate isolation precautions for identified infection/condition  Outcome: Progressing  Goal: Absence of fever/infection during neutropenic period  Description: INTERVENTIONS:  - Monitor WBC    Outcome: Progressing     Problem: SAFETY ADULT  Goal: Patient will remain free of falls  Description: INTERVENTIONS:  - Educate patient/family on patient safety including physical limitations  - Instruct patient to call for assistance with activity   - Consult OT/PT to assist with strengthening/mobility   - Keep Call bell within reach  - Keep bed low and locked with side rails adjusted as appropriate  - Keep care items and personal belongings within reach  - Initiate and maintain comfort rounds  - Make Fall Risk Sign visible to staff  - Offer Toileting every 2 Hours, in advance of need  - Initiate/Maintain alarm  - Obtain necessary fall risk management equipment  - Apply yellow socks and bracelet for high fall risk patients  - Consider moving patient to room near nurses station  Outcome: Progressing  Goal: Maintain or return to baseline ADL function  Description: INTERVENTIONS:  -  Assess patient's ability to carry out ADLs; assess patient's baseline for ADL function and identify physical deficits which impact ability to perform ADLs (bathing, care of mouth/teeth, toileting, grooming, dressing, etc.)  - Assess/evaluate cause of self-care deficits   - Assess range of motion  - Assess patient's mobility; develop plan if impaired  - Assess patient's need for assistive devices and provide as appropriate  - Encourage maximum independence but intervene and supervise when necessary  - Involve family in performance of ADLs  - Assess for home care needs following discharge   - Consider OT consult to assist with ADL evaluation and planning for discharge  - Provide patient education as appropriate  Outcome: Progressing  Goal: Maintains/Returns to pre admission functional level  Description: INTERVENTIONS:  - Perform AM-PAC 6 Click Basic Mobility/ Daily Activity assessment daily.  - Set and communicate daily mobility goal to care team and patient/family/caregiver. - Collaborate with rehabilitation services on mobility goals if consulted  - Perform Range of Motion 3 times a day. - Reposition patient every 2 hours.   - Dangle patient 3 times a day  - Stand patient 3 times a day  - Ambulate patient 3 times a day  - Out of bed to chair 3 times a day   - Out of bed for meals 3 times a day  - Out of bed for toileting  - Record patient progress and toleration of activity level   Outcome: Progressing     Problem: DISCHARGE PLANNING  Goal: Discharge to home or other facility with appropriate resources  Description: INTERVENTIONS:  - Identify barriers to discharge w/patient and caregiver  - Arrange for needed discharge resources and transportation as appropriate  - Identify discharge learning needs (meds, wound care, etc.)  - Arrange for interpretive services to assist at discharge as needed  - Refer to Case Management Department for coordinating discharge planning if the patient needs post-hospital services based on physician/advanced practitioner order or complex needs related to functional status, cognitive ability, or social support system  Outcome: Progressing     Problem: Knowledge Deficit  Goal: Patient/family/caregiver demonstrates understanding of disease process, treatment plan, medications, and discharge instructions  Description: Complete learning assessment and assess knowledge base.   Interventions:  - Provide teaching at level of understanding  - Provide teaching via preferred learning methods  Outcome: Progressing     Problem: Prexisting or High Potential for Compromised Skin Integrity  Goal: Skin integrity is maintained or improved  Description: INTERVENTIONS:  - Identify patients at risk for skin breakdown  - Assess and monitor skin integrity  - Assess and monitor nutrition and hydration status  - Monitor labs   - Assess for incontinence   - Turn and reposition patient  - Assist with mobility/ambulation  - Relieve pressure over bony prominences  - Avoid friction and shearing  - Provide appropriate hygiene as needed including keeping skin clean and dry  - Evaluate need for skin moisturizer/barrier cream  - Collaborate with interdisciplinary team   - Patient/family teaching  - Consider wound care consult   Outcome: Progressing

## 2023-12-04 ENCOUNTER — TRANSITIONAL CARE MANAGEMENT (OUTPATIENT)
Dept: INTERNAL MEDICINE CLINIC | Facility: CLINIC | Age: 83
End: 2023-12-04

## 2023-12-09 ENCOUNTER — APPOINTMENT (EMERGENCY)
Dept: RADIOLOGY | Facility: HOSPITAL | Age: 83
DRG: 570 | End: 2023-12-09
Payer: COMMERCIAL

## 2023-12-09 ENCOUNTER — HOSPITAL ENCOUNTER (INPATIENT)
Facility: HOSPITAL | Age: 83
LOS: 9 days | Discharge: HOME | DRG: 570 | End: 2023-12-18
Attending: EMERGENCY MEDICINE | Admitting: INTERNAL MEDICINE
Payer: COMMERCIAL

## 2023-12-09 DIAGNOSIS — Z29.9 PROPHYLACTIC MEASURE: ICD-10-CM

## 2023-12-09 DIAGNOSIS — R60.0 BILATERAL LEG EDEMA: ICD-10-CM

## 2023-12-09 DIAGNOSIS — L03.116 CELLULITIS OF LEFT LOWER EXTREMITY: Primary | ICD-10-CM

## 2023-12-09 DIAGNOSIS — L02.416 ABSCESS OF LEFT LEG: ICD-10-CM

## 2023-12-09 PROBLEM — L03.119 CELLULITIS OF EXTREMITY: Status: ACTIVE | Noted: 2023-12-09

## 2023-12-09 LAB
ALBUMIN SERPL BCP-MCNC: 3.2 G/DL (ref 3.5–5)
ALP SERPL-CCNC: 53 U/L (ref 34–104)
ALT SERPL W P-5'-P-CCNC: 11 U/L (ref 7–52)
ANION GAP SERPL CALCULATED.3IONS-SCNC: 3 MMOL/L
AST SERPL W P-5'-P-CCNC: 13 U/L (ref 13–39)
BASOPHILS # BLD AUTO: 0.04 THOUSANDS/ÂΜL (ref 0–0.1)
BASOPHILS NFR BLD AUTO: 0 % (ref 0–1)
BILIRUB SERPL-MCNC: 0.84 MG/DL (ref 0.2–1)
BNP SERPL-MCNC: 103 PG/ML (ref 0–100)
BUN SERPL-MCNC: 30 MG/DL (ref 5–25)
CALCIUM ALBUM COR SERPL-MCNC: 9.3 MG/DL (ref 8.3–10.1)
CALCIUM SERPL-MCNC: 8.7 MG/DL (ref 8.4–10.2)
CARDIAC TROPONIN I PNL SERPL HS: 6 NG/L
CHLORIDE SERPL-SCNC: 100 MMOL/L (ref 96–108)
CO2 SERPL-SCNC: 32 MMOL/L (ref 21–32)
CREAT SERPL-MCNC: 1.31 MG/DL (ref 0.6–1.3)
EOSINOPHIL # BLD AUTO: 0.14 THOUSAND/ÂΜL (ref 0–0.61)
EOSINOPHIL NFR BLD AUTO: 1 % (ref 0–6)
ERYTHROCYTE [DISTWIDTH] IN BLOOD BY AUTOMATED COUNT: 14 % (ref 11.6–15.1)
GFR SERPL CREATININE-BSD FRML MDRD: 49 ML/MIN/1.73SQ M
GLUCOSE SERPL-MCNC: 117 MG/DL (ref 65–140)
HCT VFR BLD AUTO: 39.3 % (ref 36.5–49.3)
HGB BLD-MCNC: 13 G/DL (ref 12–17)
IMM GRANULOCYTES # BLD AUTO: 0.14 THOUSAND/UL (ref 0–0.2)
IMM GRANULOCYTES NFR BLD AUTO: 1 % (ref 0–2)
LYMPHOCYTES # BLD AUTO: 1.09 THOUSANDS/ÂΜL (ref 0.6–4.47)
LYMPHOCYTES NFR BLD AUTO: 6 % (ref 14–44)
MCH RBC QN AUTO: 32.3 PG (ref 26.8–34.3)
MCHC RBC AUTO-ENTMCNC: 33.1 G/DL (ref 31.4–37.4)
MCV RBC AUTO: 98 FL (ref 82–98)
MONOCYTES # BLD AUTO: 1.45 THOUSAND/ÂΜL (ref 0.17–1.22)
MONOCYTES NFR BLD AUTO: 8 % (ref 4–12)
NEUTROPHILS # BLD AUTO: 16.12 THOUSANDS/ÂΜL (ref 1.85–7.62)
NEUTS SEG NFR BLD AUTO: 84 % (ref 43–75)
NRBC BLD AUTO-RTO: 0 /100 WBCS
PLATELET # BLD AUTO: 211 THOUSANDS/UL (ref 149–390)
PMV BLD AUTO: 9.2 FL (ref 8.9–12.7)
POTASSIUM SERPL-SCNC: 4.4 MMOL/L (ref 3.5–5.3)
PROT SERPL-MCNC: 6.1 G/DL (ref 6.4–8.4)
RBC # BLD AUTO: 4.03 MILLION/UL (ref 3.88–5.62)
SODIUM SERPL-SCNC: 135 MMOL/L (ref 135–147)
WBC # BLD AUTO: 18.98 THOUSAND/UL (ref 4.31–10.16)

## 2023-12-09 PROCEDURE — 99285 EMERGENCY DEPT VISIT HI MDM: CPT | Performed by: EMERGENCY MEDICINE

## 2023-12-09 PROCEDURE — 83880 ASSAY OF NATRIURETIC PEPTIDE: CPT | Performed by: EMERGENCY MEDICINE

## 2023-12-09 PROCEDURE — 99222 1ST HOSP IP/OBS MODERATE 55: CPT | Performed by: NURSE PRACTITIONER

## 2023-12-09 PROCEDURE — 93005 ELECTROCARDIOGRAM TRACING: CPT

## 2023-12-09 PROCEDURE — 84484 ASSAY OF TROPONIN QUANT: CPT | Performed by: EMERGENCY MEDICINE

## 2023-12-09 PROCEDURE — 85025 COMPLETE CBC W/AUTO DIFF WBC: CPT | Performed by: EMERGENCY MEDICINE

## 2023-12-09 PROCEDURE — 71045 X-RAY EXAM CHEST 1 VIEW: CPT

## 2023-12-09 PROCEDURE — 80053 COMPREHEN METABOLIC PANEL: CPT | Performed by: EMERGENCY MEDICINE

## 2023-12-09 PROCEDURE — 99284 EMERGENCY DEPT VISIT MOD MDM: CPT

## 2023-12-09 PROCEDURE — 36415 COLL VENOUS BLD VENIPUNCTURE: CPT | Performed by: EMERGENCY MEDICINE

## 2023-12-09 RX ORDER — PANTOPRAZOLE SODIUM 40 MG/1
40 TABLET, DELAYED RELEASE ORAL
Status: DISCONTINUED | OUTPATIENT
Start: 2023-12-10 | End: 2023-12-18 | Stop reason: HOSPADM

## 2023-12-09 RX ORDER — LANOLIN ALCOHOL/MO/W.PET/CERES
1 CREAM (GRAM) TOPICAL 2 TIMES DAILY WITH MEALS
Status: DISCONTINUED | OUTPATIENT
Start: 2023-12-10 | End: 2023-12-18 | Stop reason: HOSPADM

## 2023-12-09 RX ORDER — CITALOPRAM 20 MG/1
40 TABLET ORAL EVERY MORNING
Status: DISCONTINUED | OUTPATIENT
Start: 2023-12-10 | End: 2023-12-18 | Stop reason: HOSPADM

## 2023-12-09 RX ORDER — TORSEMIDE 10 MG/1
5 TABLET ORAL 2 TIMES WEEKLY
Status: DISCONTINUED | OUTPATIENT
Start: 2023-12-11 | End: 2023-12-18 | Stop reason: HOSPADM

## 2023-12-09 RX ORDER — TAMSULOSIN HYDROCHLORIDE 0.4 MG/1
0.4 CAPSULE ORAL
Status: DISCONTINUED | OUTPATIENT
Start: 2023-12-10 | End: 2023-12-18 | Stop reason: HOSPADM

## 2023-12-09 RX ORDER — CEFAZOLIN SODIUM 2 G/50ML
2000 SOLUTION INTRAVENOUS EVERY 8 HOURS
Status: DISCONTINUED | OUTPATIENT
Start: 2023-12-09 | End: 2023-12-18 | Stop reason: HOSPADM

## 2023-12-09 RX ORDER — POTASSIUM CHLORIDE 750 MG/1
10 TABLET, EXTENDED RELEASE ORAL 2 TIMES WEEKLY
Status: DISCONTINUED | OUTPATIENT
Start: 2023-12-11 | End: 2023-12-18 | Stop reason: HOSPADM

## 2023-12-09 RX ORDER — ACETAMINOPHEN 325 MG/1
650 TABLET ORAL EVERY 6 HOURS PRN
Status: DISCONTINUED | OUTPATIENT
Start: 2023-12-09 | End: 2023-12-18 | Stop reason: HOSPADM

## 2023-12-09 RX ORDER — ATORVASTATIN CALCIUM 10 MG/1
5 TABLET, FILM COATED ORAL
Status: DISCONTINUED | OUTPATIENT
Start: 2023-12-10 | End: 2023-12-18 | Stop reason: HOSPADM

## 2023-12-09 RX ORDER — LANOLIN ALCOHOL/MO/W.PET/CERES
400 CREAM (GRAM) TOPICAL DAILY
Status: DISCONTINUED | OUTPATIENT
Start: 2023-12-10 | End: 2023-12-10

## 2023-12-09 RX ORDER — METOPROLOL TARTRATE 50 MG/1
50 TABLET, FILM COATED ORAL EVERY 12 HOURS
Status: DISCONTINUED | OUTPATIENT
Start: 2023-12-10 | End: 2023-12-18 | Stop reason: HOSPADM

## 2023-12-09 RX ORDER — ENOXAPARIN SODIUM 100 MG/ML
40 INJECTION SUBCUTANEOUS
Status: DISCONTINUED | OUTPATIENT
Start: 2023-12-09 | End: 2023-12-18 | Stop reason: HOSPADM

## 2023-12-09 RX ORDER — ONDANSETRON 2 MG/ML
4 INJECTION INTRAMUSCULAR; INTRAVENOUS EVERY 6 HOURS PRN
Status: DISCONTINUED | OUTPATIENT
Start: 2023-12-09 | End: 2023-12-18 | Stop reason: HOSPADM

## 2023-12-09 RX ORDER — CEFTRIAXONE 1 G/50ML
1000 INJECTION, SOLUTION INTRAVENOUS ONCE
Status: COMPLETED | OUTPATIENT
Start: 2023-12-09 | End: 2023-12-10

## 2023-12-09 RX ORDER — NIACIN 500 MG/1
500 TABLET, EXTENDED RELEASE ORAL
Status: DISCONTINUED | OUTPATIENT
Start: 2023-12-10 | End: 2023-12-18 | Stop reason: HOSPADM

## 2023-12-09 RX ORDER — IPRATROPIUM BROMIDE 21 UG/1
2 SPRAY, METERED NASAL 2 TIMES DAILY
Status: DISCONTINUED | OUTPATIENT
Start: 2023-12-10 | End: 2023-12-18 | Stop reason: HOSPADM

## 2023-12-09 RX ORDER — FLUTICASONE PROPIONATE 50 MCG
1 SPRAY, SUSPENSION (ML) NASAL DAILY
Status: DISCONTINUED | OUTPATIENT
Start: 2023-12-10 | End: 2023-12-18 | Stop reason: HOSPADM

## 2023-12-09 RX ORDER — LISINOPRIL 5 MG/1
5 TABLET ORAL DAILY
Status: DISCONTINUED | OUTPATIENT
Start: 2023-12-10 | End: 2023-12-18 | Stop reason: HOSPADM

## 2023-12-09 RX ADMIN — CEFTRIAXONE 1000 MG: 1 INJECTION, SOLUTION INTRAVENOUS at 21:29

## 2023-12-10 LAB
2HR DELTA HS TROPONIN: 0 NG/L
4HR DELTA HS TROPONIN: -1 NG/L
ANION GAP SERPL CALCULATED.3IONS-SCNC: 4 MMOL/L
ATRIAL RATE: 73 BPM
BACTERIA UR QL AUTO: ABNORMAL /HPF
BASOPHILS # BLD AUTO: 0.03 THOUSANDS/ÂΜL (ref 0–0.1)
BASOPHILS NFR BLD AUTO: 0 % (ref 0–1)
BILIRUB UR QL STRIP: NEGATIVE
BUN SERPL-MCNC: 26 MG/DL (ref 5–25)
CALCIUM SERPL-MCNC: 8.4 MG/DL (ref 8.4–10.2)
CARDIAC TROPONIN I PNL SERPL HS: 5 NG/L
CARDIAC TROPONIN I PNL SERPL HS: 6 NG/L
CHLORIDE SERPL-SCNC: 101 MMOL/L (ref 96–108)
CLARITY UR: CLEAR
CO2 SERPL-SCNC: 30 MMOL/L (ref 21–32)
COLOR UR: ABNORMAL
CREAT SERPL-MCNC: 1.19 MG/DL (ref 0.6–1.3)
EOSINOPHIL # BLD AUTO: 0.13 THOUSAND/ÂΜL (ref 0–0.61)
EOSINOPHIL NFR BLD AUTO: 1 % (ref 0–6)
ERYTHROCYTE [DISTWIDTH] IN BLOOD BY AUTOMATED COUNT: 13.9 % (ref 11.6–15.1)
GFR SERPL CREATININE-BSD FRML MDRD: 56 ML/MIN/1.73SQ M
GLUCOSE SERPL-MCNC: 101 MG/DL (ref 65–140)
GLUCOSE UR STRIP-MCNC: NEGATIVE MG/DL
HCT VFR BLD AUTO: 36.3 % (ref 36.5–49.3)
HGB BLD-MCNC: 12 G/DL (ref 12–17)
HGB UR QL STRIP.AUTO: NEGATIVE
IMM GRANULOCYTES # BLD AUTO: 0.12 THOUSAND/UL (ref 0–0.2)
IMM GRANULOCYTES NFR BLD AUTO: 1 % (ref 0–2)
KETONES UR STRIP-MCNC: NEGATIVE MG/DL
LEUKOCYTE ESTERASE UR QL STRIP: NEGATIVE
LYMPHOCYTES # BLD AUTO: 1.51 THOUSANDS/ÂΜL (ref 0.6–4.47)
LYMPHOCYTES NFR BLD AUTO: 9 % (ref 14–44)
MAGNESIUM SERPL-MCNC: 1.8 MG/DL (ref 1.9–2.7)
MCH RBC QN AUTO: 32.3 PG (ref 26.8–34.3)
MCHC RBC AUTO-ENTMCNC: 33.1 G/DL (ref 31.4–37.4)
MCV RBC AUTO: 98 FL (ref 82–98)
MONOCYTES # BLD AUTO: 1.54 THOUSAND/ÂΜL (ref 0.17–1.22)
MONOCYTES NFR BLD AUTO: 9 % (ref 4–12)
MUCOUS THREADS UR QL AUTO: ABNORMAL
NEUTROPHILS # BLD AUTO: 13.7 THOUSANDS/ÂΜL (ref 1.85–7.62)
NEUTS SEG NFR BLD AUTO: 80 % (ref 43–75)
NITRITE UR QL STRIP: NEGATIVE
NON-SQ EPI CELLS URNS QL MICRO: ABNORMAL /HPF
NRBC BLD AUTO-RTO: 0 /100 WBCS
P AXIS: 34 DEGREES
PH UR STRIP.AUTO: 5.5 [PH]
PLATELET # BLD AUTO: 180 THOUSANDS/UL (ref 149–390)
PMV BLD AUTO: 9.9 FL (ref 8.9–12.7)
POTASSIUM SERPL-SCNC: 4.1 MMOL/L (ref 3.5–5.3)
PR INTERVAL: 158 MS
PROT UR STRIP-MCNC: ABNORMAL MG/DL
QRS AXIS: 5 DEGREES
QRSD INTERVAL: 92 MS
QT INTERVAL: 370 MS
QTC INTERVAL: 407 MS
RBC # BLD AUTO: 3.72 MILLION/UL (ref 3.88–5.62)
RBC #/AREA URNS AUTO: ABNORMAL /HPF
SODIUM SERPL-SCNC: 135 MMOL/L (ref 135–147)
SP GR UR STRIP.AUTO: 1.02 (ref 1–1.03)
T WAVE AXIS: 25 DEGREES
UROBILINOGEN UR QL STRIP.AUTO: 0.2 E.U./DL
VENTRICULAR RATE: 73 BPM
WBC # BLD AUTO: 17.03 THOUSAND/UL (ref 4.31–10.16)
WBC #/AREA URNS AUTO: ABNORMAL /HPF

## 2023-12-10 PROCEDURE — 83735 ASSAY OF MAGNESIUM: CPT | Performed by: NURSE PRACTITIONER

## 2023-12-10 PROCEDURE — 97167 OT EVAL HIGH COMPLEX 60 MIN: CPT

## 2023-12-10 PROCEDURE — 97163 PT EVAL HIGH COMPLEX 45 MIN: CPT

## 2023-12-10 PROCEDURE — 80048 BASIC METABOLIC PNL TOTAL CA: CPT | Performed by: NURSE PRACTITIONER

## 2023-12-10 PROCEDURE — 97535 SELF CARE MNGMENT TRAINING: CPT

## 2023-12-10 PROCEDURE — 85025 COMPLETE CBC W/AUTO DIFF WBC: CPT | Performed by: NURSE PRACTITIONER

## 2023-12-10 PROCEDURE — 97530 THERAPEUTIC ACTIVITIES: CPT

## 2023-12-10 PROCEDURE — 84484 ASSAY OF TROPONIN QUANT: CPT | Performed by: NURSE PRACTITIONER

## 2023-12-10 PROCEDURE — 36415 COLL VENOUS BLD VENIPUNCTURE: CPT | Performed by: NURSE PRACTITIONER

## 2023-12-10 PROCEDURE — 99232 SBSQ HOSP IP/OBS MODERATE 35: CPT | Performed by: INTERNAL MEDICINE

## 2023-12-10 PROCEDURE — 81001 URINALYSIS AUTO W/SCOPE: CPT | Performed by: NURSE PRACTITIONER

## 2023-12-10 RX ORDER — SACCHAROMYCES BOULARDII 250 MG
250 CAPSULE ORAL 2 TIMES DAILY
Status: DISCONTINUED | OUTPATIENT
Start: 2023-12-10 | End: 2023-12-18 | Stop reason: HOSPADM

## 2023-12-10 RX ORDER — LANOLIN ALCOHOL/MO/W.PET/CERES
400 CREAM (GRAM) TOPICAL 2 TIMES DAILY
Status: DISCONTINUED | OUTPATIENT
Start: 2023-12-10 | End: 2023-12-18 | Stop reason: HOSPADM

## 2023-12-10 RX ORDER — MAGNESIUM SULFATE HEPTAHYDRATE 40 MG/ML
2 INJECTION, SOLUTION INTRAVENOUS ONCE
Status: COMPLETED | OUTPATIENT
Start: 2023-12-10 | End: 2023-12-10

## 2023-12-10 RX ADMIN — Medication 400 MG: at 09:34

## 2023-12-10 RX ADMIN — CITALOPRAM HYDROBROMIDE 40 MG: 20 TABLET ORAL at 09:34

## 2023-12-10 RX ADMIN — FLUTICASONE PROPIONATE 1 SPRAY: 50 SPRAY, METERED NASAL at 09:35

## 2023-12-10 RX ADMIN — B-COMPLEX W/ C & FOLIC ACID TAB 1 TABLET: TAB at 09:34

## 2023-12-10 RX ADMIN — LISINOPRIL 5 MG: 5 TABLET ORAL at 09:34

## 2023-12-10 RX ADMIN — TAMSULOSIN HYDROCHLORIDE 0.4 MG: 0.4 CAPSULE ORAL at 15:53

## 2023-12-10 RX ADMIN — NIACIN 500 MG: 500 TABLET, EXTENDED RELEASE ORAL at 15:54

## 2023-12-10 RX ADMIN — CEFAZOLIN SODIUM 2000 MG: 2 SOLUTION INTRAVENOUS at 15:54

## 2023-12-10 RX ADMIN — PANTOPRAZOLE SODIUM 40 MG: 40 TABLET, DELAYED RELEASE ORAL at 06:28

## 2023-12-10 RX ADMIN — Medication 250 MG: at 17:32

## 2023-12-10 RX ADMIN — CEFAZOLIN SODIUM 2000 MG: 2 SOLUTION INTRAVENOUS at 00:28

## 2023-12-10 RX ADMIN — ENOXAPARIN SODIUM 40 MG: 40 INJECTION SUBCUTANEOUS at 00:27

## 2023-12-10 RX ADMIN — Medication 250 MG: at 09:34

## 2023-12-10 RX ADMIN — Medication 1 TABLET: at 06:30

## 2023-12-10 RX ADMIN — MAGNESIUM SULFATE HEPTAHYDRATE 2 G: 40 INJECTION, SOLUTION INTRAVENOUS at 10:35

## 2023-12-10 RX ADMIN — CEFAZOLIN SODIUM 2000 MG: 2 SOLUTION INTRAVENOUS at 06:28

## 2023-12-10 RX ADMIN — METOPROLOL TARTRATE 50 MG: 50 TABLET, FILM COATED ORAL at 09:34

## 2023-12-10 RX ADMIN — ENOXAPARIN SODIUM 40 MG: 40 INJECTION SUBCUTANEOUS at 21:52

## 2023-12-10 RX ADMIN — METOPROLOL TARTRATE 50 MG: 50 TABLET, FILM COATED ORAL at 21:52

## 2023-12-10 RX ADMIN — CEFAZOLIN SODIUM 2000 MG: 2 SOLUTION INTRAVENOUS at 23:19

## 2023-12-10 RX ADMIN — Medication 1 TABLET: at 15:53

## 2023-12-10 RX ADMIN — ATORVASTATIN CALCIUM 5 MG: 10 TABLET, FILM COATED ORAL at 15:53

## 2023-12-10 RX ADMIN — Medication 400 MG: at 17:32

## 2023-12-10 NOTE — PLAN OF CARE
Problem: PHYSICAL THERAPY ADULT  Goal: Performs mobility at highest level of function for planned discharge setting.  See evaluation for individualized goals.  Description: Treatment/Interventions: LE strengthening/ROM, Functional transfer training, ADL retraining, Therapeutic exercise, Endurance training, Patient/family training, Bed mobility, Gait training          See flowsheet documentation for full assessment, interventions and recommendations.  Note: Prognosis: Good  Problem List: Decreased strength, Decreased range of motion, Decreased endurance, Impaired balance, Decreased mobility, Pain, Decreased skin integrity  Assessment: Patient is an 83 y.o. year old male seen for Physical Therapy evaluation. Patient admitted with Cellulitis of extremity.  Comorbidities affecting patient's physical performance include: L LE cellulitis, COVID-19, OP, OCD.  Personal factors affecting patient at time of initial evaluation include: ambulating with assistive device, stairs to enter home, and inability to navigate community distances. Prior to admission, patient was independent with functional mobility with walker and requiring assist for ADLS.  Please find objective findings from Physical Therapy assessment regarding body systems outlined above with impairments and limitations including weakness, decreased endurance, pain, decreased activity tolerance, decreased functional mobility tolerance, and fall risk.  The Barthel Index was used as a functional outcome tool presenting with a score of Barthel Index Score: 55 today indicating marked limitations of functional mobility and ADLS.  Patient's clinical presentation is currently unstable/unpredictable as seen in patient's presentation of changing level of pain, increased fall risk, new onset of impairment of functional mobility, decreased endurance, and new onset of weakness. Pt would benefit from continued Physical Therapy treatment to address deficits as defined above and  maximize level of functional mobility. As demonstrated by objective findings, the assigned level of complexity for this evaluation is high.The patient's AM-PAC Basic Mobility Inpatient Short Form Raw Score is 18. A Raw score of greater than 16 suggests the patient may benefit from discharge to home.        Rehab Resource Intensity Level, PT: No post-acute rehabilitation needs    See flowsheet documentation for full assessment.

## 2023-12-10 NOTE — PROGRESS NOTES
[facility]  Progress Note  Name: Taj Roblero I  MRN: [id number]  Unit/Bed#: 3 [place] 308-01 I Date of Admission: 12/9/2023   Date of Service: 12/10/2023 I Hospital Day: 1    Assessment/Plan   * Cellulitis of extremity  Assessment & Plan  Patient presents with left lower extremity redness edema pain since Thursday this week, got worse today.  Reports low-grade fever at home.  Chart review.  Patient was hospitalized 11/29 - 12/2/2023 for COVID-19 and RSV with acute respiratory insufficiency.  Treated with remdesivir and Decadron, discharged on prednisone 40 mg p.o. daily for 4 days.  History of left femur fracture, underwent insertion of nail in June 2022, subsequently developed left distal femur abscess in September 2023.  Wound culture grew MSSA.  Patient underwent hardware removal, was treated with IV cefazolin  then Keflex.  Left lower extremity redness, edema, increased warmth, tender on exam.  Redness extends to below left knee.  WBC 18.98.  Partly could be due to steroids.  Patient does not meet sepsis criteria.  Given Rocephin in ED. continue high-dose Ancef  Check venous Doppler to rule out DVT  Continue leg elevation and pain control  Monitor clinical course and CBC    COVID-19  Assessment & Plan  As above.  Symptoms improved a lot per daughter.  Occasional cough on exam.  Patient has been stable on room air.  Patient tested positive for COVID-19 and RSV.  Supportive treatment    Stage 3 chronic kidney disease, unspecified whether stage 3a or 3b CKD ([facility])  Assessment & Plan  Baseline creatinine appears to be around 1.1-1.3s  Creatinine around baseline  Monitor  Results from last 7 days   Lab Units 12/10/23  0543 12/09/23 2047   BUN mg/dL 26* 30*   CREATININE mg/dL 1.19 1.31*        BPH (benign prostatic hyperplasia)  Assessment & Plan  Continue Flomax    Gastroesophageal reflux disease without esophagitis  Assessment & Plan  Continue PPI    Leg edema, left  Assessment &  Plan  Chronic left leg edema per daughter.  On Demadex 5mg p.o. twice a week at home.  Worsening left leg edema  per daughter  Continue Demadex  Check venous Doppler to rule out DVT    Paroxysmal atrial flutter (HCC)  Assessment & Plan  on metoprolol at home which will be continued  Not on AC.  EKG sinus rhythm      Valvular heart disease  Assessment & Plan  2D echo in June last year showed normal EF around 60%, grade 1 diastolic dysfunction, mild to moderate TR, PA pressure 51, mild AR.  Continue ACE inhibitor  Patient follows cardiology as outpatient    History of pulmonary embolus (PE) 2019  Assessment & Plan  Provoked PE after right femur surgery in 2019, took AC for a few months.  Had IVC filter inserted and subsequently removed.  Pharmacologic DVT prophylaxis    Recurrent major depressive disorder, in full remission (HCC)  Assessment & Plan  Continue Celexa    Essential hypertension  Assessment & Plan  On fosinopril and metoprolol.  Will continue.  BP acceptable               VTE Pharmacologic Prophylaxis:   Moderate Risk (Score 3-4) - Pharmacological DVT Prophylaxis Ordered: enoxaparin (Lovenox).    Mobility:   Basic Mobility Inpatient Raw Score: 18  JH-HLM Goal: 6: Walk 10 steps or more  JH-HLM Achieved: 7: Walk 25 feet or more  HLM Goal achieved. Continue to encourage appropriate mobility.    Patient Centered Rounds: I performed bedside rounds with nursing staff today.   Discussions with Specialists or Other Care Team Provider: No    Education and Discussions with Family / Patient: Updated  (wife) at bedside.    Total Time Spent on Date of Encounter in care of patient: 35 mins. This time was spent on one or more of the following: performing physical exam; counseling and coordination of care; obtaining or reviewing history; documenting in the medical record; reviewing/ordering tests, medications or procedures; communicating with other healthcare professionals and discussing with patient's  family/caregivers.    Current Length of Stay: 1 day(s)  Current Patient Status: Inpatient   Certification Statement: The patient will continue to require additional inpatient hospital stay due to left leg cellulitis and swelling  Discharge Plan: Anticipate discharge in 24-48 hrs to home.    Code Status: Level 1 - Full Code    Subjective:   Patient still complaining of some pain in the left leg.  Occasional cough.  Denies any shortness of breath, chest pain or abdominal pain.    Objective:     Vitals:   Temp (24hrs), Av.5 °F (37.5 °C), Min:99.5 °F (37.5 °C), Max:99.6 °F (37.6 °C)    Temp:  [99.5 °F (37.5 °C)-99.6 °F (37.6 °C)] 99.5 °F (37.5 °C)  HR:  [74-89] 89  Resp:  [18-21] 18  BP: (119-158)/(58-96) 158/70  SpO2:  [94 %-100 %] 97 %  Body mass index is 27.76 kg/m².     Input and Output Summary (last 24 hours):     Intake/Output Summary (Last 24 hours) at 12/10/2023 1551  Last data filed at 12/10/2023 0800  Gross per 24 hour   Intake 50 ml   Output 180 ml   Net -130 ml       Physical Exam:   Physical Exam  Constitutional:       Appearance: Normal appearance.   HENT:      Head: Normocephalic and atraumatic.   Eyes:      Extraocular Movements: Extraocular movements intact.      Pupils: Pupils are equal, round, and reactive to light.   Cardiovascular:      Rate and Rhythm: Normal rate and regular rhythm.      Heart sounds: No murmur heard.     No gallop.   Pulmonary:      Effort: Pulmonary effort is normal.      Breath sounds: Normal breath sounds.   Abdominal:      General: Bowel sounds are normal.      Palpations: Abdomen is soft.      Tenderness: There is no abdominal tenderness.   Musculoskeletal:         General: No swelling or deformity. Normal range of motion.      Cervical back: Normal range of motion and neck supple.      Left lower leg: Edema present.      Comments: Left leg redness around the leg from below the knee extending onto the foot   Skin:     General: Skin is warm and dry.   Neurological:       General: No focal deficit present.      Mental Status: He is alert.          Additional Data:     Labs:  Results from last 7 days   Lab Units 12/10/23  0543   WBC Thousand/uL 17.03*   HEMOGLOBIN g/dL 12.0   HEMATOCRIT % 36.3*   PLATELETS Thousands/uL 180   NEUTROS PCT % 80*   LYMPHS PCT % 9*   MONOS PCT % 9   EOS PCT % 1     Results from last 7 days   Lab Units 12/10/23  0543 12/09/23  2047   SODIUM mmol/L 135 135   POTASSIUM mmol/L 4.1 4.4   CHLORIDE mmol/L 101 100   CO2 mmol/L 30 32   BUN mg/dL 26* 30*   CREATININE mg/dL 1.19 1.31*   ANION GAP mmol/L 4 3   CALCIUM mg/dL 8.4 8.7   ALBUMIN g/dL  --  3.2*   TOTAL BILIRUBIN mg/dL  --  0.84   ALK PHOS U/L  --  53   ALT U/L  --  11   AST U/L  --  13   GLUCOSE RANDOM mg/dL 101 117                       Lines/Drains:  Invasive Devices       Peripheral Intravenous Line  Duration             Peripheral IV 12/09/23 Right Antecubital <1 day                          Imaging:     Recent Cultures (last 7 days):         Last 24 Hours Medication List:   Current Facility-Administered Medications   Medication Dose Route Frequency Provider Last Rate    acetaminophen  650 mg Oral Q6H PRN BERNADETTE Marie      atorvastatin  5 mg Oral Daily With Dinner BERNADETTE Marie      calcium carbonate-vitamin D  1 tablet Oral BID With Meals BERNADETTE Marie      cefazolin  2,000 mg Intravenous Q8H BERNADETTE Marie 2,000 mg (12/10/23 0628)    citalopram  40 mg Oral QAM BERNADETTE Marie      enoxaparin  40 mg Subcutaneous HS BERNADETTE Marie      fluticasone  1 spray Each Nare Daily BERNADETTE Marie      ipratropium  2 spray Nasal BID BERNADETTE Marie      lisinopril  5 mg Oral Daily BERNADETTE Marie      magnesium Oxide  400 mg Oral BID Ashok Stewart MD      metoprolol tartrate  50 mg Oral Q12H BERNADETTE Marie      multivitamin stress formula  1 tablet Oral Daily BERNADETTE Marie      niacin  500 mg Oral Daily With Dinner BERNADETTE Marie      ondansetron  4 mg  Intravenous Q6H PRN Yovany Lafleur, BERNADETTE      pantoprazole  40 mg Oral Early Morning BERNADETTE Marie      [START ON 12/11/2023] potassium chloride  10 mEq Oral Once per day on Mon Thu Yovany Lafleur, BERNADETTE      saccharomyces boulardii  250 mg Oral BID Yovany Lafleur, BERNADETTE      tamsulosin  0.4 mg Oral Daily With Dinner BERNADETTE Marie      [START ON 12/11/2023] torsemide  5 mg Oral Once per day on Mon Thu BERNADETTE Marie          Today, Patient Was Seen By: Ashok Stewart MD    **Please Note: This note may have been constructed using a voice recognition system.**

## 2023-12-10 NOTE — ASSESSMENT & PLAN NOTE
As above.  Symptoms improved a lot per daughter.  Occasional cough on exam.  Patient has been stable on room air.  Patient tested positive for COVID-19 and RSV.  Supportive treatment

## 2023-12-10 NOTE — ASSESSMENT & PLAN NOTE
Patient presents with left lower extremity redness edema pain since Thursday this week, got worse today.  Reports low-grade fever at home.  Chart review.  Patient was hospitalized 11/29 - 12/2/2023 for COVID-19 and RSV with acute respiratory insufficiency.  Treated with remdesivir and Decadron, discharged on prednisone 40 mg p.o. daily for 4 days.  History of left femur fracture, underwent insertion of nail in June 2022, subsequently developed left distal femur abscess in September 2023.  Wound culture grew MSSA.  Patient underwent hardware removal, was treated with IV cefazolin  then Keflex.  Left lower extremity redness, edema, increased warmth, tender on exam.  Redness extends to below left knee.  WBC 18.98.  Partly could be due to steroids.  Patient does not meet sepsis criteria.  Given Rocephin in ED.  Will change to high-dose Ancef.  Check venous Doppler to rule out DVT  Leg elevation  Pain control  Repeat CBC with differential in the morning

## 2023-12-10 NOTE — ASSESSMENT & PLAN NOTE
Baseline creatinine appears to be around 1.1-1.3s  Creatinine around baseline  Monitor  Results from last 7 days   Lab Units 12/10/23  0543 12/09/23  2047   BUN mg/dL 26* 30*   CREATININE mg/dL 1.19 1.31*

## 2023-12-10 NOTE — PLAN OF CARE
Problem: PAIN - ADULT  Goal: Verbalizes/displays adequate comfort level or baseline comfort level  Description: Interventions:  - Encourage patient to monitor pain and request assistance  - Assess pain using appropriate pain scale  - Administer analgesics based on type and severity of pain and evaluate response  - Implement non-pharmacological measures as appropriate and evaluate response  - Consider cultural and social influences on pain and pain management  - Notify physician/advanced practitioner if interventions unsuccessful or patient reports new pain  Outcome: Progressing     Problem: INFECTION - ADULT  Goal: Absence or prevention of progression during hospitalization  Description: INTERVENTIONS:  - Assess and monitor for signs and symptoms of infection  - Monitor lab/diagnostic results  - Monitor all insertion sites, i.e. indwelling lines, tubes, and drains  - Monitor endotracheal if appropriate and nasal secretions for changes in amount and color  - [place] appropriate cooling/warming therapies per order  - Administer medications as ordered  - Instruct and encourage patient and family to use good hand hygiene technique  - Identify and instruct in appropriate isolation precautions for identified infection/condition  Outcome: Progressing  Goal: Absence of fever/infection during neutropenic period  Description: INTERVENTIONS:  - Monitor WBC    Outcome: Progressing     Problem: SAFETY ADULT  Goal: Patient will remain free of falls  Description: INTERVENTIONS:  - Educate patient/family on patient safety including physical limitations  - Instruct patient to call for assistance with activity   - Consult OT/PT to assist with strengthening/mobility   - Keep Call bell within reach  - Keep bed low and locked with side rails adjusted as appropriate  - Keep care items and personal belongings within reach  - Initiate and maintain comfort rounds  - Make Fall Risk Sign visible to staff  - Offer Toileting every 2 Hours,  in advance of need  - Initiate/Maintain bed alarm  - Apply yellow socks and bracelet for high fall risk patients  - Consider moving patient to room near nurses station  Outcome: Progressing  Goal: Maintain or return to baseline ADL function  Description: INTERVENTIONS:  -  Assess patient's ability to carry out ADLs; assess patient's baseline for ADL function and identify physical deficits which impact ability to perform ADLs (bathing, care of mouth/teeth, toileting, grooming, dressing, etc.)  - Assess/evaluate cause of self-care deficits   - Assess range of motion  - Assess patient's mobility; develop plan if impaired  - Assess patient's need for assistive devices and provide as appropriate  - Encourage maximum independence but intervene and supervise when necessary  - Involve family in performance of ADLs  - Assess for home care needs following discharge   - Consider OT consult to assist with ADL evaluation and planning for discharge  - Provide patient education as appropriate  Outcome: Progressing  Goal: Maintains/Returns to pre admission functional level  Description: INTERVENTIONS:  - Perform AM-PAC 6 Click Basic Mobility/ Daily Activity assessment daily.  - Set and communicate daily mobility goal to care team and patient/family/caregiver.   - Collaborate with rehabilitation services on mobility goals if consulted  - Perform Range of Motion 3 times a day.  - Reposition patient every 2 hours.  - Dangle patient 3 times a day  - Stand patient 3 times a day  - Ambulate patient 3 times a day  - Out of bed to chair 3 times a day   - Out of bed for meals 3 times a day  - Out of bed for toileting  - Record patient progress and toleration of activity level   Outcome: Progressing     Problem: DISCHARGE PLANNING  Goal: Discharge to home or other facility with appropriate resources  Description: INTERVENTIONS:  - Identify barriers to discharge w/patient and caregiver  - Arrange for needed discharge resources and  transportation as appropriate  - Identify discharge learning needs (meds, wound care, etc.)  - Arrange for interpretive services to assist at discharge as needed  - Refer to Case Management Department for coordinating discharge planning if the patient needs post-hospital services based on physician/advanced practitioner order or complex needs related to functional status, cognitive ability, or social support system  Outcome: Progressing     Problem: Knowledge Deficit  Goal: Patient/family/caregiver demonstrates understanding of disease process, treatment plan, medications, and discharge instructions  Description: Complete learning assessment and assess knowledge base.  Interventions:  - Provide teaching at level of understanding  - Provide teaching via preferred learning methods  Outcome: Progressing

## 2023-12-10 NOTE — ED PROVIDER NOTES
History  Chief Complaint   Patient presents with    Leg Swelling     L leg swelling and redness for a couple of days, discharged from here a week ago, had covid and rsv at that time     83-year-old male presents to the ED for evaluation of increasing left lower extremity redness and swelling over the past few days.  Patient was recently admitted on 2023 for COVID-19 infection and RSV.  Patient has been healing well from his respiratory illness.  Patient has had low-grade temperature since yesterday.  Daughter noted the rash started to increase and move up to the knee.  Subsequently daughter brought patient to the ED for further evaluation.      History provided by:  Patient and relative (Daughter)      Prior to Admission Medications   Prescriptions Last Dose Informant Patient Reported? Taking?   Calcium Carb-Cholecalciferol (CALTRATE 600+D3 PO) 2023 Self Yes Yes   Sig: Take 600 mg by mouth 2 (two) times a day   Multiple Vitamin (MULTIVITAMIN) tablet 2023 Self Yes Yes   Sig: Take 1 tablet by mouth daily   albuterol (Ventolin HFA) 90 mcg/act inhaler  Self No No   Sig: Inhale 2 puffs every 6 (six) hours as needed for wheezing   atorvastatin (LIPITOR) 10 mg tablet   No No   Sig: TAKE ONE-HALF TABLET DAILY   Patient taking differently: Take 5 mg by mouth daily with dinner TAKE ONE-HALF TABLET DAILY   citalopram (CeleXA) 40 mg tablet 2023  No Yes   Sig: TAKE ONE TABLET ONCE DAILY BY MOUTH   fluticasone (FLONASE) 50 mcg/act nasal spray 2023  No Yes   Sig: USE 1 SPRAY IN EACH NOSTRIL DAILY   fosinopril (MONOPRIL) 10 mg tablet 2023  No Yes   Sig: TAKE ONE-HALF TABLET BY MOUTH DAILY   ipratropium (ATROVENT) 0.03 % nasal spray  Self No No   Si sprays into each nostril 2 (two) times a day as needed for rhinitis   Patient taking differently: 2 sprays into each nostril 2 (two) times a day   magnesium oxide (MAG-OX) 400 mg tablet  Self Yes No   Sig: Take 1 tablet by mouth daily   metoprolol  tartrate (LOPRESSOR) 50 mg tablet 2023  No Yes   Sig: TAKE ONE TABLET EVERY 12 HOURS   niacin (NIASPAN) 500 mg CR tablet 2023  No Yes   Sig: TAKE ONE TABLET DAILY AT BEDTIME   pantoprazole (PROTONIX) 40 mg tablet 2023  No Yes   Sig: TAKE ONE TABLET DAILY   potassium chloride (Klor-Con) 10 mEq tablet   No No   Si tablet every other day   Patient taking differently: Take 10 mEq by mouth 2 (two) times a week   tamsulosin (FLOMAX) 0.4 mg 2023 Self Yes Yes   Sig: Take 0.4 mg by mouth daily with dinner   torsemide (DEMADEX) 5 MG tablet   No No   Si tablet every other day   Patient taking differently: Take 5 mg by mouth 2 (two) times a week Monday and Thursday      Facility-Administered Medications: None       Past Medical History:   Diagnosis Date    BPH (benign prostatic hyperplasia)     Depression     HTN (hypertension)     Hyperlipidemia     OCD (obsessive compulsive disorder)     Pulmonary embolism (HCC)        Past Surgical History:   Procedure Laterality Date    HARDWARE REMOVAL Left 2023    Procedure: REMOVAL HARDWARE;  Surgeon: Henri Flanagan DO;  Location: [facility];  Service: Orthopedics    INCISION AND DRAINAGE OF WOUND Left 2023    Procedure: INCISION AND DRAINAGE (I&D) EXTREMITY;  Surgeon: Henri Flanagan DO;  Location: [facility];  Service: Orthopedics    IR IVC FILTER PLACEMENT OPTIONAL/TEMPORARY  2019    IR IVC FILTER REMOVAL  2020    TN OPTX FEM SHFT FX W/INSJ IMED IMPLT W/WO SCREW Right 2019    Procedure: INSERTION NAIL IM FEMUR ANTEGRADE (TROCHANTERIC);  Surgeon: Yesenia Veronica DO;  Location: [facility];  Service: Orthopedics    TN OPTX FEM SHFT FX W/INSJ IMED IMPLT W/WO SCREW Left 2022    Procedure: INSERTION NAIL IM FEMUR ANTEGRADE (TROCHANTERIC) - long nail;  Surgeon: Henri Flanagan DO;  Location: [facility];  Service: Orthopedics       Family History   Problem Relation Age of Onset    Cancer Mother      I have reviewed and agree with the  history as documented.    E-Cigarette/Vaping    E-Cigarette Use Never User      E-Cigarette/Vaping Substances     Social History     Tobacco Use    Smoking status: Never    Smokeless tobacco: Never   Vaping Use    Vaping Use: Never used   Substance Use Topics    Alcohol use: Never    Drug use: Never       Review of Systems   Constitutional:  Negative for chills and fever.   HENT:  Negative for ear pain and sore throat.    Eyes:  Negative for pain and visual disturbance.   Respiratory:  Negative for cough and shortness of breath.    Cardiovascular:  Negative for chest pain and palpitations.   Gastrointestinal:  Negative for abdominal pain and vomiting.   Genitourinary:  Negative for dysuria and hematuria.   Musculoskeletal:  Negative for arthralgias and back pain.   Skin:  Positive for wound. Negative for color change and rash.   Neurological:  Negative for seizures and syncope.   All other systems reviewed and are negative.      Physical Exam  Physical Exam  Vitals and nursing note reviewed.   Constitutional:       General: He is not in acute distress.     Appearance: He is well-developed.   HENT:      Head: Normocephalic and atraumatic.   Eyes:      Conjunctiva/sclera: Conjunctivae normal.   Cardiovascular:      Rate and Rhythm: Normal rate and regular rhythm.      Heart sounds: No murmur heard.  Pulmonary:      Effort: Pulmonary effort is normal. No respiratory distress.      Breath sounds: Normal breath sounds.   Abdominal:      Palpations: Abdomen is soft.      Tenderness: There is no abdominal tenderness.   Musculoskeletal:         General: No swelling.      Cervical back: Neck supple.   Skin:     General: Skin is warm and dry.      Capillary Refill: Capillary refill takes less than 2 seconds.      Comments: Erythematous maculopapular rash that is warm to touch noted with associated edema to left lower extremity.  Please see picture below for clarification.   Neurological:      Mental Status: He is alert.    Psychiatric:         Mood and Affect: Mood normal.           Vital Signs  ED Triage Vitals [12/09/23 1907]   Temperature Pulse Respirations Blood Pressure SpO2   99.5 °F (37.5 °C) 74 20 152/67 95 %      Temp Source Heart Rate Source Patient Position - Orthostatic VS BP Location FiO2 (%)   Tympanic Monitor Sitting Left arm --      Pain Score       2           Vitals:    12/09/23 2215 12/09/23 2330 12/09/23 2345 12/10/23 0015   BP: 152/67 129/60 119/58 129/60   Pulse: 74 76 74 78   Patient Position - Orthostatic VS:             Visual Acuity      ED Medications  Medications   atorvastatin (LIPITOR) tablet 5 mg (has no administration in time range)   calcium carbonate-vitamin D 500 mg-5 mcg tablet 1 tablet (has no administration in time range)   citalopram (CeleXA) tablet 40 mg (has no administration in time range)   fluticasone (FLONASE) 50 mcg/act nasal spray 1 spray (has no administration in time range)   lisinopril (ZESTRIL) tablet 5 mg (has no administration in time range)   ipratropium (ATROVENT) 0.03 % nasal spray 2 spray (has no administration in time range)   magnesium Oxide (MAG-OX) tablet 400 mg (has no administration in time range)   metoprolol tartrate (LOPRESSOR) tablet 50 mg (has no administration in time range)   multivitamin stress formula tablet 1 tablet (has no administration in time range)   niacin (NIASPAN) CR tablet 500 mg (has no administration in time range)   pantoprazole (PROTONIX) EC tablet 40 mg (has no administration in time range)   potassium chloride (K-DUR,KLOR-CON) CR tablet 10 mEq (has no administration in time range)   tamsulosin (FLOMAX) capsule 0.4 mg (has no administration in time range)   torsemide (DEMADEX) tablet 5 mg (has no administration in time range)   acetaminophen (TYLENOL) tablet 650 mg (has no administration in time range)   ondansetron (ZOFRAN) injection 4 mg (has no administration in time range)   enoxaparin (LOVENOX) subcutaneous injection 40 mg (40 mg Subcutaneous  Given 12/10/23 0027)   ceFAZolin (ANCEF) IVPB (premix in dextrose) 2,000 mg 50 mL (2,000 mg Intravenous New Bag 12/10/23 0028)   cefTRIAXone (ROCEPHIN) IVPB (premix in dextrose) 1,000 mg 50 mL (0 mg Intravenous Stopped 12/10/23 0015)       Diagnostic Studies  Results Reviewed       Procedure Component Value Units Date/Time    UA w Reflex to Microscopic w Reflex to Culture [[id number]]  (Abnormal) Collected: 12/10/23 0026    Lab Status: Final result Specimen: Urine, Clean Catch Updated: 12/10/23 0110     Color, UA Sylvia     Clarity, UA Clear     Specific Gravity, UA 1.025     pH, UA 5.5     Leukocytes, UA Negative     Nitrite, UA Negative     Protein, UA 30 (1+) mg/dl      Glucose, UA Negative mg/dl      Ketones, UA Negative mg/dl      Urobilinogen, UA 0.2 E.U./dl      Bilirubin, UA Negative     Occult Blood, UA Negative    Urine Microscopic [[id number]]  (Abnormal) Collected: 12/10/23 0026    Lab Status: Final result Specimen: Urine, Clean Catch Updated: 12/10/23 0103     RBC, UA None Seen /hpf      WBC, UA 1-2 /hpf      Epithelial Cells None Seen /hpf      Bacteria, UA Occasional /hpf      MUCUS THREADS Moderate    HS Troponin I 2hr [[id number]]  (Normal) Collected: 12/10/23 0026    Lab Status: Final result Specimen: Blood from Arm, Right Updated: 12/10/23 0057     hs TnI 2hr 6 ng/L      Delta 2hr hsTnI 0 ng/L     HS Troponin I 4hr [[id number]]     Lab Status: No result Specimen: Blood     HS Troponin 0hr (reflex protocol) [[id number]]  (Normal) Collected: 12/09/23 2047    Lab Status: Final result Specimen: Blood from Arm, Right Updated: 12/09/23 2119     hs TnI 0hr 6 ng/L     B-Type Natriuretic Peptide(BNP) [[id number]]  (Abnormal) Collected: 12/09/23 2047    Lab Status: Final result Specimen: Blood from Arm, Right Updated: 12/09/23 2119      pg/mL     Comprehensive metabolic panel [[id number]]  (Abnormal) Collected: 12/09/23 2047    Lab Status: Final result Specimen: Blood from Arm, Right Updated: 12/09/23 9676      Sodium 135 mmol/L      Potassium 4.4 mmol/L      Chloride 100 mmol/L      CO2 32 mmol/L      ANION GAP 3 mmol/L      BUN 30 mg/dL      Creatinine 1.31 mg/dL      Glucose 117 mg/dL      Calcium 8.7 mg/dL      Corrected Calcium 9.3 mg/dL      AST 13 U/L      ALT 11 U/L      Alkaline Phosphatase 53 U/L      Total Protein 6.1 g/dL      Albumin 3.2 g/dL      Total Bilirubin 0.84 mg/dL      eGFR 49 ml/min/1.73sq m     Narrative:      [employer] guidelines for Chronic Kidney Disease (CKD):     Stage 1 with normal or high GFR (GFR > 90 mL/min/1.73 square meters)    Stage 2 Mild CKD (GFR = 60-89 mL/min/1.73 square meters)    Stage 3A Moderate CKD (GFR = 45-59 mL/min/1.73 square meters)    Stage 3B Moderate CKD (GFR = 30-44 mL/min/1.73 square meters)    Stage 4 Severe CKD (GFR = 15-29 mL/min/1.73 square meters)    Stage 5 End Stage CKD (GFR <15 mL/min/1.73 square meters)  Note: GFR calculation is accurate only with a steady state creatinine    CBC and differential [[id number]]  (Abnormal) Collected: 12/09/23 2047    Lab Status: Final result Specimen: Blood from Arm, Right Updated: 12/09/23 2053     WBC 18.98 Thousand/uL      RBC 4.03 Million/uL      Hemoglobin 13.0 g/dL      Hematocrit 39.3 %      MCV 98 fL      MCH 32.3 pg      MCHC 33.1 g/dL      RDW 14.0 %      MPV 9.2 fL      Platelets 211 Thousands/uL      nRBC 0 /100 WBCs      Neutrophils Relative 84 %      Immat GRANS % 1 %      Lymphocytes Relative 6 %      Monocytes Relative 8 %      Eosinophils Relative 1 %      Basophils Relative 0 %      Neutrophils Absolute 16.12 Thousands/µL      Immature Grans Absolute 0.14 Thousand/uL      Lymphocytes Absolute 1.09 Thousands/µL      Monocytes Absolute 1.45 Thousand/µL      Eosinophils Absolute 0.14 Thousand/µL      Basophils Absolute 0.04 Thousands/µL                    XR chest 1 view portable    (Results Pending)              Procedures  ECG 12 Lead Documentation Only    Date/Time: 12/9/2023 8:29  PM    Performed by: Lyndon Ramirez DO  Authorized by: Lyndon Ramirez DO    Indications / Diagnosis:  Leg swelling  ECG reviewed by me, the ED Provider: yes    Patient location:  ED  Previous ECG:     Previous ECG:  Compared to current    Similarity:  Changes noted    Comparison to cardiac monitor: Yes    Interpretation:     Interpretation: abnormal    Comments:      Sinus rhythm, rate 73, normal axis, normal intervals, no acute ST/T wave abdomen is noted, occasional PACs noted that is new compared to previous EKG.           ED Course                                             Medical Decision Making  Obtain blood work  Start antibiotics    Patient had significant leukocytosis on blood work.  At this time patient's symptoms are consistent with cellulitis.  IV antibiotics started and patient is admitted for further evaluation and management.  Patient and family agrees with admission plans.      Amount and/or Complexity of Data Reviewed  Labs: ordered.  Radiology: ordered.    Risk  Prescription drug management.  Decision regarding hospitalization.             Disposition  Final diagnoses:   Cellulitis of left lower extremity     Time reflects when diagnosis was documented in both MDM as applicable and the Disposition within this note       Time User Action Codes Description Comment    12/9/2023  9:20 PM Lyndon Ramirez Add [L03.116] Cellulitis of left lower extremity           ED Disposition       ED Disposition   Admit    Condition   Stable    Date/Time   Sat Dec 9, 2023  9:20 PM    Comment   Case was discussed with NP for hospitalist and the patient's admission status was agreed to be Admission Status: inpatient status to the service of Dr. Stewart.               Follow-up Information    None         Current Discharge Medication List        CONTINUE these medications which have NOT CHANGED    Details   Calcium Carb-Cholecalciferol (CALTRATE 600+D3 PO) Take 600 mg by mouth 2 (two) times a day      citalopram  (CeleXA) 40 mg tablet TAKE ONE TABLET ONCE DAILY BY MOUTH  Qty: 90 tablet, Refills: 1    Associated Diagnoses: Essential hypertension; Recurrent major depressive disorder, in full remission (HCC)      fluticasone (FLONASE) 50 mcg/act nasal spray USE 1 SPRAY IN EACH NOSTRIL DAILY  Qty: 16 g, Refills: 0    Associated Diagnoses: Nasal congestion      fosinopril (MONOPRIL) 10 mg tablet TAKE ONE-HALF TABLET BY MOUTH DAILY  Qty: 45 tablet, Refills: 1    Associated Diagnoses: Essential hypertension      metoprolol tartrate (LOPRESSOR) 50 mg tablet TAKE ONE TABLET EVERY 12 HOURS  Qty: 180 tablet, Refills: 1    Associated Diagnoses: Paroxysmal atrial flutter (HCC)      Multiple Vitamin (MULTIVITAMIN) tablet Take 1 tablet by mouth daily      niacin (NIASPAN) 500 mg CR tablet TAKE ONE TABLET DAILY AT BEDTIME  Qty: 90 tablet, Refills: 1    Associated Diagnoses: Hyperlipidemia, unspecified hyperlipidemia type      pantoprazole (PROTONIX) 40 mg tablet TAKE ONE TABLET DAILY  Qty: 30 tablet, Refills: 2    Associated Diagnoses: Hiatal hernia      tamsulosin (FLOMAX) 0.4 mg Take 0.4 mg by mouth daily with dinner      albuterol (Ventolin HFA) 90 mcg/act inhaler Inhale 2 puffs every 6 (six) hours as needed for wheezing  Qty: 18 g, Refills: 0    Comments: Substitution to a formulary equivalent within the same pharmaceutical class is authorized.  Associated Diagnoses: Reactive airway disease      atorvastatin (LIPITOR) 10 mg tablet TAKE ONE-HALF TABLET DAILY  Qty: 45 tablet, Refills: 5    Associated Diagnoses: Hypercholesteremia      ipratropium (ATROVENT) 0.03 % nasal spray 2 sprays into each nostril 2 (two) times a day as needed for rhinitis  Qty: 30 mL, Refills: 2    Associated Diagnoses: Chronic rhinitis      magnesium oxide (MAG-OX) 400 mg tablet Take 1 tablet by mouth daily      potassium chloride (Klor-Con) 10 mEq tablet 1 tablet every other day  Qty: 45 tablet, Refills: 1    Associated Diagnoses: Bilateral leg edema       torsemide (DEMADEX) 5 MG tablet 1 tablet every other day  Qty: 45 tablet, Refills: 1    Associated Diagnoses: Bilateral leg edema             No discharge procedures on file.    PDMP Review       None            ED Provider  Electronically Signed by             Lyndon Ramirez DO  12/09/23 9338       Lyndon Ramirez DO  12/10/23 1018

## 2023-12-10 NOTE — ASSESSMENT & PLAN NOTE
2D echo in June last year showed normal EF around 60%, grade 1 diastolic dysfunction, mild to moderate TR, PA pressure 51, mild AR.  Continue ACE inhibitor  Patient follows cardiology as outpatient

## 2023-12-10 NOTE — H&P
[facility]  H&P  Name: Taj Roblero 83 y.o. male I MRN: [id number]  Unit/Bed#: 3 [place] [address]  Date of Admission: 12/9/2023   Date of Service: 12/10/2023 I Hospital Day: 1      Assessment/Plan   * Cellulitis of extremity  Assessment & Plan  Patient presents with left lower extremity redness edema pain since Thursday this week, got worse today.  Reports low-grade fever at home.  Chart review.  Patient was hospitalized 11/29 - 12/2/2023 for COVID-19 and RSV with acute respiratory insufficiency.  Treated with remdesivir and Decadron, discharged on prednisone 40 mg p.o. daily for 4 days.  History of left femur fracture, underwent insertion of nail in June 2022, subsequently developed left distal femur abscess in September 2023.  Wound culture grew MSSA.  Patient underwent hardware removal, was treated with IV cefazolin  then Keflex.  Left lower extremity redness, edema, increased warmth, tender on exam.  Redness extends to below left knee.  WBC 18.98.  Partly could be due to steroids.  Patient does not meet sepsis criteria.  Given Rocephin in ED.  Will change to high-dose Ancef.  Check venous Doppler to rule out DVT  Leg elevation  Pain control  Repeat CBC with differential in the morning    BPH (benign prostatic hyperplasia)  Assessment & Plan  Continue Flomax    Stage 3 chronic kidney disease, unspecified whether stage 3a or 3b CKD (HCC)  Assessment & Plan  Baseline creatinine appears to be around 1.1-1.3s  Creatinine around baseline  Monitor    Gastroesophageal reflux disease without esophagitis  Assessment & Plan  Continue PPI    Leg edema, left  Assessment & Plan  Chronic left leg edema per daughter.  On Demadex 5mg p.o. twice a week at home.  Worsening left leg edema  per daughter  Continue Demadex  Check venous Doppler to rule out DVT    COVID-19  Assessment & Plan  As above.  Symptoms improved a lot per daughter.  Occasional cough on exam.  On room air, satting adequately.    Paroxysmal  atrial flutter (HCC)  Assessment & Plan  On metoprolol at home.  Will continue.  Not on AC.  EKG sinus rhythm  Continue metoprolol  Optimize electrolytes    Valvular heart disease  Assessment & Plan  2D echo in June last year showed normal EF around 60%, grade 1 diastolic dysfunction, mild to moderate TR, PA pressure 51, mild AR.  Continue ACE inhibitor  Patient follows cardiology as outpatient    History of pulmonary embolus (PE) 2019  Assessment & Plan  Provoked PE after right femur surgery in 2019, took AC for a few months.  Had IVC filter inserted and subsequently removed.  Pharmacologic DVT prophylaxis    Recurrent major depressive disorder, in full remission (HCC)  Assessment & Plan  Continue Celexa    Essential hypertension  Assessment & Plan  On fosinopril and metoprolol.  Will continue.  BP acceptable          VTE Prophylaxis: Enoxaparin (Lovenox)  / reason for no mechanical VTE prophylaxis on lovenox    Code Status: full code  POLST: POLST form is not discussed and not completed at this time.    Anticipated Length of Stay:  Patient will be admitted on an Inpatient basis with an anticipated length of stay of  > 2 midnights.   Justification for Hospital Stay: Left  leg cellulitis    Total Time for Visit, including Counseling / Coordination of Care: 45 minutes.  Greater than 50% of this total time spent on direct patient counseling and coordination of care.    Chief Complaint:   Left leg infection    History of Present Illness:    Taj Roblero is a 83 y.o. male with PMH of left knee abscess, left femur ORIF, hypertension, CKD 3, BPH, PE, GERD, a flutter, left leg edema, valvular heart disease who presents with left lower extremity redness edema pain since Thursday this week, got worse today.  Reports low-grade fever at home.  Patient denies chest pain, headache, dizziness, SOB, nausea vomiting diarrhea constipation.  Reports COVID symptoms improved a lot.  Daughter at bedside reports patient had a left knee  infection in the summer this year, not MRSA.  Patient ambulates with walker at home, lives with wife.        Review of Systems:    Review of Systems   Constitutional:  Positive for fever.   HENT:          Hard of hearing   Cardiovascular:  Positive for leg swelling.   Musculoskeletal:         Left leg pain   Skin:         Left leg redness   All other systems reviewed and are negative.      Past Medical and Surgical History:     Past Medical History:   Diagnosis Date    BPH (benign prostatic hyperplasia)     Depression     HTN (hypertension)     Hyperlipidemia     OCD (obsessive compulsive disorder)     Pulmonary embolism (HCC) 2019       Past Surgical History:   Procedure Laterality Date    HARDWARE REMOVAL Left 9/9/2023    Procedure: REMOVAL HARDWARE;  Surgeon: Henri Flanagan DO;  Location: [facility];  Service: Orthopedics    INCISION AND DRAINAGE OF WOUND Left 9/9/2023    Procedure: INCISION AND DRAINAGE (I&D) EXTREMITY;  Surgeon: Herni Flanagan DO;  Location: [facility];  Service: Orthopedics    IR IVC FILTER PLACEMENT OPTIONAL/TEMPORARY  12/7/2019    IR IVC FILTER REMOVAL  8/21/2020    NH OPTX FEM SHFT FX W/INSJ IMED IMPLT W/WO SCREW Right 12/4/2019    Procedure: INSERTION NAIL IM FEMUR ANTEGRADE (TROCHANTERIC);  Surgeon: Yesenia Veronica DO;  Location: [facility];  Service: Orthopedics    NH OPTX FEM SHFT FX W/INSJ IMED IMPLT W/WO SCREW Left 6/17/2022    Procedure: INSERTION NAIL IM FEMUR ANTEGRADE (TROCHANTERIC) - long nail;  Surgeon: Henri Flanagan DO;  Location: [facility];  Service: Orthopedics       Meds/Allergies:    Prior to Admission medications    Medication Sig Start Date End Date Taking? Authorizing Provider   Calcium Carb-Cholecalciferol (CALTRATE 600+D3 PO) Take 600 mg by mouth 2 (two) times a day   Yes Historical Provider, MD   citalopram (CeleXA) 40 mg tablet TAKE ONE TABLET ONCE DAILY BY MOUTH 10/3/23  Yes Gurpreet Aguirre MD   fluticasone (FLONASE) 50 mcg/act nasal spray USE 1 SPRAY IN  EACH NOSTRIL DAILY 10/25/23  Yes BERNADETTE Ambrosio   fosinopril (MONOPRIL) 10 mg tablet TAKE ONE-HALF TABLET BY MOUTH DAILY 8/28/23  Yes Gurpreet Aguirre MD   magnesium oxide (MAG-OX) 400 mg tablet Take 1 tablet by mouth daily 4/25/23  Yes Historical Provider, MD   metoprolol tartrate (LOPRESSOR) 50 mg tablet TAKE ONE TABLET EVERY 12 HOURS 8/28/23  Yes Gurpreet Aguirre MD   Multiple Vitamin (MULTIVITAMIN) tablet Take 1 tablet by mouth daily   Yes Historical Provider, MD   niacin (NIASPAN) 500 mg CR tablet TAKE ONE TABLET DAILY AT BEDTIME 8/28/23  Yes Gurpreet Aguirre MD   pantoprazole (PROTONIX) 40 mg tablet TAKE ONE TABLET DAILY 11/27/23  Yes Gurpreet Aguirre MD   tamsulosin (FLOMAX) 0.4 mg Take 0.4 mg by mouth daily with dinner   Yes Historical Provider, MD   albuterol (Ventolin HFA) 90 mcg/act inhaler Inhale 2 puffs every 6 (six) hours as needed for wheezing 3/24/23   Dung Ashley MD   atorvastatin (LIPITOR) 10 mg tablet TAKE ONE-HALF TABLET DAILY  Patient taking differently: Take 5 mg by mouth daily with dinner TAKE ONE-HALF TABLET DAILY 10/30/23   Gurpreet Aguirre MD   ipratropium (ATROVENT) 0.03 % nasal spray 2 sprays into each nostril 2 (two) times a day as needed for rhinitis  Patient taking differently: 2 sprays into each nostril 2 (two) times a day 4/12/23 10/13/23  BERNADETTE Ambrosio   potassium chloride (Klor-Con) 10 mEq tablet 1 tablet every other day  Patient taking differently: Take 10 mEq by mouth 2 (two) times a week 10/13/23   Gurpreet Aguirre MD   torsemide (DEMADEX) 5 MG tablet 1 tablet every other day  Patient taking differently: Take 5 mg by mouth 2 (two) times a week Monday and Thursday 10/13/23   Gurpreet Aguirre MD     I have reviewed home medications with patient family member.    Allergies: No Known Allergies    Social History:     Marital Status: /Civil Union   Occupation: Retired  Patient Pre-hospital Living Situation: Wife  Patient  "Pre-hospital Level of Mobility: Walker  Patient Pre-hospital Diet Restrictions: Cardiac  Substance Use History:   Social History     Substance and Sexual Activity   Alcohol Use Never     Social History     Tobacco Use   Smoking Status Never   Smokeless Tobacco Never     Social History     Substance and Sexual Activity   Drug Use Never       Family History:    non-contributory    Physical Exam:     Vitals:   Blood Pressure: 151/96 (12/10/23 0125)  Pulse: 81 (12/10/23 0125)  Temperature: 99.6 °F (37.6 °C) (12/10/23 0125)  Temp Source: Oral (12/10/23 0125)  Respirations: 20 (12/10/23 0125)  Height: 5' 9\" (175.3 cm) (12/10/23 0141)  Weight - Scale: 85.3 kg (188 lb) (12/10/23 0141)  SpO2: 94 % (12/10/23 0125)    Physical Exam  Vitals and nursing note reviewed.   Constitutional:       Appearance: He is well-developed.      Comments: Patient appears overweight   HENT:      Head: Normocephalic and atraumatic.   Neck:      Thyroid: No thyromegaly.      Vascular: No JVD.      Trachea: No tracheal deviation.   Cardiovascular:      Rate and Rhythm: Normal rate and regular rhythm.      Heart sounds: Normal heart sounds.   Pulmonary:      Effort: Pulmonary effort is normal. No respiratory distress.      Breath sounds: No wheezing or rales.      Comments: Breath sounds diminished bilateral, on room air, respiration easy  Abdominal:      General: Bowel sounds are normal.      Palpations: Abdomen is soft.      Tenderness: There is no abdominal tenderness. There is no guarding.   Musculoskeletal:      Cervical back: Neck supple.      Left lower leg: Edema present.      Comments: Left lower extremity edema, redness, increased warmth, tender, redness extends to below  left knee   Skin:     General: Skin is warm and dry.   Neurological:      General: No focal deficit present.      Mental Status: He is alert and oriented to person, place, and time.      Comments: Hard of hearing   Psychiatric:         Mood and Affect: Mood normal.        "  Judgment: Judgment normal.         Additional Data:     Lab Results: I have personally reviewed pertinent reports.      Results from last 7 days   Lab Units 12/09/23 2047   WBC Thousand/uL 18.98*   HEMOGLOBIN g/dL 13.0   HEMATOCRIT % 39.3   PLATELETS Thousands/uL 211   NEUTROS PCT % 84*   LYMPHS PCT % 6*   MONOS PCT % 8   EOS PCT % 1     Results from last 7 days   Lab Units 12/09/23 2047   POTASSIUM mmol/L 4.4   CHLORIDE mmol/L 100   CO2 mmol/L 32   BUN mg/dL 30*   CREATININE mg/dL 1.31*   CALCIUM mg/dL 8.7   ALK PHOS U/L 53   ALT U/L 11   AST U/L 13           Imaging: I have personally reviewed pertinent reports.      XR chest 1 view portable    Result Date: 11/29/2023  Narrative: CHEST INDICATION:   pneumonia? SOB, cough. COMPARISON:  None EXAM PERFORMED/VIEWS:  XR CHEST PORTABLE FINDINGS: Hiatal hernia noted. Cardiomediastinal silhouette appears unremarkable. The lungs are clear.  No pneumothorax or pleural effusion. Osseous structures appear within normal limits for patient age.     Impression: No acute cardiopulmonary disease. Stable appearance of hiatal hernia Workstation performed: [id number]       EKG, Pathology, and Other Studies Reviewed on Admission:   EKG: sr    Allscripts Records Reviewed: Yes     ** Please Note: Dragon 360 Dictation voice to text software may have been used in the creation of this document. **

## 2023-12-10 NOTE — PHYSICAL THERAPY NOTE
"       PHYSICAL THERAPY EVALUATION/TREATMENT     12/10/23 0908   PT Last Visit   PT Visit Date 12/10/23   Note Type   Note type Evaluation   Pain Assessment   Pain Assessment Tool 0-10   Pain Score No Pain   Pain Location/Orientation Orientation: Left;Location: Leg  (Pt reports his leg is \"sore\")   Restrictions/Precautions   Other Precautions Pain;Fall Risk;Airborne/isolation   Home Living   Type of Home House   Home Layout Two level;Performs ADLs on one level;Able to live on main level with bedroom/bathroom  (2 JASON)   Home Equipment Walker   Prior Function   Level of [place]   (ambulates with a walker, wife assists for ADLs)   Lives With Spouse   Receives Help From Family   Falls in the last 6 months 0   General   Additional Pertinent History Pt was recently admitted for COVID-19 not readmitted with L ZEESHAN cellulitis still isolated for COVID-19.   Family/Caregiver Present No   Cognition   Overall Cognitive Status WFL   Arousal/Participation Alert   Orientation Level Oriented X4   Subjective   Subjective \"I'd rather stay in bed\"   RLE Assessment   RLE Assessment   (ROM WFL, MMT 4+/5)   LLE Assessment   LLE Assessment   (ROM limited in ankle and knee by edema.  +erythema distal to knee, MMT 4-/5)   Bed Mobility   Supine to Sit 4  Minimal assistance   Sit to Supine 5  Supervision   Transfers   Sit to Stand 4  Minimal assistance   Stand to Sit 4  Minimal assistance   Stand pivot 4  Minimal assistance   Additional items   (with walker)   Ambulation/Elevation   Gait pattern   (flexed posture, slow jennifer)   Gait Assistance 4  Minimal assist   Assistive Device Rolling walker   Distance 50 feet in room   Balance   Static Sitting Good   Dynamic Sitting Fair   Static Standing Fair   Ambulatory Fair -   Activity Tolerance   Activity Tolerance Patient tolerated treatment well;Patient limited by fatigue   Assessment   Prognosis Good   Problem List Decreased strength;Decreased range of motion;Decreased endurance;Impaired " "balance;Decreased mobility;Pain;Decreased skin integrity   Assessment Patient is an 83 y.o. year old male seen for Physical Therapy evaluation. Patient admitted with Cellulitis of extremity.  Comorbidities affecting patient's physical performance include: L LE cellulitis, COVID-19, OP, OCD.  Personal factors affecting patient at time of initial evaluation include: ambulating with assistive device, stairs to enter home, and inability to navigate community distances. Prior to admission, patient was independent with functional mobility with walker and requiring assist for ADLS.  Please find objective findings from Physical Therapy assessment regarding body systems outlined above with impairments and limitations including weakness, decreased endurance, pain, decreased activity tolerance, decreased functional mobility tolerance, and fall risk.  The Barthel Index was used as a functional outcome tool presenting with a score of Barthel Index Score: 55 today indicating marked limitations of functional mobility and ADLS.  Patient's clinical presentation is currently unstable/unpredictable as seen in patient's presentation of changing level of pain, increased fall risk, new onset of impairment of functional mobility, decreased endurance, and new onset of weakness. Pt would benefit from continued Physical Therapy treatment to address deficits as defined above and maximize level of functional mobility. As demonstrated by objective findings, the assigned level of complexity for this evaluation is high.The patient's -[facility] Basic Mobility Inpatient Short Form Raw Score is 18. A Raw score of greater than 16 suggests the patient may benefit from discharge to home.   Goals   Patient Goals \"go home\"   STG Expiration Date 12/17/23   Short Term Goal #1 1.  Independent bed mobility   2.  Independent transfers 3  .  Modified independent ambulation with a rolling walker indoor level surfaces 50 feet   LTG Expiration Date 12/24/23   Long " "Term Goal #1 1.  Modified independent ambulation after level surfaces with a rolling walker 100 feet with a steady gait   2.  Supervision up-and-down 2 steps the patient can enter and exit his home   3.  Improve bilateral lower extremity strength by one half manual muscle test score to improve patient's function and activity tolerance   Plan   Treatment/Interventions LE strengthening/ROM;Functional transfer training;ADL retraining;Therapeutic exercise;Endurance training;Patient/family training;Bed mobility;Gait training   PT Frequency Other (Comment)  (5x/week)   Discharge Recommendation   Rehab Resource Intensity Level, PT No post-acute rehabilitation needs   AM-PAC Basic Mobility Inpatient   Turning in Flat Bed Without Bedrails 3   Lying on Back to Sitting on Edge of Flat Bed Without Bedrails 3   Moving Bed to Chair 3   Standing Up From Chair Using Arms 3   Walk in Room 3   Climb 3-5 Stairs With Railing 3   Basic Mobility Inpatient Raw Score 18   Basic Mobility Standardized Score 41.05   Highest Level Of Mobility   JH-HLM Goal 6: Walk 10 steps or more   JH-HLM Achieved 7: Walk 25 feet or more   Barthel Index   Feeding 10   Bathing 0   Grooming Score 0   Dressing Score 5   Bladder Score 10   Bowels Score 10   Toilet Use Score 5   Transfers (Bed/Chair) Score 10   Mobility (Level Surface) Score 0   Stairs Score 5   Barthel Index Score 55   Additional Treatment Session   Start Time 0855   End Time 0908   Treatment Assessment S:  \"you can walk me in the hallway if you want\" O:  Pt assisted to the bathroom/toilet with close superivison requiring sidestepping with walker to enter the bathroom.  Pt then asked to walk some more so pt did so with supervision and a walker x 50 feet in his room due to airborne precautions.  A:  Pt demonstrates improving quality of gait and endurance with progressive actiivty  P:  Continue to mobilize in the room progressing to the hallway when isolation restrictions lifted.   End of Consult "   Patient Position at End of Consult All needs within reach;Bed/Chair alarm activated;Supine   Licensure   [place] License Number  Yesenia Mcneil PT [id number]

## 2023-12-10 NOTE — OCCUPATIONAL THERAPY NOTE
Occupational Therapy Evaluation/Treatment     12/10/23 2515   Note Type   Note type Evaluation   Pain Assessment   Pain Assessment Tool 0-10   Pain Score No Pain   Restrictions/Precautions   Weight Bearing Precautions Per Order No   Other Precautions Contact/isolation;Airborne/isolation;Fall Risk;Hard of hearing   Home Living   Type of Home House   Home Layout Two level;Performs ADLs on one level;Able to live on main level with bedroom/bathroom  (has 2 JASON)   Bathroom Shower/Tub Tub/shower unit  (per pt he does sponge bath with assist from wife)   Bathroom Toilet Standard   Bathroom Equipment Toilet raiser;Shower chair   Bathroom Accessibility Accessible   Home Equipment Walker;Cane   Additional Comments Pt is a 83 y.o male who presented with worsening left lower extremity redness edema pain with reports of  lowe grade fever at home.   Prior Function   Level of [place] Independent with functional mobility;Needs assistance with ADLs;Needs assistance with IADLS  (wife assist with sponge bath)   Lives With Spouse   Receives Help From Family   Falls in the last 6 months 0   Vocational Retired   Comments Pt reports being indp with most BADLs except bathing (per pt wife assist him with sponge baths), indp with functional mobility using AD/RW. Pt reports he and wife shares driving tasks and some chores.   Subjective   Subjective go home   ADL   Eating Assistance 6  Modified independent   Grooming Assistance 5  Supervision/Setup   UB Bathing Assistance Unable to assess   UB Bathing Deficit   (pt limited by decreased strength and mobility- clinical judgment- will need min A to perform task)   LB Bathing Assistance Unable to assess  (pt limited by decreased strength and mobility- clinical judgment- will need min A to perform task)   UB Dressing Assistance 5  Supervision/Setup   LB Dressing Assistance 4  Minimal Assistance   LB Dressing Deficit Don/doff R sock;Don/doff L sock   Toileting Assistance  4  Minimal  Assistance   Toileting Deficit Clothing management up;Clothing management down;Supervison/safety   Bed Mobility   Rolling L 5  Supervision   Supine to Sit 4  Minimal assistance   Additional items Assist x 1;Verbal cues;LE management   Sit to Supine 5  Supervision   Additional items Verbal cues;LE management   Transfers   Toilet transfer 4  Minimal assistance   Additional items Assist x 1;Standard toilet;Verbal cues   Balance   Static Sitting Good   Dynamic Sitting Fair +   Static Standing Fair   Dynamic Standing Fair -   Activity Tolerance   Activity Tolerance Patient tolerated treatment well;Patient limited by fatigue   Nurse Made Aware [place] CARLISLE   RUKARISSA Assessment   RUE Assessment WFL   LUE Assessment   LUE Assessment WFL   Cognition   Overall Cognitive Status WFL   Arousal/Participation Alert;Responsive;Cooperative   Attention Within functional limits   Orientation Level Oriented X4   Following Commands Follows multistep commands without difficulty   Assessment   Limitation Decreased ADL status;Decreased UE strength;Decreased endurance;Decreased self-care trans;Decreased high-level ADLs   Prognosis Good   Assessment Patient evaluated by Occupational Therapy.  Patient admitted with Cellulitis of extremity.  The patients occupational profile, medical and therapy history includes a extensive additional review of physical, cognitive, or psychosocial history related to current functional performance.  Comorbidities affecting functional mobility and ADLS include:eft knee abscess, left femur ORIF, hypertension, CKD 3, BPH, PE, GERD, a flutter, left leg edema, valvular heart disease   Prior to admission, patient was independent with functional mobility with AD, requiring assist for ADLS, requiring assist for IADLS, ambulating household distance, retired, and home with family assist.  The evaluation identifies the following performance deficits: weakness, decreased endurance, decreased ADLS, decreased IADLS, decreased  activity tolerance, and decreased strength, that result in activity limitations and/or participation restrictions. This evaluation requires clinical decision making of high complexity, because the patient presents with comorbidites that affect occupational performance and required significant modification of tasks or assistance with consideration of multiple treatment options.  The Barthel Index was used as a functional outcome tool presenting with a score of Barthel Index Score: 55, indicating marked limitations of functional mobility and ADLS.  The patient's raw score on the -PAC Daily Activity Inpatient Short Form is 18. A raw score of less than 19 suggests the patient may benefit from discharge to home. Please refer to the recommendation of the Occupational Therapist for safe discharge planning. Patient will benefit from skilled Occupational Therapy services to address above deficits and facilitate a safe return to prior level of function.   Goals   Patient Goals go home   STG Time Frame   (1-7 days)   Short Term Goal  Goals established to promote Patient Goals: go home:  Eating: independent; Grooming: independent standing at sink; Bathing: min assist; Upper Body Dressing independent; Lower Body Dressing: supervision; Toileting: supervision; Patient will increase ambulatory standard toilet transfer to supervision with rolling walker to increase performance and safety with ADLS and functional mobility; Patient will increase standing tolerance to 5 minutes during ADL task to decrease assistance level and decrease fall risk; Patient will increase bed mobility to supervision in preparation for ADLS and transfers; Patient will increase functional mobility to and from bathroom with rolling walker with supervision to increase performance with ADLS and to use a toilet; Patient will tolerate 5 minutes of UE ROM/strengthening to increase general activity tolerance and performance in ADLS/IADLS; Patient will improve  functional activity tolerance to 5 minutes of sustained functional tasks to increase participation in basic self-care and decrease assistance level;  Patient will be able to to verbalize understanding and perform energy conservation/proper body mechanics during ADLS and functional mobility at least 75% of the time with minimal cueing to decrease signs of fatigue and increase stamina to return to prior level of function; Patient will increase  dynamic sitting balance to good to improve the ability to sit at edge of bed or on a chair for ADLS;  Patient will increase static standing balance to fair+ to improve postural stability and decrease fall risk during standing ADLS and transfers.   LTG Time Frame   (8-14 days)   Long Term Goal Bathing: supervision;  Lower Body Dressing: independent; Toileting: independent; Patient will increase ambulatory standard toilet transfer to independent with rolling walker to increase performance and safety with ADLS and functional mobility; Patient will increase standing tolerance to 10 minutes during ADL task to decrease assistance level and decrease fall risk; Patient will increase bed mobility to independent in preparation for ADLS and transfers; Patient will increase functional mobility to and from bathroom with rolling walker independently to increase performance with ADLS and to use a toilet; Patient will tolerate 10 minutes of UE ROM/strengthening to increase general activity tolerance and performance in ADLS/IADLS; Patient will improve functional activity tolerance to 10 minutes of sustained functional tasks to increase participation in basic self-care and decrease assistance level;  Patient will be able to to verbalize understanding and perform energy conservation/proper body mechanics during ADLS and functional mobility at least 90% of the time with no cueing to decrease signs of fatigue and increase stamina to return to prior level of function;  Patient will increase dynamic  "standing balance to fair to improve postural stability and decrease fall risk during standing ADLS and transfers.  Pt will score >/= 21/24 on AM-PAC Daily Activity Inpatient scale to promote safe independence with ADLs and functional mobility; Pt will score >/= 85/100 on Barthel Index in order to decrease caregiver assistance needed and increase ability to perform ADLs and functional mobility.   Plan   Treatment Interventions ADL retraining;UE strengthening/ROM;Endurance training;Energy conservation;Activityengagement   Goal Expiration Date 12/24/23   OT Frequency 3-5x/wk   Discharge Recommendation   Rehab Resource Intensity Level, OT III (Minimum Resource Intensity)   AM-PAC Daily Activity Inpatient   Lower Body Dressing 3   Bathing 2   Toileting 3   Upper Body Dressing 3   Grooming 3   Eating 4   Daily Activity Raw Score 18   Daily Activity Standardized Score (Calc for Raw Score >=11) 38.66   AM-PAC Applied Cognition Inpatient   Following a Speech/Presentation 4   Understanding Ordinary Conversation 4   Taking Medications 4   Remembering Where Things Are Placed or Put Away 4   Remembering List of 4-5 Errands 4   Taking Care of Complicated Tasks 4   Applied Cognition Raw Score 24   Applied Cognition Standardized Score 62.21   Barthel Index   Feeding 10   Bathing 0   Grooming Score 0   Dressing Score 5   Bladder Score 10   Bowels Score 10   Toilet Use Score 5   Transfers (Bed/Chair) Score 10   Mobility (Level Surface) Score 0   Stairs Score 5   Barthel Index Score 55   Additional Treatment Session   Start Time 1435   End Time 1445   Treatment Assessment S:\"I can get up and go to the bathroom\"  Pt received while in bed and agreeable to OT session.  L LE red and swollen but no reports of pain. Pt able to  victorina and doff socks with increased time.O: Patient ambulated short household distance to/from bathroom with RW and supervision; pt able to manage bringing walker into narrow doorway to bathroom by side stepping but " needed cues to stay  inside the walker and not outside walker  for safety and for optimal support. toilet hygiene completed with supervision; hand hygiene, while standing to sink supported independently; pt then requesting to do more walking in room. Pt  ambulated approximately 20 ft with RW and with S.  A: Patient with some discomfort to L LE but able to complete all ADLs and functional mobility without difficulty, at end of session patient supine in bed with all needs met P: Home w/continued family support.   End of Consult   Education Provided Yes   Patient Position at End of Consult Supine   Nurse Communication Nurse aware of consult   Licensure   [place] License Number  Mary Rose E. Blanes, MS, OTR/L [id number]

## 2023-12-10 NOTE — ASSESSMENT & PLAN NOTE
On metoprolol at home.  Will continue.  Not on AC.  EKG sinus rhythm  Continue metoprolol  Optimize electrolytes

## 2023-12-10 NOTE — ASSESSMENT & PLAN NOTE
Provoked PE after right femur surgery in 2019, took AC for a few months.  Had IVC filter inserted and subsequently removed.  Pharmacologic DVT prophylaxis

## 2023-12-10 NOTE — PLAN OF CARE
Problem: PAIN - ADULT  Goal: Verbalizes/displays adequate comfort level or baseline comfort level  Description: Interventions:  - Encourage patient to monitor pain and request assistance  - Assess pain using appropriate pain scale  - Administer analgesics based on type and severity of pain and evaluate response  - Implement non-pharmacological measures as appropriate and evaluate response  - Consider cultural and social influences on pain and pain management  - Notify physician/advanced practitioner if interventions unsuccessful or patient reports new pain  Outcome: Progressing     Problem: INFECTION - ADULT  Goal: Absence or prevention of progression during hospitalization  Description: INTERVENTIONS:  - Assess and monitor for signs and symptoms of infection  - Monitor lab/diagnostic results  - Monitor all insertion sites, i.e. indwelling lines, tubes, and drains  Problem: SAFETY ADULT  Goal: Patient will remain free of falls  Description: INTERVENTIONS:  - Educate patient/family on patient safety including physical limitations  - Instruct patient to call for assistance with activity   - Consult OT/PT to assist with strengthening/mobility   - Keep Call bell within reach  - Keep bed low and locked with side rails adjusted as appropriate  - Keep care items and personal belongings within reach  - Initiate and maintain comfort rounds  - Make Fall Risk Sign visible to staff  - Offer Toileting every 2 Hours, in advance of need  - Initiate/Maintain bed alarm  - Obtain necessary fall risk management equipment: bed alarm, yellow non-skid socks  - Apply yellow socks and bracelet for high fall risk patients  - Consider moving patient to room near nurses station  Outcome: Progressing  Goal: Maintain or return to baseline ADL function  Description: INTERVENTIONS:  -  Assess patient's ability to carry out ADLs; assess patient's baseline for ADL function and identify physical deficits which impact ability to perform ADLs  (bathing, care of mouth/teeth, toileting, grooming, dressing, etc.)  - Assess/evaluate cause of self-care deficits   - Assess range of motion  - Assess patient's mobility; develop plan if impaired  - Assess patient's need for assistive devices and provide as appropriate  - Encourage maximum independence but intervene and supervise when necessary  - Involve family in performance of ADLs  - Assess for home care needs following discharge   - Consider OT consult to assist with ADL evaluation and planning for discharge  - Provide patient education as appropriate  Outcome: Progressing  Goal: Maintains/Returns to pre admission functional level  Description: INTERVENTIONS:  - Perform AM-PAC 6 Click Basic Mobility/ Daily Activity assessment daily.  - Set and communicate daily mobility goal to care team and patient/family/caregiver.   - Collaborate with rehabilitation services on mobility goals if consulted  Problem: DISCHARGE PLANNING  Goal: Discharge to home or other facility with appropriate resources  Description: INTERVENTIONS:  - Identify barriers to discharge w/patient and caregiver  - Arrange for needed discharge resources and transportation as appropriate  - Identify discharge learning needs (meds, wound care, etc.)  - Arrange for interpretive services to assist at discharge as needed  - Refer to Case Management Department for coordinating discharge planning if the patient needs post-hospital services based on physician/advanced practitioner order or complex needs related to functional status, cognitive ability, or social support system  Outcome: Progressing     Problem: Knowledge Deficit  Goal: Patient/family/caregiver demonstrates understanding of disease process, treatment plan, medications, and discharge instructions  Description: Complete learning assessment and assess knowledge base.  Interventions:  - Provide teaching at level of understanding  - Provide teaching via preferred learning methods  Outcome:  Progressing     - Out of bed for toileting  - Record patient progress and toleration of activity level   Outcome: Progressing     - [place] appropriate cooling/warming therapies per order  - Administer medications as ordered  - Instruct and encourage patient and family to use good hand hygiene technique  - Identify and instruct in appropriate isolation precautions for identified infection/condition  Outcome: Progressing  Goal: Absence of fever/infection during neutropenic period  Description: INTERVENTIONS:  - Monitor WBC    Outcome: Progressing

## 2023-12-10 NOTE — ASSESSMENT & PLAN NOTE
Chronic left leg edema per daughter.  On Demadex 5mg p.o. twice a week at home.  Worsening left leg edema  per daughter  Continue Demadex  Check venous Doppler to rule out DVT

## 2023-12-10 NOTE — ASSESSMENT & PLAN NOTE
Patient presents with left lower extremity redness edema pain since Thursday this week, got worse today.  Reports low-grade fever at home.  Chart review.  Patient was hospitalized 11/29 - 12/2/2023 for COVID-19 and RSV with acute respiratory insufficiency.  Treated with remdesivir and Decadron, discharged on prednisone 40 mg p.o. daily for 4 days.  History of left femur fracture, underwent insertion of nail in June 2022, subsequently developed left distal femur abscess in September 2023.  Wound culture grew MSSA.  Patient underwent hardware removal, was treated with IV cefazolin  then Keflex.  Left lower extremity redness, edema, increased warmth, tender on exam.  Redness extends to below left knee.  WBC 18.98.  Partly could be due to steroids.  Patient does not meet sepsis criteria.  Given Rocephin in ED. continue high-dose Ancef  Check venous Doppler to rule out DVT  Continue leg elevation and pain control  Monitor clinical course and CBC

## 2023-12-10 NOTE — ASSESSMENT & PLAN NOTE
As above.  Symptoms improved a lot per daughter.  Occasional cough on exam.  On room air, satting adequately.

## 2023-12-11 ENCOUNTER — APPOINTMENT (INPATIENT)
Dept: RADIOLOGY | Facility: HOSPITAL | Age: 83
DRG: 570 | End: 2023-12-11
Payer: COMMERCIAL

## 2023-12-11 PROBLEM — E87.8 ELECTROLYTE ABNORMALITY: Status: ACTIVE | Noted: 2023-12-11

## 2023-12-11 LAB
ANION GAP SERPL CALCULATED.3IONS-SCNC: 5 MMOL/L
BASOPHILS # BLD AUTO: 0.02 THOUSANDS/ÂΜL (ref 0–0.1)
BASOPHILS NFR BLD AUTO: 0 % (ref 0–1)
BUN SERPL-MCNC: 19 MG/DL (ref 5–25)
CALCIUM SERPL-MCNC: 8.2 MG/DL (ref 8.4–10.2)
CHLORIDE SERPL-SCNC: 100 MMOL/L (ref 96–108)
CO2 SERPL-SCNC: 28 MMOL/L (ref 21–32)
CREAT SERPL-MCNC: 1.12 MG/DL (ref 0.6–1.3)
EOSINOPHIL # BLD AUTO: 0.19 THOUSAND/ÂΜL (ref 0–0.61)
EOSINOPHIL NFR BLD AUTO: 2 % (ref 0–6)
ERYTHROCYTE [DISTWIDTH] IN BLOOD BY AUTOMATED COUNT: 13.5 % (ref 11.6–15.1)
GFR SERPL CREATININE-BSD FRML MDRD: 60 ML/MIN/1.73SQ M
GLUCOSE SERPL-MCNC: 98 MG/DL (ref 65–140)
HCT VFR BLD AUTO: 34.9 % (ref 36.5–49.3)
HGB BLD-MCNC: 12 G/DL (ref 12–17)
IMM GRANULOCYTES # BLD AUTO: 0.07 THOUSAND/UL (ref 0–0.2)
IMM GRANULOCYTES NFR BLD AUTO: 1 % (ref 0–2)
LYMPHOCYTES # BLD AUTO: 1.2 THOUSANDS/ÂΜL (ref 0.6–4.47)
LYMPHOCYTES NFR BLD AUTO: 10 % (ref 14–44)
MAGNESIUM SERPL-MCNC: 1.8 MG/DL (ref 1.9–2.7)
MCH RBC QN AUTO: 33.1 PG (ref 26.8–34.3)
MCHC RBC AUTO-ENTMCNC: 34.4 G/DL (ref 31.4–37.4)
MCV RBC AUTO: 96 FL (ref 82–98)
MONOCYTES # BLD AUTO: 1.26 THOUSAND/ÂΜL (ref 0.17–1.22)
MONOCYTES NFR BLD AUTO: 10 % (ref 4–12)
NEUTROPHILS # BLD AUTO: 9.57 THOUSANDS/ÂΜL (ref 1.85–7.62)
NEUTS SEG NFR BLD AUTO: 77 % (ref 43–75)
NRBC BLD AUTO-RTO: 0 /100 WBCS
PLATELET # BLD AUTO: 191 THOUSANDS/UL (ref 149–390)
PMV BLD AUTO: 9.4 FL (ref 8.9–12.7)
POTASSIUM SERPL-SCNC: 4.4 MMOL/L (ref 3.5–5.3)
RBC # BLD AUTO: 3.63 MILLION/UL (ref 3.88–5.62)
SODIUM SERPL-SCNC: 133 MMOL/L (ref 135–147)
WBC # BLD AUTO: 12.31 THOUSAND/UL (ref 4.31–10.16)

## 2023-12-11 PROCEDURE — 99232 SBSQ HOSP IP/OBS MODERATE 35: CPT | Performed by: INTERNAL MEDICINE

## 2023-12-11 PROCEDURE — 83735 ASSAY OF MAGNESIUM: CPT | Performed by: INTERNAL MEDICINE

## 2023-12-11 PROCEDURE — 93971 EXTREMITY STUDY: CPT

## 2023-12-11 PROCEDURE — 93971 EXTREMITY STUDY: CPT | Performed by: SURGERY

## 2023-12-11 PROCEDURE — 85025 COMPLETE CBC W/AUTO DIFF WBC: CPT | Performed by: INTERNAL MEDICINE

## 2023-12-11 PROCEDURE — 80048 BASIC METABOLIC PNL TOTAL CA: CPT | Performed by: INTERNAL MEDICINE

## 2023-12-11 RX ORDER — MAGNESIUM SULFATE HEPTAHYDRATE 40 MG/ML
2 INJECTION, SOLUTION INTRAVENOUS ONCE
Status: COMPLETED | OUTPATIENT
Start: 2023-12-11 | End: 2023-12-11

## 2023-12-11 RX ADMIN — Medication 400 MG: at 10:14

## 2023-12-11 RX ADMIN — NIACIN 500 MG: 500 TABLET, EXTENDED RELEASE ORAL at 15:50

## 2023-12-11 RX ADMIN — TORSEMIDE 5 MG: 10 TABLET ORAL at 10:14

## 2023-12-11 RX ADMIN — Medication 1 TABLET: at 15:50

## 2023-12-11 RX ADMIN — METOPROLOL TARTRATE 50 MG: 50 TABLET, FILM COATED ORAL at 10:14

## 2023-12-11 RX ADMIN — CEFAZOLIN SODIUM 2000 MG: 2 SOLUTION INTRAVENOUS at 15:50

## 2023-12-11 RX ADMIN — B-COMPLEX W/ C & FOLIC ACID TAB 1 TABLET: TAB at 10:14

## 2023-12-11 RX ADMIN — CEFAZOLIN SODIUM 2000 MG: 2 SOLUTION INTRAVENOUS at 22:35

## 2023-12-11 RX ADMIN — FLUTICASONE PROPIONATE 1 SPRAY: 50 SPRAY, METERED NASAL at 10:15

## 2023-12-11 RX ADMIN — Medication 250 MG: at 17:21

## 2023-12-11 RX ADMIN — PANTOPRAZOLE SODIUM 40 MG: 40 TABLET, DELAYED RELEASE ORAL at 06:09

## 2023-12-11 RX ADMIN — TAMSULOSIN HYDROCHLORIDE 0.4 MG: 0.4 CAPSULE ORAL at 15:50

## 2023-12-11 RX ADMIN — ATORVASTATIN CALCIUM 5 MG: 10 TABLET, FILM COATED ORAL at 15:50

## 2023-12-11 RX ADMIN — CEFAZOLIN SODIUM 2000 MG: 2 SOLUTION INTRAVENOUS at 07:36

## 2023-12-11 RX ADMIN — POTASSIUM CHLORIDE 10 MEQ: 750 TABLET, EXTENDED RELEASE ORAL at 10:14

## 2023-12-11 RX ADMIN — Medication 250 MG: at 10:15

## 2023-12-11 RX ADMIN — LISINOPRIL 5 MG: 5 TABLET ORAL at 10:14

## 2023-12-11 RX ADMIN — ENOXAPARIN SODIUM 40 MG: 40 INJECTION SUBCUTANEOUS at 22:35

## 2023-12-11 RX ADMIN — Medication 400 MG: at 17:21

## 2023-12-11 RX ADMIN — CITALOPRAM HYDROBROMIDE 40 MG: 20 TABLET ORAL at 10:14

## 2023-12-11 RX ADMIN — MAGNESIUM SULFATE HEPTAHYDRATE 2 G: 40 INJECTION, SOLUTION INTRAVENOUS at 10:13

## 2023-12-11 RX ADMIN — Medication 1 TABLET: at 07:37

## 2023-12-11 RX ADMIN — METOPROLOL TARTRATE 50 MG: 50 TABLET, FILM COATED ORAL at 22:35

## 2023-12-11 NOTE — ASSESSMENT & PLAN NOTE
Baseline creatinine appears to be around 1.1-1.3s  Creatinine around baseline  Monitor  Results from last 7 days   Lab Units 12/11/23  0608 12/10/23  0543 12/09/23  2047   BUN mg/dL 19 26* 30*   CREATININE mg/dL 1.12 1.19 1.31*

## 2023-12-11 NOTE — ASSESSMENT & PLAN NOTE
As above.  Symptoms improved a lot per daughter.  Occasional cough on exam.  Patient has been stable on room air.  Patient tested positive for COVID-19 and RSV.  Supportive treatment  Removed off isolation as patient tested positive on 11/29/2023

## 2023-12-11 NOTE — ASSESSMENT & PLAN NOTE
Patient presents with left lower extremity redness edema pain since Thursday this week, got worse today.  Reports low-grade fever at home.  Chart review.  Patient was hospitalized 11/29 - 12/2/2023 for COVID-19 and RSV with acute respiratory insufficiency.  Treated with remdesivir and Decadron, discharged on prednisone 40 mg p.o. daily for 4 days.  History of left femur fracture, underwent insertion of nail in June 2022, subsequently developed left distal femur abscess in September 2023.  Wound culture grew MSSA.  Patient underwent hardware removal, was treated with IV cefazolin  then Keflex.  Left lower extremity redness, edema, increased warmth, tender on exam.  Redness extends to below left knee.  WBC 18.98.  Partly could be due to steroids.  Patient does not meet sepsis criteria.  Given Rocephin in ED. continue high-dose Ancef  Extremity venous Dopplers were negative for DVT.  Continue leg elevation and pain control  White count and cellulitis are improving

## 2023-12-11 NOTE — ASSESSMENT & PLAN NOTE
Chronic left leg edema per daughter.  On Demadex 5mg p.o. twice a week at home.  Worsening left leg edema  per daughter  Continue Demadex  Lower extremity venous Doppler showed no evidence of DVT

## 2023-12-11 NOTE — PLAN OF CARE
Problem: PAIN - ADULT  Goal: Verbalizes/displays adequate comfort level or baseline comfort level  Description: Interventions:  - Encourage patient to monitor pain and request assistance  - Assess pain using appropriate pain scale  - Administer analgesics based on type and severity of pain and evaluate response  - Implement non-pharmacological measures as appropriate and evaluate response  - Consider cultural and social influences on pain and pain management  - Notify physician/advanced practitioner if interventions unsuccessful or patient reports new pain  Outcome: Progressing     Problem: INFECTION - ADULT  Goal: Absence or prevention of progression during hospitalization  Description: INTERVENTIONS:  - Assess and monitor for signs and symptoms of infection  - Monitor lab/diagnostic results  - Monitor all insertion sites, i.e. indwelling lines, tubes, and drains  - Monitor endotracheal if appropriate and nasal secretions for changes in amount and color  - [place] appropriate cooling/warming therapies per order  - Administer medications as ordered  - Instruct and encourage patient and family to use good hand hygiene technique  - Identify and instruct in appropriate isolation precautions for identified infection/condition  Outcome: Progressing  Goal: Absence of fever/infection during neutropenic period  Description: INTERVENTIONS:  - Monitor WBC    Outcome: Progressing     Problem: SAFETY ADULT  Goal: Patient will remain free of falls  Description: INTERVENTIONS:  - Educate patient/family on patient safety including physical limitations  - Instruct patient to call for assistance with activity   - Consult OT/PT to assist with strengthening/mobility   - Keep Call bell within reach  - Keep bed low and locked with side rails adjusted as appropriate  - Keep care items and personal belongings within reach  - Initiate and maintain comfort rounds  - Make Fall Risk Sign visible to staff  - Offer Toileting every 2 Hours,  in advance of need  - Initiate/Maintain bed alarm  - Apply yellow socks and bracelet for high fall risk patients  - Consider moving patient to room near nurses station  Outcome: Progressing  Goal: Maintain or return to baseline ADL function  Description: INTERVENTIONS:  -  Assess patient's ability to carry out ADLs; assess patient's baseline for ADL function and identify physical deficits which impact ability to perform ADLs (bathing, care of mouth/teeth, toileting, grooming, dressing, etc.)  - Assess/evaluate cause of self-care deficits   - Assess range of motion  - Assess patient's mobility; develop plan if impaired  - Assess patient's need for assistive devices and provide as appropriate  - Encourage maximum independence but intervene and supervise when necessary  - Involve family in performance of ADLs  - Assess for home care needs following discharge   - Consider OT consult to assist with ADL evaluation and planning for discharge  - Provide patient education as appropriate  Outcome: Progressing  Goal: Maintains/Returns to pre admission functional level  Description: INTERVENTIONS:  - Perform AM-PAC 6 Click Basic Mobility/ Daily Activity assessment daily.  - Set and communicate daily mobility goal to care team and patient/family/caregiver.   - Collaborate with rehabilitation services on mobility goals if consulted    Problem: DISCHARGE PLANNING  Goal: Discharge to home or other facility with appropriate resources  Description: INTERVENTIONS:  - Identify barriers to discharge w/patient and caregiver  - Arrange for needed discharge resources and transportation as appropriate  - Identify discharge learning needs (meds, wound care, etc.)  - Arrange for interpretive services to assist at discharge as needed  - Refer to Case Management Department for coordinating discharge planning if the patient needs post-hospital services based on physician/advanced practitioner order or complex needs related to functional status,  cognitive ability, or social support system  Outcome: Progressing     Problem: Knowledge Deficit  Goal: Patient/family/caregiver demonstrates understanding of disease process, treatment plan, medications, and discharge instructions  Description: Complete learning assessment and assess knowledge base.  Interventions:  - Provide teaching at level of understanding  - Provide teaching via preferred learning methods  Outcome: Progressing   - Out of bed for toileting  - Record patient progress and toleration of activity level   Outcome: Progressing

## 2023-12-11 NOTE — PROGRESS NOTES
[facility]  Progress Note  Name: Taj Roblero I  MRN: [id number]  Unit/Bed#: 3 [place] 308-01 I Date of Admission: 12/9/2023   Date of Service: 12/11/2023 I Hospital Day: 2    Assessment/Plan   * Cellulitis of extremity  Assessment & Plan  Patient presents with left lower extremity redness edema pain since Thursday this week, got worse today.  Reports low-grade fever at home.  Chart review.  Patient was hospitalized 11/29 - 12/2/2023 for COVID-19 and RSV with acute respiratory insufficiency.  Treated with remdesivir and Decadron, discharged on prednisone 40 mg p.o. daily for 4 days.  History of left femur fracture, underwent insertion of nail in June 2022, subsequently developed left distal femur abscess in September 2023.  Wound culture grew MSSA.  Patient underwent hardware removal, was treated with IV cefazolin  then Keflex.  Left lower extremity redness, edema, increased warmth, tender on exam.  Redness extends to below left knee.  WBC 18.98.  Partly could be due to steroids.  Patient does not meet sepsis criteria.  Given Rocephin in ED. continue high-dose Ancef  Extremity venous Dopplers were negative for DVT.  Continue leg elevation and pain control  White count and cellulitis are improving    COVID-19  Assessment & Plan  As above.  Symptoms improved a lot per daughter.  Occasional cough on exam.  Patient has been stable on room air.  Patient tested positive for COVID-19 and RSV.  Supportive treatment  Removed off isolation as patient tested positive on 11/29/2023    Stage 3 chronic kidney disease, unspecified whether stage 3a or 3b CKD (HCC)  Assessment & Plan  Baseline creatinine appears to be around 1.1-1.3s  Creatinine around baseline  Monitor  Results from last 7 days   Lab Units 12/11/23  0608 12/10/23  0543 12/09/23  2047   BUN mg/dL 19 26* 30*   CREATININE mg/dL 1.12 1.19 1.31*          BPH (benign prostatic hyperplasia)  Assessment & Plan  Continue Flomax    Gastroesophageal  reflux disease without esophagitis  Assessment & Plan  Continue PPI    Leg edema, left  Assessment & Plan  Chronic left leg edema per daughter.  On Demadex 5mg p.o. twice a week at home.  Worsening left leg edema  per daughter  Continue Demadex  Lower extremity venous Doppler showed no evidence of DVT    Paroxysmal atrial flutter (HCC)  Assessment & Plan  on metoprolol at home which will be continued  Not on AC.  EKG sinus rhythm      Valvular heart disease  Assessment & Plan  2D echo in June last year showed normal EF around 60%, grade 1 diastolic dysfunction, mild to moderate TR, PA pressure 51, mild AR.  Continue ACE inhibitor  Patient follows cardiology as outpatient    History of pulmonary embolus (PE) 2019  Assessment & Plan  Provoked PE after right femur surgery in 2019, took AC for a few months.  Had IVC filter inserted and subsequently removed.  Pharmacologic DVT prophylaxis    Recurrent major depressive disorder, in full remission (HCC)  Assessment & Plan  Continue Celexa    Essential hypertension  Assessment & Plan  On fosinopril and metoprolol.  Will continue.  BP acceptable       Electrolyte abnormality-sodium 133 with a magnesium level of 1.8.  Patient was given 2 g of IV magnesium sulfate and was started on magnesium oxide 400 mg p.o. twice daily.  Follow BMP and magnesium levels in a.m.        VTE Pharmacologic Prophylaxis:   High Risk (Score >/= 5) - Pharmacological DVT Prophylaxis Ordered: enoxaparin (Lovenox). Sequential Compression Devices Ordered.    Mobility:   Basic Mobility Inpatient Raw Score: 18  JH-HLM Goal: 6: Walk 10 steps or more  JH-HLM Achieved: 6: Walk 10 steps or more  HLM Goal achieved. Continue to encourage appropriate mobility.    Patient Centered Rounds: I performed bedside rounds with nursing staff today.   Discussions with Specialists or Other Care Team Provider: No    Education and Discussions with Family / Patient: Updated  (daughter) via phone.    Total Time  Spent on Date of Encounter in care of patient: 35 mins. This time was spent on one or more of the following: performing physical exam; counseling and coordination of care; obtaining or reviewing history; documenting in the medical record; reviewing/ordering tests, medications or procedures; communicating with other healthcare professionals and discussing with patient's family/caregivers.    Current Length of Stay: 2 day(s)  Current Patient Status: Inpatient   Certification Statement: The patient will continue to require additional inpatient hospital stay due to cellulitis  Discharge Plan: Anticipate discharge in 24-48 hrs to home.    Code Status: Level 1 - Full Code    Subjective:   Patient still having some pain in the leg but has improved since admission.  Denies any chest pain, shortness of breath, abdominal pain    Objective:     Vitals:   Temp (24hrs), Av.3 °F (36.8 °C), Min:98.1 °F (36.7 °C), Max:98.6 °F (37 °C)    Temp:  [98.1 °F (36.7 °C)-98.6 °F (37 °C)] 98.3 °F (36.8 °C)  HR:  [79-88] 79  Resp:  [16-18] 18  BP: (151-174)/(66-70) 174/70  SpO2:  [91 %-94 %] 94 %  Body mass index is 27.76 kg/m².     Input and Output Summary (last 24 hours):     Intake/Output Summary (Last 24 hours) at 2023 1647  Last data filed at 2023 0608  Gross per 24 hour   Intake --   Output 500 ml   Net -500 ml       Physical Exam:   Physical Exam  Constitutional:       Appearance: Normal appearance.   HENT:      Head: Normocephalic and atraumatic.   Eyes:      Extraocular Movements: Extraocular movements intact.      Pupils: Pupils are equal, round, and reactive to light.   Cardiovascular:      Rate and Rhythm: Normal rate and regular rhythm.      Heart sounds: No murmur heard.     No gallop.   Pulmonary:      Effort: Pulmonary effort is normal.      Breath sounds: Normal breath sounds.   Abdominal:      General: Bowel sounds are normal.      Palpations: Abdomen is soft.      Tenderness: There is no abdominal  tenderness.   Musculoskeletal:         General: Swelling present. No deformity. Normal range of motion.      Cervical back: Normal range of motion and neck supple.      Left lower leg: Edema present.   Skin:     General: Skin is warm and dry.      Findings: Erythema present.      Comments: Left leg redness and warmth have improved since yesterday   Neurological:      General: No focal deficit present.      Mental Status: He is alert.          Additional Data:     Labs:  Results from last 7 days   Lab Units 12/11/23  0608   WBC Thousand/uL 12.31*   HEMOGLOBIN g/dL 12.0   HEMATOCRIT % 34.9*   PLATELETS Thousands/uL 191   NEUTROS PCT % 77*   LYMPHS PCT % 10*   MONOS PCT % 10   EOS PCT % 2     Results from last 7 days   Lab Units 12/11/23  0608 12/10/23  0543 12/09/23  2047   SODIUM mmol/L 133*   < > 135   POTASSIUM mmol/L 4.4   < > 4.4   CHLORIDE mmol/L 100   < > 100   CO2 mmol/L 28   < > 32   BUN mg/dL 19   < > 30*   CREATININE mg/dL 1.12   < > 1.31*   ANION GAP mmol/L 5   < > 3   CALCIUM mg/dL 8.2*   < > 8.7   ALBUMIN g/dL  --   --  3.2*   TOTAL BILIRUBIN mg/dL  --   --  0.84   ALK PHOS U/L  --   --  53   ALT U/L  --   --  11   AST U/L  --   --  13   GLUCOSE RANDOM mg/dL 98   < > 117    < > = values in this interval not displayed.                       Lines/Drains:  Invasive Devices       Peripheral Intravenous Line  Duration             Peripheral IV 12/09/23 Right Antecubital 1 day                          Imaging: Reviewed radiology reports from this admission including: Venous Dopplers    Recent Cultures (last 7 days):         Last 24 Hours Medication List:   Current Facility-Administered Medications   Medication Dose Route Frequency Provider Last Rate    acetaminophen  650 mg Oral Q6H PRN BERNADETTE Marie      atorvastatin  5 mg Oral Daily With Dinner BERNADETTE Marie      calcium carbonate-vitamin D  1 tablet Oral BID With Meals BERNADETTE Marie      cefazolin  2,000 mg Intravenous Q8H BERNADETTE Marie  2,000 mg (12/11/23 1550)    citalopram  40 mg Oral QAM [facility], CR[facility]      enoxaparin  40 mg Subcutaneous HS [facility], [facility]      fluticasone  1 spray Each Nare Daily [facility], CRNP      ipratropium  2 spray Nasal BID [facility], CRNP      lisinopril  5 mg Oral Daily [facility], CRNP      magnesium Oxide  400 mg Oral BID Ashok Stewart MD      metoprolol tartrate  50 mg Oral Q12H [facility], CRNP      multivitamin stress formula  1 tablet Oral Daily [facility], CRNP      niacin  500 mg Oral Daily With Dinner [facility], CRNP      ondansetron  4 mg Intravenous Q6H PRN [facility], CRNP      pantoprazole  40 mg Oral Early Morning [facility], CRNP      potassium chloride  10 mEq Oral Once per day on Mon Thu [facility], CRNP      saccharomyces boulardii  250 mg Oral BID [facility], CRNP      tamsulosin  0.4 mg Oral Daily With Dinner [facility], CRNP      torsemide  5 mg Oral Once per day on Mon Thu [facility], CRNP          Today, Patient Was Seen By: Ashok Stewart MD    **Please Note: This note may have been constructed using a voice recognition system.**

## 2023-12-11 NOTE — PLAN OF CARE
Problem: PAIN - ADULT  Goal: Verbalizes/displays adequate comfort level or baseline comfort level  Description: Interventions:  - Encourage patient to monitor pain and request assistance  - Assess pain using appropriate pain scale  - Administer analgesics based on type and severity of pain and evaluate response  - Implement non-pharmacological measures as appropriate and evaluate response  - Consider cultural and social influences on pain and pain management  - Notify physician/advanced practitioner if interventions unsuccessful or patient reports new pain  Outcome: Progressing     Problem: INFECTION - ADULT  Goal: Absence or prevention of progression during hospitalization  Description: INTERVENTIONS:  - Assess and monitor for signs and symptoms of infection  - Monitor lab/diagnostic results  - Monitor all insertion sites, i.e. indwelling lines, tubes, and drains  - Monitor endotracheal if appropriate and nasal secretions for changes in amount and color  - [place] appropriate cooling/warming therapies per order  - Administer medications as ordered  - Instruct and encourage patient and family to use good hand hygiene technique  - Identify and instruct in appropriate isolation precautions for identified infection/condition  Outcome: Progressing  Goal: Absence of fever/infection during neutropenic period  Description: INTERVENTIONS:  - Monitor WBC    Outcome: Progressing     Problem: SAFETY ADULT  Goal: Patient will remain free of falls  Description: INTERVENTIONS:  - Educate patient/family on patient safety including physical limitations  - Instruct patient to call for assistance with activity   - Consult OT/PT to assist with strengthening/mobility   - Keep Call bell within reach  - Keep bed low and locked with side rails adjusted as appropriate  - Keep care items and personal belongings within reach  - Initiate and maintain comfort rounds  - Make Fall Risk Sign visible to staff  - Offer Toileting every 2 Hours,  in advance of need  - Initiate/Maintain bed alarm  - Obtain necessary fall risk management equipment: alarms  - Apply yellow socks and bracelet for high fall risk patients  - Consider moving patient to room near nurses station  Outcome: Progressing  Goal: Maintain or return to baseline ADL function  Description: INTERVENTIONS:  -  Assess patient's ability to carry out ADLs; assess patient's baseline for ADL function and identify physical deficits which impact ability to perform ADLs (bathing, care of mouth/teeth, toileting, grooming, dressing, etc.)  - Assess/evaluate cause of self-care deficits   - Assess range of motion  - Assess patient's mobility; develop plan if impaired  - Assess patient's need for assistive devices and provide as appropriate  - Encourage maximum independence but intervene and supervise when necessary  - Involve family in performance of ADLs  - Assess for home care needs following discharge   - Consider OT consult to assist with ADL evaluation and planning for discharge  - Provide patient education as appropriate  Outcome: Progressing  Goal: Maintains/Returns to pre admission functional level  Description: INTERVENTIONS:  - Perform AM-PAC 6 Click Basic Mobility/ Daily Activity assessment daily.  - Set and communicate daily mobility goal to care team and patient/family/caregiver.   - Collaborate with rehabilitation services on mobility goals if consulted  - Perform Range of Motion 3 times a day.  - Reposition patient every 2 hours.  - Dangle patient 3 times a day  - Stand patient 3 times a day  - Ambulate patient 3 times a day  - Out of bed to chair 3 times a day   - Out of bed for meals 3 times a day  - Out of bed for toileting  - Record patient progress and toleration of activity level   Outcome: Progressing     Problem: DISCHARGE PLANNING  Goal: Discharge to home or other facility with appropriate resources  Description: INTERVENTIONS:  - Identify barriers to discharge w/patient and  caregiver  - Arrange for needed discharge resources and transportation as appropriate  - Identify discharge learning needs (meds, wound care, etc.)  - Arrange for interpretive services to assist at discharge as needed  - Refer to Case Management Department for coordinating discharge planning if the patient needs post-hospital services based on physician/advanced practitioner order or complex needs related to functional status, cognitive ability, or social support system  Outcome: Progressing     Problem: Knowledge Deficit  Goal: Patient/family/caregiver demonstrates understanding of disease process, treatment plan, medications, and discharge instructions  Description: Complete learning assessment and assess knowledge base.  Interventions:  - Provide teaching at level of understanding  - Provide teaching via preferred learning methods  Outcome: Progressing

## 2023-12-11 NOTE — UTILIZATION REVIEW
Initial Clinical Review    Admission: Date/Time/Statement:   Admission Orders (From admission, onward)       Ordered        12/09/23 2120  INPATIENT ADMISSION  Once                          Orders Placed This Encounter   Procedures    INPATIENT ADMISSION     Standing Status:   Standing     Number of Occurrences:   1     Order Specific Question:   Level of Care     Answer:   Med Surg [16]     Order Specific Question:   Estimated length of stay     Answer:   More than 2 Midnights     Order Specific Question:   Certification     Answer:   I certify that inpatient services are medically necessary for this patient for a duration of greater than two midnights. See H&P and MD Progress Notes for additional information about the patient's course of treatment.     ED Arrival Information       Expected   12/9/2023     Arrival   12/9/2023 18:56    Acuity   Urgent              Means of arrival   Walk-In    Escorted by   Family Member    Service   Hospitalist    Admission type   Emergency              Arrival complaint   leg swelling/ pain             Chief Complaint   Patient presents with    Leg Swelling     L leg swelling and redness for a couple of days, discharged from here a week ago, had covid and rsv at that time       Initial Presentation: 83 y.o. male , presented to the ED @ [facility], from home via walk in w family member.   Admitted as Inpatient due to Cellulitis of LLE.    PMH of left knee abscess, left femur ORIF, hypertension, CKD 3, BPH, PE, GERD, a flutter, left leg edema, valvular heart disease   Date: 12/09/2023   Presents with left lower extremity redness edema pain since Thursday this week, got worse today.  Reports low-grade fever at home.  + fever.  + leg swelling.  Patient was hospitalized 11/29 - 12/2/2023 for COVID-19 and RSV with acute respiratory insufficiency.  Treated with remdesivir and Decadron, discharged on prednisone 40 mg p.o. daily for 4 days.  History of left femur fracture, underwent  "insertion of nail in June 2022, subsequently developed left distal femur abscess in September 2023.  Wound culture grew MSSA.  Patient underwent hardware removal, was treated with IV cefazolin  then Keflex.  Left lower extremity redness, edema, increased warmth, tender on exam.  Redness extends to below left knee.  WBC 18.98.  Partly could be due to steroids.  Patient does not meet sepsis criteria.  Given Rocephin in ED.  Will change to high-dose Ancef.  Check venous Doppler to rule out DVT.  Leg elevation.  Pain control.  Repeat CBC with differential in the morning.    Day 2: 12/10/2023 Continue leg elevation and pain control.  PT/OT.  Monitor clinical course.  Trend CBC.  Continue Demadex.  Continue IV Abx.      Date: 12/11/2023.    Hospital Day 3: Has surpassed a 2nd midnight with active treatments and services, which include:  BP (!) 174/70 (BP Location: Right arm)   Pulse 79   Temp 98.3 °F (36.8 °C) (Oral)   Resp 18   Ht 5' 9\" (1.753 m)   Wt 85.3 kg (188 lb)   SpO2 94%   BMI 27.76 kg/m²    Continue IV Abx.  Lovenox.  Continue demadex.  Analgesia control.  PT/OT.  Trend CBC.        ED Triage Vitals [12/09/23 1907]   Temperature Pulse Respirations Blood Pressure SpO2   99.5 °F (37.5 °C) 74 20 152/67 95 %      Temp Source Heart Rate Source Patient Position - Orthostatic VS BP Location FiO2 (%)   Tympanic Monitor Sitting Left arm --      Pain Score       2          Wt Readings from Last 1 Encounters:   12/10/23 85.3 kg (188 lb)     Additional Vital Signs:   12/10/23 2327 98.1 °F (36.7 °C) 84 18 163/70 101 92 % None (Room air) Sitting   12/10/23 2152 -- 86 -- 155/68 -- -- -- --   12/10/23 1939 98.6 °F (37 °C) 88 16 151/66 -- 91 % None (Room air) Lying   12/10/23 1606 99.2 °F (37.3 °C) 76 18 160/67 97 92 % None (Room air) Lying   12/10/23 0738 99.5 °F (37.5 °C) 89 18 158/70 101 97 % None (Room air) --   12/10/23 0125 99.6 °F (37.6 °C) 81 20 151/96 99 94 % None (Room air) Sitting   12/10/23 0015 -- 78 21 129/60 " 86 99 % -- --   12/09/23 2345 -- 74 19 119/58 83 99 % -- --   12/09/23 2330 -- 76 20 129/60 87 100 % -- --   12/09/23 2215 -- 74 19 152/67 97 99 % -- --   12/09/23 2145 -- 78 21 155/67 96 99 % None (Room air) Lying         Pertinent Labs/Diagnostic Test Results:   XR chest 1 view portable   Final Result by Bonnie Love MD (12/10 1313)   Mild pulmonary venous congestion.      Large hiatal hernia.      VAS lower limb venous duplex study, unilateral/limited    (Results Pending)     Results from last 7 days   Lab Units 12/11/23  0608 12/10/23  0543 12/09/23 2047   WBC Thousand/uL 12.31* 17.03* 18.98*   HEMOGLOBIN g/dL 12.0 12.0 13.0   HEMATOCRIT % 34.9* 36.3* 39.3   PLATELETS Thousands/uL 191 180 211   NEUTROS ABS Thousands/µL 9.57* 13.70* 16.12*     Results from last 7 days   Lab Units 12/11/23  0608 12/10/23  0543 12/09/23 2047   SODIUM mmol/L 133* 135 135   POTASSIUM mmol/L 4.4 4.1 4.4   CHLORIDE mmol/L 100 101 100   CO2 mmol/L 28 30 32   ANION GAP mmol/L 5 4 3   BUN mg/dL 19 26* 30*   CREATININE mg/dL 1.12 1.19 1.31*   EGFR ml/min/1.73sq m 60 56 49   CALCIUM mg/dL 8.2* 8.4 8.7   MAGNESIUM mg/dL 1.8* 1.8*  --      Results from last 7 days   Lab Units 12/09/23  2047   AST U/L 13   ALT U/L 11   ALK PHOS U/L 53   TOTAL PROTEIN g/dL 6.1*   ALBUMIN g/dL 3.2*   TOTAL BILIRUBIN mg/dL 0.84     Results from last 7 days   Lab Units 12/11/23  0608 12/10/23  0543 12/09/23 2047   GLUCOSE RANDOM mg/dL 98 101 117     Results from last 7 days   Lab Units 12/10/23  0143 12/10/23  0026 12/09/23 2047   HS TNI 0HR ng/L  --   --  6   HS TNI 2HR ng/L  --  6  --    HSTNI D2 ng/L  --  0  --    HS TNI 4HR ng/L 5  --   --    HSTNI D4 ng/L -1  --   --      Results from last 7 days   Lab Units 12/09/23  2047   BNP pg/mL 103*     Results from last 7 days   Lab Units 12/10/23  0026   CLARITY UA  Clear   COLOR UA  Sylvia   SPEC GRAV UA  1.025   PH UA  5.5   GLUCOSE UA mg/dl Negative   KETONES UA mg/dl Negative   BLOOD UA  Negative    PROTEIN UA mg/dl 30 (1+)*   NITRITE UA  Negative   BILIRUBIN UA  Negative   UROBILINOGEN UA E.U./dl 0.2   LEUKOCYTES UA  Negative   WBC UA /hpf 1-2   RBC UA /hpf None Seen   BACTERIA UA /hpf Occasional   EPITHELIAL CELLS WET PREP /hpf None Seen   MUCUS THREADS  Moderate*     ED Treatment:   Medication Administration from 12/09/2023 1839 to 12/10/2023 0116         Date/Time Order Dose Route Action     12/09/2023 2129 EST cefTRIAXone (ROCEPHIN) IVPB (premix in dextrose) 1,000 mg 50 mL 1,000 mg Intravenous New Bag     12/10/2023 0027 EST enoxaparin (LOVENOX) subcutaneous injection 40 mg 40 mg Subcutaneous Given     12/10/2023 0028 EST ceFAZolin (ANCEF) IVPB (premix in dextrose) 2,000 mg 50 mL 2,000 mg Intravenous New Bag          Past Medical History:   Diagnosis Date    BPH (benign prostatic hyperplasia)     Depression     HTN (hypertension)     Hyperlipidemia     OCD (obsessive compulsive disorder)     Pulmonary embolism (HCC) 2019     Present on Admission:   Stage 3 chronic kidney disease, unspecified whether stage 3a or 3b CKD (HCC)   Recurrent major depressive disorder, in full remission (HCC)   History of pulmonary embolus (PE) 2019   Gastroesophageal reflux disease without esophagitis   Essential hypertension   BPH (benign prostatic hyperplasia)   Paroxysmal atrial flutter (HCC)   Valvular heart disease   COVID-19   Leg edema, left      Admitting Diagnosis: Cellulitis of left lower extremity [L03.116]  Age/Sex: 83 y.o. male  Admission Orders:  CV diet  Up & OOB as tolerated  Obtain VAS Lower Limb left  PT/OT  Elevate LLE.      Scheduled Medications:  atorvastatin, 5 mg, Oral, Daily With Dinner  calcium carbonate-vitamin D, 1 tablet, Oral, BID With Meals  cefazolin, 2,000 mg, Intravenous, Q8H  citalopram, 40 mg, Oral, QAM  enoxaparin, 40 mg, Subcutaneous, HS  fluticasone, 1 spray, Each Nare, Daily  ipratropium, 2 spray, Nasal, BID  lisinopril, 5 mg, Oral, Daily  magnesium Oxide, 400 mg, Oral, BID  magnesium  sulfate, 2 g, Intravenous, Once  metoprolol tartrate, 50 mg, Oral, Q12H  multivitamin stress formula, 1 tablet, Oral, Daily  niacin, 500 mg, Oral, Daily With Dinner  pantoprazole, 40 mg, Oral, Early Morning  potassium chloride, 10 mEq, Oral, Once per day on Mon Thu  saccharomyces boulardii, 250 mg, Oral, BID  tamsulosin, 0.4 mg, Oral, Daily With Dinner  torsemide, 5 mg, Oral, Once per day on Mon Thu      Continuous IV Infusions:     PRN Meds:  acetaminophen, 650 mg, Oral, Q6H PRN  ondansetron, 4 mg, Intravenous, Q6H PRN            Network Utilization Review Department  ATTENTION: Please call with any questions or concerns to [phone] and carefully listen to the prompts so that you are directed to the right person. All voicemails are confidential.   For Discharge needs, contact Care Management DC Support Team at [phone] opt. [phone]  Send all requests for admission clinical reviews, approved or denied determinations and any other requests to dedicated fax number below belonging to the [facility] where the patient is receiving treatment. List of dedicated fax numbers for the Facilities:  FACILITY NAME UR FAX NUMBER   ADMISSION DENIALS (Administrative/Medical Necessity) [phone]   DISCHARGE SUPPORT TEAM (NETWORK) [phone]   PARENT CHILD HEALTH (Maternity/NICU/Pediatrics) [phone]   [facility] [phone]   [facility] [phone]   [facility] [phone]   [facility] [phone]   [facility] [phone]   [facility] [phone]   General acute hospital [phone]   [facility] [phone]   [facility] [phone]   [facility] [phone]   [facility] [phone]

## 2023-12-12 LAB
ANION GAP SERPL CALCULATED.3IONS-SCNC: 6 MMOL/L
ANISOCYTOSIS BLD QL SMEAR: PRESENT
BASOPHILS # BLD MANUAL: 0 THOUSAND/UL (ref 0–0.1)
BASOPHILS NFR MAR MANUAL: 0 % (ref 0–1)
BUN SERPL-MCNC: 16 MG/DL (ref 5–25)
CALCIUM SERPL-MCNC: 8.1 MG/DL (ref 8.4–10.2)
CHLORIDE SERPL-SCNC: 98 MMOL/L (ref 96–108)
CO2 SERPL-SCNC: 27 MMOL/L (ref 21–32)
CREAT SERPL-MCNC: 1.05 MG/DL (ref 0.6–1.3)
EOSINOPHIL # BLD MANUAL: 0.24 THOUSAND/UL (ref 0–0.4)
EOSINOPHIL NFR BLD MANUAL: 2 % (ref 0–6)
ERYTHROCYTE [DISTWIDTH] IN BLOOD BY AUTOMATED COUNT: 13.4 % (ref 11.6–15.1)
GFR SERPL CREATININE-BSD FRML MDRD: 65 ML/MIN/1.73SQ M
GLUCOSE SERPL-MCNC: 95 MG/DL (ref 65–140)
HCT VFR BLD AUTO: 36.7 % (ref 36.5–49.3)
HGB BLD-MCNC: 11.8 G/DL (ref 12–17)
LG PLATELETS BLD QL SMEAR: PRESENT
LYMPHOCYTES # BLD AUTO: 17 % (ref 14–44)
LYMPHOCYTES # BLD AUTO: 2.19 THOUSAND/UL (ref 0.6–4.47)
MAGNESIUM SERPL-MCNC: 1.8 MG/DL (ref 1.9–2.7)
MCH RBC QN AUTO: 31.5 PG (ref 26.8–34.3)
MCHC RBC AUTO-ENTMCNC: 32.2 G/DL (ref 31.4–37.4)
MCV RBC AUTO: 98 FL (ref 82–98)
MONOCYTES # BLD AUTO: 1.1 THOUSAND/UL (ref 0–1.22)
MONOCYTES NFR BLD: 9 % (ref 4–12)
NEUTROPHILS # BLD MANUAL: 8.65 THOUSAND/UL (ref 1.85–7.62)
NEUTS BAND NFR BLD MANUAL: 4 % (ref 0–8)
NEUTS SEG NFR BLD AUTO: 67 % (ref 43–75)
PLATELET # BLD AUTO: 195 THOUSANDS/UL (ref 149–390)
PLATELET BLD QL SMEAR: ADEQUATE
PMV BLD AUTO: 9.5 FL (ref 8.9–12.7)
POTASSIUM SERPL-SCNC: 4.6 MMOL/L (ref 3.5–5.3)
RBC # BLD AUTO: 3.75 MILLION/UL (ref 3.88–5.62)
RBC MORPH BLD: PRESENT
SODIUM SERPL-SCNC: 131 MMOL/L (ref 135–147)
VARIANT LYMPHS # BLD AUTO: 1 %
WBC # BLD AUTO: 12.18 THOUSAND/UL (ref 4.31–10.16)

## 2023-12-12 PROCEDURE — 97535 SELF CARE MNGMENT TRAINING: CPT

## 2023-12-12 PROCEDURE — 80048 BASIC METABOLIC PNL TOTAL CA: CPT | Performed by: INTERNAL MEDICINE

## 2023-12-12 PROCEDURE — 85027 COMPLETE CBC AUTOMATED: CPT | Performed by: INTERNAL MEDICINE

## 2023-12-12 PROCEDURE — 85007 BL SMEAR W/DIFF WBC COUNT: CPT | Performed by: INTERNAL MEDICINE

## 2023-12-12 PROCEDURE — 83735 ASSAY OF MAGNESIUM: CPT | Performed by: INTERNAL MEDICINE

## 2023-12-12 PROCEDURE — 99232 SBSQ HOSP IP/OBS MODERATE 35: CPT | Performed by: INTERNAL MEDICINE

## 2023-12-12 RX ADMIN — B-COMPLEX W/ C & FOLIC ACID TAB 1 TABLET: TAB at 08:01

## 2023-12-12 RX ADMIN — NIACIN 500 MG: 500 TABLET, EXTENDED RELEASE ORAL at 17:41

## 2023-12-12 RX ADMIN — Medication 250 MG: at 17:41

## 2023-12-12 RX ADMIN — METOPROLOL TARTRATE 50 MG: 50 TABLET, FILM COATED ORAL at 21:57

## 2023-12-12 RX ADMIN — ATORVASTATIN CALCIUM 5 MG: 10 TABLET, FILM COATED ORAL at 17:41

## 2023-12-12 RX ADMIN — METOPROLOL TARTRATE 50 MG: 50 TABLET, FILM COATED ORAL at 08:02

## 2023-12-12 RX ADMIN — Medication 1 TABLET: at 08:01

## 2023-12-12 RX ADMIN — FLUTICASONE PROPIONATE 1 SPRAY: 50 SPRAY, METERED NASAL at 08:07

## 2023-12-12 RX ADMIN — Medication 400 MG: at 17:41

## 2023-12-12 RX ADMIN — PANTOPRAZOLE SODIUM 40 MG: 40 TABLET, DELAYED RELEASE ORAL at 05:05

## 2023-12-12 RX ADMIN — ENOXAPARIN SODIUM 40 MG: 40 INJECTION SUBCUTANEOUS at 21:57

## 2023-12-12 RX ADMIN — LISINOPRIL 5 MG: 5 TABLET ORAL at 08:01

## 2023-12-12 RX ADMIN — Medication 250 MG: at 08:01

## 2023-12-12 RX ADMIN — TAMSULOSIN HYDROCHLORIDE 0.4 MG: 0.4 CAPSULE ORAL at 17:40

## 2023-12-12 RX ADMIN — Medication 1 TABLET: at 17:41

## 2023-12-12 RX ADMIN — CEFAZOLIN SODIUM 2000 MG: 2 SOLUTION INTRAVENOUS at 15:50

## 2023-12-12 RX ADMIN — Medication 400 MG: at 08:02

## 2023-12-12 RX ADMIN — CEFAZOLIN SODIUM 2000 MG: 2 SOLUTION INTRAVENOUS at 07:25

## 2023-12-12 RX ADMIN — CITALOPRAM HYDROBROMIDE 40 MG: 20 TABLET ORAL at 08:01

## 2023-12-12 NOTE — ASSESSMENT & PLAN NOTE
Baseline creatinine appears to be around 1.1-1.3s  Creatinine around baseline  Monitor  Results from last 7 days   Lab Units 12/12/23  0513 12/11/23  0608 12/10/23  0543 12/09/23  2047   BUN mg/dL 16 19 26* 30*   CREATININE mg/dL 1.05 1.12 1.19 1.31*

## 2023-12-12 NOTE — PLAN OF CARE
Problem: INFECTION - ADULT  Goal: Absence or prevention of progression during hospitalization  Description: INTERVENTIONS:  - Assess and monitor for signs and symptoms of infection  - Monitor lab/diagnostic results  - Monitor all insertion sites, i.e. indwelling lines, tubes, and drains  - Monitor endotracheal if appropriate and nasal secretions for changes in amount and color  - [place] appropriate cooling/warming therapies per order  - Administer medications as ordered  - Instruct and encourage patient and family to use good hand hygiene technique  - Identify and instruct in appropriate isolation precautions for identified infection/condition  Outcome: Progressing  Goal: Absence of fever/infection during neutropenic period  Description: INTERVENTIONS:  - Monitor WBC    Outcome: Progressing

## 2023-12-12 NOTE — PROGRESS NOTES
[facility]  Progress Note  Name: Taj Roblero I  MRN: [id number]  Unit/Bed#: 3 [place] 308-01 I Date of Admission: 12/9/2023   Date of Service: 12/12/2023 I Hospital Day: 3    Assessment/Plan   * Cellulitis of extremity  Assessment & Plan  Patient presents with left lower extremity redness edema pain since Thursday this week, got worse today.  Reports low-grade fever at home.  Chart review.  Patient was hospitalized 11/29 - 12/2/2023 for COVID-19 and RSV with acute respiratory insufficiency.  Treated with remdesivir and Decadron, discharged on prednisone 40 mg p.o. daily for 4 days.  History of left femur fracture, underwent insertion of nail in June 2022, subsequently developed left distal femur abscess in September 2023.  Wound culture grew MSSA.  Patient underwent hardware removal, was treated with IV cefazolin  then Keflex.  Left lower extremity redness, edema, increased warmth, tender on exam.  Redness extends to below left knee.  On admission WBC 18.98.  Partly could be due to steroids.  Patient does not meet sepsis criteria.  Slowly improving.  Currently does not appear to have deeper infection  Afebrile, leukocytosis downtrending  Given Rocephin in ED. continue high-dose Ancef  Extremity venous Dopplers were negative for DVT.  Continue leg elevation and pain control   consider imaging if no improvement    Electrolyte abnormality  Assessment & Plan  Sodium 133 with a magnesium level of 1.8  Continue magnesium oxide 400 mg p.o. twice daily     Stage 3 chronic kidney disease, unspecified whether stage 3a or 3b CKD ([facility])  Assessment & Plan  Baseline creatinine appears to be around 1.1-1.3s  Creatinine around baseline  Monitor  Results from last 7 days   Lab Units 12/12/23  0513 12/11/23  0608 12/10/23  0543 12/09/23  2047   BUN mg/dL 16 19 26* 30*   CREATININE mg/dL 1.05 1.12 1.19 1.31*          COVID-19  Assessment & Plan  As above.  Symptoms improved a lot per daughter.  Occasional cough  on exam.  Patient has been stable on room air.  Patient tested positive for COVID-19 and RSV.  Supportive treatment  Removed off isolation as patient tested positive on 11/29/2023    Paroxysmal atrial flutter (HCC)  Assessment & Plan  on metoprolol at home which will be continued  Not on AC.  EKG sinus rhythm      Valvular heart disease  Assessment & Plan  2D echo in June last year showed normal EF around 60%, grade 1 diastolic dysfunction, mild to moderate TR, PA pressure 51, mild AR.  Continue ACE inhibitor  Patient follows cardiology as outpatient    History of pulmonary embolus (PE) 2019  Assessment & Plan  Provoked PE after right femur surgery in 2019, took AC for a few months.  Had IVC filter inserted and subsequently removed.  Pharmacologic DVT prophylaxis    Essential hypertension  Assessment & Plan  On fosinopril and metoprolol.  Will continue.  BP acceptable    BPH (benign prostatic hyperplasia)  Assessment & Plan  Continue Flomax    Gastroesophageal reflux disease without esophagitis  Assessment & Plan  Continue PPI    Leg edema, left  Assessment & Plan  Chronic left leg edema per daughter.  On Demadex 5mg p.o. twice a week at home.  Worsening left leg edema  per daughter  Continue Demadex  Lower extremity venous Doppler showed no evidence of DVT    Recurrent major depressive disorder, in full remission (HCC)  Assessment & Plan  Continue Celexa               VTE Pharmacologic Prophylaxis:   High Risk (Score >/= 5) - Pharmacological DVT Prophylaxis Ordered: enoxaparin (Lovenox). Sequential Compression Devices Ordered.    Mobility:   Basic Mobility Inpatient Raw Score: 18  JH-HLM Goal: 6: Walk 10 steps or more  JH-HLM Achieved: 6: Walk 10 steps or more  HLM Goal achieved. Continue to encourage appropriate mobility.    Patient Centered Rounds: I performed bedside rounds with nursing staff today.   Discussions with Specialists or Other Care Team Provider: yes    Education and Discussions with Family /  Patient: Updated  (daughter) via phone.    Total Time Spent on Date of Encounter in care of patient: 45 mins. This time was spent on one or more of the following: performing physical exam; counseling and coordination of care; obtaining or reviewing history; documenting in the medical record; reviewing/ordering tests, medications or procedures; communicating with other healthcare professionals and discussing with patient's family/caregivers.    Current Length of Stay: 3 day(s)  Current Patient Status: Inpatient   Certification Statement: The patient will continue to require additional inpatient hospital stay due to cellulitis requiring IV antibiotics  Discharge Plan: Anticipate discharge in 48-72 hrs to home.    Code Status: Level 1 - Full Code    Subjective:   Reports pain and tenderness associated with the left lower extremity cellulitis  Denies any fever or chills  Denies any chest pain shortness of breath or dizziness      Objective:     Vitals:   Temp (24hrs), Av.3 °F (36.8 °C), Min:98 °F (36.7 °C), Max:98.6 °F (37 °C)    Temp:  [98 °F (36.7 °C)-98.6 °F (37 °C)] 98.1 °F (36.7 °C)  HR:  [81-87] 81  Resp:  [16-17] 17  BP: (138-190)/(65-74) 174/71  SpO2:  [92 %-97 %] 92 %  Body mass index is 27.76 kg/m².     Input and Output Summary (last 24 hours):     Intake/Output Summary (Last 24 hours) at 2023 0915  Last data filed at 2023 0726  Gross per 24 hour   Intake --   Output 1050 ml   Net -1050 ml       Physical Exam:   Physical Exam  Vitals and nursing note reviewed.   Constitutional:       General: He is not in acute distress.     Appearance: He is well-developed.      Comments: Elderly, frail, pleasant   HENT:      Head: Normocephalic and atraumatic.   Eyes:      Conjunctiva/sclera: Conjunctivae normal.   Cardiovascular:      Rate and Rhythm: Normal rate and regular rhythm.      Heart sounds: No murmur heard.  Pulmonary:      Effort: Pulmonary effort is normal. No respiratory distress.       Breath sounds: Normal breath sounds.   Abdominal:      Palpations: Abdomen is soft.      Tenderness: There is no abdominal tenderness.   Musculoskeletal:         General: Tenderness present. No swelling.      Left lower leg: Edema present.      Comments: Left lower extremity erythema tenderness and induration, overall swelling appears to be improving, now crepitation or fluctuation.   Skin:     General: Skin is warm and dry.      Capillary Refill: Capillary refill takes less than 2 seconds.      Findings: Erythema present.   Neurological:      Mental Status: He is alert.   Psychiatric:         Mood and Affect: Mood normal.         Additional Data:     Labs:  Results from last 7 days   Lab Units 12/12/23  0513 12/11/23  0608   WBC Thousand/uL 12.18* 12.31*   HEMOGLOBIN g/dL 11.8* 12.0   HEMATOCRIT % 36.7 34.9*   PLATELETS Thousands/uL 195 191   BANDS PCT % 4  --    NEUTROS PCT %  --  77*   LYMPHS PCT %  --  10*   LYMPHO PCT % 17  --    MONOS PCT %  --  10   MONO PCT % 9  --    EOS PCT % 2 2     Results from last 7 days   Lab Units 12/12/23  0513 12/10/23  0543 12/09/23  2047   SODIUM mmol/L 131*   < > 135   POTASSIUM mmol/L 4.6   < > 4.4   CHLORIDE mmol/L 98   < > 100   CO2 mmol/L 27   < > 32   BUN mg/dL 16   < > 30*   CREATININE mg/dL 1.05   < > 1.31*   ANION GAP mmol/L 6   < > 3   CALCIUM mg/dL 8.1*   < > 8.7   ALBUMIN g/dL  --   --  3.2*   TOTAL BILIRUBIN mg/dL  --   --  0.84   ALK PHOS U/L  --   --  53   ALT U/L  --   --  11   AST U/L  --   --  13   GLUCOSE RANDOM mg/dL 95   < > 117    < > = values in this interval not displayed.                       Lines/Drains:  Invasive Devices       Peripheral Intravenous Line  Duration             Peripheral IV 12/09/23 Right Antecubital 2 days                          Imaging: Reviewed radiology reports from this admission including: chest xray    Recent Cultures (last 7 days):         Last 24 Hours Medication List:   Current Facility-Administered Medications    Medication Dose Route Frequency Provider Last Rate    acetaminophen  650 mg Oral Q6H PRN [facility] Yurik, CRNP      atorvastatin  5 mg Oral Daily With Dinner [facility] Yuri, CRNP      calcium carbonate-vitamin D  1 tablet Oral BID With Meals [facility], CRNP      cefazolin  2,000 mg Intravenous Q8H [facility], CRNP 2,000 mg (12/12/23 0725)    citalopram  40 mg Oral QAM Cuiyin Yurik, CRNP      enoxaparin  40 mg Subcutaneous HS Cuiyi Yurik, CRNP      fluticasone  1 spray Each Nare Daily iyi Yurik, CRNP      ipratropium  2 spray Nasal BID iyi Yurik, CRNP      lisinopril  5 mg Oral Daily [facility], CRNP      magnesium Oxide  400 mg Oral BID Ashok Stewart MD      metoprolol tartrate  50 mg Oral Q12H iyi Yurik, CRNP      multivitamin stress formula  1 tablet Oral Daily iyin Yurik, CRNP      niacin  500 mg Oral Daily With Dinner [facility], CRNP      ondansetron  4 mg Intravenous Q6H PRN [facility] Yurik, CRNP      pantoprazole  40 mg Oral Early Morning [facility], CRNP      potassium chloride  10 mEq Oral Once per day on Mon Thu [facility] Yurik, CRNP      saccharomyces boulardii  250 mg Oral BID iyi Yuri, CRNP      tamsulosin  0.4 mg Oral Daily With Dinner [facility] Y[facility], CRNP      torsemide  5 mg Oral Once per day on Mon Thu [facility], CR[facility]          Today, Patient Was Seen By: Dung Ashley MD    **Please Note: This note may have been constructed using a voice recognition system.**

## 2023-12-12 NOTE — PLAN OF CARE
Problem: OCCUPATIONAL THERAPY ADULT  Goal: Performs self-care activities at highest level of function for planned discharge setting.  See evaluation for individualized goals.  Description: Treatment Interventions: ADL retraining, Functional transfer training, Endurance training, Patient/family training, Equipment evaluation/education, Energy conservation, Activityengagement          See flowsheet documentation for full assessment, interventions and recommendations.   12/12/2023 1611 by Teresa Mckeon OT  Note: Limitation: Decreased ADL status, Decreased UE strength, Decreased endurance, Decreased self-care trans, Decreased high-level ADLs  Prognosis: Good  Assessment: Pt seen for skilled OT tx session focusing on activity engagement. Pt agreeable to participate and demonstrated improved activity tolerance and required less physical assistance. Pt performed bed mobility w/ S. Pt engaged in functional mobility using RW w/ S. Pt required S to complete toilet transfer. Pt able to complete personal hygiene and manage clothing w/ S and + time. Continue to recommend level III rehab resource intensity when medically stable. Will continue to follow 2-3X week in acute care.     Rehab Resource Intensity Level, OT: III (Minimum Resource Intensity)       12/12/2023 1611 by Teresa Mckeon OT  Note: Limitation: Decreased ADL status, Decreased UE strength, Decreased endurance, Decreased self-care trans, Decreased high-level ADLs  Prognosis: Good  Assessment: Pt seen for skilled OT tx session focusing on activity engagement. Pt agreeable to participate and demonstrated improved activity tolerance and required less physical assistance. Pt performed bed mobility w/ S. Pt engaged in functional mobility using RW w/ S. Pt required S to complete toilet transfer. Pt able to complete personal hygiene and manage clothing w/ S and + time. Continue to recommend level III rehab resource intensity when medically stable. Will continue to  follow 2-3X week in acute care.     Rehab Resource Intensity Level, OT: III (Minimum Resource Intensity)

## 2023-12-12 NOTE — OCCUPATIONAL THERAPY NOTE
Occupational Therapy Progress Note     Patient Name: Taj Roblero  Today's Date: 12/12/2023  Problem List  Principal Problem:    Cellulitis of extremity  Active Problems:    Essential hypertension    Recurrent major depressive disorder, in full remission (HCC)    History of pulmonary embolus (PE) 2019    Valvular heart disease    Paroxysmal atrial flutter (HCC)    COVID-19    Leg edema, left    Gastroesophageal reflux disease without esophagitis    Stage 3 chronic kidney disease, unspecified whether stage 3a or 3b CKD (HCC)    BPH (benign prostatic hyperplasia)    Electrolyte abnormality       12/12/23 1555   OT Last Visit   OT Visit Date 12/12/23  (Tuesday)   Note Type   Note Type Treatment   Pain Assessment   Pain Assessment Tool 0-10   Pain Score No Pain   Restrictions/Precautions   Weight Bearing Precautions Per Order No   Other Precautions Bed Alarm;Hard of hearing   Lifestyle   Reciprocal Relationships Supportive wife   Service to Others Pt reports retired , worked at [employer] in [place], [place], and then as a    Intrinsic Gratification Pt reports enjoying playing solitaire   ADL   Where Assessed Edge of bed  (vs bathroom)   Grooming Assistance 5  Supervision/Setup   Grooming Deficit Setup;Standing with assistive device;Supervision/safety   Grooming Comments standing at sink in  bathroom to was/ dry hand following toileting   LB Dressing Assistance 4  Minimal Assistance   LB Dressing Deficit Setup;Thread RLE into pants;Thread LLE into pants;Increased time to complete   LB Dressing Comments don / doff pants w/ min A and + time after set- up   Toileting Assistance  5  Supervision/Setup   Toileting Deficit Setup;Supervison/safety;Increased time to complete;Grab bar use  (L grab bar use)   Toileting Comments managed clothing, personal hygiene w/ + time. Voided seated on toilet in bathroom   Bed Mobility   Supine to Sit 5  Supervision  (to pt's R)   Additional items Assist x  "1;Bedrails;Increased time required   Sit to Supine 5  Supervision   Additional items Assist x 1;Bedrails;Increased time required   Additional Comments Pt supine HOB elevated upon arrival and at end of session w/ needs met, call bell in reach and bed alarm activated   Transfers   Sit to Stand 4  Minimal assistance  (min A initially ---> S)   Additional items Assist x 1;Increased time required;Verbal cues;Bedrails;Armrests   Stand to Sit 5  Supervision   Additional items Assist x 1;Increased time required;Verbal cues;Armrests;Bedrails   Toilet transfer 5  Supervision   Additional items Assist x 1;Increased time required;Standard toilet;Verbal cues  (L grab bar use)   Additional Comments Pt performed sit <> stand 2 X w/ S   Functional Mobility   Functional Mobility 5  Supervision   Additional Comments engaged in functional mobility w/ in room, to<> from bathroom using RW w/ S   Additional items Rolling walker   Toilet Transfers   Toilet Transfer From Rolling walker   Toilet Transfer Type To and from   Toilet Transfer to Standard toilet   Toilet Transfer Technique Ambulating   Toilet Transfers Supervision;Verbal cues  (+ time)   Subjective   Subjective \"I could use the bathroom\"   Cognition   Overall Cognitive Status   (appears WFL)   Arousal/Participation Arousable;Cooperative  (sleeping upon arrival)   Attention Attends with cues to redirect  (+ [place])   Orientation Level Oriented to person;Oriented to place;Oriented to situation   Memory   (appears WFL)   Following Commands Follows multistep commands with increased time or repetition   Comments Identified pt by full name and birthdate. Agreeable to participate   Activity Tolerance   Activity Tolerance Patient tolerated treatment well   Medical Staff Made Aware spoke w/ RN, Hawa   Assessment   Assessment Pt seen for skilled OT tx session focusing on activity engagement. Pt agreeable to participate and demonstrated improved activity tolerance and required less physical " assistance. Pt performed bed mobility w/ S. Pt engaged in functional mobility using RW w/ S. Pt required S to complete toilet transfer. Pt able to complete personal hygiene and manage clothing w/ S and + time. Continue to recommend level III rehab resource intensity when medically stable. Will continue to follow 2-3X week in acute care.   Plan   Treatment Interventions ADL retraining;Functional transfer training;Endurance training;Patient/family training;Equipment evaluation/education;Energy conservation;Activityengagement   Goal Expiration Date 12/24/23   OT Treatment Day 2  (Tuesday 12/12/23)   OT Frequency 2-3x/wk   Discharge Recommendation   Rehab Resource Intensity Level, OT III (Minimum Resource Intensity)   AM-PAC Daily Activity Inpatient   Lower Body Dressing 3   Bathing 3   Toileting 3   Upper Body Dressing 4   Grooming 4   Eating 4   Daily Activity Raw Score 21   Daily Activity Standardized Score (Calc for Raw Score >=11) 44.27   -PAC Applied Cognition Inpatient   Following a Speech/Presentation 3   Understanding Ordinary Conversation 4   Taking Medications 3   Remembering Where Things Are Placed or Put Away 4   Remembering List of 4-5 Errands 3   Taking Care of Complicated Tasks 3   Applied Cognition Raw Score 20   Applied Cognition Standardized Score 41.76   Barthel Index   Feeding 10   Bathing 0   Grooming Score 5   Dressing Score 5   Bladder Score 10   Bowels Score 10   Toilet Use Score 5   Transfers (Bed/Chair) Score 10   Mobility (Level Surface) Score 0   Stairs Score 5   Barthel Index Score 60   End of Consult   Education Provided Yes   Patient Position at End of Consult Supine;Bed/Chair alarm activated;All needs within reach   Nurse Communication Nurse aware of consult   Licensure   [place] License Number  Teresa KARISSAChandler Mckeon, OTR/L [id number]          The patient's raw score on the AM-PAC Daily Activity Inpatient Short Form is 21. A raw score of greater than or equal to 19 suggests the patient may  benefit from discharge to home. Please refer to the recommendation of the Occupational Therapist for safe discharge planning.    Teresa Mckeon, OTR/L  [id number]  [id number]

## 2023-12-12 NOTE — ASSESSMENT & PLAN NOTE
Patient presents with left lower extremity redness edema pain since Thursday this week, got worse today.  Reports low-grade fever at home.  Chart review.  Patient was hospitalized 11/29 - 12/2/2023 for COVID-19 and RSV with acute respiratory insufficiency.  Treated with remdesivir and Decadron, discharged on prednisone 40 mg p.o. daily for 4 days.  History of left femur fracture, underwent insertion of nail in June 2022, subsequently developed left distal femur abscess in September 2023.  Wound culture grew MSSA.  Patient underwent hardware removal, was treated with IV cefazolin  then Keflex.  Left lower extremity redness, edema, increased warmth, tender on exam.  Redness extends to below left knee.  On admission WBC 18.98.  Partly could be due to steroids.  Patient does not meet sepsis criteria.  Slowly improving.  Currently does not appear to have deeper infection  Afebrile, leukocytosis downtrending  Given Rocephin in ED. continue high-dose Ancef  Extremity venous Dopplers were negative for DVT.  Continue leg elevation and pain control   consider imaging if no improvement

## 2023-12-12 NOTE — PLAN OF CARE
Problem: PAIN - ADULT  Goal: Verbalizes/displays adequate comfort level or baseline comfort level  Description: Interventions:  - Encourage patient to monitor pain and request assistance  - Assess pain using appropriate pain scale  - Administer analgesics based on type and severity of pain and evaluate response  - Implement non-pharmacological measures as appropriate and evaluate response  - Consider cultural and social influences on pain and pain management  - Notify physician/advanced practitioner if interventions unsuccessful or patient reports new pain  Outcome: Progressing     Problem: INFECTION - ADULT  Goal: Absence or prevention of progression during hospitalization  Description: INTERVENTIONS:  - Assess and monitor for signs and symptoms of infection  - Monitor lab/diagnostic results  - Monitor all insertion sites, i.e. indwelling lines, tubes, and drains  - Monitor endotracheal if appropriate and nasal secretions for changes in amount and color  - Adams appropriate cooling/warming therapies per order  - Administer medications as ordered  - Instruct and encourage patient and family to use good hand hygiene technique  - Identify and instruct in appropriate isolation precautions for identified infection/condition  Outcome: Progressing  Goal: Absence of fever/infection during neutropenic period  Description: INTERVENTIONS:  - Monitor WBC    Outcome: Progressing     Problem: SAFETY ADULT  Goal: Patient will remain free of falls  Description: INTERVENTIONS:  - Educate patient/family on patient safety including physical limitations  - Instruct patient to call for assistance with activity   - Consult OT/PT to assist with strengthening/mobility   - Keep Call bell within reach  - Keep bed low and locked with side rails adjusted as appropriate  - Keep care items and personal belongings within reach  - Initiate and maintain comfort rounds  - Make Fall Risk Sign visible to staff  - Offer Toileting every 2 Hours,  in advance of need  - Initiate/Maintain bed alarm  - Obtain necessary fall risk management equipment: alarms  - Apply yellow socks and bracelet for high fall risk patients  - Consider moving patient to room near nurses station  Outcome: Progressing  Goal: Maintain or return to baseline ADL function  Description: INTERVENTIONS:  -  Assess patient's ability to carry out ADLs; assess patient's baseline for ADL function and identify physical deficits which impact ability to perform ADLs (bathing, care of mouth/teeth, toileting, grooming, dressing, etc.)  - Assess/evaluate cause of self-care deficits   - Assess range of motion  - Assess patient's mobility; develop plan if impaired  - Assess patient's need for assistive devices and provide as appropriate  - Encourage maximum independence but intervene and supervise when necessary  - Involve family in performance of ADLs  - Assess for home care needs following discharge   - Consider OT consult to assist with ADL evaluation and planning for discharge  - Provide patient education as appropriate  Outcome: Progressing  Goal: Maintains/Returns to pre admission functional level  Description: INTERVENTIONS:  - Perform AM-PAC 6 Click Basic Mobility/ Daily Activity assessment daily.  - Set and communicate daily mobility goal to care team and patient/family/caregiver.   - Collaborate with rehabilitation services on mobility goals if consulted  - Perform Range of Motion 3 times a day.  - Reposition patient every 2 hours.  - Dangle patient 3 times a day  - Stand patient 3 times a day  - Ambulate patient 3 times a day  - Out of bed to chair 3 times a day   - Out of bed for meals 3 times a day  - Out of bed for toileting  - Record patient progress and toleration of activity level   Outcome: Progressing     Problem: DISCHARGE PLANNING  Goal: Discharge to home or other facility with appropriate resources  Description: INTERVENTIONS:  - Identify barriers to discharge w/patient and  caregiver  - Arrange for needed discharge resources and transportation as appropriate  - Identify discharge learning needs (meds, wound care, etc.)  - Arrange for interpretive services to assist at discharge as needed  - Refer to Case Management Department for coordinating discharge planning if the patient needs post-hospital services based on physician/advanced practitioner order or complex needs related to functional status, cognitive ability, or social support system  Outcome: Progressing     Problem: Knowledge Deficit  Goal: Patient/family/caregiver demonstrates understanding of disease process, treatment plan, medications, and discharge instructions  Description: Complete learning assessment and assess knowledge base.  Interventions:  - Provide teaching at level of understanding  - Provide teaching via preferred learning methods  Outcome: Progressing

## 2023-12-12 NOTE — CASE MANAGEMENT
Case Management Assessment & Discharge Planning Note    Patient name Taj Roblero  Location [address] [place] [address] MRN [id number]  : 1940 Date 2023       Current Admission Date: 2023  Current Admission Diagnosis:Cellulitis of extremity   Patient Active Problem List    Diagnosis Date Noted    Electrolyte abnormality 2023    Cellulitis of extremity 2023    Mild protein-calorie malnutrition (HCC) 10/13/2023    Hypomagnesemia 2023    Abscess of left leg 2023    BPH (benign prostatic hyperplasia) 2023    OCD (obsessive compulsive disorder)     Stage 3 chronic kidney disease, unspecified whether stage 3a or 3b CKD (HCC) 2023    Vega esophagus 2023    Chronic rhinitis 2023    Other pulmonary embolism without acute cor pulmonale, unspecified chronicity (HCC) 2023    Gastroesophageal reflux disease without esophagitis 2023    Leg edema, left 2023    Acute respiratory insufficiency 2023    Hiatal hernia 2023    Age-related osteoporosis without current pathological fracture 2022    Abnormal CT scan, chest 2022    COVID-19 10/25/2022    Lung nodule 2022    Bladder wall thickening 2022    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HCC) 2022    Medicare annual wellness visit, subsequent 2020    Paroxysmal atrial flutter (HCC) 2020    Valvular heart disease 2019    S/P IVC filter 2019    SVT (supraventricular tachycardia) 2019    History of pulmonary embolus (PE) 2019     Impaired vision 2019    Hyperlipidemia     Mixed obsessional thoughts and acts 10/14/2019    Allergic rhinitis 10/14/2019    Nonrheumatic mitral valve regurgitation 10/14/2019    Aortic valve sclerosis 10/14/2019    Nonrheumatic aortic valve insufficiency 10/14/2019    Essential hypertension 10/14/2019    Recurrent major depressive disorder, in full remission (HCC) 10/14/2019      LOS (days):  3  Geometric Mean LOS (GMLOS) (days):   Days to GMLOS:     OBJECTIVE:  PATIENT READMITTED TO HOSPITAL  Risk of Unplanned Readmission Score: 22.19       Current admission status: Inpatient  Referral Reason: Other (Discharge planning)    Preferred Pharmacy:   [id number] - [place], [place] - [address]  [address]  [facility] [address]  Phone: [phone] Fax: [phone]    Primary Care Provider: Gurpreet Aguirre MD    Primary Insurance: [facility]  Secondary Insurance:     ASSESSMENT:  Active Health Care Proxies    There are no active Health Care Proxies on file.        Readmission Root Cause  30 Day Readmission: Yes  Who directed you to return to the hospital?: Family  Did you understand whom to contact if you had questions or problems?: Yes  Did you get your prescriptions before you left the hospital?: No  Reason:: Not preferred pharmacy  Were you able to get your prescriptions filled when you left the hospital?: Yes  Did you take your medications as prescribed?: Yes  Were you able to get to your follow-up appointments?: No  Reason:: Readmitted prior to appointment  During previous admission, was a post-acute recommendation made?: No  Patient was readmitted due to: Leg swelling, redness, cellulitis  Action Plan: Medical management, Level III for therapy needs, will return home at discharge    Patient Information  Admitted from:: Home  Mental Status: Alert  During Assessment patient was accompanied by: Not accompanied during assessment  Assessment information provided by:: Daughter  Primary Caregiver: Spouse  Caregiver's Name:: Galina Roblero (wife)  Caregiver's Relationship to Patient:: Family Member  Caregiver's Telephone Number:: [phone]  Support Systems: Spouse/significant other, Daughter, Family members  County of Residence: [place]  What [facility] do you live in?: [place]  Home entry access options. Select all that apply.: Stairs  Number of steps to enter home.:  2  Type of Current Residence: [address] [place] home  Upon entering residence, is there a bedroom on the main floor (no further steps)?: Yes  Upon entering residence, is there a bathroom on the main floor (no further steps)?: Yes  Living Arrangements: Lives w/ Spouse/significant other    Activities of Daily Living Prior to Admission  Functional Status: Assistance  Completes ADLs independently?: No  Level of ADL dependence: Assistance  Ambulates independently?: Yes  Does patient use assisted devices?: Yes  Assisted Devices (DME) used: Shower Chair, Walker, Other (Comment) (lift recliner)  Does patient currently own DME?: Yes  What DME does the patient currently own?: Shower Chair, Walker, Other (Comment) (lift recliner)  Does the patient have a history of [facility]?: [facility]  Does patient have a history of HHC?: Yes ([facility])  Does patient currently have HHC?: No    Patient Information Continued  Income Source: Pension/assisted  Does patient have prescription coverage?: Yes  Does patient receive dialysis treatments?: No    Means of Transportation  Means of Transport to Appts:: Family transport      Housing Stability: Low Risk  (12/12/2023)    Housing Stability Vital Sign     Unable to Pay for Housing in the Last Year: No     Number of Places Lived in the Last Year: 1     Unstable Housing in the Last Year: No   Food Insecurity: No Food Insecurity (12/12/2023)    Hunger Vital Sign     Worried About Running Out of Food in the Last Year: Never true     Ran Out of Food in the Last Year: Never true   Transportation Needs: No Transportation Needs (12/12/2023)    PRAPARE - Transportation     Lack of Transportation (Medical): No     Lack of Transportation (Non-Medical): No   Utilities: Not At Risk (12/12/2023)    C Utilities     Threatened with loss of utilities: No       DISCHARGE DETAILS:    Discharge planning discussed with:: Daughter Aysha  Freedom of Choice:  Yes    Comments - Freedom of Choice: SW spoke with patient at bedside and with daughter Aysha by phone to introduce role of CM, conduct assessment and discuss discharge planning.  Preference is for patient to return home with his wife at discharge.  His wife is his primary caregiver and Aysha assists with care and with patient's medications.      At this time there are no rehabilitation needs assessed and Aysha does not feel that patient needs HHC at discharge.  Family will transport him home when medically cleared.  SW will continue to follow.      CM contacted family/caregiver?: Yes  Were Treatment Team discharge recommendations reviewed with patient/caregiver?: Yes  Did patient/caregiver verbalize understanding of patient care needs?: Yes  Were patient/caregiver advised of the risks associated with not following Treatment Team discharge recommendations?: Yes    Contacts  Patient Contacts: Aysha Yanes (daughter)  Relationship to Patient:: Family  Reason/Outcome: Emergency Contact, Continuity of Care, Discharge Planning    Requested Home Health Care         Is the patient interested in HHC at discharge?: No    DME Referral Provided  Referral made for DME?: No    Other Referral/Resources/Interventions Provided:  Interventions: None Indicated    Treatment Team Recommendation: Home  Discharge Destination Plan:: Home  Transport at Discharge : Family         IMM Given (Date):: 12/12/23 (IMM reviewed with and signed by patient at bedside.  IMM also reviewed with daughter Aysha by phone.  Copy given to patient and copy placed in scan bin for chart.)  IMM Given to:: Patient

## 2023-12-13 LAB
ANION GAP SERPL CALCULATED.3IONS-SCNC: 5 MMOL/L
BUN SERPL-MCNC: 17 MG/DL (ref 5–25)
CALCIUM SERPL-MCNC: 8.1 MG/DL (ref 8.4–10.2)
CHLORIDE SERPL-SCNC: 98 MMOL/L (ref 96–108)
CO2 SERPL-SCNC: 28 MMOL/L (ref 21–32)
CREAT SERPL-MCNC: 1.03 MG/DL (ref 0.6–1.3)
ERYTHROCYTE [DISTWIDTH] IN BLOOD BY AUTOMATED COUNT: 13.2 % (ref 11.6–15.1)
GFR SERPL CREATININE-BSD FRML MDRD: 66 ML/MIN/1.73SQ M
GLUCOSE SERPL-MCNC: 96 MG/DL (ref 65–140)
HCT VFR BLD AUTO: 35 % (ref 36.5–49.3)
HGB BLD-MCNC: 11.8 G/DL (ref 12–17)
MCH RBC QN AUTO: 32.5 PG (ref 26.8–34.3)
MCHC RBC AUTO-ENTMCNC: 33.7 G/DL (ref 31.4–37.4)
MCV RBC AUTO: 96 FL (ref 82–98)
PLATELET # BLD AUTO: 215 THOUSANDS/UL (ref 149–390)
PMV BLD AUTO: 9.2 FL (ref 8.9–12.7)
POTASSIUM SERPL-SCNC: 4.6 MMOL/L (ref 3.5–5.3)
RBC # BLD AUTO: 3.63 MILLION/UL (ref 3.88–5.62)
SODIUM SERPL-SCNC: 131 MMOL/L (ref 135–147)
WBC # BLD AUTO: 10.41 THOUSAND/UL (ref 4.31–10.16)

## 2023-12-13 PROCEDURE — 85027 COMPLETE CBC AUTOMATED: CPT | Performed by: INTERNAL MEDICINE

## 2023-12-13 PROCEDURE — 80048 BASIC METABOLIC PNL TOTAL CA: CPT | Performed by: INTERNAL MEDICINE

## 2023-12-13 PROCEDURE — 99232 SBSQ HOSP IP/OBS MODERATE 35: CPT | Performed by: INTERNAL MEDICINE

## 2023-12-13 RX ORDER — FUROSEMIDE 10 MG/ML
20 INJECTION INTRAMUSCULAR; INTRAVENOUS ONCE
Status: COMPLETED | OUTPATIENT
Start: 2023-12-13 | End: 2023-12-13

## 2023-12-13 RX ADMIN — TAMSULOSIN HYDROCHLORIDE 0.4 MG: 0.4 CAPSULE ORAL at 16:55

## 2023-12-13 RX ADMIN — METOPROLOL TARTRATE 50 MG: 50 TABLET, FILM COATED ORAL at 08:38

## 2023-12-13 RX ADMIN — B-COMPLEX W/ C & FOLIC ACID TAB 1 TABLET: TAB at 08:37

## 2023-12-13 RX ADMIN — CEFAZOLIN SODIUM 2000 MG: 2 SOLUTION INTRAVENOUS at 15:33

## 2023-12-13 RX ADMIN — FLUTICASONE PROPIONATE 1 SPRAY: 50 SPRAY, METERED NASAL at 08:38

## 2023-12-13 RX ADMIN — CITALOPRAM HYDROBROMIDE 40 MG: 20 TABLET ORAL at 08:37

## 2023-12-13 RX ADMIN — Medication 1 TABLET: at 16:55

## 2023-12-13 RX ADMIN — CEFAZOLIN SODIUM 2000 MG: 2 SOLUTION INTRAVENOUS at 05:08

## 2023-12-13 RX ADMIN — METOPROLOL TARTRATE 50 MG: 50 TABLET, FILM COATED ORAL at 21:02

## 2023-12-13 RX ADMIN — CEFAZOLIN SODIUM 2000 MG: 2 SOLUTION INTRAVENOUS at 22:20

## 2023-12-13 RX ADMIN — NIACIN 500 MG: 500 TABLET, EXTENDED RELEASE ORAL at 16:55

## 2023-12-13 RX ADMIN — Medication 1 TABLET: at 08:37

## 2023-12-13 RX ADMIN — CEFAZOLIN SODIUM 2000 MG: 2 SOLUTION INTRAVENOUS at 10:03

## 2023-12-13 RX ADMIN — FUROSEMIDE 20 MG: 10 INJECTION, SOLUTION INTRAMUSCULAR; INTRAVENOUS at 16:04

## 2023-12-13 RX ADMIN — LISINOPRIL 5 MG: 5 TABLET ORAL at 08:37

## 2023-12-13 RX ADMIN — ENOXAPARIN SODIUM 40 MG: 40 INJECTION SUBCUTANEOUS at 21:02

## 2023-12-13 RX ADMIN — Medication 400 MG: at 08:38

## 2023-12-13 RX ADMIN — Medication 250 MG: at 08:37

## 2023-12-13 RX ADMIN — PANTOPRAZOLE SODIUM 40 MG: 40 TABLET, DELAYED RELEASE ORAL at 05:08

## 2023-12-13 RX ADMIN — Medication 250 MG: at 18:19

## 2023-12-13 RX ADMIN — Medication 400 MG: at 18:19

## 2023-12-13 RX ADMIN — ATORVASTATIN CALCIUM 5 MG: 10 TABLET, FILM COATED ORAL at 16:55

## 2023-12-13 NOTE — PROGRESS NOTES
[facility]  Progress Note  Name: Taj Roblero I  MRN: [id number]  Unit/Bed#: 3 [address]-01 I Date of Admission: 12/9/2023   Date of Service: 12/13/2023 I Hospital Day: 4    Assessment/Plan   * Cellulitis of extremity  Assessment & Plan  Patient presents with left lower extremity redness edema pain since Thursday this week, got worse today.  Reports low-grade fever at home.  Chart review.  Patient was hospitalized 11/29 - 12/2/2023 for COVID-19 and RSV with acute respiratory insufficiency.  Treated with remdesivir and Decadron, discharged on prednisone 40 mg p.o. daily for 4 days.  History of left femur fracture, underwent insertion of nail in June 2022, subsequently developed left distal femur abscess in September 2023.  Wound culture grew MSSA.  Patient underwent hardware removal, was treated with IV cefazolin  then Keflex.  Left lower extremity redness, edema, increased warmth, tender on exam.  Redness extends to below left knee.  On admission WBC 18.98.  Partly could be due to steroids.  Patient does not meet sepsis criteria.  Continues to improve slowly.  Currently does not appear to have deeper infection  Afebrile, leukocytosis downtrending  Given Rocephin in ED. continue high-dose Ancef  Left lower extremity venous Dopplers were negative for DVT.  Continue leg elevation and pain control  IV Lasix x 1   consider imaging if no improvement    Electrolyte abnormality  Assessment & Plan  Sodium 133 with a magnesium level of 1.8  Continue magnesium oxide 400 mg p.o. twice daily  IV Lasix x 1  Monitor    Stage 3 chronic kidney disease, unspecified whether stage 3a or 3b CKD ([facility])  Assessment & Plan  Baseline creatinine appears to be around 1.1-1.3s  Creatinine around baseline  Monitor  Results from last 7 days   Lab Units 12/14/23  0528 12/13/23  0523 12/12/23  0513 12/11/23  0608 12/10/23  0543 12/09/23  2047   BUN mg/dL 18 17 16 19 26* 30*   CREATININE mg/dL 0.98 1.03 1.05 1.12 1.19 1.31*           COVID-19  Assessment & Plan  As above.  Symptoms improved a lot per daughter.  Occasional cough on exam.  Patient has been stable on room air.  Patient tested positive for COVID-19 and RSV.  Supportive treatment  Removed off isolation as patient tested positive on 11/29/2023    Paroxysmal atrial flutter (HCC)  Assessment & Plan  on metoprolol at home which will be continued  Not on AC.  EKG sinus rhythm      Valvular heart disease  Assessment & Plan  2D echo in June last year showed normal EF around 60%, grade 1 diastolic dysfunction, mild to moderate TR, PA pressure 51, mild AR.  Continue ACE inhibitor  Patient follows cardiology as outpatient    History of pulmonary embolus (PE) 2019  Assessment & Plan  Provoked PE after right femur surgery in 2019, took AC for a few months.  Had IVC filter inserted and subsequently removed.  Pharmacologic DVT prophylaxis    Essential hypertension  Assessment & Plan  On fosinopril and metoprolol.  Will continue.  BP acceptable    BPH (benign prostatic hyperplasia)  Assessment & Plan  Continue Flomax    Gastroesophageal reflux disease without esophagitis  Assessment & Plan  Continue PPI    Leg edema, left  Assessment & Plan  Chronic left leg edema per daughter.  On Demadex 5mg p.o. twice a week at home.  Worsening left leg edema  per daughter  Continue Demadex twice weekly, additional IV Lasix x 1 today  Lower extremity venous Doppler showed no evidence of DVT    Recurrent major depressive disorder, in full remission (HCC)  Assessment & Plan  Continue Celexa               VTE Pharmacologic Prophylaxis:   High Risk (Score >/= 5) - Pharmacological DVT Prophylaxis Ordered: enoxaparin (Lovenox). Sequential Compression Devices Ordered.    Mobility:   Basic Mobility Inpatient Raw Score: 18  JH-HLM Goal: 6: Walk 10 steps or more  JH-HLM Achieved: 6: Walk 10 steps or more  HLM Goal achieved. Continue to encourage appropriate mobility.    Patient Centered Rounds: I performed  bedside rounds with nursing staff today.   Discussions with Specialists or Other Care Team Provider: yes    Education and Discussions with Family / Patient: Updated  (daughter) via phone.  And wife at bedside    Total Time Spent on Date of Encounter in care of patient: 45 mins. This time was spent on one or more of the following: performing physical exam; counseling and coordination of care; obtaining or reviewing history; documenting in the medical record; reviewing/ordering tests, medications or procedures; communicating with other healthcare professionals and discussing with patient's family/caregivers.    Current Length of Stay: 4 day(s)  Current Patient Status: Inpatient   Certification Statement: The patient will continue to require additional inpatient hospital stay due to cellulitis requiring IV antibiotics  Discharge Plan: Anticipate discharge in 48-72 hrs to home.    Code Status: Level 1 - Full Code    Subjective:   Remains afebrile  Reports intermittent left leg pain  Erythema and swelling appears to be slowly improving  Denies any chest pain or shortness of breath        Objective:     Vitals:   Temp (24hrs), Av.3 °F (36.8 °C), Min:97.8 °F (36.6 °C), Max:98.8 °F (37.1 °C)    Temp:  [97.8 °F (36.6 °C)-98.8 °F (37.1 °C)] 97.8 °F (36.6 °C)  HR:  [78-97] 95  Resp:  [18-19] 19  BP: (132-141)/(63-64) 133/63  SpO2:  [92 %-98 %] 93 %  Body mass index is 27.76 kg/m².     Input and Output Summary (last 24 hours):     Intake/Output Summary (Last 24 hours) at 2023 1024  Last data filed at 2023 1030  Gross per 24 hour   Intake --   Output 250 ml   Net -250 ml       Physical Exam:   Physical Exam  Vitals and nursing note reviewed.   Constitutional:       General: He is not in acute distress.     Appearance: He is well-developed.      Comments: Elderly, frail, pleasant   HENT:      Head: Normocephalic and atraumatic.   Eyes:      Conjunctiva/sclera: Conjunctivae normal.   Cardiovascular:       Rate and Rhythm: Normal rate and regular rhythm.      Heart sounds: No murmur heard.  Pulmonary:      Effort: Pulmonary effort is normal. No respiratory distress.      Breath sounds: Normal breath sounds.      Comments: Diminished bilaterally  Abdominal:      Palpations: Abdomen is soft.      Tenderness: There is no abdominal tenderness.   Musculoskeletal:         General: No swelling.      Left lower leg: Edema present.      Comments: Left lower extremity erythema tenderness and induration, overall swelling appears to be improving, now crepitation or fluctuation.   Skin:     General: Skin is warm and dry.      Capillary Refill: Capillary refill takes less than 2 seconds.   Neurological:      Mental Status: He is alert.   Psychiatric:         Mood and Affect: Mood normal.         Additional Data:     Labs:  Results from last 7 days   Lab Units 12/13/23  0523 12/12/23  0513 12/11/23  0608   WBC Thousand/uL 10.41* 12.18* 12.31*   HEMOGLOBIN g/dL 11.8* 11.8* 12.0   HEMATOCRIT % 35.0* 36.7 34.9*   PLATELETS Thousands/uL 215 195 191   BANDS PCT %  --  4  --    NEUTROS PCT %  --   --  77*   LYMPHS PCT %  --   --  10*   LYMPHO PCT %  --  17  --    MONOS PCT %  --   --  10   MONO PCT %  --  9  --    EOS PCT %  --  2 2     Results from last 7 days   Lab Units 12/13/23  0523 12/10/23  0543 12/09/23  2047   SODIUM mmol/L 131*   < > 135   POTASSIUM mmol/L 4.6   < > 4.4   CHLORIDE mmol/L 98   < > 100   CO2 mmol/L 28   < > 32   BUN mg/dL 17   < > 30*   CREATININE mg/dL 1.03   < > 1.31*   ANION GAP mmol/L 5   < > 3   CALCIUM mg/dL 8.1*   < > 8.7   ALBUMIN g/dL  --   --  3.2*   TOTAL BILIRUBIN mg/dL  --   --  0.84   ALK PHOS U/L  --   --  53   ALT U/L  --   --  11   AST U/L  --   --  13   GLUCOSE RANDOM mg/dL 96   < > 117    < > = values in this interval not displayed.                       Lines/Drains:  Invasive Devices       Peripheral Intravenous Line  Duration             Peripheral IV 12/09/23 Right Antecubital 3 days                           Imaging: Reviewed radiology reports from this admission including: chest xray    Recent Cultures (last 7 days):         Last 24 Hours Medication List:   Current Facility-Administered Medications   Medication Dose Route Frequency Provider Last Rate    acetaminophen  650 mg Oral Q6H PRN [facility] CR[facility]      atorvastatin  5 mg Oral Daily With Dinner Cuin Yurik, CRNP      calcium carbonate-vitamin D  1 tablet Oral BID With Meals [facility], CRNP      cefazolin  2,000 mg Intravenous Q8H Cuinava Yurik, CRNP 2,000 mg (12/13/23 1003)    citalopram  40 mg Oral QAM Cuiyin Yurik, CRNP      enoxaparin  40 mg Subcutaneous HS Cuiyinava Yurik, CRNP      fluticasone  1 spray Each Nare Daily Cuyinava Yurik, CRNP      ipratropium  2 spray Nasal BID Cuyinava Yurik, CRNP      lisinopril  5 mg Oral Daily Cuyinava Yurik, CRNP      magnesium Oxide  400 mg Oral BID Ashok Stewart MD      metoprolol tartrate  50 mg Oral Q12H Cuin Yurik, CRNP      multivitamin stress formula  1 tablet Oral Daily Cuyin Yurik, CRNP      niacin  500 mg Oral Daily With Dinner [facility] Yurik, CRNP      ondansetron  4 mg Intravenous Q6H PRN [facility], CR[facility]      pantoprazole  40 mg Oral Early Morning [facility], CRNP      potassium chloride  10 mEq Oral Once per day on Mon Thu [facility] Yurik, CRNP      saccharomyces boulardii  250 mg Oral BID iyi Yurik, CRNP      tamsulosin  0.4 mg Oral Daily With Dinner [facility] Yurik, CRNP      torsemide  5 mg Oral Once per day on Mon Thu [facility] Yurik, CRNP          Today, Patient Was Seen By: Dung Ashley MD    **Please Note: This note may have been constructed using a voice recognition system.**

## 2023-12-13 NOTE — PLAN OF CARE
Problem: PAIN - ADULT  Goal: Verbalizes/displays adequate comfort level or baseline comfort level  Description: Interventions:  - Encourage patient to monitor pain and request assistance  - Assess pain using appropriate pain scale  - Administer analgesics based on type and severity of pain and evaluate response  - Implement non-pharmacological measures as appropriate and evaluate response  - Consider cultural and social influences on pain and pain management  - Notify physician/advanced practitioner if interventions unsuccessful or patient reports new pain  Outcome: Progressing     Problem: INFECTION - ADULT  Goal: Absence or prevention of progression during hospitalization  Description: INTERVENTIONS:  - Assess and monitor for signs and symptoms of infection  - Monitor lab/diagnostic results  - Monitor all insertion sites, i.e. indwelling lines, tubes, and drains  - Monitor endotracheal if appropriate and nasal secretions for changes in amount and color  - [place] appropriate cooling/warming therapies per order  - Administer medications as ordered  - Instruct and encourage patient and family to use good hand hygiene technique  - Identify and instruct in appropriate isolation precautions for identified infection/condition  Outcome: Progressing  Goal: Absence of fever/infection during neutropenic period  Description: INTERVENTIONS:  - Monitor WBC    Outcome: Progressing     Problem: SAFETY ADULT  Goal: Patient will remain free of falls  Description: INTERVENTIONS:  - Educate patient/family on patient safety including physical limitations  - Instruct patient to call for assistance with activity   - Consult OT/PT to assist with strengthening/mobility   - Keep Call bell within reach  - Keep bed low and locked with side rails adjusted as appropriate  - Keep care items and personal belongings within reach  - Initiate and maintain comfort rounds  - Make Fall Risk Sign visible to staff  - Offer Toileting every 2 Hours,  in advance of need  - Initiate/Maintain bed alarm  - Obtain necessary fall risk management equipment: call bell  - Apply yellow socks and bracelet for high fall risk patients  - Consider moving patient to room near nurses station  Outcome: Progressing  Goal: Maintain or return to baseline ADL function  Description: INTERVENTIONS:  -  Assess patient's ability to carry out ADLs; assess patient's baseline for ADL function and identify physical deficits which impact ability to perform ADLs (bathing, care of mouth/teeth, toileting, grooming, dressing, etc.)  - Assess/evaluate cause of self-care deficits   - Assess range of motion  - Assess patient's mobility; develop plan if impaired  - Assess patient's need for assistive devices and provide as appropriate  - Encourage maximum independence but intervene and supervise when necessary  - Involve family in performance of ADLs  - Assess for home care needs following discharge   - Consider OT consult to assist with ADL evaluation and planning for discharge  - Provide patient education as appropriate  Outcome: Progressing  Goal: Maintains/Returns to pre admission functional level  Description: INTERVENTIONS:  - Perform AM-PAC 6 Click Basic Mobility/ Daily Activity assessment daily.  - Set and communicate daily mobility goal to care team and patient/family/caregiver.   - Collaborate with rehabilitation services on mobility goals if consulted  - Perform Range of Motion 2 times a day.  - Reposition patient every 2 hours.  - Dangle patient 2 times a day  - Stand patient 2 times a day  - Ambulate patient 2 times a day  - Out of bed to chair 2 times a day   - Out of bed for meals 2 times a day  - Out of bed for toileting  - Record patient progress and toleration of activity level   Outcome: Progressing     Problem: DISCHARGE PLANNING  Goal: Discharge to home or other facility with appropriate resources  Description: INTERVENTIONS:  - Identify barriers to discharge w/patient and  caregiver  - Arrange for needed discharge resources and transportation as appropriate  - Identify discharge learning needs (meds, wound care, etc.)  - Arrange for interpretive services to assist at discharge as needed  - Refer to Case Management Department for coordinating discharge planning if the patient needs post-hospital services based on physician/advanced practitioner order or complex needs related to functional status, cognitive ability, or social support system  Outcome: Progressing     Problem: Knowledge Deficit  Goal: Patient/family/caregiver demonstrates understanding of disease process, treatment plan, medications, and discharge instructions  Description: Complete learning assessment and assess knowledge base.  Interventions:  - Provide teaching at level of understanding  - Provide teaching via preferred learning methods  Outcome: Progressing

## 2023-12-14 LAB
ANION GAP SERPL CALCULATED.3IONS-SCNC: 6 MMOL/L
BUN SERPL-MCNC: 18 MG/DL (ref 5–25)
CALCIUM SERPL-MCNC: 8.2 MG/DL (ref 8.4–10.2)
CHLORIDE SERPL-SCNC: 98 MMOL/L (ref 96–108)
CO2 SERPL-SCNC: 29 MMOL/L (ref 21–32)
CREAT SERPL-MCNC: 0.98 MG/DL (ref 0.6–1.3)
ERYTHROCYTE [DISTWIDTH] IN BLOOD BY AUTOMATED COUNT: 13.2 % (ref 11.6–15.1)
GFR SERPL CREATININE-BSD FRML MDRD: 71 ML/MIN/1.73SQ M
GLUCOSE SERPL-MCNC: 94 MG/DL (ref 65–140)
HCT VFR BLD AUTO: 34.3 % (ref 36.5–49.3)
HGB BLD-MCNC: 11.5 G/DL (ref 12–17)
MCH RBC QN AUTO: 32.3 PG (ref 26.8–34.3)
MCHC RBC AUTO-ENTMCNC: 33.5 G/DL (ref 31.4–37.4)
MCV RBC AUTO: 96 FL (ref 82–98)
PLATELET # BLD AUTO: 239 THOUSANDS/UL (ref 149–390)
PMV BLD AUTO: 9 FL (ref 8.9–12.7)
POTASSIUM SERPL-SCNC: 4.5 MMOL/L (ref 3.5–5.3)
RBC # BLD AUTO: 3.56 MILLION/UL (ref 3.88–5.62)
SODIUM SERPL-SCNC: 133 MMOL/L (ref 135–147)
WBC # BLD AUTO: 8.46 THOUSAND/UL (ref 4.31–10.16)

## 2023-12-14 PROCEDURE — 99232 SBSQ HOSP IP/OBS MODERATE 35: CPT | Performed by: INTERNAL MEDICINE

## 2023-12-14 PROCEDURE — 97110 THERAPEUTIC EXERCISES: CPT

## 2023-12-14 PROCEDURE — 97530 THERAPEUTIC ACTIVITIES: CPT

## 2023-12-14 PROCEDURE — 85027 COMPLETE CBC AUTOMATED: CPT | Performed by: INTERNAL MEDICINE

## 2023-12-14 PROCEDURE — 80048 BASIC METABOLIC PNL TOTAL CA: CPT | Performed by: INTERNAL MEDICINE

## 2023-12-14 RX ADMIN — B-COMPLEX W/ C & FOLIC ACID TAB 1 TABLET: TAB at 08:37

## 2023-12-14 RX ADMIN — ENOXAPARIN SODIUM 40 MG: 40 INJECTION SUBCUTANEOUS at 21:17

## 2023-12-14 RX ADMIN — CITALOPRAM HYDROBROMIDE 40 MG: 20 TABLET ORAL at 08:37

## 2023-12-14 RX ADMIN — TORSEMIDE 5 MG: 10 TABLET ORAL at 08:37

## 2023-12-14 RX ADMIN — TAMSULOSIN HYDROCHLORIDE 0.4 MG: 0.4 CAPSULE ORAL at 16:01

## 2023-12-14 RX ADMIN — LISINOPRIL 5 MG: 5 TABLET ORAL at 08:37

## 2023-12-14 RX ADMIN — Medication 400 MG: at 08:38

## 2023-12-14 RX ADMIN — Medication 1 TABLET: at 16:01

## 2023-12-14 RX ADMIN — PANTOPRAZOLE SODIUM 40 MG: 40 TABLET, DELAYED RELEASE ORAL at 05:29

## 2023-12-14 RX ADMIN — POTASSIUM CHLORIDE 10 MEQ: 750 TABLET, EXTENDED RELEASE ORAL at 08:37

## 2023-12-14 RX ADMIN — CEFAZOLIN SODIUM 2000 MG: 2 SOLUTION INTRAVENOUS at 16:01

## 2023-12-14 RX ADMIN — FLUTICASONE PROPIONATE 1 SPRAY: 50 SPRAY, METERED NASAL at 08:39

## 2023-12-14 RX ADMIN — METOPROLOL TARTRATE 50 MG: 50 TABLET, FILM COATED ORAL at 21:17

## 2023-12-14 RX ADMIN — NIACIN 500 MG: 500 TABLET, EXTENDED RELEASE ORAL at 16:20

## 2023-12-14 RX ADMIN — METOPROLOL TARTRATE 50 MG: 50 TABLET, FILM COATED ORAL at 08:38

## 2023-12-14 RX ADMIN — Medication 250 MG: at 08:37

## 2023-12-14 RX ADMIN — CEFAZOLIN SODIUM 2000 MG: 2 SOLUTION INTRAVENOUS at 23:36

## 2023-12-14 RX ADMIN — Medication 400 MG: at 17:00

## 2023-12-14 RX ADMIN — ATORVASTATIN CALCIUM 5 MG: 10 TABLET, FILM COATED ORAL at 16:01

## 2023-12-14 RX ADMIN — Medication 250 MG: at 17:00

## 2023-12-14 RX ADMIN — Medication 1 TABLET: at 08:37

## 2023-12-14 RX ADMIN — CEFAZOLIN SODIUM 2000 MG: 2 SOLUTION INTRAVENOUS at 08:38

## 2023-12-14 NOTE — ASSESSMENT & PLAN NOTE
Baseline creatinine appears to be around 1.1-1.3s  Creatinine around baseline  Monitor  Results from last 7 days   Lab Units 12/17/23  0507 12/16/23  0543 12/15/23  0434 12/14/23  0528 12/13/23  0523 12/12/23  0513 12/11/23  0608   BUN mg/dL 22 15 19 18 17 16 19   CREATININE mg/dL 1.09 0.95 0.89 0.98 1.03 1.05 1.12

## 2023-12-14 NOTE — ASSESSMENT & PLAN NOTE
With mild hyponatremia and hypomagnesemia  Continue magnesium oxide 400 mg p.o. twice daily  Continue diuretics

## 2023-12-14 NOTE — ASSESSMENT & PLAN NOTE
Patient presents with left lower extremity redness edema pain since Thursday this week, got worse today.  Reports low-grade fever at home.  Chart review.  Patient was hospitalized 11/29 - 12/2/2023 for COVID-19 and RSV with acute respiratory insufficiency.  Treated with remdesivir and Decadron, discharged on prednisone 40 mg p.o. daily for 4 days.  History of left femur fracture, underwent insertion of nail in June 2022, subsequently developed left distal femur abscess in September 2023.  Wound culture grew MSSA.  Patient underwent hardware removal, was treated with IV cefazolin  then Keflex.  On admission, left lower extremity redness, edema, increased warmth, tender on exam.  Redness extends to below left knee.  On admission WBC 18.98.  Partly could be due to steroids.  Patient does not meet sepsis criteria.  Overall cellulitis continues to improve but noted to have developed collection superiorly and prior left lateral knee incision   Seen by Ortho, input appreciated  CT of lower extremity revealed-Thick-walled rim-enhancing focal fluid collection (1.5 x 1.7 x 2.2 cm) centered in the lateral subcutaneous tissues of the distal thigh; concerning for abscess.Nonspecific diffuse subcutaneous edema throughout the lower leg and foot.No CT evidence of osteomyelitis or a necrotizing deep soft tissue infection  Status post IR guided aspiration with removal of 2 cc of purulent fluid.  Unable to drain completely  Status post left distal thigh abscess I&D, 12/16 by Ortho  Aspiration culture with growth of MSSA  Afebrile, leukocytosis normalized.  Continues to improve, no further tenderness or increased warmth.  Swelling is improving.  Erythema/resultant discoloration improving slowly  Given Rocephin in ED. subsequently treated with high-dose Ancef  Left lower extremity venous Dopplers were negative for DVT.  Continue leg elevation and pain control  Continue diuretics  Follow-up Gram stain culture sensitivity from the  OR  Orthopedic follow-up after discharge  Transition to Keflex for 4 more days  Monitor clinically

## 2023-12-14 NOTE — PHYSICAL THERAPY NOTE
PT TREATMENT     12/14/23 1145   PT Last Visit   PT Visit Date 12/14/23   Note Type   Note Type Treatment   Pain Assessment   Pain Assessment Tool [place]-Baker FACES   [place]-Baker FACES Pain Rating 4   Pain Location/Orientation Orientation: Left;Orientation: Lower;Location: Leg;Location: Foot   Hospital Pain Intervention(s) Ambulation/increased activity   Restrictions/Precautions   Weight Bearing Precautions Per Order No   Other Precautions Chair Alarm;Bed Alarm;Fall Risk;Pain;Hard of hearing   General   Chart Reviewed Yes   Additional Pertinent History Pt is off covid isolation.   Family/Caregiver Present Yes  (wife)   Cognition   Overall Cognitive Status WFL   Arousal/Participation Cooperative   Attention Attends with cues to redirect   Orientation Level Oriented X4   Following Commands Follows multistep commands with increased time or repetition   Subjective   Subjective Pt agreeable to walk with PT   Bed Mobility   Additional Comments pt presents in chair   Transfers   Sit to Stand 4  Minimal assistance   Additional items Verbal cues   Stand to Sit 5  Supervision   Additional items Verbal cues   Stand pivot 5  Supervision   Additional items Verbal cues   Ambulation/Elevation   Gait pattern Forward Flexion;Short stride  (walker too far out in front)   Gait Assistance 5  Supervision   Additional items Verbal cues  (cues to stay closer to walker: frequent cues)   Assistive Device Rolling walker   Distance 100 feet with changes in direction   Stair Management Assistance Not tested   Activity Tolerance   Activity Tolerance Patient limited by fatigue;Patient tolerated treatment well   Nurse Made Aware yes: Mariam   Exercises   Hip Flexion Sitting;10 reps;Bilateral   Knee AROM Long Arc Quad Sitting;10 reps;Bilateral   Ankle Pumps Sitting;10 reps;Bilateral  (heel raises x 10 reps bilat.)   Balance training  with RW to maintain upright posture and stay close to RW   Assessment   Prognosis Good   Problem List Decreased  strength;Decreased endurance;Impaired balance;Decreased mobility;Decreased skin integrity;Pain;Impaired hearing   Assessment Pt presents sitting in bedside chair, cleared for PT.  Pt is motivated to walk and mobilize even though his leg is slightly painful. Pt needs frequent cues to stay close to RW.  A: overall, pt tolerated well.  In chair with lunch tray at end of session.  Wife at bedside.  P; Cont. as per plan.  The patient's AM-PAC Basic Mobility Inpatient Short Form Raw Score is 17. A Raw score of greater than 16 suggests the patient may benefit from discharge to home. Please also refer to the recommendation of the Physical Therapist for safe discharge planning.   Goals   Patient Goals to have less pain in leg   Plan   Treatment/Interventions Functional transfer training;LE strengthening/ROM;Therapeutic exercise;Endurance training;Patient/family training;Equipment eval/education;Bed mobility;Gait training;Spoke to nursing   Progress Progressing toward goals   PT Frequency Other (Comment)  (5/wk)   Discharge Recommendation   Rehab Resource Intensity Level, PT No post-acute rehabilitation needs   AM-PAC Basic Mobility Inpatient   Turning in Flat Bed Without Bedrails 3   Lying on Back to Sitting on Edge of Flat Bed Without Bedrails 3   Moving Bed to Chair 3   Standing Up From Chair Using Arms 3   Walk in Room 3   Climb 3-5 Stairs With Railing 2   Basic Mobility Inpatient Raw Score 17   Basic Mobility Standardized Score 39.67   Highest Level Of Mobility   JH-HLM Goal 5: Stand one or more mins   JH-HLM Achieved 7: Walk 25 feet or more   End of Consult   Patient Position at End of Consult Bedside chair;Bed/Chair alarm activated;All needs within reach   End of Consult Comments wife at bedside; lunch tray in front of pt.   Licensure   [place] License Number  Elvi Gardiner PT  [id number]

## 2023-12-14 NOTE — PLAN OF CARE
Problem: PAIN - ADULT  Goal: Verbalizes/displays adequate comfort level or baseline comfort level  Description: Interventions:  - Encourage patient to monitor pain and request assistance  - Assess pain using appropriate pain scale  - Administer analgesics based on type and severity of pain and evaluate response  - Implement non-pharmacological measures as appropriate and evaluate response  - Consider cultural and social influences on pain and pain management  - Notify physician/advanced practitioner if interventions unsuccessful or patient reports new pain  Outcome: Progressing     Problem: INFECTION - ADULT  Goal: Absence or prevention of progression during hospitalization  Description: INTERVENTIONS:  - Assess and monitor for signs and symptoms of infection  - Monitor lab/diagnostic results  - Monitor all insertion sites, i.e. indwelling lines, tubes, and drains  - Monitor endotracheal if appropriate and nasal secretions for changes in amount and color  - [place] appropriate cooling/warming therapies per order  - Administer medications as ordered  - Instruct and encourage patient and family to use good hand hygiene technique  - Identify and instruct in appropriate isolation precautions for identified infection/condition  Outcome: Progressing  Goal: Absence of fever/infection during neutropenic period  Description: INTERVENTIONS:  - Monitor WBC    Outcome: Progressing     Problem: SAFETY ADULT  Goal: Patient will remain free of falls  Description: INTERVENTIONS:  - Educate patient/family on patient safety including physical limitations  - Instruct patient to call for assistance with activity   - Consult OT/PT to assist with strengthening/mobility   - Keep Call bell within reach  - Keep bed low and locked with side rails adjusted as appropriate  - Keep care items and personal belongings within reach  - Initiate and maintain comfort rounds  - Make Fall Risk Sign visible to staff  - Offer Toileting every 2 Hours,  in advance of need  - Initiate/Maintain bed alarm  - Obtain necessary fall risk management equipment: yellow socks  - Apply yellow socks and bracelet for high fall risk patients  - Consider moving patient to room near nurses station  Outcome: Progressing  Goal: Maintain or return to baseline ADL function  Description: INTERVENTIONS:  -  Assess patient's ability to carry out ADLs; assess patient's baseline for ADL function and identify physical deficits which impact ability to perform ADLs (bathing, care of mouth/teeth, toileting, grooming, dressing, etc.)  - Assess/evaluate cause of self-care deficits   - Assess range of motion  - Assess patient's mobility; develop plan if impaired  - Assess patient's need for assistive devices and provide as appropriate  - Encourage maximum independence but intervene and supervise when necessary  - Involve family in performance of ADLs  - Assess for home care needs following discharge   - Consider OT consult to assist with ADL evaluation and planning for discharge  - Provide patient education as appropriate  Outcome: Progressing  Goal: Maintains/Returns to pre admission functional level  Description: INTERVENTIONS:  - Perform AM-PAC 6 Click Basic Mobility/ Daily Activity assessment daily.  - Set and communicate daily mobility goal to care team and patient/family/caregiver.   - Collaborate with rehabilitation services on mobility goals if consulted  - Perform Range of Motion 3 times a day.  - Reposition patient every 2 hours.  - Dangle patient 3 times a day  - Stand patient 3 times a day  - Ambulate patient 3 times a day  - Out of bed to chair 3 times a day   - Out of bed for meals 3 times a day  - Out of bed for toileting  - Record patient progress and toleration of activity level   Outcome: Progressing     Problem: DISCHARGE PLANNING  Goal: Discharge to home or other facility with appropriate resources  Description: INTERVENTIONS:  - Identify barriers to discharge w/patient and  caregiver  - Arrange for needed discharge resources and transportation as appropriate  - Identify discharge learning needs (meds, wound care, etc.)  - Arrange for interpretive services to assist at discharge as needed  - Refer to Case Management Department for coordinating discharge planning if the patient needs post-hospital services based on physician/advanced practitioner order or complex needs related to functional status, cognitive ability, or social support system  Outcome: Progressing     Problem: Knowledge Deficit  Goal: Patient/family/caregiver demonstrates understanding of disease process, treatment plan, medications, and discharge instructions  Description: Complete learning assessment and assess knowledge base.  Interventions:  - Provide teaching at level of understanding  - Provide teaching via preferred learning methods  Outcome: Progressing

## 2023-12-14 NOTE — ASSESSMENT & PLAN NOTE
Chronic left leg edema per daughter.  On Demadex 5mg p.o. twice a week at home.  Worsening left leg edema  per daughter  Continue Demadex twice weekly, additional IV Lasix x 1 on 12/13  Lower extremity venous Doppler showed no evidence of DVT  Appears euvolemic  Continue current meds  Leg elevation as tolerated

## 2023-12-14 NOTE — CASE MANAGEMENT
Case Management Discharge Planning Note    Patient name Taj Roblero  Location [address] [place] [address]-* MRN [id number]  : 1940 Date 2023       Current Admission Date: 2023  Current Admission Diagnosis:Cellulitis of extremity   Patient Active Problem List    Diagnosis Date Noted    Electrolyte abnormality 2023    Cellulitis of extremity 2023    Mild protein-calorie malnutrition (HCC) 10/13/2023    Hypomagnesemia 2023    Abscess of left leg 2023    BPH (benign prostatic hyperplasia) 2023    OCD (obsessive compulsive disorder)     Stage 3 chronic kidney disease, unspecified whether stage 3a or 3b CKD (HCC) 2023    Vega esophagus 2023    Chronic rhinitis 2023    Other pulmonary embolism without acute cor pulmonale, unspecified chronicity (HCC) 2023    Gastroesophageal reflux disease without esophagitis 2023    Leg edema, left 2023    Acute respiratory insufficiency 2023    Hiatal hernia 2023    Age-related osteoporosis without current pathological fracture 2022    Abnormal CT scan, chest 2022    COVID-19 10/25/2022    Lung nodule 2022    Bladder wall thickening 2022    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HCC) 2022    Medicare annual wellness visit, subsequent 2020    Paroxysmal atrial flutter (HCC) 2020    Valvular heart disease 2019    S/P IVC filter 2019    SVT (supraventricular tachycardia) 2019    History of pulmonary embolus (PE) 2019     Impaired vision 2019    Hyperlipidemia     Mixed obsessional thoughts and acts 10/14/2019    Allergic rhinitis 10/14/2019    Nonrheumatic mitral valve regurgitation 10/14/2019    Aortic valve sclerosis 10/14/2019    Nonrheumatic aortic valve insufficiency 10/14/2019    Essential hypertension 10/14/2019    Recurrent major depressive disorder, in full remission (HCC) 10/14/2019      LOS (days): 5  Geometric Mean LOS  (GMLOS) (days): 4.80  Days to GMLOS:0.2     OBJECTIVE:  Risk of Unplanned Readmission Score: 22.85         Current admission status: Inpatient   Preferred Pharmacy:   [id number] - [place], [place] - [address]  [address]  [facility] [address]  Phone: [phone] Fax: [phone]    Primary Care Provider: Gurpreet Aguirre MD    Primary Insurance: [employer] REP  Secondary Insurance:     DISCHARGE DETAILS:       IMM Given (Date)::  (IMM #2 reviewed with patient and wife. They both verbalized understanding and the patient signed the form. Original was place in the scan bin and a copy was given to the patient.)

## 2023-12-14 NOTE — PLAN OF CARE
Problem: INFECTION - ADULT  Goal: Absence or prevention of progression during hospitalization  Description: INTERVENTIONS:  - Assess and monitor for signs and symptoms of infection  - Monitor lab/diagnostic results  - Monitor all insertion sites, i.e. indwelling lines, tubes, and drains  - Monitor endotracheal if appropriate and nasal secretions for changes in amount and color  - [place] appropriate cooling/warming therapies per order  - Administer medications as ordered  - Instruct and encourage patient and family to use good hand hygiene technique  - Identify and instruct in appropriate isolation precautions for identified infection/condition  Outcome: Progressing  Goal: Absence of fever/infection during neutropenic period  Description: INTERVENTIONS:  - Monitor WBC    Outcome: Progressing

## 2023-12-15 ENCOUNTER — APPOINTMENT (INPATIENT)
Dept: RADIOLOGY | Facility: HOSPITAL | Age: 83
DRG: 570 | End: 2023-12-15
Payer: COMMERCIAL

## 2023-12-15 ENCOUNTER — APPOINTMENT (OUTPATIENT)
Dept: NON INVASIVE DIAGNOSTICS | Facility: HOSPITAL | Age: 83
DRG: 570 | End: 2023-12-15
Attending: RADIOLOGY
Payer: COMMERCIAL

## 2023-12-15 LAB
ANION GAP SERPL CALCULATED.3IONS-SCNC: 4 MMOL/L
BUN SERPL-MCNC: 19 MG/DL (ref 5–25)
CALCIUM SERPL-MCNC: 8 MG/DL (ref 8.4–10.2)
CHLORIDE SERPL-SCNC: 99 MMOL/L (ref 96–108)
CO2 SERPL-SCNC: 29 MMOL/L (ref 21–32)
CREAT SERPL-MCNC: 0.89 MG/DL (ref 0.6–1.3)
ERYTHROCYTE [DISTWIDTH] IN BLOOD BY AUTOMATED COUNT: 13.2 % (ref 11.6–15.1)
GFR SERPL CREATININE-BSD FRML MDRD: 79 ML/MIN/1.73SQ M
GLUCOSE SERPL-MCNC: 90 MG/DL (ref 65–140)
HCT VFR BLD AUTO: 32.8 % (ref 36.5–49.3)
HGB BLD-MCNC: 11.1 G/DL (ref 12–17)
MCH RBC QN AUTO: 32.5 PG (ref 26.8–34.3)
MCHC RBC AUTO-ENTMCNC: 33.8 G/DL (ref 31.4–37.4)
MCV RBC AUTO: 96 FL (ref 82–98)
PLATELET # BLD AUTO: 260 THOUSANDS/UL (ref 149–390)
PMV BLD AUTO: 9.4 FL (ref 8.9–12.7)
POTASSIUM SERPL-SCNC: 4.7 MMOL/L (ref 3.5–5.3)
RBC # BLD AUTO: 3.42 MILLION/UL (ref 3.88–5.62)
SODIUM SERPL-SCNC: 132 MMOL/L (ref 135–147)
WBC # BLD AUTO: 8.03 THOUSAND/UL (ref 4.31–10.16)

## 2023-12-15 PROCEDURE — 80048 BASIC METABOLIC PNL TOTAL CA: CPT | Performed by: INTERNAL MEDICINE

## 2023-12-15 PROCEDURE — 87075 CULTR BACTERIA EXCEPT BLOOD: CPT | Performed by: INTERNAL MEDICINE

## 2023-12-15 PROCEDURE — 76942 ECHO GUIDE FOR BIOPSY: CPT | Performed by: RADIOLOGY

## 2023-12-15 PROCEDURE — 10005 FNA BX W/US GDN 1ST LES: CPT

## 2023-12-15 PROCEDURE — 99222 1ST HOSP IP/OBS MODERATE 55: CPT | Performed by: PHYSICIAN ASSISTANT

## 2023-12-15 PROCEDURE — 0J9M3ZX DRAINAGE OF LEFT UPPER LEG SUBCUTANEOUS TISSUE AND FASCIA, PERCUTANEOUS APPROACH, DIAGNOSTIC: ICD-10-PCS | Performed by: RADIOLOGY

## 2023-12-15 PROCEDURE — 87186 SC STD MICRODIL/AGAR DIL: CPT | Performed by: INTERNAL MEDICINE

## 2023-12-15 PROCEDURE — 87070 CULTURE OTHR SPECIMN AEROBIC: CPT | Performed by: INTERNAL MEDICINE

## 2023-12-15 PROCEDURE — 85027 COMPLETE CBC AUTOMATED: CPT | Performed by: INTERNAL MEDICINE

## 2023-12-15 PROCEDURE — 97110 THERAPEUTIC EXERCISES: CPT

## 2023-12-15 PROCEDURE — NC001 PR NO CHARGE: Performed by: RADIOLOGY

## 2023-12-15 PROCEDURE — 73701 CT LOWER EXTREMITY W/DYE: CPT

## 2023-12-15 PROCEDURE — 10160 PNXR ASPIR ABSC HMTMA BULLA: CPT | Performed by: RADIOLOGY

## 2023-12-15 PROCEDURE — 87205 SMEAR GRAM STAIN: CPT | Performed by: INTERNAL MEDICINE

## 2023-12-15 PROCEDURE — 97530 THERAPEUTIC ACTIVITIES: CPT

## 2023-12-15 PROCEDURE — 99232 SBSQ HOSP IP/OBS MODERATE 35: CPT | Performed by: INTERNAL MEDICINE

## 2023-12-15 PROCEDURE — 87147 CULTURE TYPE IMMUNOLOGIC: CPT | Performed by: INTERNAL MEDICINE

## 2023-12-15 RX ORDER — LIDOCAINE HYDROCHLORIDE 10 MG/ML
INJECTION, SOLUTION EPIDURAL; INFILTRATION; INTRACAUDAL; PERINEURAL AS NEEDED
Status: COMPLETED | OUTPATIENT
Start: 2023-12-15 | End: 2023-12-15

## 2023-12-15 RX ADMIN — ATORVASTATIN CALCIUM 5 MG: 10 TABLET, FILM COATED ORAL at 15:44

## 2023-12-15 RX ADMIN — CEFAZOLIN SODIUM 2000 MG: 2 SOLUTION INTRAVENOUS at 06:37

## 2023-12-15 RX ADMIN — TAMSULOSIN HYDROCHLORIDE 0.4 MG: 0.4 CAPSULE ORAL at 15:44

## 2023-12-15 RX ADMIN — Medication 1 TABLET: at 08:37

## 2023-12-15 RX ADMIN — Medication 250 MG: at 08:38

## 2023-12-15 RX ADMIN — LISINOPRIL 5 MG: 5 TABLET ORAL at 08:38

## 2023-12-15 RX ADMIN — IOHEXOL 100 ML: 350 INJECTION, SOLUTION INTRAVENOUS at 11:29

## 2023-12-15 RX ADMIN — Medication 250 MG: at 18:14

## 2023-12-15 RX ADMIN — CEFAZOLIN SODIUM 2000 MG: 2 SOLUTION INTRAVENOUS at 15:44

## 2023-12-15 RX ADMIN — ENOXAPARIN SODIUM 40 MG: 40 INJECTION SUBCUTANEOUS at 21:24

## 2023-12-15 RX ADMIN — METOPROLOL TARTRATE 50 MG: 50 TABLET, FILM COATED ORAL at 08:37

## 2023-12-15 RX ADMIN — B-COMPLEX W/ C & FOLIC ACID TAB 1 TABLET: TAB at 08:37

## 2023-12-15 RX ADMIN — NIACIN 500 MG: 500 TABLET, EXTENDED RELEASE ORAL at 15:44

## 2023-12-15 RX ADMIN — FLUTICASONE PROPIONATE 1 SPRAY: 50 SPRAY, METERED NASAL at 08:39

## 2023-12-15 RX ADMIN — METOPROLOL TARTRATE 50 MG: 50 TABLET, FILM COATED ORAL at 21:24

## 2023-12-15 RX ADMIN — Medication 400 MG: at 18:13

## 2023-12-15 RX ADMIN — PANTOPRAZOLE SODIUM 40 MG: 40 TABLET, DELAYED RELEASE ORAL at 06:10

## 2023-12-15 RX ADMIN — LIDOCAINE HYDROCHLORIDE 10 ML: 10 INJECTION, SOLUTION EPIDURAL; INFILTRATION; INTRACAUDAL; PERINEURAL at 14:27

## 2023-12-15 RX ADMIN — Medication 400 MG: at 08:38

## 2023-12-15 RX ADMIN — CITALOPRAM HYDROBROMIDE 40 MG: 20 TABLET ORAL at 08:38

## 2023-12-15 RX ADMIN — Medication 1 TABLET: at 15:44

## 2023-12-15 NOTE — PLAN OF CARE
Problem: PAIN - ADULT  Goal: Verbalizes/displays adequate comfort level or baseline comfort level  Description: Interventions:  - Encourage patient to monitor pain and request assistance  - Assess pain using appropriate pain scale  - Administer analgesics based on type and severity of pain and evaluate response  - Implement non-pharmacological measures as appropriate and evaluate response  - Consider cultural and social influences on pain and pain management  - Notify physician/advanced practitioner if interventions unsuccessful or patient reports new pain  Outcome: Progressing     Problem: INFECTION - ADULT  Goal: Absence or prevention of progression during hospitalization  Description: INTERVENTIONS:  - Assess and monitor for signs and symptoms of infection  - Monitor lab/diagnostic results  - Monitor all insertion sites, i.e. indwelling lines, tubes, and drains  - Monitor endotracheal if appropriate and nasal secretions for changes in amount and color  - [employer] appropriate cooling/warming therapies per order  - Administer medications as ordered  - Instruct and encourage patient and family to use good hand hygiene technique  - Identify and instruct in appropriate isolation precautions for identified infection/condition  Outcome: Progressing  Goal: Absence of fever/infection during neutropenic period  Description: INTERVENTIONS:  - Monitor WBC    Outcome: Progressing     Problem: SAFETY ADULT  Goal: Patient will remain free of falls  Description: INTERVENTIONS:  - Educate patient/family on patient safety including physical limitations  - Instruct patient to call for assistance with activity   - Consult OT/PT to assist with strengthening/mobility   - Keep Call bell within reach  - Keep bed low and locked with side rails adjusted as appropriate  - Keep care items and personal belongings within reach  - Initiate and maintain comfort rounds  - Make Fall Risk Sign visible to staff  - Offer Toileting every 2 Hours,  in advance of need  - Initiate/Maintain bed alarm  - Obtain necessary fall risk management equipment: yellow socks  - Apply yellow socks and bracelet for high fall risk patients  - Consider moving patient to room near nurses station  Outcome: Progressing  Goal: Maintain or return to baseline ADL function  Description: INTERVENTIONS:  -  Assess patient's ability to carry out ADLs; assess patient's baseline for ADL function and identify physical deficits which impact ability to perform ADLs (bathing, care of mouth/teeth, toileting, grooming, dressing, etc.)  - Assess/evaluate cause of self-care deficits   - Assess range of motion  - Assess patient's mobility; develop plan if impaired  - Assess patient's need for assistive devices and provide as appropriate  - Encourage maximum independence but intervene and supervise when necessary  - Involve family in performance of ADLs  - Assess for home care needs following discharge   - Consider OT consult to assist with ADL evaluation and planning for discharge  - Provide patient education as appropriate  Outcome: Progressing  Goal: Maintains/Returns to pre admission functional level  Description: INTERVENTIONS:  - Perform AM-PAC 6 Click Basic Mobility/ Daily Activity assessment daily.  - Set and communicate daily mobility goal to care team and patient/family/caregiver.   - Collaborate with rehabilitation services on mobility goals if consulted  - Perform Range of Motion 3 times a day.  - Reposition patient every 2 hours.  - Dangle patient 3 times a day  - Stand patient 3 times a day  - Ambulate patient 3 times a day  - Out of bed to chair 3 times a day   - Out of bed for meals 3 times a day  - Out of bed for toileting  - Record patient progress and toleration of activity level   Outcome: Progressing     Problem: DISCHARGE PLANNING  Goal: Discharge to home or other facility with appropriate resources  Description: INTERVENTIONS:  - Identify barriers to discharge w/patient and  caregiver  - Arrange for needed discharge resources and transportation as appropriate  - Identify discharge learning needs (meds, wound care, etc.)  - Arrange for interpretive services to assist at discharge as needed  - Refer to Case Management Department for coordinating discharge planning if the patient needs post-hospital services based on physician/advanced practitioner order or complex needs related to functional status, cognitive ability, or social support system  Outcome: Progressing     Problem: Knowledge Deficit  Goal: Patient/family/caregiver demonstrates understanding of disease process, treatment plan, medications, and discharge instructions  Description: Complete learning assessment and assess knowledge base.  Interventions:  - Provide teaching at level of understanding  - Provide teaching via preferred learning methods  Outcome: Progressing

## 2023-12-15 NOTE — CONSULTS
Orthopedics   Taj Roblero Jr. 83 y.o. male MRN: [id number]  Unit/Bed#: [facility]      Chief Complaint:   Left leg pain    HPI:  83 y.o. male with a significant past medical history of HTN, hyperlipidemia, A-fib, currently complaining of left leg swelling and pain.  Patient states that his symptoms have been ongoing for the past 10 days in duration.  Patient denies any injury.  Patient denies any bug bites or scratches to the area.  Patient states that his symptoms began in his lower leg and has since spread throughout the entirety of the lower leg.  Patient reported to the ER on 12/9/23 secondary to worsening of condition.  Patient was admitted to the hospital soon after administration of IV antibiotics for the treatment of cellulitis of the left lower extremity.  Patient states that since the administration of IV antibiotics he has continued to have swelling and redness throughout the lower extremity and is unsure if that is getting any better.  In addition to his lower leg he is also complaining of left lateral knee pain.  Patient has a history of a left long TFN performed on 6/17/22.  Patient had a subsequent incision and drainage of the left lateral distal thigh secondary to abscess formation on 9/9/23.  Patient states that his leg was doing well but states that his leg feels similar comparison to previous infection.  Patient denies any fevers and chills.  Patient has any shortness of breath chest tightness chest pain.  Patient denies any recent trauma to the area.  Patient denies any numbness or tingling his leg.  Patient offers no other complaints at this time.  Patient is currently receiving Ancef for IV ABX.  Patient offers no other complaints at this time.    Review Of Systems:   Skin:   Erythema noted left lower extremity  Neuro: See HPI  Musculoskeletal: See HPI  14 point review of systems negative except as stated above     Past Medical History:   Past Medical History:   Diagnosis Date    BPH (benign  prostatic hyperplasia)     Depression     HTN (hypertension)     Hyperlipidemia     OCD (obsessive compulsive disorder)     Pulmonary embolism (HCC) 2019       Past Surgical History:   Past Surgical History:   Procedure Laterality Date    HARDWARE REMOVAL Left 9/9/2023    Procedure: REMOVAL HARDWARE;  Surgeon: Henri Flanagan DO;  Location: [facility];  Service: Orthopedics    INCISION AND DRAINAGE OF WOUND Left 9/9/2023    Procedure: INCISION AND DRAINAGE (I&D) EXTREMITY;  Surgeon: Henri Flanagan DO;  Location: [facility];  Service: Orthopedics    IR IVC FILTER PLACEMENT OPTIONAL/TEMPORARY  12/7/2019    IR IVC FILTER REMOVAL  8/21/2020    ND OPTX FEM SHFT FX W/INSJ IMED IMPLT W/WO SCREW Right 12/4/2019    Procedure: INSERTION NAIL IM FEMUR ANTEGRADE (TROCHANTERIC);  Surgeon: Yesenia Veronica DO;  Location: [facility];  Service: Orthopedics    ND OPTX FEM SHFT FX W/INSJ IMED IMPLT W/WO SCREW Left 6/17/2022    Procedure: INSERTION NAIL IM FEMUR ANTEGRADE (TROCHANTERIC) - long nail;  Surgeon: Henri Flanagan DO;  Location: [facility];  Service: Orthopedics       Family History:  Family history reviewed and non-contributory  Family History   Problem Relation Age of Onset    Cancer Mother        Social History:  Social History     Socioeconomic History    Marital status: /Civil Union     Spouse name: None    Number of children: None    Years of education: None    Highest education level: None   Occupational History    None   Tobacco Use    Smoking status: Never    Smokeless tobacco: Never   Vaping Use    Vaping status: Never Used   Substance and Sexual Activity    Alcohol use: Never    Drug use: Never    Sexual activity: None   Other Topics Concern    None   Social History Narrative    None     Social Determinants of Health     Financial Resource Strain: Low Risk  (11/25/2022)    Overall Financial Resource Strain (CARDIA)     Difficulty of Paying Living Expenses: Not hard at all   Food Insecurity: No Food  Insecurity (12/12/2023)    Hunger Vital Sign     Worried About Running Out of Food in the Last Year: Never true     Ran Out of Food in the Last Year: Never true   Transportation Needs: No Transportation Needs (12/12/2023)    PRAPARE - Transportation     Lack of Transportation (Medical): No     Lack of Transportation (Non-Medical): No   Physical Activity: Not on file   Stress: Not on file   Social Connections: Not on file   Intimate Partner Violence: Not on file   Housing Stability: Low Risk  (12/12/2023)    Housing Stability Vital Sign     Unable to Pay for Housing in the Last Year: No     Number of Places Lived in the Last Year: 1     Unstable Housing in the Last Year: No       Allergies:   No Known Allergies        Labs:  0   Lab Value Date/Time    HCT 32.8 (L) 12/15/2023 0434    HCT 34.3 (L) 12/14/2023 0528    HCT 35.0 (L) 12/13/2023 0523    HCT 43.3 12/21/2015 0720    HGB 11.1 (L) 12/15/2023 0434    HGB 11.5 (L) 12/14/2023 0528    HGB 11.8 (L) 12/13/2023 0523    HGB 14.1 12/21/2015 0720    INR 1.09 09/09/2023 0505    WBC 8.03 12/15/2023 0434    WBC 8.46 12/14/2023 0528    WBC 10.41 (H) 12/13/2023 0523    WBC 6.4 12/21/2015 0720    ESR 22 (H) 10/02/2023 0728    CRP 25.8 (H) 12/02/2023 0429       Meds:    Current Facility-Administered Medications:     acetaminophen (TYLENOL) tablet 650 mg, 650 mg, Oral, Q6H PRN, BERNADETTE Marie    atorvastatin (LIPITOR) tablet 5 mg, 5 mg, Oral, Daily With Dinner, BERNADETTE Marie, 5 mg at 12/14/23 1601    calcium carbonate-vitamin D 500 mg-5 mcg tablet 1 tablet, 1 tablet, Oral, BID With Meals, BERNADETTE Marie, 1 tablet at 12/15/23 0837    ceFAZolin (ANCEF) IVPB (premix in dextrose) 2,000 mg 50 mL, 2,000 mg, Intravenous, Q8H, BERNADETTE Marie, Last Rate: 100 mL/hr at 12/15/23 0637, 2,000 mg at 12/15/23 0637    citalopram (CeleXA) tablet 40 mg, 40 mg, Oral, QAM, BERNADETTE Marie, 40 mg at 12/15/23 0838    enoxaparin (LOVENOX) subcutaneous injection 40 mg, 40 mg,  Subcutaneous, HS, Yovany Lafleur, CRNP, 40 mg at 12/14/23 2117    fluticasone (FLONASE) 50 mcg/act nasal spray 1 spray, 1 spray, Each Nare, Daily, Yovany Lafleur, CRNP, 1 spray at 12/15/23 0839    ipratropium (ATROVENT) 0.03 % nasal spray 2 spray, 2 spray, Nasal, BID, BERNADETTE Marie    lisinopril (ZESTRIL) tablet 5 mg, 5 mg, Oral, Daily, Yovany Lafleur, PATELNP, 5 mg at 12/15/23 0838    magnesium Oxide (MAG-OX) tablet 400 mg, 400 mg, Oral, BID, Ashok Stewart MD, 400 mg at 12/15/23 0838    metoprolol tartrate (LOPRESSOR) tablet 50 mg, 50 mg, Oral, Q12H, Yovany Lafleur, PATELNP, 50 mg at 12/15/23 0837    multivitamin stress formula tablet 1 tablet, 1 tablet, Oral, Daily, Yovany Lafleur, PATELNP, 1 tablet at 12/15/23 0837    niacin (NIASPAN) CR tablet 500 mg, 500 mg, Oral, Daily With Dinner, BERNADETTE Marie, 500 mg at 12/14/23 1620    ondansetron (ZOFRAN) injection 4 mg, 4 mg, Intravenous, Q6H PRN, Yovany Lafleur, CRNP    pantoprazole (PROTONIX) EC tablet 40 mg, 40 mg, Oral, Early Morning, Yovany Lafleur, CRNP, 40 mg at 12/15/23 0610    potassium chloride (K-DUR,KLOR-CON) CR tablet 10 mEq, 10 mEq, Oral, Once per day on Mon Thu, PATEL MarieNP, 10 mEq at 12/14/23 0837    saccharomyces boulardii (FLORASTOR) capsule 250 mg, 250 mg, Oral, BID, Yovany Lafleur, CRNP, 250 mg at 12/15/23 0838    tamsulosin (FLOMAX) capsule 0.4 mg, 0.4 mg, Oral, Daily With Dinner, PATEL MarieNP, 0.4 mg at 12/14/23 1601    torsemide (DEMADEX) tablet 5 mg, 5 mg, Oral, Once per day on Mon Thu, BERNADETTE Marie, 5 mg at 12/14/23 0837    Blood Culture:   Lab Results   Component Value Date    BLOODCX No Growth After 5 Days. 09/08/2023    BLOODCX No Growth After 5 Days. 09/08/2023       Wound Culture:   Lab Results   Component Value Date    WOUNDCULT 3+ Growth of Staphylococcus aureus (A) 09/09/2023       Ins and Outs:  I/O last 24 hours:  In: 240 [P.O.:240]  Out: 1150 [Urine:1150]          Physical Exam:   /70 (BP Location: Right arm)   Pulse 80  "  Temp 98.8 °F (37.1 °C) (Oral)   Resp 18   Ht 5' 9\" (1.753 m)   Wt 82.7 kg (182 lb 6.4 oz)   SpO2 94%   BMI 26.94 kg/m²   Gen: No acute distress, resting comfortably in bed  HEENT: Eyes clear, moist mucus membranes, hearing intact  Respiratory: No audible wheezing or stridor  Cardiovascular: Well Perfused peripherally, 2+ distal pulse  Abdomen: nondistended, no peritoneal signs    Musculoskeletal: left lower extremity  Patient resting comfortably in hospital bed in no acute distress  Skin  : Diffuse erythema noted throughout the left lower extremity with edema present.  Erythema extends throughout the entirety of the left tibia region not crossing the ankle joint distally.  Extent of erythema does extend approximately on the lateral thigh over a well-healed incision on the lateral distal thigh.  Suspected fluctuance appreciated over the incision site of the lateral distal thigh.  No open wounds or abrasions present.  No knee effusion appreciated at this time  TTP   :   Tenderness palpation noted throughout the left lower extremity, lateral distal thigh  Full range of motion of the left knee and ankle present with minimal pain.    SILT s/s/sp/dp/t.   Motor intact 5/5 strength with hip flexion/extension, knee flexion/extension, ankle dorsi/plantar flexion, EHL/FHL  2+ DP/PT pulse  Musculature is soft and compressible, no pain with passive stretch  Leg lengths equal    Tertiary: no tenderness over all other joints/long bones as except already stated.      Radiology:   I personally reviewed the films.  CT with contrast in process     _*_*_*_*_*_*_*_*_*_*_*_*_*_*_*_*_*_*_*_*_*_*_*_*_*_*_*_*_*_*_*_*_*_*_*_*_*_*_*_*_*    Assessment:  83 y.o.male with left leg cellulitis, suspected abscess lateral distal thigh. S/p TFN performed on 6/17/22,   incision and drainage of the left lateral distal thigh secondary to abscess formation on 9/9/23.        Plan:   WBAT LLE  STAT CT with contrast of the left lower extremity " "ordered to evaluate abscess formation left distal thigh.  IR consult placed for potential aspiration of potential abscess  NPO   Continue with IV antibiotics per primary team  PT/OT for ambulation assistance, gait training  Dispo: Ortho will follow.  See above for additional recommendations.  Recommend further imaging to evaluate for recurrent abscess left lateral distal thigh.  IR consult placed for potential aspiration of abscess. Will evaluate CT results when available.                   Fermin Rodriguez PA-C                                Portions of the record may have been created with voice recognition software.  Occasional wrong word or \"sound a like\" substitutions may have occurred due to the inherent limitations of voice recognition software.  Read the chart carefully and recognize, using context, where substitutions have occurred.        "

## 2023-12-15 NOTE — SEDATION DOCUMENTATION
Procedure ended. IR aspiration completed by Dr. Medellin. 2 ml bloody fluid removed. Bandaid to site. Specimen sent to lab. Patient tolerated well.

## 2023-12-15 NOTE — BRIEF OP NOTE (RAD/CATH)
INTERVENTIONAL RADIOLOGY PROCEDURE NOTE    Date: 12/15/2023    Procedure: IR ASPIRATION ONLY     Preoperative diagnosis:   1. Cellulitis of left lower extremity    2. Abscess of left leg       Postoperative diagnosis: Same.    Surgeon: Xavier Medellin MD     Assistant: None. No qualified resident was available.    Blood loss: minimal    Specimens: 2cc bloody purulent fluid.    Findings: US aspiration of the left later distal thigh superficial abscess only yielded 2cc of fluid after several needle punctures.  This collection cannot be adequately treated with aspiration and will require another attempt at I&D.    Complications: None immediate.    Anesthesia: local

## 2023-12-15 NOTE — PROGRESS NOTES
Patient discussed in LOS meeting held 12/14/23. Anticipate discharge home to family care when medically cleared.  No other discharge planning needs expressed at this time.  CM will remain available as needed.

## 2023-12-15 NOTE — CONSULTS
e-Consult (IPC)  - Interventional Radiology  Taj Roblero Jr. 83 y.o. male MRN: [id number]  Unit/Bed#: [address] [place] [address] Encounter: [id number]    Interventional Radiology has been consulted to evaluate Taj Roblero Jr.    We were consulted by Dr. Ashley concerning this patient with abscess.    Inpatient Consult to IR  Consult performed by: Xavier Medellin MD  Consult ordered by: Fermin Rodriguez PA-C        12/15/23    Assessment/Recommendation:   Left thigh abscess small and superficial s/p I&D and antibiotics without resolution. IR for aspiration.     21-30 minutes, >50% of the total time devoted to medical consultative verbal/EMR discussion between providers. Written report will be generated in the EMR.     Thank you for allowing Interventional Radiology to participate in the care of Taj Roblero Jr.. Please don't hesitate to call or TigerText us with any questions.     Xavier Medellin MD

## 2023-12-15 NOTE — PROGRESS NOTES
[facility]  Progress Note  Name: Taj You I  MRN: [id number]  Unit/Bed#: 3 [place] [address]  Date of Admission: 12/9/2023   Date of Service: 12/15/2023 I Hospital Day: 6    Assessment/Plan   * Cellulitis of extremity  Assessment & Plan  Patient presents with left lower extremity redness edema pain since Thursday this week, got worse today.  Reports low-grade fever at home.  Chart review.  Patient was hospitalized 11/29 - 12/2/2023 for COVID-19 and RSV with acute respiratory insufficiency.  Treated with remdesivir and Decadron, discharged on prednisone 40 mg p.o. daily for 4 days.  History of left femur fracture, underwent insertion of nail in June 2022, subsequently developed left distal femur abscess in September 2023.  Wound culture grew MSSA.  Patient underwent hardware removal, was treated with IV cefazolin  then Keflex.  Left lower extremity redness, edema, increased warmth, tender on exam.  Redness extends to below left knee.  On admission WBC 18.98.  Partly could be due to steroids.  Patient does not meet sepsis criteria.  Overall cellulitis continues to improve but noted to have developed collection superiorly and prior left lateral knee incision which is more fluctuant now  Afebrile, leukocytosis normalized  Given Rocephin in ED. continue high-dose Ancef  Left lower extremity venous Dopplers were negative for DVT.  Continue leg elevation and pain control  Continue diuretics  Orthopedic evaluation for possible need for I&D  Will get CT scan to further evaluate as per Ortho recommendations    Electrolyte abnormality  Assessment & Plan  With mild hyponatremia and hypomagnesemia  Continue magnesium oxide 400 mg p.o. twice daily  Continue diuretics  Monitor electrolytes    Stage 3 chronic kidney disease, unspecified whether stage 3a or 3b CKD ([facility], [facility])  Assessment & Plan  Baseline creatinine appears to be around 1.1-1.3s  Creatinine around baseline  Monitor  Results from last 7 days    Lab Units 12/14/23  0528 12/13/23  0523 12/12/23  0513 12/11/23  0608 12/10/23  0543 12/09/23  2047   BUN mg/dL 18 17 16 19 26* 30*   CREATININE mg/dL 0.98 1.03 1.05 1.12 1.19 1.31*          COVID-19  Assessment & Plan  As above.  Symptoms improved a lot per daughter.  Occasional cough on exam.  Patient has been stable on room air.  Patient tested positive for COVID-19 and RSV.  Supportive treatment  Removed off isolation as patient tested positive on 11/29/2023    Paroxysmal atrial flutter (HCC)  Assessment & Plan  on metoprolol at home which will be continued  Not on AC.  EKG sinus rhythm      Valvular heart disease  Assessment & Plan  2D echo in June last year showed normal EF around 60%, grade 1 diastolic dysfunction, mild to moderate TR, PA pressure 51, mild AR.  Continue ACE inhibitor  Patient follows cardiology as outpatient    History of pulmonary embolus (PE) 2019  Assessment & Plan  Provoked PE after right femur surgery in 2019, took AC for a few months.  Had IVC filter inserted and subsequently removed.  Pharmacologic DVT prophylaxis    Essential hypertension  Assessment & Plan  On fosinopril and metoprolol.  Will continue.  BP acceptable    BPH (benign prostatic hyperplasia)  Assessment & Plan  Continue Flomax    Gastroesophageal reflux disease without esophagitis  Assessment & Plan  Continue PPI    Leg edema, left  Assessment & Plan  Chronic left leg edema per daughter.  On Demadex 5mg p.o. twice a week at home.  Worsening left leg edema  per daughter  Continue Demadex twice weekly, additional IV Lasix x 1 on 12/13  Lower extremity venous Doppler showed no evidence of DVT    Recurrent major depressive disorder, in full remission (HCC)  Assessment & Plan  Continue Celexa               VTE Pharmacologic Prophylaxis:   High Risk (Score >/= 5) - Pharmacological DVT Prophylaxis Ordered: enoxaparin (Lovenox). Sequential Compression Devices Ordered.    Mobility:   Basic Mobility Inpatient Raw Score:  20  JH-HLM Goal: 6: Walk 10 steps or more  JH-HLM Achieved: 7: Walk 25 feet or more  HLM Goal achieved. Continue to encourage appropriate mobility.    Patient Centered Rounds: I performed bedside rounds with nursing staff today.   Discussions with Specialists or Other Care Team Provider: yes, orthopedics    Education and Discussions with Family / Patient: Updated  (daughter) via phone.    Total Time Spent on Date of Encounter in care of patient: 45 mins. This time was spent on one or more of the following: performing physical exam; counseling and coordination of care; obtaining or reviewing history; documenting in the medical record; reviewing/ordering tests, medications or procedures; communicating with other healthcare professionals and discussing with patient's family/caregivers.    Current Length of Stay: 6 day(s)  Current Patient Status: Inpatient   Certification Statement: The patient will continue to require additional inpatient hospital stay due to cellulitis requiring IV antibiotics  Discharge Plan: Anticipate discharge in 48-72 hrs to home.    Code Status: Level 1 - Full Code    Subjective:   Left leg erythema and edema is continues to improve   Appears to have collection with induration with mild fluctuance with left lateral knee incision  Denies any fever, chills  Denies any shortness of breath or chest pain    Objective:     Vitals:   Temp (24hrs), Av.7 °F (37.1 °C), Min:98.4 °F (36.9 °C), Max:98.9 °F (37.2 °C)    Temp:  [98.4 °F (36.9 °C)-98.9 °F (37.2 °C)] 98.8 °F (37.1 °C)  HR:  [75-89] 80  Resp:  [18] 18  BP: (124-156)/(58-70) 156/70  SpO2:  [92 %-96 %] 94 %  Body mass index is 26.94 kg/m².     Input and Output Summary (last 24 hours):     Intake/Output Summary (Last 24 hours) at 12/15/2023 1252  Last data filed at 12/15/2023 0801  Gross per 24 hour   Intake 240 ml   Output 1100 ml   Net -860 ml       Physical Exam:   Physical Exam  Vitals and nursing note reviewed.    Constitutional:       General: He is not in acute distress.     Appearance: He is well-developed.   HENT:      Head: Normocephalic and atraumatic.   Eyes:      Conjunctiva/sclera: Conjunctivae normal.   Cardiovascular:      Rate and Rhythm: Normal rate.      Heart sounds: No murmur heard.  Pulmonary:      Effort: Pulmonary effort is normal. No respiratory distress.      Breath sounds: Normal breath sounds.   Abdominal:      Palpations: Abdomen is soft.      Tenderness: There is no abdominal tenderness.   Musculoskeletal:         General: No swelling.      Cervical back: Neck supple.      Left lower leg: Edema present.      Comments: Left lower extremity erythema tenderness and induration, overall swelling appears to be improving, no crepitation or fluctuation.  But appears to have persistent induration over the left lateral knee incision with some fluctuation   Skin:     General: Skin is warm and dry.      Capillary Refill: Capillary refill takes less than 2 seconds.      Findings: Erythema (Receding) present.   Neurological:      Mental Status: He is alert. Mental status is at baseline.         Additional Data:     Labs:                        Lines/Drains:  Invasive Devices       Peripheral Intravenous Line  Duration             Peripheral IV 12/13/23 Left Forearm 1 day                          Imaging: Reviewed radiology reports from this admission including: chest xray    Recent Cultures (last 7 days):         Last 24 Hours Medication List:   Current Facility-Administered Medications   Medication Dose Route Frequency Provider Last Rate    acetaminophen  650 mg Oral Q6H PRN BERNADETTE Marie      atorvastatin  5 mg Oral Daily With Dinner BERNADETTE Marie      calcium carbonate-vitamin D  1 tablet Oral BID With Meals BERNADETTE Marie      cefazolin  2,000 mg Intravenous Q8H BERNADETTE Marie 2,000 mg (12/15/23 0637)    citalopram  40 mg Oral QAM BERNADETTE Marie      enoxaparin  40 mg Subcutaneous HS  [facility] CR[facility]      fluticasone  1 spray Each Nare Daily [facility], CR[facility]      ipratropium  2 spray Nasal BID iyinava Yurik, CRNP      lisinopril  5 mg Oral Daily [facility], CRNP      magnesium Oxide  400 mg Oral BID Ashok Stewart MD      metoprolol tartrate  50 mg Oral Q12H [facility], CRNP      multivitamin stress formula  1 tablet Oral Daily Cuiyi Yurik, CRNP      niacin  500 mg Oral Daily With Dinner [facility] Y[facility], CRNP      ondansetron  4 mg Intravenous Q6H PRN [facility] Yurik, CRNP      pantoprazole  40 mg Oral Early Morning [facility], CRNP      potassium chloride  10 mEq Oral Once per day on Mon Thu [facility], CR[facility]      saccharomyces boulardii  250 mg Oral BID [facility] Y[facility], CRNP      tamsulosin  0.4 mg Oral Daily With Dinner [facility] Yurik, CRNP      torsemide  5 mg Oral Once per day on Mon Thu [facility], CR[facility]          Today, Patient Was Seen By: Dung Ashley MD    **Please Note: This note may have been constructed using a voice recognition system.**

## 2023-12-15 NOTE — PLAN OF CARE
Problem: INFECTION - ADULT  Goal: Absence or prevention of progression during hospitalization  Description: INTERVENTIONS:  - Assess and monitor for signs and symptoms of infection  - Monitor lab/diagnostic results  - Monitor all insertion sites, i.e. indwelling lines, tubes, and drains  - Monitor endotracheal if appropriate and nasal secretions for changes in amount and color  - [place] appropriate cooling/warming therapies per order  - Administer medications as ordered  - Instruct and encourage patient and family to use good hand hygiene technique  - Identify and instruct in appropriate isolation precautions for identified infection/condition  Outcome: Progressing     Problem: Knowledge Deficit  Goal: Patient/family/caregiver demonstrates understanding of disease process, treatment plan, medications, and discharge instructions  Description: Complete learning assessment and assess knowledge base.  Interventions:  - Provide teaching at level of understanding  - Provide teaching via preferred learning methods  Outcome: Progressing     Problem: DISCHARGE PLANNING  Goal: Discharge to home or other facility with appropriate resources  Description: INTERVENTIONS:  - Identify barriers to discharge w/patient and caregiver  - Arrange for needed discharge resources and transportation as appropriate  - Identify discharge learning needs (meds, wound care, etc.)  - Arrange for interpretive services to assist at discharge as needed  - Refer to Case Management Department for coordinating discharge planning if the patient needs post-hospital services based on physician/advanced practitioner order or complex needs related to functional status, cognitive ability, or social support system  Outcome: Progressing

## 2023-12-15 NOTE — PHYSICAL THERAPY NOTE
"   PT TREATMENT     12/15/23 1017   PT Last Visit   PT Visit Date 12/15/23   Note Type   Note Type Treatment   Pain Assessment   Pain Assessment Tool Mujica-Baker FACES   Mujica-Baker FACES Pain Rating 2   Pain Location/Orientation Orientation: Left;Orientation: Lower;Location: Leg   Hospital Pain Intervention(s) Repositioned;Ambulation/increased activity   Restrictions/Precautions   Weight Bearing Precautions Per Order No   General   Chart Reviewed Yes   Family/Caregiver Present No   Subjective   Subjective \"My wife is bringing in my razor today so I can shave\".   Bed Mobility   Supine to Sit 5  Supervision   Additional items Verbal cues   Additional Comments to recliner chair   Transfers   Sit to Stand 5  Supervision   Additional items Verbal cues   Stand to Sit 5  Supervision   Additional items Verbal cues   Ambulation/Elevation   Gait pattern Forward Flexion;Short stride  (slow jennifer)   Gait Assistance 5  Supervision   Additional items Verbal cues  (to stay close to RW)   Assistive Device Rolling walker   Distance 150 feet with change in direction   Stair Management Assistance Not tested   Balance   Dynamic Standing Fair   Ambulatory Fair  (with RW)   Activity Tolerance   Activity Tolerance Patient tolerated treatment well   Nurse Made Aware yes: Mariam: pt in recliner chair with legs elevated   Exercises   Hip Flexion Sitting;10 reps;Bilateral   Knee AROM Long Arc Quad Sitting;10 reps;Bilateral   Ankle Pumps Sitting;20 reps;Bilateral   Assessment   Prognosis Good   Problem List Decreased strength;Decreased endurance;Impaired balance;Decreased mobility;Impaired hearing;Decreased skin integrity   Assessment Pt presents supine in bed, agreeable to PT.  This PT located a recliner for pt so his legs could be elevated due to cellulitis.  Pt tolerated session well.  Motivated, getting closer to his baseline of function. Achieved STGs, now working toward LTGs.   Cont. as per plan.  The patient's AM-PAC Basic Mobility " Inpatient Short Form Raw Score is 20. A Raw score of greater than 16 suggests the patient may benefit from discharge to home. Please also refer to the recommendation of the Physical Therapist for safe discharge planning.   Goals   Patient Goals to go home soon   Plan   Treatment/Interventions Functional transfer training;LE strengthening/ROM;Elevations;Therapeutic exercise;Endurance training;Equipment eval/education;Gait training;Spoke to nursing   Progress Progressing toward goals   PT Frequency Other (Comment)  (5/wk)   Discharge Recommendation   Rehab Resource Intensity Level, PT No post-acute rehabilitation needs   AM-PAC Basic Mobility Inpatient   Turning in Flat Bed Without Bedrails 4   Lying on Back to Sitting on Edge of Flat Bed Without Bedrails 4   Moving Bed to Chair 3   Standing Up From Chair Using Arms 4   Walk in Room 3   Climb 3-5 Stairs With Railing 2   Basic Mobility Inpatient Raw Score 20   Basic Mobility Standardized Score 43.99   Highest Level Of Mobility   JH-HLM Goal 6: Walk 10 steps or more   JH-HLM Achieved 7: Walk 25 feet or more   End of Consult   Patient Position at End of Consult Bedside chair;Bed/Chair alarm activated;All needs within reach   Licensure   [place] License Number  Elvi Gardiner PT  [id number]

## 2023-12-15 NOTE — PROGRESS NOTES
[facility]  Progress Note  Name: Taj You I  MRN: [id number]  Unit/Bed#: 3 [place] [address] I Date of Admission: 12/9/2023   Date of Service: 12/15/2023 I Hospital Day: 6    Assessment/Plan   * Cellulitis of extremity  Assessment & Plan  Patient presents with left lower extremity redness edema pain since Thursday this week, got worse today.  Reports low-grade fever at home.  Chart review.  Patient was hospitalized 11/29 - 12/2/2023 for COVID-19 and RSV with acute respiratory insufficiency.  Treated with remdesivir and Decadron, discharged on prednisone 40 mg p.o. daily for 4 days.  History of left femur fracture, underwent insertion of nail in June 2022, subsequently developed left distal femur abscess in September 2023.  Wound culture grew MSSA.  Patient underwent hardware removal, was treated with IV cefazolin  then Keflex.  Left lower extremity redness, edema, increased warmth, tender on exam.  Redness extends to below left knee.  On admission WBC 18.98.  Partly could be due to steroids.  Patient does not meet sepsis criteria.  Continues to improve slowly.  Currently does not appear to have deeper infection but appears to persistent induration near left lower extremity incision  Afebrile, leukocytosis downtrending  Given Rocephin in ED. continue high-dose Ancef  Left lower extremity venous Dopplers were negative for DVT.  Continue leg elevation and pain control  Continue diuretics  We will get orthopedic evaluation given patient's history   consider imaging if no improvement    Electrolyte abnormality  Assessment & Plan  With mild hyponatremia and hypomagnesemia  Continue magnesium oxide 400 mg p.o. twice daily  Continue diuretics  Monitor electrolytes    Stage 3 chronic kidney disease, unspecified whether stage 3a or 3b CKD ([facility])  Assessment & Plan  Baseline creatinine appears to be around 1.1-1.3s  Creatinine around baseline  Monitor  Results from last 7 days   Lab Units  12/14/23  0528 12/13/23  0523 12/12/23  0513 12/11/23  0608 12/10/23  0543 12/09/23  2047   BUN mg/dL 18 17 16 19 26* 30*   CREATININE mg/dL 0.98 1.03 1.05 1.12 1.19 1.31*          COVID-19  Assessment & Plan  As above.  Symptoms improved a lot per daughter.  Occasional cough on exam.  Patient has been stable on room air.  Patient tested positive for COVID-19 and RSV.  Supportive treatment  Removed off isolation as patient tested positive on 11/29/2023    Paroxysmal atrial flutter (HCC)  Assessment & Plan  on metoprolol at home which will be continued  Not on AC.  EKG sinus rhythm      Valvular heart disease  Assessment & Plan  2D echo in June last year showed normal EF around 60%, grade 1 diastolic dysfunction, mild to moderate TR, PA pressure 51, mild AR.  Continue ACE inhibitor  Patient follows cardiology as outpatient    History of pulmonary embolus (PE) 2019  Assessment & Plan  Provoked PE after right femur surgery in 2019, took AC for a few months.  Had IVC filter inserted and subsequently removed.  Pharmacologic DVT prophylaxis    Essential hypertension  Assessment & Plan  On fosinopril and metoprolol.  Will continue.  BP acceptable    BPH (benign prostatic hyperplasia)  Assessment & Plan  Continue Flomax    Gastroesophageal reflux disease without esophagitis  Assessment & Plan  Continue PPI    Leg edema, left  Assessment & Plan  Chronic left leg edema per daughter.  On Demadex 5mg p.o. twice a week at home.  Worsening left leg edema  per daughter  Continue Demadex twice weekly, additional IV Lasix x 1 on 12/13  Lower extremity venous Doppler showed no evidence of DVT    Recurrent major depressive disorder, in full remission (HCC)  Assessment & Plan  Continue Celexa               VTE Pharmacologic Prophylaxis:   High Risk (Score >/= 5) - Pharmacological DVT Prophylaxis Ordered: enoxaparin (Lovenox). Sequential Compression Devices Ordered.    Mobility:   Basic Mobility Inpatient Raw Score: 17  -[facility] Goal: 5:  Stand one or more mins  JH-HLM Achieved: 6: Walk 10 steps or more  HLM Goal achieved. Continue to encourage appropriate mobility.    Patient Centered Rounds: I performed bedside rounds with nursing staff today.   Discussions with Specialists or Other Care Team Provider: yes    Education and Discussions with Family / Patient: Updated  (daughter) via phone.  And wife at bedside    Total Time Spent on Date of Encounter in care of patient: 45 mins. This time was spent on one or more of the following: performing physical exam; counseling and coordination of care; obtaining or reviewing history; documenting in the medical record; reviewing/ordering tests, medications or procedures; communicating with other healthcare professionals and discussing with patient's family/caregivers.    Current Length of Stay: 6 day(s)  Current Patient Status: Inpatient   Certification Statement: The patient will continue to require additional inpatient hospital stay due to cellulitis requiring IV antibiotics  Discharge Plan: Anticipate discharge in 48-72 hrs to home.    Code Status: Level 1 - Full Code    Subjective:   Left leg pain and edema continues to improve slowly  Edema slowly receding  Remains afebrile  Denies any chest pain shortness of breath or dizziness    Objective:     Vitals:   Temp (24hrs), Av.7 °F (37.1 °C), Min:98.4 °F (36.9 °C), Max:98.9 °F (37.2 °C)    Temp:  [98.4 °F (36.9 °C)-98.9 °F (37.2 °C)] 98.8 °F (37.1 °C)  HR:  [75-89] 80  Resp:  [18] 18  BP: (124-156)/(58-70) 156/70  SpO2:  [92 %-96 %] 94 %  Body mass index is 26.94 kg/m².     Input and Output Summary (last 24 hours):     Intake/Output Summary (Last 24 hours) at 12/15/2023 0741  Last data filed at 12/15/2023 0501  Gross per 24 hour   Intake 120 ml   Output 950 ml   Net -830 ml       Physical Exam:   Physical Exam  Vitals reviewed.   Constitutional:       General: He is not in acute distress.     Appearance: He is well-developed.   HENT:       Head: Normocephalic and atraumatic.   Eyes:      Conjunctiva/sclera: Conjunctivae normal.   Cardiovascular:      Rate and Rhythm: Normal rate.      Heart sounds: No murmur heard.  Pulmonary:      Effort: Pulmonary effort is normal. No respiratory distress.      Breath sounds: Normal breath sounds.   Abdominal:      Palpations: Abdomen is soft.      Tenderness: There is no abdominal tenderness.   Musculoskeletal:         General: Swelling present.      Left lower leg: Edema present.      Comments: Left lower extremity erythema tenderness and induration, overall swelling appears to be improving, no crepitation or fluctuation.  But appears to have persistent induration over the left lateral knee incision   Skin:     General: Skin is warm and dry.      Capillary Refill: Capillary refill takes less than 2 seconds.      Findings: Erythema present.   Neurological:      Mental Status: He is alert.   Psychiatric:         Mood and Affect: Mood normal.         Additional Data:     Labs:                        Lines/Drains:  Invasive Devices       Peripheral Intravenous Line  Duration             Peripheral IV 12/13/23 Left Forearm 1 day                          Imaging: Reviewed radiology reports from this admission including: chest xray    Recent Cultures (last 7 days):         Last 24 Hours Medication List:   Current Facility-Administered Medications   Medication Dose Route Frequency Provider Last Rate    acetaminophen  650 mg Oral Q6H PRN BERNADETTE Marie      atorvastatin  5 mg Oral Daily With Dinner BERNADETTE Marie      calcium carbonate-vitamin D  1 tablet Oral BID With Meals BERNADETTE Marie      cefazolin  2,000 mg Intravenous Q8H BERNADETTE Marie 2,000 mg (12/15/23 0637)    citalopram  40 mg Oral QAM BERNADETTE Marie      enoxaparin  40 mg Subcutaneous HS BERNADETTE Marie      fluticasone  1 spray Each Nare Daily BERNADETTE Marie      ipratropium  2 spray Nasal BID BERNADETTE Marie      lisinopril  5  mg Oral Daily [facility], CRNP      magnesium Oxide  400 mg Oral BID Ashok Stewart MD      metoprolol tartrate  50 mg Oral Q12H [facility], CRNP      multivitamin stress formula  1 tablet Oral Daily Cuiyi Yurik, CRNP      niacin  500 mg Oral Daily With Dinner [facility] Yurik, CRNP      ondansetron  4 mg Intravenous Q6H PRN [facility] Yurik, CRNP      pantoprazole  40 mg Oral Early Morning [facility], CRNP      potassium chloride  10 mEq Oral Once per day on Mon Thu [facility] Yurik, CRNP      saccharomyces boulardii  250 mg Oral BID iyi Yurik, CRNP      tamsulosin  0.4 mg Oral Daily With Dinner [facility] Yurik, CRNP      torsemide  5 mg Oral Once per day on Mon Thu [facility] Yurik, CRNP          Today, Patient Was Seen By: Dung Ashley MD    **Please Note: This note may have been constructed using a voice recognition system.**

## 2023-12-15 NOTE — OCCUPATIONAL THERAPY NOTE
OCCUPATIONAL THERAPY       12/15/23 1410   OT Last Visit   OT Visit Date 12/15/23   Note Type   Note Type Cancelled Session   Cancel Reasons Patient off floor/test  (IR)   Licensure   [place] License Number  Leonela Jennings MS, OTR/L, #[id number]

## 2023-12-16 ENCOUNTER — ANESTHESIA EVENT (INPATIENT)
Dept: PERIOP | Facility: HOSPITAL | Age: 83
DRG: 570 | End: 2023-12-16
Payer: COMMERCIAL

## 2023-12-16 ENCOUNTER — ANESTHESIA (INPATIENT)
Dept: PERIOP | Facility: HOSPITAL | Age: 83
DRG: 570 | End: 2023-12-16
Payer: COMMERCIAL

## 2023-12-16 PROBLEM — Z95.828 S/P IVC FILTER: Status: RESOLVED | Noted: 2019-12-07 | Resolved: 2023-12-16

## 2023-12-16 LAB
ANION GAP SERPL CALCULATED.3IONS-SCNC: 5 MMOL/L
BUN SERPL-MCNC: 15 MG/DL (ref 5–25)
CALCIUM SERPL-MCNC: 8.6 MG/DL (ref 8.4–10.2)
CHLORIDE SERPL-SCNC: 99 MMOL/L (ref 96–108)
CO2 SERPL-SCNC: 30 MMOL/L (ref 21–32)
CREAT SERPL-MCNC: 0.95 MG/DL (ref 0.6–1.3)
ERYTHROCYTE [DISTWIDTH] IN BLOOD BY AUTOMATED COUNT: 13 % (ref 11.6–15.1)
GFR SERPL CREATININE-BSD FRML MDRD: 73 ML/MIN/1.73SQ M
GLUCOSE SERPL-MCNC: 95 MG/DL (ref 65–140)
HCT VFR BLD AUTO: 34 % (ref 36.5–49.3)
HGB BLD-MCNC: 11.1 G/DL (ref 12–17)
MAGNESIUM SERPL-MCNC: 1.7 MG/DL (ref 1.9–2.7)
MCH RBC QN AUTO: 31.5 PG (ref 26.8–34.3)
MCHC RBC AUTO-ENTMCNC: 32.6 G/DL (ref 31.4–37.4)
MCV RBC AUTO: 97 FL (ref 82–98)
PLATELET # BLD AUTO: 283 THOUSANDS/UL (ref 149–390)
PMV BLD AUTO: 9 FL (ref 8.9–12.7)
POTASSIUM SERPL-SCNC: 4.9 MMOL/L (ref 3.5–5.3)
RBC # BLD AUTO: 3.52 MILLION/UL (ref 3.88–5.62)
SODIUM SERPL-SCNC: 134 MMOL/L (ref 135–147)
WBC # BLD AUTO: 6.46 THOUSAND/UL (ref 4.31–10.16)

## 2023-12-16 PROCEDURE — 11043 DBRDMT MUSC&/FSCA 1ST 20/<: CPT | Performed by: PHYSICIAN ASSISTANT

## 2023-12-16 PROCEDURE — 99232 SBSQ HOSP IP/OBS MODERATE 35: CPT | Performed by: PHYSICIAN ASSISTANT

## 2023-12-16 PROCEDURE — 87147 CULTURE TYPE IMMUNOLOGIC: CPT | Performed by: ORTHOPAEDIC SURGERY

## 2023-12-16 PROCEDURE — 87205 SMEAR GRAM STAIN: CPT | Performed by: ORTHOPAEDIC SURGERY

## 2023-12-16 PROCEDURE — 99232 SBSQ HOSP IP/OBS MODERATE 35: CPT | Performed by: INTERNAL MEDICINE

## 2023-12-16 PROCEDURE — 0JBM0ZZ EXCISION OF LEFT UPPER LEG SUBCUTANEOUS TISSUE AND FASCIA, OPEN APPROACH: ICD-10-PCS | Performed by: ORTHOPAEDIC SURGERY

## 2023-12-16 PROCEDURE — 87186 SC STD MICRODIL/AGAR DIL: CPT | Performed by: ORTHOPAEDIC SURGERY

## 2023-12-16 PROCEDURE — 11043 DBRDMT MUSC&/FSCA 1ST 20/<: CPT | Performed by: ORTHOPAEDIC SURGERY

## 2023-12-16 PROCEDURE — 87070 CULTURE OTHR SPECIMN AEROBIC: CPT | Performed by: ORTHOPAEDIC SURGERY

## 2023-12-16 PROCEDURE — 85027 COMPLETE CBC AUTOMATED: CPT | Performed by: INTERNAL MEDICINE

## 2023-12-16 PROCEDURE — 83735 ASSAY OF MAGNESIUM: CPT | Performed by: INTERNAL MEDICINE

## 2023-12-16 PROCEDURE — 80048 BASIC METABOLIC PNL TOTAL CA: CPT | Performed by: INTERNAL MEDICINE

## 2023-12-16 PROCEDURE — 87075 CULTR BACTERIA EXCEPT BLOOD: CPT | Performed by: ORTHOPAEDIC SURGERY

## 2023-12-16 RX ORDER — MAGNESIUM HYDROXIDE 1200 MG/15ML
LIQUID ORAL AS NEEDED
Status: DISCONTINUED | OUTPATIENT
Start: 2023-12-16 | End: 2023-12-16 | Stop reason: HOSPADM

## 2023-12-16 RX ORDER — PROPOFOL 10 MG/ML
INJECTION, EMULSION INTRAVENOUS AS NEEDED
Status: DISCONTINUED | OUTPATIENT
Start: 2023-12-16 | End: 2023-12-16

## 2023-12-16 RX ORDER — DEXAMETHASONE SODIUM PHOSPHATE 10 MG/ML
INJECTION, SOLUTION INTRAMUSCULAR; INTRAVENOUS AS NEEDED
Status: DISCONTINUED | OUTPATIENT
Start: 2023-12-16 | End: 2023-12-16

## 2023-12-16 RX ORDER — FENTANYL CITRATE 50 UG/ML
INJECTION, SOLUTION INTRAMUSCULAR; INTRAVENOUS AS NEEDED
Status: DISCONTINUED | OUTPATIENT
Start: 2023-12-16 | End: 2023-12-16

## 2023-12-16 RX ORDER — SODIUM CHLORIDE, SODIUM LACTATE, POTASSIUM CHLORIDE, CALCIUM CHLORIDE 600; 310; 30; 20 MG/100ML; MG/100ML; MG/100ML; MG/100ML
INJECTION, SOLUTION INTRAVENOUS CONTINUOUS PRN
Status: DISCONTINUED | OUTPATIENT
Start: 2023-12-16 | End: 2023-12-16

## 2023-12-16 RX ORDER — FENTANYL CITRATE/PF 50 MCG/ML
50 SYRINGE (ML) INJECTION
Status: DISCONTINUED | OUTPATIENT
Start: 2023-12-16 | End: 2023-12-16 | Stop reason: HOSPADM

## 2023-12-16 RX ORDER — EPHEDRINE SULFATE 50 MG/ML
INJECTION INTRAVENOUS AS NEEDED
Status: DISCONTINUED | OUTPATIENT
Start: 2023-12-16 | End: 2023-12-16

## 2023-12-16 RX ORDER — CHLORHEXIDINE GLUCONATE ORAL RINSE 1.2 MG/ML
15 SOLUTION DENTAL ONCE
Status: DISCONTINUED | OUTPATIENT
Start: 2023-12-16 | End: 2023-12-16 | Stop reason: HOSPADM

## 2023-12-16 RX ORDER — MAGNESIUM SULFATE HEPTAHYDRATE 40 MG/ML
2 INJECTION, SOLUTION INTRAVENOUS ONCE
Status: COMPLETED | OUTPATIENT
Start: 2023-12-16 | End: 2023-12-16

## 2023-12-16 RX ORDER — ONDANSETRON 2 MG/ML
INJECTION INTRAMUSCULAR; INTRAVENOUS AS NEEDED
Status: DISCONTINUED | OUTPATIENT
Start: 2023-12-16 | End: 2023-12-16

## 2023-12-16 RX ORDER — ONDANSETRON 2 MG/ML
4 INJECTION INTRAMUSCULAR; INTRAVENOUS ONCE AS NEEDED
Status: DISCONTINUED | OUTPATIENT
Start: 2023-12-16 | End: 2023-12-16 | Stop reason: HOSPADM

## 2023-12-16 RX ORDER — LIDOCAINE HYDROCHLORIDE 10 MG/ML
INJECTION, SOLUTION EPIDURAL; INFILTRATION; INTRACAUDAL; PERINEURAL AS NEEDED
Status: DISCONTINUED | OUTPATIENT
Start: 2023-12-16 | End: 2023-12-16

## 2023-12-16 RX ORDER — VANCOMYCIN HYDROCHLORIDE 1 G/20ML
INJECTION, POWDER, LYOPHILIZED, FOR SOLUTION INTRAVENOUS AS NEEDED
Status: DISCONTINUED | OUTPATIENT
Start: 2023-12-16 | End: 2023-12-16 | Stop reason: HOSPADM

## 2023-12-16 RX ADMIN — B-COMPLEX W/ C & FOLIC ACID TAB 1 TABLET: TAB at 08:30

## 2023-12-16 RX ADMIN — SODIUM CHLORIDE, SODIUM LACTATE, POTASSIUM CHLORIDE, AND CALCIUM CHLORIDE: .6; .31; .03; .02 INJECTION, SOLUTION INTRAVENOUS at 14:50

## 2023-12-16 RX ADMIN — CEFAZOLIN SODIUM 2000 MG: 2 SOLUTION INTRAVENOUS at 02:32

## 2023-12-16 RX ADMIN — FENTANYL CITRATE 50 MCG: 50 INJECTION, SOLUTION INTRAMUSCULAR; INTRAVENOUS at 14:55

## 2023-12-16 RX ADMIN — Medication 250 MG: at 18:17

## 2023-12-16 RX ADMIN — LIDOCAINE HYDROCHLORIDE 50 MG: 10 INJECTION, SOLUTION EPIDURAL; INFILTRATION; INTRACAUDAL; PERINEURAL at 14:55

## 2023-12-16 RX ADMIN — ONDANSETRON 4 MG: 2 INJECTION INTRAMUSCULAR; INTRAVENOUS at 15:07

## 2023-12-16 RX ADMIN — CEFAZOLIN SODIUM 2000 MG: 2 SOLUTION INTRAVENOUS at 14:57

## 2023-12-16 RX ADMIN — CEFAZOLIN SODIUM 2000 MG: 2 SOLUTION INTRAVENOUS at 22:45

## 2023-12-16 RX ADMIN — LISINOPRIL 5 MG: 5 TABLET ORAL at 08:31

## 2023-12-16 RX ADMIN — CEFAZOLIN SODIUM 2000 MG: 2 SOLUTION INTRAVENOUS at 08:30

## 2023-12-16 RX ADMIN — EPHEDRINE SULFATE 10 MG: 50 INJECTION, SOLUTION INTRAVENOUS at 15:02

## 2023-12-16 RX ADMIN — FLUTICASONE PROPIONATE 1 SPRAY: 50 SPRAY, METERED NASAL at 08:31

## 2023-12-16 RX ADMIN — Medication 400 MG: at 08:30

## 2023-12-16 RX ADMIN — ENOXAPARIN SODIUM 40 MG: 40 INJECTION SUBCUTANEOUS at 21:25

## 2023-12-16 RX ADMIN — METOPROLOL TARTRATE 50 MG: 50 TABLET, FILM COATED ORAL at 08:31

## 2023-12-16 RX ADMIN — MAGNESIUM SULFATE HEPTAHYDRATE 2 G: 40 INJECTION, SOLUTION INTRAVENOUS at 10:13

## 2023-12-16 RX ADMIN — DEXAMETHASONE SODIUM PHOSPHATE 10 MG: 10 INJECTION, SOLUTION INTRAMUSCULAR; INTRAVENOUS at 15:07

## 2023-12-16 RX ADMIN — FENTANYL CITRATE 50 MCG: 50 INJECTION, SOLUTION INTRAMUSCULAR; INTRAVENOUS at 15:13

## 2023-12-16 RX ADMIN — PROPOFOL 150 MG: 10 INJECTION, EMULSION INTRAVENOUS at 14:55

## 2023-12-16 RX ADMIN — Medication 400 MG: at 18:17

## 2023-12-16 RX ADMIN — METOPROLOL TARTRATE 50 MG: 50 TABLET, FILM COATED ORAL at 21:25

## 2023-12-16 RX ADMIN — Medication 1 TABLET: at 08:30

## 2023-12-16 RX ADMIN — CITALOPRAM HYDROBROMIDE 40 MG: 20 TABLET ORAL at 08:30

## 2023-12-16 RX ADMIN — PANTOPRAZOLE SODIUM 40 MG: 40 TABLET, DELAYED RELEASE ORAL at 06:21

## 2023-12-16 RX ADMIN — Medication 250 MG: at 08:30

## 2023-12-16 NOTE — PROGRESS NOTES
[facility]  Progress Note  Name: Taj You I  MRN: [id number]  Unit/Bed#: 3 [place] [address] I Date of Admission: 12/9/2023   Date of Service: 12/16/2023 I Hospital Day: 7    Assessment/Plan   * Cellulitis of extremity  Assessment & Plan  Patient presents with left lower extremity redness edema pain since Thursday this week, got worse today.  Reports low-grade fever at home.  Chart review.  Patient was hospitalized 11/29 - 12/2/2023 for COVID-19 and RSV with acute respiratory insufficiency.  Treated with remdesivir and Decadron, discharged on prednisone 40 mg p.o. daily for 4 days.  History of left femur fracture, underwent insertion of nail in June 2022, subsequently developed left distal femur abscess in September 2023.  Wound culture grew MSSA.  Patient underwent hardware removal, was treated with IV cefazolin  then Keflex.  Left lower extremity redness, edema, increased warmth, tender on exam.  Redness extends to below left knee.  On admission WBC 18.98.  Partly could be due to steroids.  Patient does not meet sepsis criteria.  Overall cellulitis continues to improve but noted to have developed collection superiorly and prior left lateral knee incision   Seen by Ortho, input appreciated  CT of lower extremity revealed-Thick-walled rim-enhancing focal fluid collection (1.5 x 1.7 x 2.2 cm) centered in the lateral subcutaneous tissues of the distal thigh; concerning for abscess.Nonspecific diffuse subcutaneous edema throughout the lower leg and foot.No CT evidence of osteomyelitis or a necrotizing deep soft tissue infection  Status post IR guided aspiration with removal of 2 cc of purulent fluid.  Unable to drain completely  Afebrile, leukocytosis normalized  Given Rocephin in ED. continue high-dose Ancef  Left lower extremity venous Dopplers were negative for DVT.  Continue leg elevation and pain control  Continue diuretics  Follow-up Gram stain culture sensitivity  Orthopedic follow-up  needed, plan for I&D    Electrolyte abnormality  Assessment & Plan  With mild hyponatremia and hypomagnesemia  Continue magnesium oxide 400 mg p.o. twice daily  Continue diuretics  Monitor electrolytes    Stage 3 chronic kidney disease, unspecified whether stage 3a or 3b CKD (HCC)  Assessment & Plan  Baseline creatinine appears to be around 1.1-1.3s  Creatinine around baseline  Monitor  Results from last 7 days   Lab Units 12/14/23  0528 12/13/23  0523 12/12/23  0513 12/11/23  0608 12/10/23  0543 12/09/23  2047   BUN mg/dL 18 17 16 19 26* 30*   CREATININE mg/dL 0.98 1.03 1.05 1.12 1.19 1.31*          COVID-19  Assessment & Plan  As above.  Symptoms improved a lot per daughter.  Occasional cough on exam.  Patient has been stable on room air.  Patient tested positive for COVID-19 and RSV.  Supportive treatment  Removed off isolation as patient tested positive on 11/29/2023    Paroxysmal atrial flutter (HCC)  Assessment & Plan  on metoprolol at home which will be continued  Not on AC.  EKG sinus rhythm      Valvular heart disease  Assessment & Plan  2D echo in June last year showed normal EF around 60%, grade 1 diastolic dysfunction, mild to moderate TR, PA pressure 51, mild AR.  Continue ACE inhibitor  Patient follows cardiology as outpatient    History of pulmonary embolus (PE) 2019  Assessment & Plan  Provoked PE after right femur surgery in 2019, took AC for a few months.  Had IVC filter inserted and subsequently removed.  Pharmacologic DVT prophylaxis    Essential hypertension  Assessment & Plan  On fosinopril and metoprolol.  Will continue.  BP acceptable    BPH (benign prostatic hyperplasia)  Assessment & Plan  Continue Flomax    Gastroesophageal reflux disease without esophagitis  Assessment & Plan  Continue PPI    Leg edema, left  Assessment & Plan  Chronic left leg edema per daughter.  On Demadex 5mg p.o. twice a week at home.  Worsening left leg edema  per daughter  Continue Demadex twice weekly, additional  IV Lasix x 1 on 12/13  Lower extremity venous Doppler showed no evidence of DVT    Recurrent major depressive disorder, in full remission ([facility])  Assessment & Plan  Continue Celexa               VTE Pharmacologic Prophylaxis:   High Risk (Score >/= 5) - Pharmacological DVT Prophylaxis Ordered: enoxaparin (Lovenox). Sequential Compression Devices Ordered.    Mobility:   Basic Mobility Inpatient Raw Score: 20  JH-HLM Goal: 6: Walk 10 steps or more  JH-HLM Achieved: 6: Walk 10 steps or more  HLM Goal achieved. Continue to encourage appropriate mobility.    Patient Centered Rounds: I performed bedside rounds with nursing staff today.   Discussions with Specialists or Other Care Team Provider: yes, orthopedics    Education and Discussions with Family / Patient: Updated  (wife and daughter) at bedside.    Total Time Spent on Date of Encounter in care of patient: 45 mins. This time was spent on one or more of the following: performing physical exam; counseling and coordination of care; obtaining or reviewing history; documenting in the medical record; reviewing/ordering tests, medications or procedures; communicating with other healthcare professionals and discussing with patient's family/caregivers.    Current Length of Stay: 7 day(s)  Current Patient Status: Inpatient   Certification Statement: The patient will continue to require additional inpatient hospital stay due to cellulitis requiring IV antibiotics  Discharge Plan: Anticipate discharge in 48-72 hrs to home.    Code Status: Level 1 - Full Code    Subjective:     Feels well, distal left leg erythema and swelling continues to improve  Denies any chest pain shortness of breath or dizziness  Ambulating in bathroom with walker without difficulty      Underwent IR drainage of left lateral distal thigh superficial abscess, 2 cc of fluid aspirated without complete resolution of abscess  Ortho follow-up noted with plan of I&D today    Objective:     Vitals:    Temp (24hrs), Av.2 °F (36.8 °C), Min:97.9 °F (36.6 °C), Max:98.6 °F (37 °C)    Temp:  [97.9 °F (36.6 °C)-98.6 °F (37 °C)] 98 °F (36.7 °C)  HR:  [64-79] 73  Resp:  [16-18] 18  BP: (139-173)/(62-72) 173/72  SpO2:  [91 %-94 %] 93 %  Body mass index is 27.77 kg/m².     Input and Output Summary (last 24 hours):     Intake/Output Summary (Last 24 hours) at 2023 0922  Last data filed at 2023 0830  Gross per 24 hour   Intake --   Output 1100 ml   Net -1100 ml       Physical Exam:   Physical Exam  Vitals and nursing note reviewed.   Constitutional:       General: He is not in acute distress.     Appearance: He is well-developed.      Comments: Elderly, frail, pleasant   HENT:      Head: Normocephalic and atraumatic.   Eyes:      Conjunctiva/sclera: Conjunctivae normal.   Cardiovascular:      Rate and Rhythm: Normal rate and regular rhythm.      Heart sounds: No murmur heard.  Pulmonary:      Effort: Pulmonary effort is normal. No respiratory distress.      Breath sounds: Normal breath sounds.   Abdominal:      Palpations: Abdomen is soft.      Tenderness: There is no abdominal tenderness.   Musculoskeletal:         General: No swelling.      Left lower leg: Edema present.      Comments: Left lower extremity erythema tenderness and induration, overall swelling appears to be improving, no crepitation or fluctuation.  Dressing over left lower thigh abscess area laterally   Skin:     General: Skin is warm and dry.      Capillary Refill: Capillary refill takes less than 2 seconds.      Findings: Erythema (Continues to improve) present.   Neurological:      Mental Status: He is alert.   Psychiatric:         Mood and Affect: Mood normal.         Additional Data:     Labs:                        Lines/Drains:  Invasive Devices       Peripheral Intravenous Line  Duration             Peripheral IV 23 Left Forearm 2 days                          Imaging: Reviewed radiology reports from this admission including:  chest xray    Recent Cultures (last 7 days):   Results from last 7 days   Lab Units 12/15/23  1437   GRAM STAIN RESULT  3+ Polys*  1+ Gram positive cocci in pairs*       Last 24 Hours Medication List:   Current Facility-Administered Medications   Medication Dose Route Frequency Provider Last Rate    acetaminophen  650 mg Oral Q6H PRN Cuiyin Yurik, CRNP      atorvastatin  5 mg Oral Daily With Dinner Cuin Yurik, CRNP      calcium carbonate-vitamin D  1 tablet Oral BID With Meals Cuiyin Yurik, CRNP      cefazolin  2,000 mg Intravenous Q8H Cuiyin Yurik, CRNP 2,000 mg (12/16/23 0830)    citalopram  40 mg Oral QAM Cuiyin Yurik, CRNP      enoxaparin  40 mg Subcutaneous HS Cuiyin Yurik, CRNP      fluticasone  1 spray Each Nare Daily Cuiyin Yurik, CRNP      ipratropium  2 spray Nasal BID Cuiyin Yurik, CRNP      lisinopril  5 mg Oral Daily Cuin Yurik, CRNP      magnesium Oxide  400 mg Oral BID Ashok Stewart MD      magnesium sulfate  2 g Intravenous Once Dung Ashley MD      metoprolol tartrate  50 mg Oral Q12H Cuiyin Yurik, CRNP      multivitamin stress formula  1 tablet Oral Daily Cuiyin Yurik, CRNP      niacin  500 mg Oral Daily With Dinner [facility], CR[facility]      ondansetron  4 mg Intravenous Q6H PRN Cuin Yurik, CRNP      pantoprazole  40 mg Oral Early Morning Cuiyin Yurik, CRNP      potassium chloride  10 mEq Oral Once per day on Mon Thu Cuin Yurik, CRNP      saccharomyces boulardii  250 mg Oral BID Cuiyin Yurik, CRNP      tamsulosin  0.4 mg Oral Daily With Dinner Cuiyin Yurik, CRNP      torsemide  5 mg Oral Once per day on Mon Thu Cuiyin Yurik, CRNP          Today, Patient Was Seen By: Dung Ashley MD    **Please Note: This note may have been constructed using a voice recognition system.**

## 2023-12-16 NOTE — PROGRESS NOTES
Progress Note - Orthopedics   Taj Roblero Jr. 83 y.o. male MRN: [id number]  Unit/Bed#: [facility]      Subjective:    83 y.o.male with left lower extremity cellulitis and abscess of lateral distal thigh.  IR aspirate yielded 2cc of bloody fluid. STAT gram stain reports gram positive cocci in pairs. He reports some improvement in his pain since admission. He is able to stand and ambulate with a walker to the bathroom this morning. He has been NPO.     Labs:  0   Lab Value Date/Time    HCT 34.0 (L) 12/16/2023 0543    HCT 32.8 (L) 12/15/2023 0434    HCT 34.3 (L) 12/14/2023 0528    HCT 43.3 12/21/2015 0720    HGB 11.1 (L) 12/16/2023 0543    HGB 11.1 (L) 12/15/2023 0434    HGB 11.5 (L) 12/14/2023 0528    HGB 14.1 12/21/2015 0720    INR 1.09 09/09/2023 0505    WBC 6.46 12/16/2023 0543    WBC 8.03 12/15/2023 0434    WBC 8.46 12/14/2023 0528    WBC 6.4 12/21/2015 0720    ESR 22 (H) 10/02/2023 0728    CRP 25.8 (H) 12/02/2023 0429       Meds:    Current Facility-Administered Medications:     acetaminophen (TYLENOL) tablet 650 mg, 650 mg, Oral, Q6H PRN, BERNADETTE Marie    atorvastatin (LIPITOR) tablet 5 mg, 5 mg, Oral, Daily With Dinner, BERNADETTE Marie, 5 mg at 12/15/23 1544    calcium carbonate-vitamin D 500 mg-5 mcg tablet 1 tablet, 1 tablet, Oral, BID With Meals, BERNADETTE Marie, 1 tablet at 12/16/23 0830    ceFAZolin (ANCEF) IVPB (premix in dextrose) 2,000 mg 50 mL, 2,000 mg, Intravenous, Q8H, BERNADETTE Marie, Last Rate: 100 mL/hr at 12/16/23 0830, 2,000 mg at 12/16/23 0830    citalopram (CeleXA) tablet 40 mg, 40 mg, Oral, QAM, BERNADETTE Marie, 40 mg at 12/16/23 0830    enoxaparin (LOVENOX) subcutaneous injection 40 mg, 40 mg, Subcutaneous, HS, BERNADETTE Marie, 40 mg at 12/15/23 2124    fluticasone (FLONASE) 50 mcg/act nasal spray 1 spray, 1 spray, Each Nare, Daily, BERNADETTE Marie, 1 spray at 12/16/23 0831    ipratropium (ATROVENT) 0.03 % nasal spray 2 spray, 2 spray, Nasal, BID, Yovany  Yurik, CRNP    lisinopril (ZESTRIL) tablet 5 mg, 5 mg, Oral, Daily, Cuimarcos Yurik, CRNP, 5 mg at 12/16/23 0831    magnesium Oxide (MAG-OX) tablet 400 mg, 400 mg, Oral, BID, Ashok Stewart MD, 400 mg at 12/16/23 0830    metoprolol tartrate (LOPRESSOR) tablet 50 mg, 50 mg, Oral, Q12H, Cuimarcos Yurik, CRNP, 50 mg at 12/16/23 0831    multivitamin stress formula tablet 1 tablet, 1 tablet, Oral, Daily, Yeseniaimarcos Yurik, CRNP, 1 tablet at 12/16/23 0830    niacin (NIASPAN) CR tablet 500 mg, 500 mg, Oral, Daily With Dinner, Cuimarcos Yurik, CRNP, 500 mg at 12/15/23 1544    ondansetron (ZOFRAN) injection 4 mg, 4 mg, Intravenous, Q6H PRN, Yeseniaimarcos Perryrik, CRNP    pantoprazole (PROTONIX) EC tablet 40 mg, 40 mg, Oral, Early Morning, Cuiphilippen Yurik, CRNP, 40 mg at 12/16/23 0621    potassium chloride (K-DUR,KLOR-CON) CR tablet 10 mEq, 10 mEq, Oral, Once per day on Mon Thu, Yovany Yurik, CRNP, 10 mEq at 12/14/23 0837    saccharomyces boulardii (FLORASTOR) capsule 250 mg, 250 mg, Oral, BID, Cuimarcos Yurik, CRNP, 250 mg at 12/16/23 0830    tamsulosin (FLOMAX) capsule 0.4 mg, 0.4 mg, Oral, Daily With Dinner, Yeseniaimarcos Yurik, CRNP, 0.4 mg at 12/15/23 1544    torsemide (DEMADEX) tablet 5 mg, 5 mg, Oral, Once per day on Mon Thu, Cuiyin Yurik, CRNP, 5 mg at 12/14/23 0837    Blood Culture:   Lab Results   Component Value Date    BLOODCX No Growth After 5 Days. 09/08/2023    BLOODCX No Growth After 5 Days. 09/08/2023       Wound Culture:   Lab Results   Component Value Date    WOUNDCULT 3+ Growth of Staphylococcus aureus (A) 09/09/2023       Ins and Outs:  I/O last 24 hours:  In: 120 [P.O.:120]  Out: 900 [Urine:900]          Physical:  Vitals:    12/16/23 0700   BP: (!) 173/72   Pulse: 73   Resp: 18   Temp: 98 °F (36.7 °C)   SpO2: 93%     Musculoskeletal: left Lower Extremity      Skin with erythema extending from the proximal lower leg down to the ankle as well as edema to the lower extremity extending proximally over the lateral knee.  There is a small  puncture wound from the IR aspiration with scant drainage on the dressing but no drainage expressed.  There is a palpable fluid collection over the lateral distal thigh  TTP throughout the lower extremity and over this lateral distal thigh with fluid collection  Full knee and ankle range of motion without pain  Sensation intact to saphenous, sural, tibial, superficial peroneal nerve, and deep peroneal  Motor intact to +FHL/EHL, +ankle dorsi/plantar flexion  2+ DP pulse  Digits warm and well perfused  Capillary refill < 2 seconds    Assessment:    83 y.o.male with left lateral thigh abscess and left lower extremity cellulitis.     Plan:  Weightbearing*left lower extremity  Given positive Gram stain on aspirate from IR anticipate surgical intervention for irrigation debridement of this abscess  Continue IV antibiotics  Maintain n.p.o.  Will coordinate with attending surgeon for surgical scheduling. Informed consent was signed.   Pain control  DVT ppx- SCD      Grant Meyer PA-C

## 2023-12-16 NOTE — ANESTHESIA PREPROCEDURE EVALUATION
Procedure:  DEBRIDEMENT LOWER EXTREMITY [facility] OUT)- left thigh abscess irrigation and debridement and all necessary procedures (Left: Leg Lower)    Relevant Problems   CARDIO   (+) Essential hypertension   (+) Hyperlipidemia   (+) Nonrheumatic aortic valve insufficiency   (+) Nonrheumatic mitral valve regurgitation   (+) Paroxysmal atrial flutter (HCC)   (+) 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HCC)   (+) SVT (supraventricular tachycardia)      GI/HEPATIC   (+) Gastroesophageal reflux disease without esophagitis   (+) Hiatal hernia      /RENAL   (+) BPH (benign prostatic hyperplasia)   (+) Stage 3 chronic kidney disease, unspecified whether stage 3a or 3b CKD (HCC)      MUSCULOSKELETAL   (+) Hiatal hernia      NEURO/PSYCH   (+) OCD (obsessive compulsive disorder)   (+) Recurrent major depressive disorder, in full remission ([address])      Digestive   (+) Vega esophagus      Musculoskeletal and Integument   (+) Age-related osteoporosis without current pathological fracture      Other   (+) Abscess of left leg        Physical Exam    Airway    Mallampati score: II  TM Distance: >3 FB  Neck ROM: full     Dental       Cardiovascular  Rhythm: regular, Rate: normal    Pulmonary   Breath sounds clear to auscultation    Other Findings        Anesthesia Plan  ASA Score- 3 Emergent    Anesthesia Type- general with ASA Monitors. Additional Monitors:     Airway Plan: LMA. Plan Factors-    Chart reviewed. Patient is not a current smoker. Induction- intravenous. Postoperative Plan- Plan for postoperative opioid use. Informed Consent- Anesthetic plan and risks discussed with patient.

## 2023-12-16 NOTE — ANESTHESIA POSTPROCEDURE EVALUATION
Post-Op Assessment Note    CV Status:  Stable  Pain Score: 3    Pain management: adequate       Mental Status:  Sleepy   Hydration Status:  Stable   PONV Controlled:  None   Airway Patency:  Patent     Post Op Vitals Reviewed: Yes      Staff: Anesthesiologist               /67 (12/16/23 1540)    Temp (!) 97.4 °F (36.3 °C) (12/16/23 1540)    Pulse 77 (12/16/23 1540)   Resp 20 (12/16/23 1540)    SpO2 100 % (12/16/23 1540)

## 2023-12-16 NOTE — PLAN OF CARE
Problem: PAIN - ADULT  Goal: Verbalizes/displays adequate comfort level or baseline comfort level  Description: Interventions:  - Encourage patient to monitor pain and request assistance  - Assess pain using appropriate pain scale  - Administer analgesics based on type and severity of pain and evaluate response  - Implement non-pharmacological measures as appropriate and evaluate response  - Consider cultural and social influences on pain and pain management  - Notify physician/advanced practitioner if interventions unsuccessful or patient reports new pain  Outcome: Progressing     Problem: INFECTION - ADULT  Goal: Absence or prevention of progression during hospitalization  Description: INTERVENTIONS:  - Assess and monitor for signs and symptoms of infection  - Monitor lab/diagnostic results  - Monitor all insertion sites, i.e. indwelling lines, tubes, and drains  - Monitor endotracheal if appropriate and nasal secretions for changes in amount and color  - [place] appropriate cooling/warming therapies per order  - Administer medications as ordered  - Instruct and encourage patient and family to use good hand hygiene technique  - Identify and instruct in appropriate isolation precautions for identified infection/condition  Outcome: Progressing  Goal: Absence of fever/infection during neutropenic period  Description: INTERVENTIONS:  - Monitor WBC    Outcome: Progressing     Problem: SAFETY ADULT  Goal: Patient will remain free of falls  Description: INTERVENTIONS:  - Educate patient/family on patient safety including physical limitations  - Instruct patient to call for assistance with activity   - Consult OT/PT to assist with strengthening/mobility   - Keep Call bell within reach  - Keep bed low and locked with side rails adjusted as appropriate  - Keep care items and personal belongings within reach  - Initiate and maintain comfort rounds  - Make Fall Risk Sign visible to staff  - Offer Toileting every 2 Hours,  in advance of need  - Initiate/Maintain bed alarm  - Obtain necessary fall risk management equipment: call bell  - Apply yellow socks and bracelet for high fall risk patients  - Consider moving patient to room near nurses station  Outcome: Progressing  Goal: Maintain or return to baseline ADL function  Description: INTERVENTIONS:  -  Assess patient's ability to carry out ADLs; assess patient's baseline for ADL function and identify physical deficits which impact ability to perform ADLs (bathing, care of mouth/teeth, toileting, grooming, dressing, etc.)  - Assess/evaluate cause of self-care deficits   - Assess range of motion  - Assess patient's mobility; develop plan if impaired  - Assess patient's need for assistive devices and provide as appropriate  - Encourage maximum independence but intervene and supervise when necessary  - Involve family in performance of ADLs  - Assess for home care needs following discharge   - Consider OT consult to assist with ADL evaluation and planning for discharge  - Provide patient education as appropriate  Outcome: Progressing  Goal: Maintains/Returns to pre admission functional level  Description: INTERVENTIONS:  - Perform AM-PAC 6 Click Basic Mobility/ Daily Activity assessment daily.  - Set and communicate daily mobility goal to care team and patient/family/caregiver.   - Collaborate with rehabilitation services on mobility goals if consulted  - Perform Range of Motion 2 times a day.  - Reposition patient every 2 hours.  - Dangle patient 2 times a day  - Stand patient 2 times a day  - Ambulate patient 2 times a day  - Out of bed to chair 2 times a day   - Out of bed for meals 2 times a day  - Out of bed for toileting  - Record patient progress and toleration of activity level   Outcome: Progressing     Problem: DISCHARGE PLANNING  Goal: Discharge to home or other facility with appropriate resources  Description: INTERVENTIONS:  - Identify barriers to discharge w/patient and  caregiver  - Arrange for needed discharge resources and transportation as appropriate  - Identify discharge learning needs (meds, wound care, etc.)  - Arrange for interpretive services to assist at discharge as needed  - Refer to Case Management Department for coordinating discharge planning if the patient needs post-hospital services based on physician/advanced practitioner order or complex needs related to functional status, cognitive ability, or social support system  Outcome: Progressing     Problem: Knowledge Deficit  Goal: Patient/family/caregiver demonstrates understanding of disease process, treatment plan, medications, and discharge instructions  Description: Complete learning assessment and assess knowledge base.  Interventions:  - Provide teaching at level of understanding  - Provide teaching via preferred learning methods  Outcome: Progressing     Problem: Nutrition/Hydration-ADULT  Goal: Nutrient/Hydration intake appropriate for improving, restoring or maintaining nutritional needs  Description: Monitor and assess patient's nutrition/hydration status for malnutrition. Collaborate with interdisciplinary team and initiate plan and interventions as ordered.  Monitor patient's weight and dietary intake as ordered or per policy. Utilize nutrition screening tool and intervene as necessary. Determine patient's food preferences and provide high-protein, high-caloric foods as appropriate.     INTERVENTIONS:  - Monitor oral intake, urinary output, labs, and treatment plans  - Assess nutrition and hydration status and recommend course of action  - Evaluate amount of meals eaten  - Assist patient with eating if necessary   - Allow adequate time for meals  - Recommend/ encourage appropriate diets, oral nutritional supplements, and vitamin/mineral supplements  - Order, calculate, and assess calorie counts as needed  - Recommend, monitor, and adjust tube feedings and TPN/PPN based on assessed needs  - Assess need for  intravenous fluids  - Provide specific nutrition/hydration education as appropriate  - Include patient/family/caregiver in decisions related to nutrition  Outcome: Progressing

## 2023-12-16 NOTE — PLAN OF CARE
Problem: PAIN - ADULT  Goal: Verbalizes/displays adequate comfort level or baseline comfort level  Description: Interventions:  - Encourage patient to monitor pain and request assistance  - Assess pain using appropriate pain scale  - Administer analgesics based on type and severity of pain and evaluate response  - Implement non-pharmacological measures as appropriate and evaluate response  - Consider cultural and social influences on pain and pain management  - Notify physician/advanced practitioner if interventions unsuccessful or patient reports new pain  Outcome: Progressing     Problem: INFECTION - ADULT  Goal: Absence or prevention of progression during hospitalization  Description: INTERVENTIONS:  - Assess and monitor for signs and symptoms of infection  - Monitor lab/diagnostic results  - Monitor all insertion sites, i.e. indwelling lines, tubes, and drains  - Monitor endotracheal if appropriate and nasal secretions for changes in amount and color  - [place] appropriate cooling/warming therapies per order  - Administer medications as ordered  - Instruct and encourage patient and family to use good hand hygiene technique  - Identify and instruct in appropriate isolation precautions for identified infection/condition  Outcome: Progressing  Goal: Absence of fever/infection during neutropenic period  Description: INTERVENTIONS:  - Monitor WBC    Outcome: Progressing     Problem: SAFETY ADULT  Goal: Patient will remain free of falls  Description: INTERVENTIONS:  - Educate patient/family on patient safety including physical limitations  - Instruct patient to call for assistance with activity   - Consult OT/PT to assist with strengthening/mobility   - Keep Call bell within reach  - Keep bed low and locked with side rails adjusted as appropriate  - Keep care items and personal belongings within reach  - Initiate and maintain comfort rounds  - Make Fall Risk Sign visible to staff  - Offer Toileting every  Hours,  in advance of need  - Initiate/Maintain alarm  - Obtain necessary fall risk management equipment:   - Apply yellow socks and bracelet for high fall risk patients  - Consider moving patient to room near nurses station  Outcome: Progressing  Goal: Maintain or return to baseline ADL function  Description: INTERVENTIONS:  -  Assess patient's ability to carry out ADLs; assess patient's baseline for ADL function and identify physical deficits which impact ability to perform ADLs (bathing, care of mouth/teeth, toileting, grooming, dressing, etc.)  - Assess/evaluate cause of self-care deficits   - Assess range of motion  - Assess patient's mobility; develop plan if impaired  - Assess patient's need for assistive devices and provide as appropriate  - Encourage maximum independence but intervene and supervise when necessary  - Involve family in performance of ADLs  - Assess for home care needs following discharge   - Consider OT consult to assist with ADL evaluation and planning for discharge  - Provide patient education as appropriate  Outcome: Progressing  Goal: Maintains/Returns to pre admission functional level  Description: INTERVENTIONS:  - Perform AM-PAC 6 Click Basic Mobility/ Daily Activity assessment daily.  - Set and communicate daily mobility goal to care team and patient/family/caregiver.   - Collaborate with rehabilitation services on mobility goals if consulted  - Perform Range of Motion  times a day.  - Reposition patient every  hours.  - Dangle patient  times a day  - Stand patient  times a day  - Ambulate patient  times a day  - Out of bed to chair  times a day   - Out of bed for meals  times a day  - Out of bed for toileting  - Record patient progress and toleration of activity level   Outcome: Progressing     Problem: DISCHARGE PLANNING  Goal: Discharge to home or other facility with appropriate resources  Description: INTERVENTIONS:  - Identify barriers to discharge w/patient and caregiver  - Arrange for  needed discharge resources and transportation as appropriate  - Identify discharge learning needs (meds, wound care, etc.)  - Arrange for interpretive services to assist at discharge as needed  - Refer to Case Management Department for coordinating discharge planning if the patient needs post-hospital services based on physician/advanced practitioner order or complex needs related to functional status, cognitive ability, or social support system  Outcome: Progressing     Problem: Knowledge Deficit  Goal: Patient/family/caregiver demonstrates understanding of disease process, treatment plan, medications, and discharge instructions  Description: Complete learning assessment and assess knowledge base.  Interventions:  - Provide teaching at level of understanding  - Provide teaching via preferred learning methods  Outcome: Progressing     Problem: Nutrition/Hydration-ADULT  Goal: Nutrient/Hydration intake appropriate for improving, restoring or maintaining nutritional needs  Description: Monitor and assess patient's nutrition/hydration status for malnutrition. Collaborate with interdisciplinary team and initiate plan and interventions as ordered.  Monitor patient's weight and dietary intake as ordered or per policy. Utilize nutrition screening tool and intervene as necessary. Determine patient's food preferences and provide high-protein, high-caloric foods as appropriate.     INTERVENTIONS:  - Monitor oral intake, urinary output, labs, and treatment plans  - Assess nutrition and hydration status and recommend course of action  - Evaluate amount of meals eaten  - Assist patient with eating if necessary   - Allow adequate time for meals  - Recommend/ encourage appropriate diets, oral nutritional supplements, and vitamin/mineral supplements  - Order, calculate, and assess calorie counts as needed  - Recommend, monitor, and adjust tube feedings and TPN/PPN based on assessed needs  - Assess need for intravenous fluids  -  Provide specific nutrition/hydration education as appropriate  - Include patient/family/caregiver in decisions related to nutrition  Outcome: Progressing

## 2023-12-16 NOTE — OCCUPATIONAL THERAPY NOTE
Occupational Therapy Cancel Note     Patient Name: Taj Roblero Jr.  Today's Date: 12/16/2023  Problem List  Principal Problem:    Cellulitis of extremity  Active Problems:    Essential hypertension    Recurrent major depressive disorder, in full remission (HCC)    History of pulmonary embolus (PE) 2019    Valvular heart disease    Paroxysmal atrial flutter (HCC)    COVID-19    Leg edema, left    Gastroesophageal reflux disease without esophagitis    Stage 3 chronic kidney disease, unspecified whether stage 3a or 3b CKD (HCC)    BPH (benign prostatic hyperplasia)    Electrolyte abnormality       12/16/23 1764   Note Type   Note Type Cancelled Session  (Saturday 12/16/23)   Cancel Reasons Patient to operating room     Teresa Mckeon OTR/WIN  [id number]  [id number]

## 2023-12-16 NOTE — OP NOTE
OPERATIVE REPORT  PATIENT NAME: Taj Roblero Jr.    :  1940  MRN: [id number]  Pt Location: [facility] OR ROOM 01    SURGERY DATE: 2023    Surgeons and Role:     * Feng Robertson MD - Primary    Preop Diagnosis:  Abscess of left leg [L02.416]    Post-Op Diagnosis Codes:     * Abscess of left leg [L02.416]    Procedure(s):  Left - DEBRIDEMENT LOWER EXTREMITY (WASH OUT)- left thigh abscess irrigation and debridement and all necessary procedures    Specimen(s):  ID Type Source Tests Collected by Time Destination   A : Left Leg Abscess Tissue Abscess ANAEROBIC CULTURE AND GRAM STAIN, CULTURE, TISSUE AND GRAM STAIN Feng Robertson MD 2023 1507        Estimated Blood Loss:   Minimal    Drains:  * No LDAs found *    Anesthesia Type:   General    Operative Indications:  Abscess of left leg [L02.416]    Operative Findings:  Left thigh abscess    Complications:   None    Procedure and Technique:  Irrigation debridement of left thigh abscess    After informed surgical consent obtained patient was brought to the operating room and transferred to the operating table in supine position.  General anesthesia was obtained.  Left lower extremity was prepped and draped normal sterile fashion.    The old wound was excised and dissection was taken down through the skin and 16's tissues to the level of the iliotibial band and then down to the level of the femur.  There was no evidence of any purulence.  Deep cultures were obtained.  This wound was sharply debrided with a knife curette and a rongeur through the entire zone which was approximately 6 cm x 2 cm x 3 cm deep.    There was no evidence of any infection from the bone or any deep abscess.    The wounds were well-irrigated with normal saline.  This was then packed with vancomycin powder and closed with 2-0 Vicryl and 3-0 nylon.  Sterile dressings were applied.  Patient was then awakened from general anesthesia and transferred to recovery stable condition having tolerated  the procedure well.   I was present for the entire procedure., A qualified resident physician was not available., and A physician assistant was required during the procedure for retraction, tissue handling, dissection and suturing.    Patient Disposition:  PACU         SIGNATURE: Feng Robertson MD  DATE: December 16, 2023  TIME: 3:19 PM

## 2023-12-17 LAB
ANION GAP SERPL CALCULATED.3IONS-SCNC: 4 MMOL/L
BACTERIA SPEC ANAEROBE CULT: NORMAL
BACTERIA SPEC BFLD CULT: ABNORMAL
BUN SERPL-MCNC: 22 MG/DL (ref 5–25)
CALCIUM SERPL-MCNC: 8.4 MG/DL (ref 8.4–10.2)
CHLORIDE SERPL-SCNC: 97 MMOL/L (ref 96–108)
CO2 SERPL-SCNC: 31 MMOL/L (ref 21–32)
CREAT SERPL-MCNC: 1.09 MG/DL (ref 0.6–1.3)
ERYTHROCYTE [DISTWIDTH] IN BLOOD BY AUTOMATED COUNT: 12.5 % (ref 11.6–15.1)
GFR SERPL CREATININE-BSD FRML MDRD: 62 ML/MIN/1.73SQ M
GLUCOSE SERPL-MCNC: 147 MG/DL (ref 65–140)
GRAM STN SPEC: ABNORMAL
GRAM STN SPEC: ABNORMAL
HCT VFR BLD AUTO: 33.8 % (ref 36.5–49.3)
HGB BLD-MCNC: 11.1 G/DL (ref 12–17)
MCH RBC QN AUTO: 31.2 PG (ref 26.8–34.3)
MCHC RBC AUTO-ENTMCNC: 32.8 G/DL (ref 31.4–37.4)
MCV RBC AUTO: 95 FL (ref 82–98)
PLATELET # BLD AUTO: 326 THOUSANDS/UL (ref 149–390)
PMV BLD AUTO: 8.6 FL (ref 8.9–12.7)
POTASSIUM SERPL-SCNC: 5.2 MMOL/L (ref 3.5–5.3)
RBC # BLD AUTO: 3.56 MILLION/UL (ref 3.88–5.62)
SODIUM SERPL-SCNC: 132 MMOL/L (ref 135–147)
WBC # BLD AUTO: 9.21 THOUSAND/UL (ref 4.31–10.16)

## 2023-12-17 PROCEDURE — 99232 SBSQ HOSP IP/OBS MODERATE 35: CPT | Performed by: PHYSICIAN ASSISTANT

## 2023-12-17 PROCEDURE — 97535 SELF CARE MNGMENT TRAINING: CPT

## 2023-12-17 PROCEDURE — 80048 BASIC METABOLIC PNL TOTAL CA: CPT | Performed by: INTERNAL MEDICINE

## 2023-12-17 PROCEDURE — 85027 COMPLETE CBC AUTOMATED: CPT | Performed by: INTERNAL MEDICINE

## 2023-12-17 PROCEDURE — 99232 SBSQ HOSP IP/OBS MODERATE 35: CPT | Performed by: INTERNAL MEDICINE

## 2023-12-17 RX ADMIN — CITALOPRAM HYDROBROMIDE 40 MG: 20 TABLET ORAL at 08:36

## 2023-12-17 RX ADMIN — Medication 1 TABLET: at 08:37

## 2023-12-17 RX ADMIN — Medication 1 TABLET: at 15:42

## 2023-12-17 RX ADMIN — ENOXAPARIN SODIUM 40 MG: 40 INJECTION SUBCUTANEOUS at 21:48

## 2023-12-17 RX ADMIN — LISINOPRIL 5 MG: 5 TABLET ORAL at 08:37

## 2023-12-17 RX ADMIN — Medication 250 MG: at 19:21

## 2023-12-17 RX ADMIN — B-COMPLEX W/ C & FOLIC ACID TAB 1 TABLET: TAB at 08:37

## 2023-12-17 RX ADMIN — FLUTICASONE PROPIONATE 1 SPRAY: 50 SPRAY, METERED NASAL at 08:40

## 2023-12-17 RX ADMIN — NIACIN 500 MG: 500 TABLET, EXTENDED RELEASE ORAL at 15:42

## 2023-12-17 RX ADMIN — CEFAZOLIN SODIUM 2000 MG: 2 SOLUTION INTRAVENOUS at 23:01

## 2023-12-17 RX ADMIN — METOPROLOL TARTRATE 50 MG: 50 TABLET, FILM COATED ORAL at 20:26

## 2023-12-17 RX ADMIN — PANTOPRAZOLE SODIUM 40 MG: 40 TABLET, DELAYED RELEASE ORAL at 05:09

## 2023-12-17 RX ADMIN — TAMSULOSIN HYDROCHLORIDE 0.4 MG: 0.4 CAPSULE ORAL at 15:42

## 2023-12-17 RX ADMIN — METOPROLOL TARTRATE 50 MG: 50 TABLET, FILM COATED ORAL at 08:38

## 2023-12-17 RX ADMIN — Medication 400 MG: at 19:21

## 2023-12-17 RX ADMIN — ATORVASTATIN CALCIUM 5 MG: 10 TABLET, FILM COATED ORAL at 15:42

## 2023-12-17 RX ADMIN — Medication 400 MG: at 08:37

## 2023-12-17 RX ADMIN — CEFAZOLIN SODIUM 2000 MG: 2 SOLUTION INTRAVENOUS at 06:30

## 2023-12-17 RX ADMIN — CEFAZOLIN SODIUM 2000 MG: 2 SOLUTION INTRAVENOUS at 15:42

## 2023-12-17 RX ADMIN — Medication 250 MG: at 08:37

## 2023-12-17 NOTE — PROGRESS NOTES
[facility]  Progress Note  Name: Taj You I  MRN: [id number]  Unit/Bed#: 3 [place] 308-01 I Date of Admission: 12/9/2023   Date of Service: 12/17/2023 I Hospital Day: 8    Assessment/Plan   * Cellulitis of extremity  Assessment & Plan  Patient presents with left lower extremity redness edema pain since Thursday this week, got worse today.  Reports low-grade fever at home.  Chart review.  Patient was hospitalized 11/29 - 12/2/2023 for COVID-19 and RSV with acute respiratory insufficiency.  Treated with remdesivir and Decadron, discharged on prednisone 40 mg p.o. daily for 4 days.  History of left femur fracture, underwent insertion of nail in June 2022, subsequently developed left distal femur abscess in September 2023.  Wound culture grew MSSA.  Patient underwent hardware removal, was treated with IV cefazolin  then Keflex.  Left lower extremity redness, edema, increased warmth, tender on exam.  Redness extends to below left knee.  On admission WBC 18.98.  Partly could be due to steroids.  Patient does not meet sepsis criteria.  Overall cellulitis continues to improve but noted to have developed collection superiorly and prior left lateral knee incision   Seen by Ortho, input appreciated  CT of lower extremity revealed-Thick-walled rim-enhancing focal fluid collection (1.5 x 1.7 x 2.2 cm) centered in the lateral subcutaneous tissues of the distal thigh; concerning for abscess.Nonspecific diffuse subcutaneous edema throughout the lower leg and foot.No CT evidence of osteomyelitis or a necrotizing deep soft tissue infection  Status post IR guided aspiration with removal of 2 cc of purulent fluid.  Unable to drain completely  Status post left distal thigh abscess I&D, 12/16 by Ortho  Aspiration culture with growth of MSSA  Afebrile, leukocytosis normalized.  Slowly improving  Given Rocephin in ED. continue high-dose Ancef  Left lower extremity venous Dopplers were negative for  DVT.  Continue leg elevation and pain control  Continue diuretics  Follow-up Gram stain culture sensitivity from the OR  Orthopedic follow-up     Electrolyte abnormality  Assessment & Plan  With mild hyponatremia and hypomagnesemia  Continue magnesium oxide 400 mg p.o. twice daily  Continue diuretics  Monitor electrolytes    Stage 3 chronic kidney disease, unspecified whether stage 3a or 3b CKD (HCC)  Assessment & Plan  Baseline creatinine appears to be around 1.1-1.3s  Creatinine around baseline  Monitor  Results from last 7 days   Lab Units 12/17/23  0507 12/16/23  0543 12/15/23  0434 12/14/23  0528 12/13/23  0523 12/12/23  0513 12/11/23  0608   BUN mg/dL 22 15 19 18 17 16 19   CREATININE mg/dL 1.09 0.95 0.89 0.98 1.03 1.05 1.12        COVID-19  Assessment & Plan  As above.  Symptoms improved a lot per daughter.  Occasional cough on exam.  Patient has been stable on room air.  Patient tested positive for COVID-19 and RSV.  Supportive treatment  Removed off isolation as patient tested positive on 11/29/2023    Paroxysmal atrial flutter (HCC)  Assessment & Plan  on metoprolol at home which will be continued  Not on AC.  EKG sinus rhythm      Valvular heart disease  Assessment & Plan  2D echo in June last year showed normal EF around 60%, grade 1 diastolic dysfunction, mild to moderate TR, PA pressure 51, mild AR.  Continue ACE inhibitor  Patient follows cardiology as outpatient    History of pulmonary embolus (PE) 2019  Assessment & Plan  Provoked PE after right femur surgery in 2019, took AC for a few months.  Had IVC filter inserted and subsequently removed.  Pharmacologic DVT prophylaxis    Essential hypertension  Assessment & Plan  On fosinopril and metoprolol.  Will continue.  BP acceptable    BPH (benign prostatic hyperplasia)  Assessment & Plan  Continue Flomax    Gastroesophageal reflux disease without esophagitis  Assessment & Plan  Continue PPI    Leg edema, left  Assessment & Plan  Chronic left leg edema  per daughter.  On Demadex 5mg p.o. twice a week at home.  Worsening left leg edema  per daughter  Continue Demadex twice weekly, additional IV Lasix x 1 on   Lower extremity venous Doppler showed no evidence of DVT    Recurrent major depressive disorder, in full remission ([facility])  Assessment & Plan  Continue Celexa               VTE Pharmacologic Prophylaxis:   High Risk (Score >/= 5) - Pharmacological DVT Prophylaxis Ordered: enoxaparin (Lovenox). Sequential Compression Devices Ordered.    Mobility:   Basic Mobility Inpatient Raw Score: 20  JH-HLM Goal: 6: Walk 10 steps or more  JH-HLM Achieved: 6: Walk 10 steps or more  HLM Goal achieved. Continue to encourage appropriate mobility.    Patient Centered Rounds: I performed bedside rounds with nursing staff today.   Discussions with Specialists or Other Care Team Provider: yes,    Education and Discussions with Family / Patient: Updated  (wife and daughter) at bedside.    Total Time Spent on Date of Encounter in care of patient: 45 mins. This time was spent on one or more of the following: performing physical exam; counseling and coordination of care; obtaining or reviewing history; documenting in the medical record; reviewing/ordering tests, medications or procedures; communicating with other healthcare professionals and discussing with patient's family/caregivers.    Current Length of Stay: 8 day(s)  Current Patient Status: Inpatient   Certification Statement: The patient will continue to require additional inpatient hospital stay due to cellulitis requiring IV antibiotics  Discharge Plan: Anticipate discharge in 48-72 hrs to home.    Code Status: Level 1 - Full Code    Subjective:     Status post left distal thigh abscess I&D yesterday  Doing well  Pain is improving  Denies any chest pain shortness of breath  Ambulating in room without any difficulty/discomfort or limiting symptoms    Objective:     Vitals:   Temp (24hrs), Av.8 °F (36.6 °C),  Min:97.4 °F (36.3 °C), Max:98.3 °F (36.8 °C)    Temp:  [97.4 °F (36.3 °C)-98.3 °F (36.8 °C)] 98.3 °F (36.8 °C)  HR:  [] 86  Resp:  [16-20] 16  BP: (127-158)/(58-74) 132/58  SpO2:  [94 %-100 %] 94 %  Body mass index is 27.76 kg/m².     Input and Output Summary (last 24 hours):     Intake/Output Summary (Last 24 hours) at 12/17/2023 0939  Last data filed at 12/16/2023 1947  Gross per 24 hour   Intake 250 ml   Output 180 ml   Net 70 ml       Physical Exam:   Physical Exam  Vitals and nursing note reviewed.   Constitutional:       General: He is not in acute distress.     Appearance: He is well-developed.      Comments: Elderly, frail, pleasant   HENT:      Head: Normocephalic and atraumatic.   Cardiovascular:      Rate and Rhythm: Normal rate.   Pulmonary:      Effort: Pulmonary effort is normal. No respiratory distress.      Breath sounds: Normal breath sounds.   Abdominal:      Palpations: Abdomen is soft.      Tenderness: There is no abdominal tenderness.   Musculoskeletal:         General: No swelling.      Left lower leg: Edema present.      Comments: Left lower extremity erythema tenderness and induration, overall swelling appears to be improving, no crepitation or fluctuation.  Dressing over left lower thigh abscess area laterally/Ace bandage   Skin:     General: Skin is warm and dry.      Capillary Refill: Capillary refill takes less than 2 seconds.      Findings: Erythema present.   Neurological:      Mental Status: He is alert. Mental status is at baseline.   Psychiatric:         Mood and Affect: Mood normal.         Additional Data:     Labs:                        Lines/Drains:  Invasive Devices       Peripheral Intravenous Line  Duration             Peripheral IV 12/13/23 Left Forearm 3 days    Peripheral IV 12/17/23 Right Forearm <1 day                          Imaging: Reviewed radiology reports from this admission including: chest xray    Recent Cultures (last 7 days):   Results from last 7 days    Lab Units 12/15/23  1437   GRAM STAIN RESULT  3+ Polys*  1+ Gram positive cocci in pairs*   BODY FLUID CULTURE, STERILE  1+ Growth of Staphylococcus aureus*       Last 24 Hours Medication List:   Current Facility-Administered Medications   Medication Dose Route Frequency Provider Last Rate    acetaminophen  650 mg Oral Q6H PRN Dung Ashley MD      atorvastatin  5 mg Oral Daily With Dinner Dung Ashley MD      calcium carbonate-vitamin D  1 tablet Oral BID With Meals Dung Ashley MD      cefazolin  2,000 mg Intravenous Q8H Dung Ashley MD 2,000 mg (12/17/23 0630)    citalopram  40 mg Oral QAM Dung Ashley MD      enoxaparin  40 mg Subcutaneous HS Dung Ashley MD      fluticasone  1 spray Each Nare Daily Dung Ashley MD      ipratropium  2 spray Nasal BID Dung Ashley MD      lisinopril  5 mg Oral Daily Dung Ashley MD      magnesium Oxide  400 mg Oral BID Dung Ashley MD      metoprolol tartrate  50 mg Oral Q12H Dung Ashley MD      multivitamin stress formula  1 tablet Oral Daily Dung Ashley MD      niacin  500 mg Oral Daily With Dinner Dung Ashley MD      ondansetron  4 mg Intravenous Q6H PRN Dung Ashley MD      pantoprazole  40 mg Oral Early Morning Dung Ashley MD      potassium chloride  10 mEq Oral Once per day on Mon Thu Dung Ashley MD      saccharomyces boulardii  250 mg Oral BID Dnug Ashley MD      tamsulosin  0.4 mg Oral Daily With Dinner Dung Ashley MD      torsemide  5 mg Oral Once per day on Mon Thu Dung Ashley MD          Today, Patient Was Seen By: Dung Ashley MD    **Please Note: This note may have been constructed using a voice recognition system.**

## 2023-12-17 NOTE — PLAN OF CARE
Problem: INFECTION - ADULT  Goal: Absence or prevention of progression during hospitalization  Description: INTERVENTIONS:  - Assess and monitor for signs and symptoms of infection  - Monitor lab/diagnostic results  - Monitor all insertion sites, i.e. indwelling lines, tubes, and drains  - Monitor endotracheal if appropriate and nasal secretions for changes in amount and color  - Monitor appropriate cooling/warming therapies per order  - Administer medications as ordered  - Instruct and encourage patient and family to use good hand hygiene technique  - Identify and instruct in appropriate isolation precautions for identified infection/condition  Outcome: Progressing  Goal: Absence of fever/infection during neutropenic period  Description: INTERVENTIONS:  - Monitor WBC    Outcome: Progressing

## 2023-12-17 NOTE — OCCUPATIONAL THERAPY NOTE
Occupational Therapy Progress Note     Patient Name: Taj Roblero Jr.  Today's Date: 12/17/2023  Problem List  Principal Problem:    Cellulitis of extremity  Active Problems:    Essential hypertension    Recurrent major depressive disorder, in full remission (HCC)    History of pulmonary embolus (PE) 2019    Valvular heart disease    Paroxysmal atrial flutter (HCC)    COVID-19    Leg edema, left    Gastroesophageal reflux disease without esophagitis    Stage 3 chronic kidney disease, unspecified whether stage 3a or 3b CKD (HCC)    BPH (benign prostatic hyperplasia)    Electrolyte abnormality       12/17/23 1541   OT Last Visit   OT Visit Date 12/17/23  (Sunday)   Note Type   Note Type Treatment   Pain Assessment   Pain Assessment Tool 0-10   Pain Score 3   Pain Location/Orientation Orientation: Left;Location: Leg   Pain Onset/Description   (sore w/ bed mobility)   Effect of Pain on Daily Activities limits pace w/ bed mobility   Patient's Stated Pain Goal No pain   Hospital Pain Intervention(s) Repositioned;Ambulation/increased activity;Emotional support   Restrictions/Precautions   Weight Bearing Precautions Per Order Yes   LLE Weight Bearing Per Order (S)  WBAT  (s/p I&D on 12/16/23 w/ orthopedics)   Other Precautions Chair Alarm;Bed Alarm;Fall Risk;Pain   Lifestyle   Autonomy Pt reports I w/ ADLs using walker at baseline   Reciprocal Relationships Supportive family. Pt reports having 2 grandchildren and added that his daughter is nurse at [employer]   ADL   Where Assessed Edge of bed   Eating Assistance 6  Modified independent   Eating Deficit Setup   LB Dressing Assistance 3  Moderate Assistance   LB Dressing Deficit Don/doff L sock;Setup;Increased time to complete;Supervision/safety;Verbal cueing   Bed Mobility   Supine to Sit 5  Supervision   Additional items Assist x 1;HOB elevated;Increased time required   Sit to Supine 5  Supervision   Additional items Assist x 1;HOB elevated;Increased time  "required   Additional Comments Pt supine HOB elevated upon arrival and at end of session w/ needs met, call bell in reach and bed alarm activated   Transfers   Sit to Stand 5  Supervision   Additional items Increased time required;Assist x 1   Stand to Sit 5  Supervision   Additional items Assist x 1;Increased time required   Functional Mobility   Functional Mobility 5  Supervision   Additional Comments household distance functional mobility using RW w/ S. Cues for body position / walker mgmt   Additional items Rolling walker   Subjective   Subjective \"That is much better\" (stated post eval w/ bed turned towards tv)   Cognition   Overall Cognitive Status WFL   Arousal/Participation Alert;Cooperative   Attention Attends with cues to redirect   Orientation Level Oriented X4   Memory Within functional limits   Following Commands Follows multistep commands without difficulty   Comments Identified pt by full name and birthdate. Alert, oriented and able to follow directions during ADLs. Hard of hearing   Activity Tolerance   Activity Tolerance Patient tolerated treatment well   Medical Staff Made Aware spoke w/ RN, Sunita / VANITA, Paula   Assessment   Assessment Pt seen for skilled OT tx session focusing on activity engagement. Pt agreeable and motivated to participate. Pt demonstrated improved activity and standing tolerance. Pt engaged in functional mobility w/ S using RW. Pt required assistance to don L sock. Continue to recommend level III rehab resource intensity when medically stable for discharge from acute care. Will continue to follow   Plan   Treatment Interventions ADL retraining;Functional transfer training;Endurance training;Patient/family training;Equipment evaluation/education;Compensatory technique education;Continued evaluation;Energy conservation;Activityengagement   Goal Expiration Date 12/24/23   OT Treatment Day 1  (Sunday 12/17/23)   OT Frequency 2-3x/wk   Discharge Recommendation   Rehab Resource " Intensity Level, OT III (Minimum Resource Intensity)   -PAC Daily Activity Inpatient   Lower Body Dressing 3   Bathing 3   Toileting 3   Upper Body Dressing 4   Grooming 4   Eating 4   Daily Activity Raw Score 21   Daily Activity Standardized Score (Calc for Raw Score >=11) 44.27   -PAC Applied Cognition Inpatient   Following a Speech/Presentation 3   Understanding Ordinary Conversation 4   Taking Medications 3   Remembering Where Things Are Placed or Put Away 4   Remembering List of 4-5 Errands 3   Taking Care of Complicated Tasks 3   Applied Cognition Raw Score 20   Applied Cognition Standardized Score 41.76   Barthel Index   Feeding 10   Bathing 0   Grooming Score 5   Dressing Score 5   Bladder Score 10   Bowels Score 10   Toilet Use Score 5   Transfers (Bed/Chair) Score 10   Mobility (Level Surface) Score 10   Stairs Score 5   Barthel Index Score 70   End of Consult   Education Provided Yes   Patient Position at End of Consult Supine;Bed/Chair alarm activated;All needs within reach   Nurse Communication Nurse aware of consult   Licensure   [place] License Number  Teresa GRECIA Linda, OTR/L [id number]          The patient's raw score on the AM-PAC Daily Activity Inpatient Short Form is 21. A raw score of greater than or equal to 19 suggests the patient may benefit from discharge to home. Please refer to the recommendation of the Occupational Therapist for safe discharge planning.    Teresa Mckeon OTR/L  [id number]  [id number]

## 2023-12-17 NOTE — PROGRESS NOTES
Progress Note - Orthopedics   Taj Roblero Jr. 83 y.o. male MRN: [id number]  Unit/Bed#: [address] [place] [address]      Subjective:    83 y.o.male status post left distal thigh washout on 12/16/2023 by Dr. Robertson. no acute events, no new complaints. Pain well controlled. Denies fevers, chills, CP, SOB, N/V, numbness or tingling. Patient reports no issues with urination or bowel movements. Patient states pain has improved since prior to surgery and he is able to ambulate to the bathroom without much issue.    Labs:  0   Lab Value Date/Time    HCT 33.8 (L) 12/17/2023 0507    HCT 34.0 (L) 12/16/2023 0543    HCT 32.8 (L) 12/15/2023 0434    HCT 43.3 12/21/2015 0720    HGB 11.1 (L) 12/17/2023 0507    HGB 11.1 (L) 12/16/2023 0543    HGB 11.1 (L) 12/15/2023 0434    HGB 14.1 12/21/2015 0720    INR 1.09 09/09/2023 0505    WBC 9.21 12/17/2023 0507    WBC 6.46 12/16/2023 0543    WBC 8.03 12/15/2023 0434    WBC 6.4 12/21/2015 0720    ESR 22 (H) 10/02/2023 0728    CRP 25.8 (H) 12/02/2023 0429       Meds:    Current Facility-Administered Medications:     acetaminophen (TYLENOL) tablet 650 mg, 650 mg, Oral, Q6H PRN, Dung Ashley MD    atorvastatin (LIPITOR) tablet 5 mg, 5 mg, Oral, Daily With Dinner, Dung Ashley MD, 5 mg at 12/15/23 1544    calcium carbonate-vitamin D 500 mg-5 mcg tablet 1 tablet, 1 tablet, Oral, BID With Meals, Dung Ashley MD, 1 tablet at 12/17/23 0837    ceFAZolin (ANCEF) IVPB (premix in dextrose) 2,000 mg 50 mL, 2,000 mg, Intravenous, Q8H, Dung Ashley MD, Last Rate: 100 mL/hr at 12/17/23 0630, 2,000 mg at 12/17/23 0630    citalopram (CeleXA) tablet 40 mg, 40 mg, Oral, QAM, Dung Ashley MD, 40 mg at 12/17/23 0836    enoxaparin (LOVENOX) subcutaneous injection 40 mg, 40 mg, Subcutaneous, HS, Dung Ashley MD, 40 mg at 12/16/23 2125    fluticasone (FLONASE) 50 mcg/act nasal spray 1 spray, 1 spray, Each Nare, Daily, Dung Ashley MD, 1 spray at 12/17/23 0840    ipratropium (ATROVENT) 0.03 % nasal  spray 2 spray, 2 spray, Nasal, BID, Dung Ashley MD    lisinopril (ZESTRIL) tablet 5 mg, 5 mg, Oral, Daily, Dung Ashley MD, 5 mg at 12/17/23 0837    magnesium Oxide (MAG-OX) tablet 400 mg, 400 mg, Oral, BID, Dung Ashley MD, 400 mg at 12/17/23 0837    metoprolol tartrate (LOPRESSOR) tablet 50 mg, 50 mg, Oral, Q12H, Dung Ashley MD, 50 mg at 12/17/23 0838    multivitamin stress formula tablet 1 tablet, 1 tablet, Oral, Daily, Dung Ashley MD, 1 tablet at 12/17/23 0837    niacin (NIASPAN) CR tablet 500 mg, 500 mg, Oral, Daily With Dinner, Dung Ashley MD, 500 mg at 12/15/23 1544    ondansetron (ZOFRAN) injection 4 mg, 4 mg, Intravenous, Q6H PRN, Dung Ashley MD    pantoprazole (PROTONIX) EC tablet 40 mg, 40 mg, Oral, Early Morning, Dung Ashley MD, 40 mg at 12/17/23 0509    potassium chloride (K-DUR,KLOR-CON) CR tablet 10 mEq, 10 mEq, Oral, Once per day on Mon Thu, Dung Ashley MD, 10 mEq at 12/14/23 0837    saccharomyces boulardii (FLORASTOR) capsule 250 mg, 250 mg, Oral, BID, Dung Ashley MD, 250 mg at 12/17/23 0837    tamsulosin (FLOMAX) capsule 0.4 mg, 0.4 mg, Oral, Daily With Dinner, Dung Ashley MD, 0.4 mg at 12/15/23 1544    torsemide (DEMADEX) tablet 5 mg, 5 mg, Oral, Once per day on Mon Thu, Dung Ashley MD, 5 mg at 12/14/23 0837    Blood Culture:   Lab Results   Component Value Date    BLOODCX No Growth After 5 Days. 09/08/2023    BLOODCX No Growth After 5 Days. 09/08/2023       Wound Culture:   Lab Results   Component Value Date    WOUNDCULT 3+ Growth of Staphylococcus aureus (A) 09/09/2023       Ins and Outs:  I/O last 24 hours:  In: 250 [I.V.:250]  Out: 680 [Urine:680]          Physical:  Vitals:    12/17/23 0800   BP: 132/58   Pulse: 86   Resp: 16   Temp: 98.3 °F (36.8 °C)   SpO2: 94%     Musculoskeletal: left Lower Extremity      Skin continued erythema to the lower leg.   Dressing clean dry and intact  Minimal tenderness about the lower extremity over the  erythema.  No tenderness about the thigh  Sensation intact to saphenous, sural, tibial, superficial peroneal nerve, and deep peroneal  Motor intact to +FHL/EHL, +ankle dorsi/plantar flexion  2+ DP pulse  Digits warm and well perfused  Capillary refill < 2 seconds    Assessment:    83 y.o.male status post left distal thigh washout irrigation debridement on 12/16/2023 by Dr. Robertson.     Plan:  Weightbearing as tolerated left lower extremity  Will change dressing on 12/18  Preliminary Gram stain of intraoperative cultures positive for gram-positive cocci as was the aspirate preoperatively.  Await final cultures.  Continue IV antibiotics at this time  PT/OT  Pain control  DVT ppx Lovenox  Dispo: Ortho will follow    Grant Meyer PA-C

## 2023-12-17 NOTE — PLAN OF CARE
Problem: OCCUPATIONAL THERAPY ADULT  Goal: Performs self-care activities at highest level of function for planned discharge setting.  See evaluation for individualized goals.  Description: Treatment Interventions: ADL retraining, Functional transfer training, Endurance training, Patient/family training, Equipment evaluation/education, Energy conservation, Activityengagement          See flowsheet documentation for full assessment, interventions and recommendations.   Outcome: Progressing  Note: Limitation: Decreased ADL status, Decreased UE strength, Decreased endurance, Decreased self-care trans, Decreased high-level ADLs  Prognosis: Good  Assessment: Pt seen for skilled OT tx session focusing on activity engagement. Pt agreeable and motivated to participate. Pt demonstrated improved activity and standing tolerance. Pt engaged in functional mobility w/ S using RW. Pt required assistance to don L sock. Continue to recommend level III rehab resource intensity when medically stable for discharge from acute care. Will continue to follow     Rehab Resource Intensity Level, OT: III (Minimum Resource Intensity)

## 2023-12-18 VITALS
HEART RATE: 73 BPM | HEIGHT: 69 IN | DIASTOLIC BLOOD PRESSURE: 67 MMHG | WEIGHT: 187.98 LBS | TEMPERATURE: 98.5 F | BODY MASS INDEX: 27.84 KG/M2 | SYSTOLIC BLOOD PRESSURE: 147 MMHG | RESPIRATION RATE: 16 BRPM | OXYGEN SATURATION: 94 %

## 2023-12-18 PROBLEM — U07.1 COVID-19: Status: RESOLVED | Noted: 2022-10-25 | Resolved: 2023-12-18

## 2023-12-18 LAB
ANION GAP SERPL CALCULATED.3IONS-SCNC: 6 MMOL/L
BUN SERPL-MCNC: 22 MG/DL (ref 5–25)
CALCIUM SERPL-MCNC: 8 MG/DL (ref 8.4–10.2)
CHLORIDE SERPL-SCNC: 100 MMOL/L (ref 96–108)
CO2 SERPL-SCNC: 30 MMOL/L (ref 21–32)
CREAT SERPL-MCNC: 1.03 MG/DL (ref 0.6–1.3)
ERYTHROCYTE [DISTWIDTH] IN BLOOD BY AUTOMATED COUNT: 12.9 % (ref 11.6–15.1)
GFR SERPL CREATININE-BSD FRML MDRD: 66 ML/MIN/1.73SQ M
GLUCOSE SERPL-MCNC: 89 MG/DL (ref 65–140)
HCT VFR BLD AUTO: 31.8 % (ref 36.5–49.3)
HGB BLD-MCNC: 10.8 G/DL (ref 12–17)
MAGNESIUM SERPL-MCNC: 1.9 MG/DL (ref 1.9–2.7)
MCH RBC QN AUTO: 32.4 PG (ref 26.8–34.3)
MCHC RBC AUTO-ENTMCNC: 34 G/DL (ref 31.4–37.4)
MCV RBC AUTO: 96 FL (ref 82–98)
PLATELET # BLD AUTO: 332 THOUSANDS/UL (ref 149–390)
PMV BLD AUTO: 8.6 FL (ref 8.9–12.7)
POTASSIUM SERPL-SCNC: 4.5 MMOL/L (ref 3.5–5.3)
RBC # BLD AUTO: 3.33 MILLION/UL (ref 3.88–5.62)
SODIUM SERPL-SCNC: 136 MMOL/L (ref 135–147)
WBC # BLD AUTO: 8.75 THOUSAND/UL (ref 4.31–10.16)

## 2023-12-18 PROCEDURE — 83735 ASSAY OF MAGNESIUM: CPT | Performed by: INTERNAL MEDICINE

## 2023-12-18 PROCEDURE — 80048 BASIC METABOLIC PNL TOTAL CA: CPT | Performed by: INTERNAL MEDICINE

## 2023-12-18 PROCEDURE — 99232 SBSQ HOSP IP/OBS MODERATE 35: CPT | Performed by: PHYSICIAN ASSISTANT

## 2023-12-18 PROCEDURE — 99239 HOSP IP/OBS DSCHRG MGMT >30: CPT | Performed by: INTERNAL MEDICINE

## 2023-12-18 PROCEDURE — 85027 COMPLETE CBC AUTOMATED: CPT | Performed by: INTERNAL MEDICINE

## 2023-12-18 RX ORDER — SACCHAROMYCES BOULARDII 250 MG
250 CAPSULE ORAL 2 TIMES DAILY
Start: 2023-12-18

## 2023-12-18 RX ORDER — CEPHALEXIN 500 MG/1
500 CAPSULE ORAL EVERY 8 HOURS SCHEDULED
Qty: 12 CAPSULE | Refills: 0 | Status: SHIPPED | OUTPATIENT
Start: 2023-12-18 | End: 2023-12-22

## 2023-12-18 RX ORDER — TORSEMIDE 5 MG/1
5 TABLET ORAL 2 TIMES WEEKLY
Start: 2023-12-18

## 2023-12-18 RX ORDER — POTASSIUM CHLORIDE 750 MG/1
10 TABLET, FILM COATED, EXTENDED RELEASE ORAL 2 TIMES WEEKLY
Start: 2023-12-18

## 2023-12-18 RX ADMIN — Medication 250 MG: at 08:29

## 2023-12-18 RX ADMIN — METOPROLOL TARTRATE 50 MG: 50 TABLET, FILM COATED ORAL at 08:29

## 2023-12-18 RX ADMIN — B-COMPLEX W/ C & FOLIC ACID TAB 1 TABLET: TAB at 08:29

## 2023-12-18 RX ADMIN — CEFAZOLIN SODIUM 2000 MG: 2 SOLUTION INTRAVENOUS at 08:28

## 2023-12-18 RX ADMIN — FLUTICASONE PROPIONATE 1 SPRAY: 50 SPRAY, METERED NASAL at 08:30

## 2023-12-18 RX ADMIN — CITALOPRAM HYDROBROMIDE 40 MG: 20 TABLET ORAL at 08:29

## 2023-12-18 RX ADMIN — PANTOPRAZOLE SODIUM 40 MG: 40 TABLET, DELAYED RELEASE ORAL at 05:24

## 2023-12-18 RX ADMIN — POTASSIUM CHLORIDE 10 MEQ: 750 TABLET, EXTENDED RELEASE ORAL at 08:29

## 2023-12-18 RX ADMIN — Medication 1 TABLET: at 08:29

## 2023-12-18 RX ADMIN — Medication 400 MG: at 08:29

## 2023-12-18 RX ADMIN — LISINOPRIL 5 MG: 5 TABLET ORAL at 08:29

## 2023-12-18 RX ADMIN — TORSEMIDE 5 MG: 10 TABLET ORAL at 08:29

## 2023-12-18 NOTE — PROGRESS NOTES
"Progress Note - Orthopedics   Taj Roblero Jr. 83 y.o. male MRN: [id number]  Unit/Bed#: [address] [place] [address] Encounter: [id number]        Subjective: Status post left distal thigh washout on 12/16/2023 by Dr. Robertson. Patient notes his pain is well controlled. Denies fevers, chills, CP, SOB, N/V, numbness or tingling. Patient reports no issues with urination or bowel movements. He notes good sensation of the left lower extremity. White count WNL and patient afebrile. Initial cultures 1+ growth staph aureus. Intra-operative cultures still pending. Patient currently getting Ancef Q 8 hours.     Vitals: Blood pressure 147/67, pulse 73, temperature 98.5 °F (36.9 °C), temperature source Oral, resp. rate 16, height 5' 9\" (1.753 m), weight 85.3 kg (187 lb 15.6 oz), SpO2 94%.,Body mass index is 27.76 kg/m².      Intake/Output Summary (Last 24 hours) at 12/18/2023 1205  Last data filed at 12/17/2023 2032  Gross per 24 hour   Intake --   Output 150 ml   Net -150 ml       Invasive Devices       Peripheral Intravenous Line  Duration             Peripheral IV 12/13/23 Left Forearm 4 days    Peripheral IV 12/17/23 Right Forearm 1 day                    Ortho Exam: Alert and oriented X 3 in NAD lyin comfortably in bed. Dressing CDI. This was removed today Incision CDI with no surrounding erythema. Continued blanching erythema lower leg, without tenderness. . Compartments soft. No pain with knee ROM. No calf tenderness. Moves toes/ankle freely. 2+ DP pulse. Sensation intact lateral femoral cutaneous, saphenous, sural, deep/superficial peroneal nerve distributions.     Lab, Imaging and other studies: I have personally reviewed pertinent lab results.  CBC:   Lab Results   Component Value Date    WBC 8.75 12/18/2023    HGB 10.8 (L) 12/18/2023    HCT 31.8 (L) 12/18/2023    MCV 96 12/18/2023     12/18/2023    RBC 3.33 (L) 12/18/2023    MCH 32.4 12/18/2023    MCHC 34.0 12/18/2023    RDW 12.9 12/18/2023    MPV 8.6 (L) 12/18/2023    NRBC 0 " 12/11/2023     CMP:   Lab Results   Component Value Date     12/21/2015     12/18/2023     12/21/2015    CO2 30 12/18/2023    CO2 30 (H) 12/21/2015    ANIONGAP 11.9 12/21/2015    BUN 22 12/18/2023    BUN 13 12/21/2015    CREATININE 1.03 12/18/2023    CREATININE 0.9 12/21/2015    GLUCOSE 93 12/21/2015    CALCIUM 8.0 (L) 12/18/2023    CALCIUM 8.7 12/21/2015    AST 13 12/09/2023    AST 20 12/21/2015    ALT 11 12/09/2023    ALT 22 12/21/2015    ALKPHOS 53 12/09/2023    ALKPHOS 82 12/21/2015    PROT 6.9 12/21/2015    BILITOT 0.6 12/21/2015    EGFR 66 12/18/2023     PT/INR:   Lab Results   Component Value Date    INR 1.09 09/09/2023       Assessment:    Status post left distal thigh washout irrigation debridement on 12/16/2023 by Dr. Robertson.     Cellulitis left lower leg.     Plan:  Weightbearing as tolerated left lower extremity  Dressing changed today.   Continue abx per infectious disease.   PT/OT  Pain control  DVT ppx Lovenox  Dispo: Ortho will follow    Weight bearing: WBAT with assistance.

## 2023-12-18 NOTE — DISCHARGE SUMMARY
Discharge Summary - [facility] Internal Medicine    Patient Information: Taj Roblero Jr. 83 y.o. male MRN: [id number]  Unit/Bed#: [address] [place] [address] Encounter: [id number]    Discharging Physician / Practitioner: Dung Ashley MD  PCP: Gurpreet Aguirre MD  Admission Date: 12/9/2023  Discharge Date: 12/18/23    Reason for Admission: Leg Swelling (L leg swelling and redness for a couple of days, discharged from here a week ago, had covid and rsv at that time)      Discharge Diagnoses:     Principal Problem:    Cellulitis of extremity  Active Problems:    Essential hypertension    History of pulmonary embolus (PE) 2019    Valvular heart disease    Paroxysmal atrial flutter (HCC)    Stage 3 chronic kidney disease, unspecified whether stage 3a or 3b CKD (HCC)    Electrolyte abnormality    Recurrent major depressive disorder, in full remission (HCC)    Leg edema, left    Gastroesophageal reflux disease without esophagitis    BPH (benign prostatic hyperplasia)  Resolved Problems:    COVID-19        * Cellulitis of extremity  Assessment & Plan  Patient presents with left lower extremity redness edema pain since Thursday this week, got worse today.  Reports low-grade fever at home.  Chart review.  Patient was hospitalized 11/29 - 12/2/2023 for COVID-19 and RSV with acute respiratory insufficiency.  Treated with remdesivir and Decadron, discharged on prednisone 40 mg p.o. daily for 4 days.  History of left femur fracture, underwent insertion of nail in June 2022, subsequently developed left distal femur abscess in September 2023.  Wound culture grew MSSA.  Patient underwent hardware removal, was treated with IV cefazolin  then Keflex.  On admission, left lower extremity redness, edema, increased warmth, tender on exam.  Redness extends to below left knee.  On admission WBC 18.98.  Partly could be due to steroids.  Patient does not meet sepsis criteria.  Overall cellulitis continues to improve but noted to have developed  collection superiorly and prior left lateral knee incision   Seen by Ortho, input appreciated  CT of lower extremity revealed-Thick-walled rim-enhancing focal fluid collection (1.5 x 1.7 x 2.2 cm) centered in the lateral subcutaneous tissues of the distal thigh; concerning for abscess.Nonspecific diffuse subcutaneous edema throughout the lower leg and foot.No CT evidence of osteomyelitis or a necrotizing deep soft tissue infection  Status post IR guided aspiration with removal of 2 cc of purulent fluid.  Unable to drain completely  Status post left distal thigh abscess I&D, 12/16 by Ortho  Aspiration culture with growth of MSSA  Afebrile, leukocytosis normalized.  Continues to improve, no further tenderness or increased warmth.  Swelling is improving.  Erythema/resultant discoloration improving slowly  Given Rocephin in ED. subsequently treated with high-dose Ancef  Left lower extremity venous Dopplers were negative for DVT.  Continue leg elevation and pain control  Continue diuretics  Follow-up Gram stain culture sensitivity from the OR  Orthopedic follow-up after discharge  Transition to Keflex for 4 more days  Monitor clinically    Electrolyte abnormality  Assessment & Plan  With mild hyponatremia and hypomagnesemia  Continue magnesium oxide 400 mg p.o. twice daily  Continue diuretics    Stage 3 chronic kidney disease, unspecified whether stage 3a or 3b CKD (HCC)  Assessment & Plan  Baseline creatinine appears to be around 1.1-1.3s  Creatinine around baseline  Monitor  Results from last 7 days   Lab Units 12/17/23  0507 12/16/23  0543 12/15/23  0434 12/14/23  0528 12/13/23  0523 12/12/23  0513 12/11/23  0608   BUN mg/dL 22 15 19 18 17 16 19   CREATININE mg/dL 1.09 0.95 0.89 0.98 1.03 1.05 1.12        Paroxysmal atrial flutter (HCC)  Assessment & Plan  on metoprolol at home which will be continued  Not on AC.  EKG sinus rhythm      Valvular heart disease  Assessment & Plan  2D echo in June last year showed normal  EF around 60%, grade 1 diastolic dysfunction, mild to moderate TR, PA pressure 51, mild AR.  Continue ACE inhibitor  Patient follows cardiology as outpatient    History of pulmonary embolus (PE) 2019  Assessment & Plan  Provoked PE after right femur surgery in 2019, took AC for a few months.  Had IVC filter inserted and subsequently removed.  Pharmacologic DVT prophylaxis    Essential hypertension  Assessment & Plan  On fosinopril and metoprolol.  Will continue.  BP acceptable    COVID-19-resolved as of 12/18/2023  Assessment & Plan  As above.  Symptoms improved a lot per daughter.  Occasional cough on exam.  Patient has been stable on room air.  Patient tested positive for COVID-19 and RSV.  Supportive treatment  Removed off isolation as patient tested positive on 11/29/2023    BPH (benign prostatic hyperplasia)  Assessment & Plan  Continue Flomax    Gastroesophageal reflux disease without esophagitis  Assessment & Plan  Continue PPI    Leg edema, left  Assessment & Plan  Chronic left leg edema per daughter.  On Demadex 5mg p.o. twice a week at home.  Worsening left leg edema  per daughter  Continue Demadex twice weekly, additional IV Lasix x 1 on 12/13  Lower extremity venous Doppler showed no evidence of DVT  Appears euvolemic  Continue current meds  Leg elevation as tolerated    Recurrent major depressive disorder, in full remission (HCC)  Assessment & Plan  Continue Celexa        Consultations During Hospital Stay:  IP CONSULT TO ORTHOPEDIC SURGERY  INPATIENT CONSULT TO IR    Procedures Performed:     Procedure(s):  DEBRIDEMENT LOWER EXTREMITY (WASH OUT)- left thigh abscess irrigation and debridement     Significant Findings:     Refer to hospital course and above listed diagnosis related plan for details    Imaging while in hospital:    IR aspiration only    Result Date: 12/15/2023  Narrative: Ultrasound-guided aspiration of superficial fluid collection Clinical History: Lateral left distal thigh superficial  abscess recurrent s/p I&D and antibiotics . Technique: Patient was brought to the interventional radiology area and placed supine on the stretcher. After a brief ultrasound examination was performed of the left distal thigh, an area on the skin over the site was prepped, and draped in the usual sterile fashion. Lidocaine was administered to the skin and a small skin incision was made. A 5 [employer] multisidehole catheter was advanced into the collection and 2cc of fluid was aspirated. Specimens were sent for analysis . After the procedure, the catheter was removed and a bandage applied to the site. The patient tolerated the procedure well and suffered no complications.     Impression: Impression: 1. Successful ultrasound-guided aspiration of the left lateral distal thigh superficial abscess, yielding only 2 cc of bloody fluid. 2. Specimens were sent to the laboratory as described above. 3. This abscess cannot be adequately aspirated percutaneously and will likely require another I&D since the fluid is too thick and it is mostly phlegmon. Workstation performed: [id number]     CT lower extremity w contrast left    Result Date: 12/15/2023  Narrative: CT LEFT LOWER EXTREMITY WITH IV CONTRAST INDICATION: Left leg, cellulitis , developing collection over left lateral lower thigh incision, r/o abscess. COMPARISON: Left femur radiograph 9/9/2023 TECHNIQUE: CT examination of the left lower extremity from the knee through the foot was performed.  This examination, like all CT scans performed in the [facility], was performed utilizing techniques to minimize radiation dose exposure,  including the use of iterative reconstruction and automated exposure control software.  Multiplanar 2D reformatted images were created from the source data. IV Contrast: 100 mL of iohexol (OMNIPAQUE) Rad dose  421.02 mGy-cm FINDINGS: OSSEOUS STRUCTURES:  No fracture, dislocation or destructive osseous lesion. Partially imaged  antegrade intramedullary nail in the distal femur. VISUALIZED MUSCULATURE:  Unremarkable. No gas in the deep intermuscular fascial planes. SOFT TISSUES: Thick walled rim-enhancing focal fluid collection centered in the lateral subcutaneous tissues of the distal thigh that measures 1.5 x 1.7 x 2.2 cm (images #3/52 and #400/47). Diffuse subcutaneous edema in the lower leg and foot. OTHER PERTINENT FINDINGS:  None.     Impression: Thick-walled rim-enhancing focal fluid collection (1.5 x 1.7 x 2.2 cm) centered in the lateral subcutaneous tissues of the distal thigh; abscess cannot be excluded on the basis of imaging. Nonspecific diffuse subcutaneous edema throughout the lower leg and foot. No CT evidence of osteomyelitis or a necrotizing deep soft tissue infection. Workstation performed: [id number]     VAS lower limb venous duplex study, unilateral/limited    Result Date: 12/11/2023  Narrative:  [facility] REPORT CLINICAL: Indications: Patient presents with left lower extremity edema x 3 days. Operative History: 2020-08-21 IVC Filter Removal 2019-12-07 IVC Filter Placement; temporary Risk Factors The patient has history of HTN, Hyperlipidemia and CKD.   CONCLUSION:  Impression: RIGHT LOWER LIMB LIMITED: Evaluation shows no evidence of thrombus in the common femoral vein. Doppler evaluation shows a normal response to augmentation maneuvers.  LEFT LOWER LIMB: No evidence of acute or chronic deep vein thrombosis No evidence of superficial thrombophlebitis noted. Doppler evaluation shows a normal response to augmentation maneuvers. Popliteal, posterior tibial and anterior tibial arterial Doppler waveform's are triphasic.  SIGNATURE: Electronically Signed by: LEVY MOTA DO, RPVI on 2023-12-11 01:43:42 PM    XR chest 1 view portable    Result Date: 12/10/2023  Narrative: CHEST INDICATION:   sob. COVID-positive 11/29/2023. Leg swelling. COMPARISON: CXR 11/29/2023 and chest CT 3/19/2023. EXAM PERFORMED/VIEWS:   XR CHEST PORTABLE. FINDINGS: Cardiomediastinal silhouette normal. Large hiatal hernia. Mild pulmonary venous congestion. No effusion or pneumothorax. Upper abdomen normal. Bones normal for age.     Impression: Mild pulmonary venous congestion. Large hiatal hernia. Workstation performed: [id number]         Incidental Findings:   None     Test Results Pending at Discharge (will require follow up):   As per After Visit Summary     Outpatient Tests Requested:  None    Complications:  Refer to hospital course and above listed diagnosis related plan, if any    Hospital Course:     Taj Roblero Jr. is a 83 y.o. male patient with past medical history of left femur ORIF, knee abscess, hypertension, CKD stage III, BPH, PE, GERD, who originally presented to the hospital on 12/9/2023 due to left lower extremity redness associated with edema pain and tenderness since Thursday prior to admission gradually getting worse.  Patient also reported associated low-grade fever.  Patient denies any chest pain, headache, dizziness, shortness of breath, nausea vomiting diarrhea or constipation.  Patient reported that he recently had COVID but his symptoms has improved.  Patient had previous history of left knee infection requiring surgical intervention.  Patient was admitted to ED and was subsequently admitted.  Patient noted to have leukocytosis on admission.  Patient was treated with IV Ancef with gradual resolution of leukocytosis.  Erythema was intense but gradually improved and receded distally.  Patient remained afebrile.  Patient underwent DVT study which did not reveal any evidence of deep vein thrombosis of the left lower extremity.  Patient remained clinically stable and cellulitis continued to improve with IV antibiotics though patient was noted to have localizing duration over prior left lateral lower thigh incision which gradually became fluctuant despite improvement in distal cellulitis.  Patient was seen by orthopedic, CT  "scan was done which confirmed presence of abscess.  IR drainage was recommended by Ortho which revealed only 2 cc of purulent fluid which grew MSSA.  Patient subsequently underwent I&D of the abscess.  No evidence of deep infection or bone infection noted.  Patient continued to improve on IV cefazolin.  Patient was followed by orthopedic after I&D and continue antibiotics for discharge.  Currently cellulitis has significantly improved pain and tenderness has resolved.  Patient is ambulating without any limiting symptoms.  Patient was cleared by orthopedic for discharge and outpatient follow-up.  Patient will continue cephalexin for total 4 more days after discharge.  Patient was advised to follow-up with orthopedic after discharge and follow-up with PCP in 1 week.      Please see above list of diagnoses and related plan for additional information.       Condition at Discharge: stable     Discharge Day Visit / Exam:     Subjective:    Feels much better  Denies any left leg pain  Ambulating around the room without any limiting discomfort  Denies any chest pain shortness of breath or dizziness    Vitals: Blood Pressure: 147/67 (12/18/23 0819)  Pulse: 73 (12/18/23 0819)  Temperature: 98.5 °F (36.9 °C) (12/18/23 0819)  Temp Source: Oral (12/18/23 0819)  Respirations: 16 (12/18/23 0819)  Height: 5' 9\" (175.3 cm) (12/10/23 0141)  Weight - Scale: 85.3 kg (187 lb 15.6 oz) (12/18/23 0600)  SpO2: 94 % (12/18/23 0819)  Exam:   Physical Exam  Constitutional:       General: He is not in acute distress.  HENT:      Head: Normocephalic and atraumatic.   Cardiovascular:      Rate and Rhythm: Normal rate.   Pulmonary:      Effort: Pulmonary effort is normal. No respiratory distress.      Breath sounds: No wheezing, rhonchi or rales.   Chest:      Chest wall: No tenderness.   Abdominal:      General: Bowel sounds are normal. There is no distension.      Palpations: Abdomen is soft.      Tenderness: There is no abdominal tenderness. " "There is no guarding or rebound.   Musculoskeletal:      Comments: Left lower extremity significantly improved, no residual tenderness.  Induration improving.  No crepitation or fluctuation.  Swelling significantly better.  Dressing over left lower thigh abscess area laterally/Ace bandage    Skin:     General: Skin is warm and dry.      Findings: No rash.   Neurological:      Mental Status: He is alert. Mental status is at baseline.      Cranial Nerves: No cranial nerve deficit.         Discharge instructions/Information to patient and family:(Discharge Medications and Follow up):   See after visit summary for information provided to patient and family.      Provisions for Follow-Up Care:  See after visit summary for information related to follow-up care and any pertinent home health orders.      Disposition: Home    Planned Readmission:  No     Discharge Statement:  I spent 45 minutes discharging the patient. This time was spent on the day of discharge. I had direct contact with the patient on the day of discharge. Greater than 50% of the total time was spent examining patient, answering all patient questions, arranging and discussing plan of care with patient as well as directly providing post-discharge instructions.  Additional time then spent on discharge activities.  Coordinated with  Orthopedic regarding discharge and follow-up recommendations, discussed with wife at bedside and patient's daughter over the phone.    Discharge Medications:  See after visit summary for reconciled discharge medications provided to patient and family.      ** Please Note: \"This note has been constructed using a voice recognition system.Therefore there may be syntax, spelling, and/or grammatical errors. Please call if you have any questions. \"**        "

## 2023-12-18 NOTE — PLAN OF CARE
Problem: PAIN - ADULT  Goal: Verbalizes/displays adequate comfort level or baseline comfort level  Description: Interventions:  - Encourage patient to monitor pain and request assistance  - Assess pain using appropriate pain scale  - Administer analgesics based on type and severity of pain and evaluate response  - Implement non-pharmacological measures as appropriate and evaluate response  - Consider cultural and social influences on pain and pain management  - Notify physician/advanced practitioner if interventions unsuccessful or patient reports new pain  Outcome: Progressing     Problem: INFECTION - ADULT  Goal: Absence or prevention of progression during hospitalization  Description: INTERVENTIONS:  - Assess and monitor for signs and symptoms of infection  - Monitor lab/diagnostic results  - Monitor all insertion sites, i.e. indwelling lines, tubes, and drains  - Monitor endotracheal if appropriate and nasal secretions for changes in amount and color  - [place] appropriate cooling/warming therapies per order  - Administer medications as ordered  - Instruct and encourage patient and family to use good hand hygiene technique  - Identify and instruct in appropriate isolation precautions for identified infection/condition  Outcome: Progressing  Goal: Absence of fever/infection during neutropenic period  Description: INTERVENTIONS:  - Monitor WBC    Outcome: Progressing     Problem: SAFETY ADULT  Goal: Patient will remain free of falls  Description: INTERVENTIONS:  - Educate patient/family on patient safety including physical limitations  - Instruct patient to call for assistance with activity   - Consult OT/PT to assist with strengthening/mobility   - Keep Call bell within reach  - Keep bed low and locked with side rails adjusted as appropriate  - Keep care items and personal belongings within reach  - Initiate and maintain comfort rounds  - Make Fall Risk Sign visible to staff  - Offer Toileting every 2 Hours,  in advance of need  - Initiate/Maintain bed alarm  - Apply yellow socks and bracelet for high fall risk patients  - Consider moving patient to room near nurses station  Outcome: Progressing  Goal: Maintain or return to baseline ADL function  Description: INTERVENTIONS:  -  Assess patient's ability to carry out ADLs; assess patient's baseline for ADL function and identify physical deficits which impact ability to perform ADLs (bathing, care of mouth/teeth, toileting, grooming, dressing, etc.)  - Assess/evaluate cause of self-care deficits   - Assess range of motion  - Assess patient's mobility; develop plan if impaired  - Assess patient's need for assistive devices and provide as appropriate  - Encourage maximum independence but intervene and supervise when necessary  - Involve family in performance of ADLs  - Assess for home care needs following discharge   - Consider OT consult to assist with ADL evaluation and planning for discharge  - Provide patient education as appropriate  Outcome: Progressing  Goal: Maintains/Returns to pre admission functional level  Description: INTERVENTIONS:  - Perform AM-PAC 6 Click Basic Mobility/ Daily Activity assessment daily.  - Set and communicate daily mobility goal to care team and patient/family/caregiver.   - Collaborate with rehabilitation services on mobility goals if consulted  - Perform Range of Motion 3 times a day.  - Reposition patient every 2 hours.  - Dangle patient 3 times a day  - Stand patient 2 times a day  - Ambulate patient 2 times a day  - Out of bed to chair 2 times a day   - Out of bed for meals 2  Problem: DISCHARGE PLANNING  Goal: Discharge to home or other facility with appropriate resources  Description: INTERVENTIONS:  - Identify barriers to discharge w/patient and caregiver  - Arrange for needed discharge resources and transportation as appropriate  - Identify discharge learning needs (meds, wound care, etc.)  - Arrange for interpretive services to  assist at discharge as needed  - Refer to Case Management Department for coordinating discharge planning if the patient needs post-hospital services based on physician/advanced practitioner order or complex needs related to functional status, cognitive ability, or social support system  Outcome: Progressing     Problem: Knowledge Deficit  Goal: Patient/family/caregiver demonstrates understanding of disease process, treatment plan, medications, and discharge instructions  Description: Complete learning assessment and assess knowledge base.  Interventions:  - Provide teaching at level of understanding  - Provide teaching via preferred learning methods  Outcome: Progressing     Problem: Nutrition/Hydration-ADULT  Goal: Nutrient/Hydration intake appropriate for improving, restoring or maintaining nutritional needs  Description: Monitor and assess patient's nutrition/hydration status for malnutrition. Collaborate with interdisciplinary team and initiate plan and interventions as ordered.  Monitor patient's weight and dietary intake as ordered or per policy. Utilize nutrition screening tool and intervene as necessary. Determine patient's food preferences and provide high-protein, high-caloric foods as appropriate.     INTERVENTIONS:  - Monitor oral intake, urinary output, labs, and treatment plans  - Assess nutrition and hydration status and recommend course of action  - Evaluate amount of meals eaten  - Assist patient with eating if necessary   - Allow adequate time for meals  - Recommend/ encourage appropriate diets, oral nutritional supplements, and vitamin/mineral supplements  - Order, calculate, and assess calorie counts as needed  - Recommend, monitor, and adjust tube feedings and TPN/PPN based on assessed needs  - Assess need for intravenous fluids  - Provide specific nutrition/hydration education as appropriate  - Include patient/family/caregiver in decisions related to nutrition  Outcome: Progressing    times a  day  - Out of bed for toileting  - Record patient progress and toleration of activity level   Outcome: Progressing

## 2023-12-18 NOTE — NURSING NOTE
Pt d/c to home. Pt transported to lobby via wheelchair. IV removed per policy and procedure, occlusive dressing applied. AVS reviewed with patient and spouse, no questions at this time. Pt left with all belongings.

## 2023-12-18 NOTE — PLAN OF CARE
Problem: INFECTION - ADULT  Goal: Absence or prevention of progression during hospitalization  Description: INTERVENTIONS:  - Assess and monitor for signs and symptoms of infection  - Monitor lab/diagnostic results  - Monitor all insertion sites, i.e. indwelling lines, tubes, and drains  - Monitor endotracheal if appropriate and nasal secretions for changes in amount and color  - [place] appropriate cooling/warming therapies per order  - Administer medications as ordered  - Instruct and encourage patient and family to use good hand hygiene technique  - Identify and instruct in appropriate isolation precautions for identified infection/condition  Outcome: Progressing     Problem: SAFETY ADULT  Goal: Patient will remain free of falls  Description: INTERVENTIONS:  - Educate patient/family on patient safety including physical limitations  - Instruct patient to call for assistance with activity   - Consult OT/PT to assist with strengthening/mobility   - Keep Call bell within reach  - Keep bed low and locked with side rails adjusted as appropriate  - Keep care items and personal belongings within reach  - Initiate and maintain comfort rounds  - Make Fall Risk Sign visible to staff  - Offer Toileting every 2 Hours, in advance of need  - Initiate/Maintain bed alarm  - Obtain necessary fall risk management equipment: yes  - Apply yellow socks and bracelet for high fall risk patients  - Consider moving patient to room near nurses station  Outcome: Progressing     Problem: DISCHARGE PLANNING  Goal: Discharge to home or other facility with appropriate resources  Description: INTERVENTIONS:  - Identify barriers to discharge w/patient and caregiver  - Arrange for needed discharge resources and transportation as appropriate  - Identify discharge learning needs (meds, wound care, etc.)  - Arrange for interpretive services to assist at discharge as needed  - Refer to Case Management Department for coordinating discharge planning  if the patient needs post-hospital services based on physician/advanced practitioner order or complex needs related to functional status, cognitive ability, or social support system  Outcome: Progressing

## 2023-12-18 NOTE — DISCHARGE INSTR - AVS FIRST PAGE
Weightbearing left lower extremity as tolerated  Continue dressing as per orthopedics    Continue antibiotics as noted  Activity as tolerated  Left leg elevation as tolerated      Follow-up with orthopedic, Dr. Flanagan in 1 week

## 2023-12-19 ENCOUNTER — TELEPHONE (OUTPATIENT)
Age: 83
End: 2023-12-19

## 2023-12-19 ENCOUNTER — TRANSITIONAL CARE MANAGEMENT (OUTPATIENT)
Dept: INTERNAL MEDICINE CLINIC | Facility: CLINIC | Age: 83
End: 2023-12-19

## 2023-12-19 LAB — BACTERIA SPEC ANAEROBE CULT: NORMAL

## 2023-12-19 NOTE — TELEPHONE ENCOUNTER
Hello,  Please advise if the following patient can be forced onto the schedule:    Patient: Taj     : 1940    MRN: [id number]    Call back #: [phone] Aysha     Insurance: Medicare     Reason for appointment: 1 week PO from today     Requested doctor/location: Panchito Suarez - [facility] opening F/Ups until 2024 AVS stated to f/u 1 wk       Thank you.

## 2023-12-19 NOTE — UTILIZATION REVIEW
NOTIFICATION OF ADMISSION DISCHARGE   This is a Notification of Discharge from [facility]. Please be advised that this patient has been discharge from our facility. Below you will find the admission and discharge date and time including the patient’s disposition.   UTILIZATION REVIEW CONTACT:  Nicolasa Duron  Utilization   Network Utilization Review Department  Phone: [phone] x carefully listen to the prompts. All voicemails are confidential.  Email: [email or web address]     ADMISSION INFORMATION  PRESENTATION DATE: 12/9/2023  8:00 PM  OBERVATION ADMISSION DATE:   INPATIENT ADMISSION DATE: 12/9/23  9:20 PM   DISCHARGE DATE: 12/18/2023  4:14 PM   DISPOSITION:Home/Self Care    Network Utilization Review Department  ATTENTION: Please call with any questions or concerns to [phone] and carefully listen to the prompts so that you are directed to the right person. All voicemails are confidential.   For Discharge needs, contact Care Management DC Support Team at [phone] opt. [phone]  Send all requests for admission clinical reviews, approved or denied determinations and any other requests to dedicated fax number below belonging to the campus where the patient is receiving treatment. List of dedicated fax numbers for the Facilities:  FACILITY NAME UR FAX NUMBER   ADMISSION DENIALS (Administrative/Medical Necessity) [phone]   DISCHARGE SUPPORT TEAM ([facility]) [phone]   PARENT CHILD HEALTH (Maternity/NICU/Pediatrics) [phone]   [facility] [phone]   [facility] [phone]   [facility] [phone]   [facility] [phone]   [facility] [phone]   [facility] [phone]   [facility] [phone]   [facility] [phone]    [facility] [phone]   [facility] [phone]   [facility] [phone]

## 2023-12-20 LAB
BACTERIA TISS AEROBE CULT: ABNORMAL
BACTERIA TISS AEROBE CULT: ABNORMAL
GRAM STN SPEC: ABNORMAL
GRAM STN SPEC: ABNORMAL

## 2023-12-22 ENCOUNTER — OFFICE VISIT (OUTPATIENT)
Dept: INTERNAL MEDICINE CLINIC | Facility: CLINIC | Age: 83
End: 2023-12-22
Payer: COMMERCIAL

## 2023-12-22 VITALS
HEART RATE: 61 BPM | OXYGEN SATURATION: 99 % | BODY MASS INDEX: 27.13 KG/M2 | DIASTOLIC BLOOD PRESSURE: 74 MMHG | HEIGHT: 69 IN | WEIGHT: 183.2 LBS | SYSTOLIC BLOOD PRESSURE: 118 MMHG | TEMPERATURE: 97.5 F

## 2023-12-22 DIAGNOSIS — I47.10 SVT (SUPRAVENTRICULAR TACHYCARDIA): ICD-10-CM

## 2023-12-22 DIAGNOSIS — R60.0 LEG EDEMA, LEFT: Primary | ICD-10-CM

## 2023-12-22 DIAGNOSIS — I35.1 NONRHEUMATIC AORTIC VALVE INSUFFICIENCY: ICD-10-CM

## 2023-12-22 DIAGNOSIS — N18.30 STAGE 3 CHRONIC KIDNEY DISEASE, UNSPECIFIED WHETHER STAGE 3A OR 3B CKD (HCC): ICD-10-CM

## 2023-12-22 DIAGNOSIS — L02.416 ABSCESS OF LEFT LEG: ICD-10-CM

## 2023-12-22 DIAGNOSIS — I34.0 NONRHEUMATIC MITRAL VALVE REGURGITATION: ICD-10-CM

## 2023-12-22 DIAGNOSIS — I48.92 PAROXYSMAL ATRIAL FLUTTER (HCC): ICD-10-CM

## 2023-12-22 DIAGNOSIS — I10 ESSENTIAL HYPERTENSION: ICD-10-CM

## 2023-12-22 DIAGNOSIS — I27.21 PULMONARY ARTERY HYPERTENSION (HCC): ICD-10-CM

## 2023-12-22 DIAGNOSIS — I35.8 AORTIC VALVE SCLEROSIS: ICD-10-CM

## 2023-12-22 DIAGNOSIS — L03.116 CELLULITIS OF LEFT LOWER EXTREMITY: ICD-10-CM

## 2023-12-22 DIAGNOSIS — I26.99 OTHER PULMONARY EMBOLISM WITHOUT ACUTE COR PULMONALE, UNSPECIFIED CHRONICITY (HCC): ICD-10-CM

## 2023-12-22 PROCEDURE — 99496 TRANSJ CARE MGMT HIGH F2F 7D: CPT | Performed by: INTERNAL MEDICINE

## 2023-12-22 NOTE — PATIENT INSTRUCTIONS
Follow with Consultants as per their and our suggestion    Patient will be following with orthopedic doctor.    Follow up in one week or as needed earlier    Watch for any signs symptoms of infection of left lower extremity including knee or if there is any fever chills worsening of the redness of the left lower extremity or any abscess blood pus collection take him to the emergency room.    Follow all instructions as advised and discussed.    Take your medications as prescribed.    Call the office immediately if you experience any side effects.    Ask questions if you do not understand.    Keep your scheduled appointment as advised or come sooner if necessary or in doubt.    Best time to call for non-urgent matter or questions on weekdays is between 9am and 12 noon.    See physician for any new symptoms or worsening of current symptoms.    Urgent or emergent situations call 911 and report to nearest emergency room.    I spent  time taking care of this patient including clinical care, conseling, collaboration, chart, lab and consultant's follow up note,images report, documentation, pre visit  review as appropriate.    Patient is to get labs 1 week(s) prior to next visit if advised

## 2023-12-22 NOTE — ASSESSMENT & PLAN NOTE
Lab Results   Component Value Date    EGFR 66 12/18/2023    EGFR 62 12/17/2023    EGFR 73 12/16/2023    CREATININE 1.03 12/18/2023    CREATININE 1.09 12/17/2023    CREATININE 0.95 12/16/2023   Recent hospital course.  Will check CMP next visit on diuretic twice a week.  Also remains on ACE inhibitor.  Will monitor

## 2023-12-22 NOTE — PROGRESS NOTES
Name: Taj Roblero Jr.      : 1940      MRN: [id number]  Encounter Provider: Gurpreet Aguirre MD  Encounter Date: 2023   Encounter department: [facility] INTERNAL MEDICINE    Assessment & Plan     1. Leg edema, left  Assessment & Plan:  4+ edema of the left lower extremity persist.  They will be applying either a higher socks or Ace bandage to the tolerance.  They will wash with soap and water and watch for any infection or sign of infection closely.    Orders:  -     CBC; Future; Expected date: 2023  -     Comprehensive metabolic panel; Future; Expected date: 2023  -     Sedimentation rate, automated; Future; Expected date: 2023    2. Cellulitis of left lower extremity  Assessment & Plan:  Cellulitis of the left lower extremity is resolved.  He does have brawny edema with 4+ edema with mostly nonpitting component.  Pink is reddish area diffusely present.  Diacik dry scaly is present.  Incision on the left knee which I did not examine per daughter is improving.  There is no abscess.  No fever or chills.  Tolerating it and finishing up antibiotic.    Plan will be to do CBC with differential sed rate and CMP next week patient has appointment with orthopedic next week I we will also see him next week for close monitoring.    Orders:  -     CBC; Future; Expected date: 2023  -     Comprehensive metabolic panel; Future; Expected date: 2023  -     Sedimentation rate, automated; Future; Expected date: 2023    3. Abscess of left leg  Assessment & Plan:  Cellulitis of the left lower extremity is resolved.  He does have brawny edema with 4+ edema with mostly nonpitting component.  Pink is reddish area diffusely present.  Diacik dry scaly is present.  Incision on the left knee which I did not examine per daughter is improving.  There is no abscess.  No fever or chills.  Tolerating it and finishing up antibiotic.    Plan will be to do CBC with differential  sed rate and CMP next week patient has appointment with orthopedic next week I we will also see him next week for close monitoring.    Orders:  -     CBC; Future; Expected date: 12/29/2023  -     Comprehensive metabolic panel; Future; Expected date: 12/29/2023  -     Sedimentation rate, automated; Future; Expected date: 12/29/2023    4. Stage 3 chronic kidney disease, unspecified whether stage 3a or 3b CKD ([facility])  Assessment & Plan:  Lab Results   Component Value Date    EGFR 66 12/18/2023    EGFR 62 12/17/2023    EGFR 73 12/16/2023    CREATININE 1.03 12/18/2023    CREATININE 1.09 12/17/2023    CREATININE 0.95 12/16/2023   Recent hospital course.  Will check CMP next visit on diuretic twice a week.  Also remains on ACE inhibitor.  Will monitor    Orders:  -     CBC; Future; Expected date: 12/29/2023  -     Comprehensive metabolic panel; Future; Expected date: 12/29/2023  -     Sedimentation rate, automated; Future; Expected date: 12/29/2023    5. Essential hypertension  Assessment & Plan:  No cardiac symptoms no symptoms of life LV failure no chest pain palpitation PND orthopnea's 4+ edema of the left lower extremity remains unchanged.  Hypertension well-controlled, aortic valve sclerosis, aortic valve insufficiency, mitral valve regurgitation all stable.  Pulmonary embolism stable tolerating anticoagulant.  History of paroxysmal atrial fibrillation pulse regular.  History of SVT stable.    Relevant medication includes potassium pill and torsemide twice a week, remains on fosinopril 10 mg daily, atorvastatin 10 mg daily,.    Will recheck CBC with differential CMP sed rate next week    Orders:  -     CBC; Future; Expected date: 12/29/2023  -     Comprehensive metabolic panel; Future; Expected date: 12/29/2023  -     Sedimentation rate, automated; Future; Expected date: 12/29/2023    6. Aortic valve sclerosis    7. Other pulmonary embolism without acute cor pulmonale, unspecified chronicity ([facility])  Assessment &  Plan:  Check CBC no longer on anticoagulation.  No symptoms of pulmonary embolism and venous Doppler negative      8. Nonrheumatic mitral valve regurgitation  Assessment & Plan:  No cardiac symptoms no symptoms of life LV failure no chest pain palpitation PND orthopnea's 4+ edema of the left lower extremity remains unchanged.  Hypertension well-controlled, aortic valve sclerosis, aortic valve insufficiency, mitral valve regurgitation all stable.  Pulmonary embolism stable tolerating anticoagulant.  History of paroxysmal atrial fibrillation pulse regular.  History of SVT stable.    Relevant medication includes potassium pill and torsemide twice a week, remains on fosinopril 10 mg daily, atorvastatin 10 mg daily,.    Will recheck CBC with differential CMP sed rate next week      9. Nonrheumatic aortic valve insufficiency  Assessment & Plan:  No cardiac symptoms no symptoms of life LV failure no chest pain palpitation PND orthopnea's 4+ edema of the left lower extremity remains unchanged.  Hypertension well-controlled, aortic valve sclerosis, aortic valve insufficiency, mitral valve regurgitation all stable.  Pulmonary embolism stable tolerating anticoagulant.  History of paroxysmal atrial fibrillation pulse regular.  History of SVT stable.    Relevant medication includes potassium pill and torsemide twice a week, remains on fosinopril 10 mg daily, atorvastatin 10 mg daily,.    Will recheck CBC with differential CMP sed rate next week      10. Paroxysmal atrial flutter (HCC)  Assessment & Plan:  No cardiac symptoms no symptoms of life LV failure no chest pain palpitation PND orthopnea's 4+ edema of the left lower extremity remains unchanged.  Hypertension well-controlled, aortic valve sclerosis, aortic valve insufficiency, mitral valve regurgitation all stable.  Pulmonary embolism stable tolerating anticoagulant.  History of paroxysmal atrial fibrillation pulse regular.  History of SVT stable.    Relevant medication  includes potassium pill and torsemide twice a week, remains on fosinopril 10 mg daily, atorvastatin 10 mg daily,.    Will recheck CBC with differential CMP sed rate next week      11. SVT (supraventricular tachycardia)  Assessment & Plan:  No cardiac symptoms no symptoms of life LV failure no chest pain palpitation PND orthopnea's 4+ edema of the left lower extremity remains unchanged.  Hypertension well-controlled, aortic valve sclerosis, aortic valve insufficiency, mitral valve regurgitation all stable.  Pulmonary embolism stable tolerating anticoagulant.  History of paroxysmal atrial fibrillation pulse regular.  History of SVT stable.    Relevant medication includes potassium pill and torsemide twice a week, remains on fosinopril 10 mg daily, atorvastatin 10 mg daily,.    Will recheck CBC with differential CMP sed rate next week      12. 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HCC)  Assessment & Plan:  No cardiac symptoms no symptoms of life LV failure no chest pain palpitation PND orthopnea's 4+ edema of the left lower extremity remains unchanged.  Hypertension well-controlled, aortic valve sclerosis, aortic valve insufficiency, mitral valve regurgitation all stable.  Pulmonary embolism stable tolerating anticoagulant.  History of paroxysmal atrial fibrillation pulse regular.  History of SVT stable.    Relevant medication includes potassium pill and torsemide twice a week, remains on fosinopril 10 mg daily, atorvastatin 10 mg daily,.    Will recheck CBC with differential CMP sed rate next week             Subjective     TCM Call     Date and time call was made  12/19/2023 11:29 AM    Hospital care reviewed  Records reviewed    Patient was hospitial[facility] at  [facility]    Date of Admission  12/09/23    Date of discharge  12/18/23    Diagnosis  Cellulitis/abscess lower extremity    Disposition  Home    Were the patients medications reviewed and updated  Yes    Current Symptoms  None    Cough Severity  Mild  Daughetr  says that it is somewhat lingereing      TCM Call     Clinical progress swelling  Improving    Post hospital issues  Reduced activity    Should patient be enrolled in anticoag monitoring?  No    Scheduled for follow up?  Yes    Patients specialists  Other (comment)    Pulmonologist name  Dr Mckeon    Pulmonologist contact #  [phone]    Endocrinologist name  [phone]    Other specialists names  DR Flanagan    Other specialists contcat #  [phone]    Referrals needed  None specilaist follow up needed    Did you obtain your prescribed medications  Yes    Do you need help managing your prescriptions or medications  No  Daughter   is a nurse. She helps with meds.    Why type of assitance do you need  His daughter is a nurse. She manages   his meds    Is transportation to your appointment needed  Yes    Specify why  His wife and daughter drive him. He does not drive    I have advised the patient to call PCP with any new or worsening symptoms    Emily Fish, Practice Administrator II    Living Arrangements  Spouse or Significiant other    Support System  None    The type of support provided  Emotional; Physical    Do you have social support  Yes, as much as I need    Are you recieving any outpatient services  No    Are you recieving home care services  No    Types of home care services  Nurse visit    Are you using any community resources  No    Current waiver services  No    Have you fallen in the last 12 months  No    How many times  1-2. Is getting Home PT/OT    Interperter language line needed  No    Counseling  Family  Daughter    Counseling topics  Activities of daily living; Importance of RX   compliance; Risk factor reduction    Comments  Daughter to call back.       Patient was last hospitalized for same from 11/29/2023 to 12/2/2023.  Patient presented with acute shortness of breath cough generalized weakness with a pulse ox of 88%.  Patient was positive for COVID and RSV.  Baby in the family  also had RSV.  Patient was treated with remdesivir and Decadron  Patient CRP start with 25 went up to 59 went up to 84 came down to 34.  Patient also had paroxysmal atrial flutter.  Patient also has bilateral edema of the lower extremities has been on torsemide every other day.  Also has been being managed with metoprolol and Monopril for hypertension and torsemide every other day.  Patient has been tolerating statin and niacin.    Patient is on Celexa for OCD.  Chest x-ray showed no active cardiopulmonary disease.  Except for hiatal hernia patient did start on physical therapy and Occupational Therapy.            Hospital Course:   Taj Roblero is a 83 y.o. male patient past medical history of hypertension, hyperlipidemia, paroxysmal atrial fibrillation, depression who originally presented to the hospital on 11/29/2023 due to SOB of breath, cough and weakness starting the day prior to admission.  Family member at home had tested positive for RSV, patient also had fever at home with dizziness.  Patient tested positive for COVID, required O2 and was placed on the moderate pathway with remdesivir and IV Decadron.  DuoNebs.  Patient improved, was weaned off of his oxygen and did not require oxygen on discharge.  PT/OT evaluation was performed and patient was determined to have minimal resource needs and will be discharged home.  He is to follow-up with his primary care physician 1 to 2 weeks postdischarge.  He will be sent home on a prednisone burst 40 mg p.o. daily for 4 days.  This was discussed with the patient at the bedside, he verbalized understanding and is agreeable to the plan.  The plan had been discussed day prior with the patient's daughter who is a nurse, and she verbalized understanding also.    Patient was subsequently rehospitalized from 12/9/2023 to 12/18/2023.  Patient presented with left lower extremity edema redness warmth tender on exam.  Patient was thought to have cellulitis.  All pertinent  important patient had history of left femur fracture patient underwent insertion of nail in June 2022 subsequently developed left femur abscess in September 2023 wound culture grew MRSA patient underwent hardware removal was treated with IV cefazolin and subsequently transition to Keflex.  Patient did not meet criteria for sepsis.  WBC count was 18.98.  Patient's cellulitis overall started to improve but noted to have developed collection superiorly and prior left lateral knee incision.  Patient seen by orthopedic.   CT of lower extremity revealed-Thick-walled rim-enhancing focal fluid collection (1.5 x 1.7 x 2.2 cm) centered in the lateral subcutaneous tissues of the distal thigh; concerning for abscess.Nonspecific diffuse subcutaneous edema throughout the lower leg and foot.No CT evidence of osteomyelitis or a necrotizing deep soft tissue infection  Status post IR guided aspiration with removal of 2 cc of purulent fluid.  Unable to drain completely  Status post left distal thigh abscess I&D, 12/16 by Ortho  Aspiration culture with growth of MSSA  Afebrile, leukocytosis normalized.  Continues to improve, no further tenderness or increased warmth.  Swelling is improving.  Erythema/resultant discoloration improving slowly  ·   Patient was managed with other problems including CKD level 3, electrolyte abnormality, paroxysmal atrial for flutter with metoprolol at home EKG normal sinus rhythm.  Patient does have a history of pulmonary embolism in 2019 provoked after right femur surgery.  Hypertension well-managed with COVID is resolved BPH fair GERD reasonable left lower extremity edema patient remains on Demadex 5 mg twice a week at home daughter has been watching very closely.  Venous Doppler was negative for deep vein thrombosis.  Patient is euvolemic.  MDD OCD/stable on Celexa.  Patient never had MDD         Procedures Performed:      · Procedure(s):  · DEBRIDEMENT LOWER EXTREMITY (WASH OUT)- left thigh abscess  irrigation and debridement      Result Date: 12/15/2023  Narrative: Ultrasound-guided aspiration of superficial fluid collection Clinical History: Lateral left distal thigh superficial abscess recurrent s/p I&D and antibiotics . Technique: Patient was brought to the interventional radiology area and placed supine on the stretcher. After a brief ultrasound examination was performed of the left distal thigh, an area on the skin over the site was prepped, and draped in the usual sterile fashion. Lidocaine was administered to the skin and a small skin incision was made. A 5 [place]ese Nuzzel multisidehole catheter was advanced into the collection and 2cc of fluid was aspirated. Specimens were sent for analysis . After the procedure, the catheter was removed and a bandage applied to the site. The patient tolerated the procedure well and suffered no complications.      Impression: Impression: 1. Successful ultrasound-guided aspiration of the left lateral distal thigh superficial abscess, yielding only 2 cc of bloody fluid. 2. Specimens were sent to the laboratory as described above. 3. This abscess cannot be adequately aspirated percutaneously and will likely require another I&D since the fluid is too thick and it is mostly phlegmon. Workstation performed: [id number]      CT lower extremity w contrast left     Result Date: 12/15/2023  Narrative: CT LEFT LOWER EXTREMITY WITH IV CONTRAST  FINDINGS: OSSEOUS STRUCTURES:  No fracture, dislocation or destructive osseous lesion. Partially imaged antegrade intramedullary nail in the distal femur. VISUALIZED MUSCULATURE:  Unremarkable. No gas in the deep intermuscular fascial planes. SOFT TISSUES: Thick walled rim-enhancing focal fluid collection centered in the lateral subcutaneous tissues of the distal thigh that measures 1.5 x 1.7 x 2.2 cm (images #3/52 and #400/47). Diffuse subcutaneous edema in the lower leg and foot. OTHER PERTINENT FINDINGS:  None.      Impression: Thick-walled  rim-enhancing focal fluid collection (1.5 x 1.7 x 2.2 cm) centered in the lateral subcutaneous tissues of the distal thigh; abscess cannot be excluded on the basis of imaging. Nonspecific diffuse subcutaneous edema throughout the lower leg and foot. No CT evidence of osteomyelitis or a necrotizing deep soft tissue infection. Workstation performed: [id number]      VAS lower limb venous duplex study, unilateral/limited     Result Date: 12/11/2023  Narrative:  [facility] REPORT CLINICAL: IImpression: RIGHT LOWER LIMB LIMITED: Evaluation shows no evidence of thrombus in the common femoral vein. Doppler evaluation shows a normal response to augmentation maneuvers.  LEFT LOWER LIMB: No evidence of acute or chronic deep vein thrombosis No evidence of superficial thrombophlebitis noted. Doppler evaluation shows a normal response to augmentation maneuvers. Popliteal, posterior tibial and anterior tibial arterial Doppler waveform's are triphasic.  SIGNATURE: Electronically Signed by: LEVY MOTA DO, RPVI on 2023-12-11 01:43:42 PM     XR chest 1 view portable     Result Date: 12/10/2023  Narrative: CHEST INDICATION:   sob. COVID-positive 11/29/2023. Leg swelling. COMPARISON: CXR 11/29/2023 and chest CT 3/19/2023. EXAM PERFORMED/VIEWS:  XR CHEST PORTABLE. FINDINGS: Cardiomediastinal silhouette normal. Large hiatal hernia. Mild pulmonary venous congestion. No effusion or pneumothorax. Upper abdomen normal. Bones normal for age.      Impression: Mild pulmonary venous congestion. Large hiatal hernia. Workstation performed: [id number]           Hospital Course:      Taj Roblero JrChandler is a 83 y.o. male patient with past medical history of left femur ORIF, knee abscess, hypertension, CKD stage III, BPH, PE, GERD, who originally presented to the hospital on 12/9/2023 due to left lower extremity redness associated with edema pain and tenderness since Thursday prior to admission gradually getting worse.  Patient also  reported associated low-grade fever.  Patient denies any chest pain, headache, dizziness, shortness of breath, nausea vomiting diarrhea or constipation.  Patient reported that he recently had COVID but his symptoms has improved.  Patient had previous history of left knee infection requiring surgical intervention.  Patient was admitted to ED and was subsequently admitted.  Patient noted to have leukocytosis on admission.  Patient was treated with IV Ancef with gradual resolution of leukocytosis.  Erythema was intense but gradually improved and receded distally.  Patient remained afebrile.  Patient underwent DVT study which did not reveal any evidence of deep vein thrombosis of the left lower extremity.  Patient remained clinically stable and cellulitis continued to improve with IV antibiotics though patient was noted to have localizing duration over prior left lateral lower thigh incision which gradually became fluctuant despite improvement in distal cellulitis.  Patient was seen by orthopedic, CT scan was done which confirmed presence of abscess.  IR drainage was recommended by Ortho which revealed only 2 cc of purulent fluid which grew MSSA.  Patient subsequently underwent I&D of the abscess.  No evidence of deep infection or bone infection noted.  Patient continued to improve on IV cefazolin.  Patient was followed by orthopedic after I&D and continue antibiotics for discharge.  Currently cellulitis has significantly improved pain and tenderness has resolved.  Patient is ambulating without any limiting symptoms.  Patient was cleared by orthopedic for discharge and outpatient follow-up.  Patient will continue cephalexin for total 4 more days after discharge.  Patient was advised to follow-up with orthopedic after discharge and follow-up with PCP in 1 week.       Patient generally seems to be doing fair.  Daughter believes that his leg is improving getting less intense in terms of redness and now it is pink is red.   He does not have any fever or chills.  She has been watching left knee very closely.  There is no infection.  He is finishing antibiotic for 2 more days.  No diarrhea nausea vomiting.    No problem as it relates to blood pressure kidney How or any other physical issues at this time.  They do have appointment with orthopedic next week.  He does have a moderate to severe edema of the left lower extremity.  No edema of the right lower extremity.  Or minimal edema of the right lower extremity.  No cardiopulmonary GI/ CNS or ACS,                       Review of Systems   Constitutional:  Negative for appetite change, fatigue, fever and unexpected weight change.   HENT:  Negative for congestion, ear pain and sore throat.    Eyes:  Negative for pain and redness.   Respiratory:  Negative for cough and shortness of breath.    Cardiovascular:  Positive for leg swelling. Negative for chest pain and palpitations.   Gastrointestinal:  Negative for abdominal pain, diarrhea, nausea and vomiting.   Endocrine: Negative for cold intolerance, polydipsia and polyuria.   Genitourinary:  Negative for dysuria, frequency and urgency.   Musculoskeletal:  Positive for gait problem. Negative for arthralgias and myalgias.   Skin:  Positive for color change. Negative for rash.   Allergic/Immunologic: Negative.    Neurological:  Positive for weakness. Negative for dizziness and headaches.   Hematological:  Negative for adenopathy. Does not bruise/bleed easily.   Psychiatric/Behavioral:  Negative for behavioral problems and confusion.        Past Medical History:   Diagnosis Date   • BPH (benign prostatic hyperplasia)    • Depression    • HTN (hypertension)    • Hyperlipidemia    • OCD (obsessive compulsive disorder)    • Pulmonary embolism (HCC) 2019     Past Surgical History:   Procedure Laterality Date   • HARDWARE REMOVAL Left 9/9/2023    Procedure: REMOVAL HARDWARE;  Surgeon: Henri Flanagan DO;  Location: [facility];  Service: Orthopedics    • INCISION AND DRAINAGE OF WOUND Left 9/9/2023    Procedure: INCISION AND DRAINAGE (I&D) EXTREMITY;  Surgeon: Henir Flanagan DO;  Location: [facility];  Service: Orthopedics   • IR ASPIRATION ONLY  12/15/2023   • IR IVC FILTER PLACEMENT OPTIONAL/TEMPORARY  12/7/2019   • IR IVC FILTER REMOVAL  8/21/2020   • [facility] OPTX FEM SHFT FX W/INSJ IMED IMPLT W/WO SCREW Right 12/4/2019    Procedure: INSERTION NAIL IM FEMUR ANTEGRADE (TROCHANTERIC);  Surgeon: Yesenia Veronica DO;  Location: [facility];  Service: Orthopedics   • [place] OPTX FEM SHFT FX W/INSJ IMED IMPLT W/WO SCREW Left 6/17/2022    Procedure: INSERTION NAIL IM FEMUR ANTEGRADE (TROCHANTERIC) - long nail;  Surgeon: Henri Flanagan DO;  Location: [facility];  Service: Orthopedics   • WOUND DEBRIDEMENT Left 12/16/2023    Procedure: DEBRIDEMENT LOWER EXTREMITY (WASH OUT)- left thigh abscess irrigation and debridement;  Surgeon: Feng Robertson MD;  Location: [facility];  Service: Orthopedics     Family History   Problem Relation Age of Onset   • Cancer Mother      Social History     Socioeconomic History   • Marital status: /Civil Union     Spouse name: None   • Number of children: None   • Years of education: None   • Highest education level: None   Occupational History   • None   Tobacco Use   • Smoking status: Never   • Smokeless tobacco: Never   Vaping Use   • Vaping status: Never Used   Substance and Sexual Activity   • Alcohol use: Never   • Drug use: Never   • Sexual activity: None   Other Topics Concern   • None   Social History Narrative   • None     Social Determinants of Health     Financial Resource Strain: Low Risk  (11/25/2022)    Overall Financial Resource Strain (CARDIA)    • Difficulty of Paying Living Expenses: Not hard at all   Food Insecurity: No Food Insecurity (12/12/2023)    Hunger Vital Sign    • Worried About Running Out of Food in the Last Year: Never true    • Ran Out of Food in the Last Year: Never true   Transportation Needs: No  Transportation Needs (12/12/2023)    PRAPARE - Transportation    • Lack of Transportation (Medical): No    • Lack of Transportation (Non-Medical): No   Physical Activity: Not on file   Stress: Not on file   Social Connections: Not on file   Intimate Partner Violence: Not on file   Housing Stability: Low Risk  (12/12/2023)    Housing Stability Vital Sign    • Unable to Pay for Housing in the Last Year: No    • Number of Places Lived in the Last Year: 1    • Unstable Housing in the Last Year: No     Current Outpatient Medications on File Prior to Visit   Medication Sig   • albuterol (Ventolin HFA) 90 mcg/act inhaler Inhale 2 puffs every 6 (six) hours as needed for wheezing   • atorvastatin (LIPITOR) 10 mg tablet TAKE ONE-HALF TABLET DAILY (Patient taking differently: Take 5 mg by mouth daily with dinner TAKE ONE-HALF TABLET DAILY)   • Calcium Carb-Cholecalciferol (CALTRATE 600+D3 PO) Take 600 mg by mouth 2 (two) times a day   • cephalexin (KEFLEX) 500 mg capsule Take 1 capsule (500 mg total) by mouth every 8 (eight) hours for 4 days   • citalopram (CeleXA) 40 mg tablet TAKE ONE TABLET ONCE DAILY BY MOUTH   • fluticasone (FLONASE) 50 mcg/act nasal spray USE 1 SPRAY IN EACH NOSTRIL DAILY   • fosinopril (MONOPRIL) 10 mg tablet TAKE ONE-HALF TABLET BY MOUTH DAILY   • ipratropium (ATROVENT) 0.03 % nasal spray 2 sprays into each nostril 2 (two) times a day as needed for rhinitis (Patient taking differently: 2 sprays into each nostril 2 (two) times a day)   • magnesium oxide (MAG-OX) 400 mg tablet Take 1 tablet by mouth daily   • metoprolol tartrate (LOPRESSOR) 50 mg tablet TAKE ONE TABLET EVERY 12 HOURS   • Multiple Vitamin (MULTIVITAMIN) tablet Take 1 tablet by mouth daily   • niacin (NIASPAN) 500 mg CR tablet TAKE ONE TABLET DAILY AT BEDTIME   • pantoprazole (PROTONIX) 40 mg tablet TAKE ONE TABLET DAILY   • potassium chloride (Klor-Con) 10 mEq tablet Take 1 tablet (10 mEq total) by mouth 2 (two) times a week   •  "saccharomyces boulardii (FLORASTOR) 250 mg capsule Take 1 capsule (250 mg total) by mouth 2 (two) times a day   • tamsulosin (FLOMAX) 0.4 mg Take 0.4 mg by mouth daily with dinner   • torsemide (DEMADEX) 5 MG tablet Take 1 tablet (5 mg total) by mouth 2 (two) times a week Monday and Thursday     No Known Allergies  Immunization History   Administered Date(s) Administered   • INFLUENZA 2022   • Influenza, high dose seasonal 0.7 mL 2020, 10/12/2021, 2022, 10/13/2023   • Influenza, injectable, quadrivalent, preservative free 0.5 mL 10/31/2019   • Tdap 10/14/2019       Objective     /74   Pulse 61   Temp 97.5 °F (36.4 °C)   Ht 5' 9\" (1.753 m)   Wt 83.1 kg (183 lb 3.2 oz)   SpO2 99%   BMI 27.05 kg/m²     Physical Exam  Vitals and nursing note reviewed.   Constitutional:       General: He is not in acute distress.     Appearance: He is well-developed. He is not ill-appearing or diaphoretic.   HENT:      Head: Normocephalic and atraumatic.      Right Ear: External ear normal.      Left Ear: External ear normal.   Eyes:      General: No scleral icterus.        Right eye: No discharge.         Left eye: No discharge.      Conjunctiva/sclera: Conjunctivae normal.   Neck:      Thyroid: No thyromegaly.      Vascular: No carotid bruit or JVD.   Cardiovascular:      Rate and Rhythm: Regular rhythm.      Heart sounds: Normal heart sounds.      Comments: Diffuse pinkish redness of the left lower extremity below knee to the ankle is present.  No clinical DVT.  Some dry scale is present.  No abscess or fluid collection at the left knee per daughter.  Patient is in wheelchair.  Pulmonary:      Effort: No respiratory distress.      Breath sounds: Normal breath sounds. No wheezing or rales.   Musculoskeletal:      Cervical back: No tenderness.      Right lower le+ Edema present.      Left lower le+ Edema present.   Lymphadenopathy:      Cervical: No cervical adenopathy.   Skin:     General: Skin is " warm.      Coloration: Skin is not jaundiced or pale.      Findings: No rash.   Neurological:      Mental Status: Mental status is at baseline.       Gurpreet Aguirre MD

## 2023-12-22 NOTE — ASSESSMENT & PLAN NOTE
4+ edema of the left lower extremity persist.  They will be applying either a higher socks or Ace bandage to the tolerance.  They will wash with soap and water and watch for any infection or sign of infection closely.

## 2023-12-22 NOTE — ASSESSMENT & PLAN NOTE
Cellulitis of the left lower extremity is resolved.  He does have brawny edema with 4+ edema with mostly nonpitting component.  Pink is reddish area diffusely present.  Diacik dry scaly is present.  Incision on the left knee which I did not examine per daughter is improving.  There is no abscess.  No fever or chills.  Tolerating it and finishing up antibiotic.    Plan will be to do CBC with differential sed rate and CMP next week patient has appointment with orthopedic next week I we will also see him next week for close monitoring.

## 2023-12-22 NOTE — ASSESSMENT & PLAN NOTE
Check CBC no longer on anticoagulation.  No symptoms of pulmonary embolism and venous Doppler negative

## 2023-12-22 NOTE — ASSESSMENT & PLAN NOTE
No cardiac symptoms no symptoms of life LV failure no chest pain palpitation PND orthopnea's 4+ edema of the left lower extremity remains unchanged.  Hypertension well-controlled, aortic valve sclerosis, aortic valve insufficiency, mitral valve regurgitation all stable.  Pulmonary embolism stable tolerating anticoagulant.  History of paroxysmal atrial fibrillation pulse regular.  History of SVT stable.    Relevant medication includes potassium pill and torsemide twice a week, remains on fosinopril 10 mg daily, atorvastatin 10 mg daily,.    Will recheck CBC with differential CMP sed rate next week

## 2023-12-28 ENCOUNTER — LAB (OUTPATIENT)
Dept: LAB | Facility: CLINIC | Age: 83
End: 2023-12-28
Payer: COMMERCIAL

## 2023-12-28 DIAGNOSIS — L03.116 CELLULITIS OF LEFT LOWER EXTREMITY: ICD-10-CM

## 2023-12-28 DIAGNOSIS — R60.0 LEG EDEMA, LEFT: ICD-10-CM

## 2023-12-28 DIAGNOSIS — R60.0 BILATERAL LEG EDEMA: ICD-10-CM

## 2023-12-28 DIAGNOSIS — E78.2 MIXED HYPERLIPIDEMIA: ICD-10-CM

## 2023-12-28 DIAGNOSIS — N18.30 STAGE 3 CHRONIC KIDNEY DISEASE, UNSPECIFIED WHETHER STAGE 3A OR 3B CKD (HCC): ICD-10-CM

## 2023-12-28 DIAGNOSIS — I10 ESSENTIAL HYPERTENSION: ICD-10-CM

## 2023-12-28 DIAGNOSIS — L02.416 ABSCESS OF LEFT LEG: ICD-10-CM

## 2023-12-28 LAB
ALBUMIN SERPL BCP-MCNC: 3.4 G/DL (ref 3.5–5)
ALP SERPL-CCNC: 71 U/L (ref 34–104)
ALT SERPL W P-5'-P-CCNC: 7 U/L (ref 7–52)
ANION GAP SERPL CALCULATED.3IONS-SCNC: 6 MMOL/L
AST SERPL W P-5'-P-CCNC: 15 U/L (ref 13–39)
BILIRUB SERPL-MCNC: 0.47 MG/DL (ref 0.2–1)
BUN SERPL-MCNC: 20 MG/DL (ref 5–25)
CALCIUM ALBUM COR SERPL-MCNC: 9.7 MG/DL (ref 8.3–10.1)
CALCIUM SERPL-MCNC: 9.2 MG/DL (ref 8.4–10.2)
CHLORIDE SERPL-SCNC: 97 MMOL/L (ref 96–108)
CO2 SERPL-SCNC: 31 MMOL/L (ref 21–32)
CREAT SERPL-MCNC: 1.15 MG/DL (ref 0.6–1.3)
ERYTHROCYTE [DISTWIDTH] IN BLOOD BY AUTOMATED COUNT: 13.4 % (ref 11.6–15.1)
ERYTHROCYTE [SEDIMENTATION RATE] IN BLOOD: 58 MM/HOUR (ref 0–19)
GFR SERPL CREATININE-BSD FRML MDRD: 58 ML/MIN/1.73SQ M
GLUCOSE P FAST SERPL-MCNC: 133 MG/DL (ref 65–99)
HCT VFR BLD AUTO: 38.2 % (ref 36.5–49.3)
HGB BLD-MCNC: 12.3 G/DL (ref 12–17)
MCH RBC QN AUTO: 31.4 PG (ref 26.8–34.3)
MCHC RBC AUTO-ENTMCNC: 32.2 G/DL (ref 31.4–37.4)
MCV RBC AUTO: 97 FL (ref 82–98)
PLATELET # BLD AUTO: 426 THOUSANDS/UL (ref 149–390)
PMV BLD AUTO: 8.9 FL (ref 8.9–12.7)
POTASSIUM SERPL-SCNC: 4.6 MMOL/L (ref 3.5–5.3)
PROT SERPL-MCNC: 6.5 G/DL (ref 6.4–8.4)
RBC # BLD AUTO: 3.92 MILLION/UL (ref 3.88–5.62)
SODIUM SERPL-SCNC: 134 MMOL/L (ref 135–147)
WBC # BLD AUTO: 11.02 THOUSAND/UL (ref 4.31–10.16)

## 2023-12-28 PROCEDURE — 85652 RBC SED RATE AUTOMATED: CPT

## 2023-12-28 PROCEDURE — 36415 COLL VENOUS BLD VENIPUNCTURE: CPT

## 2023-12-28 PROCEDURE — 80053 COMPREHEN METABOLIC PANEL: CPT

## 2023-12-28 PROCEDURE — 85027 COMPLETE CBC AUTOMATED: CPT

## 2023-12-29 ENCOUNTER — OFFICE VISIT (OUTPATIENT)
Dept: OBGYN CLINIC | Facility: CLINIC | Age: 83
End: 2023-12-29
Payer: COMMERCIAL

## 2023-12-29 ENCOUNTER — OFFICE VISIT (OUTPATIENT)
Dept: INTERNAL MEDICINE CLINIC | Facility: CLINIC | Age: 83
End: 2023-12-29
Payer: COMMERCIAL

## 2023-12-29 VITALS
TEMPERATURE: 97.9 F | HEIGHT: 69 IN | WEIGHT: 183 LBS | HEART RATE: 66 BPM | BODY MASS INDEX: 27.11 KG/M2 | DIASTOLIC BLOOD PRESSURE: 76 MMHG | SYSTOLIC BLOOD PRESSURE: 122 MMHG | OXYGEN SATURATION: 94 %

## 2023-12-29 VITALS — HEART RATE: 77 BPM | SYSTOLIC BLOOD PRESSURE: 125 MMHG | DIASTOLIC BLOOD PRESSURE: 75 MMHG

## 2023-12-29 DIAGNOSIS — I35.1 NONRHEUMATIC AORTIC VALVE INSUFFICIENCY: ICD-10-CM

## 2023-12-29 DIAGNOSIS — J20.9 ACUTE BRONCHITIS, UNSPECIFIED ORGANISM: Primary | ICD-10-CM

## 2023-12-29 DIAGNOSIS — E87.8 ELECTROLYTE ABNORMALITY: ICD-10-CM

## 2023-12-29 DIAGNOSIS — Z00.00 MEDICARE ANNUAL WELLNESS VISIT, SUBSEQUENT: ICD-10-CM

## 2023-12-29 DIAGNOSIS — L03.116 CELLULITIS OF LEFT LOWER EXTREMITY: ICD-10-CM

## 2023-12-29 DIAGNOSIS — Z98.890 STATUS POST INCISION AND DRAINAGE: Primary | ICD-10-CM

## 2023-12-29 DIAGNOSIS — R60.0 LEG EDEMA, LEFT: ICD-10-CM

## 2023-12-29 DIAGNOSIS — L02.416 ABSCESS OF LEFT LOWER LEG: ICD-10-CM

## 2023-12-29 DIAGNOSIS — N18.30 STAGE 3 CHRONIC KIDNEY DISEASE, UNSPECIFIED WHETHER STAGE 3A OR 3B CKD (HCC): ICD-10-CM

## 2023-12-29 DIAGNOSIS — I48.92 PAROXYSMAL ATRIAL FLUTTER (HCC): ICD-10-CM

## 2023-12-29 DIAGNOSIS — I27.21 PULMONARY ARTERY HYPERTENSION (HCC): ICD-10-CM

## 2023-12-29 DIAGNOSIS — I10 ESSENTIAL HYPERTENSION: ICD-10-CM

## 2023-12-29 DIAGNOSIS — L02.416 ABSCESS OF LEFT LEG: ICD-10-CM

## 2023-12-29 DIAGNOSIS — R09.81 HEAD CONGESTION: ICD-10-CM

## 2023-12-29 DIAGNOSIS — E83.42 HYPOMAGNESEMIA: ICD-10-CM

## 2023-12-29 DIAGNOSIS — I35.8 AORTIC VALVE SCLEROSIS: ICD-10-CM

## 2023-12-29 DIAGNOSIS — Z98.890 STATUS POST HARDWARE REMOVAL: ICD-10-CM

## 2023-12-29 PROBLEM — J40 BRONCHITIS: Status: ACTIVE | Noted: 2023-12-29

## 2023-12-29 LAB
SARS-COV-2 AG UPPER RESP QL IA: NEGATIVE
VALID CONTROL: NORMAL

## 2023-12-29 PROCEDURE — 87811 SARS-COV-2 COVID19 W/OPTIC: CPT | Performed by: INTERNAL MEDICINE

## 2023-12-29 PROCEDURE — G0439 PPPS, SUBSEQ VISIT: HCPCS | Performed by: INTERNAL MEDICINE

## 2023-12-29 PROCEDURE — 99213 OFFICE O/P EST LOW 20 MIN: CPT | Performed by: PHYSICIAN ASSISTANT

## 2023-12-29 PROCEDURE — 99214 OFFICE O/P EST MOD 30 MIN: CPT | Performed by: INTERNAL MEDICINE

## 2023-12-29 RX ORDER — AZITHROMYCIN 250 MG/1
TABLET, FILM COATED ORAL DAILY
Qty: 6 TABLET | Refills: 0 | Status: SHIPPED | OUTPATIENT
Start: 2023-12-29 | End: 2024-01-03

## 2023-12-29 NOTE — PATIENT INSTRUCTIONS
Zithromax 250 mg 2 tablet today and 1 a day for 4 more days.    DayQuil 3 times a day.    Discontinue Mucinex.    If cough is no better come back next Tuesday or Wednesday.    Increase torsemide 5 mg 3 times a week as well as potassium 3 times a week.    Go for the blood test CBC and BMP in 7 to 10 days at your convenience.    Will follow back in 2 weeks or 3 to 5 days as needed.    Follow with Consultants as per their and our suggestion    Follow up in approximately two week or as needed earlier    Follow all instructions as advised and discussed.    Take your medications as prescribed.    Call the office immediately if you experience any side effects.    Ask questions if you do not understand.    Keep your scheduled appointment as advised or come sooner if necessary or in doubt.    Best time to call for non-urgent matter or questions on weekdays is between 9am and 12 noon.    See physician for any new symptoms or worsening of current symptoms.    Urgent or emergent situations call 911 and report to nearest emergency room.    I spent  time taking care of this patient including clinical care, conseling, collaboration, chart, lab and consultant's follow up note,images report, documentation, pre visit  review as appropriate.    Patient is to get labs 1 week(s) prior to next visit if advised         Medicare Preventive Visit Patient Instructions  Thank you for completing your Welcome to Medicare Visit or Medicare Annual Wellness Visit today. Your next wellness visit will be due in one year (12/29/2024).  The screening/preventive services that you may require over the next 5-10 years are detailed below. Some tests may not apply to you based off risk factors and/or age. Screening tests ordered at today's visit but not completed yet may show as past due. Also, please note that scanned in results may not display below.  Preventive Screenings:  Service Recommendations Previous Testing/Comments   Colorectal Cancer  Screening  Colonoscopy    Fecal Occult Blood Test (FOBT)/Fecal Immunochemical Test (FIT)  Fecal DNA/Cologuard Test  Flexible Sigmoidoscopy Age: 45-75 years old   Colonoscopy: every 10 years (May be performed more frequently if at higher risk)  OR  FOBT/FIT: every 1 year  OR  Cologuard: every 3 years  OR  Sigmoidoscopy: every 5 years  Screening may be recommended earlier than age 45 if at higher risk for colorectal cancer. Also, an individualized decision between you and your healthcare provider will decide whether screening between the ages of 76-85 would be appropriate. Colonoscopy: Not on file  FOBT/FIT: Not on file  Cologuard: Not on file  Sigmoidoscopy: Not on file          Prostate Cancer Screening Individualized decision between patient and health care provider in men between ages of 55-69   Medicare will cover every 12 months beginning on the day after your 50th birthday PSA: 0.8 ng/mL           Hepatitis C Screening Once for adults born between 1945 and 1965  More frequently in patients at high risk for Hepatitis C Hep C Antibody: Not on file        Diabetes Screening 1-2 times per year if you're at risk for diabetes or have pre-diabetes Fasting glucose: 133 mg/dL (12/28/2023)  A1C: No results in last 5 years (No results in last 5 years)      Cholesterol Screening Once every 5 years if you don't have a lipid disorder. May order more often based on risk factors. Lipid panel: 05/05/2023         Other Preventive Screenings Covered by Medicare:  Abdominal Aortic Aneurysm (AAA) Screening: covered once if your at risk. You're considered to be at risk if you have a family history of AAA or a male between the age of 65-75 who smoking at least 100 cigarettes in your lifetime.  Lung Cancer Screening: covers low dose CT scan once per year if you meet all of the following conditions: (1) Age 55-77; (2) No signs or symptoms of lung cancer; (3) Current smoker or have quit smoking within the last 15 years; (4) You have a  tobacco smoking history of at least 20 pack years (packs per day x number of years you smoked); (5) You get a written order from a healthcare provider.  Glaucoma Screening: covered annually if you're considered high risk: (1) You have diabetes OR (2) Family history of glaucoma OR (3)  aged 50 and older OR (4)  American aged 65 and older  Osteoporosis Screening: covered every 2 years if you meet one of the following conditions: (1) Have a vertebral abnormality; (2) On glucocorticoid therapy for more than 3 months; (3) Have primary hyperparathyroidism; (4) On osteoporosis medications and need to assess response to drug therapy.  HIV Screening: covered annually if you're between the age of 15-65. Also covered annually if you are younger than 15 and older than 65 with risk factors for HIV infection. For pregnant patients, it is covered up to 3 times per pregnancy.    Immunizations:  Immunization Recommendations   Influenza Vaccine Annual influenza vaccination during flu season is recommended for all persons aged >= 6 months who do not have contraindications   Pneumococcal Vaccine   * Pneumococcal conjugate vaccine = PCV13 (Prevnar 13), PCV15 (Vaxneuvance), PCV20 (Prevnar 20)  * Pneumococcal polysaccharide vaccine = PPSV23 (Pneumovax) Adults 19-63 yo with certain risk factors or if 65+ yo  If never received any pneumonia vaccine: recommend Prevnar 20 (PCV20)  Give PCV20 if previously received 1 dose of PCV13 or PPSV23   Hepatitis B Vaccine 3 dose series if at intermediate or high risk (ex: diabetes, end stage renal disease, liver disease)   Respiratory syncytial virus (RSV) Vaccine - COVERED BY MEDICARE PART D  * RSVPreF3 (Arexvy) CDC recommends that adults 60 years of age and older may receive a single dose of RSV vaccine using shared clinical decision-making (SCDM)   Tetanus (Td) Vaccine - COST NOT COVERED BY MEDICARE PART B Following completion of primary series, a booster dose should be given  every 10 years to maintain immunity against tetanus. Td may also be given as tetanus wound prophylaxis.   Tdap Vaccine - COST NOT COVERED BY MEDICARE PART B Recommended at least once for all adults. For pregnant patients, recommended with each pregnancy.   Shingles Vaccine (Shingrix) - COST NOT COVERED BY MEDICARE PART B  2 shot series recommended in those 19 years and older who have or will have weakened immune systems or those 50 years and older     Health Maintenance Due:  There are no preventive care reminders to display for this patient.  Immunizations Due:      Topic Date Due    COVID-19 Vaccine (1) Never done    Pneumococcal Vaccine: 65+ Years (1 - PCV) Never done     Advance Directives   What are advance directives?  Advance directives are legal documents that state your wishes and plans for medical care. These plans are made ahead of time in case you lose your ability to make decisions for yourself. Advance directives can apply to any medical decision, such as the treatments you want, and if you want to donate organs.   What are the types of advance directives?  There are many types of advance directives, and each state has rules about how to use them. You may choose a combination of any of the following:  Living will:  This is a written record of the treatment you want. You can also choose which treatments you do not want, which to limit, and which to stop at a certain time. This includes surgery, medicine, IV fluid, and tube feedings.   Durable power of  for healthcare (DPAHC):  This is a written record that states who you want to make healthcare choices for you when you are unable to make them for yourself. This person, called a proxy, is usually a family member or a friend. You may choose more than 1 proxy.  Do not resuscitate (DNR) order:  A DNR order is used in case your heart stops beating or you stop breathing. It is a request not to have certain forms of treatment, such as CPR. A DNR order  may be included in other types of advance directives.  Medical directive:  This covers the care that you want if you are in a coma, near death, or unable to make decisions for yourself. You can list the treatments you want for each condition. Treatment may include pain medicine, surgery, blood transfusions, dialysis, IV or tube feedings, and a ventilator (breathing machine).  Values history:  This document has questions about your views, beliefs, and how you feel and think about life. This information can help others choose the care that you would choose.  Why are advance directives important?  An advance directive helps you control your care. Although spoken wishes may be used, it is better to have your wishes written down. Spoken wishes can be misunderstood, or not followed. Treatments may be given even if you do not want them. An advance directive may make it easier for your family to make difficult choices about your care.   Weight Management   Why it is important to manage your weight:  Being overweight increases your risk of health conditions such as heart disease, high blood pressure, type 2 diabetes, and certain types of cancer. It can also increase your risk for osteoarthritis, sleep apnea, and other respiratory problems. Aim for a slow, steady weight loss. Even a small amount of weight loss can lower your risk of health problems.  How to lose weight safely:  A safe and healthy way to lose weight is to eat fewer calories and get regular exercise. You can lose up about 1 pound a week by decreasing the number of calories you eat by 500 calories each day.   Healthy meal plan for weight management:  A healthy meal plan includes a variety of foods, contains fewer calories, and helps you stay healthy. A healthy meal plan includes the following:  Eat whole-grain foods more often.  A healthy meal plan should contain fiber. Fiber is the part of grains, fruits, and vegetables that is not broken down by your body.  Whole-grain foods are healthy and provide extra fiber in your diet. Some examples of whole-grain foods are whole-wheat breads and pastas, oatmeal, brown rice, and bulgur.  Eat a variety of vegetables every day.  Include dark, leafy greens such as spinach, kale, ivette greens, and mustard greens. Eat yellow and orange vegetables such as carrots, sweet potatoes, and winter squash.   Eat a variety of fruits every day.  Choose fresh or canned fruit (canned in its own juice or light syrup) instead of juice. Fruit juice has very little or no fiber.  Eat low-fat dairy foods.  Drink fat-free (skim) milk or 1% milk. Eat fat-free yogurt and low-fat cottage cheese. Try low-fat cheeses such as mozzarella and other reduced-fat cheeses.  Choose meat and other protein foods that are low in fat.  Choose beans or other legumes such as split peas or lentils. Choose fish, skinless poultry (chicken or turkey), or lean cuts of red meat (beef or pork). Before you cook meat or poultry, cut off any visible fat.   Use less fat and oil.  Try baking foods instead of frying them. Add less fat, such as margarine, sour cream, regular salad dressing and mayonnaise to foods. Eat fewer high-fat foods. Some examples of high-fat foods include french fries, doughnuts, ice cream, and cakes.  Eat fewer sweets.  Limit foods and drinks that are high in sugar. This includes candy, cookies, regular soda, and sweetened drinks.  Exercise:  Exercise at least 30 minutes per day on most days of the week. Some examples of exercise include walking, biking, dancing, and swimming. You can also fit in more physical activity by taking the stairs instead of the elevator or parking farther away from stores. Ask your healthcare provider about the best exercise plan for you.      © Copyright [employer] 2018 Information is for End User's use only and may not be sold, redistributed or otherwise used for commercial purposes. All illustrations and images included in  CareNotes® are the copyrighted property of [employer] or [employer]

## 2023-12-29 NOTE — ASSESSMENT & PLAN NOTE
Lab Results   Component Value Date    EGFR 58 12/28/2023    EGFR 66 12/18/2023    EGFR 62 12/17/2023    CREATININE 1.15 12/28/2023    CREATININE 1.03 12/18/2023    CREATININE 1.09 12/17/2023   Renal function noted.  GFR is pretty close to baseline.  Will closely continue to monitor.  Potassium is normal.  Sodium 134

## 2023-12-29 NOTE — ASSESSMENT & PLAN NOTE
He has a unilateral edema with significant edema on left lower extremity right lower extremity edema is mild.    Discoloration is better.    Patient's knee joint was not examined distal leg were examined been below knee due to his tight pain.  Patient was seen by orthopedic their notes were noted.    Currently patient is on torsemide 5 mg twice a week and potassium pill 10 mill equivalent 2 times a week.  Recommend to increase both of them Monday Wednesday and Friday 3 times a week.    We will follow back in 2 weeks.    Recommend elevation    Will do CBC and BMP in 2 weeks.    12/29/2023: Home COVID test negative  12/28/2023: Sed rate 58, CBC with differential WBC 11,000 hemoglobin came back to normal 12.3 platelet 426, CMP sodium 134 blood sugar 133 GFR 58 rest of the CMP unremarkable.  12/8/2023 magnesium 1.9 WBC 8.75 hemoglobin 10.8 and BMP unremarkable with GFR 66Magnesium is fair will monitor

## 2023-12-29 NOTE — ASSESSMENT & PLAN NOTE
Cellulitis is better    He was seen by orthopedic doctor today  Incision looks good.    Discoloration is still present but much better.  No fever no chills.    This is complicated by having a URI/bronchitis at this time WBC top normal hemoglobin back to normal and platelet 400,000 with sed rate mild to moderately elevated.    He is no longer on antibiotic.    Cellulitis component is better.  Abscess on the left leg component is better.    Patient is advised not to cover incision or over compression of the leg.

## 2023-12-29 NOTE — PROGRESS NOTES
Assessment and Plan:     Problem List Items Addressed This Visit        Respiratory    Acute bronchitis - Primary     Started cough Wednesday no fever, NO SB, abdominal pain , nausea,vomiting or diarrhea, mild stuffy nose , white phlegm, no problem with smell or taste, no headache , bodyache, sinus pain    COBVID taste is negative, no travel, no exposure to covid      I believe patient has upper respite tract with acute bronchitis.    Recommend:    Zithromax 250 mg 2 tablet today and 1 a day for 4 more days.    DayQuil 3 times a day.    Discontinue Mucinex.    If cough is no better come back next Tuesday or Wednesday.         Relevant Medications    azithromycin (Zithromax) 250 mg tablet    Other Relevant Orders    CBC    Basic metabolic panel       Cardiovascular and Mediastinum    Aortic valve sclerosis     Current cardiac medications includes atorvastatin, fosinopril, torsemide, metoprolol, potassium pill will continue same at this time.  Stable cardiac status with no LV failure and regular pulse.    Vitals:    12/29/23 1238   BP: 122/76   Pulse: 66   Temp: 97.9 °F (36.6 °C)   SpO2: 94%       .12/29/2023: Home COVID test negative  12/28/2023: Sed rate 58, CBC with differential WBC 11,000 hemoglobin came back to normal 12.3 platelet 426, CMP sodium 134 blood sugar 133 GFR 58 rest of the CMP unremarkable.  12/8/2023 magnesium 1.9 WBC 8.75 hemoglobin 10.8 and BMP unremarkable with GFR 66Magnesium is fair will monitor         Nonrheumatic aortic valve insufficiency     Current cardiac medications includes atorvastatin, fosinopril, torsemide, metoprolol, potassium pill will continue same at this time.  Stable cardiac status with no LV failure and regular pulse.    Vitals:    12/29/23 1238   BP: 122/76   Pulse: 66   Temp: 97.9 °F (36.6 °C)   SpO2: 94%       .12/29/2023: Home COVID test negative  12/28/2023: Sed rate 58, CBC with differential WBC 11,000 hemoglobin came back to normal 12.3 platelet 426, CMP sodium 134  blood sugar 133 GFR 58 rest of the CMP unremarkable.  12/8/2023 magnesium 1.9 WBC 8.75 hemoglobin 10.8 and BMP unremarkable with GFR 66Magnesium is fair will monitor         Essential hypertension     Current cardiac medications includes atorvastatin, fosinopril, torsemide, metoprolol, potassium pill will continue same at this time.  Stable cardiac status with no LV failure and regular pulse.    Vitals:    12/29/23 1238   BP: 122/76   Pulse: 66   Temp: 97.9 °F (36.6 °C)   SpO2: 94%       .12/29/2023: Home COVID test negative  12/28/2023: Sed rate 58, CBC with differential WBC 11,000 hemoglobin came back to normal 12.3 platelet 426, CMP sodium 134 blood sugar 133 GFR 58 rest of the CMP unremarkable.  12/8/2023 magnesium 1.9 WBC 8.75 hemoglobin 10.8 and BMP unremarkable with GFR 66Magnesium is fair will monitor         Relevant Orders    CBC    Basic metabolic panel    Paroxysmal atrial flutter (HCC)     Current cardiac medications includes atorvastatin, fosinopril, torsemide, metoprolol, potassium pill will continue same at this time.  Stable cardiac status with no LV failure and regular pulse.    Vitals:    12/29/23 1238   BP: 122/76   Pulse: 66   Temp: 97.9 °F (36.6 °C)   SpO2: 94%       .12/29/2023: Home COVID test negative  12/28/2023: Sed rate 58, CBC with differential WBC 11,000 hemoglobin came back to normal 12.3 platelet 426, CMP sodium 134 blood sugar 133 GFR 58 rest of the CMP unremarkable.  12/8/2023 magnesium 1.9 WBC 8.75 hemoglobin 10.8 and BMP unremarkable with GFR 66Magnesium is fair will monitor         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HCC)     Current cardiac medications includes atorvastatin, fosinopril, torsemide, metoprolol, potassium pill will continue same at this time.  Stable cardiac status with no LV failure and regular pulse.    Vitals:    12/29/23 1238   BP: 122/76   Pulse: 66   Temp: 97.9 °F (36.6 °C)   SpO2: 94%       .12/29/2023: Home COVID test negative  12/28/2023: Sed rate 58, CBC with  differential WBC 11,000 hemoglobin came back to normal 12.3 platelet 426, CMP sodium 134 blood sugar 133 GFR 58 rest of the CMP unremarkable.  12/8/2023 magnesium 1.9 WBC 8.75 hemoglobin 10.8 and BMP unremarkable with GFR 66Magnesium is fair will monitor            Genitourinary    Stage 3 chronic kidney disease, unspecified whether stage 3a or 3b CKD ([facility])     Lab Results   Component Value Date    EGFR 58 12/28/2023    EGFR 66 12/18/2023    EGFR 62 12/17/2023    CREATININE 1.15 12/28/2023    CREATININE 1.03 12/18/2023    CREATININE 1.09 12/17/2023   Renal function noted.  GFR is pretty close to baseline.  Will closely continue to monitor.  Potassium is normal.  Sodium 134         Relevant Orders    CBC    Basic metabolic panel       Other    Medicare annual wellness visit, subsequent    Leg edema, left     He has a unilateral edema with significant edema on left lower extremity right lower extremity edema is mild.    Discoloration is better.    Patient's knee joint was not examined distal leg were examined been below knee due to his tight pain.  Patient was seen by orthopedic their notes were noted.    Currently patient is on torsemide 5 mg twice a week and potassium pill 10 mill equivalent 2 times a week.  Recommend to increase both of them Monday Wednesday and Friday 3 times a week.    We will follow back in 2 weeks.    Recommend elevation    Will do CBC and BMP in 2 weeks.    12/29/2023: Home COVID test negative  12/28/2023: Sed rate 58, CBC with differential WBC 11,000 hemoglobin came back to normal 12.3 platelet 426, CMP sodium 134 blood sugar 133 GFR 58 rest of the CMP unremarkable.  12/8/2023 magnesium 1.9 WBC 8.75 hemoglobin 10.8 and BMP unremarkable with GFR 66Magnesium is fair will monitor         Relevant Orders    CBC    Basic metabolic panel    Abscess of left leg    Relevant Orders    CBC    Basic metabolic panel    Hypomagnesemia    Cellulitis of extremity     Cellulitis is better    He was seen by  orthopedic doctor today  Incision looks good.    Discoloration is still present but much better.  No fever no chills.    This is complicated by having a URI/bronchitis at this time WBC top normal hemoglobin back to normal and platelet 400,000 with sed rate mild to moderately elevated.    He is no longer on antibiotic.    Cellulitis component is better.  Abscess on the left leg component is better.    Patient is advised not to cover incision or over compression of the leg.         Relevant Orders    CBC    Basic metabolic panel    Electrolyte abnormality     Lab Results   Component Value Date     12/21/2015    SODIUM 134 (L) 12/28/2023    K 4.6 12/28/2023    CL 97 12/28/2023    CO2 31 12/28/2023    ANIONGAP 11.9 12/21/2015    AGAP 6 12/28/2023    BUN 20 12/28/2023    CREATININE 1.15 12/28/2023    GLUC 89 12/18/2023    GLUF 133 (H) 12/28/2023    CALCIUM 9.2 12/28/2023    AST 15 12/28/2023    ALT 7 12/28/2023    ALKPHOS 71 12/28/2023    PROT 6.9 12/21/2015    TP 6.5 12/28/2023    BILITOT 0.6 12/21/2015    TBILI 0.47 12/28/2023    EGFR 58 12/28/2023   Sodium is 134.  Potassium is normal.  Will continue to monitor          Other Visit Diagnoses     Head congestion        Relevant Medications    azithromycin (Zithromax) 250 mg tablet    Other Relevant Orders    POCT Rapid Covid Ag (Completed)             Preventive health issues were discussed with patient, and age appropriate screening tests were ordered as noted in patient's After Visit Summary.  Personalized health advice and appropriate referrals for health education or preventive services given if needed, as noted in patient's After Visit Summary.     History of Present Illness:     Patient presents for a Medicare Wellness Visit    Patient is here for follow-up of his multiple medical problems.    First of all he is here for follow-up of his left lower extremity cellulitis he remains off antibiotic.  Cellulitis component is much better.  He does have a chronic  significant edema of the left lower extremity.  His discoloration is now down to very faint pink.    Patient was seen by orthopedic doctor the notes were noted.  Incision per daughter as well as Dr. Is unremarkable.    He does not have any fever or chills.    Edema persist significant 4+ on left lower extremity and 1+ on the right lower extremity.  Patient does not have a deep vein thrombosis.    Patient is currently on torsemide 5 mg twice a week as well as KCl 10 mEq twice a week we will increase both of them to 3 times a week.    From cardiac standpoint hypertension, valvular heart disease, paroxysmal atrial flutter, pulmonary artery hypertension, history of pulmonary embolism, nonrheumatic aortic valve insufficiency and aortic valve sclerosis and mitral valve regurgitation all fair.    New coupmlain:cough    Started cough Wednesday no fever, NO SB, abdominal pain , nausea,vomiting or diarrhea, mild stuffy nose , white phlegm, no problem with smell or taste, no headache , bodyache, sinus pain    COVID taste is negative, no travel, no exposure to covid    No history of asthma or COPD patient is non-smoker.  No known allergies.           Patient Care Team:  Gurpreet Aguirre MD as PCP - General (Internal Medicine)  Lexa العراقي DO as PCP - PCP-[facility] ([facility])     Review of Systems:     Review of Systems   Constitutional:  Negative for appetite change, fatigue, fever and unexpected weight change.   HENT:  Positive for congestion and sore throat. Negative for ear pain.    Eyes:  Negative for pain and redness.   Respiratory:  Positive for cough. Negative for shortness of breath and wheezing.    Cardiovascular:  Positive for leg swelling. Negative for chest pain and palpitations.   Gastrointestinal:  Negative for abdominal pain, diarrhea, nausea and vomiting.   Endocrine: Negative for cold intolerance, polydipsia and polyuria.   Genitourinary:  Negative for dysuria, frequency and urgency.    Musculoskeletal:  Negative for arthralgias, gait problem and myalgias.   Skin:  Positive for color change. Negative for rash.   Allergic/Immunologic: Negative.    Neurological:  Negative for dizziness and headaches.   Hematological:  Negative for adenopathy.   Psychiatric/Behavioral:  Negative for behavioral problems.         Problem List:     Patient Active Problem List   Diagnosis   • Mixed obsessional thoughts and acts   • Allergic rhinitis   • Nonrheumatic mitral valve regurgitation   • Aortic valve sclerosis   • Nonrheumatic aortic valve insufficiency   • Essential hypertension   • Recurrent major depressive disorder, in full remission (HCC)   • Hyperlipidemia   • Impaired vision   • History of pulmonary embolus (PE) 2019   • SVT (supraventricular tachycardia)   • Valvular heart disease   • Paroxysmal atrial flutter (HCC)   • Medicare annual wellness visit, subsequent   • Lung nodule   • Bladder wall thickening   • 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HCC)   • Age-related osteoporosis without current pathological fracture   • Abnormal CT scan, chest   • Leg edema, left   • Acute respiratory insufficiency   • Hiatal hernia   • Other pulmonary embolism without acute cor pulmonale, unspecified chronicity (HCC)   • Gastroesophageal reflux disease without esophagitis   • Chronic rhinitis   • Vega esophagus   • Stage 3 chronic kidney disease, unspecified whether stage 3a or 3b CKD (HCC)   • Abscess of left leg   • BPH (benign prostatic hyperplasia)   • OCD (obsessive compulsive disorder)   • Hypomagnesemia   • Mild protein-calorie malnutrition (HCC)   • Cellulitis of extremity   • Electrolyte abnormality   • Acute bronchitis      Past Medical and Surgical History:     Past Medical History:   Diagnosis Date   • BPH (benign prostatic hyperplasia)    • Depression    • HTN (hypertension)    • Hyperlipidemia    • OCD (obsessive compulsive disorder)    • Pulmonary embolism (HCC) 2019     Past Surgical History:   Procedure  Laterality Date   • HARDWARE REMOVAL Left 9/9/2023    Procedure: REMOVAL HARDWARE;  Surgeon: Henri Flanagan DO;  Location: [facility];  Service: Orthopedics   • INCISION AND DRAINAGE OF WOUND Left 9/9/2023    Procedure: INCISION AND DRAINAGE (I&D) EXTREMITY;  Surgeon: Henri Flanagan DO;  Location: [facility];  Service: Orthopedics   • IR ASPIRATION ONLY  12/15/2023   • IR IVC FILTER PLACEMENT OPTIONAL/TEMPORARY  12/7/2019   • IR IVC FILTER REMOVAL  8/21/2020   • SC OPTX FEM SHFT FX W/INSJ IMED IMPLT W/WO SCREW Right 12/4/2019    Procedure: INSERTION NAIL IM FEMUR ANTEGRADE (TROCHANTERIC);  Surgeon: Yesenia Veronica DO;  Location: [facility];  Service: Orthopedics   • SC OPTX FEM SHFT FX W/INSJ IMED IMPLT W/WO SCREW Left 6/17/2022    Procedure: INSERTION NAIL IM FEMUR ANTEGRADE (TROCHANTERIC) - long nail;  Surgeon: Henri Flanagan DO;  Location: [facility];  Service: Orthopedics   • WOUND DEBRIDEMENT Left 12/16/2023    Procedure: DEBRIDEMENT LOWER EXTREMITY (WASH OUT)- left thigh abscess irrigation and debridement;  Surgeon: Feng Robertson MD;  Location: [facility];  Service: Orthopedics      Family History:     Family History   Problem Relation Age of Onset   • Cancer Mother       Social History:     Social History     Socioeconomic History   • Marital status: /Civil Union     Spouse name: None   • Number of children: None   • Years of education: None   • Highest education level: None   Occupational History   • None   Tobacco Use   • Smoking status: Never   • Smokeless tobacco: Never   Vaping Use   • Vaping status: Never Used   Substance and Sexual Activity   • Alcohol use: Never   • Drug use: Never   • Sexual activity: None   Other Topics Concern   • None   Social History Narrative   • None     Social Determinants of Health     Financial Resource Strain: Low Risk  (12/29/2023)    Overall Financial Resource Strain (CARDIA)    • Difficulty of Paying Living Expenses: Not hard at all   Food Insecurity: No Food  Insecurity (12/12/2023)    Hunger Vital Sign    • Worried About Running Out of Food in the Last Year: Never true    • Ran Out of Food in the Last Year: Never true   Transportation Needs: No Transportation Needs (12/29/2023)    PRAPARE - Transportation    • Lack of Transportation (Medical): No    • Lack of Transportation (Non-Medical): No   Physical Activity: Not on file   Stress: Not on file   Social Connections: Not on file   Intimate Partner Violence: Not on file   Housing Stability: Low Risk  (12/12/2023)    Housing Stability Vital Sign    • Unable to Pay for Housing in the Last Year: No    • Number of Places Lived in the Last Year: 1    • Unstable Housing in the Last Year: No      Medications and Allergies:     Current Outpatient Medications   Medication Sig Dispense Refill   • azithromycin (Zithromax) 250 mg tablet Take 2 tablets (500 mg total) by mouth daily for 1 day, THEN 1 tablet (250 mg total) daily for 4 days. 6 tablet 0   • albuterol (Ventolin HFA) 90 mcg/act inhaler Inhale 2 puffs every 6 (six) hours as needed for wheezing 18 g 0   • atorvastatin (LIPITOR) 10 mg tablet TAKE ONE-HALF TABLET DAILY (Patient taking differently: Take 5 mg by mouth daily with dinner TAKE ONE-HALF TABLET DAILY) 45 tablet 5   • Calcium Carb-Cholecalciferol (CALTRATE 600+D3 PO) Take 600 mg by mouth 2 (two) times a day     • citalopram (CeleXA) 40 mg tablet TAKE ONE TABLET ONCE DAILY BY MOUTH 90 tablet 1   • fluticasone (FLONASE) 50 mcg/act nasal spray USE 1 SPRAY IN EACH NOSTRIL DAILY 16 g 0   • fosinopril (MONOPRIL) 10 mg tablet TAKE ONE-HALF TABLET BY MOUTH DAILY 45 tablet 1   • ipratropium (ATROVENT) 0.03 % nasal spray 2 sprays into each nostril 2 (two) times a day as needed for rhinitis (Patient taking differently: 2 sprays into each nostril 2 (two) times a day) 30 mL 2   • magnesium oxide (MAG-OX) 400 mg tablet Take 1 tablet by mouth daily     • metoprolol tartrate (LOPRESSOR) 50 mg tablet TAKE ONE TABLET EVERY 12 HOURS 180  tablet 1   • Multiple Vitamin (MULTIVITAMIN) tablet Take 1 tablet by mouth daily     • niacin (NIASPAN) 500 mg CR tablet TAKE ONE TABLET DAILY AT BEDTIME 90 tablet 1   • pantoprazole (PROTONIX) 40 mg tablet TAKE ONE TABLET DAILY 30 tablet 2   • potassium chloride (Klor-Con) 10 mEq tablet Take 1 tablet (10 mEq total) by mouth 2 (two) times a week     • saccharomyces boulardii (FLORASTOR) 250 mg capsule Take 1 capsule (250 mg total) by mouth 2 (two) times a day     • tamsulosin (FLOMAX) 0.4 mg Take 0.4 mg by mouth daily with dinner     • torsemide (DEMADEX) 5 MG tablet Take 1 tablet (5 mg total) by mouth 2 (two) times a week Monday and Thursday       No current facility-administered medications for this visit.     No Known Allergies   Immunizations:     Immunization History   Administered Date(s) Administered   • INFLUENZA 12/14/2022   • Influenza, high dose seasonal 0.7 mL 09/29/2020, 10/12/2021, 12/14/2022, 10/13/2023   • Influenza, injectable, quadrivalent, preservative free 0.5 mL 10/31/2019   • Tdap 10/14/2019      Health Maintenance:     There are no preventive care reminders to display for this patient.      Topic Date Due   • COVID-19 Vaccine (1) Never done   • Pneumococcal Vaccine: 65+ Years (1 - PCV) Never done      Medicare Screening Tests and Risk Assessments:     Taj is here for his Subsequent Wellness visit. Last Medicare Wellness visit information reviewed, patient interviewed and updates made to the record today.      Health Risk Assessment:   Patient rates overall health as good. Patient feels that their physical health rating is slightly worse. Patient is satisfied with their life. Eyesight was rated as same. Hearing was rated as same. Patient feels that their emotional and mental health rating is same. Patients states they are never, rarely angry. Patient states they are sometimes unusually tired/fatigued. Pain experienced in the last 7 days has been none. Patient states that he has experienced no  weight loss or gain in last 6 months. Weight stays the same.  Is getting better    Depression Screening:   PHQ-9 Score: 0      Fall Risk Screening:   In the past year, patient has experienced: no history of falling in past year      Home Safety:  Patient has trouble with stairs inside or outside of their home. Patient has working smoke alarms and has working carbon monoxide detector. Home safety hazards include: none. No home hazards. Goes slow on the stairs. Use banisters for support.Jose is a nurse she looks in a couple times a week. Uses walker in home.    Nutrition:   Current diet is Regular. Eats a balanced diet    Medications:   Patient is currently taking over-the-counter supplements. OTC medications include: see medication list. Patient is able to manage medications. No issues. Daughter does his meds.    Activities of Daily Living (ADLs)/Instrumental Activities of Daily Living (IADLs):   Walk and transfer into and out of bed and chair?: Yes  Dress and groom yourself?: Yes    Bathe or shower yourself?: Yes    Feed yourself? Yes  Do your laundry/housekeeping?: Yes  Manage your money, pay your bills and track your expenses?: Yes  Make your own meals?: Yes    Do your own shopping?: Yes    ADL comments: Pt is independent.    Previous Hospitalizations:   Any hospitalizations or ED visits within the last 12 months?: No      Hospitalization Comments: Was in hospital for RSV and Covid. Then for infection of knee.    Advance Care Planning:   Living will: Yes    Durable POA for healthcare: Yes    Advanced directive: Yes    Advanced directive counseling given: Yes    Patient declined ACP directive: No    End of Life Decisions reviewed with patient: Yes    Provider agrees with end of life decisions: Yes      Comments: All docs in place per pt    Cognitive Screening:   Provider or family/friend/caregiver concerned regarding cognition?: No    PREVENTIVE SCREENINGS      Cardiovascular Screening:    General: Screening  "Not Indicated and History Lipid Disorder      Diabetes Screening:     General: Screening Current      Colorectal Cancer Screening:     General: Screening Not Indicated      Prostate Cancer Screening:    General: Screening Not Indicated      Osteoporosis Screening:    General: Screening Not Indicated and History Osteoporosis      Abdominal Aortic Aneurysm (AAA) Screening:        General: Screening Not Indicated      Lung Cancer Screening:     General: Screening Not Indicated      Hepatitis C Screening:    General: Patient Declines    Hep C Screening Accepted: No       Preventive Screening Comments: No risk for Hep C. UTD with covid and flu.    Screening, Brief Intervention, and Referral to Treatment (SBIRT)    Screening  Typical number of drinks in a day: 0  Typical number of drinks in a week: 0  Interpretation: Low risk drinking behavior.    AUDIT-C Screenin) How often did you have a drink containing alcohol in the past year? never  2) How many drinks did you have on a typical day when you were drinking in the past year? 0  3) How often did you have 6 or more drinks on one occasion in the past year? never    AUDIT-C Score: 0  Interpretation: Score 0-3 (male): Negative screen for alcohol misuse    Single Item Drug Screening:  How often have you used an illegal drug (including marijuana) or a prescription medication for non-medical reasons in the past year? never    Single Item Drug Screen Score: 0  Interpretation: Negative screen for possible drug use disorder    Brief Intervention  Alcohol & drug use screenings were reviewed. No concerns regarding substance use disorder identified.     Other Counseling Topics:   Sunscreen and regular weightbearing exercise.     No results found.     Physical Exam:     /76   Pulse 66   Temp 97.9 °F (36.6 °C)   Ht 5' 9\" (1.753 m)   Wt 83 kg (183 lb)   SpO2 94%   BMI 27.02 kg/m²     Physical Exam  Constitutional:       General: He is not in acute distress.     " Appearance: He is well-developed. He is not ill-appearing, toxic-appearing or diaphoretic.   HENT:      Head: Normocephalic and atraumatic.      Nose: Congestion and rhinorrhea present.   Eyes:      General:         Right eye: No discharge.         Left eye: No discharge.      Conjunctiva/sclera: Conjunctivae normal.   Neck:      Thyroid: No thyromegaly.      Vascular: No JVD.   Cardiovascular:      Rate and Rhythm: Regular rhythm.      Heart sounds: Murmur heard.   Pulmonary:      Effort: No respiratory distress.      Breath sounds: Normal breath sounds. No wheezing or rales.   Abdominal:      General: Bowel sounds are normal. There is no distension.      Palpations: There is no mass.      Tenderness: There is no abdominal tenderness. There is no rebound.   Musculoskeletal:      Right lower le+ Edema present.      Left lower le+ Edema present.   Lymphadenopathy:      Cervical: No cervical adenopathy.   Skin:     General: Skin is warm.      Findings: No rash.   Neurological:      Coordination: Coordination normal.   Psychiatric:         Behavior: Behavior normal.         Judgment: Judgment normal.          Gurpreet Aguirre MD

## 2023-12-29 NOTE — ASSESSMENT & PLAN NOTE
Started cough Wednesday no fever, NO SB, abdominal pain , nausea,vomiting or diarrhea, mild stuffy nose , white phlegm, no problem with smell or taste, no headache , bodyache, sinus pain    COBVID taste is negative, no travel, no exposure to covid      I believe patient has upper respite tract with acute bronchitis.    Recommend:    Zithromax 250 mg 2 tablet today and 1 a day for 4 more days.    DayQuil 3 times a day.    Discontinue Mucinex.    If cough is no better come back next Tuesday or Wednesday.

## 2023-12-29 NOTE — ASSESSMENT & PLAN NOTE
Current cardiac medications includes atorvastatin, fosinopril, torsemide, metoprolol, potassium pill will continue same at this time.  Stable cardiac status with no LV failure and regular pulse.    Vitals:    12/29/23 1238   BP: 122/76   Pulse: 66   Temp: 97.9 °F (36.6 °C)   SpO2: 94%       .12/29/2023: Home COVID test negative  12/28/2023: Sed rate 58, CBC with differential WBC 11,000 hemoglobin came back to normal 12.3 platelet 426, CMP sodium 134 blood sugar 133 GFR 58 rest of the CMP unremarkable.  12/8/2023 magnesium 1.9 WBC 8.75 hemoglobin 10.8 and BMP unremarkable with GFR 66Magnesium is fair will monitor

## 2023-12-29 NOTE — ASSESSMENT & PLAN NOTE
Lab Results   Component Value Date     12/21/2015    SODIUM 134 (L) 12/28/2023    K 4.6 12/28/2023    CL 97 12/28/2023    CO2 31 12/28/2023    ANIONGAP 11.9 12/21/2015    AGAP 6 12/28/2023    BUN 20 12/28/2023    CREATININE 1.15 12/28/2023    GLUC 89 12/18/2023    GLUF 133 (H) 12/28/2023    CALCIUM 9.2 12/28/2023    AST 15 12/28/2023    ALT 7 12/28/2023    ALKPHOS 71 12/28/2023    PROT 6.9 12/21/2015    TP 6.5 12/28/2023    BILITOT 0.6 12/21/2015    TBILI 0.47 12/28/2023    EGFR 58 12/28/2023   Sodium is 134.  Potassium is normal.  Will continue to monitor

## 2023-12-29 NOTE — PROGRESS NOTES
Assessment/Plan:  1. Status post incision and drainage        2. Status post hardware removal        3. Abscess of left lower leg        4. Cellulitis of left lower extremity          Taj is a pleasant 83-year-old gentleman presenting today for follow-up 2 weeks after an incision and drainage as well as irrigation and debridement of a left thigh abscess by Dr. Robertson.  There does not appear to be any recurrence of abscess at this time.  Sutures were removed aseptically and there was no drainage from the incision sites.  There is no tenderness to palpation, fluctuance, or induration at this area.  He does not have any pain in the thigh with ambulation either.  Therefore, we will defer the remainder of his care to his PCP.  I discussed with his daughter that they should avoid significant compression over the sites as it could be worsening his lower extremity edema.  Since he is doing well, he may now follow-up on an as-needed basis.  All questions addressed    Subjective: 2 weeks status post irrigation and debridement of left thigh abscess by Dr. Robertson    Patient ID: Taj Roblero Jr. is a 83 y.o. male presenting today with his wife and follow-up of the aforementioned surgery.  He was discharged on oral antibiotics for 4 days and has completed that.  His daughter has been doing dressing changes over the incision sites every other day.  She notes initially that there was some slight drainage, but that has resolved.  He has continued to go for lab work and was noted to have elevated ESR and leukocytosis yesterday.  He does report having a respiratory illness.  He denies any fevers or chills.  He denies any significant pain in the leg at rest or with ambulation    Review of Systems   Constitutional: Negative.    HENT: Negative.     Eyes: Negative.    Respiratory:  Positive for cough and wheezing.    Cardiovascular: Negative.    Gastrointestinal: Negative.    Endocrine: Negative.    Genitourinary: Negative.     Musculoskeletal: Negative.    Skin: Negative.    Allergic/Immunologic: Negative.    Neurological: Negative.    Hematological: Negative.    Psychiatric/Behavioral: Negative.       Past Medical History:   Diagnosis Date    BPH (benign prostatic hyperplasia)     Depression     HTN (hypertension)     Hyperlipidemia     OCD (obsessive compulsive disorder)     Pulmonary embolism (HCC) 2019       Past Surgical History:   Procedure Laterality Date    HARDWARE REMOVAL Left 9/9/2023    Procedure: REMOVAL HARDWARE;  Surgeon: Henri Flanagan DO;  Location: [facility];  Service: Orthopedics    INCISION AND DRAINAGE OF WOUND Left 9/9/2023    Procedure: INCISION AND DRAINAGE (I&D) EXTREMITY;  Surgeon: Henri Flanagan DO;  Location: [facility];  Service: Orthopedics    IR ASPIRATION ONLY  12/15/2023    IR IVC FILTER PLACEMENT OPTIONAL/TEMPORARY  12/7/2019    IR IVC FILTER REMOVAL  8/21/2020    OH OPTX FEM SHFT FX W/INSJ IMED IMPLT W/WO SCREW Right 12/4/2019    Procedure: INSERTION NAIL IM FEMUR ANTEGRADE (TROCHANTERIC);  Surgeon: Yesenia Veronica DO;  Location: [facility];  Service: Orthopedics    OH OPTX FEM SHFT FX W/INSJ IMED IMPLT W/WO SCREW Left 6/17/2022    Procedure: INSERTION NAIL IM FEMUR ANTEGRADE (TROCHANTERIC) - long nail;  Surgeon: Henri Flanagan DO;  Location: [facility];  Service: Orthopedics    WOUND DEBRIDEMENT Left 12/16/2023    Procedure: DEBRIDEMENT LOWER EXTREMITY (WASH OUT)- left thigh abscess irrigation and debridement;  Surgeon: Feng Robertson MD;  Location: [facility];  Service: Orthopedics     Family History   Problem Relation Age of Onset    Cancer Mother        Social History     Occupational History    Not on file   Tobacco Use    Smoking status: Never    Smokeless tobacco: Never   Vaping Use    Vaping status: Never Used   Substance and Sexual Activity    Alcohol use: Never    Drug use: Never    Sexual activity: Not on file         Current Outpatient Medications:     albuterol (Ventolin HFA) 90 mcg/act  inhaler, Inhale 2 puffs every 6 (six) hours as needed for wheezing, Disp: 18 g, Rfl: 0    atorvastatin (LIPITOR) 10 mg tablet, TAKE ONE-HALF TABLET DAILY (Patient taking differently: Take 5 mg by mouth daily with dinner TAKE ONE-HALF TABLET DAILY), Disp: 45 tablet, Rfl: 5    Calcium Carb-Cholecalciferol (CALTRATE 600+D3 PO), Take 600 mg by mouth 2 (two) times a day, Disp: , Rfl:     citalopram (CeleXA) 40 mg tablet, TAKE ONE TABLET ONCE DAILY BY MOUTH, Disp: 90 tablet, Rfl: 1    fluticasone (FLONASE) 50 mcg/act nasal spray, USE 1 SPRAY IN EACH NOSTRIL DAILY, Disp: 16 g, Rfl: 0    fosinopril (MONOPRIL) 10 mg tablet, TAKE ONE-HALF TABLET BY MOUTH DAILY, Disp: 45 tablet, Rfl: 1    magnesium oxide (MAG-OX) 400 mg tablet, Take 1 tablet by mouth daily, Disp: , Rfl:     metoprolol tartrate (LOPRESSOR) 50 mg tablet, TAKE ONE TABLET EVERY 12 HOURS, Disp: 180 tablet, Rfl: 1    Multiple Vitamin (MULTIVITAMIN) tablet, Take 1 tablet by mouth daily, Disp: , Rfl:     niacin (NIASPAN) 500 mg CR tablet, TAKE ONE TABLET DAILY AT BEDTIME, Disp: 90 tablet, Rfl: 1    pantoprazole (PROTONIX) 40 mg tablet, TAKE ONE TABLET DAILY, Disp: 30 tablet, Rfl: 2    potassium chloride (Klor-Con) 10 mEq tablet, Take 1 tablet (10 mEq total) by mouth 2 (two) times a week, Disp: , Rfl:     saccharomyces boulardii (FLORASTOR) 250 mg capsule, Take 1 capsule (250 mg total) by mouth 2 (two) times a day, Disp: , Rfl:     tamsulosin (FLOMAX) 0.4 mg, Take 0.4 mg by mouth daily with dinner, Disp: , Rfl:     torsemide (DEMADEX) 5 MG tablet, Take 1 tablet (5 mg total) by mouth 2 (two) times a week Monday and Thursday, Disp: , Rfl:     ipratropium (ATROVENT) 0.03 % nasal spray, 2 sprays into each nostril 2 (two) times a day as needed for rhinitis (Patient taking differently: 2 sprays into each nostril 2 (two) times a day), Disp: 30 mL, Rfl: 2    No Known Allergies    Objective:  Vitals:    12/29/23 1131   BP: 125/75   Pulse: 77       There is no height or weight  "on file to calculate BMI.    Left Knee Exam     Tenderness   The patient is experiencing no tenderness.     Range of Motion   Extension:  normal   Flexion:  normal     Other   Erythema: present  Scars: present  Sensation: normal  Pulse: present  Swelling: mild (lower extremity pitting edema)  Effusion: no effusion present    Comments:  Well healed incisions without drainage, erythema or warmth  Sutures removed aseptically today  No drainage from the incision sites  Erythema and pitting edema left lower extremity.  No significant warmth to touch.  No pain with palpation  Able to stand without difficulty from wheelchair with no complaints of pain            Observations   Left Knee   Negative for effusion.       Physical Exam  Vitals and nursing note reviewed.   Constitutional:       Appearance: Normal appearance. He is well-developed.      Comments: In wheelchair   HENT:      Head: Normocephalic and atraumatic.      Right Ear: External ear normal.      Left Ear: External ear normal.   Eyes:      Extraocular Movements: Extraocular movements intact.      Conjunctiva/sclera: Conjunctivae normal.   Cardiovascular:      Rate and Rhythm: Normal rate.      Pulses: Normal pulses.   Pulmonary:      Effort: Pulmonary effort is normal.   Musculoskeletal:      Cervical back: Normal range of motion.      Left knee: No effusion.      Comments: See ortho exam   Skin:     General: Skin is warm and dry.   Neurological:      General: No focal deficit present.      Mental Status: He is alert and oriented to person, place, and time. Mental status is at baseline.   Psychiatric:         Mood and Affect: Mood normal.         Behavior: Behavior normal.         Thought Content: Thought content normal.         Judgment: Judgment normal.     No results found for: \"SEDRATE\"  Lab Results   Component Value Date    WBC 11.02 (H) 12/28/2023    HGB 12.3 12/28/2023    HCT 38.2 12/28/2023    MCV 97 12/28/2023     (H) 12/28/2023     This " document was created using speech voice recognition software.   Grammatical errors, random word insertions, pronoun errors, and incomplete sentences are an occasional consequence of this system due to software limitations, ambient noise, and hardware issues.   Any formal questions or concerns about content, text, or information contained within the body of this dictation should be directly addressed to the provider for clarification.

## 2024-01-08 DIAGNOSIS — R09.81 NASAL CONGESTION: Chronic | ICD-10-CM

## 2024-01-08 RX ORDER — FLUTICASONE PROPIONATE 50 MCG
1 SPRAY, SUSPENSION (ML) NASAL DAILY
Qty: 16 G | Refills: 0 | Status: SHIPPED | OUTPATIENT
Start: 2024-01-08

## 2024-01-11 ENCOUNTER — LAB (OUTPATIENT)
Dept: LAB | Facility: CLINIC | Age: 84
End: 2024-01-11
Payer: COMMERCIAL

## 2024-01-11 DIAGNOSIS — L03.116 CELLULITIS OF LEFT LOWER EXTREMITY: ICD-10-CM

## 2024-01-11 DIAGNOSIS — R60.0 LEG EDEMA, LEFT: ICD-10-CM

## 2024-01-11 DIAGNOSIS — R60.0 BILATERAL LEG EDEMA: ICD-10-CM

## 2024-01-11 DIAGNOSIS — J20.9 ACUTE BRONCHITIS, UNSPECIFIED ORGANISM: ICD-10-CM

## 2024-01-11 DIAGNOSIS — I10 ESSENTIAL HYPERTENSION: ICD-10-CM

## 2024-01-11 DIAGNOSIS — E78.2 MIXED HYPERLIPIDEMIA: ICD-10-CM

## 2024-01-11 DIAGNOSIS — L02.416 ABSCESS OF LEFT LEG: ICD-10-CM

## 2024-01-11 DIAGNOSIS — N18.30 STAGE 3 CHRONIC KIDNEY DISEASE, UNSPECIFIED WHETHER STAGE 3A OR 3B CKD (HCC): ICD-10-CM

## 2024-01-11 LAB
ANION GAP SERPL CALCULATED.3IONS-SCNC: 4 MMOL/L
BUN SERPL-MCNC: 18 MG/DL (ref 5–25)
CALCIUM SERPL-MCNC: 9.1 MG/DL (ref 8.4–10.2)
CHLORIDE SERPL-SCNC: 102 MMOL/L (ref 96–108)
CHOLEST SERPL-MCNC: 117 MG/DL
CO2 SERPL-SCNC: 31 MMOL/L (ref 21–32)
CREAT SERPL-MCNC: 1.11 MG/DL (ref 0.6–1.3)
ERYTHROCYTE [DISTWIDTH] IN BLOOD BY AUTOMATED COUNT: 13.8 % (ref 11.6–15.1)
GFR SERPL CREATININE-BSD FRML MDRD: 61 ML/MIN/1.73SQ M
GLUCOSE P FAST SERPL-MCNC: 91 MG/DL (ref 65–99)
HCT VFR BLD AUTO: 39.2 % (ref 36.5–49.3)
HDLC SERPL-MCNC: 48 MG/DL
HGB BLD-MCNC: 12.8 G/DL (ref 12–17)
LDLC SERPL CALC-MCNC: 48 MG/DL (ref 0–100)
MCH RBC QN AUTO: 31.3 PG (ref 26.8–34.3)
MCHC RBC AUTO-ENTMCNC: 32.7 G/DL (ref 31.4–37.4)
MCV RBC AUTO: 96 FL (ref 82–98)
NONHDLC SERPL-MCNC: 69 MG/DL
PLATELET # BLD AUTO: 237 THOUSANDS/UL (ref 149–390)
PMV BLD AUTO: 9.5 FL (ref 8.9–12.7)
POTASSIUM SERPL-SCNC: 4.3 MMOL/L (ref 3.5–5.3)
RBC # BLD AUTO: 4.09 MILLION/UL (ref 3.88–5.62)
SODIUM SERPL-SCNC: 137 MMOL/L (ref 135–147)
TRIGL SERPL-MCNC: 103 MG/DL
WBC # BLD AUTO: 6.92 THOUSAND/UL (ref 4.31–10.16)

## 2024-01-11 PROCEDURE — 36415 COLL VENOUS BLD VENIPUNCTURE: CPT

## 2024-01-11 PROCEDURE — 80061 LIPID PANEL: CPT

## 2024-01-11 PROCEDURE — 85027 COMPLETE CBC AUTOMATED: CPT

## 2024-01-11 PROCEDURE — 80048 BASIC METABOLIC PNL TOTAL CA: CPT

## 2024-01-12 ENCOUNTER — OFFICE VISIT (OUTPATIENT)
Dept: INTERNAL MEDICINE CLINIC | Facility: CLINIC | Age: 84
End: 2024-01-12
Payer: COMMERCIAL

## 2024-01-12 ENCOUNTER — TELEPHONE (OUTPATIENT)
Age: 84
End: 2024-01-12

## 2024-01-12 VITALS
TEMPERATURE: 98 F | DIASTOLIC BLOOD PRESSURE: 66 MMHG | WEIGHT: 182 LBS | HEART RATE: 60 BPM | BODY MASS INDEX: 26.96 KG/M2 | HEIGHT: 69 IN | SYSTOLIC BLOOD PRESSURE: 130 MMHG | OXYGEN SATURATION: 98 %

## 2024-01-12 DIAGNOSIS — J20.9 ACUTE BRONCHITIS, UNSPECIFIED ORGANISM: ICD-10-CM

## 2024-01-12 DIAGNOSIS — I26.99 OTHER PULMONARY EMBOLISM WITHOUT ACUTE COR PULMONALE, UNSPECIFIED CHRONICITY (HCC): ICD-10-CM

## 2024-01-12 DIAGNOSIS — J31.0 CHRONIC RHINITIS: Chronic | ICD-10-CM

## 2024-01-12 DIAGNOSIS — N18.30 STAGE 3 CHRONIC KIDNEY DISEASE, UNSPECIFIED WHETHER STAGE 3A OR 3B CKD (HCC): Primary | ICD-10-CM

## 2024-01-12 DIAGNOSIS — I48.92 PAROXYSMAL ATRIAL FLUTTER (HCC): ICD-10-CM

## 2024-01-12 DIAGNOSIS — F33.42 RECURRENT MAJOR DEPRESSIVE DISORDER, IN FULL REMISSION (HCC): ICD-10-CM

## 2024-01-12 DIAGNOSIS — I10 ESSENTIAL HYPERTENSION: ICD-10-CM

## 2024-01-12 DIAGNOSIS — I27.21 PULMONARY ARTERY HYPERTENSION (HCC): ICD-10-CM

## 2024-01-12 DIAGNOSIS — E44.1 MILD PROTEIN-CALORIE MALNUTRITION (HCC): ICD-10-CM

## 2024-01-12 PROCEDURE — 99214 OFFICE O/P EST MOD 30 MIN: CPT | Performed by: INTERNAL MEDICINE

## 2024-01-12 RX ORDER — CITALOPRAM 40 MG/1
40 TABLET ORAL EVERY MORNING
Qty: 90 TABLET | Refills: 1 | Status: SHIPPED | OUTPATIENT
Start: 2024-01-12

## 2024-01-12 RX ORDER — IPRATROPIUM BROMIDE 21 UG/1
2 SPRAY, METERED NASAL 2 TIMES DAILY
Qty: 30 ML | Refills: 5 | Status: SHIPPED | OUTPATIENT
Start: 2024-01-12 | End: 2024-02-11

## 2024-01-12 RX ORDER — METHYLPREDNISOLONE 4 MG/1
TABLET ORAL
Qty: 21 EACH | Refills: 0 | Status: SHIPPED | OUTPATIENT
Start: 2024-01-12

## 2024-01-12 RX ORDER — BENZONATATE 100 MG/1
200 CAPSULE ORAL 3 TIMES DAILY PRN
Qty: 3 CAPSULE | Refills: 0 | Status: SHIPPED | OUTPATIENT
Start: 2024-01-12

## 2024-01-12 NOTE — TELEPHONE ENCOUNTER
Danny from [facility] called asking for clarification on medication Benzonatate. He states that the quantity does not add up to the directions. Please advise.

## 2024-01-14 NOTE — ASSESSMENT & PLAN NOTE
Malnutrition Findings:    BMI 26.88 mild mild protein caloric malnutrition resolved                             BMI Findings:           Body mass index is 26.88 kg/m².

## 2024-01-14 NOTE — ASSESSMENT & PLAN NOTE
Persistent coughing dry for last 2 weeks finish Zithromax denies any fever chills or difficulty breathing    Will treat as follows    Tessalon Perles 200 every 6 hours as needed    Medrol Dosepak to use as directed

## 2024-01-14 NOTE — ASSESSMENT & PLAN NOTE
Lab Results   Component Value Date    EGFR 61 01/11/2024    EGFR 58 12/28/2023    EGFR 66 12/18/2023    CREATININE 1.11 01/11/2024    CREATININE 1.15 12/28/2023    CREATININE 1.03 12/18/2023   GFR 61 and creatinine 1.1 stable at baseline    Agree and continue management as follows    Demadex 20 mg daily

## 2024-01-14 NOTE — ASSESSMENT & PLAN NOTE
Asymptomatic no difficulty breathing stable    No evidence of any right-sided heart failure patient euvolemic    Agree and continue management as follows    Demadex 5 mg daily

## 2024-01-14 NOTE — ASSESSMENT & PLAN NOTE
Blood pressure controlled asymptomatic no chest pain difficulty breathing    Agree and continue current management as follows    Blood pressure    50 mg twice a day    Low-salt diet

## 2024-01-14 NOTE — ASSESSMENT & PLAN NOTE
Atrial flutter resolved  Asymptomatic stable    Agree and continue medication management as follows    Metoprolol 50 mg twice a day

## 2024-01-14 NOTE — PROGRESS NOTES
Dr. Hunt's Office Visit Note  24     Taj Friasliban You 83 y.o. male MRN: [id number]  : 1940    Assessment:     1. Stage 3 chronic kidney disease, unspecified whether stage 3a or 3b CKD ([facility])  Assessment & Plan:  Lab Results   Component Value Date    EGFR 61 2024    EGFR 58 2023    EGFR 66 2023    CREATININE 1.11 2024    CREATININE 1.15 2023    CREATININE 1.03 2023   GFR 61 and creatinine 1.1 stable at baseline    Agree and continue management as follows    Demadex 20 mg daily      2. Chronic rhinitis  Assessment & Plan:  Symptoms controlled continue Astelin nasal spray and agree with current management    Orders:  -     ipratropium (ATROVENT) 0.03 % nasal spray; 2 sprays into each nostril 2 (two) times a day    3. Essential hypertension  Assessment & Plan:  Blood pressure controlled asymptomatic no chest pain difficulty breathing    Agree and continue current management as follows    Blood pressure    50 mg twice a day    Low-salt diet    Orders:  -     citalopram (CeleXA) 40 mg tablet; Take 1 tablet (40 mg total) by mouth every morning    4. Recurrent major depressive disorder, in full remission ([facility])  Assessment & Plan:  Examination asymptomatic    Agree and continue management as follows    Celexa 20 mg daily    Orders:  -     citalopram (CeleXA) 40 mg tablet; Take 1 tablet (40 mg total) by mouth every morning    5. Mild protein-calorie malnutrition (HCC)  Assessment & Plan:  Malnutrition Findings:    BMI 26.88 mild mild protein caloric malnutrition resolved                             BMI Findings:           Body mass index is 26.88 kg/m².         6. Other pulmonary embolism without acute cor pulmonale, unspecified chronicity ([facility])  Assessment & Plan:  No recurrence  Asymptomatic  Not on anticoagulation stable no further intervention needed      7. 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HCC)  Assessment & Plan:  Asymptomatic no difficulty breathing stable    No evidence  of any right-sided heart failure patient euvolemic    Agree and continue management as follows    Demadex 5 mg daily      8. Paroxysmal atrial flutter (HCC)  Assessment & Plan:  Atrial flutter resolved  Asymptomatic stable    Agree and continue medication management as follows    Metoprolol 50 mg twice a day      9. Acute bronchitis, unspecified organism  Assessment & Plan:  Persistent coughing dry for last 2 weeks finish Zithromax denies any fever chills or difficulty breathing    Will treat as follows    Tessalon Perles 200 every 6 hours as needed    Medrol Dosepak to use as directed    Orders:  -     methylPREDNISolone 4 MG tablet therapy pack; Use as directed on package  -     benzonatate (TESSALON PERLES) 100 mg capsule; Take 2 capsules (200 mg total) by mouth 3 (three) times a day as needed for cough          Discussion Summary and Plan:  Today's care plan and medications were reviewed with patient in detail and all their questions answered to their satisfaction.    Chief Complaint   Patient presents with   • Follow-up      Subjective:  Patient recently discharged from the hospital denies any chest pain difficulty breathing persistent dry coughing for last 2 weeks treated with Zithromax some improvement denies any fever chills tested negative for COVID and came in for follow-up chronic medical condition listed under visit diagnosis for detail referred to assessment plan under visit diagnosis        The following portions of the patient's history were reviewed and updated as appropriate: allergies, current medications, past family history, past medical history, past social history, past surgical history and problem list.    Review of Systems   Constitutional:  Positive for activity change. Negative for appetite change, chills, diaphoresis, fatigue, fever and unexpected weight change.   HENT:  Negative for congestion, dental problem, drooling, ear discharge, ear pain, facial swelling, hearing loss, mouth sores,  nosebleeds, postnasal drip, rhinorrhea, sinus pressure, sneezing, sore throat, tinnitus, trouble swallowing and voice change.    Eyes:  Negative for photophobia, pain, discharge, redness, itching and visual disturbance.   Respiratory:  Positive for cough. Negative for apnea, choking, chest tightness, shortness of breath, wheezing and stridor.    Cardiovascular:  Negative for chest pain, palpitations and leg swelling.   Gastrointestinal:  Negative for abdominal distention, abdominal pain, anal bleeding, blood in stool, constipation, diarrhea, nausea, rectal pain and vomiting.   Endocrine: Negative for cold intolerance, heat intolerance, polydipsia, polyphagia and polyuria.   Genitourinary:  Negative for decreased urine volume, difficulty urinating, dysuria, enuresis, flank pain, frequency, genital sores, hematuria and urgency.   Musculoskeletal:  Positive for arthralgias. Negative for back pain, gait problem, joint swelling, myalgias, neck pain and neck stiffness.   Skin:  Negative for color change, pallor, rash and wound.   Allergic/Immunologic: Negative.  Negative for environmental allergies, food allergies and immunocompromised state.   Neurological:  Negative for dizziness, tremors, seizures, syncope, facial asymmetry, speech difficulty, weakness, light-headedness, numbness and headaches.   Psychiatric/Behavioral:  Negative for agitation, behavioral problems, confusion, decreased concentration, dysphoric mood, hallucinations, self-injury, sleep disturbance and suicidal ideas. The patient is not nervous/anxious and is not hyperactive.          Historical Information   Patient Active Problem List   Diagnosis   • Mixed obsessional thoughts and acts   • Allergic rhinitis   • Nonrheumatic mitral valve regurgitation   • Aortic valve sclerosis   • Nonrheumatic aortic valve insufficiency   • Essential hypertension   • Recurrent major depressive disorder, in full remission (HCC)   • Hyperlipidemia   • Impaired vision   •  History of pulmonary embolus (PE) 2019   • SVT (supraventricular tachycardia)   • Valvular heart disease   • Paroxysmal atrial flutter (HCC)   • Medicare annual wellness visit, subsequent   • Lung nodule   • Bladder wall thickening   • 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HCC)   • Age-related osteoporosis without current pathological fracture   • Abnormal CT scan, chest   • Leg edema, left   • Acute respiratory insufficiency   • Hiatal hernia   • Other pulmonary embolism without acute cor pulmonale, unspecified chronicity (HCC)   • Gastroesophageal reflux disease without esophagitis   • Chronic rhinitis   • Vega esophagus   • Stage 3 chronic kidney disease, unspecified whether stage 3a or 3b CKD (HCC)   • Abscess of left leg   • BPH (benign prostatic hyperplasia)   • OCD (obsessive compulsive disorder)   • Hypomagnesemia   • Mild protein-calorie malnutrition (HCC)   • Cellulitis of extremity   • Electrolyte abnormality   • Acute bronchitis     Past Medical History:   Diagnosis Date   • BPH (benign prostatic hyperplasia)    • Depression    • HTN (hypertension)    • Hyperlipidemia    • OCD (obsessive compulsive disorder)    • Pulmonary embolism (HCC) 2019     Past Surgical History:   Procedure Laterality Date   • HARDWARE REMOVAL Left 9/9/2023    Procedure: REMOVAL HARDWARE;  Surgeon: Henri Flanagan DO;  Location: [facility];  Service: Orthopedics   • INCISION AND DRAINAGE OF WOUND Left 9/9/2023    Procedure: INCISION AND DRAINAGE (I&D) EXTREMITY;  Surgeon: Henri Flanagan DO;  Location: [facility];  Service: Orthopedics   • IR ASPIRATION ONLY  12/15/2023   • IR IVC FILTER PLACEMENT OPTIONAL/TEMPORARY  12/7/2019   • IR IVC FILTER REMOVAL  8/21/2020   • ID OPTX FEM SHFT FX W/INSJ IMED IMPLT W/WO SCREW Right 12/4/2019    Procedure: INSERTION NAIL IM FEMUR ANTEGRADE (TROCHANTERIC);  Surgeon: Yesenia Veronica DO;  Location: [facility];  Service: Orthopedics   • ID OPTX FEM SHFT FX W/INSJ IMED IMPLT W/WO SCREW Left 6/17/2022     Procedure: INSERTION NAIL IM FEMUR ANTEGRADE (TROCHANTERIC) - long nail;  Surgeon: Henri Flanagan DO;  Location: [facility];  Service: Orthopedics   • WOUND DEBRIDEMENT Left 12/16/2023    Procedure: DEBRIDEMENT LOWER EXTREMITY (WASH OUT)- left thigh abscess irrigation and debridement;  Surgeon: Feng Robertson MD;  Location: [facility];  Service: Orthopedics     Social History     Substance and Sexual Activity   Alcohol Use Never     Social History     Substance and Sexual Activity   Drug Use Never     Social History     Tobacco Use   Smoking Status Never   Smokeless Tobacco Never     Family History   Problem Relation Age of Onset   • Cancer Mother      Health Maintenance Due   Topic   • COVID-19 Vaccine (1)   • Zoster Vaccine (1 of 2)   • Pneumococcal Vaccine: 65+ Years (1 - PCV)      Meds/Allergies       Current Outpatient Medications:   •  albuterol (Ventolin HFA) 90 mcg/act inhaler, Inhale 2 puffs every 6 (six) hours as needed for wheezing, Disp: 18 g, Rfl: 0  •  atorvastatin (LIPITOR) 10 mg tablet, TAKE ONE-HALF TABLET DAILY (Patient taking differently: Take 5 mg by mouth daily with dinner TAKE ONE-HALF TABLET DAILY), Disp: 45 tablet, Rfl: 5  •  benzonatate (TESSALON PERLES) 100 mg capsule, Take 2 capsules (200 mg total) by mouth 3 (three) times a day as needed for cough, Disp: 3 capsule, Rfl: 0  •  Calcium Carb-Cholecalciferol (CALTRATE 600+D3 PO), Take 600 mg by mouth 2 (two) times a day, Disp: , Rfl:   •  citalopram (CeleXA) 40 mg tablet, Take 1 tablet (40 mg total) by mouth every morning, Disp: 90 tablet, Rfl: 1  •  fluticasone (FLONASE) 50 mcg/act nasal spray, USE 1 SPRAY IN EACH NOSTRIL DAILY, Disp: 16 g, Rfl: 0  •  fosinopril (MONOPRIL) 10 mg tablet, TAKE ONE-HALF TABLET BY MOUTH DAILY, Disp: 45 tablet, Rfl: 1  •  ipratropium (ATROVENT) 0.03 % nasal spray, 2 sprays into each nostril 2 (two) times a day, Disp: 30 mL, Rfl: 5  •  magnesium oxide (MAG-OX) 400 mg tablet, Take 1 tablet by mouth daily, Disp:  ", Rfl:   •  methylPREDNISolone 4 MG tablet therapy pack, Use as directed on package, Disp: 21 each, Rfl: 0  •  metoprolol tartrate (LOPRESSOR) 50 mg tablet, TAKE ONE TABLET EVERY 12 HOURS, Disp: 180 tablet, Rfl: 1  •  Multiple Vitamin (MULTIVITAMIN) tablet, Take 1 tablet by mouth daily, Disp: , Rfl:   •  niacin (NIASPAN) 500 mg CR tablet, TAKE ONE TABLET DAILY AT BEDTIME, Disp: 90 tablet, Rfl: 1  •  pantoprazole (PROTONIX) 40 mg tablet, TAKE ONE TABLET DAILY, Disp: 30 tablet, Rfl: 2  •  potassium chloride (Klor-Con) 10 mEq tablet, Take 1 tablet (10 mEq total) by mouth 2 (two) times a week, Disp: , Rfl:   •  saccharomyces boulardii (FLORASTOR) 250 mg capsule, Take 1 capsule (250 mg total) by mouth 2 (two) times a day, Disp: , Rfl:   •  tamsulosin (FLOMAX) 0.4 mg, Take 0.4 mg by mouth daily with dinner, Disp: , Rfl:   •  torsemide (DEMADEX) 5 MG tablet, Take 1 tablet (5 mg total) by mouth 2 (two) times a week Monday and Thursday, Disp: , Rfl:       Objective:    Vitals:   /66   Pulse 60   Temp 98 °F (36.7 °C)   Ht 5' 9\" (1.753 m)   Wt 82.6 kg (182 lb)   SpO2 98%   BMI 26.88 kg/m²   Body mass index is 26.88 kg/m².  Vitals:    01/12/24 1136   Weight: 82.6 kg (182 lb)       Physical Exam  Vitals and nursing note reviewed.   Constitutional:       General: He is not in acute distress.     Appearance: He is well-developed. He is not ill-appearing, toxic-appearing or diaphoretic.   HENT:      Head: Normocephalic and atraumatic.      Right Ear: External ear normal.      Left Ear: External ear normal.      Nose: Nose normal.      Mouth/Throat:      Pharynx: No oropharyngeal exudate.   Eyes:      General: Lids are normal. Lids are everted, no foreign bodies appreciated. No scleral icterus.        Right eye: No discharge.         Left eye: No discharge.      Conjunctiva/sclera: Conjunctivae normal.      Pupils: Pupils are equal, round, and reactive to light.   Neck:      Thyroid: No thyromegaly.      Vascular: Normal " carotid pulses. No carotid bruit, hepatojugular reflux or JVD.      Trachea: No tracheal tenderness or tracheal deviation.   Cardiovascular:      Rate and Rhythm: Normal rate and regular rhythm.      Pulses: Normal pulses.      Heart sounds: Normal heart sounds. No murmur heard.     No friction rub. No gallop.   Pulmonary:      Effort: Pulmonary effort is normal. No respiratory distress.      Breath sounds: No stridor. Rhonchi present. No wheezing or rales.   Chest:      Chest wall: No tenderness.   Abdominal:      General: Bowel sounds are normal. There is no distension.      Palpations: Abdomen is soft. There is no mass.      Tenderness: There is no abdominal tenderness. There is no guarding or rebound.   Musculoskeletal:         General: No tenderness or deformity. Normal range of motion.      Cervical back: Normal range of motion and neck supple. No edema, erythema or rigidity. No spinous process tenderness or muscular tenderness. Normal range of motion.   Lymphadenopathy:      Head:      Right side of head: No submental, submandibular, tonsillar, preauricular or posterior auricular adenopathy.      Left side of head: No submental, submandibular, tonsillar, preauricular, posterior auricular or occipital adenopathy.      Cervical: No cervical adenopathy.      Right cervical: No superficial, deep or posterior cervical adenopathy.     Left cervical: No superficial, deep or posterior cervical adenopathy.      Upper Body:      Right upper body: No pectoral adenopathy.      Left upper body: No pectoral adenopathy.   Skin:     General: Skin is warm and dry.      Coloration: Skin is not pale.      Findings: No erythema or rash.   Neurological:      General: No focal deficit present.      Mental Status: He is alert and oriented to person, place, and time.      Cranial Nerves: No cranial nerve deficit.      Sensory: No sensory deficit.      Motor: No tremor, abnormal muscle tone or seizure activity.      Coordination:  Coordination normal.      Gait: Gait abnormal.      Deep Tendon Reflexes: Reflexes are normal and symmetric. Reflexes normal.   Psychiatric:         Behavior: Behavior normal.         Thought Content: Thought content normal.         Judgment: Judgment normal.         Lab Review   Lab on 01/11/2024   Component Date Value Ref Range Status   • Cholesterol 01/11/2024 117  See Comment mg/dL Final    Cholesterol:         Pediatric <18 Years        Desirable          <170 mg/dL      Borderline High    170-199 mg/dL      High               >=200 mg/dL        Adult >=18 Years            Desirable         <200 mg/dL      Borderline High   200-239 mg/dL      High              >239 mg/dL     • Triglycerides 01/11/2024 103  See Comment mg/dL Final    Triglyceride:     0-9Y            <75mg/dL     10Y-17Y         <90 mg/dL       >=18Y     Normal          <150 mg/dL     Borderline High 150-199 mg/dL     High            200-499 mg/dL        Very High       >499 mg/dL    Specimen collection should occur prior to Metamizole administration due to the potential for falsely depressed results.   • HDL, Direct 01/11/2024 48  >=40 mg/dL Final   • LDL Calculated 01/11/2024 48  0 - 100 mg/dL Final    LDL Cholesterol:     Optimal           <100 mg/dl     Near Optimal      100-129 mg/dl     Above Optimal       Borderline High 130-159 mg/dl       High            160-189 mg/dl       Very High       >189 mg/dl         This screening LDL is a calculated result.   It does not have the accuracy of the Direct Measured LDL in the monitoring of patients with hyperlipidemia and/or statin therapy.   Direct Measure LDL (VUZ190) must be ordered separately in these patients.   • Non-HDL-Chol (CHOL-HDL) 01/11/2024 69  mg/dl Final   • WBC 01/11/2024 6.92  4.31 - 10.16 Thousand/uL Final   • RBC 01/11/2024 4.09  3.88 - 5.62 Million/uL Final   • Hemoglobin 01/11/2024 12.8  12.0 - 17.0 g/dL Final   • Hematocrit 01/11/2024 39.2  36.5 - 49.3 % Final   • MCV  01/11/2024 96  82 - 98 fL Final   • MCH 01/11/2024 31.3  26.8 - 34.3 pg Final   • MCHC 01/11/2024 32.7  31.4 - 37.4 g/dL Final   • RDW 01/11/2024 13.8  11.6 - 15.1 % Final   • Platelets 01/11/2024 237  149 - 390 Thousands/uL Final   • MPV 01/11/2024 9.5  8.9 - 12.7 fL Final   • Sodium 01/11/2024 137  135 - 147 mmol/L Final   • Potassium 01/11/2024 4.3  3.5 - 5.3 mmol/L Final   • Chloride 01/11/2024 102  96 - 108 mmol/L Final   • CO2 01/11/2024 31  21 - 32 mmol/L Final   • ANION GAP 01/11/2024 4  mmol/L Final   • BUN 01/11/2024 18  5 - 25 mg/dL Final   • Creatinine 01/11/2024 1.11  0.60 - 1.30 mg/dL Final    Standardized to IDMS reference method   • Glucose, Fasting 01/11/2024 91  65 - 99 mg/dL Final   • Calcium 01/11/2024 9.1  8.4 - 10.2 mg/dL Final   • eGFR 01/11/2024 61  ml/min/1.73sq m Final   Office Visit on 12/29/2023   Component Date Value Ref Range Status   • POCT SARS-CoV-2 Ag 12/29/2023 Negative  Negative Final   • VALID CONTROL 12/29/2023 Valid   Final   Lab on 12/28/2023   Component Date Value Ref Range Status   • Sodium 12/28/2023 134 (L)  135 - 147 mmol/L Final   • Potassium 12/28/2023 4.6  3.5 - 5.3 mmol/L Final   • Chloride 12/28/2023 97  96 - 108 mmol/L Final   • CO2 12/28/2023 31  21 - 32 mmol/L Final   • ANION GAP 12/28/2023 6  mmol/L Final   • BUN 12/28/2023 20  5 - 25 mg/dL Final   • Creatinine 12/28/2023 1.15  0.60 - 1.30 mg/dL Final    Standardized to IDMS reference method   • Glucose, Fasting 12/28/2023 133 (H)  65 - 99 mg/dL Final   • Calcium 12/28/2023 9.2  8.4 - 10.2 mg/dL Final   • Corrected Calcium 12/28/2023 9.7  8.3 - 10.1 mg/dL Final   • AST 12/28/2023 15  13 - 39 U/L Final   • ALT 12/28/2023 7  7 - 52 U/L Final    Specimen collection should occur prior to Sulfasalazine administration due to the potential for falsely depressed results.    • Alkaline Phosphatase 12/28/2023 71  34 - 104 U/L Final   • Total Protein 12/28/2023 6.5  6.4 - 8.4 g/dL Final   • Albumin 12/28/2023 3.4 (L)  3.5 -  5.0 g/dL Final   • Total Bilirubin 12/28/2023 0.47  0.20 - 1.00 mg/dL Final    Use of this assay is not recommended for patients undergoing treatment with eltrombopag due to the potential for falsely elevated results.  N-acetyl-p-benzoquinone imine (metabolite of Acetaminophen) will generate erroneously low results in samples for patients that have taken an overdose of Acetaminophen.   • eGFR 12/28/2023 58  ml/min/1.73sq m Final   • WBC 12/28/2023 11.02 (H)  4.31 - 10.16 Thousand/uL Final   • RBC 12/28/2023 3.92  3.88 - 5.62 Million/uL Final   • Hemoglobin 12/28/2023 12.3  12.0 - 17.0 g/dL Final   • Hematocrit 12/28/2023 38.2  36.5 - 49.3 % Final   • MCV 12/28/2023 97  82 - 98 fL Final   • MCH 12/28/2023 31.4  26.8 - 34.3 pg Final   • MCHC 12/28/2023 32.2  31.4 - 37.4 g/dL Final   • RDW 12/28/2023 13.4  11.6 - 15.1 % Final   • Platelets 12/28/2023 426 (H)  149 - 390 Thousands/uL Final   • MPV 12/28/2023 8.9  8.9 - 12.7 fL Final   • Sed Rate 12/28/2023 58 (H)  0 - 19 mm/hour Final   No results displayed because visit has over 200 results.      Admission on 11/29/2023, Discharged on 12/02/2023   Component Date Value Ref Range Status   • WBC 11/29/2023 11.22 (H)  4.31 - 10.16 Thousand/uL Final   • RBC 11/29/2023 4.07  3.88 - 5.62 Million/uL Final   • Hemoglobin 11/29/2023 13.5  12.0 - 17.0 g/dL Final   • Hematocrit 11/29/2023 38.4  36.5 - 49.3 % Final   • MCV 11/29/2023 94  82 - 98 fL Final   • MCH 11/29/2023 33.2  26.8 - 34.3 pg Final   • MCHC 11/29/2023 35.2  31.4 - 37.4 g/dL Final   • RDW 11/29/2023 13.3  11.6 - 15.1 % Final   • MPV 11/29/2023 9.0  8.9 - 12.7 fL Final   • Platelets 11/29/2023 199  149 - 390 Thousands/uL Final   • nRBC 11/29/2023 0  /100 WBCs Final    This is an appended report.  These results have been appended to a previously preliminary verified report.   • Neutrophils Relative 11/29/2023 80 (H)  43 - 75 % Final   • Immat GRANS % 11/29/2023 0  0 - 2 % Final   • Lymphocytes Relative 11/29/2023 8  (L)  14 - 44 % Final   • Monocytes Relative 11/29/2023 11  4 - 12 % Final   • Eosinophils Relative 11/29/2023 1  0 - 6 % Final   • Basophils Relative 11/29/2023 0  0 - 1 % Final   • Neutrophils Absolute 11/29/2023 8.88 (H)  1.85 - 7.62 Thousands/µL Final   • Immature Grans Absolute 11/29/2023 0.04  0.00 - 0.20 Thousand/uL Final   • Lymphocytes Absolute 11/29/2023 0.91  0.60 - 4.47 Thousands/µL Final   • Monocytes Absolute 11/29/2023 1.25 (H)  0.17 - 1.22 Thousand/µL Final   • Eosinophils Absolute 11/29/2023 0.11  0.00 - 0.61 Thousand/µL Final   • Basophils Absolute 11/29/2023 0.03  0.00 - 0.10 Thousands/µL Final   • Sodium 11/29/2023 134 (L)  135 - 147 mmol/L Final   • Potassium 11/29/2023 4.6  3.5 - 5.3 mmol/L Final   • Chloride 11/29/2023 99  96 - 108 mmol/L Final   • CO2 11/29/2023 29  21 - 32 mmol/L Final   • ANION GAP 11/29/2023 6  mmol/L Final   • BUN 11/29/2023 16  5 - 25 mg/dL Final   • Creatinine 11/29/2023 1.26  0.60 - 1.30 mg/dL Final    Standardized to IDMS reference method   • Glucose 11/29/2023 97  65 - 140 mg/dL Final    If the patient is fasting, the ADA then defines impaired fasting glucose as > 100 mg/dL and diabetes as > or equal to 123 mg/dL.   • Calcium 11/29/2023 9.2  8.4 - 10.2 mg/dL Final   • AST 11/29/2023 16  13 - 39 U/L Final   • ALT 11/29/2023 9  7 - 52 U/L Final    Specimen collection should occur prior to Sulfasalazine administration due to the potential for falsely depressed results.    • Alkaline Phosphatase 11/29/2023 76  34 - 104 U/L Final   • Total Protein 11/29/2023 6.8  6.4 - 8.4 g/dL Final   • Albumin 11/29/2023 3.9  3.5 - 5.0 g/dL Final   • Total Bilirubin 11/29/2023 0.88  0.20 - 1.00 mg/dL Final    Use of this assay is not recommended for patients undergoing treatment with eltrombopag due to the potential for falsely elevated results.  N-acetyl-p-benzoquinone imine (metabolite of Acetaminophen) will generate erroneously low results in samples for patients that have taken an  overdose of Acetaminophen.   • eGFR 11/29/2023 52  ml/min/1.73sq m Final   • Procalcitonin 11/29/2023 0.07  <=0.25 ng/ml Final    Comment: Suspected Lower Respiratory Tract Infection (LRTI):  - LESS than or EQUAL to 0.25 ng/mL:   low likelihood for bacterial LRTI; antibiotics DISCOURAGED.  - GREATER than 0.25 ng/mL:   increased likelihood for bacterial LRTI; antibiotics ENCOURAGED.    Suspected Sepsis:  - Strongly consider initiating antibiotics in ALL UNSTABLE patients.  - LESS than or EQUAL to 0.5 ng/mL:   low likelihood for bacterial sepsis; antibiotics DISCOURAGED.  - GREATER than 0.5 ng/mL:   increased likelihood for bacterial sepsis; antibiotics ENCOURAGED.  - GREATER than 2 ng/mL:   high risk for severe sepsis / septic shock; antibiotics strongly ENCOURAGED.    Decisions on antibiotic use should not be based solely on Procalcitonin (PCT) levels. If PCT is low but uncertainty exists with stopping antibiotics, repeat PCT in 6-24 hours to confirm the low level. If antibiotics are administered (regardless if initial PCT was high or low), repeat PCT every 1-2 days to consider early antibiotic cessation (when GREATER                            than 80% decrease from the peak OR when PCT drops below designated cutoffs, whichever comes first), so long as the infection is NOT one that typically requires prolonged treatment durations (e.g., bone/joint infections, endocarditis, Staph. aureus bacteremia).    Situations of FALSE-POSITIVE Procalcitonin values:  1) Newborns < 72 hours old  2) Massive stress from severe trauma / burns, major surgery, acute pancreatitis, cardiogenic / hemorrhagic shock, sickle cell crisis, or other organ perfusion abnormalities  3) Malaria and some Candidal infections  4) Treatment with agents that stimulate cytokines (e.g., OKT3, anti-lymphocyte globulins, alemtuzumab, IL-2, granulocyte transfusion [NOT GCSFs])  5) Chronic renal disease causes elevated baseline levels (consider GREATER than  0.75 ng/mL as an abnormal cut-off); initiating HD/CRRT may cause transient decreases  6) Paraneoplastic syndromes from medullary thyroid or SCLC, some forms of vasculitis, and acute [id number]                            disease    Situations of FALSE-NEGATIVE Procalcitonin values:  1) Too early in clinical course for PCT to have reached its peak (may repeat in 6-24 hours to confirm low level)  2) Localized infection WITHOUT systemic (SIRS / sepsis) response (e.g., an abscess, osteomyelitis, cystitis)  3) Mycobacteria (e.g., Tuberculosis, MAC)  4) Cystic fibrosis exacerbations     • SARS-CoV-2 11/29/2023 Positive (A)  Negative Final   • INFLUENZA A PCR 11/29/2023 Negative  Negative Final   • INFLUENZA B PCR 11/29/2023 Negative  Negative Final   • RSV PCR 11/29/2023 Positive (A)  Negative Final   • CRP 11/29/2023 34.7 (H)  <3.0 mg/L Final   • D-Dimer, Quant 11/29/2023 0.90 (H)  <0.50 ug/ml FEU Final    Reference and upper limits to exclude DVT and PE are the same.  Do not use to exclude if clinical symptoms are present.   • Sodium 11/30/2023 135  135 - 147 mmol/L Final   • Potassium 11/30/2023 4.5  3.5 - 5.3 mmol/L Final   • Chloride 11/30/2023 101  96 - 108 mmol/L Final   • CO2 11/30/2023 28  21 - 32 mmol/L Final   • ANION GAP 11/30/2023 6  mmol/L Final   • BUN 11/30/2023 21  5 - 25 mg/dL Final   • Creatinine 11/30/2023 1.21  0.60 - 1.30 mg/dL Final    Standardized to IDMS reference method   • Glucose 11/30/2023 134  65 - 140 mg/dL Final    If the patient is fasting, the ADA then defines impaired fasting glucose as > 100 mg/dL and diabetes as > or equal to 123 mg/dL.   • Calcium 11/30/2023 8.7  8.4 - 10.2 mg/dL Final   • eGFR 11/30/2023 55  ml/min/1.73sq m Final   • Magnesium 11/30/2023 1.8 (L)  1.9 - 2.7 mg/dL Final   • WBC 11/30/2023 8.72  4.31 - 10.16 Thousand/uL Final   • RBC 11/30/2023 3.80 (L)  3.88 - 5.62 Million/uL Final   • Hemoglobin 11/30/2023 12.5  12.0 - 17.0 g/dL Final   • Hematocrit 11/30/2023 36.5   36.5 - 49.3 % Final   • MCV 11/30/2023 96  82 - 98 fL Final   • MCH 11/30/2023 32.9  26.8 - 34.3 pg Final   • MCHC 11/30/2023 34.2  31.4 - 37.4 g/dL Final   • RDW 11/30/2023 13.4  11.6 - 15.1 % Final   • Platelets 11/30/2023 194  149 - 390 Thousands/uL Final   • MPV 11/30/2023 9.9  8.9 - 12.7 fL Final   • CRP 11/30/2023 84.0 (H)  <3.0 mg/L Final   • WBC 12/01/2023 9.86  4.31 - 10.16 Thousand/uL Final   • RBC 12/01/2023 3.69 (L)  3.88 - 5.62 Million/uL Final   • Hemoglobin 12/01/2023 12.0  12.0 - 17.0 g/dL Final   • Hematocrit 12/01/2023 34.9 (L)  36.5 - 49.3 % Final   • MCV 12/01/2023 95  82 - 98 fL Final   • MCH 12/01/2023 32.5  26.8 - 34.3 pg Final   • MCHC 12/01/2023 34.4  31.4 - 37.4 g/dL Final   • RDW 12/01/2023 13.3  11.6 - 15.1 % Final   • Platelets 12/01/2023 200  149 - 390 Thousands/uL Final   • MPV 12/01/2023 9.8  8.9 - 12.7 fL Final   • CRP 12/01/2023 59.7 (H)  <3.0 mg/L Final   • Sodium 12/02/2023 137  135 - 147 mmol/L Final   • Potassium 12/02/2023 4.9  3.5 - 5.3 mmol/L Final   • Chloride 12/02/2023 106  96 - 108 mmol/L Final   • CO2 12/02/2023 26  21 - 32 mmol/L Final   • ANION GAP 12/02/2023 5  mmol/L Final   • BUN 12/02/2023 31 (H)  5 - 25 mg/dL Final   • Creatinine 12/02/2023 1.13  0.60 - 1.30 mg/dL Final    Standardized to IDMS reference method   • Glucose 12/02/2023 132  65 - 140 mg/dL Final    If the patient is fasting, the ADA then defines impaired fasting glucose as > 100 mg/dL and diabetes as > or equal to 123 mg/dL.   • Calcium 12/02/2023 8.4  8.4 - 10.2 mg/dL Final   • eGFR 12/02/2023 59  ml/min/1.73sq m Final   • WBC 12/02/2023 7.21  4.31 - 10.16 Thousand/uL Final   • RBC 12/02/2023 3.66 (L)  3.88 - 5.62 Million/uL Final   • Hemoglobin 12/02/2023 11.7 (L)  12.0 - 17.0 g/dL Final   • Hematocrit 12/02/2023 34.9 (L)  36.5 - 49.3 % Final   • MCV 12/02/2023 95  82 - 98 fL Final   • MCH 12/02/2023 32.0  26.8 - 34.3 pg Final   • MCHC 12/02/2023 33.5  31.4 - 37.4 g/dL Final   • RDW 12/02/2023 13.4   11.6 - 15.1 % Final   • Platelets 12/02/2023 207  149 - 390 Thousands/uL Final   • MPV 12/02/2023 9.8  8.9 - 12.7 fL Final   • CRP 12/02/2023 25.8 (H)  <3.0 mg/L Final         Patient Instructions   Acute Bronchitis   WHAT YOU NEED TO KNOW:   Acute bronchitis is swelling and irritation in your lungs. It is usually caused by a virus and most often happens in the winter. Bronchitis may also be caused by bacteria or by a chemical irritant, such as smoke.  DISCHARGE INSTRUCTIONS:   Return to the emergency department if:   You cough up blood.    Your lips or fingernails turn blue.    You feel like you are not getting enough air when you breathe.    Call your doctor if:   Your symptoms do not go away or get worse, even after treatment.    Your cough does not get better within 4 weeks.    You have questions or concerns about your condition or care.    Medicines:  You may need any of the following:  Cough suppressants  decrease your urge to cough.    Decongestants  help loosen mucus in your lungs and make it easier to cough up. This can help you breathe easier.    Inhalers  may be given. Your healthcare provider may give you one or more inhalers to help you breathe easier and cough less. An inhaler gives you medicine to open your airways. Ask your healthcare provider to show you how to use your inhaler correctly.         Antiviral medicine  treats infections caused by a virus.    Antibiotics  may be given if your bronchitis is caused by bacteria or if you have lung condition.    Acetaminophen  decreases pain and fever. It is available without a doctor's order. Ask how much to take and how often to take it. Follow directions. Read the labels of all other medicines you are using to see if they also contain acetaminophen, or ask your doctor or pharmacist. Acetaminophen can cause liver damage if not taken correctly.    NSAIDs  help decrease swelling and pain or fever. This medicine is available with or without a doctor's order.  NSAIDs can cause stomach bleeding or kidney problems in certain people. If you take blood thinner medicine, always ask your healthcare provider if NSAIDs are safe for you. Always read the medicine label and follow directions.    Self-care:   Drink liquids as directed.  You may need to drink more liquids than usual to stay hydrated. Ask how much liquid to drink each day and which liquids are best for you.    Use a cool mist humidifier.  This increases air moisture in your home. This may make it easier for you to breathe and help decrease your cough.     Get more rest.  Rest helps your body to heal. Slowly start to do more each day. Rest when you feel it is needed.    Prevent acute bronchitis:       Ask about vaccines you may need.  Get a flu vaccine each year as soon as recommended, usually in September or October. Ask your healthcare provider if you should also get a pneumonia or COVID-19 vaccine. Your healthcare provider can tell you if you should also get other vaccines, and when to get them.    Prevent the spread of germs.  You can decrease your risk for acute bronchitis and other illnesses by doing the following:     Wash your hands often with soap and water. Carry germ-killing hand lotion or gel with you. You can use the lotion or gel to clean your hands when soap and water are not available.         Do not touch your eyes, nose, or mouth unless you have washed your hands first.    Always cover your mouth when you cough to prevent the spread of germs. It is best to cough into a tissue or your shirt sleeve instead of into your hand. Ask those around you to cover their mouths when they cough.    Try to avoid people who have a cold or the flu. If you are sick, stay away from others as much as possible.    Avoid irritants in the air.  Avoid chemicals, fumes, and dust. Wear a face mask if you must work around dust or fumes. Stay inside on days when air pollution levels are high. If you have allergies, stay inside  "when pollen counts are high. Do not use aerosol products, such as spray-on deodorant, bug spray, and hair spray.    Do not smoke or be around others who are smoking.  Nicotine and other chemicals in cigarettes and cigars can cause lung damage. Ask your healthcare provider for information if you currently smoke and need help to quit. E-cigarettes or smokeless tobacco still contain nicotine. Talk to your healthcare provider before you use these products.  Follow up with your doctor as directed:  Write down questions you have so you will remember to ask them during your follow-up visits.  © Copyright [employer] 2023 Information is for End User's use only and may not be sold, redistributed or otherwise used for commercial purposes.  The above information is an  only. It is not intended as medical advice for individual conditions or treatments. Talk to your doctor, nurse or pharmacist before following any medical regimen to see if it is safe and effective for you.       Carmen Hunt MD        \"This note has been constructed using a voice recognition system.Therefore there may be syntax, spelling, and/or grammatical errors. Please call if you have any questions. \"  "

## 2024-01-25 ENCOUNTER — OFFICE VISIT (OUTPATIENT)
Dept: INTERNAL MEDICINE CLINIC | Facility: CLINIC | Age: 84
End: 2024-01-25
Payer: COMMERCIAL

## 2024-01-25 VITALS
SYSTOLIC BLOOD PRESSURE: 146 MMHG | BODY MASS INDEX: 27.25 KG/M2 | WEIGHT: 184 LBS | TEMPERATURE: 98 F | OXYGEN SATURATION: 98 % | HEIGHT: 69 IN | DIASTOLIC BLOOD PRESSURE: 78 MMHG | HEART RATE: 95 BPM

## 2024-01-25 DIAGNOSIS — N18.30 STAGE 3 CHRONIC KIDNEY DISEASE, UNSPECIFIED WHETHER STAGE 3A OR 3B CKD (HCC): Primary | ICD-10-CM

## 2024-01-25 DIAGNOSIS — I10 ESSENTIAL HYPERTENSION: ICD-10-CM

## 2024-01-25 DIAGNOSIS — J20.9 ACUTE BRONCHITIS, UNSPECIFIED ORGANISM: ICD-10-CM

## 2024-01-25 PROCEDURE — 99213 OFFICE O/P EST LOW 20 MIN: CPT | Performed by: INTERNAL MEDICINE

## 2024-01-25 NOTE — ASSESSMENT & PLAN NOTE
Blood pressure very well-controlled asymptomatic    Agree and continue medication management as follows    Demadex 5 mg daily    Metoprolol 50 mg twice a day    Monitor blood pressure at home

## 2024-01-25 NOTE — PATIENT INSTRUCTIONS
Pt is symptom free for this problem.  This diagnosis or problem is stable/well controlled  Patient is advised to continue same meds as outlined in medicine list

## 2024-01-25 NOTE — PROGRESS NOTES
Dr. Hunt's Office Visit Note  24     Taj Roblero  83 y.o. male MRN: [id number]  : 1940    Assessment:     1. Stage 3 chronic kidney disease, unspecified whether stage 3a or 3b CKD (Formerly [facility])  Assessment & Plan:  Lab Results   Component Value Date    EGFR 61 2024    EGFR 58 2023    EGFR 66 2023    CREATININE 1.11 2024    CREATININE 1.15 2023    CREATININE 1.03 2023   Repeat BMP GFR improved to 61 creatinine 1.1 stable at baseline avoid nephrotoxic will keep monitoring with BMP      2. Essential hypertension  Assessment & Plan:  Blood pressure very well-controlled asymptomatic    Agree and continue medication management as follows    Demadex 5 mg daily    Metoprolol 50 mg twice a day    Monitor blood pressure at home      3. Acute bronchitis, unspecified organism  Assessment & Plan:  Also symptoms now resolved acute bronchitis resolved            Discussion Summary and Plan:  Today's care plan and medications were reviewed with patient in detail and all their questions answered to their satisfaction.    Chief Complaint   Patient presents with   • Follow-up      Subjective:  Patient came in follow-up chronic medical condition listed under visit diagnosis denies any chest pain difficulty breathing blood work reviewed unremarkable symptoms symptoms of acute bronchitis resolved no fever chills noted difficulty breathing no chest pain        The following portions of the patient's history were reviewed and updated as appropriate: allergies, current medications, past family history, past medical history, past social history, past surgical history and problem list.    Review of Systems   Constitutional:  Positive for activity change. Negative for appetite change, chills, diaphoresis, fatigue, fever and unexpected weight change.   HENT:  Negative for congestion, dental problem, drooling, ear discharge, ear pain, facial swelling, hearing loss, mouth sores, nosebleeds, postnasal  drip, rhinorrhea, sinus pressure, sneezing, sore throat, tinnitus, trouble swallowing and voice change.    Eyes:  Negative for photophobia, pain, discharge, redness, itching and visual disturbance.   Respiratory:  Negative for apnea, choking, chest tightness, shortness of breath, wheezing and stridor.    Cardiovascular:  Positive for leg swelling. Negative for chest pain and palpitations.   Gastrointestinal:  Negative for abdominal distention, abdominal pain, anal bleeding, blood in stool, constipation, diarrhea, nausea, rectal pain and vomiting.   Endocrine: Negative for cold intolerance, heat intolerance, polydipsia, polyphagia and polyuria.   Genitourinary:  Negative for decreased urine volume, difficulty urinating, dysuria, enuresis, flank pain, frequency, genital sores, hematuria and urgency.   Musculoskeletal:  Positive for arthralgias, gait problem and joint swelling. Negative for back pain, myalgias, neck pain and neck stiffness.   Skin:  Negative for color change, pallor, rash and wound.   Allergic/Immunologic: Negative.  Negative for environmental allergies, food allergies and immunocompromised state.   Neurological:  Negative for dizziness, tremors, seizures, syncope, facial asymmetry, speech difficulty, weakness, light-headedness, numbness and headaches.   Psychiatric/Behavioral:  Negative for agitation, behavioral problems, confusion, decreased concentration, dysphoric mood, hallucinations, self-injury, sleep disturbance and suicidal ideas. The patient is not nervous/anxious and is not hyperactive.          Historical Information   Patient Active Problem List   Diagnosis   • Mixed obsessional thoughts and acts   • Allergic rhinitis   • Nonrheumatic mitral valve regurgitation   • Aortic valve sclerosis   • Nonrheumatic aortic valve insufficiency   • Essential hypertension   • Recurrent major depressive disorder, in full remission (HCC)   • Hyperlipidemia   • Impaired vision   • History of pulmonary  embolus (PE) 2019   • SVT (supraventricular tachycardia)   • Valvular heart disease   • Paroxysmal atrial flutter (HCC)   • Medicare annual wellness visit, subsequent   • Lung nodule   • Bladder wall thickening   • 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HCC)   • Age-related osteoporosis without current pathological fracture   • Abnormal CT scan, chest   • Leg edema, left   • Acute respiratory insufficiency   • Hiatal hernia   • Other pulmonary embolism without acute cor pulmonale, unspecified chronicity (HCC)   • Gastroesophageal reflux disease without esophagitis   • Chronic rhinitis   • Vega esophagus   • Stage 3 chronic kidney disease, unspecified whether stage 3a or 3b CKD (HCC)   • Abscess of left leg   • BPH (benign prostatic hyperplasia)   • OCD (obsessive compulsive disorder)   • Hypomagnesemia   • Mild protein-calorie malnutrition (HCC)   • Cellulitis of extremity   • Electrolyte abnormality   • Acute bronchitis     Past Medical History:   Diagnosis Date   • BPH (benign prostatic hyperplasia)    • Depression    • HTN (hypertension)    • Hyperlipidemia    • OCD (obsessive compulsive disorder)    • Pulmonary embolism (HCC) 2019     Past Surgical History:   Procedure Laterality Date   • HARDWARE REMOVAL Left 9/9/2023    Procedure: REMOVAL HARDWARE;  Surgeon: Henri Flanagan DO;  Location: [facility];  Service: Orthopedics   • INCISION AND DRAINAGE OF WOUND Left 9/9/2023    Procedure: INCISION AND DRAINAGE (I&D) EXTREMITY;  Surgeon: Henri Flanagan DO;  Location: [facility];  Service: Orthopedics   • IR ASPIRATION ONLY  12/15/2023   • IR IVC FILTER PLACEMENT OPTIONAL/TEMPORARY  12/7/2019   • IR IVC FILTER REMOVAL  8/21/2020   • FL OPTX FEM SHFT FX W/INSJ IMED IMPLT W/WO SCREW Right 12/4/2019    Procedure: INSERTION NAIL IM FEMUR ANTEGRADE (TROCHANTERIC);  Surgeon: Yesenia Veronica DO;  Location: [facility];  Service: Orthopedics   • FL OPTX FEM SHFT FX W/INSJ IMED IMPLT W/WO SCREW Left 6/17/2022    Procedure:  INSERTION NAIL IM FEMUR ANTEGRADE (TROCHANTERIC) - long nail;  Surgeon: Henri Flanagan DO;  Location: [facility];  Service: Orthopedics   • WOUND DEBRIDEMENT Left 12/16/2023    Procedure: DEBRIDEMENT LOWER EXTREMITY (WASH OUT)- left thigh abscess irrigation and debridement;  Surgeon: Feng Robertson MD;  Location: [facility];  Service: Orthopedics     Social History     Substance and Sexual Activity   Alcohol Use Never     Social History     Substance and Sexual Activity   Drug Use Never     Social History     Tobacco Use   Smoking Status Never   Smokeless Tobacco Never     Family History   Problem Relation Age of Onset   • Cancer Mother      Health Maintenance Due   Topic   • COVID-19 Vaccine (1)   • Zoster Vaccine (1 of 2)   • Pneumococcal Vaccine: 65+ Years (1 - PCV)      Meds/Allergies       Current Outpatient Medications:   •  albuterol (Ventolin HFA) 90 mcg/act inhaler, Inhale 2 puffs every 6 (six) hours as needed for wheezing, Disp: 18 g, Rfl: 0  •  atorvastatin (LIPITOR) 10 mg tablet, TAKE ONE-HALF TABLET DAILY (Patient taking differently: Take 5 mg by mouth daily with dinner TAKE ONE-HALF TABLET DAILY), Disp: 45 tablet, Rfl: 5  •  benzonatate (TESSALON PERLES) 100 mg capsule, Take 2 capsules (200 mg total) by mouth 3 (three) times a day as needed for cough, Disp: 3 capsule, Rfl: 0  •  Calcium Carb-Cholecalciferol (CALTRATE 600+D3 PO), Take 600 mg by mouth 2 (two) times a day, Disp: , Rfl:   •  citalopram (CeleXA) 40 mg tablet, Take 1 tablet (40 mg total) by mouth every morning, Disp: 90 tablet, Rfl: 1  •  fluticasone (FLONASE) 50 mcg/act nasal spray, USE 1 SPRAY IN EACH NOSTRIL DAILY, Disp: 16 g, Rfl: 0  •  fosinopril (MONOPRIL) 10 mg tablet, TAKE ONE-HALF TABLET BY MOUTH DAILY, Disp: 45 tablet, Rfl: 1  •  ipratropium (ATROVENT) 0.03 % nasal spray, 2 sprays into each nostril 2 (two) times a day, Disp: 30 mL, Rfl: 5  •  magnesium oxide (MAG-OX) 400 mg tablet, Take 1 tablet by mouth daily, Disp: , Rfl:   •   "metoprolol tartrate (LOPRESSOR) 50 mg tablet, TAKE ONE TABLET EVERY 12 HOURS, Disp: 180 tablet, Rfl: 1  •  Multiple Vitamin (MULTIVITAMIN) tablet, Take 1 tablet by mouth daily, Disp: , Rfl:   •  niacin (NIASPAN) 500 mg CR tablet, TAKE ONE TABLET DAILY AT BEDTIME, Disp: 90 tablet, Rfl: 1  •  pantoprazole (PROTONIX) 40 mg tablet, TAKE ONE TABLET DAILY, Disp: 30 tablet, Rfl: 2  •  potassium chloride (Klor-Con) 10 mEq tablet, Take 1 tablet (10 mEq total) by mouth 2 (two) times a week, Disp: , Rfl:   •  tamsulosin (FLOMAX) 0.4 mg, Take 0.4 mg by mouth daily with dinner, Disp: , Rfl:   •  torsemide (DEMADEX) 5 MG tablet, Take 1 tablet (5 mg total) by mouth 2 (two) times a week Monday and Thursday, Disp: , Rfl:   •  methylPREDNISolone 4 MG tablet therapy pack, Use as directed on package (Patient not taking: Reported on 1/25/2024), Disp: 21 each, Rfl: 0      Objective:    Vitals:   /78   Pulse 95   Temp 98 °F (36.7 °C)   Ht 5' 9\" (1.753 m)   Wt 83.5 kg (184 lb)   SpO2 98%   BMI 27.17 kg/m²   Body mass index is 27.17 kg/m².  Vitals:    01/25/24 1127   Weight: 83.5 kg (184 lb)       Physical Exam  Vitals and nursing note reviewed.   Constitutional:       General: He is not in acute distress.     Appearance: He is well-developed. He is not ill-appearing, toxic-appearing or diaphoretic.   HENT:      Head: Normocephalic and atraumatic.      Right Ear: External ear normal.      Left Ear: External ear normal.      Nose: Nose normal.      Mouth/Throat:      Pharynx: No oropharyngeal exudate.   Eyes:      General: Lids are normal. Lids are everted, no foreign bodies appreciated. No scleral icterus.        Right eye: No discharge.         Left eye: No discharge.      Conjunctiva/sclera: Conjunctivae normal.      Pupils: Pupils are equal, round, and reactive to light.   Neck:      Thyroid: No thyromegaly.      Vascular: Normal carotid pulses. No carotid bruit, hepatojugular reflux or JVD.      Trachea: No tracheal " tenderness or tracheal deviation.   Cardiovascular:      Rate and Rhythm: Normal rate and regular rhythm.      Pulses: Normal pulses.      Heart sounds: Murmur heard.      No friction rub. No gallop.   Pulmonary:      Effort: Pulmonary effort is normal. No respiratory distress.      Breath sounds: No stridor. No wheezing or rales.   Chest:      Chest wall: No tenderness.   Abdominal:      General: Bowel sounds are normal. There is no distension.      Palpations: Abdomen is soft. There is no mass.      Tenderness: There is no abdominal tenderness. There is no guarding or rebound.   Musculoskeletal:         General: No tenderness or deformity. Normal range of motion.      Cervical back: Normal range of motion and neck supple. No edema, erythema or rigidity. No spinous process tenderness or muscular tenderness. Normal range of motion.      Left lower leg: Edema present.   Lymphadenopathy:      Head:      Right side of head: No submental, submandibular, tonsillar, preauricular or posterior auricular adenopathy.      Left side of head: No submental, submandibular, tonsillar, preauricular, posterior auricular or occipital adenopathy.      Cervical: No cervical adenopathy.      Right cervical: No superficial, deep or posterior cervical adenopathy.     Left cervical: No superficial, deep or posterior cervical adenopathy.      Upper Body:      Right upper body: No pectoral adenopathy.      Left upper body: No pectoral adenopathy.   Skin:     General: Skin is warm and dry.      Coloration: Skin is not pale.      Findings: No erythema or rash.   Neurological:      General: No focal deficit present.      Mental Status: He is alert and oriented to person, place, and time.      Cranial Nerves: No cranial nerve deficit.      Sensory: No sensory deficit.      Motor: No tremor, abnormal muscle tone or seizure activity.      Coordination: Coordination normal.      Gait: Gait abnormal.      Deep Tendon Reflexes: Reflexes are normal  and symmetric. Reflexes normal.   Psychiatric:         Behavior: Behavior normal.         Thought Content: Thought content normal.         Judgment: Judgment normal.         Lab Review   Lab on 01/11/2024   Component Date Value Ref Range Status   • Cholesterol 01/11/2024 117  See Comment mg/dL Final    Cholesterol:         Pediatric <18 Years        Desirable          <170 mg/dL      Borderline High    170-199 mg/dL      High               >=200 mg/dL        Adult >=18 Years            Desirable         <200 mg/dL      Borderline High   200-239 mg/dL      High              >239 mg/dL     • Triglycerides 01/11/2024 103  See Comment mg/dL Final    Triglyceride:     0-9Y            <75mg/dL     10Y-17Y         <90 mg/dL       >=18Y     Normal          <150 mg/dL     Borderline High 150-199 mg/dL     High            200-499 mg/dL        Very High       >499 mg/dL    Specimen collection should occur prior to Metamizole administration due to the potential for falsely depressed results.   • HDL, Direct 01/11/2024 48  >=40 mg/dL Final   • LDL Calculated 01/11/2024 48  0 - 100 mg/dL Final    LDL Cholesterol:     Optimal           <100 mg/dl     Near Optimal      100-129 mg/dl     Above Optimal       Borderline High 130-159 mg/dl       High            160-189 mg/dl       Very High       >189 mg/dl         This screening LDL is a calculated result.   It does not have the accuracy of the Direct Measured LDL in the monitoring of patients with hyperlipidemia and/or statin therapy.   Direct Measure LDL (QQR440) must be ordered separately in these patients.   • Non-HDL-Chol (CHOL-HDL) 01/11/2024 69  mg/dl Final   • WBC 01/11/2024 6.92  4.31 - 10.16 Thousand/uL Final   • RBC 01/11/2024 4.09  3.88 - 5.62 Million/uL Final   • Hemoglobin 01/11/2024 12.8  12.0 - 17.0 g/dL Final   • Hematocrit 01/11/2024 39.2  36.5 - 49.3 % Final   • MCV 01/11/2024 96  82 - 98 fL Final   • MCH 01/11/2024 31.3  26.8 - 34.3 pg Final   • MCHC 01/11/2024  32.7  31.4 - 37.4 g/dL Final   • RDW 01/11/2024 13.8  11.6 - 15.1 % Final   • Platelets 01/11/2024 237  149 - 390 Thousands/uL Final   • MPV 01/11/2024 9.5  8.9 - 12.7 fL Final   • Sodium 01/11/2024 137  135 - 147 mmol/L Final   • Potassium 01/11/2024 4.3  3.5 - 5.3 mmol/L Final   • Chloride 01/11/2024 102  96 - 108 mmol/L Final   • CO2 01/11/2024 31  21 - 32 mmol/L Final   • ANION GAP 01/11/2024 4  mmol/L Final   • BUN 01/11/2024 18  5 - 25 mg/dL Final   • Creatinine 01/11/2024 1.11  0.60 - 1.30 mg/dL Final    Standardized to IDMS reference method   • Glucose, Fasting 01/11/2024 91  65 - 99 mg/dL Final   • Calcium 01/11/2024 9.1  8.4 - 10.2 mg/dL Final   • eGFR 01/11/2024 61  ml/min/1.73sq m Final   Office Visit on 12/29/2023   Component Date Value Ref Range Status   • POCT SARS-CoV-2 Ag 12/29/2023 Negative  Negative Final   • VALID CONTROL 12/29/2023 Valid   Final   Lab on 12/28/2023   Component Date Value Ref Range Status   • Sodium 12/28/2023 134 (L)  135 - 147 mmol/L Final   • Potassium 12/28/2023 4.6  3.5 - 5.3 mmol/L Final   • Chloride 12/28/2023 97  96 - 108 mmol/L Final   • CO2 12/28/2023 31  21 - 32 mmol/L Final   • ANION GAP 12/28/2023 6  mmol/L Final   • BUN 12/28/2023 20  5 - 25 mg/dL Final   • Creatinine 12/28/2023 1.15  0.60 - 1.30 mg/dL Final    Standardized to IDMS reference method   • Glucose, Fasting 12/28/2023 133 (H)  65 - 99 mg/dL Final   • Calcium 12/28/2023 9.2  8.4 - 10.2 mg/dL Final   • Corrected Calcium 12/28/2023 9.7  8.3 - 10.1 mg/dL Final   • AST 12/28/2023 15  13 - 39 U/L Final   • ALT 12/28/2023 7  7 - 52 U/L Final    Specimen collection should occur prior to Sulfasalazine administration due to the potential for falsely depressed results.    • Alkaline Phosphatase 12/28/2023 71  34 - 104 U/L Final   • Total Protein 12/28/2023 6.5  6.4 - 8.4 g/dL Final   • Albumin 12/28/2023 3.4 (L)  3.5 - 5.0 g/dL Final   • Total Bilirubin 12/28/2023 0.47  0.20 - 1.00 mg/dL Final    Use of this assay  is not recommended for patients undergoing treatment with eltrombopag due to the potential for falsely elevated results.  N-acetyl-p-benzoquinone imine (metabolite of Acetaminophen) will generate erroneously low results in samples for patients that have taken an overdose of Acetaminophen.   • eGFR 12/28/2023 58  ml/min/1.73sq m Final   • WBC 12/28/2023 11.02 (H)  4.31 - 10.16 Thousand/uL Final   • RBC 12/28/2023 3.92  3.88 - 5.62 Million/uL Final   • Hemoglobin 12/28/2023 12.3  12.0 - 17.0 g/dL Final   • Hematocrit 12/28/2023 38.2  36.5 - 49.3 % Final   • MCV 12/28/2023 97  82 - 98 fL Final   • MCH 12/28/2023 31.4  26.8 - 34.3 pg Final   • MCHC 12/28/2023 32.2  31.4 - 37.4 g/dL Final   • RDW 12/28/2023 13.4  11.6 - 15.1 % Final   • Platelets 12/28/2023 426 (H)  149 - 390 Thousands/uL Final   • MPV 12/28/2023 8.9  8.9 - 12.7 fL Final   • Sed Rate 12/28/2023 58 (H)  0 - 19 mm/hour Final   No results displayed because visit has over 200 results.      Admission on 11/29/2023, Discharged on 12/02/2023   Component Date Value Ref Range Status   • WBC 11/29/2023 11.22 (H)  4.31 - 10.16 Thousand/uL Final   • RBC 11/29/2023 4.07  3.88 - 5.62 Million/uL Final   • Hemoglobin 11/29/2023 13.5  12.0 - 17.0 g/dL Final   • Hematocrit 11/29/2023 38.4  36.5 - 49.3 % Final   • MCV 11/29/2023 94  82 - 98 fL Final   • MCH 11/29/2023 33.2  26.8 - 34.3 pg Final   • MCHC 11/29/2023 35.2  31.4 - 37.4 g/dL Final   • RDW 11/29/2023 13.3  11.6 - 15.1 % Final   • MPV 11/29/2023 9.0  8.9 - 12.7 fL Final   • Platelets 11/29/2023 199  149 - 390 Thousands/uL Final   • nRBC 11/29/2023 0  /100 WBCs Final    This is an appended report.  These results have been appended to a previously preliminary verified report.   • Neutrophils Relative 11/29/2023 80 (H)  43 - 75 % Final   • Immat GRANS % 11/29/2023 0  0 - 2 % Final   • Lymphocytes Relative 11/29/2023 8 (L)  14 - 44 % Final   • Monocytes Relative 11/29/2023 11  4 - 12 % Final   • Eosinophils Relative  11/29/2023 1  0 - 6 % Final   • Basophils Relative 11/29/2023 0  0 - 1 % Final   • Neutrophils Absolute 11/29/2023 8.88 (H)  1.85 - 7.62 Thousands/µL Final   • Immature Grans Absolute 11/29/2023 0.04  0.00 - 0.20 Thousand/uL Final   • Lymphocytes Absolute 11/29/2023 0.91  0.60 - 4.47 Thousands/µL Final   • Monocytes Absolute 11/29/2023 1.25 (H)  0.17 - 1.22 Thousand/µL Final   • Eosinophils Absolute 11/29/2023 0.11  0.00 - 0.61 Thousand/µL Final   • Basophils Absolute 11/29/2023 0.03  0.00 - 0.10 Thousands/µL Final   • Sodium 11/29/2023 134 (L)  135 - 147 mmol/L Final   • Potassium 11/29/2023 4.6  3.5 - 5.3 mmol/L Final   • Chloride 11/29/2023 99  96 - 108 mmol/L Final   • CO2 11/29/2023 29  21 - 32 mmol/L Final   • ANION GAP 11/29/2023 6  mmol/L Final   • BUN 11/29/2023 16  5 - 25 mg/dL Final   • Creatinine 11/29/2023 1.26  0.60 - 1.30 mg/dL Final    Standardized to IDMS reference method   • Glucose 11/29/2023 97  65 - 140 mg/dL Final    If the patient is fasting, the ADA then defines impaired fasting glucose as > 100 mg/dL and diabetes as > or equal to 123 mg/dL.   • Calcium 11/29/2023 9.2  8.4 - 10.2 mg/dL Final   • AST 11/29/2023 16  13 - 39 U/L Final   • ALT 11/29/2023 9  7 - 52 U/L Final    Specimen collection should occur prior to Sulfasalazine administration due to the potential for falsely depressed results.    • Alkaline Phosphatase 11/29/2023 76  34 - 104 U/L Final   • Total Protein 11/29/2023 6.8  6.4 - 8.4 g/dL Final   • Albumin 11/29/2023 3.9  3.5 - 5.0 g/dL Final   • Total Bilirubin 11/29/2023 0.88  0.20 - 1.00 mg/dL Final    Use of this assay is not recommended for patients undergoing treatment with eltrombopag due to the potential for falsely elevated results.  N-acetyl-p-benzoquinone imine (metabolite of Acetaminophen) will generate erroneously low results in samples for patients that have taken an overdose of Acetaminophen.   • eGFR 11/29/2023 52  ml/min/1.73sq m Final   • Procalcitonin 11/29/2023  0.07  <=0.25 ng/ml Final    Comment: Suspected Lower Respiratory Tract Infection (LRTI):  - LESS than or EQUAL to 0.25 ng/mL:   low likelihood for bacterial LRTI; antibiotics DISCOURAGED.  - GREATER than 0.25 ng/mL:   increased likelihood for bacterial LRTI; antibiotics ENCOURAGED.    Suspected Sepsis:  - Strongly consider initiating antibiotics in ALL UNSTABLE patients.  - LESS than or EQUAL to 0.5 ng/mL:   low likelihood for bacterial sepsis; antibiotics DISCOURAGED.  - GREATER than 0.5 ng/mL:   increased likelihood for bacterial sepsis; antibiotics ENCOURAGED.  - GREATER than 2 ng/mL:   high risk for severe sepsis / septic shock; antibiotics strongly ENCOURAGED.    Decisions on antibiotic use should not be based solely on Procalcitonin (PCT) levels. If PCT is low but uncertainty exists with stopping antibiotics, repeat PCT in 6-24 hours to confirm the low level. If antibiotics are administered (regardless if initial PCT was high or low), repeat PCT every 1-2 days to consider early antibiotic cessation (when GREATER                            than 80% decrease from the peak OR when PCT drops below designated cutoffs, whichever comes first), so long as the infection is NOT one that typically requires prolonged treatment durations (e.g., bone/joint infections, endocarditis, Staph. aureus bacteremia).    Situations of FALSE-POSITIVE Procalcitonin values:  1) Newborns < 72 hours old  2) Massive stress from severe trauma / burns, major surgery, acute pancreatitis, cardiogenic / hemorrhagic shock, sickle cell crisis, or other organ perfusion abnormalities  3) Malaria and some Candidal infections  4) Treatment with agents that stimulate cytokines (e.g., OKT3, anti-lymphocyte globulins, alemtuzumab, IL-2, granulocyte transfusion [NOT GCSFs])  5) Chronic renal disease causes elevated baseline levels (consider GREATER than 0.75 ng/mL as an abnormal cut-off); initiating HD/CRRT may cause transient decreases  6)  Paraneoplastic syndromes from medullary thyroid or SCLC, some forms of vasculitis, and acute [id number]                            disease    Situations of FALSE-NEGATIVE Procalcitonin values:  1) Too early in clinical course for PCT to have reached its peak (may repeat in 6-24 hours to confirm low level)  2) Localized infection WITHOUT systemic (SIRS / sepsis) response (e.g., an abscess, osteomyelitis, cystitis)  3) Mycobacteria (e.g., Tuberculosis, MAC)  4) Cystic fibrosis exacerbations     • SARS-CoV-2 11/29/2023 Positive (A)  Negative Final   • INFLUENZA A PCR 11/29/2023 Negative  Negative Final   • INFLUENZA B PCR 11/29/2023 Negative  Negative Final   • RSV PCR 11/29/2023 Positive (A)  Negative Final   • CRP 11/29/2023 34.7 (H)  <3.0 mg/L Final   • D-Dimer, Quant 11/29/2023 0.90 (H)  <0.50 ug/ml FEU Final    Reference and upper limits to exclude DVT and PE are the same.  Do not use to exclude if clinical symptoms are present.   • Sodium 11/30/2023 135  135 - 147 mmol/L Final   • Potassium 11/30/2023 4.5  3.5 - 5.3 mmol/L Final   • Chloride 11/30/2023 101  96 - 108 mmol/L Final   • CO2 11/30/2023 28  21 - 32 mmol/L Final   • ANION GAP 11/30/2023 6  mmol/L Final   • BUN 11/30/2023 21  5 - 25 mg/dL Final   • Creatinine 11/30/2023 1.21  0.60 - 1.30 mg/dL Final    Standardized to IDMS reference method   • Glucose 11/30/2023 134  65 - 140 mg/dL Final    If the patient is fasting, the ADA then defines impaired fasting glucose as > 100 mg/dL and diabetes as > or equal to 123 mg/dL.   • Calcium 11/30/2023 8.7  8.4 - 10.2 mg/dL Final   • eGFR 11/30/2023 55  ml/min/1.73sq m Final   • Magnesium 11/30/2023 1.8 (L)  1.9 - 2.7 mg/dL Final   • WBC 11/30/2023 8.72  4.31 - 10.16 Thousand/uL Final   • RBC 11/30/2023 3.80 (L)  3.88 - 5.62 Million/uL Final   • Hemoglobin 11/30/2023 12.5  12.0 - 17.0 g/dL Final   • Hematocrit 11/30/2023 36.5  36.5 - 49.3 % Final   • MCV 11/30/2023 96  82 - 98 fL Final   • MCH 11/30/2023 32.9   26.8 - 34.3 pg Final   • MCHC 11/30/2023 34.2  31.4 - 37.4 g/dL Final   • RDW 11/30/2023 13.4  11.6 - 15.1 % Final   • Platelets 11/30/2023 194  149 - 390 Thousands/uL Final   • MPV 11/30/2023 9.9  8.9 - 12.7 fL Final   • CRP 11/30/2023 84.0 (H)  <3.0 mg/L Final   • WBC 12/01/2023 9.86  4.31 - 10.16 Thousand/uL Final   • RBC 12/01/2023 3.69 (L)  3.88 - 5.62 Million/uL Final   • Hemoglobin 12/01/2023 12.0  12.0 - 17.0 g/dL Final   • Hematocrit 12/01/2023 34.9 (L)  36.5 - 49.3 % Final   • MCV 12/01/2023 95  82 - 98 fL Final   • MCH 12/01/2023 32.5  26.8 - 34.3 pg Final   • MCHC 12/01/2023 34.4  31.4 - 37.4 g/dL Final   • RDW 12/01/2023 13.3  11.6 - 15.1 % Final   • Platelets 12/01/2023 200  149 - 390 Thousands/uL Final   • MPV 12/01/2023 9.8  8.9 - 12.7 fL Final   • CRP 12/01/2023 59.7 (H)  <3.0 mg/L Final   • Sodium 12/02/2023 137  135 - 147 mmol/L Final   • Potassium 12/02/2023 4.9  3.5 - 5.3 mmol/L Final   • Chloride 12/02/2023 106  96 - 108 mmol/L Final   • CO2 12/02/2023 26  21 - 32 mmol/L Final   • ANION GAP 12/02/2023 5  mmol/L Final   • BUN 12/02/2023 31 (H)  5 - 25 mg/dL Final   • Creatinine 12/02/2023 1.13  0.60 - 1.30 mg/dL Final    Standardized to IDMS reference method   • Glucose 12/02/2023 132  65 - 140 mg/dL Final    If the patient is fasting, the ADA then defines impaired fasting glucose as > 100 mg/dL and diabetes as > or equal to 123 mg/dL.   • Calcium 12/02/2023 8.4  8.4 - 10.2 mg/dL Final   • eGFR 12/02/2023 59  ml/min/1.73sq m Final   • WBC 12/02/2023 7.21  4.31 - 10.16 Thousand/uL Final   • RBC 12/02/2023 3.66 (L)  3.88 - 5.62 Million/uL Final   • Hemoglobin 12/02/2023 11.7 (L)  12.0 - 17.0 g/dL Final   • Hematocrit 12/02/2023 34.9 (L)  36.5 - 49.3 % Final   • MCV 12/02/2023 95  82 - 98 fL Final   • MCH 12/02/2023 32.0  26.8 - 34.3 pg Final   • MCHC 12/02/2023 33.5  31.4 - 37.4 g/dL Final   • RDW 12/02/2023 13.4  11.6 - 15.1 % Final   • Platelets 12/02/2023 207  149 - 390 Thousands/uL Final   •  "MPV 12/02/2023 9.8  8.9 - 12.7 fL Final   • CRP 12/02/2023 25.8 (H)  <3.0 mg/L Final         Patient Instructions   Pt is symptom free for this problem.  This diagnosis or problem is stable/well controlled  Patient is advised to continue same meds as outlined in medicine list          Carmen Hunt MD        \"This note has been constructed using a voice recognition system.Therefore there may be syntax, spelling, and/or grammatical errors. Please call if you have any questions. \"  "

## 2024-01-25 NOTE — ASSESSMENT & PLAN NOTE
Lab Results   Component Value Date    EGFR 61 01/11/2024    EGFR 58 12/28/2023    EGFR 66 12/18/2023    CREATININE 1.11 01/11/2024    CREATININE 1.15 12/28/2023    CREATININE 1.03 12/18/2023   Repeat BMP GFR improved to 61 creatinine 1.1 stable at baseline avoid nephrotoxic will keep monitoring with BMP

## 2024-02-07 NOTE — UTILIZATION REVIEW
NOTIFICATION OF ADMISSION DISCHARGE   This is a Notification of Discharge from [facility]. Please be advised that this patient has been discharge from our facility. Below you will find the admission and discharge date and time including the patient’s disposition.   UTILIZATION REVIEW CONTACT:  Nicolasa Duron  Utilization   Network Utilization Review Department  Phone: [phone] x carefully listen to the prompts. All voicemails are confidential.  Email: [email or web address]     ADMISSION INFORMATION  PRESENTATION DATE: 11/29/2023 12:21 PM  OBERVATION ADMISSION DATE:   INPATIENT ADMISSION DATE: 11/29/23  2:03 PM   DISCHARGE DATE: 12/2/2023  2:07 PM   DISPOSITION:Home/Self Care    Network Utilization Review Department  ATTENTION: Please call with any questions or concerns to [phone] and carefully listen to the prompts so that you are directed to the right person. All voicemails are confidential.   For Discharge needs, contact Care Management DC Support Team at [phone] opt. [phone]  Send all requests for admission clinical reviews, approved or denied determinations and any other requests to dedicated fax number below belonging to the campus where the patient is receiving treatment. List of dedicated fax numbers for the Facilities:  FACILITY NAME UR FAX NUMBER   ADMISSION DENIALS (Administrative/Medical Necessity) [phone]   DISCHARGE SUPPORT TEAM ([facility]) [phone]   PARENT CHILD HEALTH (Maternity/NICU/Pediatrics) [phone]   [facility] [phone]   [facility] [phone]   [facility] [phone]   [facility] [phone]   [facility] [phone]   [facility] [phone]   [facility] [phone]   [facility] [phone]    [facility] [phone]   [facility] [phone]   [facility] [phone]   [facility] [phone]

## 2024-02-21 PROBLEM — Z00.00 MEDICARE ANNUAL WELLNESS VISIT, SUBSEQUENT: Status: RESOLVED | Noted: 2020-11-05 | Resolved: 2024-02-21

## 2024-02-23 DIAGNOSIS — I48.92 PAROXYSMAL ATRIAL FLUTTER (HCC): ICD-10-CM

## 2024-02-23 DIAGNOSIS — I10 ESSENTIAL HYPERTENSION: ICD-10-CM

## 2024-02-23 DIAGNOSIS — K44.9 HIATAL HERNIA: ICD-10-CM

## 2024-02-23 DIAGNOSIS — F33.42 RECURRENT MAJOR DEPRESSIVE DISORDER, IN FULL REMISSION (HCC): ICD-10-CM

## 2024-02-23 DIAGNOSIS — E78.5 HYPERLIPIDEMIA, UNSPECIFIED HYPERLIPIDEMIA TYPE: ICD-10-CM

## 2024-02-23 RX ORDER — PANTOPRAZOLE SODIUM 40 MG/1
40 TABLET, DELAYED RELEASE ORAL DAILY
Qty: 30 TABLET | Refills: 5 | Status: SHIPPED | OUTPATIENT
Start: 2024-02-23

## 2024-02-23 RX ORDER — FOSINOPRIL SODIUM 10 MG/1
TABLET ORAL
Qty: 45 TABLET | Refills: 1 | Status: SHIPPED | OUTPATIENT
Start: 2024-02-23

## 2024-02-23 RX ORDER — METOPROLOL TARTRATE 50 MG/1
TABLET, FILM COATED ORAL
Qty: 180 TABLET | Refills: 1 | Status: SHIPPED | OUTPATIENT
Start: 2024-02-23

## 2024-02-23 RX ORDER — CITALOPRAM 40 MG/1
40 TABLET ORAL DAILY
Qty: 90 TABLET | Refills: 1 | Status: SHIPPED | OUTPATIENT
Start: 2024-02-23

## 2024-02-23 RX ORDER — NIACIN 500 MG/1
TABLET, EXTENDED RELEASE ORAL
Qty: 90 TABLET | Refills: 1 | Status: SHIPPED | OUTPATIENT
Start: 2024-02-23

## 2024-03-29 ENCOUNTER — OFFICE VISIT (OUTPATIENT)
Dept: GASTROENTEROLOGY | Facility: CLINIC | Age: 84
End: 2024-03-29
Payer: COMMERCIAL

## 2024-03-29 VITALS — HEIGHT: 69 IN | WEIGHT: 186 LBS | BODY MASS INDEX: 27.55 KG/M2

## 2024-03-29 DIAGNOSIS — K44.9 HIATAL HERNIA: ICD-10-CM

## 2024-03-29 DIAGNOSIS — K22.70 BARRETT'S ESOPHAGUS WITHOUT DYSPLASIA: ICD-10-CM

## 2024-03-29 DIAGNOSIS — K21.9 GASTROESOPHAGEAL REFLUX DISEASE, UNSPECIFIED WHETHER ESOPHAGITIS PRESENT: Primary | ICD-10-CM

## 2024-03-29 PROCEDURE — 99213 OFFICE O/P EST LOW 20 MIN: CPT | Performed by: NURSE PRACTITIONER

## 2024-03-29 RX ORDER — PANTOPRAZOLE SODIUM 40 MG/1
40 TABLET, DELAYED RELEASE ORAL DAILY
Qty: 90 TABLET | Refills: 3 | Status: SHIPPED | OUTPATIENT
Start: 2024-03-29

## 2024-03-29 NOTE — PATIENT INSTRUCTIONS
Scheduled date of EGD(as of today): 6/5/24  Physician performing EGD: felicitas  Location of EGD: [facility]  Instructions reviewed with patient by: domenica  Clearances:  n/a

## 2024-04-30 ENCOUNTER — OFFICE VISIT (OUTPATIENT)
Dept: INTERNAL MEDICINE CLINIC | Facility: CLINIC | Age: 84
End: 2024-04-30
Payer: COMMERCIAL

## 2024-04-30 VITALS
BODY MASS INDEX: 27.4 KG/M2 | SYSTOLIC BLOOD PRESSURE: 128 MMHG | HEIGHT: 69 IN | DIASTOLIC BLOOD PRESSURE: 64 MMHG | HEART RATE: 60 BPM | OXYGEN SATURATION: 95 % | WEIGHT: 185 LBS

## 2024-04-30 DIAGNOSIS — E78.2 MIXED HYPERLIPIDEMIA: ICD-10-CM

## 2024-04-30 DIAGNOSIS — I35.8 AORTIC VALVE SCLEROSIS: ICD-10-CM

## 2024-04-30 DIAGNOSIS — Z13.0 SCREENING FOR DEFICIENCY ANEMIA: ICD-10-CM

## 2024-04-30 DIAGNOSIS — K22.70 BARRETT'S ESOPHAGUS WITHOUT DYSPLASIA: ICD-10-CM

## 2024-04-30 DIAGNOSIS — I48.92 PAROXYSMAL ATRIAL FLUTTER (HCC): ICD-10-CM

## 2024-04-30 DIAGNOSIS — E55.9 VITAMIN D DEFICIENCY: ICD-10-CM

## 2024-04-30 DIAGNOSIS — R73.02 GLUCOSE INTOLERANCE (IMPAIRED GLUCOSE TOLERANCE): ICD-10-CM

## 2024-04-30 DIAGNOSIS — I27.21 PULMONARY ARTERY HYPERTENSION (HCC): ICD-10-CM

## 2024-04-30 DIAGNOSIS — N18.30 STAGE 3 CHRONIC KIDNEY DISEASE, UNSPECIFIED WHETHER STAGE 3A OR 3B CKD (HCC): Primary | ICD-10-CM

## 2024-04-30 PROCEDURE — G2211 COMPLEX E/M VISIT ADD ON: HCPCS | Performed by: INTERNAL MEDICINE

## 2024-04-30 PROCEDURE — 3078F DIAST BP <80 MM HG: CPT | Performed by: INTERNAL MEDICINE

## 2024-04-30 PROCEDURE — 1160F RVW MEDS BY RX/DR IN RCRD: CPT | Performed by: INTERNAL MEDICINE

## 2024-04-30 PROCEDURE — 99214 OFFICE O/P EST MOD 30 MIN: CPT | Performed by: INTERNAL MEDICINE

## 2024-04-30 PROCEDURE — 3074F SYST BP LT 130 MM HG: CPT | Performed by: INTERNAL MEDICINE

## 2024-04-30 PROCEDURE — 1159F MED LIST DOCD IN RCRD: CPT | Performed by: INTERNAL MEDICINE

## 2024-04-30 NOTE — ASSESSMENT & PLAN NOTE
For now asymptomatic followed back GI    Agree and continue management medication as follows    Pantoprazole 40 mg daily instructed about the diet   DISPLAY PLAN FREE TEXT

## 2024-04-30 NOTE — ASSESSMENT & PLAN NOTE
Lab Results   Component Value Date    EGFR 61 01/11/2024    EGFR 58 12/28/2023    EGFR 66 12/18/2023    CREATININE 1.11 01/11/2024    CREATININE 1.15 12/28/2023    CREATININE 1.03 12/18/2023   GFR improved to 61 creatinine 1.1 stable at baseline avoid nephrotoxic drug patient continue blood pressure monitoring    GFR is baseline  Creat is baseline.   Recommend periodic blood test for monitoring of BUN and Creat  Avoid Non Steroidal Antiinflammatory such as ibuprofen, aleve etc.  Potassium, HCO3 and calcium are wnl and will require periodic blood test.  Bone and Mineral disease monitoring as appropriate.  All patient with GFR less than 30( CKD 4 or 5 or 6) advised to follow with nephrologist.

## 2024-04-30 NOTE — PROGRESS NOTES
Dr. Hunt's Office Visit Note  24     Taj Roblero . 84 y.o. male MRN: [id number]  : 1940    Assessment:     1. Stage 3 chronic kidney disease, unspecified whether stage 3a or 3b CKD (HCC)  Assessment & Plan:  Lab Results   Component Value Date    EGFR 61 2024    EGFR 58 2023    EGFR 66 2023    CREATININE 1.11 2024    CREATININE 1.15 2023    CREATININE 1.03 2023   GFR improved to 61 creatinine 1.1 stable at baseline avoid nephrotoxic drug patient continue blood pressure monitoring    GFR is baseline  Creat is baseline.   Recommend periodic blood test for monitoring of BUN and Creat  Avoid Non Steroidal Antiinflammatory such as ibuprofen, aleve etc.  Potassium, HCO3 and calcium are wnl and will require periodic blood test.  Bone and Mineral disease monitoring as appropriate.  All patient with GFR less than 30( CKD 4 or 5 or 6) advised to follow with nephrologist.     Orders:  -     Comprehensive metabolic panel; Future    2. Screening for deficiency anemia  -     CBC and differential; Future    3. 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HCC)  Assessment & Plan:  Last echocardiogram done the ejection fraction left ventricular normal about 55 to 60% and associated with mild to moderate tricuspid regurgitation elevated right ventricular systolic pressure about 50    No evidence of any right-sided heart failure    Continue Demadex 5 mg daily awaiting to be seen by cardiology tomorrow    Orders:  -     Comprehensive metabolic panel; Future    4. Paroxysmal atrial flutter (HCC)  Assessment & Plan:  Atrial flutter resolved  Asymptomatic stable    Agree and continue medication management as follows    Metoprolol 50 mg twice a dayNot on any anticoagulation as per cardiology awaiting to be seen by cardiology tomorrow    Orders:  -     Comprehensive metabolic panel; Future    5. Mixed hyperlipidemia  Assessment & Plan:  Lab Results   Component Value Date    LDLCALC 48 2024       Lab Results   Component Value Date    ALT 7 12/28/2023    ALT 22 12/21/2015   LDL very well-controlled    Agree and continue management medication as follows    Niacin 500 mg daily    Repeat lipid profile before next visit    Orders:  -     Lipid Panel with Direct LDL reflex; Future    6. Aortic valve sclerosis  Assessment & Plan:  There was mild left ventricular outflow obstruction at rest with peak velocity in LVOT of 1.5 M/S  There was mild to moderate regurgitation.    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 Glucose intolerance (impaired glucose tolerance)  -     Hemoglobin A1C; Future  -     Albumin / creatinine urine ratio; Future  -     Urinalysis with microscopic; Future    9. Vitamin D deficiency  -     Vitamin D 25 hydroxy; Future; Expected date: 05/30/2024          Discussion Summary and Plan:  Today's care plan and medications were reviewed with patient in detail and all their questions answered to their satisfaction.    Chief Complaint   Patient presents with   • Follow-up      Subjective:  Came in follow-up chronic medical condition listed under visit diagnosis denies any chest pain difficulty breathing able to ambulate with a walker overall health unchanged persistent leg edema for which patient is on Demadex 5 mg every other day to be seen by cardiology tomorrow patient was given complete blood work for details see attached encounter under the assessment plan        The following portions of the patient's history were reviewed and updated as appropriate: allergies, current  medications, past family history, past medical history, past social history, past surgical history and problem list.    Review of Systems   Constitutional:  Positive for activity change. Negative for appetite change, chills, diaphoresis, fatigue, fever and unexpected weight change.   HENT:  Negative for congestion, dental problem, drooling, ear discharge, ear pain, facial swelling, hearing loss, mouth sores, nosebleeds, postnasal drip, rhinorrhea, sinus pressure, sneezing, sore throat, tinnitus, trouble swallowing and voice change.    Eyes:  Negative for photophobia, pain, discharge, redness, itching and visual disturbance.   Respiratory:  Negative for apnea, cough, choking, chest tightness, shortness of breath, wheezing and stridor.    Cardiovascular:  Positive for leg swelling. Negative for chest pain and palpitations.   Gastrointestinal:  Negative for abdominal distention, abdominal pain, anal bleeding, blood in stool, constipation, diarrhea, nausea, rectal pain and vomiting.   Endocrine: Negative for cold intolerance, heat intolerance, polydipsia, polyphagia and polyuria.   Genitourinary:  Negative for decreased urine volume, difficulty urinating, dysuria, enuresis, flank pain, frequency, genital sores, hematuria and urgency.   Musculoskeletal:  Positive for arthralgias, back pain, gait problem, joint swelling and myalgias. Negative for neck pain and neck stiffness.   Skin:  Negative for color change, pallor, rash and wound.   Allergic/Immunologic: Negative.  Negative for environmental allergies, food allergies and immunocompromised state.   Neurological:  Negative for dizziness, tremors, seizures, syncope, facial asymmetry, speech difficulty, weakness, light-headedness, numbness and headaches.   Psychiatric/Behavioral:  Negative for agitation, behavioral problems, confusion, decreased concentration, dysphoric mood, hallucinations, self-injury, sleep disturbance and suicidal ideas. The patient is not  nervous/anxious and is not hyperactive.          Historical Information   Patient Active Problem List   Diagnosis   • Mixed obsessional thoughts and acts   • Allergic rhinitis   • Nonrheumatic mitral valve regurgitation   • Aortic valve sclerosis   • Nonrheumatic aortic valve insufficiency   • Essential hypertension   • Recurrent major depressive disorder, in full remission (HCC)   • Hyperlipidemia   • Impaired vision   • History of pulmonary embolus (PE) 2019   • SVT (supraventricular tachycardia)   • Valvular heart disease   • Paroxysmal atrial flutter (HCC)   • Lung nodule   • Bladder wall thickening   • 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HCC)   • Age-related osteoporosis without current pathological fracture   • Abnormal CT scan, chest   • Leg edema, left   • Acute respiratory insufficiency   • Hiatal hernia   • Other pulmonary embolism without acute cor pulmonale, unspecified chronicity (HCC)   • Gastroesophageal reflux disease without esophagitis   • Chronic rhinitis   • Vega esophagus   • Stage 3 chronic kidney disease, unspecified whether stage 3a or 3b CKD (HCC)   • Abscess of left leg   • BPH (benign prostatic hyperplasia)   • OCD (obsessive compulsive disorder)   • Hypomagnesemia   • Mild protein-calorie malnutrition (HCC)   • Cellulitis of extremity   • Electrolyte abnormality   • Acute bronchitis     Past Medical History:   Diagnosis Date   • Vega esophagus    • BPH (benign prostatic hyperplasia)    • Depression    • Hiatal hernia    • HTN (hypertension)    • Hyperlipidemia    • Hypertension    • OCD (obsessive compulsive disorder)    • Pulmonary embolism (HCC) 2019     Past Surgical History:   Procedure Laterality Date   • HARDWARE REMOVAL Left 09/09/2023    Procedure: REMOVAL HARDWARE;  Surgeon: Henri Flanagan DO;  Location: [facility];  Service: Orthopedics   • INCISION AND DRAINAGE OF WOUND Left 09/09/2023    Procedure: INCISION AND DRAINAGE (I&D) EXTREMITY;  Surgeon: Henri Flanagan DO;  Location:  [facility];  Service: Orthopedics   • IR ASPIRATION ONLY  12/15/2023   • IR IVC FILTER PLACEMENT OPTIONAL/TEMPORARY  12/07/2019   • IR IVC FILTER REMOVAL  08/21/2020   • WY OPTX FEM SHFT FX W/INSJ IMED IMPLT W/WO SCREW Right 12/04/2019    Procedure: INSERTION NAIL IM FEMUR ANTEGRADE (TROCHANTERIC);  Surgeon: Yesenia Veronica DO;  Location: [facility];  Service: Orthopedics   • [place] OPTX FEM SHFT FX W/INSJ IMED IMPLT W/WO SCREW Left 06/17/2022    Procedure: INSERTION NAIL IM FEMUR ANTEGRADE (TROCHANTERIC) - long nail;  Surgeon: Henri Flanagan DO;  Location: [facility];  Service: Orthopedics   • UPPER GASTROINTESTINAL ENDOSCOPY     • WOUND DEBRIDEMENT Left 12/16/2023    Procedure: DEBRIDEMENT LOWER EXTREMITY (WASH OUT)- left thigh abscess irrigation and debridement;  Surgeon: Feng Robertson MD;  Location: [facility];  Service: Orthopedics     Social History     Substance and Sexual Activity   Alcohol Use Never     Social History     Substance and Sexual Activity   Drug Use Never     Social History     Tobacco Use   Smoking Status Never   Smokeless Tobacco Never     Family History   Problem Relation Age of Onset   • Cancer Mother      Health Maintenance Due   Topic   • Zoster Vaccine (1 of 2)   • Pneumococcal Vaccine: 65+ Years (1 of 1 - PCV)   • COVID-19 Vaccine (1 - 2023-24 season)      Meds/Allergies       Current Outpatient Medications:   •  albuterol (Ventolin HFA) 90 mcg/act inhaler, Inhale 2 puffs every 6 (six) hours as needed for wheezing, Disp: 18 g, Rfl: 0  •  atorvastatin (LIPITOR) 10 mg tablet, TAKE ONE-HALF TABLET DAILY (Patient taking differently: Take 5 mg by mouth daily with dinner TAKE ONE-HALF TABLET DAILY), Disp: 45 tablet, Rfl: 5  •  Calcium Carb-Cholecalciferol (CALTRATE 600+D3 PO), Take 600 mg by mouth 2 (two) times a day, Disp: , Rfl:   •  citalopram (CeleXA) 40 mg tablet, TAKE ONE TABLET BY MOUTH EVERY DAY, Disp: 90 tablet, Rfl: 1  •  fluticasone (FLONASE) 50 mcg/act nasal spray, USE 1 SPRAY IN  "EACH NOSTRIL DAILY, Disp: 16 g, Rfl: 0  •  fosinopril (MONOPRIL) 10 mg tablet, TAKE ONE-HALF TABLET BY MOUTH DAILY, Disp: 45 tablet, Rfl: 1  •  ipratropium (ATROVENT) 0.03 % nasal spray, 2 sprays into each nostril 2 (two) times a day, Disp: 30 mL, Rfl: 5  •  magnesium oxide (MAG-OX) 400 mg tablet, Take 1 tablet by mouth daily, Disp: , Rfl:   •  metoprolol tartrate (LOPRESSOR) 50 mg tablet, TAKE ONE TABLET EVERY 12 HOURS, Disp: 180 tablet, Rfl: 1  •  Multiple Vitamin (MULTIVITAMIN) tablet, Take 1 tablet by mouth daily, Disp: , Rfl:   •  niacin (NIASPAN) 500 mg CR tablet, TAKE ONE TABLET DAILY AT BEDTIME, Disp: 90 tablet, Rfl: 1  •  pantoprazole (PROTONIX) 40 mg tablet, Take 1 tablet (40 mg total) by mouth daily, Disp: 90 tablet, Rfl: 3  •  potassium chloride (Klor-Con) 10 mEq tablet, Take 1 tablet (10 mEq total) by mouth 2 (two) times a week, Disp: , Rfl:   •  tamsulosin (FLOMAX) 0.4 mg, Take 0.4 mg by mouth daily with dinner, Disp: , Rfl:   •  torsemide (DEMADEX) 5 MG tablet, Take 1 tablet (5 mg total) by mouth 2 (two) times a week Monday and Thursday, Disp: , Rfl:       Objective:    Vitals:   /64   Pulse 60   Ht 5' 9\" (1.753 m)   Wt 83.9 kg (185 lb)   SpO2 95%   BMI 27.32 kg/m²   Body mass index is 27.32 kg/m².  Vitals:    04/30/24 1047   Weight: 83.9 kg (185 lb)       Physical Exam  Vitals and nursing note reviewed.   Constitutional:       General: He is not in acute distress.     Appearance: He is well-developed. He is not ill-appearing, toxic-appearing or diaphoretic.   HENT:      Head: Normocephalic and atraumatic.      Right Ear: External ear normal.      Left Ear: External ear normal.      Nose: Nose normal.      Mouth/Throat:      Pharynx: No oropharyngeal exudate.   Eyes:      General: Lids are normal. Lids are everted, no foreign bodies appreciated. No scleral icterus.        Right eye: No discharge.         Left eye: No discharge.      Conjunctiva/sclera: Conjunctivae normal.      Pupils: " Pupils are equal, round, and reactive to light.   Neck:      Thyroid: No thyromegaly.      Vascular: Normal carotid pulses. No carotid bruit, hepatojugular reflux or JVD.      Trachea: No tracheal tenderness or tracheal deviation.   Cardiovascular:      Rate and Rhythm: Normal rate and regular rhythm.      Pulses: Normal pulses.      Heart sounds: Murmur heard.      No friction rub. No gallop.   Pulmonary:      Effort: Pulmonary effort is normal. No respiratory distress.      Breath sounds: No stridor. No wheezing or rales.   Chest:      Chest wall: No tenderness.   Abdominal:      General: Bowel sounds are normal. There is no distension.      Palpations: Abdomen is soft. There is no mass.      Tenderness: There is no abdominal tenderness. There is no guarding or rebound.   Musculoskeletal:         General: No tenderness or deformity. Normal range of motion.      Cervical back: Normal range of motion and neck supple. No edema, erythema or rigidity. No spinous process tenderness or muscular tenderness. Normal range of motion.      Right lower leg: Edema present.      Left lower leg: Edema present.   Lymphadenopathy:      Head:      Right side of head: No submental, submandibular, tonsillar, preauricular or posterior auricular adenopathy.      Left side of head: No submental, submandibular, tonsillar, preauricular, posterior auricular or occipital adenopathy.      Cervical: No cervical adenopathy.      Right cervical: No superficial, deep or posterior cervical adenopathy.     Left cervical: No superficial, deep or posterior cervical adenopathy.      Upper Body:      Right upper body: No pectoral adenopathy.      Left upper body: No pectoral adenopathy.   Skin:     General: Skin is warm and dry.      Coloration: Skin is not pale.      Findings: No erythema or rash.   Neurological:      General: No focal deficit present.      Mental Status: He is alert and oriented to person, place, and time.      Cranial Nerves: No  cranial nerve deficit.      Sensory: No sensory deficit.      Motor: No tremor, abnormal muscle tone or seizure activity.      Coordination: Coordination normal.      Gait: Gait abnormal.      Deep Tendon Reflexes: Reflexes are normal and symmetric. Reflexes normal.   Psychiatric:         Behavior: Behavior normal.         Thought Content: Thought content normal.         Judgment: Judgment normal.         Lab Review   No visits with results within 2 Month(s) from this visit.   Latest known visit with results is:   Lab on 01/11/2024   Component Date Value Ref Range Status   • Cholesterol 01/11/2024 117  See Comment mg/dL Final    Cholesterol:         Pediatric <18 Years        Desirable          <170 mg/dL      Borderline High    170-199 mg/dL      High               >=200 mg/dL        Adult >=18 Years            Desirable         <200 mg/dL      Borderline High   200-239 mg/dL      High              >239 mg/dL     • Triglycerides 01/11/2024 103  See Comment mg/dL Final    Triglyceride:     0-9Y            <75mg/dL     10Y-17Y         <90 mg/dL       >=18Y     Normal          <150 mg/dL     Borderline High 150-199 mg/dL     High            200-499 mg/dL        Very High       >499 mg/dL    Specimen collection should occur prior to Metamizole administration due to the potential for falsely depressed results.   • HDL, Direct 01/11/2024 48  >=40 mg/dL Final   • LDL Calculated 01/11/2024 48  0 - 100 mg/dL Final    LDL Cholesterol:     Optimal           <100 mg/dl     Near Optimal      100-129 mg/dl     Above Optimal       Borderline High 130-159 mg/dl       High            160-189 mg/dl       Very High       >189 mg/dl         This screening LDL is a calculated result.   It does not have the accuracy of the Direct Measured LDL in the monitoring of patients with hyperlipidemia and/or statin therapy.   Direct Measure LDL (AUB477) must be ordered separately in these patients.   • Non-HDL-Chol (CHOL-HDL) 01/11/2024 69  mg/dl  Final   • WBC 01/11/2024 6.92  4.31 - 10.16 Thousand/uL Final   • RBC 01/11/2024 4.09  3.88 - 5.62 Million/uL Final   • Hemoglobin 01/11/2024 12.8  12.0 - 17.0 g/dL Final   • Hematocrit 01/11/2024 39.2  36.5 - 49.3 % Final   • MCV 01/11/2024 96  82 - 98 fL Final   • MCH 01/11/2024 31.3  26.8 - 34.3 pg Final   • MCHC 01/11/2024 32.7  31.4 - 37.4 g/dL Final   • RDW 01/11/2024 13.8  11.6 - 15.1 % Final   • Platelets 01/11/2024 237  149 - 390 Thousands/uL Final   • MPV 01/11/2024 9.5  8.9 - 12.7 fL Final   • Sodium 01/11/2024 137  135 - 147 mmol/L Final   • Potassium 01/11/2024 4.3  3.5 - 5.3 mmol/L Final   • Chloride 01/11/2024 102  96 - 108 mmol/L Final   • CO2 01/11/2024 31  21 - 32 mmol/L Final   • ANION GAP 01/11/2024 4  mmol/L Final   • BUN 01/11/2024 18  5 - 25 mg/dL Final   • Creatinine 01/11/2024 1.11  0.60 - 1.30 mg/dL Final    Standardized to IDMS reference method   • Glucose, Fasting 01/11/2024 91  65 - 99 mg/dL Final   • Calcium 01/11/2024 9.1  8.4 - 10.2 mg/dL Final   • eGFR 01/11/2024 61  ml/min/1.73sq m Final         Patient Instructions   Leg Edema   WHAT YOU NEED TO KNOW:   Leg edema is swelling caused by fluid buildup. Your legs may swell if you sit or stand for long periods of time, are pregnant, or are injured. Swelling may also occur if you have heart failure or circulation problems. This means that your heart does not pump blood through your body as it should.       DISCHARGE INSTRUCTIONS:   Call your local emergency number (911 in the [place]) for any of the following:   You cannot walk.    You have chest pain or trouble breathing that is worse when you lie down.    You suddenly feel lightheaded and have trouble breathing.    You have new and sudden chest pain. You may have more pain when you take deep breaths or cough.    You cough up blood.    Return to the emergency department if:   You feel faint or confused.    Your skin turns blue or gray.    Your leg feels warm, tender, and painful. It may be  swollen and red.    Call your doctor if:   You have a fever or feel more tired than usual.    The veins in your legs look larger than usual. They may look full or bulging.    Your legs itch or feel heavy.    You have red or white areas or sores on your legs. The skin may also appear dimpled or have indentations.    You are gaining weight.    You have trouble moving your ankles.    The swelling does not go away, or other parts of your body swell.    You have questions or concerns about your condition or care.    Self-care:   Elevate your legs.  Raise your legs above the level of your heart as often as you can. This will help decrease swelling and pain. Prop your legs on pillows or blankets to keep them elevated comfortably.         Wear pressure stockings, if directed.  These tight stockings put pressure on your legs to promote blood flow and prevent blood clots. Put them on before you get out of bed. Wear the stockings during the day. Do not wear them while you sleep.         Stay active.  Do not stand or sit for long periods of time. Ask your healthcare provider about the best exercise plan for you.         Eat healthy foods.  Healthy foods include fruits, vegetables, whole-grain breads, low-fat dairy products, beans, lean meats, and fish. Ask if you need to be on a special diet.         Limit sodium (salt).  Salt will make your body hold even more fluid. Your healthcare provider will tell you how many milligrams (mg) of salt you can have each day.       Follow up with your doctor as directed:  Write down your questions so you remember to ask them during your visits.  © Copyright [employer] 2023 Information is for End User's use only and may not be sold, redistributed or otherwise used for commercial purposes.  The above information is an  only. It is not intended as medical advice for individual conditions or treatments. Talk to your doctor, nurse or pharmacist before following any medical regimen to  "see if it is safe and effective for you.       Carmen Hunt MD        \"This note has been constructed using a voice recognition system.Therefore there may be syntax, spelling, and/or grammatical errors. Please call if you have any questions. \"    "

## 2024-04-30 NOTE — PATIENT INSTRUCTIONS
Leg Edema   WHAT YOU NEED TO KNOW:   Leg edema is swelling caused by fluid buildup. Your legs may swell if you sit or stand for long periods of time, are pregnant, or are injured. Swelling may also occur if you have heart failure or circulation problems. This means that your heart does not pump blood through your body as it should.       DISCHARGE INSTRUCTIONS:   Call your local emergency number (911 in the [place]) for any of the following:   You cannot walk.    You have chest pain or trouble breathing that is worse when you lie down.    You suddenly feel lightheaded and have trouble breathing.    You have new and sudden chest pain. You may have more pain when you take deep breaths or cough.    You cough up blood.    Return to the emergency department if:   You feel faint or confused.    Your skin turns blue or gray.    Your leg feels warm, tender, and painful. It may be swollen and red.    Call your doctor if:   You have a fever or feel more tired than usual.    The veins in your legs look larger than usual. They may look full or bulging.    Your legs itch or feel heavy.    You have red or white areas or sores on your legs. The skin may also appear dimpled or have indentations.    You are gaining weight.    You have trouble moving your ankles.    The swelling does not go away, or other parts of your body swell.    You have questions or concerns about your condition or care.    Self-care:   Elevate your legs.  Raise your legs above the level of your heart as often as you can. This will help decrease swelling and pain. Prop your legs on pillows or blankets to keep them elevated comfortably.         Wear pressure stockings, if directed.  These tight stockings put pressure on your legs to promote blood flow and prevent blood clots. Put them on before you get out of bed. Wear the stockings during the day. Do not wear them while you sleep.         Stay active.  Do not stand or sit for long periods of time. Ask your  healthcare provider about the best exercise plan for you.         Eat healthy foods.  Healthy foods include fruits, vegetables, whole-grain breads, low-fat dairy products, beans, lean meats, and fish. Ask if you need to be on a special diet.         Limit sodium (salt).  Salt will make your body hold even more fluid. Your healthcare provider will tell you how many milligrams (mg) of salt you can have each day.       Follow up with your doctor as directed:  Write down your questions so you remember to ask them during your visits.  © Copyright [employer] 2023 Information is for End User's use only and may not be sold, redistributed or otherwise used for commercial purposes.  The above information is an  only. It is not intended as medical advice for individual conditions or treatments. Talk to your doctor, nurse or pharmacist before following any medical regimen to see if it is safe and effective for you.

## 2024-04-30 NOTE — ASSESSMENT & PLAN NOTE
Last echocardiogram done the ejection fraction left ventricular normal about 55 to 60% and associated with mild to moderate tricuspid regurgitation elevated right ventricular systolic pressure about 50    No evidence of any right-sided heart failure    Continue Demadex 5 mg daily awaiting to be seen by cardiology tomorrow

## 2024-04-30 NOTE — ASSESSMENT & PLAN NOTE
Lab Results   Component Value Date    LDLCALC 48 01/11/2024      Lab Results   Component Value Date    ALT 7 12/28/2023    ALT 22 12/21/2015   LDL very well-controlled    Agree and continue management medication as follows    Niacin 500 mg daily    Repeat lipid profile before next visit

## 2024-04-30 NOTE — ASSESSMENT & PLAN NOTE
There was mild left ventricular outflow obstruction at rest with peak velocity in LVOT of 1.5 M/S  There was mild to moderate regurgitation.    I will reviewed the echocardiogram finding    Asymptomatic    Awaiting repeat echocardiogram for follow-up to be seen by cardiology tomorrow

## 2024-04-30 NOTE — ASSESSMENT & PLAN NOTE
Atrial flutter resolved  Asymptomatic stable    Agree and continue medication management as follows    Metoprolol 50 mg twice a dayNot on any anticoagulation as per cardiology awaiting to be seen by cardiology tomorrow

## 2024-07-05 DIAGNOSIS — R09.81 NASAL CONGESTION: Chronic | ICD-10-CM

## 2024-07-05 NOTE — TELEPHONE ENCOUNTER
Reason for call:   [x] Refill   [] Prior Auth  [] Other:     Office:   [x] PCP/Provider -   [] Specialty/Provider -     Medication: Fluticasone     Dose/Frequency: 50 mcg/act nasal spray. Use 1 spray in each nostril daily     Quantity: 16 g     Pharmacy: [id number] - [place], [place] - [address] [phone]     Does the patient have enough for 3 days?   [x] Yes   [] No - Send as HP to POD

## 2024-07-06 RX ORDER — FLUTICASONE PROPIONATE 50 MCG
1 SPRAY, SUSPENSION (ML) NASAL DAILY
Qty: 16 G | Refills: 3 | Status: SHIPPED | OUTPATIENT
Start: 2024-07-06

## 2024-08-01 ENCOUNTER — APPOINTMENT (OUTPATIENT)
Dept: LAB | Facility: CLINIC | Age: 84
End: 2024-08-01
Payer: COMMERCIAL

## 2024-08-01 DIAGNOSIS — E78.2 MIXED HYPERLIPIDEMIA: ICD-10-CM

## 2024-08-01 DIAGNOSIS — I48.92 PAROXYSMAL ATRIAL FLUTTER (HCC): ICD-10-CM

## 2024-08-01 DIAGNOSIS — Z13.0 SCREENING FOR DEFICIENCY ANEMIA: ICD-10-CM

## 2024-08-01 DIAGNOSIS — I35.8 AORTIC VALVE SCLEROSIS: ICD-10-CM

## 2024-08-01 DIAGNOSIS — N18.30 STAGE 3 CHRONIC KIDNEY DISEASE, UNSPECIFIED WHETHER STAGE 3A OR 3B CKD (HCC): ICD-10-CM

## 2024-08-01 DIAGNOSIS — K22.70 BARRETT'S ESOPHAGUS WITHOUT DYSPLASIA: ICD-10-CM

## 2024-08-01 DIAGNOSIS — I27.21 PULMONARY ARTERY HYPERTENSION (HCC): ICD-10-CM

## 2024-08-01 DIAGNOSIS — R73.02 GLUCOSE INTOLERANCE (IMPAIRED GLUCOSE TOLERANCE): ICD-10-CM

## 2024-08-01 LAB
ALBUMIN SERPL BCG-MCNC: 3.7 G/DL (ref 3.5–5)
ALP SERPL-CCNC: 73 U/L (ref 34–104)
ALT SERPL W P-5'-P-CCNC: 9 U/L (ref 7–52)
ANION GAP SERPL CALCULATED.3IONS-SCNC: 6 MMOL/L (ref 4–13)
AST SERPL W P-5'-P-CCNC: 17 U/L (ref 13–39)
BACTERIA UR QL AUTO: NORMAL /HPF
BASOPHILS # BLD AUTO: 0.05 THOUSANDS/ÂΜL (ref 0–0.1)
BASOPHILS NFR BLD AUTO: 1 % (ref 0–1)
BILIRUB SERPL-MCNC: 0.7 MG/DL (ref 0.2–1)
BILIRUB UR QL STRIP: NEGATIVE
BUN SERPL-MCNC: 14 MG/DL (ref 5–25)
CALCIUM SERPL-MCNC: 9.1 MG/DL (ref 8.4–10.2)
CHLORIDE SERPL-SCNC: 99 MMOL/L (ref 96–108)
CHOLEST SERPL-MCNC: 120 MG/DL
CLARITY UR: CLEAR
CO2 SERPL-SCNC: 31 MMOL/L (ref 21–32)
COLOR UR: NORMAL
CREAT SERPL-MCNC: 1.23 MG/DL (ref 0.6–1.3)
CREAT UR-MCNC: 126.3 MG/DL
EOSINOPHIL # BLD AUTO: 0.46 THOUSAND/ÂΜL (ref 0–0.61)
EOSINOPHIL NFR BLD AUTO: 8 % (ref 0–6)
ERYTHROCYTE [DISTWIDTH] IN BLOOD BY AUTOMATED COUNT: 13.8 % (ref 11.6–15.1)
EST. AVERAGE GLUCOSE BLD GHB EST-MCNC: 114 MG/DL
GFR SERPL CREATININE-BSD FRML MDRD: 53 ML/MIN/1.73SQ M
GLUCOSE P FAST SERPL-MCNC: 91 MG/DL (ref 65–99)
GLUCOSE UR STRIP-MCNC: NEGATIVE MG/DL
HBA1C MFR BLD: 5.6 %
HCT VFR BLD AUTO: 39.7 % (ref 36.5–49.3)
HDLC SERPL-MCNC: 56 MG/DL
HGB BLD-MCNC: 13.3 G/DL (ref 12–17)
HGB UR QL STRIP.AUTO: NEGATIVE
IMM GRANULOCYTES # BLD AUTO: 0.02 THOUSAND/UL (ref 0–0.2)
IMM GRANULOCYTES NFR BLD AUTO: 0 % (ref 0–2)
KETONES UR STRIP-MCNC: NEGATIVE MG/DL
LDLC SERPL CALC-MCNC: 47 MG/DL (ref 0–100)
LEUKOCYTE ESTERASE UR QL STRIP: NEGATIVE
LYMPHOCYTES # BLD AUTO: 1.93 THOUSANDS/ÂΜL (ref 0.6–4.47)
LYMPHOCYTES NFR BLD AUTO: 32 % (ref 14–44)
MCH RBC QN AUTO: 31.7 PG (ref 26.8–34.3)
MCHC RBC AUTO-ENTMCNC: 33.5 G/DL (ref 31.4–37.4)
MCV RBC AUTO: 95 FL (ref 82–98)
MICROALBUMIN UR-MCNC: <7 MG/L
MONOCYTES # BLD AUTO: 0.77 THOUSAND/ÂΜL (ref 0.17–1.22)
MONOCYTES NFR BLD AUTO: 13 % (ref 4–12)
NEUTROPHILS # BLD AUTO: 2.85 THOUSANDS/ÂΜL (ref 1.85–7.62)
NEUTS SEG NFR BLD AUTO: 46 % (ref 43–75)
NITRITE UR QL STRIP: NEGATIVE
NON-SQ EPI CELLS URNS QL MICRO: NORMAL /HPF
NRBC BLD AUTO-RTO: 0 /100 WBCS
PH UR STRIP.AUTO: 7.5 [PH]
PLATELET # BLD AUTO: 253 THOUSANDS/UL (ref 149–390)
PMV BLD AUTO: 9.9 FL (ref 8.9–12.7)
POTASSIUM SERPL-SCNC: 4.9 MMOL/L (ref 3.5–5.3)
PROT SERPL-MCNC: 6.4 G/DL (ref 6.4–8.4)
PROT UR STRIP-MCNC: NEGATIVE MG/DL
RBC # BLD AUTO: 4.19 MILLION/UL (ref 3.88–5.62)
RBC #/AREA URNS AUTO: NORMAL /HPF
SODIUM SERPL-SCNC: 136 MMOL/L (ref 135–147)
SP GR UR STRIP.AUTO: 1.01 (ref 1–1.03)
TRIGL SERPL-MCNC: 85 MG/DL
UROBILINOGEN UR STRIP-ACNC: <2 MG/DL
WBC # BLD AUTO: 6.08 THOUSAND/UL (ref 4.31–10.16)
WBC #/AREA URNS AUTO: NORMAL /HPF

## 2024-08-01 PROCEDURE — 82043 UR ALBUMIN QUANTITATIVE: CPT

## 2024-08-01 PROCEDURE — 82570 ASSAY OF URINE CREATININE: CPT

## 2024-08-01 PROCEDURE — 80061 LIPID PANEL: CPT

## 2024-08-01 PROCEDURE — 83036 HEMOGLOBIN GLYCOSYLATED A1C: CPT

## 2024-08-01 PROCEDURE — 85025 COMPLETE CBC W/AUTO DIFF WBC: CPT

## 2024-08-01 PROCEDURE — 36415 COLL VENOUS BLD VENIPUNCTURE: CPT

## 2024-08-01 PROCEDURE — 80053 COMPREHEN METABOLIC PANEL: CPT

## 2024-08-01 PROCEDURE — 81001 URINALYSIS AUTO W/SCOPE: CPT

## 2024-08-07 ENCOUNTER — OFFICE VISIT (OUTPATIENT)
Dept: INTERNAL MEDICINE CLINIC | Facility: CLINIC | Age: 84
End: 2024-08-07
Payer: COMMERCIAL

## 2024-08-07 VITALS
HEIGHT: 69 IN | WEIGHT: 190 LBS | OXYGEN SATURATION: 96 % | DIASTOLIC BLOOD PRESSURE: 84 MMHG | HEART RATE: 67 BPM | BODY MASS INDEX: 28.14 KG/M2 | SYSTOLIC BLOOD PRESSURE: 160 MMHG

## 2024-08-07 DIAGNOSIS — I47.10 SVT (SUPRAVENTRICULAR TACHYCARDIA): ICD-10-CM

## 2024-08-07 DIAGNOSIS — I48.92 PAROXYSMAL ATRIAL FLUTTER (HCC): ICD-10-CM

## 2024-08-07 DIAGNOSIS — I10 ESSENTIAL HYPERTENSION: Primary | ICD-10-CM

## 2024-08-07 DIAGNOSIS — E78.2 MIXED HYPERLIPIDEMIA: ICD-10-CM

## 2024-08-07 PROCEDURE — 99213 OFFICE O/P EST LOW 20 MIN: CPT | Performed by: INTERNAL MEDICINE

## 2024-08-07 PROCEDURE — G2211 COMPLEX E/M VISIT ADD ON: HCPCS | Performed by: INTERNAL MEDICINE

## 2024-08-07 NOTE — ASSESSMENT & PLAN NOTE
Lab Results   Component Value Date    LDLCALC 47 08/01/2024      Lab Results   Component Value Date    ALT 9 08/01/2024    ALT 22 12/21/2015       Agree and continue manage medications for  Lipitor 5 mg daily with low-fat low-cholesterol diet no side effects

## 2024-08-07 NOTE — ASSESSMENT & PLAN NOTE
For now SVT resolved asymptomatic no chest pain difficulty breathing    Continue agree management metoprolol 50 mg twice a day followed by cardiology

## 2024-08-07 NOTE — PROGRESS NOTES
Dr. Hunt's Office Visit Note  24     Taj Roblero Jr. 84 y.o. male MRN: [id number]  : 1940    Assessment:     1. Essential hypertension  Assessment & Plan:  Blood pressure reviewed systolic higher in the office patient's daughter claims monitoring blood pressure at home seems to be controlled systolic less than 140 diastolic less than 80    Agree continue management medication as follows    Demadex 5 mg daily     Metoprolol 50 mg twice a day     Monitor blood pressure at home  2. Mixed hyperlipidemia  Assessment & Plan:  Lab Results   Component Value Date    LDLCALC 47 2024      Lab Results   Component Value Date    ALT 9 2024    ALT 22 2015       Agree and continue manage medications for  Lipitor 5 mg daily with low-fat low-cholesterol diet no side effects  3. Paroxysmal atrial flutter (HCC)  4. SVT (supraventricular tachycardia)  Assessment & Plan:  For now SVT resolved asymptomatic no chest pain difficulty breathing    Continue agree management metoprolol 50 mg twice a day followed by cardiology        Discussion Summary and Plan:  Today's care plan and medications were reviewed with patient in detail and all their questions answered to their satisfaction.    Chief Complaint   Patient presents with   • Follow-up      Subjective:  Came in follow-up chronic medical condition listed visit diagnosis denies any chest pain difficulty breathing able to ambulate with a walker all the labs reviewed at length patient monitoring blood pressure at home seems to be controlled although the blood pressure readings little higher side systolic 160 in the office asymptomatic        The following portions of the patient's history were reviewed and updated as appropriate: allergies, current medications, past family history, past medical history, past social history, past surgical history and problem list.    Review of Systems   Constitutional:  Positive for activity change. Negative for appetite  change, chills, diaphoresis, fatigue, fever and unexpected weight change.   HENT:  Negative for congestion, dental problem, drooling, ear discharge, ear pain, facial swelling, hearing loss, mouth sores, nosebleeds, postnasal drip, rhinorrhea, sinus pressure, sneezing, sore throat, tinnitus, trouble swallowing and voice change.    Eyes:  Negative for photophobia, pain, discharge, redness, itching and visual disturbance.   Respiratory:  Negative for apnea, cough, choking, chest tightness, shortness of breath, wheezing and stridor.    Cardiovascular:  Positive for leg swelling. Negative for chest pain and palpitations.   Gastrointestinal:  Negative for abdominal distention, abdominal pain, anal bleeding, blood in stool, constipation, diarrhea, nausea, rectal pain and vomiting.   Endocrine: Negative for cold intolerance, heat intolerance, polydipsia, polyphagia and polyuria.   Genitourinary:  Negative for decreased urine volume, difficulty urinating, dysuria, enuresis, flank pain, frequency, genital sores, hematuria and urgency.   Musculoskeletal:  Positive for arthralgias, back pain, gait problem, joint swelling and myalgias. Negative for neck pain and neck stiffness.   Skin:  Negative for color change, pallor, rash and wound.   Allergic/Immunologic: Negative.  Negative for environmental allergies, food allergies and immunocompromised state.   Neurological:  Negative for dizziness, tremors, seizures, syncope, facial asymmetry, speech difficulty, weakness, light-headedness, numbness and headaches.   Psychiatric/Behavioral:  Negative for agitation, behavioral problems, confusion, decreased concentration, dysphoric mood, hallucinations, self-injury, sleep disturbance and suicidal ideas. The patient is not nervous/anxious and is not hyperactive.          Historical Information   Patient Active Problem List   Diagnosis   • Mixed obsessional thoughts and acts   • Allergic rhinitis   • Nonrheumatic mitral valve regurgitation    • Aortic valve sclerosis   • Nonrheumatic aortic valve insufficiency   • Essential hypertension   • Recurrent major depressive disorder, in full remission (HCC)   • Hyperlipidemia   • Impaired vision   • History of pulmonary embolus (PE) 2019   • SVT (supraventricular tachycardia)   • Valvular heart disease   • Paroxysmal atrial flutter (HCC)   • Lung nodule   • Bladder wall thickening   • 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HCC)   • Age-related osteoporosis without current pathological fracture   • Abnormal CT scan, chest   • Leg edema, left   • Acute respiratory insufficiency   • Hiatal hernia   • Other pulmonary embolism without acute cor pulmonale, unspecified chronicity (HCC)   • Gastroesophageal reflux disease without esophagitis   • Chronic rhinitis   • Vega esophagus   • Stage 3 chronic kidney disease, unspecified whether stage 3a or 3b CKD (HCC)   • Abscess of left leg   • BPH (benign prostatic hyperplasia)   • OCD (obsessive compulsive disorder)   • Hypomagnesemia   • Mild protein-calorie malnutrition (HCC)   • Cellulitis of extremity   • Electrolyte abnormality   • Acute bronchitis     Past Medical History:   Diagnosis Date   • Vega esophagus    • BPH (benign prostatic hyperplasia)    • Depression    • Hiatal hernia    • HTN (hypertension)    • Hyperlipidemia    • Hypertension    • OCD (obsessive compulsive disorder)    • Pulmonary embolism (HCC) 2019     Past Surgical History:   Procedure Laterality Date   • HARDWARE REMOVAL Left 09/09/2023    Procedure: REMOVAL HARDWARE;  Surgeon: Henri Flanagan DO;  Location: [facility];  Service: Orthopedics   • INCISION AND DRAINAGE OF WOUND Left 09/09/2023    Procedure: INCISION AND DRAINAGE (I&D) EXTREMITY;  Surgeon: Henri Flanagan DO;  Location: [facility];  Service: Orthopedics   • IR ASPIRATION ONLY  12/15/2023   • IR IVC FILTER PLACEMENT OPTIONAL/TEMPORARY  12/07/2019   • IR IVC FILTER REMOVAL  08/21/2020   • [place] OPTX FEM SHFT FX W/INSJ IMED IMPLT W/WO SCREW  Right 12/04/2019    Procedure: INSERTION NAIL IM FEMUR ANTEGRADE (TROCHANTERIC);  Surgeon: Yesenia Veronica DO;  Location: [facility];  Service: Orthopedics   • SC OPTX FEM SHFT FX W/INSJ IMED IMPLT W/WO SCREW Left 06/17/2022    Procedure: INSERTION NAIL IM FEMUR ANTEGRADE (TROCHANTERIC) - long nail;  Surgeon: Henri Flanagan DO;  Location: [facility];  Service: Orthopedics   • UPPER GASTROINTESTINAL ENDOSCOPY     • WOUND DEBRIDEMENT Left 12/16/2023    Procedure: DEBRIDEMENT LOWER EXTREMITY (WASH OUT)- left thigh abscess irrigation and debridement;  Surgeon: Feng Robertson MD;  Location: [facility];  Service: Orthopedics     Social History     Substance and Sexual Activity   Alcohol Use Never     Social History     Substance and Sexual Activity   Drug Use Never     Social History     Tobacco Use   Smoking Status Never   Smokeless Tobacco Never     Family History   Problem Relation Age of Onset   • Cancer Mother      Health Maintenance Due   Topic   • Zoster Vaccine (1 of 2)   • RSV Vaccine Age 60+ Years (1 - 1-dose 60+ series)   • Pneumococcal Vaccine: 65+ Years (1 of 1 - PCV)   • COVID-19 Vaccine (1 - 2023-24 season)   • Influenza Vaccine (1)      Meds/Allergies       Current Outpatient Medications:   •  albuterol (Ventolin HFA) 90 mcg/act inhaler, Inhale 2 puffs every 6 (six) hours as needed for wheezing, Disp: 18 g, Rfl: 0  •  atorvastatin (LIPITOR) 10 mg tablet, TAKE ONE-HALF TABLET DAILY (Patient taking differently: Take 5 mg by mouth daily with dinner TAKE ONE-HALF TABLET DAILY), Disp: 45 tablet, Rfl: 5  •  Calcium Carb-Cholecalciferol (CALTRATE 600+D3 PO), Take 600 mg by mouth 2 (two) times a day, Disp: , Rfl:   •  citalopram (CeleXA) 40 mg tablet, TAKE ONE TABLET BY MOUTH EVERY DAY, Disp: 90 tablet, Rfl: 1  •  fluticasone (FLONASE) 50 mcg/act nasal spray, 1 spray into each nostril daily, Disp: 16 g, Rfl: 3  •  fosinopril (MONOPRIL) 10 mg tablet, TAKE ONE-HALF TABLET BY MOUTH DAILY, Disp: 45 tablet, Rfl: 1  •  " ipratropium (ATROVENT) 0.03 % nasal spray, 2 sprays into each nostril 2 (two) times a day, Disp: 30 mL, Rfl: 5  •  magnesium oxide (MAG-OX) 400 mg tablet, Take 1 tablet by mouth daily, Disp: , Rfl:   •  metoprolol tartrate (LOPRESSOR) 50 mg tablet, TAKE ONE TABLET EVERY 12 HOURS, Disp: 180 tablet, Rfl: 1  •  Multiple Vitamin (MULTIVITAMIN) tablet, Take 1 tablet by mouth daily, Disp: , Rfl:   •  niacin (NIASPAN) 500 mg CR tablet, TAKE ONE TABLET DAILY AT BEDTIME, Disp: 90 tablet, Rfl: 1  •  pantoprazole (PROTONIX) 40 mg tablet, Take 1 tablet (40 mg total) by mouth daily, Disp: 90 tablet, Rfl: 3  •  potassium chloride (Klor-Con) 10 mEq tablet, Take 1 tablet (10 mEq total) by mouth 2 (two) times a week, Disp: , Rfl:   •  tamsulosin (FLOMAX) 0.4 mg, Take 0.4 mg by mouth daily with dinner, Disp: , Rfl:   •  torsemide (DEMADEX) 5 MG tablet, Take 1 tablet (5 mg total) by mouth 2 (two) times a week Monday and Thursday, Disp: , Rfl:       Objective:    Vitals:   /84   Pulse 67   Ht 5' 9\" (1.753 m)   Wt 86.2 kg (190 lb)   SpO2 96%   BMI 28.06 kg/m²   Body mass index is 28.06 kg/m².  Vitals:    08/07/24 1019   Weight: 86.2 kg (190 lb)       Physical Exam  Vitals and nursing note reviewed.   Constitutional:       General: He is not in acute distress.     Appearance: He is well-developed. He is not ill-appearing, toxic-appearing or diaphoretic.   HENT:      Head: Normocephalic and atraumatic.      Right Ear: External ear normal.      Left Ear: External ear normal.      Nose: Nose normal.      Mouth/Throat:      Pharynx: No oropharyngeal exudate.   Eyes:      General: Lids are normal. Lids are everted, no foreign bodies appreciated. No scleral icterus.        Right eye: No discharge.         Left eye: No discharge.      Conjunctiva/sclera: Conjunctivae normal.      Pupils: Pupils are equal, round, and reactive to light.   Neck:      Thyroid: No thyromegaly.      Vascular: Normal carotid pulses. No carotid bruit, " hepatojugular reflux or JVD.      Trachea: No tracheal tenderness or tracheal deviation.   Cardiovascular:      Rate and Rhythm: Normal rate and regular rhythm.      Pulses: Normal pulses.      Heart sounds: Murmur heard.      No friction rub. No gallop.   Pulmonary:      Effort: Pulmonary effort is normal. No respiratory distress.      Breath sounds: No stridor. No wheezing or rales.   Chest:      Chest wall: No tenderness.   Abdominal:      General: Bowel sounds are normal. There is no distension.      Palpations: Abdomen is soft. There is no mass.      Tenderness: There is no abdominal tenderness. There is no guarding or rebound.   Musculoskeletal:         General: No tenderness or deformity. Normal range of motion.      Cervical back: Normal range of motion and neck supple. No edema, erythema or rigidity. No spinous process tenderness or muscular tenderness. Normal range of motion.      Right lower leg: Edema present.      Left lower leg: Edema present.   Lymphadenopathy:      Head:      Right side of head: No submental, submandibular, tonsillar, preauricular or posterior auricular adenopathy.      Left side of head: No submental, submandibular, tonsillar, preauricular, posterior auricular or occipital adenopathy.      Cervical: No cervical adenopathy.      Right cervical: No superficial, deep or posterior cervical adenopathy.     Left cervical: No superficial, deep or posterior cervical adenopathy.      Upper Body:      Right upper body: No pectoral adenopathy.      Left upper body: No pectoral adenopathy.   Skin:     General: Skin is warm and dry.      Coloration: Skin is not pale.      Findings: No erythema or rash.   Neurological:      General: No focal deficit present.      Mental Status: He is alert and oriented to person, place, and time.      Cranial Nerves: No cranial nerve deficit.      Sensory: No sensory deficit.      Motor: No tremor, abnormal muscle tone or seizure activity.      Coordination:  Coordination normal.      Gait: Gait abnormal.      Deep Tendon Reflexes: Reflexes are normal and symmetric. Reflexes normal.   Psychiatric:         Behavior: Behavior normal.         Thought Content: Thought content normal.         Judgment: Judgment normal.         Lab Review   Appointment on 08/01/2024   Component Date Value Ref Range Status   • WBC 08/01/2024 6.08  4.31 - 10.16 Thousand/uL Final   • RBC 08/01/2024 4.19  3.88 - 5.62 Million/uL Final   • Hemoglobin 08/01/2024 13.3  12.0 - 17.0 g/dL Final   • Hematocrit 08/01/2024 39.7  36.5 - 49.3 % Final   • MCV 08/01/2024 95  82 - 98 fL Final   • MCH 08/01/2024 31.7  26.8 - 34.3 pg Final   • MCHC 08/01/2024 33.5  31.4 - 37.4 g/dL Final   • RDW 08/01/2024 13.8  11.6 - 15.1 % Final   • MPV 08/01/2024 9.9  8.9 - 12.7 fL Final   • Platelets 08/01/2024 253  149 - 390 Thousands/uL Final   • nRBC 08/01/2024 0  /100 WBCs Final   • Segmented % 08/01/2024 46  43 - 75 % Final   • Immature Grans % 08/01/2024 0  0 - 2 % Final   • Lymphocytes % 08/01/2024 32  14 - 44 % Final   • Monocytes % 08/01/2024 13 (H)  4 - 12 % Final   • Eosinophils Relative 08/01/2024 8 (H)  0 - 6 % Final   • Basophils Relative 08/01/2024 1  0 - 1 % Final   • Absolute Neutrophils 08/01/2024 2.85  1.85 - 7.62 Thousands/µL Final   • Absolute Immature Grans 08/01/2024 0.02  0.00 - 0.20 Thousand/uL Final   • Absolute Lymphocytes 08/01/2024 1.93  0.60 - 4.47 Thousands/µL Final   • Absolute Monocytes 08/01/2024 0.77  0.17 - 1.22 Thousand/µL Final   • Eosinophils Absolute 08/01/2024 0.46  0.00 - 0.61 Thousand/µL Final   • Basophils Absolute 08/01/2024 0.05  0.00 - 0.10 Thousands/µL Final   • Sodium 08/01/2024 136  135 - 147 mmol/L Final   • Potassium 08/01/2024 4.9  3.5 - 5.3 mmol/L Final   • Chloride 08/01/2024 99  96 - 108 mmol/L Final   • CO2 08/01/2024 31  21 - 32 mmol/L Final   • ANION GAP 08/01/2024 6  4 - 13 mmol/L Final   • BUN 08/01/2024 14  5 - 25 mg/dL Final   • Creatinine 08/01/2024 1.23  0.60 -  1.30 mg/dL Final    Standardized to IDMS reference method   • Glucose, Fasting 08/01/2024 91  65 - 99 mg/dL Final   • Calcium 08/01/2024 9.1  8.4 - 10.2 mg/dL Final   • AST 08/01/2024 17  13 - 39 U/L Final   • ALT 08/01/2024 9  7 - 52 U/L Final    Specimen collection should occur prior to Sulfasalazine administration due to the potential for falsely depressed results.    • Alkaline Phosphatase 08/01/2024 73  34 - 104 U/L Final   • Total Protein 08/01/2024 6.4  6.4 - 8.4 g/dL Final   • Albumin 08/01/2024 3.7  3.5 - 5.0 g/dL Final   • Total Bilirubin 08/01/2024 0.70  0.20 - 1.00 mg/dL Final    Use of this assay is not recommended for patients undergoing treatment with eltrombopag due to the potential for falsely elevated results.  N-acetyl-p-benzoquinone imine (metabolite of Acetaminophen) will generate erroneously low results in samples for patients that have taken an overdose of Acetaminophen.   • eGFR 08/01/2024 53  ml/min/1.73sq m Final   • Hemoglobin A1C 08/01/2024 5.6  Normal 4.0-5.6%; PreDiabetic 5.7-6.4%; Diabetic >=6.5%; Glycemic control for adults with diabetes <7.0% % Final   • EAG 08/01/2024 114  mg/dl Final   • Cholesterol 08/01/2024 120  See Comment mg/dL Final    Cholesterol:         Pediatric <18 Years        Desirable          <170 mg/dL      Borderline High    170-199 mg/dL      High               >=200 mg/dL        Adult >=18 Years            Desirable         <200 mg/dL      Borderline High   200-239 mg/dL      High              >239 mg/dL     • Triglycerides 08/01/2024 85  See Comment mg/dL Final    Triglyceride:     0-9Y            <75mg/dL     10Y-17Y         <90 mg/dL       >=18Y     Normal          <150 mg/dL     Borderline High 150-199 mg/dL     High            200-499 mg/dL        Very High       >499 mg/dL    Specimen collection should occur prior to Metamizole administration due to the potential for falsely depressed results.   • HDL, Direct 08/01/2024 56  >=40 mg/dL Final   • LDL  "Calculated 08/01/2024 47  0 - 100 mg/dL Final    LDL Cholesterol:     Optimal           <100 mg/dl     Near Optimal      100-129 mg/dl     Above Optimal       Borderline High 130-159 mg/dl       High            160-189 mg/dl       Very High       >189 mg/dl         This screening LDL is a calculated result.   It does not have the accuracy of the Direct Measured LDL in the monitoring of patients with hyperlipidemia and/or statin therapy.   Direct Measure LDL (ALM421) must be ordered separately in these patients.   • Creatinine, Ur 08/01/2024 126.3  Reference range not established. mg/dL Final   • Albumin,U,Random 08/01/2024 <7.0  <20.0 mg/L Final   • Albumin Creat Ratio 08/01/2024    Final    Unable to calculate.   • Color, UA 08/01/2024 Light Yellow   Final   • Clarity, UA 08/01/2024 Clear   Final   • Specific Gravity, UA 08/01/2024 1.012  1.003 - 1.030 Final   • pH, UA 08/01/2024 7.5  4.5, 5.0, 5.5, 6.0, 6.5, 7.0, 7.5, 8.0 Final   • Leukocytes, UA 08/01/2024 Negative  Negative Final   • Nitrite, UA 08/01/2024 Negative  Negative Final   • Protein, UA 08/01/2024 Negative  Negative mg/dl Final   • Glucose, UA 08/01/2024 Negative  Negative mg/dl Final   • Ketones, UA 08/01/2024 Negative  Negative mg/dl Final   • Urobilinogen, UA 08/01/2024 <2.0  <2.0 mg/dl mg/dl Final   • Bilirubin, UA 08/01/2024 Negative  Negative Final   • Occult Blood, UA 08/01/2024 Negative  Negative Final   • RBC, UA 08/01/2024 None Seen  None Seen, 1-2 /hpf Final   • WBC, UA 08/01/2024 None Seen  None Seen, 1-2 /hpf Final   • Epithelial Cells 08/01/2024 None Seen  None Seen, Occasional /hpf Final   • Bacteria, UA 08/01/2024 None Seen  None Seen, Occasional /hpf Final         Patient Instructions   Pt is symptom free for this problem.  This diagnosis or problem is stable/well controlled  Patient is advised to continue same meds as outlined in medicine list          Carmen Hunt MD        \"This note has been constructed using a voice " "recognition system.Therefore there may be syntax, spelling, and/or grammatical errors. Please call if you have any questions. \"  "

## 2024-08-07 NOTE — ASSESSMENT & PLAN NOTE
Blood pressure reviewed systolic higher in the office patient's daughter claims monitoring blood pressure at home seems to be controlled systolic less than 140 diastolic less than 80    Agree continue management medication as follows    Demadex 5 mg daily     Metoprolol 50 mg twice a day     Monitor blood pressure at home

## 2024-08-22 DIAGNOSIS — F33.42 RECURRENT MAJOR DEPRESSIVE DISORDER, IN FULL REMISSION (HCC): ICD-10-CM

## 2024-08-22 DIAGNOSIS — I10 ESSENTIAL HYPERTENSION: ICD-10-CM

## 2024-08-22 RX ORDER — CITALOPRAM HYDROBROMIDE 40 MG/1
40 TABLET ORAL DAILY
Qty: 90 TABLET | Refills: 1 | Status: SHIPPED | OUTPATIENT
Start: 2024-08-22

## 2024-09-06 DIAGNOSIS — I48.92 PAROXYSMAL ATRIAL FLUTTER (HCC): ICD-10-CM

## 2024-09-06 DIAGNOSIS — E78.5 HYPERLIPIDEMIA, UNSPECIFIED HYPERLIPIDEMIA TYPE: ICD-10-CM

## 2024-09-06 RX ORDER — METOPROLOL TARTRATE 50 MG
50 TABLET ORAL EVERY 12 HOURS
Qty: 180 TABLET | Refills: 1 | Status: SHIPPED | OUTPATIENT
Start: 2024-09-06

## 2024-09-06 RX ORDER — NIACIN 500 MG/1
500 TABLET, EXTENDED RELEASE ORAL
Qty: 90 TABLET | Refills: 1 | Status: SHIPPED | OUTPATIENT
Start: 2024-09-06

## 2024-10-17 DIAGNOSIS — R60.0 BILATERAL LEG EDEMA: ICD-10-CM

## 2024-10-17 RX ORDER — POTASSIUM CHLORIDE 750 MG/1
10 TABLET, EXTENDED RELEASE ORAL 2 TIMES WEEKLY
Qty: 30 TABLET | Refills: 2 | Status: SHIPPED | OUTPATIENT
Start: 2024-10-17

## 2024-10-17 RX ORDER — TORSEMIDE 5 MG/1
5 TABLET ORAL 2 TIMES WEEKLY
Qty: 30 TABLET | Refills: 2 | Status: SHIPPED | OUTPATIENT
Start: 2024-10-17

## 2024-10-18 ENCOUNTER — IMMUNIZATIONS (OUTPATIENT)
Dept: INTERNAL MEDICINE CLINIC | Facility: CLINIC | Age: 84
End: 2024-10-18
Payer: COMMERCIAL

## 2024-10-18 DIAGNOSIS — Z23 ENCOUNTER FOR IMMUNIZATION: Primary | ICD-10-CM

## 2024-10-18 PROCEDURE — G0008 ADMIN INFLUENZA VIRUS VAC: HCPCS

## 2024-10-18 PROCEDURE — 90662 IIV NO PRSV INCREASED AG IM: CPT

## 2024-11-04 DIAGNOSIS — E78.00 HYPERCHOLESTEREMIA: ICD-10-CM

## 2024-11-04 RX ORDER — ATORVASTATIN CALCIUM 10 MG/1
5 TABLET, FILM COATED ORAL
Qty: 45 TABLET | Refills: 2 | Status: SHIPPED | OUTPATIENT
Start: 2024-11-04

## 2024-11-06 DIAGNOSIS — E78.00 HYPERCHOLESTEREMIA: ICD-10-CM

## 2024-11-06 RX ORDER — ATORVASTATIN CALCIUM 10 MG/1
5 TABLET, FILM COATED ORAL
Qty: 45 TABLET | Refills: 2 | OUTPATIENT
Start: 2024-11-06

## 2024-11-14 DIAGNOSIS — I10 ESSENTIAL HYPERTENSION: ICD-10-CM

## 2024-11-14 NOTE — TELEPHONE ENCOUNTER
Reason for call:   [x] Refill   [] Prior Auth  [] Other:     Office:   [x] PCP/Provider -   [] Specialty/Provider -     Medication:   Fosinopril 10mg- take one-half tablet by mouth daily      Pharmacy: [facility]    Does the patient have enough for 3 days?   [x] Yes   [] No - Send as HP to POD

## 2024-11-15 RX ORDER — FOSINOPRIL SODIUM 10 MG/1
TABLET ORAL
Qty: 45 TABLET | Refills: 0 | Status: SHIPPED | OUTPATIENT
Start: 2024-11-15 | End: 2024-11-21

## 2024-11-16 ENCOUNTER — APPOINTMENT (EMERGENCY)
Dept: RADIOLOGY | Facility: HOSPITAL | Age: 84
DRG: 205 | End: 2024-11-16
Payer: COMMERCIAL

## 2024-11-16 ENCOUNTER — HOSPITAL ENCOUNTER (EMERGENCY)
Facility: HOSPITAL | Age: 84
Discharge: HOME | DRG: 205 | End: 2024-11-16
Attending: EMERGENCY MEDICINE | Admitting: EMERGENCY MEDICINE
Payer: COMMERCIAL

## 2024-11-16 VITALS
DIASTOLIC BLOOD PRESSURE: 83 MMHG | SYSTOLIC BLOOD PRESSURE: 208 MMHG | WEIGHT: 190 LBS | OXYGEN SATURATION: 92 % | RESPIRATION RATE: 16 BRPM | TEMPERATURE: 97.7 F | BODY MASS INDEX: 28.06 KG/M2 | HEART RATE: 74 BPM
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
## 2024-11-16 DIAGNOSIS — S42.253A GREATER TUBEROSITY OF HUMERUS FRACTURE: ICD-10-CM

## 2024-11-16 DIAGNOSIS — S42.101A CLOSED RIGHT SCAPULAR FRACTURE: ICD-10-CM

## 2024-11-16 PROCEDURE — 73030 X-RAY EXAM OF SHOULDER: CPT

## 2024-11-16 PROCEDURE — 99284 EMERGENCY DEPT VISIT MOD MDM: CPT | Performed by: EMERGENCY MEDICINE

## 2024-11-16 RX ORDER — ACETAMINOPHEN 325 MG/1
975 TABLET ORAL ONCE
Status: COMPLETED | OUTPATIENT
Start: 2024-11-16 | End: 2024-11-16

## 2024-11-16 RX ORDER — SENNOSIDES 8.6 MG
650 CAPSULE ORAL EVERY 8 HOURS PRN
Qty: 30 TABLET | Refills: 0 | Status: SHIPPED | OUTPATIENT
Start: 2024-11-16 | End: 2024-11-21

## 2024-11-16 RX ORDER — OXYCODONE HYDROCHLORIDE 5 MG/1
5 TABLET ORAL EVERY 4 HOURS PRN
Qty: 10 TABLET | Refills: 0 | Status: SHIPPED | OUTPATIENT
Start: 2024-11-16 | End: 2024-11-21

## 2024-11-16 RX ORDER — OXYCODONE HYDROCHLORIDE 5 MG/1
5 TABLET ORAL ONCE
Refills: 0 | Status: COMPLETED | OUTPATIENT
Start: 2024-11-16 | End: 2024-11-16

## 2024-11-16 RX ADMIN — OXYCODONE HYDROCHLORIDE 5 MG: 5 TABLET ORAL at 18:49

## 2024-11-16 RX ADMIN — ACETAMINOPHEN 975 MG: 325 TABLET ORAL at 17:31

## 2024-11-16 NOTE — DISCHARGE INSTRUCTIONS
Provided sling at all times.  Use the prescribed Tylenol for pain, oxycodone for severe breakthrough pain.  If you have any severe worsening of pain, new weakness or numbness, return to the emergency department.  Call the provided number to schedule follow-up with orthopedic surgery to be seen in the next 2 weeks.   I have reviewed and confirmed nurses' notes for patient's medications, allergies, medical history, and surgical history.

## 2024-11-18 ENCOUNTER — APPOINTMENT (EMERGENCY)
Dept: RADIOLOGY | Facility: HOSPITAL | Age: 84
DRG: 205 | End: 2024-11-18
Payer: COMMERCIAL

## 2024-11-18 ENCOUNTER — HOSPITAL ENCOUNTER (INPATIENT)
Facility: HOSPITAL | Age: 84
LOS: 3 days | Discharge: NURSING, REHAB OR LONG-TERM CARE | DRG: 205 | End: 2024-11-21
Attending: EMERGENCY MEDICINE | Admitting: FAMILY MEDICINE
Payer: COMMERCIAL

## 2024-11-18 DIAGNOSIS — S22.030A COMPRESSION FRACTURE OF T3 VERTEBRA, INITIAL ENCOUNTER (HCC): ICD-10-CM

## 2024-11-18 DIAGNOSIS — R26.2 AMBULATORY DYSFUNCTION: ICD-10-CM

## 2024-11-18 DIAGNOSIS — R09.02 HYPOXEMIA REQUIRING SUPPLEMENTAL OXYGEN: Primary | ICD-10-CM

## 2024-11-18 DIAGNOSIS — S42.109A SCAPULAR FRACTURE: ICD-10-CM

## 2024-11-18 DIAGNOSIS — Z99.81 HYPOXEMIA REQUIRING SUPPLEMENTAL OXYGEN: Primary | ICD-10-CM

## 2024-11-18 PROBLEM — I48.0 PAROXYSMAL ATRIAL FIBRILLATION (HCC): Status: ACTIVE | Noted: 2024-11-18

## 2024-11-18 PROBLEM — S42.101A CLOSED FRACTURE OF RIGHT SCAPULA: Status: ACTIVE | Noted: 2024-11-18

## 2024-11-18 PROBLEM — R53.1 GENERALIZED WEAKNESS: Status: ACTIVE | Noted: 2024-11-18

## 2024-11-18 LAB
2HR DELTA HS TROPONIN: 0 NG/L
ALBUMIN SERPL BCG-MCNC: 3.9 G/DL (ref 3.5–5)
ALP SERPL-CCNC: 63 U/L (ref 34–104)
ALT SERPL W P-5'-P-CCNC: 9 U/L (ref 7–52)
ANION GAP SERPL CALCULATED.3IONS-SCNC: 7 MMOL/L (ref 4–13)
APTT PPP: 29 SECONDS (ref 23–34)
AST SERPL W P-5'-P-CCNC: 21 U/L (ref 13–39)
BASOPHILS # BLD AUTO: 0.05 THOUSANDS/ÂΜL (ref 0–0.1)
BASOPHILS NFR BLD AUTO: 1 % (ref 0–1)
BILIRUB SERPL-MCNC: 0.95 MG/DL (ref 0.2–1)
BNP SERPL-MCNC: 99 PG/ML (ref 0–100)
BUN SERPL-MCNC: 31 MG/DL (ref 5–25)
CALCIUM SERPL-MCNC: 9.1 MG/DL (ref 8.4–10.2)
CARDIAC TROPONIN I PNL SERPL HS: 7 NG/L (ref ?–50)
CARDIAC TROPONIN I PNL SERPL HS: 7 NG/L (ref ?–50)
CHLORIDE SERPL-SCNC: 101 MMOL/L (ref 96–108)
CO2 SERPL-SCNC: 29 MMOL/L (ref 21–32)
CREAT SERPL-MCNC: 1.33 MG/DL (ref 0.6–1.3)
CRP SERPL QL: 107.6 MG/L
D DIMER PPP FEU-MCNC: 3.94 UG/ML FEU
EOSINOPHIL # BLD AUTO: 0.65 THOUSAND/ÂΜL (ref 0–0.61)
EOSINOPHIL NFR BLD AUTO: 6 % (ref 0–6)
ERYTHROCYTE [DISTWIDTH] IN BLOOD BY AUTOMATED COUNT: 13.9 % (ref 11.6–15.1)
ERYTHROCYTE [SEDIMENTATION RATE] IN BLOOD: 24 MM/HOUR (ref 0–19)
FLUAV AG UPPER RESP QL IA.RAPID: NEGATIVE
FLUBV AG UPPER RESP QL IA.RAPID: NEGATIVE
GFR SERPL CREATININE-BSD FRML MDRD: 48 ML/MIN/1.73SQ M
GLUCOSE SERPL-MCNC: 126 MG/DL (ref 65–140)
HCT VFR BLD AUTO: 37.7 % (ref 36.5–49.3)
HGB BLD-MCNC: 12.7 G/DL (ref 12–17)
IMM GRANULOCYTES # BLD AUTO: 0.03 THOUSAND/UL (ref 0–0.2)
IMM GRANULOCYTES NFR BLD AUTO: 0 % (ref 0–2)
INR PPP: 1.06 (ref 0.85–1.19)
LACTATE SERPL-SCNC: 1.2 MMOL/L (ref 0.5–2)
LYMPHOCYTES # BLD AUTO: 1.02 THOUSANDS/ÂΜL (ref 0.6–4.47)
LYMPHOCYTES NFR BLD AUTO: 10 % (ref 14–44)
MCH RBC QN AUTO: 32.3 PG (ref 26.8–34.3)
MCHC RBC AUTO-ENTMCNC: 33.7 G/DL (ref 31.4–37.4)
MCV RBC AUTO: 96 FL (ref 82–98)
MONOCYTES # BLD AUTO: 1.21 THOUSAND/ÂΜL (ref 0.17–1.22)
MONOCYTES NFR BLD AUTO: 11 % (ref 4–12)
NEUTROPHILS # BLD AUTO: 7.83 THOUSANDS/ÂΜL (ref 1.85–7.62)
NEUTS SEG NFR BLD AUTO: 72 % (ref 43–75)
NRBC BLD AUTO-RTO: 0 /100 WBCS
PLATELET # BLD AUTO: 195 THOUSANDS/UL (ref 149–390)
PMV BLD AUTO: 8.8 FL (ref 8.9–12.7)
POTASSIUM SERPL-SCNC: 5 MMOL/L (ref 3.5–5.3)
PROCALCITONIN SERPL-MCNC: 0.12 NG/ML
PROT SERPL-MCNC: 6.3 G/DL (ref 6.4–8.4)
PROTHROMBIN TIME: 14.4 SECONDS (ref 12.3–15)
RBC # BLD AUTO: 3.93 MILLION/UL (ref 3.88–5.62)
SARS-COV+SARS-COV-2 AG RESP QL IA.RAPID: NEGATIVE
SODIUM SERPL-SCNC: 137 MMOL/L (ref 135–147)
TSH SERPL DL<=0.05 MIU/L-ACNC: 1.15 UIU/ML (ref 0.45–4.5)
WBC # BLD AUTO: 10.79 THOUSAND/UL (ref 4.31–10.16)

## 2024-11-18 PROCEDURE — 93005 ELECTROCARDIOGRAM TRACING: CPT

## 2024-11-18 PROCEDURE — 84443 ASSAY THYROID STIM HORMONE: CPT

## 2024-11-18 PROCEDURE — 71275 CT ANGIOGRAPHY CHEST: CPT

## 2024-11-18 PROCEDURE — 86140 C-REACTIVE PROTEIN: CPT

## 2024-11-18 PROCEDURE — 36415 COLL VENOUS BLD VENIPUNCTURE: CPT | Performed by: EMERGENCY MEDICINE

## 2024-11-18 PROCEDURE — 85025 COMPLETE CBC W/AUTO DIFF WBC: CPT | Performed by: EMERGENCY MEDICINE

## 2024-11-18 PROCEDURE — 99285 EMERGENCY DEPT VISIT HI MDM: CPT | Performed by: EMERGENCY MEDICINE

## 2024-11-18 PROCEDURE — 83880 ASSAY OF NATRIURETIC PEPTIDE: CPT | Performed by: EMERGENCY MEDICINE

## 2024-11-18 PROCEDURE — 94760 N-INVAS EAR/PLS OXIMETRY 1: CPT

## 2024-11-18 PROCEDURE — 94640 AIRWAY INHALATION TREATMENT: CPT

## 2024-11-18 PROCEDURE — 80053 COMPREHEN METABOLIC PANEL: CPT | Performed by: EMERGENCY MEDICINE

## 2024-11-18 PROCEDURE — 99285 EMERGENCY DEPT VISIT HI MDM: CPT

## 2024-11-18 PROCEDURE — 87154 CUL TYP ID BLD PTHGN 6+ TRGT: CPT | Performed by: EMERGENCY MEDICINE

## 2024-11-18 PROCEDURE — 84145 PROCALCITONIN (PCT): CPT | Performed by: EMERGENCY MEDICINE

## 2024-11-18 PROCEDURE — 85652 RBC SED RATE AUTOMATED: CPT

## 2024-11-18 PROCEDURE — 87040 BLOOD CULTURE FOR BACTERIA: CPT | Performed by: EMERGENCY MEDICINE

## 2024-11-18 PROCEDURE — 87811 SARS-COV-2 COVID19 W/OPTIC: CPT | Performed by: EMERGENCY MEDICINE

## 2024-11-18 PROCEDURE — 85379 FIBRIN DEGRADATION QUANT: CPT | Performed by: EMERGENCY MEDICINE

## 2024-11-18 PROCEDURE — 83605 ASSAY OF LACTIC ACID: CPT | Performed by: EMERGENCY MEDICINE

## 2024-11-18 PROCEDURE — 84484 ASSAY OF TROPONIN QUANT: CPT | Performed by: EMERGENCY MEDICINE

## 2024-11-18 PROCEDURE — 85730 THROMBOPLASTIN TIME PARTIAL: CPT | Performed by: EMERGENCY MEDICINE

## 2024-11-18 PROCEDURE — 87804 INFLUENZA ASSAY W/OPTIC: CPT | Performed by: EMERGENCY MEDICINE

## 2024-11-18 PROCEDURE — 99222 1ST HOSP IP/OBS MODERATE 55: CPT

## 2024-11-18 PROCEDURE — 85610 PROTHROMBIN TIME: CPT | Performed by: EMERGENCY MEDICINE

## 2024-11-18 RX ORDER — FUROSEMIDE 10 MG/ML
20 INJECTION INTRAMUSCULAR; INTRAVENOUS ONCE
Status: COMPLETED | OUTPATIENT
Start: 2024-11-18 | End: 2024-11-18

## 2024-11-18 RX ORDER — TAMSULOSIN HYDROCHLORIDE 0.4 MG/1
0.4 CAPSULE ORAL
Status: DISCONTINUED | OUTPATIENT
Start: 2024-11-18 | End: 2024-11-21 | Stop reason: HOSPADM

## 2024-11-18 RX ORDER — IPRATROPIUM BROMIDE 21 UG/1
2 SPRAY, METERED NASAL 2 TIMES DAILY
Status: DISCONTINUED | OUTPATIENT
Start: 2024-11-18 | End: 2024-11-18

## 2024-11-18 RX ORDER — IPRATROPIUM BROMIDE AND ALBUTEROL SULFATE 2.5; .5 MG/3ML; MG/3ML
3 SOLUTION RESPIRATORY (INHALATION)
Status: DISCONTINUED | OUTPATIENT
Start: 2024-11-18 | End: 2024-11-21 | Stop reason: HOSPADM

## 2024-11-18 RX ORDER — FLUTICASONE PROPIONATE 50 MCG
1 SPRAY, SUSPENSION (ML) NASAL DAILY
Status: DISCONTINUED | OUTPATIENT
Start: 2024-11-19 | End: 2024-11-21 | Stop reason: HOSPADM

## 2024-11-18 RX ORDER — AMLODIPINE BESYLATE 5 MG/1
5 TABLET ORAL DAILY
Status: DISCONTINUED | OUTPATIENT
Start: 2024-11-18 | End: 2024-11-18

## 2024-11-18 RX ORDER — ATORVASTATIN CALCIUM 10 MG/1
5 TABLET, FILM COATED ORAL
Status: DISCONTINUED | OUTPATIENT
Start: 2024-11-18 | End: 2024-11-21 | Stop reason: HOSPADM

## 2024-11-18 RX ORDER — LANOLIN ALCOHOL/MO/W.PET/CERES
1 CREAM (GRAM) TOPICAL 2 TIMES DAILY WITH MEALS
Status: DISCONTINUED | OUTPATIENT
Start: 2024-11-18 | End: 2024-11-21 | Stop reason: HOSPADM

## 2024-11-18 RX ORDER — ALBUTEROL SULFATE 90 UG/1
2 INHALANT RESPIRATORY (INHALATION) EVERY 6 HOURS PRN
Status: DISCONTINUED | OUTPATIENT
Start: 2024-11-18 | End: 2024-11-21 | Stop reason: HOSPADM

## 2024-11-18 RX ORDER — PANTOPRAZOLE SODIUM 40 MG/1
40 TABLET, DELAYED RELEASE ORAL DAILY
Status: DISCONTINUED | OUTPATIENT
Start: 2024-11-19 | End: 2024-11-21 | Stop reason: HOSPADM

## 2024-11-18 RX ORDER — NIACIN 500 MG/1
500 TABLET, EXTENDED RELEASE ORAL
Status: DISCONTINUED | OUTPATIENT
Start: 2024-11-18 | End: 2024-11-21 | Stop reason: HOSPADM

## 2024-11-18 RX ORDER — ENOXAPARIN SODIUM 100 MG/ML
40 INJECTION SUBCUTANEOUS DAILY
Status: DISCONTINUED | OUTPATIENT
Start: 2024-11-19 | End: 2024-11-21 | Stop reason: HOSPADM

## 2024-11-18 RX ORDER — CITALOPRAM HYDROBROMIDE 20 MG/1
40 TABLET ORAL DAILY
Status: DISCONTINUED | OUTPATIENT
Start: 2024-11-19 | End: 2024-11-21 | Stop reason: HOSPADM

## 2024-11-18 RX ORDER — AMLODIPINE BESYLATE 2.5 MG/1
2.5 TABLET ORAL DAILY
Status: DISCONTINUED | OUTPATIENT
Start: 2024-11-19 | End: 2024-11-21 | Stop reason: HOSPADM

## 2024-11-18 RX ORDER — METOPROLOL TARTRATE 50 MG
50 TABLET ORAL EVERY 12 HOURS
Status: DISCONTINUED | OUTPATIENT
Start: 2024-11-18 | End: 2024-11-21 | Stop reason: HOSPADM

## 2024-11-18 RX ORDER — LANOLIN ALCOHOL/MO/W.PET/CERES
400 CREAM (GRAM) TOPICAL DAILY
Status: DISCONTINUED | OUTPATIENT
Start: 2024-11-19 | End: 2024-11-21 | Stop reason: HOSPADM

## 2024-11-18 RX ADMIN — IOHEXOL 85 ML: 350 INJECTION, SOLUTION INTRAVENOUS at 14:32

## 2024-11-18 RX ADMIN — METOPROLOL TARTRATE 50 MG: 50 TABLET, FILM COATED ORAL at 19:17

## 2024-11-18 RX ADMIN — IPRATROPIUM BROMIDE AND ALBUTEROL SULFATE 3 ML: .5; 3 SOLUTION RESPIRATORY (INHALATION) at 20:29

## 2024-11-18 RX ADMIN — FUROSEMIDE 20 MG: 10 INJECTION, SOLUTION INTRAMUSCULAR; INTRAVENOUS at 20:09

## 2024-11-18 RX ADMIN — ATORVASTATIN CALCIUM 5 MG: 10 TABLET, FILM COATED ORAL at 19:17

## 2024-11-18 RX ADMIN — NIACIN 500 MG: 500 TABLET, EXTENDED RELEASE ORAL at 22:24

## 2024-11-18 NOTE — ASSESSMENT & PLAN NOTE
Berenice (mom) states for the past few days, Fouzia has been pulling on both of her ears, fussy, had a runny nose, but no fever.  Berenice would like you to call her at [phone]   Per echo from 2022      -Check updated echo  -Consult pulmonary medicine team given shortness of breath could be related to pulmonary hypertension.  Appreciate further input

## 2024-11-18 NOTE — H&P
H&P - Hospitalist   Name: Taj Roblero Jr. 84 y.o. male I MRN: [id number]  Unit/Bed#: ED 10 I Date of Admission: 11/18/2024   Date of Service: 11/18/2024 I Hospital Day: 0     Assessment & Plan  Generalized weakness  Patient reports generalized weakness/fatigue since earlier this morning.  Family confirms the same at bedside.  Was found to be hypoxic in the emergency department on presentation.  No history of oxygen needs previously.  Also with right-sided scapular fracture.    Most probably generalized weakness secondary to hypoxia    CTA chest; no acute findings other than right sided scapular fracture that is comminuted and displaced.  Case discussed with orthopedics on-call Dr. Flanagan who recommended no need for transfer or acute surgical interventions at this time.    COVID/flu/RSV; negative  Lactic acid within normal range  Procalcitonin negative  CBC; white blood cell within normal limits    Plan:   -Continue oxygen supply to maintain SpO2 93%  -Respiratory protocol  -Check RPP panel  -Consult pulmonary medicine team given the patient have pulmonary hypertension could this be correlating to shortness of breath and hypoxia??   -Check updated echo  -Maintain on telemetry for next 24 hours  -Check TSH  -Refer to assessment and plan for respiratory failure  -Refer to assessment and plan for scapular fracture  Mixed obsessional thoughts and acts  Updated EKG.  QTc 420.  Continue Lexapro    Allergic rhinitis  Flonase as needed  Aortic valve sclerosis  Per echo from 2022  Check repeat echo  Essential hypertension  On ACE inhibitor at home.  Given elevated creatinine we will hold the medication    -Substituted with amlodipine 2.5 mg OD  -Monitor vitals  Hyperlipidemia  Continue statin and niacin  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HCC)  Per echo from 2022      -Check updated echo  -Consult pulmonary medicine team given shortness of breath could be related to pulmonary hypertension.  Appreciate further input  Acute  respiratory insufficiency  Found on presentation to the ED.  No oxygen needs at baseline.  Past medical history is negative for COPD/smoking/dust exposure    -COVID/flu/RSV; negative  -CTA chest; no acute pulmonary embolism noted.  No other acute findings note other than scapular fracture.      Plan:   Check RPP panel  Check ESR, CRP  Check updated echo  Continue oxygen supplementation with target SpO2 93%  Respiratory protocol  DuoNeehsan  Consult pulmonary medicine team given this problem could be related to pulmonary hypertension  Hold torsemide 5 mg twice a week  Give 1 x lasix 20 mg IV   Gastroesophageal reflux disease without esophagitis  Continue Protonix  Vega esophagus  Continue Protonix  Stage 3 chronic kidney disease, unspecified whether stage 3a or 3b CKD (HCC)  Lab Results   Component Value Date    EGFR 48 11/18/2024    EGFR 53 08/01/2024    EGFR 61 01/11/2024    CREATININE 1.33 (H) 11/18/2024    CREATININE 1.23 08/01/2024    CREATININE 1.11 01/11/2024   Mildly elevated creatinine.  Near baseline.    -Monitor I/os, avoid nephrotoxins as possible  -Interval follow-up with a.m. labs  BPH (benign prostatic hyperplasia)  Continue Flomax  Closed fracture of right scapula  Patient reports a fall on the stairs 2 days ago.  Was evaluated in the ED at that time.  X-ray showed nondisplaced scapular fracture.  Upon reevaluation today CTA chest showing comminuted and displaced right sided scapular fracture.    Case was discussed with emergency medicine physician and we recommended discussing the case with orthopedic/trauma service.  Orthopedics on-call physician Dr. Flanagan recommending no acute surgical interventions at this time.  No need to transfer the patient to another facility.      -Consult orthopedics; appreciate further input  Paroxysmal atrial fibrillation (HCC)  Follows up with out of network cardiologist    -Continue metoprolol 50 mg twice daily  -Not on anticoagulation.  -Continue telemetry  -Check  updated echo      VTE Pharmacologic Prophylaxis: VTE Score: 6 Moderate Risk (Score 3-4) - Pharmacological DVT Prophylaxis Ordered: enoxaparin (Lovenox).  Code Status: Level 1 - Full Code   Discussion with family: Updated  (daughter) at bedside.    Anticipated Length of Stay: Patient will be admitted on an observation basis with an anticipated length of stay of less than 2 midnights secondary to acute respiratory failure .    History of Present Illness   Chief Complaint: SOB, generalized weakness     Taj Roblero Jr. is a 84 y.o. male with a PMH of HTN , A fib , CKD3, pulmonary hypertension who presents with generalized weakness/fatigue and shortness of breath since the morning.  The patient reports no history of similar problem.  Currently he reports improvement in breathing after he was started on oxygen.  He denies any chest pain or tightness, nausea or vomiting, headache or lightheadedness, syncope or presyncope, diarrhea or constipation or any active urinary symptoms.  No fevers or chills.  No recent respiratory tract infections or antibiotic use.  Family at bedside confirms the above symptoms.   Patient does not have any history of smoking/dust exposure/COPD.      Review of Systems   Constitutional:  Positive for activity change and fatigue. Negative for appetite change, chills and fever.   HENT: Negative.  Negative for hearing loss.    Eyes: Negative.  Negative for visual disturbance.   Respiratory:  Positive for shortness of breath. Negative for cough, chest tightness and wheezing.    Cardiovascular:  Negative for chest pain and palpitations.   Gastrointestinal:  Negative for abdominal pain, blood in stool, constipation, diarrhea, nausea and vomiting.   Endocrine: Negative.    Genitourinary:  Negative for difficulty urinating and dysuria.   Musculoskeletal:  Negative for arthralgias and myalgias.   Skin: Negative.    Allergic/Immunologic: Negative.    Neurological:  Negative for seizures and  syncope.   Hematological:  Negative for adenopathy.   Psychiatric/Behavioral: Negative.         Historical Information   Past Medical History:   Diagnosis Date    Vega esophagus     BPH (benign prostatic hyperplasia)     Depression     Hiatal hernia     HTN (hypertension)     Hyperlipidemia     Hypertension     Leaky heart valve     OCD (obsessive compulsive disorder)     Pulmonary embolism (HCC) 2019     Past Surgical History:   Procedure Laterality Date    HARDWARE REMOVAL Left 09/09/2023    Procedure: REMOVAL HARDWARE;  Surgeon: Henri Flanagan DO;  Location: [facility];  Service: Orthopedics    INCISION AND DRAINAGE OF WOUND Left 09/09/2023    Procedure: INCISION AND DRAINAGE (I&D) EXTREMITY;  Surgeon: Henri Flanagan DO;  Location: [facility];  Service: Orthopedics    IR ASPIRATION ONLY  12/15/2023    IR IVC FILTER PLACEMENT OPTIONAL/TEMPORARY  12/07/2019    IR IVC FILTER REMOVAL  08/21/2020    NC OPTX FEM SHFT FX W/INSJ IMED IMPLT W/WO SCREW Right 12/04/2019    Procedure: INSERTION NAIL IM FEMUR ANTEGRADE (TROCHANTERIC);  Surgeon: Yesenia Veronica DO;  Location: [facility];  Service: Orthopedics    NC OPTX FEM SHFT FX W/INSJ IMED IMPLT W/WO SCREW Left 06/17/2022    Procedure: INSERTION NAIL IM FEMUR ANTEGRADE (TROCHANTERIC) - long nail;  Surgeon: Henri Flanagan DO;  Location: [facility];  Service: Orthopedics    UPPER GASTROINTESTINAL ENDOSCOPY      WOUND DEBRIDEMENT Left 12/16/2023    Procedure: DEBRIDEMENT LOWER EXTREMITY (WASH OUT)- left thigh abscess irrigation and debridement;  Surgeon: Feng Robertson MD;  Location: [facility];  Service: Orthopedics     Social History     Tobacco Use    Smoking status: Never    Smokeless tobacco: Never   Vaping Use    Vaping status: Never Used   Substance and Sexual Activity    Alcohol use: Never    Drug use: Never    Sexual activity: Not on file     E-Cigarette/Vaping    E-Cigarette Use Never User      E-Cigarette/Vaping Substances    Nicotine No     THC No     CBD No      Flavoring No     Other No     Unknown No      Family history non-contributory  Social History:  Marital Status: /Civil Union   Occupation: retired   Patient Pre-hospital Living Situation: Home  Patient Pre-hospital Level of Mobility: walks  Patient Pre-hospital Diet Restrictions: cardiac    Meds/Allergies   I have reviewed home medications with patient personally.  Prior to Admission medications    Medication Sig Start Date End Date Taking? Authorizing Provider   metoprolol tartrate (LOPRESSOR) 50 mg tablet Take 1 tablet (50 mg total) by mouth every 12 (twelve) hours 9/6/24   Carmen Hunt MD   niacin (NIASPAN) 500 mg CR tablet Take 1 tablet (500 mg total) by mouth daily at bedtime 9/6/24   Carmen Hunt MD   acetaminophen (TYLENOL) 650 mg CR tablet Take 1 tablet (650 mg total) by mouth every 8 (eight) hours as needed for mild pain 11/16/24   Clarke Mathew MD   albuterol (Ventolin HFA) 90 mcg/act inhaler Inhale 2 puffs every 6 (six) hours as needed for wheezing 3/24/23   Dung Ashley MD   atorvastatin (LIPITOR) 10 mg tablet Take 0.5 tablets (5 mg total) by mouth daily with dinner TAKE ONE-HALF TABLET DAILY 11/4/24   Carmen Hunt MD   Calcium Carb-Cholecalciferol (CALTRATE 600+D3 PO) Take 600 mg by mouth 2 (two) times a day    Historical Provider, MD   citalopram (CeleXA) 40 mg tablet Take 1 tablet (40 mg total) by mouth daily 8/22/24   Carmen Hunt MD   fluticasone (FLONASE) 50 mcg/act nasal spray 1 spray into each nostril daily 7/6/24   Carmen Hunt MD   fosinopril (MONOPRIL) 10 mg tablet TAKE ONE-HALF TABLET BY MOUTH DAILY 11/15/24   Carmen Hunt MD   ipratropium (ATROVENT) 0.03 % nasal spray 2 sprays into each nostril 2 (two) times a day 1/12/24 8/7/24  Carmen Hunt MD   magnesium oxide (MAG-OX) 400 mg tablet Take 1 tablet by mouth daily 4/25/23   Historical Provider, MD   Multiple Vitamin (MULTIVITAMIN) tablet Take 1 tablet by mouth daily    Historical  Provider, MD   oxyCODONE (Roxicodone) 5 immediate release tablet Take 1 tablet (5 mg total) by mouth every 4 (four) hours as needed for moderate pain for up to 10 days Max Daily Amount: 30 mg 11/16/24 11/26/24  Clarke Mathew MD   pantoprazole (PROTONIX) 40 mg tablet Take 1 tablet (40 mg total) by mouth daily 3/29/24   BERNADETTE Chavis   potassium chloride (Klor-Con) 10 mEq tablet Take 1 tablet (10 mEq total) by mouth 2 (two) times a week 10/17/24   Carmen Hunt MD   tamsulosin (FLOMAX) 0.4 mg Take 0.4 mg by mouth daily with dinner    Historical Provider, MD   torsemide (DEMADEX) 5 MG tablet Take 1 tablet (5 mg total) by mouth 2 (two) times a week Monday and Thursday 10/17/24   Carmen Hunt MD     No Known Allergies    Objective :  Temp:  [99.5 °F (37.5 °C)] 99.5 °F (37.5 °C)  HR:  [70-75] 70  BP: (149-171)/(67-73) 165/73  Resp:  [17-28] 22  SpO2:  [86 %-96 %] 96 %  O2 Device: Nasal cannula  Nasal Cannula O2 Flow Rate (L/min):  [2 L/min-3 L/min] 3 L/min    Physical Exam  Vitals and nursing note reviewed.   Constitutional:       General: He is not in acute distress.     Appearance: Normal appearance.   HENT:      Head: Normocephalic and atraumatic.      Nose: Nose normal.      Mouth/Throat:      Mouth: Mucous membranes are moist.   Eyes:      Conjunctiva/sclera: Conjunctivae normal.      Pupils: Pupils are equal, round, and reactive to light.   Cardiovascular:      Rate and Rhythm: Normal rate and regular rhythm.      Pulses: Normal pulses.           Carotid pulses are 2+ on the right side and 2+ on the left side.       Radial pulses are 2+ on the right side and 2+ on the left side.        Posterior tibial pulses are 2+ on the right side and 2+ on the left side.      Heart sounds: S1 normal and S2 normal. Murmur heard.      Comments: Trace pitting edema noted bilaterally   Pulmonary:      Effort: No tachypnea, bradypnea or accessory muscle usage.      Breath sounds: Normal breath sounds.  Decreased air movement present. No decreased breath sounds, wheezing, rhonchi or rales.   Abdominal:      General: Abdomen is flat. Bowel sounds are normal. There is no distension.      Palpations: Abdomen is soft.      Tenderness: There is no abdominal tenderness. There is no guarding.   Musculoskeletal:      Cervical back: Normal range of motion.   Skin:     General: Skin is warm.      Capillary Refill: Capillary refill takes less than 2 seconds.   Neurological:      General: No focal deficit present.      Mental Status: He is alert. Mental status is at baseline.   Psychiatric:         Mood and Affect: Mood normal.         Behavior: Behavior normal.          Lines/Drains:            Lab Results: I have reviewed the following results:  Results from last 7 days   Lab Units 11/18/24  1327   WBC Thousand/uL 10.79*   HEMOGLOBIN g/dL 12.7   HEMATOCRIT % 37.7   PLATELETS Thousands/uL 195   SEGS PCT % 72   LYMPHO PCT % 10*   MONO PCT % 11   EOS PCT % 6     Results from last 7 days   Lab Units 11/18/24  1327   SODIUM mmol/L 137   POTASSIUM mmol/L 5.0   CHLORIDE mmol/L 101   CO2 mmol/L 29   BUN mg/dL 31*   CREATININE mg/dL 1.33*   ANION GAP mmol/L 7   CALCIUM mg/dL 9.1   ALBUMIN g/dL 3.9   TOTAL BILIRUBIN mg/dL 0.95   ALK PHOS U/L 63   ALT U/L 9   AST U/L 21   GLUCOSE RANDOM mg/dL 126     Results from last 7 days   Lab Units 11/18/24  1327   INR  1.06         Lab Results   Component Value Date    HGBA1C 5.6 08/01/2024     Results from last 7 days   Lab Units 11/18/24  1327   LACTIC ACID mmol/L 1.2   PROCALCITONIN ng/ml 0.12       Imaging Results Review: I reviewed radiology reports from this admission including: CT chest.  Other Study Results Review: EKG was reviewed.     Administrative Statements   I have spent a total time of 45 minutes in caring for this patient on the day of the visit/encounter including Diagnostic results, Prognosis, Risks and benefits of tx options, and Instructions for management.    ** Please Note:  This note has been constructed using a voice recognition system. **

## 2024-11-18 NOTE — ASSESSMENT & PLAN NOTE
On ACE inhibitor at home.  Given elevated creatinine we will hold the medication    -Substituted with amlodipine 2.5 mg OD  -Monitor vitals

## 2024-11-18 NOTE — ASSESSMENT & PLAN NOTE
Patient reports generalized weakness/fatigue since earlier this morning.  Family confirms the same at bedside.  Was found to be hypoxic in the emergency department on presentation.  No history of oxygen needs previously.  Also with right-sided scapular fracture.    Most probably generalized weakness secondary to hypoxia    CTA chest; no acute findings other than right sided scapular fracture that is comminuted and displaced.  Case discussed with orthopedics on-call Dr. Flanagan who recommended no need for transfer or acute surgical interventions at this time.    COVID/flu/RSV; negative  Lactic acid within normal range  Procalcitonin negative  CBC; white blood cell within normal limits    Plan:   -Continue oxygen supply to maintain SpO2 93%  -Respiratory protocol  -Check RPP panel  -Consult pulmonary medicine team given the patient have pulmonary hypertension could this be correlating to shortness of breath and hypoxia??   -Check updated echo  -Maintain on telemetry for next 24 hours  -Check TSH  -Refer to assessment and plan for respiratory failure  -Refer to assessment and plan for scapular fracture

## 2024-11-18 NOTE — ED PROVIDER NOTES
Time reflects when diagnosis was documented in both MDM as applicable and the Disposition within this note       Time User Action Codes Description Comment    11/18/2024  5:04 PM Lizabeth Oquendo Add [R09.02,  Z99.81] Hypoxemia requiring supplemental oxygen     11/18/2024  5:04 PM Lizabeth Oquendo [R26.2] Ambulatory dysfunction     11/18/2024  5:04 PM Lizabeth Oquendo [S42.109A] Scapular fracture           ED Disposition       ED Disposition   Admit    Condition   Stable    Date/Time   Mon Nov 18, 2024  5:04 PM    Comment   Case was discussed with [facility] and the patient's admission status was agreed to be Admission Status: inpatient status to the service of Dr. Wayne.               Assessment & Plan       Medical Decision Making  Pt is a 83yo M who presents with shortness of breath and hypoxia. Exam pertinent for hypoxia.    Differential diagnosis to include but not limited to pneumonia, atelectasis, pneumothorax, hemothorax, ACS, PE.  Patient arrived 84 to 86% on room air.  Patient placed on 2 L nasal cannula with improvement in sat greater than 90%.  Will plan for labs and imaging with modality to be based on D-dimer result.  See ED course for results and details.    Plan to admit pt to [facility]. Pt discussed with admitting team and admission orders placed. Pt admitted without incident.       Amount and/or Complexity of Data Reviewed  Labs: ordered. Decision-making details documented in ED Course.  Radiology: ordered. Decision-making details documented in ED Course.    Risk  Prescription drug management.  Decision regarding hospitalization.        ED Course as of 11/18/24 1732   Mon Nov 18, 2024   1336 Procedure Note: EKG  Date/Time: 11/18/24 1:36 PM   Interpreted by: Lizabeth Oquendo MD  Indications / Diagnosis: Hypoxia  ECG reviewed by me, the ED Physician: yes   The EKG demonstrates:  Rhythm: normal sinus  Intervals: normal intervals  Axis: normal axis  QRS/Blocks: normal QRS  ST Changes: No acute ST  Changes, no STD/JASON.   1339 WBC(!): 10.79  Mildly elevated. Non-diagnostic.    1339 CBC and differential(!)  Reviewed and without actionable derangement.    1354 PTT: 29  WNL   1354 POCT INR: 1.06  WNL   1406 Creatinine(!): 1.33  Minimally increased from prior. Not meeting criteria for JIMY.    1406 LACTIC ACID: 1.2  WNL   1406 D-Dimer, Quant(!): 3.94  Elevated. Will proceed with CTA.    1407 hs TnI 0hr: 7  Will require delta.    1408 Procalcitonin: 0.12  Negative.    1425 BNP: 99  WNL   1535 CT being read.    1554 CTA chest pe study  1.  No pulmonary embolus or other acute findings in the chest.  2.  Large hiatal hernia.  3.  Comminuted and displaced right scapular fracture.  4.  Age-indeterminate favored chronic T3 compression fracture.   1616 Delta 2hr hsTnI: 0  WNL   1618 Pt reassessed and resting comfortably. Pt and daughter made aware of all results and plan for admission due to hypoxia. All agreeable.    1655 SLIM at bedside. Requesting ortho be made aware. EpicChat sent.    1705 FLU/COVID Rapid Antigen (30 min. TAT) - Preferred screening test in ED  Negative.        Medications   albuterol (PROVENTIL HFA,VENTOLIN HFA) inhaler 2 puff (has no administration in time range)   atorvastatin (LIPITOR) tablet 5 mg (has no administration in time range)   calcium carbonate-vitamin D 500 mg-5 mcg tablet 1 tablet (has no administration in time range)   citalopram (CeleXA) tablet 40 mg (has no administration in time range)   fluticasone (FLONASE) 50 mcg/act nasal spray 1 spray (has no administration in time range)   tamsulosin (FLOMAX) capsule 0.4 mg (has no administration in time range)   pantoprazole (PROTONIX) EC tablet 40 mg (has no administration in time range)   niacin (NIASPAN) CR tablet 500 mg (has no administration in time range)   multivitamin stress formula tablet 1 tablet (has no administration in time range)   metoprolol tartrate (LOPRESSOR) tablet 50 mg (has no administration in time range)   magnesium oxide  (MAG-OX) tablet 400 mg (has no administration in time range)   ipratropium (ATROVENT) 0.03 % nasal spray 2 spray (has no administration in time range)   ipratropium-albuterol (DUO-NEB) 0.5-2.5 mg/3 mL inhalation solution 3 mL (has no administration in time range)   amLODIPine (NORVASC) tablet 5 mg (has no administration in time range)   enoxaparin (LOVENOX) subcutaneous injection 40 mg (has no administration in time range)   iohexol (OMNIPAQUE) 350 MG/ML injection (MULTI-DOSE) 85 mL (85 mL Intravenous Given 11/18/24 1432)       ED Risk Strat Scores                                               History of Present Illness       Chief Complaint   Patient presents with    Shortness of Breath     Here couple of days ago after fall, fx humerous and scapula. Started with some shortness of breath and dizziness yesterday. Oxygen low at home       Past Medical History:   Diagnosis Date    Vega esophagus     BPH (benign prostatic hyperplasia)     Depression     Hiatal hernia     HTN (hypertension)     Hyperlipidemia     Hypertension     Leaky heart valve     OCD (obsessive compulsive disorder)     Pulmonary embolism (HCC) 2019      Past Surgical History:   Procedure Laterality Date    HARDWARE REMOVAL Left 09/09/2023    Procedure: REMOVAL HARDWARE;  Surgeon: Henri Flanagan DO;  Location: [facility];  Service: Orthopedics    INCISION AND DRAINAGE OF WOUND Left 09/09/2023    Procedure: INCISION AND DRAINAGE (I&D) EXTREMITY;  Surgeon: Henri Flanagan DO;  Location: [facility];  Service: Orthopedics    IR ASPIRATION ONLY  12/15/2023    IR IVC FILTER PLACEMENT OPTIONAL/TEMPORARY  12/07/2019    IR IVC FILTER REMOVAL  08/21/2020    UT OPTX FEM SHFT FX W/INSJ IMED IMPLT W/WO SCREW Right 12/04/2019    Procedure: INSERTION NAIL IM FEMUR ANTEGRADE (TROCHANTERIC);  Surgeon: Yesenia Veronica DO;  Location: [facility];  Service: Orthopedics    UT OPTX FEM SHFT FX W/INSJ IMED IMPLT W/WO SCREW Left 06/17/2022    Procedure: INSERTION NAIL  IM FEMUR ANTEGRADE (TROCHANTERIC) - long nail;  Surgeon: Henri Flanagan DO;  Location: [facility];  Service: Orthopedics    UPPER GASTROINTESTINAL ENDOSCOPY      WOUND DEBRIDEMENT Left 12/16/2023    Procedure: DEBRIDEMENT LOWER EXTREMITY (WASH OUT)- left thigh abscess irrigation and debridement;  Surgeon: Feng Robertson MD;  Location: [facility];  Service: Orthopedics      Family History   Problem Relation Age of Onset    Cancer Mother       Social History     Tobacco Use    Smoking status: Never    Smokeless tobacco: Never   Vaping Use    Vaping status: Never Used   Substance Use Topics    Alcohol use: Never    Drug use: Never      E-Cigarette/Vaping    E-Cigarette Use Never User       E-Cigarette/Vaping Substances    Nicotine No     THC No     CBD No     Flavoring No     Other No     Unknown No       I have reviewed and agree with the history as documented.     Pt is an 83yo M who presents for shortness of breath and hypoxia.  Patient presents with daughters who provide much of history.  Daughter states that the shortness of breath and hypoxia started today.  Of note, patient did have a fall 2 days ago and was found to have a right scapular fracture.  Patient was still able to get around with his walker until today.  Today patient had weakness, shortness of breath, and was found to be in the 80%s on room air.  Patient is not on oxygen at baseline.  Daughters also report low-grade fever at home.  Patient reports right shoulder pain secondary to the fall 2 days ago but denies any other complaints at this time.  Patient has not had any further falls.  Patient is not on blood thinners.              Objective       ED Triage Vitals [11/18/24 1255]   Temperature Pulse Blood Pressure Respirations SpO2 Patient Position - Orthostatic VS   99.5 °F (37.5 °C) 75 149/67 (!) 28 (!) 86 % Sitting      Temp Source Heart Rate Source BP Location FiO2 (%) Pain Score    Tympanic Monitor Left arm -- 9      Vitals      Date and Time  Temp Pulse SpO2 Resp BP Pain Score FACES Pain Rating User   11/18/24 1715 -- 70 96 % 22 165/73 -- -- CS   11/18/24 1415 -- 74 94 % 17 165/72 -- -- CS   11/18/24 1345 -- 74 94 % 24 171/72 -- -- CS   11/18/24 1330 -- 74 93 % 25 164/70 -- -- CS   11/18/24 1255 99.5 °F (37.5 °C) 75 86 % 28 149/67 9 -- LS            Physical Exam  Vitals reviewed.   Constitutional:       General: He is not in acute distress.     Appearance: He is well-developed. He is not toxic-appearing or diaphoretic.   HENT:      Head: Normocephalic and atraumatic.      Right Ear: External ear normal.      Left Ear: External ear normal.      Nose: Nose normal.      Mouth/Throat:      Pharynx: Oropharynx is clear.   Eyes:      Extraocular Movements: Extraocular movements intact.      Pupils: Pupils are equal, round, and reactive to light.   Cardiovascular:      Rate and Rhythm: Normal rate and regular rhythm.      Heart sounds: Normal heart sounds.   Pulmonary:      Effort: Tachypnea present. No respiratory distress.      Breath sounds: Normal breath sounds. No stridor. No wheezing.   Abdominal:      General: There is no distension.      Palpations: Abdomen is soft.      Tenderness: There is no abdominal tenderness.   Musculoskeletal:         General: Tenderness present. No swelling.      Cervical back: Neck supple.      Comments: Right arm in sling   Skin:     General: Skin is warm and dry.      Capillary Refill: Capillary refill takes less than 2 seconds.      Coloration: Skin is not pale.   Neurological:      General: No focal deficit present.      Mental Status: He is alert. Mental status is at baseline.   Psychiatric:         Speech: Speech normal.         Behavior: Behavior is cooperative.         Results Reviewed       Procedure Component Value Units Date/Time    Sedimentation rate, automated [[id number]]     Lab Status: No result Specimen: Blood     C-reactive protein [[id number]]     Lab Status: No result Specimen: Blood     TSH, 3rd generation  with Free T4 reflex [[id number]]     Lab Status: No result Specimen: Blood     Respiratory Panel 2.1(RP2)with COVID19 [[id number]]     Lab Status: No result Specimen: Nasopharyngeal Swab     FLU/COVID Rapid Antigen (30 min. TAT) - Preferred screening test in ED [[id number]]  (Normal) Collected: 11/18/24 1605    Lab Status: Final result Specimen: Nares from Nose Updated: 11/18/24 1705     SARS COV Rapid Antigen Negative     Influenza A Rapid Antigen Negative     Influenza B Rapid Antigen Negative    Narrative:      This test has been performed using the [facility] Swathi 2 FLU+SARS Antigen test under the Emergency Use Authorization (EUA). This test has been validated by the  and verified by the performing laboratory. The Swathi uses lateral flow immunofluorescent sandwich assay to detect SARS-COV, Influenza A and Influenza B Antigen.     The [facility] Swathi 2 SARS Antigen test does not differentiate between SARS-CoV and SARS-CoV-2.     Negative results are presumptive and may be confirmed with a molecular assay, if necessary, for patient management. Negative results do not rule out SARS-CoV-2 or influenza infection and should not be used as the sole basis for treatment or patient management decisions. A negative test result may occur if the level of antigen in a sample is below the limit of detection of this test.     Positive results are indicative of the presence of viral antigens, but do not rule out bacterial infection or co-infection with other viruses.     All test results should be used as an adjunct to clinical observations and other information available to the provider.    FOR PEDIATRIC PATIENTS - copy/paste COVID Guidelines URL to browser: [email or web address]    HS Troponin I 2hr [[id number]]  (Normal) Collected: 11/18/24 1540    Lab Status: Final result Specimen: Blood from Arm, Left Updated: 11/18/24 1615     hs TnI 2hr 7 ng/L      Delta 2hr hsTnI 0  ng/L     HS Troponin I 4hr [[id number]]     Lab Status: No result Specimen: Blood     B-Type Natriuretic Peptide(BNP) [[id number]]  (Normal) Collected: 11/18/24 1327    Lab Status: Final result Specimen: Blood from Arm, Left Updated: 11/18/24 1423     BNP 99 pg/mL     Procalcitonin [[id number]]  (Normal) Collected: 11/18/24 1327    Lab Status: Final result Specimen: Blood from Arm, Left Updated: 11/18/24 1408     Procalcitonin 0.12 ng/ml     HS Troponin 0hr (reflex protocol) [[id number]]  (Normal) Collected: 11/18/24 1327    Lab Status: Final result Specimen: Blood from Arm, Left Updated: 11/18/24 1407     hs TnI 0hr 7 ng/L     D-Dimer [[id number]]  (Abnormal) Collected: 11/18/24 1327    Lab Status: Final result Specimen: Blood from Arm, Left Updated: 11/18/24 1406     D-Dimer, Quant 3.94 ug/ml FEU     Narrative:      In the evaluation for possible pulmonary embolism, in the appropriate (Well's Score of 4 or less) patient, the age adjusted d-dimer cutoff for this patient can be calculated as:    Age x 0.01 (in ug/mL) for Age-adjusted D-dimer exclusion threshold for a patient over 50 years.    Lactic acid [[id number]]  (Normal) Collected: 11/18/24 1327    Lab Status: Final result Specimen: Blood from Arm, Left Updated: 11/18/24 1403     LACTIC ACID 1.2 mmol/L     Narrative:      Result may be elevated if tourniquet was used during collection.    Comprehensive metabolic panel [[id number]]  (Abnormal) Collected: 11/18/24 1327    Lab Status: Final result Specimen: Blood from Arm, Left Updated: 11/18/24 1402     Sodium 137 mmol/L      Potassium 5.0 mmol/L      Chloride 101 mmol/L      CO2 29 mmol/L      ANION GAP 7 mmol/L      BUN 31 mg/dL      Creatinine 1.33 mg/dL      Glucose 126 mg/dL      Calcium 9.1 mg/dL      AST 21 U/L      ALT 9 U/L      Alkaline Phosphatase 63 U/L      Total Protein 6.3 g/dL      Albumin 3.9 g/dL      Total Bilirubin 0.95 mg/dL      eGFR 48 ml/min/1.73sq m     Narrative:      National Kidney Disease  Foundation guidelines for Chronic Kidney Disease (CKD):     Stage 1 with normal or high GFR (GFR > 90 mL/min/1.73 square meters)    Stage 2 Mild CKD (GFR = 60-89 mL/min/1.73 square meters)    Stage 3A Moderate CKD (GFR = 45-59 mL/min/1.73 square meters)    Stage 3B Moderate CKD (GFR = 30-44 mL/min/1.73 square meters)    Stage 4 Severe CKD (GFR = 15-29 mL/min/1.73 square meters)    Stage 5 End Stage CKD (GFR <15 mL/min/1.73 square meters)  Note: GFR calculation is accurate only with a steady state creatinine    Protime-INR [[id number]]  (Normal) Collected: 11/18/24 1327    Lab Status: Final result Specimen: Blood from Arm, Left Updated: 11/18/24 1354     Protime 14.4 seconds      INR 1.06    Narrative:      INR Therapeutic Range    Indication                                             INR Range      Atrial Fibrillation                                               2.0-3.0  Hypercoagulable State                                    2.0.2.3  Left Ventricular Asist Device                            2.0-3.0  Mechanical Heart Valve                                  -    Aortic(with afib, MI, embolism, HF, LA enlargement,    and/or coagulopathy)                                     2.0-3.0 (2.5-3.5)     Mitral                                                             2.5-3.5  Prosthetic/Bioprosthetic Heart Valve               2.0-3.0  Venous thromboembolism (VTE: VT, PE        2.0-3.0    APTT [[id number]]  (Normal) Collected: 11/18/24 1327    Lab Status: Final result Specimen: Blood from Arm, Left Updated: 11/18/24 1354     PTT 29 seconds     CBC and differential [[id number]]  (Abnormal) Collected: 11/18/24 1327    Lab Status: Final result Specimen: Blood from Arm, Left Updated: 11/18/24 1338     WBC 10.79 Thousand/uL      RBC 3.93 Million/uL      Hemoglobin 12.7 g/dL      Hematocrit 37.7 %      MCV 96 fL      MCH 32.3 pg      MCHC 33.7 g/dL      RDW 13.9 %      MPV 8.8 fL      Platelets 195 Thousands/uL      nRBC 0 /100 WBCs       Segmented % 72 %      Immature Grans % 0 %      Lymphocytes % 10 %      Monocytes % 11 %      Eosinophils Relative 6 %      Basophils Relative 1 %      Absolute Neutrophils 7.83 Thousands/µL      Absolute Immature Grans 0.03 Thousand/uL      Absolute Lymphocytes 1.02 Thousands/µL      Absolute Monocytes 1.21 Thousand/µL      Eosinophils Absolute 0.65 Thousand/µL      Basophils Absolute 0.05 Thousands/µL     Blood culture #1 [[id number]] Collected: 24 1327    Lab Status: In process Specimen: Blood from Arm, Left Updated: 24 1336    UA w Reflex to Microscopic w Reflex to Culture [[id number]]     Lab Status: No result Specimen: Urine             CTA chest pe study   Final Interpretation by Roderick Mora MD ([id number])      1.  No pulmonary embolus or other acute findings in the chest.   2.  Large hiatal hernia.   3.  Comminuted and displaced right scapular fracture.   4.  Age-indeterminate favored chronic T3 compression fracture.                  Workstation performed: [id number]             Procedures    ED Medication and Procedure Management   Prior to Admission Medications   Prescriptions Last Dose Informant Patient Reported? Taking?   Calcium Carb-Cholecalciferol (CALTRATE 600+D3 PO)  Self Yes No   Sig: Take 600 mg by mouth 2 (two) times a day   Multiple Vitamin (MULTIVITAMIN) tablet  Self Yes No   Sig: Take 1 tablet by mouth daily   acetaminophen (TYLENOL) 650 mg CR tablet   No No   Sig: Take 1 tablet (650 mg total) by mouth every 8 (eight) hours as needed for mild pain   albuterol (Ventolin HFA) 90 mcg/act inhaler  Self No No   Sig: Inhale 2 puffs every 6 (six) hours as needed for wheezing   atorvastatin (LIPITOR) 10 mg tablet   No No   Sig: Take 0.5 tablets (5 mg total) by mouth daily with dinner TAKE ONE-HALF TABLET DAILY   citalopram (CeleXA) 40 mg tablet   No No   Sig: Take 1 tablet (40 mg total) by mouth daily   fluticasone (FLONASE) 50 mcg/act nasal spray   No No   Si spray into each  nostril daily   fosinopril (MONOPRIL) 10 mg tablet   No No   Sig: TAKE ONE-HALF TABLET BY MOUTH DAILY   ipratropium (ATROVENT) 0.03 % nasal spray  Self No No   Si sprays into each nostril 2 (two) times a day   magnesium oxide (MAG-OX) 400 mg tablet  Self Yes No   Sig: Take 1 tablet by mouth daily   metoprolol tartrate (LOPRESSOR) 50 mg tablet   No No   Sig: Take 1 tablet (50 mg total) by mouth every 12 (twelve) hours   niacin (NIASPAN) 500 mg CR tablet   No No   Sig: Take 1 tablet (500 mg total) by mouth daily at bedtime   oxyCODONE (Roxicodone) 5 immediate release tablet   No No   Sig: Take 1 tablet (5 mg total) by mouth every 4 (four) hours as needed for moderate pain for up to 10 days Max Daily Amount: 30 mg   pantoprazole (PROTONIX) 40 mg tablet   No No   Sig: Take 1 tablet (40 mg total) by mouth daily   potassium chloride (Klor-Con) 10 mEq tablet   No No   Sig: Take 1 tablet (10 mEq total) by mouth 2 (two) times a week   tamsulosin (FLOMAX) 0.4 mg  Self Yes No   Sig: Take 0.4 mg by mouth daily with dinner   torsemide (DEMADEX) 5 MG tablet   No No   Sig: Take 1 tablet (5 mg total) by mouth 2 (two) times a week Monday and Thursday      Facility-Administered Medications: None     Patient's Medications   Discharge Prescriptions    No medications on file     No discharge procedures on file.  ED SEPSIS DOCUMENTATION   Time reflects when diagnosis was documented in both MDM as applicable and the Disposition within this note       Time User Action Codes Description Comment    2024  5:04 PM Lizabeth Oquendo [R09.02,  Z99.81] Hypoxemia requiring supplemental oxygen     2024  5:04 PM Lizabeth Oquendo [R26.2] Ambulatory dysfunction     2024  5:04 PM Lizabeth Oquendo [S42.109A] Scapular fracture                  Lizabeth Oquendo MD  24 0523

## 2024-11-18 NOTE — ASSESSMENT & PLAN NOTE
Follows up with out of network cardiologist    -Continue metoprolol 50 mg twice daily  -Not on anticoagulation.  -Continue telemetry  -Check updated echo

## 2024-11-18 NOTE — ASSESSMENT & PLAN NOTE
Lab Results   Component Value Date    EGFR 48 11/18/2024    EGFR 53 08/01/2024    EGFR 61 01/11/2024    CREATININE 1.33 (H) 11/18/2024    CREATININE 1.23 08/01/2024    CREATININE 1.11 01/11/2024   Mildly elevated creatinine.  Near baseline.    -Monitor I/os, avoid nephrotoxins as possible  -Interval follow-up with a.m. labs

## 2024-11-18 NOTE — ASSESSMENT & PLAN NOTE
Found on presentation to the ED.  No oxygen needs at baseline.  Past medical history is negative for COPD/smoking/dust exposure    -COVID/flu/RSV; negative  -CTA chest; no acute pulmonary embolism noted.  No other acute findings note other than scapular fracture.      Plan:   Check RPP panel  Check ESR, CRP  Check updated echo  Continue oxygen supplementation with target SpO2 93%  Respiratory protocol  Jovi  Consult pulmonary medicine team given this problem could be related to pulmonary hypertension  Hold torsemide 5 mg twice a week  Give 1 x lasix 20 mg IV

## 2024-11-18 NOTE — ASSESSMENT & PLAN NOTE
Patient reports a fall on the stairs 2 days ago.  Was evaluated in the ED at that time.  X-ray showed nondisplaced scapular fracture.  Upon reevaluation today CTA chest showing comminuted and displaced right sided scapular fracture.    Case was discussed with emergency medicine physician and we recommended discussing the case with orthopedic/trauma service.  Orthopedics on-call physician Dr. Flanagan recommending no acute surgical interventions at this time.  No need to transfer the patient to another facility.      -Consult orthopedics; appreciate further input

## 2024-11-19 ENCOUNTER — APPOINTMENT (INPATIENT)
Dept: NON INVASIVE DIAGNOSTICS | Facility: HOSPITAL | Age: 84
DRG: 205 | End: 2024-11-19
Payer: COMMERCIAL

## 2024-11-19 PROBLEM — J96.01 ACUTE RESPIRATORY FAILURE WITH HYPOXIA (HCC): Status: ACTIVE | Noted: 2023-03-19

## 2024-11-19 PROBLEM — R93.89 ABNORMAL CT OF THE CHEST: Status: ACTIVE | Noted: 2024-11-19

## 2024-11-19 PROBLEM — S22.030A COMPRESSION FRACTURE OF T3 VERTEBRA (HCC): Status: ACTIVE | Noted: 2024-11-19

## 2024-11-19 LAB
ANION GAP SERPL CALCULATED.3IONS-SCNC: 8 MMOL/L (ref 4–13)
AORTIC ROOT: 3.4 CM
APICAL FOUR CHAMBER EJECTION FRACTION: 57 %
ASCENDING AORTA: 3.9 CM
ATRIAL RATE: 71 BPM
ATRIAL RATE: 73 BPM
ATRIAL RATE: 74 BPM
ATRIAL RATE: 74 BPM
ATRIAL RATE: 75 BPM
AV REGURGITATION PRESSURE HALF TIME: 407 MS
BACTERIA UR QL AUTO: ABNORMAL /HPF
BASOPHILS # BLD AUTO: 0.05 THOUSANDS/ÂΜL (ref 0–0.1)
BASOPHILS NFR BLD AUTO: 1 % (ref 0–1)
BILIRUB UR QL STRIP: NEGATIVE
BSA FOR ECHO PROCEDURE: 2.01 M2
BUN SERPL-MCNC: 26 MG/DL (ref 5–25)
CALCIUM SERPL-MCNC: 9.4 MG/DL (ref 8.4–10.2)
CHLORIDE SERPL-SCNC: 99 MMOL/L (ref 96–108)
CLARITY UR: CLEAR
CO2 SERPL-SCNC: 30 MMOL/L (ref 21–32)
COLOR UR: YELLOW
CREAT SERPL-MCNC: 1.29 MG/DL (ref 0.6–1.3)
E WAVE DECELERATION TIME: 132 MS
E/A RATIO: 0.79
EOSINOPHIL # BLD AUTO: 0.42 THOUSAND/ÂΜL (ref 0–0.61)
EOSINOPHIL NFR BLD AUTO: 4 % (ref 0–6)
ERYTHROCYTE [DISTWIDTH] IN BLOOD BY AUTOMATED COUNT: 13.8 % (ref 11.6–15.1)
FRACTIONAL SHORTENING: 39 (ref 28–44)
GFR SERPL CREATININE-BSD FRML MDRD: 50 ML/MIN/1.73SQ M
GLUCOSE SERPL-MCNC: 113 MG/DL (ref 65–140)
GLUCOSE UR STRIP-MCNC: NEGATIVE MG/DL
HCT VFR BLD AUTO: 37.7 % (ref 36.5–49.3)
HGB BLD-MCNC: 12.5 G/DL (ref 12–17)
HGB UR QL STRIP.AUTO: ABNORMAL
IMM GRANULOCYTES # BLD AUTO: 0.06 THOUSAND/UL (ref 0–0.2)
IMM GRANULOCYTES NFR BLD AUTO: 1 % (ref 0–2)
INTERVENTRICULAR SEPTUM IN DIASTOLE (PARASTERNAL SHORT AXIS VIEW): 1.2 CM
INTERVENTRICULAR SEPTUM: 1.2 CM (ref 0.6–1.1)
KETONES UR STRIP-MCNC: NEGATIVE MG/DL
LAAS-AP2: 16.5 CM2
LAAS-AP4: 18.9 CM2
LEFT ATRIUM SIZE: 1.7 CM
LEFT ATRIUM VOLUME (MOD BIPLANE): 45 ML
LEFT ATRIUM VOLUME INDEX (MOD BIPLANE): 22.4 ML/M2
LEFT INTERNAL DIMENSION IN SYSTOLE: 2.3 CM (ref 2.1–4)
LEFT VENTRICULAR INTERNAL DIMENSION IN DIASTOLE: 3.8 CM (ref 3.5–6)
LEFT VENTRICULAR POSTERIOR WALL IN END DIASTOLE: 1.2 CM
LEFT VENTRICULAR STROKE VOLUME: 42 ML
LEUKOCYTE ESTERASE UR QL STRIP: NEGATIVE
LVSV (TEICH): 42 ML
LYMPHOCYTES # BLD AUTO: 1.2 THOUSANDS/ÂΜL (ref 0.6–4.47)
LYMPHOCYTES NFR BLD AUTO: 12 % (ref 14–44)
MAGNESIUM SERPL-MCNC: 1.9 MG/DL (ref 1.9–2.7)
MCH RBC QN AUTO: 32.3 PG (ref 26.8–34.3)
MCHC RBC AUTO-ENTMCNC: 33.2 G/DL (ref 31.4–37.4)
MCV RBC AUTO: 97 FL (ref 82–98)
MONOCYTES # BLD AUTO: 1.45 THOUSAND/ÂΜL (ref 0.17–1.22)
MONOCYTES NFR BLD AUTO: 15 % (ref 4–12)
MUCOUS THREADS UR QL AUTO: ABNORMAL
MV E'TISSUE VEL-LAT: 9 CM/S
MV E'TISSUE VEL-SEP: 6 CM/S
MV PEAK A VEL: 0.78 M/S
MV PEAK E VEL: 62 CM/S
MV STENOSIS PRESSURE HALF TIME: 38 MS
MV VALVE AREA P 1/2 METHOD: 5.79
NEUTROPHILS # BLD AUTO: 6.84 THOUSANDS/ÂΜL (ref 1.85–7.62)
NEUTS SEG NFR BLD AUTO: 67 % (ref 43–75)
NITRITE UR QL STRIP: NEGATIVE
NON-SQ EPI CELLS URNS QL MICRO: ABNORMAL /HPF
NRBC BLD AUTO-RTO: 0 /100 WBCS
P AXIS: -3 DEGREES
P AXIS: -5 DEGREES
P AXIS: -6 DEGREES
P AXIS: 38 DEGREES
P AXIS: 47 DEGREES
PH UR STRIP.AUTO: 6.5 [PH]
PLATELET # BLD AUTO: 183 THOUSANDS/UL (ref 149–390)
PMV BLD AUTO: 9.6 FL (ref 8.9–12.7)
POTASSIUM SERPL-SCNC: 4.9 MMOL/L (ref 3.5–5.3)
PR INTERVAL: 156 MS
PR INTERVAL: 160 MS
PR INTERVAL: 168 MS
PR INTERVAL: 174 MS
PR INTERVAL: 202 MS
PROT UR STRIP-MCNC: ABNORMAL MG/DL
QRS AXIS: 3 DEGREES
QRS AXIS: 3 DEGREES
QRS AXIS: 34 DEGREES
QRS AXIS: 35 DEGREES
QRS AXIS: 7 DEGREES
QRSD INTERVAL: 86 MS
QRSD INTERVAL: 86 MS
QRSD INTERVAL: 88 MS
QRSD INTERVAL: 92 MS
QRSD INTERVAL: 94 MS
QT INTERVAL: 372 MS
QT INTERVAL: 376 MS
QT INTERVAL: 384 MS
QT INTERVAL: 386 MS
QT INTERVAL: 386 MS
QTC INTERVAL: 412 MS
QTC INTERVAL: 417 MS
QTC INTERVAL: 420 MS
QTC INTERVAL: 424 MS
QTC INTERVAL: 432 MS
RA PRESSURE ESTIMATED: 8 MMHG
RBC # BLD AUTO: 3.87 MILLION/UL (ref 3.88–5.62)
RBC #/AREA URNS AUTO: ABNORMAL /HPF
RIGHT ATRIUM AREA SYSTOLE A4C: 12.1 CM2
RIGHT VENTRICLE ID DIMENSION: 3 CM
RV PSP: 54 MMHG
SINOTUBULAR JUNCTION: 3.4 CM
SL CV AV DECELERATION TIME RETROGRADE: 1403 MS
SL CV AV PEAK GRADIENT RETROGRADE: 79 MMHG
SL CV LEFT ATRIUM LENGTH A2C: 5.9 CM
SL CV LV EF: 60
SL CV PED ECHO LEFT VENTRICLE DIASTOLIC VOLUME (MOD BIPLANE) 2D: 61 ML
SL CV PED ECHO LEFT VENTRICLE SYSTOLIC VOLUME (MOD BIPLANE) 2D: 19 ML
SL CV SINUS OF VALSALVA 2D: 3.9 CM
SODIUM SERPL-SCNC: 137 MMOL/L (ref 135–147)
SP GR UR STRIP.AUTO: 1.01 (ref 1–1.03)
STJ: 3.4 CM
T WAVE AXIS: 20 DEGREES
T WAVE AXIS: 30 DEGREES
T WAVE AXIS: 32 DEGREES
T WAVE AXIS: 58 DEGREES
T WAVE AXIS: 60 DEGREES
TR MAX PG: 46 MMHG
TR PEAK VELOCITY: 3.4 M/S
TRICUSPID ANNULAR PLANE SYSTOLIC EXCURSION: 1.9 CM
TRICUSPID VALVE PEAK REGURGITATION VELOCITY: 3.38 M/S
UROBILINOGEN UR STRIP-ACNC: <2 MG/DL
VENTRICULAR RATE: 71 BPM
VENTRICULAR RATE: 73 BPM
VENTRICULAR RATE: 74 BPM
VENTRICULAR RATE: 74 BPM
VENTRICULAR RATE: 75 BPM
WBC # BLD AUTO: 10.02 THOUSAND/UL (ref 4.31–10.16)
WBC #/AREA URNS AUTO: ABNORMAL /HPF

## 2024-11-19 PROCEDURE — 94668 MNPJ CHEST WALL SBSQ: CPT

## 2024-11-19 PROCEDURE — 94640 AIRWAY INHALATION TREATMENT: CPT

## 2024-11-19 PROCEDURE — 99223 1ST HOSP IP/OBS HIGH 75: CPT | Performed by: STUDENT IN AN ORGANIZED HEALTH CARE EDUCATION/TRAINING PROGRAM

## 2024-11-19 PROCEDURE — 94664 DEMO&/EVAL PT USE INHALER: CPT

## 2024-11-19 PROCEDURE — 93306 TTE W/DOPPLER COMPLETE: CPT | Performed by: INTERNAL MEDICINE

## 2024-11-19 PROCEDURE — 93010 ELECTROCARDIOGRAM REPORT: CPT | Performed by: INTERNAL MEDICINE

## 2024-11-19 PROCEDURE — 99232 SBSQ HOSP IP/OBS MODERATE 35: CPT | Performed by: FAMILY MEDICINE

## 2024-11-19 PROCEDURE — 83735 ASSAY OF MAGNESIUM: CPT

## 2024-11-19 PROCEDURE — 80048 BASIC METABOLIC PNL TOTAL CA: CPT

## 2024-11-19 PROCEDURE — 94760 N-INVAS EAR/PLS OXIMETRY 1: CPT

## 2024-11-19 PROCEDURE — 99222 1ST HOSP IP/OBS MODERATE 55: CPT | Performed by: PHYSICIAN ASSISTANT

## 2024-11-19 PROCEDURE — 93306 TTE W/DOPPLER COMPLETE: CPT

## 2024-11-19 PROCEDURE — 81001 URINALYSIS AUTO W/SCOPE: CPT

## 2024-11-19 PROCEDURE — 85025 COMPLETE CBC W/AUTO DIFF WBC: CPT

## 2024-11-19 RX ADMIN — PANTOPRAZOLE SODIUM 40 MG: 40 TABLET, DELAYED RELEASE ORAL at 06:11

## 2024-11-19 RX ADMIN — IPRATROPIUM BROMIDE AND ALBUTEROL SULFATE 3 ML: .5; 3 SOLUTION RESPIRATORY (INHALATION) at 02:52

## 2024-11-19 RX ADMIN — FLUTICASONE PROPIONATE 1 SPRAY: 50 SPRAY, METERED NASAL at 08:03

## 2024-11-19 RX ADMIN — IPRATROPIUM BROMIDE AND ALBUTEROL SULFATE 3 ML: .5; 3 SOLUTION RESPIRATORY (INHALATION) at 07:02

## 2024-11-19 RX ADMIN — METOPROLOL TARTRATE 50 MG: 50 TABLET, FILM COATED ORAL at 16:34

## 2024-11-19 RX ADMIN — AMLODIPINE BESYLATE 2.5 MG: 2.5 TABLET ORAL at 08:02

## 2024-11-19 RX ADMIN — Medication 1 TABLET: at 08:00

## 2024-11-19 RX ADMIN — ENOXAPARIN SODIUM 40 MG: 40 INJECTION SUBCUTANEOUS at 08:02

## 2024-11-19 RX ADMIN — Medication 2.5 MG: at 16:35

## 2024-11-19 RX ADMIN — CITALOPRAM HYDROBROMIDE 40 MG: 20 TABLET ORAL at 08:00

## 2024-11-19 RX ADMIN — NIACIN 500 MG: 500 TABLET, EXTENDED RELEASE ORAL at 22:30

## 2024-11-19 RX ADMIN — B-COMPLEX W/ C & FOLIC ACID TAB 1 TABLET: TAB at 08:02

## 2024-11-19 RX ADMIN — IPRATROPIUM BROMIDE AND ALBUTEROL SULFATE 3 ML: .5; 3 SOLUTION RESPIRATORY (INHALATION) at 19:20

## 2024-11-19 RX ADMIN — TAMSULOSIN HYDROCHLORIDE 0.4 MG: 0.4 CAPSULE ORAL at 16:34

## 2024-11-19 RX ADMIN — METOPROLOL TARTRATE 50 MG: 50 TABLET, FILM COATED ORAL at 06:11

## 2024-11-19 RX ADMIN — IPRATROPIUM BROMIDE AND ALBUTEROL SULFATE 3 ML: .5; 3 SOLUTION RESPIRATORY (INHALATION) at 13:37

## 2024-11-19 RX ADMIN — ATORVASTATIN CALCIUM 5 MG: 10 TABLET, FILM COATED ORAL at 16:34

## 2024-11-19 RX ADMIN — Medication 1 TABLET: at 16:35

## 2024-11-19 RX ADMIN — Medication 400 MG: at 08:02

## 2024-11-19 RX ADMIN — Medication 2.5 MG: at 10:23

## 2024-11-19 NOTE — CONSULTS
ASSESSMENT  1) right shoulder pain -patient sustained a mechanical fall approximately 3 days ago which has resulted in a mildly displaced right scapular fracture.  This will be treated nonoperatively.  He should remain in a sling and nonweightbearing on the right upper extremity at all times.  He is neurovascularly intact with no abrasions.  Orthopedics will sign off at this time.  He may follow-up with Dr. Escalante in 2 weeks for reevaluation with repeat x-ray.    PLAN  Weightbearing status: Nonweightbearing right upper extremity  Sling at all times  PT/OT  Incentive spirometry  Pain control as needed  DVT ppx per primary team  Diet per primary team  Orthopedics will sign off at this time.  He may follow-up in 1 to 2 weeks for reevaluation with an x-ray of his scapula with Dr. Escalante.      History of Present Illness   HPI: Taj Roblero Jr. is a 84 y.o. year old male who presents with generalized weakness.  In his workup, it was noted that he had a right scapula fracture which was comminuted and displaced.  He states that he fell on Saturday in his basement and had right shoulder pain.  He was seen in an x-ray demonstrated a nondisplaced right scapular fracture.  He has not had any falls since that original fall.  He is left-hand dominant.  He denies history of injury or surgery to the right shoulder.  He has not tried to perform significant range of motion of the shoulder.  He denies any numbness or tingling in the right upper extremity    Review of Systems   Constitutional:  Positive for activity change and fatigue.   HENT: Negative.     Eyes: Negative.    Respiratory: Negative.     Cardiovascular: Negative.    Gastrointestinal: Negative.    Endocrine: Negative.    Genitourinary: Negative.    Musculoskeletal:  Positive for back pain and myalgias.   Skin: Negative.    Allergic/Immunologic: Negative.    Neurological:  Positive for weakness.   Hematological: Negative.    Psychiatric/Behavioral: Negative.       significant for findings described in the HPI.  I have reviewed the patient's PMH, PSH, Social History, Family History, Meds, and Allergies  Historical Information   Past Medical History:   Diagnosis Date    Vega esophagus     BPH (benign prostatic hyperplasia)     Depression     Hiatal hernia     HTN (hypertension)     Hyperlipidemia     Hypertension     Leaky heart valve     OCD (obsessive compulsive disorder)     Pulmonary embolism (HCC) 2019     Past Surgical History:   Procedure Laterality Date    HARDWARE REMOVAL Left 09/09/2023    Procedure: REMOVAL HARDWARE;  Surgeon: Henri Flanagan DO;  Location: [facility];  Service: Orthopedics    INCISION AND DRAINAGE OF WOUND Left 09/09/2023    Procedure: INCISION AND DRAINAGE (I&D) EXTREMITY;  Surgeon: Henri Flanagan DO;  Location: [facility];  Service: Orthopedics    IR ASPIRATION ONLY  12/15/2023    IR IVC FILTER PLACEMENT OPTIONAL/TEMPORARY  12/07/2019    IR IVC FILTER REMOVAL  08/21/2020    DE OPTX FEM SHFT FX W/INSJ IMED IMPLT W/WO SCREW Right 12/04/2019    Procedure: INSERTION NAIL IM FEMUR ANTEGRADE (TROCHANTERIC);  Surgeon: Yesenia Veronica DO;  Location: [facility];  Service: Orthopedics    DE OPTX FEM SHFT FX W/INSJ IMED IMPLT W/WO SCREW Left 06/17/2022    Procedure: INSERTION NAIL IM FEMUR ANTEGRADE (TROCHANTERIC) - long nail;  Surgeon: Henri Flanagan DO;  Location: [facility];  Service: Orthopedics    UPPER GASTROINTESTINAL ENDOSCOPY      WOUND DEBRIDEMENT Left 12/16/2023    Procedure: DEBRIDEMENT LOWER EXTREMITY (WASH OUT)- left thigh abscess irrigation and debridement;  Surgeon: Feng Robertson MD;  Location: [facility];  Service: Orthopedics     Social History     Tobacco Use    Smoking status: Never    Smokeless tobacco: Never   Vaping Use    Vaping status: Never Used   Substance and Sexual Activity    Alcohol use: Never    Drug use: Never    Sexual activity: Not on file     E-Cigarette/Vaping    E-Cigarette Use Never User      E-Cigarette/Vaping  Substances    Nicotine No     THC No     CBD No     Flavoring No     Other No     Unknown No      Family history non-contributory  Social History     Tobacco Use    Smoking status: Never    Smokeless tobacco: Never   Vaping Use    Vaping status: Never Used   Substance and Sexual Activity    Alcohol use: Never    Drug use: Never    Sexual activity: Not on file       Current Facility-Administered Medications:     albuterol (PROVENTIL HFA,VENTOLIN HFA) inhaler 2 puff, Q6H PRN    amLODIPine (NORVASC) tablet 2.5 mg, Daily    atorvastatin (LIPITOR) tablet 5 mg, Daily With Dinner    calcium carbonate-vitamin D 500 mg-5 mcg tablet 1 tablet, BID With Meals    citalopram (CeleXA) tablet 40 mg, Daily    enoxaparin (LOVENOX) subcutaneous injection 40 mg, Daily    fluticasone (FLONASE) 50 mcg/act nasal spray 1 spray, Daily    ipratropium-albuterol (DUO-NEB) 0.5-2.5 mg/3 mL inhalation solution 3 mL, Q6H    magnesium Oxide (MAG-OX) tablet 400 mg, Daily    metoprolol tartrate (LOPRESSOR) tablet 50 mg, Q12H    multivitamin stress formula tablet 1 tablet, Daily    niacin (NIASPAN) CR tablet 500 mg, HS    oxyCODONE (ROXICODONE) split tablet 2.5 mg, Q6H PRN    pantoprazole (PROTONIX) EC tablet 40 mg, Daily    tamsulosin (FLOMAX) capsule 0.4 mg, Daily With Dinner  Prior to Admission Medications   Prescriptions Last Dose Informant Patient Reported? Taking?   Calcium Carb-Cholecalciferol (CALTRATE 600+D3 PO) 11/18/2024 Self Yes Yes   Sig: Take 600 mg by mouth 2 (two) times a day   Multiple Vitamin (MULTIVITAMIN) tablet Unknown Self Yes No   Sig: Take 1 tablet by mouth daily   acetaminophen (TYLENOL) 650 mg CR tablet Not Taking  No No   Sig: Take 1 tablet (650 mg total) by mouth every 8 (eight) hours as needed for mild pain   Patient not taking: Reported on 11/18/2024   albuterol (Ventolin HFA) 90 mcg/act inhaler Unknown Self No No   Sig: Inhale 2 puffs every 6 (six) hours as needed for wheezing   atorvastatin (LIPITOR) 10 mg tablet   No  No   Sig: Take 0.5 tablets (5 mg total) by mouth daily with dinner TAKE ONE-HALF TABLET DAILY   citalopram (CeleXA) 40 mg tablet   No No   Sig: Take 1 tablet (40 mg total) by mouth daily   fluticasone (FLONASE) 50 mcg/act nasal spray Unknown  No No   Si spray into each nostril daily   fosinopril (MONOPRIL) 10 mg tablet   No No   Sig: TAKE ONE-HALF TABLET BY MOUTH DAILY   ipratropium (ATROVENT) 0.03 % nasal spray  Self No No   Si sprays into each nostril 2 (two) times a day   magnesium oxide (MAG-OX) 400 mg tablet Unknown Self Yes No   Sig: Take 1 tablet by mouth daily   metoprolol tartrate (LOPRESSOR) 50 mg tablet   No No   Sig: Take 1 tablet (50 mg total) by mouth every 12 (twelve) hours   niacin (NIASPAN) 500 mg CR tablet   No No   Sig: Take 1 tablet (500 mg total) by mouth daily at bedtime   oxyCODONE (Roxicodone) 5 immediate release tablet 2024  No Yes   Sig: Take 1 tablet (5 mg total) by mouth every 4 (four) hours as needed for moderate pain for up to 10 days Max Daily Amount: 30 mg   pantoprazole (PROTONIX) 40 mg tablet   No No   Sig: Take 1 tablet (40 mg total) by mouth daily   potassium chloride (Klor-Con) 10 mEq tablet 2024  No Yes   Sig: Take 1 tablet (10 mEq total) by mouth 2 (two) times a week   tamsulosin (FLOMAX) 0.4 mg  Self Yes No   Sig: Take 0.4 mg by mouth daily with dinner   torsemide (DEMADEX) 5 MG tablet Unknown  No No   Sig: Take 1 tablet (5 mg total) by mouth 2 (two) times a week Monday and Thursday      Facility-Administered Medications: None     Patient has no known allergies.    Objective :  Temp:  [99.2 °F (37.3 °C)-99.7 °F (37.6 °C)] 99.5 °F (37.5 °C)  HR:  [] 94  BP: (148-167)/(62-74) 154/74  Resp:  [17-26] 20  SpO2:  [81 %-97 %] 90 %  O2 Device: Nasal cannula  Nasal Cannula O2 Flow Rate (L/min):  [2 L/min-3 L/min] 2 L/min  Physical Exam  Vitals and nursing note reviewed.   Constitutional:       Appearance: He is well-developed. He is not ill-appearing.       Comments: Body mass index is 27.84 kg/m².   HENT:      Head: Normocephalic and atraumatic.      Right Ear: External ear normal.      Left Ear: External ear normal.      Nose: Nose normal.      Mouth/Throat:      Mouth: Mucous membranes are moist.   Eyes:      Extraocular Movements: Extraocular movements intact.      Conjunctiva/sclera: Conjunctivae normal.   Cardiovascular:      Rate and Rhythm: Normal rate.      Pulses: Normal pulses.   Pulmonary:      Effort: Pulmonary effort is normal.   Abdominal:      Palpations: Abdomen is soft.   Musculoskeletal:      Cervical back: Normal range of motion.      Comments: See ortho exam   Skin:     General: Skin is warm and dry.   Neurological:      General: No focal deficit present.      Mental Status: He is alert. Mental status is at baseline.   Psychiatric:         Mood and Affect: Mood normal.         Behavior: Behavior normal.         Thought Content: Thought content normal.         Judgment: Judgment normal.     Right Shoulder Exam     Tenderness   Right shoulder tenderness location: Tender right scapula.    Other   Erythema: absent  Scars: absent  Sensation: normal  Pulse: present    Comments:  2+ radial pulse  Normal sensation to light touch C5-T1  5 out of 5  strength  Sling in place right upper extremity  Tender over right scapula  No tenderness over AC joint or clavicle               Lab Results: I have reviewed the following results:   Recent Labs     11/18/24  1327 11/18/24  1540 11/19/24  0448   WBC 10.79*  --  10.02   HGB 12.7  --  12.5   HCT 37.7  --  37.7     --  183   BUN 31*  --  26*   CREATININE 1.33*  --  1.29   PTT 29  --   --    INR 1.06  --   --    ESR 24*  --   --    CRP  --  107.6*  --        Imaging Results Review: I personally reviewed the following image studies in PACS and associated radiology reports: CT chest and xray(s). My interpretation of the radiology images/reports is: There is a comminuted and mildly displaced right scapular  fracture.  No other osseous abnormalities appreciated.  Other Study Results Review: No additional pertinent studies reviewed.

## 2024-11-19 NOTE — ASSESSMENT & PLAN NOTE
Taj has linear atelectasis noted on his CT, as well as basilar changes concerning for bronchitis/aspiration pneumonitis.  He does report he has a chronic daily cough and has had aspiration pneumonia in the past.  He also has a large hiatal hernia.  Continue [facility] for pulmonary hygiene.  Check swallow evaluation.

## 2024-11-19 NOTE — UTILIZATION REVIEW
Initial Clinical Review    Admission: Date/Time/Statement:   Admission Orders (From admission, onward)       Ordered        11/18/24 1704  INPATIENT ADMISSION  Once                          Orders Placed This Encounter   Procedures    INPATIENT ADMISSION     Standing Status:   Standing     Number of Occurrences:   1     Level of Care:   Med Surg [16]     Estimated length of stay:   More than 2 Midnights     Certification:   I certify that inpatient services are medically necessary for this patient for a duration of greater than two midnights. See H&P and MD Progress Notes for additional information about the patient's course of treatment.     ED Arrival Information       Expected   -    Arrival   11/18/2024 12:38    Acuity   Emergent              Means of arrival   Wheelchair    Escorted by   Family Member    Service   Hospitalist    Admission type   Emergency              Arrival complaint   diff breath, low oxy level, low fever             Chief Complaint   Patient presents with    Shortness of Breath     Here couple of days ago after fall, fx humerous and scapula. Started with some shortness of breath and dizziness yesterday. Oxygen low at home       Initial Presentation:   84 yom to ER from home for weakness, SOB & hypoxia @ 80% on RA . S/p fall 2 days ago and was found to have a right scapular fracture. Hx HTN , A fib , CKD3, pulmonary hypertension. Presents febrile, tachypneic, hypoxic, decreased breath sounds, trace BLE edema. Admission CTA chest: No pulmonary embolus or other acute findings in the chest. Large hiatal hernia. Comminuted and displaced right scapular fracture. Age-indeterminate favored chronic T3 compression fracture. Labs: WBC 10.79, BUN 31, Cr 1.33, tprot 6.3, d-dimer 3.94, .6, sed rate 24, u/a+blood, prot.  Admitted to inpatient status for generalized weakness.       Date: 11/19/24   Day 2:   Generalized weakness, likely 2nd hypoxia. Currently requiring O2 2ltr nc.pulmonary following.  Ortho following for displaced right scapular fracture. Maintain NWB RUE.     Per pulm: Acute Hypoxic Respiratory failure - Likely secondary to atelectasis and splinting from recent fall and fractures. Possible mild aspiration pneumonitis but no overt symptoms of pneumonia. Recommend IS, PT/OT and monitor for further infectious symptoms.  Wean FiO2 - Maintain O2 Sat >90%    Per ortho: right shoulder pain -patient sustained a mechanical fall approximately 3 days ago which has resulted in a mildly displaced right scapular fracture.  This will be treated nonoperatively.  He should remain in a sling and nonweightbearing on the right upper extremity at all times.  He is neurovascularly intact with no abrasions.     Date: 11/20/24  Day 3: Has surpassed a 2nd midnight with active treatments and services.  Generalized weakness 2nd hypoxia. Lungs clear, O2 requirements still @ 2ltr nc. Plan per pulmonary, Wean FiO2 - Maintain O2 Sat >90%. Continue to monitor respiratory status, O2 needs, monitor VS, follow labs       ED Treatment-Medication Administration from 11/18/2024 1238 to 11/18/2024 1943         Date/Time Order Dose Route Action     11/18/2024 1432 iohexol (OMNIPAQUE) 350 MG/ML injection (MULTI-DOSE) 85 mL 85 mL Intravenous Given     11/18/2024 1917 atorvastatin (LIPITOR) tablet 5 mg 5 mg Oral Given     11/18/2024 1917 metoprolol tartrate (LOPRESSOR) tablet 50 mg 50 mg Oral Given            Scheduled Medications:  amLODIPine, 2.5 mg, Oral, Daily  atorvastatin, 5 mg, Oral, Daily With Dinner  calcium carbonate-vitamin D, 1 tablet, Oral, BID With Meals  citalopram, 40 mg, Oral, Daily  enoxaparin, 40 mg, Subcutaneous, Daily  fluticasone, 1 spray, Nasal, Daily  ipratropium-albuterol, 3 mL, Nebulization, Q6H  magnesium Oxide, 400 mg, Oral, Daily  metoprolol tartrate, 50 mg, Oral, Q12H  multivitamin stress formula, 1 tablet, Oral, Daily  niacin, 500 mg, Oral, HS  pantoprazole, 40 mg, Oral, Daily  tamsulosin, 0.4 mg, Oral,  Daily With Dinner      PRN Meds:  albuterol, 2 puff, Inhalation, Q6H PRN  oxyCODONE, 2.5 mg, Oral, Q6H PRN      ED Triage Vitals [11/18/24 1255]   Temperature Pulse Respirations Blood Pressure SpO2 Pain Score   99.5 °F (37.5 °C) 75 (!) 28 149/67 (!) 86 % 9     Weight (last 2 days)       Date/Time Weight    11/19/24 1320 85.5 (188.49)    11/18/24 1950 85.5 (188.49)            Vital Signs (last 3 days)       Date/Time Temp Pulse Resp BP MAP (mmHg) SpO2 Calculated FIO2 (%) - Nasal Cannula Nasal Cannula O2 Flow Rate (L/min) O2 Device Patient Position - Orthostatic VS Pain    11/20/24 1150 -- -- -- -- -- -- -- -- -- -- 4    11/20/24 1003 -- -- -- -- -- -- -- -- -- -- No Pain    11/20/24 08:12:45 -- 85 -- 133/58 83 88 % -- -- -- -- --    11/20/24 0725 -- 81 16 -- -- 91 % 28 2 L/min Nasal cannula -- --    11/20/24 05:48:01 98.7 °F (37.1 °C) 90 19 162/70 101 90 % -- -- -- -- --    11/20/24 0209 -- 81 18 -- -- 93 % 28 2 L/min Nasal cannula -- --    11/20/24 0001 -- -- -- -- -- -- -- -- Nasal cannula -- No Pain    11/19/24 22:53:05 99.8 °F (37.7 °C) 98 19 142/110 121 93 % -- -- -- -- --    11/19/24 20:33:49 99.4 °F (37.4 °C) 108 -- 137/70 92 92 % -- -- -- -- --    11/19/24 20:33:27 99.4 °F (37.4 °C) 107 18 137/70 92 92 % -- -- -- -- --    11/19/24 1925 -- 94 18 -- -- 90 % 28 2 L/min Nasal cannula -- --    11/19/24 1635 -- -- -- -- -- -- -- -- -- -- 4    11/19/24 1337 -- -- -- -- -- 90 % 28 2 L/min Nasal cannula -- --    11/19/24 1320 -- 94 -- 154/74 -- 89 % -- -- -- -- --    11/19/24 1023 -- -- -- -- -- -- -- -- -- -- 5    11/19/24 0900 -- -- -- -- -- -- -- -- Nasal cannula -- --    11/19/24 07:50:51 99.5 °F (37.5 °C) 99 20 154/74 101 81 % -- -- -- Lying --    11/19/24 0702 -- -- -- -- -- 92 % 28 2 L/min Nasal cannula -- --    11/19/24 06:12:42 99.6 °F (37.6 °C) 96 -- 153/74 100 89 % -- -- -- -- --    11/19/24 0611 -- 100 -- 153/74 -- -- -- -- -- -- --    11/19/24 0253 -- -- 18 -- -- -- 28 2 L/min Nasal cannula -- --     11/19/24 02:31:35 99.2 °F (37.3 °C) 80 17 155/71 99 90 % -- -- -- -- --    11/18/24 22:22:33 99.7 °F (37.6 °C) 83 17 157/69 98 93 % -- -- Nasal cannula -- No Pain    11/18/24 2035 -- -- 20 -- -- -- 28 2 L/min Nasal cannula -- --    11/18/24 1952 -- -- -- -- -- -- -- -- -- -- No Pain    11/18/24 19:50:20 99.5 °F (37.5 °C) 75 -- 167/72 104 96 % -- -- -- -- --    11/18/24 1950 99.5 °F (37.5 °C) 75 18 167/72 74 96 % -- -- -- -- --    11/18/24 1915 -- 74 26 155/67 96 97 % -- -- None (Room air) Lying --    11/18/24 1845 -- 68 21 150/62 89 97 % -- -- Nasal cannula Lying --    11/18/24 1815 -- 74 23 148/67 97 96 % -- -- -- Lying --    11/18/24 1745 -- 70 24 157/66 95 97 % -- -- None (Room air) Lying --    11/18/24 1715 -- 70 22 165/73 105 96 % 32 3 L/min Nasal cannula Lying --    11/18/24 1415 -- 74 17 165/72 104 94 % 28 2 L/min Nasal cannula Lying --    11/18/24 1345 -- 74 24 171/72 104 94 % 28 2 L/min Nasal cannula Lying --    11/18/24 1333 -- -- -- -- -- -- -- -- Nasal cannula -- --    11/18/24 1330 -- 74 25 164/70 100 93 % 28 2 L/min Nasal cannula Lying --    11/18/24 1255 99.5 °F (37.5 °C) 75 28 149/67 -- 86 % -- -- None (Room air) Sitting 9              Pertinent Labs/Diagnostic Test Results:   Radiology:  CTA chest pe study   Final Interpretation by Roderick Mora MD (11/18 4093)      1.  No pulmonary embolus or other acute findings in the chest.   2.  Large hiatal hernia.   3.  Comminuted and displaced right scapular fracture.   4.  Age-indeterminate favored chronic T3 compression fracture.                  Workstation performed: [id number]           Cardiology:  Echo complete w/ contrast if indicated   Final Result by Jennifer Uribe DO (11/19 1400)        Left Ventricle: Left ventricular cavity size is normal. Wall thickness    is mildly increased. The left ventricular ejection fraction is 60% by    visual estimation. Systolic function is normal. Although no diagnostic    regional wall motion abnormality was  identified, this possibility cannot    be completely excluded on the basis of this study. Diastolic function is    mildly abnormal, consistent with grade I (abnormal) relaxation.     IVS: There is sigmoid appearance of the septum without any significant    LVOT obstruction.     Right Ventricle: Systolic function is normal.     Aortic Valve: There is mild to moderate regurgitation. There is aortic    valve sclerosis.     Mitral Valve: There is annular calcification. There is mild    regurgitation.     Tricuspid Valve: There is mild to moderate regurgitation. The right    ventricular systolic pressure is mildly to moderately elevated. The    estimated right ventricular systolic pressure is 54.00 mmHg.     Compared to previous exam, there is no signficant change.         ECG 12 lead   Final Result by Delvin Roman MD (11/19 0925)   Sinus rhythm with Premature atrial complexes   Otherwise normal ECG   When compared with ECG of 18-Nov-2024 17:22, (unconfirmed)   No significant change was found   Confirmed by Dlevin Roman ([id number]) on 11/19/2024 9:25:45 AM      ECG 12 lead   Final Result by Delvin Roman MD (11/19 0925)   Sinus rhythm with Premature atrial complexes   Otherwise normal ECG   When compared with ECG of 18-Nov-2024 17:22, (unconfirmed)   Premature atrial complexes are now Present   Confirmed by Delvin Roman ([id number]) on 11/19/2024 9:25:43 AM      ECG 12 lead   Final Result by Delvin Roman MD (11/19 0925)   Normal sinus rhythm with sinus arrhythmia   Normal ECG      Confirmed by Delvin Roman ([id number]) on 11/19/2024 9:25:39 AM      ECG 12 lead   Final Result by Delvin Roman MD (11/19 0921)   Normal sinus rhythm   Normal ECG   Confirmed by Delvin Roman ([id number]) on 11/19/2024 9:21:30 AM      ECG 12 lead   Final Result by Delvin Roman MD (11/19 0921)   Sinus rhythm with Premature atrial complexes with Aberrant conduction   Otherwise normal ECG   Baseline Artifact      Repeat tracing  "recommended      Confirmed by Delvin Roman ([id number]) on 11/19/2024 9:21:17 AM        GI:  No orders to display           Results from last 7 days   Lab Units 11/20/24  0536 11/19/24  0448 11/18/24  1327   WBC Thousand/uL 9.27 10.02 10.79*   HEMOGLOBIN g/dL 11.8* 12.5 12.7   HEMATOCRIT % 35.4* 37.7 37.7   PLATELETS Thousands/uL 179 183 195   TOTAL NEUT ABS Thousands/µL 5.71 6.84 7.83*         Results from last 7 days   Lab Units 11/20/24  0536 11/19/24  0448 11/18/24  1327   SODIUM mmol/L 134* 137 137   POTASSIUM mmol/L 4.0 4.9 5.0   CHLORIDE mmol/L 98 99 101   CO2 mmol/L 31 30 29   ANION GAP mmol/L 5 8 7   BUN mg/dL 36* 26* 31*   CREATININE mg/dL 1.30 1.29 1.33*   EGFR ml/min/1.73sq m 50 50 48   CALCIUM mg/dL 8.5 9.4 9.1   MAGNESIUM mg/dL 1.9 1.9  --      Results from last 7 days   Lab Units 11/18/24  1327   AST U/L 21   ALT U/L 9   ALK PHOS U/L 63   TOTAL PROTEIN g/dL 6.3*   ALBUMIN g/dL 3.9   TOTAL BILIRUBIN mg/dL 0.95         Results from last 7 days   Lab Units 11/20/24  0536 11/19/24  0448 11/18/24  1327   GLUCOSE RANDOM mg/dL 104 113 126             No results found for: \"BETA-HYDROXYBUTYRATE\"                   Results from last 7 days   Lab Units 11/18/24  1540 11/18/24  1327   HS TNI 0HR ng/L  --  7   HS TNI 2HR ng/L 7  --    HSTNI D2 ng/L 0  --      Results from last 7 days   Lab Units 11/18/24  1327   D-DIMER QUANTITATIVE ug/ml FEU 3.94*     Results from last 7 days   Lab Units 11/18/24  1327   PROTIME seconds 14.4   INR  1.06   PTT seconds 29     Results from last 7 days   Lab Units 11/18/24  1540   TSH 3RD GENERATON uIU/mL 1.149     Results from last 7 days   Lab Units 11/18/24  1327   PROCALCITONIN ng/ml 0.12     Results from last 7 days   Lab Units 11/18/24  1327   LACTIC ACID mmol/L 1.2             Results from last 7 days   Lab Units 11/18/24  1327   BNP pg/mL 99                         Results from last 7 days   Lab Units 11/18/24  1540 11/18/24  1327   CRP mg/L 107.6*  --    SED RATE mm/hour  --  " 24*             Results from last 7 days   Lab Units 11/19/24  0759   CLARITY UA  Clear   COLOR UA  Yellow   SPEC GRAV UA  1.015   PH UA  6.5   GLUCOSE UA mg/dl Negative   KETONES UA mg/dl Negative   BLOOD UA  Small*   PROTEIN UA mg/dl 30 (1+)*   NITRITE UA  Negative   BILIRUBIN UA  Negative   UROBILINOGEN UA (BE) mg/dl <2.0   LEUKOCYTES UA  Negative   WBC UA /hpf 0-1   RBC UA /hpf 2-4   BACTERIA UA /hpf Occasional   EPITHELIAL CELLS WET PREP /hpf None Seen   MUCUS THREADS  Occasional*                                 Results from last 7 days   Lab Units 11/18/24  1327   BLOOD CULTURE  Staphylococcus epidermidis*   GRAM STAIN RESULT  Gram positive cocci in clusters*                   Past Medical History:   Diagnosis Date    Vega esophagus     BPH (benign prostatic hyperplasia)     Depression     Hiatal hernia     HTN (hypertension)     Hyperlipidemia     Hypertension     Leaky heart valve     OCD (obsessive compulsive disorder)     Pulmonary embolism (HCC) 2019     Present on Admission:   Mixed obsessional thoughts and acts   Allergic rhinitis   Aortic valve sclerosis   Essential hypertension   Hyperlipidemia   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HCC)   Acute respiratory failure with hypoxia (HCC)   Vega esophagus   Gastroesophageal reflux disease without esophagitis   BPH (benign prostatic hyperplasia)   Stage 3 chronic kidney disease, unspecified whether stage 3a or 3b CKD (HCC)      Admitting Diagnosis: SOB (shortness of breath) [R06.02]  Hypoxemia requiring supplemental oxygen [R09.02, Z99.81]  Scapular fracture [S42.109A]  Ambulatory dysfunction [R26.2]  Age/Sex: 84 y.o. male    Network Utilization Review Department  ATTENTION: Please call with any questions or concerns to [phone] and carefully listen to the prompts so that you are directed to the right person. All voicemails are confidential.   For Discharge needs, contact Care Management DC Support Team at [phone] opt. 2  Send all requests for  admission clinical reviews, approved or denied determinations and any other requests to dedicated fax number below belonging to the campus where the patient is receiving treatment. List of dedicated fax numbers for the Facilities:  FACILITY NAME UR FAX NUMBER   ADMISSION DENIALS (Administrative/Medical Necessity) [phone]   DISCHARGE SUPPORT TEAM (NETWORK) [phone]   PARENT CHILD HEALTH (Maternity/NICU/Pediatrics) [phone]   [facility] [phone]   [facility] [phone]   [facility] [phone]   [facility] [phone]   [facility] [phone]   [facility] [phone]   [facility] [phone]   [facility] [phone]   [facility] [phone]   Critical access hospital [phone]   [facility] [phone]   [facility] [phone]

## 2024-11-19 NOTE — ASSESSMENT & PLAN NOTE
Patient reports generalized weakness/fatigue since earlier this morning.  Family confirms the same at bedside.  Was found to be hypoxic in the emergency department on presentation.  No history of oxygen needs previously.  Also with right-sided scapular fracture.    Most probably generalized weakness secondary to hypoxia    CTA chest; no acute findings other than right sided scapular fracture that is comminuted and displaced.  Case discussed with orthopedics on-call Dr. Flanagan who recommended no need for transfer or acute surgical interventions at this time.    COVID/flu/RSV; negative  Lactic acid within normal range  Procalcitonin negative  CBC; white blood cell within normal limits    Plan:   -Continue oxygen supply to maintain SpO2 93%  -Respiratory protocol  -Check RPP panel  -Consult pulmonary medicine team given the patient have pulmonary hypertension could this be correlating to shortness of breath and hypoxia??   -Check updated echo  -Maintain on telemetry for next 24 hours  -Check TSH  -Refer to assessment and plan for respiratory failure  -Refer to assessment and plan for scapular fracture    11/19 - Patient feeling better today. PT/OT consults pending.

## 2024-11-19 NOTE — ASSESSMENT & PLAN NOTE
Per echo from 2022      -Check updated echo  -Consult pulmonary medicine team given shortness of breath could be related to pulmonary hypertension.  Appreciate further input    11/19 -repeat 2D echocardiogram performed showing a left ventricular ejection fraction of 60% with grade 1 diastolic dysfunction.  No significant changes compared to previous echocardiogram.  Patient was seen by pulmonology as noted below.

## 2024-11-19 NOTE — ASSESSMENT & PLAN NOTE
Lab Results   Component Value Date    EGFR 50 11/19/2024    EGFR 48 11/18/2024    EGFR 53 08/01/2024    CREATININE 1.29 11/19/2024    CREATININE 1.33 (H) 11/18/2024    CREATININE 1.23 08/01/2024   Mildly elevated creatinine.  Near baseline.    -Monitor I/os, avoid nephrotoxins as possible  -Interval follow-up with a.m. labs

## 2024-11-19 NOTE — ASSESSMENT & PLAN NOTE
Titrate supplemental oxygen to maintain saturations greater than or equal to 89%.  Has required supplemental oxygen in the past.  Add incentive spirometer and flutter valve.  Ambulatory pulse ox prior to discharge.

## 2024-11-19 NOTE — RESPIRATORY THERAPY NOTE
RT Protocol Note  Taj Roblero Jr. 84 y.o. male MRN: [id number]  Unit/Bed#: 2 South 202 Encounter: [id number]    Assessment    Principal Problem:    Generalized weakness  Active Problems:    Mixed obsessional thoughts and acts    Allergic rhinitis    Aortic valve sclerosis    Essential hypertension    Hyperlipidemia    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HCC)    Acute respiratory insufficiency    Gastroesophageal reflux disease without esophagitis    Vega esophagus    Stage 3 chronic kidney disease, unspecified whether stage 3a or 3b CKD (HCC)    BPH (benign prostatic hyperplasia)    Closed fracture of right scapula    Paroxysmal atrial fibrillation (HCC)      Home Pulmonary Medications:  NONE       Past Medical History:   Diagnosis Date    Vega esophagus     BPH (benign prostatic hyperplasia)     Depression     Hiatal hernia     HTN (hypertension)     Hyperlipidemia     Hypertension     Leaky heart valve     OCD (obsessive compulsive disorder)     Pulmonary embolism (HCC) 2019     Social History     Socioeconomic History    Marital status: /Civil Union     Spouse name: None    Number of children: None    Years of education: None    Highest education level: None   Occupational History    None   Tobacco Use    Smoking status: Never    Smokeless tobacco: Never   Vaping Use    Vaping status: Never Used   Substance and Sexual Activity    Alcohol use: Never    Drug use: Never    Sexual activity: None   Other Topics Concern    None   Social History Narrative    None     Social Drivers of Health     Financial Resource Strain: Low Risk  (12/29/2023)    Overall Financial Resource Strain (CARDIA)     Difficulty of Paying Living Expenses: Not hard at all   Food Insecurity: No Food Insecurity (11/18/2024)    Nursing - Inadequate Food Risk Classification     Worried About Running Out of Food in the Last Year: Never true     Ran Out of Food in the Last Year: Never true     Ran Out of Food in the Last Year: 1  "  Transportation Needs: No Transportation Needs (2024)    Nursing - Transportation Risk Classification     Lack of Transportation: Not on file     Lack of Transportation: 2   Physical Activity: Not on file   Stress: Not on file   Social Connections: Not on file   Intimate Partner Violence: Unknown (2024)    Nursing IPS     Feels Physically and Emotionally Safe: Not on file     Physically Hurt by Someone: Not on file     Humiliated or Emotionally Abused by Someone: Not on file     Physically Hurt by Someone: 2     Hurt or Threatened by Someone: 2   Housing Stability: Unknown (2024)    Nursing: Inadequate Housing Risk Classification     Has Housing: Not on file     Worried About Losing Housing: Not on file     Unable to Get Utilities: Not on file     Unable to Pay for Housing in the Last Year: 2     Has Housin       Subjective         Objective    Physical Exam:   Assessment Type: Pre-treatment  General Appearance: Awake  Respiratory Pattern: Normal  Chest Assessment: Chest expansion symmetrical  Bilateral Breath Sounds: Diminished  Cough: None  O2 Device: Nc    Vitals:  Blood pressure 167/72, pulse 75, temperature 99.5 °F (37.5 °C), resp. rate 20, height 5' 9\" (1.753 m), weight 85.5 kg (188 lb 7.9 oz), SpO2 96%.          Imaging and other studies:     O2 Device: Nc     Plan    Respiratory Plan: No distress/Pulmonary history        Resp Comments: neb tx given- no distress   "

## 2024-11-19 NOTE — ASSESSMENT & PLAN NOTE
Patient reports a fall on the stairs 2 days ago.  Was evaluated in the ED at that time.  X-ray showed nondisplaced scapular fracture.  Upon reevaluation today CTA chest showing comminuted and displaced right sided scapular fracture.    Case was discussed with emergency medicine physician and we recommended discussing the case with orthopedic/trauma service.  Orthopedics on-call physician Dr. Flanagan recommending no acute surgical interventions at this time.  No need to transfer the patient to another facility.      - Orthopedics recommending no acute surgical intervention at this time.

## 2024-11-19 NOTE — PLAN OF CARE
Problem: RESPIRATORY - ADULT  Goal: Achieves optimal ventilation and oxygenation  Description: INTERVENTIONS:  - Assess for changes in respiratory status  - Assess for changes in mentation and behavior  - Position to facilitate oxygenation and minimize respiratory effort  - Oxygen administered by appropriate delivery if ordered  - Initiate smoking cessation education as indicated  - Encourage broncho-pulmonary hygiene including cough, deep breathe, Incentive Spirometry  - Assess the need for suctioning and aspirate as needed  - Assess and instruct to report SOB or any respiratory difficulty  - Respiratory Therapy support as indicated  Outcome: Progressing     Problem: PAIN - ADULT  Goal: Verbalizes/displays adequate comfort level or baseline comfort level  Description: Interventions:  - Encourage patient to monitor pain and request assistance  - Assess pain using appropriate pain scale  - Administer analgesics based on type and severity of pain and evaluate response  - Implement non-pharmacological measures as appropriate and evaluate response  - Consider cultural and social influences on pain and pain management  - Notify physician/advanced practitioner if interventions unsuccessful or patient reports new pain  Outcome: Progressing     Problem: DISCHARGE PLANNING  Goal: Discharge to home or other facility with appropriate resources  Description: INTERVENTIONS:  - Identify barriers to discharge w/patient and caregiver  - Arrange for needed discharge resources and transportation as appropriate  - Identify discharge learning needs (meds, wound care, etc.)  - Arrange for interpretive services to assist at discharge as needed  - Refer to Case Management Department for coordinating discharge planning if the patient needs post-hospital services based on physician/advanced practitioner order or complex needs related to functional status, cognitive ability, or social support system  Outcome: Progressing     Problem:  Knowledge Deficit  Goal: Patient/family/caregiver demonstrates understanding of disease process, treatment plan, medications, and discharge instructions  Description: Complete learning assessment and assess knowledge base.  Interventions:  - Provide teaching at level of understanding  - Provide teaching via preferred learning methods  Outcome: Progressing

## 2024-11-19 NOTE — CONSULTS
Consultation - Pulmonology   Name: Taj Roblero Jr. 84 y.o. male I MRN: [id number]  Unit/Bed#: 2 [address] Date of Admission: 11/18/2024   Date of Service: 11/19/2024 I Hospital Day: 1   Inpatient consult to Pulmonology  Consult performed by: BERNADETTE Gardner  Consult ordered by: Demetra Wayne MD        Physician Requesting Evaluation: Demetra Wayne MD   Reason for Evaluation / Principal Problem: New onset respiratory failure, hypoxia    Assessment & Plan  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HCC)  Repeat echocardiogram is pending.  Acute respiratory failure with hypoxia (HCC)  Titrate supplemental oxygen to maintain saturations greater than or equal to 89%.  Has required supplemental oxygen in the past.  Add incentive spirometer and flutter valve.  Ambulatory pulse ox prior to discharge.  Closed fracture of right scapula  Management per orthopedic team.  Paroxysmal atrial fibrillation (HCC)  Management per primary team.  Abnormal CT of the chest  Taj has linear atelectasis noted on his CT, as well as basilar changes concerning for bronchitis/aspiration pneumonitis.  He does report he has a chronic daily cough and has had aspiration pneumonia in the past.  He also has a large hiatal hernia.  Continue DuoNebs for pulmonary hygiene.  Check swallow evaluation.    I have discussed the above management plan in detail with the primary service.   Pulmonology service will follow.  Please contact the SecureChat role for the Pulmonology service with any questions/concerns.    History of Present Illness   Taj Roblero Jr. is a 84 y.o. male who presents with right arm pain.  Taj reports that he was of normal state of health prior to admission.  He was in his basement and fell on the ground.  He hurt his arm.  He came to the ER for further evaluation and was found to have a right scapular fracture.  He reports that he also was found to need supplemental oxygen.  He did have some transient shortness of breath, but  reports his breathing is back to normal today.  He denies any chronic dyspnea, but does state that he has a chronic daily cough.  He is not on supplemental oxygen or inhalers at home.    Review of Systems   All other systems reviewed and are negative.  A full 12-point review of systems was completed and is negative except for those outlined in the HPI.    Historical Information   I have reviewed the patient's PMH, PSH, Social History, Family History, Meds, and Allergies  Historical Information   Past Medical History:   Diagnosis Date    Vega esophagus     BPH (benign prostatic hyperplasia)     Depression     Hiatal hernia     HTN (hypertension)     Hyperlipidemia     Hypertension     Leaky heart valve     OCD (obsessive compulsive disorder)     Pulmonary embolism (HCC) 2019     Past Surgical History:   Procedure Laterality Date    HARDWARE REMOVAL Left 09/09/2023    Procedure: REMOVAL HARDWARE;  Surgeon: Henri Flanagan DO;  Location: [facility];  Service: Orthopedics    INCISION AND DRAINAGE OF WOUND Left 09/09/2023    Procedure: INCISION AND DRAINAGE (I&D) EXTREMITY;  Surgeon: Henri Flanagan DO;  Location: [facility];  Service: Orthopedics    IR ASPIRATION ONLY  12/15/2023    IR IVC FILTER PLACEMENT OPTIONAL/TEMPORARY  12/07/2019    IR IVC FILTER REMOVAL  08/21/2020    KS OPTX FEM SHFT FX W/INSJ IMED IMPLT W/WO SCREW Right 12/04/2019    Procedure: INSERTION NAIL IM FEMUR ANTEGRADE (TROCHANTERIC);  Surgeon: Yesenia Veronica DO;  Location: [facility];  Service: Orthopedics    KS OPTX FEM SHFT FX W/INSJ IMED IMPLT W/WO SCREW Left 06/17/2022    Procedure: INSERTION NAIL IM FEMUR ANTEGRADE (TROCHANTERIC) - long nail;  Surgeon: Henri Flanagan DO;  Location: [facility];  Service: Orthopedics    UPPER GASTROINTESTINAL ENDOSCOPY      WOUND DEBRIDEMENT Left 12/16/2023    Procedure: DEBRIDEMENT LOWER EXTREMITY (WASH OUT)- left thigh abscess irrigation and debridement;  Surgeon: Feng Robertson MD;  Location: [facility];   Service: Orthopedics     Social History     Tobacco Use    Smoking status: Never    Smokeless tobacco: Never   Vaping Use    Vaping status: Never Used   Substance and Sexual Activity    Alcohol use: Never    Drug use: Never    Sexual activity: Not on file     E-Cigarette/Vaping    E-Cigarette Use Never User      E-Cigarette/Vaping Substances    Nicotine No     THC No     CBD No     Flavoring No     Other No     Unknown No        Current Facility-Administered Medications:     albuterol (PROVENTIL HFA,VENTOLIN HFA) inhaler 2 puff, Q6H PRN    amLODIPine (NORVASC) tablet 2.5 mg, Daily    atorvastatin (LIPITOR) tablet 5 mg, Daily With Dinner    calcium carbonate-vitamin D 500 mg-5 mcg tablet 1 tablet, BID With Meals    citalopram (CeleXA) tablet 40 mg, Daily    enoxaparin (LOVENOX) subcutaneous injection 40 mg, Daily    fluticasone (FLONASE) 50 mcg/act nasal spray 1 spray, Daily    ipratropium-albuterol (DUO-NEB) 0.5-2.5 mg/3 mL inhalation solution 3 mL, Q6H    magnesium Oxide (MAG-OX) tablet 400 mg, Daily    metoprolol tartrate (LOPRESSOR) tablet 50 mg, Q12H    multivitamin stress formula tablet 1 tablet, Daily    niacin (NIASPAN) CR tablet 500 mg, HS    pantoprazole (PROTONIX) EC tablet 40 mg, Daily    tamsulosin (FLOMAX) capsule 0.4 mg, Daily With Dinner  Prior to Admission Medications   Prescriptions Last Dose Informant Patient Reported? Taking?   Calcium Carb-Cholecalciferol (CALTRATE 600+D3 PO) 11/18/2024 Self Yes Yes   Sig: Take 600 mg by mouth 2 (two) times a day   Multiple Vitamin (MULTIVITAMIN) tablet Unknown Self Yes No   Sig: Take 1 tablet by mouth daily   acetaminophen (TYLENOL) 650 mg CR tablet Not Taking  No No   Sig: Take 1 tablet (650 mg total) by mouth every 8 (eight) hours as needed for mild pain   Patient not taking: Reported on 11/18/2024   albuterol (Ventolin HFA) 90 mcg/act inhaler Unknown Self No No   Sig: Inhale 2 puffs every 6 (six) hours as needed for wheezing   atorvastatin (LIPITOR) 10 mg  tablet   No No   Sig: Take 0.5 tablets (5 mg total) by mouth daily with dinner TAKE ONE-HALF TABLET DAILY   citalopram (CeleXA) 40 mg tablet   No No   Sig: Take 1 tablet (40 mg total) by mouth daily   fluticasone (FLONASE) 50 mcg/act nasal spray Unknown  No No   Si spray into each nostril daily   fosinopril (MONOPRIL) 10 mg tablet   No No   Sig: TAKE ONE-HALF TABLET BY MOUTH DAILY   ipratropium (ATROVENT) 0.03 % nasal spray  Self No No   Si sprays into each nostril 2 (two) times a day   magnesium oxide (MAG-OX) 400 mg tablet Unknown Self Yes No   Sig: Take 1 tablet by mouth daily   metoprolol tartrate (LOPRESSOR) 50 mg tablet   No No   Sig: Take 1 tablet (50 mg total) by mouth every 12 (twelve) hours   niacin (NIASPAN) 500 mg CR tablet   No No   Sig: Take 1 tablet (500 mg total) by mouth daily at bedtime   oxyCODONE (Roxicodone) 5 immediate release tablet 2024  No Yes   Sig: Take 1 tablet (5 mg total) by mouth every 4 (four) hours as needed for moderate pain for up to 10 days Max Daily Amount: 30 mg   pantoprazole (PROTONIX) 40 mg tablet   No No   Sig: Take 1 tablet (40 mg total) by mouth daily   potassium chloride (Klor-Con) 10 mEq tablet 2024  No Yes   Sig: Take 1 tablet (10 mEq total) by mouth 2 (two) times a week   tamsulosin (FLOMAX) 0.4 mg  Self Yes No   Sig: Take 0.4 mg by mouth daily with dinner   torsemide (DEMADEX) 5 MG tablet Unknown  No No   Sig: Take 1 tablet (5 mg total) by mouth 2 (two) times a week Monday and Thursday      Facility-Administered Medications: None     Patient has no known allergies.    Objective :  Temp:  [99.2 °F (37.3 °C)-99.7 °F (37.6 °C)] 99.5 °F (37.5 °C)  HR:  [] 99  BP: (148-171)/(62-74) 154/74  Resp:  [17-28] 20  SpO2:  [81 %-97 %] 81 %  O2 Device: Nasal cannula  Nasal Cannula O2 Flow Rate (L/min):  [2 L/min-3 L/min] 2 L/min    Physical Exam  Vitals reviewed.   Constitutional:       General: He is not in acute distress.     Appearance: He is  well-developed. He is not toxic-appearing or diaphoretic.      Interventions: Nasal cannula in place.   HENT:      Head: Normocephalic and atraumatic.   Eyes:      General: No scleral icterus.  Neck:      Trachea: No tracheal deviation.   Cardiovascular:      Rate and Rhythm: Normal rate and regular rhythm.      Heart sounds: S1 normal and S2 normal. No murmur heard.     No friction rub. No gallop.   Pulmonary:      Effort: Pulmonary effort is normal. No tachypnea, accessory muscle usage or respiratory distress.      Breath sounds: Normal breath sounds. No stridor. No decreased breath sounds, wheezing, rhonchi or rales.   Chest:      Chest wall: No tenderness.   Abdominal:      General: Bowel sounds are normal. There is no distension.      Palpations: Abdomen is soft.      Tenderness: There is no abdominal tenderness.   Musculoskeletal:         General: No tenderness.      Cervical back: Neck supple.      Right lower leg: No edema.      Left lower leg: No edema.      Comments: Right arm sling in place.   Skin:     General: Skin is warm and dry.      Findings: No rash.   Neurological:      Mental Status: He is alert and oriented to person, place, and time.      GCS: GCS eye subscore is 4. GCS verbal subscore is 5. GCS motor subscore is 6.   Psychiatric:         Speech: Speech normal.         Behavior: Behavior normal. Behavior is cooperative.           Lab Results: I have reviewed the following results:  .     11/18/24  1327 11/18/24  1540 11/19/24  0448   WBC 10.79*  --  10.02   HGB 12.7  --  12.5   HCT 37.7  --  37.7     --  183   SODIUM 137  --  137   K 5.0  --  4.9     --  99   CO2 29  --  30   BUN 31*  --  26*   CREATININE 1.33*  --  1.29   GLUC 126  --  113   MG  --   --  1.9   AST 21  --   --    ALT 9  --   --    ALB 3.9  --   --    TBILI 0.95  --   --    ALKPHOS 63  --   --    PTT 29  --   --    INR 1.06  --   --    HSTNI0 7  --   --    HSTNI2  --  7  --    BNP 99  --   --    LACTICACID 1.2  --    --      Echocardiogram pending.  Previous from 2022 showed EF 60% with grade 1 diastolic dysfunction and RV size and systolic function normal.  CTA chest personally reviewed images shows no PE.  There is mild dependent atelectasis with no endobronchial lesion.  Prominent, but not pathologically enlarged mediastinal lymph nodes with large hiatal hernia.  There is also a right scapular fracture and a chronic T3 compression fracture.  RP 2 panel pending  Flu/COVID-negative  .6  BNP 99  Blood cultures x 2 pending  Procalcitonin 0.12   no

## 2024-11-19 NOTE — ASSESSMENT & PLAN NOTE
Found on presentation to the ED.  No oxygen needs at baseline.  Past medical history is negative for COPD/smoking/dust exposure    -COVID/flu/RSV; negative  -CTA chest; no acute pulmonary embolism noted.  No other acute findings note other than scapular fracture.      Plan:   Check RPP panel  Check ESR, CRP  Check updated echo  Continue oxygen supplementation with target SpO2 93%  Respiratory protocol  Jovi  Consult pulmonary medicine team given this problem could be related to pulmonary hypertension  Hold torsemide 5 mg twice a week  Give 1 x lasix 20 mg IV     11/19 - Patient reports feeling better today.  Patient was seen by pulmonology who suspects that the Pittore symptoms are secondary to atelectasis and splinting from recent fall and fractures.  Question of mild aspiration pneumonitis but there are no overt signs of pneumonia.

## 2024-11-19 NOTE — PROGRESS NOTES
Progress Note - Hospitalist   Name: Taj Roblero JrChandler 84 y.o. male I MRN: [id number]  Unit/Bed#: 2 [address] Date of Admission: 11/18/2024   Date of Service: 11/19/2024 I Hospital Day: 1    Assessment & Plan  Generalized weakness  Patient reports generalized weakness/fatigue since earlier this morning.  Family confirms the same at bedside.  Was found to be hypoxic in the emergency department on presentation.  No history of oxygen needs previously.  Also with right-sided scapular fracture.    Most probably generalized weakness secondary to hypoxia    CTA chest; no acute findings other than right sided scapular fracture that is comminuted and displaced.  Case discussed with orthopedics on-call Dr. Flanagan who recommended no need for transfer or acute surgical interventions at this time.    COVID/flu/RSV; negative  Lactic acid within normal range  Procalcitonin negative  CBC; white blood cell within normal limits    Plan:   -Continue oxygen supply to maintain SpO2 93%  -Respiratory protocol  -Check RPP panel  -Consult pulmonary medicine team given the patient have pulmonary hypertension could this be correlating to shortness of breath and hypoxia??   -Check updated echo  -Maintain on telemetry for next 24 hours  -Check TSH  -Refer to assessment and plan for respiratory failure  -Refer to assessment and plan for scapular fracture    11/19 - Patient feeling better today. PT/OT consults pending.  Mixed obsessional thoughts and acts  Updated EKG.  QTc 420.  Continue Lexapro    Allergic rhinitis  Flonase as needed  Aortic valve sclerosis  Per echo from 2022  Check repeat echo  Essential hypertension  On ACE inhibitor at home.  Given elevated creatinine we will hold the medication    -Substituted with amlodipine 2.5 mg OD  -Monitor vitals  Hyperlipidemia  Continue statin and niacin  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HCC)  Per echo from 2022      -Check updated echo  -Consult pulmonary medicine team given shortness of breath  could be related to pulmonary hypertension.  Appreciate further input    11/19 -repeat 2D echocardiogram performed showing a left ventricular ejection fraction of 60% with grade 1 diastolic dysfunction.  No significant changes compared to previous echocardiogram.  Patient was seen by pulmonology as noted below.  Acute respiratory failure with hypoxia (HCC)  Found on presentation to the ED.  No oxygen needs at baseline.  Past medical history is negative for COPD/smoking/dust exposure    -COVID/flu/RSV; negative  -CTA chest; no acute pulmonary embolism noted.  No other acute findings note other than scapular fracture.      Plan:   Check RPP panel  Check ESR, CRP  Check updated echo  Continue oxygen supplementation with target SpO2 93%  Respiratory protocol  Jovi  Consult pulmonary medicine team given this problem could be related to pulmonary hypertension  Hold torsemide 5 mg twice a week  Give 1 x lasix 20 mg IV     11/19 - Patient reports feeling better today.  Patient was seen by pulmonology who suspects that the Pittore symptoms are secondary to atelectasis and splinting from recent fall and fractures.  Question of mild aspiration pneumonitis but there are no overt signs of pneumonia.  Gastroesophageal reflux disease without esophagitis  Continue Protonix  Vega esophagus  Continue Protonix  Stage 3 chronic kidney disease, unspecified whether stage 3a or 3b CKD (HCC)  Lab Results   Component Value Date    EGFR 50 11/19/2024    EGFR 48 11/18/2024    EGFR 53 08/01/2024    CREATININE 1.29 11/19/2024    CREATININE 1.33 (H) 11/18/2024    CREATININE 1.23 08/01/2024   Mildly elevated creatinine.  Near baseline.    -Monitor I/os, avoid nephrotoxins as possible  -Interval follow-up with a.m. labs  BPH (benign prostatic hyperplasia)  Continue Flomax  Closed fracture of right scapula  Patient reports a fall on the stairs 2 days ago.  Was evaluated in the ED at that time.  X-ray showed nondisplaced scapular fracture.   Upon reevaluation today CTA chest showing comminuted and displaced right sided scapular fracture.    Case was discussed with emergency medicine physician and we recommended discussing the case with orthopedic/trauma service.  Orthopedics on-call physician Dr. Flanagan recommending no acute surgical interventions at this time.  No need to transfer the patient to another facility.      - Orthopedics recommending no acute surgical intervention at this time.  Paroxysmal atrial fibrillation (HCC)  Follows up with out of network cardiologist    -Continue metoprolol 50 mg twice daily  -Not on anticoagulation.  -Continue telemetry  -Check updated echo  Abnormal CT of the chest    Compression fracture of T3 vertebra (HCC)    VTE Pharmacologic Prophylaxis: VTE Score: 6 Lovenox prophylaxis.    Mobility:   Basic Mobility Inpatient Raw Score: 12  -[facility] Goal: 4: Move to chair/commode  -[facility] Achieved: 2: Bed activities/Dependent transfer    Patient Centered Rounds: I performed bedside rounds with nursing staff today.     Current Length of Stay: 1 day(s)  Current Patient Status: Inpatient   Certification Statement: The patient will continue to require additional inpatient hospital stay due to above.  Discharge Plan: TBD    Code Status: Level 1 - Full Code    Subjective   Patient seen and examined at bedside. No acute events overnight. Patient reports feeling less short of breath today. No chest pain.    Objective :  Temp:  [99.2 °F (37.3 °C)-99.7 °F (37.6 °C)] 99.5 °F (37.5 °C)  HR:  [] 94  BP: (148-167)/(62-74) 154/74  Resp:  [17-26] 20  SpO2:  [81 %-97 %] 90 %  O2 Device: Nasal cannula  Nasal Cannula O2 Flow Rate (L/min):  [2 L/min-3 L/min] 2 L/min    Body mass index is 27.84 kg/m².     Input and Output Summary (last 24 hours):     Intake/Output Summary (Last 24 hours) at 11/19/2024 1522  Last data filed at 11/18/2024 2205  Gross per 24 hour   Intake --   Output 200 ml   Net -200 ml     Physical Exam  HENT:      Head:  Normocephalic.      Mouth/Throat:      Mouth: Mucous membranes are moist.   Eyes:      Extraocular Movements: Extraocular movements intact.   Cardiovascular:      Rate and Rhythm: Normal rate.   Pulmonary:      Effort: Pulmonary effort is normal. No respiratory distress.      Comments: Decreased in the bases  Abdominal:      Palpations: Abdomen is soft.      Tenderness: There is no abdominal tenderness.   Skin:     General: Skin is warm.   Neurological:      Mental Status: He is alert.   Psychiatric:         Mood and Affect: Mood normal.       Lines/Drains:    Telemetry:  Telemetry Orders (From admission, onward)               24 Hour Telemetry Monitoring  Continuous x 24 Hours (Telem)        Expiring   Question:  Reason for 24 Hour Telemetry  Answer:  Arrhythmias requiring acute medical intervention / PPM or ICD malfunction                       Lab Results: I have reviewed the following results:   Results from last 7 days   Lab Units 11/19/24  0448   WBC Thousand/uL 10.02   HEMOGLOBIN g/dL 12.5   HEMATOCRIT % 37.7   PLATELETS Thousands/uL 183   SEGS PCT % 67   LYMPHO PCT % 12*   MONO PCT % 15*   EOS PCT % 4     Results from last 7 days   Lab Units 11/19/24  0448 11/18/24  1327   SODIUM mmol/L 137 137   POTASSIUM mmol/L 4.9 5.0   CHLORIDE mmol/L 99 101   CO2 mmol/L 30 29   BUN mg/dL 26* 31*   CREATININE mg/dL 1.29 1.33*   ANION GAP mmol/L 8 7   CALCIUM mg/dL 9.4 9.1   ALBUMIN g/dL  --  3.9   TOTAL BILIRUBIN mg/dL  --  0.95   ALK PHOS U/L  --  63   ALT U/L  --  9   AST U/L  --  21   GLUCOSE RANDOM mg/dL 113 126     Results from last 7 days   Lab Units 11/18/24  1327   INR  1.06     Results from last 7 days   Lab Units 11/18/24  1327   LACTIC ACID mmol/L 1.2   PROCALCITONIN ng/ml 0.12     Recent Cultures (last 7 days):   Results from last 7 days   Lab Units 11/18/24  1327   GRAM STAIN RESULT  Gram positive cocci in clusters*     Last 24 Hours Medication List:     Current Facility-Administered Medications:      albuterol (PROVENTIL HFA,VENTOLIN HFA) inhaler 2 puff, Q6H PRN    amLODIPine (NORVASC) tablet 2.5 mg, Daily    atorvastatin (LIPITOR) tablet 5 mg, Daily With Dinner    calcium carbonate-vitamin D 500 mg-5 mcg tablet 1 tablet, BID With Meals    citalopram (CeleXA) tablet 40 mg, Daily    enoxaparin (LOVENOX) subcutaneous injection 40 mg, Daily    fluticasone (FLONASE) 50 mcg/act nasal spray 1 spray, Daily    ipratropium-albuterol (DUO-NEB) 0.5-2.5 mg/3 mL inhalation solution 3 mL, Q6H    magnesium Oxide (MAG-OX) tablet 400 mg, Daily    metoprolol tartrate (LOPRESSOR) tablet 50 mg, Q12H    multivitamin stress formula tablet 1 tablet, Daily    niacin (NIASPAN) CR tablet 500 mg, HS    oxyCODONE (ROXICODONE) split tablet 2.5 mg, Q6H PRN    pantoprazole (PROTONIX) EC tablet 40 mg, Daily    tamsulosin (FLOMAX) capsule 0.4 mg, Daily With Dinner    Administrative Statements   Today, Patient Was Seen By: Lexa De La Vega MD    **Please Note: This note may have been constructed using a voice recognition system.**

## 2024-11-19 NOTE — PLAN OF CARE
Problem: RESPIRATORY - ADULT  Goal: Achieves optimal ventilation and oxygenation  Description: INTERVENTIONS:  - Assess for changes in respiratory status  - Assess for changes in mentation and behavior  - Position to facilitate oxygenation and minimize respiratory effort  - Oxygen administered by appropriate delivery if ordered  - Initiate smoking cessation education as indicated  - Encourage broncho-pulmonary hygiene including cough, deep breathe, Incentive Spirometry  - Assess the need for suctioning and aspirate as needed  - Assess and instruct to report SOB or any respiratory difficulty  - Respiratory Therapy support as indicated  Outcome: Progressing     Problem: PAIN - ADULT  Goal: Verbalizes/displays adequate comfort level or baseline comfort level  Description: Interventions:  - Encourage patient to monitor pain and request assistance  - Assess pain using appropriate pain scale  - Administer analgesics based on type and severity of pain and evaluate response  - Implement non-pharmacological measures as appropriate and evaluate response  - Consider cultural and social influences on pain and pain management  - Notify physician/advanced practitioner if interventions unsuccessful or patient reports new pain  Outcome: Progressing     Problem: INFECTION - ADULT  Goal: Absence or prevention of progression during hospitalization  Description: INTERVENTIONS:  - Assess and monitor for signs and symptoms of infection  - Monitor lab/diagnostic results  - Monitor all insertion sites, i.e. indwelling lines, tubes, and drains  - Monitor endotracheal if appropriate and nasal secretions for changes in amount and color  - [place] appropriate cooling/warming therapies per order  - Administer medications as ordered  - Instruct and encourage patient and family to use good hand hygiene technique  - Identify and instruct in appropriate isolation precautions for identified infection/condition  Outcome: Progressing  Goal:  Absence of fever/infection during neutropenic period  Description: INTERVENTIONS:  - Monitor WBC    Outcome: Progressing     Problem: SAFETY ADULT  Goal: Patient will remain free of falls  Description: INTERVENTIONS:  - Educate patient/family on patient safety including physical limitations  - Instruct patient to call for assistance with activity   - Consult OT/PT to assist with strengthening/mobility   - Keep Call bell within reach  - Keep bed low and locked with side rails adjusted as appropriate  - Keep care items and personal belongings within reach  - Initiate and maintain comfort rounds  - Make Fall Risk Sign visible to staff  - Offer Toileting every 2 Hours, in advance of need  - Initiate/Maintain bed alarm  - Obtain necessary fall risk management equipment:   - Apply yellow socks and bracelet for high fall risk patients  - Consider moving patient to room near nurses station  Outcome: Progressing  Goal: Maintain or return to baseline ADL function  Description: INTERVENTIONS:  -  Assess patient's ability to carry out ADLs; assess patient's baseline for ADL function and identify physical deficits which impact ability to perform ADLs (bathing, care of mouth/teeth, toileting, grooming, dressing, etc.)  - Assess/evaluate cause of self-care deficits   - Assess range of motion  - Assess patient's mobility; develop plan if impaired  - Assess patient's need for assistive devices and provide as appropriate  - Encourage maximum independence but intervene and supervise when necessary  - Involve family in performance of ADLs  - Assess for home care needs following discharge   - Consider OT consult to assist with ADL evaluation and planning for discharge  - Provide patient education as appropriate  Outcome: Progressing  Goal: Maintains/Returns to pre admission functional level  Description: INTERVENTIONS:  - Perform AM-PAC 6 Click Basic Mobility/ Daily Activity assessment daily.  - Set and communicate daily mobility goal  to care team and patient/family/caregiver.   - Collaborate with rehabilitation services on mobility goals if consulted  - Perform Range of Motion 3 times a day.  - Reposition patient every 3 hours.  - Dangle patient 3 times a day  - Stand patient 3 times a day  - Ambulate patient 3 times a day  - Out of bed to chair 3 times a day   - Out of bed for meals 3 times a day  - Out of bed for toileting  - Record patient progress and toleration of activity level   Outcome: Progressing     Problem: DISCHARGE PLANNING  Goal: Discharge to home or other facility with appropriate resources  Description: INTERVENTIONS:  - Identify barriers to discharge w/patient and caregiver  - Arrange for needed discharge resources and transportation as appropriate  - Identify discharge learning needs (meds, wound care, etc.)  - Arrange for interpretive services to assist at discharge as needed  - Refer to Case Management Department for coordinating discharge planning if the patient needs post-hospital services based on physician/advanced practitioner order or complex needs related to functional status, cognitive ability, or social support system  Outcome: Progressing     Problem: Knowledge Deficit  Goal: Patient/family/caregiver demonstrates understanding of disease process, treatment plan, medications, and discharge instructions  Description: Complete learning assessment and assess knowledge base.  Interventions:  - Provide teaching at level of understanding  - Provide teaching via preferred learning methods  Outcome: Progressing

## 2024-11-20 ENCOUNTER — TELEPHONE (OUTPATIENT)
Dept: PULMONOLOGY | Facility: MEDICAL CENTER | Age: 84
End: 2024-11-20

## 2024-11-20 LAB
ANION GAP SERPL CALCULATED.3IONS-SCNC: 5 MMOL/L (ref 4–13)
BASOPHILS # BLD AUTO: 0.04 THOUSANDS/ÂΜL (ref 0–0.1)
BASOPHILS NFR BLD AUTO: 0 % (ref 0–1)
BUN SERPL-MCNC: 36 MG/DL (ref 5–25)
CALCIUM SERPL-MCNC: 8.5 MG/DL (ref 8.4–10.2)
CHLORIDE SERPL-SCNC: 98 MMOL/L (ref 96–108)
CO2 SERPL-SCNC: 31 MMOL/L (ref 21–32)
CREAT SERPL-MCNC: 1.3 MG/DL (ref 0.6–1.3)
EOSINOPHIL # BLD AUTO: 0.64 THOUSAND/ÂΜL (ref 0–0.61)
EOSINOPHIL NFR BLD AUTO: 7 % (ref 0–6)
ERYTHROCYTE [DISTWIDTH] IN BLOOD BY AUTOMATED COUNT: 13.9 % (ref 11.6–15.1)
GFR SERPL CREATININE-BSD FRML MDRD: 50 ML/MIN/1.73SQ M
GLUCOSE SERPL-MCNC: 104 MG/DL (ref 65–140)
HCT VFR BLD AUTO: 35.4 % (ref 36.5–49.3)
HGB BLD-MCNC: 11.8 G/DL (ref 12–17)
IMM GRANULOCYTES # BLD AUTO: 0.03 THOUSAND/UL (ref 0–0.2)
IMM GRANULOCYTES NFR BLD AUTO: 0 % (ref 0–2)
LYMPHOCYTES # BLD AUTO: 1.44 THOUSANDS/ÂΜL (ref 0.6–4.47)
LYMPHOCYTES NFR BLD AUTO: 16 % (ref 14–44)
MAGNESIUM SERPL-MCNC: 1.9 MG/DL (ref 1.9–2.7)
MCH RBC QN AUTO: 32 PG (ref 26.8–34.3)
MCHC RBC AUTO-ENTMCNC: 33.3 G/DL (ref 31.4–37.4)
MCV RBC AUTO: 96 FL (ref 82–98)
MONOCYTES # BLD AUTO: 1.41 THOUSAND/ÂΜL (ref 0.17–1.22)
MONOCYTES NFR BLD AUTO: 15 % (ref 4–12)
NEUTROPHILS # BLD AUTO: 5.71 THOUSANDS/ÂΜL (ref 1.85–7.62)
NEUTS SEG NFR BLD AUTO: 62 % (ref 43–75)
NRBC BLD AUTO-RTO: 0 /100 WBCS
PLATELET # BLD AUTO: 179 THOUSANDS/UL (ref 149–390)
PMV BLD AUTO: 9.3 FL (ref 8.9–12.7)
POTASSIUM SERPL-SCNC: 4 MMOL/L (ref 3.5–5.3)
RBC # BLD AUTO: 3.69 MILLION/UL (ref 3.88–5.62)
SODIUM SERPL-SCNC: 134 MMOL/L (ref 135–147)
WBC # BLD AUTO: 9.27 THOUSAND/UL (ref 4.31–10.16)

## 2024-11-20 PROCEDURE — 99232 SBSQ HOSP IP/OBS MODERATE 35: CPT | Performed by: STUDENT IN AN ORGANIZED HEALTH CARE EDUCATION/TRAINING PROGRAM

## 2024-11-20 PROCEDURE — 97167 OT EVAL HIGH COMPLEX 60 MIN: CPT

## 2024-11-20 PROCEDURE — 83735 ASSAY OF MAGNESIUM: CPT | Performed by: FAMILY MEDICINE

## 2024-11-20 PROCEDURE — 92610 EVALUATE SWALLOWING FUNCTION: CPT

## 2024-11-20 PROCEDURE — 97163 PT EVAL HIGH COMPLEX 45 MIN: CPT

## 2024-11-20 PROCEDURE — 94760 N-INVAS EAR/PLS OXIMETRY 1: CPT

## 2024-11-20 PROCEDURE — 94640 AIRWAY INHALATION TREATMENT: CPT

## 2024-11-20 PROCEDURE — 94668 MNPJ CHEST WALL SBSQ: CPT

## 2024-11-20 PROCEDURE — 97530 THERAPEUTIC ACTIVITIES: CPT

## 2024-11-20 PROCEDURE — 99232 SBSQ HOSP IP/OBS MODERATE 35: CPT | Performed by: FAMILY MEDICINE

## 2024-11-20 PROCEDURE — 80048 BASIC METABOLIC PNL TOTAL CA: CPT | Performed by: FAMILY MEDICINE

## 2024-11-20 PROCEDURE — 85025 COMPLETE CBC W/AUTO DIFF WBC: CPT | Performed by: FAMILY MEDICINE

## 2024-11-20 RX ORDER — AMLODIPINE BESYLATE 2.5 MG/1
2.5 TABLET ORAL DAILY
Start: 2024-11-21

## 2024-11-20 RX ORDER — IPRATROPIUM BROMIDE AND ALBUTEROL SULFATE 2.5; .5 MG/3ML; MG/3ML
3 SOLUTION RESPIRATORY (INHALATION)
Start: 2024-11-20

## 2024-11-20 RX ORDER — OXYCODONE HYDROCHLORIDE 5 MG/1
2.5 TABLET ORAL EVERY 6 HOURS PRN
Status: SHIPPED | OUTPATIENT
Start: 2024-11-20 | End: 2024-11-25

## 2024-11-20 RX ADMIN — CITALOPRAM HYDROBROMIDE 40 MG: 20 TABLET ORAL at 08:35

## 2024-11-20 RX ADMIN — IPRATROPIUM BROMIDE AND ALBUTEROL SULFATE 3 ML: .5; 3 SOLUTION RESPIRATORY (INHALATION) at 13:20

## 2024-11-20 RX ADMIN — Medication 1 TABLET: at 08:35

## 2024-11-20 RX ADMIN — FLUTICASONE PROPIONATE 1 SPRAY: 50 SPRAY, METERED NASAL at 08:34

## 2024-11-20 RX ADMIN — Medication 1 TABLET: at 16:39

## 2024-11-20 RX ADMIN — TAMSULOSIN HYDROCHLORIDE 0.4 MG: 0.4 CAPSULE ORAL at 16:39

## 2024-11-20 RX ADMIN — Medication 400 MG: at 08:35

## 2024-11-20 RX ADMIN — IPRATROPIUM BROMIDE AND ALBUTEROL SULFATE 3 ML: .5; 3 SOLUTION RESPIRATORY (INHALATION) at 19:33

## 2024-11-20 RX ADMIN — METOPROLOL TARTRATE 50 MG: 50 TABLET, FILM COATED ORAL at 17:34

## 2024-11-20 RX ADMIN — METOPROLOL TARTRATE 50 MG: 50 TABLET, FILM COATED ORAL at 06:06

## 2024-11-20 RX ADMIN — NIACIN 500 MG: 500 TABLET, EXTENDED RELEASE ORAL at 23:25

## 2024-11-20 RX ADMIN — PANTOPRAZOLE SODIUM 40 MG: 40 TABLET, DELAYED RELEASE ORAL at 06:06

## 2024-11-20 RX ADMIN — ATORVASTATIN CALCIUM 5 MG: 10 TABLET, FILM COATED ORAL at 16:39

## 2024-11-20 RX ADMIN — B-COMPLEX W/ C & FOLIC ACID TAB 1 TABLET: TAB at 08:35

## 2024-11-20 RX ADMIN — AMLODIPINE BESYLATE 2.5 MG: 2.5 TABLET ORAL at 08:35

## 2024-11-20 RX ADMIN — IPRATROPIUM BROMIDE AND ALBUTEROL SULFATE 3 ML: .5; 3 SOLUTION RESPIRATORY (INHALATION) at 07:24

## 2024-11-20 RX ADMIN — IPRATROPIUM BROMIDE AND ALBUTEROL SULFATE 3 ML: .5; 3 SOLUTION RESPIRATORY (INHALATION) at 02:06

## 2024-11-20 RX ADMIN — ENOXAPARIN SODIUM 40 MG: 40 INJECTION SUBCUTANEOUS at 08:35

## 2024-11-20 NOTE — CASE MANAGEMENT
Case Management Assessment & Discharge Planning Note    Patient name Taj Roblero Jr.  Location 2 [facility] [address] [address] MRN [id number]  : 1940 Date 2024       Current Admission Date: 2024  Current Admission Diagnosis:Generalized weakness   Patient Active Problem List    Diagnosis Date Noted Date Diagnosed    Abnormal CT of the chest 2024     Compression fracture of T3 vertebra (HCC) 2024     Closed fracture of right scapula 2024     Generalized weakness 2024     Paroxysmal atrial fibrillation (HCC) 2024     Acute bronchitis 2023     Electrolyte abnormality 2023     Cellulitis of extremity 2023     Mild protein-calorie malnutrition (HCC) 10/13/2023     Hypomagnesemia 2023     Abscess of left leg 2023     BPH (benign prostatic hyperplasia) 2023     OCD (obsessive compulsive disorder)      Stage 3 chronic kidney disease, unspecified whether stage 3a or 3b CKD (HCC) 2023     Vega esophagus 2023     Chronic rhinitis 2023     Other pulmonary embolism without acute cor pulmonale, unspecified chronicity (HCC) 2023     Gastroesophageal reflux disease without esophagitis 2023     Leg edema, left 2023     Acute respiratory failure with hypoxia ([facility]) 2023     Hiatal hernia 2023     Age-related osteoporosis without current pathological fracture 2022     Abnormal CT scan, chest 2022     Lung nodule 2022     Bladder wall thickening 2022     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HCC) 2022     Paroxysmal atrial flutter (HCC) 2020     Valvular heart disease 2019     SVT (supraventricular tachycardia) (HCC) 2019     History of pulmonary embolus (PE) 2019      Impaired vision 2019     Hyperlipidemia      Mixed obsessional thoughts and acts 10/14/2019     Allergic rhinitis 10/14/2019     Nonrheumatic mitral valve regurgitation 10/14/2019     Aortic  valve sclerosis 10/14/2019     Nonrheumatic aortic valve insufficiency 10/14/2019     Essential hypertension 10/14/2019     Recurrent major depressive disorder, in full remission (HCC) 10/14/2019       LOS (days): 2  Geometric Mean LOS (GMLOS) (days): 3.5  Days to GMLOS:1.7     OBJECTIVE:    Risk of Unplanned Readmission Score: 17.73     Current admission status: Inpatient    Preferred Pharmacy:   [id number] - [place], [place] - [address] [address]  [address] [address]  Phone: [phone] Fax: [phone]    Primary Care Provider: Carmen Hunt MD    Primary Insurance: [employer] [address] [facility]  Secondary Insurance:     ASSESSMENT:  Active Health Care Proxies    There are no active Health Care Proxies on file.       Readmission Root Cause  30 Day Readmission: No    Patient Information  Admitted from:: Home  Mental Status: Alert  During Assessment patient was accompanied by: Not accompanied during assessment  Assessment information provided by:: Patient, Daughter (Daughter Aysha)  Primary Caregiver: Spouse  Caregiver's Name:: Wife Galina  Caregiver's Relationship to Patient:: Significant Other  Caregiver's Telephone Number:: [phone]  County of Residence: [place]  What city do you live in?: [place]  Home entry access options. Select all that apply.: Stairs  Number of steps to enter home.: 2  Do the steps have railings?: Yes  Type of Current Residence: [address] [place] [address] home  Upon entering residence, is there a bedroom on the main floor (no further steps)?: Yes  Upon entering residence, is there a bathroom on the main floor (no further steps)?: Yes  Living Arrangements: Lives w/ Spouse/significant other    Activities of Daily Living Prior to Admission  Functional Status: Independent  Completes ADLs independently?: Yes  Ambulates independently?: No  Level of ambulatory dependence: Assistance (Patient ambulates with a walker)  Does patient use assisted devices?: Yes  Assisted  Devices (DME) used: Walker  Does patient currently own DME?: Yes  What DME does the patient currently own?: Walker  Does patient have a history of Outpatient Therapy (PT/OT)?: Yes  Does the patient have a history of Short-Term Rehab?: Yes (Hx at [facility] and approx 2 years ago at [facility].)  Does patient have a history of HHC?: Yes (Hx with [facility])  Does patient currently have HHC?: No      Patient Information Continued  Income Source: Pension/jail  Does patient have prescription coverage?: Yes  Does patient receive dialysis treatments?: No  Does patient have a history of substance abuse?: No  Does patient have a history of Mental Health Diagnosis?: No         Means of Transportation  Means of Transport to Appts:: Family transport      Social Determinants of Health (SDOH)      Flowsheet Row Most Recent Value   Housing Stability    In the last 12 months, was there a time when you were not able to pay the mortgage or rent on time? N   In the past 12 months, how many times have you moved where you were living? 0   At any time in the past 12 months, were you homeless or living in a shelter (including now)? N   Transportation Needs    In the past 12 months, has lack of transportation kept you from medical appointments or from getting medications? no   In the past 12 months, has lack of transportation kept you from meetings, work, or from getting things needed for daily living? No   Food Insecurity    Within the past 12 months, you worried that your food would run out before you got the money to buy more. Never true   Within the past 12 months, the food you bought just didn't last and you didn't have money to get more. Never true   Utilities    In the past 12 months has the electric, gas, oil, or water company threatened to shut off services in your home? No          DISCHARGE DETAILS:    Discharge planning discussed with:: Patient and Patient's Daughter Aysha  Freedom of Choice:  Yes  Comments - Freedom of Choice: CM met bedside with patient to introduce self/role, complete assessment, and discuss discharg planning. CM discussed recommendation for STR. Patient was in agreement with STR placement and requested referral be sent to the facility where his daughter works. CM confirmed with patient and patient's daughter Aysha the preferrred facility is [facility].  CM contacted family/caregiver?: Yes  Were Treatment Team discharge recommendations reviewed with patient/caregiver?: Yes  Did patient/caregiver verbalize understanding of patient care needs?: Yes    Contacts  Patient Contacts: Aysha Yanes (daughter)  Relationship to Patient:: Family  Contact Method: Phone, In Person  Phone Number: [phone]  Reason/Outcome: Emergency Contact, Continuity of Care, Discharge Planning    Requested Home Health Care         Is the patient interested in C at discharge?: No    DME Referral Provided  Referral made for DME?: No    Other Referral/Resources/Interventions Provided:  Referral Comments: CM sent STR referral in [place] to [facility]    Treatment Team Recommendation: Short Term Rehab  Discharge Destination Plan:: Short Term Rehab

## 2024-11-20 NOTE — ASSESSMENT & PLAN NOTE
Health Maintenance Summary     Topic Due On Due Status Completed On    Pap Smear - Cervical Cancer Screening  Oct 19, 2021 Not Due Oct 19, 2016    Immunization - TDAP Pregnancy  Hidden     IMMUNIZATION - DTaP/Tdap/Td Apr 18, 2023 Not Due Apr 18, 2013    Jamshid Houser  Completed Nov 1, 2017    Depression Screening Feb 8, 2019 Not Due Feb 8, 2018          Patient is up to date, no discussion needed . Patient reports a fall on the stairs 2 days ago.  Was evaluated in the ED at that time.  X-ray showed nondisplaced scapular fracture.  Upon reevaluation today CTA chest showing comminuted and displaced right sided scapular fracture.    Case was discussed with emergency medicine physician and we recommended discussing the case with orthopedic/trauma service.  Orthopedics on-call physician Dr. Flanagan recommending no acute surgical interventions at this time.  No need to transfer the patient to another facility.      - Orthopedics recommending no acute surgical intervention at this time. Continue with RUE sling along with NWB of the RUE. Patient to follow up with orthopedic surgery after discharge.

## 2024-11-20 NOTE — ASSESSMENT & PLAN NOTE
Per echo from 2022  Check repeat echo    11/20 -repeat 2D echocardiogram showing a left ventricular ejection fraction of 60% with a normal systolic function and grade 1 diastolic dysfunction.  There is mild mitral valve regurgitation.  Mild to moderate aortic valve regurgitation.  Mild to moderate tricuspid valve regurgitation.

## 2024-11-20 NOTE — CASE MANAGEMENT
Case Management Discharge Planning Note    Patient name Taj Roblero Jr.  Location [address] [address] [address] MRN [id number]  : 1940 Date 2024       Current Admission Date: 2024  Current Admission Diagnosis:Generalized weakness   Patient Active Problem List    Diagnosis Date Noted Date Diagnosed    Abnormal CT of the chest 2024     Compression fracture of T3 vertebra (HCC) 2024     Closed fracture of right scapula 2024     Generalized weakness 2024     Paroxysmal atrial fibrillation (HCC) 2024     Acute bronchitis 2023     Electrolyte abnormality 2023     Cellulitis of extremity 2023     Mild protein-calorie malnutrition (HCC) 10/13/2023     Hypomagnesemia 2023     Abscess of left leg 2023     BPH (benign prostatic hyperplasia) 2023     OCD (obsessive compulsive disorder)      Stage 3 chronic kidney disease, unspecified whether stage 3a or 3b CKD (HCC) 2023     Vega esophagus 2023     Chronic rhinitis 2023     Other pulmonary embolism without acute cor pulmonale, unspecified chronicity (HCC) 2023     Gastroesophageal reflux disease without esophagitis 2023     Leg edema, left 2023     Acute respiratory failure with hypoxia ([facility]) 2023     Hiatal hernia 2023     Age-related osteoporosis without current pathological fracture 2022     Abnormal CT scan, chest 2022     Lung nodule 2022     Bladder wall thickening 2022     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HCC) 2022     Paroxysmal atrial flutter (HCC) 2020     Valvular heart disease 2019     SVT (supraventricular tachycardia) (HCC) 2019     History of pulmonary embolus (PE) 2019      Impaired vision 2019     Hyperlipidemia      Mixed obsessional thoughts and acts 10/14/2019     Allergic rhinitis 10/14/2019     Nonrheumatic mitral valve regurgitation 10/14/2019     Aortic valve sclerosis  10/14/2019     Nonrheumatic aortic valve insufficiency 10/14/2019     Essential hypertension 10/14/2019     Recurrent major depressive disorder, in full remission (HCC) 10/14/2019       LOS (days): 2  Geometric Mean LOS (GMLOS) (days): 3.5  Days to GMLOS:1.6     OBJECTIVE:  Risk of Unplanned Readmission Score: 17.73         Current admission status: Inpatient   Preferred Pharmacy:   [id number] - [place], [place] - [address] [place] [address]  Phone: [phone] Fax: [phone]    Primary Care Provider: Carmen Hunt MD    Primary Insurance: [employer] [facility]  Secondary Insurance:     DISCHARGE DETAILS:                                                                                                               Facility Insurance Auth Number: [id number]

## 2024-11-20 NOTE — PROGRESS NOTES
Progress Note - Hospitalist   Name: Taj Roblero JrChandler 84 y.o. male I MRN: [id number]  Unit/Bed#: [address] [place] [address] Date of Admission: 11/18/2024   Date of Service: 11/20/2024 I Hospital Day: 2    Assessment & Plan  Generalized weakness  Patient reports generalized weakness/fatigue since earlier this morning.  Family confirms the same at bedside.  Was found to be hypoxic in the emergency department on presentation.  No history of oxygen needs previously.  Also with right-sided scapular fracture.    Most probably generalized weakness secondary to hypoxia    CTA chest; no acute findings other than right sided scapular fracture that is comminuted and displaced.  Case discussed with orthopedics on-call Dr. Flanagan who recommended no need for transfer or acute surgical interventions at this time.    COVID/flu/RSV; negative  Lactic acid within normal range  Procalcitonin negative  CBC; white blood cell within normal limits    Plan:   -Continue oxygen supply to maintain SpO2 93%  -Respiratory protocol  -Check RPP panel  -Consult pulmonary medicine team given the patient have pulmonary hypertension could this be correlating to shortness of breath and hypoxia??   -Check updated echo  -Maintain on telemetry for next 24 hours  -Check TSH  -Refer to assessment and plan for respiratory failure  -Refer to assessment and plan for scapular fracture    11/19 - Patient feeling better today. PT/OT consults pending.    11/20 - Patient seen by PT and OT with recommendations for rehab which the patient and family are agreeable to.  Plan was initially for patient to be discharged to rehab this evening however patient is noted to have a low-grade fever so will monitor overnight.  Mixed obsessional thoughts and acts  Updated EKG.  QTc 420.  Continue Lexapro    Allergic rhinitis  Flonase as needed  Aortic valve sclerosis  Per echo from 2022  Check repeat echo    11/20 -repeat 2D echocardiogram showing a left ventricular ejection fraction of 60%  with a normal systolic function and grade 1 diastolic dysfunction.  There is mild mitral valve regurgitation.  Mild to moderate aortic valve regurgitation.  Mild to moderate tricuspid valve regurgitation.   Essential hypertension  On ACE inhibitor at home.  Given elevated creatinine we will hold the medication    -Substituted with amlodipine 2.5 mg OD  -Monitor vitals  Hyperlipidemia  Continue statin and niacin  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HCC)  Per echo from 2022      -Check updated echo  -Consult pulmonary medicine team given shortness of breath could be related to pulmonary hypertension.  Appreciate further input    11/19 -repeat 2D echocardiogram performed showing a left ventricular ejection fraction of 60% with grade 1 diastolic dysfunction.  No significant changes compared to previous echocardiogram.  Patient was seen by pulmonology as noted below.    11/20 - Discussed with pulmonology who cleared the patient for discharge.  Patient scheduled for discharge to rehab this evening however is having a low-grade fever so will be monitored overnight here in the hospital.  Acute respiratory failure with hypoxia (HCC)  Found on presentation to the ED.  No oxygen needs at baseline.  Past medical history is negative for COPD/smoking/dust exposure    -COVID/flu/RSV; negative  -CTA chest; no acute pulmonary embolism noted.  No other acute findings note other than scapular fracture.      Plan:   Check RPP panel  Check ESR, CRP  Check updated echo  Continue oxygen supplementation with target SpO2 93%  Respiratory protocol  DuoNebs  Consult pulmonary medicine team given this problem could be related to pulmonary hypertension  Hold torsemide 5 mg twice a week  Give 1 x lasix 20 mg IV     11/19 - Patient reports feeling better today.  Patient was seen by pulmonology who suspects that the [place] symptoms are secondary to atelectasis and splinting from recent fall and fractures.  Question of mild aspiration pneumonitis but  there are no overt signs of pneumonia.  Gastroesophageal reflux disease without esophagitis  Continue Protonix  Vega esophagus  Continue Protonix  Stage 3 chronic kidney disease, unspecified whether stage 3a or 3b CKD (HCC)  Lab Results   Component Value Date    EGFR 50 11/20/2024    EGFR 50 11/19/2024    EGFR 48 11/18/2024    CREATININE 1.30 11/20/2024    CREATININE 1.29 11/19/2024    CREATININE 1.33 (H) 11/18/2024   Mildly elevated creatinine.  Near baseline.    -Monitor I/os, avoid nephrotoxins as possible  -Interval follow-up with a.m. labs  BPH (benign prostatic hyperplasia)  Continue Flomax  Closed fracture of right scapula  Patient reports a fall on the stairs 2 days ago.  Was evaluated in the ED at that time.  X-ray showed nondisplaced scapular fracture.  Upon reevaluation today CTA chest showing comminuted and displaced right sided scapular fracture.    Case was discussed with emergency medicine physician and we recommended discussing the case with orthopedic/trauma service.  Orthopedics on-call physician Dr. Flanagan recommending no acute surgical interventions at this time.  No need to transfer the patient to another facility.      -  Orthopedics recommending no acute surgical intervention at this time. Continue with RUE sling along with NWB of the RUE. Patient to follow up with orthopedic surgery after discharge.   Paroxysmal atrial fibrillation (HCC)  Follows up with out of network cardiologist    -Continue metoprolol 50 mg twice daily  -Not on anticoagulation.  -Continue telemetry  -Check updated echo  Abnormal CT of the chest    Compression fracture of T3 vertebra (HCC)    VTE Pharmacologic Prophylaxis: VTE Score: 6 Lovenox prophylaxis.    Mobility:   Basic Mobility Inpatient Raw Score: 13  -[facility] Goal: 4: Move to chair/commode  -HL Achieved: 4: Move to chair/commode    Patient Centered Rounds: I performed bedside rounds with nursing staff today.     Current Length of Stay: 2 day(s)  Current  Patient Status: Inpatient   Certification Statement: The patient will continue to require additional inpatient hospital stay due to above.  Discharge Plan: TBD    Code Status: Level 1 - Full Code    Subjective   Patient seen and examined at bedside. No acute events overnight. Patient reports feeling less short of breath today. No chest pain.    Objective :  Temp:  [98.6 °F (37 °C)-99.8 °F (37.7 °C)] 99 °F (37.2 °C)  HR:  [] 101  BP: (133-162)/() 138/71  Resp:  [16-19] 16  SpO2:  [88 %-93 %] 92 %  O2 Device: Nasal cannula  Nasal Cannula O2 Flow Rate (L/min):  [2 L/min] 2 L/min    Body mass index is 27.84 kg/m².     Input and Output Summary (last 24 hours):     Intake/Output Summary (Last 24 hours) at 11/20/2024 1842  Last data filed at 11/20/2024 1701  Gross per 24 hour   Intake 610 ml   Output 300 ml   Net 310 ml     Physical Exam  HENT:      Head: Normocephalic.      Mouth/Throat:      Mouth: Mucous membranes are moist.   Eyes:      Extraocular Movements: Extraocular movements intact.   Cardiovascular:      Rate and Rhythm: Normal rate.   Pulmonary:      Effort: Pulmonary effort is normal. No respiratory distress.      Comments: Decreased in the bases  Abdominal:      Palpations: Abdomen is soft.      Tenderness: There is no abdominal tenderness.   Skin:     General: Skin is warm.   Neurological:      Mental Status: He is alert.   Psychiatric:         Mood and Affect: Mood normal.       Lines/Drains:    Telemetry:  Telemetry Orders (From admission, onward)               24 Hour Telemetry Monitoring  Continuous x 24 Hours (Telem)        Expiring   Question:  Reason for 24 Hour Telemetry  Answer:  Arrhythmias requiring acute medical intervention / PPM or ICD malfunction                       Lab Results: I have reviewed the following results:   Results from last 7 days   Lab Units 11/20/24  0536   WBC Thousand/uL 9.27   HEMOGLOBIN g/dL 11.8*   HEMATOCRIT % 35.4*   PLATELETS Thousands/uL 179   SEGS PCT %  62   LYMPHO PCT % 16   MONO PCT % 15*   EOS PCT % 7*     Results from last 7 days   Lab Units 11/20/24  0536 11/19/24  0448 11/18/24  1327   SODIUM mmol/L 134*   < > 137   POTASSIUM mmol/L 4.0   < > 5.0   CHLORIDE mmol/L 98   < > 101   CO2 mmol/L 31   < > 29   BUN mg/dL 36*   < > 31*   CREATININE mg/dL 1.30   < > 1.33*   ANION GAP mmol/L 5   < > 7   CALCIUM mg/dL 8.5   < > 9.1   ALBUMIN g/dL  --   --  3.9   TOTAL BILIRUBIN mg/dL  --   --  0.95   ALK PHOS U/L  --   --  63   ALT U/L  --   --  9   AST U/L  --   --  21   GLUCOSE RANDOM mg/dL 104   < > 126    < > = values in this interval not displayed.     Results from last 7 days   Lab Units 11/18/24  1327   INR  1.06     Results from last 7 days   Lab Units 11/18/24  1327   LACTIC ACID mmol/L 1.2   PROCALCITONIN ng/ml 0.12     Recent Cultures (last 7 days):   Results from last 7 days   Lab Units 11/18/24  1327   BLOOD CULTURE  Staphylococcus epidermidis*   GRAM STAIN RESULT  Gram positive cocci in clusters*     Last 24 Hours Medication List:     Current Facility-Administered Medications:     albuterol (PROVENTIL HFA,VENTOLIN HFA) inhaler 2 puff, Q6H PRN    amLODIPine (NORVASC) tablet 2.5 mg, Daily    atorvastatin (LIPITOR) tablet 5 mg, Daily With Dinner    calcium carbonate-vitamin D 500 mg-5 mcg tablet 1 tablet, BID With Meals    citalopram (CeleXA) tablet 40 mg, Daily    enoxaparin (LOVENOX) subcutaneous injection 40 mg, Daily    fluticasone (FLONASE) 50 mcg/act nasal spray 1 spray, Daily    ipratropium-albuterol (DUO-NEB) 0.5-2.5 mg/3 mL inhalation solution 3 mL, Q6H    magnesium Oxide (MAG-OX) tablet 400 mg, Daily    metoprolol tartrate (LOPRESSOR) tablet 50 mg, Q12H    multivitamin stress formula tablet 1 tablet, Daily    niacin (NIASPAN) CR tablet 500 mg, HS    oxyCODONE (ROXICODONE) split tablet 2.5 mg, Q6H PRN    pantoprazole (PROTONIX) EC tablet 40 mg, Daily    tamsulosin (FLOMAX) capsule 0.4 mg, Daily With Dinner    Administrative Statements   Today, Patient  Was Seen By: Lexa De La Vega MD    **Please Note: This note may have been constructed using a voice recognition system.**

## 2024-11-20 NOTE — PROGRESS NOTES
Progress Note - Pulmonology   Name: Taj Roblero Jr. 84 y.o. male I MRN: [id number]  Unit/Bed#: 2 [address] Date of Admission: 11/18/2024   Date of Service: 11/20/2024 I Hospital Day: 2    Assessment & Plan  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HCC)  Repeat echocardiogram noted.  Acute respiratory failure with hypoxia (HCC)  Titrate supplemental oxygen to maintain saturations greater than or equal to 89%.  Has required supplemental oxygen in the past.  Continue incentive spirometer and flutter valve.  Ambulatory pulse ox prior to discharge.  Closed fracture of right scapula  Management per orthopedic team.  Paroxysmal atrial fibrillation (HCC)  Management per primary team.  Abnormal CT of the chest  Taj has linear atelectasis noted on his CT, as well as basilar changes concerning for bronchitis/aspiration pneumonitis.  He does report he has a chronic daily cough and has had aspiration pneumonia in the past.  He also has a large hiatal hernia.  Continue DuoNebs for pulmonary hygiene.  Swallow evaluation noted.    Outpatient follow-up as per discharge instructions.    24 Hour Events : None per notes  Subjective : Taj is resting in bed comfortably upon my visit.  No overnight events.  No new complaints.    Objective :  Temp:  [98.7 °F (37.1 °C)-99.8 °F (37.7 °C)] 98.7 °F (37.1 °C)  HR:  [] 85  BP: (133-162)/() 133/58  Resp:  [16-19] 16  SpO2:  [88 %-93 %] 88 %  O2 Device: Nasal cannula  Nasal Cannula O2 Flow Rate (L/min):  [2 L/min] 2 L/min    Physical Exam  Vitals reviewed.   Constitutional:       General: He is not in acute distress.     Appearance: He is well-developed. He is not toxic-appearing or diaphoretic.      Interventions: Nasal cannula in place.   HENT:      Head: Normocephalic and atraumatic.   Eyes:      General: No scleral icterus.  Neck:      Trachea: No tracheal deviation.   Cardiovascular:      Rate and Rhythm: Normal rate and regular rhythm.      Heart sounds: S1 normal and S2 normal. No  murmur heard.     No friction rub. No gallop.   Pulmonary:      Effort: Pulmonary effort is normal. No tachypnea, accessory muscle usage or respiratory distress.      Breath sounds: Normal breath sounds. No stridor. No decreased breath sounds, wheezing, rhonchi or rales.   Chest:      Chest wall: No tenderness.   Abdominal:      General: Bowel sounds are normal. There is no distension.      Palpations: Abdomen is soft.      Tenderness: There is no abdominal tenderness.   Musculoskeletal:         General: No tenderness.      Cervical back: Neck supple.      Right lower leg: No edema.      Left lower leg: No edema.      Comments: Right arm sling in place.   Skin:     General: Skin is warm and dry.      Findings: No rash.   Neurological:      Mental Status: He is alert and oriented to person, place, and time.      GCS: GCS eye subscore is 4. GCS verbal subscore is 5. GCS motor subscore is 6.   Psychiatric:         Speech: Speech normal.         Behavior: Behavior normal. Behavior is cooperative.         Lab Results: I have reviewed the following results:   .     11/20/24  0536   WBC 9.27   HGB 11.8*   HCT 35.4*      SODIUM 134*   K 4.0   CL 98   CO2 31   BUN 36*   CREATININE 1.30   GLUC 104   MG 1.9

## 2024-11-20 NOTE — CASE MANAGEMENT
Case Management Progress Note    Patient name Taj Roblero Jr.  Location 2 [address]/2 [address] 217 MRN [id number]  : 1940 Date 2024       LOS (days): 2  Geometric Mean LOS (GMLOS) (days): 3.5  Days to GMLOS:1.6        OBJECTIVE:    Current admission status: Inpatient  Preferred Pharmacy:   [id number] [place] [place] [facility] [address]  [facility]  [address] [place] [id number]  Phone: [phone] Fax: [phone]    Primary Care Provider: Carmen Hunt MD    Primary Insurance: Patient Communicator [facility]  Secondary Insurance:     PROGRESS NOTE:    CM received approval notice from [facility]. CM made accepting STR facility [facility] aware and confirmed they can accept patient today. CM called patient's daugther Aysha and made her aware of received insurance approval and that transportation to STR facility will be arranged.     CM made provider, nursing, patient's family, and accepting facility aware of confirmed transport time,  confirmed at 2100 by [facility]. Medical necessities form placed in label binder for nursing/transportation.     CM completed and uploaded pasrr in [facility] as requested by [facility].     auth approval     [facility] has received APPROVED authorization.  Insurance: [facility]   Auth obtained via portal.  Authorization received for: SNF  Facility: [facility]  Authorization #: [id number]   Start of Care:   Next Review Date:   Submit next review to: H&CC Portal / F#: [phone]

## 2024-11-20 NOTE — [place] UNIVERSAL TRANSFER FORM
"[place] UNIVERSAL TRANSFER FORM  (ALL ITEMS MUST BE COMPLETED)    1. TRANSFER FROM: [facility]      TRANSFER TO: [facility]    2. DATE OF TRANSFER: 11/20/2024                        TIME OF TRANSFER: 9:30    3. PATIENT NAME: Taj Roblero A      YOB: 1940                             GENDER: male    4. LANGUAGE:   English    5. PHYSICIAN NAME:  Lexa De La Vega MD                   PHONE: [phone]    6. CODE STATUS: Level 1 - Full Code        Out of Hospital DNR Attached: No    7. :                                      :  Extended Emergency Contact Information  Primary Emergency Contact: Aysha Yanes  Mobile Phone: [phone]  Relation: Daughter  Secondary Emergency Contact: Galina Roblero  Address: [address] [place] [address]  Home Phone: [phone]  Mobile Phone: [phone]  Relation: Spouse           Health Care Representative/Proxy:  Yes           Legal Guardian:  No             NAME OF:           HEALTH CARE REPRESENTATIVE/PROXY:    Aysha Yanes                                      OR           LEGAL GUARDIAN, IF NOT :                                               PHONE:  (Day)           (Night)                        (Cell)    8. REASON FOR TRANSFER: (Must include brief medical history and recent changes in physical function or cognition.) Patient admitted for progressively worsening weakness and fatigue and was found to have evidence of acute Evoxac respiratory failure on admission. PT/OT evaluated him during hospital stay and both recommended inpatient rehab.            V/S: /72   Pulse 102   Temp 98.6 °F (37 °C)   Resp 16   Ht 5' 9\" (1.753 m)   Wt 85.5 kg (188 lb 7.9 oz)   SpO2 (!) 88%   BMI 27.84 kg/m²           PAIN: None    9. PRIMARY DIAGNOSIS: Generalized weakness      Secondary Diagnosis:         Pacemaker: No      " Internal Defib: No          Mental Health Diagnosis (if Applicable):    10. RESTRAINTS: No     11. RESPIRATORY NEEDS: Oxygen Device  ,    12. ISOLATION/PRECAUTION: None    13. ALLERGY: Patient has no known allergies.    14. SENSORY:       Vision Glasses and Hearing Hearing Aid Left and Right    15. SKIN CONDITION: No Wounds    16. DIET: Cardiac; fluid restriction 2000 ml    17. IV ACCESS: None    18. PERSONAL ITEMS SENT WITH PATIENT: Glasses, Hearing Aid Left and Right, and Dentures Lower Partial    19. ATTACHED DOCUMENTS: MUST ATTACH CURRENT MEDICATION INFORMATION Face Sheet, MAR, Medication Reconciliation, and Discharge Summary    20. AT RISK ALERTS:Falls        HARM TO: N/A    21. WEIGHT BEARING STATUS:         Left Leg: None        Right Leg: None  NWB L shoulder/scapula    22. MENTAL STATUS:Alert, Oriented, and Forgetful    23. FUNCTION:        Walk: With Help        Transfer: With Help        Toilet: With Help        Feed: Self    24. IMMUNIZATIONS/SCREENING:     Immunization History   Administered Date(s) Administered    INFLUENZA 12/14/2022    Influenza Split High Dose Preservative Free IM 10/18/2024    Influenza, high dose seasonal 0.7 mL 09/29/2020, 10/12/2021, 12/14/2022, 10/13/2023    Influenza, injectable, quadrivalent, preservative free 0.5 mL 10/31/2019    Tdap 10/14/2019       25. BOWEL: Continent; with periods of accidents    26. BLADDER: Continent    27. SENDING FACILITY CONTACT:                   Title: [facility]        Unit: 2S        Phone: [phone]          REC'G FACILITY CONTACT (if known):        Title:        Unit:         Phone:         FORM PREFILLED BY (if applicable)       Title:       Unit:        Phone:         FORM COMPLETED BY Karrie Walter RN      Title: [facility]      Phone: [phone]

## 2024-11-20 NOTE — PLAN OF CARE
Problem: PHYSICAL THERAPY ADULT  Goal: Performs mobility at highest level of function for planned discharge setting.  See evaluation for individualized goals.  Description: Treatment/Interventions: ADL retraining, Functional transfer training, LE strengthening/ROM, Therapeutic exercise, Endurance training, Bed mobility, Gait training, Spoke to nursing, OT, Spoke to case management          See flowsheet documentation for full assessment, interventions and recommendations.  Note: Prognosis: Good  Problem List: Decreased strength, Decreased range of motion, Decreased endurance, Impaired balance, Decreased mobility, Impaired judgement, Decreased safety awareness, Orthopedic restrictions, Pain  Assessment: Patient seen for Physical Therapy evaluation. Patient admitted with Generalized weakness.  Comorbidities affecting patient's physical performance include: Afib, cardiac disease, CKD, falls, GERD, hypertension, hyperlipidemia, osteoporosis, and PE.  Personal factors affecting patient at time of initial evaluation include: lives in 2 story house, ambulating with assistive device, stairs to enter home, inability to ambulate household distances, inability to navigate community distances, inability to navigate level surfaces without external assistance, positive fall history, inability to perform ADLS, and inability to perform IADLS . Prior to admission, patient was independent with functional mobility with walker, requiring assist for ADLS, requiring assist for IADLS, living with spouse in a 2 level home with 2 steps to enter, and ambulating household distance.  Please find objective findings from Physical Therapy assessment regarding body systems outlined above with impairments and limitations including weakness, decreased ROM, impaired balance, decreased endurance, gait deviations, pain, decreased activity tolerance, decreased functional mobility tolerance, decreased safety awareness, fall risk, and orthopedic  restrictions.  The Barthel Index was used as a functional outcome tool presenting with a score of Barthel Index Score: 30 today indicating marked limitations of functional mobility and ADLS.  Patient's clinical presentation is currently unstable/unpredictable as seen in patient's presentation of changing level of pain, increased fall risk, new onset of impairment of functional mobility, decreased endurance, and new onset of weakness. Pt would benefit from continued Physical Therapy treatment to address deficits as defined above and maximize level of functional mobility. As demonstrated by objective findings, the assigned level of complexity for this evaluation is high.The patient's -[facility] Basic Mobility Inpatient Short Form Raw Score is 13. A Raw score of less than or equal to 16 suggests the patient may benefit from discharge to post-acute rehabilitation services. Please also refer to the recommendation of the Physical Therapist for safe discharge planning.        Rehab Resource Intensity Level, PT: II (Moderate Resource Intensity)    See flowsheet documentation for full assessment.

## 2024-11-20 NOTE — PLAN OF CARE
Problem: RESPIRATORY - ADULT  Goal: Achieves optimal ventilation and oxygenation  Description: INTERVENTIONS:  - Assess for changes in respiratory status  - Assess for changes in mentation and behavior  - Position to facilitate oxygenation and minimize respiratory effort  - Oxygen administered by appropriate delivery if ordered  - Initiate smoking cessation education as indicated  - Encourage broncho-pulmonary hygiene including cough, deep breathe, Incentive Spirometry  - Assess the need for suctioning and aspirate as needed  - Assess and instruct to report SOB or any respiratory difficulty  - Respiratory Therapy support as indicated  Outcome: Progressing     Problem: PAIN - ADULT  Goal: Verbalizes/displays adequate comfort level or baseline comfort level  Description: Interventions:  - Encourage patient to monitor pain and request assistance  - Assess pain using appropriate pain scale  - Administer analgesics based on type and severity of pain and evaluate response  - Implement non-pharmacological measures as appropriate and evaluate response  - Consider cultural and social influences on pain and pain management  - Notify physician/advanced practitioner if interventions unsuccessful or patient reports new pain  Outcome: Progressing     Problem: INFECTION - ADULT  Goal: Absence or prevention of progression during hospitalization  Description: INTERVENTIONS:  - Assess and monitor for signs and symptoms of infection  - Monitor lab/diagnostic results  - Monitor all insertion sites, i.e. indwelling lines, tubes, and drains  - Monitor endotracheal if appropriate and nasal secretions for changes in amount and color  - [place] appropriate cooling/warming therapies per order  - Administer medications as ordered  - Instruct and encourage patient and family to use good hand hygiene technique  - Identify and instruct in appropriate isolation precautions for identified infection/condition  Outcome: Progressing  Goal:  Absence of fever/infection during neutropenic period  Description: INTERVENTIONS:  - Monitor WBC    Outcome: Progressing     Problem: SAFETY ADULT  Goal: Patient will remain free of falls  Description: INTERVENTIONS:  - Educate patient/family on patient safety including physical limitations  - Instruct patient to call for assistance with activity   - Consult OT/PT to assist with strengthening/mobility   - Keep Call bell within reach  - Keep bed low and locked with side rails adjusted as appropriate  - Keep care items and personal belongings within reach  - Initiate and maintain comfort rounds  - Make Fall Risk Sign visible to staff  - Offer Toileting every hour, in advance of need  - Initiate/Maintain bed/chair alarm  - Obtain necessary fall risk management equipment: yellow socks and yellow bracelet  - Apply yellow socks and bracelet for high fall risk patients  - Consider moving patient to room near nurses station  Outcome: Progressing  Goal: Maintain or return to baseline ADL function  Description: INTERVENTIONS:  -  Assess patient's ability to carry out ADLs; assess patient's baseline for ADL function and identify physical deficits which impact ability to perform ADLs (bathing, care of mouth/teeth, toileting, grooming, dressing, etc.)  - Assess/evaluate cause of self-care deficits   - Assess range of motion  - Assess patient's mobility; develop plan if impaired  - Assess patient's need for assistive devices and provide as appropriate  - Encourage maximum independence but intervene and supervise when necessary  - Involve family in performance of ADLs  - Assess for home care needs following discharge   - Consider OT consult to assist with ADL evaluation and planning for discharge  - Provide patient education as appropriate  Outcome: Progressing  Goal: Maintains/Returns to pre admission functional level  Description: INTERVENTIONS:  - Perform AM-PAC 6 Click Basic Mobility/ Daily Activity assessment daily.  - Set  and communicate daily mobility goal to care team and patient/family/caregiver.   - Collaborate with rehabilitation services on mobility goals if consulted  - Perform Range of Motion daily  - Reposition patient every 2 hours.  - Ambulate patient daily  - Out of bed to chair daily  - Out of bed for toileting  - Record patient progress and toleration of activity level   Outcome: Progressing     Problem: DISCHARGE PLANNING  Goal: Discharge to home or other facility with appropriate resources  Description: INTERVENTIONS:  - Identify barriers to discharge w/patient and caregiver  - Arrange for needed discharge resources and transportation as appropriate  - Identify discharge learning needs (meds, wound care, etc.)  - Arrange for interpretive services to assist at discharge as needed  - Refer to Case Management Department for coordinating discharge planning if the patient needs post-hospital services based on physician/advanced practitioner order or complex needs related to functional status, cognitive ability, or social support system  Outcome: Progressing     Problem: Knowledge Deficit  Goal: Patient/family/caregiver demonstrates understanding of disease process, treatment plan, medications, and discharge instructions  Description: Complete learning assessment and assess knowledge base.  Interventions:  - Provide teaching at level of understanding  - Provide teaching via preferred learning methods  Outcome: Progressing     Problem: Prexisting or High Potential for Compromised Skin Integrity  Goal: Skin integrity is maintained or improved  Description: INTERVENTIONS:  - Identify patients at risk for skin breakdown  - Assess and monitor skin integrity  - Assess and monitor nutrition and hydration status  - Monitor labs   - Assess for incontinence   - Turn and reposition patient  - Assist with mobility/ambulation  - Relieve pressure over bony prominences  - Avoid friction and shearing  - Provide appropriate hygiene as  needed including keeping skin clean and dry  - Evaluate need for skin moisturizer/barrier cream  - Collaborate with interdisciplinary team   - Patient/family teaching  - Consider wound care consult   Outcome: Progressing

## 2024-11-20 NOTE — ASSESSMENT & PLAN NOTE
Taj has linear atelectasis noted on his CT, as well as basilar changes concerning for bronchitis/aspiration pneumonitis.  He does report he has a chronic daily cough and has had aspiration pneumonia in the past.  He also has a large hiatal hernia.  Continue [facility] for pulmonary hygiene.  Swallow evaluation noted.

## 2024-11-20 NOTE — ASSESSMENT & PLAN NOTE
Titrate supplemental oxygen to maintain saturations greater than or equal to 89%.  Has required supplemental oxygen in the past.  Continue incentive spirometer and flutter valve.  Ambulatory pulse ox prior to discharge.

## 2024-11-20 NOTE — ASSESSMENT & PLAN NOTE
Lab Results   Component Value Date    EGFR 50 11/20/2024    EGFR 50 11/19/2024    EGFR 48 11/18/2024    CREATININE 1.30 11/20/2024    CREATININE 1.29 11/19/2024    CREATININE 1.33 (H) 11/18/2024   Mildly elevated creatinine.  Near baseline.    -Monitor I/os, avoid nephrotoxins as possible  -Interval follow-up with a.m. labs

## 2024-11-20 NOTE — ASSESSMENT & PLAN NOTE
Patient reports a fall on the stairs 2 days ago.  Was evaluated in the ED at that time.  X-ray showed nondisplaced scapular fracture.  Upon reevaluation today CTA chest showing comminuted and displaced right sided scapular fracture.    Case was discussed with emergency medicine physician and we recommended discussing the case with orthopedic/trauma service.  Orthopedics on-call physician Dr. Flanagan recommending no acute surgical interventions at this time.  No need to transfer the patient to another facility.      -  Orthopedics recommending no acute surgical intervention at this time. Continue with RUE sling along with NWB of the RUE. Patient to follow up with orthopedic surgery after discharge.

## 2024-11-20 NOTE — DISCHARGE INSTR - AVS FIRST PAGE
Please continue taking all medications as prescribed. Please follow up with your primary medical doctor within 1 week of discharge. Please follow up with pulmonology in 1 to 2 weeks. Please follow up with orthopedic surgery in 1 to 2 weeks. Please return to the nearest emergency department if you develop any signs or symptoms of worsening disease or infection, including but not limited to chest pain, shortness of breath, abdominal pain, lightheadedness, dizziness, palpitations, fevers or chills.

## 2024-11-20 NOTE — OCCUPATIONAL THERAPY NOTE
Occupational Therapy Evaluation       11/20/24 1150   OT Last Visit   OT Visit Date 11/20/24   Note Type   Note type Evaluation   Pain Assessment   Pain Assessment Tool 0-10   Pain Score 4   Pain Location/Orientation Orientation: Right;Location: Shoulder   Restrictions/Precautions   Weight Bearing Precautions Per Order Yes   RUE Weight Bearing Per Order NWB  (with sling due to R scapular fracture)   Braces or Orthoses Sling  (RUE)   Other Precautions Bed Alarm;Chair Alarm;Fall Risk;WBS;O2  (2L O2 via NC)   Home Living   Type of Home House   Home Layout Two level;Performs ADLs on one level;Able to live on main level with bedroom/bathroom;Stairs to enter with rails   Bathroom Shower/Tub Tub/shower unit   Bathroom Toilet Standard   Bathroom Equipment Grab bars in shower   Home Equipment Walker;Cane;Quad cane;Wheelchair-manual   Prior Function   Level of [place] Independent with functional mobility;Needs assistance with ADLs;Needs assistance with IADLS  (sponge bathes with assist from wife at baseline, dresses himself)   Lives With Spouse   Receives Help From Family   IADLs Family/Friend/Other provides transportation;Family/Friend/Other provides meals;Family/Friend/Other provides medication management   Falls in the last 6 months 1 to 4   Vocational Retired   General   Additional Pertinent History Pt admitted s/p fall now with hypoxia   Subjective   Subjective Pt agreeable to OT evaluation   ADL   Eating Assistance 5  Supervision/Setup   Grooming Assistance 4  Minimal Assistance   UB Bathing Assistance 2  Maximal Assistance   LB Bathing Assistance 3  Moderate Assistance   UB Dressing Assistance 2  Maximal Assistance   LB Dressing Assistance 2  Maximal Assistance   Toileting Assistance  2  Maximal Assistance   Bed Mobility   Supine to Sit Unable to assess   Sit to Supine Unable to assess   Additional Comments pt sitting OOB upon OT arrival and returned to chair at end of session   Transfers   Sit to Stand 3   Moderate assistance   Additional items Assist x 1;Verbal cues;Increased time required   Stand to Sit 3  Moderate assistance   Additional items Assist x 1;Verbal cues;Increased time required   Toilet transfer 3  Moderate assistance   Additional items Assist x 1;Verbal cues;Increased time required;Commode   Additional Comments handheld assist on L   Functional Mobility   Functional Mobility 3  Moderate assistance   Additional Comments few steps from chair to commode   Additional items Hand hold assistance   Balance   Static Sitting Fair   Dynamic Sitting Fair -   Static Standing Fair -   Dynamic Standing Poor +   Activity Tolerance   Activity Tolerance Patient tolerated treatment well;Patient limited by fatigue   Medical Staff Made Aware spoke with PT   RUE Assessment   RUE Assessment   (NT due to NWB in sling)   LUE Assessment   LUE Assessment WFL   Vision-Basic Assessment   Current Vision Wears glasses all the time   Cognition   Arousal/Participation Alert;Cooperative   Attention Within functional limits   Orientation Level Oriented X4   Memory Decreased short term memory;Decreased recall of recent events;Decreased recall of precautions   Following Commands Follows multistep commands with increased time or repetition   Assessment   Limitation Decreased ADL status;Decreased UE ROM;Decreased UE strength;Decreased endurance;Decreased self-care trans;Decreased high-level ADLs  (decreased balance and mobility)   Prognosis Good   Assessment Patient evaluated by Occupational Therapy.  Patient admitted with Generalized weakness.  The patients occupational profile, medical and therapy history includes a extensive additional review of physical, cognitive, or psychosocial history related to current functional performance.  Comorbidities affecting functional mobility and ADLS include: closed fracture of R scapula, aortic valve stenosis, HTN, HLD, acute respiratory insufficiency, CKD3, GERD, BPH, paroxysmal a-fib.  Prior to  admission, patient was independent with functional mobility without assistive device, independent with ADLS, and independent with IADLS.  The evaluation identifies the following performance deficits: weakness, decreased ROM, impaired balance, decreased endurance, decreased coordination, increased fall risk, new onset of impairment of functional mobility, decreased ADLS, decreased IADLS, pain, decreased activity tolerance, decreased safety awareness, impaired judgement, orthopedic restrictions, and decreased strength, that result in activity limitations and/or participation restrictions. This evaluation requires clinical decision making of high complexity, because the patient presents with comorbidites that affect occupational performance and required significant modification of tasks or assistance with consideration of multiple treatment options.  The Barthel Index was used as a functional outcome tool presenting with a score of Barthel Index Score: 30, indicating marked limitations of functional mobility and ADLS.  The patient's raw score on the AM-PAC Daily Activity Inpatient Short Form is 14. A raw score of less than 19 suggests the patient may benefit from discharge to post-acute rehabilitation services. Please refer to the recommendation of the Occupational Therapist for safe discharge planning.  Patient will benefit from skilled Occupational Therapy services to address above deficits and facilitate a safe return to prior level of function.   Goals   Patient Goals to get better   STG Time Frame   (1-7 days)   Short Term Goal  Goals established to promote Patient Goals: to get better:  Eating: independent; Grooming: supervision seated; Bathing: mod assist; Upper Body Dressing mod assist; Lower Body Dressing: mod assist; Toileting: mod assist; Patient will increase ambulatory commode transfer to min assist with least restrictive assistive device to increase performance and safety with ADLS and functional  mobility; Patient will increase standing tolerance to 5 minutes during ADL task to decrease assistance level and decrease fall risk; Patient will increase bed mobility to supervision in preparation for ADLS and transfers; Patient will increase functional mobility to and from bathroom with least restrictive assistive device with min assist to increase performance with ADLS and to use a toilet; Patient will tolerate 5 minutes of UE ROM/strengthening to increase general activity tolerance and performance in ADLS/IADLS; Patient will improve functional activity tolerance to 5 minutes of sustained functional tasks to increase participation in basic self-care and decrease assistance level;  Patient will be able to to verbalize understanding and perform energy conservation/proper body mechanics during ADLS and functional mobility at least 75% of the time with minimal cueing to decrease signs of fatigue and increase stamina to return to prior level of function; Patient will increase dynamic sitting balance to fair to improve the ability to sit at edge of bed or on a chair for ADLS;  Patient will increase static standing balance to fair to improve postural stability and decrease fall risk during standing ADLS and transfers.   LTG Time Frame   (8-14 days)   Long Term Goal Grooming: independent seated; Bathing: min assist; Upper Body Dressing min assist; Lower Body Dressing: min assist; Toileting: min assist; Patient will increase ambulatory commode transfer to supervision with least restrictive assistive device to increase performance and safety with ADLS and functional mobility; Patient will increase standing tolerance to 10 minutes during ADL task to decrease assistance level and decrease fall risk; Patient will increase bed mobility to independent in preparation for ADLS and transfers; Patient will increase functional mobility to and from bathroom with least restrictive assistive device with supervision to increase  performance with ADLS and to use a toilet; Patient will tolerate 10 minutes of UE ROM/strengthening to increase general activity tolerance and performance in ADLS/IADLS; Patient will improve functional activity tolerance to 10 minutes of sustained functional tasks to increase participation in basic self-care and decrease assistance level;  Patient will be able to to verbalize understanding and perform energy conservation/proper body mechanics during ADLS and functional mobility at least 90% of the time with no cueing to decrease signs of fatigue and increase stamina to return to prior level of function; Patient will increase dynamic sitting balance to fair+ to improve the ability to sit at edge of bed or on a chair for ADLS;  Patient will increase static standing balance to fair+ to improve postural stability and decrease fall risk during standing ADLS and transfers.  Pt will score >/= 18/24 on AM-PAC Daily Activity Inpatient scale to promote safe independence with ADLs and functional mobility; Pt will score >/= 60/100 on Barthel Index in order to decrease caregiver assistance needed and increase ability to perform ADLs and functional mobility.   Plan   Treatment Interventions ADL retraining;Functional transfer training;UE strengthening/ROM;Endurance training;Patient/family training;Equipment evaluation/education;Compensatory technique education;Energy conservation;Activityengagement   Goal Expiration Date 12/04/24   OT Frequency 3-5x/wk   Discharge Recommendation   Rehab Resource Intensity Level, OT II (Moderate Resource Intensity)   AM-PAC Daily Activity Inpatient   Lower Body Dressing 2   Bathing 2   Toileting 2   Upper Body Dressing 2   Grooming 3   Eating 3   Daily Activity Raw Score 14   Daily Activity Standardized Score (Calc for Raw Score >=11) 33.39   AM-PAC Applied Cognition Inpatient   Following a Speech/Presentation 4   Understanding Ordinary Conversation 4   Taking Medications 4   Remembering Where  Things Are Placed or Put Away 4   Remembering List of 4-5 Errands 3   Taking Care of Complicated Tasks 3   Applied Cognition Raw Score 22   Applied Cognition Standardized Score 47.83   Barthel Index   Feeding 5   Bathing 0   Grooming Score 0   Dressing Score 0   Bladder Score 5   Bowels Score 10   Toilet Use Score 5   Transfers (Bed/Chair) Score 5   Mobility (Level Surface) Score 0   Stairs Score 0   Barthel Index Score 30       Yesenia Mello OTR/L   [place] License # [id number]  [place] License # [id number]

## 2024-11-20 NOTE — ASSESSMENT & PLAN NOTE
Per echo from 2022      -Check updated echo  -Consult pulmonary medicine team given shortness of breath could be related to pulmonary hypertension.  Appreciate further input    11/19 -repeat 2D echocardiogram performed showing a left ventricular ejection fraction of 60% with grade 1 diastolic dysfunction.  No significant changes compared to previous echocardiogram.  Patient was seen by pulmonology as noted below.    11/20 - Discussed with pulmonology who cleared the patient for discharge.  Patient scheduled for discharge to rehab this evening however is having a low-grade fever so will be monitored overnight here in the hospital.

## 2024-11-20 NOTE — ASSESSMENT & PLAN NOTE
Found on presentation to the ED.  No oxygen needs at baseline.  Past medical history is negative for COPD/smoking/dust exposure    -COVID/flu/RSV; negative  -CTA chest; no acute pulmonary embolism noted.  No other acute findings note other than scapular fracture.      Plan:   Check RPP panel  Check ESR, CRP  Check updated echo  Continue oxygen supplementation with target SpO2 93%  Respiratory protocol  J[place]  Consult pulmonary medicine team given this problem could be related to pulmonary hypertension  Hold torsemide 5 mg twice a week  Give 1 x lasix 20 mg IV     11/19 - Patient reports feeling better today.  Patient was seen by pulmonology who suspects that the [place] symptoms are secondary to atelectasis and splinting from recent fall and fractures.  Question of mild aspiration pneumonitis but there are no overt signs of pneumonia.    11/20 - Overall improved. Discussed with pulmonology. Ok for discharge to rehab.

## 2024-11-20 NOTE — TELEPHONE ENCOUNTER
Patient has been scheduled for a 2 week HFU appointment with Tanya. Discharge scheduled for 11/21.  Per Tanya's request.

## 2024-11-20 NOTE — DISCHARGE SUMMARY
Discharge Summary - Hospitalist   Name: Taj Roblero Jr. 84 y.o. male I MRN: [id number]  Unit/Bed#: [address] [place] [address] Date of Admission: 11/18/2024   Date of Service: 11/20/2024 I Hospital Day: 2     Assessment & Plan  Generalized weakness  Patient reports generalized weakness/fatigue since earlier this morning.  Family confirms the same at bedside.  Was found to be hypoxic in the emergency department on presentation.  No history of oxygen needs previously.  Also with right-sided scapular fracture.    Most probably generalized weakness secondary to hypoxia    CTA chest; no acute findings other than right sided scapular fracture that is comminuted and displaced.  Case discussed with orthopedics on-call Dr. Flanagan who recommended no need for transfer or acute surgical interventions at this time.    COVID/flu/RSV; negative  Lactic acid within normal range  Procalcitonin negative  CBC; white blood cell within normal limits    Plan:   -Continue oxygen supply to maintain SpO2 93%  -Respiratory protocol  -Check RPP panel  -Consult pulmonary medicine team given the patient have pulmonary hypertension could this be correlating to shortness of breath and hypoxia??   -Check updated echo  -Maintain on telemetry for next 24 hours  -Check TSH  -Refer to assessment and plan for respiratory failure  -Refer to assessment and plan for scapular fracture    11/19 - Patient feeling better today. PT/OT consults pending.    11/20 - PT/OT recommending rehab which patient/family are agreeable to.  Patient will be discharged to rehab today.  Mixed obsessional thoughts and acts  Updated EKG.  QTc 420.  Continue Lexapro    Allergic rhinitis  Flonase as needed  Aortic valve sclerosis  Per echo from 2022  Check repeat echo    11/20 -repeat 2D echocardiogram showing a left ventricular ejection fraction of 60% with a normal systolic function and grade 1 diastolic dysfunction.  There is mild mitral valve regurgitation.  Mild to moderate aortic valve  regurgitation.  Mild to moderate tricuspid valve regurgitation.  Essential hypertension  On ACE inhibitor at home.  Given elevated creatinine we will hold the medication    -Substituted with amlodipine 2.5 mg OD  -Monitor vitals  Hyperlipidemia  Continue statin and niacin  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HCC)  Per echo from 2022      -Check updated echo  -Consult pulmonary medicine team given shortness of breath could be related to pulmonary hypertension.  Appreciate further input    11/19 -repeat 2D echocardiogram performed showing a left ventricular ejection fraction of 60% with grade 1 diastolic dysfunction.  No significant changes compared to previous echocardiogram.  Patient was seen by pulmonology as noted below.  Acute respiratory failure with hypoxia (HCC)  Found on presentation to the ED.  No oxygen needs at baseline.  Past medical history is negative for COPD/smoking/dust exposure    -COVID/flu/RSV; negative  -CTA chest; no acute pulmonary embolism noted.  No other acute findings note other than scapular fracture.      Plan:   Check RPP panel  Check ESR, CRP  Check updated echo  Continue oxygen supplementation with target SpO2 93%  Respiratory protocol  DuoNebs  Consult pulmonary medicine team given this problem could be related to pulmonary hypertension  Hold torsemide 5 mg twice a week  Give 1 x lasix 20 mg IV     11/19 - Patient reports feeling better today.  Patient was seen by pulmonology who suspects that the [place] symptoms are secondary to atelectasis and splinting from recent fall and fractures.  Question of mild aspiration pneumonitis but there are no overt signs of pneumonia.    11/20 - Overall improved. Discussed with pulmonology. Ok for discharge to rehab.  Gastroesophageal reflux disease without esophagitis  Continue Protonix  Vega esophagus  Continue Protonix  Stage 3 chronic kidney disease, unspecified whether stage 3a or 3b CKD (HCC)  Lab Results   Component Value Date    EGFR 50  11/20/2024    EGFR 50 11/19/2024    EGFR 48 11/18/2024    CREATININE 1.30 11/20/2024    CREATININE 1.29 11/19/2024    CREATININE 1.33 (H) 11/18/2024   Mildly elevated creatinine.  Near baseline.    -Monitor I/os, avoid nephrotoxins as possible  -Interval follow-up with a.m. labs  BPH (benign prostatic hyperplasia)  Continue Flomax  Closed fracture of right scapula  Patient reports a fall on the stairs 2 days ago.  Was evaluated in the ED at that time.  X-ray showed nondisplaced scapular fracture.  Upon reevaluation today CTA chest showing comminuted and displaced right sided scapular fracture.    Case was discussed with emergency medicine physician and we recommended discussing the case with orthopedic/trauma service.  Orthopedics on-call physician Dr. Flanagan recommending no acute surgical interventions at this time.  No need to transfer the patient to another facility.      - Orthopedics recommending no acute surgical intervention at this time. Continue with RUE sling along with NWB of the RUE. Patient to follow up with orthopedic surgery after discharge.  Paroxysmal atrial fibrillation (HCC)  Follows up with out of network cardiologist    -Continue metoprolol 50 mg twice daily  -Not on anticoagulation.  -Continue telemetry  -Check updated echo  Abnormal CT of the chest    Compression fracture of T3 vertebra (HCC)       Medical Problems       Resolved Problems  Date Reviewed: 11/18/2024   None       Discharging Physician / Practitioner: Lexa De La Vega MD  PCP: Carmen Hunt MD  Admission Date:   Admission Orders (From admission, onward)       Ordered        11/18/24 1704  INPATIENT ADMISSION  Once                          Discharge Date: 11/20/24    Consultations During Hospital Stay:  Pulmonology  Orthopedic surgery    Reason for Admission: Taj Roblero Jr. is a 84 y.o. male with a PMH of HTN , A fib , CKD3, pulmonary hypertension who presents with generalized weakness/fatigue and shortness of breath  since the morning.  The patient reports no history of similar problem.  Currently he reports improvement in breathing after he was started on oxygen.  He denies any chest pain or tightness, nausea or vomiting, headache or lightheadedness, syncope or presyncope, diarrhea or constipation or any active urinary symptoms.  No fevers or chills.  No recent respiratory tract infections or antibiotic use.  Family at bedside confirms the above symptoms.   Patient does not have any history of smoking/dust exposure/COPD.    Hospital Course: Patient was seen and examined in the emergency department and was admitted to the hospital for further evaluation and management.  Patient presenting with progressively worsening weakness and fatigue.  Patient found to have evidence of acute [place]oxac respiratory failure on admission.  Patient with a recent fall also found to have a right-sided scapular fracture.  Patient was seen by pulmonology who suspects that the respiratory symptoms are likely secondary to atelectasis and splinting from recent fall and fracture.  Question of mild aspiration pneumonitis but there are no overt signs of pneumonia.  Patient was also evaluated by orthopedic surgery with plans for nonoperative treatment.  Sling was placed.  Patient to be nonweightbearing to the right upper extremity and can follow-up in 2 weeks with orthopedic surgery in the outpatient setting.  Patient was evaluated by physical therapy and Occupational Therapy with recommendations for inpatient rehab which the patient and his family are agreeable to.  Patient will be discharged to rehab today.  At the time of discharge patient reports feeling significantly improved and currently denies any acute complaints or concerns including chest pain or shortness of breath.    Please see above list of diagnoses and related plan for additional information.     Condition at Discharge: stable    Discharge Day Visit / Exam:   Vitals: Blood Pressure: 140/72  "(11/20/24 1534)  Pulse: 102 (11/20/24 1534)  Temperature: 98.6 °F (37 °C) (11/20/24 1534)  Temp Source: Oral (11/19/24 0750)  Respirations: 16 (11/20/24 0725)  Height: 5' 9\" (175.3 cm) (11/19/24 1320)  Weight - Scale: 85.5 kg (188 lb 7.9 oz) (11/19/24 1320)  SpO2: (!) 88 % (11/20/24 1534)  Physical Exam  HENT:      Head: Normocephalic.      Mouth/Throat:      Mouth: Mucous membranes are moist.   Eyes:      Extraocular Movements: Extraocular movements intact.   Cardiovascular:      Rate and Rhythm: Normal rate.   Pulmonary:      Effort: Pulmonary effort is normal. No respiratory distress.      Comments: Decreased in the bases  Abdominal:      Palpations: Abdomen is soft.      Tenderness: There is no abdominal tenderness.   Musculoskeletal:      Comments: RUE sling   Skin:     General: Skin is warm.   Neurological:      Mental Status: He is alert and oriented to person, place, and time.   Psychiatric:         Mood and Affect: Mood normal.       Discussion with Family: Discussed with family at bedside.    Discharge instructions/Information to patient and family:   See after visit summary for information provided to patient and family.      Provisions for Follow-Up Care:  See after visit summary for information related to follow-up care and any pertinent home health orders.      Mobility at time of Discharge:   Basic Mobility Inpatient Raw Score: 13  JH-HLM Goal: 4: Move to chair/commode  JH-HLM Achieved: 5: Stand (1 or more minutes)     Disposition:   Rehab.    Discharge Medications:  See after visit summary for reconciled discharge medications provided to patient and/or family.      Administrative Statements   Discharge Statement:  I have spent a total time of 35 minutes in caring for this patient on the day of the visit/encounter.     **Please Note: This note may have been constructed using a voice recognition system**  "

## 2024-11-20 NOTE — SPEECH THERAPY NOTE
Speech-Language Pathology Bedside Swallow Evaluation      Patient Name: Taj Roblero Jr.    Today's Date: 11/20/2024     Problem List  Principal Problem:    Generalized weakness  Active Problems:    Mixed obsessional thoughts and acts    Allergic rhinitis    Aortic valve sclerosis    Essential hypertension    Hyperlipidemia    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HCC)    Acute respiratory failure with hypoxia (HCC)    Gastroesophageal reflux disease without esophagitis    Vega esophagus    Stage 3 chronic kidney disease, unspecified whether stage 3a or 3b CKD (HCC)    BPH (benign prostatic hyperplasia)    Closed fracture of right scapula    Paroxysmal atrial fibrillation (HCC)    Abnormal CT of the chest    Compression fracture of T3 vertebra (HCC)      Past Medical History  Past Medical History:   Diagnosis Date    Vega esophagus     BPH (benign prostatic hyperplasia)     Depression     Hiatal hernia     HTN (hypertension)     Hyperlipidemia     Hypertension     Leaky heart valve     OCD (obsessive compulsive disorder)     Pulmonary embolism (HCC) 2019         Summary   Pt presented with functional appearing oral and pharyngeal stage swallowing skills with materials administered today.    Recommended Diet: continue current diet  Recommended Form of Meds: whole with liquid   Reflux precautions given h/o large hiatal hernia  Other Recommendations: Continue frequent oral care        Current Medical Status  Taj Roblero Jr. is a 84 y.o. male with a PMH of HTN , A fib , CKD3, pulmonary hypertension who presented 11/18 pm with generalized weakness/fatigue and shortness of breath.   DX: generalized weakness, acute respiratory failure with hypoxia, r/o Covid, pulm artery htn, compression fracture of T3 vertebra, GERD, large hiatal hernia.   SLP Swallowing Evaluation ordered at this time.     Current Precautions:  r/o Covid    Allergies:  No known food allergies    Past medical history:  Please see H&P for  details    Special Studies:  11/18 CTA chest PE:    1.  No pulmonary embolus or other acute findings in the chest.  2.  Large hiatal hernia.  3.  Comminuted and displaced right scapular fracture.  4.  Age-indeterminate favored chronic T3 compression fracture.    Social/Education/Vocational Hx:  Pt lives w/wife    Swallow Information   Current Symptoms/Concerns: cough during and outside of meals  Current Diet: Cardiac; Fluid Restriction 2000 ML, regular texture, thin liquid  Baseline Diet: regular diet and thin liquids      Baseline Assessment   Behavior/Cognition: alert  Speech/Language Status: comprehended all ?'s/commands and expressed needs with ease with no s/s dysarthria  Patient Positioning: upright in bed  Pain Status/Interventions/Response to Interventions:  No report of or nonverbal indications of pain.       Swallow Mechanism Exam  Facial: symmetrical  Labial: WFL  Lingual: WFL  Velum: symmetrical  Mandible: adequate ROM  Dentition: adequate/full natural dentition  Vocal quality:clear/adequate   Respiratory Status: on 2L O2      Consistencies Assessed and Performance   Consistencies Administered: thin liquids, soft solids, and hard solids    Oral Stage:   Mastication was adequate with the materials administered today.  Bolus formation and transfer were functional with no significant oral residue noted.  No overt s/s reduced oral control.    Pharyngeal Stage:   Swallow Mechanics:  Swallowing initiation appeared prompt.  Laryngeal rise was palpated and judged to be within functional limits.  No coughing, throat clearing, change in vocal quality or respiratory status noted today.     Esophageal Concerns: known large hiatal hernia    Summary and Recommendations (see above)    Results Reviewed with: patient     Treatment Recommended: None at this time    Thank you for this referral.  Please do not hesitate to contact me with any questions or concerns.    Yuridia Stoddard MS CCC-SLP  [place] License [id number]  [place]  License [id number]

## 2024-11-20 NOTE — CASE MANAGEMENT
[facility] received request for authorization from Care Manager.  Authorization request submitted for: [facility]  Facility Name: [facility] NPI: [id number]  Facility MD: Bartolome Landaverde NPI: [id number]  Authorization initiated by contacting insurance: [facility]   Via: [employer]   Clinicals submitted via Portal attachment   Pending Reference #: [id number]     [facility] has received APPROVED authorization.  Insurance: [facility]    Auth obtained via portal.  Authorization received for: [facility]  Facility: [facility]  Authorization #: [id number]   Start of Care: 11/20  Next Review Date: 11/22  Submit next review to: [employer] Portal / F#: [phone]    Care Manager notified: Dana Severini    Please reach out to  for updates on any clinical information.

## 2024-11-20 NOTE — ASSESSMENT & PLAN NOTE
Patient reports generalized weakness/fatigue since earlier this morning.  Family confirms the same at bedside.  Was found to be hypoxic in the emergency department on presentation.  No history of oxygen needs previously.  Also with right-sided scapular fracture.    Most probably generalized weakness secondary to hypoxia    CTA chest; no acute findings other than right sided scapular fracture that is comminuted and displaced.  Case discussed with orthopedics on-call Dr. Flanagan who recommended no need for transfer or acute surgical interventions at this time.    COVID/flu/RSV; negative  Lactic acid within normal range  Procalcitonin negative  CBC; white blood cell within normal limits    Plan:   -Continue oxygen supply to maintain SpO2 93%  -Respiratory protocol  -Check RPP panel  -Consult pulmonary medicine team given the patient have pulmonary hypertension could this be correlating to shortness of breath and hypoxia??   -Check updated echo  -Maintain on telemetry for next 24 hours  -Check TSH  -Refer to assessment and plan for respiratory failure  -Refer to assessment and plan for scapular fracture    11/19 - Patient feeling better today. PT/OT consults pending.    11/20 - Patient seen by PT and OT with recommendations for rehab which the patient and family are agreeable to.  Plan was initially for patient to be discharged to rehab this evening however patient is noted to have a low-grade fever so will monitor overnight.

## 2024-11-20 NOTE — ASSESSMENT & PLAN NOTE
Patient reports generalized weakness/fatigue since earlier this morning.  Family confirms the same at bedside.  Was found to be hypoxic in the emergency department on presentation.  No history of oxygen needs previously.  Also with right-sided scapular fracture.    Most probably generalized weakness secondary to hypoxia    CTA chest; no acute findings other than right sided scapular fracture that is comminuted and displaced.  Case discussed with orthopedics on-call Dr. Flanagan who recommended no need for transfer or acute surgical interventions at this time.    COVID/flu/RSV; negative  Lactic acid within normal range  Procalcitonin negative  CBC; white blood cell within normal limits    Plan:   -Continue oxygen supply to maintain SpO2 93%  -Respiratory protocol  -Check RPP panel  -Consult pulmonary medicine team given the patient have pulmonary hypertension could this be correlating to shortness of breath and hypoxia??   -Check updated echo  -Maintain on telemetry for next 24 hours  -Check TSH  -Refer to assessment and plan for respiratory failure  -Refer to assessment and plan for scapular fracture    11/19 - Patient feeling better today. PT/OT consults pending.    11/20 - PT/OT recommending rehab which patient/family are agreeable to.  Patient will be discharged to rehab today.

## 2024-11-20 NOTE — CASE MANAGEMENT
Case Management Discharge Planning Note    Patient name Taj Roblero Jr.  Location [address] [address] [address] MRN [id number]  : 1940 Date 2024       Current Admission Date: 2024  Current Admission Diagnosis:Generalized weakness   Patient Active Problem List    Diagnosis Date Noted Date Diagnosed    Abnormal CT of the chest 2024     Compression fracture of T3 vertebra (HCC) 2024     Closed fracture of right scapula 2024     Generalized weakness 2024     Paroxysmal atrial fibrillation (HCC) 2024     Acute bronchitis 2023     Electrolyte abnormality 2023     Cellulitis of extremity 2023     Mild protein-calorie malnutrition (HCC) 10/13/2023     Hypomagnesemia 2023     Abscess of left leg 2023     BPH (benign prostatic hyperplasia) 2023     OCD (obsessive compulsive disorder)      Stage 3 chronic kidney disease, unspecified whether stage 3a or 3b CKD (HCC) 2023     Vega esophagus 2023     Chronic rhinitis 2023     Other pulmonary embolism without acute cor pulmonale, unspecified chronicity (HCC) 2023     Gastroesophageal reflux disease without esophagitis 2023     Leg edema, left 2023     Acute respiratory failure with hypoxia ([facility]) 2023     Hiatal hernia 2023     Age-related osteoporosis without current pathological fracture 2022     Abnormal CT scan, chest 2022     Lung nodule 2022     Bladder wall thickening 2022     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HCC) 2022     Paroxysmal atrial flutter (HCC) 2020     Valvular heart disease 2019     SVT (supraventricular tachycardia) (HCC) 2019     History of pulmonary embolus (PE) 2019      Impaired vision 2019     Hyperlipidemia      Mixed obsessional thoughts and acts 10/14/2019     Allergic rhinitis 10/14/2019     Nonrheumatic mitral valve regurgitation 10/14/2019     Aortic valve sclerosis  10/14/2019     Nonrheumatic aortic valve insufficiency 10/14/2019     Essential hypertension 10/14/2019     Recurrent major depressive disorder, in full remission (HCC) 10/14/2019       LOS (days): 2  Geometric Mean LOS (GMLOS) (days): 3.5  Days to GMLOS:1.6     OBJECTIVE:  Risk of Unplanned Readmission Score: 17.73     Current admission status: Inpatient   Preferred Pharmacy:   [id number] - [address] [address]  Phone: [phone] Fax: [phone]    Primary Care Provider: Carmen Hunt MD    Primary Insurance: [facility]  Secondary Insurance:     DISCHARGE DETAILS:    Other Referral/Resources/Interventions Provided:  Referral Comments: CM reserved [facility] at [facility] in [facility].      CM tasked [facility] in [facility] with insurance authorization request.

## 2024-11-20 NOTE — PLAN OF CARE
Problem: RESPIRATORY - ADULT  Goal: Achieves optimal ventilation and oxygenation  Description: INTERVENTIONS:  - Assess for changes in respiratory status  - Assess for changes in mentation and behavior  - Position to facilitate oxygenation and minimize respiratory effort  - Oxygen administered by appropriate delivery if ordered  - Initiate smoking cessation education as indicated  - Encourage broncho-pulmonary hygiene including cough, deep breathe, Incentive Spirometry  - Assess the need for suctioning and aspirate as needed  - Assess and instruct to report SOB or any respiratory difficulty  - Respiratory Therapy support as indicated  Outcome: Progressing     Problem: PAIN - ADULT  Goal: Verbalizes/displays adequate comfort level or baseline comfort level  Description: Interventions:  - Encourage patient to monitor pain and request assistance  - Assess pain using appropriate pain scale  - Administer analgesics based on type and severity of pain and evaluate response  - Implement non-pharmacological measures as appropriate and evaluate response  - Consider cultural and social influences on pain and pain management  - Notify physician/advanced practitioner if interventions unsuccessful or patient reports new pain  Outcome: Progressing     Problem: INFECTION - ADULT  Goal: Absence or prevention of progression during hospitalization  Description: INTERVENTIONS:  - Assess and monitor for signs and symptoms of infection  - Monitor lab/diagnostic results  - Monitor all insertion sites, i.e. indwelling lines, tubes, and drains  - Monitor endotracheal if appropriate and nasal secretions for changes in amount and color  - [place] appropriate cooling/warming therapies per order  - Administer medications as ordered  - Instruct and encourage patient and family to use good hand hygiene technique  - Identify and instruct in appropriate isolation precautions for identified infection/condition  Outcome: Progressing  Goal:  Absence of fever/infection during neutropenic period  Description: INTERVENTIONS:  - Monitor WBC    Outcome: Progressing     Problem: SAFETY ADULT  Goal: Patient will remain free of falls  Description: INTERVENTIONS:  - Educate patient/family on patient safety including physical limitations  - Instruct patient to call for assistance with activity   - Consult OT/PT to assist with strengthening/mobility   - Keep Call bell within reach  - Keep bed low and locked with side rails adjusted as appropriate  - Keep care items and personal belongings within reach  - Initiate and maintain comfort rounds  - Make Fall Risk Sign visible to staff  - Offer Toileting every 2 Hours, in advance of need  - Initiate/Maintain bed alarm  - Obtain necessary fall risk management equipment:   - Apply yellow socks and bracelet for high fall risk patients  - Consider moving patient to room near nurses station  Outcome: Progressing  Goal: Maintain or return to baseline ADL function  Description: INTERVENTIONS:  -  Assess patient's ability to carry out ADLs; assess patient's baseline for ADL function and identify physical deficits which impact ability to perform ADLs (bathing, care of mouth/teeth, toileting, grooming, dressing, etc.)  - Assess/evaluate cause of self-care deficits   - Assess range of motion  - Assess patient's mobility; develop plan if impaired  - Assess patient's need for assistive devices and provide as appropriate  - Encourage maximum independence but intervene and supervise when necessary  - Involve family in performance of ADLs  - Assess for home care needs following discharge   - Consider OT consult to assist with ADL evaluation and planning for discharge  - Provide patient education as appropriate  Outcome: Progressing  Goal: Maintains/Returns to pre admission functional level  Description: INTERVENTIONS:  - Perform AM-PAC 6 Click Basic Mobility/ Daily Activity assessment daily.  - Set and communicate daily mobility goal  to care team and patient/family/caregiver.   - Collaborate with rehabilitation services on mobility goals if consulted  - Perform Range of Motion 3 times a day.  - Reposition patient every 3 hours.  - Dangle patient 3 times a day  - Stand patient 3 times a day  - Ambulate patient 3 times a day  - Out of bed to chair 3 times a day   - Out of bed for meals 3 times a day  - Out of bed for toileting  - Record patient progress and toleration of activity level   Outcome: Progressing     Problem: DISCHARGE PLANNING  Goal: Discharge to home or other facility with appropriate resources  Description: INTERVENTIONS:  - Identify barriers to discharge w/patient and caregiver  - Arrange for needed discharge resources and transportation as appropriate  - Identify discharge learning needs (meds, wound care, etc.)  - Arrange for interpretive services to assist at discharge as needed  - Refer to Case Management Department for coordinating discharge planning if the patient needs post-hospital services based on physician/advanced practitioner order or complex needs related to functional status, cognitive ability, or social support system  Outcome: Progressing     Problem: Knowledge Deficit  Goal: Patient/family/caregiver demonstrates understanding of disease process, treatment plan, medications, and discharge instructions  Description: Complete learning assessment and assess knowledge base.  Interventions:  - Provide teaching at level of understanding  - Provide teaching via preferred learning methods  Outcome: Progressing     Problem: Prexisting or High Potential for Compromised Skin Integrity  Goal: Skin integrity is maintained or improved  Description: INTERVENTIONS:  - Identify patients at risk for skin breakdown  - Assess and monitor skin integrity  - Assess and monitor nutrition and hydration status  - Monitor labs   - Assess for incontinence   - Turn and reposition patient  - Assist with mobility/ambulation  - Relieve pressure  over bony prominences  - Avoid friction and shearing  - Provide appropriate hygiene as needed including keeping skin clean and dry  - Evaluate need for skin moisturizer/barrier cream  - Collaborate with interdisciplinary team   - Patient/family teaching  - Consider wound care consult   Outcome: Progressing

## 2024-11-20 NOTE — PHYSICAL THERAPY NOTE
PHYSICAL THERAPY EVALUATION/TREATMENT       11/20/24 1003   PT Last Visit   PT Visit Date 11/20/24   Note Type   Note type Evaluation   Pain Assessment   Pain Assessment Tool 0-10   Pain Score No Pain   Patient's Stated Pain Goal No pain   Multiple Pain Sites No   Restrictions/Precautions   Weight Bearing Precautions Per Order Yes   RUE Weight Bearing Per Order NWB  (with sling due to R scapular fracture)   Braces or Orthoses Sling   Other Precautions Bed Alarm;Chair Alarm;Fall Risk;Pain;WBS   Home Living   Type of Home House   Home Layout Two level;Performs ADLs on one level;Able to live on main level with bedroom/bathroom;Stairs to enter with rails  (2 JASON with or without rail ; Bed/bath on first floor)   Bathroom Shower/Tub Tub/shower unit   Bathroom Toilet Standard   Bathroom Equipment Grab bars in shower   Home Equipment Walker;Cane;Quad cane;Wheelchair-manual   Additional Comments Pt reports he sponge bathes with assist from wife at baseline (not getting in shower)   Prior Function   Level of [place] Independent with functional mobility;Needs assistance with ADLs;Needs assistance with IADLS  (Wife assists with sponge bathing ; pt reports he dresses himself)   Lives With Spouse   Receives Help From Family   IADLs Family/Friend/Other provides transportation;Family/Friend/Other provides meals   Falls in the last 6 months 1 to 4   Vocational Retired   General   Additional Pertinent History Pt is an 84 year-old male who was admitted to the hospital on 11/18/24 due to fall. Found to have R scapular fracture - NWB RUE with sling.   Family/Caregiver Present No   Cognition   Arousal/Participation Cooperative   Orientation Level Oriented to person;Oriented to place;Oriented to time;Oriented to situation   Following Commands Follows multistep commands with increased time or repetition   Subjective   Subjective Pt agreeable to PT session this AM   RLE Assessment   RLE Assessment WFL   LLE Assessment   LLE  Assessment WFL   Bed Mobility   Supine to Sit 4  Minimal assistance   Additional items Assist x 1;Verbal cues;Increased time required;HOB elevated   Sit to Supine Unable to assess  (transferred to bedside chair)   Additional Comments Sling not appropriately donned when laying in bed ; readjusted to properly support pt's arm before mobilizing   Transfers   Sit to Stand 3  Moderate assistance   Additional items Assist x 1;Verbal cues;Increased time required   Stand to Sit 3  Moderate assistance   Additional items Assist x 1;Verbal cues;Increased time required   Ambulation/Elevation   Gait pattern Narrow ALEX;Foward flexed;Short stride;Step to  (short, shuffling steps to chair/commode)   Gait Assistance 3  Moderate assist  (Min progressing to Mod due to unsteadiness)   Additional items Assist x 1;Verbal cues   Assistive Device   (HHA - unsteady and unable to use cane)   Distance 2 feet x 2   Stair Management Assistance Not tested   Balance   Static Sitting Fair   Dynamic Sitting Fair -   Static Standing Fair -   Dynamic Standing Poor +   Ambulatory Poor   Activity Tolerance   Activity Tolerance Patient tolerated treatment well;Patient limited by fatigue   Nurse Made Aware yes RN Karrie   Assessment   Prognosis Good   Problem List Decreased strength;Decreased range of motion;Decreased endurance;Impaired balance;Decreased mobility;Impaired judgement;Decreased safety awareness;Orthopedic restrictions;Pain   Assessment Patient seen for Physical Therapy evaluation. Patient admitted with Generalized weakness.  Comorbidities affecting patient's physical performance include: Afib, cardiac disease, CKD, falls, GERD, hypertension, hyperlipidemia, osteoporosis, and PE.  Personal factors affecting patient at time of initial evaluation include: lives in 2 story house, ambulating with assistive device, stairs to enter home, inability to ambulate household distances, inability to navigate community distances, inability to navigate  level surfaces without external assistance, positive fall history, inability to perform ADLS, and inability to perform IADLS . Prior to admission, patient was independent with functional mobility with walker, requiring assist for ADLS, requiring assist for IADLS, living with spouse in a 2 level home with 2 steps to enter, and ambulating household distance.  Please find objective findings from Physical Therapy assessment regarding body systems outlined above with impairments and limitations including weakness, decreased ROM, impaired balance, decreased endurance, gait deviations, pain, decreased activity tolerance, decreased functional mobility tolerance, decreased safety awareness, fall risk, and orthopedic restrictions.  The Barthel Index was used as a functional outcome tool presenting with a score of Barthel Index Score: 30 today indicating marked limitations of functional mobility and ADLS.  Patient's clinical presentation is currently unstable/unpredictable as seen in patient's presentation of changing level of pain, increased fall risk, new onset of impairment of functional mobility, decreased endurance, and new onset of weakness. Pt would benefit from continued Physical Therapy treatment to address deficits as defined above and maximize level of functional mobility. As demonstrated by objective findings, the assigned level of complexity for this evaluation is high.The patient's -[facility] Basic Mobility Inpatient Short Form Raw Score is 13. A Raw score of less than or equal to 16 suggests the patient may benefit from discharge to post-acute rehabilitation services. Please also refer to the recommendation of the Physical Therapist for safe discharge planning.   Goals   Patient Goals to decrease pain and return home   STG Expiration Date 11/27/24   Short Term Goal #1 Pt will perform bed mobility with supervision with HOB elevated + grab bar ; Pt will perform bed <> chair transfer with Min A using cane ; Standing  "balance will improve to fair- to decrease risk of falls ; Pt will ambulate x 25 feet with Min A + RW ; AMPAC score will improve >15/24 to demonstrate improved functional independence   LTG Expiration Date 12/04/24   Long Term Goal #1 Pt will perform bed mobility with supervision ; Pt will perform bed <> chair transfer with supervision using cane ; Standing balance will improve to fair to decrease risk of falls ; Pt will ambulate x 25 feet with supervision + RW ; AMPAC score will improve >17/24 to demonstrate improved functional independence   Plan   Treatment/Interventions ADL retraining;Functional transfer training;LE strengthening/ROM;Therapeutic exercise;Endurance training;Bed mobility;Gait training;Spoke to nursing;OT;Spoke to case management   PT Frequency 4-6x/wk   Discharge Recommendation   Rehab Resource Intensity Level, PT II (Moderate Resource Intensity)   AM-PAC Basic Mobility Inpatient   Turning in Flat Bed Without Bedrails 3   Lying on Back to Sitting on Edge of Flat Bed Without Bedrails 2   Moving Bed to Chair 2   Standing Up From Chair Using Arms 3   Walk in Room 2   Climb 3-5 Stairs With Railing 1   Basic Mobility Inpatient Raw Score 13   Basic Mobility Standardized Score 33.99   [facility] Highest Level Of Mobility   -[facility] Goal 4: Move to chair/commode   [facility] Achieved 5: Stand (1 or more minutes)   Barthel Index   Feeding 5   Bathing 0   Grooming Score 0   Dressing Score 0   Bladder Score 5   Bowels Score 10   Toilet Use Score 5   Transfers (Bed/Chair) Score 5   Mobility (Level Surface) Score 0   Stairs Score 0   Barthel Index Score 30   Additional Treatment Session   Start Time 0950   End Time 1003   Treatment Assessment S: \"I need to go to the bathroom\" O/A: Pt intially requires Mod A to stand from EOB + Min A with static standing. Attempted to use cane for mobility but pt unsteady ; hand held assist x 2 steps to commode + Mod A to safely sit. Pt requires Mod A to stand + dependent for " pericare. Attempted further ambulation but pt very unsteady with posterior LOB, assisted to chair + repositioned for comfort. P: pt will benefit from skilled PT to address deficits to maxmize IND for safe d/c   Equipment Use commode   End of Consult   Patient Position at End of Consult Bedside chair;All needs within reach;Bed/Chair alarm activated   Licensure   [place] License Number  Tanya Felix [id number]

## 2024-11-21 VITALS
SYSTOLIC BLOOD PRESSURE: 130 MMHG | HEIGHT: 69 IN | WEIGHT: 188.49 LBS | OXYGEN SATURATION: 94 % | TEMPERATURE: 99.1 F | HEART RATE: 82 BPM | DIASTOLIC BLOOD PRESSURE: 68 MMHG | BODY MASS INDEX: 27.92 KG/M2 | RESPIRATION RATE: 18 BRPM

## 2024-11-21 LAB
ANION GAP SERPL CALCULATED.3IONS-SCNC: 6 MMOL/L (ref 4–13)
BACTERIA BLD CULT: ABNORMAL
BACTERIA BLD CULT: ABNORMAL
BASOPHILS # BLD AUTO: 0.03 THOUSANDS/ÂΜL (ref 0–0.1)
BASOPHILS NFR BLD AUTO: 0 % (ref 0–1)
BUN SERPL-MCNC: 37 MG/DL (ref 5–25)
CALCIUM SERPL-MCNC: 8.4 MG/DL (ref 8.4–10.2)
CHLORIDE SERPL-SCNC: 99 MMOL/L (ref 96–108)
CO2 SERPL-SCNC: 28 MMOL/L (ref 21–32)
CREAT SERPL-MCNC: 1.24 MG/DL (ref 0.6–1.3)
EOSINOPHIL # BLD AUTO: 0.69 THOUSAND/ÂΜL (ref 0–0.61)
EOSINOPHIL NFR BLD AUTO: 9 % (ref 0–6)
ERYTHROCYTE [DISTWIDTH] IN BLOOD BY AUTOMATED COUNT: 13.8 % (ref 11.6–15.1)
GFR SERPL CREATININE-BSD FRML MDRD: 53 ML/MIN/1.73SQ M
GLUCOSE SERPL-MCNC: 102 MG/DL (ref 65–140)
GRAM STN SPEC: ABNORMAL
HCT VFR BLD AUTO: 32.7 % (ref 36.5–49.3)
HGB BLD-MCNC: 11.1 G/DL (ref 12–17)
IMM GRANULOCYTES # BLD AUTO: 0.05 THOUSAND/UL (ref 0–0.2)
IMM GRANULOCYTES NFR BLD AUTO: 1 % (ref 0–2)
LYMPHOCYTES # BLD AUTO: 1.28 THOUSANDS/ÂΜL (ref 0.6–4.47)
LYMPHOCYTES NFR BLD AUTO: 16 % (ref 14–44)
MAGNESIUM SERPL-MCNC: 1.9 MG/DL (ref 1.9–2.7)
MCH RBC QN AUTO: 32.4 PG (ref 26.8–34.3)
MCHC RBC AUTO-ENTMCNC: 33.9 G/DL (ref 31.4–37.4)
MCV RBC AUTO: 95 FL (ref 82–98)
MECA+MECC ISLT/SPM QL: DETECTED
MONOCYTES # BLD AUTO: 1.21 THOUSAND/ÂΜL (ref 0.17–1.22)
MONOCYTES NFR BLD AUTO: 15 % (ref 4–12)
NEUTROPHILS # BLD AUTO: 4.87 THOUSANDS/ÂΜL (ref 1.85–7.62)
NEUTS SEG NFR BLD AUTO: 59 % (ref 43–75)
NRBC BLD AUTO-RTO: 0 /100 WBCS
PLATELET # BLD AUTO: 193 THOUSANDS/UL (ref 149–390)
PMV BLD AUTO: 9.4 FL (ref 8.9–12.7)
POTASSIUM SERPL-SCNC: 4.3 MMOL/L (ref 3.5–5.3)
RBC # BLD AUTO: 3.43 MILLION/UL (ref 3.88–5.62)
S EPIDERMIDIS DNA BLD POS QL NAA+NON-PRB: DETECTED
SODIUM SERPL-SCNC: 133 MMOL/L (ref 135–147)
WBC # BLD AUTO: 8.13 THOUSAND/UL (ref 4.31–10.16)

## 2024-11-21 PROCEDURE — 94640 AIRWAY INHALATION TREATMENT: CPT

## 2024-11-21 PROCEDURE — 80048 BASIC METABOLIC PNL TOTAL CA: CPT | Performed by: FAMILY MEDICINE

## 2024-11-21 PROCEDURE — 83735 ASSAY OF MAGNESIUM: CPT | Performed by: FAMILY MEDICINE

## 2024-11-21 PROCEDURE — 94760 N-INVAS EAR/PLS OXIMETRY 1: CPT

## 2024-11-21 PROCEDURE — 99238 HOSP IP/OBS DSCHRG MGMT 30/<: CPT | Performed by: FAMILY MEDICINE

## 2024-11-21 PROCEDURE — 85025 COMPLETE CBC W/AUTO DIFF WBC: CPT | Performed by: FAMILY MEDICINE

## 2024-11-21 RX ADMIN — IPRATROPIUM BROMIDE AND ALBUTEROL SULFATE 3 ML: .5; 3 SOLUTION RESPIRATORY (INHALATION) at 07:19

## 2024-11-21 RX ADMIN — Medication 400 MG: at 09:08

## 2024-11-21 RX ADMIN — Medication 1 TABLET: at 09:08

## 2024-11-21 RX ADMIN — PANTOPRAZOLE SODIUM 40 MG: 40 TABLET, DELAYED RELEASE ORAL at 06:20

## 2024-11-21 RX ADMIN — CITALOPRAM HYDROBROMIDE 40 MG: 20 TABLET ORAL at 09:08

## 2024-11-21 RX ADMIN — IPRATROPIUM BROMIDE AND ALBUTEROL SULFATE 3 ML: .5; 3 SOLUTION RESPIRATORY (INHALATION) at 02:27

## 2024-11-21 RX ADMIN — AMLODIPINE BESYLATE 2.5 MG: 2.5 TABLET ORAL at 09:08

## 2024-11-21 RX ADMIN — METOPROLOL TARTRATE 50 MG: 50 TABLET, FILM COATED ORAL at 06:20

## 2024-11-21 RX ADMIN — ENOXAPARIN SODIUM 40 MG: 40 INJECTION SUBCUTANEOUS at 09:08

## 2024-11-21 RX ADMIN — FLUTICASONE PROPIONATE 1 SPRAY: 50 SPRAY, METERED NASAL at 09:09

## 2024-11-21 RX ADMIN — B-COMPLEX W/ C & FOLIC ACID TAB 1 TABLET: TAB at 09:08

## 2024-11-21 RX ADMIN — IPRATROPIUM BROMIDE AND ALBUTEROL SULFATE 3 ML: .5; 3 SOLUTION RESPIRATORY (INHALATION) at 13:11

## 2024-11-21 NOTE — PLAN OF CARE
Problem: RESPIRATORY - ADULT  Goal: Achieves optimal ventilation and oxygenation  Description: INTERVENTIONS:  - Assess for changes in respiratory status  - Assess for changes in mentation and behavior  - Position to facilitate oxygenation and minimize respiratory effort  - Oxygen administered by appropriate delivery if ordered  - Initiate smoking cessation education as indicated  - Encourage broncho-pulmonary hygiene including cough, deep breathe, Incentive Spirometry  - Assess the need for suctioning and aspirate as needed  - Assess and instruct to report SOB or any respiratory difficulty  - Respiratory Therapy support as indicated  Outcome: Progressing     Problem: PAIN - ADULT  Goal: Verbalizes/displays adequate comfort level or baseline comfort level  Description: Interventions:  - Encourage patient to monitor pain and request assistance  - Assess pain using appropriate pain scale  - Administer analgesics based on type and severity of pain and evaluate response  - Implement non-pharmacological measures as appropriate and evaluate response  - Consider cultural and social influences on pain and pain management  - Notify physician/advanced practitioner if interventions unsuccessful or patient reports new pain  Outcome: Progressing     Problem: SAFETY ADULT  Goal: Patient will remain free of falls  Description: INTERVENTIONS:  - Educate patient/family on patient safety including physical limitations  - Instruct patient to call for assistance with activity   - Consult OT/PT to assist with strengthening/mobility   - Keep Call bell within reach  - Keep bed low and locked with side rails adjusted as appropriate  - Keep care items and personal belongings within reach  - Initiate and maintain comfort rounds  - Make Fall Risk Sign visible to staff  - Offer Toileting every 2 Hours, in advance of need  - Initiate/Maintain bed alarm  - Obtain necessary fall risk management equipment: call bell   - Apply yellow socks and  bracelet for high fall risk patients  - Consider moving patient to room near nurses station  Outcome: Progressing  Goal: Maintain or return to baseline ADL function  Description: INTERVENTIONS:  -  Assess patient's ability to carry out ADLs; assess patient's baseline for ADL function and identify physical deficits which impact ability to perform ADLs (bathing, care of mouth/teeth, toileting, grooming, dressing, etc.)  - Assess/evaluate cause of self-care deficits   - Assess range of motion  - Assess patient's mobility; develop plan if impaired  - Assess patient's need for assistive devices and provide as appropriate  - Encourage maximum independence but intervene and supervise when necessary  - Involve family in performance of ADLs  - Assess for home care needs following discharge   - Consider OT consult to assist with ADL evaluation and planning for discharge  - Provide patient education as appropriate  Outcome: Progressing  Goal: Maintains/Returns to pre admission functional level  Description: INTERVENTIONS:  - Perform AM-PAC 6 Click Basic Mobility/ Daily Activity assessment daily.  - Set and communicate daily mobility goal to care team and patient/family/caregiver.   - Collaborate with rehabilitation services on mobility goals if consulted  - Perform Range of Motion 3 times a day.  - Reposition patient every 3 hours.  - Dangle patient 3 times a day  - Stand patient 3 times a day  - Ambulate patient 3 times a day  - Out of bed to chair 3 times a day   - Out of bed for meals 3 times a day  - Out of bed for toileting  - Record patient progress and toleration of activity level   Outcome: Progressing

## 2024-11-21 NOTE — CASE MANAGEMENT
Case Management Discharge Planning Note    Patient name Taj Roblero Jr.  [address] [address] [address] MRN [id number]  : 1940 Date 2024       Current Admission Date: 2024  Current Admission Diagnosis:Generalized weakness   Patient Active Problem List    Diagnosis Date Noted Date Diagnosed    Abnormal CT of the chest 2024     Compression fracture of T3 vertebra (HCC) 2024     Closed fracture of right scapula 2024     Generalized weakness 2024     Paroxysmal atrial fibrillation (HCC) 2024     Acute bronchitis 2023     Electrolyte abnormality 2023     Cellulitis of extremity 2023     Mild protein-calorie malnutrition (HCC) 10/13/2023     Hypomagnesemia 2023     Abscess of left leg 2023     BPH (benign prostatic hyperplasia) 2023     OCD (obsessive compulsive disorder)      Stage 3 chronic kidney disease, unspecified whether stage 3a or 3b CKD (HCC) 2023     Vega esophagus 2023     Chronic rhinitis 2023     Other pulmonary embolism without acute cor pulmonale, unspecified chronicity (HCC) 2023     Gastroesophageal reflux disease without esophagitis 2023     Leg edema, left 2023     Acute respiratory failure with hypoxia ([facility]) 2023     Hiatal hernia 2023     Age-related osteoporosis without current pathological fracture 2022     Abnormal CT scan, chest 2022     Lung nodule 2022     Bladder wall thickening 2022     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HCC) 2022     Paroxysmal atrial flutter (HCC) 2020     Valvular heart disease 2019     SVT (supraventricular tachycardia) (HCC) 2019     History of pulmonary embolus (PE) 2019      Impaired vision 2019     Hyperlipidemia      Mixed obsessional thoughts and acts 10/14/2019     Allergic rhinitis 10/14/2019     Nonrheumatic mitral valve regurgitation 10/14/2019     Aortic valve sclerosis  10/14/2019     Nonrheumatic aortic valve insufficiency 10/14/2019     Essential hypertension 10/14/2019     Recurrent major depressive disorder, in full remission (HCC) 10/14/2019       LOS (days): 3  Geometric Mean LOS (GMLOS) (days): 3.5  Days to GMLOS:0.7     OBJECTIVE:  Risk of Unplanned Readmission Score: 18.54         Current admission status: Inpatient   Preferred Pharmacy:   [id number] - [place], [place] - [address] [address]  [address] [place] [id number]  Phone: [phone] Fax: [phone]    Primary Care Provider: Carmen Hunt MD    Primary Insurance: [employer] [facility]  Secondary Insurance:     DISCHARGE DETAILS:    Other Referral/Resources/Interventions Provided:  Interventions: Transportation  Referral Comments: RN CM made aware patient medically cleared for discharge today. BLS requested for 1400 and  confirmed by [facility] for 1400. All related parties made aware. RN CM spoke with patient at bedside to update and called patient's dtr Aysha and wife Galina to update.     Treatment Team Recommendation: Short Term Rehab  Discharge Destination Plan:: Short Term Rehab ([facility])  Transport at Discharge : BLS Ambulance     Number/Name of Dispatcher: [employer]  Transported by (Company and Unit #): [employer]ER[employer]I  ETA of Transport (Date): 11/21/24  ETA of Transport (Time): 1400

## 2024-11-21 NOTE — ASSESSMENT & PLAN NOTE
Per echo from 2022      -Check updated echo  -Consult pulmonary medicine team given shortness of breath could be related to pulmonary hypertension.  Appreciate further input    11/19 -repeat 2D echocardiogram performed showing a left ventricular ejection fraction of 60% with grade 1 diastolic dysfunction.  No significant changes compared to previous echocardiogram.  Patient was seen by pulmonology as noted below.    11/20 - Discussed with pulmonology who cleared the patient for discharge.  Patient scheduled for discharge to rehab this evening however is having a low-grade fever so will be monitored overnight here in the hospital.    11/21 - Respiratory status stable overnight. No new complaints. Plan for discharge to rehab today.

## 2024-11-21 NOTE — DISCHARGE SUMMARY
Discharge Summary - Hospitalist   Name: Taj Roblero Jr. 84 y.o. male I MRN: [id number]  Unit/Bed#: 2 [address] Date of Admission: 11/18/2024   Date of Service: 11/21/2024 I Hospital Day: 3     Assessment & Plan  Generalized weakness  Patient reports generalized weakness/fatigue since earlier this morning.  Family confirms the same at bedside.  Was found to be hypoxic in the emergency department on presentation.  No history of oxygen needs previously.  Also with right-sided scapular fracture.    Most probably generalized weakness secondary to hypoxia    CTA chest; no acute findings other than right sided scapular fracture that is comminuted and displaced.  Case discussed with orthopedics on-call Dr. Flanagan who recommended no need for transfer or acute surgical interventions at this time.    COVID/flu/RSV; negative  Lactic acid within normal range  Procalcitonin negative  CBC; white blood cell within normal limits    Plan:   -Continue oxygen supply to maintain SpO2 93%  -Respiratory protocol  -Check RPP panel  -Consult pulmonary medicine team given the patient have pulmonary hypertension could this be correlating to shortness of breath and hypoxia??   -Check updated echo  -Maintain on telemetry for next 24 hours  -Check TSH  -Refer to assessment and plan for respiratory failure  -Refer to assessment and plan for scapular fracture    11/19 - Patient feeling better today. PT/OT consults pending.    11/20 - Patient seen by PT and OT with recommendations for rehab which the patient and family are agreeable to.  Plan was initially for patient to be discharged to rehab this evening however patient is noted to have a low-grade fever so will monitor overnight.    11/21 - Patient stable overnight. Plan for discharge to rehab today,  Mixed obsessional thoughts and acts  Updated EKG.  QTc 420.  Continue Lexapro    Allergic rhinitis  Flonase as needed  Aortic valve sclerosis  Per echo from 2022  Check repeat echo    11/20  -repeat 2D echocardiogram showing a left ventricular ejection fraction of 60% with a normal systolic function and grade 1 diastolic dysfunction.  There is mild mitral valve regurgitation.  Mild to moderate aortic valve regurgitation.  Mild to moderate tricuspid valve regurgitation.   Essential hypertension  On ACE inhibitor at home.  Given elevated creatinine we will hold the medication    -Substituted with amlodipine 2.5 mg OD  -Monitor vitals  Hyperlipidemia  Continue statin and niacin  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HCC)  Per echo from 2022      -Check updated echo  -Consult pulmonary medicine team given shortness of breath could be related to pulmonary hypertension.  Appreciate further input    11/19 -repeat 2D echocardiogram performed showing a left ventricular ejection fraction of 60% with grade 1 diastolic dysfunction.  No significant changes compared to previous echocardiogram.  Patient was seen by pulmonology as noted below.    11/20 - Discussed with pulmonology who cleared the patient for discharge.  Patient scheduled for discharge to rehab this evening however is having a low-grade fever so will be monitored overnight here in the hospital.    11/21 - Respiratory status stable overnight. No new complaints. Plan for discharge to rehab today.  Acute respiratory failure with hypoxia (HCC)  Found on presentation to the ED.  No oxygen needs at baseline.  Past medical history is negative for COPD/smoking/dust exposure    -COVID/flu/RSV; negative  -CTA chest; no acute pulmonary embolism noted.  No other acute findings note other than scapular fracture.      Plan:   Check RPP panel  Check ESR, CRP  Check updated echo  Continue oxygen supplementation with target SpO2 93%  Respiratory protocol  DuoNebs  Consult pulmonary medicine team given this problem could be related to pulmonary hypertension  Hold torsemide 5 mg twice a week  Give 1 x lasix 20 mg IV     11/19 - Patient reports feeling better today.  Patient was  seen by pulmonology who suspects that the respiratory symptoms are secondary to atelectasis and splinting from recent fall and fractures.  Question of mild aspiration pneumonitis but there are no overt signs of pneumonia.  Gastroesophageal reflux disease without esophagitis  Continue Protonix  Vega esophagus  Continue Protonix  Stage 3 chronic kidney disease, unspecified whether stage 3a or 3b CKD (HCC)  Lab Results   Component Value Date    EGFR 53 11/21/2024    EGFR 50 11/20/2024    EGFR 50 11/19/2024    CREATININE 1.24 11/21/2024    CREATININE 1.30 11/20/2024    CREATININE 1.29 11/19/2024   Mildly elevated creatinine.  Near baseline.    -Monitor I/os, avoid nephrotoxins as possible  -Interval follow-up with a.m. labs  BPH (benign prostatic hyperplasia)  Continue Flomax  Closed fracture of right scapula  Patient reports a fall on the stairs 2 days ago.  Was evaluated in the ED at that time.  X-ray showed nondisplaced scapular fracture.  Upon reevaluation today CTA chest showing comminuted and displaced right sided scapular fracture.    Case was discussed with emergency medicine physician and we recommended discussing the case with orthopedic/trauma service.  Orthopedics on-call physician Dr. Flanagan recommending no acute surgical interventions at this time.  No need to transfer the patient to another facility.      -  Orthopedics recommending no acute surgical intervention at this time. Continue with RUE sling along with NWB of the RUE. Patient to follow up with orthopedic surgery after discharge.   Paroxysmal atrial fibrillation (HCC)  Follows up with out of network cardiologist    -Continue metoprolol 50 mg twice daily  -Not on anticoagulation.  -Continue telemetry  -Updated echo as noted above  Abnormal CT of the chest    Compression fracture of T3 vertebra (HCC)       Medical Problems       Resolved Problems  Date Reviewed: 11/18/2024   None       Discharging Physician / Practitioner: Lxea De La Vega,  MD  PCP: Carmen Hunt MD  Admission Date:   Admission Orders (From admission, onward)       Ordered        11/18/24 1704  INPATIENT ADMISSION  Once                          Discharge Date: 11/21/24    Consultations During Hospital Stay:  Pulmonology  Orthopedic surgery    Reason for Admission: Taj Roblero Jr. is a 84 y.o. male with a PMH of HTN , A fib , CKD3, pulmonary hypertension who presents with generalized weakness/fatigue and shortness of breath since the morning.  The patient reports no history of similar problem.  Currently he reports improvement in breathing after he was started on oxygen.  He denies any chest pain or tightness, nausea or vomiting, headache or lightheadedness, syncope or presyncope, diarrhea or constipation or any active urinary symptoms.  No fevers or chills.  No recent respiratory tract infections or antibiotic use.  Family at bedside confirms the above symptoms.  Patient does not have any history of smoking/dust exposure/COPD.    Hospital Course: Patient was seen and examined in the emergency department and was admitted to the hospital for further evaluation and management. Patient presenting with progressively worsening weakness and fatigue. Patient found to have evidence of acute Evoxac respiratory failure on admission. Patient with a recent fall also found to have a right-sided scapular fracture. Patient was seen by pulmonology who suspects that the respiratory symptoms are likely secondary to atelectasis and splinting from recent fall and fracture. Question of mild aspiration pneumonitis but there are no overt signs of pneumonia. Patient was also evaluated by orthopedic surgery with plans for nonoperative treatment. Sling was placed. Patient to be nonweightbearing to the right upper extremity and can follow-up in 2 weeks with orthopedic surgery in the outpatient setting. Patient was evaluated by physical therapy and Occupational Therapy with recommendations for inpatient  "rehab which the patient and his family are agreeable to. Patient will be discharged to rehab today. At the time of discharge patient reports feeling significantly improved and currently denies any acute complaints or concerns including chest pain or shortness of breath.     Please see above list of diagnoses and related plan for additional information.     Condition at Discharge: stable    Discharge Day Visit / Exam:   Vitals: Blood Pressure: 130/68 (11/21/24 0902)  Pulse: 82 (11/21/24 0902)  Temperature: 99.1 °F (37.3 °C) (11/21/24 0902)  Temp Source: Oral (11/21/24 0902)  Respirations: 18 (11/21/24 0902)  Height: 5' 9\" (175.3 cm) (11/19/24 1320)  Weight - Scale: 85.5 kg (188 lb 7.9 oz) (11/19/24 1320)  SpO2: 94 % (11/21/24 1311)  Physical Exam  HENT:      Head: Normocephalic.      Mouth/Throat:      Mouth: Mucous membranes are moist.   Eyes:      Extraocular Movements: Extraocular movements intact.   Cardiovascular:      Rate and Rhythm: Normal rate.   Pulmonary:      Comments: Decreased in the bases  Abdominal:      Palpations: Abdomen is soft.      Tenderness: There is no abdominal tenderness.   Musculoskeletal:      Comments: + RUE sling   Skin:     General: Skin is warm.   Neurological:      Mental Status: He is alert and oriented to person, place, and time.   Psychiatric:         Mood and Affect: Mood normal.       Discussion with Family: Discussed with family.    Discharge instructions/Information to patient and family:   See after visit summary for information provided to patient and family.      Provisions for Follow-Up Care:  See after visit summary for information related to follow-up care and any pertinent home health orders.      Mobility at time of Discharge:   Basic Mobility Inpatient Raw Score: 14  JH-HLM Goal: 4: Move to chair/commode  JH-HLM Achieved: 4: Move to chair/commode     Disposition:   Rehab.    Discharge Medications:  See after visit summary for reconciled discharge medications provided " to patient and/or family.      Administrative Statements   Discharge Statement:  I have spent a total time of 35 minutes in caring for this patient on the day of the visit/encounter.    **Please Note: This note may have been constructed using a voice recognition system**

## 2024-11-21 NOTE — ASSESSMENT & PLAN NOTE
Patient reports generalized weakness/fatigue since earlier this morning.  Family confirms the same at bedside.  Was found to be hypoxic in the emergency department on presentation.  No history of oxygen needs previously.  Also with right-sided scapular fracture.    Most probably generalized weakness secondary to hypoxia    CTA chest; no acute findings other than right sided scapular fracture that is comminuted and displaced.  Case discussed with orthopedics on-call Dr. Flanagan who recommended no need for transfer or acute surgical interventions at this time.    COVID/flu/RSV; negative  Lactic acid within normal range  Procalcitonin negative  CBC; white blood cell within normal limits    Plan:   -Continue oxygen supply to maintain SpO2 93%  -Respiratory protocol  -Check RPP panel  -Consult pulmonary medicine team given the patient have pulmonary hypertension could this be correlating to shortness of breath and hypoxia??   -Check updated echo  -Maintain on telemetry for next 24 hours  -Check TSH  -Refer to assessment and plan for respiratory failure  -Refer to assessment and plan for scapular fracture    11/19 - Patient feeling better today. PT/OT consults pending.    11/20 - Patient seen by PT and OT with recommendations for rehab which the patient and family are agreeable to.  Plan was initially for patient to be discharged to rehab this evening however patient is noted to have a low-grade fever so will monitor overnight.    11/21 - Patient stable overnight. Plan for discharge to rehab today,

## 2024-11-21 NOTE — ASSESSMENT & PLAN NOTE
Follows up with out of network cardiologist    -Continue metoprolol 50 mg twice daily  -Not on anticoagulation.  -Continue telemetry  -Updated echo as noted above

## 2024-11-21 NOTE — ASSESSMENT & PLAN NOTE
Found on presentation to the ED.  No oxygen needs at baseline.  Past medical history is negative for COPD/smoking/dust exposure    -COVID/flu/RSV; negative  -CTA chest; no acute pulmonary embolism noted.  No other acute findings note other than scapular fracture.      Plan:   Check RPP panel  Check ESR, CRP  Check updated echo  Continue oxygen supplementation with target SpO2 93%  Respiratory protocol  Jovi  Consult pulmonary medicine team given this problem could be related to pulmonary hypertension  Hold torsemide 5 mg twice a week  Give 1 x lasix 20 mg IV     11/19 - Patient reports feeling better today.  Patient was seen by pulmonology who suspects that the respiratory symptoms are secondary to atelectasis and splinting from recent fall and fractures.  Question of mild aspiration pneumonitis but there are no overt signs of pneumonia.

## 2024-11-21 NOTE — ASSESSMENT & PLAN NOTE
Lab Results   Component Value Date    EGFR 53 11/21/2024    EGFR 50 11/20/2024    EGFR 50 11/19/2024    CREATININE 1.24 11/21/2024    CREATININE 1.30 11/20/2024    CREATININE 1.29 11/19/2024   Mildly elevated creatinine.  Near baseline.    -Monitor I/os, avoid nephrotoxins as possible  -Interval follow-up with a.m. labs

## 2024-11-26 ENCOUNTER — TELEPHONE (OUTPATIENT)
Age: 84
End: 2024-11-26

## 2024-12-04 ENCOUNTER — APPOINTMENT (OUTPATIENT)
Dept: RADIOLOGY | Facility: CLINIC | Age: 84
End: 2024-12-04
Payer: COMMERCIAL

## 2024-12-04 ENCOUNTER — OFFICE VISIT (OUTPATIENT)
Dept: OBGYN CLINIC | Facility: CLINIC | Age: 84
End: 2024-12-04
Payer: COMMERCIAL

## 2024-12-04 VITALS
SYSTOLIC BLOOD PRESSURE: 146 MMHG | HEIGHT: 69 IN | HEART RATE: 71 BPM | BODY MASS INDEX: 27.85 KG/M2 | DIASTOLIC BLOOD PRESSURE: 63 MMHG | WEIGHT: 188 LBS

## 2024-12-04 DIAGNOSIS — S42.111A CLOSED DISPLACED FRACTURE OF BODY OF RIGHT SCAPULA, INITIAL ENCOUNTER: Primary | ICD-10-CM

## 2024-12-04 DIAGNOSIS — S42.101A CLOSED FRACTURE OF RIGHT SCAPULA, UNSPECIFIED PART OF SCAPULA, INITIAL ENCOUNTER: ICD-10-CM

## 2024-12-04 PROCEDURE — 73010 X-RAY EXAM OF SHOULDER BLADE: CPT

## 2024-12-04 PROCEDURE — 99214 OFFICE O/P EST MOD 30 MIN: CPT | Performed by: ORTHOPAEDIC SURGERY

## 2024-12-04 NOTE — PROGRESS NOTES
Assessment/Plan:  1. Closed displaced fracture of body of right scapula, initial encounter  XR scapula right        Scribe Attestation      I,:  Rose Ybarranava am acting as a scribe while in the presence of the attending physician.:       I,:  Conor Escalante MD personally performed the services described in this documentation    as scribed in my presence.:               Taj is a pleasant 84 y.o. male who presents for initial evaluation of his right shoulder. He did sustain a right scapular body fracture as a result of his fall on 11/16/2024.  Given his age and the fracture type I do still believe this can be managed non operatively. He should remain in the sling for an additional 4 weeks. He may remove the sling to work on gentle elbow ROM and shoulder pendulums. He should continue using the aleena-walker for an additional 2 weeks. He may then transition back to using his usual walker. He and his daughter expressed their understanding of the plan. He will follow-up in 4 weeks for re-evaluation of his right shoulder with repeat X-rays of his right scapula upon arrival.    Subjective:   Taj Roblero Jr. is a 84 y.o. male who presents for initial evaluation of his right shoulder. He sustained a fall on 11/16/2024, landing on his right side. He was taken to the ED the same day via EMS, where X-rays were obtained demonstrating a nondisplaced scapula fracture. He was subsequently admitted to the hospital on 11/18/2024-11/21/2024 due to shortness of breath.       Review of Systems   Constitutional:  Positive for activity change. Negative for chills and fever.   HENT:  Negative for ear pain and sore throat.    Eyes:  Negative for pain and visual disturbance.   Respiratory:  Negative for cough and shortness of breath.    Cardiovascular:  Negative for chest pain and palpitations.   Gastrointestinal:  Negative for abdominal pain and vomiting.   Genitourinary:  Negative for dysuria and hematuria.   Musculoskeletal:  Positive  for arthralgias. Negative for back pain.   Skin:  Negative for color change and rash.   Neurological:  Negative for seizures and syncope.   All other systems reviewed and are negative.        Past Medical History:   Diagnosis Date    Vega esophagus     BPH (benign prostatic hyperplasia)     Depression     Hiatal hernia     HTN (hypertension)     Hyperlipidemia     Hypertension     Leaky heart valve     OCD (obsessive compulsive disorder)     Pulmonary embolism (HCC) 2019       Past Surgical History:   Procedure Laterality Date    HARDWARE REMOVAL Left 09/09/2023    Procedure: REMOVAL HARDWARE;  Surgeon: Henri Flanagan DO;  Location: [facility];  Service: Orthopedics    INCISION AND DRAINAGE OF WOUND Left 09/09/2023    Procedure: INCISION AND DRAINAGE (I&D) EXTREMITY;  Surgeon: Henri Flanagan DO;  Location: [facility];  Service: Orthopedics    IR ASPIRATION ONLY  12/15/2023    IR IVC FILTER PLACEMENT OPTIONAL/TEMPORARY  12/07/2019    IR IVC FILTER REMOVAL  08/21/2020    CT OPTX FEM SHFT FX W/INSJ IMED IMPLT W/WO SCREW Right 12/04/2019    Procedure: INSERTION NAIL IM FEMUR ANTEGRADE (TROCHANTERIC);  Surgeon: Yesenia Veronica DO;  Location: [facility];  Service: Orthopedics    CT OPTX FEM SHFT FX W/INSJ IMED IMPLT W/WO SCREW Left 06/17/2022    Procedure: INSERTION NAIL IM FEMUR ANTEGRADE (TROCHANTERIC) - long nail;  Surgeon: Henri Flanagan DO;  Location: [facility];  Service: Orthopedics    UPPER GASTROINTESTINAL ENDOSCOPY      WOUND DEBRIDEMENT Left 12/16/2023    Procedure: DEBRIDEMENT LOWER EXTREMITY (WASH OUT)- left thigh abscess irrigation and debridement;  Surgeon: Feng Robertson MD;  Location: [facility];  Service: Orthopedics       Family History   Problem Relation Age of Onset    Cancer Mother        Social History     Occupational History    Not on file   Tobacco Use    Smoking status: Never    Smokeless tobacco: Never   Vaping Use    Vaping status: Never Used   Substance and Sexual Activity    Alcohol  use: Never    Drug use: Never    Sexual activity: Not on file         Current Outpatient Medications:     albuterol (Ventolin HFA) 90 mcg/act inhaler, Inhale 2 puffs every 6 (six) hours as needed for wheezing, Disp: 18 g, Rfl: 0    amLODIPine (NORVASC) 2.5 mg tablet, Take 1 tablet (2.5 mg total) by mouth daily, Disp: , Rfl:     atorvastatin (LIPITOR) 10 mg tablet, Take 0.5 tablets (5 mg total) by mouth daily with dinner TAKE ONE-HALF TABLET DAILY, Disp: 45 tablet, Rfl: 2    Calcium Carb-Cholecalciferol (CALTRATE 600+D3 PO), Take 600 mg by mouth 2 (two) times a day, Disp: , Rfl:     citalopram (CeleXA) 40 mg tablet, Take 1 tablet (40 mg total) by mouth daily, Disp: 90 tablet, Rfl: 1    fluticasone (FLONASE) 50 mcg/act nasal spray, 1 spray into each nostril daily, Disp: 16 g, Rfl: 3    ipratropium-albuterol (DUO-NEB) 0.5-2.5 mg/3 mL nebulizer solution, Take 3 mL by nebulization every 6 (six) hours, Disp: , Rfl:     magnesium oxide (MAG-OX) 400 mg tablet, Take 1 tablet by mouth daily, Disp: , Rfl:     metoprolol tartrate (LOPRESSOR) 50 mg tablet, Take 1 tablet (50 mg total) by mouth every 12 (twelve) hours, Disp: 180 tablet, Rfl: 1    Multiple Vitamin (MULTIVITAMIN) tablet, Take 1 tablet by mouth daily, Disp: , Rfl:     niacin (NIASPAN) 500 mg CR tablet, Take 1 tablet (500 mg total) by mouth daily at bedtime, Disp: 90 tablet, Rfl: 1    pantoprazole (PROTONIX) 40 mg tablet, Take 1 tablet (40 mg total) by mouth daily, Disp: 90 tablet, Rfl: 3    tamsulosin (FLOMAX) 0.4 mg, Take 0.4 mg by mouth daily with dinner, Disp: , Rfl:     No Known Allergies    Objective:  Vitals:    12/04/24 1320   BP: 146/63   Pulse: 71       Right Shoulder Exam     Tenderness   Right shoulder tenderness location: sacpula.    Other   Erythema: absent  Scars: absent  Sensation: normal  Pulse: present    Comments:  Strength and ROM deferred due to known fracture  SILT  AIN/PIN intact            Physical Exam  Vitals and nursing note reviewed.    Constitutional:       Appearance: Normal appearance.   HENT:      Head: Normocephalic and atraumatic.      Right Ear: External ear normal.      Left Ear: External ear normal.      Nose: Nose normal.   Eyes:      General: No scleral icterus.     Extraocular Movements: Extraocular movements intact.      Conjunctiva/sclera: Conjunctivae normal.   Cardiovascular:      Rate and Rhythm: Normal rate.   Pulmonary:      Effort: Pulmonary effort is normal. No respiratory distress.   Musculoskeletal:         General: Tenderness present.      Cervical back: Normal range of motion and neck supple.      Comments: See ortho exam   Skin:     General: Skin is warm and dry.   Neurological:      Mental Status: He is alert and oriented to person, place, and time.   Psychiatric:         Mood and Affect: Mood normal.         Behavior: Behavior normal.         I have personally reviewed pertinent films in PACS and my interpretation is as follows:  X-rays of the right scapula obtained in the office demonstrate mild displacement of the fracture site of the scapular body compared to original radiographs from ED on 11/16/2024.      This document was created using speech voice recognition software.   Grammatical errors, random word insertions, pronoun errors, and incomplete sentences are an occasional consequence of this system due to software limitations, ambient noise, and hardware issues.   Any formal questions or concerns about content, text, or information contained within the body of this dictation should be directly addressed to the provider for clarification.

## 2024-12-10 ENCOUNTER — OFFICE VISIT (OUTPATIENT)
Dept: INTERNAL MEDICINE CLINIC | Facility: CLINIC | Age: 84
End: 2024-12-10
Payer: COMMERCIAL

## 2024-12-10 VITALS
WEIGHT: 188 LBS | DIASTOLIC BLOOD PRESSURE: 68 MMHG | SYSTOLIC BLOOD PRESSURE: 130 MMHG | OXYGEN SATURATION: 93 % | HEIGHT: 69 IN | BODY MASS INDEX: 27.85 KG/M2 | HEART RATE: 69 BPM

## 2024-12-10 DIAGNOSIS — E78.5 HYPERLIPIDEMIA, UNSPECIFIED HYPERLIPIDEMIA TYPE: ICD-10-CM

## 2024-12-10 DIAGNOSIS — S22.030A COMPRESSION FRACTURE OF T3 VERTEBRA, INITIAL ENCOUNTER (HCC): ICD-10-CM

## 2024-12-10 DIAGNOSIS — I48.0 PAROXYSMAL ATRIAL FIBRILLATION (HCC): ICD-10-CM

## 2024-12-10 DIAGNOSIS — R09.81 NASAL CONGESTION: Chronic | ICD-10-CM

## 2024-12-10 DIAGNOSIS — K44.9 HIATAL HERNIA: ICD-10-CM

## 2024-12-10 DIAGNOSIS — J45.909 REACTIVE AIRWAY DISEASE: ICD-10-CM

## 2024-12-10 DIAGNOSIS — E78.00 HYPERCHOLESTEREMIA: ICD-10-CM

## 2024-12-10 DIAGNOSIS — F33.42 RECURRENT MAJOR DEPRESSIVE DISORDER, IN FULL REMISSION (HCC): ICD-10-CM

## 2024-12-10 DIAGNOSIS — D64.9 ANEMIA, UNSPECIFIED TYPE: ICD-10-CM

## 2024-12-10 DIAGNOSIS — N42.89 PROSTATE ATROPHY: ICD-10-CM

## 2024-12-10 DIAGNOSIS — I10 ESSENTIAL HYPERTENSION: ICD-10-CM

## 2024-12-10 DIAGNOSIS — E87.6 HYPOKALEMIA: ICD-10-CM

## 2024-12-10 DIAGNOSIS — J96.01 ACUTE RESPIRATORY FAILURE WITH HYPOXIA (HCC): Primary | ICD-10-CM

## 2024-12-10 PROCEDURE — G2211 COMPLEX E/M VISIT ADD ON: HCPCS | Performed by: INTERNAL MEDICINE

## 2024-12-10 PROCEDURE — 99214 OFFICE O/P EST MOD 30 MIN: CPT | Performed by: INTERNAL MEDICINE

## 2024-12-10 RX ORDER — PANTOPRAZOLE SODIUM 40 MG/1
40 TABLET, DELAYED RELEASE ORAL DAILY
Qty: 90 TABLET | Refills: 3 | Status: SHIPPED | OUTPATIENT
Start: 2024-12-10

## 2024-12-10 RX ORDER — MAGNESIUM OXIDE 400 MG/1
1 TABLET ORAL DAILY
Qty: 90 TABLET | Refills: 1 | Status: SHIPPED | OUTPATIENT
Start: 2024-12-10 | End: 2024-12-10

## 2024-12-10 RX ORDER — ATORVASTATIN CALCIUM 10 MG/1
5 TABLET, FILM COATED ORAL
Qty: 45 TABLET | Refills: 2 | Status: SHIPPED | OUTPATIENT
Start: 2024-12-10

## 2024-12-10 RX ORDER — IPRATROPIUM BROMIDE 21 UG/1
2 SPRAY, METERED NASAL DAILY
Qty: 30 ML | Refills: 5 | Status: SHIPPED | OUTPATIENT
Start: 2024-12-10

## 2024-12-10 RX ORDER — AMLODIPINE BESYLATE 2.5 MG/1
2.5 TABLET ORAL DAILY
Qty: 90 TABLET | Refills: 1 | Status: SHIPPED | OUTPATIENT
Start: 2024-12-10

## 2024-12-10 RX ORDER — IPRATROPIUM BROMIDE 21 UG/1
2 SPRAY, METERED NASAL DAILY
COMMUNITY
End: 2024-12-10 | Stop reason: SDUPTHER

## 2024-12-10 RX ORDER — ALBUTEROL SULFATE 90 UG/1
2 INHALANT RESPIRATORY (INHALATION) EVERY 6 HOURS PRN
Qty: 18 G | Refills: 3 | Status: SHIPPED | OUTPATIENT
Start: 2024-12-10

## 2024-12-10 RX ORDER — METOPROLOL TARTRATE 50 MG
50 TABLET ORAL EVERY 12 HOURS
Qty: 180 TABLET | Refills: 1 | Status: SHIPPED | OUTPATIENT
Start: 2024-12-10

## 2024-12-10 RX ORDER — FLUTICASONE PROPIONATE 50 MCG
1 SPRAY, SUSPENSION (ML) NASAL DAILY
Qty: 16 G | Refills: 3 | Status: SHIPPED | OUTPATIENT
Start: 2024-12-10

## 2024-12-10 RX ORDER — TAMSULOSIN HYDROCHLORIDE 0.4 MG/1
0.4 CAPSULE ORAL
Qty: 90 CAPSULE | Refills: 1 | Status: SHIPPED | OUTPATIENT
Start: 2024-12-10

## 2024-12-10 RX ORDER — CITALOPRAM HYDROBROMIDE 40 MG/1
40 TABLET ORAL DAILY
Qty: 90 TABLET | Refills: 1 | Status: SHIPPED | OUTPATIENT
Start: 2024-12-10

## 2024-12-10 RX ORDER — NIACIN 500 MG/1
500 TABLET, EXTENDED RELEASE ORAL
Qty: 90 TABLET | Refills: 1 | Status: SHIPPED | OUTPATIENT
Start: 2024-12-10

## 2024-12-10 NOTE — ASSESSMENT & PLAN NOTE
Follows up with out of network cardiologist    Patient asymptomatic for now patient sinus rhythm rate controlled    -Continue metoprolol 50 mg twice daily  -Not on anticoagulation.  No anticoagulation recommended by cardiology high risk for the fall bleeding and a fall

## 2024-12-10 NOTE — ASSESSMENT & PLAN NOTE
Blood work reviewed done in the nursing home    Potassium was 5.3 elevated    Will repeat BMP if needed start on Kayexalate

## 2024-12-10 NOTE — ASSESSMENT & PLAN NOTE
Previous CBC reviewed in the nursing home hemoglobin 11.5    Etiology remains unknown for anemia    Will repeat CBC if persistent anemia needed further workup for unexplained anemia

## 2024-12-10 NOTE — ASSESSMENT & PLAN NOTE
COVID/flu/RSV; negative  -CTA chest; no acute pulmonary embolism noted.  No other acute findings note other than scapular fracture.    Above reviewed patient developed atelectasis after the fall causing hypoxic respiratory failure    Acute hypoxic respiratory failure has resolved    Not on any oxygen using oxygen only at nighttime    To be seen by pulmonary tomorrow    Continue using DuoNeb as needed at home    Respiratory protocol  Proventil 2 puff 4 times a day as needed  Echocardiogram reviewed LV function normal mild pulmonary hypertension

## 2024-12-10 NOTE — ASSESSMENT & PLAN NOTE
Blood pressure controlled    Agree continue manage medication as follow    Metoprolol 50 mg daily    Amlodipine 2.5 mg daily low-salt diet check BMP

## 2024-12-10 NOTE — PROGRESS NOTES
Dr. Hunt's Office Visit Note  12/10/24     Taj Roblero Jr. 84 y.o. male MRN: [id number]  : 1940    Assessment:     1. Acute respiratory failure with hypoxia ([facility])  Assessment & Plan:  COVID/flu/RSV; negative  -CTA chest; no acute pulmonary embolism noted.  No other acute findings note other than scapular fracture.    Above reviewed patient developed atelectasis after the fall causing hypoxic respiratory failure    Acute hypoxic respiratory failure has resolved    Not on any oxygen using oxygen only at nighttime    To be seen by pulmonary tomorrow    Continue using DuoNeb as needed at home    Respiratory protocol  Proventil 2 puff 4 times a day as needed  Echocardiogram reviewed LV function normal mild pulmonary hypertension  Orders:  -     albuterol (Ventolin HFA) 90 mcg/act inhaler; Inhale 2 puffs every 6 (six) hours as needed for wheezing  2. Prostate atrophy  -     tamsulosin (FLOMAX) 0.4 mg; Take 1 capsule (0.4 mg total) by mouth daily with dinner  3. Nasal congestion  -     fluticasone (FLONASE) 50 mcg/act nasal spray; 1 spray into each nostril daily  -     ipratropium (ATROVENT) 0.03 % nasal spray; 2 sprays into each nostril in the morning  4. Reactive airway disease  -     albuterol (Ventolin HFA) 90 mcg/act inhaler; Inhale 2 puffs every 6 (six) hours as needed for wheezing  -     ipratropium (ATROVENT) 0.03 % nasal spray; 2 sprays into each nostril in the morning  5. Compression fracture of T3 vertebra, initial encounter ([facility])  -     amLODIPine (NORVASC) 2.5 mg tablet; Take 1 tablet (2.5 mg total) by mouth daily  6. Essential hypertension  Assessment & Plan:  Blood pressure controlled    Agree continue manage medication as follow    Metoprolol 50 mg daily    Amlodipine 2.5 mg daily low-salt diet check BMP  Orders:  -     citalopram (CeleXA) 40 mg tablet; Take 1 tablet (40 mg total) by mouth daily  7. Recurrent major depressive disorder, in full remission ([facility])  -     citalopram (CeleXA) 40 mg  tablet; Take 1 tablet (40 mg total) by mouth daily  8. Hiatal hernia  -     pantoprazole (PROTONIX) 40 mg tablet; Take 1 tablet (40 mg total) by mouth daily  9. Hypercholesteremia  -     atorvastatin (LIPITOR) 10 mg tablet; Take 0.5 tablets (5 mg total) by mouth daily with dinner TAKE ONE-HALF TABLET DAILY  10. Hyperlipidemia, unspecified hyperlipidemia type  -     niacin (NIASPAN) 500 mg CR tablet; Take 1 tablet (500 mg total) by mouth daily at bedtime  11. Hypokalemia  Assessment & Plan:  Blood work reviewed done in the nursing home    Potassium was 5.3 elevated    Will repeat BMP if needed start on Kayexalate  Orders:  -     Basic metabolic panel; Future  12. Anemia, unspecified type  Assessment & Plan:  Previous CBC reviewed in the nursing home hemoglobin 11.5    Etiology remains unknown for anemia    Will repeat CBC if persistent anemia needed further workup for unexplained anemia  Orders:  -     CBC and differential; Future  13. Paroxysmal atrial fibrillation (HCC)  Assessment & Plan:  Follows up with out of network cardiologist    Patient asymptomatic for now patient sinus rhythm rate controlled    -Continue metoprolol 50 mg twice daily  -Not on anticoagulation.  No anticoagulation recommended by cardiology high risk for the fall bleeding and a fall      Orders:  -     metoprolol tartrate (LOPRESSOR) 50 mg tablet; Take 1 tablet (50 mg total) by mouth every 12 (twelve) hours        Discussion Summary and Plan:  Today's care plan and medications were reviewed with patient in detail and all their questions answered to their satisfaction.    Chief Complaint   Patient presents with   • Follow-up     From rehab on oxygen at home. At night.      Subjective:  Patient fell and admitted with acute hypoxic respiratory failure due to atelectasis CT chest reviewed generalized weakness followed by hospitalization was sent to the rehab patient recently discharged from the rehab but not on oxygen only at bedtime if needed  awaiting to be seen by pulmonary tomorrow previous labs reviewed which shows potassium elevated some anemia we will repeat the labs before next visit for now no chest pain dyspnea on exertion at baseline        The following portions of the patient's history were reviewed and updated as appropriate: allergies, current medications, past family history, past medical history, past social history, past surgical history and problem list.    Review of Systems   Constitutional:  Positive for activity change. Negative for appetite change, chills, diaphoresis, fatigue, fever and unexpected weight change.   HENT:  Negative for congestion, dental problem, drooling, ear discharge, ear pain, facial swelling, hearing loss, mouth sores, nosebleeds, postnasal drip, rhinorrhea, sinus pressure, sneezing, sore throat, tinnitus, trouble swallowing and voice change.    Eyes:  Negative for photophobia, pain, discharge, redness, itching and visual disturbance.   Respiratory:  Negative for apnea, cough, choking, chest tightness, shortness of breath, wheezing and stridor.    Cardiovascular:  Positive for leg swelling. Negative for chest pain and palpitations.   Gastrointestinal:  Negative for abdominal distention, abdominal pain, anal bleeding, blood in stool, constipation, diarrhea, nausea, rectal pain and vomiting.   Endocrine: Negative for cold intolerance, heat intolerance, polydipsia, polyphagia and polyuria.   Genitourinary:  Negative for decreased urine volume, difficulty urinating, dysuria, enuresis, flank pain, frequency, genital sores, hematuria and urgency.   Musculoskeletal:  Positive for arthralgias, back pain, gait problem, joint swelling and myalgias. Negative for neck pain and neck stiffness.   Skin:  Negative for color change, pallor, rash and wound.   Allergic/Immunologic: Negative.  Negative for environmental allergies, food allergies and immunocompromised state.   Neurological:  Negative for dizziness, tremors, seizures,  syncope, facial asymmetry, speech difficulty, weakness, light-headedness, numbness and headaches.   Psychiatric/Behavioral:  Negative for agitation, behavioral problems, confusion, decreased concentration, dysphoric mood, hallucinations, self-injury, sleep disturbance and suicidal ideas. The patient is not nervous/anxious and is not hyperactive.          Historical Information   Patient Active Problem List   Diagnosis   • Mixed obsessional thoughts and acts   • Allergic rhinitis   • Nonrheumatic mitral valve regurgitation   • Aortic valve sclerosis   • Nonrheumatic aortic valve insufficiency   • Essential hypertension   • Recurrent major depressive disorder, in full remission ([facility])   • Hyperlipidemia   • Impaired vision   • History of pulmonary embolus (PE) 2019   • SVT (supraventricular tachycardia) ([facility])   • Valvular heart disease   • Paroxysmal atrial flutter ([facility])   • Lung nodule   • Bladder wall thickening   • 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[facility])   • Age-related osteoporosis without current pathological fracture   • Abnormal CT scan, chest   • Leg edema, left   • Acute respiratory failure with hypoxia ([facility])   • Hiatal hernia   • Other pulmonary embolism without acute cor pulmonale, unspecified chronicity ([facility])   • Gastroesophageal reflux disease without esophagitis   • Chronic rhinitis   • Vega esophagus   • Stage 3 chronic kidney disease, unspecified whether stage 3a or 3b CKD ([facility])   • Abscess of left leg   • BPH (benign prostatic hyperplasia)   • OCD (obsessive compulsive disorder)   • Hypomagnesemia   • Mild protein-calorie malnutrition ([facility])   • Cellulitis of extremity   • Electrolyte abnormality   • Acute bronchitis   • Closed fracture of right scapula   • Generalized weakness   • Paroxysmal atrial fibrillation ([facility])   • Abnormal CT of the chest   • Compression fracture of T3 vertebra ([facility])   • Hypokalemia   • Anemia     Past Medical History:   Diagnosis Date   • Vega esophagus    • BPH (benign  prostatic hyperplasia)    • Depression    • Hiatal hernia    • HTN (hypertension)    • Hyperlipidemia    • Hypertension    • Leaky heart valve    • OCD (obsessive compulsive disorder)    • Pulmonary embolism (HCC) 2019     Past Surgical History:   Procedure Laterality Date   • HARDWARE REMOVAL Left 09/09/2023    Procedure: REMOVAL HARDWARE;  Surgeon: Henri Flanagan DO;  Location: [facility];  Service: Orthopedics   • INCISION AND DRAINAGE OF WOUND Left 09/09/2023    Procedure: INCISION AND DRAINAGE (I&D) EXTREMITY;  Surgeon: Henri Flanagan DO;  Location: [facility];  Service: Orthopedics   • IR ASPIRATION ONLY  12/15/2023   • IR IVC FILTER PLACEMENT OPTIONAL/TEMPORARY  12/07/2019   • IR IVC FILTER REMOVAL  08/21/2020   • PA OPTX FEM SHFT FX W/INSJ IMED IMPLT W/WO SCREW Right 12/04/2019    Procedure: INSERTION NAIL IM FEMUR ANTEGRADE (TROCHANTERIC);  Surgeon: Yesenia Veronica DO;  Location: [facility];  Service: Orthopedics   • PA OPTX FEM SHFT FX W/INSJ IMED IMPLT W/WO SCREW Left 06/17/2022    Procedure: INSERTION NAIL IM FEMUR ANTEGRADE (TROCHANTERIC) - long nail;  Surgeon: Henri Flanagan DO;  Location: [facility];  Service: Orthopedics   • UPPER GASTROINTESTINAL ENDOSCOPY     • WOUND DEBRIDEMENT Left 12/16/2023    Procedure: DEBRIDEMENT LOWER EXTREMITY (WASH OUT)- left thigh abscess irrigation and debridement;  Surgeon: Feng Robertson MD;  Location: [facility];  Service: Orthopedics     Social History     Substance and Sexual Activity   Alcohol Use Never     Social History     Substance and Sexual Activity   Drug Use Never     Social History     Tobacco Use   Smoking Status Never   Smokeless Tobacco Never     Family History   Problem Relation Age of Onset   • Cancer Mother      Health Maintenance Due   Topic   • Pneumococcal Vaccine: 65+ Years (1 of 2 - PCV)   • Zoster Vaccine (1 of 2)   • RSV Vaccine Age 60+ Years (1 - 1-dose 75+ series)   • COVID-19 Vaccine (1 - 2024-25 season)   • Fall Risk    • Medicare  "Annual Wellness Visit (AWV)       Meds/Allergies       Current Outpatient Medications:   •  albuterol (Ventolin HFA) 90 mcg/act inhaler, Inhale 2 puffs every 6 (six) hours as needed for wheezing, Disp: 18 g, Rfl: 3  •  amLODIPine (NORVASC) 2.5 mg tablet, Take 1 tablet (2.5 mg total) by mouth daily, Disp: 90 tablet, Rfl: 1  •  atorvastatin (LIPITOR) 10 mg tablet, Take 0.5 tablets (5 mg total) by mouth daily with dinner TAKE ONE-HALF TABLET DAILY, Disp: 45 tablet, Rfl: 2  •  Calcium Carb-Cholecalciferol (CALTRATE 600+D3 PO), Take 600 mg by mouth 2 (two) times a day, Disp: , Rfl:   •  citalopram (CeleXA) 40 mg tablet, Take 1 tablet (40 mg total) by mouth daily, Disp: 90 tablet, Rfl: 1  •  fluticasone (FLONASE) 50 mcg/act nasal spray, 1 spray into each nostril daily, Disp: 16 g, Rfl: 3  •  ipratropium (ATROVENT) 0.03 % nasal spray, 2 sprays into each nostril in the morning, Disp: 30 mL, Rfl: 5  •  ipratropium-albuterol (DUO-NEB) 0.5-2.5 mg/3 mL nebulizer solution, Take 3 mL by nebulization every 6 (six) hours, Disp: , Rfl:   •  metoprolol tartrate (LOPRESSOR) 50 mg tablet, Take 1 tablet (50 mg total) by mouth every 12 (twelve) hours, Disp: 180 tablet, Rfl: 1  •  Multiple Vitamin (MULTIVITAMIN) tablet, Take 1 tablet by mouth daily, Disp: , Rfl:   •  niacin (NIASPAN) 500 mg CR tablet, Take 1 tablet (500 mg total) by mouth daily at bedtime, Disp: 90 tablet, Rfl: 1  •  pantoprazole (PROTONIX) 40 mg tablet, Take 1 tablet (40 mg total) by mouth daily, Disp: 90 tablet, Rfl: 3  •  tamsulosin (FLOMAX) 0.4 mg, Take 1 capsule (0.4 mg total) by mouth daily with dinner, Disp: 90 capsule, Rfl: 1      Objective:    Vitals:   /68   Pulse 69   Ht 5' 9\" (1.753 m)   Wt 85.3 kg (188 lb)   SpO2 93%   BMI 27.76 kg/m²   Body mass index is 27.76 kg/m².  Vitals:    12/10/24 1022   Weight: 85.3 kg (188 lb)       Physical Exam  Vitals and nursing note reviewed.   Constitutional:       General: He is not in acute distress.     " Appearance: He is well-developed. He is not ill-appearing, toxic-appearing or diaphoretic.   HENT:      Head: Normocephalic and atraumatic.      Right Ear: External ear normal.      Left Ear: External ear normal.      Nose: Nose normal.      Mouth/Throat:      Pharynx: No oropharyngeal exudate.   Eyes:      General: Lids are normal. Lids are everted, no foreign bodies appreciated. No scleral icterus.        Right eye: No discharge.         Left eye: No discharge.      Conjunctiva/sclera: Conjunctivae normal.      Pupils: Pupils are equal, round, and reactive to light.   Neck:      Thyroid: No thyromegaly.      Vascular: Normal carotid pulses. No carotid bruit, hepatojugular reflux or JVD.      Trachea: No tracheal tenderness or tracheal deviation.   Cardiovascular:      Rate and Rhythm: Normal rate and regular rhythm.      Pulses: Normal pulses.      Heart sounds: Murmur heard.      No friction rub. No gallop.   Pulmonary:      Effort: Pulmonary effort is normal. No respiratory distress.      Breath sounds: No stridor. No wheezing or rales.      Comments: Decreased air entry bilateral  Chest:      Chest wall: No tenderness.   Abdominal:      General: Bowel sounds are normal. There is no distension.      Palpations: Abdomen is soft. There is no mass.      Tenderness: There is no abdominal tenderness. There is no guarding or rebound.   Musculoskeletal:         General: No tenderness or deformity. Normal range of motion.      Cervical back: Normal range of motion and neck supple. No edema, erythema or rigidity. No spinous process tenderness or muscular tenderness. Normal range of motion.      Right lower leg: Edema present.      Left lower leg: Edema present.   Lymphadenopathy:      Head:      Right side of head: No submental, submandibular, tonsillar, preauricular or posterior auricular adenopathy.      Left side of head: No submental, submandibular, tonsillar, preauricular, posterior auricular or occipital  "adenopathy.      Cervical: No cervical adenopathy.      Right cervical: No superficial, deep or posterior cervical adenopathy.     Left cervical: No superficial, deep or posterior cervical adenopathy.      Upper Body:      Right upper body: No pectoral adenopathy.      Left upper body: No pectoral adenopathy.   Skin:     General: Skin is warm and dry.      Coloration: Skin is not pale.      Findings: No erythema or rash.   Neurological:      General: No focal deficit present.      Mental Status: He is alert and oriented to person, place, and time.      Cranial Nerves: No cranial nerve deficit.      Sensory: No sensory deficit.      Motor: No tremor, abnormal muscle tone or seizure activity.      Coordination: Coordination normal.      Gait: Gait abnormal.      Deep Tendon Reflexes: Reflexes are normal and symmetric. Reflexes normal.   Psychiatric:         Behavior: Behavior normal.         Thought Content: Thought content normal.         Judgment: Judgment normal.         Lab Review   No results displayed because visit has over 200 results.            Patient Instructions   Pt is symptom free for this problem.  This diagnosis or problem is stable/well controlled  Patient is advised to continue same meds as outlined in medicine list  Pt is advised to continue to follow with specialist if they are following for this problme  Pt should get blood test or other testing as per specialist ( or myself) prior to your next visit.        Carmen Hunt MD        \"This note has been constructed using a voice recognition system.Therefore there may be syntax, spelling, and/or grammatical errors. Please call if you have any questions. \"  "

## 2024-12-11 ENCOUNTER — OFFICE VISIT (OUTPATIENT)
Dept: PULMONOLOGY | Facility: MEDICAL CENTER | Age: 84
End: 2024-12-11
Payer: COMMERCIAL

## 2024-12-11 VITALS
DIASTOLIC BLOOD PRESSURE: 62 MMHG | BODY MASS INDEX: 26.66 KG/M2 | HEART RATE: 62 BPM | TEMPERATURE: 98.4 F | WEIGHT: 180 LBS | HEIGHT: 69 IN | RESPIRATION RATE: 14 BRPM | OXYGEN SATURATION: 91 % | SYSTOLIC BLOOD PRESSURE: 130 MMHG

## 2024-12-11 DIAGNOSIS — J98.11 ATELECTASIS: ICD-10-CM

## 2024-12-11 DIAGNOSIS — I27.21 PULMONARY ARTERY HYPERTENSION (HCC): ICD-10-CM

## 2024-12-11 DIAGNOSIS — J96.11 CHRONIC RESPIRATORY FAILURE WITH HYPOXIA (HCC): Primary | Chronic | ICD-10-CM

## 2024-12-11 PROBLEM — R93.89 ABNORMAL CT SCAN, CHEST: Status: RESOLVED | Noted: 2022-11-25 | Resolved: 2024-12-11

## 2024-12-11 PROBLEM — J96.01 ACUTE RESPIRATORY FAILURE WITH HYPOXIA (HCC): Status: RESOLVED | Noted: 2023-03-19 | Resolved: 2024-12-11

## 2024-12-11 PROCEDURE — 99214 OFFICE O/P EST MOD 30 MIN: CPT | Performed by: NURSE PRACTITIONER

## 2024-12-11 RX ORDER — MAGNESIUM 30 MG
400 TABLET ORAL 2 TIMES DAILY
COMMUNITY

## 2024-12-11 NOTE — ASSESSMENT & PLAN NOTE
Taj continues to use and benefit from supplementary oxygen.  His O2 saturation when he came in from the waiting room today was 88% on room air.  2 L of supplemental oxygen at rest his O2 saturation goes to 94%.  Continues to use and benefit he is partially cared for by his daughter who is a nurse at the [facility].  She is able to monitor his O2 saturation primarily with pulse oximetry.  He does use O2 2 L at at bedtime which he is needed this has been started since he was at the nursing home.  Continues to use and benefit from supplementary oxygen.  I did discuss to with his daughter that a nocturnal pulse oximetry could be done we will defer this at this point since he is living home alone with his wife and this might be difficult to know.  His daughter that we could do a nocturnal pulse oximetry but would be difficult to do at this point

## 2024-12-11 NOTE — ASSESSMENT & PLAN NOTE
Patient likely has an element of pulmonary artery hypertension.  He did had a recent 2D echo which I personally reviewed.  There was some mild to moderate tricuspid regurgitation.  He is according to his daughter currently on Demadex 5 mg twice weekly.  He has not been given his potassium it at the nursing home is potassium was noted to be high.  He has follow-up lab work and does not seem to be decompensated at this point he has very little trace edema in his ankles but otherwise is doing well

## 2024-12-11 NOTE — ASSESSMENT & PLAN NOTE
Patient likely has atelectasis.  He had a recent fall and does have compression fractures that are likely chronic.  According to his daughter he does have an osteoporosis.  He is using a incentive spirometer which I feel is a wonderful idea and would help in any further atelectasis.  Currently he is doing well he is stable and I do not think he needs any repeat imaging

## 2024-12-11 NOTE — PROGRESS NOTES
Assessment/Plan:     Problem List Items Addressed This Visit          Cardiovascular and Mediastinum    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HCC) (Chronic)    Patient likely has an element of pulmonary artery hypertension.  He did had a recent 2D echo which I personally reviewed.  There was some mild to moderate tricuspid regurgitation.  He is according to his daughter currently on Demadex 5 mg twice weekly.  He has not been given his potassium it at the nursing home is potassium was noted to be high.  He has follow-up lab work and does not seem to be decompensated at this point he has very little trace edema in his ankles but otherwise is doing well         Relevant Orders    Home Oxygen with Portability       Respiratory    Chronic respiratory failure with hypoxia (HCC) - Primary (Chronic)    Taj continues to use and benefit from supplementary oxygen.  His O2 saturation when he came in from the waiting room today was 88% on room air.  2 L of supplemental oxygen at rest his O2 saturation goes to 94%.  Continues to use and benefit he is partially cared for by his daughter who is a nurse at the [facility].  She is able to monitor his O2 saturation primarily with pulse oximetry.  He does use O2 2 L at at bedtime which he is needed this has been started since he was at the nursing home.  Continues to use and benefit from supplementary oxygen.  I did discuss to with his daughter that a nocturnal pulse oximetry could be done we will defer this at this point since he is living home alone with his wife and this might be difficult to know.  His daughter that we could do a nocturnal pulse oximetry but would be difficult to do at this point         Relevant Orders    Home Oxygen with Portability    Atelectasis (Chronic)    Patient likely has atelectasis.  He had a recent fall and does have compression fractures that are likely chronic.  According to his daughter he does have an osteoporosis.  He is using a incentive  "spirometer which I feel is a wonderful idea and would help in any further atelectasis.  Currently he is doing well he is stable and I do not think he needs any repeat imaging              Return in about 6 months (around 6/11/2025).  All questions are answered to the patient's satisfaction and understanding.  He verbalizes understanding.  He is encouraged to call with any further questions or concerns.    Portions of the record may have been created with voice recognition software.  Occasional wrong word or \"sound a like\" substitutions may have occurred due to the inherent limitations of voice recognition software.  Read the chart carefully and recognize, using context, where substitutions have occurred.    Electronically Signed by BERNADETTE Ambrosio    ______________________________________________________________________    Chief Complaint: No chief complaint on file.      Patient ID: Taj is a 84 y.o. y.o. male has a past medical history of Vega esophagus, BPH (benign prostatic hyperplasia), Depression, Hiatal hernia, HTN (hypertension), Hyperlipidemia, Hypertension, Leaky heart valve, OCD (obsessive compulsive disorder), and Pulmonary embolism (HCC) (2019).    12/11/2024  Patient presents today for follow-up visit.  CARMEN Vang is an 84-year-old male who is here today with his daughter.  He was hospitalized at [facility] from November 18 to November 21, 2020 for his principal diagnoses was generalized weak and weakness status post fall.  He was also noted to be hypoxemic by his daughter.  He had a CTA of his chest there were no acute findings there was some atelectasis but also right sided scapular fracture.  Patient was noted to have low oxygen the goal was to continue was oxygen to be at least 93% or better respiratory panel was checked which was negative he had consultation by pulmonary medical team and it was determined that likely has hypoxemia could be secondary to atelectasis and also an element of " pulmonary hypertension.  He also was seen by PT and OT and did go to acute rehab at [facility] he also had a 2D echo repeat November 20.  The ejection fraction was 60% with grade 1 diastolic dysfunction and mitral valve regurgitation mild he had mild to moderate aortic valve regurgitation mild to moderate tricuspid regurgitation he is on an ACE inhibitor he did have an elevated creatinine ACE was held and also is here today status post acute rehab.  Pulmonary consultation felt as though his acute hypoxemic respiratory failure was likely secondary to atelectasis and splinting.  This gentleman did have a recent fall and fracture possible mild aspiration pneumonitis.  Also noted the patient has a large hiatal hernia and was given DuoNebs.  Patient was never a smoker.    Review of Systems   Constitutional: Negative.    HENT: Negative.     Eyes: Negative.    Respiratory:  Positive for cough.         Dry cough   Cardiovascular:  Positive for leg swelling.   Gastrointestinal: Negative.    Endocrine: Negative.    Genitourinary: Negative.    Musculoskeletal: Negative.    Skin: Negative.    Allergic/Immunologic: Negative.    Neurological: Negative.    Hematological: Negative.    Psychiatric/Behavioral: Negative.         Smoking history: He reports that he has never smoked. He has never used smokeless tobacco.    The following portions of the patient's history were reviewed and updated as appropriate: allergies, current medications, past family history, past medical history, past social history, past surgical history, and problem list.    Immunization History   Administered Date(s) Administered    INFLUENZA 12/14/2022    Influenza Split High Dose Preservative Free IM 10/18/2024    Influenza, high dose seasonal 0.7 mL 09/29/2020, 10/12/2021, 12/14/2022, 10/13/2023    Influenza, injectable, quadrivalent, preservative free 0.5 mL 10/31/2019    Tdap 10/14/2019     Current Outpatient Medications   Medication Sig  "Dispense Refill    albuterol (Ventolin HFA) 90 mcg/act inhaler Inhale 2 puffs every 6 (six) hours as needed for wheezing 18 g 3    amLODIPine (NORVASC) 2.5 mg tablet Take 1 tablet (2.5 mg total) by mouth daily 90 tablet 1    atorvastatin (LIPITOR) 10 mg tablet Take 0.5 tablets (5 mg total) by mouth daily with dinner TAKE ONE-HALF TABLET DAILY 45 tablet 2    Calcium Carb-Cholecalciferol (CALTRATE 600+D3 PO) Take 600 mg by mouth 2 (two) times a day      citalopram (CeleXA) 40 mg tablet Take 1 tablet (40 mg total) by mouth daily 90 tablet 1    fluticasone (FLONASE) 50 mcg/act nasal spray 1 spray into each nostril daily 16 g 3    ipratropium (ATROVENT) 0.03 % nasal spray 2 sprays into each nostril in the morning 30 mL 5    magnesium 30 MG tablet Take 400 mg by mouth 2 (two) times a day      magnesium oxide-pyridoxine (BEELITH) 362-20 MG TABS Take 1 tablet by mouth daily      metoprolol tartrate (LOPRESSOR) 50 mg tablet Take 1 tablet (50 mg total) by mouth every 12 (twelve) hours 180 tablet 1    Multiple Vitamin (MULTIVITAMIN) tablet Take 1 tablet by mouth daily      niacin (NIASPAN) 500 mg CR tablet Take 1 tablet (500 mg total) by mouth daily at bedtime 90 tablet 1    pantoprazole (PROTONIX) 40 mg tablet Take 1 tablet (40 mg total) by mouth daily 90 tablet 3    tamsulosin (FLOMAX) 0.4 mg Take 1 capsule (0.4 mg total) by mouth daily with dinner 90 capsule 1    ipratropium-albuterol (DUO-NEB) 0.5-2.5 mg/3 mL nebulizer solution Take 3 mL by nebulization every 6 (six) hours (Patient not taking: Reported on 12/11/2024)       No current facility-administered medications for this visit.     Allergies: Patient has no known allergies.    Objective:  Vitals:    12/11/24 0950   BP: 130/62   BP Location: Left arm   Patient Position: Sitting   Cuff Size: Standard   Pulse: 62   Resp: 14   Temp: 98.4 °F (36.9 °C)   TempSrc: Temporal   SpO2: 91%   Weight: 81.6 kg (180 lb)   Height: 5' 9\" (1.753 m)   Oxygen Therapy  SpO2: 91 %  .  Wt " Readings from Last 3 Encounters:   12/11/24 81.6 kg (180 lb)   12/10/24 85.3 kg (188 lb)   12/04/24 85.3 kg (188 lb)     Body mass index is 26.58 kg/m².    Physical Exam  Constitutional:       Appearance: He is normal weight.   HENT:      Head: Normocephalic and atraumatic.      Nose: Nose normal.      Mouth/Throat:      Mouth: Mucous membranes are moist.   Cardiovascular:      Rate and Rhythm: Normal rate and regular rhythm.   Pulmonary:      Effort: Pulmonary effort is normal.      Breath sounds: Normal breath sounds.   Musculoskeletal:         General: Normal range of motion.      Cervical back: Normal range of motion.   Skin:     General: Skin is warm and dry.      Capillary Refill: Capillary refill takes less than 2 seconds.   Neurological:      General: No focal deficit present.      Mental Status: He is alert and oriented to person, place, and time.   Psychiatric:         Mood and Affect: Mood normal.         Behavior: Behavior normal.         Lab Review:   No results displayed because visit has over 200 results.          Past Surgical History:   Procedure Laterality Date    HARDWARE REMOVAL Left 09/09/2023    Procedure: REMOVAL HARDWARE;  Surgeon: Henri Flanagan DO;  Location: [facility];  Service: Orthopedics    INCISION AND DRAINAGE OF WOUND Left 09/09/2023    Procedure: INCISION AND DRAINAGE (I&D) EXTREMITY;  Surgeon: Henri Flanagan DO;  Location: [facility];  Service: Orthopedics    IR ASPIRATION ONLY  12/15/2023    IR IVC FILTER PLACEMENT OPTIONAL/TEMPORARY  12/07/2019    IR IVC FILTER REMOVAL  08/21/2020    [place] OPTX FEM SHFT FX W/INSJ IMED IMPLT W/WO SCREW Right 12/04/2019    Procedure: INSERTION NAIL IM FEMUR ANTEGRADE (TROCHANTERIC);  Surgeon: Yesenia Veronica DO;  Location: [facility];  Service: Orthopedics    [place] OPTX FEM SHFT FX W/INSJ IMED IMPLT W/WO SCREW Left 06/17/2022    Procedure: INSERTION NAIL IM FEMUR ANTEGRADE (TROCHANTERIC) - long nail;  Surgeon: Henri Flanagan DO;  Location: [facility];   Service: Orthopedics    UPPER GASTROINTESTINAL ENDOSCOPY      WOUND DEBRIDEMENT Left 12/16/2023    Procedure: DEBRIDEMENT LOWER EXTREMITY (WASH OUT)- left thigh abscess irrigation and debridement;  Surgeon: Feng Robertson MD;  Location: [facility];  Service: Orthopedics        Family History   Problem Relation Age of Onset    Cancer Mother         Diagnostics:  Results Review Statement: No pertinent imaging studies reviewed.  none pertinent  Office Spirometry Results:     ESS:    XR scapula right  Result Date: 12/4/2024  Narrative: RIGHT SCAPULA INDICATION:   Fracture of unspecified part of scapula, right shoulder, initial encounter for closed fracture. COMPARISON:  None. VIEWS:  XR SCAPULA RIGHT Images: 2 FINDINGS: There is mildly displaced comminuted fracture through the scapular body. There is sclerosis and callus formation suggestive of a subacute injury. No lytic or blastic osseous lesion. Visualized right hemithorax appears intact.     Impression: Subacute appearing, mildly displaced and comminuted scapular body fracture deformity Electronically signed: 12/04/2024 04:02 PM Isacc Mora MD    Echo complete w/ contrast if indicated  Result Date: 11/19/2024  Narrative:   Left Ventricle: Left ventricular cavity size is normal. Wall thickness is mildly increased. The left ventricular ejection fraction is 60% by visual estimation. Systolic function is normal. Although no diagnostic regional wall motion abnormality was identified, this possibility cannot be completely excluded on the basis of this study. Diastolic function is mildly abnormal, consistent with grade I (abnormal) relaxation.   IVS: There is sigmoid appearance of the septum without any significant LVOT obstruction.   Right Ventricle: Systolic function is normal.   Aortic Valve: There is mild to moderate regurgitation. There is aortic valve sclerosis.   Mitral Valve: There is annular calcification. There is mild regurgitation.   Tricuspid Valve:  There is mild to moderate regurgitation. The right ventricular systolic pressure is mildly to moderately elevated. The estimated right ventricular systolic pressure is 54.00 mmHg.   Compared to previous exam, there is no signficant change.     CTA chest pe study  Result Date: 11/18/2024  Narrative: CTA - CHEST WITH IV CONTRAST - PULMONARY ANGIOGRAM INDICATION: Hypoxia, SOB, elevated dimer. COMPARISON: CT chest 3/19/2023 TECHNIQUE: CTA examination of the chest was performed using angiographic technique according to a protocol specifically tailored to evaluate for pulmonary embolism. Multiplanar 2D reformatted images were created from the source data. In addition, coronal  3D MIP postprocessing was performed on the acquisition scanner. Radiation dose length product (DLP) for this visit: 606.18 mGy-cm . This examination, like all CT scans performed in the [facility], was performed utilizing techniques to minimize radiation dose exposure, including the use of iterative reconstruction and automated exposure control. IV Contrast: 85 mL of iohexol (OMNIPAQUE) FINDINGS: PULMONARY ARTERIAL TREE:  No pulmonary embolus. LUNGS: Mild dependent atelectasis. There is no tracheal or endobronchial lesion. PLEURA: Unremarkable. HEART/GREAT VESSELS: Heart is unremarkable for patient's age. No thoracic aortic aneurysm. Coronary artery and aortic calcification. MEDIASTINUM AND JACQUIE: Prominent but nonpathologically enlarged mediastinal lymph nodes. Large hiatal hernia. CHEST WALL AND LOWER NECK: Unremarkable. VISUALIZED STRUCTURES IN THE UPPER ABDOMEN: Unremarkable. OSSEOUS STRUCTURES: New comminuted and displaced right scapular fracture as noted on recent radiograph. There is also a severe T3 compression fracture which is new since March 2023 but favored to be chronic in appearance. Additional chronic thoracolumbar  compression fractures are unchanged.     Impression: 1.  No pulmonary embolus or other acute findings in  the chest. 2.  Large hiatal hernia. 3.  Comminuted and displaced right scapular fracture. 4.  Age-indeterminate favored chronic T3 compression fracture. Workstation performed: [id number]     XR shoulder 2+ views RIGHT  Result Date: 11/17/2024  Narrative: XR SHOULDER 2+ VW RIGHT INDICATION: right shoulder pain after fall. COMPARISON: None FINDINGS: There is a nondisplaced fracture of the scapula. No significant degenerative changes. No lytic or blastic osseous lesion. Unremarkable soft tissues.     Impression: Nondisplaced scapular fracture. This report is in agreement with the preliminary interpretation regarding the scapular fracture. No greater tuberosity fracture is visualized, however. Computerized Assisted Algorithm (CAA) may have been used to analyze all applicable images. Workstation performed: [id number]

## 2024-12-12 NOTE — PROGRESS NOTES
Home O2 w/ Portability [facility]

## 2024-12-13 LAB
DME PARACHUTE DELIVERY DATE ACTUAL: NORMAL
DME PARACHUTE DELIVERY DATE EXPECTED: NORMAL
DME PARACHUTE DELIVERY DATE REQUESTED: NORMAL
DME PARACHUTE ITEM DESCRIPTION: NORMAL
DME PARACHUTE ORDER STATUS: NORMAL
DME PARACHUTE SUPPLIER NAME: NORMAL
DME PARACHUTE SUPPLIER PHONE: NORMAL

## 2024-12-18 ENCOUNTER — APPOINTMENT (OUTPATIENT)
Dept: LAB | Facility: CLINIC | Age: 84
End: 2024-12-18
Payer: COMMERCIAL

## 2024-12-18 DIAGNOSIS — D64.9 ANEMIA, UNSPECIFIED TYPE: ICD-10-CM

## 2024-12-18 DIAGNOSIS — E87.6 HYPOKALEMIA: ICD-10-CM

## 2024-12-18 LAB
ANION GAP SERPL CALCULATED.3IONS-SCNC: 5 MMOL/L (ref 4–13)
BASOPHILS # BLD AUTO: 0.05 THOUSANDS/ÂΜL (ref 0–0.1)
BASOPHILS NFR BLD AUTO: 1 % (ref 0–1)
BUN SERPL-MCNC: 19 MG/DL (ref 5–25)
CALCIUM SERPL-MCNC: 8.9 MG/DL (ref 8.4–10.2)
CHLORIDE SERPL-SCNC: 102 MMOL/L (ref 96–108)
CO2 SERPL-SCNC: 32 MMOL/L (ref 21–32)
CREAT SERPL-MCNC: 1.03 MG/DL (ref 0.6–1.3)
EOSINOPHIL # BLD AUTO: 0.71 THOUSAND/ÂΜL (ref 0–0.61)
EOSINOPHIL NFR BLD AUTO: 11 % (ref 0–6)
ERYTHROCYTE [DISTWIDTH] IN BLOOD BY AUTOMATED COUNT: 14 % (ref 11.6–15.1)
GFR SERPL CREATININE-BSD FRML MDRD: 66 ML/MIN/1.73SQ M
GLUCOSE SERPL-MCNC: 89 MG/DL (ref 65–140)
HCT VFR BLD AUTO: 36.3 % (ref 36.5–49.3)
HGB BLD-MCNC: 11.4 G/DL (ref 12–17)
IMM GRANULOCYTES # BLD AUTO: 0.01 THOUSAND/UL (ref 0–0.2)
IMM GRANULOCYTES NFR BLD AUTO: 0 % (ref 0–2)
LYMPHOCYTES # BLD AUTO: 1.4 THOUSANDS/ÂΜL (ref 0.6–4.47)
LYMPHOCYTES NFR BLD AUTO: 22 % (ref 14–44)
MCH RBC QN AUTO: 31.1 PG (ref 26.8–34.3)
MCHC RBC AUTO-ENTMCNC: 31.4 G/DL (ref 31.4–37.4)
MCV RBC AUTO: 99 FL (ref 82–98)
MONOCYTES # BLD AUTO: 0.8 THOUSAND/ÂΜL (ref 0.17–1.22)
MONOCYTES NFR BLD AUTO: 12 % (ref 4–12)
NEUTROPHILS # BLD AUTO: 3.49 THOUSANDS/ÂΜL (ref 1.85–7.62)
NEUTS SEG NFR BLD AUTO: 54 % (ref 43–75)
NRBC BLD AUTO-RTO: 0 /100 WBCS
PLATELET # BLD AUTO: 211 THOUSANDS/UL (ref 149–390)
PMV BLD AUTO: 9.1 FL (ref 8.9–12.7)
POTASSIUM SERPL-SCNC: 4.6 MMOL/L (ref 3.5–5.3)
RBC # BLD AUTO: 3.66 MILLION/UL (ref 3.88–5.62)
SODIUM SERPL-SCNC: 139 MMOL/L (ref 135–147)
WBC # BLD AUTO: 6.46 THOUSAND/UL (ref 4.31–10.16)

## 2024-12-18 PROCEDURE — 36415 COLL VENOUS BLD VENIPUNCTURE: CPT

## 2024-12-18 PROCEDURE — 85025 COMPLETE CBC W/AUTO DIFF WBC: CPT

## 2024-12-18 PROCEDURE — 80048 BASIC METABOLIC PNL TOTAL CA: CPT

## 2024-12-21 ENCOUNTER — RESULTS FOLLOW-UP (OUTPATIENT)
Dept: INTERNAL MEDICINE CLINIC | Facility: CLINIC | Age: 84
End: 2024-12-21

## 2024-12-23 ENCOUNTER — TELEPHONE (OUTPATIENT)
Age: 84
End: 2024-12-23

## 2024-12-23 NOTE — TELEPHONE ENCOUNTER
"Per OV 12/04  \"He should remain in the sling for an additional 4 weeks. He may remove the sling to work on gentle elbow ROM and shoulder pendulums. He should continue using the aleena-walker for an additional 2 weeks. He may then transition back to using his usual walker.\"  "

## 2024-12-23 NOTE — TELEPHONE ENCOUNTER
Caller: Irene from [facility]    Doctor: Dr. Escalante    Reason for call: Irene calling asking for clarification if patient is able to return to his regular walker and the weightbearing of the RUE?  Patient is not reporting pain.  Can the sling be removed and if patient can be progressed from pendulums and AROM at the elbow?  Irene can be reached at the number below.     Call back#: [phone]

## 2024-12-23 NOTE — TELEPHONE ENCOUNTER
Patient having a hard time using the sling while using the walker,  currently using it when he's sitting.      Is this ok?    Irene from [facility]   # [phone]

## 2025-01-08 ENCOUNTER — OFFICE VISIT (OUTPATIENT)
Dept: OBGYN CLINIC | Facility: CLINIC | Age: 85
End: 2025-01-08
Payer: COMMERCIAL

## 2025-01-08 ENCOUNTER — APPOINTMENT (OUTPATIENT)
Dept: RADIOLOGY | Facility: CLINIC | Age: 85
End: 2025-01-08
Payer: COMMERCIAL

## 2025-01-08 DIAGNOSIS — S42.111A CLOSED DISPLACED FRACTURE OF BODY OF RIGHT SCAPULA, INITIAL ENCOUNTER: Primary | ICD-10-CM

## 2025-01-08 DIAGNOSIS — S42.111A CLOSED DISPLACED FRACTURE OF BODY OF RIGHT SCAPULA, INITIAL ENCOUNTER: ICD-10-CM

## 2025-01-08 PROCEDURE — 99213 OFFICE O/P EST LOW 20 MIN: CPT | Performed by: ORTHOPAEDIC SURGERY

## 2025-01-08 PROCEDURE — 73010 X-RAY EXAM OF SHOULDER BLADE: CPT

## 2025-01-08 RX ORDER — TORSEMIDE 5 MG/1
TABLET ORAL
COMMUNITY
Start: 2025-01-08

## 2025-01-08 RX ORDER — POTASSIUM CHLORIDE 600 MG/1
TABLET, FILM COATED, EXTENDED RELEASE ORAL
COMMUNITY
Start: 2025-01-08

## 2025-01-08 NOTE — PROGRESS NOTES
Healing well.  No pain.  Can do activity as tolerated.  Weightbearing as tolerated.    Assessment/Plan:  1. Closed displaced fracture of body of right scapula, initial encounter  XR scapula right        Scribe Attestation    I,:   am acting as a scribe while in the presence of the attending physician.:       I,:   personally performed the services described in this documentation    as scribed in my presence.:             Taj is a pleasant 84 y.o. male who presents for follow-up evaluation of his right shoulder in the setting of scapular body fracture. He did sustain a right scapular body fracture as a result of his fall on 11/16/2024.  At this time he is about 2 months out from his injury.  He is regained motion and has good strength and no pain.  He may weight-bear as tolerated.  He may do home exercises.  He may follow-up as needed.  No limitations at this point.    Subjective:   Taj Roblero Jr. is a 84 y.o. male who presents for repeat evaluation of his right shoulder in the setting of right scapular body fracture.  Date of injury 11/16/2024.  Is doing very well at this time.  He is regain motion and strength in his shoulder.  He has no pain.      Review of Systems   Constitutional:  Positive for activity change. Negative for chills and fever.   HENT:  Negative for ear pain and sore throat.    Eyes:  Negative for pain and visual disturbance.   Respiratory:  Negative for cough and shortness of breath.    Cardiovascular:  Negative for chest pain and palpitations.   Gastrointestinal:  Negative for abdominal pain and vomiting.   Genitourinary:  Negative for dysuria and hematuria.   Musculoskeletal:  Positive for arthralgias. Negative for back pain.   Skin:  Negative for color change and rash.   Neurological:  Negative for seizures and syncope.   All other systems reviewed and are negative.        Past Medical History:   Diagnosis Date   • Vega esophagus    • BPH (benign prostatic hyperplasia)    • Depression     • Hiatal hernia    • HTN (hypertension)    • Hyperlipidemia    • Hypertension    • Leaky heart valve    • OCD (obsessive compulsive disorder)    • Pulmonary embolism (HCC) 2019       Past Surgical History:   Procedure Laterality Date   • HARDWARE REMOVAL Left 09/09/2023    Procedure: REMOVAL HARDWARE;  Surgeon: Henri Flanagan DO;  Location: [facility];  Service: Orthopedics   • INCISION AND DRAINAGE OF WOUND Left 09/09/2023    Procedure: INCISION AND DRAINAGE (I&D) EXTREMITY;  Surgeon: Henri Flanagan DO;  Location: [facility] OR;  Service: Orthopedics   • IR ASPIRATION ONLY  12/15/2023   • IR IVC FILTER PLACEMENT OPTIONAL/TEMPORARY  12/07/2019   • IR IVC FILTER REMOVAL  08/21/2020   • [place] OPTX FEM SHFT FX W/INSJ IMED IMPLT W/WO SCREW Right 12/04/2019    Procedure: INSERTION NAIL IM FEMUR ANTEGRADE (TROCHANTERIC);  Surgeon: Yesenia Veronica DO;  Location: [facility] OR;  Service: Orthopedics   • [place] OPTX FEM SHFT FX W/INSJ IMED IMPLT W/WO SCREW Left 06/17/2022    Procedure: INSERTION NAIL IM FEMUR ANTEGRADE (TROCHANTERIC) - long nail;  Surgeon: Henri Flanagan DO;  Location: [facility] OR;  Service: Orthopedics   • UPPER GASTROINTESTINAL ENDOSCOPY     • WOUND DEBRIDEMENT Left 12/16/2023    Procedure: DEBRIDEMENT LOWER EXTREMITY (WASH OUT)- left thigh abscess irrigation and debridement;  Surgeon: Feng Robertson MD;  Location: [facility];  Service: Orthopedics       Family History   Problem Relation Age of Onset   • Cancer Mother        Social History     Occupational History   • Not on file   Tobacco Use   • Smoking status: Never   • Smokeless tobacco: Never   Vaping Use   • Vaping status: Never Used   Substance and Sexual Activity   • Alcohol use: Never   • Drug use: Never   • Sexual activity: Not on file         Current Outpatient Medications:   •  albuterol (Ventolin HFA) 90 mcg/act inhaler, Inhale 2 puffs every 6 (six) hours as needed for wheezing, Disp: 18 g, Rfl: 3  •  amLODIPine (NORVASC) 2.5 mg tablet, Take 1 tablet  (2.5 mg total) by mouth daily, Disp: 90 tablet, Rfl: 1  •  atorvastatin (LIPITOR) 10 mg tablet, Take 0.5 tablets (5 mg total) by mouth daily with dinner TAKE ONE-HALF TABLET DAILY, Disp: 45 tablet, Rfl: 2  •  Calcium Carb-Cholecalciferol (CALTRATE 600+D3 PO), Take 600 mg by mouth 2 (two) times a day, Disp: , Rfl:   •  citalopram (CeleXA) 40 mg tablet, Take 1 tablet (40 mg total) by mouth daily, Disp: 90 tablet, Rfl: 1  •  fluticasone (FLONASE) 50 mcg/act nasal spray, 1 spray into each nostril daily, Disp: 16 g, Rfl: 3  •  ipratropium (ATROVENT) 0.03 % nasal spray, 2 sprays into each nostril in the morning, Disp: 30 mL, Rfl: 5  •  magnesium 30 MG tablet, Take 400 mg by mouth 2 (two) times a day (Patient taking differently: Take 400 mg by mouth daily), Disp: , Rfl:   •  metoprolol tartrate (LOPRESSOR) 50 mg tablet, Take 1 tablet (50 mg total) by mouth every 12 (twelve) hours, Disp: 180 tablet, Rfl: 1  •  Multiple Vitamin (MULTIVITAMIN) tablet, Take 1 tablet by mouth daily, Disp: , Rfl:   •  niacin (NIASPAN) 500 mg CR tablet, Take 1 tablet (500 mg total) by mouth daily at bedtime, Disp: 90 tablet, Rfl: 1  •  pantoprazole (PROTONIX) 40 mg tablet, Take 1 tablet (40 mg total) by mouth daily, Disp: 90 tablet, Rfl: 3  •  potassium chloride (KLOR-CON) 8 MEQ tablet, , Disp: , Rfl:   •  tamsulosin (FLOMAX) 0.4 mg, Take 1 capsule (0.4 mg total) by mouth daily with dinner, Disp: 90 capsule, Rfl: 1  •  torsemide (DEMADEX) 5 MG tablet, , Disp: , Rfl:   •  ipratropium-albuterol (DUO-NEB) 0.5-2.5 mg/3 mL nebulizer solution, Take 3 mL by nebulization every 6 (six) hours (Patient not taking: Reported on 12/11/2024), Disp: , Rfl:   •  magnesium oxide-pyridoxine (BEELITH) 362-20 MG TABS, Take 1 tablet by mouth daily, Disp: , Rfl:     No Known Allergies    Objective:  There were no vitals filed for this visit.      Right Shoulder Exam     Tenderness   The patient is experiencing no tenderness.    Range of Motion   External rotation:  50    Forward flexion:  150     Muscle Strength   Abduction: 5/5   Biceps: 5/5     Other   Erythema: absent  Scars: absent  Sensation: normal  Pulse: present    Comments:    SILT  AIN/PIN/IO intact            Physical Exam  Vitals and nursing note reviewed.   Constitutional:       Appearance: Normal appearance.   HENT:      Head: Normocephalic and atraumatic.      Right Ear: External ear normal.      Left Ear: External ear normal.      Nose: Nose normal.   Eyes:      General: No scleral icterus.     Extraocular Movements: Extraocular movements intact.      Conjunctiva/sclera: Conjunctivae normal.   Cardiovascular:      Rate and Rhythm: Normal rate.   Pulmonary:      Effort: Pulmonary effort is normal. No respiratory distress.   Musculoskeletal:         General: Tenderness present.      Cervical back: Normal range of motion and neck supple.      Comments: See ortho exam   Skin:     General: Skin is warm and dry.   Neurological:      Mental Status: He is alert and oriented to person, place, and time.   Psychiatric:         Mood and Affect: Mood normal.         Behavior: Behavior normal.         I have personally reviewed pertinent films in PACS and my interpretation is as follows:  X-rays of the right scapula obtained in the office demonstrate continued alignment of his scapular fracture with good healing.      This document was created using speech voice recognition software.   Grammatical errors, random word insertions, pronoun errors, and incomplete sentences are an occasional consequence of this system due to software limitations, ambient noise, and hardware issues.   Any formal questions or concerns about content, text, or information contained within the body of this dictation should be directly addressed to the provider for clarification.

## 2025-01-09 ENCOUNTER — TELEPHONE (OUTPATIENT)
Age: 85
End: 2025-01-09

## 2025-01-09 NOTE — TELEPHONE ENCOUNTER
Caller: [facility]     Doctor: Boris     Reason for call: Asked for any changes in progression status    Call back#: [phone] (Irene)

## 2025-01-13 NOTE — PROGRESS NOTES
"Initial Clinical Review    Admission: Date/Time/Statement:   Admission Orders (From admission, onward)       Ordered        01/12/25 0430  INPATIENT ADMISSION  Once                          Orders Placed This Encounter   Procedures    INPATIENT ADMISSION     Standing Status:   Standing     Number of Occurrences:   1     Level of Care:   Level 1 Stepdown [13]     Estimated length of stay:   More than 2 Midnights     Certification:   I certify that inpatient services are medically necessary for this patient for a duration of greater than two midnights. See H&P and MD Progress Notes for additional information about the patient's course of treatment.     ED Arrival Information       Expected   -    Arrival   1/12/2025 00:53    Acuity   Emergent              Means of arrival   Ambulance    Escorted by   [facility]   Hospitalist    Admission type   Emergency              Arrival complaint   Fall             Chief Complaint   Patient presents with    Fall     Pt BIBA with unwitnessed fall. +HS, unknown LOC, -BT. Pt found to be hypoglycemic by EMS 47.     Hypoglycemia - Symptomatic       Initial Presentation: 61 y.o. male to ED from home via EMS w/PMHX significant for HTN , CHF, COPD, hyperlipidemia, DVT on aspirin, meningioma, and CKD 4 associated with hypertensive disease who presents with altered mental status per his family reporting confusion and tremors. Of note, patient is hypertensive and hypoglycemic on presentation, and is known to be noncompliant with his medication regimen. UDS + THC . Unwitnessed fall prior to arrival . CT head neg , Ct C spine neg . CT chest /abd  noting \"a short segment of jejunojejunal intussusception and suggested intussusception at the gastroduodenal junction. No evidence of bowel obstruction. \" Admitted IP status to ICU  w/ acute encephalopathy . Plan for delirium precautions , neuro consult , seizure precautions , check tsh and ammonia levels , check thiamine , " [facility] [place] - Outpatient Consultation  Taj Roblero Jr. 84 y.o. male MRN: [id number]  Encounter: [id number]          ASSESSMENT AND PLAN:      1. Gastroesophageal reflux disease, unspecified whether esophagitis present  2. 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 Hiatal hernia  Patient with GERD and large 6 centimeter hiatal hernia with nondysplastic Vega's seen on last EGD in March 2023 now due for surveillance EGD.  Risk/benefit discussed.  He is currently doing well on Pantoprazole 40 mg daily with no symptoms of heartburn, dysphagia, odynophagia, nausea/vomiting, abdominal pain.  He does get full easily likely secondary to large hiatal hernia so naturally eat smaller meals.    -Discussed with patient to continue eating smaller meals and avoid laying down after eating for at least 3 to 4 hours  -Continue pantoprazole 40 mg daily  -EGD scheduled.  Prep and procedure explained.  Will perform EGD at [facility] given comorbid conditions  I obtained informed consent from the patient. The risks/benefits/alternatives of the procedure were discussed with the patient. Risks included, but not limited to, infection, bleeding, perforation, injury to organs in the abdomen, missed lesion and incomplete procedure were discussed. Patient was agreeable and electronic signature was obtained.    -     EGD; Future; Expected date: 03/29/2024  -     pantoprazole (PROTONIX) 40 mg tablet; Take 1 tablet (40 mg total) by mouth daily        ______________________________________________________________________    HPI:  Taj Roblero Jr. is a 84 y.o. male with history of GERD, large hiatal hernia, Vega's esophagus, HTN, HLD, BPH, OCD, pulmonary hypertension, paroxysmal a flutter, PE presenting with daughter and wife for consultation to EGD for surveillance of Vega's.  He was last seen after hospitalization in March last year when admitted with pneumonia and concern for aspiration with large hiatal  hernia on imaging.  EGD was performed on 3/23/2023 which showed a large 6 cm hiatal hernia with foreshortening of the esophagus and mild gastritis.  Biopsies from the lower esophagus showed nondysplastic Vega's and antral biopsy showed mild chronic inactive gastritis negative for H. pylori.  He was recommended to repeat EGD in 1 year. He has been doing well on Pantoprazole 40mg daily, eating small meals. Denies any dysphagia, heartburn, nausea/vomiting, abdominal pain, weight loss.       REVIEW OF SYSTEMS:    CONSTITUTIONAL: Denies any fever, chills, rigors, and weight loss.  HEENT: No earache or tinnitus.  CARDIOVASCULAR: No chest pain or palpitations.   RESPIRATORY: Denies any cough, hemoptysis, shortness of breath or dyspnea on exertion.  GASTROINTESTINAL: As noted in the History of Present Illness.   GENITOURINARY: Denies any hematuria or dysuria.  NEUROLOGIC: No dizziness or vertigo.   MUSCULOSKELETAL: Denies any joint swellings.  SKIN: Denies skin rashes or itching.   ENDOCRINE: Denies excessive thirst. Denies intolerance to heat or cold.  PSYCHOSOCIAL: Denies depression or anxiety. Denies any recent memory loss.       Historical Information   Past Medical History:   Diagnosis Date    Vega esophagus     BPH (benign prostatic hyperplasia)     Depression     Hiatal hernia     HTN (hypertension)     Hyperlipidemia     Hypertension     OCD (obsessive compulsive disorder)     Pulmonary embolism (HCC) 2019     Past Surgical History:   Procedure Laterality Date    HARDWARE REMOVAL Left 09/09/2023    Procedure: REMOVAL HARDWARE;  Surgeon: Henri Flanagan DO;  Location: [facility];  Service: Orthopedics    INCISION AND DRAINAGE OF WOUND Left 09/09/2023    Procedure: INCISION AND DRAINAGE (I&D) EXTREMITY;  Surgeon: Henri Flanagan DO;  Location: [facility];  Service: Orthopedics    IR ASPIRATION ONLY  12/15/2023    IR IVC FILTER PLACEMENT OPTIONAL/TEMPORARY  12/07/2019    IR IVC FILTER REMOVAL  08/21/2020    [place] OPTX  monitor BP . CHF cont asa and antihypertensives . COPD bronchodilators prn . CKD CR 2.68 at baseline . Hypoglycemia cont D10 /NSS and monitor glucose .     Date: 1/13   Day 2: in ICU initially BP fluctuating . 191/102 this am . Reviewed OP nephrology notes and added hctz and labetalol as per OP dosing today . Transferred out of ICU 1/12 , likely HTN encephalopathy . initial concern for intussception has now resolved with PO contrast CT . Monitor renal function and UO .     1/13 Nephrology Consult   systolic BP exceeding 220 mm Hg. Etiology likely non compliance with home BP medications as patient agreed. Etiology of CKD has been hypertensive kidney disease.  Baseline creatinine ranges between 2.8-3.2.  Current Cr is 2.8 mg.dl and stable. Acute encephalopathy has resolved.     Date: 1/14  Day 3: Has surpassed a 2nd midnight with active treatments and services. Cr 3.0 and is acceptable . Pt appears euvolemic . Hgb stable . Encephalopathy resolved . DC to home .         ED Treatment-Medication Administration from 01/12/2025 0053 to 01/12/2025 0529         Date/Time Order Dose Route Action     01/12/2025 0126 iohexol (OMNIPAQUE) 350 MG/ML injection (MULTI-DOSE) 100 mL 100 mL Intravenous Given     01/12/2025 0138 dextrose 5 % and sodium chloride 0.45 % infusion 100 mL/hr Intravenous New Bag     01/12/2025 0157 labetalol (NORMODYNE) injection 20 mg 20 mg Intravenous Given     01/12/2025 0205 dextrose 50 % IV solution 25 mL 25 mL Intravenous Given     01/12/2025 0219 dextrose 10 % and normal saline infusion 100 mL/hr Intravenous New Bag     01/12/2025 0433 dextrose 10 % and normal saline infusion 50 mL/hr Intravenous Rate/Dose Change     01/12/2025 0434 dextrose 10 % and normal saline infusion 50 mL/hr Intravenous Rate/Dose Change     01/12/2025 0246 dextrose 50 % IV solution 25 mL 25 mL Intravenous Given     01/12/2025 0245 labetalol (NORMODYNE) injection 20 mg 20 mg Intravenous Given     01/12/2025 0344 hydrALAZINE  "FEM SHFT FX W/INSJ IMED IMPLT W/WO SCREW Right 12/04/2019    Procedure: INSERTION NAIL IM FEMUR ANTEGRADE (TROCHANTERIC);  Surgeon: Yesenia Veronica DO;  Location: [facility];  Service: Orthopedics    [place] OPTX FEM SHFT FX W/INSJ IMED IMPLT W/WO SCREW Left 06/17/2022    Procedure: INSERTION NAIL IM FEMUR ANTEGRADE (TROCHANTERIC) - long nail;  Surgeon: Henri Flanagan DO;  Location: [facility];  Service: Orthopedics    UPPER GASTROINTESTINAL ENDOSCOPY      WOUND DEBRIDEMENT Left 12/16/2023    Procedure: DEBRIDEMENT LOWER EXTREMITY (WASH OUT)- left thigh abscess irrigation and debridement;  Surgeon: Feng Robertson MD;  Location: [facility];  Service: Orthopedics     Social History   Social History     Substance and Sexual Activity   Alcohol Use Never     Social History     Substance and Sexual Activity   Drug Use Never     Social History     Tobacco Use   Smoking Status Never   Smokeless Tobacco Never     Family History   Problem Relation Age of Onset    Cancer Mother        Meds/Allergies       Current Outpatient Medications:     albuterol (Ventolin HFA) 90 mcg/act inhaler    atorvastatin (LIPITOR) 10 mg tablet    Calcium Carb-Cholecalciferol (CALTRATE 600+D3 PO)    citalopram (CeleXA) 40 mg tablet    fluticasone (FLONASE) 50 mcg/act nasal spray    fosinopril (MONOPRIL) 10 mg tablet    ipratropium (ATROVENT) 0.03 % nasal spray    magnesium oxide (MAG-OX) 400 mg tablet    methylPREDNISolone 4 MG tablet therapy pack    metoprolol tartrate (LOPRESSOR) 50 mg tablet    Multiple Vitamin (MULTIVITAMIN) tablet    niacin (NIASPAN) 500 mg CR tablet    pantoprazole (PROTONIX) 40 mg tablet    potassium chloride (Klor-Con) 10 mEq tablet    tamsulosin (FLOMAX) 0.4 mg    torsemide (DEMADEX) 5 MG tablet    benzonatate (TESSALON PERLES) 100 mg capsule    No Known Allergies        Objective     Height 5' 9\" (1.753 m), weight 84.4 kg (186 lb). Body mass index is 27.47 kg/m².        PHYSICAL EXAM:      General Appearance:   Alert, " (APRESOLINE) injection 20 mg 20 mg Intravenous Given     01/12/2025 0426 hydrALAZINE (APRESOLINE) injection 20 mg 20 mg Intravenous Given            Scheduled Medications:  amLODIPine, 10 mg, Oral, Daily  ARIPiprazole, 5 mg, Oral, Daily  aspirin, 81 mg, Oral, Daily  atorvastatin, 40 mg, Oral, Daily With Dinner  Cholecalciferol, 2,000 Units, Oral, Daily  doxazosin, 8 mg, Oral, Q12H  heparin (porcine), 5,000 Units, Subcutaneous, Q8H JOANN  hydrALAZINE, 100 mg, Oral, Q8H JOANN  hydroCHLOROthiazide, 25 mg, Oral, Daily  isosorbide dinitrate, 40 mg, Oral, TID after meals  labetalol, 600 mg, Oral, Q8H JOANN  pantoprazole, 40 mg, Oral, Daily  potassium chloride, 40 mEq, Oral, Once  sertraline, 200 mg, Oral, Daily  spironolactone, 12.5 mg, Oral, BID  sucralfate, 1 g, Oral, BID      Continuous IV Infusions:     PRN Meds:  hydrALAZINE, 20 mg, Intravenous, Q6H PRN 1/12 x1      ED Triage Vitals   Temperature Pulse Respirations Blood Pressure SpO2 Pain Score   01/12/25 0145 01/12/25 0115 01/12/25 0115 01/12/25 0115 01/12/25 0115 01/12/25 0515   98.4 °F (36.9 °C) 64 22 (!) 183/104 99 % No Pain     Weight (last 2 days)       Date/Time Weight    01/14/25 0528 66.5 (146.61)    01/13/25 0800 65.8 (145)    01/13/25 0540 65.9 (145.28)    01/12/25 0600 63.5 (139.99)    01/12/25 0516 63.5 (139.99)            Vital Signs (last 3 days)       Date/Time Temp Pulse Resp BP MAP (mmHg) SpO2 O2 Device Patient Position - Orthostatic VS Saroj Coma Scale Score Pain    01/14/25 0700 98.4 °F (36.9 °C) 64 -- 160/95 121 95 % -- Lying -- --    01/14/25 0636 -- 63 -- 170/90 -- -- -- -- -- --    01/14/25 0633 -- 64 -- 170/90 -- 96 % None (Room air) Lying -- --    01/14/25 0544 -- -- -- 198/94 -- -- -- -- -- --    01/14/25 0529 -- -- -- 198/94 -- -- -- -- -- --    01/14/25 0528 -- 60 -- 203/113 148 96 % -- -- -- --    01/14/25 0400 -- -- -- -- -- -- -- -- 15 No Pain    01/14/25 0300 98.5 °F (36.9 °C) 61 17 158/90 -- 96 % None (Room air) Lying -- No Pain     01/13/25 2244 98.5 °F (36.9 °C) 63 -- 161/96 123 95 % -- -- -- --    01/13/25 2158 -- 67 -- 162/86 -- -- -- -- -- --    01/13/25 2000 -- -- -- -- -- -- -- -- 15 No Pain    01/13/25 1902 98.8 °F (37.1 °C) 68 18 134/79 100 97 % None (Room air) -- -- --    01/13/25 1724 -- -- -- 169/90 -- -- -- -- -- --    01/13/25 1600 -- -- -- -- -- -- -- -- 15 --    01/13/25 1500 98.2 °F (36.8 °C) 65 18 136/80 103 96 % None (Room air) Lying -- --    01/13/25 1421 -- -- -- 142/85 -- -- -- -- -- --    01/13/25 1200 -- -- -- -- -- -- -- -- 15 --    01/13/25 1100 98.6 °F (37 °C) 67 16 140/87 109 96 % -- Lying -- --    01/13/25 1000 -- 77 -- 167/101 128 96 % -- -- -- --    01/13/25 0900 98 °F (36.7 °C) 77 18 175/102 132 98 % None (Room air) Lying -- --    01/13/25 0800 -- 73 -- 191/102 -- -- -- -- 15 No Pain    01/13/25 0540 -- 73 -- 191/102 140 96 % -- -- -- --    01/13/25 0500 -- 74 -- 188/105 140 95 % -- -- -- --    01/13/25 0400 -- 79 16 179/120 144 96 % -- -- -- --    01/13/25 0200 -- 79 10 172/103 130 96 % None (Room air) Lying -- --    01/13/25 0000 -- 83 18 164/98 125 94 % None (Room air) -- -- --    01/12/25 2300 98.4 °F (36.9 °C) 84 17 160/91 118 98 % None (Room air) Lying -- --    01/12/25 2200 -- 85 17 150/87 108 96 % -- -- -- --    01/12/25 2100 -- 71 14 185/116 145 98 % -- -- -- --    01/12/25 2013 -- -- -- -- -- -- -- -- 15 --    01/12/25 2000 -- 68 15 168/100 128 97 % None (Room air) -- -- No Pain    01/12/25 1900 98.2 °F (36.8 °C) 77 22 157/92 119 96 % None (Room air) Lying -- No Pain    01/12/25 1540 97.9 °F (36.6 °C) 71 17 143/90 112 96 % None (Room air) Lying -- No Pain    01/12/25 1500 -- 74 16 149/88 113 97 % None (Room air) -- -- --    01/12/25 1400 -- 78 19 158/102 125 98 % None (Room air) -- -- --    01/12/25 1131 -- -- -- 208/117 -- -- -- -- -- --    01/12/25 1100 -- 66 13 179/106 -- 97 % None (Room air) -- -- --    01/12/25 0825 -- 73 15 201/97 137 99 % None (Room air) -- -- No Pain    01/12/25 0800 -- -- -- --  cooperative, no distress   HEENT:   Normocephalic, atraumatic, anicteric.     Neck:  Supple, symmetrical, trachea midline   Lungs:   Clear to auscultation bilaterally; no rales, rhonchi or wheezing; respirations unlabored    Heart::   Regular rate and rhythm; no murmur.   Abdomen:   Soft, non-tender, non-distended; normal bowel sounds; no masses, no organomegaly    Genitalia:   Deferred    Rectal:   Deferred    Extremities:  No cyanosis, clubbing or edema    Skin:  No jaundice, rashes, or lesions    Lymph nodes:  No palpable cervical lymphadenopathy        Lab Results:   No visits with results within 1 Day(s) from this visit.   Latest known visit with results is:   Lab on 01/11/2024   Component Date Value    Cholesterol 01/11/2024 117     Triglycerides 01/11/2024 103     HDL, Direct 01/11/2024 48     LDL Calculated 01/11/2024 48     Non-HDL-Chol (CHOL-HDL) 01/11/2024 69     WBC 01/11/2024 6.92     RBC 01/11/2024 4.09     Hemoglobin 01/11/2024 12.8     Hematocrit 01/11/2024 39.2     MCV 01/11/2024 96     MCH 01/11/2024 31.3     MCHC 01/11/2024 32.7     RDW 01/11/2024 13.8     Platelets 01/11/2024 237     MPV 01/11/2024 9.5     Sodium 01/11/2024 137     Potassium 01/11/2024 4.3     Chloride 01/11/2024 102     CO2 01/11/2024 31     ANION GAP 01/11/2024 4     BUN 01/11/2024 18     Creatinine 01/11/2024 1.11     Glucose, Fasting 01/11/2024 91     Calcium 01/11/2024 9.1     eGFR 01/11/2024 61          Radiology Results:   No results found.   -- -- -- -- 14 --    01/12/25 0600 -- 65 13 168/94 125 -- -- -- -- --    01/12/25 0515 97.4 °F (36.3 °C) 77 13 166/83 116 94 % -- Lying 13 No Pain    01/12/25 0500 -- 77 18 165/86 115 98 % None (Room air) Lying -- --    01/12/25 0435 -- 74 20 180/97 -- 98 % -- Lying -- --    01/12/25 0426 -- -- -- 193/117 -- -- -- -- -- --    01/12/25 0415 -- 75 22 193/117  148 94 % None (Room air) Lying -- --    01/12/25 0400 -- 73 17 176/114  142 98 % None (Room air) Lying 13 --    01/12/25 0345 -- 72 20 193/116 148 97 % None (Room air) Lying -- --    01/12/25 0330 -- 68 18 199/123  155 97 % None (Room air) Lying 13 --    01/12/25 0315 -- 65 16 210/123  160 98 % None (Room air) Lying 13 --    01/12/25 0300 -- 63 20 182/120 147 96 % None (Room air) Lying 13 --    01/12/25 0245 -- 66 20 196/125 154 96 % None (Room air) Lying 13 --    01/12/25 0238 -- 75 20 213/131  -- 96 % None (Room air) Lying -- --    01/12/25 0230 -- 62 20 210/122 155 98 % None (Room air) Lying 13 --    01/12/25 0215 -- 62 18 208/112 153 96 % None (Room air) Lying 13 --    01/12/25 0200 -- 59 18 184/118 145 96 % None (Room air) Lying 13 --    01/12/25 0145 98.4 °F (36.9 °C) 72 22 220/126  166 99 % None (Room air) Lying 13 --    01/12/25 0130 -- -- -- -- -- -- -- -- 13 --    01/12/25 0115 -- 64 22 183/104 138 99 % None (Room air) Lying 13 --              Pertinent Labs/Diagnostic Test Results:   Radiology:  CT abdomen pelvis wo contrast   Final Interpretation by Rosalia Louis MD (01/12 1132)      Resolved intussusception.      No acute change compared to earlier today.         Workstation performed: [id number]         TRAUMA - CT head wo contrast   Final Interpretation by Sia Nguyen MD (01/12 5173)      No acute intracranial abnormality.      I personally discussed this study with SUNNY LYNN on 1/12/2025 2:39 AM.                  Workstation performed: [id number]         TRAUMA - CT spine cervical wo contrast   Final Interpretation by Sia DUFF  MD Wendy (01/12 0254)      No cervical spine fracture or traumatic malalignment.      I personally discussed this study with SUNNY LIN on 1/12/2025 2:39 AM.            Workstation performed: [id number]         TRAUMA - CT chest abdomen pelvis w contrast   Final Interpretation by Sia Nguyen MD (01/12 0251)      1.  No definite evidence of traumatic injury within study limitations.   2.  Limited evaluation of the GI tract due to artifact and lack of enteric contrast. There is a short segment of jejunojejunal intussusception and suggested intussusception at the gastroduodenal junction. No evidence of bowel obstruction. These findings    are likely transient in nature. Follow-up CT abdomen and pelvis with enteric contrast can be considered for complete evaluation as clinically appropriate.   3.  Mild diffuse bladder wall thickening which can be seen with chronic outlet obstruction or cystitis.   4.  Small volume of ascites and diffuse mesenteric edema.         I personally discussed this study with SUNNY LIN on 1/12/2025 2:39 AM.               Workstation performed: [id number]         XR Trauma chest portable   ED Interpretation by Sunny Lin DO (01/12 0128)   NAD      Final Interpretation by Sia Nguyen MD (01/12 0254)      No acute cardiopulmonary disease.            Workstation performed: [id number]           Cardiology:  Echo complete w/ contrast if indicated   Final Result by Farzad Cristobal MD (01/13 1204)        Left Ventricle: Left ventricular cavity size is normal. Wall thickness    is severely increased. There is severe concentric hypertrophy. The left    ventricular ejection fraction is 51%. Wall motion is normal. Diastolic    function is mildly abnormal, consistent with grade I (abnormal)    relaxation.     Left Atrium: The atrium is mildly enlarged (35-41 mL/m2).     Abnormal Left ventricular GLS at -15.8%.      Strain was performed to quantify  interventricular dyssynchrony and    evaluate components of myocardial function due to (positive family history    of HCM, family history of sudden death, HCM, Chemotherapy, complex CHD,    genetic abnormality, viral infection) . Results from the utilization of    Strain Analysis are listed in the report below.         ECG 12 lead   Final Result by Judy Jeffery MD (01/12 0846)   Normal sinus rhythm   Possible Left atrial enlargement   Left anterior fascicular block   Nonspecific ST and T wave abnormality   Prolonged QT   Abnormal ECG   Confirmed by Judy Jeffery ([id number]) on 1/12/2025 8:46:40 AM      ECG 12 lead   Final Result by Judy Jeffery MD (01/12 1116)   Normal sinus rhythm   Possible Left atrial enlargement   Left anterior fascicular block   Anterior infarct , age undetermined   Prolonged QT   Abnormal ECG   Confirmed by Judy Jeffery ([id number]) on 1/12/2025 11:16:49 AM        GI:  No orders to display           Results from last 7 days   Lab Units 01/14/25 0518 01/13/25 0446 01/12/25  0111   WBC Thousand/uL 4.92 6.82 6.01   HEMOGLOBIN g/dL 9.0* 9.6* 10.7*   HEMATOCRIT % 28.9* 30.0* 34.5*   PLATELETS Thousands/uL 239 262 268   TOTAL NEUT ABS Thousands/µL 2.58 4.59 4.29         Results from last 7 days   Lab Units 01/14/25 0518 01/13/25 0446 01/12/25  0111   SODIUM mmol/L 136 137 139   POTASSIUM mmol/L 3.1* 3.8 3.2*   CHLORIDE mmol/L 103 104 107   CO2 mmol/L 26 25 23   ANION GAP mmol/L 7 8 9   BUN mg/dL 47* 46* 49*   CREATININE mg/dL 3.03* 2.87* 2.68*   EGFR ml/min/1.73sq m 21 22 24   CALCIUM mg/dL 8.6 8.5 8.2*   MAGNESIUM mg/dL 1.8* 2.0  --    PHOSPHORUS mg/dL 5.0* 4.8*  --      Results from last 7 days   Lab Units 01/14/25 0518 01/13/25 0446 01/12/25  0559 01/12/25  0111   AST U/L 28 39  --  45*   ALT U/L 32 37  --  49   ALK PHOS U/L 73 78  --  98   TOTAL PROTEIN g/dL 6.2* 6.3*  --  6.9   ALBUMIN g/dL 3.5 3.5  --  3.9   TOTAL BILIRUBIN mg/dL 0.73 0.81  --  0.53   AMMONIA umol/L  --   --  32  --       Results from last 7 days   Lab Units 01/13/25  2047 01/13/25  1659 01/13/25  1126 01/13/25  0905 01/12/25  2123 01/12/25  1551 01/12/25  1430 01/12/25  1142 01/12/25  0558 01/12/25  0519 01/12/25  0430 01/12/25  0350   POC GLUCOSE mg/dl 93 95 116 241* 115 140 163* 103 153* 152* 154* 124     Results from last 7 days   Lab Units 01/14/25  0518 01/13/25  0446 01/12/25  0111   GLUCOSE RANDOM mg/dL 88 104 84             BETA-HYDROXYBUTYRATE   Date Value Ref Range Status   07/09/2020 1.2 (H) <0.6 mmol/L Final   07/03/2020 1.5 (H) <0.6 mmol/L Final                  Results from last 7 days   Lab Units 01/13/25  0446 01/12/25  0559 01/12/25  0111   CK TOTAL U/L 439* 566* 349*     Results from last 7 days   Lab Units 01/12/25  0559 01/12/25  0334 01/12/25  0111   HS TNI 0HR ng/L  --   --  65*   HS TNI 2HR ng/L  --  112*  --    HSTNI D2 ng/L  --  47*  --    HS TNI 4HR ng/L 135*  --   --    HSTNI D4 ng/L 70*  --   --              Results from last 7 days   Lab Units 01/12/25  0559   TSH 3RD GENERATON uIU/mL 2.437     Results from last 7 days   Lab Units 01/12/25  0559   PROCALCITONIN ng/ml 0.08                 Results from last 7 days   Lab Units 01/12/25  0111   BNP pg/mL 373*                                     Results from last 7 days   Lab Units 01/12/25  0111   CLARITY UA  Clear   COLOR UA  Colorless   SPEC GRAV UA  1.007   PH UA  7.5   GLUCOSE UA mg/dl Negative   KETONES UA mg/dl Negative   BLOOD UA  Small*   PROTEIN UA mg/dl 70 (1+)*   NITRITE UA  Negative   BILIRUBIN UA  Negative   UROBILINOGEN UA (BE) mg/dl <2.0   LEUKOCYTES UA  Negative   WBC UA /hpf None Seen   RBC UA /hpf 1-2   BACTERIA UA /hpf None Seen   EPITHELIAL CELLS WET PREP /hpf Occasional             Results from last 7 days   Lab Units 01/12/25  0101   AMPH/METH  Negative   BARBITURATE UR  Negative   BENZODIAZEPINE UR  Negative   COCAINE UR  Negative   METHADONE URINE  Negative   OPIATE UR  Negative   PCP UR  Negative   THC UR  Positive*     Results  from last 7 days   Lab Units 01/12/25  0111   ETHANOL LVL mg/dL <10   ACETAMINOPHEN LVL ug/mL <2*   SALICYLATE LVL mg/dL <5                                   Past Medical History:   Diagnosis Date    Asthma     Chronic kidney disease     COPD (chronic obstructive pulmonary disease) (HCC)     CPAP (continuous positive airway pressure) dependence     NO LONGER NEEDS D/T BARIATRIC SURGERY.    Diabetes mellitus (HCC)     Emphysema of lung (HCC)     Gout     History of transfusion     pt stated they had a transfusion when they had gallbladder surgery.    Hypertension     Kidney disease     renal failure    Leg DVT (deep venous thromboembolism), acute, bilateral (HCC) 01/2018    Obesity (BMI 30-39.9)     AGUS on CPAP     setting 11    Postgastrectomy malabsorption     Sleep apnea     Systolic CHF (HCC)      Present on Admission:   COPD without exacerbation (HCC)   Stage 4 chronic kidney disease (HCC)   Encephalopathy acute   Chronic heart failure with preserved ejection fraction (HFpEF) (HCC)   Hypoglycemia   Intussusception intestine (HCC)   Hypertensive emergency      Admitting Diagnosis: Hypoglycemia [E16.2]  Hypertensive emergency [I16.1]  Age/Sex: 61 y.o. male    Network Utilization Review Department  ATTENTION: Please call with any questions or concerns to [phone] and carefully listen to the prompts so that you are directed to the right person. All voicemails are confidential.   For Discharge needs, contact Care Management DC Support Team at [phone] opt. 2  Send all requests for admission clinical reviews, approved or denied determinations and any other requests to dedicated fax number below belonging to the campus where the patient is receiving treatment. List of dedicated fax numbers for the Facilities:  FACILITY NAME UR FAX NUMBER   ADMISSION DENIALS (Administrative/Medical Necessity) [phone]   DISCHARGE SUPPORT TEAM (NETWORK) [phone]   PARENT CHILD HEALTH (Maternity/NICU/Pediatrics)  [phone]   [facility] [phone]   [facility] [phone]   [facility] [phone]   [facility] [phone]   Critical access hospital [phone]   [facility] [phone]   [facility] [phone]   [facility] [phone]   [facility] [phone]   [facility] [phone]   [facility] [phone]   [facility] [phone]

## 2025-03-04 ENCOUNTER — OFFICE VISIT (OUTPATIENT)
Dept: INTERNAL MEDICINE CLINIC | Facility: CLINIC | Age: 85
End: 2025-03-04
Payer: COMMERCIAL

## 2025-03-04 VITALS
HEART RATE: 63 BPM | SYSTOLIC BLOOD PRESSURE: 138 MMHG | OXYGEN SATURATION: 93 % | BODY MASS INDEX: 28.14 KG/M2 | WEIGHT: 190 LBS | DIASTOLIC BLOOD PRESSURE: 70 MMHG | HEIGHT: 69 IN

## 2025-03-04 DIAGNOSIS — I27.21 PULMONARY ARTERY HYPERTENSION (HCC): ICD-10-CM

## 2025-03-04 DIAGNOSIS — Z13.0 SCREENING FOR DEFICIENCY ANEMIA: ICD-10-CM

## 2025-03-04 DIAGNOSIS — N18.31 STAGE 3A CHRONIC KIDNEY DISEASE (HCC): Primary | ICD-10-CM

## 2025-03-04 DIAGNOSIS — I48.92 PAROXYSMAL ATRIAL FLUTTER (HCC): ICD-10-CM

## 2025-03-04 DIAGNOSIS — R73.03 PREDIABETES: ICD-10-CM

## 2025-03-04 DIAGNOSIS — Z00.00 ENCOUNTER FOR SUBSEQUENT ANNUAL WELLNESS VISIT (AWV) IN MEDICARE PATIENT: ICD-10-CM

## 2025-03-04 DIAGNOSIS — I48.0 PAROXYSMAL ATRIAL FIBRILLATION (HCC): ICD-10-CM

## 2025-03-04 PROBLEM — J96.11 CHRONIC RESPIRATORY FAILURE WITH HYPOXIA (HCC): Chronic | Status: RESOLVED | Noted: 2024-12-11 | Resolved: 2025-03-04

## 2025-03-04 PROCEDURE — G0439 PPPS, SUBSEQ VISIT: HCPCS | Performed by: INTERNAL MEDICINE

## 2025-03-04 PROCEDURE — 99213 OFFICE O/P EST LOW 20 MIN: CPT | Performed by: INTERNAL MEDICINE

## 2025-03-04 NOTE — ASSESSMENT & PLAN NOTE
On outpatient sinus rhythm rate controlled asymptomatic  Continue metoprolol 50 mg twice a day not on anticoagulation awaiting to be seen by cardiology as per cardiology not on anticoagulation considering the risk and benefit  Orders:  •  Comprehensive metabolic panel; Future  •  Hemoglobin A1C; Future  •  Urinalysis with microscopic; Future

## 2025-03-04 NOTE — ASSESSMENT & PLAN NOTE
For now patient in sinus rhythm rate control continue metoprolol followed by cardiology follow-up with cardiology asymptomatic

## 2025-03-04 NOTE — ASSESSMENT & PLAN NOTE
Patient likely has an element of pulmonary artery hypertension. He did had a recent 2D echo which I personally reviewed. There was some mild to moderate tricuspid regurgitation. He is according to his daugh  Orders:  •  Comprehensive metabolic panel; Future  •  Hemoglobin A1C; Future  •  Urinalysis with microscopic; Future

## 2025-03-04 NOTE — PROGRESS NOTES
Name: Taj Roblero Jr.      : 1940      MRN: [id number]  Encounter Provider: Carmen Hunt MD  Encounter Date: 3/4/2025   Encounter department: Count includes the [facility] INTERNAL MEDICINE    Assessment & Plan  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HCC)  Patient likely has an element of pulmonary artery hypertension. He did had a recent 2D echo which I personally reviewed. There was some mild to moderate tricuspid regurgitation. He is according to his dapawel  Orders:  •  Comprehensive metabolic panel; Future  •  Hemoglobin A1C; Future  •  Urinalysis with microscopic; Future    Paroxysmal atrial fibrillation (HCC)  On outpatient sinus rhythm rate controlled asymptomatic  Continue metoprolol 50 mg twice a day not on anticoagulation awaiting to be seen by cardiology as per cardiology not on anticoagulation considering the risk and benefit  Orders:  •  Comprehensive metabolic panel; Future  •  Hemoglobin A1C; Future  •  Urinalysis with microscopic; Future    Stage 3a chronic kidney disease (HCC)  Lab Results   Component Value Date    EGFR 66 2024    EGFR 53 2024    EGFR 50 2024    CREATININE 1.03 2024    CREATININE 1.24 2024    CREATININE 1.30 2024     For stable at 66 much improved creatinine 1.03 stable at baseline avoid nephrotoxic will monitor closely with BMP  Orders:  •  Comprehensive metabolic panel; Future  •  Hemoglobin A1C; Future  •  Urinalysis with microscopic; Future    Paroxysmal atrial flutter (HCC)  For now patient in sinus rhythm rate control continue metoprolol followed by cardiology follow-up with cardiology asymptomatic       Screening for deficiency anemia    Orders:  •  CBC and differential; Future    Prediabetes    Orders:  •  Hemoglobin A1C; Future       Preventive health issues were discussed with patient, and age appropriate screening tests were ordered as noted in patient's After Visit Summary. Personalized health advice and appropriate referrals for  health education or preventive services given if needed, as noted in patient's After Visit Summary.    History of Present Illness     Came in follow-up chronic medical condition listed visit diagnosis denies any chest pain difficulty breathing no new complaint overall health unchanged previous labs reviewed new set of lab request given to the patient to be done before next visit for details refer to assessment plan visit diagnosis overall health unchanged since the last visit    Complete annual wellness exam done for counseling screening follow-up recommendations see attached encounter       Patient Care Team:  Carmen Hunt MD as PCP - General (Internal Medicine)  Gurpreet Aguirre MD as PCP - [facility]CP-[facility] ([facility])    Review of Systems   Constitutional:  Positive for activity change. Negative for appetite change, chills, diaphoresis, fatigue, fever and unexpected weight change.   HENT:  Negative for congestion, dental problem, drooling, ear discharge, ear pain, facial swelling, hearing loss, mouth sores, nosebleeds, postnasal drip, rhinorrhea, sinus pressure, sneezing, sore throat, tinnitus, trouble swallowing and voice change.    Eyes:  Negative for photophobia, pain, discharge, redness, itching and visual disturbance.   Respiratory:  Negative for apnea, cough, choking, chest tightness, shortness of breath, wheezing and stridor.    Cardiovascular:  Negative for chest pain and palpitations.   Gastrointestinal:  Negative for abdominal distention, abdominal pain, anal bleeding, blood in stool, constipation, diarrhea, nausea, rectal pain and vomiting.   Endocrine: Negative for cold intolerance, heat intolerance, polydipsia, polyphagia and polyuria.   Genitourinary:  Negative for decreased urine volume, difficulty urinating, dysuria, enuresis, flank pain, frequency, genital sores, hematuria and urgency.   Musculoskeletal:  Positive for arthralgias, back pain, gait problem, joint swelling and myalgias.  Negative for neck pain and neck stiffness.   Skin:  Negative for color change, pallor, rash and wound.   Allergic/Immunologic: Negative.  Negative for environmental allergies, food allergies and immunocompromised state.   Neurological:  Negative for dizziness, tremors, seizures, syncope, facial asymmetry, speech difficulty, weakness, light-headedness, numbness and headaches.   Psychiatric/Behavioral:  Negative for agitation, behavioral problems, confusion, decreased concentration, dysphoric mood, hallucinations, self-injury, sleep disturbance and suicidal ideas. The patient is not nervous/anxious and is not hyperactive.      Medical History Reviewed by provider this encounter:  Tobacco  Allergies  Meds  Problems  Med Hx  Surg Hx  Fam Hx       Annual Wellness Visit Questionnaire   Taj is here for his Subsequent Wellness visit. Last Medicare Wellness visit information reviewed, patient interviewed and updates made to the record today.      Health Risk Assessment:   Patient rates overall health as very good. Patient feels that their physical health rating is same. Patient is satisfied with their life. Eyesight was rated as same. Hearing was rated as same. Patient feels that their emotional and mental health rating is same. Patients states they are never, rarely angry. Patient states they are sometimes unusually tired/fatigued. Pain experienced in the last 7 days has been none. Patient states that he has experienced no weight loss or gain in last 6 months. Weight stays the same.    Fall Risk Screening:   In the past year, patient has experienced: history of falling in past year    Number of falls: 1  Injured during fall?: Yes    Feels unsteady when standing or walking?: Yes    Worried about falling?: Yes      Home Safety:  Patient has trouble with stairs inside or outside of their home. Patient has working smoke alarms and has working carbon monoxide detector. Home safety hazards include: none. No home hazards.  Goes slow on the stairs. Use banisters for support.Jose is a nurse she looks in a couple times a week. Uses walker in home.    Nutrition:   Current diet is Regular. Eats a balanced die teats lots of salads    Medications:   Patient is currently taking over-the-counter supplements. OTC medications include: see medication list. Patient is able to manage medications. No issues. Daughter does his meds.    Activities of Daily Living (ADLs)/Instrumental Activities of Daily Living (IADLs):   Walk and transfer into and out of bed and chair?: Yes  Dress and groom yourself?: Yes    Bathe or shower yourself?: Yes    Feed yourself? Yes  Do your laundry/housekeeping?: Yes  Manage your money, pay your bills and track your expenses?: Yes  Make your own meals?: Yes    Do your own shopping?: Yes    ADL comments: Wife helps with everything. Including bathing.    Previous Hospitalizations:   Any hospitalizations or ED visits within the last 12 months?: No      Hospitalization Comments: Broken clavicle    Advance Care Planning:   Living will: Yes    Durable POA for healthcare: Yes    Advanced directive: Yes    Advanced directive counseling given: Yes    Patient declined ACP directive: No    End of Life Decisions reviewed with patient: Yes    Provider agrees with end of life decisions: Yes      Comments: All docs in place per pt    Cognitive Screening:   Provider or family/friend/caregiver concerned regarding cognition?: No    PREVENTIVE SCREENINGS      Cardiovascular Screening:    General: Screening Not Indicated and History Lipid Disorder      Diabetes Screening:     General: Screening Current      Colorectal Cancer Screening:     General: Screening Not Indicated      Prostate Cancer Screening:    General: Screening Not Indicated      Osteoporosis Screening:    General: Screening Not Indicated and History Osteoporosis      Abdominal Aortic Aneurysm (AAA) Screening:        General: Screening Not Indicated      Lung Cancer  Screening:     General: Screening Not Indicated      Hepatitis C Screening:    General: Patient Declines    Hep C Screening Accepted: No       Preventive Screening Comments: No risk for Hep C. UTD with covid and flu.    Screening, Brief Intervention, and Referral to Treatment (SBIRT)     Screening  Typical number of drinks in a day: 0  Typical number of drinks in a week: 0  Interpretation: Low risk drinking behavior.    AUDIT-C Screenin) How often did you have a drink containing alcohol in the past year? never  2) How many drinks did you have on a typical day when you were drinking in the past year? 0  3) How often did you have 6 or more drinks on one occasion in the past year? never    AUDIT-C Score: 0  Interpretation: Score 0-3 (male): Negative screen for alcohol misuse    Single Item Drug Screening:  How often have you used an illegal drug (including marijuana) or a prescription medication for non-medical reasons in the past year? never    Single Item Drug Screen Score: 0  Interpretation: Negative screen for possible drug use disorder    Brief Intervention  Alcohol & drug use screenings were reviewed. No concerns regarding substance use disorder identified.     Other Counseling Topics:   Skin self-exam and sunscreen.     Social Drivers of Health     Financial Resource Strain: Low Risk  (2023)    Overall Financial Resource Strain (CARDIA)    • Difficulty of Paying Living Expenses: Not hard at all   Food Insecurity: No Food Insecurity (3/4/2025)    Hunger Vital Sign    • Worried About Running Out of Food in the Last Year: Never true    • Ran Out of Food in the Last Year: Never true   Transportation Needs: No Transportation Needs (3/4/2025)    PRAPARE - Transportation    • Lack of Transportation (Medical): No    • Lack of Transportation (Non-Medical): No   Housing Stability: Low Risk  (3/4/2025)    Housing Stability Vital Sign    • Unable to Pay for Housing in the Last Year: No    • Number of Times Moved  "in the Last Year: 0    • Homeless in the Last Year: No   Utilities: Not At Risk (3/4/2025)    [facility]    • Threatened with loss of utilities: No     No results found.    Objective   /70   Pulse 63   Ht 5' 9\" (1.753 m)   Wt 86.2 kg (190 lb)   SpO2 93%   BMI 28.06 kg/m²     Physical Exam  Vitals and nursing note reviewed.   Constitutional:       General: He is not in acute distress.     Appearance: He is well-developed. He is not ill-appearing, toxic-appearing or diaphoretic.   HENT:      Head: Normocephalic and atraumatic.      Right Ear: External ear normal.      Left Ear: External ear normal.      Nose: Nose normal.      Mouth/Throat:      Pharynx: No oropharyngeal exudate.   Eyes:      General: Lids are normal. Lids are everted, no foreign bodies appreciated. No scleral icterus.        Right eye: No discharge.         Left eye: No discharge.      Conjunctiva/sclera: Conjunctivae normal.      Pupils: Pupils are equal, round, and reactive to light.   Neck:      Thyroid: No thyromegaly.      Vascular: Normal carotid pulses. No carotid bruit, hepatojugular reflux or JVD.      Trachea: No tracheal tenderness or tracheal deviation.   Cardiovascular:      Rate and Rhythm: Normal rate and regular rhythm.      Pulses: Normal pulses.      Heart sounds: Normal heart sounds. No murmur heard.     No friction rub. No gallop.   Pulmonary:      Effort: Pulmonary effort is normal. No respiratory distress.      Breath sounds: Normal breath sounds. No stridor. No wheezing or rales.   Chest:      Chest wall: No tenderness.   Abdominal:      General: Bowel sounds are normal. There is no distension.      Palpations: Abdomen is soft. There is no mass.      Tenderness: There is no abdominal tenderness. There is no guarding or rebound.   Musculoskeletal:         General: No tenderness or deformity. Normal range of motion.      Cervical back: Normal range of motion and neck supple. No edema, erythema or rigidity. No " spinous process tenderness or muscular tenderness. Normal range of motion.   Lymphadenopathy:      Head:      Right side of head: No submental, submandibular, tonsillar, preauricular or posterior auricular adenopathy.      Left side of head: No submental, submandibular, tonsillar, preauricular, posterior auricular or occipital adenopathy.      Cervical: No cervical adenopathy.      Right cervical: No superficial, deep or posterior cervical adenopathy.     Left cervical: No superficial, deep or posterior cervical adenopathy.      Upper Body:      Right upper body: No pectoral adenopathy.      Left upper body: No pectoral adenopathy.   Skin:     General: Skin is warm and dry.      Coloration: Skin is not pale.      Findings: No erythema or rash.   Neurological:      General: No focal deficit present.      Mental Status: He is alert and oriented to person, place, and time.      Cranial Nerves: No cranial nerve deficit.      Sensory: No sensory deficit.      Motor: No tremor, abnormal muscle tone or seizure activity.      Coordination: Coordination normal.      Gait: Gait abnormal.      Deep Tendon Reflexes: Reflexes are normal and symmetric. Reflexes normal.   Psychiatric:         Behavior: Behavior normal.         Thought Content: Thought content normal.         Judgment: Judgment normal.

## 2025-03-04 NOTE — PATIENT INSTRUCTIONS
Medicare Preventive Visit Patient Instructions  Thank you for completing your Welcome to Medicare Visit or Medicare Annual Wellness Visit today. Your next wellness visit will be due in one year (3/5/2026).  The screening/preventive services that you may require over the next 5-10 years are detailed below. Some tests may not apply to you based off risk factors and/or age. Screening tests ordered at today's visit but not completed yet may show as past due. Also, please note that scanned in results may not display below.  Preventive Screenings:  Service Recommendations Previous Testing/Comments   Colorectal Cancer Screening  Colonoscopy    Fecal Occult Blood Test (FOBT)/Fecal Immunochemical Test (FIT)  Fecal DNA/Cologuard Test  Flexible Sigmoidoscopy Age: 45-75 years old   Colonoscopy: every 10 years (May be performed more frequently if at higher risk)  OR  FOBT/FIT: every 1 year  OR  Cologuard: every 3 years  OR  Sigmoidoscopy: every 5 years  Screening may be recommended earlier than age 45 if at higher risk for colorectal cancer. Also, an individualized decision between you and your healthcare provider will decide whether screening between the ages of 76-85 would be appropriate. Colonoscopy: Not on file  FOBT/FIT: Not on file  Cologuard: Not on file  Sigmoidoscopy: Not on file          Prostate Cancer Screening Individualized decision between patient and health care provider in men between ages of 55-69   Medicare will cover every 12 months beginning on the day after your 50th birthday PSA: 0.8 ng/mL           Hepatitis C Screening Once for adults born between 1945 and 1965  More frequently in patients at high risk for Hepatitis C Hep C Antibody: Not on file        Diabetes Screening 1-2 times per year if you're at risk for diabetes or have pre-diabetes Fasting glucose: 91 mg/dL (8/1/2024)  A1C: 5.6 % (8/1/2024)      Cholesterol Screening Once every 5 years if you don't have a lipid disorder. May order more often  based on risk factors. Lipid panel: 08/01/2024         Other Preventive Screenings Covered by Medicare:  Abdominal Aortic Aneurysm (AAA) Screening: covered once if your at risk. You're considered to be at risk if you have a family history of AAA or a male between the age of 65-75 who smoking at least 100 cigarettes in your lifetime.  Lung Cancer Screening: covers low dose CT scan once per year if you meet all of the following conditions: (1) Age 55-77; (2) No signs or symptoms of lung cancer; (3) Current smoker or have quit smoking within the last 15 years; (4) You have a tobacco smoking history of at least 20 pack years (packs per day x number of years you smoked); (5) You get a written order from a healthcare provider.  Glaucoma Screening: covered annually if you're considered high risk: (1) You have diabetes OR (2) Family history of glaucoma OR (3)  aged 50 and older OR (4)  American aged 65 and older  Osteoporosis Screening: covered every 2 years if you meet one of the following conditions: (1) Have a vertebral abnormality; (2) On glucocorticoid therapy for more than 3 months; (3) Have primary hyperparathyroidism; (4) On osteoporosis medications and need to assess response to drug therapy.  HIV Screening: covered annually if you're between the age of 15-65. Also covered annually if you are younger than 15 and older than 65 with risk factors for HIV infection. For pregnant patients, it is covered up to 3 times per pregnancy.    Immunizations:  Immunization Recommendations   Influenza Vaccine Annual influenza vaccination during flu season is recommended for all persons aged >= 6 months who do not have contraindications   Pneumococcal Vaccine   * Pneumococcal conjugate vaccine = PCV13 (Prevnar 13), PCV15 (Vaxneuvance), PCV20 (Prevnar 20)  * Pneumococcal polysaccharide vaccine = PPSV23 (Pneumovax) Adults 19-63 yo with certain risk factors or if 65+ yo  If never received any pneumonia vaccine:  recommend Prevnar 20 (PCV20)  Give PCV20 if previously received 1 dose of PCV13 or PPSV23   Hepatitis B Vaccine 3 dose series if at intermediate or high risk (ex: diabetes, end stage renal disease, liver disease)   Respiratory syncytial virus (RSV) Vaccine - COVERED BY MEDICARE PART D  * RSVPreF3 (Arexvy) CDC recommends that adults 60 years of age and older may receive a single dose of RSV vaccine using shared clinical decision-making (SCDM)   Tetanus (Td) Vaccine - COST NOT COVERED BY MEDICARE PART B Following completion of primary series, a booster dose should be given every 10 years to maintain immunity against tetanus. Td may also be given as tetanus wound prophylaxis.   Tdap Vaccine - COST NOT COVERED BY MEDICARE PART B Recommended at least once for all adults. For pregnant patients, recommended with each pregnancy.   Shingles Vaccine (Shingrix) - COST NOT COVERED BY MEDICARE PART B  2 shot series recommended in those 19 years and older who have or will have weakened immune systems or those 50 years and older     Health Maintenance Due:  There are no preventive care reminders to display for this patient.  Immunizations Due:      Topic Date Due   • Pneumococcal Vaccine: 65+ Years (1 of 2 - PCV) Never done   • COVID-19 Vaccine (1 - 2024-25 season) Never done     Advance Directives   What are advance directives?  Advance directives are legal documents that state your wishes and plans for medical care. These plans are made ahead of time in case you lose your ability to make decisions for yourself. Advance directives can apply to any medical decision, such as the treatments you want, and if you want to donate organs.   What are the types of advance directives?  There are many types of advance directives, and each state has rules about how to use them. You may choose a combination of any of the following:  Living will:  This is a written record of the treatment you want. You can also choose which treatments you do  not want, which to limit, and which to stop at a certain time. This includes surgery, medicine, IV fluid, and tube feedings.   Durable power of  for healthcare (DPAHC):  This is a written record that states who you want to make healthcare choices for you when you are unable to make them for yourself. This person, called a proxy, is usually a family member or a friend. You may choose more than 1 proxy.  Do not resuscitate (DNR) order:  A DNR order is used in case your heart stops beating or you stop breathing. It is a request not to have certain forms of treatment, such as CPR. A DNR order may be included in other types of advance directives.  Medical directive:  This covers the care that you want if you are in a coma, near death, or unable to make decisions for yourself. You can list the treatments you want for each condition. Treatment may include pain medicine, surgery, blood transfusions, dialysis, IV or tube feedings, and a ventilator (breathing machine).  Values history:  This document has questions about your views, beliefs, and how you feel and think about life. This information can help others choose the care that you would choose.  Why are advance directives important?  An advance directive helps you control your care. Although spoken wishes may be used, it is better to have your wishes written down. Spoken wishes can be misunderstood, or not followed. Treatments may be given even if you do not want them. An advance directive may make it easier for your family to make difficult choices about your care.   Weight Management   Why it is important to manage your weight:  Being overweight increases your risk of health conditions such as heart disease, high blood pressure, type 2 diabetes, and certain types of cancer. It can also increase your risk for osteoarthritis, sleep apnea, and other respiratory problems. Aim for a slow, steady weight loss. Even a small amount of weight loss can lower your risk of  health problems.  How to lose weight safely:  A safe and healthy way to lose weight is to eat fewer calories and get regular exercise. You can lose up about 1 pound a week by decreasing the number of calories you eat by 500 calories each day.   Healthy meal plan for weight management:  A healthy meal plan includes a variety of foods, contains fewer calories, and helps you stay healthy. A healthy meal plan includes the following:  Eat whole-grain foods more often.  A healthy meal plan should contain fiber. Fiber is the part of grains, fruits, and vegetables that is not broken down by your body. Whole-grain foods are healthy and provide extra fiber in your diet. Some examples of whole-grain foods are whole-wheat breads and pastas, oatmeal, brown rice, and bulgur.  Eat a variety of vegetables every day.  Include dark, leafy greens such as spinach, kale, ivette greens, and mustard greens. Eat yellow and orange vegetables such as carrots, sweet potatoes, and winter squash.   Eat a variety of fruits every day.  Choose fresh or canned fruit (canned in its own juice or light syrup) instead of juice. Fruit juice has very little or no fiber.  Eat low-fat dairy foods.  Drink fat-free (skim) milk or 1% milk. Eat fat-free yogurt and low-fat cottage cheese. Try low-fat cheeses such as mozzarella and other reduced-fat cheeses.  Choose meat and other protein foods that are low in fat.  Choose beans or other legumes such as split peas or lentils. Choose fish, skinless poultry (chicken or turkey), or lean cuts of red meat (beef or pork). Before you cook meat or poultry, cut off any visible fat.   Use less fat and oil.  Try baking foods instead of frying them. Add less fat, such as margarine, sour cream, regular salad dressing and mayonnaise to foods. Eat fewer high-fat foods. Some examples of high-fat foods include french fries, doughnuts, ice cream, and cakes.  Eat fewer sweets.  Limit foods and drinks that are high in sugar. This  includes candy, cookies, regular soda, and sweetened drinks.  Exercise:  Exercise at least 30 minutes per day on most days of the week. Some examples of exercise include walking, biking, dancing, and swimming. You can also fit in more physical activity by taking the stairs instead of the elevator or parking farther away from stores. Ask your healthcare provider about the best exercise plan for you.      © Copyright [employer] 2018 Information is for End User's use only and may not be sold, redistributed or otherwise used for commercial purposes. All illustrations and images included in CareNotes® are the copyrighted property of [employer]M., Inc[employer] or [employer]

## 2025-04-08 PROBLEM — Z00.00 ENCOUNTER FOR SUBSEQUENT ANNUAL WELLNESS VISIT (AWV) IN MEDICARE PATIENT: Status: RESOLVED | Noted: 2020-11-05 | Resolved: 2025-04-08

## 2025-04-28 ENCOUNTER — DOCUMENTATION (OUTPATIENT)
Dept: ADMINISTRATIVE | Facility: OTHER | Age: 85
End: 2025-04-28

## 2025-04-28 NOTE — PROGRESS NOTES
04/28/25 3:43 PM    Annual Wellness Visit outreach is not required, an AWV was completed at the PCP office.    Thank you.  Alondra Damon MA  [facility]

## 2025-05-07 NOTE — PATIENT INSTRUCTIONS
Follow with Consultants as per their and our suggestion    Follow up in 2 months or as needed earlier    Follow all instructions as advised and discussed. Take your medications as prescribed. Call the office immediately if you experience any side effects. Ask questions if you do not understand. Keep your scheduled appointment as advised or come sooner if necessary or in doubt. Best time to call for non-urgent matter or questions on weekdays is between 9am and 12 noon. See physician for any new symptoms or worsening of current symptoms. Urgent or emergent situations call 911 and report to nearest emergency room. I spent  time taking care of this patient including clinical care, conseling, collaboration, chart, lab and consultant's follow up note,images report, documentation, pre visit  review as appropriate.     Patient is to get labs 1 week(s) prior to next visit if advised Liver serology today.  ALKP isoenzymes today.  Liver ultrasound with elastography

## 2025-06-04 ENCOUNTER — OFFICE VISIT (OUTPATIENT)
Dept: INTERNAL MEDICINE CLINIC | Facility: CLINIC | Age: 85
End: 2025-06-04
Payer: COMMERCIAL

## 2025-06-04 VITALS
HEIGHT: 69 IN | OXYGEN SATURATION: 97 % | SYSTOLIC BLOOD PRESSURE: 134 MMHG | HEART RATE: 56 BPM | BODY MASS INDEX: 28.71 KG/M2 | DIASTOLIC BLOOD PRESSURE: 68 MMHG | WEIGHT: 193.8 LBS

## 2025-06-04 DIAGNOSIS — F33.42 RECURRENT MAJOR DEPRESSIVE DISORDER, IN FULL REMISSION (HCC): ICD-10-CM

## 2025-06-04 DIAGNOSIS — I48.0 PAROXYSMAL ATRIAL FIBRILLATION (HCC): ICD-10-CM

## 2025-06-04 DIAGNOSIS — Z13.0 SCREENING FOR DEFICIENCY ANEMIA: ICD-10-CM

## 2025-06-04 DIAGNOSIS — E78.2 MIXED HYPERLIPIDEMIA: ICD-10-CM

## 2025-06-04 DIAGNOSIS — I10 ESSENTIAL HYPERTENSION: ICD-10-CM

## 2025-06-04 DIAGNOSIS — N42.89 PROSTATE ATROPHY: ICD-10-CM

## 2025-06-04 DIAGNOSIS — I27.21 PULMONARY ARTERY HYPERTENSION (HCC): ICD-10-CM

## 2025-06-04 DIAGNOSIS — N18.31 STAGE 3A CHRONIC KIDNEY DISEASE (HCC): Primary | ICD-10-CM

## 2025-06-04 DIAGNOSIS — R73.03 PREDIABETES: ICD-10-CM

## 2025-06-04 PROCEDURE — 99214 OFFICE O/P EST MOD 30 MIN: CPT | Performed by: INTERNAL MEDICINE

## 2025-06-04 RX ORDER — CALCIUM CARBONATE 300MG(750)
400 TABLET,CHEWABLE ORAL EVERY MORNING
COMMUNITY

## 2025-06-04 RX ORDER — CITALOPRAM HYDROBROMIDE 40 MG/1
40 TABLET ORAL DAILY
Qty: 90 TABLET | Refills: 1 | Status: SHIPPED | OUTPATIENT
Start: 2025-06-04 | End: 2025-12-01

## 2025-06-04 RX ORDER — TAMSULOSIN HYDROCHLORIDE 0.4 MG/1
0.4 CAPSULE ORAL
Qty: 90 CAPSULE | Refills: 1 | Status: SHIPPED | OUTPATIENT
Start: 2025-06-04

## 2025-06-04 RX ORDER — METOPROLOL TARTRATE 50 MG
50 TABLET ORAL EVERY 12 HOURS
Qty: 180 TABLET | Refills: 1 | Status: SHIPPED | OUTPATIENT
Start: 2025-06-04

## 2025-06-04 NOTE — ASSESSMENT & PLAN NOTE
On outpatient sinus rhythm rate controlled asymptomatic  Continue metoprolol 50 mg twice a day not on anticoagulation to be seen by cardiology as per cardiology not on anticoagulation considering the risk and benefit  Patient's rate controlled in sinus rhythm asymptomatic continue follow-up with cardiology  Orders:  •  Comprehensive metabolic panel; Future  •  Hemoglobin A1C; Future

## 2025-06-04 NOTE — ASSESSMENT & PLAN NOTE
Previous LDL reviewed controlled no side effect LFT reviewed normal    Again continue manage medication follow  Lipitor 10 mg daily with low-fat low-cholesterol diet  Check lipid profile and LFT before next visit  Orders:  •  Lipid Panel with Direct LDL reflex; Future  •  Comprehensive metabolic panel; Future

## 2025-06-04 NOTE — ASSESSMENT & PLAN NOTE
Patient likely has an element of pulmonary artery hypertension. He did had a recent 2D echo which reviewed there was some mild to moderate tricuspid regurgitation.  Patient's dyspnea on exertion at baseline will monitor closely follow-up with cardiology  No evidence of any right-sided heart failure echocardiogram done echocardiogram done November 2024 reviewed  Orders:  •  Comprehensive metabolic panel; Future  •  Hemoglobin A1C; Future

## 2025-06-04 NOTE — PROGRESS NOTES
Name: Taj Roblero Jr.      : 1940      MRN: [id number]  Encounter Provider: Carmen Hunt MD  Encounter Date: 2025   Encounter department: [facility]  :  Assessment & Plan  Mixed hyperlipidemia  Previous LDL reviewed controlled no side effect LFT reviewed normal    Again continue manage medication follow  Lipitor 10 mg daily with low-fat low-cholesterol diet  Check lipid profile and LFT before next visit  Orders:  •  Lipid Panel with Direct LDL reflex; Future  •  Comprehensive metabolic panel; Future    Screening for deficiency anemia    Orders:  •  CBC and differential; Future    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HCC)  Patient likely has an element of pulmonary artery hypertension. He did had a recent 2D echo which reviewed there was some mild to moderate tricuspid regurgitation.  Patient's dyspnea on exertion at baseline will monitor closely follow-up with cardiology  No evidence of any right-sided heart failure echocardiogram done echocardiogram done 2024 reviewed  Orders:  •  Comprehensive metabolic panel; Future  •  Hemoglobin A1C; Future     Paroxysmal atrial fibrillation (HCC)  On outpatient sinus rhythm rate controlled asymptomatic  Continue metoprolol 50 mg twice a day not on anticoagulation to be seen by cardiology as per cardiology not on anticoagulation considering the risk and benefit  Patient's rate controlled in sinus rhythm asymptomatic continue follow-up with cardiology  Orders:  •  Comprehensive metabolic panel; Future  •  Hemoglobin A1C; Future     Stage 3a chronic kidney disease (HCC)  Lab Results   Component Value Date    EGFR 66 2024    EGFR 53 2024    EGFR 50 2024    CREATININE 1.03 2024    CREATININE 1.24 2024    CREATININE 1.30 2024   GFR now improved to 66 creatinine 1.03 stable at baseline avoid nephrotoxic blood pressure controlled  Continue Demadex 10 mg twice a week  Orders:  •  Comprehensive  "metabolic panel; Future  •  Hemoglobin A1C; Future    Prediabetes  Lab Results   Component Value Date    HGBA1C 5.6 08/01/2024   A1c reviewed much improved continue diabetic diet diet exercise lose weight will check A1c before next visit  Orders:  •  Hemoglobin A1C; Future           History of Present Illness   Came in follow-up for the chronic medical condition listed visit diagnosis patient's dyspnea on exertion at baseline denies any chest pain overall health unchanged since the last visit no new symptoms no new events previous labs reviewed given new set of labs to be done before next visit for details refer to assessment plan visit diagnosis      Review of Systems    Objective   /68   Pulse 56   Ht 5' 9\" (1.753 m)   Wt 87.9 kg (193 lb 12.8 oz)   SpO2 97%   BMI 28.62 kg/m²      Physical Exam    "

## 2025-06-06 DIAGNOSIS — E78.5 HYPERLIPIDEMIA, UNSPECIFIED HYPERLIPIDEMIA TYPE: ICD-10-CM

## 2025-06-06 RX ORDER — NIACIN 500 MG/1
500 TABLET, EXTENDED RELEASE ORAL
Qty: 90 TABLET | Refills: 3 | Status: SHIPPED | OUTPATIENT
Start: 2025-06-06

## 2025-06-11 DIAGNOSIS — S22.030A COMPRESSION FRACTURE OF T3 VERTEBRA, INITIAL ENCOUNTER (HCC): ICD-10-CM

## 2025-06-11 RX ORDER — AMLODIPINE BESYLATE 2.5 MG/1
2.5 TABLET ORAL DAILY
Qty: 90 TABLET | Refills: 1 | Status: SHIPPED | OUTPATIENT
Start: 2025-06-11

## 2025-06-11 NOTE — TELEPHONE ENCOUNTER
Reason for call:   [x] Refill   [] Prior Auth  [] Other:     Office:   [x] PCP/Provider -   [] Specialty/Provider -     Medication:     amLODIPine (NORVASC) 2.5 mg tablet       Dose/Frequency: Take 1 tablet (2.5 mg total) by mouth daily,     Quantity: 90 tablet     Pharmacy: [id number] - [place], [place] - [address] [place] [address]     Local Pharmacy   Does the patient have enough for 3 days?   [x] Yes   [] No - Send as HP to POD

## (undated) DEVICE — FABRIC REINFORCED, SURGICAL GOWN, XL: Brand: CONVERTORS

## (undated) DEVICE — SUT VICRYL 1 CTX 36 IN J977H

## (undated) DEVICE — CHLORAPREP HI-LITE 26ML ORANGE

## (undated) DEVICE — 3000CC GUARDIAN II: Brand: GUARDIAN

## (undated) DEVICE — DRESSING MEPILEX AG BORDER POST-OP 4 X 8 IN

## (undated) DEVICE — STOCKINETTE,IMPERVIOUS,12X48,STERILE: Brand: MEDLINE

## (undated) DEVICE — 3M™ STERI-DRAPE™ U-DRAPE 1015: Brand: STERI-DRAPE™

## (undated) DEVICE — THE SIMPULSE SOLO SYSTEM WITH ULTREX RETRACTABLE SPLASH SHIELD TIP: Brand: SIMPULSE SOLO

## (undated) DEVICE — REPEL LITE CUT REST SURGICAL GLV LNRS X-LG: Brand: REPEL

## (undated) DEVICE — PADDING CAST 6IN COTTON STRL

## (undated) DEVICE — 6617 IOBAN II PATIENT ISOLATION DRAPE 5/BX,4BX/CS: Brand: STERI-DRAPE™ IOBAN™ 2

## (undated) DEVICE — ACE WRAP 4 IN STERILE

## (undated) DEVICE — CYSTO TUBING SINGLE IRRIGATION

## (undated) DEVICE — GLOVE SRG BIOGEL 9

## (undated) DEVICE — INTENDED FOR TISSUE SEPARATION, AND OTHER PROCEDURES THAT REQUIRE A SHARP SURGICAL BLADE TO PUNCTURE OR CUT.: Brand: BARD-PARKER SAFETY BLADES SIZE 15, STERILE

## (undated) DEVICE — ADHESIVE SKIN HIGH VISCOSITY EXOFIN 1ML

## (undated) DEVICE — INTENDED FOR TISSUE SEPARATION, AND OTHER PROCEDURES THAT REQUIRE A SHARP SURGICAL BLADE TO PUNCTURE OR CUT.: Brand: BARD-PARKER SAFETY BLADES SIZE 10, STERILE

## (undated) DEVICE — SPONGE GAUZE 4 X 9

## (undated) DEVICE — PADDING CAST 4 IN  COTTON STRL

## (undated) DEVICE — ASTOUND STANDARD SURGICAL GOWN, XL: Brand: CONVERTORS

## (undated) DEVICE — IMPERVIOUS STOCKINETTE: Brand: DEROYAL

## (undated) DEVICE — WEBRIL 6 IN UNSTERILE

## (undated) DEVICE — GLOVE INDICATOR PI UNDERGLOVE SZ 8.5 BLUE

## (undated) DEVICE — DRAPE SHEET THREE QUARTER

## (undated) DEVICE — ACE WRAP 6 IN UNSTERILE

## (undated) DEVICE — DRAPE C-ARM X-RAY

## (undated) DEVICE — DRESSING XEROFORM 5 X 9

## (undated) DEVICE — ABDOMINAL PAD: Brand: DERMACEA

## (undated) DEVICE — 3.2MM GUIDE WIRE 400MM

## (undated) DEVICE — DRESSING MEPILEX AG BORDER 4 X 4 IN

## (undated) DEVICE — 4.2MM THREE-FLUTED DRILL BIT QC/NEEDLE POINT/145MM

## (undated) DEVICE — ASTOUND FABRIC REINFORCED SURGICAL GOWN: Brand: CONVERTORS

## (undated) DEVICE — U-DRAPE: Brand: CONVERTORS

## (undated) DEVICE — BULB SYRINGE,IRRIGATION WITH PROTECTIVE CAP: Brand: DOVER

## (undated) DEVICE — KERLIX BANDAGE ROLL: Brand: KERLIX

## (undated) DEVICE — SUT VICRYL 2-0 CT-1 36 IN J945H

## (undated) DEVICE — GLOVE SRG BIOGEL 8

## (undated) DEVICE — ACE WRAP 4 IN UNSTERILE

## (undated) DEVICE — SUT VICRYL 0 CP-1 27 IN J267H

## (undated) DEVICE — ANTIBACTERIAL UNDYED BRAIDED (POLYGLACTIN 910), SYNTHETIC ABSORBABLE SUTURE: Brand: COATED VICRYL

## (undated) DEVICE — 2.5MM REAMING ROD WITH BALL TIP/950MM-STERILE

## (undated) DEVICE — GLOVE SRG BIOGEL 8.5

## (undated) DEVICE — PROXIMATE PLUS MD MULTI-DIRECTIONAL RELEASE SKIN STAPLERS CONTAINS 35 STAINLESS STEEL STAPLES APPROXIMATE CLOSED DIMENSIONS: 6.9MM X 3.9MM WIDE: Brand: PROXIMATE

## (undated) DEVICE — PACK GENERAL LF

## (undated) DEVICE — COBAN 4 IN STERILE

## (undated) DEVICE — INTENDED FOR TISSUE SEPARATION, AND OTHER PROCEDURES THAT REQUIRE A SHARP SURGICAL BLADE TO PUNCTURE OR CUT.: Brand: BARD-PARKER ® CARBON RIB-BACK BLADES

## (undated) DEVICE — GLOVE INDICATOR PI UNDERGLOVE SZ 9 BLUE

## (undated) DEVICE — GLOVE SRG BIOGEL 6.5

## (undated) DEVICE — SUT PDS II 0 CT-2 27 IN Z334H

## (undated) DEVICE — SUT VICRYL 2-0 SH 27 IN UNDYED J417H

## (undated) DEVICE — SPONGE LAP 18 X 18 IN

## (undated) DEVICE — DRAPE ISOLATION

## (undated) DEVICE — COBAN 6 IN STERILE

## (undated) DEVICE — BASIC DOUBLE BASIN 2-LF: Brand: MEDLINE INDUSTRIES, INC.

## (undated) DEVICE — 4.2MM THREE-FLUTED DRILL BIT QC/NEEDLE POINT/145MM-STERILE

## (undated) DEVICE — STRL ALLENTOWN HIP SHOULDER PK: Brand: CARDINAL HEALTH

## (undated) DEVICE — PROXIMATE SKIN STAPLERS (35 WIDE) CONTAINS 35 STAINLESS STEEL STAPLES (FIXED HEAD): Brand: PROXIMATE

## (undated) DEVICE — SUT ETHILON 2-0 FS 18 IN 664H

## (undated) DEVICE — SUT ETHILON 3-0 FSL 30 IN 1671H

## (undated) DEVICE — ASTOUND SURGICAL GOWN, XXX LARGE, X-LONG: Brand: CONVERTORS

## (undated) DEVICE — GLOVE RADIATION SZ 7

## (undated) DEVICE — NEPTUNE E-SEP SMOKE EVACUATION PENCIL, COATED, 70MM BLADE, PUSH BUTTON SWITCH: Brand: NEPTUNE E-SEP

## (undated) DEVICE — GLOVE INDICATOR PI UNDERGLOVE SZ 7.5 BLUE

## (undated) DEVICE — OCCLUSIVE GAUZE STRIP,3% BISMUTH TRIBROMOPHENATE IN PETROLATUM BLEND: Brand: XEROFORM

## (undated) DEVICE — GAUZE SPONGES,16 PLY: Brand: CURITY

## (undated) DEVICE — DRESSING MEPILEX AG BORDER 4 X 8 IN

## (undated) DEVICE — CAST PADDING 4 IN SYNTHETIC NON-STRL

## (undated) DEVICE — SCD SEQUENTIAL COMPRESSION COMFORT SLEEVE MEDIUM KNEE LENGTH: Brand: KENDALL SCD

## (undated) DEVICE — NON-STERILE REUSABLE TOURNIQUET CUFF SINGLE BLADDER, DUAL PORT AND QUICK CONNECT CONNECTOR: Brand: COLOR CUFF

## (undated) DEVICE — SUT PDS II 2-0 CT-1 27 IN Z339H

## (undated) DEVICE — DRESSING MEPILEX AG BORDER POST-OP 4 X 6 IN

## (undated) DEVICE — DRAPE C-ARMOUR

## (undated) DEVICE — GLOVE INDICATOR PI UNDERGLOVE SZ 7 BLUE

## (undated) DEVICE — PLUMEPEN PRO 10FT